# Patient Record
Sex: MALE | Race: OTHER | HISPANIC OR LATINO | URBAN - METROPOLITAN AREA
[De-identification: names, ages, dates, MRNs, and addresses within clinical notes are randomized per-mention and may not be internally consistent; named-entity substitution may affect disease eponyms.]

---

## 2017-04-02 ENCOUNTER — EMERGENCY (EMERGENCY)
Facility: HOSPITAL | Age: 32
LOS: 1 days | Discharge: HOME | End: 2017-04-02
Attending: EMERGENCY MEDICINE | Admitting: EMERGENCY MEDICINE
Payer: MEDICAID

## 2017-04-02 VITALS
HEART RATE: 65 BPM | HEIGHT: 66 IN | OXYGEN SATURATION: 97 % | RESPIRATION RATE: 18 BRPM | WEIGHT: 141.98 LBS | TEMPERATURE: 98 F | DIASTOLIC BLOOD PRESSURE: 75 MMHG | SYSTOLIC BLOOD PRESSURE: 110 MMHG

## 2017-04-02 DIAGNOSIS — S69.92XA UNSPECIFIED INJURY OF LEFT WRIST, HAND AND FINGER(S), INITIAL ENCOUNTER: ICD-10-CM
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
## 2017-04-02 DIAGNOSIS — Z88.0 ALLERGY STATUS TO PENICILLIN: ICD-10-CM
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
## 2017-04-02 DIAGNOSIS — Z79.1 LONG TERM (CURRENT) USE OF NON-STEROIDAL ANTI-INFLAMMATORIES (NSAID): ICD-10-CM

## 2017-04-02 DIAGNOSIS — Z79.891 LONG TERM (CURRENT) USE OF OPIATE ANALGESIC: ICD-10-CM

## 2017-04-02 DIAGNOSIS — Z79.899 OTHER LONG TERM (CURRENT) DRUG THERAPY: ICD-10-CM
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
## 2017-04-02 DIAGNOSIS — Z79.2 LONG TERM (CURRENT) USE OF ANTIBIOTICS: ICD-10-CM

## 2017-04-02 PROCEDURE — 73140 X-RAY EXAM OF FINGER(S): CPT | Mod: 26,LT

## 2017-04-02 PROCEDURE — 99283 EMERGENCY DEPT VISIT LOW MDM: CPT | Mod: 25

## 2017-04-02 PROCEDURE — 73140 X-RAY EXAM OF FINGER(S): CPT

## 2017-04-02 NOTE — ED PROVIDER NOTE - MUSCULOSKELETAL, MLM
L hand: + min swelling over 3rd PIP, + tend over PIP, FROM of PIP and DIP joints, no breaks in skin, no tend over metacarpals, no snuff box tend, + light touch, radial pulse 2+, cap refill < 2 sec
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
## 2017-04-02 NOTE — ED ADULT TRIAGE NOTE - CHIEF COMPLAINT QUOTE
" I noticed my finger has deformity." Pt C/O pain with movement to left 3rd finger x 1wk. Pt is able to move the finger.

## 2017-10-27 ENCOUNTER — EMERGENCY (EMERGENCY)
Facility: HOSPITAL | Age: 32
LOS: 1 days | Discharge: HOME | End: 2017-10-27
Admitting: EMERGENCY MEDICINE
Payer: MEDICAID

## 2017-10-27 VITALS
RESPIRATION RATE: 18 BRPM | HEART RATE: 67 BPM | HEIGHT: 66 IN | WEIGHT: 141.98 LBS | SYSTOLIC BLOOD PRESSURE: 100 MMHG | TEMPERATURE: 98 F | DIASTOLIC BLOOD PRESSURE: 71 MMHG | OXYGEN SATURATION: 98 %

## 2017-10-27 DIAGNOSIS — Z79.2 LONG TERM (CURRENT) USE OF ANTIBIOTICS: ICD-10-CM

## 2017-10-27 DIAGNOSIS — Z79.891 LONG TERM (CURRENT) USE OF OPIATE ANALGESIC: ICD-10-CM

## 2017-10-27 DIAGNOSIS — Z79.1 LONG TERM (CURRENT) USE OF NON-STEROIDAL ANTI-INFLAMMATORIES (NSAID): ICD-10-CM

## 2017-10-27 DIAGNOSIS — S20.211A CONTUSION OF RIGHT FRONT WALL OF THORAX, INITIAL ENCOUNTER: ICD-10-CM
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
## 2017-10-27 DIAGNOSIS — S70.01XA CONTUSION OF RIGHT HIP, INITIAL ENCOUNTER: ICD-10-CM

## 2017-10-27 DIAGNOSIS — B20 HUMAN IMMUNODEFICIENCY VIRUS [HIV] DISEASE: ICD-10-CM

## 2017-10-27 DIAGNOSIS — M25.551 PAIN IN RIGHT HIP: ICD-10-CM

## 2017-10-27 DIAGNOSIS — Z88.0 ALLERGY STATUS TO PENICILLIN: ICD-10-CM
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
## 2017-10-27 PROCEDURE — 93005 ELECTROCARDIOGRAM TRACING: CPT

## 2017-10-27 PROCEDURE — 99284 EMERGENCY DEPT VISIT MOD MDM: CPT | Mod: 25

## 2017-10-27 PROCEDURE — 71045 X-RAY EXAM CHEST 1 VIEW: CPT

## 2017-10-27 PROCEDURE — 93010 ELECTROCARDIOGRAM REPORT: CPT

## 2017-10-27 PROCEDURE — 71101 X-RAY EXAM UNILAT RIBS/CHEST: CPT | Mod: 26,RT

## 2017-10-27 PROCEDURE — 73502 X-RAY EXAM HIP UNI 2-3 VIEWS: CPT | Mod: 26,RT

## 2017-10-27 PROCEDURE — 73502 X-RAY EXAM HIP UNI 2-3 VIEWS: CPT

## 2017-10-27 PROCEDURE — 71100 X-RAY EXAM RIBS UNI 2 VIEWS: CPT

## 2017-10-27 RX ORDER — OXYCODONE AND ACETAMINOPHEN 5; 325 MG/1; MG/1
1 TABLET ORAL ONCE
Qty: 0 | Refills: 0 | Status: DISCONTINUED | OUTPATIENT
Start: 2017-10-27 | End: 2017-10-27

## 2017-10-27 RX ORDER — IBUPROFEN 200 MG
1 TABLET ORAL
Qty: 30 | Refills: 0 | OUTPATIENT
Start: 2017-10-27 | End: 2017-11-06

## 2017-10-27 RX ORDER — ACETAMINOPHEN WITH CODEINE 300MG-30MG
1 TABLET ORAL
Qty: 9 | Refills: 0 | OUTPATIENT
Start: 2017-10-27 | End: 2017-10-30

## 2017-10-27 RX ADMIN — OXYCODONE AND ACETAMINOPHEN 1 TABLET(S): 5; 325 TABLET ORAL at 19:50

## 2017-10-27 NOTE — ED PROVIDER NOTE - DIAGNOSTIC INTERPRETATION
CHEST XRAY INTERPRETATION: lungs clear, heart shadow normal, bony structures intact . no pneumothorax  ribs: no fx  x-ray hip/pelvis: no fx or dislocation

## 2017-10-27 NOTE — ED PROVIDER NOTE - RESPIRATORY, MLM
Breath sounds clear and equal bilaterally. + tenderness to right lower ribs. no bruising or redness. no deformity. equal expansion b/l
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
## 2017-10-27 NOTE — ED PROVIDER NOTE - MUSCULOSKELETAL, MLM
Spine appears normal, range of motion is not limited, + tenderness to lateral right hip.  no deformity. FROM. distal NVI. remainder of RLE non tender.

## 2017-11-07 ENCOUNTER — EMERGENCY (EMERGENCY)
Facility: HOSPITAL | Age: 32
LOS: 1 days | Discharge: HOME | End: 2017-11-07
Admitting: EMERGENCY MEDICINE
Payer: MEDICAID

## 2017-11-07 VITALS
HEART RATE: 76 BPM | HEIGHT: 66 IN | RESPIRATION RATE: 18 BRPM | OXYGEN SATURATION: 98 % | TEMPERATURE: 99 F | SYSTOLIC BLOOD PRESSURE: 111 MMHG | DIASTOLIC BLOOD PRESSURE: 74 MMHG | WEIGHT: 145.95 LBS

## 2017-11-07 DIAGNOSIS — Z79.891 LONG TERM (CURRENT) USE OF OPIATE ANALGESIC: ICD-10-CM

## 2017-11-07 DIAGNOSIS — H57.11 OCULAR PAIN, RIGHT EYE: ICD-10-CM

## 2017-11-07 DIAGNOSIS — Z79.2 LONG TERM (CURRENT) USE OF ANTIBIOTICS: ICD-10-CM

## 2017-11-07 DIAGNOSIS — Z88.0 ALLERGY STATUS TO PENICILLIN: ICD-10-CM

## 2017-11-07 DIAGNOSIS — H00.032 ABSCESS OF RIGHT LOWER EYELID: ICD-10-CM

## 2017-11-07 DIAGNOSIS — Z79.899 OTHER LONG TERM (CURRENT) DRUG THERAPY: ICD-10-CM

## 2017-11-07 DIAGNOSIS — Z79.1 LONG TERM (CURRENT) USE OF NON-STEROIDAL ANTI-INFLAMMATORIES (NSAID): ICD-10-CM

## 2017-11-07 PROCEDURE — 99283 EMERGENCY DEPT VISIT LOW MDM: CPT | Mod: 25

## 2017-11-07 PROCEDURE — 67700 BLEPHAROTOMY DRG ABSC EYELID: CPT | Mod: E4

## 2017-11-07 PROCEDURE — 67700 BLEPHAROTOMY DRG ABSC EYELID: CPT

## 2017-11-07 RX ORDER — ERYTHROMYCIN BASE 5 MG/GRAM
1 OINTMENT (GRAM) OPHTHALMIC (EYE)
Qty: 1 | Refills: 0 | OUTPATIENT
Start: 2017-11-07 | End: 2017-11-17

## 2017-11-07 NOTE — ED PROVIDER NOTE - OBJECTIVE STATEMENT
pt to ed co pain and swelling to lower lid right eye for few days getting worse no vision change no eye red 8/10 no radiation no alleviating factors onset slow  sharp pain no fever no dizzy no headache no chills no NVD no chest pain no SOB no shakes no aches no other  injury no other complaints

## 2017-11-07 NOTE — ED PROVIDER NOTE - EYES, MLM
Clear bilaterally, pupils equal, round and reactive to light. lower lid large sty with surrounding edema

## 2017-11-07 NOTE — ED ADULT NURSE NOTE - OBJECTIVE STATEMENT
c/o of swelling to upper right x 1 week with tenderness. has little white discharge from right side of eye through out the day.  pt denies any visual changes, denies left eye problem

## 2017-11-07 NOTE — ED PROVIDER NOTE - MEDICAL DECISION MAKING DETAILS
pt with abscess on right eye lid not improving small I&D done pus removed pt to FU eye I have discussed the discharge plan with the patient. The patient agrees with the plan, as discussed.  The patient understands Emergency Department diagnosis is a preliminary diagnosis often based on limited information and that the patient must adhere to the follow-up plan as discussed.  The patient understands that if the symptoms worsen or if prescribed medications do not have the desired/planned effect that the patient may return to the Emergency Department at any time for further evaluation and treatment.

## 2017-11-07 NOTE — ED ADULT TRIAGE NOTE - CHIEF COMPLAINT QUOTE
Pt c/o R eye stye and itching x one week, states he tried warm soaks. Pt states "It would be great if you guys could bust it".

## 2017-11-13 ENCOUNTER — EMERGENCY (EMERGENCY)
Facility: HOSPITAL | Age: 32
LOS: 1 days | Discharge: HOME | End: 2017-11-13
Attending: EMERGENCY MEDICINE | Admitting: EMERGENCY MEDICINE
Payer: MEDICAID

## 2017-11-13 VITALS
HEART RATE: 83 BPM | HEIGHT: 66 IN | OXYGEN SATURATION: 98 % | SYSTOLIC BLOOD PRESSURE: 119 MMHG | WEIGHT: 145.95 LBS | RESPIRATION RATE: 16 BRPM | DIASTOLIC BLOOD PRESSURE: 74 MMHG | TEMPERATURE: 98 F

## 2017-11-13 VITALS
OXYGEN SATURATION: 99 % | DIASTOLIC BLOOD PRESSURE: 70 MMHG | RESPIRATION RATE: 16 BRPM | SYSTOLIC BLOOD PRESSURE: 105 MMHG | TEMPERATURE: 98 F | HEART RATE: 76 BPM

## 2017-11-13 DIAGNOSIS — Z79.899 OTHER LONG TERM (CURRENT) DRUG THERAPY: ICD-10-CM

## 2017-11-13 DIAGNOSIS — R06.02 SHORTNESS OF BREATH: ICD-10-CM

## 2017-11-13 DIAGNOSIS — Z88.0 ALLERGY STATUS TO PENICILLIN: ICD-10-CM

## 2017-11-13 DIAGNOSIS — Z21 ASYMPTOMATIC HUMAN IMMUNODEFICIENCY VIRUS [HIV] INFECTION STATUS: ICD-10-CM

## 2017-11-13 DIAGNOSIS — R05 COUGH: ICD-10-CM

## 2017-11-13 DIAGNOSIS — Z79.891 LONG TERM (CURRENT) USE OF OPIATE ANALGESIC: ICD-10-CM

## 2017-11-13 DIAGNOSIS — Z79.2 LONG TERM (CURRENT) USE OF ANTIBIOTICS: ICD-10-CM

## 2017-11-13 PROCEDURE — 93010 ELECTROCARDIOGRAM REPORT: CPT

## 2017-11-13 PROCEDURE — 99053 MED SERV 10PM-8AM 24 HR FAC: CPT

## 2017-11-13 PROCEDURE — 71020: CPT | Mod: 26

## 2017-11-13 PROCEDURE — 99284 EMERGENCY DEPT VISIT MOD MDM: CPT | Mod: 25

## 2017-11-13 PROCEDURE — 94640 AIRWAY INHALATION TREATMENT: CPT

## 2017-11-13 PROCEDURE — 71046 X-RAY EXAM CHEST 2 VIEWS: CPT

## 2017-11-13 RX ORDER — ALBUTEROL 90 UG/1
2.5 AEROSOL, METERED ORAL ONCE
Qty: 0 | Refills: 0 | Status: COMPLETED | OUTPATIENT
Start: 2017-11-13 | End: 2017-11-13

## 2017-11-13 RX ORDER — ALBUTEROL 90 UG/1
2 AEROSOL, METERED ORAL
Qty: 1 | Refills: 0 | OUTPATIENT
Start: 2017-11-13 | End: 2017-12-13

## 2017-11-13 RX ADMIN — ALBUTEROL 2.5 MILLIGRAM(S): 90 AEROSOL, METERED ORAL at 09:14

## 2017-11-13 RX ADMIN — ALBUTEROL 2.5 MILLIGRAM(S): 90 AEROSOL, METERED ORAL at 08:47

## 2017-11-13 NOTE — ED ADULT TRIAGE NOTE - ARRIVAL INFO ADDITIONAL COMMENTS
Pt c/o sob x 4 days. Respirations unlabored in triage. Pt states he was told there was black mold in his apt and is concerned because he has intermittent sob and cough.

## 2017-11-13 NOTE — ED PROVIDER NOTE - OBJECTIVE STATEMENT
32 yo male with hx of HIV, on meds, unknown viral load but CD4 count 32 yo male with hx of HIV, on meds, unknown viral load but CD4 count in 300s presents with SOB X 4 days. Pt states he was told that there was "black mould" found in his building and is concerned that this is affecting his breathing. Some dry cough. No fevers/ chills/ leg pain or swelling. No CP. NO other complaints.

## 2017-11-13 NOTE — ED PROVIDER NOTE - MEDICAL DECISION MAKING DETAILS
30 yo male with hx of HIV, controlled on meds, presents with SOB X 4 days and concern for possible mould exposure at his place of residence. GIven nebs with improvement of sxs. CXR with NAD. Rx given and pt to f/up outpt.

## 2017-11-13 NOTE — ED ADULT NURSE NOTE - CHPI ED SYMPTOMS NEG
no diaphoresis/no fever/no edema/no headache/no chest pain/no cough/no chills/no wheezing/no hemoptysis/no body aches

## 2017-11-13 NOTE — ED ADULT NURSE NOTE - OBJECTIVE STATEMENT
hx of HIV, c/o " at work and outside I feel fine, but at home I'm waking up with short of breath for one week, my building has black mold , not sure if mold giving me short of breath" pt speaking in full sentences, no SOB noted, denies cough, no fever, no chills, no chest pain hx of HIV, c/o " at work and outside I feel fine, but at home I'm waking up with short of breath for one week, my building has black mold , not sure if mold giving me short of breath" pt speaking in full sentences, no SOB noted, denies cough, no fever, no chills, no chest pain. pt observed playing  with his phone, no s/s of distress noted. EKG in progress by DENY Metzger

## 2017-11-13 NOTE — ED ADULT NURSE NOTE - DISCHARGE TEACHING
followup with primary doctor, take medication as prescribed. Return to ED if symptoms continue or worsen

## 2018-03-06 ENCOUNTER — EMERGENCY (EMERGENCY)
Facility: HOSPITAL | Age: 33
LOS: 1 days | Discharge: HOME | End: 2018-03-06
Admitting: EMERGENCY MEDICINE
Payer: MEDICAID

## 2018-03-06 VITALS
DIASTOLIC BLOOD PRESSURE: 80 MMHG | RESPIRATION RATE: 16 BRPM | WEIGHT: 156.97 LBS | OXYGEN SATURATION: 99 % | HEART RATE: 72 BPM | TEMPERATURE: 98 F | SYSTOLIC BLOOD PRESSURE: 122 MMHG

## 2018-03-06 DIAGNOSIS — Z79.899 OTHER LONG TERM (CURRENT) DRUG THERAPY: ICD-10-CM

## 2018-03-06 DIAGNOSIS — Z79.2 LONG TERM (CURRENT) USE OF ANTIBIOTICS: ICD-10-CM

## 2018-03-06 DIAGNOSIS — R05 COUGH: ICD-10-CM

## 2018-03-06 DIAGNOSIS — J06.9 ACUTE UPPER RESPIRATORY INFECTION, UNSPECIFIED: ICD-10-CM

## 2018-03-06 DIAGNOSIS — Z88.0 ALLERGY STATUS TO PENICILLIN: ICD-10-CM

## 2018-03-06 DIAGNOSIS — Z79.891 LONG TERM (CURRENT) USE OF OPIATE ANALGESIC: ICD-10-CM

## 2018-03-06 DIAGNOSIS — Z79.1 LONG TERM (CURRENT) USE OF NON-STEROIDAL ANTI-INFLAMMATORIES (NSAID): ICD-10-CM

## 2018-03-06 LAB
ALBUMIN SERPL ELPH-MCNC: 4.2 G/DL — SIGNIFICANT CHANGE UP (ref 3.3–5)
ALP SERPL-CCNC: 78 U/L — SIGNIFICANT CHANGE UP (ref 40–120)
ALT FLD-CCNC: 28 U/L — SIGNIFICANT CHANGE UP (ref 10–45)
ANION GAP SERPL CALC-SCNC: 11 MMOL/L — SIGNIFICANT CHANGE UP (ref 5–17)
AST SERPL-CCNC: 44 U/L — HIGH (ref 10–40)
BASOPHILS NFR BLD AUTO: 0.4 % — SIGNIFICANT CHANGE UP (ref 0–2)
BILIRUB SERPL-MCNC: <0.2 MG/DL — SIGNIFICANT CHANGE UP (ref 0.2–1.2)
BUN SERPL-MCNC: 16 MG/DL — SIGNIFICANT CHANGE UP (ref 7–23)
CALCIUM SERPL-MCNC: 9.2 MG/DL — SIGNIFICANT CHANGE UP (ref 8.4–10.5)
CHLORIDE SERPL-SCNC: 104 MMOL/L — SIGNIFICANT CHANGE UP (ref 96–108)
CO2 SERPL-SCNC: 25 MMOL/L — SIGNIFICANT CHANGE UP (ref 22–31)
CREAT SERPL-MCNC: 0.9 MG/DL — SIGNIFICANT CHANGE UP (ref 0.5–1.3)
EOSINOPHIL NFR BLD AUTO: 1.1 % — SIGNIFICANT CHANGE UP (ref 0–6)
GLUCOSE SERPL-MCNC: 98 MG/DL — SIGNIFICANT CHANGE UP (ref 70–99)
HCT VFR BLD CALC: 39.6 % — SIGNIFICANT CHANGE UP (ref 39–50)
HGB BLD-MCNC: 13.2 G/DL — SIGNIFICANT CHANGE UP (ref 13–17)
LYMPHOCYTES # BLD AUTO: 38.2 % — SIGNIFICANT CHANGE UP (ref 13–44)
MCHC RBC-ENTMCNC: 30 PG — SIGNIFICANT CHANGE UP (ref 27–34)
MCHC RBC-ENTMCNC: 33.3 G/DL — SIGNIFICANT CHANGE UP (ref 32–36)
MCV RBC AUTO: 90 FL — SIGNIFICANT CHANGE UP (ref 80–100)
MONOCYTES NFR BLD AUTO: 10.9 % — SIGNIFICANT CHANGE UP (ref 2–14)
NEUTROPHILS NFR BLD AUTO: 49.4 % — SIGNIFICANT CHANGE UP (ref 43–77)
PLATELET # BLD AUTO: 271 K/UL — SIGNIFICANT CHANGE UP (ref 150–400)
POTASSIUM SERPL-MCNC: 4.6 MMOL/L — SIGNIFICANT CHANGE UP (ref 3.5–5.3)
POTASSIUM SERPL-SCNC: 4.6 MMOL/L — SIGNIFICANT CHANGE UP (ref 3.5–5.3)
PROT SERPL-MCNC: 8.1 G/DL — SIGNIFICANT CHANGE UP (ref 6–8.3)
RAPID RVP RESULT: SIGNIFICANT CHANGE UP
RBC # BLD: 4.4 M/UL — SIGNIFICANT CHANGE UP (ref 4.2–5.8)
RBC # FLD: 14.9 % — SIGNIFICANT CHANGE UP (ref 10.3–16.9)
SODIUM SERPL-SCNC: 140 MMOL/L — SIGNIFICANT CHANGE UP (ref 135–145)
WBC # BLD: 5.5 K/UL — SIGNIFICANT CHANGE UP (ref 3.8–10.5)
WBC # FLD AUTO: 5.5 K/UL — SIGNIFICANT CHANGE UP (ref 3.8–10.5)

## 2018-03-06 PROCEDURE — 87486 CHLMYD PNEUM DNA AMP PROBE: CPT

## 2018-03-06 PROCEDURE — 36415 COLL VENOUS BLD VENIPUNCTURE: CPT

## 2018-03-06 PROCEDURE — 71046 X-RAY EXAM CHEST 2 VIEWS: CPT

## 2018-03-06 PROCEDURE — 99284 EMERGENCY DEPT VISIT MOD MDM: CPT | Mod: 25

## 2018-03-06 PROCEDURE — 71046 X-RAY EXAM CHEST 2 VIEWS: CPT | Mod: 26

## 2018-03-06 PROCEDURE — 87581 M.PNEUMON DNA AMP PROBE: CPT

## 2018-03-06 PROCEDURE — 80053 COMPREHEN METABOLIC PANEL: CPT

## 2018-03-06 PROCEDURE — 93010 ELECTROCARDIOGRAM REPORT: CPT

## 2018-03-06 PROCEDURE — 96375 TX/PRO/DX INJ NEW DRUG ADDON: CPT

## 2018-03-06 PROCEDURE — 87798 DETECT AGENT NOS DNA AMP: CPT

## 2018-03-06 PROCEDURE — 87633 RESP VIRUS 12-25 TARGETS: CPT

## 2018-03-06 PROCEDURE — 96374 THER/PROPH/DIAG INJ IV PUSH: CPT

## 2018-03-06 PROCEDURE — 85025 COMPLETE CBC W/AUTO DIFF WBC: CPT

## 2018-03-06 PROCEDURE — 93005 ELECTROCARDIOGRAM TRACING: CPT

## 2018-03-06 RX ORDER — ONDANSETRON 8 MG/1
4 TABLET, FILM COATED ORAL ONCE
Qty: 0 | Refills: 0 | Status: COMPLETED | OUTPATIENT
Start: 2018-03-06 | End: 2018-03-06

## 2018-03-06 RX ORDER — AZITHROMYCIN 500 MG/1
1 TABLET, FILM COATED ORAL
Qty: 6 | Refills: 0 | OUTPATIENT
Start: 2018-03-06 | End: 2018-03-10

## 2018-03-06 RX ORDER — KETOROLAC TROMETHAMINE 30 MG/ML
30 SYRINGE (ML) INJECTION ONCE
Qty: 0 | Refills: 0 | Status: DISCONTINUED | OUTPATIENT
Start: 2018-03-06 | End: 2018-03-06

## 2018-03-06 RX ORDER — ONDANSETRON 8 MG/1
1 TABLET, FILM COATED ORAL
Qty: 9 | Refills: 0 | OUTPATIENT
Start: 2018-03-06 | End: 2018-03-08

## 2018-03-06 RX ORDER — METOCLOPRAMIDE HCL 10 MG
10 TABLET ORAL ONCE
Qty: 0 | Refills: 0 | Status: COMPLETED | OUTPATIENT
Start: 2018-03-06 | End: 2018-03-06

## 2018-03-06 RX ORDER — SODIUM CHLORIDE 9 MG/ML
1000 INJECTION INTRAMUSCULAR; INTRAVENOUS; SUBCUTANEOUS ONCE
Qty: 0 | Refills: 0 | Status: COMPLETED | OUTPATIENT
Start: 2018-03-06 | End: 2018-03-06

## 2018-03-06 RX ORDER — FAMOTIDINE 10 MG/ML
20 INJECTION INTRAVENOUS ONCE
Qty: 0 | Refills: 0 | Status: COMPLETED | OUTPATIENT
Start: 2018-03-06 | End: 2018-03-06

## 2018-03-06 RX ADMIN — ONDANSETRON 4 MILLIGRAM(S): 8 TABLET, FILM COATED ORAL at 19:51

## 2018-03-06 RX ADMIN — Medication 104 MILLIGRAM(S): at 18:34

## 2018-03-06 RX ADMIN — SODIUM CHLORIDE 1000 MILLILITER(S): 9 INJECTION INTRAMUSCULAR; INTRAVENOUS; SUBCUTANEOUS at 18:33

## 2018-03-06 RX ADMIN — Medication 30 MILLIGRAM(S): at 18:34

## 2018-03-06 RX ADMIN — FAMOTIDINE 20 MILLIGRAM(S): 10 INJECTION INTRAVENOUS at 19:52

## 2018-03-06 RX ADMIN — Medication 30 MILLIGRAM(S): at 18:51

## 2018-03-06 NOTE — ED PROVIDER NOTE - ENMT, MLM
Airway patent, Nasal congestion. Mouth with dry mucosa. Throat has no vesicles, no oropharyngeal exudates and uvula is midline. + diffuse cervical lymphadenopathy b/l

## 2018-03-06 NOTE — ED ADULT NURSE NOTE - OBJECTIVE STATEMENT
Pt presents to ED with c/o unproductive cough since Thursday, with sweating/chills and generalized body aches. Denies nausea/vomiting. States he left AMA from a different hospital who told him his EKG and chest x-ray were abnormal. Pt breathing unlabored, afebrile, VSS.

## 2018-03-06 NOTE — ED PROVIDER NOTE - OBJECTIVE STATEMENT
The pt is a 31 y/o M, HIV+ (VL >1000, CD4 300s), who presents to ED c/o cough x 4 d. Pt states cough is dry, non productive, also nasal congestion, body aches, chills, n/v/d. States that was seen at another hosp yest and signed out AMA - stating "they said my xray was very abnormal". Denies sob, dyspnea, hemoptysis, night sweats, weight loss, abd pain, dizziness, syncope, rash

## 2018-03-06 NOTE — ED PROVIDER NOTE - MEDICAL DECISION MAKING DETAILS
well appearing, non toxic pt w/cough x 4d, nasal congestion and n/v/d - benign abd, seen at another hosp and told that had abnormal cxr - however cxr clear today (discussed w/radiology), HIV+ but normal labs, symptomatically improved w/meds and ivf, will dc w/prn anti emetics, cough meds and will cover w/z pack since immunocompromised, to f/u w/his pmd (out of [facility]), pt understands and agrees w/plan

## 2018-07-31 ENCOUNTER — INPATIENT (INPATIENT)
Facility: HOSPITAL | Age: 33
LOS: 12 days | Discharge: HOME | DRG: 885 | End: 2018-08-13
Attending: STUDENT IN AN ORGANIZED HEALTH CARE EDUCATION/TRAINING PROGRAM | Admitting: STUDENT IN AN ORGANIZED HEALTH CARE EDUCATION/TRAINING PROGRAM
Payer: MEDICAID

## 2018-07-31 VITALS
SYSTOLIC BLOOD PRESSURE: 111 MMHG | WEIGHT: 128.97 LBS | OXYGEN SATURATION: 97 % | DIASTOLIC BLOOD PRESSURE: 73 MMHG | TEMPERATURE: 98 F | HEART RATE: 79 BPM | RESPIRATION RATE: 18 BRPM

## 2018-07-31 DIAGNOSIS — F33.3 MAJOR DEPRESSIVE DISORDER, RECURRENT, SEVERE WITH PSYCHOTIC SYMPTOMS: ICD-10-CM

## 2018-07-31 LAB
ALBUMIN SERPL ELPH-MCNC: 3.8 G/DL — SIGNIFICANT CHANGE UP (ref 3.3–5)
ALP SERPL-CCNC: 67 U/L — SIGNIFICANT CHANGE UP (ref 40–120)
ALT FLD-CCNC: 14 U/L — SIGNIFICANT CHANGE UP (ref 10–45)
ANION GAP SERPL CALC-SCNC: 12 MMOL/L — SIGNIFICANT CHANGE UP (ref 5–17)
APAP SERPL-MCNC: <15 UG/ML — SIGNIFICANT CHANGE UP (ref 10–30)
APPEARANCE UR: CLEAR — SIGNIFICANT CHANGE UP
AST SERPL-CCNC: 23 U/L — SIGNIFICANT CHANGE UP (ref 10–40)
BASOPHILS NFR BLD AUTO: 0.9 % — SIGNIFICANT CHANGE UP (ref 0–2)
BILIRUB SERPL-MCNC: 0.3 MG/DL — SIGNIFICANT CHANGE UP (ref 0.2–1.2)
BILIRUB UR-MCNC: NEGATIVE — SIGNIFICANT CHANGE UP
BUN SERPL-MCNC: 14 MG/DL — SIGNIFICANT CHANGE UP (ref 7–23)
CALCIUM SERPL-MCNC: 9.3 MG/DL — SIGNIFICANT CHANGE UP (ref 8.4–10.5)
CHLORIDE SERPL-SCNC: 102 MMOL/L — SIGNIFICANT CHANGE UP (ref 96–108)
CO2 SERPL-SCNC: 26 MMOL/L — SIGNIFICANT CHANGE UP (ref 22–31)
COLOR SPEC: YELLOW — SIGNIFICANT CHANGE UP
CREAT SERPL-MCNC: 0.98 MG/DL — SIGNIFICANT CHANGE UP (ref 0.5–1.3)
DIFF PNL FLD: NEGATIVE — SIGNIFICANT CHANGE UP
EOSINOPHIL NFR BLD AUTO: 2.1 % — SIGNIFICANT CHANGE UP (ref 0–6)
ETHANOL SERPL-MCNC: <10 MG/DL — SIGNIFICANT CHANGE UP (ref 0–10)
GLUCOSE SERPL-MCNC: 81 MG/DL — SIGNIFICANT CHANGE UP (ref 70–99)
GLUCOSE UR QL: NEGATIVE — SIGNIFICANT CHANGE UP
HCT VFR BLD CALC: 38.8 % — LOW (ref 39–50)
HGB BLD-MCNC: 12.6 G/DL — LOW (ref 13–17)
KETONES UR-MCNC: NEGATIVE — SIGNIFICANT CHANGE UP
LEUKOCYTE ESTERASE UR-ACNC: NEGATIVE — SIGNIFICANT CHANGE UP
LYMPHOCYTES # BLD AUTO: 45.5 % — HIGH (ref 13–44)
MCHC RBC-ENTMCNC: 28.7 PG — SIGNIFICANT CHANGE UP (ref 27–34)
MCHC RBC-ENTMCNC: 32.5 G/DL — SIGNIFICANT CHANGE UP (ref 32–36)
MCV RBC AUTO: 88.4 FL — SIGNIFICANT CHANGE UP (ref 80–100)
MONOCYTES NFR BLD AUTO: 13.8 % — SIGNIFICANT CHANGE UP (ref 2–14)
NEUTROPHILS NFR BLD AUTO: 37.7 % — LOW (ref 43–77)
NITRITE UR-MCNC: NEGATIVE — SIGNIFICANT CHANGE UP
PCP SPEC-MCNC: SIGNIFICANT CHANGE UP
PH UR: 5.5 — SIGNIFICANT CHANGE UP (ref 5–8)
PLATELET # BLD AUTO: 294 K/UL — SIGNIFICANT CHANGE UP (ref 150–400)
POTASSIUM SERPL-MCNC: 4.2 MMOL/L — SIGNIFICANT CHANGE UP (ref 3.5–5.3)
POTASSIUM SERPL-SCNC: 4.2 MMOL/L — SIGNIFICANT CHANGE UP (ref 3.5–5.3)
PROT SERPL-MCNC: 8 G/DL — SIGNIFICANT CHANGE UP (ref 6–8.3)
PROT UR-MCNC: NEGATIVE MG/DL — SIGNIFICANT CHANGE UP
RBC # BLD: 4.39 M/UL — SIGNIFICANT CHANGE UP (ref 4.2–5.8)
RBC # FLD: 13.2 % — SIGNIFICANT CHANGE UP (ref 10.3–16.9)
SALICYLATES SERPL-MCNC: <0.3 MG/DL — LOW (ref 2.8–20)
SODIUM SERPL-SCNC: 140 MMOL/L — SIGNIFICANT CHANGE UP (ref 135–145)
SP GR SPEC: 1.02 — SIGNIFICANT CHANGE UP (ref 1–1.03)
UROBILINOGEN FLD QL: 0.2 E.U./DL — SIGNIFICANT CHANGE UP
WBC # BLD: 3.3 K/UL — LOW (ref 3.8–10.5)
WBC # FLD AUTO: 3.3 K/UL — LOW (ref 3.8–10.5)

## 2018-07-31 PROCEDURE — 99285 EMERGENCY DEPT VISIT HI MDM: CPT

## 2018-07-31 RX ORDER — NICOTINE POLACRILEX 2 MG
2 GUM BUCCAL EVERY 4 HOURS
Qty: 0 | Refills: 0 | Status: DISCONTINUED | OUTPATIENT
Start: 2018-07-31 | End: 2018-08-01

## 2018-07-31 RX ORDER — HALOPERIDOL DECANOATE 100 MG/ML
5 INJECTION INTRAMUSCULAR EVERY 6 HOURS
Qty: 0 | Refills: 0 | Status: DISCONTINUED | OUTPATIENT
Start: 2018-07-31 | End: 2018-08-13

## 2018-07-31 RX ORDER — NICOTINE POLACRILEX 2 MG
1 GUM BUCCAL DAILY
Qty: 0 | Refills: 0 | Status: DISCONTINUED | OUTPATIENT
Start: 2018-07-31 | End: 2018-08-13

## 2018-07-31 RX ORDER — ACETAMINOPHEN 500 MG
650 TABLET ORAL EVERY 6 HOURS
Qty: 0 | Refills: 0 | Status: DISCONTINUED | OUTPATIENT
Start: 2018-07-31 | End: 2018-08-13

## 2018-07-31 RX ORDER — DIPHENHYDRAMINE HCL 50 MG
50 CAPSULE ORAL EVERY 6 HOURS
Qty: 0 | Refills: 0 | Status: DISCONTINUED | OUTPATIENT
Start: 2018-07-31 | End: 2018-08-13

## 2018-07-31 RX ORDER — BACITRACIN ZINC 500 UNIT/G
1 OINTMENT IN PACKET (EA) TOPICAL
Qty: 0 | Refills: 0 | Status: DISCONTINUED | OUTPATIENT
Start: 2018-07-31 | End: 2018-08-13

## 2018-07-31 RX ORDER — BACITRACIN ZINC 500 UNIT/G
1 OINTMENT IN PACKET (EA) TOPICAL ONCE
Qty: 0 | Refills: 0 | Status: COMPLETED | OUTPATIENT
Start: 2018-07-31 | End: 2018-07-31

## 2018-07-31 RX ORDER — RISPERIDONE 4 MG/1
1 TABLET ORAL ONCE
Qty: 0 | Refills: 0 | Status: COMPLETED | OUTPATIENT
Start: 2018-07-31 | End: 2018-07-31

## 2018-07-31 RX ORDER — TETANUS TOXOID, REDUCED DIPHTHERIA TOXOID AND ACELLULAR PERTUSSIS VACCINE, ADSORBED 5; 2.5; 8; 8; 2.5 [IU]/.5ML; [IU]/.5ML; UG/.5ML; UG/.5ML; UG/.5ML
0.5 SUSPENSION INTRAMUSCULAR ONCE
Qty: 0 | Refills: 0 | Status: COMPLETED | OUTPATIENT
Start: 2018-07-31 | End: 2018-07-31

## 2018-07-31 RX ADMIN — Medication 50 MILLIGRAM(S): at 22:36

## 2018-07-31 RX ADMIN — Medication 1 APPLICATION(S): at 20:19

## 2018-07-31 RX ADMIN — TETANUS TOXOID, REDUCED DIPHTHERIA TOXOID AND ACELLULAR PERTUSSIS VACCINE, ADSORBED 0.5 MILLILITER(S): 5; 2.5; 8; 8; 2.5 SUSPENSION INTRAMUSCULAR at 20:20

## 2018-07-31 RX ADMIN — RISPERIDONE 1 MILLIGRAM(S): 4 TABLET ORAL at 22:37

## 2018-07-31 NOTE — ED PROVIDER NOTE - CARE PLAN
Principal Discharge DX:	Suicide attempt  Secondary Diagnosis:	HIV (human immunodeficiency virus infection)

## 2018-07-31 NOTE — ED BEHAVIORAL HEALTH ASSESSMENT NOTE - SUMMARY
This is a 32 year old man, domiciled alone, with a past psychiatric history of major depressive disorder with psychotic features, multiple hospitalizations, multiple suicide attempts including via hanging at age 17, not currently in outpatient care, who presents after attempting suicide tonight in the context of worsening depression and psychosis. The patient is at imminent risk of harm to himself and requires inpatient hospitalization for safety and stabilization.

## 2018-07-31 NOTE — ED ADULT NURSE REASSESSMENT NOTE - NS ED NURSE REASSESS COMMENT FT1
report was called as noted, teletracking entered
spoke with Dr. Harding, states doesn't need coags
report was received from off-going RN, NAD at present, sitter at bedside, medicated as noted, lenny. well, assessment on-going, see constant observation record

## 2018-07-31 NOTE — ED BEHAVIORAL HEALTH ASSESSMENT NOTE - OTHER PAST PSYCHIATRIC HISTORY (INCLUDE DETAILS REGARDING ONSET, COURSE OF ILLNESS, INPATIENT/OUTPATIENT TREATMENT)
Multiple hospitalizations, multiple suicide attempts, on many medications, patient does not recall names

## 2018-07-31 NOTE — ED PROVIDER NOTE - PHYSICAL EXAMINATION
VITAL SIGNS: I have reviewed nursing notes and confirm.  CONSTITUTIONAL: Well-developed; well-nourished; in no acute distress.  SKIN: Agree with RN documentation regarding decubitus evaluation. Remainder of skin exam is warm and dry, no acute rash.  HEAD: Normocephalic; atraumatic.  EYES: PERRL, EOM intact; conjunctiva and sclera clear.  ENT: No nasal discharge; airway clear.  NECK: Supple; non tender, + linear abrasions over anterior neck w/out active bleeding or foreign body, no bruit or hematoma/swelling  CARD: S1, S2 normal; no murmurs, gallops, or rubs. Regular rate and rhythm.  RESP: No wheezes, rales or rhonchi.  ABD: Normal bowel sounds; soft; non-distended; non-tender; no hepatosplenomegaly.  EXT: Normal ROM. No clubbing, cyanosis or edema.  LYMPH: No acute cervical adenopathy.  NEURO: Alert, oriented. Grossly unremarkable.  PSYCH: Cooperative, dysphoric/flat affect, appears to be responding to internal stimuli at times.
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
## 2018-07-31 NOTE — ED ADULT NURSE NOTE - OTHER
not taking meds for 2 months - family dropped him off for treatment--- a lot of family responsibil;ity

## 2018-07-31 NOTE — ED BEHAVIORAL HEALTH ASSESSMENT NOTE - SUICIDE RISK FACTORS
Highly impulsive behavior/None Known/Hopelessness/Perceived burden on family and others/Unable to engage in safety planning/Mood episode/Anhedonia

## 2018-07-31 NOTE — ED ADULT NURSE NOTE - OBJECTIVE STATEMENT
"cutting"  attempt at dinner with family --auditory hallucinations -- "telling me to stop" but does not know what -- denies physical symptoms - states has "psychosis" and stopped taking meds 2 months ago because "ran out" ; knows he needs his meds- wants to be evaluated and admitted for treatment ;

## 2018-07-31 NOTE — ED BEHAVIORAL HEALTH ASSESSMENT NOTE - RISK ASSESSMENT
Patient at acute and imminent risk of harm to himself due to suicide attempt tonight, command auditory hallucinations, active suicidal ideation, past attempts, and acute psychosis.

## 2018-07-31 NOTE — ED BEHAVIORAL HEALTH ASSESSMENT NOTE - PSYCHIATRIC ISSUES AND PLAN (INCLUDE STANDING AND PRN MEDICATION)
will order PRN medications for agitation- patient received Risperdal for auditory hallucinations while in ED

## 2018-07-31 NOTE — ED ADULT NURSE NOTE - NSSISCREENINGSIGNS_ED_A_ED
past suicide attempts/ family/perceived burden to family/talking about suicide/non-complaint with treatments

## 2018-07-31 NOTE — ED ADULT NURSE NOTE - HPI (INCLUDE ILLNESS QUALITY, SEVERITY, DURATION, TIMING, CONTEXT, MODIFYING FACTORS, ASSOCIATED SIGNS AND SYMPTOMS)
psychosis? hallucination, SI and attempt at cutting neck; this episode started today - client is open with conversation and cooperative ; maintained on constant observation since arrival

## 2018-07-31 NOTE — ED ADULT NURSE NOTE - NSIMPLEMENTINTERV_GEN_ALL_ED
Implemented All Universal Safety Interventions:  [place] to call system. Call bell, personal items and telephone within reach. Instruct patient to call for assistance. Room bathroom lighting operational. Non-slip footwear when patient is off stretcher. Physically safe environment: no spills, clutter or unnecessary equipment. Stretcher in lowest position, wheels locked, appropriate side rails in place.

## 2018-07-31 NOTE — ED PROVIDER NOTE - OBJECTIVE STATEMENT
33 y/o M w/psych hx (schizophrenia? MDD?) w/several suicide attempts in the past and hospitalizations, last hospitalization 1yr ago, not actively on psych meds, p/w suicide attempt cutting his neck with a kitchen knife while at dinner with his family. Doesn't remember his actions. States he heard voices in his head urging him to kill himself. No VH or HI. Denies substance abuse. No medical complaints at this time or active bleeding from small abrasion on neck.

## 2018-07-31 NOTE — ED BEHAVIORAL HEALTH ASSESSMENT NOTE - DETAILS
spoke with SKYE Stevens on [facility] Collaborated with Dr. Finley actively wants to die by cutting father had psychosis irritation and erythema/superficial lacerations R neck Saying "STOP!"

## 2018-08-01 DIAGNOSIS — B20 HUMAN IMMUNODEFICIENCY VIRUS [HIV] DISEASE: ICD-10-CM

## 2018-08-01 DIAGNOSIS — F19.90 OTHER PSYCHOACTIVE SUBSTANCE USE, UNSPECIFIED, UNCOMPLICATED: ICD-10-CM

## 2018-08-01 DIAGNOSIS — Z72.0 TOBACCO USE: ICD-10-CM

## 2018-08-01 LAB
CHOLEST SERPL-MCNC: 165 MG/DL — SIGNIFICANT CHANGE UP (ref 10–199)
CULTURE RESULTS: NO GROWTH — SIGNIFICANT CHANGE UP
HBA1C BLD-MCNC: 5.6 % — SIGNIFICANT CHANGE UP (ref 4–5.6)
HDLC SERPL-MCNC: 43 MG/DL — SIGNIFICANT CHANGE UP (ref 40–125)
LIPID PNL WITH DIRECT LDL SERPL: 95 MG/DL — SIGNIFICANT CHANGE UP
SPECIMEN SOURCE: SIGNIFICANT CHANGE UP
T PALLIDUM AB TITR SER: NEGATIVE — SIGNIFICANT CHANGE UP
TOTAL CHOLESTEROL/HDL RATIO MEASUREMENT: 3.8 RATIO — SIGNIFICANT CHANGE UP (ref 3.4–9.6)
TRIGL SERPL-MCNC: 134 MG/DL — SIGNIFICANT CHANGE UP (ref 10–149)

## 2018-08-01 PROCEDURE — 99223 1ST HOSP IP/OBS HIGH 75: CPT

## 2018-08-01 RX ORDER — NICOTINE POLACRILEX 2 MG
2 GUM BUCCAL
Qty: 0 | Refills: 0 | Status: DISCONTINUED | OUTPATIENT
Start: 2018-08-01 | End: 2018-08-13

## 2018-08-01 RX ORDER — HALOPERIDOL DECANOATE 100 MG/ML
5 INJECTION INTRAMUSCULAR
Qty: 0 | Refills: 0 | Status: DISCONTINUED | OUTPATIENT
Start: 2018-08-01 | End: 2018-08-02

## 2018-08-01 RX ORDER — SERTRALINE 25 MG/1
50 TABLET, FILM COATED ORAL DAILY
Qty: 0 | Refills: 0 | Status: DISCONTINUED | OUTPATIENT
Start: 2018-08-01 | End: 2018-08-03

## 2018-08-01 RX ADMIN — HALOPERIDOL DECANOATE 5 MILLIGRAM(S): 100 INJECTION INTRAMUSCULAR at 22:06

## 2018-08-01 RX ADMIN — HALOPERIDOL DECANOATE 5 MILLIGRAM(S): 100 INJECTION INTRAMUSCULAR at 11:10

## 2018-08-01 RX ADMIN — Medication 50 MILLIGRAM(S): at 22:05

## 2018-08-01 RX ADMIN — Medication 1 PATCH: at 11:05

## 2018-08-01 RX ADMIN — Medication 1 PATCH: at 11:08

## 2018-08-01 NOTE — BEHAVIORAL HEALTH ASSESSMENT NOTE - NSBHADMITIPSTRENGTH_PSY_A_CORE
Intelligent/Creative/Attempting to realize his/her potential/Has access to housing/residential stability
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
## 2018-08-01 NOTE — BEHAVIORAL HEALTH ASSESSMENT NOTE - NSBHADMITCOUNSEL_PSY_A_CORE
diagnostic results/impressions and/or recommended studies/importance of adherence to chosen treatment/risks and benefits of treatment options/client/family/caregiver education/instructions for management, treatment and follow up/risk factor reduction

## 2018-08-01 NOTE — BEHAVIORAL HEALTH ASSESSMENT NOTE - NSBHSUICRISKFACTOR_PSY_A_CORE
Perceived burden on family and others/Global insomnia/Substance abuse/dependence/History of abuse/trauma/Family history of suicide/Chronic pain or acute medical issue/Hopelessness/Mood episode/Command hallucinations to hurt self/Anhedonia

## 2018-08-01 NOTE — BEHAVIORAL HEALTH ASSESSMENT NOTE - NSBHCHARTREVIEWLAB_PSY_A_CORE FT
12.6                 140  | 26   | 14           3.3   >-----------< 294     ------------------------< 81                    38.8                 4.2  | 102  | 0.98                                         Ca 9.3   Mg x     Ph x

## 2018-08-01 NOTE — BEHAVIORAL HEALTH ASSESSMENT NOTE - NSBHMEDSOTHERFT_PSY_A_CORE
Unable to recall Unable to recall, per pharmacy  Tivicay 50 mg  Olanzapine 5 mg  Descovy 200/25  Sertraline 100 mg

## 2018-08-01 NOTE — BEHAVIORAL HEALTH ASSESSMENT NOTE - DESCRIPTION (FIRST USE, LAST USE, QUANTITY, FREQUENCY, DURATION)
2 packs per day hx of use, none recently states that cigarettes were recently laced with cocaine hx of IV use, none recently denies any hx of rehab/detox

## 2018-08-01 NOTE — BEHAVIORAL HEALTH ASSESSMENT NOTE - NSBHCHARTREVIEWVS_PSY_A_CORE FT
Vital Signs Last 24 Hrs  T(C): 36.6 (01 Aug 2018 10:04), Max: 36.7 (31 Jul 2018 19:15)  T(F): 97.9 (01 Aug 2018 10:04), Max: 98.1 (31 Jul 2018 19:15)  HR: 57 (01 Aug 2018 10:04) (57 - 79)  BP: 99/67 (01 Aug 2018 10:04) (99/67 - 112/66)  BP(mean): --  RR: 17 (31 Jul 2018 21:00) (17 - 18)  SpO2: 98% (31 Jul 2018 21:00) (97% - 98%)
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
## 2018-08-02 LAB
4/8 RATIO: 0.49 RATIO — LOW (ref 0.9–3.6)
ABS CD8: 654 /UL — SIGNIFICANT CHANGE UP (ref 136–757)
CD16+CD56+ CELLS NFR BLD: 26 % — HIGH (ref 4–25)
CD16+CD56+ CELLS NFR SPEC: 411 /UL — SIGNIFICANT CHANGE UP (ref 64–494)
CD19 BLASTS SPEC-ACNC: 125 /UL — SIGNIFICANT CHANGE UP (ref 68–528)
CD19 BLASTS SPEC-ACNC: 8 % — SIGNIFICANT CHANGE UP (ref 5–22)
CD3 BLASTS SPEC-ACNC: 1022 /UL — SIGNIFICANT CHANGE UP (ref 799–2171)
CD3 BLASTS SPEC-ACNC: 66 % — SIGNIFICANT CHANGE UP (ref 59–85)
CD4 %: 22 % — LOW (ref 36–65)
CD8 %: 44 % — HIGH (ref 11–36)
FOLATE SERPL-MCNC: 8 NG/ML — SIGNIFICANT CHANGE UP
HIV-1 VIRAL LOAD RESULT: ABNORMAL
HIV1 RNA # SERPL NAA+PROBE: SIGNIFICANT CHANGE UP
HIV1 RNA SER-IMP: SIGNIFICANT CHANGE UP
HIV1 RNA SERPL NAA+PROBE-ACNC: ABNORMAL
HIV1 RNA SERPL NAA+PROBE-LOG#: 4.18 — SIGNIFICANT CHANGE UP
T-CELL CD4 SUBSET PNL BLD: 321 /UL — LOW (ref 489–1457)
T3 SERPL-MCNC: 83 NG/DL — SIGNIFICANT CHANGE UP (ref 80–200)
T4 AB SER-ACNC: 4.97 UG/DL — SIGNIFICANT CHANGE UP (ref 3.17–11.72)
TSH SERPL-MCNC: 1.78 UIU/ML — SIGNIFICANT CHANGE UP (ref 0.35–4.94)
VIT B12 SERPL-MCNC: 582 PG/ML — SIGNIFICANT CHANGE UP (ref 232–1245)
VIT D25+D1,25 OH+D1,25 PNL SERPL-MCNC: 39.5 PG/ML — SIGNIFICANT CHANGE UP (ref 19.9–79.3)

## 2018-08-02 PROCEDURE — 99232 SBSQ HOSP IP/OBS MODERATE 35: CPT

## 2018-08-02 RX ORDER — BENZTROPINE MESYLATE 1 MG
1 TABLET ORAL
Qty: 0 | Refills: 0 | Status: DISCONTINUED | OUTPATIENT
Start: 2018-08-02 | End: 2018-08-06

## 2018-08-02 RX ORDER — HALOPERIDOL DECANOATE 100 MG/ML
10 INJECTION INTRAMUSCULAR AT BEDTIME
Qty: 0 | Refills: 0 | Status: DISCONTINUED | OUTPATIENT
Start: 2018-08-02 | End: 2018-08-13

## 2018-08-02 RX ORDER — HALOPERIDOL DECANOATE 100 MG/ML
5 INJECTION INTRAMUSCULAR DAILY
Qty: 0 | Refills: 0 | Status: DISCONTINUED | OUTPATIENT
Start: 2018-08-02 | End: 2018-08-13

## 2018-08-02 RX ADMIN — HALOPERIDOL DECANOATE 10 MILLIGRAM(S): 100 INJECTION INTRAMUSCULAR at 22:13

## 2018-08-02 RX ADMIN — HALOPERIDOL DECANOATE 5 MILLIGRAM(S): 100 INJECTION INTRAMUSCULAR at 10:59

## 2018-08-02 RX ADMIN — Medication 50 MILLIGRAM(S): at 22:13

## 2018-08-02 RX ADMIN — SERTRALINE 50 MILLIGRAM(S): 25 TABLET, FILM COATED ORAL at 10:59

## 2018-08-03 PROCEDURE — 70450 CT HEAD/BRAIN W/O DYE: CPT | Mod: 26

## 2018-08-03 PROCEDURE — 99232 SBSQ HOSP IP/OBS MODERATE 35: CPT

## 2018-08-03 RX ORDER — DOLUTEGRAVIR SODIUM 25 MG/1
50 TABLET, FILM COATED ORAL DAILY
Qty: 0 | Refills: 0 | Status: DISCONTINUED | OUTPATIENT
Start: 2018-08-03 | End: 2018-08-13

## 2018-08-03 RX ORDER — EMTRICITABINE AND TENOFOVIR DISOPROXIL FUMARATE 200; 300 MG/1; MG/1
1 TABLET, FILM COATED ORAL DAILY
Qty: 0 | Refills: 0 | Status: DISCONTINUED | OUTPATIENT
Start: 2018-08-03 | End: 2018-08-13

## 2018-08-03 RX ORDER — SERTRALINE 25 MG/1
75 TABLET, FILM COATED ORAL DAILY
Qty: 0 | Refills: 0 | Status: DISCONTINUED | OUTPATIENT
Start: 2018-08-03 | End: 2018-08-06

## 2018-08-03 RX ADMIN — SERTRALINE 50 MILLIGRAM(S): 25 TABLET, FILM COATED ORAL at 09:50

## 2018-08-03 RX ADMIN — HALOPERIDOL DECANOATE 10 MILLIGRAM(S): 100 INJECTION INTRAMUSCULAR at 21:36

## 2018-08-03 RX ADMIN — Medication 50 MILLIGRAM(S): at 21:36

## 2018-08-03 RX ADMIN — HALOPERIDOL DECANOATE 5 MILLIGRAM(S): 100 INJECTION INTRAMUSCULAR at 09:50

## 2018-08-04 RX ADMIN — SERTRALINE 75 MILLIGRAM(S): 25 TABLET, FILM COATED ORAL at 10:33

## 2018-08-04 RX ADMIN — HALOPERIDOL DECANOATE 10 MILLIGRAM(S): 100 INJECTION INTRAMUSCULAR at 22:11

## 2018-08-04 RX ADMIN — HALOPERIDOL DECANOATE 5 MILLIGRAM(S): 100 INJECTION INTRAMUSCULAR at 10:33

## 2018-08-04 RX ADMIN — DOLUTEGRAVIR SODIUM 50 MILLIGRAM(S): 25 TABLET, FILM COATED ORAL at 10:33

## 2018-08-04 RX ADMIN — Medication 50 MILLIGRAM(S): at 22:11

## 2018-08-04 RX ADMIN — EMTRICITABINE AND TENOFOVIR DISOPROXIL FUMARATE 1 TABLET(S): 200; 300 TABLET, FILM COATED ORAL at 10:33

## 2018-08-05 RX ADMIN — DOLUTEGRAVIR SODIUM 50 MILLIGRAM(S): 25 TABLET, FILM COATED ORAL at 11:56

## 2018-08-05 RX ADMIN — HALOPERIDOL DECANOATE 10 MILLIGRAM(S): 100 INJECTION INTRAMUSCULAR at 22:22

## 2018-08-05 RX ADMIN — SERTRALINE 75 MILLIGRAM(S): 25 TABLET, FILM COATED ORAL at 11:56

## 2018-08-05 RX ADMIN — HALOPERIDOL DECANOATE 5 MILLIGRAM(S): 100 INJECTION INTRAMUSCULAR at 11:56

## 2018-08-05 RX ADMIN — EMTRICITABINE AND TENOFOVIR DISOPROXIL FUMARATE 1 TABLET(S): 200; 300 TABLET, FILM COATED ORAL at 11:56

## 2018-08-05 RX ADMIN — Medication 50 MILLIGRAM(S): at 22:22

## 2018-08-06 PROCEDURE — 99232 SBSQ HOSP IP/OBS MODERATE 35: CPT

## 2018-08-06 RX ORDER — TRAZODONE HCL 50 MG
50 TABLET ORAL AT BEDTIME
Qty: 0 | Refills: 0 | Status: DISCONTINUED | OUTPATIENT
Start: 2018-08-06 | End: 2018-08-13

## 2018-08-06 RX ORDER — BENZTROPINE MESYLATE 1 MG
1 TABLET ORAL
Qty: 0 | Refills: 0 | Status: DISCONTINUED | OUTPATIENT
Start: 2018-08-06 | End: 2018-08-13

## 2018-08-06 RX ORDER — TRAZODONE HCL 50 MG
50 TABLET ORAL AT BEDTIME
Qty: 0 | Refills: 0 | Status: DISCONTINUED | OUTPATIENT
Start: 2018-08-06 | End: 2018-08-07

## 2018-08-06 RX ORDER — SERTRALINE 25 MG/1
100 TABLET, FILM COATED ORAL DAILY
Qty: 0 | Refills: 0 | Status: DISCONTINUED | OUTPATIENT
Start: 2018-08-06 | End: 2018-08-13

## 2018-08-06 RX ADMIN — HALOPERIDOL DECANOATE 5 MILLIGRAM(S): 100 INJECTION INTRAMUSCULAR at 12:28

## 2018-08-06 RX ADMIN — Medication 50 MILLIGRAM(S): at 22:43

## 2018-08-06 RX ADMIN — HALOPERIDOL DECANOATE 5 MILLIGRAM(S): 100 INJECTION INTRAMUSCULAR at 22:42

## 2018-08-06 RX ADMIN — Medication 1 MILLIGRAM(S): at 22:42

## 2018-08-06 RX ADMIN — EMTRICITABINE AND TENOFOVIR DISOPROXIL FUMARATE 1 TABLET(S): 200; 300 TABLET, FILM COATED ORAL at 12:28

## 2018-08-06 RX ADMIN — Medication 50 MILLIGRAM(S): at 22:42

## 2018-08-06 RX ADMIN — DOLUTEGRAVIR SODIUM 50 MILLIGRAM(S): 25 TABLET, FILM COATED ORAL at 12:28

## 2018-08-06 RX ADMIN — SERTRALINE 75 MILLIGRAM(S): 25 TABLET, FILM COATED ORAL at 12:28

## 2018-08-07 PROCEDURE — 99232 SBSQ HOSP IP/OBS MODERATE 35: CPT

## 2018-08-07 RX ORDER — TRAZODONE HCL 50 MG
100 TABLET ORAL AT BEDTIME
Qty: 0 | Refills: 0 | Status: DISCONTINUED | OUTPATIENT
Start: 2018-08-07 | End: 2018-08-13

## 2018-08-07 RX ADMIN — SERTRALINE 100 MILLIGRAM(S): 25 TABLET, FILM COATED ORAL at 10:13

## 2018-08-07 RX ADMIN — EMTRICITABINE AND TENOFOVIR DISOPROXIL FUMARATE 1 TABLET(S): 200; 300 TABLET, FILM COATED ORAL at 10:13

## 2018-08-07 RX ADMIN — HALOPERIDOL DECANOATE 5 MILLIGRAM(S): 100 INJECTION INTRAMUSCULAR at 10:13

## 2018-08-07 RX ADMIN — HALOPERIDOL DECANOATE 10 MILLIGRAM(S): 100 INJECTION INTRAMUSCULAR at 21:40

## 2018-08-07 RX ADMIN — Medication 1 MILLIGRAM(S): at 21:38

## 2018-08-07 RX ADMIN — Medication 2 MILLIGRAM(S): at 19:14

## 2018-08-07 RX ADMIN — Medication 100 MILLIGRAM(S): at 21:39

## 2018-08-07 RX ADMIN — Medication 1 MILLIGRAM(S): at 10:13

## 2018-08-07 RX ADMIN — DOLUTEGRAVIR SODIUM 50 MILLIGRAM(S): 25 TABLET, FILM COATED ORAL at 10:13

## 2018-08-08 PROCEDURE — 99232 SBSQ HOSP IP/OBS MODERATE 35: CPT

## 2018-08-08 RX ORDER — HALOPERIDOL DECANOATE 100 MG/ML
100 INJECTION INTRAMUSCULAR
Qty: 0 | Refills: 0 | Status: DISCONTINUED | OUTPATIENT
Start: 2018-08-08 | End: 2018-08-09

## 2018-08-08 RX ADMIN — Medication 50 MILLIGRAM(S): at 21:21

## 2018-08-08 RX ADMIN — HALOPERIDOL DECANOATE 10 MILLIGRAM(S): 100 INJECTION INTRAMUSCULAR at 21:22

## 2018-08-08 RX ADMIN — Medication 1 MILLIGRAM(S): at 21:22

## 2018-08-08 RX ADMIN — HALOPERIDOL DECANOATE 5 MILLIGRAM(S): 100 INJECTION INTRAMUSCULAR at 09:44

## 2018-08-08 RX ADMIN — EMTRICITABINE AND TENOFOVIR DISOPROXIL FUMARATE 1 TABLET(S): 200; 300 TABLET, FILM COATED ORAL at 09:44

## 2018-08-08 RX ADMIN — Medication 50 MILLIGRAM(S): at 11:53

## 2018-08-08 RX ADMIN — Medication 2 MILLIGRAM(S): at 19:24

## 2018-08-08 RX ADMIN — SERTRALINE 100 MILLIGRAM(S): 25 TABLET, FILM COATED ORAL at 09:44

## 2018-08-08 RX ADMIN — HALOPERIDOL DECANOATE 100 MILLIGRAM(S): 100 INJECTION INTRAMUSCULAR at 19:26

## 2018-08-08 RX ADMIN — Medication 1 MILLIGRAM(S): at 09:44

## 2018-08-08 RX ADMIN — Medication 100 MILLIGRAM(S): at 21:22

## 2018-08-08 RX ADMIN — DOLUTEGRAVIR SODIUM 50 MILLIGRAM(S): 25 TABLET, FILM COATED ORAL at 09:44

## 2018-08-08 RX ADMIN — Medication 2 MILLIGRAM(S): at 09:48

## 2018-08-08 NOTE — CHART NOTE - NSCHARTNOTEFT_GEN_A_CORE
Patient seen and evaluated.  Chart reviewed.  Case discussed with interdisciplinary team, including Yumi Whatley NP.  Patient is calm, cooperative, and pleasant.  He states that he is feeling better and his thoughts are more clear than when he first came in.  He acknowledges AH PTA, and states that they have since dissipated.  He reports improved mood.  He denies SI/HI/AH/VH at this time.  He is future-oriented and looking forward to moving to [place] in the near future.  Sleeps well at night, +appetite.  He is compliant with meds, denies side effects, and is agreeable to [facility] prior to discharge.  Does not appear to be responding to internal stimuli, well related.  Denies substance abuse, though utox + for cocaine.  Psychoeducation provided re: risks associated with susbstance use.  He presents with a euthymic affect.  DDX- likely mdd with psychotic features, though need to r/o primary psychotic d/o.  Agree with continuing NP's plan.  Patient is stabilizing, acute risk factors are being mitigated and patient is at low acute risk of harm to self/others at this time.  If remains stable and accepts [facility], can likely discharge pt with outpt follow up by the end of the week.

## 2018-08-09 PROCEDURE — 99232 SBSQ HOSP IP/OBS MODERATE 35: CPT

## 2018-08-09 RX ORDER — HALOPERIDOL DECANOATE 100 MG/ML
50 INJECTION INTRAMUSCULAR
Qty: 0 | Refills: 0 | Status: DISCONTINUED | OUTPATIENT
Start: 2018-08-10 | End: 2018-08-13

## 2018-08-09 RX ADMIN — Medication 1 MILLIGRAM(S): at 10:20

## 2018-08-09 RX ADMIN — Medication 50 MILLIGRAM(S): at 21:39

## 2018-08-09 RX ADMIN — Medication 1 MILLIGRAM(S): at 21:37

## 2018-08-09 RX ADMIN — DOLUTEGRAVIR SODIUM 50 MILLIGRAM(S): 25 TABLET, FILM COATED ORAL at 10:20

## 2018-08-09 RX ADMIN — SERTRALINE 100 MILLIGRAM(S): 25 TABLET, FILM COATED ORAL at 10:20

## 2018-08-09 RX ADMIN — HALOPERIDOL DECANOATE 10 MILLIGRAM(S): 100 INJECTION INTRAMUSCULAR at 21:38

## 2018-08-09 RX ADMIN — Medication 100 MILLIGRAM(S): at 21:37

## 2018-08-09 RX ADMIN — HALOPERIDOL DECANOATE 5 MILLIGRAM(S): 100 INJECTION INTRAMUSCULAR at 10:20

## 2018-08-09 RX ADMIN — EMTRICITABINE AND TENOFOVIR DISOPROXIL FUMARATE 1 TABLET(S): 200; 300 TABLET, FILM COATED ORAL at 10:20

## 2018-08-10 PROCEDURE — 99232 SBSQ HOSP IP/OBS MODERATE 35: CPT

## 2018-08-10 RX ADMIN — Medication 100 MILLIGRAM(S): at 21:33

## 2018-08-10 RX ADMIN — HALOPERIDOL DECANOATE 5 MILLIGRAM(S): 100 INJECTION INTRAMUSCULAR at 12:52

## 2018-08-10 RX ADMIN — Medication 50 MILLIGRAM(S): at 21:33

## 2018-08-10 RX ADMIN — SERTRALINE 100 MILLIGRAM(S): 25 TABLET, FILM COATED ORAL at 12:52

## 2018-08-10 RX ADMIN — Medication 1 MILLIGRAM(S): at 21:33

## 2018-08-10 RX ADMIN — HALOPERIDOL DECANOATE 50 MILLIGRAM(S): 100 INJECTION INTRAMUSCULAR at 12:54

## 2018-08-10 RX ADMIN — Medication 1 MILLIGRAM(S): at 12:51

## 2018-08-10 RX ADMIN — EMTRICITABINE AND TENOFOVIR DISOPROXIL FUMARATE 1 TABLET(S): 200; 300 TABLET, FILM COATED ORAL at 12:53

## 2018-08-10 RX ADMIN — DOLUTEGRAVIR SODIUM 50 MILLIGRAM(S): 25 TABLET, FILM COATED ORAL at 12:53

## 2018-08-10 RX ADMIN — HALOPERIDOL DECANOATE 10 MILLIGRAM(S): 100 INJECTION INTRAMUSCULAR at 21:33

## 2018-08-10 RX ADMIN — Medication 50 MILLIGRAM(S): at 15:28

## 2018-08-11 RX ADMIN — Medication 50 MILLIGRAM(S): at 22:03

## 2018-08-11 RX ADMIN — HALOPERIDOL DECANOATE 10 MILLIGRAM(S): 100 INJECTION INTRAMUSCULAR at 22:02

## 2018-08-11 RX ADMIN — Medication 100 MILLIGRAM(S): at 22:02

## 2018-08-11 RX ADMIN — Medication 1 MILLIGRAM(S): at 11:10

## 2018-08-11 RX ADMIN — HALOPERIDOL DECANOATE 5 MILLIGRAM(S): 100 INJECTION INTRAMUSCULAR at 11:10

## 2018-08-11 RX ADMIN — Medication 1 MILLIGRAM(S): at 22:02

## 2018-08-11 RX ADMIN — EMTRICITABINE AND TENOFOVIR DISOPROXIL FUMARATE 1 TABLET(S): 200; 300 TABLET, FILM COATED ORAL at 11:10

## 2018-08-11 RX ADMIN — DOLUTEGRAVIR SODIUM 50 MILLIGRAM(S): 25 TABLET, FILM COATED ORAL at 11:10

## 2018-08-11 RX ADMIN — Medication 50 MILLIGRAM(S): at 12:54

## 2018-08-11 RX ADMIN — SERTRALINE 100 MILLIGRAM(S): 25 TABLET, FILM COATED ORAL at 11:10

## 2018-08-12 RX ADMIN — Medication 1 MILLIGRAM(S): at 21:28

## 2018-08-12 RX ADMIN — HALOPERIDOL DECANOATE 10 MILLIGRAM(S): 100 INJECTION INTRAMUSCULAR at 21:28

## 2018-08-12 RX ADMIN — Medication 1 MILLIGRAM(S): at 10:13

## 2018-08-12 RX ADMIN — DOLUTEGRAVIR SODIUM 50 MILLIGRAM(S): 25 TABLET, FILM COATED ORAL at 10:13

## 2018-08-12 RX ADMIN — Medication 50 MILLIGRAM(S): at 21:28

## 2018-08-12 RX ADMIN — HALOPERIDOL DECANOATE 5 MILLIGRAM(S): 100 INJECTION INTRAMUSCULAR at 10:13

## 2018-08-12 RX ADMIN — SERTRALINE 100 MILLIGRAM(S): 25 TABLET, FILM COATED ORAL at 10:13

## 2018-08-12 RX ADMIN — Medication 50 MILLIGRAM(S): at 14:27

## 2018-08-12 RX ADMIN — EMTRICITABINE AND TENOFOVIR DISOPROXIL FUMARATE 1 TABLET(S): 200; 300 TABLET, FILM COATED ORAL at 10:14

## 2018-08-12 RX ADMIN — Medication 100 MILLIGRAM(S): at 21:28

## 2018-08-13 VITALS
TEMPERATURE: 98 F | DIASTOLIC BLOOD PRESSURE: 72 MMHG | HEART RATE: 71 BPM | SYSTOLIC BLOOD PRESSURE: 107 MMHG | RESPIRATION RATE: 18 BRPM

## 2018-08-13 PROCEDURE — 87086 URINE CULTURE/COLONY COUNT: CPT

## 2018-08-13 PROCEDURE — 84436 ASSAY OF TOTAL THYROXINE: CPT

## 2018-08-13 PROCEDURE — 70450 CT HEAD/BRAIN W/O DYE: CPT

## 2018-08-13 PROCEDURE — 84480 ASSAY TRIIODOTHYRONINE (T3): CPT

## 2018-08-13 PROCEDURE — 86780 TREPONEMA PALLIDUM: CPT

## 2018-08-13 PROCEDURE — 82746 ASSAY OF FOLIC ACID SERUM: CPT

## 2018-08-13 PROCEDURE — 82652 VIT D 1 25-DIHYDROXY: CPT

## 2018-08-13 PROCEDURE — 85025 COMPLETE CBC W/AUTO DIFF WBC: CPT

## 2018-08-13 PROCEDURE — 36415 COLL VENOUS BLD VENIPUNCTURE: CPT

## 2018-08-13 PROCEDURE — 87536 HIV-1 QUANT&REVRSE TRNSCRPJ: CPT

## 2018-08-13 PROCEDURE — 80307 DRUG TEST PRSMV CHEM ANLYZR: CPT

## 2018-08-13 PROCEDURE — 99238 HOSP IP/OBS DSCHRG MGMT 30/<: CPT

## 2018-08-13 PROCEDURE — 90471 IMMUNIZATION ADMIN: CPT

## 2018-08-13 PROCEDURE — 90715 TDAP VACCINE 7 YRS/> IM: CPT

## 2018-08-13 PROCEDURE — 84443 ASSAY THYROID STIM HORMONE: CPT

## 2018-08-13 PROCEDURE — 80061 LIPID PANEL: CPT

## 2018-08-13 PROCEDURE — 80053 COMPREHEN METABOLIC PANEL: CPT

## 2018-08-13 PROCEDURE — 99285 EMERGENCY DEPT VISIT HI MDM: CPT | Mod: 25

## 2018-08-13 PROCEDURE — 81003 URINALYSIS AUTO W/O SCOPE: CPT

## 2018-08-13 PROCEDURE — 82607 VITAMIN B-12: CPT

## 2018-08-13 PROCEDURE — 83036 HEMOGLOBIN GLYCOSYLATED A1C: CPT

## 2018-08-13 RX ORDER — EMTRICITABINE AND TENOFOVIR DISOPROXIL FUMARATE 200; 300 MG/1; MG/1
0 TABLET, FILM COATED ORAL
Qty: 0 | Refills: 0 | COMMUNITY

## 2018-08-13 RX ORDER — TRAZODONE HCL 50 MG
1 TABLET ORAL
Qty: 21 | Refills: 0
Start: 2018-08-13 | End: 2018-09-02

## 2018-08-13 RX ORDER — TRAZODONE HCL 50 MG
1 TABLET ORAL
Qty: 0 | Refills: 0 | COMMUNITY
Start: 2018-08-13

## 2018-08-13 RX ORDER — EMTRICITABINE AND TENOFOVIR DISOPROXIL FUMARATE 200; 300 MG/1; MG/1
1 TABLET, FILM COATED ORAL
Qty: 30 | Refills: 0
Start: 2018-08-13 | End: 2018-09-11

## 2018-08-13 RX ORDER — HALOPERIDOL DECANOATE 100 MG/ML
1 INJECTION INTRAMUSCULAR
Qty: 21 | Refills: 0
Start: 2018-08-13 | End: 2018-09-02

## 2018-08-13 RX ORDER — SERTRALINE 25 MG/1
1 TABLET, FILM COATED ORAL
Qty: 0 | Refills: 0 | COMMUNITY
Start: 2018-08-13

## 2018-08-13 RX ORDER — NICOTINE POLACRILEX 2 MG
0 GUM BUCCAL
Qty: 0 | Refills: 0 | COMMUNITY
Start: 2018-08-13

## 2018-08-13 RX ORDER — BENZTROPINE MESYLATE 1 MG
1 TABLET ORAL
Qty: 42 | Refills: 0
Start: 2018-08-13 | End: 2018-09-02

## 2018-08-13 RX ORDER — BENZTROPINE MESYLATE 1 MG
1 TABLET ORAL
Qty: 0 | Refills: 0 | COMMUNITY
Start: 2018-08-13

## 2018-08-13 RX ORDER — SERTRALINE 25 MG/1
1 TABLET, FILM COATED ORAL
Qty: 21 | Refills: 0
Start: 2018-08-13 | End: 2018-09-02

## 2018-08-13 RX ORDER — NICOTINE POLACRILEX 2 MG
1 GUM BUCCAL
Qty: 360 | Refills: 0
Start: 2018-08-13 | End: 2018-09-11

## 2018-08-13 RX ORDER — EMTRICITABINE AND TENOFOVIR DISOPROXIL FUMARATE 200; 300 MG/1; MG/1
1 TABLET, FILM COATED ORAL
Qty: 0 | Refills: 0 | COMMUNITY
Start: 2018-08-13

## 2018-08-13 RX ORDER — DOLUTEGRAVIR SODIUM 25 MG/1
1 TABLET, FILM COATED ORAL
Qty: 30 | Refills: 0
Start: 2018-08-13 | End: 2018-09-11

## 2018-08-13 RX ORDER — NICOTINE POLACRILEX 2 MG
1 GUM BUCCAL
Qty: 30 | Refills: 0
Start: 2018-08-13 | End: 2018-09-11

## 2018-08-13 RX ADMIN — HALOPERIDOL DECANOATE 5 MILLIGRAM(S): 100 INJECTION INTRAMUSCULAR at 10:34

## 2018-08-13 RX ADMIN — DOLUTEGRAVIR SODIUM 50 MILLIGRAM(S): 25 TABLET, FILM COATED ORAL at 10:34

## 2018-08-13 RX ADMIN — SERTRALINE 100 MILLIGRAM(S): 25 TABLET, FILM COATED ORAL at 10:34

## 2018-08-13 RX ADMIN — EMTRICITABINE AND TENOFOVIR DISOPROXIL FUMARATE 1 TABLET(S): 200; 300 TABLET, FILM COATED ORAL at 10:34

## 2018-08-13 RX ADMIN — Medication 1 MILLIGRAM(S): at 10:34

## 2018-08-13 NOTE — PROGRESS NOTE BEHAVIORAL HEALTH - PROBLEM SELECTOR PLAN 2
t cell/rpr ordered  HIV specialist contacted re recs. Tivicay 50mg daily/Descovy daily
t cell/rpr ordered  HIV specialist contacted re recs.

## 2018-08-13 NOTE — PROGRESS NOTE BEHAVIORAL HEALTH - NSBHCHARTREVIEWLAB_PSY_A_CORE FT
12.6                 140  | 26   | 14           3.3   >-----------< 294     ------------------------< 81                    38.8                 4.2  | 102  | 0.98                                         Ca 9.3   Mg x     Ph x  08-01 LmjlmqtzqoT1O 5.6  08-01 Chol 165 LDL 95 HDL 43 Trig 134
12.6                 140  | 26   | 14           3.3   >-----------< 294     ------------------------< 81                    38.8                 4.2  | 102  | 0.98                                         Ca 9.3   Mg x     Ph x  08-01 SfxsessmplM4N 5.6  08-01 Chol 165 LDL 95 HDL 43 Trig 134
12.6                 140  | 26   | 14           3.3   >-----------< 294     ------------------------< 81                    38.8                 4.2  | 102  | 0.98                                         Ca 9.3   Mg x     Ph x  08-01 VbmpduayshQ4X 5.6  08-01 Chol 165 LDL 95 HDL 43 Trig 134
12.6                 140  | 26   | 14           3.3   >-----------< 294     ------------------------< 81                    38.8                 4.2  | 102  | 0.98                                         Ca 9.3   Mg x     Ph x  08-01 VmbppeeetvD0N 5.6  08-01 Chol 165 LDL 95 HDL 43 Trig 134
12.6                 140  | 26   | 14           3.3   >-----------< 294     ------------------------< 81                    38.8                 4.2  | 102  | 0.98                                         Ca 9.3   Mg x     Ph x  08-01 XktiqswfwyT0C 5.6  08-01 Chol 165 LDL 95 HDL 43 Trig 134
12.6                 140  | 26   | 14           3.3   >-----------< 294     ------------------------< 81                    38.8                 4.2  | 102  | 0.98                                         Ca 9.3   Mg x     Ph x  08-01 ZxpxvposhlV3A 5.6  08-01 Chol 165 LDL 95 HDL 43 Trig 134
12.6                 140  | 26   | 14           3.3   >-----------< 294     ------------------------< 81                    38.8                 4.2  | 102  | 0.98                                         Ca 9.3   Mg x     Ph x  08-01 SfwbnqmnelU6M 5.6  08-01 Chol 165 LDL 95 HDL 43 Trig 134
12.6                 140  | 26   | 14           3.3   >-----------< 294     ------------------------< 81                    38.8                 4.2  | 102  | 0.98                                         Ca 9.3   Mg x     Ph x  08-01 VxkorcqvlcT7K 5.6  08-01 Chol 165 LDL 95 HDL 43 Trig 134

## 2018-08-13 NOTE — PROGRESS NOTE BEHAVIORAL HEALTH - [facility] REVIEW OF ED CHART AVAILABLE LABS REVIEWED
PDMP reviewed; no aberrant behavior identified, prescription authorized for postoperative pain.     
Surgery Off-Premise Visit Arrived  
Yes

## 2018-08-13 NOTE — PROGRESS NOTE BEHAVIORAL HEALTH - NSBHLEGALSTATUS_PSY_A_CORE
9.13 (Voluntary)

## 2018-08-13 NOTE — PROGRESS NOTE BEHAVIORAL HEALTH - NSBHADMITDANGERSELF_PSY_A_CORE
unable to care for self
discharge planning
unable to care for self
discharge planning
discharge planning
unable to care for self

## 2018-08-13 NOTE — PROGRESS NOTE BEHAVIORAL HEALTH - NSBHATTESTSEENBY_PSY_A_CORE
NP without Attending Psychiatrist

## 2018-08-13 NOTE — PROGRESS NOTE BEHAVIORAL HEALTH - SUMMARY
This is a 32 year old man, domiciled alone, with a past psychiatric history of major depressive disorder with psychotic features, multiple hospitalizations, multiple suicide attempts including via hanging at age 17, not currently in outpatient care, who presents after attempting suicide tonight in the context of worsening depression and psychosis. The patient is at imminent risk of harm to himself and requires inpatient hospitalization for safety and stabilization.    Haldol dec 100mg to be given today and the balance of 50mg given on Friday prior to discharge.
This is a 32 year old man, domiciled alone, with a past psychiatric history of major depressive disorder with psychotic features, multiple hospitalizations, multiple suicide attempts including via hanging at age 17, not currently in outpatient care, who presents after attempting suicide tonight in the context of worsening depression and psychosis. The patient is at imminent risk of harm to himself and requires inpatient hospitalization for safety and stabilization.    Haldol dec 50mg to be given tomorrow prior to discharge. Continue all other meds. Plan to discharge tomorrow
This is a 32 year old man, domiciled alone, with a past psychiatric history of major depressive disorder with psychotic features, multiple hospitalizations, multiple suicide attempts including via hanging at age 17, not currently in outpatient care, who presents after attempting suicide tonight in the context of worsening depression and psychosis. The patient is at imminent risk of harm to himself and requires inpatient hospitalization for safety and stabilization.    Trazodone 50mg added to regimen.
This is a 32 year old man, domiciled alone, with a past psychiatric history of major depressive disorder with psychotic features, multiple hospitalizations, multiple suicide attempts including via hanging at age 17, not currently in outpatient care, who presents after attempting suicide tonight in the context of worsening depression and psychosis. The patient is at imminent risk of harm to himself and requires inpatient hospitalization for safety and stabilization.    Haldol dec 50mg to be given tomorrow prior to discharge. Continue all other meds. Plan to discharge tomorrow
This is a 32 year old man, domiciled alone, with a past psychiatric history of major depressive disorder with psychotic features, multiple hospitalizations, multiple suicide attempts including via hanging at age 17, not currently in outpatient care, who presents after attempting suicide tonight in the context of worsening depression and psychosis. The patient is at imminent risk of harm to himself and requires inpatient hospitalization for safety and stabilization.    Patient reports improvement in mood and sxs, anticipating discharge today and his upcoming appointments.
This is a 32 year old man, domiciled alone, with a past psychiatric history of major depressive disorder with psychotic features, multiple hospitalizations, multiple suicide attempts including via hanging at age 17, not currently in outpatient care, who presents after attempting suicide tonight in the context of worsening depression and psychosis. The patient is at imminent risk of harm to himself and requires inpatient hospitalization for safety and stabilization.    Trazodone 50mg added to regimen PRN and standing. Zoloft increased to 100mg daily and cogentin 1mg is now standing.
This is a 32 year old man, domiciled alone, with a past psychiatric history of major depressive disorder with psychotic features, multiple hospitalizations, multiple suicide attempts including via hanging at age 17, not currently in outpatient care, who presents after attempting suicide tonight in the context of worsening depression and psychosis. The patient is at imminent risk of harm to himself and requires inpatient hospitalization for safety and stabilization.    Trazodone increased to 100mg QHS. Will continue regimen.
This is a 32 year old man, domiciled alone, with a past psychiatric history of major depressive disorder with psychotic features, multiple hospitalizations, multiple suicide attempts including via hanging at age 17, not currently in outpatient care, who presents after attempting suicide tonight in the context of worsening depression and psychosis. The patient is at imminent risk of harm to himself and requires inpatient hospitalization for safety and stabilization.

## 2018-08-13 NOTE — PROGRESS NOTE BEHAVIORAL HEALTH - [facility] MSE GENERAL APPEARANCE
Other/Well developed
Other/Well developed
Well developed/Other

## 2018-08-13 NOTE — PROGRESS NOTE BEHAVIORAL HEALTH - NSBHCHARTREVIEWVS_PSY_A_CORE FT
Vital Signs Last 24 Hrs  T(C): 36.4 (07 Aug 2018 10:00), Max: 36.4 (07 Aug 2018 10:00)  T(F): 97.5 (07 Aug 2018 10:00), Max: 97.5 (07 Aug 2018 10:00)  HR: 64 (07 Aug 2018 10:00) (64 - 64)  BP: 110/72 (07 Aug 2018 10:00) (110/72 - 110/72)  BP(mean): --  RR: 18 (07 Aug 2018 10:00) (18 - 18)  SpO2: --
Vital Signs Last 24 Hrs  T(C): 36.4 (09 Aug 2018 09:05), Max: 36.5 (08 Aug 2018 16:21)  T(F): 97.5 (09 Aug 2018 09:05), Max: 97.7 (08 Aug 2018 16:21)  HR: 72 (09 Aug 2018 09:05) (72 - 73)  BP: 103/64 (09 Aug 2018 09:05) (103/64 - 113/71)  BP(mean): --  RR: 20 (09 Aug 2018 09:05) (18 - 20)  SpO2: --
Vital Signs Last 24 Hrs  T(C): 36.4 (09 Aug 2018 09:05), Max: 36.5 (08 Aug 2018 16:21)  T(F): 97.5 (09 Aug 2018 09:05), Max: 97.7 (08 Aug 2018 16:21)  HR: 72 (09 Aug 2018 09:05) (72 - 73)  BP: 103/64 (09 Aug 2018 09:05) (103/64 - 113/71)  BP(mean): --  RR: 20 (09 Aug 2018 09:05) (18 - 20)  SpO2: --
Vital Signs Last 24 Hrs  T(C): 36.6 (13 Aug 2018 10:00), Max: 37 (12 Aug 2018 17:00)  T(F): 97.9 (13 Aug 2018 10:00), Max: 98.6 (12 Aug 2018 17:00)  HR: 71 (13 Aug 2018 10:00) (69 - 71)  BP: 107/72 (13 Aug 2018 10:00) (101/67 - 107/72)  BP(mean): --  RR: 18 (13 Aug 2018 10:00) (17 - 18)  SpO2: --
Vital Signs Last 24 Hrs  T(C): 36.7 (02 Aug 2018 10:00), Max: 36.7 (02 Aug 2018 10:00)  T(F): 98.1 (02 Aug 2018 10:00), Max: 98.1 (02 Aug 2018 10:00)  HR: 67 (02 Aug 2018 10:00) (67 - 78)  BP: 100/67 (02 Aug 2018 10:00) (100/67 - 100/70)  BP(mean): --  RR: 20 (02 Aug 2018 10:00) (17 - 20)  SpO2: --
Vital Signs Last 24 Hrs  T(C): 36.7 (06 Aug 2018 09:18), Max: 37 (05 Aug 2018 17:00)  T(F): 98.1 (06 Aug 2018 09:18), Max: 98.6 (05 Aug 2018 17:00)  HR: 62 (06 Aug 2018 09:18) (62 - 67)  BP: 110/72 (06 Aug 2018 09:18) (103/67 - 110/72)  BP(mean): --  RR: 20 (06 Aug 2018 09:18) (17 - 20)  SpO2: --
Vital Signs Last 24 Hrs  T(C): 36.5 (08 Aug 2018 16:21), Max: 37.1 (07 Aug 2018 17:00)  T(F): 97.7 (08 Aug 2018 16:21), Max: 98.7 (07 Aug 2018 17:00)  HR: 73 (08 Aug 2018 16:21) (62 - 73)  BP: 113/71 (08 Aug 2018 16:21) (102/66 - 113/71)  BP(mean): --  RR: 18 (08 Aug 2018 16:21) (17 - 20)  SpO2: --
Vital Signs Last 24 Hrs  T(C): 36.4 (03 Aug 2018 09:35), Max: 36.9 (02 Aug 2018 16:16)  T(F): 97.5 (03 Aug 2018 09:35), Max: 98.5 (02 Aug 2018 16:16)  HR: 74 (03 Aug 2018 09:35) (74 - 76)  BP: 108/71 (03 Aug 2018 09:35) (104/68 - 108/71)  BP(mean): --  RR: 20 (03 Aug 2018 09:35) (16 - 20)  SpO2: --

## 2018-08-13 NOTE — PROGRESS NOTE BEHAVIORAL HEALTH - PROBLEM SELECTOR PROBLEM 2
HIV (human immunodeficiency virus infection)

## 2018-08-13 NOTE — PROGRESS NOTE BEHAVIORAL HEALTH - PROBLEM SELECTOR PLAN 4
Substance use groups

## 2018-08-13 NOTE — PROGRESS NOTE BEHAVIORAL HEALTH - NSBHADMITCOORDWITH_PSY_A_CORE
nursing, creative arts/medical staff/social work
nursing, creative arts/social work/medical staff
medical staff/social work/nursing, [employer] arts
nursing, creative arts/medical staff/social work
social work/medical staff/nursing, [employer] arts
medical staff/social work/nursing, [employer] arts

## 2018-08-13 NOTE — PROGRESS NOTE BEHAVIORAL HEALTH - RISK ASSESSMENT
Static: mental illness, prior hospitalizations, hx of SA, hx of substance use  Modifiable: psychotic sxs, mood sxs, med/treatment accessibility, interpersonal issues  Protective: future oriented, employed, domiciled
Patient at acute and imminent risk of harm to himself due to suicide attempt tonight, command auditory hallucinations, active suicidal ideation, past attempts, and acute psychosis.
Static: mental illness, prior hospitalizations, hx of SA, hx of substance use  Modifiable: psychotic sxs, mood sxs, med/treatment accessibility, interpersonal issues  Protective: future oriented, employed, domiciled
Static: mental illness, prior hospitalizations, hx of SA, hx of substance use  Modifiable: psychotic sxs, mood sxs, med/treatment accessibility, interpersonal issues  Protective: future oriented, employed, domiciled
Patient at acute and imminent risk of harm to himself due to suicide attempt tonight, command auditory hallucinations, active suicidal ideation, past attempts, and acute psychosis.

## 2018-08-13 NOTE — PROGRESS NOTE BEHAVIORAL HEALTH - OTHER
multiple visible tattoos on neck, arms, hands

## 2018-08-13 NOTE — PROGRESS NOTE BEHAVIORAL HEALTH - NSBHADMITIPOBSFT_PSY_A_CORE
Patient attempted to cut throat prior to admission, and has no recollection of events
q15 mins
Patient attempted to cut throat prior to admission, and has no recollection of events
q15 mins
q15 mins
Patient attempted to cut throat prior to admission, and has no recollection of events

## 2018-08-13 NOTE — PROGRESS NOTE BEHAVIORAL HEALTH - PROBLEM SELECTOR PROBLEM 1
Major depressive disorder, recurrent episode with mood-congruent psychotic features

## 2018-08-13 NOTE — PROGRESS NOTE BEHAVIORAL HEALTH - PROBLEM SELECTOR PLAN 1
Zoloft 100mg daily  Haldol 5mg/10mg  Cogentin 1mg  Trazodone  100mg QHS  Haldol dec 100mg 8/8/18
Zoloft 100mg daily  Haldol 5mg/10mg  Cogentin 1mg  Trazodone  100mg QHS  Haldol dec 100mg 8/8/18  Haldol dec 50mg 8/10/18
Zoloft 50mg daily  Haldol 5mg/10mg  Cogentin 1mg BID PRN EPS  Trazodone 50mg WH[facility]
Zoloft 100mg daily  Haldol 5mg/10mg  Cogentin 1mg  Trazodone  100mg QHS  Haldol dec 100mg 8/8/18  Haldol dec 50mg 8/10/18
Zoloft 100mg daily  Haldol 5mg/10mg  Cogentin 1mg  Trazodone  100mg QHS  Haldol dec 100mg 8/8/18  Haldol dec 50mg 8/10/18
Zoloft 100mg daily  Haldol 5mg/10mg  Cogentin 1mg  Trazodone 50mg S
Zoloft 100mg daily  Haldol 5mg/10mg  Cogentin 1mg  Trazodone increased to 100mg QHS
Zoloft 50mg daily  Haldol increased to 5mg/10mg  Cogentin 1mg BID PRN EPS

## 2018-08-13 NOTE — PROGRESS NOTE BEHAVIORAL HEALTH - [facility] MED ROS CONSTITUTIONAL SYMPTOMS
Unable to assess

## 2018-08-13 NOTE — PROGRESS NOTE BEHAVIORAL HEALTH - PERCEPTIONS
Auditory hallucinations
Auditory hallucinations
No abnormalities

## 2018-08-13 NOTE — PROGRESS NOTE BEHAVIORAL HEALTH - PROBLEM SELECTOR PLAN 3
Nicotine patch  Nicorette gum prn

## 2018-08-13 NOTE — PROGRESS NOTE BEHAVIORAL HEALTH - PROBLEM SELECTOR PROBLEM 4
Substance use disorder

## 2018-08-13 NOTE — PROGRESS NOTE BEHAVIORAL HEALTH - NSBHADMITCOUNSEL_PSY_A_CORE
risk factor reduction/client/family/caregiver education/instructions for management, treatment and follow up/diagnostic results/impressions and/or recommended studies/risks and benefits of treatment options/importance of adherence to chosen treatment
risks and benefits of treatment options/importance of adherence to chosen treatment/client/family/caregiver education/diagnostic results/impressions and/or recommended studies/instructions for management, treatment and follow up/risk factor reduction
client/family/caregiver education/importance of adherence to chosen treatment/instructions for management, treatment and follow up/risk factor reduction/diagnostic results/impressions and/or recommended studies/risks and benefits of treatment options
diagnostic results/impressions and/or recommended studies/importance of adherence to chosen treatment/risks and benefits of treatment options/client/family/caregiver education/instructions for management, treatment and follow up/risk factor reduction
diagnostic results/impressions and/or recommended studies/risks and benefits of treatment options/instructions for management, treatment and follow up/risk factor reduction/client/family/caregiver education/importance of adherence to chosen treatment
diagnostic results/impressions and/or recommended studies/risks and benefits of treatment options/instructions for management, treatment and follow up/risk factor reduction/client/family/caregiver education/importance of adherence to chosen treatment
risks and benefits of treatment options/instructions for management, treatment and follow up/client/family/caregiver education/diagnostic results/impressions and/or recommended studies/importance of adherence to chosen treatment/risk factor reduction
risks and benefits of treatment options/importance of adherence to chosen treatment/client/family/caregiver education/instructions for management, treatment and follow up/risk factor reduction/diagnostic results/impressions and/or recommended studies

## 2018-08-13 NOTE — PROGRESS NOTE BEHAVIORAL HEALTH - NSBHADMITIPOBS_PSY_A_CORE
Constant observation
Routine observation
Constant observation
Routine observation
Routine observation
Constant observation

## 2018-08-13 NOTE — PROGRESS NOTE BEHAVIORAL HEALTH - NSBHFUPINTERVALHXFT_PSY_A_CORE
Patient reports continued improvement in mood, initially ambivalent about leaving tomorrow, stating that unless he were able to get discharge early in the day (I have a lot of things to take care of)', he would be ok with discharge on Monday. He later approached writer and requested to be discharged tomorrow. He has no complaints, medical or psychiatric, has adjusted well to the unit. no side effects of med regimen. Attending select groups and interacting appropriately. Anticipating haldol dec 50mg prior to d/c tomorrow.
Patient reports having a fair weekend. He states that though his mood is lifting and he is smiling more, he feels irritable that he is on constant observant and is wearing a gown instead of his clothes. He is tolerating med regimen well, though he notes fine hand tremors, probably due to haldol. We discussed adding cogentin to regimen and he was agreeable. No other side effects reported. He states that he last had a/h prior to the weekend and hasn't had v/h or tactile h in several days. He now states that he only had tactile h once during this hospitalization, though upon admission he reported experiencing the sensation that someone was touching his back on a daily basis. This is an inconsistency. He also states that one of his most recent stressors is his entire family most likely moving to [place] to be with other members of the family by the end of the year. He shares custody of 12y/o daughter with his mother and has been stressed about the decision. He thinks that this may have led to his dissociative episode. He denies having any such episode during his admission. Denies si, no hi, no a/v h or paranoia. We discussed beginning to taper his CO during the day as he will be more visible on the unit attending groups and such. He is agreeable. Appetite is good, though sleep is poor and he is continuing to have intermittent and initial insomnia. Has not gotten in contact with family yet.   Per staff report he is guarded, minimally interactive with staff.
Patient seen at the bedside, is cooperative and interactive upon approach, NAD. He reports some improvement in his symptoms, a/h has decreased, though he heard a voice telling him 'stop' earlier in the morning. No v/h since yesterday. Continuing to experience daily tactile hallucinations, no olfactory expressed. He is tolerating med regimen well without issue, no reported side effects or adverse reactions. Mood is described as 'low', but he is hopeful that he will begin to feel better soon. He has not contacted family or friends yet since his admission. He states that his appetite has slightly improved and he has been eating at each meal. Sleep is fair. No acute concerns. He denies any physical concerns, discomfort or pain. He remains on CO.
Patient seen in his room resting at intervals throughout the day. He reports overall improvement in mood (compared to admission) lessening of his depression and states that his a/h has resolved, has not heard it in several days. No reported si/hi or paranoia. Is future oriented. Is tolerating meds well without issue, no side effects or adverse reactions. Sleep was improved last night with addition of Trazodone though he requested benadryl as he felt the trazodone wasn't strong enough. Appetite has improved and he reports eating 100% at each meal.
Patient seen in the miileu, reported that he felt it would be a safer discharge next week as he is concerned about his aftercare. No si/hi, no a/v h or paranoia reported. Mood and sxs continue to improve. No medical concerns. visible on the unit attending groups and interacting with peers
Patient visible on the unit this morning. He reports sleep was improved last night with the increase in Trazodone to 100mg. He did not require need for prn Benadryl. He reports continued improvement in mood feeling that the level of his depression has significantly improved during his admission. He denies a/v h or paranoia and feels that they have completely resolved with haldol. Is overall tolerating meds well without issue. We discussed [facility] to which he was agreeable with. r/b/a discussed and he verbalized understanding. He is discharge focused, requesting to be discharged on Friday with appropriate aftercare. He has reached out to his mother during his admission and states that she was supportive which he found to be shocking as she usually doesn't like when he is in the hospital for psychiatric reasons.   Appetite remains good. No acute medical concerns. CO to be discontinued after tonight.
Patient visible on the unit, he reports having a fair weekend, not experiencing any hallucinations and feels mood is 'ok'. He reports feeling future oriented and feels ready and more prepared for discharge.  No thoughts of death, suicide or self-harm. No acute medical concerns. Tolerating meds well without issue, looking forward to upcoming appointment.
Patient seen at the bedside, is cooperative and interactive upon approach, NAD. He does report some improvement in mood and sxs today, last had a/h this morning shortly after awakening, last had v/h and tactile h last night, olfactory hallucination was last experienced the first day of admission. He states that he feels his mood is slowly improving and he is not feeling quite as depressed as the day before. He is still experiencing both initial and intermittent insomnia, though states that it has improved during this admission. He is requesting to receive meds to help with his insomnia. No medical concerns reported. Appetite is fair. Will remain on CO for safety concerns over the weekend, unless specified by DOC.

## 2018-08-16 DIAGNOSIS — R44.0 AUDITORY HALLUCINATIONS: ICD-10-CM

## 2018-08-16 DIAGNOSIS — F19.19 OTHER PSYCHOACTIVE SUBSTANCE ABUSE WITH UNSPECIFIED PSYCHOACTIVE SUBSTANCE-INDUCED DISORDER: ICD-10-CM
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
## 2018-08-16 DIAGNOSIS — G47.00 INSOMNIA, UNSPECIFIED: ICD-10-CM

## 2018-08-16 DIAGNOSIS — T14.91XA SUICIDE ATTEMPT, INITIAL ENCOUNTER: ICD-10-CM

## 2018-08-16 DIAGNOSIS — F17.210 NICOTINE DEPENDENCE, CIGARETTES, UNCOMPLICATED: ICD-10-CM

## 2018-08-16 DIAGNOSIS — F33.3 MAJOR DEPRESSIVE DISORDER, RECURRENT, SEVERE WITH PSYCHOTIC SYMPTOMS: ICD-10-CM
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
## 2018-08-16 DIAGNOSIS — R44.1 VISUAL HALLUCINATIONS: ICD-10-CM

## 2018-08-16 DIAGNOSIS — Z21 ASYMPTOMATIC HUMAN IMMUNODEFICIENCY VIRUS [HIV] INFECTION STATUS: ICD-10-CM

## 2019-05-29 ENCOUNTER — EMERGENCY (EMERGENCY)
Facility: HOSPITAL | Age: 34
LOS: 1 days | Discharge: ANOTHER HOSPITAL | End: 2019-05-29
Attending: EMERGENCY MEDICINE | Admitting: EMERGENCY MEDICINE
Payer: COMMERCIAL

## 2019-05-29 ENCOUNTER — INPATIENT (INPATIENT)
Facility: HOSPITAL | Age: 34
LOS: 14 days | Discharge: HOME | End: 2019-06-13
Attending: PSYCHIATRY & NEUROLOGY | Admitting: PSYCHIATRY & NEUROLOGY
Payer: COMMERCIAL

## 2019-05-29 VITALS
SYSTOLIC BLOOD PRESSURE: 136 MMHG | HEART RATE: 89 BPM | OXYGEN SATURATION: 100 % | TEMPERATURE: 98 F | RESPIRATION RATE: 18 BRPM | DIASTOLIC BLOOD PRESSURE: 87 MMHG

## 2019-05-29 VITALS
DIASTOLIC BLOOD PRESSURE: 62 MMHG | SYSTOLIC BLOOD PRESSURE: 122 MMHG | HEART RATE: 60 BPM | OXYGEN SATURATION: 97 % | RESPIRATION RATE: 18 BRPM | TEMPERATURE: 98 F

## 2019-05-29 VITALS — WEIGHT: 141.1 LBS | RESPIRATION RATE: 18 BRPM | HEIGHT: 66 IN | TEMPERATURE: 98 F | OXYGEN SATURATION: 99 %

## 2019-05-29 DIAGNOSIS — F14.10 COCAINE ABUSE, UNCOMPLICATED: ICD-10-CM

## 2019-05-29 DIAGNOSIS — F33.2 MAJOR DEPRESSIVE DISORDER, RECURRENT SEVERE WITHOUT PSYCHOTIC FEATURES: ICD-10-CM

## 2019-05-29 DIAGNOSIS — F33.3 MAJOR DEPRESSIVE DISORDER, RECURRENT, SEVERE WITH PSYCHOTIC SYMPTOMS: ICD-10-CM

## 2019-05-29 PROCEDURE — 99053 MED SERV 10PM-8AM 24 HR FAC: CPT

## 2019-05-29 PROCEDURE — 90792 PSYCH DIAG EVAL W/MED SRVCS: CPT | Mod: GT

## 2019-05-29 PROCEDURE — 99284 EMERGENCY DEPT VISIT MOD MDM: CPT | Mod: 25

## 2019-05-29 PROCEDURE — 93010 ELECTROCARDIOGRAM REPORT: CPT

## 2019-05-29 RX ORDER — DIPHENHYDRAMINE HCL 50 MG
50 CAPSULE ORAL EVERY 6 HOURS
Refills: 0 | Status: DISCONTINUED | OUTPATIENT
Start: 2019-05-29 | End: 2019-06-13

## 2019-05-29 RX ORDER — TRAZODONE HCL 50 MG
50 TABLET ORAL AT BEDTIME
Refills: 0 | Status: DISCONTINUED | OUTPATIENT
Start: 2019-05-29 | End: 2019-05-31

## 2019-05-29 RX ORDER — HALOPERIDOL DECANOATE 100 MG/ML
5 INJECTION INTRAMUSCULAR EVERY 6 HOURS
Refills: 0 | Status: DISCONTINUED | OUTPATIENT
Start: 2019-05-29 | End: 2019-06-13

## 2019-05-29 RX ORDER — HALOPERIDOL DECANOATE 100 MG/ML
5 INJECTION INTRAMUSCULAR ONCE
Refills: 0 | Status: DISCONTINUED | OUTPATIENT
Start: 2019-05-29 | End: 2019-06-13

## 2019-05-29 RX ORDER — DIPHENHYDRAMINE HCL 50 MG
50 CAPSULE ORAL ONCE
Refills: 0 | Status: DISCONTINUED | OUTPATIENT
Start: 2019-05-29 | End: 2019-06-13

## 2019-05-29 RX ADMIN — Medication 50 MILLIGRAM(S): at 20:56
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
## 2019-05-29 NOTE — ED ADULT NURSE NOTE - OBJECTIVE STATEMENT
34 y/o male presents to ED c/o SI. AOx3, states was with friend when he "blacked out and began cutting himself with a broken bottle". On arrival, patient presents with scratch marks on anterior aspect of neck and L forearm without bleeding. Patient also notes "hears voices that are telling me to hurt myself". Admits to cocaine use last week. Cooperative at triage, denies HI, CP, SoB, fevers, chills. 34 y/o male presents to ED c/o SI. AOx3, states was with friend when he "blacked out and began cutting himself with a broken bottle". On arrival, patient presents with scratch marks on anterior aspect of neck and L forearm without bleeding. Patient also notes "hears voices that are telling me to hurt myself". Admits to cocaine 12 hours ago. Cooperative at triage, denies HI, CP, SoB, fevers, chills.

## 2019-05-29 NOTE — ED BEHAVIORAL HEALTH ASSESSMENT NOTE - PAST PSYCHOTROPIC MEDICATION
according to psyckes- abilify 15mg, remeron 15mg, zyprexa 5mg, seroquel 50mg, effexor XR 75mg, zoloft 100mg, lexapro 10mg, klonopin 1mg and xanax 1mg. cogentin had been used for EPS from haldol

## 2019-05-29 NOTE — ED BEHAVIORAL HEALTH ASSESSMENT NOTE - RISK ASSESSMENT
Acute Suicide Risk  (x  ) High   (  ) Moderate   (  ) Low   (  ) Unable to determine   Rationale ___________current SI with plan and intent, active depressive and psychotic symptoms, substance abuse, isolated, unemployed      Elevated Chronic Risk   x(  ) Yes ___________  Details ___________h/o SA, inpt admissions, multiple losses, trauma, non-compliance with tx, male gender, strong family h/o suicide and substance use, + substance abuse hx  (  ) No   ___________     Safety Plan   Details: ___________  [  ] Safety plan discussed with patient  [  ] Education provided regarding environmental safety / lethal means restriction  [  ] Provision of National Suicide Prevention Lifeline 2-457-081-TALK (9304)

## 2019-05-29 NOTE — ED BEHAVIORAL HEALTH ASSESSMENT NOTE - PSYCHIATRIC ISSUES AND PLAN (INCLUDE STANDING AND PRN MEDICATION)
should do Li level. intermittently compliant with medications. reports last being on lithium, risperdal and trazodone. low risk for violence but can give PRNS: haldol 5mg, ativan 2mg, diphenhydramine 50mg, PO/IM, Q6H for Agitation

## 2019-05-29 NOTE — ED ADULT NURSE REASSESSMENT NOTE - NS ED NURSE REASSESS COMMENT FT1
Pt remains calm and compliant, awaiting bed allocation for admission, remains on 1-1
Pt remains calm and compliant, has walked out independantly to use bathroom with pct in attendance
Patient resting in stretcher, nad on 1:1 constant observation. Currently no complaints at this moment, patient is cooperative and easily arousable. Pending psych consult, plan of care explained, will continue to monitor.

## 2019-05-29 NOTE — ED PROVIDER NOTE - OBJECTIVE STATEMENT
Hx of HIV and depression last admitted to [facility] psychiatric floor ~ 1 year ago.  States that he passed out and then began cutting himself.  States that he has been feeling suicidal.  Endorses cocaine use about 12 hours ago.

## 2019-05-29 NOTE — ED PROVIDER NOTE - CLINICAL SUMMARY MEDICAL DECISION MAKING FREE TEXT BOX
hx of depression with last system psych admission ~ 1 year ago presents with SI and "cutting" episode.  Superficial abrasions to the neck and L forearm.  EKG, labs, urine reviewed.  Medically cleared for psych evaluation.  D/w Telepsychiatry at 7 am.
[id number] Comprehensive
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
## 2019-05-29 NOTE — ED BEHAVIORAL HEALTH ASSESSMENT NOTE - DESCRIPTION (FIRST USE, LAST USE, QUANTITY, FREQUENCY, DURATION)
2 packs per day--> now 3 cig/day drinks 3-4 beers a week. long hx of substance abuse. relapsed yesterday. sober for 1mo last use heroin IV 3yrs ago

## 2019-05-29 NOTE — ED BEHAVIORAL HEALTH ASSESSMENT NOTE - DESCRIPTION
Pre-Hospital Course: Patient was a walk-in.    ED Course: Pt was in ED ~1.5 hours prior to telepsychiatry consult request at 4:52. Patient presents to ED for SI. SKYE Freed reports that patient was cooperative with the triage process and interviews, provided routine bloodwork and urine willingly and allowed gowning and security wanding without incident. Patient’s belongings were taken by security, nothing of note in belongings, no contraband. RN reports that patient appears anxious and depressed. RN reports that patient’s affect appears flat, with a blank stare on his face and seems to be “looking through you.” Patient states he was with friend when he blacked out and began cutting himself with a broken bottle, per RN note, scratch marks observed on neck and forearm, no bleeding. RN reports patient endorses SI, with plan to cut himself. Patient also endorses command AH, hearing voices telling him to kill himself. Patient denies HI, VH. Patient also admitted to cocaine use 12 hours ago. Utox is positive for cocaine, BAL was negative. Patient reports hx of inpatient psych admission at [facility] ~1 year ago, and medical hx of HIV. RN reports patient is A&Ox4, speech is soft but coherent, thought process is linear and logical, eye contact is poor. Patient’s hygiene is poor, he is slightly malodorous but not disheveled. RN reports patient has been calm in the ED, no agitation, aggression or behavioral issues. Patient did not require medication, security intervention or restraints. Patient is observed resting in bed, fell asleep at ~5:00 and slept for several hours. Patient requested something to drink and was provided with drink, did not ask for any other food/medications. RN reports patient is unaccompanied by social or family supports. Patient was placed on 1:1 observation and is interacting appropriately with the 1:1 sitter. RN reports patient is in private room ready for telepsychiatry evaluation.     Collateral Sources:   (1) No collaterals in chart. HIV for 10yrs, reports taking Descovy and Tivicay through [facility] clinic Dr. Garza. as per psyckes last picked up descovy 200mg-25mg/day and tivicay 50mg/day on 8/15/2018. 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

## 2019-05-29 NOTE — ED BEHAVIORAL HEALTH ASSESSMENT NOTE - SUICIDE RISK FACTORS
Hopelessness/History of abuse/trauma/Anhedonia/Highly impulsive behavior/Substance abuse/dependence/Perceived burden on family and others/Unable to engage in safety planning/Family history of suicide/Command hallucinations to hurt self/Access to means (pills, firearms, etc.)/Mood episode
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
## 2019-05-29 NOTE — ED ADULT NURSE NOTE - NS ED NURSE DISCH DISPOSITION
[facility] is calling  Would like to know if we received the plan of care that was faxed on 04/13/2017. Please call Anibal back. The caller stated it is okay for clinical staff to leave a detailed message on the patient's voice mail with their results or other medical information.     Phone number to leave message on  [phone]    Transferred

## 2019-05-29 NOTE — ED BEHAVIORAL HEALTH ASSESSMENT NOTE - DIFFERENTIAL
MDD with psychotic features vs. Schizoaffective Disorder vs. complex PTSD  cocaine could also be contributing to mood and psychosis although pt reports these symptoms even in sober periods  cocaine abuse disorder

## 2019-05-29 NOTE — ED ADULT TRIAGE NOTE - CHIEF COMPLAINT QUOTE
patient c/o suicidal ideation with a plan. patient states he " blacked out" 1 hour ago and started cutting himself. patient is cooperative during triage. denies drug use in last 12 hours. trentonlewisn also states he is hearing voices. patient denies homicidal ideation.

## 2019-05-29 NOTE — ED BEHAVIORAL HEALTH ASSESSMENT NOTE - NS ED [facility] ED COURSE UTILIZATION OF 1 TO 1 IN ED YN
ONCOLOGY / HEMATOLOGY FOLLOW  UP NOTE    Ms. Jackeline Rosario was seen at [facility], she is a 54 year old female with:     ONCOLOGY PROBLEMS:  Patient Active Problem List    Diagnosis Date Noted   • Malignant neoplasm of base of tongue (CMS/HCC) 05/01/2015     Priority: High     Ms. Rosario noticed a left neck lump around July or August of 2014. Over time, she developed pain and some difficulty swallowing, particularly with solids.   Left supraclav node bx 5/9/2015 - Squamous Cell Ca  PET Scan - Extensive demarco involvement. With orpharynx uptake.  ENT EXAM _ Base of Tongue Ca.  Rx - CMT - Cisplatin + RT beginning 6/8/2015     • Dehydration, moderate 08/28/2015     Priority: Medium   • Hypomagnesemia 07/07/2015     Priority: Medium   • Hypokalemia 07/07/2015     Priority: Medium   • Drug-induced hypothyroidism 06/14/2018     Priority: Low   • Anemia due to antineoplastic chemotherapy 04/18/2016     Priority: Low   • Chemotherapy-induced thrombocytopenia 01/25/2016     Priority: Low   • Chemotherapy induced diarrhea 01/25/2016     Priority: Low   • Encounter for antineoplastic chemotherapy 01/25/2016     Priority: Low   • Nausea & vomiting 09/01/2015     Priority: Low   • Failure to thrive in adult 07/28/2015     Priority: Low   • Other complications due to other vascular device, implant, and graft 06/29/2015     Priority: Low   • Chemotherapy induced nausea and vomiting 06/15/2015     Priority: Low   • Elevated serum creatinine 06/15/2015     Priority: Low        Chief Complaint   Patient presents with   • Cancer     BOT   • Chemotherapy     Cycl 10 Nivolumab         INTERVAL HISTORY:  Jackeline Rosario noticed a left neck lump around July or August of 2014. Over time, she developed pain and some difficulty swallowing, particularly with solids.  Left supraclavicula node bx done on 5/9/2015 confirmed Squamous Cell Ca. PET Scan showed Extensive demarco involvement. With orpharynx uptake. ENT EXAM revealed  Base of Tongue Ca.  She was treated with CMT consisting of Cisplatin + RT from 6/8/2015 to 8/21/2015.  PET Scan done on 12/9/2015 confirmed metastatic disease in AP window LN as well as C 4 vertebral body.  She was treated with Carbo/Taxol/Cetuximab from 1/18/2016 to 5/31/2016 for 6 cycles.  She then continued with single agent Cetuximab.  CT on 3/22/2018 showed progressive mediastinal CEFERINO.  Dr Kellogg performed EBUS and biopsy on 4/27/2018 which confirmed metastatic squamous cell cancer.  She began palliative treatment with Nivolumab on 5/17/2018.     She has tolerated the treatment well.  She did not develop any acute nausea or vomiting.  She did not have any increase in tingling or numbness.  Her fatigue was manageable.  She did not experience any significant bleeding.  No mouth sores were noted.   She had a cough a few days ago which has subsided.  She has chronic left shoulder pain which is better.  She has chronic numbness in her feet which doesn't interfere with her ability to walk.     She also denies any fever, chills or night sweats.  Her weight has remained essentially stable.    She has not required any transfusion in the recent past, nor has she had any infections or hospitalizations.  She is on synthroid 75 mcg daily.  Her dose was decreased from 100 mcg 4 weeks ago    Her most recent CT Neck/Chest was on 8/10/2018 which showed good response to immunotherapy.    Please see review of system below and the positive findings are highlighted.     PAST MEDICAL HISTORY:    Past Medical History:   Diagnosis Date   • Depression    • Drug-induced hypothyroidism 6/14/2018   • Gastroesophageal reflux disease    • Malignant neoplasm (CMS/HCC)     base of tongue   • Severe protein-calorie malnutrition (CMS/HCC)    • Urinary tract infection         ALLERGIES: no known allergies.     Current Outpatient Prescriptions   Medication Sig   • levothyroxine (SYNTHROID, LEVOTHROID) 75 MCG tablet Take 1 tablet by mouth daily.    • lidocaine-prilocaine (EMLA) cream Apply over port site 1 hour before appointment. Cover with colton.   • HYDROcodone-acetaminophen (NORCO)  MG per tablet Take 1 tablet by mouth every 4 hours as needed for Pain.   • LORazepam (ATIVAN) 1 MG tablet Take 1 tablet by mouth every 6 hours as needed for Anxiety.   • acetaminophen (TYLENOL) 500 MG tablet Take 500 mg by mouth every 6 hours as needed for Pain.   • Calcium Carbonate Antacid (LINDSAY-SELTZER ANTACID PO) Take by mouth daily as needed.   • phenylephrine (KEM-SYNEPHRINE) 1 % nasal spray Spray 1 drop in each nostril every 4 hours as needed for Congestion.   • ondansetron (ZOFRAN ODT) 8 MG disintegrating tablet Place 1 tablet onto the tongue every 8 hours as needed for Nausea.   • prochlorperazine (COMPAZINE) 10 MG tablet Take 1 tablet by mouth every 6 hours as needed for Nausea or Vomiting.   • FAM Unable to Find Medication Take 2 each by mouth daily. Energy , metabolism gummies   • sulfacetamide (BLEPH-10) 10 % ophthalmic solution Place 1 drop into both eyes every 3 hours.   • Multiple Vitamins-Minerals (MULTIVITAMIN GUMMIES ADULT PO) Take by mouth daily.     No current facility-administered medications for this visit.      Facility-Administered Medications Ordered in Other Visits   Medication   • heparin flush 100 UNIT/ML lock flush 500 Units       FAMILY & SOCIAL HISTORY:  Reviewed with patient.  Unchanged.    REVIEW OF SYSTEMS:   Jocelin Mercado MA  9/10/2018 11:43 AM  Sign at close encounter  REVIEW OF SYSTEMS:  GENERAL:  Patient denies fevers chills night sweats excessive fatigue anorexia weight loss weakness hot flashes  EYES:  Patient denies blurred vision double vision pain burning & itching.   NEUROLOGIC:  Patient denies headache dizziness numbness tingling loss of balance insomnia.  GASTROINTESTINAL:  Patient denies nausea, vomiting, diarrhea, abdominal pain, constipation, blood in stool, indigestion/heartburn.  CARDIOVASCULAR:  Patient denies  chest pain, palpitations.  SKIN:  Patient denies skin rashes, persistent itching, but complains of:  bruising.  MUSCULOSKELETAL:  Patient denies joint pain, bone pain, leg and ankle swelling.  ENT/MOUTH:  Patient denies dysphagia, dry mouth, sore throat, sores on tongue, mouth sores, sinus drainage, hearing loss  :  Patient denies urgency, painful urination, urinary frequency, blood in urine, nocturia  RESPIRATORY:  Patient denies wheezing, frequent cough, shortness of breath.  PSYCH: Patient denies anxiety, depression    Concerns:  Medications reviewed and updated.  Denies known Latex allergy or symptoms of Latex sensitivity.         PHYSICAL EXAMINATION:    Jackeline Rosario is a 54 year old female who is alert, oriented times 3, in no apparent distress.  VITALS:   Oncology Encounter Vitals [09/10/18 1143]   ONC OP Encounter Vitals Group      BP 98/59      Pulse 61      Resp 16      Temp 98.2 °F (36.8 °C)      Temp src Oral      SpO2 98 %      Weight 95 lb 1.6 oz (43.1 kg)      Height       Pain Score  0      Pain Location       Pain Education?       BSA (Calculated - m2) - [employer]       BMI (Calculated)        ECO - No physically strenuous activity, but ambulatory and able to carry out light or sedentary work.   General: The patient is alert, well-developed, well-nourished, no distress.  Skin: Warm, normal color, normal texture, normal turgor and without rash.  Head: Normocephalic, atraumatic.  Neck: Supple with no significant adenopathy.  Eyes: Normal conjunctivae and sclerae. Pupils equal, round, reactive to light and accommodation. Extraocular movements intact.  ENT: Mucous membranes are dry and pale. Normal nose,mouth and throat.  Cardiovascular: Regular rate and rhythm, no murmurs, gallops or rubs.  Respiratory: Normal respiratory effort. Clear to auscultation. No wheezes, rales, or rhonchi.  Abdomen: Soft and non tender.  No hepato-splenomegaly or masses.  Extremities: No clubbing, no cyanosis,  trace pitting edema. Normal muscle tone and development bilaterally.  Lymph: No cervical, supraclavicular, axillary, inguinal lymphadenopathy.  Neurologic: Motor strength normal. Coordination normal. No tremor noted.  Psychiatric: Cooperative. Appropriate mood and affect. Normal judgment.       LABS:    Lab Results   Component Value Date/Time    WBC 6.2 09/10/2018 11:32 AM    RBC 3.60 (L) 09/10/2018 11:32 AM    HGB 11.4 (L) 09/10/2018 11:32 AM    HCT 34.5 (L) 09/10/2018 11:32 AM     (L) 09/10/2018 11:32 AM    MCV 95.8 09/10/2018 11:32 AM    MCH 31.7 09/10/2018 11:32 AM    MCHC 33.0 09/10/2018 11:32 AM    RDWCV 14.1 09/10/2018 11:32 AM    SEG 77 09/10/2018 11:32 AM    TLYMPH 15 09/10/2018 11:32 AM    PMON 6 09/10/2018 11:32 AM    PEOS 2 09/10/2018 11:32 AM    PBASO 0 09/10/2018 11:32 AM    ANEUT 4.8 09/10/2018 11:32 AM    ALYMS 0.9 (L) 09/10/2018 11:32 AM    JESUS 0.4 09/10/2018 11:32 AM    AEOS 0.1 09/10/2018 11:32 AM    ABASO 0.0 09/10/2018 11:32 AM     Lab Results   Component Value Date/Time    GLUCOSE 107 (H) 08/27/2018 12:47 PM    SODIUM 140 08/27/2018 12:47 PM    POTASSIUM 3.3 (L) 08/27/2018 12:47 PM    CHLORIDE 102 08/27/2018 12:47 PM    BUN 19 08/27/2018 12:47 PM    CREATININE 0.84 08/27/2018 12:47 PM    CALCIUM 8.8 08/27/2018 12:47 PM    ALBUMIN 2.9 (L) 08/27/2018 12:47 PM    AST 52 (H) 08/27/2018 12:47 PM    ALKPT 289 (H) 08/27/2018 12:47 PM    GPT 70 08/27/2018 12:47 PM    ANIONGAP 10 08/27/2018 12:47 PM    BCRAT 23 08/27/2018 12:47 PM    GLOB 3.7 08/27/2018 12:47 PM    AGR 0.8 (L) 08/27/2018 12:47 PM       RADIOLOGICAL DATA: NONE      ASSESSMENT/PLAN:  Ms. Jackeline Rosario is a 52 year old year old female with the following hematology/oncology problems:     ASSESSMENT: (C01) Malignant neoplasm of base of tongue (CMS/HCC)  (primary encounter diagnosis)  Comment: Confirmed metastatc disease to mediastinum..  Responding to treatment.  Plan: CBC & AUTO DIFFERENTIAL, COMPREHENSIVE         METABOLIC PANEL         Continue present management.       (D64.81,  T45.1X5A) Anemia due to antineoplastic chemotherapy  Comment: Mild.  Based on today's counts, the hemoglobin is not low enough to compromise end organ perfusion and therefore,  PRBC (Packed Red Blood Cell)  transfusion is not needed.   Plan: Continue to observe and monitor.  No intervention is needed at this time.     (Z51.12) Encounter for antineoplastic immunotherapy  Comment:No grade 3/4 toxicity.  No evidence of thrombosis or hemorrhage.  Thus far, the patient has had no immune reactions to immunotherapy. Specifically, there is no evidence of thyroid, adrenal, gastrointestinal, or pulmonary reactions.  Counts reviewed and adequate for treatment.   Plan: Proceed with Cycle 9 of Nivolumab.  Follow up in 2 weeks.     (E03.2) Drug-induced hypothyroidism  Comment: Over correction.  On Synthroid 75 mcg daily.  Plan: TSH and T4 today..        The patient is instructed to call earlier if any questions or concerns.    Thank you for allowing me to participate in her care and please feel free to call if any questions or concerns.    Jagjit Patterson MD  [facility].    Yes

## 2019-05-29 NOTE — ED BEHAVIORAL HEALTH ASSESSMENT NOTE - OTHER
[facility], [facility], [facility] , [facility] Medical, [facility], [facility]patient Psychiatry, [facility], [facility], [facility], [facility] ED, [facility]patient Psychiatry, [facility], [facility], [facility], [facility], [facility] lost [facility] of his daughter, not compliant with medications, unemployed and isolating telepych, external billing nurse states malodorous deferred to medical ER
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
## 2019-05-29 NOTE — ED PROVIDER NOTE - CARE PLAN
Principal Discharge DX:	Depression, unspecified depression type  Secondary Diagnosis:	Suicidal behavior with attempted self-injury

## 2019-05-29 NOTE — ED ADULT NURSE NOTE - NSIMPLEMENTINTERV_GEN_ALL_ED
Implemented All Universal Safety Interventions:  [place] to call system. Call bell, personal items and telephone within reach. Instruct patient to call for assistance. Room bathroom lighting operational. Non-slip footwear when patient is off stretcher. Physically safe environment: no spills, clutter or unnecessary equipment. Stretcher in lowest position, wheels locked, appropriate side rails in place.

## 2019-05-30 PROCEDURE — 99232 SBSQ HOSP IP/OBS MODERATE 35: CPT

## 2019-05-30 RX ORDER — OLANZAPINE 15 MG/1
5 TABLET, FILM COATED ORAL AT BEDTIME
Refills: 0 | Status: DISCONTINUED | OUTPATIENT
Start: 2019-05-30 | End: 2019-06-03

## 2019-05-30 RX ORDER — FLUOXETINE HCL 10 MG
20 CAPSULE ORAL DAILY
Refills: 0 | Status: DISCONTINUED | OUTPATIENT
Start: 2019-05-30 | End: 2019-06-03

## 2019-05-30 RX ADMIN — OLANZAPINE 5 MILLIGRAM(S): 15 TABLET, FILM COATED ORAL at 21:10

## 2019-05-30 RX ADMIN — Medication 20 MILLIGRAM(S): at 14:58

## 2019-05-30 RX ADMIN — Medication 50 MILLIGRAM(S): at 21:10

## 2019-05-31 LAB
CHOLEST SERPL-MCNC: 160 MG/DL — SIGNIFICANT CHANGE UP (ref 120–199)
HBA1C BLD-MCNC: 5.2 % — SIGNIFICANT CHANGE UP (ref 4–5.6)
HDLC SERPL-MCNC: 45 MG/DL — SIGNIFICANT CHANGE UP (ref 35–55)
LIPID PNL WITH DIRECT LDL SERPL: 105 MG/DL — SIGNIFICANT CHANGE UP
TRIGL SERPL-MCNC: 131 MG/DL — SIGNIFICANT CHANGE UP (ref 10–149)
TSH SERPL-MCNC: 3.94 UIU/ML — SIGNIFICANT CHANGE UP (ref 0.27–4.2)

## 2019-05-31 PROCEDURE — 99232 SBSQ HOSP IP/OBS MODERATE 35: CPT

## 2019-05-31 RX ORDER — TRAZODONE HCL 50 MG
100 TABLET ORAL AT BEDTIME
Refills: 0 | Status: DISCONTINUED | OUTPATIENT
Start: 2019-05-31 | End: 2019-06-03

## 2019-05-31 RX ORDER — DOLUTEGRAVIR SODIUM 25 MG/1
50 TABLET, FILM COATED ORAL DAILY
Refills: 0 | Status: DISCONTINUED | OUTPATIENT
Start: 2019-05-31 | End: 2019-06-13

## 2019-05-31 RX ORDER — EMTRICITABINE AND TENOFOVIR DISOPROXIL FUMARATE 200; 300 MG/1; MG/1
1 TABLET, FILM COATED ORAL DAILY
Refills: 0 | Status: DISCONTINUED | OUTPATIENT
Start: 2019-05-31 | End: 2019-06-13

## 2019-05-31 RX ADMIN — Medication 20 MILLIGRAM(S): at 08:33

## 2019-05-31 RX ADMIN — DOLUTEGRAVIR SODIUM 50 MILLIGRAM(S): 25 TABLET, FILM COATED ORAL at 15:24

## 2019-05-31 RX ADMIN — Medication 100 MILLIGRAM(S): at 20:53

## 2019-05-31 RX ADMIN — EMTRICITABINE AND TENOFOVIR DISOPROXIL FUMARATE 1 TABLET(S): 200; 300 TABLET, FILM COATED ORAL at 15:36

## 2019-05-31 RX ADMIN — OLANZAPINE 5 MILLIGRAM(S): 15 TABLET, FILM COATED ORAL at 20:53
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
## 2019-06-01 DIAGNOSIS — R45.851 SUICIDAL IDEATIONS: ICD-10-CM

## 2019-06-01 DIAGNOSIS — T14.91XA SUICIDE ATTEMPT, INITIAL ENCOUNTER: ICD-10-CM

## 2019-06-01 DIAGNOSIS — F32.9 MAJOR DEPRESSIVE DISORDER, SINGLE EPISODE, UNSPECIFIED: ICD-10-CM

## 2019-06-01 DIAGNOSIS — S10.91XA ABRASION OF UNSPECIFIED PART OF NECK, INITIAL ENCOUNTER: ICD-10-CM
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
## 2019-06-01 DIAGNOSIS — S50.812A ABRASION OF LEFT FOREARM, INITIAL ENCOUNTER: ICD-10-CM

## 2019-06-01 PROCEDURE — 93010 ELECTROCARDIOGRAM REPORT: CPT

## 2019-06-01 PROCEDURE — 99232 SBSQ HOSP IP/OBS MODERATE 35: CPT

## 2019-06-01 RX ADMIN — OLANZAPINE 5 MILLIGRAM(S): 15 TABLET, FILM COATED ORAL at 21:23

## 2019-06-01 RX ADMIN — EMTRICITABINE AND TENOFOVIR DISOPROXIL FUMARATE 1 TABLET(S): 200; 300 TABLET, FILM COATED ORAL at 09:18

## 2019-06-01 RX ADMIN — Medication 100 MILLIGRAM(S): at 21:23

## 2019-06-01 RX ADMIN — Medication 20 MILLIGRAM(S): at 09:18

## 2019-06-01 RX ADMIN — DOLUTEGRAVIR SODIUM 50 MILLIGRAM(S): 25 TABLET, FILM COATED ORAL at 09:18

## 2019-06-01 NOTE — CHART NOTE - NSCHARTNOTEFT_GEN_A_CORE
Screening Medical Evaluation  Patient Admitted from: [facility] ED    [facility] admitting diagnosis: Severe episode of recurrent major depressive disorder, without psychotic features    PAST MEDICAL & SURGICAL HISTORY:  HIV (human immunodeficiency virus infection; contracted in 2009 via IV heroin use)- on ART  Corrective surgery for Deviated Septum- 2015    ALLERGIES  - penicillins (RASH)    INTOLERANCES  - none    SOCIAL HISTORY:   Admits to Drug use- Cocaine (last used on 5/28/19)  Admits to EtOH use- last used 5/29/19	  Current Smoker- ½ ppd x 15years  , 2 children: 3 y.o and 14y.o (no longer has custody)  Unemployed    FAMILY HISTORY:  No pertinent family history reported by patient.    MEDICATIONS  (STANDING):  dolutegravir 50 milliGRAM(s) Oral daily  emtricitabine 200 mG/tenofovir alafenamide 25 mG (DESCOVY) Tablet 1 Tablet(s) Oral daily  FLUoxetine 20 milliGRAM(s) Oral daily  OLANZapine 5 milliGRAM(s) Oral at bedtime  traZODone 100 milliGRAM(s) Oral at bedtime    MEDICATIONS  (PRN):  diphenhydrAMINE 50 milliGRAM(s) Oral every 6 hours PRN agitation  diphenhydrAMINE   Injectable 50 milliGRAM(s) IntraMuscular once PRN agitation  haloperidol     Tablet 5 milliGRAM(s) Oral every 6 hours PRN agitation  haloperidol    Injectable 5 milliGRAM(s) IntraMuscular once PRN agitation  LORazepam     Tablet 2 milliGRAM(s) Oral every 2 hours PRN CIWA score increase by 2 points and current CIWA score GREATER THAN 9      Vital Signs Last 24 Hrs  T(C): 36.5 (31 May 2019 08:37), Max: 36.5 (31 May 2019 08:37)  T(F): 97.7 (31 May 2019 08:37), Max: 97.7 (31 May 2019 08:37)  HR: 59 (01 Jun 2019 01:15) (59 - 78)  BP: 111/58 (01 Jun 2019 01:15) (111/58 - 111/58)  RR: --  SpO2: --    PHYSICAL EXAM:  GENERAL: NAD, well-developed, well-nourished, sitting in day room watching TV  HEAD:  Atraumatic, Normocephalic  EYES: EOMI, PERRLA  NECK: Supple  CHEST/LUNG: Clear to auscultation bilaterally; No wheeze, crackles, rhonchi  HEART: Regular rate and rhythm; +s1 and +s2, No murmurs, rubs, or gallops  ABDOMEN: Soft, Nontender, Nondistended; normoactive Bowel sounds present  EXTREMITIES:  2+ Peripheral Pulses: Radial b/l and Dorsalis Pedis pulse b/l, No clubbing, or edema  PSYCH: AAOx3, cooperative, pleasant  NEUROLOGY: non-focal  SKIN: Lacerations across throat and LUE, various tattoos throughout b/l UE and LE. b/l feet without deformity, erythema, or swelling.       LABS:  Labs and EKG personally reviewed: NSR of 82bpm and inverted T-waves in Lead V1, with no evidence of ischemia, infarction or arrhythmias. No prior EKG to compare to.     RADIOLOGY & ADDITIONAL TESTS:    Assessment and Plan:  Pt is a 33y.o  male with PMHx significant for HIV (contracted 2009 via IVDU, currently on ART) who presents to [facility] from [facility] ED for Severe episode of recurrent major depressive disorder, without psychotic features. In regards to his diagnosis of HIV, pt does follow Dr. Luis at [facility] reporting that his last CD4 and Viral load were taken 2 weeks ago. Pt reports having intermittent 8/10 substernal CP with no radiation that has been ongoing since 2014, worst with ambulation with no associated numbness/tingling/radiation to neck/jaw, UE, or back. EKG from OSH personally reviewed with NSR of 82bpm and inverted T-waves in Lead V1, with no evidence of ischemia, infarction or arrhythmias. No prior EKG to compare to. He also reports bilateral foot pain that extends to below knee that he describes “like I’m walking on broken pieces of glass” and was told to follow-up with podiatry. He currently denies any medical concern including fever, chills, visual changes (double vision, scotomas, vision loss), HA, SOB, CP, Abdominal pain, n/v, constipation, diarrhea, urinary symptoms (dysuria, hematuria, incontinence, frequency/urgency, urethral discharge) or changes in bowel movements. Physical Exam unremarkable. OSH labs and inpatient labs personally reviewed. CBC, CMP, TSH, and Lipid Panel WNL. UTox Screen positive for Cocaine.      #) HIV: HIV-1 RNA Quantitative, Viral Load labs ordered, continue current medication regimen (Dolutegravir & Descovy).   #) Intermittent CP: EKG ordered. Monitor for CP. Pt educated to look out for worsening CP.  #) Bilateral Foot Pain: possibyl Plantar Fascitis?? Recommended patient stretch feet through various means. Podiatry consult placed.   #) Smoking: Nicotine patch/gum offered, patient refused. Pt aware it is readily available if needed  #) Severe episode of recurrent major depressive disorder, without psychotic features: management as per primary psychiatry team

## 2019-06-02 PROCEDURE — 99232 SBSQ HOSP IP/OBS MODERATE 35: CPT

## 2019-06-02 RX ORDER — ACETAMINOPHEN 500 MG
650 TABLET ORAL ONCE
Refills: 0 | Status: COMPLETED | OUTPATIENT
Start: 2019-06-02 | End: 2019-06-02

## 2019-06-02 RX ORDER — HYDROXYZINE HCL 10 MG
50 TABLET ORAL EVERY 6 HOURS
Refills: 0 | Status: DISCONTINUED | OUTPATIENT
Start: 2019-06-02 | End: 2019-06-13

## 2019-06-02 RX ADMIN — OLANZAPINE 5 MILLIGRAM(S): 15 TABLET, FILM COATED ORAL at 21:09

## 2019-06-02 RX ADMIN — Medication 20 MILLIGRAM(S): at 09:22

## 2019-06-02 RX ADMIN — Medication 650 MILLIGRAM(S): at 21:09

## 2019-06-02 RX ADMIN — Medication 650 MILLIGRAM(S): at 22:10

## 2019-06-02 RX ADMIN — Medication 100 MILLIGRAM(S): at 21:09

## 2019-06-02 RX ADMIN — EMTRICITABINE AND TENOFOVIR DISOPROXIL FUMARATE 1 TABLET(S): 200; 300 TABLET, FILM COATED ORAL at 09:22

## 2019-06-02 RX ADMIN — DOLUTEGRAVIR SODIUM 50 MILLIGRAM(S): 25 TABLET, FILM COATED ORAL at 09:22

## 2019-06-03 PROCEDURE — 99232 SBSQ HOSP IP/OBS MODERATE 35: CPT

## 2019-06-03 RX ORDER — TRAZODONE HCL 50 MG
150 TABLET ORAL AT BEDTIME
Refills: 0 | Status: DISCONTINUED | OUTPATIENT
Start: 2019-06-03 | End: 2019-06-13

## 2019-06-03 RX ORDER — ACETAMINOPHEN 500 MG
650 TABLET ORAL ONCE
Refills: 0 | Status: COMPLETED | OUTPATIENT
Start: 2019-06-03 | End: 2019-06-03

## 2019-06-03 RX ORDER — FLUOXETINE HCL 10 MG
40 CAPSULE ORAL DAILY
Refills: 0 | Status: DISCONTINUED | OUTPATIENT
Start: 2019-06-03 | End: 2019-06-05

## 2019-06-03 RX ORDER — OLANZAPINE 15 MG/1
10 TABLET, FILM COATED ORAL AT BEDTIME
Refills: 0 | Status: DISCONTINUED | OUTPATIENT
Start: 2019-06-03 | End: 2019-06-06

## 2019-06-03 RX ADMIN — OLANZAPINE 10 MILLIGRAM(S): 15 TABLET, FILM COATED ORAL at 21:31

## 2019-06-03 RX ADMIN — Medication 150 MILLIGRAM(S): at 21:31

## 2019-06-03 RX ADMIN — Medication 650 MILLIGRAM(S): at 22:00

## 2019-06-03 RX ADMIN — DOLUTEGRAVIR SODIUM 50 MILLIGRAM(S): 25 TABLET, FILM COATED ORAL at 09:27

## 2019-06-03 RX ADMIN — EMTRICITABINE AND TENOFOVIR DISOPROXIL FUMARATE 1 TABLET(S): 200; 300 TABLET, FILM COATED ORAL at 09:27

## 2019-06-03 RX ADMIN — Medication 650 MILLIGRAM(S): at 22:10

## 2019-06-03 RX ADMIN — Medication 20 MILLIGRAM(S): at 09:27

## 2019-06-04 PROCEDURE — 99232 SBSQ HOSP IP/OBS MODERATE 35: CPT

## 2019-06-04 RX ORDER — SODIUM CHLORIDE 0.65 %
1 AEROSOL, SPRAY (ML) NASAL EVERY 6 HOURS
Refills: 0 | Status: DISCONTINUED | OUTPATIENT
Start: 2019-06-04 | End: 2019-06-13

## 2019-06-04 RX ADMIN — Medication 40 MILLIGRAM(S): at 08:13

## 2019-06-04 RX ADMIN — Medication 150 MILLIGRAM(S): at 21:43

## 2019-06-04 RX ADMIN — DOLUTEGRAVIR SODIUM 50 MILLIGRAM(S): 25 TABLET, FILM COATED ORAL at 08:13

## 2019-06-04 RX ADMIN — OLANZAPINE 10 MILLIGRAM(S): 15 TABLET, FILM COATED ORAL at 21:43

## 2019-06-04 RX ADMIN — EMTRICITABINE AND TENOFOVIR DISOPROXIL FUMARATE 1 TABLET(S): 200; 300 TABLET, FILM COATED ORAL at 08:13

## 2019-06-05 PROCEDURE — 99232 SBSQ HOSP IP/OBS MODERATE 35: CPT

## 2019-06-05 RX ORDER — ONDANSETRON 8 MG/1
4 TABLET, FILM COATED ORAL ONCE
Refills: 0 | Status: COMPLETED | OUTPATIENT
Start: 2019-06-05 | End: 2019-06-05

## 2019-06-05 RX ORDER — FLUOXETINE HCL 10 MG
60 CAPSULE ORAL DAILY
Refills: 0 | Status: DISCONTINUED | OUTPATIENT
Start: 2019-06-05 | End: 2019-06-07

## 2019-06-05 RX ORDER — PRAZOSIN HCL 2 MG
1 CAPSULE ORAL AT BEDTIME
Refills: 0 | Status: DISCONTINUED | OUTPATIENT
Start: 2019-06-05 | End: 2019-06-06

## 2019-06-05 RX ADMIN — Medication 1 MILLIGRAM(S): at 21:13

## 2019-06-05 RX ADMIN — EMTRICITABINE AND TENOFOVIR DISOPROXIL FUMARATE 1 TABLET(S): 200; 300 TABLET, FILM COATED ORAL at 09:06

## 2019-06-05 RX ADMIN — Medication 100 MILLIGRAM(S): at 21:35

## 2019-06-05 RX ADMIN — Medication 40 MILLIGRAM(S): at 09:06

## 2019-06-05 RX ADMIN — OLANZAPINE 10 MILLIGRAM(S): 15 TABLET, FILM COATED ORAL at 21:13

## 2019-06-05 RX ADMIN — Medication 150 MILLIGRAM(S): at 21:13

## 2019-06-05 RX ADMIN — ONDANSETRON 4 MILLIGRAM(S): 8 TABLET, FILM COATED ORAL at 21:35

## 2019-06-05 RX ADMIN — DOLUTEGRAVIR SODIUM 50 MILLIGRAM(S): 25 TABLET, FILM COATED ORAL at 09:06

## 2019-06-06 PROCEDURE — 99231 SBSQ HOSP IP/OBS SF/LOW 25: CPT

## 2019-06-06 RX ORDER — PRAZOSIN HCL 2 MG
2 CAPSULE ORAL AT BEDTIME
Refills: 0 | Status: DISCONTINUED | OUTPATIENT
Start: 2019-06-06 | End: 2019-06-10

## 2019-06-06 RX ORDER — OLANZAPINE 15 MG/1
15 TABLET, FILM COATED ORAL AT BEDTIME
Refills: 0 | Status: DISCONTINUED | OUTPATIENT
Start: 2019-06-06 | End: 2019-06-10

## 2019-06-06 RX ADMIN — Medication 150 MILLIGRAM(S): at 21:42

## 2019-06-06 RX ADMIN — OLANZAPINE 15 MILLIGRAM(S): 15 TABLET, FILM COATED ORAL at 21:42

## 2019-06-06 RX ADMIN — EMTRICITABINE AND TENOFOVIR DISOPROXIL FUMARATE 1 TABLET(S): 200; 300 TABLET, FILM COATED ORAL at 08:46

## 2019-06-06 RX ADMIN — Medication 60 MILLIGRAM(S): at 08:46

## 2019-06-06 RX ADMIN — Medication 2 MILLIGRAM(S): at 21:42

## 2019-06-06 RX ADMIN — DOLUTEGRAVIR SODIUM 50 MILLIGRAM(S): 25 TABLET, FILM COATED ORAL at 08:46

## 2019-06-07 PROCEDURE — 99232 SBSQ HOSP IP/OBS MODERATE 35: CPT

## 2019-06-07 RX ORDER — ACETAMINOPHEN 500 MG
650 TABLET ORAL EVERY 6 HOURS
Refills: 0 | Status: DISCONTINUED | OUTPATIENT
Start: 2019-06-07 | End: 2019-06-13

## 2019-06-07 RX ORDER — FLUOXETINE HCL 10 MG
80 CAPSULE ORAL DAILY
Refills: 0 | Status: DISCONTINUED | OUTPATIENT
Start: 2019-06-08 | End: 2019-06-13

## 2019-06-07 RX ORDER — ONDANSETRON 8 MG/1
4 TABLET, FILM COATED ORAL EVERY 6 HOURS
Refills: 0 | Status: DISCONTINUED | OUTPATIENT
Start: 2019-06-07 | End: 2019-06-13

## 2019-06-07 RX ADMIN — Medication 150 MILLIGRAM(S): at 21:33

## 2019-06-07 RX ADMIN — Medication 60 MILLIGRAM(S): at 09:09

## 2019-06-07 RX ADMIN — DOLUTEGRAVIR SODIUM 50 MILLIGRAM(S): 25 TABLET, FILM COATED ORAL at 09:09

## 2019-06-07 RX ADMIN — OLANZAPINE 15 MILLIGRAM(S): 15 TABLET, FILM COATED ORAL at 21:33

## 2019-06-07 RX ADMIN — Medication 2 MILLIGRAM(S): at 21:33

## 2019-06-07 RX ADMIN — EMTRICITABINE AND TENOFOVIR DISOPROXIL FUMARATE 1 TABLET(S): 200; 300 TABLET, FILM COATED ORAL at 09:09

## 2019-06-08 RX ORDER — LANOLIN ALCOHOL/MO/W.PET/CERES
3 CREAM (GRAM) TOPICAL ONCE
Refills: 0 | Status: COMPLETED | OUTPATIENT
Start: 2019-06-08 | End: 2019-06-08

## 2019-06-08 RX ADMIN — Medication 80 MILLIGRAM(S): at 08:27

## 2019-06-08 RX ADMIN — EMTRICITABINE AND TENOFOVIR DISOPROXIL FUMARATE 1 TABLET(S): 200; 300 TABLET, FILM COATED ORAL at 08:27

## 2019-06-08 RX ADMIN — Medication 150 MILLIGRAM(S): at 20:25

## 2019-06-08 RX ADMIN — DOLUTEGRAVIR SODIUM 50 MILLIGRAM(S): 25 TABLET, FILM COATED ORAL at 08:27

## 2019-06-08 RX ADMIN — Medication 2 MILLIGRAM(S): at 20:25

## 2019-06-08 RX ADMIN — OLANZAPINE 15 MILLIGRAM(S): 15 TABLET, FILM COATED ORAL at 20:25

## 2019-06-09 RX ADMIN — OLANZAPINE 15 MILLIGRAM(S): 15 TABLET, FILM COATED ORAL at 21:13

## 2019-06-09 RX ADMIN — DOLUTEGRAVIR SODIUM 50 MILLIGRAM(S): 25 TABLET, FILM COATED ORAL at 08:58

## 2019-06-09 RX ADMIN — Medication 2 MILLIGRAM(S): at 21:13

## 2019-06-09 RX ADMIN — Medication 150 MILLIGRAM(S): at 21:13

## 2019-06-09 RX ADMIN — Medication 80 MILLIGRAM(S): at 08:58

## 2019-06-09 RX ADMIN — EMTRICITABINE AND TENOFOVIR DISOPROXIL FUMARATE 1 TABLET(S): 200; 300 TABLET, FILM COATED ORAL at 08:58

## 2019-06-10 PROCEDURE — 99232 SBSQ HOSP IP/OBS MODERATE 35: CPT

## 2019-06-10 RX ORDER — OLANZAPINE 15 MG/1
20 TABLET, FILM COATED ORAL AT BEDTIME
Refills: 0 | Status: DISCONTINUED | OUTPATIENT
Start: 2019-06-10 | End: 2019-06-13

## 2019-06-10 RX ORDER — PRAZOSIN HCL 2 MG
3 CAPSULE ORAL AT BEDTIME
Refills: 0 | Status: DISCONTINUED | OUTPATIENT
Start: 2019-06-10 | End: 2019-06-13

## 2019-06-10 RX ADMIN — Medication 80 MILLIGRAM(S): at 09:31

## 2019-06-10 RX ADMIN — Medication 3 MILLIGRAM(S): at 21:23

## 2019-06-10 RX ADMIN — Medication 150 MILLIGRAM(S): at 21:22

## 2019-06-10 RX ADMIN — OLANZAPINE 20 MILLIGRAM(S): 15 TABLET, FILM COATED ORAL at 21:23

## 2019-06-10 RX ADMIN — EMTRICITABINE AND TENOFOVIR DISOPROXIL FUMARATE 1 TABLET(S): 200; 300 TABLET, FILM COATED ORAL at 09:31

## 2019-06-10 RX ADMIN — DOLUTEGRAVIR SODIUM 50 MILLIGRAM(S): 25 TABLET, FILM COATED ORAL at 09:31

## 2019-06-11 PROCEDURE — 90832 PSYTX W PT 30 MINUTES: CPT

## 2019-06-11 PROCEDURE — 99232 SBSQ HOSP IP/OBS MODERATE 35: CPT

## 2019-06-11 RX ADMIN — EMTRICITABINE AND TENOFOVIR DISOPROXIL FUMARATE 1 TABLET(S): 200; 300 TABLET, FILM COATED ORAL at 08:36

## 2019-06-11 RX ADMIN — Medication 150 MILLIGRAM(S): at 20:24

## 2019-06-11 RX ADMIN — DOLUTEGRAVIR SODIUM 50 MILLIGRAM(S): 25 TABLET, FILM COATED ORAL at 08:36

## 2019-06-11 RX ADMIN — OLANZAPINE 20 MILLIGRAM(S): 15 TABLET, FILM COATED ORAL at 20:24

## 2019-06-11 RX ADMIN — Medication 3 MILLIGRAM(S): at 20:24

## 2019-06-11 RX ADMIN — Medication 80 MILLIGRAM(S): at 08:36

## 2019-06-12 VITALS — TEMPERATURE: 98 F | SYSTOLIC BLOOD PRESSURE: 94 MMHG | HEART RATE: 74 BPM | DIASTOLIC BLOOD PRESSURE: 60 MMHG

## 2019-06-12 PROCEDURE — 99232 SBSQ HOSP IP/OBS MODERATE 35: CPT

## 2019-06-12 RX ORDER — EMTRICITABINE AND TENOFOVIR DISOPROXIL FUMARATE 200; 300 MG/1; MG/1
1 TABLET, FILM COATED ORAL
Qty: 30 | Refills: 0
Start: 2019-06-12

## 2019-06-12 RX ORDER — PRAZOSIN HCL 2 MG
3 CAPSULE ORAL
Qty: 90 | Refills: 0
Start: 2019-06-12

## 2019-06-12 RX ORDER — DOLUTEGRAVIR SODIUM 25 MG/1
1 TABLET, FILM COATED ORAL
Qty: 30 | Refills: 0
Start: 2019-06-12

## 2019-06-12 RX ORDER — OLANZAPINE 15 MG/1
1 TABLET, FILM COATED ORAL
Qty: 30 | Refills: 0
Start: 2019-06-12

## 2019-06-12 RX ORDER — FLUOXETINE HCL 10 MG
2 CAPSULE ORAL
Qty: 60 | Refills: 0
Start: 2019-06-12

## 2019-06-12 RX ORDER — TRAZODONE HCL 50 MG
1 TABLET ORAL
Qty: 30 | Refills: 0
Start: 2019-06-12

## 2019-06-12 RX ADMIN — DOLUTEGRAVIR SODIUM 50 MILLIGRAM(S): 25 TABLET, FILM COATED ORAL at 08:27

## 2019-06-12 RX ADMIN — OLANZAPINE 20 MILLIGRAM(S): 15 TABLET, FILM COATED ORAL at 21:38

## 2019-06-12 RX ADMIN — Medication 3 MILLIGRAM(S): at 21:38

## 2019-06-12 RX ADMIN — Medication 80 MILLIGRAM(S): at 08:27

## 2019-06-12 RX ADMIN — EMTRICITABINE AND TENOFOVIR DISOPROXIL FUMARATE 1 TABLET(S): 200; 300 TABLET, FILM COATED ORAL at 08:27

## 2019-06-12 RX ADMIN — Medication 150 MILLIGRAM(S): at 21:38

## 2019-06-13 PROCEDURE — 99238 HOSP IP/OBS DSCHRG MGMT 30/<: CPT

## 2019-06-13 RX ADMIN — EMTRICITABINE AND TENOFOVIR DISOPROXIL FUMARATE 1 TABLET(S): 200; 300 TABLET, FILM COATED ORAL at 10:36

## 2019-06-13 RX ADMIN — DOLUTEGRAVIR SODIUM 50 MILLIGRAM(S): 25 TABLET, FILM COATED ORAL at 10:36

## 2019-06-13 RX ADMIN — Medication 80 MILLIGRAM(S): at 10:36

## 2019-08-19 ENCOUNTER — EMERGENCY (EMERGENCY)
Facility: HOSPITAL | Age: 34
LOS: 1 days | Discharge: HOME | End: 2019-08-19
Attending: EMERGENCY MEDICINE | Admitting: EMERGENCY MEDICINE
Payer: MEDICAID

## 2019-08-19 VITALS
SYSTOLIC BLOOD PRESSURE: 108 MMHG | RESPIRATION RATE: 16 BRPM | TEMPERATURE: 101 F | OXYGEN SATURATION: 99 % | HEART RATE: 78 BPM | DIASTOLIC BLOOD PRESSURE: 63 MMHG

## 2019-08-19 VITALS
DIASTOLIC BLOOD PRESSURE: 56 MMHG | HEART RATE: 85 BPM | SYSTOLIC BLOOD PRESSURE: 101 MMHG | RESPIRATION RATE: 16 BRPM | HEIGHT: 66 IN | WEIGHT: 154.98 LBS | TEMPERATURE: 100 F | OXYGEN SATURATION: 98 %

## 2019-08-19 DIAGNOSIS — T14.91XA SUICIDE ATTEMPT, INITIAL ENCOUNTER: ICD-10-CM
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
## 2019-08-19 DIAGNOSIS — R18.8 OTHER ASCITES: ICD-10-CM
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
## 2019-08-19 DIAGNOSIS — R50.9 FEVER, UNSPECIFIED: ICD-10-CM
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
## 2019-08-19 LAB
ALBUMIN SERPL ELPH-MCNC: 3.3 G/DL — SIGNIFICANT CHANGE UP (ref 3.3–5)
ALP SERPL-CCNC: 67 U/L — SIGNIFICANT CHANGE UP (ref 40–120)
ALT FLD-CCNC: 32 U/L — SIGNIFICANT CHANGE UP (ref 10–45)
ANION GAP SERPL CALC-SCNC: 11 MMOL/L — SIGNIFICANT CHANGE UP (ref 5–17)
APAP SERPL-MCNC: <5 UG/ML — LOW (ref 10–30)
APPEARANCE UR: CLEAR — SIGNIFICANT CHANGE UP
APTT BLD: 33.9 SEC — SIGNIFICANT CHANGE UP (ref 27.5–36.3)
AST SERPL-CCNC: 51 U/L — HIGH (ref 10–40)
BASOPHILS # BLD AUTO: 0.01 K/UL — SIGNIFICANT CHANGE UP (ref 0–0.2)
BASOPHILS NFR BLD AUTO: 0.2 % — SIGNIFICANT CHANGE UP (ref 0–2)
BILIRUB SERPL-MCNC: 0.2 MG/DL — SIGNIFICANT CHANGE UP (ref 0.2–1.2)
BILIRUB UR-MCNC: NEGATIVE — SIGNIFICANT CHANGE UP
BUN SERPL-MCNC: 6 MG/DL — LOW (ref 7–23)
CALCIUM SERPL-MCNC: 8.2 MG/DL — LOW (ref 8.4–10.5)
CHLORIDE SERPL-SCNC: 99 MMOL/L — SIGNIFICANT CHANGE UP (ref 96–108)
CO2 SERPL-SCNC: 27 MMOL/L — SIGNIFICANT CHANGE UP (ref 22–31)
COLOR SPEC: YELLOW — SIGNIFICANT CHANGE UP
CREAT SERPL-MCNC: 0.93 MG/DL — SIGNIFICANT CHANGE UP (ref 0.5–1.3)
DIFF PNL FLD: ABNORMAL
EOSINOPHIL # BLD AUTO: 0.02 K/UL — SIGNIFICANT CHANGE UP (ref 0–0.5)
EOSINOPHIL NFR BLD AUTO: 0.3 % — SIGNIFICANT CHANGE UP (ref 0–6)
ETHANOL SERPL-MCNC: <10 MG/DL — SIGNIFICANT CHANGE UP (ref 0–10)
GLUCOSE SERPL-MCNC: 78 MG/DL — SIGNIFICANT CHANGE UP (ref 70–99)
GLUCOSE UR QL: NEGATIVE — SIGNIFICANT CHANGE UP
HCT VFR BLD CALC: 37 % — LOW (ref 39–50)
HGB BLD-MCNC: 11.7 G/DL — LOW (ref 13–17)
IMM GRANULOCYTES NFR BLD AUTO: 0.2 % — SIGNIFICANT CHANGE UP (ref 0–1.5)
INR BLD: 1.27 — HIGH (ref 0.88–1.16)
KETONES UR-MCNC: NEGATIVE — SIGNIFICANT CHANGE UP
LACTATE SERPL-SCNC: 1.1 MMOL/L — SIGNIFICANT CHANGE UP (ref 0.5–2)
LEUKOCYTE ESTERASE UR-ACNC: ABNORMAL
LITHIUM SERPL-MCNC: <.05 MMOL/L — LOW (ref 0.6–1.2)
LYMPHOCYTES # BLD AUTO: 1.32 K/UL — SIGNIFICANT CHANGE UP (ref 1–3.3)
LYMPHOCYTES # BLD AUTO: 23 % — SIGNIFICANT CHANGE UP (ref 13–44)
MCHC RBC-ENTMCNC: 29 PG — SIGNIFICANT CHANGE UP (ref 27–34)
MCHC RBC-ENTMCNC: 31.6 GM/DL — LOW (ref 32–36)
MCV RBC AUTO: 91.6 FL — SIGNIFICANT CHANGE UP (ref 80–100)
MONOCYTES # BLD AUTO: 0.55 K/UL — SIGNIFICANT CHANGE UP (ref 0–0.9)
MONOCYTES NFR BLD AUTO: 9.6 % — SIGNIFICANT CHANGE UP (ref 2–14)
NEUTROPHILS # BLD AUTO: 3.84 K/UL — SIGNIFICANT CHANGE UP (ref 1.8–7.4)
NEUTROPHILS NFR BLD AUTO: 66.7 % — SIGNIFICANT CHANGE UP (ref 43–77)
NITRITE UR-MCNC: NEGATIVE — SIGNIFICANT CHANGE UP
NRBC # BLD: 0 /100 WBCS — SIGNIFICANT CHANGE UP (ref 0–0)
PH UR: 6 — SIGNIFICANT CHANGE UP (ref 5–8)
PLATELET # BLD AUTO: 365 K/UL — SIGNIFICANT CHANGE UP (ref 150–400)
POTASSIUM SERPL-MCNC: 4.1 MMOL/L — SIGNIFICANT CHANGE UP (ref 3.5–5.3)
POTASSIUM SERPL-SCNC: 4.1 MMOL/L — SIGNIFICANT CHANGE UP (ref 3.5–5.3)
PROT SERPL-MCNC: 8 G/DL — SIGNIFICANT CHANGE UP (ref 6–8.3)
PROT UR-MCNC: NEGATIVE MG/DL — SIGNIFICANT CHANGE UP
PROTHROM AB SERPL-ACNC: 14.4 SEC — HIGH (ref 10–12.9)
RBC # BLD: 4.04 M/UL — LOW (ref 4.2–5.8)
RBC # FLD: 13.7 % — SIGNIFICANT CHANGE UP (ref 10.3–14.5)
SALICYLATES SERPL-MCNC: <0.3 MG/DL — LOW (ref 2.8–20)
SODIUM SERPL-SCNC: 137 MMOL/L — SIGNIFICANT CHANGE UP (ref 135–145)
SP GR SPEC: 1.01 — SIGNIFICANT CHANGE UP (ref 1–1.03)
TSH SERPL-MCNC: 2.72 UIU/ML — SIGNIFICANT CHANGE UP (ref 0.35–4.94)
UROBILINOGEN FLD QL: 0.2 E.U./DL — SIGNIFICANT CHANGE UP
WBC # BLD: 5.75 K/UL — SIGNIFICANT CHANGE UP (ref 3.8–10.5)
WBC # FLD AUTO: 5.75 K/UL — SIGNIFICANT CHANGE UP (ref 3.8–10.5)

## 2019-08-19 PROCEDURE — 93010 ELECTROCARDIOGRAM REPORT: CPT

## 2019-08-19 PROCEDURE — 70450 CT HEAD/BRAIN W/O DYE: CPT

## 2019-08-19 PROCEDURE — 85730 THROMBOPLASTIN TIME PARTIAL: CPT

## 2019-08-19 PROCEDURE — 85025 COMPLETE CBC W/AUTO DIFF WBC: CPT

## 2019-08-19 PROCEDURE — 87536 HIV-1 QUANT&REVRSE TRNSCRPJ: CPT

## 2019-08-19 PROCEDURE — 74177 CT ABD & PELVIS W/CONTRAST: CPT | Mod: 26

## 2019-08-19 PROCEDURE — 85610 PROTHROMBIN TIME: CPT

## 2019-08-19 PROCEDURE — 71260 CT THORAX DX C+: CPT

## 2019-08-19 PROCEDURE — 36415 COLL VENOUS BLD VENIPUNCTURE: CPT

## 2019-08-19 PROCEDURE — 80307 DRUG TEST PRSMV CHEM ANLYZR: CPT

## 2019-08-19 PROCEDURE — 87086 URINE CULTURE/COLONY COUNT: CPT

## 2019-08-19 PROCEDURE — 83605 ASSAY OF LACTIC ACID: CPT

## 2019-08-19 PROCEDURE — 80053 COMPREHEN METABOLIC PANEL: CPT

## 2019-08-19 PROCEDURE — 71260 CT THORAX DX C+: CPT | Mod: 26

## 2019-08-19 PROCEDURE — 96374 THER/PROPH/DIAG INJ IV PUSH: CPT

## 2019-08-19 PROCEDURE — 70450 CT HEAD/BRAIN W/O DYE: CPT | Mod: 26

## 2019-08-19 PROCEDURE — 84443 ASSAY THYROID STIM HORMONE: CPT

## 2019-08-19 PROCEDURE — 99285 EMERGENCY DEPT VISIT HI MDM: CPT

## 2019-08-19 PROCEDURE — 80178 ASSAY OF LITHIUM: CPT

## 2019-08-19 PROCEDURE — 81001 URINALYSIS AUTO W/SCOPE: CPT

## 2019-08-19 PROCEDURE — 99285 EMERGENCY DEPT VISIT HI MDM: CPT | Mod: 25

## 2019-08-19 PROCEDURE — 96375 TX/PRO/DX INJ NEW DRUG ADDON: CPT | Mod: XU

## 2019-08-19 PROCEDURE — 93005 ELECTROCARDIOGRAM TRACING: CPT

## 2019-08-19 PROCEDURE — 74177 CT ABD & PELVIS W/CONTRAST: CPT

## 2019-08-19 PROCEDURE — 87040 BLOOD CULTURE FOR BACTERIA: CPT

## 2019-08-19 RX ORDER — VANCOMYCIN HCL 1 G
1750 VIAL (EA) INTRAVENOUS ONCE
Refills: 0 | Status: COMPLETED | OUTPATIENT
Start: 2019-08-19 | End: 2019-08-19

## 2019-08-19 RX ORDER — ACETAMINOPHEN 500 MG
650 TABLET ORAL ONCE
Refills: 0 | Status: COMPLETED | OUTPATIENT
Start: 2019-08-19 | End: 2019-08-19

## 2019-08-19 RX ORDER — VANCOMYCIN HCL 1 G
1750 VIAL (EA) INTRAVENOUS ONCE
Refills: 0 | Status: DISCONTINUED | OUTPATIENT
Start: 2019-08-19 | End: 2019-08-19

## 2019-08-19 RX ORDER — SODIUM CHLORIDE 9 MG/ML
1000 INJECTION INTRAMUSCULAR; INTRAVENOUS; SUBCUTANEOUS ONCE
Refills: 0 | Status: COMPLETED | OUTPATIENT
Start: 2019-08-19 | End: 2019-08-19

## 2019-08-19 RX ORDER — AZTREONAM 2 G
2000 VIAL (EA) INJECTION ONCE
Refills: 0 | Status: COMPLETED | OUTPATIENT
Start: 2019-08-19 | End: 2019-08-19

## 2019-08-19 RX ADMIN — Medication 100 MILLIGRAM(S): at 18:41

## 2019-08-19 RX ADMIN — SODIUM CHLORIDE 1000 MILLILITER(S): 9 INJECTION INTRAMUSCULAR; INTRAVENOUS; SUBCUTANEOUS at 18:41

## 2019-08-19 RX ADMIN — Medication 250 MILLIGRAM(S): at 19:31

## 2019-08-19 RX ADMIN — Medication 650 MILLIGRAM(S): at 18:41
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
## 2019-08-19 NOTE — ED ADULT TRIAGE NOTE - NS_BH TRG QUESTION8_ED_ALL_ED
Anxiety (includes Panic, OCD)/Depression (without Suicidality or Psychosis)/Lor (includes Bipolar Disorder)

## 2019-08-19 NOTE — ED PROVIDER NOTE - PROGRESS NOTE DETAILS
Klepfish: very slight h/h drop, other labs grossly wnl. UA weakly positive. CT w/ concern for small pulm contusions vs. possible infection. Also w/ mild free fluid in abd. Pt still w/ L lower chest/upper abd pain, abd soft but still mild ttp, no rebound/guarding. d/w michele dia (trauma) and dr. ayoub (ED). will transfer ED to ED. updated pt. Rubén: Opal pt is on 1:1, michele unable to arrange transport. d/w [facility] who will arrange transport. transfer paperwork completed. cd of CTS.

## 2019-08-19 NOTE — ED PROVIDER NOTE - PHYSICAL EXAMINATION
tech [facility] chaperone 102.3 rectal temp, no brbpr or black stool.  No clonus, rigidity, tremors, fasciculations. PERRL, EOMI, no nystagmus. Strength 5/5. Steady unassisted gait. Normal bowel sounds, skin temp/color. minimal scattered LE scabs, no surrounding erythema. multiple old scars, tattoos.   abd: soft, minimal b/l upper abd ttp  chest: no obvious deformities. +L lateral lower rib ttp, no overlying skin changes.  No spinal ttp, neck FROM. No other bony ttp, FROM all extremities. Normal equal distal pulses. no LE edema.

## 2019-08-19 NOTE — ED ADULT NURSE NOTE - NS_BH TRG QUESTION7_ED_ALL_ED
Lor (includes Bipolar Disorder)/Anxiety (includes Panic, OCD)/Depression (without Suicidality or Psychosis)
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
## 2019-08-19 NOTE — ED PROVIDER NOTE - CARE PLAN
Principal Discharge DX:	Fever  Secondary Diagnosis:	Suicide attempt Principal Discharge DX:	Fever  Secondary Diagnosis:	Suicide attempt  Secondary Diagnosis:	Free fluid in pelvis
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
## 2019-08-19 NOTE — ED ADULT TRIAGE NOTE - NS_BH TRG QUESTION7_ED_ALL_ED
Depression (without Suicidality or Psychosis)/Lor (includes Bipolar Disorder)/Anxiety (includes Panic, OCD)
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
## 2019-08-20 LAB
CULTURE RESULTS: NO GROWTH — SIGNIFICANT CHANGE UP
SPECIMEN SOURCE: SIGNIFICANT CHANGE UP

## 2019-08-21 LAB
HIV-1 VIRAL LOAD RESULT: ABNORMAL
HIV1 RNA # SERPL NAA+PROBE: SIGNIFICANT CHANGE UP
HIV1 RNA SER-IMP: SIGNIFICANT CHANGE UP
HIV1 RNA SERPL NAA+PROBE-ACNC: ABNORMAL
HIV1 RNA SERPL NAA+PROBE-LOG#: 4.16 — SIGNIFICANT CHANGE UP

## 2019-08-24 LAB
CULTURE RESULTS: SIGNIFICANT CHANGE UP
CULTURE RESULTS: SIGNIFICANT CHANGE UP
SPECIMEN SOURCE: SIGNIFICANT CHANGE UP
SPECIMEN SOURCE: SIGNIFICANT CHANGE UP

## 2019-09-15 ENCOUNTER — EMERGENCY (EMERGENCY)
Facility: HOSPITAL | Age: 34
LOS: 1 days | Discharge: HOME | End: 2019-09-15
Admitting: EMERGENCY MEDICINE
Payer: MEDICAID

## 2019-09-15 VITALS
DIASTOLIC BLOOD PRESSURE: 73 MMHG | SYSTOLIC BLOOD PRESSURE: 121 MMHG | HEART RATE: 90 BPM | OXYGEN SATURATION: 96 % | RESPIRATION RATE: 18 BRPM | TEMPERATURE: 98 F
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
## 2019-09-15 DIAGNOSIS — S00.31XA ABRASION OF NOSE, INITIAL ENCOUNTER: ICD-10-CM

## 2019-09-15 DIAGNOSIS — R55 SYNCOPE AND COLLAPSE: ICD-10-CM
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
## 2019-09-15 PROCEDURE — 93010 ELECTROCARDIOGRAM REPORT: CPT

## 2019-09-15 PROCEDURE — 99053 MED SERV 10PM-8AM 24 HR FAC: CPT

## 2019-09-15 PROCEDURE — 71045 X-RAY EXAM CHEST 1 VIEW: CPT | Mod: 26

## 2019-09-15 PROCEDURE — 99284 EMERGENCY DEPT VISIT MOD MDM: CPT | Mod: 25

## 2019-09-15 RX ORDER — SODIUM CHLORIDE 9 MG/ML
1000 INJECTION INTRAMUSCULAR; INTRAVENOUS; SUBCUTANEOUS ONCE
Refills: 0 | Status: COMPLETED | OUTPATIENT
Start: 2019-09-15 | End: 2019-09-15

## 2019-09-15 RX ORDER — ACETAMINOPHEN 500 MG
650 TABLET ORAL ONCE
Refills: 0 | Status: COMPLETED | OUTPATIENT
Start: 2019-09-15 | End: 2019-09-15

## 2019-09-15 NOTE — ED ADULT TRIAGE NOTE - CHIEF COMPLAINT QUOTE
Patient to ED s/p syncopal episode earlier tonight.  States he had LOC for 2 minutes.  Presents with swelling and skin tear to nose and neck pain.

## 2019-09-16 LAB
ALBUMIN SERPL ELPH-MCNC: 2.9 G/DL — LOW (ref 3.4–5)
ALP SERPL-CCNC: 105 U/L — SIGNIFICANT CHANGE UP (ref 40–120)
ALT FLD-CCNC: 20 U/L — SIGNIFICANT CHANGE UP (ref 12–42)
ANION GAP SERPL CALC-SCNC: 7 MMOL/L — LOW (ref 9–16)
AST SERPL-CCNC: 24 U/L — SIGNIFICANT CHANGE UP (ref 15–37)
BASOPHILS NFR BLD AUTO: 0.4 % — SIGNIFICANT CHANGE UP (ref 0–2)
BILIRUB SERPL-MCNC: 0.2 MG/DL — SIGNIFICANT CHANGE UP (ref 0.2–1.2)
BUN SERPL-MCNC: 14 MG/DL — SIGNIFICANT CHANGE UP (ref 7–23)
CALCIUM SERPL-MCNC: 8.1 MG/DL — LOW (ref 8.5–10.5)
CHLORIDE SERPL-SCNC: 110 MMOL/L — HIGH (ref 96–108)
CO2 SERPL-SCNC: 27 MMOL/L — SIGNIFICANT CHANGE UP (ref 22–31)
CREAT SERPL-MCNC: 1.1 MG/DL — SIGNIFICANT CHANGE UP (ref 0.5–1.3)
EOSINOPHIL NFR BLD AUTO: 3.4 % — SIGNIFICANT CHANGE UP (ref 0–6)
ETHANOL SERPL-MCNC: <3 MG/DL — SIGNIFICANT CHANGE UP
GLUCOSE SERPL-MCNC: 113 MG/DL — HIGH (ref 70–99)
HCT VFR BLD CALC: 37.3 % — LOW (ref 39–50)
HGB BLD-MCNC: 12.3 G/DL — LOW (ref 13–17)
IMM GRANULOCYTES NFR BLD AUTO: 0.2 % — SIGNIFICANT CHANGE UP (ref 0–1.5)
LITHIUM SERPL-MCNC: <0.2 MMOL/L — LOW (ref 0.6–1.2)
LYMPHOCYTES # BLD AUTO: 35.9 % — SIGNIFICANT CHANGE UP (ref 13–44)
MAGNESIUM SERPL-MCNC: 1.6 MG/DL — SIGNIFICANT CHANGE UP (ref 1.6–2.6)
MCHC RBC-ENTMCNC: 29.6 PG — SIGNIFICANT CHANGE UP (ref 27–34)
MCHC RBC-ENTMCNC: 33 G/DL — SIGNIFICANT CHANGE UP (ref 32–36)
MCV RBC AUTO: 89.9 FL — SIGNIFICANT CHANGE UP (ref 80–100)
MONOCYTES NFR BLD AUTO: 9.2 % — SIGNIFICANT CHANGE UP (ref 2–14)
NEUTROPHILS NFR BLD AUTO: 50.9 % — SIGNIFICANT CHANGE UP (ref 43–77)
PLATELET # BLD AUTO: 249 K/UL — SIGNIFICANT CHANGE UP (ref 150–400)
POTASSIUM SERPL-MCNC: 4 MMOL/L — SIGNIFICANT CHANGE UP (ref 3.5–5.3)
POTASSIUM SERPL-SCNC: 4 MMOL/L — SIGNIFICANT CHANGE UP (ref 3.5–5.3)
PROT SERPL-MCNC: 7.5 G/DL — SIGNIFICANT CHANGE UP (ref 6.4–8.2)
RBC # BLD: 4.15 M/UL — LOW (ref 4.2–5.8)
RBC # FLD: 15.3 % — HIGH (ref 10.3–14.5)
SODIUM SERPL-SCNC: 144 MMOL/L — SIGNIFICANT CHANGE UP (ref 132–145)
WBC # BLD: 4.8 K/UL — SIGNIFICANT CHANGE UP (ref 3.8–10.5)
WBC # FLD AUTO: 4.8 K/UL — SIGNIFICANT CHANGE UP (ref 3.8–10.5)

## 2019-09-16 PROCEDURE — 70486 CT MAXILLOFACIAL W/O DYE: CPT | Mod: 26

## 2019-09-16 PROCEDURE — 72125 CT NECK SPINE W/O DYE: CPT | Mod: 26

## 2019-09-16 PROCEDURE — 71045 X-RAY EXAM CHEST 1 VIEW: CPT | Mod: 26

## 2019-09-16 PROCEDURE — 70450 CT HEAD/BRAIN W/O DYE: CPT | Mod: 26

## 2019-09-16 RX ORDER — SODIUM CHLORIDE 9 MG/ML
1000 INJECTION INTRAMUSCULAR; INTRAVENOUS; SUBCUTANEOUS ONCE
Refills: 0 | Status: COMPLETED | OUTPATIENT
Start: 2019-09-16 | End: 2019-09-16

## 2019-09-16 RX ORDER — BACITRACIN ZINC 500 UNIT/G
1 OINTMENT IN PACKET (EA) TOPICAL ONCE
Refills: 0 | Status: COMPLETED | OUTPATIENT
Start: 2019-09-16 | End: 2019-09-16

## 2019-09-16 RX ADMIN — SODIUM CHLORIDE 1000 MILLILITER(S): 9 INJECTION INTRAMUSCULAR; INTRAVENOUS; SUBCUTANEOUS at 03:41

## 2019-09-16 RX ADMIN — Medication 1 APPLICATION(S): at 04:57

## 2019-09-16 RX ADMIN — SODIUM CHLORIDE 1000 MILLILITER(S): 9 INJECTION INTRAMUSCULAR; INTRAVENOUS; SUBCUTANEOUS at 00:06

## 2019-09-16 RX ADMIN — Medication 650 MILLIGRAM(S): at 03:42

## 2019-09-16 RX ADMIN — SODIUM CHLORIDE 1000 MILLILITER(S): 9 INJECTION INTRAMUSCULAR; INTRAVENOUS; SUBCUTANEOUS at 03:42

## 2019-09-16 RX ADMIN — SODIUM CHLORIDE 1000 MILLILITER(S): 9 INJECTION INTRAMUSCULAR; INTRAVENOUS; SUBCUTANEOUS at 04:57

## 2019-09-16 RX ADMIN — Medication 650 MILLIGRAM(S): at 00:06

## 2019-09-16 NOTE — ED PROVIDER NOTE - OBJECTIVE STATEMENT
32 y/o Male with PMHx of HIV and bipolar disorder BIBA s/p syncope episode while at friend's house this evening. Pt has recently been homeless for the past x5 days and has been trying to stay awake due to living conditions. Pt admits to not having enough sleep and a decrease in PO intake. Denies alcohol and drug use. Denies CP, SOB, palpitations, drug use within the last x48 hours, HA , and N/V. Per friend, pt passed out for x1 minute.

## 2019-09-16 NOTE — ED PROVIDER NOTE - MUSCULOSKELETAL, MLM
Tenderness to underlying nasal bone. Spine appears normal, range of motion is not limited, no muscle or joint tenderness. EOMI without pain. No Tenderness to underlying nasal bone. Spine appears normal with no midline spine tenderness, range of motion is not limited, no muscle or joint tenderness. EOMI without pain.

## 2019-09-16 NOTE — ED PROVIDER NOTE - PATIENT PORTAL LINK FT
You can access the FollowMyHealth Patient Portal offered by [facility] by registering at the following website: [email or web address]. By joining [facility]’s FollowMyHealth portal, you will also be able to view your health information using other applications (apps) compatible with our system.

## 2019-09-16 NOTE — ED PROVIDER NOTE - CLINICAL SUMMARY MEDICAL DECISION MAKING FREE TEXT BOX
pt with syncope; no prodromal symptoms.  concern for dehydration, vasovagal syncope.  no witnessed seizure.  no chest symptoms.  will give IVF and obtain ct head, maxface, and c spine, and basic labs and reassess.  pt newly homeless and presentation in the setting of lack of sleep.,  pt well appearing tolerating pO.  will give local wound care to abrasions.

## 2019-09-16 NOTE — ED PROVIDER NOTE - ENMT, MLM
Airway patent, Nasal mucosa clear. Mouth with normal mucosa. Throat has no vesicles, no oropharyngeal exudates and uvula is midline. No hemotympanum. No septal hematoma.

## 2019-09-16 NOTE — ED PROVIDER NOTE - DIAGNOSTIC INTERPRETATION
Interpreted by ED physician  chest x-ray, 1 view  Lungs clear, heart shadow normal, bony structures normal, no free air under diaphragm, no PTX

## 2019-09-16 NOTE — ED ADULT NURSE NOTE - OBJECTIVE STATEMENT
pt has been homeless for 4 days smoking crack cocaine, yesterday at friends house, had syncopal episode, has not been eating or drinking fluids, sustained abrasions to face

## 2019-12-10 NOTE — ED ADULT NURSE NOTE - CAS TRG GEN SKIN CONDITION
Warm/Dry Tissue Cultured Epidermal Autograft Text: The defect edges were debeveled with a #15 scalpel blade.  Given the location of the defect, shape of the defect and the proximity to free margins a tissue cultured epidermal autograft was deemed most appropriate.  The graft was then trimmed to fit the size of the defect.  The graft was then placed in the primary defect and oriented appropriately.

## 2020-04-17 ENCOUNTER — EMERGENCY (EMERGENCY)
Facility: HOSPITAL | Age: 35
LOS: 1 days | Discharge: ANOTHER HOSPITAL | End: 2020-04-17
Attending: EMERGENCY MEDICINE | Admitting: EMERGENCY MEDICINE
Payer: MEDICAID

## 2020-04-17 VITALS
RESPIRATION RATE: 18 BRPM | SYSTOLIC BLOOD PRESSURE: 133 MMHG | HEIGHT: 68 IN | HEART RATE: 84 BPM | TEMPERATURE: 98 F | WEIGHT: 160.06 LBS | OXYGEN SATURATION: 99 % | DIASTOLIC BLOOD PRESSURE: 80 MMHG

## 2020-04-17 LAB
ALBUMIN SERPL ELPH-MCNC: 3.5 G/DL — SIGNIFICANT CHANGE UP (ref 3.4–5)
ALP SERPL-CCNC: 93 U/L — SIGNIFICANT CHANGE UP (ref 40–120)
ALT FLD-CCNC: 24 U/L — SIGNIFICANT CHANGE UP (ref 12–42)
AMPHET UR-MCNC: NEGATIVE — SIGNIFICANT CHANGE UP
ANION GAP SERPL CALC-SCNC: 12 MMOL/L — SIGNIFICANT CHANGE UP (ref 9–16)
ANISOCYTOSIS BLD QL: SLIGHT — SIGNIFICANT CHANGE UP
AST SERPL-CCNC: 24 U/L — SIGNIFICANT CHANGE UP (ref 15–37)
BARBITURATES UR SCN-MCNC: NEGATIVE — SIGNIFICANT CHANGE UP
BASOPHILS # BLD AUTO: 0.03 K/UL — SIGNIFICANT CHANGE UP (ref 0–0.2)
BASOPHILS NFR BLD AUTO: 1 % — SIGNIFICANT CHANGE UP (ref 0–2)
BENZODIAZ UR-MCNC: NEGATIVE — SIGNIFICANT CHANGE UP
BILIRUB SERPL-MCNC: 0.4 MG/DL — SIGNIFICANT CHANGE UP (ref 0.2–1.2)
BUN SERPL-MCNC: 12 MG/DL — SIGNIFICANT CHANGE UP (ref 7–23)
CALCIUM SERPL-MCNC: 8.6 MG/DL — SIGNIFICANT CHANGE UP (ref 8.5–10.5)
CHLORIDE SERPL-SCNC: 106 MMOL/L — SIGNIFICANT CHANGE UP (ref 96–108)
CO2 SERPL-SCNC: 22 MMOL/L — SIGNIFICANT CHANGE UP (ref 22–31)
COCAINE METAB.OTHER UR-MCNC: POSITIVE
CREAT SERPL-MCNC: 1.26 MG/DL — SIGNIFICANT CHANGE UP (ref 0.5–1.3)
EOSINOPHIL # BLD AUTO: 0.08 K/UL — SIGNIFICANT CHANGE UP (ref 0–0.5)
EOSINOPHIL NFR BLD AUTO: 3 % — SIGNIFICANT CHANGE UP (ref 0–6)
ETHANOL SERPL-MCNC: <3 MG/DL — SIGNIFICANT CHANGE UP
GLUCOSE SERPL-MCNC: 150 MG/DL — HIGH (ref 70–99)
HCT VFR BLD CALC: 44.2 % — SIGNIFICANT CHANGE UP (ref 39–50)
HGB BLD-MCNC: 14.5 G/DL — SIGNIFICANT CHANGE UP (ref 13–17)
LYMPHOCYTES # BLD AUTO: 0.7 K/UL — LOW (ref 1–3.3)
LYMPHOCYTES # BLD AUTO: 25 % — SIGNIFICANT CHANGE UP (ref 13–44)
MANUAL SMEAR VERIFICATION: SIGNIFICANT CHANGE UP
MCHC RBC-ENTMCNC: 28.8 PG — SIGNIFICANT CHANGE UP (ref 27–34)
MCHC RBC-ENTMCNC: 32.8 GM/DL — SIGNIFICANT CHANGE UP (ref 32–36)
MCV RBC AUTO: 87.7 FL — SIGNIFICANT CHANGE UP (ref 80–100)
METHADONE UR-MCNC: NEGATIVE — SIGNIFICANT CHANGE UP
MICROCYTES BLD QL: SLIGHT — SIGNIFICANT CHANGE UP
MONOCYTES # BLD AUTO: 0.22 K/UL — SIGNIFICANT CHANGE UP (ref 0–0.9)
MONOCYTES NFR BLD AUTO: 8 % — SIGNIFICANT CHANGE UP (ref 2–14)
NEUTROPHILS # BLD AUTO: 1.04 K/UL — LOW (ref 1.8–7.4)
NEUTROPHILS NFR BLD AUTO: 37 % — LOW (ref 43–77)
NRBC # BLD: 0 /100 — SIGNIFICANT CHANGE UP (ref 0–0)
NRBC # BLD: SIGNIFICANT CHANGE UP /100 WBCS (ref 0–0)
OPIATES UR-MCNC: NEGATIVE — SIGNIFICANT CHANGE UP
PCP SPEC-MCNC: SIGNIFICANT CHANGE UP
PCP UR-MCNC: NEGATIVE — SIGNIFICANT CHANGE UP
PLAT MORPH BLD: NORMAL — SIGNIFICANT CHANGE UP
PLATELET # BLD AUTO: 253 K/UL — SIGNIFICANT CHANGE UP (ref 150–400)
PLATELET COUNT - ESTIMATE: NORMAL — SIGNIFICANT CHANGE UP
POTASSIUM SERPL-MCNC: 3.9 MMOL/L — SIGNIFICANT CHANGE UP (ref 3.5–5.3)
POTASSIUM SERPL-SCNC: 3.9 MMOL/L — SIGNIFICANT CHANGE UP (ref 3.5–5.3)
PROT SERPL-MCNC: 8.1 G/DL — SIGNIFICANT CHANGE UP (ref 6.4–8.2)
RBC # BLD: 5.04 M/UL — SIGNIFICANT CHANGE UP (ref 4.2–5.8)
RBC # FLD: 14.4 % — SIGNIFICANT CHANGE UP (ref 10.3–14.5)
RBC BLD AUTO: ABNORMAL
SARS-COV-2 RNA SPEC QL NAA+PROBE: SIGNIFICANT CHANGE UP
SODIUM SERPL-SCNC: 140 MMOL/L — SIGNIFICANT CHANGE UP (ref 132–145)
THC UR QL: NEGATIVE — SIGNIFICANT CHANGE UP
VARIANT LYMPHS # BLD: 26 % — HIGH (ref 0–6)
WBC # BLD: 2.8 K/UL — LOW (ref 3.8–10.5)
WBC # FLD AUTO: 2.8 K/UL — LOW (ref 3.8–10.5)

## 2020-04-17 PROCEDURE — 90792 PSYCH DIAG EVAL W/MED SRVCS: CPT | Mod: 95

## 2020-04-17 PROCEDURE — 99220: CPT
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
## 2020-04-17 NOTE — ED BEHAVIORAL HEALTH ASSESSMENT NOTE - DIFFERENTIAL
MDD with psychotic features vs. Schizoaffective Disorder vs. complex PTSD  cocaine could also be contributing to mood and psychosis although pt reports these symptoms even in sober periods  cocaine abuse disorder MDD with psychotic features vs. Schizoaffective Disorder   Cocaine use disorder

## 2020-04-17 NOTE — ED ADULT NURSE NOTE - OBJECTIVE STATEMENT
33 y/o M with hx anxiety, depression, bipolar, schizoaffective disorder, HIV, unsure of his daily meds, reports he is compliant with his HIV meds but not as much with his psych meds. Pt presents to ED c/o increasing suicidal thoughts and visual hallucinations for the past 2 weeks. pt reports he has racing thoughts, and he lives alone and his mother checks on him and reports she wanted him to come to the hospital. pt reports multiple past psych admissions and suicide attempts. reports he has a plan to jump in front of the train, feels paranoid and sees two men following him although he has been self isolating in his apartment. denies alcohol/drug use, cough, fevers, any recent self harm or HI.

## 2020-04-17 NOTE — ED PROVIDER NOTE - CLINICAL SUMMARY MEDICAL DECISION MAKING FREE TEXT BOX
Suicide ideation with previous hx of psych disorders. Plan to check labs with a psychiatric telepsych consult, and will then re-evaluate.
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
## 2020-04-17 NOTE — ED CDU PROVIDER INITIAL DAY NOTE - OBJECTIVE STATEMENT
Pt is a 35 y/o male with a PMHx of HIV, bipolar disorder, schizophrenia, and prior psych admission with SI presents to the ED c/o suicide ideation for x2 weeks. Pt reports he lives with his mother and that he is on haldol and HIV medication. He states that he is intermittently complaint with psych medication. Pt reports today he planned to get a razor blade and slit his throat. He reports cocaine use 1 week ago and is a daily tobacco user. Denies EtOH consumption. Denies fever, chills, and weakness.

## 2020-04-17 NOTE — ED BEHAVIORAL HEALTH ASSESSMENT NOTE - DESCRIPTION (FIRST USE, LAST USE, QUANTITY, FREQUENCY, DURATION)
2 packs per day--> now 3 cig/day drinks 3-4 beers a week. long hx of substance abuse. relapsed yesterday. sober for 1mo last use heroin IV 3yrs ago up to 1 ppd several beers intermittently throughout the week long hx of cocaine use; UTOX +cocaine heroin IV 3yrs ago

## 2020-04-17 NOTE — ED BEHAVIORAL HEALTH ASSESSMENT NOTE - RISK ASSESSMENT
High Acute Suicide Risk high acute risk due to above. chronic elevated risk due to chronic mental illness and chronic/ongoing substance use

## 2020-04-17 NOTE — ED BEHAVIORAL HEALTH ASSESSMENT NOTE - SUICIDE RISK FACTORS
Anhedonia/Current mood episode/Unable to engage in safety planning/Family history of suicide/Access to lethal methods (pills, firearm, etc.: Ask specifically about presence or absence of a firearm in the home or ease of accessing/Hopelessness or despair/Command hallucinations/Alcohol/Substance abuse disorders/History of abuse/trauma History of abuse/trauma/Anhedonia/Alcohol/Substance abuse disorders/Access to lethal methods (pills, firearm, etc.: Ask specifically about presence or absence of a firearm in the home or ease of accessing/Family history of suicide/Current mood episode/Agitation/Severe Anxiety/Panic/Hopelessness or despair/Command hallucinations/Unable to engage in safety planning/Insomnia
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
## 2020-04-17 NOTE — ED BEHAVIORAL HEALTH ASSESSMENT NOTE - OTHER
girlfriend and three young children; mother lives in same building [employer], [facility], [facility] , [facility], [facility], [facility], [facility], [facility], [facility], [facility], [facility], [facility], [facility], [facility], [facility] lost [facility] of his daughter, not compliant with medications, unemployed and isolating deferred to medical ER appears internally preoccupied and withdrawn/distracted pending external billing

## 2020-04-17 NOTE — ED PROVIDER NOTE - OBJECTIVE STATEMENT
Pt is a 35 y/o male with a PMHx of HIV, bipolar disorder, schizophrenia, and prior psych admission with SI presents to the ED c/o suicide ideation for x2 weeks. Pt reports he lives with his mother and that he is on haldol and HIV medication. He states that he is intermittently complaint with psych medication. Pt reports today he planned to get a razor blade and slit his throat. He reports cocaine use 1 week ago and is a daily tobacco user. Denies EtOH consumption. Denies fever, chills, and weakness. Pt is a 33 y/o male with a PMHx of HIV, bipolar disorder, schizophrenia, and prior psych admission with SI presents to the ED c/o suicide ideation for x2 weeks. Pt reports he lives with his mother and that he is on haldol and HIV medication. He states that he is intermittently complaint with psych medication. Pt reports today he planned to get a razor blade and slit his throat. He reports cocaine use 1 week ago and is a daily tobacco user. Denies EtOH consumption. Denies fever, chills, and weakness.    Prev behavioral health note with hx attempted hanging at 8 yo, attempted jumping off a bridge at 18 yo. hospitalized 1 year ago at [facility] after attempted suicide by cutting throat.

## 2020-04-17 NOTE — ED BEHAVIORAL HEALTH ASSESSMENT NOTE - DESCRIPTION
see BTCM note HIV for 10yrs, reports taking Descovy and Tivicay through [facility] clinic Dr. Garza. as per psyckes last picked up descovy 200mg-25mg/day and tivicay 50mg/day on 8/15/2018. 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

## 2020-04-17 NOTE — ED BEHAVIORAL HEALTH ASSESSMENT NOTE - ACTIVATING EVENTS/STRESSORS
Other/Current or pending social isolation/Non-compliant or not receiving treatment Current or pending social isolation/Non-compliant or not receiving treatment

## 2020-04-17 NOTE — ED BEHAVIORAL HEALTH ASSESSMENT NOTE - CURRENT MEDICATION
intermittently compliant and does not know doses. as per psyckes pt picked up risperdal 2mg BID and trazodone 50mg/day on 4/12/19 (pt reports being prescribed by an ER); on 1/23/2019 he picked up haldol 1mg BID, Lithium 300mg and 600mg, propanolol 20mg.  pt reports currently taking risperdal, li and trazodone every few days on/off. intermittently compliant and does not know doses -- per psyckes, pt was on duloxetine 30 mg BID and risperidone 2 mg BID, trazodone PRN sleep.

## 2020-04-17 NOTE — ED ADULT TRIAGE NOTE - CHIEF COMPLAINT QUOTE
Patient to ED with complaint of suicidal ideation.  Patient states "I want to jump in front of a bus or train".  Numerous attempts in past.  Not aggressive in triage.  Denies drug or alcohol use

## 2020-04-17 NOTE — ED BEHAVIORAL HEALTH NOTE - BEHAVIORAL HEALTH NOTE
PRE HOSPITAL COURSE  SOURCE: RN and triage documentation.   DETAILS: Patient presented self to ED; chief complaint of SI.     ED COURSE  SOURCE: RN and triage documentation/nurse note.  ARRIVAL: Patient was calm and cooperative upon arrival. Patient allowed for gowning/security wanding without incident.   BELONGINGS: No notable belongings; cell phone and clothing secured.   BEHAVIOR: Patient has been cooperative and complaint, however presents as anxious Patient is redirected easily with RN. Patient provided blood/urine for routine labs. Patient has been resting in hospital bed. Patient endorsed SI and plan to throw himself in front of train, however denies HI. Patient endorses AH of voices to kill himself. Patients speech is of normal volume/pressured and patient is seemingly alert/oriented at this time. Patient has eaten and drank while he's been in ED.   TREATMENT: No medication intervention has been required while in ED.   VISITORS: Patient is unaccompanied by social supports at this time.

## 2020-04-17 NOTE — ED BEHAVIORAL HEALTH ASSESSMENT NOTE - MEDICAL ISSUES AND PLAN (INCLUDE STANDING AND PRN MEDICATION)
HIV - reports taking Descovy and Tivicay through [facility] - per psyckes as of 2/2020 Descovy 200-25 mg and Tivicay 50 mg

## 2020-04-18 ENCOUNTER — INPATIENT (INPATIENT)
Facility: HOSPITAL | Age: 35
LOS: 8 days | Discharge: HOME | End: 2020-04-27
Attending: PSYCHIATRY & NEUROLOGY | Admitting: PSYCHIATRY & NEUROLOGY
Payer: COMMERCIAL

## 2020-04-18 VITALS
HEART RATE: 70 BPM | RESPIRATION RATE: 18 BRPM | DIASTOLIC BLOOD PRESSURE: 74 MMHG | SYSTOLIC BLOOD PRESSURE: 121 MMHG | OXYGEN SATURATION: 96 % | TEMPERATURE: 98 F

## 2020-04-18 VITALS — HEIGHT: 66 IN | WEIGHT: 117.95 LBS | RESPIRATION RATE: 16 BRPM | TEMPERATURE: 98 F

## 2020-04-18 DIAGNOSIS — F39 UNSPECIFIED MOOD [AFFECTIVE] DISORDER: ICD-10-CM

## 2020-04-18 PROCEDURE — 99217: CPT

## 2020-04-18 RX ORDER — HALOPERIDOL DECANOATE 100 MG/ML
5 INJECTION INTRAMUSCULAR EVERY 6 HOURS
Refills: 0 | Status: DISCONTINUED | OUTPATIENT
Start: 2020-04-18 | End: 2020-04-27

## 2020-04-18 RX ORDER — DULOXETINE HYDROCHLORIDE 30 MG/1
30 CAPSULE, DELAYED RELEASE ORAL DAILY
Refills: 0 | Status: DISCONTINUED | OUTPATIENT
Start: 2020-04-18 | End: 2020-04-20

## 2020-04-18 RX ORDER — TRAZODONE HCL 50 MG
100 TABLET ORAL AT BEDTIME
Refills: 0 | Status: DISCONTINUED | OUTPATIENT
Start: 2020-04-18 | End: 2020-04-24

## 2020-04-18 RX ORDER — DIPHENHYDRAMINE HCL 50 MG
50 CAPSULE ORAL EVERY 4 HOURS
Refills: 0 | Status: DISCONTINUED | OUTPATIENT
Start: 2020-04-18 | End: 2020-04-27

## 2020-04-18 RX ORDER — DOLUTEGRAVIR SODIUM 25 MG/1
50 TABLET, FILM COATED ORAL DAILY
Refills: 0 | Status: DISCONTINUED | OUTPATIENT
Start: 2020-04-18 | End: 2020-04-27

## 2020-04-18 RX ORDER — EMTRICITABINE AND TENOFOVIR DISOPROXIL FUMARATE 200; 300 MG/1; MG/1
1 TABLET, FILM COATED ORAL DAILY
Refills: 0 | Status: DISCONTINUED | OUTPATIENT
Start: 2020-04-18 | End: 2020-04-27

## 2020-04-18 RX ORDER — HALOPERIDOL DECANOATE 100 MG/ML
5 INJECTION INTRAMUSCULAR ONCE
Refills: 0 | Status: DISCONTINUED | OUTPATIENT
Start: 2020-04-18 | End: 2020-04-27

## 2020-04-18 RX ORDER — RISPERIDONE 4 MG/1
2 TABLET ORAL
Refills: 0 | Status: DISCONTINUED | OUTPATIENT
Start: 2020-04-18 | End: 2020-04-27

## 2020-04-18 RX ADMIN — RISPERIDONE 2 MILLIGRAM(S): 4 TABLET ORAL at 20:44

## 2020-04-18 RX ADMIN — RISPERIDONE 2 MILLIGRAM(S): 4 TABLET ORAL at 08:47

## 2020-04-18 RX ADMIN — DOLUTEGRAVIR SODIUM 50 MILLIGRAM(S): 25 TABLET, FILM COATED ORAL at 08:47

## 2020-04-18 RX ADMIN — Medication 100 MILLIGRAM(S): at 20:44

## 2020-04-18 RX ADMIN — DULOXETINE HYDROCHLORIDE 30 MILLIGRAM(S): 30 CAPSULE, DELAYED RELEASE ORAL at 08:47

## 2020-04-18 RX ADMIN — EMTRICITABINE AND TENOFOVIR DISOPROXIL FUMARATE 1 TABLET(S): 200; 300 TABLET, FILM COATED ORAL at 08:47

## 2020-04-18 NOTE — ED ADULT NURSE REASSESSMENT NOTE - NS ED NURSE REASSESS COMMENT FT1
Pt care handed over to myself, checked in on patient remains on 1-1 with pct Jaron at present, introduced self to patient, nil complaints asked if he wanted more food - given to him, pt smiling calm and compliant aware he is awaiting bed allocation, informed pt once I know of bed I will inform him
Pt has bed on floor Low 4 at [facility], report given to luz cheatham with thanks transport booked by rn tomy
transport here for pt report given
Pt laying on bed, states that he is comfortable at this time. Pt awaiting bed placement. Pt continues to be on constant observation. Will continue to monitor. Safety is maintained.

## 2020-04-18 NOTE — CHART NOTE - NSCHARTNOTEFT_GEN_A_CORE
Screening Medical Evaluation  Patient Admitted from: [facility] admitting diagnosis: Mood disorder    PAST MEDICAL & SURGICAL HISTORY:  Schizophrenia, unspecified type  Bipolar disorder  HIV (human immunodeficiency virus infection)  No significant past surgical history        Allergies    penicillins (Unknown)    Intolerances        Social History:     FAMILY HISTORY:  No pertinent family history in first degree relatives      MEDICATIONS  (STANDING):  dolutegravir 50 milliGRAM(s) Oral daily  DULoxetine 30 milliGRAM(s) Oral daily  emtricitabine 200 mG/tenofovir alafenamide 25 mG (DESCOVY) Tablet 1 Tablet(s) Oral daily  risperiDONE   Tablet 2 milliGRAM(s) Oral two times a day  traZODone 100 milliGRAM(s) Oral at bedtime    MEDICATIONS  (PRN):  diphenhydrAMINE 50 milliGRAM(s) Oral every 4 hours PRN agitation  diphenhydrAMINE   Injectable 50 milliGRAM(s) IntraMuscular every 4 hours PRN Rash and/or Itching  haloperidol     Tablet 5 milliGRAM(s) Oral every 6 hours PRN agitation  haloperidol    Injectable 5 milliGRAM(s) IntraMuscular once PRN agitation  LORazepam     Tablet 2 milliGRAM(s) Oral every 6 hours PRN agitation  LORazepam   Injectable 2 milliGRAM(s) IntraMuscular once PRN agitation      Vital Signs Last 24 Hrs  T(C): 36.2 (18 Apr 2020 19:07), Max: 36.9 (18 Apr 2020 08:17)  T(F): 97.1 (18 Apr 2020 19:07), Max: 98.4 (18 Apr 2020 08:17)  HR: 70 (18 Apr 2020 04:39) (70 - 74)  BP: 121/74 (18 Apr 2020 04:39) (116/73 - 121/74)  BP(mean): --  RR: 16 (18 Apr 2020 05:53) (16 - 18)  SpO2: 96% (18 Apr 2020 04:39) (96% - 96%)  CAPILLARY BLOOD GLUCOSE            PHYSICAL EXAM:  GENERAL: NAD, well-developed  HEAD:  Atraumatic, Normocephalic  EYES: EOMI, PERRLA, conjunctiva and sclera clear  NECK: Supple, No JVD  CHEST/LUNG: Clear to auscultation bilaterally; No wheeze  HEART: Regular rate and rhythm; No murmurs, rubs, or gallops  ABDOMEN: Soft, Nontender, Nondistended; Bowel sounds present  EXTREMITIES:  2+ Peripheral Pulses, No clubbing, cyanosis, or edema  PSYCH: AAOx3  NEUROLOGY: non-focal  SKIN: In tact    LABS:                        14.5   2.80  )-----------( 253      ( 17 Apr 2020 18:14 )             44.2     04-17    140  |  106  |  12  ----------------------------<  150<H>  3.9   |  22  |  1.26    Ca    8.6      17 Apr 2020 18:14    TPro  8.1  /  Alb  3.5  /  TBili  0.4  /  DBili  x   /  AST  24  /  ALT  24  /  AlkPhos  93  04-17              RADIOLOGY & ADDITIONAL TESTS:    Assessment and Plan: 33 yo M with PMH of HIV is admitted to [facility] with a primary psychiatric diagnosis of Mood disorder. The pt currently denies having any medical complaints such as chest pain, sob, abdominal pain, n/v/d/c, or any problems with urination or bowel movements. The rest of his screening physical is unremarkable.    1.Mood disorder-Plan: continue with meds as per primary psychiatric team

## 2020-04-18 NOTE — ED CDU PROVIDER DISPOSITION NOTE - CLINICAL COURSE
pt placed on obs for pending covid and psych evaluation  d/w psych team, voluntary admission to [facility]  pending bed pt placed on obs for pending covid and psych evaluation, medically cleared by previous ED provider  d/w psych team, voluntary admission to [facility]  pending bed pt placed on obs for pending covid and psych evaluation, medically cleared by previous ED provider  d/w psych team, voluntary admission to [facility]  pending bed    pt had been 1:1 in ed, has been calm/cooperative, has not required any sedation, denies any active hallucination.

## 2020-04-19 PROCEDURE — 99232 SBSQ HOSP IP/OBS MODERATE 35: CPT

## 2020-04-19 RX ADMIN — RISPERIDONE 2 MILLIGRAM(S): 4 TABLET ORAL at 20:40

## 2020-04-19 RX ADMIN — DULOXETINE HYDROCHLORIDE 30 MILLIGRAM(S): 30 CAPSULE, DELAYED RELEASE ORAL at 08:11

## 2020-04-19 RX ADMIN — EMTRICITABINE AND TENOFOVIR DISOPROXIL FUMARATE 1 TABLET(S): 200; 300 TABLET, FILM COATED ORAL at 08:11

## 2020-04-19 RX ADMIN — Medication 100 MILLIGRAM(S): at 20:40

## 2020-04-19 RX ADMIN — RISPERIDONE 2 MILLIGRAM(S): 4 TABLET ORAL at 08:11

## 2020-04-19 RX ADMIN — DOLUTEGRAVIR SODIUM 50 MILLIGRAM(S): 25 TABLET, FILM COATED ORAL at 08:11

## 2020-04-20 LAB
BASOPHILS # BLD AUTO: 0.03 K/UL — SIGNIFICANT CHANGE UP (ref 0–0.2)
BASOPHILS NFR BLD AUTO: 1 % — SIGNIFICANT CHANGE UP (ref 0–2)
BASOPHILS NFR SPEC: 0 % — SIGNIFICANT CHANGE UP (ref 0–2)
BLASTS # FLD: 0 % — SIGNIFICANT CHANGE UP (ref 0–0)
CHOLEST SERPL-MCNC: 186 MG/DL — SIGNIFICANT CHANGE UP (ref 120–199)
EOSINOPHIL # BLD AUTO: 0.1 K/UL — SIGNIFICANT CHANGE UP (ref 0–0.5)
EOSINOPHIL NFR BLD AUTO: 3.2 % — SIGNIFICANT CHANGE UP (ref 0–6)
EOSINOPHIL NFR FLD: 2 % — SIGNIFICANT CHANGE UP (ref 0–6)
GIANT PLATELETS BLD QL SMEAR: PRESENT — SIGNIFICANT CHANGE UP
HBA1C BLD-MCNC: 5.3 % — SIGNIFICANT CHANGE UP (ref 4–5.6)
HCT VFR BLD CALC: 41.5 % — SIGNIFICANT CHANGE UP (ref 39–50)
HDLC SERPL-MCNC: 59 MG/DL — HIGH (ref 35–55)
HGB BLD-MCNC: 13.2 G/DL — SIGNIFICANT CHANGE UP (ref 13–17)
IMM GRANULOCYTES NFR BLD AUTO: 0.3 % — SIGNIFICANT CHANGE UP (ref 0–1.5)
LIPID PNL WITH DIRECT LDL SERPL: 108 MG/DL — SIGNIFICANT CHANGE UP
LYMPHOCYTES # BLD AUTO: 1.49 K/UL — SIGNIFICANT CHANGE UP (ref 1–3.3)
LYMPHOCYTES # BLD AUTO: 47.8 % — HIGH (ref 13–44)
LYMPHOCYTES NFR SPEC AUTO: 24.7 % — SIGNIFICANT CHANGE UP (ref 13–44)
MACROCYTES BLD QL: SLIGHT — SIGNIFICANT CHANGE UP
MCHC RBC-ENTMCNC: 28.3 PG — SIGNIFICANT CHANGE UP (ref 27–34)
MCHC RBC-ENTMCNC: 31.8 % — LOW (ref 32–36)
MCV RBC AUTO: 88.9 FL — SIGNIFICANT CHANGE UP (ref 80–100)
METAMYELOCYTES # FLD: 0 % — SIGNIFICANT CHANGE UP (ref 0–1)
MICROCYTES BLD QL: SIGNIFICANT CHANGE UP
MONOCYTES # BLD AUTO: 0.39 K/UL — SIGNIFICANT CHANGE UP (ref 0–0.9)
MONOCYTES NFR BLD AUTO: 12.5 % — SIGNIFICANT CHANGE UP (ref 2–14)
MONOCYTES NFR BLD: 10.9 % — HIGH (ref 2–9)
MYELOCYTES NFR BLD: 0 % — SIGNIFICANT CHANGE UP (ref 0–0)
NEUTROPHIL AB SER-ACNC: 42.6 % — LOW (ref 43–77)
NEUTROPHILS # BLD AUTO: 1.1 K/UL — LOW (ref 1.8–7.4)
NEUTROPHILS NFR BLD AUTO: 35.2 % — LOW (ref 43–77)
NEUTS BAND # BLD: 0 % — SIGNIFICANT CHANGE UP (ref 0–6)
NRBC # FLD: 0 K/UL — SIGNIFICANT CHANGE UP (ref 0–0)
OTHER - HEMATOLOGY %: 0 — SIGNIFICANT CHANGE UP
PLATELET # BLD AUTO: 227 K/UL — SIGNIFICANT CHANGE UP (ref 150–400)
PLATELET COUNT - ESTIMATE: NORMAL — SIGNIFICANT CHANGE UP
PMV BLD: 10.1 FL — SIGNIFICANT CHANGE UP (ref 7–13)
PROMYELOCYTES # FLD: 0 % — SIGNIFICANT CHANGE UP (ref 0–0)
RBC # BLD: 4.67 M/UL — SIGNIFICANT CHANGE UP (ref 4.2–5.8)
RBC # FLD: 14.5 % — SIGNIFICANT CHANGE UP (ref 10.3–14.5)
SMUDGE CELLS # BLD: PRESENT — SIGNIFICANT CHANGE UP
TRIGL SERPL-MCNC: 222 MG/DL — HIGH (ref 10–149)
VARIANT LYMPHS # BLD: 19.8 % — SIGNIFICANT CHANGE UP
WBC # BLD: 3.12 K/UL — LOW (ref 3.8–10.5)
WBC # FLD AUTO: 3.12 K/UL — LOW (ref 3.8–10.5)

## 2020-04-20 PROCEDURE — 99232 SBSQ HOSP IP/OBS MODERATE 35: CPT

## 2020-04-20 RX ORDER — DULOXETINE HYDROCHLORIDE 30 MG/1
30 CAPSULE, DELAYED RELEASE ORAL
Refills: 0 | Status: DISCONTINUED | OUTPATIENT
Start: 2020-04-20 | End: 2020-04-23

## 2020-04-20 RX ADMIN — DULOXETINE HYDROCHLORIDE 30 MILLIGRAM(S): 30 CAPSULE, DELAYED RELEASE ORAL at 20:39

## 2020-04-20 RX ADMIN — Medication 50 MILLIGRAM(S): at 23:35

## 2020-04-20 RX ADMIN — HALOPERIDOL DECANOATE 5 MILLIGRAM(S): 100 INJECTION INTRAMUSCULAR at 23:35

## 2020-04-20 RX ADMIN — EMTRICITABINE AND TENOFOVIR DISOPROXIL FUMARATE 1 TABLET(S): 200; 300 TABLET, FILM COATED ORAL at 09:02

## 2020-04-20 RX ADMIN — Medication 100 MILLIGRAM(S): at 20:42

## 2020-04-20 RX ADMIN — DULOXETINE HYDROCHLORIDE 30 MILLIGRAM(S): 30 CAPSULE, DELAYED RELEASE ORAL at 09:02

## 2020-04-20 RX ADMIN — RISPERIDONE 2 MILLIGRAM(S): 4 TABLET ORAL at 09:02

## 2020-04-20 RX ADMIN — RISPERIDONE 2 MILLIGRAM(S): 4 TABLET ORAL at 20:42

## 2020-04-20 RX ADMIN — DOLUTEGRAVIR SODIUM 50 MILLIGRAM(S): 25 TABLET, FILM COATED ORAL at 09:02

## 2020-04-21 DIAGNOSIS — Z79.899 OTHER LONG TERM (CURRENT) DRUG THERAPY: ICD-10-CM

## 2020-04-21 DIAGNOSIS — R45.851 SUICIDAL IDEATIONS: ICD-10-CM

## 2020-04-21 DIAGNOSIS — Z88.0 ALLERGY STATUS TO PENICILLIN: ICD-10-CM

## 2020-04-21 PROCEDURE — 99232 SBSQ HOSP IP/OBS MODERATE 35: CPT

## 2020-04-21 RX ORDER — HYDROXYZINE HCL 10 MG
50 TABLET ORAL EVERY 6 HOURS
Refills: 0 | Status: DISCONTINUED | OUTPATIENT
Start: 2020-04-21 | End: 2020-04-27

## 2020-04-21 RX ADMIN — Medication 2 MILLIGRAM(S): at 21:11

## 2020-04-21 RX ADMIN — Medication 100 MILLIGRAM(S): at 20:11

## 2020-04-21 RX ADMIN — RISPERIDONE 2 MILLIGRAM(S): 4 TABLET ORAL at 20:11

## 2020-04-21 RX ADMIN — Medication 50 MILLIGRAM(S): at 20:11

## 2020-04-21 RX ADMIN — EMTRICITABINE AND TENOFOVIR DISOPROXIL FUMARATE 1 TABLET(S): 200; 300 TABLET, FILM COATED ORAL at 08:39

## 2020-04-21 RX ADMIN — RISPERIDONE 2 MILLIGRAM(S): 4 TABLET ORAL at 08:39

## 2020-04-21 RX ADMIN — DULOXETINE HYDROCHLORIDE 30 MILLIGRAM(S): 30 CAPSULE, DELAYED RELEASE ORAL at 08:39

## 2020-04-21 RX ADMIN — DULOXETINE HYDROCHLORIDE 30 MILLIGRAM(S): 30 CAPSULE, DELAYED RELEASE ORAL at 20:11

## 2020-04-21 RX ADMIN — Medication 50 MILLIGRAM(S): at 21:11

## 2020-04-21 RX ADMIN — DOLUTEGRAVIR SODIUM 50 MILLIGRAM(S): 25 TABLET, FILM COATED ORAL at 08:39

## 2020-04-22 LAB — MANUAL DIF COMMENT BLD-IMP: SIGNIFICANT CHANGE UP

## 2020-04-22 RX ADMIN — EMTRICITABINE AND TENOFOVIR DISOPROXIL FUMARATE 1 TABLET(S): 200; 300 TABLET, FILM COATED ORAL at 08:30

## 2020-04-22 RX ADMIN — DULOXETINE HYDROCHLORIDE 30 MILLIGRAM(S): 30 CAPSULE, DELAYED RELEASE ORAL at 08:30

## 2020-04-22 RX ADMIN — Medication 1 MILLIGRAM(S): at 21:33

## 2020-04-22 RX ADMIN — DOLUTEGRAVIR SODIUM 50 MILLIGRAM(S): 25 TABLET, FILM COATED ORAL at 08:30

## 2020-04-22 RX ADMIN — Medication 50 MILLIGRAM(S): at 20:30

## 2020-04-22 RX ADMIN — RISPERIDONE 2 MILLIGRAM(S): 4 TABLET ORAL at 08:30

## 2020-04-22 RX ADMIN — RISPERIDONE 2 MILLIGRAM(S): 4 TABLET ORAL at 20:30

## 2020-04-22 RX ADMIN — HALOPERIDOL DECANOATE 5 MILLIGRAM(S): 100 INJECTION INTRAMUSCULAR at 21:33

## 2020-04-22 RX ADMIN — Medication 50 MILLIGRAM(S): at 21:33

## 2020-04-22 RX ADMIN — DULOXETINE HYDROCHLORIDE 30 MILLIGRAM(S): 30 CAPSULE, DELAYED RELEASE ORAL at 20:30

## 2020-04-22 RX ADMIN — Medication 100 MILLIGRAM(S): at 20:30

## 2020-04-23 PROCEDURE — 99232 SBSQ HOSP IP/OBS MODERATE 35: CPT

## 2020-04-23 RX ORDER — DULOXETINE HYDROCHLORIDE 30 MG/1
60 CAPSULE, DELAYED RELEASE ORAL DAILY
Refills: 0 | Status: DISCONTINUED | OUTPATIENT
Start: 2020-04-23 | End: 2020-04-27

## 2020-04-23 RX ORDER — DULOXETINE HYDROCHLORIDE 30 MG/1
30 CAPSULE, DELAYED RELEASE ORAL ONCE
Refills: 0 | Status: COMPLETED | OUTPATIENT
Start: 2020-04-23 | End: 2020-04-23

## 2020-04-23 RX ORDER — LANOLIN ALCOHOL/MO/W.PET/CERES
5 CREAM (GRAM) TOPICAL AT BEDTIME
Refills: 0 | Status: DISCONTINUED | OUTPATIENT
Start: 2020-04-23 | End: 2020-04-27

## 2020-04-23 RX ADMIN — DOLUTEGRAVIR SODIUM 50 MILLIGRAM(S): 25 TABLET, FILM COATED ORAL at 08:41

## 2020-04-23 RX ADMIN — Medication 5 MILLIGRAM(S): at 20:02

## 2020-04-23 RX ADMIN — RISPERIDONE 2 MILLIGRAM(S): 4 TABLET ORAL at 20:02

## 2020-04-23 RX ADMIN — Medication 50 MILLIGRAM(S): at 20:14

## 2020-04-23 RX ADMIN — Medication 1 MILLIGRAM(S): at 20:14

## 2020-04-23 RX ADMIN — EMTRICITABINE AND TENOFOVIR DISOPROXIL FUMARATE 1 TABLET(S): 200; 300 TABLET, FILM COATED ORAL at 08:41

## 2020-04-23 RX ADMIN — DULOXETINE HYDROCHLORIDE 30 MILLIGRAM(S): 30 CAPSULE, DELAYED RELEASE ORAL at 08:41

## 2020-04-23 RX ADMIN — RISPERIDONE 2 MILLIGRAM(S): 4 TABLET ORAL at 08:41

## 2020-04-23 RX ADMIN — HALOPERIDOL DECANOATE 5 MILLIGRAM(S): 100 INJECTION INTRAMUSCULAR at 20:14

## 2020-04-23 RX ADMIN — Medication 100 MILLIGRAM(S): at 20:02

## 2020-04-23 RX ADMIN — DULOXETINE HYDROCHLORIDE 30 MILLIGRAM(S): 30 CAPSULE, DELAYED RELEASE ORAL at 12:01

## 2020-04-24 RX ORDER — TRAZODONE HCL 50 MG
150 TABLET ORAL AT BEDTIME
Refills: 0 | Status: DISCONTINUED | OUTPATIENT
Start: 2020-04-24 | End: 2020-04-27

## 2020-04-24 RX ADMIN — Medication 150 MILLIGRAM(S): at 20:39

## 2020-04-24 RX ADMIN — RISPERIDONE 2 MILLIGRAM(S): 4 TABLET ORAL at 08:47

## 2020-04-24 RX ADMIN — Medication 5 MILLIGRAM(S): at 20:39

## 2020-04-24 RX ADMIN — DULOXETINE HYDROCHLORIDE 60 MILLIGRAM(S): 30 CAPSULE, DELAYED RELEASE ORAL at 08:47

## 2020-04-24 RX ADMIN — DOLUTEGRAVIR SODIUM 50 MILLIGRAM(S): 25 TABLET, FILM COATED ORAL at 08:47

## 2020-04-24 RX ADMIN — EMTRICITABINE AND TENOFOVIR DISOPROXIL FUMARATE 1 TABLET(S): 200; 300 TABLET, FILM COATED ORAL at 08:47

## 2020-04-24 RX ADMIN — RISPERIDONE 2 MILLIGRAM(S): 4 TABLET ORAL at 20:39

## 2020-04-25 RX ADMIN — RISPERIDONE 2 MILLIGRAM(S): 4 TABLET ORAL at 08:37

## 2020-04-25 RX ADMIN — DOLUTEGRAVIR SODIUM 50 MILLIGRAM(S): 25 TABLET, FILM COATED ORAL at 08:37

## 2020-04-25 RX ADMIN — Medication 5 MILLIGRAM(S): at 20:09

## 2020-04-25 RX ADMIN — EMTRICITABINE AND TENOFOVIR DISOPROXIL FUMARATE 1 TABLET(S): 200; 300 TABLET, FILM COATED ORAL at 08:37

## 2020-04-25 RX ADMIN — RISPERIDONE 2 MILLIGRAM(S): 4 TABLET ORAL at 20:09

## 2020-04-25 RX ADMIN — DULOXETINE HYDROCHLORIDE 60 MILLIGRAM(S): 30 CAPSULE, DELAYED RELEASE ORAL at 08:37

## 2020-04-25 RX ADMIN — Medication 150 MILLIGRAM(S): at 20:09

## 2020-04-26 RX ADMIN — RISPERIDONE 2 MILLIGRAM(S): 4 TABLET ORAL at 21:02

## 2020-04-26 RX ADMIN — Medication 5 MILLIGRAM(S): at 21:02

## 2020-04-26 RX ADMIN — DOLUTEGRAVIR SODIUM 50 MILLIGRAM(S): 25 TABLET, FILM COATED ORAL at 08:57

## 2020-04-26 RX ADMIN — RISPERIDONE 2 MILLIGRAM(S): 4 TABLET ORAL at 08:57

## 2020-04-26 RX ADMIN — DULOXETINE HYDROCHLORIDE 60 MILLIGRAM(S): 30 CAPSULE, DELAYED RELEASE ORAL at 08:57

## 2020-04-26 RX ADMIN — EMTRICITABINE AND TENOFOVIR DISOPROXIL FUMARATE 1 TABLET(S): 200; 300 TABLET, FILM COATED ORAL at 08:57

## 2020-04-26 RX ADMIN — Medication 150 MILLIGRAM(S): at 21:02

## 2020-04-27 VITALS — TEMPERATURE: 98 F

## 2020-04-27 PROCEDURE — 99238 HOSP IP/OBS DSCHRG MGMT 30/<: CPT

## 2020-04-27 RX ORDER — RISPERIDONE 4 MG/1
1 TABLET ORAL
Qty: 60 | Refills: 0
Start: 2020-04-27 | End: 2020-05-26

## 2020-04-27 RX ORDER — TRAZODONE HCL 50 MG
1 TABLET ORAL
Qty: 30 | Refills: 0
Start: 2020-04-27 | End: 2022-06-25

## 2020-04-27 RX ORDER — DULOXETINE HYDROCHLORIDE 30 MG/1
1 CAPSULE, DELAYED RELEASE ORAL
Qty: 30 | Refills: 0
Start: 2020-04-27 | End: 2022-06-25

## 2020-04-27 RX ORDER — TRAZODONE HCL 50 MG
1 TABLET ORAL
Qty: 30 | Refills: 0
Start: 2020-04-27 | End: 2020-05-26

## 2020-04-27 RX ORDER — DOLUTEGRAVIR SODIUM 25 MG/1
1 TABLET, FILM COATED ORAL
Qty: 30 | Refills: 0
Start: 2020-04-27 | End: 2020-05-26

## 2020-04-27 RX ORDER — LANOLIN ALCOHOL/MO/W.PET/CERES
1 CREAM (GRAM) TOPICAL
Qty: 30 | Refills: 0
Start: 2020-04-27 | End: 2022-06-25

## 2020-04-27 RX ORDER — RISPERIDONE 4 MG/1
1 TABLET ORAL
Qty: 60 | Refills: 0
Start: 2020-04-27 | End: 2022-06-25

## 2020-04-27 RX ORDER — LANOLIN ALCOHOL/MO/W.PET/CERES
1 CREAM (GRAM) TOPICAL
Qty: 30 | Refills: 0
Start: 2020-04-27 | End: 2020-05-26

## 2020-04-27 RX ORDER — DULOXETINE HYDROCHLORIDE 30 MG/1
1 CAPSULE, DELAYED RELEASE ORAL
Qty: 30 | Refills: 0
Start: 2020-04-27 | End: 2020-05-26

## 2020-04-27 RX ORDER — EMTRICITABINE AND TENOFOVIR DISOPROXIL FUMARATE 200; 300 MG/1; MG/1
1 TABLET, FILM COATED ORAL
Qty: 30 | Refills: 0
Start: 2020-04-27 | End: 2020-05-26

## 2020-04-27 RX ADMIN — DULOXETINE HYDROCHLORIDE 60 MILLIGRAM(S): 30 CAPSULE, DELAYED RELEASE ORAL at 08:08

## 2020-04-27 RX ADMIN — RISPERIDONE 2 MILLIGRAM(S): 4 TABLET ORAL at 08:08

## 2020-04-27 RX ADMIN — DOLUTEGRAVIR SODIUM 50 MILLIGRAM(S): 25 TABLET, FILM COATED ORAL at 08:08

## 2020-04-27 RX ADMIN — EMTRICITABINE AND TENOFOVIR DISOPROXIL FUMARATE 1 TABLET(S): 200; 300 TABLET, FILM COATED ORAL at 08:08

## 2020-05-29 ENCOUNTER — EMERGENCY (EMERGENCY)
Facility: HOSPITAL | Age: 35
LOS: 1 days | Discharge: ANOTHER HOSPITAL | End: 2020-05-29
Attending: EMERGENCY MEDICINE | Admitting: EMERGENCY MEDICINE
Payer: COMMERCIAL

## 2020-05-29 VITALS
HEART RATE: 99 BPM | TEMPERATURE: 99 F | OXYGEN SATURATION: 98 % | SYSTOLIC BLOOD PRESSURE: 122 MMHG | RESPIRATION RATE: 16 BRPM | WEIGHT: 130.07 LBS | HEIGHT: 67 IN | DIASTOLIC BLOOD PRESSURE: 76 MMHG

## 2020-05-29 DIAGNOSIS — B20 HUMAN IMMUNODEFICIENCY VIRUS [HIV] DISEASE: ICD-10-CM

## 2020-05-29 DIAGNOSIS — R45.851 SUICIDAL IDEATIONS: ICD-10-CM

## 2020-05-29 DIAGNOSIS — Z88.0 ALLERGY STATUS TO PENICILLIN: ICD-10-CM

## 2020-05-29 DIAGNOSIS — Z79.899 OTHER LONG TERM (CURRENT) DRUG THERAPY: ICD-10-CM

## 2020-05-29 DIAGNOSIS — F17.200 NICOTINE DEPENDENCE, UNSPECIFIED, UNCOMPLICATED: ICD-10-CM

## 2020-05-29 DIAGNOSIS — Z20.828 CONTACT WITH AND (SUSPECTED) EXPOSURE TO OTHER VIRAL COMMUNICABLE DISEASES: ICD-10-CM

## 2020-05-29 LAB
ALBUMIN SERPL ELPH-MCNC: 3.7 G/DL — SIGNIFICANT CHANGE UP (ref 3.3–5)
ALP SERPL-CCNC: 92 U/L — SIGNIFICANT CHANGE UP (ref 40–120)
ALT FLD-CCNC: 15 U/L — SIGNIFICANT CHANGE UP (ref 10–45)
AMPHET UR-MCNC: NEGATIVE — SIGNIFICANT CHANGE UP
ANION GAP SERPL CALC-SCNC: 12 MMOL/L — SIGNIFICANT CHANGE UP (ref 5–17)
AST SERPL-CCNC: 28 U/L — SIGNIFICANT CHANGE UP (ref 10–40)
BARBITURATES UR SCN-MCNC: NEGATIVE — SIGNIFICANT CHANGE UP
BASOPHILS # BLD AUTO: 0.02 K/UL — SIGNIFICANT CHANGE UP (ref 0–0.2)
BASOPHILS NFR BLD AUTO: 0.7 % — SIGNIFICANT CHANGE UP (ref 0–2)
BENZODIAZ UR-MCNC: NEGATIVE — SIGNIFICANT CHANGE UP
BILIRUB SERPL-MCNC: 0.4 MG/DL — SIGNIFICANT CHANGE UP (ref 0.2–1.2)
BUN SERPL-MCNC: 11 MG/DL — SIGNIFICANT CHANGE UP (ref 7–23)
CALCIUM SERPL-MCNC: 8.7 MG/DL — SIGNIFICANT CHANGE UP (ref 8.4–10.5)
CHLORIDE SERPL-SCNC: 103 MMOL/L — SIGNIFICANT CHANGE UP (ref 96–108)
CO2 SERPL-SCNC: 25 MMOL/L — SIGNIFICANT CHANGE UP (ref 22–31)
COCAINE METAB.OTHER UR-MCNC: POSITIVE
CREAT SERPL-MCNC: 0.89 MG/DL — SIGNIFICANT CHANGE UP (ref 0.5–1.3)
EOSINOPHIL # BLD AUTO: 0.05 K/UL — SIGNIFICANT CHANGE UP (ref 0–0.5)
EOSINOPHIL NFR BLD AUTO: 1.6 % — SIGNIFICANT CHANGE UP (ref 0–6)
ETHANOL SERPL-MCNC: <10 MG/DL — SIGNIFICANT CHANGE UP (ref 0–10)
GLUCOSE SERPL-MCNC: 147 MG/DL — HIGH (ref 70–99)
HCT VFR BLD CALC: 42.8 % — SIGNIFICANT CHANGE UP (ref 39–50)
HGB BLD-MCNC: 14.5 G/DL — SIGNIFICANT CHANGE UP (ref 13–17)
IMM GRANULOCYTES NFR BLD AUTO: 0.3 % — SIGNIFICANT CHANGE UP (ref 0–1.5)
LYMPHOCYTES # BLD AUTO: 1.47 K/UL — SIGNIFICANT CHANGE UP (ref 1–3.3)
LYMPHOCYTES # BLD AUTO: 48.4 % — HIGH (ref 13–44)
MCHC RBC-ENTMCNC: 28.9 PG — SIGNIFICANT CHANGE UP (ref 27–34)
MCHC RBC-ENTMCNC: 33.9 GM/DL — SIGNIFICANT CHANGE UP (ref 32–36)
MCV RBC AUTO: 85.4 FL — SIGNIFICANT CHANGE UP (ref 80–100)
METHADONE UR-MCNC: NEGATIVE — SIGNIFICANT CHANGE UP
MONOCYTES # BLD AUTO: 0.37 K/UL — SIGNIFICANT CHANGE UP (ref 0–0.9)
MONOCYTES NFR BLD AUTO: 12.2 % — SIGNIFICANT CHANGE UP (ref 2–14)
NEUTROPHILS # BLD AUTO: 1.12 K/UL — LOW (ref 1.8–7.4)
NEUTROPHILS NFR BLD AUTO: 36.8 % — LOW (ref 43–77)
NRBC # BLD: 0 /100 WBCS — SIGNIFICANT CHANGE UP (ref 0–0)
OPIATES UR-MCNC: NEGATIVE — SIGNIFICANT CHANGE UP
PCP SPEC-MCNC: SIGNIFICANT CHANGE UP
PCP UR-MCNC: NEGATIVE — SIGNIFICANT CHANGE UP
PLATELET # BLD AUTO: 283 K/UL — SIGNIFICANT CHANGE UP (ref 150–400)
POTASSIUM SERPL-MCNC: 3.7 MMOL/L — SIGNIFICANT CHANGE UP (ref 3.5–5.3)
POTASSIUM SERPL-SCNC: 3.7 MMOL/L — SIGNIFICANT CHANGE UP (ref 3.5–5.3)
PROT SERPL-MCNC: 7.8 G/DL — SIGNIFICANT CHANGE UP (ref 6–8.3)
RBC # BLD: 5.01 M/UL — SIGNIFICANT CHANGE UP (ref 4.2–5.8)
RBC # FLD: 13.5 % — SIGNIFICANT CHANGE UP (ref 10.3–14.5)
SODIUM SERPL-SCNC: 140 MMOL/L — SIGNIFICANT CHANGE UP (ref 135–145)
THC UR QL: NEGATIVE — SIGNIFICANT CHANGE UP
WBC # BLD: 3.04 K/UL — LOW (ref 3.8–10.5)
WBC # FLD AUTO: 3.04 K/UL — LOW (ref 3.8–10.5)

## 2020-05-29 PROCEDURE — 90792 PSYCH DIAG EVAL W/MED SRVCS: CPT | Mod: 95

## 2020-05-29 PROCEDURE — 99285 EMERGENCY DEPT VISIT HI MDM: CPT
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
## 2020-05-29 NOTE — ED ADULT NURSE REASSESSMENT NOTE - NS ED NURSE REASSESS COMMENT FT1
Pt Took report from SKYE Chung. Pt under constant observation 1:1, pending urine sample to be sent and telepsych consult. All safety and comfort measures maintained. Pt calm and lying in bed. NAD. A&Ox4.
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
## 2020-05-29 NOTE — ED BEHAVIORAL HEALTH ASSESSMENT NOTE - DESCRIPTION
see BTCM note    VITAL SIGNS (Last 24 hrs):  T(C): 37 (05-29-20 @ 21:36), Max: 37.1 (05-29-20 @ 18:11)  HR: 88 (05-29-20 @ 21:36) (88 - 99)  BP: 121/72 (05-29-20 @ 21:36) (121/72 - 122/76)  RR: 18 (05-29-20 @ 21:36) (16 - 18)  SpO2: 98% (05-29-20 @ 21:36) (98% - 98%)  Wt(kg): --  Daily Height in cm: 170.18 (29 May 2020 18:11)    Daily     I&O's Summary HIV for 10yrs, reports taking Descovy and Tivicay Graduated college in 2005, worked in visual design in past; , now unclear if  vs. GF who is his partner, 3 young children

## 2020-05-29 NOTE — ED PROVIDER NOTE - PROGRESS NOTE DETAILS
called telepsych, pending eval by dr bejarano telepsych attending recommends voluntary admission, in fact she saw the patient one year ago and he was admitted at that time, there are no beds at [place] so will await bed, EKG and Covid19 swab ordered.

## 2020-05-29 NOTE — ED BEHAVIORAL HEALTH ASSESSMENT NOTE - DIFFERENTIAL
MDD with psychotic features vs. Schizoaffective Disorder vs. PTSD have been past diagnosis  Cocaine use disorder vs. cocaine induced mood disorder

## 2020-05-29 NOTE — ED BEHAVIORAL HEALTH ASSESSMENT NOTE - SUICIDE RISK FACTORS
Insomnia/Hopelessness or despair/Command hallucinations/Alcohol/Substance abuse disorders/Current mood episode/Unable to engage in safety planning/History of abuse/trauma/Family history of suicide/Agitation/Severe Anxiety/Panic/Access to lethal methods (pills, firearm, etc.: Ask specifically about presence or absence of a firearm in the home or ease of accessing/Anhedonia
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
## 2020-05-29 NOTE — ED PROVIDER NOTE - CLINICAL SUMMARY MEDICAL DECISION MAKING FREE TEXT BOX
labs ok, called telepsych, pending eval by dr bejarano labs ok, called telepsych, pending eval by dr bejarano    pt resting comfortably in ER calm and withour complaints.   admit to dr reyes at [facility]

## 2020-05-29 NOTE — ED BEHAVIORAL HEALTH ASSESSMENT NOTE - VIOLENCE PROTECTIVE FACTORS:
Residential stability/Engagement in treatment/Insight into violence risk and need for management/treatment/Relationship stability

## 2020-05-29 NOTE — ED BEHAVIORAL HEALTH ASSESSMENT NOTE - CURRENT MEDICATION
as per [facility] d/c summary: duloxetine 30 mg BID and risperidone 2 mg BID, trazodone 150mg PRN sleep, descovy 200/25mg daily, tivicay 500mg daily.

## 2020-05-29 NOTE — ED BEHAVIORAL HEALTH ASSESSMENT NOTE - DESCRIPTION (FIRST USE, LAST USE, QUANTITY, FREQUENCY, DURATION)
up to 1 ppd several beers intermittently throughout the week according to last note; however pt denies any alcohol use this month long hx of cocaine use; UTOX +cocaine heroin IV 3yrs ago
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
## 2020-05-29 NOTE — ED BEHAVIORAL HEALTH ASSESSMENT NOTE - RISK ASSESSMENT
risk- male, active cocaine use, active SI with intent and plan, h/o serious SA, recent psych admission and chronic mental illness, h/o trauma, recent violent episode, insomnia and medication over-use   protective- has children and is , not homicidal, not manic, compliant with medications High Acute Suicide Risk
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
## 2020-05-29 NOTE — ED BEHAVIORAL HEALTH NOTE - BEHAVIORAL HEALTH NOTE
============  ED COURSE   ============  SOURCE:  Chart review and primary RN Angie  ARRIVAL: walked in     BELONGINGS: clothing   BEHAVIOR: Complied with all of triage protocol-provided blood and urine willingly, pt. is positive for cocaine, pt. allowed security to wand/collect belongings without incident, hygiene is fair, pt. endorses SI with plan to jump out of window, no HI, and delusions, speech is normal, affect is euthymic, thought process is logical and linear, pt. is not aggressive and is A&OX3.   TREATMENT:  None required   VISITORS: no one    ========================  FOR EACH COLLATERAL  ========================  NAME:  NUMBER:   RELATIONSHIP:   RELIABILITY:   COMMENTS:     ========================  PATIENT DEMOGRAPHICS:  ========================  HPI  BASELINE FUNCTIONING:   DATE HPI STARTED:   DECOMPENSATION:   SUICIDALITY  VIOLENCE:    SUBSTANCE:        ========================  PAST PSYCHIATRIC HISTORY  ========================  DATE PAST PSYCHIATRIC HISTORY STARTED:  MAIN PSYCHIATRIC DIAGNOSIS:  PSYCHIATRIC HOSPITALIZATIONS:    PRIOR ILLNESS:?  SUICIDALITY:    VIOLENCE:    SUBSTANCE USE:      ==============  OTHER HISTORY  ==============  CURRENT MEDICATION:    MEDICAL HISTORY:    ALLERGIES  LEGAL ISSUES:  FIREARM ACCESS:  SOCIAL HISTORY:  FAMILY HISTORY:  DEVELOPMENTAL HISTORY: ============  ED COURSE   ============  SOURCE: Chart review and primary RN Angie  ARRIVAL: walked in     BELONGINGS: clothing   BEHAVIOR: Complied with all of triage protocol-provided blood and urine willingly, pt. is positive for cocaine, pt. allowed security to wand/collect belongings without incident, hygiene is fair, pt. endorses SI with plan to jump out of window, no HI, and delusions, speech is normal, affect is depressed and pt. is tearful, thought process is logical and linear, pt. is not aggressive and is A&OX3.   TREATMENT:  None required   VISITORS: no one

## 2020-05-29 NOTE — ED ADULT NURSE REASSESSMENT NOTE - NS ED NURSE REASSESS COMMENT FT1
Pt currently sitting up in stretcher, speaking with Telepsych. Security and PCT watching pt. NAD. Pt is calm. A&Ox3.

## 2020-05-30 VITALS
OXYGEN SATURATION: 99 % | SYSTOLIC BLOOD PRESSURE: 117 MMHG | DIASTOLIC BLOOD PRESSURE: 69 MMHG | HEART RATE: 83 BPM | TEMPERATURE: 98 F

## 2020-05-30 DIAGNOSIS — F14.10 COCAINE ABUSE, UNCOMPLICATED: ICD-10-CM

## 2020-05-30 DIAGNOSIS — F33.3 MAJOR DEPRESSIVE DISORDER, RECURRENT, SEVERE WITH PSYCHOTIC SYMPTOMS: ICD-10-CM

## 2020-05-30 LAB — SARS-COV-2 RNA SPEC QL NAA+PROBE: SIGNIFICANT CHANGE UP

## 2020-05-30 NOTE — ED ADULT NURSE REASSESSMENT NOTE - NS ED NURSE REASSESS COMMENT FT1
Pt sitting up in bed, calm and requesting something to drink. Pt maintained on constant observation. NAD. Safety and comfort measures maintained. Pending placement in inpatient psychiatric facility.
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
## 2020-05-30 NOTE — ED ADULT NURSE REASSESSMENT NOTE - NS ED NURSE REASSESS COMMENT FT1
Pt sleeping in stretcher. On constant observation. NAD. Safety and comfort measures maintained. Pending Psych admission in AM

## 2020-07-10 ENCOUNTER — EMERGENCY (EMERGENCY)
Facility: HOSPITAL | Age: 35
LOS: 1 days | Discharge: HOME | End: 2020-07-10
Attending: EMERGENCY MEDICINE | Admitting: EMERGENCY MEDICINE
Payer: MEDICAID

## 2020-07-10 VITALS
OXYGEN SATURATION: 98 % | HEART RATE: 71 BPM | HEIGHT: 66 IN | TEMPERATURE: 98 F | SYSTOLIC BLOOD PRESSURE: 111 MMHG | RESPIRATION RATE: 18 BRPM | DIASTOLIC BLOOD PRESSURE: 75 MMHG | WEIGHT: 149.91 LBS

## 2020-07-10 DIAGNOSIS — Z20.828 CONTACT WITH AND (SUSPECTED) EXPOSURE TO OTHER VIRAL COMMUNICABLE DISEASES: ICD-10-CM

## 2020-07-10 DIAGNOSIS — R45.851 SUICIDAL IDEATIONS: ICD-10-CM

## 2020-07-10 LAB
APPEARANCE UR: CLEAR — SIGNIFICANT CHANGE UP
BASOPHILS # BLD AUTO: 0.05 K/UL — SIGNIFICANT CHANGE UP (ref 0–0.2)
BASOPHILS NFR BLD AUTO: 1.1 % — SIGNIFICANT CHANGE UP (ref 0–2)
BILIRUB UR-MCNC: NEGATIVE — SIGNIFICANT CHANGE UP
COLOR SPEC: YELLOW — SIGNIFICANT CHANGE UP
DIFF PNL FLD: NEGATIVE — SIGNIFICANT CHANGE UP
EOSINOPHIL # BLD AUTO: 0.27 K/UL — SIGNIFICANT CHANGE UP (ref 0–0.5)
EOSINOPHIL NFR BLD AUTO: 5.7 % — SIGNIFICANT CHANGE UP (ref 0–6)
ETHANOL SERPL-MCNC: <10 MG/DL — SIGNIFICANT CHANGE UP (ref 0–10)
GLUCOSE UR QL: NEGATIVE — SIGNIFICANT CHANGE UP
HCT VFR BLD CALC: 39.8 % — SIGNIFICANT CHANGE UP (ref 39–50)
HGB BLD-MCNC: 12.7 G/DL — LOW (ref 13–17)
IMM GRANULOCYTES NFR BLD AUTO: 0.2 % — SIGNIFICANT CHANGE UP (ref 0–1.5)
KETONES UR-MCNC: NEGATIVE — SIGNIFICANT CHANGE UP
LEUKOCYTE ESTERASE UR-ACNC: NEGATIVE — SIGNIFICANT CHANGE UP
LYMPHOCYTES # BLD AUTO: 1.75 K/UL — SIGNIFICANT CHANGE UP (ref 1–3.3)
LYMPHOCYTES # BLD AUTO: 37 % — SIGNIFICANT CHANGE UP (ref 13–44)
MCHC RBC-ENTMCNC: 29.9 PG — SIGNIFICANT CHANGE UP (ref 27–34)
MCHC RBC-ENTMCNC: 31.9 GM/DL — LOW (ref 32–36)
MCV RBC AUTO: 93.6 FL — SIGNIFICANT CHANGE UP (ref 80–100)
MONOCYTES # BLD AUTO: 0.32 K/UL — SIGNIFICANT CHANGE UP (ref 0–0.9)
MONOCYTES NFR BLD AUTO: 6.8 % — SIGNIFICANT CHANGE UP (ref 2–14)
NEUTROPHILS # BLD AUTO: 2.33 K/UL — SIGNIFICANT CHANGE UP (ref 1.8–7.4)
NEUTROPHILS NFR BLD AUTO: 49.2 % — SIGNIFICANT CHANGE UP (ref 43–77)
NITRITE UR-MCNC: NEGATIVE — SIGNIFICANT CHANGE UP
NRBC # BLD: 0 /100 WBCS — SIGNIFICANT CHANGE UP (ref 0–0)
PCP SPEC-MCNC: SIGNIFICANT CHANGE UP
PH UR: 6 — SIGNIFICANT CHANGE UP (ref 5–8)
PLATELET # BLD AUTO: 203 K/UL — SIGNIFICANT CHANGE UP (ref 150–400)
PROT UR-MCNC: NEGATIVE MG/DL — SIGNIFICANT CHANGE UP
RBC # BLD: 4.25 M/UL — SIGNIFICANT CHANGE UP (ref 4.2–5.8)
RBC # FLD: 15.4 % — HIGH (ref 10.3–14.5)
SP GR SPEC: 1.02 — SIGNIFICANT CHANGE UP (ref 1–1.03)
UROBILINOGEN FLD QL: 0.2 E.U./DL — SIGNIFICANT CHANGE UP
WBC # BLD: 4.73 K/UL — SIGNIFICANT CHANGE UP (ref 3.8–10.5)
WBC # FLD AUTO: 4.73 K/UL — SIGNIFICANT CHANGE UP (ref 3.8–10.5)

## 2020-07-10 PROCEDURE — 99053 MED SERV 10PM-8AM 24 HR FAC: CPT

## 2020-07-10 PROCEDURE — 93010 ELECTROCARDIOGRAM REPORT: CPT

## 2020-07-10 PROCEDURE — 99285 EMERGENCY DEPT VISIT HI MDM: CPT

## 2020-07-10 PROCEDURE — 99231 SBSQ HOSP IP/OBS SF/LOW 25: CPT

## 2020-07-10 NOTE — ED ADULT NURSE NOTE - HPI (INCLUDE ILLNESS QUALITY, SEVERITY, DURATION, TIMING, CONTEXT, MODIFYING FACTORS, ASSOCIATED SIGNS AND SYMPTOMS)
Patient w/ Hx of Shizophrenia, Bipolar, Anxiety , HIV, verbalizes SI X 1 week, states hearing voices telling him to harm self, seeing shadows telling him to follow.  Does not have a specific plan.   SI precautions ongoing.
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
## 2020-07-10 NOTE — ED ADULT NURSE NOTE - OBJECTIVE STATEMENT
Patient w/ Hx of Shizophrenia, Bipolar, Anxiety , HIV, verbalizes SI X 1 week, states hearing voices telling him to harm self, seeing shadows telling him to follow.  SI precautions ongoing.

## 2020-07-10 NOTE — ED PROVIDER NOTE - CLINICAL SUMMARY MEDICAL DECISION MAKING FREE TEXT BOX
35 y/o M pt presents to ED with SI. Will check labs, once pt is medically stable, will obtain psych consult.

## 2020-07-10 NOTE — ED ADULT NURSE NOTE - NSIMPLEMENTINTERV_GEN_ALL_ED
Implemented All Universal Safety Interventions:  [place] to call system. Call bell, personal items and telephone within reach. Instruct patient to call for assistance. Room bathroom lighting operational. Non-slip footwear when patient is off stretcher. Physically safe environment: no spills, clutter or unnecessary equipment. Stretcher in lowest position, wheels locked, appropriate side rails in place.

## 2020-07-10 NOTE — ED PROVIDER NOTE - PROGRESS NOTE DETAILS
pt evaluated by telepsych - to be voluntary admission.  pending bed placement and availability As per psych, bed available at [facility]. Legals signed and faxed. Pending accepting physician. Discussed with VINCENZO George - pending transfer confirmation.

## 2020-07-11 VITALS
RESPIRATION RATE: 18 BRPM | OXYGEN SATURATION: 98 % | SYSTOLIC BLOOD PRESSURE: 131 MMHG | TEMPERATURE: 98 F | DIASTOLIC BLOOD PRESSURE: 69 MMHG | HEART RATE: 66 BPM

## 2020-07-11 DIAGNOSIS — F32.2 MAJOR DEPRESSIVE DISORDER, SINGLE EPISODE, SEVERE WITHOUT PSYCHOTIC FEATURES: ICD-10-CM

## 2020-07-11 LAB
ALBUMIN SERPL ELPH-MCNC: 3.5 G/DL — SIGNIFICANT CHANGE UP (ref 3.3–5)
ALP SERPL-CCNC: 103 U/L — SIGNIFICANT CHANGE UP (ref 40–120)
ALT FLD-CCNC: 55 U/L — HIGH (ref 10–45)
ANION GAP SERPL CALC-SCNC: 10 MMOL/L — SIGNIFICANT CHANGE UP (ref 5–17)
APAP SERPL-MCNC: <5 UG/ML — LOW (ref 10–30)
AST SERPL-CCNC: 76 U/L — HIGH (ref 10–40)
BILIRUB SERPL-MCNC: <0.2 MG/DL — SIGNIFICANT CHANGE UP (ref 0.2–1.2)
BUN SERPL-MCNC: 9 MG/DL — SIGNIFICANT CHANGE UP (ref 7–23)
CALCIUM SERPL-MCNC: 8.2 MG/DL — LOW (ref 8.4–10.5)
CHLORIDE SERPL-SCNC: 101 MMOL/L — SIGNIFICANT CHANGE UP (ref 96–108)
CO2 SERPL-SCNC: 26 MMOL/L — SIGNIFICANT CHANGE UP (ref 22–31)
CREAT SERPL-MCNC: 0.79 MG/DL — SIGNIFICANT CHANGE UP (ref 0.5–1.3)
ETHANOL SERPL-MCNC: <10 MG/DL — SIGNIFICANT CHANGE UP (ref 0–10)
GLUCOSE SERPL-MCNC: 105 MG/DL — HIGH (ref 70–99)
POTASSIUM SERPL-MCNC: 4.2 MMOL/L — SIGNIFICANT CHANGE UP (ref 3.5–5.3)
POTASSIUM SERPL-SCNC: 4.2 MMOL/L — SIGNIFICANT CHANGE UP (ref 3.5–5.3)
PROT SERPL-MCNC: 7.4 G/DL — SIGNIFICANT CHANGE UP (ref 6–8.3)
SALICYLATES SERPL-MCNC: <0.3 MG/DL — LOW (ref 2.8–20)
SARS-COV-2 RNA SPEC QL NAA+PROBE: SIGNIFICANT CHANGE UP
SODIUM SERPL-SCNC: 137 MMOL/L — SIGNIFICANT CHANGE UP (ref 135–145)

## 2020-07-11 PROCEDURE — 80307 DRUG TEST PRSMV CHEM ANLYZR: CPT

## 2020-07-11 PROCEDURE — 80053 COMPREHEN METABOLIC PANEL: CPT

## 2020-07-11 PROCEDURE — 81003 URINALYSIS AUTO W/O SCOPE: CPT

## 2020-07-11 PROCEDURE — 90792 PSYCH DIAG EVAL W/MED SRVCS: CPT | Mod: 95

## 2020-07-11 PROCEDURE — 85025 COMPLETE CBC W/AUTO DIFF WBC: CPT

## 2020-07-11 PROCEDURE — 99285 EMERGENCY DEPT VISIT HI MDM: CPT | Mod: 25

## 2020-07-11 PROCEDURE — 87635 SARS-COV-2 COVID-19 AMP PRB: CPT

## 2020-07-11 PROCEDURE — 36415 COLL VENOUS BLD VENIPUNCTURE: CPT

## 2020-07-11 PROCEDURE — 93005 ELECTROCARDIOGRAM TRACING: CPT
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
## 2020-07-11 NOTE — ED BEHAVIORAL HEALTH NOTE - BEHAVIORAL HEALTH NOTE
I reviewed the chart and re-evaluated this 34 year-old man with MDD with psychotic features and cocaine use disorder in the ED at [facility] awaiting an inpatient psychiatric bed. The patient continues to endorse SI. Denies a plan or intent to end his life in the hospital. Endorses persistently depressed mood and anhedonia. Continues to be interested in inpatient treatment for SI and depression. Patient is agreeable to be transferred to [facility] for voluntary inpatient psychiatric treatment where there is a bed available.   Plan  Disposition  Admit to [facility]  Safety  No 1:1 indicated – patient denying intent or plan to harm self or others  Psychiatric  Patient willing to restart Risperdal 1 mg qhs, which he states he's been on before and helps him  Defer selection of antidepressant to inpatient team  Medical  Continue Tivicay 50 mg daily and Descovy 200/25 mg daily for HIV  COVID negative  Substance  Patient endorsing recent abuse of cocaine, monitor vitals, w/d can be treated symptomatically  Denying recent use of EtOH or benzos/barbiturates   Smokes 1 ppd, nicotine patch, gum prn   Legal  9.13 Tele Part B I reviewed the chart and re-evaluated this 34 year-old man with MDD with psychotic features and cocaine use disorder in the ED at [facility] awaiting an inpatient psychiatric bed. The patient continues to endorse SI. Denies a plan or intent to end his life in the hospital. Endorses persistently depressed mood and anhedonia. Continues to be interested in inpatient treatment for SI and depression. Patient is agreeable to be transferred to [facility] for voluntary inpatient psychiatric treatment where there is a bed available.   Plan  Disposition  Admit to [facility]  Safety  No 1:1 indicated – patient denying intent or plan to harm self or others  Psychiatric  Patient willing to restart Risperdal 1 mg qhs, which he states he's been on before and helps him  Defer selection of antidepressant to inpatient team  Medical  Continue Tivicay 50 mg daily and Descovy 200/25 mg daily for HIV  COVID negative  Substance  Patient endorsing recent abuse of cocaine, monitor vitals, w/d can be treated symptomatically  Denying recent use of EtOH or benzos/barbiturates   Smokes 1 ppd, nicotine patch, gum prn   Legal  9.13 Tele Part B    Update 7:09 pm - Descovy is nonformulary at [facility]. I will put in emtricitabine/tenofovir 200/300 mg instead. Of note, neither Tivicay nor dolutegravir is formulary at [facility]. Inpatient team will need to submit a nonformulary request.
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
## 2020-07-11 NOTE — PROGRESS NOTE BEHAVIORAL HEALTH - NSBHFUPINTERVALCCFT_PSY_A_CORE
patient's Covid test came back negative and patient agreed to be admitted voluntarily to an inpatient unit for psychiatric stabilization of SI.  Patient reported he would prefer to wait until on a unit prior to starting psychotropic medications

## 2020-07-11 NOTE — ED BEHAVIORAL HEALTH ASSESSMENT NOTE - VIOLENCE PROTECTIVE FACTORS:
Engagement in treatment/Insight into violence risk and need for management/treatment/Residential stability/Relationship stability

## 2020-07-11 NOTE — PROGRESS NOTE BEHAVIORAL HEALTH - SUMMARY
34 year old male with history of SUDs, HIV and MDD presents to ED c/o depression and SI requesting admission to inpatient milieu for stabilization in the context of recent discharge in June 2020

## 2020-07-11 NOTE — ED ADULT NURSE REASSESSMENT NOTE - NS ED NURSE REASSESS COMMENT FT1
Covering for Duane CHEUNG. Pt is sleeping in chair in grant. Denies complaints at this time. Pt is well appearing, calm. One to one and safety measures in place. All needs attended.
Patient a/oX 3, SI precautions ongoing, no symptom complaint, vital signs stable.  Transferred to [facility] by EMS in stable condition.
Patient a/oX 3, no new symptom complaint, SI precautions 1:1 sitter ongoing, vital signs stable.  Tele Psych done.  Transfer to Psychiatric facility pending.
Patient care and endorsement received from previous RN.  Patient a/ox 3, SI precautions 1:1 sitter ongoing.  Vital signs stable.  Food and fluids PO tolerated well.  Transfer to Psych facility pending.
Telepsych called, states ready for pt., remains 1:1, moved to private consult room on cart 2, Telepsych notified
as per Dr. Mireles, pt. now planned voluntary admit, Covid swab obtained and sent, awaiting further
pt on 1:1 with PCA.  pt aa+ox 4.   pt pleasant, conversant.  pt offered lunch, pt declined.
pt. nad, calm/cooperative, awaiting further recs. per Telepsych, pt. aware, with understanding verbalized, assessment on-going
pt. sitting up eating sandwich and drinking juice, nad, remains 1:1, assessment on-going
pt endorsed to me from  night shift RN Drew, alert, awake and oriented x4. pt calm and on a constant observation. Awaiting admission and transferred to a [facility] hospital. will continue to monitor.

## 2020-07-11 NOTE — ED BEHAVIORAL HEALTH ASSESSMENT NOTE - DESCRIPTION
see BTCM note    VITAL SIGNS (Last 24 hrs):  T(C): 37 (05-29-20 @ 21:36), Max: 37.1 (05-29-20 @ 18:11)  HR: 88 (05-29-20 @ 21:36) (88 - 99)  BP: 121/72 (05-29-20 @ 21:36) (121/72 - 122/76)  RR: 18 (05-29-20 @ 21:36) (16 - 18)  SpO2: 98% (05-29-20 @ 21:36) (98% - 98%)  Wt(kg): --  Daily Height in cm: 170.18 (29 May 2020 18:11)    Daily     I&O's Summary HIV for 10yrs, reports taking Descovy and Tivicay Graduated college in 2005, worked in visual design in past; , now unclear if  vs. GF who is his partner, 3 young children see BTCM note  Vital Signs Last 24 Hrs  T(C): 36.8 (11 Jul 2020 01:21), Max: 36.8 (10 Jul 2020 22:20)  T(F): 98.2 (11 Jul 2020 01:21), Max: 98.3 (10 Jul 2020 22:20)  HR: 58 (11 Jul 2020 01:21) (58 - 71)  BP: 112/71 (11 Jul 2020 01:21) (111/75 - 112/71)  BP(mean): --  RR: 18 (11 Jul 2020 01:21) (18 - 18)  SpO2: 98% (11 Jul 2020 01:21) (98% - 98%) see BTCM note    Vital Signs Last 24 Hrs  T(C): 36.8 (11 Jul 2020 01:21), Max: 36.8 (10 Jul 2020 22:20)  T(F): 98.2 (11 Jul 2020 01:21), Max: 98.3 (10 Jul 2020 22:20)  HR: 58 (11 Jul 2020 01:21) (58 - 71)  BP: 112/71 (11 Jul 2020 01:21) (111/75 - 112/71)  BP(mean): --  RR: 18 (11 Jul 2020 01:21) (18 - 18)  SpO2: 98% (11 Jul 2020 01:21) (98% - 98%)

## 2020-07-11 NOTE — ED BEHAVIORAL HEALTH ASSESSMENT NOTE - PAST PSYCHOTROPIC MEDICATION
according to psyckes- abilify 15mg, remeron 15mg, zyprexa 5mg, seroquel 50mg, effexor XR 75mg, zoloft 100mg, lexapro 10mg, klonopin 1mg and xanax 1mg. cogentin had been used for EPS from haldol according to psyckes- abilify 15mg, remeron 15mg, zyprexa 5mg, seroquel 50mg, effexor XR 75mg, zoloft 100mg, lexapro 10mg, klonopin 1mg and xanax 1mg. cogentin had been used for EPS from haldol  cymbalta, risperdal, trazodone

## 2020-07-11 NOTE — ED BEHAVIORAL HEALTH ASSESSMENT NOTE - AXIS IV
Occupational problems/Problem related to social environment/Problems with primary support Problems with primary support/Problem related to social environment

## 2020-07-11 NOTE — ED BEHAVIORAL HEALTH ASSESSMENT NOTE - OTHER
[facility], [facility], [facility] , [facility], [facility], [facility], [facility], [facility], [facility] ED, [facility], [facility], [facility], [facility], [facility], [facility] appears internally preoccupied and withdrawn/distracted telepych, external billing

## 2020-07-11 NOTE — ED BEHAVIORAL HEALTH ASSESSMENT NOTE - SUICIDE RISK FACTORS
Family history of suicide/Unable to engage in safety planning/Current mood episode/Anhedonia/Hopelessness or despair/Command hallucinations/Alcohol/Substance abuse disorders/Agitation/Severe Anxiety/Panic/History of abuse/trauma

## 2020-08-08 ENCOUNTER — EMERGENCY (EMERGENCY)
Facility: HOSPITAL | Age: 35
LOS: 1 days | Discharge: ANOTHER HOSPITAL | End: 2020-08-08
Attending: EMERGENCY MEDICINE | Admitting: EMERGENCY MEDICINE
Payer: MEDICAID

## 2020-08-08 VITALS
HEIGHT: 66 IN | DIASTOLIC BLOOD PRESSURE: 89 MMHG | SYSTOLIC BLOOD PRESSURE: 130 MMHG | TEMPERATURE: 98 F | HEART RATE: 73 BPM | OXYGEN SATURATION: 96 % | RESPIRATION RATE: 18 BRPM | WEIGHT: 149.91 LBS

## 2020-08-08 VITALS
RESPIRATION RATE: 18 BRPM | DIASTOLIC BLOOD PRESSURE: 81 MMHG | OXYGEN SATURATION: 99 % | SYSTOLIC BLOOD PRESSURE: 131 MMHG | TEMPERATURE: 98 F | HEART RATE: 69 BPM

## 2020-08-08 DIAGNOSIS — F32.9 MAJOR DEPRESSIVE DISORDER, SINGLE EPISODE, UNSPECIFIED: ICD-10-CM

## 2020-08-08 DIAGNOSIS — Z20.828 CONTACT WITH AND (SUSPECTED) EXPOSURE TO OTHER VIRAL COMMUNICABLE DISEASES: ICD-10-CM

## 2020-08-08 DIAGNOSIS — R45.851 SUICIDAL IDEATIONS: ICD-10-CM

## 2020-08-08 DIAGNOSIS — F14.10 COCAINE ABUSE, UNCOMPLICATED: ICD-10-CM

## 2020-08-08 LAB
ALBUMIN SERPL ELPH-MCNC: 3.2 G/DL — LOW (ref 3.4–5)
ALP SERPL-CCNC: 112 U/L — SIGNIFICANT CHANGE UP (ref 40–120)
ALT FLD-CCNC: 44 U/L — HIGH (ref 12–42)
ANION GAP SERPL CALC-SCNC: 7 MMOL/L — LOW (ref 9–16)
APPEARANCE UR: CLEAR — SIGNIFICANT CHANGE UP
AST SERPL-CCNC: 42 U/L — HIGH (ref 15–37)
BASOPHILS # BLD AUTO: 0.03 K/UL — SIGNIFICANT CHANGE UP (ref 0–0.2)
BASOPHILS NFR BLD AUTO: 0.7 % — SIGNIFICANT CHANGE UP (ref 0–2)
BILIRUB SERPL-MCNC: 0.4 MG/DL — SIGNIFICANT CHANGE UP (ref 0.2–1.2)
BILIRUB UR-MCNC: ABNORMAL
BUN SERPL-MCNC: 12 MG/DL — SIGNIFICANT CHANGE UP (ref 7–23)
CALCIUM SERPL-MCNC: 8.4 MG/DL — LOW (ref 8.5–10.5)
CHLORIDE SERPL-SCNC: 104 MMOL/L — SIGNIFICANT CHANGE UP (ref 96–108)
CO2 SERPL-SCNC: 29 MMOL/L — SIGNIFICANT CHANGE UP (ref 22–31)
COLOR SPEC: YELLOW — SIGNIFICANT CHANGE UP
CREAT SERPL-MCNC: 0.91 MG/DL — SIGNIFICANT CHANGE UP (ref 0.5–1.3)
DIFF PNL FLD: NEGATIVE — SIGNIFICANT CHANGE UP
EOSINOPHIL # BLD AUTO: 0.17 K/UL — SIGNIFICANT CHANGE UP (ref 0–0.5)
EOSINOPHIL NFR BLD AUTO: 3.8 % — SIGNIFICANT CHANGE UP (ref 0–6)
ETHANOL SERPL-MCNC: <3 MG/DL — SIGNIFICANT CHANGE UP
GLUCOSE SERPL-MCNC: 89 MG/DL — SIGNIFICANT CHANGE UP (ref 70–99)
GLUCOSE UR QL: NEGATIVE — SIGNIFICANT CHANGE UP
HCT VFR BLD CALC: 39.1 % — SIGNIFICANT CHANGE UP (ref 39–50)
HGB BLD-MCNC: 13.3 G/DL — SIGNIFICANT CHANGE UP (ref 13–17)
IMM GRANULOCYTES NFR BLD AUTO: 0.2 % — SIGNIFICANT CHANGE UP (ref 0–1.5)
KETONES UR-MCNC: NEGATIVE — SIGNIFICANT CHANGE UP
LEUKOCYTE ESTERASE UR-ACNC: NEGATIVE — SIGNIFICANT CHANGE UP
LYMPHOCYTES # BLD AUTO: 1.46 K/UL — SIGNIFICANT CHANGE UP (ref 1–3.3)
LYMPHOCYTES # BLD AUTO: 32.3 % — SIGNIFICANT CHANGE UP (ref 13–44)
MCHC RBC-ENTMCNC: 30.8 PG — SIGNIFICANT CHANGE UP (ref 27–34)
MCHC RBC-ENTMCNC: 34 GM/DL — SIGNIFICANT CHANGE UP (ref 32–36)
MCV RBC AUTO: 90.5 FL — SIGNIFICANT CHANGE UP (ref 80–100)
MONOCYTES # BLD AUTO: 0.37 K/UL — SIGNIFICANT CHANGE UP (ref 0–0.9)
MONOCYTES NFR BLD AUTO: 8.2 % — SIGNIFICANT CHANGE UP (ref 2–14)
NEUTROPHILS # BLD AUTO: 2.48 K/UL — SIGNIFICANT CHANGE UP (ref 1.8–7.4)
NEUTROPHILS NFR BLD AUTO: 54.8 % — SIGNIFICANT CHANGE UP (ref 43–77)
NITRITE UR-MCNC: NEGATIVE — SIGNIFICANT CHANGE UP
NRBC # BLD: 0 /100 WBCS — SIGNIFICANT CHANGE UP (ref 0–0)
PCP SPEC-MCNC: SIGNIFICANT CHANGE UP
PH UR: 5.5 — SIGNIFICANT CHANGE UP (ref 5–8)
PLATELET # BLD AUTO: 227 K/UL — SIGNIFICANT CHANGE UP (ref 150–400)
POTASSIUM SERPL-MCNC: 4.1 MMOL/L — SIGNIFICANT CHANGE UP (ref 3.5–5.3)
POTASSIUM SERPL-SCNC: 4.1 MMOL/L — SIGNIFICANT CHANGE UP (ref 3.5–5.3)
PROT SERPL-MCNC: 8 G/DL — SIGNIFICANT CHANGE UP (ref 6.4–8.2)
PROT UR-MCNC: ABNORMAL MG/DL
RBC # BLD: 4.32 M/UL — SIGNIFICANT CHANGE UP (ref 4.2–5.8)
RBC # FLD: 14.5 % — SIGNIFICANT CHANGE UP (ref 10.3–14.5)
SARS-COV-2 RNA SPEC QL NAA+PROBE: SIGNIFICANT CHANGE UP
SODIUM SERPL-SCNC: 140 MMOL/L — SIGNIFICANT CHANGE UP (ref 132–145)
SP GR SPEC: >=1.03 — SIGNIFICANT CHANGE UP (ref 1–1.03)
UROBILINOGEN FLD QL: 4 E.U./DL
WBC # BLD: 4.52 K/UL — SIGNIFICANT CHANGE UP (ref 3.8–10.5)
WBC # FLD AUTO: 4.52 K/UL — SIGNIFICANT CHANGE UP (ref 3.8–10.5)

## 2020-08-08 PROCEDURE — 99284 EMERGENCY DEPT VISIT MOD MDM: CPT

## 2020-08-08 PROCEDURE — 93010 ELECTROCARDIOGRAM REPORT: CPT

## 2020-08-08 PROCEDURE — 90792 PSYCH DIAG EVAL W/MED SRVCS: CPT | Mod: 95

## 2020-08-08 PROCEDURE — 93971 EXTREMITY STUDY: CPT | Mod: 26,LT

## 2020-08-08 RX ORDER — HALOPERIDOL DECANOATE 100 MG/ML
5 INJECTION INTRAMUSCULAR EVERY 6 HOURS
Refills: 0 | Status: DISCONTINUED | OUTPATIENT
Start: 2020-08-09 | End: 2020-08-14

## 2020-08-08 NOTE — ED ADULT NURSE REASSESSMENT NOTE - NS ED NURSE REASSESS COMMENT FT1
Attempted to give report to [facility], they wont take report yet as they havent received "legals" from [employer] dick has faxed legals to [employer] so awaiting confirmation

## 2020-08-08 NOTE — ED BEHAVIORAL HEALTH ASSESSMENT NOTE - SUBSTANCE ISSUES AND PLAN (INCLUDE STANDING AND PRN MEDICATION)
monitor VS for cocaine use; symptomatic management Symptomatic treatment of cocaine withdrawal, he is denying recent use of ETOH or benzos

## 2020-08-08 NOTE — ED PROVIDER NOTE - ATTENDING CONTRIBUTION TO CARE
I have seen the pt, reviewed all pertinent clinical data, and I agree with the documentation/care/plan executed by GIORGIO Cornejo.

## 2020-08-08 NOTE — ED BEHAVIORAL HEALTH ASSESSMENT NOTE - DIFFERENTIAL
MDD with psychotic features vs. Schizoaffective Disorder vs. PTSD have been past diagnosis  Cocaine use disorder vs. cocaine induced mood disorder MDD vs. Schizoaffective Disorder vs. PTSD have been past diagnosis  Cocaine use disorder vs. cocaine induced mood disorder

## 2020-08-08 NOTE — ED ADULT NURSE REASSESSMENT NOTE - NS ED NURSE REASSESS COMMENT FT1
pt care handed over to myself, checked in on patient, introduced self given drinks in paper cups, awaiting bed placement pt aware,pt calm and compliant, remains on 1-1 with pct kirk

## 2020-08-08 NOTE — ED BEHAVIORAL HEALTH ASSESSMENT NOTE - AXIS IV
Problems with primary support/Problem related to social environment Problem related to social environment/Housing problems/Problems with primary support/Occupational problems/Economic problems/Problems with access to healthcare services

## 2020-08-08 NOTE — ED ADULT NURSE REASSESSMENT NOTE - NS ED NURSE REASSESS COMMENT FT1
Spoke with ailin they faxed through 'legals" to [facility] will wait and then given report

## 2020-08-08 NOTE — ED PROVIDER NOTE - OBJECTIVE STATEMENT
33 yo M w/ PMHx of depression, schizophrenia, bipolar disorder, previous suicide attempts, HIV presents to the ED via walk-in w/ worsening depression w/ new SI. Pt notes he recently became homeless unexpectedly. Pt is currently living in streets with no idea of what to do next. Pt is extremely stressed, worried, depressed and is considering suicide; no plan at this time. Pt lost psychiatric meds 1 week ago when he became homeless (cannot remember the name of medications); pt was given the prescription upon discharge from psychiatric hospital, but cannot remember the name of the hospital. Pt has no access to psychiatric resources or follow up at this time. Pt admits to using cocaine 3 days ago and has not used any drugs since that time. denies alcohol or illicit drug use currently, fever, chills, headache, dizziness, chest pain, palpitations, hemoptysis, N/V/D, abdominal pain, LE numbness/weakness, HI, auditory or visual hallucinations. Pt also notes LLE is slightly more swollen uncomfortable over past couple days and is unsure if this is due prolonged time on his feet due to homelessness.
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
## 2020-08-08 NOTE — ED BEHAVIORAL HEALTH NOTE - BEHAVIORAL HEALTH NOTE
===================  PRE-HOSPITAL COURSE  ===================  SOURCE:  Courtney CHEUNG     DETAILS:  Pt was a walk in.     ============  ED COURSE   ============  SOURCE:  Courtney CHEUNG    ARRIVAL:  Pt was a walk in for worsening sx of depression and SI without a plan.     BELONGINGS:  Pt’s belongings were secured by security.     BEHAVIOR: Pt brought self to the ED for worsening sx of depression and SI without a plan. Pt is fairly well-groomed, flat affect, good eye-contact, no psychomotor agitation, speech volume and rate normal as well as logical and linear thought process.  RN did not ask pt regarding hx of SA. Pt agreed to change into hospital scrubs, has been compliant with triage processes and agreed for bloodwork to be drawn and for an EKG to get done.  Pt had something to eat and drink. Pt was seen resting comfortably with 1:1 at bedside while waiting to get seen by telepsych. Pt was not witnessed being aggressive or irritable. Pt did not endorse AVH and no delusions were elicited.     TREATMENT:  Pt did not require any medication or security intervention.     VISITORS:  Pt does not have any visitors at bedside.     ========================  FOR EACH COLLATERAL  ========================    None available at this time.
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
## 2020-08-08 NOTE — ED BEHAVIORAL HEALTH ASSESSMENT NOTE - PRIMARY DX
Severe episode of recurrent major depressive disorder, with psychotic features MDD (major depressive disorder)

## 2020-08-08 NOTE — ED PROVIDER NOTE - MUSCULOSKELETAL, MLM
LLE: slight swollen w/ mild tenderness to the anterior shin no pitting edema, soft compartments, no deformity, no fluctuance, no crepitus, distally NVI

## 2020-08-08 NOTE — ED ADULT TRIAGE NOTE - CHIEF COMPLAINT QUOTE
Pt presents to ED c/o suicidal thoughts x2 weeks s/p breaking up with girlfriend and being kicked out of apt and now being homeless for the first time. Pt reports thoughts of jumping in front of traffic. Pt denies any recent suicide attempts, no auditory/visual hallucinations, no homicidal ideation. Pt reports x10 suicide attempts in the last x2 years with admissions. Pt denies any ETOH abuse, admits to cocaine x3days ago. Pt is calm and cooperative in triage, tearful with limited eye contact. 1x1 observation initiated in triage.

## 2020-08-08 NOTE — ED BEHAVIORAL HEALTH ASSESSMENT NOTE - MEDICAL ISSUES AND PLAN (INCLUDE STANDING AND PRN MEDICATION)
deferred to ed team HIV - I would recommend checking the patient's CD4 count and viral load and consider consulting ID, patient reports he's been off Tivicay and Descovy for 7 days

## 2020-08-08 NOTE — ED BEHAVIORAL HEALTH ASSESSMENT NOTE - DESCRIPTION
HIV for 10yrs, reports taking Descovy and Tivicay Graduated college in 2005, worked in visual design in past; , 3 young children See SW note    ICU Vital Signs Last 24 Hrs  T(C): 36.8 (08 Aug 2020 11:44), Max: 36.8 (08 Aug 2020 11:44)  T(F): 98.3 (08 Aug 2020 11:44), Max: 98.3 (08 Aug 2020 11:44)  HR: 67 (08 Aug 2020 13:17) (67 - 73)  BP: 127/73 (08 Aug 2020 13:17) (127/73 - 130/89)  BP(mean): --  ABP: --  ABP(mean): --  RR: 18 (08 Aug 2020 13:17) (18 - 18)  SpO2: 97% (08 Aug 2020 13:17) (96% - 97%)

## 2020-08-08 NOTE — ED PROVIDER NOTE - CLINICAL SUMMARY MEDICAL DECISION MAKING FREE TEXT BOX
35 yo M w/ PMHx of schizophrenia, bipolar disorder, depression w/ previous suicide attempts presents with 1 week of worsening depression and SI in the setting of recent homelessness. Will medically clear; LE ultrasound ordered. psychiatric evaluation pending.

## 2020-08-08 NOTE — ED ADULT NURSE NOTE - OBJECTIVE STATEMENT
Pt pw si.  Pt rpts feeling depressed for the last week.  Pt states,  'I'm very depressed, I'm homeless, I feel suicidal ideation. It's been a lot"  Pt does not report active plan.

## 2020-08-08 NOTE — ED PROVIDER NOTE - PROGRESS NOTE DETAILS
Patient was evaluated by Dr. Shrestha (Psychiatry) in the ED via telepsych and the patient will be a voluntary admission to [facility].

## 2020-08-08 NOTE — ED BEHAVIORAL HEALTH ASSESSMENT NOTE - OTHER
[facility], [facility] Psychiatry, [facility] , [facility] Outpatient Medical, [facility], [facility] Inpatient Psychiatry, [facility] Outpatient Psychiatry, [facility] Inpatient Psychiatry, [facility], [facility] ED, [facility] Inpatient Psychiatry, [facility], [facility], [facility], [facility], [facility] to medical ER location depends on results of covid test
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
## 2020-08-08 NOTE — ED BEHAVIORAL HEALTH ASSESSMENT NOTE - PAST PSYCHOTROPIC MEDICATION
according to psyckes- abilify 15mg, remeron 15mg, zyprexa 5mg, seroquel 50mg, effexor XR 75mg, zoloft 100mg, lexapro 10mg, klonopin 1mg and xanax 1mg. cogentin had been used for EPS from haldol  cymbalta, risperdal, trazodone
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
## 2020-08-08 NOTE — ED BEHAVIORAL HEALTH ASSESSMENT NOTE - PSYCHIATRIC ISSUES AND PLAN (INCLUDE STANDING AND PRN MEDICATION)
PRNS: haldol 5mg, ativan 2mg, diphenhydramine 50mg, PO/IM, Q6H for Agitation Defer selection of antidepressant to inpatient team

## 2020-08-08 NOTE — ED BEHAVIORAL HEALTH ASSESSMENT NOTE - SUICIDE RISK FACTORS
Hopelessness or despair/Current mood episode/Alcohol/Substance abuse disorders/Family history of suicide/Anhedonia/History of abuse/trauma/Agitation/Severe Anxiety/Panic/Unable to engage in safety planning

## 2020-08-09 ENCOUNTER — INPATIENT (INPATIENT)
Facility: HOSPITAL | Age: 35
LOS: 4 days | Discharge: HOME | End: 2020-08-14
Attending: PSYCHIATRY & NEUROLOGY | Admitting: PSYCHIATRY & NEUROLOGY
Payer: COMMERCIAL

## 2020-08-09 VITALS
HEART RATE: 74 BPM | TEMPERATURE: 97 F | RESPIRATION RATE: 18 BRPM | WEIGHT: 154.98 LBS | DIASTOLIC BLOOD PRESSURE: 73 MMHG | SYSTOLIC BLOOD PRESSURE: 131 MMHG | HEIGHT: 66 IN | OXYGEN SATURATION: 98 %

## 2020-08-09 DIAGNOSIS — R45.851 SUICIDAL IDEATIONS: ICD-10-CM

## 2020-08-09 DIAGNOSIS — F32.2 MAJOR DEPRESSIVE DISORDER, SINGLE EPISODE, SEVERE WITHOUT PSYCHOTIC FEATURES: ICD-10-CM

## 2020-08-09 DIAGNOSIS — F32.9 MAJOR DEPRESSIVE DISORDER, SINGLE EPISODE, UNSPECIFIED: ICD-10-CM

## 2020-08-09 LAB
SARS-COV-2 IGG SERPL QL IA: NEGATIVE — SIGNIFICANT CHANGE UP
SARS-COV-2 IGM SERPL IA-ACNC: 0.19 INDEX — SIGNIFICANT CHANGE UP

## 2020-08-09 RX ORDER — IBUPROFEN 200 MG
400 TABLET ORAL THREE TIMES A DAY
Refills: 0 | Status: DISCONTINUED | OUTPATIENT
Start: 2020-08-09 | End: 2020-08-14

## 2020-08-09 RX ADMIN — Medication 25 MILLIGRAM(S): at 18:45

## 2020-08-09 RX ADMIN — Medication 25 MILLIGRAM(S): at 13:22

## 2020-08-09 RX ADMIN — Medication 25 MILLIGRAM(S): at 06:10

## 2020-08-09 RX ADMIN — Medication 25 MILLIGRAM(S): at 01:58

## 2020-08-09 RX ADMIN — Medication 25 MILLIGRAM(S): at 09:29

## 2020-08-09 NOTE — H&P ADULT - NSICDXPASTMEDICALHX_GEN_ALL_CORE_FT
PAST MEDICAL HISTORY:  Bipolar disorder     HIV (human immunodeficiency virus infection)     Schizophrenia, unspecified type

## 2020-08-09 NOTE — H&P ADULT - HISTORY OF PRESENT ILLNESS
33 yo M w/ PMHx of depression, schizophrenia, bipolar disorder, previous suicide attempts, HIV presents to the ED via walk-in w/ worsening depression w/ new SI. Pt notes he recently became homeless unexpectedly. Pt is currently living in streets with no idea of what to do next. Pt is extremely stressed, worried, depressed and is considering suicide; no plan at this time. Pt lost psychiatric meds 1 week ago when he became homeless (cannot remember the name of medications); pt was given the prescription upon discharge from psychiatric hospital, but cannot remember the name of the hospital. Pt has no access to psychiatric resources or follow up at this time. Pt admits to using cocaine 3 days ago and has not used any drugs since that time. denies alcohol or illicit drug use currently, fever, chills, headache, dizziness, chest pain, palpitations, hemoptysis, N/V/D, abdominal pain, LE numbness/weakness, HI, auditory or visual hallucinations. Pt also notes LLE is slightly more swollen uncomfortable over past couple days and is unsure if this is due prolonged time on his feet due to homelessness. [facility] patient states while in the floor that he drinks  5 pints of vodka daily for 2 months . 35 yo M w/ PMHx of depression, schizophrenia, bipolar disorder, previous suicide attempts, HIV presents to the ED via walk-in w/ worsening depression w/ new SI. Pt notes he recently became homeless unexpectedly. Pt is currently living in streets with no idea of what to do next. Pt is extremely stressed, worried, depressed and is considering suicide; no plan at this time. Pt lost psychiatric meds 1 week ago when he became homeless (cannot remember the name of medications); pt was given the prescription upon discharge from psychiatric hospital, but cannot remember the name of the hospital. Pt has no access to psychiatric resources or follow up at this time. Pt admits to using cocaine 3 days ago and has not used any drugs since that time. denies illicit drug use currently, fever, chills, headache, dizziness, chest pain, palpitations, hemoptysis, N/V/D, abdominal pain, LE numbness/weakness, HI, auditory or visual hallucinations. Pt also notes LLE is slightly more swollen uncomfortable over past couple days and is unsure if this is due prolonged time on his feet due to homelessness. [facility] patient states while in the floor that he drinks  5 pints of vodka daily for 2 months .

## 2020-08-09 NOTE — H&P ADULT - NSHPLABSRESULTS_GEN_ALL_CORE
13.3   4.52  )-----------( 227      ( 08 Aug 2020 12:01 )             39.1       08-08    140  |  104  |  12  ----------------------------<  89  4.1   |  29  |  0.91    Ca    8.4<L>      08 Aug 2020 12:01    TPro  8.0  /  Alb  3.2<L>  /  TBili  0.4  /  DBili  x   /  AST  42<H>  /  ALT  44<H>  /  AlkPhos  112  08-08              Urinalysis Basic - ( 08 Aug 2020 12:01 )    Color: x / Appearance: x / SG: x / pH: x  Gluc: x / Ketone: x  / Bili: x / Urobili: x   Blood: x / Protein: x / Nitrite: x   Leuk Esterase: x / RBC: < 5 /HPF / WBC x   Sq Epi: x / Non Sq Epi: 0-5 /HPF / Bacteria: x            Lactate Trend            CAPILLARY BLOOD GLUCOSE
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
## 2020-08-09 NOTE — H&P ADULT - NSHPPHYSICALEXAM_GEN_ALL_CORE
Vital Signs Last 24 Hrs  T(C): 36 (08-09-20 @ 02:15)  T(F): 96.8 (08-09-20 @ 02:15), Max: 98.3 (08-08-20 @ 11:44)  HR: 74 (08-09-20 @ 02:15) (67 - 82)  BP: 131/73 (08-09-20 @ 02:15)  BP(mean): --  RR: 18 (08-09-20 @ 02:15) (16 - 18)  SpO2: 98% (08-09-20 @ 02:15) (96% - 100%)  Wt(kg): --

## 2020-08-10 DIAGNOSIS — F17.200 NICOTINE DEPENDENCE, UNSPECIFIED, UNCOMPLICATED: ICD-10-CM

## 2020-08-10 DIAGNOSIS — F10.20 ALCOHOL DEPENDENCE, UNCOMPLICATED: ICD-10-CM

## 2020-08-10 DIAGNOSIS — F14.20 COCAINE DEPENDENCE, UNCOMPLICATED: ICD-10-CM

## 2020-08-10 DIAGNOSIS — F33.2 MAJOR DEPRESSIVE DISORDER, RECURRENT SEVERE WITHOUT PSYCHOTIC FEATURES: ICD-10-CM

## 2020-08-10 PROCEDURE — 99232 SBSQ HOSP IP/OBS MODERATE 35: CPT

## 2020-08-10 RX ORDER — ESCITALOPRAM OXALATE 10 MG/1
10 TABLET, FILM COATED ORAL DAILY
Refills: 0 | Status: DISCONTINUED | OUTPATIENT
Start: 2020-08-11 | End: 2020-08-14

## 2020-08-10 RX ORDER — HYDROXYZINE HCL 10 MG
50 TABLET ORAL EVERY 6 HOURS
Refills: 0 | Status: DISCONTINUED | OUTPATIENT
Start: 2020-08-10 | End: 2020-08-14

## 2020-08-10 RX ORDER — NICOTINE POLACRILEX 2 MG
2 GUM BUCCAL
Refills: 0 | Status: DISCONTINUED | OUTPATIENT
Start: 2020-08-10 | End: 2020-08-14

## 2020-08-10 RX ORDER — TRAZODONE HCL 50 MG
50 TABLET ORAL AT BEDTIME
Refills: 0 | Status: DISCONTINUED | OUTPATIENT
Start: 2020-08-10 | End: 2020-08-14

## 2020-08-10 RX ADMIN — Medication 20 MILLIGRAM(S): at 01:34

## 2020-08-10 RX ADMIN — Medication 20 MILLIGRAM(S): at 11:29

## 2020-08-10 RX ADMIN — Medication 20 MILLIGRAM(S): at 22:03

## 2020-08-10 RX ADMIN — Medication 20 MILLIGRAM(S): at 18:37

## 2020-08-10 RX ADMIN — Medication 20 MILLIGRAM(S): at 14:18

## 2020-08-10 RX ADMIN — Medication 20 MILLIGRAM(S): at 06:36

## 2020-08-10 NOTE — PROGRESS NOTE BEHAVIORAL HEALTH - NSBHADDHXSUBSTFT_PSY_A_CORE
Alcohol use disorder, started drinking at 14 years of age.  Attempted to go to inpatient rehab but wasn't accepted, was referred to outpatient rehab and has attempted multiple times but was never successful.  Denies withdrawal seizures but admits to withdrawal symptoms of sweating and restlessness.  Has smoked cigarettes, 2ppd, since 14 years old.  Uses cocaine to improve his low mood and admits to "crashing" soon after

## 2020-08-10 NOTE — PROGRESS NOTE BEHAVIORAL HEALTH - SUMMARY
34 year-old  man with depression with psychotic features, newly undomiciled, father to 3 children he has reportedly not seen in years, unemployed, with PPHx MDD with psychotic features, cocaine use disorder, multiple prior hospitalizations, recently discharged from [facility], frequent ER visits, history of four known suicide attempts.  h/o childhood trauma; hx of arrests/legal issues including DUI in 2006; active substance use of cocaine with UTOX+cocaine on admission; hx of mult detox/rehab admissions and substance tx, PMH of HIV+ contracted from IV heroin use.  Reported to [facility] ED by himself with plans to commit suicide by jumping into traffic.      Today on evaluation, patient endorses continued suicidal thoughts of not wanting to live, but currently does not have a plan and is seeking help for his depression and alcohol use disorder.  Patient's current mood episode and suicidal thoughts appear to be related to his girlfriend kicking him out of the house recently, thus making the patient homeless.  His continued alcohol and cocaine use appear to be worsening his mood and suicidality due to disinhibiting effects of alcohol and the mood crashes after cocaine use.  Predisposing factors of family history of depression and suicide may also be contributing, as well as hx of trauma.  Patient has multiple IPP admissions in the past for multiple suicide attempts, poor compliance with medications, and demonstrates lack of ability to engage in his treatment for psychiatric illness.  Will consult infectious disease to decide how and when to restart HIV medications.  Will continue librium taper for alcohol withdrawal symptoms.      #Alcohol use disorder  - Continue Librium taper for alcohol withdrawal; started on 8/9/20   - Librium 25mg Q4hrs PRN for symptoms of alcohol withdrawal not fully treated on Librium taper   - Ativan 2mg PRN for agitation       #MDD with psychotic features  - Milieu, group and supportive therapy  - Haldol 5mg PRN for agitation 34 year-old  man with depression with psychotic features, newly undomiciled, father to 3 children he has reportedly not seen in years, unemployed, with PPHx MDD with psychotic features, cocaine use disorder, multiple prior hospitalizations, recently discharged from [facility], frequent ER visits, history of four known suicide attempts.  h/o childhood trauma; hx of arrests/legal issues including DUI in 2006; active substance use of cocaine with UTOX+cocaine on admission; hx of mult detox/rehab admissions and substance tx, PMH of HIV+ contracted from IV heroin use.  Reported to [facility] ED by himself with plans to commit suicide by jumping into traffic.      Today on evaluation, patient endorses continued suicidal thoughts of not wanting to live, but currently does not have a plan and is seeking help for his depression and alcohol use disorder.  Patient's current mood episode and suicidal thoughts appear to be related to his girlfriend kicking him out of the house recently, thus making the patient homeless.  His continued alcohol and cocaine use appear to be worsening his mood and suicidality due to disinhibiting effects of alcohol and the mood crashes after cocaine use.  Predisposing factors of family history of depression and suicide may also be contributing, as well as hx of trauma.  Patient has multiple IPP admissions in the past for multiple suicide attempts, poor compliance with medications, and demonstrates lack of ability to engage in his treatment for psychiatric illness.  Will consult infectious disease to decide how and when to restart HIV medications.  Will continue librium taper for alcohol withdrawal symptoms.      #MDD with psychotic features  - Start lexapro 10 mg qd   - Trazodone 50 mg q6h prn insomnia  - Vistaril 50 mg q6h prn anxiety/insomnia  - Encourage group milieu  - Supportive psychotherapy provided     #Alcohol use disorder  - Continue Librium taper for alcohol withdrawal; started on 8/9/20   - Monitor for withdrawal symptoms  - Librium 25mg Q4hrs PRN for symptoms of alcohol withdrawal not fully treated on Librium taper   - MVI  - Encourage pt to go to inpatient rehab once psychiatrically stable    #Cocaine use d/o  - Monitor for symptoms  - No medications indicated at this time  - Encourage pt to go to inpatient rehab once psychiatrically stable    #Nicotine use d/o  - Nicotine gum 2 mg q2h prn nicotine craving    #HIV  - Pt's pharmacy has not yet confirmed his outpatient medications  - Unclear how long pt has not been on medication  - ID consult appreciated     ***PRNs: for agitation not amenable to verbal redirection, can give Haldol 6mg PO q6h and [id number] PO q6h, with escalation to IM formulation if patient refuses or a danger to himself or others.

## 2020-08-10 NOTE — PROGRESS NOTE BEHAVIORAL HEALTH - RISK ASSESSMENT
Risk factors include: multiple IPP admissions, multiple suicide attempts, male sex, alcohol use disorder, cocaine use, poor insight, poor judgement, positive HIV status    Protective Factors include: willingness to seek treatment, desire to transfer seek inpatient rehab after discharge from IPP Risk factors include: multiple IPP admissions, multiple suicide attempts, male sex, alcohol use disorder, cocaine use, positive HIV status    Protective Factors include: willingness to seek treatment, desire to transfer seek inpatient rehab after discharge from IPP

## 2020-08-10 NOTE — PROGRESS NOTE BEHAVIORAL HEALTH - NSBHADDHXPSYCHFT_PSY_A_CORE
MDD with psychotic features.  Command auditory hallucinations, last time was 1 month ago.  Voices tell him to jump off a bridge and other ways to kill himself.  Patient states he was diagnosed with bipolar disorder by a psychiatrist and states he was taking Lithium previously but cannot name any other medications.  States he can't confirm if any medications were helpful in the past because he would only taking them for a short while after being d/c'd from IPP and then stopped soon after without followup.

## 2020-08-10 NOTE — PROGRESS NOTE BEHAVIORAL HEALTH - PRIMARY DX
Current severe episode of major depressive disorder without psychotic features without prior episode Severe episode of recurrent major depressive disorder, without psychotic features

## 2020-08-10 NOTE — PROGRESS NOTE BEHAVIORAL HEALTH - NSBHADDHXMEDFT_PSY_A_CORE
Patient is HIV positive and is a patient at [facility].  Complains of left ankle pain which was evaluate PA here at [facility]; PA said this was likely an ankle sprain and gave motrin but no other imaging or interventions were planned.

## 2020-08-10 NOTE — PROGRESS NOTE BEHAVIORAL HEALTH - DETAILS
fatigue, uncomfortable light sensitivity left ankle pain that feels like the "skin is being pulled tight" headache

## 2020-08-10 NOTE — PROGRESS NOTE BEHAVIORAL HEALTH - NSBHFUPADDHPIFT_PSY_A_CORE
Patient is a 34 year old male recently undomiciled with a history of alcohol use disorder, MDD with psychotic features, a reported history of bipolar disorder, and HIV.  Patient receives his medications from [facility], where his HIV medications are managed and he receives treatment.  Contacted [facility] to obtain records of medications, doses, and date last picked up - request for this information was made and pharmacy will call back.  Patient states he hasn't been taking any of his medications in the past 7 days.  Patient states he was kicked out of his home by his girlfriend recently due to his alcohol issues. Patient is a 34 year old male recently undomiciled with a history of alcohol use disorder, MDD with psychotic features, and HIV.  Patient receives his medications from [facility], where his HIV medications are managed and he receives treatment.  Contacted [facility] to obtain records of medications, doses, and date last picked up - request for this information was made and pharmacy will call back.  Patient states he hasn't been taking any of his medications in the past 7 days.  Patient states he was kicked out of his home by his girlfriend recently due to his alcohol issues.

## 2020-08-10 NOTE — PROGRESS NOTE BEHAVIORAL HEALTH - NSBHFUPINTERVALHXFT_PSY_A_CORE
Patient evaluated at bedside, chart reviewed.  No overnight events.      Upon approach today, patient is seen laying down in bed wearing hospital gown.  Appears fatigued, sweating, and uncomfortable, repeatedly pulling his gown away from his chest to cool him down.  Patient is cooperative with interview but at times becomes mildly irritable with questions, which he apologized for, stating that he is in withdrawal from alcohol.  Patient states he has a poor memory but is able to explain that he reported to the ED by himself because he was suicidal.  He has attempted suicide 4 times, the first time was at 9 years of age when he tried to hang himself.  He states that voices tell him jump off a bridge, that he's better off dead, and other commands to kill himself. Patient evaluated at bedside, chart reviewed.  No overnight events.      Upon approach today, patient is seen laying down in bed wearing hospital gown.  Appears fatigued, sweating, and uncomfortable, repeatedly pulling his gown away from his chest to cool him down.  Patient is cooperative with interview but at times becomes mildly irritable with questions, which he apologized for, stating that he is in withdrawal from alcohol.  Patient states he has a poor memory but is able to explain that he reported to the ED by himself because he was suicidal.  He has attempted suicide 4 times, the first time was at 9 years of age when he tried to hang himself. Pt reports that he became homeless recently since his ex girlfriend "kick him " out of the apartment. He reports that this is the first time that he has been homeless. Reports depressed mood and admits to drinking 'too much." States last drink was day of coming to the hospital. At this time, reports that he is experiencing some withdrawal symptoms, complains of hot sweats, chills, fatigue, photophobia and nausea. Denied previous withdrawal seizures or DTs. Also admitted to using cocaine "once in a while." At this time, reports that he has not been compliant with psychiatric medications since leaving the previous hospital. Would be interested in going to inpatient rehab once psychiatrically stable. Denied A/V hallucinations at this time. Denied any active suicidal/homicidal ideation, intent or plan.

## 2020-08-11 PROCEDURE — 99232 SBSQ HOSP IP/OBS MODERATE 35: CPT

## 2020-08-11 RX ORDER — EMTRICITABINE AND TENOFOVIR DISOPROXIL FUMARATE 200; 300 MG/1; MG/1
1 TABLET, FILM COATED ORAL DAILY
Refills: 0 | Status: DISCONTINUED | OUTPATIENT
Start: 2020-08-11 | End: 2020-08-14

## 2020-08-11 RX ORDER — DOLUTEGRAVIR SODIUM 25 MG/1
50 TABLET, FILM COATED ORAL DAILY
Refills: 0 | Status: DISCONTINUED | OUTPATIENT
Start: 2020-08-11 | End: 2020-08-14

## 2020-08-11 RX ORDER — THIAMINE MONONITRATE (VIT B1) 100 MG
100 TABLET ORAL DAILY
Refills: 0 | Status: DISCONTINUED | OUTPATIENT
Start: 2020-08-11 | End: 2020-08-14

## 2020-08-11 RX ORDER — FOLIC ACID 0.8 MG
1 TABLET ORAL DAILY
Refills: 0 | Status: DISCONTINUED | OUTPATIENT
Start: 2020-08-11 | End: 2020-08-14

## 2020-08-11 RX ADMIN — EMTRICITABINE AND TENOFOVIR DISOPROXIL FUMARATE 1 TABLET(S): 200; 300 TABLET, FILM COATED ORAL at 15:32

## 2020-08-11 RX ADMIN — Medication 15 MILLIGRAM(S): at 18:36

## 2020-08-11 RX ADMIN — Medication 1 TABLET(S): at 09:00

## 2020-08-11 RX ADMIN — Medication 15 MILLIGRAM(S): at 21:01

## 2020-08-11 RX ADMIN — Medication 400 MILLIGRAM(S): at 18:31

## 2020-08-11 RX ADMIN — ESCITALOPRAM OXALATE 10 MILLIGRAM(S): 10 TABLET, FILM COATED ORAL at 09:00

## 2020-08-11 RX ADMIN — Medication 15 MILLIGRAM(S): at 05:43

## 2020-08-11 RX ADMIN — Medication 15 MILLIGRAM(S): at 15:53

## 2020-08-11 RX ADMIN — Medication 15 MILLIGRAM(S): at 09:00

## 2020-08-11 RX ADMIN — DOLUTEGRAVIR SODIUM 50 MILLIGRAM(S): 25 TABLET, FILM COATED ORAL at 15:53

## 2020-08-11 NOTE — CONSULT NOTE ADULT - SUBJECTIVE AND OBJECTIVE BOX
ADRIANNE BRADLEY  34y, Male  Allergy: penicillins (Unknown)      CHIEF COMPLAINT: 34 YEARS OLD MALE C/O SUICIDAL THOUGHTS . (09 Aug 2020 02:18)      HPI:  33 yo M w/ PMHx of depression, schizophrenia, bipolar disorder, previous suicide attempts, HIV presents to the ED via walk-in w/ worsening depression w/ new SI. Pt notes he recently became homeless unexpectedly. Pt is currently living in streets with no idea of what to do next. Pt is extremely stressed, worried, depressed and is considering suicide; no plan at this time. Pt lost psychiatric meds 1 week ago when he became homeless (cannot remember the name of medications); pt was given the prescription upon discharge from psychiatric hospital, but cannot remember the name of the hospital. Pt has no access to psychiatric resources or follow up at this time. Pt admits to using cocaine 3 days ago and has not used any drugs since that time. denies illicit drug use currently, fever, chills, headache, dizziness, chest pain, palpitations, hemoptysis, N/V/D, abdominal pain, LE numbness/weakness, HI, auditory or visual hallucinations. Pt also notes LLE is slightly more swollen uncomfortable over past couple days and is unsure if this is due prolonged time on his feet due to homelessness. The patient transfer to ipp unit in [facility] from [facility] , patient states while in the floor that he drinks  5 pints of vodka daily for 2 months . (09 Aug 2020 02:18)    FAMILY HISTORY:    PAST MEDICAL & SURGICAL HISTORY:  Schizophrenia, unspecified type  Bipolar disorder  HIV (human immunodeficiency virus infection)  No significant past surgical history      SOCIAL HISTORY  Social History:        ROS  General: Denies fevers, chills, nightsweats, weight loss  HEENT: Denies headache, rhinorrhea, sore throat, eye pain  CV: Denies CP, palpitations  PULM: Denies SOB, cough  GI: Denies abdominal pain, diarrhea  : Denies dysuria, hematuria  MSK: Denies arthralgias  SKIN: Denies rash   NEURO: Denies paresthesias, weakness  PSYCH: Denies depression    VITALS:  T(F): 97.5, Max: 98.3 (08-11-20 @ 05:46)  HR: 87  BP: 113/58  RR: 18Vital Signs Last 24 Hrs  T(C): 36.4 (11 Aug 2020 08:00), Max: 36.8 (11 Aug 2020 05:46)  T(F): 97.5 (11 Aug 2020 08:00), Max: 98.3 (11 Aug 2020 05:46)  HR: 87 (11 Aug 2020 08:00) (62 - 87)  BP: 113/58 (11 Aug 2020 08:00) (113/58 - 124/73)  BP(mean): --  RR: 18 (11 Aug 2020 05:46) (16 - 18)  SpO2: --    PHYSICAL EXAM:  Gen: NAD, resting in bed  HEENT: Normocephalic, atraumatic  Neck: supple, no lymphadenopathy  CV: Regular rate & regular rhythm  Lungs: decreased BS at bases, no fremitus  Abdomen: Soft, BS present  Ext: Warm, well perfused  Neuro: non focal, awake  Skin: no rash, no erythema    TESTS & MEASUREMENTS:                        INFECTIOUS DISEASES TESTING      RADIOLOGY & ADDITIONAL TESTS:  I have personally reviewed the last Chest xray  CXR      CT      CARDIOLOGY TESTING      MEDICATIONS  chlordiazePOXIDE   chlordiazePOXIDE 15  escitalopram 10  multivitamin 1      ANTIBIOTICS:      All available historical data has been reviewed

## 2020-08-11 NOTE — PROGRESS NOTE BEHAVIORAL HEALTH - NSBHFUPINTERVALHXFT_PSY_A_CORE
Patient evaluated in team meeting today, chart reviewed, no overnight events.     Upon approach today, patient is seen in bed, is responsive to verbal communication and gets out of bed readily for team meeting.  His affect appeared normal at the time of approach, but upon arriving to team meeting he became more irritable at times stating that he doesn't want to be "bothered by people during meals."  He refused lab draw this morning because he was eating breakfast.  He denies being irritable at the time of refusing blood draw, but simply states he didn't want to be bothered while eating and he will cooperate with labs tomorrow.  Patient appears more alert today and has better memory/fund of knowledge.  Did not require PRN's last night and denies any significant withdrawal symptoms not controlled under his Librium taper.  Admits to mild headache and nausea, but states this his greatly improved from the day prior and the sweating he had yesterday has also improved.  Patient states he feels he has more energy and will get up and participate in groups today.  He confirms that his goal after discharge is still to attend inpatient rehab and thereafter find a place to stay since he is homeless.  Patient mentions that his friend recently killed himself 3 weeks ago and he found him in the bathroom after completing suicide.  Pt. states this has increased his stress because the friend was his only emotional support.  Denies having any other friends or family members to talk to.  He states he has 3 children but doesn't have a good relationship with them.  he talks to his oldest daughter once a month and would like to be a better father once he gets himself "back on his feet".  States he has no contacts we could use for collateral information.  He is not interested in getting back together with his girlfriend who kicked him out and he would "rather not talk about it."  Patient denies suicidal thoughts today, states the last time was yesterday afternoon when he thought about jumping off a bridge or shooting himself.  Patient has no access to firearms outside the hospital.  He identifies triggers for his suicidal ideation as "seeing people happy", after drinking, and after using cocaine.  He identifies his only coping mechanisms as continuing to drink or going to the hospital. Patient evaluated in team meeting today, chart reviewed, no overnight events.     Upon approach today, patient is seen in bed, is responsive to verbal communication and gets out of bed readily for team meeting.  His affect appeared normal at the time of approach, but upon arriving to team meeting he became more irritable at times stating that he doesn't want to be "bothered by people during meals."  He refused lab draw this morning because he was eating breakfast.  He denies being irritable at the time of refusing blood draw, but simply states he didn't want to be bothered while eating and he will cooperate with labs tomorrow.  Patient appears more alert today and has better memory/fund of knowledge.  Did not require PRN's last night and denies any significant withdrawal symptoms not controlled under his Librium taper.  Admits to mild headache and nausea, but states this his greatly improved from the day prior and the sweating he had yesterday has also improved.  Patient states he feels he has more energy and will get up and participate in groups today. Pt remains motivated to go to rehab once psychiatrically stable. Patient mentions that his friend recently killed himself 3 weeks ago and he found him in the bathroom after completing suicide.  Pt. states this has increased his stress because the friend was his only emotional support.  Denies having any other friends or family members to talk to.  He states he has 3 children but doesn't have a good relationship with them.  he talks to his oldest daughter once a month and would like to be a better father once he gets himself "back on his feet".  States he has no contacts we could use for collateral information.  He is not interested in getting back together with his girlfriend who kicked him out and he would "rather not talk about it." Patient denies suicidal thoughts today, states the last time was yesterday afternoon. Patient has no access to firearms outside the hospital.  He identifies triggers for his suicidal ideation as "seeing people happy", after drinking, and after using cocaine.  He identifies his only coping mechanisms as continuing to drink or going to the hospital. Denied A/V hallucinations.

## 2020-08-11 NOTE — CONSULT NOTE ADULT - ASSESSMENT
ASSESSMENT  34y M admitted with MAJOR DEPRESSIVE DISORDER    HPI:  33 yo M w/ PMHx of depression, schizophrenia, bipolar disorder, previous suicide attempts, HIV presents to the ED via walk-in w/ worsening depression w/ new SI. Pt notes he recently became homeless unexpectedly. Pt is currently living in streets with no idea of what to do next. Pt is extremely stressed, worried, depressed and is considering suicide; no plan at this time. Pt lost psychiatric meds 1 week ago when he became homeless (cannot remember the name of medications); pt was given the prescription upon discharge from psychiatric hospital, but cannot remember the name of the hospital. Pt has no access to psychiatric resources or follow up at this time. Pt admits to using cocaine 3 days ago and has not used any drugs since that time. denies illicit drug use currently, fever, chills, headache, dizziness, chest pain, palpitations, hemoptysis, N/V/D, abdominal pain, LE numbness/weakness, HI, auditory or visual hallucinations. Pt also notes LLE is slightly more swollen uncomfortable over past couple days and is unsure if this is due prolonged time on his feet due to homelessness. The patient transfer to ipp unit in [facility] from [facility] , patient states while in the floor that he drinks  5 pints of vodka daily for 2 months .    PROBLEMS  1. HIV (non-compliant with HIV meds)    2. Suicidal    3. Alcohol abuse ASSESSMENT  34y M admitted with MAJOR DEPRESSIVE DISORDER    HPI:  35 yo M w/ PMHx of depression, schizophrenia, bipolar disorder, previous suicide attempts, HIV presents to the ED via walk-in w/ worsening depression w/ new SI. Pt notes he recently became homeless unexpectedly. Pt is currently living in streets with no idea of what to do next. Pt is extremely stressed, worried, depressed and is considering suicide; no plan at this time. Pt lost psychiatric meds 1 week ago when he became homeless (cannot remember the name of medications); pt was given the prescription upon discharge from psychiatric hospital, but cannot remember the name of the hospital. Pt has no access to psychiatric resources or follow up at this time. Pt admits to using cocaine 3 days ago and has not used any drugs since that time. denies illicit drug use currently, fever, chills, headache, dizziness, chest pain, palpitations, hemoptysis, N/V/D, abdominal pain, LE numbness/weakness, HI, auditory or visual hallucinations. Pt also notes LLE is slightly more swollen uncomfortable over past couple days and is unsure if this is due prolonged time on his feet due to homelessness. The patient transfer to ipp unit in [facility] from [facility] , patient states while in the floor that he drinks  5 pints of vodka daily for 2 months .    PROBLEMS  1. HIV (non-compliant with HIV meds)    After 9am: can call the [facility] at [phone] who may have list of HIV meds    2. Suicidal    3. Alcohol abuse

## 2020-08-11 NOTE — PROGRESS NOTE BEHAVIORAL HEALTH - DETAILS
fatigue, improving from yesterday; sweating - improved from yesterday light sensitivity - improved from yesterday left ankle pain that feels like the "skin is being pulled tight" headache - improved from yesterday

## 2020-08-11 NOTE — PROGRESS NOTE BEHAVIORAL HEALTH - RISK ASSESSMENT
Risk factors include: multiple IPP admissions, multiple suicide attempts, male sex, alcohol use disorder, cocaine use, positive HIV status    Protective Factors include: willingness to seek treatment, desire to transfer seek inpatient rehab after discharge from IPP.

## 2020-08-11 NOTE — PROGRESS NOTE BEHAVIORAL HEALTH - SUMMARY
34 year-old  man with depression with psychotic features, newly undomiciled, father to 3 children he has reportedly not seen in years, unemployed, with PPHx MDD with psychotic features, cocaine use disorder, multiple prior hospitalizations, recently discharged from [facility], frequent ER visits, history of four known suicide attempts.  h/o childhood trauma; hx of arrests/legal issues including DUI in 2006; active substance use of cocaine with UTOX+cocaine on admission; hx of mult detox/rehab admissions and substance tx, PMH of HIV+ contracted from IV heroin use.  Reported to [facility] ED by himself with plans to commit suicide by jumping into traffic.      Today on evaluation, patient denies suicidal thoughts; last time was yesterday afternoon but he had no plan.  Denies auditory hallucinations.  Patient's current mood episode and suicidal thoughts appear to be related to his girlfriend kicking him out of the house recently, thus making the patient homeless, and also related to his only friend who committed suicide 3 weeks ago; this friend was his only source of support.  His continued alcohol and cocaine use appear to be worsening his mood and suicidality due to disinhibiting effects of alcohol and the mood crashes after cocaine use.  Cocaine and alcohol are his only coping mechanism he can identify to distract him from depressed/suicidal thoughts.  He has fair insight but poor judgement, though he does demonstrate ability to seek help.  Predisposing factors of family history of depression and suicide may also be contributing, as well as hx of trauma.  Patient has multiple IPP admissions in the past for multiple suicide attempts, poor compliance with medications, and demonstrates lack of ability to engage in his treatment for psychiatric illness.  Consulted Infectious Disease to assess HIV medications and need to restart here in the hospital.  No plan was recommended by infectious disease time, but [facility] pharmacy was contacted today and medications/last refill was confirmed.  Will continue librium taper for alcohol withdrawal symptoms.      #MDD with psychotic features  - C/w lexapro 10 mg qd   - Trazodone 50 mg q6h prn insomnia  - Vistaril 50 mg q6h prn anxiety/insomnia  - Encourage group milieu  - Supportive psychotherapy provided     #Alcohol use disorder  - Continue Librium taper for alcohol withdrawal; started on 8/9/20   - Monitor for withdrawal symptoms  - Librium 25mg Q4hrs PRN for symptoms of alcohol withdrawal not fully treated on Librium taper   - MVI  - Encourage pt to go to inpatient rehab once psychiatrically stable    #Cocaine use d/o  - Monitor for symptoms  - No medications indicated at this time  - Encourage pt to go to inpatient rehab once psychiatrically stable    #Nicotine use d/o  - Nicotine gum 2 mg q2h prn nicotine craving    #HIV  - Pt's pharmacy and medications confirmed.  HIV medications last picked up July 24 and would be current until August 24, however his home meds are at ex girlfriends house to which he has no access  - Will restart HIV meds here in IPP  - Unclear how long pt has not been on medication  - ID consult appreciated     ***PRNs: for agitation not amenable to verbal redirection, can give Haldol 6mg PO q6h and Fsunsj0dd PO q6h, with escalation to IM formulation if patient refuses or a danger to himself or others. 34 year-old  man with depression with psychotic features, newly undomiciled, father to 3 children he has reportedly not seen in years, unemployed, with PPHx MDD with psychotic features, cocaine use disorder, multiple prior hospitalizations, recently discharged from [facility], frequent ER visits, history of four known suicide attempts.  h/o childhood trauma; hx of arrests/legal issues including DUI in 2006; active substance use of cocaine with UTOX+cocaine on admission; hx of mult detox/rehab admissions and substance tx, PMH of HIV+ contracted from IV heroin use.  Reported to [facility] ED by himself with plans to commit suicide by jumping into traffic.      Today on evaluation, patient denies suicidal thoughts; last time was yesterday afternoon but he had no plan.  Denies auditory hallucinations.  Patient's current mood episode and suicidal thoughts appear to be related to his girlfriend kicking him out of the house recently, thus making the patient homeless, and also related to his only friend who committed suicide 3 weeks ago; this friend was his only source of support.  His continued alcohol and cocaine use appear to be worsening his mood and suicidality due to disinhibiting effects of alcohol and the mood crashes after cocaine use.  Cocaine and alcohol are his only coping mechanism he can identify to distract him from depressed/suicidal thoughts.  He has fair insight but poor judgement, though he does demonstrate ability to seek help.  Predisposing factors of family history of depression and suicide may also be contributing, as well as hx of trauma.  Patient has multiple IPP admissions in the past for multiple suicide attempts, poor compliance with medications, and demonstrates lack of ability to engage in his treatment for psychiatric illness.  Consulted Infectious Disease to assess HIV medications and need to restart here in the hospital.  No plan was recommended by infectious disease time, but [facility] pharmacy was contacted today and medications/last refill was confirmed.  Will continue librium taper for alcohol withdrawal symptoms.      #MDD with psychotic features  - C/w lexapro 10 mg qd   - Trazodone 50 mg q6h prn insomnia  - Vistaril 50 mg q6h prn anxiety/insomnia  - Encourage group milieu  - Supportive psychotherapy provided     #Alcohol use disorder  - Continue Librium taper for alcohol withdrawal; started on 8/9/20   - Monitor for withdrawal symptoms  - Librium 25mg Q4hrs PRN for symptoms of alcohol withdrawal not fully treated on Librium taper   - MVI/folate/thiamine  - Encourage pt to go to inpatient rehab once psychiatrically stable    #Cocaine use d/o  - Monitor for symptoms  - No medications indicated at this time  - Encourage pt to go to inpatient rehab once psychiatrically stable    #Nicotine use d/o  - Nicotine gum 2 mg q2h prn nicotine craving    #HIV  - Pt's pharmacy and medications confirmed with [facility] pharmacy ([phone]).  HIV medications last picked up July 24; however his home meds are at ex girlfriends house to which he has no access  - Will restart outpatient HIV meds here in IPP: Descovy 1 tab qd, dolutegravir 50 mg qd  - ID consult appreciated     ***PRNs: for agitation not amenable to verbal redirection, can give Haldol 6mg PO q6h and Mphipb2fw PO q6h, with escalation to IM formulation if patient refuses or a danger to himself or others.

## 2020-08-11 NOTE — PROGRESS NOTE BEHAVIORAL HEALTH - NSBHCHARTREVIEWINVESTIGATE_PSY_A_CORE FT
< from: 12 Lead ECG (07.10.20 @ 22:47) >      Ventricular Rate 59 BPM    Atrial Rate 59 BPM    P-R Interval 130 ms    QRS Duration 92 ms    Q-T Interval 422 ms    QTC Calculation(Bezet) 417 ms    P Axis 38 degrees    R Axis 62 degrees    T Axis 57 degrees    Diagnosis Line Sinus bradycardia    Confirmed by Shalonda Milan ([id number]) on 7/14/2020 3:38:54 PM    < end of copied text >

## 2020-08-12 LAB
A1C WITH ESTIMATED AVERAGE GLUCOSE RESULT: 5.5 % — SIGNIFICANT CHANGE UP (ref 4–5.6)
ALBUMIN SERPL ELPH-MCNC: 3.7 G/DL — SIGNIFICANT CHANGE UP (ref 3.5–5.2)
ALP SERPL-CCNC: 80 U/L — SIGNIFICANT CHANGE UP (ref 30–115)
ALT FLD-CCNC: 98 U/L — HIGH (ref 0–41)
ANION GAP SERPL CALC-SCNC: 7 MMOL/L — SIGNIFICANT CHANGE UP (ref 7–14)
AST SERPL-CCNC: 110 U/L — HIGH (ref 0–41)
BILIRUB SERPL-MCNC: <0.2 MG/DL — SIGNIFICANT CHANGE UP (ref 0.2–1.2)
BUN SERPL-MCNC: 17 MG/DL — SIGNIFICANT CHANGE UP (ref 10–20)
CALCIUM SERPL-MCNC: 9 MG/DL — SIGNIFICANT CHANGE UP (ref 8.5–10.1)
CHLORIDE SERPL-SCNC: 103 MMOL/L — SIGNIFICANT CHANGE UP (ref 98–110)
CHOLEST SERPL-MCNC: 189 MG/DL — SIGNIFICANT CHANGE UP (ref 100–200)
CO2 SERPL-SCNC: 28 MMOL/L — SIGNIFICANT CHANGE UP (ref 17–32)
CREAT SERPL-MCNC: 1.1 MG/DL — SIGNIFICANT CHANGE UP (ref 0.7–1.5)
ESTIMATED AVERAGE GLUCOSE: 111 MG/DL — SIGNIFICANT CHANGE UP (ref 68–114)
GLUCOSE SERPL-MCNC: 95 MG/DL — SIGNIFICANT CHANGE UP (ref 70–99)
HCT VFR BLD CALC: 40.8 % — LOW (ref 42–52)
HDLC SERPL-MCNC: 62 MG/DL — SIGNIFICANT CHANGE UP
HGB BLD-MCNC: 13.4 G/DL — LOW (ref 14–18)
LIPID PNL WITH DIRECT LDL SERPL: 110 MG/DL — SIGNIFICANT CHANGE UP (ref 4–129)
MCHC RBC-ENTMCNC: 30.5 PG — SIGNIFICANT CHANGE UP (ref 27–31)
MCHC RBC-ENTMCNC: 32.8 G/DL — SIGNIFICANT CHANGE UP (ref 32–37)
MCV RBC AUTO: 92.7 FL — SIGNIFICANT CHANGE UP (ref 80–94)
NRBC # BLD: 0 /100 WBCS — SIGNIFICANT CHANGE UP (ref 0–0)
PLATELET # BLD AUTO: 264 K/UL — SIGNIFICANT CHANGE UP (ref 130–400)
POTASSIUM SERPL-MCNC: 4.4 MMOL/L — SIGNIFICANT CHANGE UP (ref 3.5–5)
POTASSIUM SERPL-SCNC: 4.4 MMOL/L — SIGNIFICANT CHANGE UP (ref 3.5–5)
PROT SERPL-MCNC: 7.2 G/DL — SIGNIFICANT CHANGE UP (ref 6–8)
RBC # BLD: 4.4 M/UL — LOW (ref 4.7–6.1)
RBC # FLD: 14.2 % — SIGNIFICANT CHANGE UP (ref 11.5–14.5)
SODIUM SERPL-SCNC: 138 MMOL/L — SIGNIFICANT CHANGE UP (ref 135–146)
T3 SERPL-MCNC: 86 NG/DL — SIGNIFICANT CHANGE UP (ref 80–200)
T4 AB SER-ACNC: 3.6 UG/DL — LOW (ref 4.6–12)
TOTAL CHOLESTEROL/HDL RATIO MEASUREMENT: 3 RATIO — LOW (ref 4–5.5)
TRIGL SERPL-MCNC: 113 MG/DL — SIGNIFICANT CHANGE UP (ref 10–149)
TSH SERPL-MCNC: 2.35 UIU/ML — SIGNIFICANT CHANGE UP (ref 0.27–4.2)
WBC # BLD: 4.09 K/UL — LOW (ref 4.8–10.8)
WBC # FLD AUTO: 4.09 K/UL — LOW (ref 4.8–10.8)

## 2020-08-12 PROCEDURE — 99231 SBSQ HOSP IP/OBS SF/LOW 25: CPT

## 2020-08-12 RX ADMIN — Medication 10 MILLIGRAM(S): at 05:54

## 2020-08-12 RX ADMIN — ESCITALOPRAM OXALATE 10 MILLIGRAM(S): 10 TABLET, FILM COATED ORAL at 08:07

## 2020-08-12 RX ADMIN — Medication 10 MILLIGRAM(S): at 18:00

## 2020-08-12 RX ADMIN — Medication 1 MILLIGRAM(S): at 08:07

## 2020-08-12 RX ADMIN — Medication 100 MILLIGRAM(S): at 08:07

## 2020-08-12 RX ADMIN — Medication 10 MILLIGRAM(S): at 09:29

## 2020-08-12 RX ADMIN — DOLUTEGRAVIR SODIUM 50 MILLIGRAM(S): 25 TABLET, FILM COATED ORAL at 08:07

## 2020-08-12 RX ADMIN — Medication 1 TABLET(S): at 08:07

## 2020-08-12 RX ADMIN — EMTRICITABINE AND TENOFOVIR DISOPROXIL FUMARATE 1 TABLET(S): 200; 300 TABLET, FILM COATED ORAL at 09:29

## 2020-08-12 RX ADMIN — Medication 10 MILLIGRAM(S): at 14:10

## 2020-08-12 NOTE — PROGRESS NOTE BEHAVIORAL HEALTH - SUMMARY
34 year-old  man with depression with psychotic features, newly undomiciled, father to 3 children he has reportedly not seen in years, unemployed, with PPHx MDD with psychotic features, cocaine use disorder, multiple prior hospitalizations, recently discharged from [facility], frequent ER visits, history of four known suicide attempts.  h/o childhood trauma; hx of arrests/legal issues including DUI in 2006; active substance use of cocaine with UTOX+cocaine on admission; hx of mult detox/rehab admissions and substance tx, PMH of HIV+ contracted from IV heroin use.  Reported to [facility] system ED by himself with plans to commit suicide by jumping into traffic.      Today on evaluation, patient denies suicidal thoughts; last time was yesterday afternoon but he had no plan.  Denies auditory hallucinations.  Patient's current mood episode and suicidal thoughts appear to be related to his girlfriend kicking him out of the house recently, thus making the patient homeless, and also related to his only friend who committed suicide 3 weeks ago; this friend was his only source of support.  His continued alcohol and cocaine use appear to be worsening his mood and suicidality due to disinhibiting effects of alcohol and the mood crashes after cocaine use.  Cocaine and alcohol are his only coping mechanism he can identify to distract him from depressed/suicidal thoughts.  He has fair insight but poor judgement, though he does demonstrate ability to seek help.  Predisposing factors of family history of depression and suicide may also be contributing, as well as hx of trauma.  Patient has multiple IPP admissions in the past for multiple suicide attempts, poor compliance with medications, and demonstrates lack of ability to engage in his treatment for psychiatric illness.  Consulted Infectious Disease to assess HIV medications and need to restart here in the hospital.  No plan was recommended by infectious disease time, but [facility] pharmacy was contacted today and medications/last refill was confirmed.  Will continue librium taper for alcohol withdrawal symptoms.      #MDD with psychotic features  - C/w lexapro 10 mg qd   - Trazodone 50 mg qh prn insomnia  - Vistaril 50 mg q6h prn anxiety/insomnia  - Encourage group milieu  - Supportive psychotherapy provided     #Alcohol use disorder  - Continue Librium taper for alcohol withdrawal; started on 8/9/20; last dose on 8/12/20  - Monitor for withdrawal symptoms  - MVI/folate/thiamine  - Encourage pt to go to inpatient rehab once psychiatrically stable    #Cocaine use d/o  - Monitor for symptoms  - No medications indicated at this time  - Encourage pt to go to inpatient rehab once psychiatrically stable    #Nicotine use d/o  - Nicotine gum 2 mg q2h prn nicotine craving    #HIV  - Pt's pharmacy and medications confirmed with [facility] pharmacy ([phone]).  HIV medications last picked up July 24; however his home meds are at ex girlfriends house to which he has no access  - Will restart outpatient HIV meds here in IPP: Descovy 1 tab qd, dolutegravir 50 mg qd  - ID consult appreciated     ***PRNs: for agitation not amenable to verbal redirection, can give Haldol 6mg PO q6h and Pmlvyt9vr PO q6h, with escalation to IM formulation if patient refuses or a danger to himself or others.

## 2020-08-12 NOTE — PROGRESS NOTE BEHAVIORAL HEALTH - NSBHFUPINTERVALHXFT_PSY_A_CORE
Pt was seen, evaluated, chart reviewed.  As per nursing staff, no events overnight. On evaluation, pt reports that he is doing "better." Offered no new complaints. Is compliant with medication, denies negative side effects. Reports that his withdrawals are improving daily. Reports that headaches and nausea have resolved. he is not feeling tremulous. Endorsed good sleep and appetite. Denied A/V hallucinations. Denied paranoia. Denied suicidal/homicidal ideation, intent or plan. Pt reports that he was able to go to 2 groups yesterday and  on some coping skills. States that the groups made him feel better and happier. Pt was encouraged to attend groups. He remains motivated to go into inpatient rehab once psychiatrically stable.

## 2020-08-13 PROCEDURE — 99231 SBSQ HOSP IP/OBS SF/LOW 25: CPT

## 2020-08-13 RX ORDER — DOLUTEGRAVIR SODIUM 25 MG/1
1 TABLET, FILM COATED ORAL
Qty: 30 | Refills: 0
Start: 2020-08-13 | End: 2020-09-11

## 2020-08-13 RX ORDER — THIAMINE MONONITRATE (VIT B1) 100 MG
1 TABLET ORAL
Qty: 30 | Refills: 0
Start: 2020-08-13 | End: 2020-09-11

## 2020-08-13 RX ORDER — ESCITALOPRAM OXALATE 10 MG/1
1 TABLET, FILM COATED ORAL
Qty: 30 | Refills: 0
Start: 2020-08-13 | End: 2020-09-11

## 2020-08-13 RX ORDER — FOLIC ACID 0.8 MG
1 TABLET ORAL
Qty: 30 | Refills: 0
Start: 2020-08-13 | End: 2020-09-11

## 2020-08-13 RX ORDER — EMTRICITABINE AND TENOFOVIR DISOPROXIL FUMARATE 200; 300 MG/1; MG/1
1 TABLET, FILM COATED ORAL
Qty: 30 | Refills: 0
Start: 2020-08-13 | End: 2020-09-11

## 2020-08-13 RX ADMIN — Medication 1 TABLET(S): at 08:03

## 2020-08-13 RX ADMIN — DOLUTEGRAVIR SODIUM 50 MILLIGRAM(S): 25 TABLET, FILM COATED ORAL at 08:03

## 2020-08-13 RX ADMIN — Medication 100 MILLIGRAM(S): at 08:03

## 2020-08-13 RX ADMIN — EMTRICITABINE AND TENOFOVIR DISOPROXIL FUMARATE 1 TABLET(S): 200; 300 TABLET, FILM COATED ORAL at 08:03

## 2020-08-13 RX ADMIN — ESCITALOPRAM OXALATE 10 MILLIGRAM(S): 10 TABLET, FILM COATED ORAL at 08:03

## 2020-08-13 RX ADMIN — Medication 1 MILLIGRAM(S): at 08:03

## 2020-08-13 NOTE — DISCHARGE NOTE BEHAVIORAL HEALTH - NSBHDCMEDSFT_PSY_A_CORE
Patient was started on librium taper for signs of alcohol withdrawal that improved over the course of his stay.  Patient never required further PRN librium for withdrawal symptoms uncontrolled on standard taper, nor did he require PRN haldol for agitation.  Was generally cooperative.

## 2020-08-13 NOTE — PROGRESS NOTE BEHAVIORAL HEALTH - NSBHLEGALSTATUS_PSY_A_CORE
9.13 (Voluntary)
S/P ankle arthrodesis    S/P appendectomy    S/P hemorrhoidectomy    S/P hysterectomy    S/P tonsillectomy
9.13 (Voluntary)

## 2020-08-13 NOTE — DISCHARGE NOTE BEHAVIORAL HEALTH - MEDICATION SUMMARY - MEDICATIONS TO STOP TAKING
I will STOP taking the medications listed below when I get home from the hospital:    DULoxetine 60 mg oral delayed release capsule  -- 1 cap(s) by mouth once a day    risperiDONE 2 mg oral tablet  -- 1 tab(s) by mouth 2 times a day    melatonin 3 mg oral tablet  -- 1 tab(s) by mouth once a day (at bedtime)    traZODone 150 mg oral tablet  -- 1 tab(s) by mouth once a day (at bedtime)

## 2020-08-13 NOTE — PROGRESS NOTE BEHAVIORAL HEALTH - SECONDARY DX2
Cocaine use disorder, moderate, dependence

## 2020-08-13 NOTE — PROGRESS NOTE BEHAVIORAL HEALTH - NSBHFUPINTERVALCCFT_PSY_A_CORE
"I'm doing better."
"ok"  pt  seen , discussed with staff and chart reviewed.  pt is lying in bed , poor eye contact. minimally engaged  during interview.  denies s/h ideations intent or plan. mood depress ,   no acute event overnight. is librium protocol.
"I'm better"
"I wanted to kill myself."
"I'm fine"

## 2020-08-13 NOTE — DISCHARGE NOTE BEHAVIORAL HEALTH - NSBHDCRESPONSEFT_PSY_A_CORE
Patient was cooperative with treatment and never refused meds, though he did refuse lab draw on one occasion which was completed the next day.  Withdrawal symptoms responded well to librium taper and his depressed/irritable mood improved significantly.  Patient irritable and somnolent on admission but was more visible on the unit toward the end of his stay and affect was brighter and patient more engaged in conversation/treatment planning

## 2020-08-13 NOTE — PROGRESS NOTE BEHAVIORAL HEALTH - SECONDARY DX1
Alcohol use disorder, moderate, dependence

## 2020-08-13 NOTE — PROGRESS NOTE BEHAVIORAL HEALTH - NSBHCHARTREVIEWLAB_PSY_A_CORE FT
13.4   4.09  )-----------( 264      ( 12 Aug 2020 07:24 )             40.8     08-12    138  |  103  |  17  ----------------------------<  95  4.4   |  28  |  1.1    Ca    9.0      12 Aug 2020 07:24    TPro  7.2  /  Alb  3.7  /  TBili  <0.2  /  DBili  x   /  AST  110<H>  /  ALT  98<H>  /  AlkPhos  80  08-12
Cocaine Metabolite, Urine: Positive (08.08.20 @ 11:56)

## 2020-08-13 NOTE — PROGRESS NOTE BEHAVIORAL HEALTH - NSBHATTESTSEENBY_PSY_A_CORE
attending Psychiatrist without NP/Trainee
attending Psychiatrist without NP/Trainee
Attending Psychiatrist supervising NP/Trainee, meeting pt...

## 2020-08-13 NOTE — PROGRESS NOTE BEHAVIORAL HEALTH - NSBHFUPINTERVALHXFT_PSY_A_CORE
Patient seen and evaluated at bedside, chart reviewed, no overnight events, no PRN's given.      Upon approach today, patient is seen in the day room watching TV. Per nursing report, he has been engaged in groups and is visible on the unit.  Today he is wearing street clothes but states that the nurse was unable to find his own personal shoes or clothes.  He states his mood is good today and denies alcohol withdrawal symptoms.  He is eager to be discharged and attend inpatient rehab.  He is able to make jokes with treatment team and shows no signs of irritability or agitation.  Last suicidal thoughts were 2 days ago and denies any AVH during IPP stay. Patient seen and evaluated at bedside, chart reviewed, no overnight events, no PRN's given.      Upon approach today, patient is seen in the day room watching TV. Per nursing report, he has been engaged in groups and is visible on the unit.  Today he is wearing street clothes but states that the nurse was unable to find his own personal shoes or clothes.  He states his mood is good today and denies alcohol withdrawal symptoms.  He is eager to be discharged and attend inpatient rehab.  He is able to make jokes with treatment team and shows no signs of irritability or agitation.  Last suicidal thoughts were 2 days ago and denies any AVH during IPP stay. Denied suicidal/homicidal ideation, intent or plan.

## 2020-08-13 NOTE — DISCHARGE NOTE BEHAVIORAL HEALTH - NSBHDCSUICFCTROTHERFT_PSY_A_CORE
lack of stable housing post-discharge from inpatient rehab.  lack of social supports.  inability to cope with depressed feelings and suicidal thoughts without the use of alcohol or cocaine.

## 2020-08-13 NOTE — DISCHARGE NOTE BEHAVIORAL HEALTH - MEDICATION SUMMARY - MEDICATIONS TO TAKE
I will START or STAY ON the medications listed below when I get home from the hospital:    escitalopram 10 mg oral tablet  -- 1 tab(s) by mouth once a day x 30 days     Continue to take as directed unless told otherwise by provider.  -- Indication: For Severe episode of recurrent major depressive disorder, without psychotic features    dolutegravir 50 mg oral tablet  -- 1 tab(s) by mouth once a day x 30 days     Continue to take as directed unless told otherwise by provider.  -- Indication: For HIV    emtricitabine-tenofovir alafenamide 200 mg-25 mg oral tablet  -- 1 tab(s) by mouth once a day x 30 days     Continue to take as directed unless told otherwise by provider.  -- Indication: For HIV    folic acid 1 mg oral tablet  -- 1 tab(s) by mouth once a day x 30 days     Continue to take as directed unless told otherwise by provider.  -- Indication: For Nutritional Supplement    thiamine 100 mg oral tablet  -- 1 tab(s) by mouth once a day x 30 days     Continue to take as directed unless told otherwise by provider.  -- Indication: For Nutritional Supplement

## 2020-08-13 NOTE — DISCHARGE NOTE BEHAVIORAL HEALTH - FAMILY HISTORY OF PSYCHIATRIC ILLNESS
recently undomiciled - kicked out of apartment by girlfriend.  Father to 3 children whom he has not seen in years.  Has no emotional or social support.

## 2020-08-13 NOTE — DISCHARGE NOTE BEHAVIORAL HEALTH - NSBHDCADMRISKREASONS_PSY_A_CORE
High utilizer of Inpateint/ED services/Poor engagement in outpt treatment/Non-adherence with medications/Poor residential stability/Insurance/financial Issues/Co-occur mental health and subst use/Co-occur mental health and medical/Lack of social supports

## 2020-08-13 NOTE — DISCHARGE NOTE BEHAVIORAL HEALTH - NSTOBACCOREFERRAL_GEN_A_NCS
Yes Patient registered and referred to the [place] [facility] program. Phone appointment scheduled for 8/17/2020 at 0900./Yes

## 2020-08-13 NOTE — DISCHARGE NOTE BEHAVIORAL HEALTH - NSBHDCDXVALIDYESFT_PSY_A_CORE
Patient showed signs of depressive episode in the context of alcohol use and cocaine withdrawal; admitted to not wanting to live but with no active plan to commit suicide.  Patient was very irritable, guarded in the information he was willing to convey, demonstrated no ability to engage in safety planning or to take a role in his treatment or supportive therapy.

## 2020-08-13 NOTE — DISCHARGE NOTE BEHAVIORAL HEALTH - NSBHDCMEDICALFT_PSY_A_CORE
Patient started on his home medications for HIV: Descovy and dolutegravir.  No issues with medications; was compliant and denied side effects

## 2020-08-13 NOTE — PROGRESS NOTE BEHAVIORAL HEALTH - NSBHADMITDANGERSELF_PSY_A_CORE
unable to care for self/SI
suicidal behavior/unable to care for self
suicidal behavior

## 2020-08-13 NOTE — DISCHARGE NOTE BEHAVIORAL HEALTH - NSBHDCSUICFCTRMIT_PSY_A_CORE
patient will attend inpatient rehab with the goal of finding better coping mechanisms to deal with depression and suicidal thoughts.  Patient was taught coping strategies while in group therapy.  Encouraged to attend AA after inpatient rehab.

## 2020-08-13 NOTE — DISCHARGE NOTE BEHAVIORAL HEALTH - NSBHDCADMRISKMITFT_PSY_A_CORE
Patient will attend inpatient rehab, has engaged in safety planning for mitigating suicidality and to learn better coping mechanisms rather than alcohol/substance use.  Pt given contact info for suicide hotline, taught coping strategies, and has engaged in group therapy.

## 2020-08-13 NOTE — DISCHARGE NOTE BEHAVIORAL HEALTH - NSBHDCSUICSAFETYFT_PSY_A_CORE
Patient given numbers for suicide hotline, [facility], local ED, and advised to call 911 in the event that patient feels unsafe.  Encouraged to reach out to friends or family to re-establish supportive relationships that were once lost.

## 2020-08-13 NOTE — PROGRESS NOTE BEHAVIORAL HEALTH - NSBHCHARTREVIEWVS_PSY_A_CORE FT
Vital Signs Last 24 Hrs  T(C): 36 (09 Aug 2020 07:57), Max: 36.8 (08 Aug 2020 21:09)  T(F): 96.8 (09 Aug 2020 07:57), Max: 98.2 (08 Aug 2020 21:09)  HR: 80 (09 Aug 2020 07:57) (69 - 82)  BP: 140/92 (09 Aug 2020 07:57) (131/73 - 142/81)  BP(mean): --  RR: 18 (09 Aug 2020 07:57) (16 - 18)  SpO2: 98% (09 Aug 2020 02:15) (98% - 100%)
Vital Signs Last 24 Hrs  T(C): 36 (13 Aug 2020 10:27), Max: 36 (13 Aug 2020 10:27)  T(F): 96.8 (13 Aug 2020 10:27), Max: 96.8 (13 Aug 2020 10:27)  HR: 68 (13 Aug 2020 10:27) (65 - 68)  BP: 110/70 (13 Aug 2020 10:27) (107/64 - 110/70)  BP(mean): --  RR: 18 (13 Aug 2020 10:27) (18 - 18)  SpO2: --
ICU Vital Signs Last 24 Hrs  T(C): 35.7 (12 Aug 2020 06:03), Max: 36.7 (11 Aug 2020 15:25)  T(F): 96.2 (12 Aug 2020 06:03), Max: 98 (11 Aug 2020 15:25)  HR: 60 (12 Aug 2020 06:03) (60 - 80)  BP: 118/68 (12 Aug 2020 06:03) (118/68 - 133/78)  BP(mean): --  ABP: --  ABP(mean): --  RR: 16 (12 Aug 2020 06:03) (16 - 16)  SpO2: --
Vital Signs Last 24 Hrs  T(C): 35.7 (10 Aug 2020 07:34), Max: 37.6 (10 Aug 2020 01:35)  T(F): 96.2 (10 Aug 2020 07:34), Max: 99.7 (10 Aug 2020 01:35)  HR: 55 (10 Aug 2020 07:34) (55 - 67)  BP: 127/82 (10 Aug 2020 07:34) (116/65 - 127/82)  BP(mean): --  RR: 18 (10 Aug 2020 07:34) (16 - 18)  SpO2: --
Vital Signs Last 24 Hrs  T(C): 36.4 (11 Aug 2020 08:00), Max: 36.8 (11 Aug 2020 05:46)  T(F): 97.5 (11 Aug 2020 08:00), Max: 98.3 (11 Aug 2020 05:46)  HR: 87 (11 Aug 2020 08:00) (62 - 87)  BP: 113/58 (11 Aug 2020 08:00) (113/58 - 124/73)  BP(mean): --  RR: 18 (11 Aug 2020 05:46) (16 - 18)  SpO2: --

## 2020-08-13 NOTE — DISCHARGE NOTE BEHAVIORAL HEALTH - NSBHDCSUBSTHXFT_PSY_A_CORE
Cocaine use disorder, alcohol use disorder.  Reportedly drinks 4 pints of alcohol per day and uses cocaine to improve his mood but reports severe crashes in his mood after using cocaine.  Was kicked out of girlfriend's apartment due to his drinking issues.

## 2020-08-13 NOTE — DISCHARGE NOTE BEHAVIORAL HEALTH - NSBHDCSWCOMMENTSFT_PSY_A_CORE
[phone] 12pm. [phone] on 08/14/2020 at 10:45am

## 2020-08-13 NOTE — PROGRESS NOTE BEHAVIORAL HEALTH - SUMMARY
4 year-old  man with depression with psychotic features, newly undomiciled, father to 3 children he has reportedly not seen in years, unemployed, with PPHx MDD with psychotic features, cocaine use disorder, multiple prior hospitalizations, recently discharged from [facility], frequent ER visits, history of four known suicide attempts.  h/o childhood trauma; hx of arrests/legal issues including DUI in 2006; active substance use of cocaine with UTOX+cocaine on admission; hx of mult detox/rehab admissions and substance tx, PMH of HIV+ contracted from IV heroin use.  Reported to [facility] ED by himself with plans to commit suicide by jumping into traffic.      Today on evaluation, patient denies suicidal thoughts; last time was 2 days prior but he had no plan.  Denies auditory hallucinations at any time during IPP stay.  Patient's current mood episode and suicidal thoughts appear to be related to his girlfriend kicking him out of the house recently, thus making the patient homeless, and also related to his only friend who committed suicide 3 weeks ago; this friend was his only source of support.  His continued alcohol and cocaine use appear to be worsening his mood and suicidality due to disinhibiting effects of alcohol and the mood crashes after cocaine use.  Cocaine and alcohol are his only coping mechanism he can identify to distract him from depressed/suicidal thoughts.  He has fair insight and judgement which is improved since admission, and he does demonstrate ability to seek help.  Predisposing factors of family history of depression and suicide may also be contributing, as well as hx of trauma.  Patient has multiple IPP admissions in the past for multiple suicide attempts, poor compliance with medications, and demonstrates lack of ability to engage in his treatment for psychiatric illness.  Has demonstrated motivations to seek inpatient rehab for alcohol and cocaine use disorders.  HIV meds have been given during IPP stay with no issues; infectious disease was consulted who reviewed labs and previous studies and had no further recommendations.  Librium taper was completed yesterday 8/13/20.  Patient has no signs of alcohol withdrawal today. Mood has improved significantly during his stay, and has shown improved ability to engage in safety planning and psychiatric treatment.      #MDD with psychotic features  - C/w lexapro 10 mg qd  - Trazodone 50 mg qh prn insomnia  - Vistaril 50 mg q6h prn anxiety/insomnia  - Encourage group milieu  - Supportive psychotherapy provided     #Alcohol use disorder  - Librium taper completed 8/12/20 - no signs of withdrawal symptoms today   - MVI/folate/thiamine given   - Patient will attend inpatient rehab following discharge    #Cocaine use d/o  - Monitor for symptoms  - No medications indicated at this time  - Encourage pt to go to inpatient rehab once psychiatrically stable    #Nicotine use d/o  - Nicotine gum 2 mg q2h prn nicotine craving    #HIV  - Pt's pharmacy and medications confirmed with [facility] pharmacy/[facility] ([phone]).  HIV medications last picked up July 24  - Outpatient HIV meds restarted here: Descovy 1 tab qd, dolutegravir 50 mg qd  - ID consult appreciated     ***PRNs: for agitation not amenable to verbal redirection, can give Haldol 6mg PO q6h and Azpcsw4te PO q6h, with escalation to IM formulation if patient refuses or a danger to himself or others.

## 2020-08-13 NOTE — PROGRESS NOTE BEHAVIORAL HEALTH - CASE SUMMARY
Pt was seen, evaluated and discussed with the resident, Dr. Jules. Chart reviewed. On evaluation, pt reports that he is feeling better, appears to have a brighter affect. Denied any more withdrawal symptoms. Has been visible on the unit, attending groups. Remains motivated to go to rehab. Agree with assessment and plan above. Continue with medications as above.
Pt was seen, evaluated and discussed with the resident, Dr. Jules. Chart reviewed. On evaluation, pt reports that he is ok and reports feeling more energized today. reports that his withdrawal symptoms are improving. Pt remains motivated to go to inpatient rehab once psychiatrically stable. Agree with assessment and plan above. Continue with medications as above.
Pt was seen, evaluated and discussed with the resident, Dr. Jules. Chart reviewed. Pt is 34 year-old  man with depression with psychotic features, newly undomiciled, father to 3 children he has reportedly not seen in years, unemployed, with PPHx MDD with psychotic features, cocaine use disorder, multiple prior hospitalizations, recently discharged from [facility], frequent ER visits, history of four known suicide attempts.  h/o childhood trauma; hx of arrests/legal issues including DUI in 2006; active substance use of cocaine with UTOX+cocaine on admission; hx of mult detox/rehab admissions and substance tx, PMH of HIV+ contracted from IV heroin use.  Reported to [facility] ED by himself with plans to commit suicide by jumping into traffic. On evaluation today, pt complains of depressed mood due to recent homelessness. Admitted to alcohol/cocaine use. Currently having some mild withdrawal symptoms, despite receiving Librium taper. Agreeable to start medications. Denied A/V hallucinations. Denied active suicidal/homicidal ideation, intent or plan. Agree with assessment and plan above. Continue with medications as above.

## 2020-08-14 VITALS
TEMPERATURE: 98 F | DIASTOLIC BLOOD PRESSURE: 51 MMHG | HEART RATE: 64 BPM | SYSTOLIC BLOOD PRESSURE: 100 MMHG | RESPIRATION RATE: 18 BRPM

## 2020-08-14 RX ADMIN — ESCITALOPRAM OXALATE 10 MILLIGRAM(S): 10 TABLET, FILM COATED ORAL at 08:06

## 2020-08-14 RX ADMIN — DOLUTEGRAVIR SODIUM 50 MILLIGRAM(S): 25 TABLET, FILM COATED ORAL at 08:06

## 2020-08-14 RX ADMIN — EMTRICITABINE AND TENOFOVIR DISOPROXIL FUMARATE 1 TABLET(S): 200; 300 TABLET, FILM COATED ORAL at 08:06

## 2020-08-14 RX ADMIN — Medication 100 MILLIGRAM(S): at 08:06

## 2020-08-14 RX ADMIN — Medication 1 TABLET(S): at 08:06

## 2020-08-14 RX ADMIN — Medication 1 MILLIGRAM(S): at 08:06
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
## 2020-08-18 DIAGNOSIS — Z79.818 LONG TERM (CURRENT) USE OF OTHER AGENTS AFFECTING ESTROGEN RECEPTORS AND ESTROGEN LEVELS: ICD-10-CM

## 2020-08-18 DIAGNOSIS — F10.20 ALCOHOL DEPENDENCE, UNCOMPLICATED: ICD-10-CM
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
## 2020-08-18 DIAGNOSIS — R45.851 SUICIDAL IDEATIONS: ICD-10-CM

## 2020-08-18 DIAGNOSIS — Z91.5 PERSONAL HISTORY OF SELF-HARM: ICD-10-CM

## 2020-08-18 DIAGNOSIS — Z71.6 TOBACCO ABUSE COUNSELING: ICD-10-CM

## 2020-08-18 DIAGNOSIS — F32.2 MAJOR DEPRESSIVE DISORDER, SINGLE EPISODE, SEVERE WITHOUT PSYCHOTIC FEATURES: ICD-10-CM

## 2020-08-18 DIAGNOSIS — Z79.899 OTHER LONG TERM (CURRENT) DRUG THERAPY: ICD-10-CM

## 2020-08-18 DIAGNOSIS — Z91.19 PATIENT'S NONCOMPLIANCE WITH OTHER MEDICAL TREATMENT AND REGIMEN: ICD-10-CM

## 2020-08-18 DIAGNOSIS — F14.20 COCAINE DEPENDENCE, UNCOMPLICATED: ICD-10-CM

## 2020-08-18 DIAGNOSIS — S93.402A SPRAIN OF UNSPECIFIED LIGAMENT OF LEFT ANKLE, INITIAL ENCOUNTER: ICD-10-CM

## 2020-08-18 DIAGNOSIS — F20.9 SCHIZOPHRENIA, UNSPECIFIED: ICD-10-CM

## 2020-08-18 DIAGNOSIS — Z59.0 HOMELESSNESS: ICD-10-CM
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
## 2020-08-18 DIAGNOSIS — Z21 ASYMPTOMATIC HUMAN IMMUNODEFICIENCY VIRUS [HIV] INFECTION STATUS: ICD-10-CM

## 2020-08-18 DIAGNOSIS — Z72.0 TOBACCO USE: ICD-10-CM

## 2020-08-18 DIAGNOSIS — Z88.0 ALLERGY STATUS TO PENICILLIN: ICD-10-CM

## 2020-08-18 SDOH — ECONOMIC STABILITY - HOUSING INSECURITY: HOMELESSNESS: Z59.0

## 2020-09-28 ENCOUNTER — EMERGENCY (EMERGENCY)
Facility: HOSPITAL | Age: 35
LOS: 1 days | Discharge: HOME | End: 2020-09-28
Attending: EMERGENCY MEDICINE | Admitting: EMERGENCY MEDICINE
Payer: MEDICAID

## 2020-09-28 VITALS
OXYGEN SATURATION: 99 % | DIASTOLIC BLOOD PRESSURE: 69 MMHG | RESPIRATION RATE: 18 BRPM | HEART RATE: 68 BPM | SYSTOLIC BLOOD PRESSURE: 104 MMHG | TEMPERATURE: 98 F

## 2020-09-28 VITALS
RESPIRATION RATE: 18 BRPM | DIASTOLIC BLOOD PRESSURE: 76 MMHG | OXYGEN SATURATION: 98 % | TEMPERATURE: 98 F | WEIGHT: 149.91 LBS | SYSTOLIC BLOOD PRESSURE: 120 MMHG | HEART RATE: 78 BPM | HEIGHT: 66 IN

## 2020-09-28 DIAGNOSIS — Z20.828 CONTACT WITH AND (SUSPECTED) EXPOSURE TO OTHER VIRAL COMMUNICABLE DISEASES: ICD-10-CM

## 2020-09-28 DIAGNOSIS — F10.20 ALCOHOL DEPENDENCE, UNCOMPLICATED: ICD-10-CM

## 2020-09-28 DIAGNOSIS — F33.3 MAJOR DEPRESSIVE DISORDER, RECURRENT, SEVERE WITH PSYCHOTIC SYMPTOMS: ICD-10-CM

## 2020-09-28 DIAGNOSIS — B20 HUMAN IMMUNODEFICIENCY VIRUS [HIV] DISEASE: ICD-10-CM

## 2020-09-28 DIAGNOSIS — F20.9 SCHIZOPHRENIA, UNSPECIFIED: ICD-10-CM

## 2020-09-28 DIAGNOSIS — F14.20 COCAINE DEPENDENCE, UNCOMPLICATED: ICD-10-CM

## 2020-09-28 DIAGNOSIS — R45.851 SUICIDAL IDEATIONS: ICD-10-CM

## 2020-09-28 LAB
ALBUMIN SERPL ELPH-MCNC: 3.6 G/DL — SIGNIFICANT CHANGE UP (ref 3.3–5)
ALP SERPL-CCNC: 66 U/L — SIGNIFICANT CHANGE UP (ref 40–120)
ALT FLD-CCNC: 12 U/L — SIGNIFICANT CHANGE UP (ref 10–45)
ANION GAP SERPL CALC-SCNC: 11 MMOL/L — SIGNIFICANT CHANGE UP (ref 5–17)
APAP SERPL-MCNC: <5 UG/ML — LOW (ref 10–30)
APPEARANCE UR: CLEAR — SIGNIFICANT CHANGE UP
AST SERPL-CCNC: 21 U/L — SIGNIFICANT CHANGE UP (ref 10–40)
BASOPHILS # BLD AUTO: 0.01 K/UL — SIGNIFICANT CHANGE UP (ref 0–0.2)
BASOPHILS NFR BLD AUTO: 0.4 % — SIGNIFICANT CHANGE UP (ref 0–2)
BILIRUB SERPL-MCNC: 0.3 MG/DL — SIGNIFICANT CHANGE UP (ref 0.2–1.2)
BILIRUB UR-MCNC: NEGATIVE — SIGNIFICANT CHANGE UP
BUN SERPL-MCNC: 9 MG/DL — SIGNIFICANT CHANGE UP (ref 7–23)
CALCIUM SERPL-MCNC: 8.8 MG/DL — SIGNIFICANT CHANGE UP (ref 8.4–10.5)
CHLORIDE SERPL-SCNC: 104 MMOL/L — SIGNIFICANT CHANGE UP (ref 96–108)
CO2 SERPL-SCNC: 23 MMOL/L — SIGNIFICANT CHANGE UP (ref 22–31)
COLOR SPEC: YELLOW — SIGNIFICANT CHANGE UP
CREAT SERPL-MCNC: 0.8 MG/DL — SIGNIFICANT CHANGE UP (ref 0.5–1.3)
DIFF PNL FLD: NEGATIVE — SIGNIFICANT CHANGE UP
EOSINOPHIL # BLD AUTO: 0.04 K/UL — SIGNIFICANT CHANGE UP (ref 0–0.5)
EOSINOPHIL NFR BLD AUTO: 1.5 % — SIGNIFICANT CHANGE UP (ref 0–6)
ETHANOL SERPL-MCNC: <10 MG/DL — SIGNIFICANT CHANGE UP (ref 0–10)
GLUCOSE SERPL-MCNC: 162 MG/DL — HIGH (ref 70–99)
GLUCOSE UR QL: NEGATIVE — SIGNIFICANT CHANGE UP
HCT VFR BLD CALC: 41 % — SIGNIFICANT CHANGE UP (ref 39–50)
HGB BLD-MCNC: 13.2 G/DL — SIGNIFICANT CHANGE UP (ref 13–17)
IMM GRANULOCYTES NFR BLD AUTO: 0 % — SIGNIFICANT CHANGE UP (ref 0–1.5)
KETONES UR-MCNC: NEGATIVE — SIGNIFICANT CHANGE UP
LEUKOCYTE ESTERASE UR-ACNC: NEGATIVE — SIGNIFICANT CHANGE UP
LYMPHOCYTES # BLD AUTO: 1.2 K/UL — SIGNIFICANT CHANGE UP (ref 1–3.3)
LYMPHOCYTES # BLD AUTO: 45.6 % — HIGH (ref 13–44)
MCHC RBC-ENTMCNC: 30.4 PG — SIGNIFICANT CHANGE UP (ref 27–34)
MCHC RBC-ENTMCNC: 32.2 GM/DL — SIGNIFICANT CHANGE UP (ref 32–36)
MCV RBC AUTO: 94.5 FL — SIGNIFICANT CHANGE UP (ref 80–100)
MONOCYTES # BLD AUTO: 0.29 K/UL — SIGNIFICANT CHANGE UP (ref 0–0.9)
MONOCYTES NFR BLD AUTO: 11 % — SIGNIFICANT CHANGE UP (ref 2–14)
NEUTROPHILS # BLD AUTO: 1.09 K/UL — LOW (ref 1.8–7.4)
NEUTROPHILS NFR BLD AUTO: 41.5 % — LOW (ref 43–77)
NITRITE UR-MCNC: NEGATIVE — SIGNIFICANT CHANGE UP
NRBC # BLD: 0 /100 WBCS — SIGNIFICANT CHANGE UP (ref 0–0)
PCP SPEC-MCNC: SIGNIFICANT CHANGE UP
PH UR: 6 — SIGNIFICANT CHANGE UP (ref 5–8)
PLATELET # BLD AUTO: 226 K/UL — SIGNIFICANT CHANGE UP (ref 150–400)
POTASSIUM SERPL-MCNC: 4.1 MMOL/L — SIGNIFICANT CHANGE UP (ref 3.5–5.3)
POTASSIUM SERPL-SCNC: 4.1 MMOL/L — SIGNIFICANT CHANGE UP (ref 3.5–5.3)
PROT SERPL-MCNC: 7.4 G/DL — SIGNIFICANT CHANGE UP (ref 6–8.3)
PROT UR-MCNC: NEGATIVE MG/DL — SIGNIFICANT CHANGE UP
RBC # BLD: 4.34 M/UL — SIGNIFICANT CHANGE UP (ref 4.2–5.8)
RBC # FLD: 13.8 % — SIGNIFICANT CHANGE UP (ref 10.3–14.5)
SALICYLATES SERPL-MCNC: <0.3 MG/DL — LOW (ref 2.8–20)
SARS-COV-2 RNA SPEC QL NAA+PROBE: SIGNIFICANT CHANGE UP
SODIUM SERPL-SCNC: 138 MMOL/L — SIGNIFICANT CHANGE UP (ref 135–145)
SP GR SPEC: 1.02 — SIGNIFICANT CHANGE UP (ref 1–1.03)
UROBILINOGEN FLD QL: 0.2 E.U./DL — SIGNIFICANT CHANGE UP
WBC # BLD: 2.63 K/UL — LOW (ref 3.8–10.5)
WBC # FLD AUTO: 2.63 K/UL — LOW (ref 3.8–10.5)

## 2020-09-28 PROCEDURE — 80053 COMPREHEN METABOLIC PANEL: CPT

## 2020-09-28 PROCEDURE — 99285 EMERGENCY DEPT VISIT HI MDM: CPT

## 2020-09-28 PROCEDURE — 87635 SARS-COV-2 COVID-19 AMP PRB: CPT

## 2020-09-28 PROCEDURE — 86769 SARS-COV-2 COVID-19 ANTIBODY: CPT

## 2020-09-28 PROCEDURE — 81003 URINALYSIS AUTO W/O SCOPE: CPT

## 2020-09-28 PROCEDURE — 93010 ELECTROCARDIOGRAM REPORT: CPT

## 2020-09-28 PROCEDURE — 36415 COLL VENOUS BLD VENIPUNCTURE: CPT

## 2020-09-28 PROCEDURE — 85025 COMPLETE CBC W/AUTO DIFF WBC: CPT

## 2020-09-28 PROCEDURE — 80307 DRUG TEST PRSMV CHEM ANLYZR: CPT

## 2020-09-28 PROCEDURE — 93005 ELECTROCARDIOGRAM TRACING: CPT

## 2020-09-28 NOTE — ED BEHAVIORAL HEALTH ASSESSMENT NOTE - PSYCHIATRIC ISSUES AND PLAN (INCLUDE STANDING AND PRN MEDICATION)
clarify most recent psychotropic regimen. Consider resuming Lexapro 10mg (most recent documented medication)
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
## 2020-09-28 NOTE — ED BEHAVIORAL HEALTH ASSESSMENT NOTE - DESCRIPTION
Per ED team, pt has been calm, cooperative, no agitation or attempts at self-harm, no PRN medications required.   Pt reported SI with command hallucinations, seeking help in hospital, appeared upset, did not appear to be responding to internal stimuli on their evaluation.  Pt reported cocaine use last two weeks ago. Utox +cocaine. Serum EtOH <10 HIV x10 years, contracted by IV heroin use Reports to be , lives with wife in [place] (recently moved from the [place]). Currently unemployed after lost job at [employer]. Graduated college with degree in design per prior chart.

## 2020-09-28 NOTE — ED ADULT NURSE NOTE - NSIMPLEMENTINTERV_GEN_ALL_ED
Implemented All Universal Safety Interventions:  [place] to call system. Call bell, personal items and telephone within reach. Instruct patient to call for assistance. Room bathroom lighting operational. Non-slip footwear when patient is off stretcher. Physically safe environment: no spills, clutter or unnecessary equipment. Stretcher in lowest position, wheels locked, appropriate side rails in place.

## 2020-09-28 NOTE — ED BEHAVIORAL HEALTH ASSESSMENT NOTE - DESCRIPTION (FIRST USE, LAST USE, QUANTITY, FREQUENCY, DURATION)
reports history of heavy alcohol use, 12 beers and 5 nips/day, last two weeks ago reports weekly-monthly use of cocaine (intranasal), last one week ago documented history of IV heroin use, denies recent use

## 2020-09-28 NOTE — ED BEHAVIORAL HEALTH ASSESSMENT NOTE - SUICIDE RISK FACTORS
Cluster B Personality disorders or traits current/past/Current mood episode/Hopelessness or despair/Impulsivity/Command hallucinations/History of abuse/trauma/Family history of suicide/Alcohol/Substance abuse disorders

## 2020-09-28 NOTE — ED BEHAVIORAL HEALTH ASSESSMENT NOTE - OTHER
job loss multiple tattoos on arms, hands moving all extremities; motor exam deferred due to COVID precautions stable posture tearful at times Does not appear to be responding to internal stimuli impaired accepting facility not yet identified

## 2020-09-28 NOTE — ED ADULT NURSE NOTE - OBJECTIVE STATEMENT
Patient is a 33yo M, arrived to ED via walk-in, AAOx3, in NAD, VSS, complaining of SI with AH and VH.  Patient states he has been "seeing shadows and a man" for over one week.  Patient denies any HI, active plan, CP. SOB, dizziness, N/V/D, fevers, chills or any other complaints at this time.  Patient placed on constant observation for patient and staff safety.

## 2020-09-28 NOTE — ED PROVIDER NOTE - PMH
Anxiety    Bipolar disorder    Depression    HIV (human immunodeficiency virus infection)    Schizophrenia, unspecified type

## 2020-09-28 NOTE — ED PROVIDER NOTE - PHYSICAL EXAMINATION
VITAL SIGNS: I have reviewed nursing notes and confirm.  CONSTITUTIONAL: Well-developed; well-nourished; in no acute distress.   SKIN:  warm and dry, no acute rash.   HEAD:  normocephalic, atraumatic.  EYES: EOM intact; conjunctiva and sclera clear.  ENT: No nasal discharge; airway clear.   NECK: Supple; non tender.  CARD: S1, S2 normal; no murmurs, gallops, or rubs. Regular rate and rhythm.   RESP:  Clear to auscultation b/l, no wheezes, rales or rhonchi.  ABD: Normal bowel sounds; soft; non-distended; non-tender; no guarding/ rebound.  EXT: Normal ROM. No clubbing, cyanosis or edema. 2+ pulses to b/l ue/le.  NEURO: Alert, oriented, grossly unremarkable  PSYCH: Cooperative, mood and affect appropriate. Pt calm, approachable and responsive to questions.
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
## 2020-09-28 NOTE — ED PROVIDER NOTE - OBJECTIVE STATEMENT
33 y/o M PMHx HIV (t cells 198. VL is significantly high), depression, anxiety, bipolar and schizophrenia presents to the ED c/o SI. Pt reports seeing shadows and hearing voices telling him to jump off a building. Pt was admitted in August at [facility] for SI. Pt states he has attempted suicide in the past where he jumped off a 2-3 story building and has walked into oncoming traffic. Pt admits to feeling overwhelmed where he recently started a job 2 W ago as an [employer] . Pt also reports missing his daughter who is in [place] who he has not seen in 6 M. Pt admits to using cocaine 2 W ago. Denies any recent alcohol use, URI sx, fevers, chills, CP, abdominal pain, SOB, GI sx, urinary sx. Pt gets treatment for HIV at [facility] where he sees his PCP and psychiatrist there. Pt has not seen his psychiatrist in a few months because he was trying to get himself together. Pt recently got a new apartment. Pt compliant w/ meds but states he is not responding to the meds since he has been having many hallucinations.

## 2020-09-28 NOTE — ED BEHAVIORAL HEALTH ASSESSMENT NOTE - ADDITIONAL DETAILS / COMMENTS
based on recent SI and command hallucinations, fair insight into psychiatric illness with regard to need to seek treatment, limited insight into role of substance abuse

## 2020-09-28 NOTE — ED PROVIDER NOTE - CARE PLAN
Principal Discharge DX:	Schizophrenia, unspecified type  Secondary Diagnosis:	HIV (human immunodeficiency virus infection)

## 2020-09-28 NOTE — ED BEHAVIORAL HEALTH ASSESSMENT NOTE - TREATMENT
previously treated for withdrawal per EMR (Librium taper while at [facility]). Referred to rehab but did not attend
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
## 2020-09-28 NOTE — ED BEHAVIORAL HEALTH ASSESSMENT NOTE - SUMMARY
RECOMMENDATIONS  -1:1 observation for safety  -requires psychiatric admission for safety and stabilization pending identification of accepting facility. Voluntary admission paperwork completed  -will attempt collateral from pt's wife and recent outpatient providers  -defer standing psychotropic medication pending clarification of most recent regimen  -recommend Haldol 5mg and Ativan 2mg q4H PRN for acute agitation  d/w ED team 33yo   man with a documented psychiatric history of MDD with psychosis, cocaine and alcohol use disorders, past opiate abuse, childhood trauma, prior question of schizophrenia/bipolar disorder, many past psychiatric admissions and suicide attempts, last known admission to [facility] 8/9-8/14/2020 following presentation to [facility] with c/o command hallucinations and SI, medical history of HIV, who presents with recurrent depression, SI, and command hallucinations to end his life in the setting of psychosocial stressors (job loss, ?conflict with wife), nonadherence with outpatient dual diagnosis treatment with recent alcohol and cocaine use, and unclear adherence with outpatient psychotropic medication.     Pt presents as acutely dysphoric and endorsing distressing command hallucinations and active SI with plan to end his life. He endorses a history of erratic and violent behavior due to psychosis and ?intoxication. While some concern for substance induced symptoms given utox +cocaine and documented history of heavy cocaine and alcohol use, with chronic poor adherence with outpatient care, pt's current acuity of symptoms pose a significant risk for harm to self. Pt would benefit from sustained sobriety, substance use treatment, and stabilization on medication for depression. He requires psychiatric admission for safety and stabilization at this time. He is agreeable to voluntary psychiatric admission.    RECOMMENDATIONS  -1:1 observation for safety  -requires psychiatric admission for safety and stabilization pending identification of accepting facility. Voluntary 9.13 admission paperwork completed with pt  -will attempt collateral from pt's wife and recent outpatient providers - pt unable to provide contact info at this time  -defer standing psychotropic medication pending clarification of most recent regimen. Consider resuming prior Lexapro 10mg for depression once confirmed (most recent documented medication)  -CIWA monitoring for alcohol withdrawal given uncertain recent use  -recommend Haldol 5mg and Ativan 2mg q4H PRN for acute agitation. Please monitor for QTc prolongation  d/w ED team

## 2020-09-28 NOTE — ED BEHAVIORAL HEALTH ASSESSMENT NOTE - VIOLENCE RISK FACTORS:
Noncompliance with treatment/Community stressors that increase the risk of destabilization/Impulsivity/Substance abuse

## 2020-09-28 NOTE — ED BEHAVIORAL HEALTH ASSESSMENT NOTE - DIFFERENTIAL
Unspecified depression, MDD with psychosis by history, r/o substance-induced depression and psychosis, r/o primary psychotic disorder  Alcohol Use Disorder  Cocaine Use Disorder Unspecified depression, MDD with psychosis by history, r/o substance-induced depression and psychosis, r/o primary psychotic disorder  Alcohol Use Disorder  Cocaine Use Disorder  Opiate Use disorder in reported sustained remission  cluster B personality traits

## 2020-09-28 NOTE — ED PROVIDER NOTE - ATTENDING CONTRIBUTION TO CARE
35yo M hx of schizophrenia, bipolar depression, anxiety, here w/ both auditory hallucinations and visual "shadows" for 2 weeks. states taking meds but not improving. wants to hurt himself by jumping off a building. no suicidal attempt at this time. denies drug use in past 2 weeks or alcohol. also w/ hx of HIV VL high and CD4 190s, not complaint w/ meds or follow up. VSS, calm, not actively responding to internal stimuli, +SI. Plan for medical clearance and psych eval

## 2020-09-28 NOTE — ED BEHAVIORAL HEALTH ASSESSMENT NOTE - CURRENT MEDICATION
Pt unable to recall medications.  Per EMR, most recent psychotropic medication at [facility] was Lexapro 10mg, with prior  Cymbalta, Trazodone, Risperidal discontinued
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
## 2020-09-28 NOTE — ED BEHAVIORAL HEALTH ASSESSMENT NOTE - COMMENTS ON VIOLENCE RISK/PROTECTIVE FACTORS:
reports relationship and housing stability but unclear if accurate, with noted recent homelessness, poor adherence with treatment

## 2020-09-28 NOTE — ED PROVIDER NOTE - CLINICAL SUMMARY MEDICAL DECISION MAKING FREE TEXT BOX
Patient with SI with hallucinations seen by psych and will be transfer to a psych accommodating facility. Patient is stable with stable labs and ekg. He is cooperative and calm. Awaiting for placement from [facility] and .

## 2020-09-28 NOTE — ED BEHAVIORAL HEALTH ASSESSMENT NOTE - RISK ASSESSMENT
High Acute Suicide Risk current active SI, command hallucinations, recurrent substance abuse all place pt at acutely elevated suicide risk  at chronic elevated risk given multiple past suicide attempts, poor engagemetn in outpatient treatment, childhood trauma, family history of suicide

## 2020-09-29 LAB
SARS-COV-2 IGG SERPL QL IA: NEGATIVE — SIGNIFICANT CHANGE UP
SARS-COV-2 IGM SERPL IA-ACNC: <0.1 INDEX — SIGNIFICANT CHANGE UP

## 2020-11-07 NOTE — PROGRESS NOTE BEHAVIORAL HEALTH - NSBHADDHXPSYSOCFT_PSY_A_CORE
Discharge Instructions    Discharged to other by medical transportation with escort. Discharged via wheelchair, hospital escort: Yes.  Special equipment needed: Not Applicable    Be sure to schedule a follow-up appointment with your primary care doctor or any specialists as instructed.     Discharge Plan:   Diet Plan: Discussed  Activity Level: Discussed  Confirmed Follow up Appointment: Patient to Call and Schedule Appointment  Confirmed Symptoms Management: Discussed  Medication Reconciliation Updated: Yes(By physician)  Influenza Vaccine Indication: Patient Refuses    I understand that a diet low in cholesterol, fat, and sodium is recommended for good health. Unless I have been given specific instructions below for another diet, I accept this instruction as my diet prescription.   Other diet: REG    Special Instructions: None    · Is patient discharged on Warfarin / Coumadin?   No     Depression / Suicide Risk    As you are discharged from this [facility] facility, it is important to learn how to keep safe from harming yourself.    Recognize the warning signs:  · Abrupt changes in personality, positive or negative- including increase in energy   · Giving away possessions  · Change in eating patterns- significant weight changes-  positive or negative  · Change in sleeping patterns- unable to sleep or sleeping all the time   · Unwillingness or inability to communicate  · Depression  · Unusual sadness, discouragement and loneliness  · Talk of wanting to die  · Neglect of personal appearance   · Rebelliousness- reckless behavior  · Withdrawal from people/activities they love  · Confusion- inability to concentrate     If you or a loved one observes any of these behaviors or has concerns about self-harm, here's what you can do:  · Talk about it- your feelings and reasons for harming yourself  · Remove any means that you might use to hurt yourself (examples: pills, rope, extension cords, firearm)  · Get professional  help from the community (Mental Health, Substance Abuse, psychological counseling)  · Do not be alone:Call your Safe Contact- someone whom you trust who will be there for you.  · Call your local CRISIS HOTLINE 184-1188 or 682-052-2120  · Call your local Children's Mobile Crisis Response Team [place] (817) 427-5786 or www.Hook Mobile  · Call the toll free National Suicide Prevention Hotlines   · National Suicide Prevention Lifeline 628-428-YZFC (3577)  · [employer] Line Network 800-SUICIDE (220-0891)      Understanding Your Risk for Falls  Each year, millions of people suffer serious injuries from falls. It is important to understand your risk for falling. Talk with your health care provider about your risk and what you can do to lower it. There are actions you can take at home to lower your risk.  If you do have a serious fall, it is important to tell your health care provider. Falling once raises your risk for falling again.  How can falls affect me?  Serious injuries from falls are common. These include:  · Broken bones. Most hip fractures are caused by falls.  · Traumatic brain injury (TBI). Falls are the most common cause of TBI.  Fear of falling can also cause you to avoid activities and stay at home. This can make your muscles weaker and actually raise your risk for a fall.  What can increase my risk?  Serious injuries from a fall most often happen to people older than age 65. Children and young adults ages 15-29 are also at higher risk. The more risk factors you have for falling, the higher your risk. Risk factors include:  · Weakness in the lower body.  · Lack (deficiency) of vitamin D.  · Weak bones (osteoporosis).  · Being generally weak or confused due to long-term (chronic) illness.  · Dizziness or balance problems.  · Poor vision.  · Having depression.  · Medicine that causes dizziness or drowsiness. These can include medicines for your blood pressure, heart, anxiety, insomnia, or edema,  as well as pain medicines and muscle relaxants.  · Drinking alcohol.  · Foot pain or improper footwear.  · Working at a dangerous job.  · Having had a fall in the past.  · Tripping hazards at home, such as floor clutter or loose rugs, or poor lighting.  · Having pets or clutter in your home.  What actions can I take to lower my risk of falling?         · Maintain physical fitness:  ? Do strength and balance exercises. Consider taking a regular class to build strength and balance. Yoga and alejandra chi are good options.  ? Have your eyes checked every year and your vision prescription updated as needed.  · Remove all clutter from walkways and stairways, including extension cords.  · Use a cordless phone.  · Do not use throw rugs. Make sure all carpeting is taped or tacked down securely.  · Use good lighting in all rooms. Keep a flashlight near your bed.  · Make sure there is a clear path from your bed to the bathroom. Use night-lights.  · Install grab bars for your tub, shower, and toilet. Use a bath mat in your tub or shower.  · Attach secure railings on both sides of your stairs.  · Repair uneven or broken steps.  · Use a cane or walker as directed by your health care provider.  · Wear nonskid shoes. Do not wear high heels. Do not walk around the house in socks or slippers.  · Avoid walking on icy or slippery surfaces. Walk on the grass instead of on icy or slick sidewalks. Where you can, use ice melt to get rid of ice on walkways.  Questions to ask your health care provider  · Can you help me evaluate my risk for a fall?  · Do any of my medicines make me more likely to fall?  · Should I take a vitamin D supplement?  · What exercises can I do to improve my strength and balance?  · Should I make an appointment to have my vision checked?  · Do I need a bone density test to check for osteoporosis?  · Would it help to use a cane or a walker?  Where to find more information  · Centers for Disease Control and Prevention,  STEADI: cdc.gov  · Community-Based Fall Prevention Programs: cdc.gov  · National [place] on Aging: sl7mptz.milagro.nih.gov  Contact a health care provider if:  · You fall at home.  · You are afraid of falling at home.  · You feel weak, drowsy, or dizzy at home.  Summary  · People 65 and older are at high risk for falling. However, older people are not the only ones injured in falls. Children and young adults have a higher-than-normal risk, too.  · Talk with your health care provider about your risks for falling and how to lower those risks.  · Taking certain precautions at home can lower your risk for falling.  · If you fall, always tell your health care provider.  This information is not intended to replace advice given to you by your health care provider. Make sure you discuss any questions you have with your health care provider.  Document Released: 08/01/2018 Document Revised: 03/19/2019 Document Reviewed: 08/01/2018  Elsevier Patient Education © 2020 Elsevier Inc.         Patient was kicked out his home by his girlfriend and is now recently homeless.  He states he was kicked out because of he drinks too much alcohol.  He denies being violent or being involved in domestic abuse.  Patient completed some college but dropped out because alcohol use inhibited him from completing studies.

## 2021-01-02 ENCOUNTER — EMERGENCY (EMERGENCY)
Facility: HOSPITAL | Age: 36
LOS: 1 days | Discharge: HOME | End: 2021-01-02
Attending: EMERGENCY MEDICINE | Admitting: EMERGENCY MEDICINE
Payer: COMMERCIAL

## 2021-01-02 VITALS
HEIGHT: 66 IN | SYSTOLIC BLOOD PRESSURE: 115 MMHG | HEART RATE: 81 BPM | DIASTOLIC BLOOD PRESSURE: 80 MMHG | TEMPERATURE: 98 F | WEIGHT: 139.99 LBS | RESPIRATION RATE: 18 BRPM | OXYGEN SATURATION: 97 %

## 2021-01-02 DIAGNOSIS — M79.18 MYALGIA, OTHER SITE: ICD-10-CM

## 2021-01-02 DIAGNOSIS — R05 COUGH: ICD-10-CM

## 2021-01-02 DIAGNOSIS — Z20.822 CONTACT WITH AND (SUSPECTED) EXPOSURE TO COVID-19: ICD-10-CM

## 2021-01-02 DIAGNOSIS — Z72.0 TOBACCO USE: ICD-10-CM

## 2021-01-02 LAB
FLU A RESULT: SIGNIFICANT CHANGE UP
FLU A RESULT: SIGNIFICANT CHANGE UP
FLUAV AG NPH QL: SIGNIFICANT CHANGE UP
FLUBV AG NPH QL: SIGNIFICANT CHANGE UP
RSV RESULT: SIGNIFICANT CHANGE UP
RSV RNA RESP QL NAA+PROBE: SIGNIFICANT CHANGE UP
SARS-COV-2 RNA SPEC QL NAA+PROBE: SIGNIFICANT CHANGE UP

## 2021-01-02 PROCEDURE — 71045 X-RAY EXAM CHEST 1 VIEW: CPT | Mod: 26

## 2021-01-02 PROCEDURE — 99284 EMERGENCY DEPT VISIT MOD MDM: CPT | Mod: 25

## 2021-01-02 RX ORDER — IBUPROFEN 200 MG
600 TABLET ORAL ONCE
Refills: 0 | Status: COMPLETED | OUTPATIENT
Start: 2021-01-02 | End: 2021-01-02

## 2021-01-02 RX ADMIN — Medication 100 MILLIGRAM(S): at 04:35

## 2021-01-02 RX ADMIN — Medication 600 MILLIGRAM(S): at 04:35

## 2021-01-02 NOTE — ED PROVIDER NOTE - NSFOLLOWUPINSTRUCTIONS_ED_ALL_ED_FT
Follow up with your primary care doctor  Take tylenol for aches as needed  Return immediately for any new or worsening symptoms or any new concerns

## 2021-01-02 NOTE — ED PROVIDER NOTE - PROGRESS NOTE DETAILS
d/w rad, no acute findings on xray.  pt afebrile, no hypoxia, no tachypnea, lungs clear, clinically not c/w pcp

## 2021-01-02 NOTE — ED PROVIDER NOTE - PHYSICAL EXAMINATION
Physical Exam  GEN: Awake, alert, non-toxic appearing, NCAT  EYES: full EOMI,  ENT: External inspection normal, normal voice,   HEAD: atraumatic  NECK: FROM neck, supple,   CV: rrr  RESP: cta bl, no tachypnea, no hypoxia, no resp distress,  MSK: barnett x 4, FROM

## 2021-01-02 NOTE — ED PROVIDER NOTE - PATIENT PORTAL LINK FT
You can access the FollowMyHealth Patient Portal offered by [facility] by registering at the following website: [email or web address]. By joining [facility]’s FollowMyHealth portal, you will also be able to view your health information using other applications (apps) compatible with our system.

## 2021-01-02 NOTE — ED PROVIDER NOTE - CLINICAL SUMMARY MEDICAL DECISION MAKING FREE TEXT BOX
nontoxic appearing pt w/ 1 wk of nonproductive cough w/ myalgia, chills, afebrile, no hypoxia, no tachypnea, will check covid and influenza, and xray chest, analgesia and antitussive,

## 2021-01-02 NOTE — ED PROVIDER NOTE - OBJECTIVE STATEMENT
35 yom pw nonproductive cough x 1 wk, w/ generalized myalgia.  pt denies fc but reports "my body feels hot and then it feels cold".  denies sick contact or travel.  hx of HIV, on HARRT, reports CD4 count 90s, and VL 10k.  no URI sx.

## 2021-01-02 NOTE — ED ADULT NURSE NOTE - CHIEF COMPLAINT QUOTE
Pt walked in c/o nonproductive cough and body aches x1 week. Denies recent travels or sick contacts. States "my body feels hot and then it feels cold". Denies sob, cp, fevers, chills.

## 2021-01-10 ENCOUNTER — EMERGENCY (EMERGENCY)
Facility: HOSPITAL | Age: 36
LOS: 1 days | Discharge: ANOTHER HOSPITAL | End: 2021-01-10
Attending: EMERGENCY MEDICINE | Admitting: EMERGENCY MEDICINE
Payer: COMMERCIAL

## 2021-01-10 ENCOUNTER — INPATIENT (INPATIENT)
Facility: HOSPITAL | Age: 36
LOS: 8 days | Discharge: HOME | End: 2021-01-19
Attending: PSYCHIATRY & NEUROLOGY | Admitting: PSYCHIATRY & NEUROLOGY
Payer: COMMERCIAL

## 2021-01-10 VITALS
HEIGHT: 66 IN | DIASTOLIC BLOOD PRESSURE: 75 MMHG | TEMPERATURE: 98 F | HEART RATE: 89 BPM | OXYGEN SATURATION: 98 % | WEIGHT: 139.99 LBS | RESPIRATION RATE: 15 BRPM | SYSTOLIC BLOOD PRESSURE: 125 MMHG

## 2021-01-10 VITALS
DIASTOLIC BLOOD PRESSURE: 75 MMHG | HEIGHT: 67 IN | HEART RATE: 73 BPM | RESPIRATION RATE: 16 BRPM | TEMPERATURE: 96 F | WEIGHT: 139.99 LBS | SYSTOLIC BLOOD PRESSURE: 125 MMHG

## 2021-01-10 VITALS
HEART RATE: 63 BPM | RESPIRATION RATE: 16 BRPM | DIASTOLIC BLOOD PRESSURE: 70 MMHG | OXYGEN SATURATION: 99 % | SYSTOLIC BLOOD PRESSURE: 118 MMHG | TEMPERATURE: 98 F

## 2021-01-10 DIAGNOSIS — R45.851 SUICIDAL IDEATIONS: ICD-10-CM
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
## 2021-01-10 DIAGNOSIS — F19.959 OTHER PSYCHOACTIVE SUBSTANCE USE, UNSPECIFIED WITH PSYCHOACTIVE SUBSTANCE-INDUCED PSYCHOTIC DISORDER, UNSPECIFIED: ICD-10-CM
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
## 2021-01-10 DIAGNOSIS — B20 HUMAN IMMUNODEFICIENCY VIRUS [HIV] DISEASE: ICD-10-CM

## 2021-01-10 DIAGNOSIS — S71.002A UNSPECIFIED OPEN WOUND, LEFT HIP, INITIAL ENCOUNTER: ICD-10-CM
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
## 2021-01-10 DIAGNOSIS — Z20.822 CONTACT WITH AND (SUSPECTED) EXPOSURE TO COVID-19: ICD-10-CM

## 2021-01-10 LAB
ALBUMIN SERPL ELPH-MCNC: 3.4 G/DL — SIGNIFICANT CHANGE UP (ref 3.4–5)
ALP SERPL-CCNC: 85 U/L — SIGNIFICANT CHANGE UP (ref 40–120)
ALT FLD-CCNC: 25 U/L — SIGNIFICANT CHANGE UP (ref 12–42)
AMPHET UR-MCNC: NEGATIVE — SIGNIFICANT CHANGE UP
ANION GAP SERPL CALC-SCNC: 11 MMOL/L — SIGNIFICANT CHANGE UP (ref 9–16)
ANISOCYTOSIS BLD QL: SLIGHT — SIGNIFICANT CHANGE UP
APPEARANCE UR: CLEAR — SIGNIFICANT CHANGE UP
AST SERPL-CCNC: 50 U/L — HIGH (ref 15–37)
BACTERIA # UR AUTO: PRESENT /HPF
BARBITURATES UR SCN-MCNC: NEGATIVE — SIGNIFICANT CHANGE UP
BASOPHILS # BLD AUTO: 0 K/UL — SIGNIFICANT CHANGE UP (ref 0–0.2)
BASOPHILS NFR BLD AUTO: 0 % — SIGNIFICANT CHANGE UP (ref 0–2)
BENZODIAZ UR-MCNC: NEGATIVE — SIGNIFICANT CHANGE UP
BILIRUB SERPL-MCNC: 0.2 MG/DL — SIGNIFICANT CHANGE UP (ref 0.2–1.2)
BILIRUB UR-MCNC: ABNORMAL
BUN SERPL-MCNC: 13 MG/DL — SIGNIFICANT CHANGE UP (ref 7–23)
CALCIUM SERPL-MCNC: 8.5 MG/DL — SIGNIFICANT CHANGE UP (ref 8.5–10.5)
CHLORIDE SERPL-SCNC: 106 MMOL/L — SIGNIFICANT CHANGE UP (ref 96–108)
CO2 SERPL-SCNC: 26 MMOL/L — SIGNIFICANT CHANGE UP (ref 22–31)
COCAINE METAB.OTHER UR-MCNC: POSITIVE
COLOR SPEC: YELLOW — SIGNIFICANT CHANGE UP
COMMENT - URINE: SIGNIFICANT CHANGE UP
CREAT SERPL-MCNC: 0.97 MG/DL — SIGNIFICANT CHANGE UP (ref 0.5–1.3)
DIFF PNL FLD: NEGATIVE — SIGNIFICANT CHANGE UP
EOSINOPHIL # BLD AUTO: 0.07 K/UL — SIGNIFICANT CHANGE UP (ref 0–0.5)
EOSINOPHIL NFR BLD AUTO: 2 % — SIGNIFICANT CHANGE UP (ref 0–6)
EPI CELLS # UR: SIGNIFICANT CHANGE UP /HPF (ref 0–5)
ETHANOL SERPL-MCNC: 23 MG/DL — HIGH
GLUCOSE SERPL-MCNC: 117 MG/DL — HIGH (ref 70–99)
GLUCOSE UR QL: NEGATIVE — SIGNIFICANT CHANGE UP
HCT VFR BLD CALC: 38 % — LOW (ref 39–50)
HGB BLD-MCNC: 12.3 G/DL — LOW (ref 13–17)
HYPOCHROMIA BLD QL: SLIGHT — SIGNIFICANT CHANGE UP
KETONES UR-MCNC: NEGATIVE — SIGNIFICANT CHANGE UP
LEUKOCYTE ESTERASE UR-ACNC: NEGATIVE — SIGNIFICANT CHANGE UP
LG PLATELETS BLD QL AUTO: SLIGHT — SIGNIFICANT CHANGE UP
LYMPHOCYTES # BLD AUTO: 0.63 K/UL — LOW (ref 1–3.3)
LYMPHOCYTES # BLD AUTO: 17 % — SIGNIFICANT CHANGE UP (ref 13–44)
MACROCYTES BLD QL: SLIGHT — SIGNIFICANT CHANGE UP
MANUAL SMEAR VERIFICATION: SIGNIFICANT CHANGE UP
MCHC RBC-ENTMCNC: 28.3 PG — SIGNIFICANT CHANGE UP (ref 27–34)
MCHC RBC-ENTMCNC: 32.4 GM/DL — SIGNIFICANT CHANGE UP (ref 32–36)
MCV RBC AUTO: 87.6 FL — SIGNIFICANT CHANGE UP (ref 80–100)
METHADONE UR-MCNC: NEGATIVE — SIGNIFICANT CHANGE UP
MONOCYTES # BLD AUTO: 0.41 K/UL — SIGNIFICANT CHANGE UP (ref 0–0.9)
MONOCYTES NFR BLD AUTO: 11 % — SIGNIFICANT CHANGE UP (ref 2–14)
NEUTROPHILS # BLD AUTO: 2.37 K/UL — SIGNIFICANT CHANGE UP (ref 1.8–7.4)
NEUTROPHILS NFR BLD AUTO: 64 % — SIGNIFICANT CHANGE UP (ref 43–77)
NITRITE UR-MCNC: NEGATIVE — SIGNIFICANT CHANGE UP
NRBC # BLD: 0 /100 — SIGNIFICANT CHANGE UP (ref 0–0)
NRBC # BLD: SIGNIFICANT CHANGE UP /100 WBCS (ref 0–0)
OPIATES UR-MCNC: NEGATIVE — SIGNIFICANT CHANGE UP
PCP SPEC-MCNC: SIGNIFICANT CHANGE UP
PCP UR-MCNC: NEGATIVE — SIGNIFICANT CHANGE UP
PH UR: 6 — SIGNIFICANT CHANGE UP (ref 5–8)
PLAT MORPH BLD: ABNORMAL
PLATELET # BLD AUTO: 300 K/UL — SIGNIFICANT CHANGE UP (ref 150–400)
POTASSIUM SERPL-MCNC: 3.6 MMOL/L — SIGNIFICANT CHANGE UP (ref 3.5–5.3)
POTASSIUM SERPL-SCNC: 3.6 MMOL/L — SIGNIFICANT CHANGE UP (ref 3.5–5.3)
PROT SERPL-MCNC: 8.5 G/DL — HIGH (ref 6.4–8.2)
PROT UR-MCNC: ABNORMAL MG/DL
RBC # BLD: 4.34 M/UL — SIGNIFICANT CHANGE UP (ref 4.2–5.8)
RBC # FLD: 13.1 % — SIGNIFICANT CHANGE UP (ref 10.3–14.5)
RBC BLD AUTO: ABNORMAL
RBC CASTS # UR COMP ASSIST: < 5 /HPF — SIGNIFICANT CHANGE UP
SARS-COV-2 RNA SPEC QL NAA+PROBE: SIGNIFICANT CHANGE UP
SCHISTOCYTES BLD QL AUTO: SLIGHT — SIGNIFICANT CHANGE UP
SODIUM SERPL-SCNC: 143 MMOL/L — SIGNIFICANT CHANGE UP (ref 132–145)
SP GR SPEC: >=1.03 — SIGNIFICANT CHANGE UP (ref 1–1.03)
TARGETS BLD QL SMEAR: SLIGHT — SIGNIFICANT CHANGE UP
THC UR QL: NEGATIVE — SIGNIFICANT CHANGE UP
TSH SERPL-MCNC: 1.65 UIU/ML — SIGNIFICANT CHANGE UP (ref 0.36–3.74)
UROBILINOGEN FLD QL: 0.2 E.U./DL — SIGNIFICANT CHANGE UP
VARIANT LYMPHS # BLD: 6 % — SIGNIFICANT CHANGE UP (ref 0–6)
WBC # BLD: 3.7 K/UL — LOW (ref 3.8–10.5)
WBC # FLD AUTO: 3.7 K/UL — LOW (ref 3.8–10.5)
WBC UR QL: < 5 /HPF — SIGNIFICANT CHANGE UP

## 2021-01-10 PROCEDURE — 90792 PSYCH DIAG EVAL W/MED SRVCS: CPT | Mod: 95

## 2021-01-10 PROCEDURE — 99053 MED SERV 10PM-8AM 24 HR FAC: CPT

## 2021-01-10 PROCEDURE — 99285 EMERGENCY DEPT VISIT HI MDM: CPT

## 2021-01-10 RX ORDER — EMTRICITABINE AND TENOFOVIR DISOPROXIL FUMARATE 200; 300 MG/1; MG/1
1 TABLET, FILM COATED ORAL DAILY
Refills: 0 | Status: DISCONTINUED | OUTPATIENT
Start: 2021-01-10 | End: 2021-01-19

## 2021-01-10 RX ORDER — KETOROLAC TROMETHAMINE 30 MG/ML
60 SYRINGE (ML) INJECTION ONCE
Refills: 0 | Status: DISCONTINUED | OUTPATIENT
Start: 2021-01-10 | End: 2021-01-10

## 2021-01-10 RX ORDER — RISPERIDONE 4 MG/1
2 TABLET ORAL AT BEDTIME
Refills: 0 | Status: DISCONTINUED | OUTPATIENT
Start: 2021-01-10 | End: 2021-01-11

## 2021-01-10 RX ORDER — RISPERIDONE 4 MG/1
1 TABLET ORAL DAILY
Refills: 0 | Status: DISCONTINUED | OUTPATIENT
Start: 2021-01-10 | End: 2021-01-11

## 2021-01-10 RX ORDER — KETOROLAC TROMETHAMINE 30 MG/ML
30 SYRINGE (ML) INJECTION ONCE
Refills: 0 | Status: DISCONTINUED | OUTPATIENT
Start: 2021-01-10 | End: 2021-01-10

## 2021-01-10 RX ORDER — DOLUTEGRAVIR SODIUM 25 MG/1
50 TABLET, FILM COATED ORAL DAILY
Refills: 0 | Status: DISCONTINUED | OUTPATIENT
Start: 2021-01-10 | End: 2021-01-19

## 2021-01-10 RX ORDER — ESCITALOPRAM OXALATE 10 MG/1
15 TABLET, FILM COATED ORAL DAILY
Refills: 0 | Status: DISCONTINUED | OUTPATIENT
Start: 2021-01-10 | End: 2021-01-19

## 2021-01-10 RX ORDER — BENZTROPINE MESYLATE 1 MG
1 TABLET ORAL
Refills: 0 | Status: DISCONTINUED | OUTPATIENT
Start: 2021-01-10 | End: 2021-01-11

## 2021-01-10 RX ADMIN — RISPERIDONE 2 MILLIGRAM(S): 4 TABLET ORAL at 20:07

## 2021-01-10 RX ADMIN — Medication 100 MILLIGRAM(S): at 06:09

## 2021-01-10 RX ADMIN — Medication 1 MILLIGRAM(S): at 20:07

## 2021-01-10 RX ADMIN — Medication 30 MILLIGRAM(S): at 05:30

## 2021-01-10 NOTE — ED PROVIDER NOTE - OBJECTIVE STATEMENT
PMHX HIV vl 10K, CD4 90 on antivirals, bipolar, depression anxiety presents with suicidal ideation. states that he wants to take something sharp and cut himself. states that he has not left his room in one week due to symptoms. admits to psych admission in the past. admits to attempts in the past with cutting, jumping out of buildings, running into traffic. admits to hearing voices telling him to hurt himself. admits to drinking three beers and doing cocaine today.  patient is also complaining of L hip wound. states that it started one week ago, states that he felt an irritation over the area, pain and symptoms worsened.

## 2021-01-10 NOTE — ED BEHAVIORAL HEALTH ASSESSMENT NOTE - DETAILS
has open wound , dressed and now pt receiving antibiotics. n/a as above father with depression and per chart, completed suicide history of aggression per chart, current level of aggressiveness unknown discussed with attending unable to assess

## 2021-01-10 NOTE — ED ADULT NURSE NOTE - HPI (INCLUDE ILLNESS QUALITY, SEVERITY, DURATION, TIMING, CONTEXT, MODIFYING FACTORS, ASSOCIATED SIGNS AND SYMPTOMS)
REports of suicidal thoughts, h/o depression, anxiety and bipolar. H/o psych admission in the past. Pt states he's been in his room for the past few days and have frequent thoughts of hurting self.

## 2021-01-10 NOTE — H&P ADULT - ASSESSMENT
36 yo male with PMHx HIV presents for increasing depression.              Plan:  ·	admit to [facility]  ·	tx and eval as per ph  ·	cont HIV meds    All above d/w Dr wynn

## 2021-01-10 NOTE — ED BEHAVIORAL HEALTH ASSESSMENT NOTE - OTHER PAST PSYCHIATRIC HISTORY (INCLUDE DETAILS REGARDING ONSET, COURSE OF ILLNESS, INPATIENT/OUTPATIENT TREATMENT)
self reported depression and possible schizophrenia  nonadherent to psychiatric medication chronically  last hospitalizations were 5/2020 [facility], 8/2020 [facility], 10/2020 [facility]  many documented past suicide attempts and aborted attempts since childhood: attempted hanging at age 9, jumped in front of car at age 17, attempted to jump off [employer] in 2016, ?sliced his own neck?

## 2021-01-10 NOTE — ED BEHAVIORAL HEALTH NOTE - BEHAVIORAL HEALTH NOTE
===================  PRE-HOSPITAL COURSE  ===================  SOURCE: ED triage   DETAILS: Patient walked into the ED, with complaints of depression and suicidal ideations and stating that he wanted to take something sharp and cut himself. He states he hasn’t left his room in a week due to the symptoms    ============  ED COURSE   ============  SOURCE: ED triage and ED Rn   ARRIVAL: Walk in  BELONGINGS: All belongs were searched and secured  BEHAVIOR: Per RN, patient has been calm, quiet and sleeping. Per Rn patient appears flat and does not appear too paranoid. Per ED Rn patient did not appear disheveled, looks clean but was wearing a few layers of clothing. He expressed suicidal ideations but no plan except that if he had something sharp.  TREATMENT: Received Toradol injection for wound on L hip, received Vibramycin 100mg oral  VISITORS: None    ========================  FOR EACH COLLATERAL-   ========================  NAME: Van  NUMBER: [phone]  RELATIONSHIP:  Unclear  COMMENTS: BTCM left message, waiting a callback    COVID Exposure Screen- collateral (i.e. third-party, chart review, belongings, etc; include EMS and ED staff)     1.        *Has the patient had a COVID-19 test in the last 21 days?  ( x ) Yes   (  ) No   (  ) Unknown- Reason: ______  IF YES PROCEED TO QUESTION #2. IF NO OR UNKNOWN THEN PLEASE SKIP TO QUESTION #3.  2.        Date of test: January 02,2021  3.        Do you know the result? ( x ) Negative   (  ) Positive   (  ) No result available  4.        *In the past 14 days, has the patient been around anyone with a positive COVID-19 test?*  (  ) Yes   (x  ) No   (  ) Unknown- Reason (e.g. collateral uncertain, refusing to answer, etc.):    IF YES PROCEED TO QUESTION #5. IF NO or UNKNOWN, PLEASE SKIP TO QUESTION #10  5.        Was the patient within 6 feet of them for at least 15 minutes? (  ) Yes   (  ) No   (  ) Unknown- Reason: ______   6.        Did the patient provide care for them? (  ) Yes   (  ) No   (  ) Unknown- Reason: ______   7.        Did the patient have direct physical contact with them (touched, hugged, or kissed them)? (  ) Yes   (  ) No    (  ) Unknown- Reason: ______   8.        Did the patient share eating or drinking utensils with them? (  ) Yes   (  ) No    (  ) Unknown- Reason: ______   9.        Have they sneezed, coughed, or somehow got respiratory droplets on the patient? (  ) Yes   (  ) No    (  ) Unknown- Reason: ______   10.     *Have you been out of [place] within the past 14 days?*  (  ) Yes   (x  ) No   (  ) Unknown- Reason (e.g. patient uncertain, sedated, refusing to answer, etc.): _______  IF YES PLEASE ANSWER THE FOLLOWING QUESTIONS:  11.     Which state/country have you been to? ______  12.     Were you there over 24 hours? (  ) Yes   (  ) No    (  ) Unknown- Reason: ______  13.     Date of return to [facility]: ______

## 2021-01-10 NOTE — ED BEHAVIORAL HEALTH ASSESSMENT NOTE - DESCRIPTION
HIV, takes tivicay 50mg and discovey 200mg-25mg as of 10/2020 as per  note lives alone, says he is now  from wife and daughters

## 2021-01-10 NOTE — H&P ADULT - NSHPLABSRESULTS_GEN_ALL_CORE
12.3   3.70  )-----------( 300      ( 10 Sid 2021 05:21 )             38.0       01-10    143  |  106  |  13  ----------------------------<  117<H>  3.6   |  26  |  0.97    Ca    8.5      10 Sid 2021 05:21    TPro  8.5<H>  /  Alb  3.4  /  TBili  0.2  /  DBili  x   /  AST  50<H>  /  ALT  25  /  AlkPhos  85  01-10                  Urinalysis Basic - ( 10 Sid 2021 05:22 )    Color: Yellow / Appearance: Clear / SG: >=1.030 / pH: x  Gluc: x / Ketone: NEGATIVE  / Bili: Small / Urobili: 0.2 E.U./dL   Blood: x / Protein: Trace mg/dL / Nitrite: NEGATIVE   Leuk Esterase: NEGATIVE / RBC: < 5 /HPF / WBC < 5 /HPF   Sq Epi: x / Non Sq Epi: 0-5 /HPF / Bacteria: Present /HPF          COVID-19 PCR: NotDetec (10 Sid 2021 05:36)  COVID-19 PCR: NotDetec (02 Jan 2021 04:04)  COVID-19 PCR: NotDetec (28 Sep 2020 12:29)  COVID-19 PCR: NotDetec (08 Aug 2020 15:52)

## 2021-01-10 NOTE — ED BEHAVIORAL HEALTH ASSESSMENT NOTE - PAST PSYCHOTROPIC MEDICATION
Discharge summary from [facility] 10/2020 indicates:   lexapro 15mg daily  risperidone 1mg QAM and 2mg QPM.   Cogentin 1mg BID

## 2021-01-10 NOTE — ED BEHAVIORAL HEALTH ASSESSMENT NOTE - HPI (INCLUDE ILLNESS QUALITY, SEVERITY, DURATION, TIMING, CONTEXT, MODIFYING FACTORS, ASSOCIATED SIGNS AND SYMPTOMS)
Milka from [facility] and has a question regarding letter sent on 2/27/2017 [phone].   35  man, currently , living alone in [place], unemployed, two daughters not in his custody, who has PMHx of HIV, and PPHx of psychotic depression vs schizophrenia, poly substance use disorder (etoh and cocaine active, heroin in the past), many prior hospitalizations (most recent 10/2020 at [facility]) and suicide attempts, who self presented with depression, AH and SI.     Patient presented to the hospital at 4:45am reporting depression, saying to medical ED that he was depressed over the holidays, had barely left his room, was not taking psychiatric medications, and wanted help. Initial workup showed ETOH level 23 and UTOX +cocaine. He also has open wound on hip which was dressed and pt started on antibiotics.     On my interview, patient was very drowsy, unable to maintain wakefulness for psychiatric interview. He said that he lives in [facility], but was unable to say what he was doing in [facility]. Denied alcohol use even when told that his alcohol level was 23. Said last cocaine use was 4 days ago. Says that he came here because "usually you guys help me before something bad happens". He continued to fall asleep and could not answer more detailed questions about recent symptoms, medications or treatment.

## 2021-01-10 NOTE — ED PROVIDER NOTE - PROGRESS NOTE DETAILS
as per Dr Miranda telepsychiatrist patient currently too drowsy to be evaluated. signed out to me pending psych disposition. telepsych called, voluntary admission to [facility]. medically cleared. as per primary care overnight team for wound to left abdomen -- doxycycline 100mg twice a day and wet to dry dressings daily while inpatient. signed out to me pending psych disposition. telepsych called, voluntary admission to [facility] under Dr. Fuentes. medically cleared. as per primary care overnight team for wound to left abdomen -- doxycycline 100mg twice a day and wet to dry dressings daily while inpatient.

## 2021-01-10 NOTE — ED BEHAVIORAL HEALTH NOTE - BEHAVIORAL HEALTH NOTE
Received signout from overnight telepsychiatry team. In brief, patient is a 34yo  man,  from wife and children 92 (daughters not in his custody), living alone in [place], unemployed, PMH HIV and PPH of psychotic depression vs. schizophrenia, polysubstance use disorder (etoh and cocaine active, heroin in the past), many prior hospitalizations (most recently at [facility] 10/2020), and hx of suicide attempts who self presented for depression, AH, SI. Utox positive for cocaine and etoh 23 suggesting recent substance use.     Patient was seen for reassessment at 9:11am when more awake to participate in interview (has had time to metabolize substances). Presented with constricted affect and poor eye contact. Still continues to endorse AH telling him to harm himself. Also continues to have SI w/plan to cut himself. Not able to safety plan at this time. Reports sleeping 2-3 hours a night for the past couple of weeks. Patient states that he has been doing poorly for a while, recent stressors include lack of family/social support during holidays, not having medications, using substances more again. Interested in voluntary admissions and restarting medications. Agreed to risperidone 1mg x1 in ER.    Plan:  -offer risperidone 1mg x1 in the ER  -COVID neg  -plan for voluntary admission 9.13 given current presentation and past risk factors once medically cleared  -CIWA to monitor for alcohol withdrawal, can provide Ativan PRN as needed

## 2021-01-10 NOTE — H&P ADULT - NSHPPHYSICALEXAM_GEN_ALL_CORE
Vital Signs Last 24 Hrs  T(C): 36.6 (10 Sid 2021 15:54), Max: 36.9 (10 Sid 2021 06:50)  T(F): 97.9 (10 Sid 2021 15:54), Max: 98.4 (10 Sid 2021 06:50)  HR: 68 (10 Sid 2021 15:54) (63 - 89)  BP: 114/62 (10 Sid 2021 15:54) (114/62 - 125/79)  BP(mean): --  RR: 16 (10 Sid 2021 15:54) (15 - 18)  SpO2: 99% (10 Sid 2021 11:45) (97% - 99%)    T(C): 36.6 (01-10-21 @ 15:54), Max: 36.9 (01-10-21 @ 06:50)  HR: 68 (01-10-21 @ 15:54) (63 - 89)  BP: 114/62 (01-10-21 @ 15:54) (114/62 - 125/79)  RR: 16 (01-10-21 @ 15:54) (15 - 18)  SpO2: 99% (01-10-21 @ 11:45) (97% - 99%)    PHYSICAL EXAM:  GENERAL: NAD, well-developed, well nourished, looks stated age  HEAD:  Atraumatic, Normocephalic  EYES: EOMI, PERRLA, conjunctiva and sclera clear  NECK: Supple, No JVD, no bruits, no masses, no thyroid enlargement  ENMT: No tonsillar erythema, exudated or enlargement, moist mucous membranes  CHEST/LUNG: Clear to auscultation bilaterally; No rales, rhonchi or wheezing, no dullness to percussion  HEART: S1,S2 Regular rate and rhythm; No murmurs, rubs, or gallops  ABDOMEN: Soft, nontender, nondistended, no rebound tenderness; No palpable masses, no hernias, no organomegaly; Bowel sounds present and normoactive  EXTREMITIES:  2+ peripheral pulses bilaterally and symmetrically, no clubbing, cyanosis, or edema  LYMPH: No lymphadenopathy  PSYCH: AAOx3, normal mood and affect  NEUROLOGY: non-focal, muscle strength 5/5 all extremities, DTRs 2+ symmetrically  SKIN: No rashes or lesions

## 2021-01-10 NOTE — H&P ADULT - HISTORY OF PRESENT ILLNESS
34 yo with PMHx HIV presents for increasing depression. Pt denies any S/H ideation.    Pt admits to a cough with congestion x 1 week. Had OP Negative COVID.

## 2021-01-10 NOTE — ED PROVIDER NOTE - ATTENDING CONTRIBUTION TO CARE
Patient presenting with SI in the setting of drug abuse. VSS. + Sleepy, SI, depressed, has L outer hip skin lesion consistent with bust abscess with nice granulation tissue. Cx sent, wet to dry dressing placed. labs sent. Will need psych eval.

## 2021-01-10 NOTE — ED ADULT TRIAGE NOTE - CHIEF COMPLAINT QUOTE
Pt states "I've been in my apartment for a few days now and I'm starting to feel suicidal." Pt reports history of depression with past suicidal attempts and psych admission.

## 2021-01-10 NOTE — ED PROVIDER NOTE - CLINICAL SUMMARY MEDICAL DECISION MAKING FREE TEXT BOX
Patient PMHx bipolar d/o, anxiety, depression presents with suicidal ideation with plan to hurt himself with hx of attempts in the past. also complaining of open wound over L hip that is draining blood and pus. will check labs, place on 1:1

## 2021-01-10 NOTE — H&P ADULT - NSICDXPASTMEDICALHX_GEN_ALL_CORE_FT
PAST MEDICAL HISTORY:  Anxiety     Bipolar disorder     Depression     HIV (human immunodeficiency virus infection)     Schizophrenia, unspecified type

## 2021-01-10 NOTE — ED BEHAVIORAL HEALTH ASSESSMENT NOTE - SUMMARY
35  man, currently , living alone in [place], unemployed, two daughters not in his custody, who has PMHx of HIV, and PPHx of psychotic depression vs schizophrenia, poly substance use disorder (etoh and cocaine active, heroin in the past), many prior hospitalizations (most recent 10/2020 at [facility]) and suicide attempts, who self presented with depression, AH and SI.   Workup revealed UTOX with cocaine and ETOH level 23 suggesting recent substance use. Additionally - his arrival to the hospital as 445am and sedated mental status (unmedicated) suggests substance-induced presentation.   At this time, pt is not maintaining sufficient wakefulness to participate in meaningful interview and risk assessment. He does have a history of admission and may benefit from re-admission, however it is possible that with additional observation and time for him to metabolize substances, he may experience some remission from his acute symptoms (cocaine-induced AH for example)

## 2021-01-11 DIAGNOSIS — F33.9 MAJOR DEPRESSIVE DISORDER, RECURRENT, UNSPECIFIED: ICD-10-CM

## 2021-01-11 DIAGNOSIS — F14.229 COCAINE DEPENDENCE WITH INTOXICATION, UNSPECIFIED: ICD-10-CM

## 2021-01-11 DIAGNOSIS — F33.3 MAJOR DEPRESSIVE DISORDER, RECURRENT, SEVERE WITH PSYCHOTIC SYMPTOMS: ICD-10-CM

## 2021-01-11 DIAGNOSIS — F10.20 ALCOHOL DEPENDENCE, UNCOMPLICATED: ICD-10-CM

## 2021-01-11 LAB
ANION GAP SERPL CALC-SCNC: 7 MMOL/L — SIGNIFICANT CHANGE UP (ref 7–14)
BUN SERPL-MCNC: 12 MG/DL — SIGNIFICANT CHANGE UP (ref 10–20)
CALCIUM SERPL-MCNC: 8.4 MG/DL — LOW (ref 8.5–10.1)
CHLORIDE SERPL-SCNC: 104 MMOL/L — SIGNIFICANT CHANGE UP (ref 98–110)
CO2 SERPL-SCNC: 27 MMOL/L — SIGNIFICANT CHANGE UP (ref 17–32)
CREAT SERPL-MCNC: 1.1 MG/DL — SIGNIFICANT CHANGE UP (ref 0.7–1.5)
GLUCOSE SERPL-MCNC: 99 MG/DL — SIGNIFICANT CHANGE UP (ref 70–99)
HCT VFR BLD CALC: 38 % — LOW (ref 42–52)
HGB BLD-MCNC: 12.1 G/DL — LOW (ref 14–18)
MCHC RBC-ENTMCNC: 28.5 PG — SIGNIFICANT CHANGE UP (ref 27–31)
MCHC RBC-ENTMCNC: 31.8 G/DL — LOW (ref 32–37)
MCV RBC AUTO: 89.4 FL — SIGNIFICANT CHANGE UP (ref 80–94)
NRBC # BLD: 0 /100 WBCS — SIGNIFICANT CHANGE UP (ref 0–0)
PLATELET # BLD AUTO: 287 K/UL — SIGNIFICANT CHANGE UP (ref 130–400)
POTASSIUM SERPL-MCNC: 4.3 MMOL/L — SIGNIFICANT CHANGE UP (ref 3.5–5)
POTASSIUM SERPL-SCNC: 4.3 MMOL/L — SIGNIFICANT CHANGE UP (ref 3.5–5)
RBC # BLD: 4.25 M/UL — LOW (ref 4.7–6.1)
RBC # FLD: 13.7 % — SIGNIFICANT CHANGE UP (ref 11.5–14.5)
SODIUM SERPL-SCNC: 138 MMOL/L — SIGNIFICANT CHANGE UP (ref 135–146)
WBC # BLD: 4.25 K/UL — LOW (ref 4.8–10.8)
WBC # FLD AUTO: 4.25 K/UL — LOW (ref 4.8–10.8)

## 2021-01-11 PROCEDURE — 99232 SBSQ HOSP IP/OBS MODERATE 35: CPT

## 2021-01-11 PROCEDURE — 99222 1ST HOSP IP/OBS MODERATE 55: CPT

## 2021-01-11 RX ORDER — BACITRACIN AND POLYMYXIN B SULFATE 500; 10000 [USP'U]/G; [USP'U]/G
1 OINTMENT TOPICAL
Refills: 0 | Status: DISCONTINUED | OUTPATIENT
Start: 2021-01-11 | End: 2021-01-11

## 2021-01-11 RX ORDER — HYDROXYZINE HCL 10 MG
50 TABLET ORAL EVERY 6 HOURS
Refills: 0 | Status: DISCONTINUED | OUTPATIENT
Start: 2021-01-11 | End: 2021-01-19

## 2021-01-11 RX ORDER — RISPERIDONE 4 MG/1
1 TABLET ORAL
Refills: 0 | Status: DISCONTINUED | OUTPATIENT
Start: 2021-01-11 | End: 2021-01-19

## 2021-01-11 RX ORDER — RISPERIDONE 4 MG/1
1 TABLET ORAL
Refills: 0 | Status: DISCONTINUED | OUTPATIENT
Start: 2021-01-11 | End: 2021-01-11

## 2021-01-11 RX ORDER — ACETAMINOPHEN 500 MG
650 TABLET ORAL EVERY 6 HOURS
Refills: 0 | Status: DISCONTINUED | OUTPATIENT
Start: 2021-01-11 | End: 2021-01-19

## 2021-01-11 RX ORDER — HALOPERIDOL DECANOATE 100 MG/ML
5 INJECTION INTRAMUSCULAR EVERY 6 HOURS
Refills: 0 | Status: DISCONTINUED | OUTPATIENT
Start: 2021-01-11 | End: 2021-01-19

## 2021-01-11 RX ORDER — BACITRACIN ZINC NEOMYCIN SULFATE POLYMYXIN B SULFATE 400; 3.5; 5 [IU]/G; MG/G; [IU]/G
1 OINTMENT TOPICAL
Refills: 0 | Status: DISCONTINUED | OUTPATIENT
Start: 2021-01-11 | End: 2021-01-19

## 2021-01-11 RX ADMIN — ESCITALOPRAM OXALATE 15 MILLIGRAM(S): 10 TABLET, FILM COATED ORAL at 08:13

## 2021-01-11 RX ADMIN — Medication 100 MILLIGRAM(S): at 12:20

## 2021-01-11 RX ADMIN — Medication 2 MILLIGRAM(S): at 10:21

## 2021-01-11 RX ADMIN — Medication 100 MILLIGRAM(S): at 20:21

## 2021-01-11 RX ADMIN — RISPERIDONE 1 MILLIGRAM(S): 4 TABLET ORAL at 08:13

## 2021-01-11 RX ADMIN — Medication 2 MILLIGRAM(S): at 20:21

## 2021-01-11 RX ADMIN — RISPERIDONE 1 MILLIGRAM(S): 4 TABLET ORAL at 20:22

## 2021-01-11 RX ADMIN — BACITRACIN ZINC NEOMYCIN SULFATE POLYMYXIN B SULFATE 1 APPLICATION(S): 400; 3.5; 5 OINTMENT TOPICAL at 20:22

## 2021-01-11 RX ADMIN — Medication 1 MILLIGRAM(S): at 08:13

## 2021-01-11 RX ADMIN — EMTRICITABINE AND TENOFOVIR DISOPROXIL FUMARATE 1 TABLET(S): 200; 300 TABLET, FILM COATED ORAL at 08:15

## 2021-01-11 RX ADMIN — DOLUTEGRAVIR SODIUM 50 MILLIGRAM(S): 25 TABLET, FILM COATED ORAL at 08:13

## 2021-01-11 NOTE — PROGRESS NOTE BEHAVIORAL HEALTH - PRIMARY DX
MDD (major depressive disorder), recurrent episode Severe episode of recurrent major depressive disorder, with psychotic features

## 2021-01-11 NOTE — PROGRESS NOTE BEHAVIORAL HEALTH - NSBHFUPSTRENGTHS_PSY_A_CORE
Motivated and ready for change/Has access to housing/residential stability/Awareness of substance use issues

## 2021-01-11 NOTE — PROGRESS NOTE BEHAVIORAL HEALTH - NSBHFUPADDHPIFT_PSY_A_CORE
35  man, currently , living alone in [place], unemployed, two daughters not in his custody, who has PMHx of HIV, and PPHx of psychotic depression vs schizophrenia, poly substance use disorder (etoh and cocaine active, heroin in the past), many prior hospitalizations (most recent 10/2020 at [facility]) and suicide attempts, who self presented with depression, auditory hallucinations and suicidal ideation.  Patient presented to the hospital at 4:45am reporting depression, saying to medical ED that he was depressed over the holidays, had barely left his room, was not taking psychiatric medications, and wanted help. Initial workup showed ETOH level 23 and UTOX +cocaine. He also has open wound on hip which was dressed and pt was started on antibiotics.

## 2021-01-11 NOTE — PROGRESS NOTE BEHAVIORAL HEALTH - NSBHADDHXPSYSOCFT_PSY_A_CORE
Living in apartment in [place]. Patient completed some college but dropped out because alcohol use inhibited him from completing studies. , poor relationship with wife, better relationship with kids.

## 2021-01-11 NOTE — ADVANCED PRACTICE NURSE CONSULT - ASSESSMENT
Patient is a 34 yo male  with PMHx HIV presents for increasing depression. Pt denies any S/H ideation.  Pt admits to a cough with congestion x 1 week. Had OP Negative COVID.    PAST MEDICAL & SURGICAL HISTORY:  Anxiety  Depression  Schizophrenia, unspecified type  Bipolar disorder  HIV (human immunodeficiency virus infection)  No significant past surgical history      Assessment:  Patient received in bed, a/o x3 responds appropriately to all commands    L hip  full thickness wound about 3x3x1 in size. Wound base pink with about 10 % of yellow tissue noted  scant amount of drainage noted, no  foul smell or pus noted   Patient has no temperature  or Increase white count at this time     Wound #1  Location:  L hip  Size: Length: 3 Width: 3 Depth: 1    Tissue Description (Place "x" if applicable/present):   [ ] Necrotic   [ ] Slough   [ ] Infected   [ ] Granulation (firm, beefy red tissue)   [ ] Hypergranulation (soft, gelatinous)  [x ] Poor-Quality Granulation (pale, grey/brown/red granulation tissue)   [ ] Epithelium (pink/mauve at wound edges)  [ ] Macerated  [ ] Other: _______  Wound Exudate : scant    Wound Edge):   [ ] Epithelisation [ ] Maceration [ ] Dehydration [ ] Undermining (use clock position) [ ] Rolled Edges [ ] Other: _____  Periwound Condition (area that extends 4cm from the edge of the wound):   [ ] Maceration [ ] Excoriation [x ] Dry skin [ ] Hyperkeratosis [ ] Callus [ ] Eczema [ ] Other: _______    Pressure due to: [ ] Immobility [ ] Medical Device   [ ] Stage I  [ ]  Stage II  [ ]  Stage III  [ ]  Stage VI  [ ]  Suspected Deep Tissue Injury (DTI)  [ ]  Unstageable    Other Etiology:  [ ] Aterial  [ ] Venous   [ ] Surgical Incision  [ ] Other: ________

## 2021-01-11 NOTE — CONSULT NOTE ADULT - ASSESSMENT
# Suicidal ideation   Card per primary team    # Non healing left hip wound in HIV patient  ID consulted- follow recommendations re HIV and abx medication  Follow up with wound cx   Wound care consult  Monitor temp  pain control    Call medicine as needed    [phone]

## 2021-01-11 NOTE — ADVANCED PRACTICE NURSE CONSULT - RECOMMEDATIONS
Plan: Clean wound with wound cleanser and apply topical antimicrobial as ordered   Pressure ulcer preventive  measures  skin care   Asses wound and inform primary provider of any changes   Case discussed with primary Rn  Wound/ ostomy specialist  to f/u as needed     Offloading: [ ] Frequent position changes [ ] Devices/Equipment  Cleansing: [ ] Saline [ ] Soap/Water [ ] Other: wound cleanser   Topicals: [ ] Barrier Cream [x ] Antimicrobial [ ] Enzymatic Wound Debridement  Dressings: [ ] Dry, sterile [ ] Foam [ ] Absorbant Pads [ ] Collagenase

## 2021-01-11 NOTE — PROGRESS NOTE BEHAVIORAL HEALTH - SUMMARY
35  man, currently , living alone in [place], unemployed, two daughters not in his custody, who has PMHx of HIV, and PPHx of psychotic depression vs schizophrenia, poly substance use disorder (etoh and cocaine active, heroin in the past), many prior hospitalizations (most recent 10/2020 at [facility]) and suicide attempts, who self presented with depression, auditory hallucinations and suicidal ideation. Workup revealed UTOX with cocaine and ETOH level 23 suggesting recent substance use. Presented with constricted affect and poor eye contact. Still continues to endorse AH telling him to harm himself. Also continues to have SI w/plan to cut himself. Not able to safety plan at this time. Reports sleeping 2-3 hours a night for the past couple of weeks. Patient states that he has been doing poorly for a while, recent stressors include lack of family/social support during holidays, not having medications, using substances more again. Interested in voluntary admissions and restarting medications.    On evaluation today, patient appearing lethargic, withdrawn. Patient continuing to endorse intermittent command auditory hallucinations, poor mood. Will continue and titrate medications to target symptoms and monitor for tolerability. Possible plan for rehab on discharge.      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  -continue lexapro 15mg daily  -risperidone 1mg bid    #Alcohol use disorder with withdrawal  -will start ativan taper with additional ativan 1mg po q6h prn for breakthrough symptoms of alcohol withdrawal    PRNs  -For severe agitation not responding to behavioral intervention, may give haldol 5 mg po q6h prn,  hydroxyzine 50 mg po q6h prn, with escalation to IM if patient refusing PO and remains an imminent danger to self or others. If IM antipsychotic is administered, please perform follow-up ECG for QTc monitoring. 35  man, currently , living alone in [place], unemployed, two daughters not in his custody, who has PMHx of HIV, and PPHx of psychotic depression vs schizophrenia, poly substance use disorder (etoh and cocaine active, heroin in the past), many prior hospitalizations (most recent 10/2020 at [facility]) and suicide attempts, who self presented with depression, auditory hallucinations and suicidal ideation. Workup revealed UTOX with cocaine and ETOH level 23 suggesting recent substance use. Presented with constricted affect and poor eye contact. Still continues to endorse AH telling him to harm himself. Also continues to have SI w/plan to cut himself. Not able to safety plan at this time. Reports sleeping 2-3 hours a night for the past couple of weeks. Patient states that he has been doing poorly for a while, recent stressors include lack of family/social support during holidays, not having medications, using substances more again. Interested in voluntary admissions and restarting medications.    On evaluation today, patient appearing lethargic, withdrawn. Patient continuing to endorse intermittent command auditory hallucinations, poor mood. Will continue and titrate medications to target symptoms and monitor for tolerability. Possible plan for rehab on discharge.      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  -continue lexapro 15mg daily  -risperidone 1mg bid    #Alcohol use disorder with withdrawal  -will start ativan taper with additional ativan 1mg po q6h prn for breakthrough symptoms of alcohol withdrawal  - Continue to monitor for alcohol withdrawal symptoms    #Cocaine use d/o  - Will continue to monitor  - No treatment indicated at this time    PRNs  -For severe agitation not responding to behavioral intervention, may give haldol 5 mg po q6h prn,  hydroxyzine 50 mg po q6h prn, with escalation to IM if patient refusing PO and remains an imminent danger to self or others. If IM antipsychotic is administered, please perform follow-up ECG for QTc monitoring.

## 2021-01-11 NOTE — PROGRESS NOTE BEHAVIORAL HEALTH - NSBHADDHXSUBSTFT_PSY_A_CORE
Alcohol use disorder, started drinking at 14 years of age. Attempted to go to inpatient rehab but wasn't accepted, was referred to outpatient rehab and has attempted multiple times but was never successful. Denies any lifetime withdrawal seizures, but admits to withdrawal symptoms of sweating and restlessness. Has smoked cigarettes, 2ppd, since 14 years old. Uses cocaine to improve his low mood and admits to "crashing" soon after

## 2021-01-12 LAB
-  AMPICILLIN/SULBACTAM: SIGNIFICANT CHANGE UP
-  CEFAZOLIN: SIGNIFICANT CHANGE UP
-  CLINDAMYCIN: SIGNIFICANT CHANGE UP
-  DAPTOMYCIN: SIGNIFICANT CHANGE UP
-  ERYTHROMYCIN: SIGNIFICANT CHANGE UP
-  GENTAMICIN: SIGNIFICANT CHANGE UP
-  LINEZOLID: SIGNIFICANT CHANGE UP
-  OXACILLIN: SIGNIFICANT CHANGE UP
-  PENICILLIN: SIGNIFICANT CHANGE UP
-  RIFAMPIN: SIGNIFICANT CHANGE UP
-  TETRACYCLINE: SIGNIFICANT CHANGE UP
-  TRIMETHOPRIM/SULFAMETHOXAZOLE: SIGNIFICANT CHANGE UP
-  VANCOMYCIN: SIGNIFICANT CHANGE UP
A1C WITH ESTIMATED AVERAGE GLUCOSE RESULT: 5.6 % — SIGNIFICANT CHANGE UP (ref 4–5.6)
ALBUMIN SERPL ELPH-MCNC: 3.6 G/DL — SIGNIFICANT CHANGE UP (ref 3.5–5.2)
ALP SERPL-CCNC: 69 U/L — SIGNIFICANT CHANGE UP (ref 30–115)
ALT FLD-CCNC: 12 U/L — SIGNIFICANT CHANGE UP (ref 0–41)
ANION GAP SERPL CALC-SCNC: 11 MMOL/L — SIGNIFICANT CHANGE UP (ref 7–14)
AST SERPL-CCNC: 20 U/L — SIGNIFICANT CHANGE UP (ref 0–41)
BASOPHILS # BLD AUTO: 0.02 K/UL — SIGNIFICANT CHANGE UP (ref 0–0.2)
BASOPHILS NFR BLD AUTO: 0.4 % — SIGNIFICANT CHANGE UP (ref 0–1)
BILIRUB SERPL-MCNC: 0.2 MG/DL — SIGNIFICANT CHANGE UP (ref 0.2–1.2)
BUN SERPL-MCNC: 10 MG/DL — SIGNIFICANT CHANGE UP (ref 10–20)
CALCIUM SERPL-MCNC: 8.6 MG/DL — SIGNIFICANT CHANGE UP (ref 8.5–10.1)
CHLORIDE SERPL-SCNC: 102 MMOL/L — SIGNIFICANT CHANGE UP (ref 98–110)
CHOLEST SERPL-MCNC: 147 MG/DL — SIGNIFICANT CHANGE UP
CO2 SERPL-SCNC: 25 MMOL/L — SIGNIFICANT CHANGE UP (ref 17–32)
CREAT SERPL-MCNC: 0.8 MG/DL — SIGNIFICANT CHANGE UP (ref 0.7–1.5)
CULTURE RESULTS: SIGNIFICANT CHANGE UP
EOSINOPHIL # BLD AUTO: 0.1 K/UL — SIGNIFICANT CHANGE UP (ref 0–0.7)
EOSINOPHIL NFR BLD AUTO: 2.2 % — SIGNIFICANT CHANGE UP (ref 0–8)
ESTIMATED AVERAGE GLUCOSE: 114 MG/DL — SIGNIFICANT CHANGE UP (ref 68–114)
GLUCOSE SERPL-MCNC: 96 MG/DL — SIGNIFICANT CHANGE UP (ref 70–99)
HCT VFR BLD CALC: 42.3 % — SIGNIFICANT CHANGE UP (ref 42–52)
HDLC SERPL-MCNC: 42 MG/DL — SIGNIFICANT CHANGE UP
HGB BLD-MCNC: 13.4 G/DL — LOW (ref 14–18)
IMM GRANULOCYTES NFR BLD AUTO: 0.2 % — SIGNIFICANT CHANGE UP (ref 0.1–0.3)
LIPID PNL WITH DIRECT LDL SERPL: 89 MG/DL — SIGNIFICANT CHANGE UP
LYMPHOCYTES # BLD AUTO: 1.45 K/UL — SIGNIFICANT CHANGE UP (ref 1.2–3.4)
LYMPHOCYTES # BLD AUTO: 31.5 % — SIGNIFICANT CHANGE UP (ref 20.5–51.1)
MCHC RBC-ENTMCNC: 28.8 PG — SIGNIFICANT CHANGE UP (ref 27–31)
MCHC RBC-ENTMCNC: 31.7 G/DL — LOW (ref 32–37)
MCV RBC AUTO: 91 FL — SIGNIFICANT CHANGE UP (ref 80–94)
METHOD TYPE: SIGNIFICANT CHANGE UP
MONOCYTES # BLD AUTO: 0.41 K/UL — SIGNIFICANT CHANGE UP (ref 0.1–0.6)
MONOCYTES NFR BLD AUTO: 8.9 % — SIGNIFICANT CHANGE UP (ref 1.7–9.3)
NEUTROPHILS # BLD AUTO: 2.61 K/UL — SIGNIFICANT CHANGE UP (ref 1.4–6.5)
NEUTROPHILS NFR BLD AUTO: 56.8 % — SIGNIFICANT CHANGE UP (ref 42.2–75.2)
NON HDL CHOLESTEROL: 105 MG/DL — SIGNIFICANT CHANGE UP
NRBC # BLD: 0 /100 WBCS — SIGNIFICANT CHANGE UP (ref 0–0)
ORGANISM # SPEC MICROSCOPIC CNT: SIGNIFICANT CHANGE UP
ORGANISM # SPEC MICROSCOPIC CNT: SIGNIFICANT CHANGE UP
PLATELET # BLD AUTO: 303 K/UL — SIGNIFICANT CHANGE UP (ref 130–400)
POTASSIUM SERPL-MCNC: 4.2 MMOL/L — SIGNIFICANT CHANGE UP (ref 3.5–5)
POTASSIUM SERPL-SCNC: 4.2 MMOL/L — SIGNIFICANT CHANGE UP (ref 3.5–5)
PROT SERPL-MCNC: 7.3 G/DL — SIGNIFICANT CHANGE UP (ref 6–8)
RBC # BLD: 4.65 M/UL — LOW (ref 4.7–6.1)
RBC # FLD: 13.5 % — SIGNIFICANT CHANGE UP (ref 11.5–14.5)
SARS-COV-2 IGG SERPL QL IA: NEGATIVE — SIGNIFICANT CHANGE UP
SARS-COV-2 IGM SERPL IA-ACNC: <0.1 INDEX — SIGNIFICANT CHANGE UP
SODIUM SERPL-SCNC: 138 MMOL/L — SIGNIFICANT CHANGE UP (ref 135–146)
SPECIMEN SOURCE: SIGNIFICANT CHANGE UP
TRIGL SERPL-MCNC: 109 MG/DL — SIGNIFICANT CHANGE UP
WBC # BLD: 4.6 K/UL — LOW (ref 4.8–10.8)
WBC # FLD AUTO: 4.6 K/UL — LOW (ref 4.8–10.8)

## 2021-01-12 PROCEDURE — 99232 SBSQ HOSP IP/OBS MODERATE 35: CPT

## 2021-01-12 RX ADMIN — ESCITALOPRAM OXALATE 15 MILLIGRAM(S): 10 TABLET, FILM COATED ORAL at 08:43

## 2021-01-12 RX ADMIN — Medication 1 MILLIGRAM(S): at 20:25

## 2021-01-12 RX ADMIN — Medication 100 MILLIGRAM(S): at 08:42

## 2021-01-12 RX ADMIN — Medication 1.5 MILLIGRAM(S): at 08:49

## 2021-01-12 RX ADMIN — DOLUTEGRAVIR SODIUM 50 MILLIGRAM(S): 25 TABLET, FILM COATED ORAL at 08:41

## 2021-01-12 RX ADMIN — BACITRACIN ZINC NEOMYCIN SULFATE POLYMYXIN B SULFATE 1 APPLICATION(S): 400; 3.5; 5 OINTMENT TOPICAL at 08:51

## 2021-01-12 RX ADMIN — RISPERIDONE 1 MILLIGRAM(S): 4 TABLET ORAL at 08:41

## 2021-01-12 RX ADMIN — Medication 1 TABLET(S): at 20:23

## 2021-01-12 RX ADMIN — RISPERIDONE 1 MILLIGRAM(S): 4 TABLET ORAL at 20:23

## 2021-01-12 RX ADMIN — EMTRICITABINE AND TENOFOVIR DISOPROXIL FUMARATE 1 TABLET(S): 200; 300 TABLET, FILM COATED ORAL at 08:42

## 2021-01-12 RX ADMIN — BACITRACIN ZINC NEOMYCIN SULFATE POLYMYXIN B SULFATE 1 APPLICATION(S): 400; 3.5; 5 OINTMENT TOPICAL at 22:00

## 2021-01-12 NOTE — PROGRESS NOTE BEHAVIORAL HEALTH - SUMMARY
35  man, currently , living alone in [place], unemployed, two daughters not in his custody, who has PMHx of HIV, and PPHx of psychotic depression vs schizophrenia, poly substance use disorder (etoh and cocaine active, heroin in the past), many prior hospitalizations (most recent 10/2020 at [facility]) and suicide attempts, who self presented with depression, auditory hallucinations and suicidal ideation. Workup revealed UTOX with cocaine and ETOH level 23 suggesting recent substance use. Presented with constricted affect and poor eye contact. Still continues to endorse AH telling him to harm himself. Also continues to have SI w/plan to cut himself. Not able to safety plan at this time. Reports sleeping 2-3 hours a night for the past couple of weeks. Patient states that he has been doing poorly for a while, recent stressors include lack of family/social support during holidays, not having medications, using substances more again. Interested in voluntary admissions and restarting medications.  On initiation evaluation, patient appeared lethargic, withdrawn, continuing to endorse intermittent command auditory hallucinations, poor mood.   On evaluation today patient's mood mildly improved however remains lethargic. Auditory hallucinations continue to be intermittently present however none were reported thus far today, possibly demonstrating improvement as well. Will continue medications to target symptoms and continue to monitor for tolerability. Possible plan for rehab on discharge remains.      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  -continue lexapro 15mg po daily  -continue risperidone 1mg po bid    #Alcohol use disorder with withdrawal  -continue ativan taper with additional ativan 1mg po q6h prn for breakthrough symptoms of alcohol withdrawal  - Continue to monitor for alcohol withdrawal symptoms    #Cocaine use d/o  - Will continue to monitor  - No treatment indicated at this time    #Left Hip Ulcer, HIV+  - treatment per ID recommendations: d/c doxy, switch to bactrim DS, f/u labs, switch to descovy and Tivicay daily once labs sent    PRNs  -For severe agitation not responding to behavioral intervention, may give haldol 5 mg po q6h prn,  hydroxyzine 50 mg po q6h prn, with escalation to IM if patient refusing PO and remains an imminent danger to self or others. If IM antipsychotic is administered, please perform follow-up ECG for QTc monitoring.

## 2021-01-12 NOTE — CONSULT NOTE ADULT - ASSESSMENT
ASSESSMENT  34 yo with PMHx HIV presents for increasing depression and SI    IMPRESSION  #HIV  - Follows at [address], [place], [place]  - Follows with Dr. Katie Pratt.   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  Spectra [phone]   ASSESSMENT  34 yo with PMHx HIV presents for increasing depression and SI    IMPRESSION  #HIV  - Follows at [address], [place], [place]  - Follows with Dr. Katie Pratt.   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) based on Genosure archive 1/2017   - previously on Tivicay/Descovy    #Left Hip Ulcer  - No surrounding erythema, but noted drainage on dressing  - Wound Cx growing MRSA     #Depression/SI  #Polysubstance use    #Abx allergy: NKDA      RECOMMENDATIONS  - please obtain HIV VL and CD4 and HIV Genosure (these labs were ordered)  - check RPR  - stop doxycycline, switch to bactrim 1DS BID for 7 days  - local wound care  - After above work-up sent, can start Descovy daily and tivicay daily  - patient possibly being discharged to rehab    Please call or message on [employer] Teams if with any questions.  Spectra [phone]

## 2021-01-12 NOTE — CONSULT NOTE ADULT - SUBJECTIVE AND OBJECTIVE BOX
ADRIANNE BRADLEY  35y, Male  Allergy: penicillins (Unknown)      CHIEF COMPLAINT: depression (11 Jan 2021 11:11)      LOS  2d    HPI:  34 yo with PMHx HIV presents for increasing depression. Pt denies any S/H ideation.    Pt admits to a cough with congestion x 1 week. Had OP Negative COVID.  (10 Sid 2021 16:39)      INFECTIOUS DISEASE HISTORY:  Admitted to psych for evaluation of depression/SI.   Has hx of polysubstance use and alcohol use.   Regarding HIV, follows at [facility] [address].   Reports that he last saw provider 4 weeks ago, although last laboratory check is on 5/23/2019.  Follows with Dr. Katie Pratt.   CD4 5/23/2019 331/17%  HIV VL 5/23/2019 35,900 --> 8/20/2019 20074     Known hx of medication adherence issues. Reports that he has not consistently taken his ART for the past few months. His use is very sporadic.     Also has left circular based ulcer on left hip.   Patient cannot provide any details regarding hx.   Appears that wound cs was taken, growing MRSA    PAST MEDICAL & SURGICAL HISTORY:  Anxiety    Depression    Schizophrenia, unspecified type    Bipolar disorder    HIV (human immunodeficiency virus infection)    No significant past surgical history        FAMILY HISTORY  No pertinent family history in first degree relatives        SOCIAL HISTORY  Social History:  denies (10 Sid 2021 16:39)        ROS  General: Denies rigors, nightsweats  HEENT: Denies headache, rhinorrhea, sore throat, eye pain  CV: Denies CP, palpitations  PULM: Denies wheezing, hemoptysis  GI: Denies hematemesis, hematochezia, melena  : Denies discharge, hematuria  MSK: Denies arthralgias, myalgias  SKIN: Denies rash, lesions  NEURO: Denies paresthesias, weakness  PSYCH: Denies depression, anxiety    VITALS:  T(F): 98.5, Max: 99.2 (01-11-21 @ 14:47)  HR: 65  BP: 125/81  RR: 18Vital Signs Last 24 Hrs  T(C): 36.9 (12 Jan 2021 08:10), Max: 37.3 (11 Jan 2021 14:47)  T(F): 98.5 (12 Jan 2021 08:10), Max: 99.2 (11 Jan 2021 14:47)  HR: 65 (12 Jan 2021 08:10) (59 - 67)  BP: 125/81 (12 Jan 2021 08:10) (111/60 - 126/66)  BP(mean): --  RR: 18 (12 Jan 2021 08:10) (16 - 18)  SpO2: --    PHYSICAL EXAM:  Gen: NAD, resting in bed  HEENT: Normocephalic, atraumatic  Neck: supple, no lymphadenopathy  CV: Regular rate & regular rhythm  Lungs: decreased BS at bases, no fremitus  Abdomen: Soft, BS present  Ext: Warm, well perfused  Neuro: non focal, awake  Skin: no rash, no erythema  Lines: no phlebitis    TESTS & MEASUREMENTS:                        13.4   4.60  )-----------( 303      ( 12 Jan 2021 09:09 )             42.3     01-12    138  |  102  |  10  ----------------------------<  96  4.2   |  25  |  0.8    Ca    8.6      12 Jan 2021 09:09    TPro  7.3  /  Alb  3.6  /  TBili  0.2  /  DBili  x   /  AST  20  /  ALT  12  /  AlkPhos  69  01-12    eGFR if African American: 135 mL/min/1.73M2 (01-12-21 @ 09:09)  eGFR if Non African American: 117 mL/min/1.73M2 (01-12-21 @ 09:09)  eGFR if Non African American: 87 mL/min/1.73M2 (01-11-21 @ 19:00)  eGFR if : 101 mL/min/1.73M2 (01-11-21 @ 19:00)    LIVER FUNCTIONS - ( 12 Jan 2021 09:09 )  Alb: 3.6 g/dL / Pro: 7.3 g/dL / ALK PHOS: 69 U/L / ALT: 12 U/L / AST: 20 U/L / GGT: x               Culture - Other (collected 01-10-21 @ 10:12)  Source: .Other no source  Final Report (01-12-21 @ 10:11):    Numerous Methicillin resistant Staphylococcus aureus  Organism: Methicillin resistant Staphylococcus aureus (01-12-21 @ 10:11)  Organism: Methicillin resistant Staphylococcus aureus (01-12-21 @ 10:11)      -  Ampicillin/Sulbactam: R <=8/4      -  Cefazolin: R 8      -  Clindamycin: S 0.5      -  Daptomycin: S 1      -  Erythromycin: R >4      -  Gentamicin: S <=1 Should not be used as monotherapy      -  Linezolid: S 4      -  Oxacillin: R >2      -  Penicillin: R >8      -  RIF- Rifampin: S <=1 Should not be used as monotherapy      -  Tetra/Doxy: I 8      -  Trimethoprim/Sulfamethoxazole: S <=0.5/9.5      -  Vancomycin: S 2      Method Type: JOSEE            INFECTIOUS DISEASES TESTING  COVID-19 PCR: NotDetec (01-10-21 @ 05:36)  COVID-19 PCR: NotDetec (01-02-21 @ 04:04)  COVID-19 PCR: NotDetec (09-28-20 @ 12:29)  COVID-19 PCR: NotDetec (08-08-20 @ 15:52)  COVID-19 PCR: NotDetec (07-11-20 @ 03:03)  COVID-19 PCR: NotDetec (05-30-20 @ 00:26)  COVID-19 PCR: NotDetec (04-17-20 @ 20:53)      RADIOLOGY & ADDITIONAL TESTS:  I have personally reviewed the last Chest xray  CXR      CT      CARDIOLOGY TESTING      MEDICATIONS  dolutegravir 50 Oral daily  doxycycline hyclate Capsule 100 Oral every 12 hours  emtricitabine 200 mG/tenofovir alafenamide 25 mG (DESCOVY) Tablet 1 Oral daily  escitalopram 15 Oral daily  LORazepam     Tablet 1.5 Oral every 8 hours  LORazepam     Tablet 1 Oral every 8 hours  neomycin/bacitracin/polymyxin Topical Ointment 1 Topical two times a day  risperiDONE   Tablet 1 Oral two times a day      Weight  Weight (kg): 63.503 (01-10-21 @ 15:00)    ANTIBIOTICS:  dolutegravir 50 milliGRAM(s) Oral daily  doxycycline hyclate Capsule 100 milliGRAM(s) Oral every 12 hours  emtricitabine 200 mG/tenofovir alafenamide 25 mG (DESCOVY) Tablet 1 Tablet(s) Oral daily      ALLERGIES:  penicillins (Unknown)        
  ABENAADRIANNE MANLEY  35y, Male  Allergy: penicillins (Unknown)      OVERNIGHT EVENTS:    36 yo male with extensive psychiatry history, HIV admitted to [facility] for management of psychiatry conditions and suicidal ideation.   Complaining of left hip non healing wound that is getting bigger. No fever or chills.     REVIEW OF SYSTEMS:    CONSTITUTIONAL: No weakness, fevers or chills  EYES/ENT: No visual changes;  No vertigo or throat pain   NECK: No pain or stiffness  RESPIRATORY: No cough, wheezing, hemoptysis; No shortness of breath  CARDIOVASCULAR: No chest pain or palpitations  GASTROINTESTINAL: No abdominal or epigastric pain. No nausea, vomiting, or hematemesis; No diarrhea or constipation. No melena or hematochezia.  GENITOURINARY: No dysuria, frequency or hematuria  NEUROLOGICAL: No numbness or weakness  SKIN: POSITIVE left hip wound    PAST MEDICAL & SURGICAL HISTORY:  Anxiety    Depression    Schizophrenia, unspecified type    Bipolar disorder    HIV (human immunodeficiency virus infection)    No significant past surgical history        VITALS:  T(F): 98.2 (01-11-21 @ 06:06), Max: 98.2 (01-11-21 @ 06:06)  HR: 62 (01-11-21 @ 06:06)  BP: 106/53 (01-11-21 @ 06:06) (106/53 - 125/75)  RR: 20 (01-11-21 @ 06:06)  SpO2: --    General: WN/WD NAD  Neurology: A&Ox3, nonfocal, SALDANA x 4  Eyes: PERRLA/ EOMI, Gross vision intact  ENT/Neck: Neck supple, trachea midline, No JVD, Gross hearing intact  Respiratory: CTA B/L, No wheezing, rales, rhonchi  CV: RRR, S1S2, no murmurs, rubs or gallops  Abdominal: Soft, NT, ND +BS,   Extremities: No edema, + peripheral pulses  Skin: left hip 4 x  3 cm open wound,mild edema no fluctuance, warm, no bleeding      TESTS & MEASUREMENTS:  Height (cm): 170.2 (01-10-21 @ 15:00), 167.6 (01-10-21 @ 04:54)  Weight (kg): 63.503 (01-10-21 @ 15:00), 63.5 (01-10-21 @ 04:54)  BMI (kg/m2): 21.9 (01-10-21 @ 15:00), 22.6 (01-10-21 @ 04:54)                          12.3   3.70  )-----------( 300      ( 10 Sid 2021 05:21 )             38.0       01-10    143  |  106  |  13  ----------------------------<  117<H>  3.6   |  26  |  0.97    Ca    8.5      10 Sid 2021 05:21    TPro  8.5<H>  /  Alb  3.4  /  TBili  0.2  /  DBili  x   /  AST  50<H>  /  ALT  25  /  AlkPhos  85  01-10    LIVER FUNCTIONS - ( 10 Sid 2021 05:21 )  Alb: 3.4 g/dL / Pro: 8.5 g/dL / ALK PHOS: 85 U/L / ALT: 25 U/L / AST: 50 U/L / GGT: x                 Culture - Other (collected 01-10-21 @ 10:12)  Source: .Other no source  Preliminary Report (01-11-21 @ 10:59):    Numerous Staphylococcus aureus Susceptibility to follow.      Urinalysis Basic - ( 10 Sid 2021 05:22 )    Color: Yellow / Appearance: Clear / SG: >=1.030 / pH: x  Gluc: x / Ketone: NEGATIVE  / Bili: Small / Urobili: 0.2 E.U./dL   Blood: x / Protein: Trace mg/dL / Nitrite: NEGATIVE   Leuk Esterase: NEGATIVE / RBC: < 5 /HPF / WBC < 5 /HPF   Sq Epi: x / Non Sq Epi: 0-5 /HPF / Bacteria: Present /HPF        RADIOLOGY & ADDITIONAL TESTS:    MEDICATIONS:  MEDICATIONS  (STANDING):  benztropine 1 milliGRAM(s) Oral two times a day  dolutegravir 50 milliGRAM(s) Oral daily  doxycycline hyclate Capsule 100 milliGRAM(s) Oral once  emtricitabine 200 mG/tenofovir alafenamide 25 mG (DESCOVY) Tablet 1 Tablet(s) Oral daily  escitalopram 15 milliGRAM(s) Oral daily  risperiDONE  Disintegrating Tablet 2 milliGRAM(s) Oral at bedtime  risperiDONE  Disintegrating Tablet 1 milliGRAM(s) Oral daily    MEDICATIONS  (PRN):  acetaminophen   Tablet .. 650 milliGRAM(s) Oral every 6 hours PRN Temp greater or equal to 38C (100.4F), Mild Pain (1 - 3)      HEALTH ISSUES - PROBLEM Dx:          Case discussed in details with:     PRESSURE ULCERS                                                 POA        YES/NO  URETHRAL CATHETER                                           POA        YES/NO  CENTRAL VENOUS CATHETER/PICC LINE          POA        YES/NO  SEPSIS                                                                       POA        YES/NO

## 2021-01-13 LAB
4/8 RATIO: 0.47 RATIO — LOW (ref 1.2–3.4)
ABS CD8: 649 /UL — HIGH (ref 206–494)
CD16+CD56+ CELLS NFR BLD: 19 % — SIGNIFICANT CHANGE UP (ref 4–20)
CD16+CD56+ CELLS NFR SPEC: 246 /UL — SIGNIFICANT CHANGE UP (ref 89–385)
CD19 BLASTS SPEC-ACNC: 3 % — LOW (ref 10–28)
CD19 BLASTS SPEC-ACNC: 46 /UL — LOW (ref 72–502)
CD3 BLASTS SPEC-ACNC: 77 % — SIGNIFICANT CHANGE UP (ref 59–81)
CD3 BLASTS SPEC-ACNC: 992 /UL — SIGNIFICANT CHANGE UP (ref 800–2012)
CD4 %: 24 % — LOW (ref 42–58)
CD8 %: 50 % — HIGH (ref 14–30)
HCT VFR BLD CALC: 43.1 % — SIGNIFICANT CHANGE UP (ref 42–52)
HGB BLD-MCNC: 13.7 G/DL — LOW (ref 14–18)
MCHC RBC-ENTMCNC: 28.7 PG — SIGNIFICANT CHANGE UP (ref 27–31)
MCHC RBC-ENTMCNC: 31.8 G/DL — LOW (ref 32–37)
MCV RBC AUTO: 90.4 FL — SIGNIFICANT CHANGE UP (ref 80–94)
NRBC # BLD: 0 /100 WBCS — SIGNIFICANT CHANGE UP (ref 0–0)
PLATELET # BLD AUTO: 327 K/UL — SIGNIFICANT CHANGE UP (ref 130–400)
RBC # BLD: 4.77 M/UL — SIGNIFICANT CHANGE UP (ref 4.7–6.1)
RBC # FLD: 13.5 % — SIGNIFICANT CHANGE UP (ref 11.5–14.5)
T-CELL CD4 SUBSET PNL BLD: 307 /UL — LOW (ref 495–1312)
WBC # BLD: 4.76 K/UL — LOW (ref 4.8–10.8)
WBC # FLD AUTO: 4.76 K/UL — LOW (ref 4.8–10.8)

## 2021-01-13 PROCEDURE — 99232 SBSQ HOSP IP/OBS MODERATE 35: CPT

## 2021-01-13 RX ADMIN — Medication 1 MILLIGRAM(S): at 06:08

## 2021-01-13 RX ADMIN — Medication 1 TABLET(S): at 20:27

## 2021-01-13 RX ADMIN — RISPERIDONE 1 MILLIGRAM(S): 4 TABLET ORAL at 08:30

## 2021-01-13 RX ADMIN — BACITRACIN ZINC NEOMYCIN SULFATE POLYMYXIN B SULFATE 1 APPLICATION(S): 400; 3.5; 5 OINTMENT TOPICAL at 08:40

## 2021-01-13 RX ADMIN — EMTRICITABINE AND TENOFOVIR DISOPROXIL FUMARATE 1 TABLET(S): 200; 300 TABLET, FILM COATED ORAL at 08:29

## 2021-01-13 RX ADMIN — ESCITALOPRAM OXALATE 15 MILLIGRAM(S): 10 TABLET, FILM COATED ORAL at 08:32

## 2021-01-13 RX ADMIN — DOLUTEGRAVIR SODIUM 50 MILLIGRAM(S): 25 TABLET, FILM COATED ORAL at 08:31

## 2021-01-13 RX ADMIN — RISPERIDONE 1 MILLIGRAM(S): 4 TABLET ORAL at 20:27

## 2021-01-13 RX ADMIN — Medication 1 TABLET(S): at 08:30

## 2021-01-13 RX ADMIN — Medication 1 MILLIGRAM(S): at 13:39

## 2021-01-13 RX ADMIN — BACITRACIN ZINC NEOMYCIN SULFATE POLYMYXIN B SULFATE 1 APPLICATION(S): 400; 3.5; 5 OINTMENT TOPICAL at 20:30

## 2021-01-13 NOTE — PROGRESS NOTE ADULT - SUBJECTIVE AND OBJECTIVE BOX
ADRIANNE BRADLEY  35y, Male  Allergy: penicillins (Unknown)      LOS  3d    CHIEF COMPLAINT: depression (12 Jan 2021 10:13)      INTERVAL EVENTS/HPI  - No acute events overnight  - T(F): , Max: 97.1 (01-12-21 @ 16:06)  - Denies any worsening symptoms  - Tolerating medication  - WBC Count: 4.60 (01-12-21 @ 09:09)  WBC Count: 4.25 (01-11-21 @ 19:00)     - Creatinine, Serum: 0.8 (01-12-21 @ 09:09)  Creatinine, Serum: 1.1 (01-11-21 @ 19:00)       ROS  General: Denies rigors, nightsweats  HEENT: Denies headache, rhinorrhea, sore throat, eye pain  CV: Denies CP, palpitations  PULM: Denies wheezing, hemoptysis  GI: Denies hematemesis, hematochezia, melena  : Denies discharge, hematuria  MSK: Denies arthralgias, myalgias  SKIN: Denies rash, lesions  NEURO: Denies paresthesias, weakness  PSYCH: Denies depression, anxiety    VITALS:  T(F): 97.1, Max: 97.1 (01-12-21 @ 16:06)  HR: 74  BP: 115/59  RR: 16Vital Signs Last 24 Hrs  T(C): 36.2 (12 Jan 2021 16:06), Max: 36.2 (12 Jan 2021 16:06)  T(F): 97.1 (12 Jan 2021 16:06), Max: 97.1 (12 Jan 2021 16:06)  HR: 74 (12 Jan 2021 16:06) (74 - 74)  BP: 115/59 (12 Jan 2021 16:06) (115/59 - 115/59)  BP(mean): --  RR: 16 (12 Jan 2021 16:06) (16 - 16)  SpO2: --    PHYSICAL EXAM:  Gen: NAD, resting in bed  HEENT: Normocephalic, atraumatic  Neck: supple, no lymphadenopathy  CV: Regular rate & regular rhythm  Lungs: decreased BS at bases, no fremitus  Abdomen: Soft, BS present  Ext: Warm, well perfused  Neuro: non focal, awake  Skin: no rash, no erythema  Lines: no phlebitis    FH: Non-contributory  Social Hx: Non-contributory    TESTS & MEASUREMENTS:                        13.4   4.60  )-----------( 303      ( 12 Jan 2021 09:09 )             42.3     01-12    138  |  102  |  10  ----------------------------<  96  4.2   |  25  |  0.8    Ca    8.6      12 Jan 2021 09:09    TPro  7.3  /  Alb  3.6  /  TBili  0.2  /  DBili  x   /  AST  20  /  ALT  12  /  AlkPhos  69  01-12      LIVER FUNCTIONS - ( 12 Jan 2021 09:09 )  Alb: 3.6 g/dL / Pro: 7.3 g/dL / ALK PHOS: 69 U/L / ALT: 12 U/L / AST: 20 U/L / GGT: x               Culture - Other (collected 01-10-21 @ 10:12)  Source: .Other no source  Final Report (01-12-21 @ 10:11):    Numerous Methicillin resistant Staphylococcus aureus  Organism: Methicillin resistant Staphylococcus aureus (01-12-21 @ 10:11)  Organism: Methicillin resistant Staphylococcus aureus (01-12-21 @ 10:11)      -  Ampicillin/Sulbactam: R <=8/4      -  Cefazolin: R 8      -  Clindamycin: S 0.5      -  Daptomycin: S 1      -  Erythromycin: R >4      -  Gentamicin: S <=1 Should not be used as monotherapy      -  Linezolid: S 4      -  Oxacillin: R >2      -  Penicillin: R >8      -  RIF- Rifampin: S <=1 Should not be used as monotherapy      -  Tetra/Doxy: I 8      -  Trimethoprim/Sulfamethoxazole: S <=0.5/9.5      -  Vancomycin: S 2      Method Type: JOSEE            INFECTIOUS DISEASES TESTING  COVID-19 PCR: NotDetec (01-10-21 @ 05:36)  COVID-19 PCR: NotDetec (01-02-21 @ 04:04)  Flu A Result: NotDetec (01-02-21 @ 04:04)  Flu B Result: NotDetec (01-02-21 @ 04:04)  RSV Result: NotDetec (01-02-21 @ 04:04)  COVID-19 PCR: NotDetec (09-28-20 @ 12:29)  COVID-19 PCR: NotDetec (08-08-20 @ 15:52)  COVID-19 PCR: NotDetec (07-11-20 @ 03:03)  COVID-19 PCR: NotDetec (05-30-20 @ 00:26)  COVID-19 PCR: NotDetec (04-17-20 @ 20:53)      INFLAMMATORY MARKERS      RADIOLOGY & ADDITIONAL TESTS:  I have personally reviewed the last available Chest xray  CXR      CT      CARDIOLOGY TESTING  12 Lead ECG:   Ventricular Rate 70 BPM    Atrial Rate 70 BPM    P-R Interval 116 ms    QRS Duration 96 ms    Q-T Interval 380 ms    QTC Calculation(Bazett) 410 ms    P Axis 30 degrees    R Axis 77 degrees    T Axis 72 degrees    Diagnosis Line Normal sinus rhythm  Normal ECG    Confirmed by FLACO SALAZAR MD ([id number]) on 1/12/2021 12:28:14 PM (01-11-21 @ 17:07)      MEDICATIONS  dolutegravir 50 Oral daily  emtricitabine 200 mG/tenofovir alafenamide 25 mG (DESCOVY) Tablet 1 Oral daily  escitalopram 15 Oral daily  LORazepam     Tablet 1 Oral every 8 hours  neomycin/bacitracin/polymyxin Topical Ointment 1 Topical two times a day  risperiDONE   Tablet 1 Oral two times a day  trimethoprim  160 mG/sulfamethoxazole 800 mG 1 Oral two times a day      WEIGHT  Weight (kg): 63.503 (01-10-21 @ 15:00)      ANTIBIOTICS:  dolutegravir 50 milliGRAM(s) Oral daily  emtricitabine 200 mG/tenofovir alafenamide 25 mG (DESCOVY) Tablet 1 Tablet(s) Oral daily  trimethoprim  160 mG/sulfamethoxazole 800 mG 1 Tablet(s) Oral two times a day      All available historical records have been reviewed

## 2021-01-14 LAB — T PALLIDUM AB TITR SER: NEGATIVE — SIGNIFICANT CHANGE UP

## 2021-01-14 PROCEDURE — 99231 SBSQ HOSP IP/OBS SF/LOW 25: CPT

## 2021-01-14 RX ADMIN — Medication 0.5 MILLIGRAM(S): at 20:38

## 2021-01-14 RX ADMIN — RISPERIDONE 1 MILLIGRAM(S): 4 TABLET ORAL at 08:15

## 2021-01-14 RX ADMIN — Medication 1 TABLET(S): at 20:36

## 2021-01-14 RX ADMIN — Medication 1 TABLET(S): at 08:12

## 2021-01-14 RX ADMIN — BACITRACIN ZINC NEOMYCIN SULFATE POLYMYXIN B SULFATE 1 APPLICATION(S): 400; 3.5; 5 OINTMENT TOPICAL at 09:53

## 2021-01-14 RX ADMIN — RISPERIDONE 1 MILLIGRAM(S): 4 TABLET ORAL at 20:36

## 2021-01-14 RX ADMIN — ESCITALOPRAM OXALATE 10 MILLIGRAM(S): 10 TABLET, FILM COATED ORAL at 10:32

## 2021-01-14 RX ADMIN — EMTRICITABINE AND TENOFOVIR DISOPROXIL FUMARATE 1 TABLET(S): 200; 300 TABLET, FILM COATED ORAL at 11:37

## 2021-01-14 RX ADMIN — DOLUTEGRAVIR SODIUM 50 MILLIGRAM(S): 25 TABLET, FILM COATED ORAL at 08:12

## 2021-01-14 RX ADMIN — Medication 0.5 MILLIGRAM(S): at 08:15

## 2021-01-14 NOTE — DISCHARGE NOTE BEHAVIORAL HEALTH - NSBHDCSWCOMMENTSFT_PSY_A_CORE
Discharge Summary to [facility] [phone] on at Discharge Summary to [facility] [phone] on 01/19/2021 at 10:30am.

## 2021-01-14 NOTE — DISCHARGE NOTE BEHAVIORAL HEALTH - NSBHDCMEDICALFT_PSY_A_CORE
Patient is HIV positive and is a patient at [facility], was continued on descovy/Tivicay daily  Patient presented with nonhealing left hip wound with wound cultures positive for MRSA. Patient was initially given doxycycline, then was placed on 7-day course of bactrim DS bid.

## 2021-01-14 NOTE — DISCHARGE NOTE BEHAVIORAL HEALTH - MEDICATION SUMMARY - MEDICATIONS TO CHANGE
I will SWITCH the dose or number of times a day I take the medications listed below when I get home from the hospital:    escitalopram 10 mg oral tablet  -- 1 tab(s) by mouth once a day x 30 days     Continue to take as directed unless told otherwise by provider.

## 2021-01-14 NOTE — ED POST DISCHARGE NOTE - RESULT SUMMARY
wound cx (+) MRSA, pt on doxycycline, (intermediate sensitivity), recc to call for wellness check and change abx if sx are not resolving. Attempted to call the numbers in the chart, not in service. Will send telegram

## 2021-01-14 NOTE — DISCHARGE NOTE BEHAVIORAL HEALTH - NSBHDCMEDSFT_PSY_A_CORE
Patient was started on lexapro 15mg daily for mood with good response and tolerance  Patient was started on risperidone 1mg po bid for auditory hallucinations, which resolved.   Patient was placed on ativan taper for alcohol withdrawal symptoms with noted symptom resolution Patient was started on lexapro 15mg daily for mood with good response and tolerance  Patient was started on risperidone 1mg po bid for auditory hallucinations, which resolved.   Patient was placed on ativan taper for alcohol withdrawal symptoms with noted symptom resolution  Patient was compliant with the medications and denied any side-effects of the medications.

## 2021-01-14 NOTE — PROGRESS NOTE BEHAVIORAL HEALTH - SUMMARY
35  man, currently , living alone in [place], unemployed, two daughters not in his custody, who has PMHx of HIV, and PPHx of psychotic depression vs schizophrenia, poly substance use disorder (etoh and cocaine active, heroin in the past), many prior hospitalizations (most recent 10/2020 at [facility]) and suicide attempts, who self presented with depression, auditory hallucinations and suicidal ideation. Workup revealed UTOX with cocaine and ETOH level 23 suggesting recent substance use. Presented with constricted affect and poor eye contact. Still continues to endorse AH telling him to harm himself. Also continues to have SI w/plan to cut himself. Not able to safety plan at this time. Reports sleeping 2-3 hours a night for the past couple of weeks. Patient states that he has been doing poorly for a while, recent stressors include lack of family/social support during holidays, not having medications, using substances more again. Interested in voluntary admissions and restarting medications.  On initiation evaluation, patient appeared lethargic, withdrawn, continuing to endorse intermittent command auditory hallucinations, poor mood.  Today, patient's mood appears to demonstrate continued improvement with current medication regimen, auditory hallucinations appear to be improved as well. Patient continuing ativan taper for alcohol withdrawal symptoms.      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  -continue lexapro 15mg po daily  -continue risperidone 1mg po bid    #Alcohol use disorder with withdrawal  -continue ativan taper with additional ativan 1mg po q6h prn for breakthrough symptoms of alcohol withdrawal  - Continue to monitor for alcohol withdrawal symptoms    #Cocaine use d/o  - Will continue to monitor  - No treatment indicated at this time    #Left Hip Ulcer, HIV+  - treatment per ID recommendations: d/c'd doxy, switched to bactrim DS x7 days, f/u labs  - continue descovy and Tivicay daily    PRNs  -For severe agitation not responding to behavioral intervention, may give haldol 5 mg po q6h prn,  hydroxyzine 50 mg po q6h prn, with escalation to IM if patient refusing PO and remains an imminent danger to self or others. If IM antipsychotic is administered, please perform follow-up ECG for QTc monitoring.

## 2021-01-14 NOTE — DISCHARGE NOTE BEHAVIORAL HEALTH - NSBHDCSUBSTHXFT_PSY_A_CORE
Alcohol use disorder, started drinking at 14 years of age. Attempted to go to inpatient rehab but wasn't accepted, was referred to outpatient rehab and has attempted multiple times but was never successful. Denies any lifetime withdrawal seizures, but admits to withdrawal symptoms of sweating and restlessness. Has smoked cigarettes, 2ppd, since 14 years old.   Uses cocaine to improve his low mood and admits to "crashing" soon after, including recent use

## 2021-01-14 NOTE — DISCHARGE NOTE BEHAVIORAL HEALTH - NSTOBACCOREFERRAL_GEN_A_NCS
Yes Patient registered and referred to the [place] [facility] program. Phone appointment scheduled for 1/21/20 at 0900./Yes

## 2021-01-14 NOTE — DISCHARGE NOTE BEHAVIORAL HEALTH - NSBHDCADMRISKREASONS_PSY_A_CORE
Poor engagement in outpt treatment/Non-adherence with medications/Co-occur mental health and subst use

## 2021-01-14 NOTE — DISCHARGE NOTE BEHAVIORAL HEALTH - NSBHDCTESTSFT_PSY_A_CORE
Full T Cell Subset (01.12.21 @ 12:07)   CD19 %: 3 %   CD3 %: 77 %   CD4 %: 24 %   CD8 %: 50 %   4/8 Ratio: 0.47 Ratio   ABS [id number]: 246 /uL   ABS CD19: 46 /uL   ABS CD3: 992 /uL   ABS CD4: 307 /uL   ABS CD8: 649 /uL   [id number] %: 19 % Due to possible exposure to a patient who tested positive for COVID while on the unit, please self-quarantine for 14 days after discharge

## 2021-01-14 NOTE — DISCHARGE NOTE BEHAVIORAL HEALTH - MEDICATION SUMMARY - MEDICATIONS TO TAKE
I will START or STAY ON the medications listed below when I get home from the hospital:    escitalopram 5 mg oral tablet  -- 3 tab(s) by mouth once a day until changed by a physician  -- Indication: For Severe episode of recurrent major depressive disorder, with psychotic features    risperiDONE 1 mg oral tablet  -- 1 tab(s) by mouth 2 times a day until changed by a physician  -- Indication: For Severe episode of recurrent major depressive disorder, with psychotic features    dolutegravir 50 mg oral tablet  -- 1 tab(s) by mouth once a day until changed by a physician  -- Indication: For HIV (human immunodeficiency virus infection)    emtricitabine-tenofovir alafenamide 200 mg-25 mg oral tablet  -- 1 tab(s) by mouth once a day until changed by a physician  -- Indication: For HIV (human immunodeficiency virus infection)    bacitracin/neomycin/polymyxin B 400 units-3.5 mg-5000 units/g topical ointment  -- 1 application on skin to wound 2 times a day until changed by a physician  -- Indication: For Ulcer of left lower extremity    lidocaine 2% mucous membrane solution  -- 5 milliliter(s), swish and spit, 4 times a day, As Needed for tooth pain until changed by a physician  -- Indication: For tooth pain    folic acid 1 mg oral tablet  -- 1 tab(s) by mouth once a day until changed by a physician  -- Indication: For Alcohol dependence    thiamine 100 mg oral tablet  -- 1 tab(s) by mouth once a day until changed by a physician  -- Indication: For Alcohol dependence

## 2021-01-14 NOTE — DISCHARGE NOTE BEHAVIORAL HEALTH - SECONDARY DIAGNOSIS.
Alcohol dependence Ulcer of left lower extremity Cocaine dependence with intoxication HIV (human immunodeficiency virus infection)

## 2021-01-14 NOTE — DISCHARGE NOTE BEHAVIORAL HEALTH - NSBHDCHANDOFFNOFT_PSY_A_CORE
Discharge summary to be faxed to provider within 24 hrs of discharge.    Discharge summary to be faxed to provider within 24 hrs of discharge.  Discharge summary to be faxed to provider within 24 hrs of discharge.

## 2021-01-14 NOTE — DISCHARGE NOTE BEHAVIORAL HEALTH - NSBHDCRESPONSEFT_PSY_A_CORE
HST has been scheduled.    ADARSH Kitchen       Patient has made significant progress over the course of hospitalization. With continuous psychotherapy from the treatment team and medication, the patient reports feeling better, sleeping and eating well. Thought process and insight improved. Patient was calm and cooperative and was in good behavioral control. Patient denied any suicidal or homicidal ideations. Patient denied any auditory or visual hallucinations. Patient was evaluated by treatment team, is stable for discharge, and shows no imminent danger to self, others, or property at this time. The patient understands and agrees with treatment plan and follow-up. Psychoeducation was provided regarding diagnosis, medications, treatment, and follow up. Risks, benefits, and alternatives were discussed, with all questions and concerns addressed and answered. Reviewed crisis intervention with patient, who verbalized understanding. Patient has made significant progress over the course of hospitalization. With continuous psychotherapy from the treatment team and medication, the patient reports feeling better, sleeping and eating well. Thought process and insight improved. Patient was calm and cooperative and was in good behavioral control. Patient denied any suicidal or homicidal ideations. Patient denied any auditory or visual hallucinations. Pt was visible on the unit. He was strongly encouraged by the treatment team to go to inpatient rehab however pt was not ready to do so. Patient was evaluated by treatment team, is stable for discharge, and shows no imminent danger to self, others, or property at this time. The patient understands and agrees with treatment plan and follow-up. Psychoeducation was provided regarding diagnosis, medications, treatment, and follow up. Risks, benefits, and alternatives were discussed, with all questions and concerns addressed and answered. Reviewed crisis intervention with patient, who verbalized understanding.

## 2021-01-14 NOTE — MEDICAL STUDENT PROGRESS NOTE(EDUCATION) - SUBJECTIVE AND OBJECTIVE BOX
Interval CC: "I'm feeling positive"    Interval Data: Pt was seen, evaluated, chart reviewed. As per nursing staff, there were no overnight events. On evaluation, pt reports he is feeling positive, saying he woke up in a good mood after getting a good sleep. Patient says he is not experiencing any withdrawal sxs, and is finishing up his ativan taper. Patient has been compliant with his medications, and denies any negative side effects. Patient has been receiving bactrim for his hip ulcer and endorses reduced pain. He has no AVH, SI/HI. Patient said he will "call for help" the next time he has suicidal thoughts. Patient was again encouraged to consider rehab after being stabilized on this unit.       Vitals:  Vital Signs Last 24 Hrs  T(C): 35.9 (14 Jan 2021 08:10), Max: 37 (13 Jan 2021 15:50)  T(F): 96.6 (14 Jan 2021 08:10), Max: 98.6 (13 Jan 2021 15:50)  HR: 50 (14 Jan 2021 08:10) (50 - 75)  BP: 134/63 (14 Jan 2021 08:10) (113/60 - 134/63)  RR: 16 (14 Jan 2021 08:10) (16 - 18)      Labs:                        13.7   4.76  )-----------( 327      ( 13 Jan 2021 09:59 )             43.1       MEDICATIONS  (STANDING):  dolutegravir 50 milliGRAM(s) Oral daily  emtricitabine 200 mG/tenofovir alafenamide 25 mG (DESCOVY) Tablet 1 Tablet(s) Oral daily  escitalopram 15 milliGRAM(s) Oral daily  LORazepam     Tablet 0.5 milliGRAM(s) Oral two times a day  neomycin/bacitracin/polymyxin Topical Ointment 1 Application(s) Topical two times a day  risperiDONE   Tablet 1 milliGRAM(s) Oral two times a day  trimethoprim  160 mG/sulfamethoxazole 800 mG 1 Tablet(s) Oral two times a day    MEDICATIONS  (PRN):  acetaminophen   Tablet .. 650 milliGRAM(s) Oral every 6 hours PRN Temp greater or equal to 38C (100.4F), Mild Pain (1 - 3)  haloperidol     Tablet 5 milliGRAM(s) Oral every 6 hours PRN agitation  hydrOXYzine hydrochloride 50 milliGRAM(s) Oral every 6 hours PRN Anxiety  LORazepam     Tablet 1 milliGRAM(s) Oral every 6 hours PRN breakthrough alcohol withdrawal      MENTAL STATUS EXAM:   · Level of Consciousness	Alert  · General Appearance	Well developed  · Body Habitus	Well nourished  · Hygiene	Good  · Grooming	Fair  · Behavior	Cooperative  · Eye Contact	Fair  · Relatedness	Fair  · Impulse Control	Normal  · Muscle Tone / Strength	Normal muscle tone/strength  · Abnormal Movements	No abnormal movements  · Gait / Station	Other  · Other	deferred  · Speech Volume	Soft  · Speech Rate	Normal  · Speech Spontaneity	Increased latency  · Speech Articulation	Normal  · Reported mood	positive  · Affect Quality	euthymic  · Affect Range	Constricted  · Affect Congruence	Congruent  · Thought Process	Linear  · Thought Associations	Normal  · Thought Content	Preoccupations  · Perceptions	No abnormalities  · Oriented to Time	Yes  · Oriented to Place	Yes  · Oriented to Situation	Yes  · Oriented to Person	Yes  · Attention / Concentration	Normal  · Estimated Intelligence	Average  · Recent Memory	Normal  · Remote Memory	Normal  · Fund of Knowledge	Normal  · Language	No abnormalities noted  · Judgment (regarding everyday events)	Fair  · Insight (regarding psychiatric illness)	Fair    SUICIDALITY:   · Suicidality (Interval)	none known    HOMICIDALITY/AGGRESSION:   · Homicidality/Aggression	none known    DIAGNOSIS DSM-V:   Psychiatric Diagnosis (Corresponds to DSM-IV Axis I, II):  Primary Dx Severe episode of recurrent major depressive disorder, with psychotic features F33.3. Secondary Dx 1 Cocaine dependence with intoxication F14.229. Secondary Dx 2 Alcohol dependence F10.20.    Medical Diagnosis (Corresponds to DSM-IV Axis III):  · Axis III	HIV          Assessment:   35  man, currently , living alone in [place], unemployed, two daughters not in his custody, who has PMHx of HIV, and PPHx of psychotic depression vs schizophrenia, poly substance use disorder (etoh and cocaine active, heroin in the past), many prior hospitalizations (most recent 10/2020 at [facility]) and suicide attempts, who self presented with depression, auditory hallucinations and suicidal ideation. Workup revealed UTOX with cocaine and ETOH level 23 suggesting recent substance use. Presented with constricted affect and poor eye contact. Still continues to endorse AH telling him to harm himself. Also continues to have SI w/plan to cut himself. Not able to safety plan at this time. Reports sleeping 2-3 hours a night for the past couple of weeks. Patient states that he has been doing poorly for a while, recent stressors include lack of family/social support during holidays, not having medications, using substances more again. Interested in voluntary admissions and restarting medications.  On initiation evaluation, patient appeared lethargic, withdrawn, continuing to endorse intermittent command auditory hallucinations, poor mood.   On evaluation today patient's mood is improved, however he remains lethargic. Patient is not experiencing AH and has no current suicidal ideation. Patient mood appears to be improving. Will continue medications to target symptoms and continue to monitor for tolerability. Possible plan for rehab on discharge remains.      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  -continue lexapro 15mg po daily  -continue risperidone 1mg po bid    #Alcohol use disorder with withdrawal  -continue ativan taper with additional ativan 1mg po q6h prn for breakthrough symptoms of alcohol withdrawal  - Continue to monitor for alcohol withdrawal symptoms    #Cocaine use d/o  - Will continue to monitor  - No treatment indicated at this time    #Left Hip Ulcer, HIV+  -ID recs appreciated:  treatment per ID recommendations: bactrim for 7 days w/ end date of 1/18, f/u labs, switch to descovy and Tivicay daily once labs sent    PRNs  -For severe agitation not responding to behavioral intervention, may give haldol 5 mg po q6h prn,  hydroxyzine 50 mg po q6h prn, with escalation to IM if patient refusing PO and remains an imminent danger to self or others. If IM antipsychotic is administered, please perform follow-up ECG for QTc monitoring.         Risk Assessment:(consider static vs modifiable risk factors and protective factors; comment on level of risk for dangerous behavior):            Elevated risk due to command auditory hallucinations, depressed mood with suicidal ideation, poor compliance with treatment, not fully mitigated by his residential stability and access to care.                     Interval CC: "I'm feeling positive"    Interval Data: Pt was seen, evaluated, chart reviewed. As per nursing staff, there were no overnight events. On evaluation, pt reports he is feeling positive, saying he woke up in a good mood after getting a good sleep. Patient says he is not experiencing any withdrawal sxs, and is finishing up his ativan taper. Patient has been compliant with his medications, and denies any negative side effects. Patient has been receiving bactrim for his hip ulcer and endorses reduced pain. He has no AVH, SI/HI. Patient said he will "call for help" the next time he has suicidal thoughts. Patient was again encouraged to consider rehab after being stabilized on this unit.       Vitals:  Vital Signs Last 24 Hrs  T(C): 35.9 (14 Jan 2021 08:10), Max: 37 (13 Jan 2021 15:50)  T(F): 96.6 (14 Jan 2021 08:10), Max: 98.6 (13 Jan 2021 15:50)  HR: 50 (14 Jan 2021 08:10) (50 - 75)  BP: 134/63 (14 Jan 2021 08:10) (113/60 - 134/63)  RR: 16 (14 Jan 2021 08:10) (16 - 18)      Labs:                        13.7   4.76  )-----------( 327      ( 13 Jan 2021 09:59 )             43.1       MEDICATIONS  (STANDING):  dolutegravir 50 milliGRAM(s) Oral daily  emtricitabine 200 mG/tenofovir alafenamide 25 mG (DESCOVY) Tablet 1 Tablet(s) Oral daily  escitalopram 15 milliGRAM(s) Oral daily  LORazepam     Tablet 0.5 milliGRAM(s) Oral two times a day  neomycin/bacitracin/polymyxin Topical Ointment 1 Application(s) Topical two times a day  risperiDONE   Tablet 1 milliGRAM(s) Oral two times a day  trimethoprim  160 mG/sulfamethoxazole 800 mG 1 Tablet(s) Oral two times a day    MEDICATIONS  (PRN):  acetaminophen   Tablet .. 650 milliGRAM(s) Oral every 6 hours PRN Temp greater or equal to 38C (100.4F), Mild Pain (1 - 3)  haloperidol     Tablet 5 milliGRAM(s) Oral every 6 hours PRN agitation  hydrOXYzine hydrochloride 50 milliGRAM(s) Oral every 6 hours PRN Anxiety  LORazepam     Tablet 1 milliGRAM(s) Oral every 6 hours PRN breakthrough alcohol withdrawal      MENTAL STATUS EXAM:   · Level of Consciousness	Alert  · General Appearance	Well developed  · Body Habitus	Well nourished  · Hygiene	Good  · Grooming	Fair  · Behavior	Cooperative  · Eye Contact	Fair  · Relatedness	Fair  · Impulse Control	Normal  · Muscle Tone / Strength	Normal muscle tone/strength  · Abnormal Movements	No abnormal movements  · Gait / Station	Other  · Other	deferred  · Speech Volume	Soft  · Speech Rate	Normal  · Speech Spontaneity	Increased latency  · Speech Articulation	Normal  · Reported mood	positive  · Affect Quality	euthymic  · Affect Range	Constricted  · Affect Congruence	Congruent  · Thought Process	Linear  · Thought Associations	Normal  · Thought Content	Preoccupations  · Perceptions	No abnormalities  · Oriented to Time	Yes  · Oriented to Place	Yes  · Oriented to Situation	Yes  · Oriented to Person	Yes  · Attention / Concentration	Normal  · Estimated Intelligence	Average  · Recent Memory	Normal  · Remote Memory	Normal  · Fund of Knowledge	Normal  · Language	No abnormalities noted  · Judgment (regarding everyday events)	Fair  · Insight (regarding psychiatric illness)	Fair    SUICIDALITY:   · Suicidality (Interval)	none known    HOMICIDALITY/AGGRESSION:   · Homicidality/Aggression	none known    DIAGNOSIS DSM-V:   Psychiatric Diagnosis (Corresponds to DSM-IV Axis I, II):  Primary Dx Severe episode of recurrent major depressive disorder, with psychotic features F33.3. Secondary Dx 1 Cocaine dependence with intoxication F14.229. Secondary Dx 2 Alcohol dependence F10.20.    Medical Diagnosis (Corresponds to DSM-IV Axis III):  · Axis III	HIV          Assessment:   35  man, currently , living alone in [place], unemployed, two daughters not in his custody, who has PMHx of HIV, and PPHx of psychotic depression vs schizophrenia, poly substance use disorder (etoh and cocaine active, heroin in the past), many prior hospitalizations (most recent 10/2020 at [facility]) and suicide attempts, who self presented with depression, auditory hallucinations and suicidal ideation. Workup revealed UTOX with cocaine and ETOH level 23 suggesting recent substance use. Presented with constricted affect and poor eye contact. Still continues to endorse AH telling him to harm himself. Also continues to have SI w/plan to cut himself. Not able to safety plan at this time. Reports sleeping 2-3 hours a night for the past couple of weeks. Patient states that he has been doing poorly for a while, recent stressors include lack of family/social support during holidays, not having medications, using substances more again. Interested in voluntary admissions and restarting medications.  On initiation evaluation, patient appeared lethargic, withdrawn, continuing to endorse intermittent command auditory hallucinations, poor mood.   On evaluation today patient's mood is improved, however he remains lethargic. Patient is not experiencing AH and has no current suicidal ideation. Patient mood appears to be improving. Will continue medications to target symptoms and continue to monitor for tolerability. Possible plan for rehab on discharge remains. Patient needs to make safety plan.      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  -continue lexapro 15mg po daily  -continue risperidone 1mg po bid    #Alcohol use disorder with withdrawal  -continue ativan taper with additional ativan 1mg po q6h prn for breakthrough symptoms of alcohol withdrawal  - Continue to monitor for alcohol withdrawal symptoms    #Cocaine use d/o  - Will continue to monitor  - No treatment indicated at this time    #Left Hip Ulcer, HIV+  -ID recs appreciated:  treatment per ID recommendations: bactrim for 7 days w/ end date of 1/18, f/u labs, switch to descovy and Tivicay daily once labs sent    PRNs  -For severe agitation not responding to behavioral intervention, may give haldol 5 mg po q6h prn,  hydroxyzine 50 mg po q6h prn, with escalation to IM if patient refusing PO and remains an imminent danger to self or others. If IM antipsychotic is administered, please perform follow-up ECG for QTc monitoring.         Risk Assessment:(consider static vs modifiable risk factors and protective factors; comment on level of risk for dangerous behavior):            Elevated risk due to command auditory hallucinations, depressed mood with suicidal ideation, poor compliance with treatment, not fully mitigated by his residential stability and access to care.

## 2021-01-14 NOTE — DISCHARGE NOTE BEHAVIORAL HEALTH - CARE PROVIDER_API CALL
JOSÉ RIVERA  [id number]  [address] [place] [phone]  Phone: [phone]  Fax: [phone])-  Follow Up Time: Routine

## 2021-01-14 NOTE — DISCHARGE NOTE BEHAVIORAL HEALTH - NSBHDCCONDITIONFT_PSY_A_CORE
improved, patient no longer reporting suicidal ideation or auditory hallucinations with concurrent improvement in mood no psychiatric contraindications to discharge

## 2021-01-14 NOTE — DISCHARGE NOTE BEHAVIORAL HEALTH - HPI (INCLUDE ILLNESS QUALITY, SEVERITY, DURATION, TIMING, CONTEXT, MODIFYING FACTORS, ASSOCIATED SIGNS AND SYMPTOMS)
35  man, currently , living alone in [place], unemployed, two daughters not in his custody, who has PMHx of HIV, and PPHx of psychotic depression vs schizophrenia, poly substance use disorder (etoh and cocaine active, heroin in the past), many prior hospitalizations (most recent 10/2020 at [facility]) and suicide attempts, who self presented with depression, auditory hallucinations and suicidal ideation.  Patient presented to the hospital at 4:45am reporting depression, saying to medical ED that he was depressed over the holidays, had barely left his room, was not taking psychiatric medications, and wanted help. Initial workup showed ETOH level 23 and UTOX +cocaine. He also has open wound on hip which was dressed and pt was started on antibiotics.  Patient reports that he came in to the hospital because he was depressed for past several weeks and had been having suicidal thoughts, namely jumping in front of a car, and came in to "get help". Patient denies any immediate suicidal thoughts, however states that he has been intermittently hearing voices telling him to hurt himself and kill himself. He denies hearing voices now but states that he last heard them "a few minutes ago", and that he wants to continue taking medications to make the voices leave. Reports voices have come when he is sober as well as when he has been intoxicated. Reports recent use of cocaine and alcohol (consistent with urine toxicology taken on admission). Patient states last alcohol and cocaine use was 4 days ago (though BAL was elevated on admission), stating he usually drinks 1 6-pack of beer per day. Reports current feelings of alcohol withdrawal, with sweating, no tremors. Patient states he is open to idea of possibly going to rehab following discharge.  Patient reports that while he was previously on medications and that they were helping with his mood, he never followed up outpatient and so did not have any of his medications, unsure of last time he took medications or what the medications were.

## 2021-01-14 NOTE — DISCHARGE NOTE BEHAVIORAL HEALTH - INSTRUCTIONS
Due to possible exposure to a patient who tested positive for COVID while on the unit, please self-quarantine for 14 days after discharge

## 2021-01-14 NOTE — DISCHARGE NOTE BEHAVIORAL HEALTH - FAMILY HISTORY OF PSYCHIATRIC ILLNESS
Living in apartment in [place]. Patient completed some college but dropped out because alcohol use inhibited him from completing studies. , poor relationship with wife, better relationship with kids.  Father had depression and committed suicide.

## 2021-01-14 NOTE — DISCHARGE NOTE BEHAVIORAL HEALTH - NSBHDCSUICSAFETYFT_PSY_A_CORE
discussed with patient discussed with patient  Safety plan is completed, in the chart. Copy given to the patient. CAMS assessment completed.

## 2021-01-15 LAB — SARS-COV-2 RNA SPEC QL NAA+PROBE: SIGNIFICANT CHANGE UP

## 2021-01-15 PROCEDURE — 99231 SBSQ HOSP IP/OBS SF/LOW 25: CPT

## 2021-01-15 RX ADMIN — RISPERIDONE 1 MILLIGRAM(S): 4 TABLET ORAL at 08:48

## 2021-01-15 RX ADMIN — DOLUTEGRAVIR SODIUM 50 MILLIGRAM(S): 25 TABLET, FILM COATED ORAL at 08:48

## 2021-01-15 RX ADMIN — Medication 1 TABLET(S): at 08:48

## 2021-01-15 RX ADMIN — BACITRACIN ZINC NEOMYCIN SULFATE POLYMYXIN B SULFATE 1 APPLICATION(S): 400; 3.5; 5 OINTMENT TOPICAL at 08:50

## 2021-01-15 RX ADMIN — ESCITALOPRAM OXALATE 10 MILLIGRAM(S): 10 TABLET, FILM COATED ORAL at 08:52

## 2021-01-15 RX ADMIN — Medication 0.5 MILLIGRAM(S): at 08:50

## 2021-01-15 RX ADMIN — RISPERIDONE 1 MILLIGRAM(S): 4 TABLET ORAL at 20:38

## 2021-01-15 RX ADMIN — Medication 1 TABLET(S): at 20:38

## 2021-01-15 RX ADMIN — EMTRICITABINE AND TENOFOVIR DISOPROXIL FUMARATE 1 TABLET(S): 200; 300 TABLET, FILM COATED ORAL at 11:15

## 2021-01-15 NOTE — PROGRESS NOTE BEHAVIORAL HEALTH - NSBHCHARTREVIEWLAB_PSY_A_CORE FT
COVID-19 PCR . (01.14.21 @ 12:30)   COVID-19 PCR: NotDetec
13.7   4.76  )-----------( 327      ( 13 Jan 2021 09:59 )             43.1
13.4   4.60  )-----------( 303      ( 12 Jan 2021 09:09 )             42.3     01-12    138  |  102  |  10  ----------------------------<  96  4.2   |  25  |  0.8    Ca    8.6      12 Jan 2021 09:09    TPro  7.3  /  Alb  3.6  /  TBili  0.2  /  DBili  x   /  AST  20  /  ALT  12  /  AlkPhos  69  01-12    LIVER FUNCTIONS - ( 12 Jan 2021 09:09 )  Alb: 3.6 g/dL / Pro: 7.3 g/dL / ALK PHOS: 69 U/L / ALT: 12 U/L / AST: 20 U/L / GGT: x
12.3   3.70  )-----------( 300      ( 10 Sid 2021 05:21 )             38.0     01-10    143  |  106  |  13  ----------------------------<  117<H>  3.6   |  26  |  0.97    Ca    8.5      10 Sid 2021 05:21    TPro  8.5<H>  /  Alb  3.4  /  TBili  0.2  /  DBili  x   /  AST  50<H>  /  ALT  25  /  AlkPhos  85  01-10    LIVER FUNCTIONS - ( 10 Sid 2021 05:21 )  Alb: 3.4 g/dL / Pro: 8.5 g/dL / ALK PHOS: 85 U/L / ALT: 25 U/L / AST: 50 U/L / GGT: x             Urinalysis Basic - ( 10 Sid 2021 05:22 )    Color: Yellow / Appearance: Clear / SG: >=1.030 / pH: x  Gluc: x / Ketone: NEGATIVE  / Bili: Small / Urobili: 0.2 E.U./dL   Blood: x / Protein: Trace mg/dL / Nitrite: NEGATIVE   Leuk Esterase: NEGATIVE / RBC: < 5 /HPF / WBC < 5 /HPF   Sq Epi: x / Non Sq Epi: 0-5 /HPF / Bacteria: Present /HPF

## 2021-01-15 NOTE — PROGRESS NOTE ADULT - SUBJECTIVE AND OBJECTIVE BOX
ADRIANNE BRADLEY  35y, Male  Allergy: penicillins (Unknown)      LOS  5d    CHIEF COMPLAINT: suicidal ideation (14 Jan 2021 15:17)      INTERVAL EVENTS/HPI  - No acute events overnight  - T(F): , Max: 98.5 (01-14-21 @ 15:47)  - Denies any worsening symptoms  - Tolerating medication  - WBC Count: 4.76 (01-13-21 @ 09:59)     -      ROS  General: Denies rigors, nightsweats  HEENT: Denies headache, rhinorrhea, sore throat, eye pain  CV: Denies CP, palpitations  PULM: Denies wheezing, hemoptysis  GI: Denies hematemesis, hematochezia, melena  : Denies discharge, hematuria  MSK: Denies arthralgias, myalgias  SKIN: Denies rash, lesions  NEURO: Denies paresthesias, weakness  PSYCH: Denies depression, anxiety    VITALS:  T(F): 96.7, Max: 98.5 (01-14-21 @ 15:47)  HR: 53  BP: 109/58  RR: 18Vital Signs Last 24 Hrs  T(C): 35.9 (15 Sid 2021 06:14), Max: 36.9 (14 Jan 2021 15:47)  T(F): 96.7 (15 Sid 2021 06:14), Max: 98.5 (14 Jan 2021 15:47)  HR: 53 (15 Sid 2021 06:14) (53 - 68)  BP: 109/58 (15 Sid 2021 06:14) (109/58 - 138/71)  BP(mean): --  RR: 18 (15 Sid 2021 06:14) (18 - 18)  SpO2: --    PHYSICAL EXAM:  Gen: NAD, resting in bed  HEENT: Normocephalic, atraumatic  Neck: supple, no lymphadenopathy  CV: Regular rate & regular rhythm  Lungs: decreased BS at bases, no fremitus  Abdomen: Soft, BS present  Ext: Warm, well perfused  Neuro: non focal, awake  Skin: no rash, no erythema  Lines: no phlebitis    FH: Non-contributory  Social Hx: Non-contributory    TESTS & MEASUREMENTS:                        13.7   4.76  )-----------( 327      ( 13 Jan 2021 09:59 )             43.1                   Culture - Other (collected 01-10-21 @ 10:12)  Source: .Other no source  Final Report (01-12-21 @ 10:11):    Numerous Methicillin resistant Staphylococcus aureus  Organism: Methicillin resistant Staphylococcus aureus (01-12-21 @ 10:11)  Organism: Methicillin resistant Staphylococcus aureus (01-12-21 @ 10:11)      -  Ampicillin/Sulbactam: R <=8/4      -  Cefazolin: R 8      -  Clindamycin: S 0.5      -  Daptomycin: S 1      -  Erythromycin: R >4      -  Gentamicin: S <=1 Should not be used as monotherapy      -  Linezolid: S 4      -  Oxacillin: R >2      -  Penicillin: R >8      -  RIF- Rifampin: S <=1 Should not be used as monotherapy      -  Tetra/Doxy: I 8      -  Trimethoprim/Sulfamethoxazole: S <=0.5/9.5      -  Vancomycin: S 2      Method Type: [facility]            INFECTIOUS DISEASES TESTING  COVID-19 PCR: NotDetec (01-14-21 @ 12:30)  COVID-19 PCR: NotDetec (01-10-21 @ 05:36)  COVID-19 PCR: NotDetec (01-02-21 @ 04:04)  Flu A Result: NotDetec (01-02-21 @ 04:04)  Flu B Result: NotDetec (01-02-21 @ 04:04)  RSV Result: NotDetec (01-02-21 @ 04:04)  COVID-19 PCR: NotDetec (09-28-20 @ 12:29)  COVID-19 PCR: NotDetec (08-08-20 @ 15:52)  COVID-19 PCR: NotDetec (07-11-20 @ 03:03)  COVID-19 PCR: NotDetec (05-30-20 @ 00:26)  COVID-19 PCR: NotDetec (04-17-20 @ 20:53)      INFLAMMATORY MARKERS      RADIOLOGY & ADDITIONAL TESTS:  I have personally reviewed the last available Chest xray  CXR      CT      CARDIOLOGY TESTING  12 Lead ECG:   Ventricular Rate 67 BPM    Atrial Rate 67 BPM    P-R Interval 122 ms    QRS Duration 94 ms    Q-T Interval 406 ms    QTC Calculation(Bazett) 429 ms    P Axis 46 degrees    R Axis 76 degrees    T Axis 72 degrees    Diagnosis Line Normal sinus rhythm  Normal ECG    Confirmed by LEFKOVIC MD, FLACO ([id number]) on 1/13/2021 12:26:13 PM (01-12-21 @ 07:57)  12 Lead ECG:   Ventricular Rate 70 BPM    Atrial Rate 70 BPM    P-R Interval 116 ms    QRS Duration 96 ms    Q-T Interval 380 ms    QTC Calculation(Bazett) 410 ms    P Axis 30 degrees    R Axis 77 degrees    T Axis 72 degrees    Diagnosis Line Normal sinus rhythm  Normal ECG    Confirmed by FLACO SALAZAR MD ([id number]) on 1/12/2021 12:28:14 PM (01-11-21 @ 17:07)      MEDICATIONS  dolutegravir 50 Oral daily  emtricitabine 200 mG/tenofovir alafenamide 25 mG (DESCOVY) Tablet 1 Oral daily  escitalopram 15 Oral daily  neomycin/bacitracin/polymyxin Topical Ointment 1 Topical two times a day  risperiDONE   Tablet 1 Oral two times a day  trimethoprim  160 mG/sulfamethoxazole 800 mG 1 Oral two times a day      WEIGHT  Weight (kg): 63.503 (01-10-21 @ 15:00)      ANTIBIOTICS:  dolutegravir 50 milliGRAM(s) Oral daily  emtricitabine 200 mG/tenofovir alafenamide 25 mG (DESCOVY) Tablet 1 Tablet(s) Oral daily  trimethoprim  160 mG/sulfamethoxazole 800 mG 1 Tablet(s) Oral two times a day      All available historical records have been reviewed

## 2021-01-15 NOTE — PROGRESS NOTE BEHAVIORAL HEALTH - RISK ASSESSMENT
Elevated risk due to command auditory hallucinations, depressed mood with suicidal ideation, poor compliance with treatment, not fully mitigated by his residential stability and access to care.

## 2021-01-15 NOTE — PROGRESS NOTE ADULT - ASSESSMENT
ASSESSMENT  36 yo with PMHx HIV presents for increasing depression and SI    IMPRESSION  #HIV  - Follows at [address], [place], [place]  - Follows with Dr. Katie Pratt.   - CD4 5/23/2019 331/17%  - HIV VL 5/23/2019 35,900 --> 8/20/2019 23948   - Has K103 Mutations and P225 Mutations (resistance to efavirenz/nevirapine) based on Genosure archive 1/2017   - previously on Tivicay/Descovy    #Left Hip Ulcer  - No surrounding erythema, but noted drainage on dressing  - Wound Cx growing MRSA     #Depression/SI  #Polysubstance use    #Abx allergy: NKDA      RECOMMENDATIONS  - follow-up HIV VL/CD4 and genotyping test (collected)  - continue descovy/Tivicay daily  - continue bactrim - plan for 7 days (end date 1/18)  - local wound care  - patient possibly being discharged to rehab    Please call or message on [employer] Teams if with any questions.  Spectra [phone]  
ASSESSMENT  34 yo with PMHx HIV presents for increasing depression and SI    IMPRESSION  #HIV  - Follows at [address], [place], [place]  - Follows with Dr. Katie Pratt.   - CD4 5/23/2019 331/17%  - HIV VL 5/23/2019 35,900 --> 8/20/2019 [id number]   - Has K103 Mutations and P225 Mutations (resistance to efavirenz/nevirapine) based on Genosure archive 1/2017   - previously on Tivicay/Descovy  - Full T Cell Subset (01.12.21 @ 12:07)    CD4 %: 24 %    ABS CD4: 307 /uL      #Left Hip Ulcer  - No surrounding erythema, but noted drainage on dressing  - Wound Cx growing MRSA     #Depression/SI  #Polysubstance use    #Abx allergy: NKDA      RECOMMENDATIONS  - follow-up HIV VL/genotyping test (collected)  - continue descovy/Tivicay daily - can send for 30 day prescription when planned for discharge  - continue bactrim - plan for 7 days (end date 1/18)  - local wound care  - will contact primary HIV specialist for follow-up    Please call or message on Microsoft Teams if with any questions.  Spectra [phone]

## 2021-01-15 NOTE — PROGRESS NOTE BEHAVIORAL HEALTH - NSBHCHARTREVIEWINVESTIGATE_PSY_A_CORE FT
< from: 12 Lead ECG (01.12.21 @ 07:57) >    Ventricular Rate 67 BPM    Atrial Rate 67 BPM    P-R Interval 122 ms    QRS Duration 94 ms    Q-T Interval 406 ms    QTC Calculation(Bazett) 429 ms    P Axis 46 degrees    R Axis 76 degrees    T Axis 72 degrees    Diagnosis Line Normal sinus rhythm  Normal ECG    Confirmed by MARTIN KWOK, FLACO ([id number]) on 1/13/2021 12:26:13 PM    < end of copied text >
intact
< from: 12 Lead ECG (01.12.21 @ 07:57) >    Ventricular Rate 67 BPM    Atrial Rate 67 BPM    P-R Interval 122 ms    QRS Duration 94 ms    Q-T Interval 406 ms    QTC Calculation(Bazett) 429 ms    P Axis 46 degrees    R Axis 76 degrees    T Axis 72 degrees    Diagnosis Line Normal sinus rhythm  Normal ECG    Confirmed by MARTIN KWOK, FLACO ([id number]) on 1/13/2021 12:26:13 PM    < end of copied text >
< from: 12 Lead ECG (09.28.20 @ 12:26) >    Ventricular Rate 67 BPM    Atrial Rate 67 BPM    P-R Interval 124 ms    QRS Duration 104 ms    Q-T Interval 384 ms    QTC Calculation(Bazett) 405 ms    < end of copied text >

## 2021-01-15 NOTE — PROGRESS NOTE BEHAVIORAL HEALTH - NSBHCHARTREVIEWVS_PSY_A_CORE FT
Vital Signs Last 24 Hrs  T(C): 36.9 (12 Jan 2021 08:10), Max: 37.3 (11 Jan 2021 14:47)  T(F): 98.5 (12 Jan 2021 08:10), Max: 99.2 (11 Jan 2021 14:47)  HR: 65 (12 Jan 2021 08:10) (59 - 67)  BP: 125/81 (12 Jan 2021 08:10) (111/60 - 126/66)  BP(mean): --  RR: 18 (12 Jan 2021 08:10) (16 - 18)  SpO2: --
Vital Signs Last 24 Hrs  T(C): 37.3 (11 Jan 2021 14:47), Max: 37.3 (11 Jan 2021 14:47)  T(F): 99.2 (11 Jan 2021 14:47), Max: 99.2 (11 Jan 2021 14:47)  HR: 62 (11 Jan 2021 14:47) (62 - 73)  BP: 111/60 (11 Jan 2021 14:47) (106/53 - 125/75)  BP(mean): --  RR: 18 (11 Jan 2021 14:47) (16 - 20)  SpO2: --
ICU Vital Signs Last 24 Hrs  T(C): 36.2 (12 Jan 2021 16:06), Max: 36.2 (12 Jan 2021 16:06)  T(F): 97.1 (12 Jan 2021 16:06), Max: 97.1 (12 Jan 2021 16:06)  HR: 74 (12 Jan 2021 16:06) (74 - 74)  BP: 115/59 (12 Jan 2021 16:06) (115/59 - 115/59)  BP(mean): --  ABP: --  ABP(mean): --  RR: 16 (12 Jan 2021 16:06) (16 - 16)  SpO2: --
Vital Signs Last 24 Hrs  T(C): 35.9 (14 Jan 2021 08:10), Max: 37 (13 Jan 2021 15:50)  T(F): 96.6 (14 Jan 2021 08:10), Max: 98.6 (13 Jan 2021 15:50)  HR: 50 (14 Jan 2021 08:10) (50 - 75)  BP: 134/63 (14 Jan 2021 08:10) (113/60 - 134/63)  BP(mean): --  RR: 16 (14 Jan 2021 08:10) (16 - 18)  SpO2: --
Vital Signs Last 24 Hrs  T(C): 35.9 (15 Sid 2021 06:14), Max: 36.9 (14 Jan 2021 15:47)  T(F): 96.7 (15 Sid 2021 06:14), Max: 98.5 (14 Jan 2021 15:47)  HR: 53 (15 Sid 2021 06:14) (53 - 68)  BP: 109/58 (15 Sid 2021 06:14) (109/58 - 138/71)  BP(mean): --  RR: 18 (15 Sid 2021 06:14) (18 - 18)  SpO2: --

## 2021-01-15 NOTE — PROGRESS NOTE BEHAVIORAL HEALTH - NSBHFUPINTERVALCCFT_PSY_A_CORE
"I was depressed and started having suicidal thoughts"
"I'm better"
"I feel positive"
"All right"
"Good"

## 2021-01-15 NOTE — PROGRESS NOTE BEHAVIORAL HEALTH - SECONDARY DX1
Cocaine dependence with intoxication

## 2021-01-15 NOTE — PROGRESS NOTE BEHAVIORAL HEALTH - NSBHATTESTSEENBY_PSY_A_CORE
Attending Psychiatrist supervising NP/Trainee, meeting pt...
attending Psychiatrist without NP/Trainee
Attending Psychiatrist supervising NP/Trainee, meeting pt...

## 2021-01-15 NOTE — PROGRESS NOTE BEHAVIORAL HEALTH - SUMMARY
35  man, currently , living alone in [place], unemployed, two daughters not in his custody, who has PMHx of HIV, and PPHx of psychotic depression vs schizophrenia, poly substance use disorder (etoh and cocaine active, heroin in the past), many prior hospitalizations (most recent 10/2020 at [facility]) and suicide attempts, who self presented with depression, auditory hallucinations and suicidal ideation. Workup revealed UTOX with cocaine and ETOH level 23 suggesting recent substance use. Presented with constricted affect and poor eye contact. Still continues to endorse AH telling him to harm himself. Also continues to have SI w/plan to cut himself. Not able to safety plan at this time. Reports sleeping 2-3 hours a night for the past couple of weeks. Patient states that he has been doing poorly for a while, recent stressors include lack of family/social support during holidays, not having medications, using substances more again. Interested in voluntary admissions and restarting medications.  On initiation evaluation, patient appeared lethargic, withdrawn, continuing to endorse intermittent command auditory hallucinations, poor mood.  Today, patient's mood appears to demonstrate continued improvement in mood and psychotic symptoms with current medication regimen. Patient finishing ativan taper today for alcohol withdrawal, will monitor for symptom recurrence.      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  -continue lexapro 15mg po daily  -continue risperidone 1mg po bid    #Alcohol use disorder with withdrawal  -patient completed ativan taper with additional ativan 1mg po q6h prn for breakthrough symptoms of alcohol withdrawal  - Continue to monitor for alcohol withdrawal symptoms    #Cocaine use d/o  - Will continue to monitor  - No treatment indicated at this time    #Left Hip Ulcer, HIV+  - treatment per ID recommendations: d/c'd doxy, switched to bactrim DS x7 days, f/u labs  - continue descovy and Tivicay daily    PRNs  -For severe agitation not responding to behavioral intervention, may give haldol 5 mg po q6h prn,  hydroxyzine 50 mg po q6h prn, with escalation to IM if patient refusing PO and remains an imminent danger to self or others. If IM antipsychotic is administered, please perform follow-up ECG for QTc monitoring.

## 2021-01-15 NOTE — PROGRESS NOTE BEHAVIORAL HEALTH - NSBHFUPINTERVALHXFT_PSY_A_CORE
Per staff, no PRNs required overnight.  Patient seen and examined at bedside. Patient sleeping in bed, calm and cooperative interview, remains somewhat withdrawn. Patient reports better sleep last night. Reports mood is "a little bit better" today though does still feel depressed. Reports suicidal ideation is improving. Denies any auditory hallucinations at present, states that they last occurred yesterday evening, at the time were telling him "to hurt myself". He denies symptoms of alcohol withdrawal at this time, stating that the ativan is helping with his symptoms. Patient reports ongoing cough starting roughly 1 week ago, denies any fevers or chills at this time but endorses fatigue, stating he is likely to stay in bed. Denies any side effects from medications at this time.
Per staff, no agitation noted overnight, no PRNs given overnight, though remains largely isolative to room, quiet.  Patient seen and examined at bedside, calm and cooperative with interview. Patient reports good sleep last night, feels mood today is "good". Patient noted to be smiling, when asked what he was smiling about patient stated "I'm not sure". Patient denies any immediate suicidal ideation or homicidal ideation. Denies any auditory hallucinations at present, feels medications are helping. Patient states he continues to be tired throughout the day but that this is improving as well. He denies current alcohol withdrawal symptoms, received final dose of ativan taper today. Reports that he has thought about attending rehab, but maintains that he does not want to do so at this time, however willing to attend groups on the unit.
Per staff, no agitation reported overnight or PRNs required.  Patient seen and examined at bedside. On approach, patient was sleeping in bed, appearing somewhat lethargic, cooperative with interview however mildly irritable. Patient reports that he came in to the hospital because he was depressed for past several weeks and had been having suicidal thoughts, namely jumping in front of a car, and came in to "get help". Patient denies any immediate suicidal thoughts, however states that he has been intermittently hearing voices telling him to hurt himself and kill himself. He denies hearing voices now but states that he last heard them "a few minutes ago", and that he wants to continue taking medications to make the voices leave. Reports voices have come when he is sober as well as when he has been intoxicated. Reports recent use of cocaine and alcohol (consistent with urine toxicology taken on admission). Patient states last alcohol and cocaine use was 4 days ago (though BAL was elevated on admission), stating he usually drinks 1 6-pack of beer per day. Reports current feelings of alcohol withdrawal, with sweating, no tremors. Patient states he is open to idea of possibly going to rehab following discharge.  Patient reports that while he was previously on medications and that they were helping with his mood, he never followed up outpatient and so did not have any of his medications, unsure of last time he took medications or what the medications were.
Pt was seen, evaluated, chart reviewed.  As per nursing staff, no events overnight. On evaluation, pt reports he is feeling "better." However, pt appears in bed most of the time, wrapped in  a blanket. He denies any withdrawal symptoms, remains on an Ativan taper. Is compliant with medication, denies negative side effects. Endorsed good sleep and appetite. When addressing pt's substance use history, he reported the last time he left the hospital he was suppose to go to [facility] rehab however he never made it. When encouraged to consider going to inpatient rehab after being stabilized on this unit, pt stated he is "not ready." Reports that he uses alcohol and cocaine to "block out memories of life." Denied auditory/visual hallucinations. States the last time he heard voices was 2 days ago. Denied paranoia. Denied suicidal/homicidal ideation, intent or plan.
Per staff, no behavioral issues noted overnight, no PRNs given overnight.  Patient seen and examined at bedside. Patient initially sleeping in bed, however pleasant, calm and cooperative with interview, mood appearing brighter than previously. Patient reports that he woke up today feeling "positive", though was unable to specify what exactly he was feeling positive about. Patient denies any current suicidal ideations or auditory hallucinations, stating that he feels medications have been helping with this. Patient admits that his alcohol and cocaine use likely contributes to recurrence of his symptoms however he maintains that he is not interested in attending rehab after discharge at this time. Reports plan to move back to [place].  Patient denies feelings of alcohol withdrawal at this time. Reports cough has improved, no longer having pain from leg wound, denies any fevers or chills at this time.

## 2021-01-15 NOTE — PROGRESS NOTE BEHAVIORAL HEALTH - CASE SUMMARY
Pt was seen, evaluated and discussed with the resident, Dr. Wahl. Chart reviewed. On evaluation, pt reports he is "positive" today. Denied alcohol withdrawal symptoms. Continues to refuse inpatient rehab. Denied auditory/visual hallucinations. Denied paranoia. Denied suicidal/homicidal ideation, intent or plan. Agree with assessment and plan above. Continue with medications as above.
Pt was seen, evaluated and discussed with the resident, Dr. Wahl. Chart reviewed. On evaluation, pt reports he is "ok." Denied alcohol withdrawal symptoms. Reports he is no longer hearing voices, reports last auditory hallucinations were last night. Compliant with medications, denied side effects. Agree with assessment and plan above. Continue with medications as above.
Pt was seen, evaluated and discussed with the resident, Dr. Wahl. Chart reviewed. On evaluation, pt reports he is doing well. Denied withdrawal symptoms. Continues to not want to go to rehab. Denied auditory/visual hallucinations. Denied paranoia. Denied suicidal/homicidal ideation, intent or plan. Agree with assessment and plan above. Continue with medications as above.
Pt was seen, evaluated and discussed with the resident, Dr. Wahl. Chart reviewed. 35  man, currently , living alone in [place], unemployed, two daughters not in his custody, who has PMHx of HIV, and PPHx of psychotic depression vs schizophrenia, poly substance use disorder (etoh and cocaine active, heroin in the past), many prior hospitalizations (most recent 10/2020 at [facility]) and suicide attempts, who self presented with depression, auditory hallucinations and suicidal ideation. Workup revealed UTOX with cocaine and ETOH level 23 suggesting recent substance use.  On evaluation, pt reports that he did not follow up with a psychiatrist and did not continue to take medications since his last discharge from [facility] several months ago. Reports he has been using cocaine and drinking about a 6 pack of beers daily. Last drink was yesterday. Complains of mild withdrawal symptoms of sweating. Appears restless and uncomfortable but not tremulous. Reports he is also having depressed mood and intermittent auditory hallucinations. Agree with assessment and plan above. Continue with medications as above.

## 2021-01-16 RX ORDER — RISPERIDONE 4 MG/1
1 TABLET ORAL
Qty: 0 | Refills: 0 | DISCHARGE
Start: 2021-01-16

## 2021-01-16 RX ORDER — DOLUTEGRAVIR SODIUM 25 MG/1
1 TABLET, FILM COATED ORAL
Qty: 0 | Refills: 0 | DISCHARGE
Start: 2021-01-16

## 2021-01-16 RX ORDER — EMTRICITABINE AND TENOFOVIR DISOPROXIL FUMARATE 200; 300 MG/1; MG/1
1 TABLET, FILM COATED ORAL
Qty: 0 | Refills: 0 | DISCHARGE
Start: 2021-01-16

## 2021-01-16 RX ORDER — ESCITALOPRAM OXALATE 10 MG/1
3 TABLET, FILM COATED ORAL
Qty: 0 | Refills: 0 | DISCHARGE
Start: 2021-01-16

## 2021-01-16 RX ORDER — BACITRACIN ZINC NEOMYCIN SULFATE POLYMYXIN B SULFATE 400; 3.5; 5 [IU]/G; MG/G; [IU]/G
1 OINTMENT TOPICAL
Qty: 0 | Refills: 0 | DISCHARGE
Start: 2021-01-16

## 2021-01-16 RX ADMIN — DOLUTEGRAVIR SODIUM 50 MILLIGRAM(S): 25 TABLET, FILM COATED ORAL at 08:32

## 2021-01-16 RX ADMIN — BACITRACIN ZINC NEOMYCIN SULFATE POLYMYXIN B SULFATE 1 APPLICATION(S): 400; 3.5; 5 OINTMENT TOPICAL at 08:39

## 2021-01-16 RX ADMIN — BACITRACIN ZINC NEOMYCIN SULFATE POLYMYXIN B SULFATE 1 APPLICATION(S): 400; 3.5; 5 OINTMENT TOPICAL at 20:58

## 2021-01-16 RX ADMIN — RISPERIDONE 1 MILLIGRAM(S): 4 TABLET ORAL at 08:35

## 2021-01-16 RX ADMIN — Medication 1 TABLET(S): at 08:32

## 2021-01-16 RX ADMIN — RISPERIDONE 1 MILLIGRAM(S): 4 TABLET ORAL at 20:50

## 2021-01-16 RX ADMIN — EMTRICITABINE AND TENOFOVIR DISOPROXIL FUMARATE 1 TABLET(S): 200; 300 TABLET, FILM COATED ORAL at 10:21

## 2021-01-16 RX ADMIN — ESCITALOPRAM OXALATE 15 MILLIGRAM(S): 10 TABLET, FILM COATED ORAL at 08:33

## 2021-01-16 RX ADMIN — Medication 1 TABLET(S): at 20:50

## 2021-01-17 RX ORDER — LIDOCAINE 4 G/100G
5 CREAM TOPICAL
Refills: 0 | Status: DISCONTINUED | OUTPATIENT
Start: 2021-01-17 | End: 2021-01-19

## 2021-01-17 RX ORDER — IBUPROFEN 200 MG
600 TABLET ORAL THREE TIMES A DAY
Refills: 0 | Status: DISCONTINUED | OUTPATIENT
Start: 2021-01-17 | End: 2021-01-19

## 2021-01-17 RX ADMIN — Medication 600 MILLIGRAM(S): at 17:32

## 2021-01-17 RX ADMIN — BACITRACIN ZINC NEOMYCIN SULFATE POLYMYXIN B SULFATE 1 APPLICATION(S): 400; 3.5; 5 OINTMENT TOPICAL at 07:58

## 2021-01-17 RX ADMIN — DOLUTEGRAVIR SODIUM 50 MILLIGRAM(S): 25 TABLET, FILM COATED ORAL at 07:55

## 2021-01-17 RX ADMIN — Medication 1 TABLET(S): at 20:15

## 2021-01-17 RX ADMIN — EMTRICITABINE AND TENOFOVIR DISOPROXIL FUMARATE 1 TABLET(S): 200; 300 TABLET, FILM COATED ORAL at 07:57

## 2021-01-17 RX ADMIN — Medication 1 TABLET(S): at 07:56

## 2021-01-17 RX ADMIN — LIDOCAINE 5 MILLILITER(S): 4 CREAM TOPICAL at 21:08

## 2021-01-17 RX ADMIN — ESCITALOPRAM OXALATE 15 MILLIGRAM(S): 10 TABLET, FILM COATED ORAL at 07:56

## 2021-01-17 RX ADMIN — LIDOCAINE 5 MILLILITER(S): 4 CREAM TOPICAL at 18:46

## 2021-01-17 RX ADMIN — Medication 600 MILLIGRAM(S): at 20:18

## 2021-01-17 RX ADMIN — BACITRACIN ZINC NEOMYCIN SULFATE POLYMYXIN B SULFATE 1 APPLICATION(S): 400; 3.5; 5 OINTMENT TOPICAL at 21:06

## 2021-01-17 RX ADMIN — RISPERIDONE 1 MILLIGRAM(S): 4 TABLET ORAL at 07:56

## 2021-01-17 RX ADMIN — RISPERIDONE 1 MILLIGRAM(S): 4 TABLET ORAL at 20:15

## 2021-01-17 RX ADMIN — Medication 650 MILLIGRAM(S): at 16:33

## 2021-01-18 RX ADMIN — EMTRICITABINE AND TENOFOVIR DISOPROXIL FUMARATE 1 TABLET(S): 200; 300 TABLET, FILM COATED ORAL at 08:48

## 2021-01-18 RX ADMIN — DOLUTEGRAVIR SODIUM 50 MILLIGRAM(S): 25 TABLET, FILM COATED ORAL at 08:46

## 2021-01-18 RX ADMIN — RISPERIDONE 1 MILLIGRAM(S): 4 TABLET ORAL at 20:37

## 2021-01-18 RX ADMIN — RISPERIDONE 1 MILLIGRAM(S): 4 TABLET ORAL at 08:47

## 2021-01-18 RX ADMIN — BACITRACIN ZINC NEOMYCIN SULFATE POLYMYXIN B SULFATE 1 APPLICATION(S): 400; 3.5; 5 OINTMENT TOPICAL at 08:48

## 2021-01-18 RX ADMIN — Medication 1 TABLET(S): at 08:47

## 2021-01-18 RX ADMIN — ESCITALOPRAM OXALATE 15 MILLIGRAM(S): 10 TABLET, FILM COATED ORAL at 08:46

## 2021-01-18 RX ADMIN — Medication 1 TABLET(S): at 20:37

## 2021-01-19 VITALS
RESPIRATION RATE: 16 BRPM | TEMPERATURE: 98 F | HEART RATE: 85 BPM | SYSTOLIC BLOOD PRESSURE: 119 MMHG | DIASTOLIC BLOOD PRESSURE: 60 MMHG

## 2021-01-19 LAB — SARS-COV-2 RNA SPEC QL NAA+PROBE: SIGNIFICANT CHANGE UP

## 2021-01-19 PROCEDURE — 99238 HOSP IP/OBS DSCHRG MGMT 30/<: CPT

## 2021-01-19 RX ORDER — EMTRICITABINE AND TENOFOVIR DISOPROXIL FUMARATE 200; 300 MG/1; MG/1
1 TABLET, FILM COATED ORAL
Qty: 14 | Refills: 0
Start: 2021-01-19 | End: 2021-02-01

## 2021-01-19 RX ORDER — LIDOCAINE 4 G/100G
5 CREAM TOPICAL
Qty: 280 | Refills: 0
Start: 2021-01-19 | End: 2021-02-01

## 2021-01-19 RX ORDER — ESCITALOPRAM OXALATE 10 MG/1
3 TABLET, FILM COATED ORAL
Qty: 42 | Refills: 0
Start: 2021-01-19 | End: 2021-02-01

## 2021-01-19 RX ORDER — THIAMINE MONONITRATE (VIT B1) 100 MG
1 TABLET ORAL
Qty: 14 | Refills: 0
Start: 2021-01-19 | End: 2021-02-01

## 2021-01-19 RX ORDER — DOLUTEGRAVIR SODIUM 25 MG/1
1 TABLET, FILM COATED ORAL
Qty: 14 | Refills: 0
Start: 2021-01-19 | End: 2021-02-01

## 2021-01-19 RX ORDER — FOLIC ACID 0.8 MG
1 TABLET ORAL
Qty: 14 | Refills: 0
Start: 2021-01-19 | End: 2021-02-01

## 2021-01-19 RX ORDER — BACITRACIN ZINC NEOMYCIN SULFATE POLYMYXIN B SULFATE 400; 3.5; 5 [IU]/G; MG/G; [IU]/G
1 OINTMENT TOPICAL
Qty: 1 | Refills: 0
Start: 2021-01-19 | End: 2021-02-01

## 2021-01-19 RX ORDER — RISPERIDONE 4 MG/1
1 TABLET ORAL
Qty: 28 | Refills: 0
Start: 2021-01-19 | End: 2021-02-01

## 2021-01-19 RX ADMIN — DOLUTEGRAVIR SODIUM 50 MILLIGRAM(S): 25 TABLET, FILM COATED ORAL at 08:27

## 2021-01-19 RX ADMIN — Medication 1 TABLET(S): at 08:27

## 2021-01-19 RX ADMIN — ESCITALOPRAM OXALATE 15 MILLIGRAM(S): 10 TABLET, FILM COATED ORAL at 08:27

## 2021-01-19 RX ADMIN — RISPERIDONE 1 MILLIGRAM(S): 4 TABLET ORAL at 08:27

## 2021-01-19 RX ADMIN — BACITRACIN ZINC NEOMYCIN SULFATE POLYMYXIN B SULFATE 1 APPLICATION(S): 400; 3.5; 5 OINTMENT TOPICAL at 08:29

## 2021-01-19 RX ADMIN — EMTRICITABINE AND TENOFOVIR DISOPROXIL FUMARATE 1 TABLET(S): 200; 300 TABLET, FILM COATED ORAL at 08:29
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
## 2021-01-22 LAB
HIV-1 VIRAL LOAD RESULT: DETECTED
HIV1 RNA # SERPL NAA+PROBE: SIGNIFICANT CHANGE UP
HIV1 RNA SERPL NAA+PROBE-ACNC: DETECTED
HIV1 RNA SERPL NAA+PROBE-LOG#: 3.96 LG COP/ML — SIGNIFICANT CHANGE UP

## 2021-01-25 DIAGNOSIS — Z79.899 OTHER LONG TERM (CURRENT) DRUG THERAPY: ICD-10-CM

## 2021-01-25 DIAGNOSIS — R45.851 SUICIDAL IDEATIONS: ICD-10-CM

## 2021-01-25 DIAGNOSIS — K08.89 OTHER SPECIFIED DISORDERS OF TEETH AND SUPPORTING STRUCTURES: ICD-10-CM

## 2021-01-25 DIAGNOSIS — B95.62 METHICILLIN RESISTANT STAPHYLOCOCCUS AUREUS INFECTION AS THE CAUSE OF DISEASES CLASSIFIED ELSEWHERE: ICD-10-CM

## 2021-01-25 DIAGNOSIS — Z91.19 PATIENT'S NONCOMPLIANCE WITH OTHER MEDICAL TREATMENT AND REGIMEN: ICD-10-CM

## 2021-01-25 DIAGNOSIS — B20 HUMAN IMMUNODEFICIENCY VIRUS [HIV] DISEASE: ICD-10-CM

## 2021-01-25 DIAGNOSIS — Z59.0 HOMELESSNESS: ICD-10-CM

## 2021-01-25 DIAGNOSIS — F10.239 ALCOHOL DEPENDENCE WITH WITHDRAWAL, UNSPECIFIED: ICD-10-CM

## 2021-01-25 DIAGNOSIS — Z56.0 UNEMPLOYMENT, UNSPECIFIED: ICD-10-CM

## 2021-01-25 DIAGNOSIS — F41.9 ANXIETY DISORDER, UNSPECIFIED: ICD-10-CM

## 2021-01-25 DIAGNOSIS — R45.1 RESTLESSNESS AND AGITATION: ICD-10-CM

## 2021-01-25 DIAGNOSIS — Z91.5 PERSONAL HISTORY OF SELF-HARM: ICD-10-CM

## 2021-01-25 DIAGNOSIS — Z63.4 DISAPPEARANCE AND DEATH OF FAMILY MEMBER: ICD-10-CM

## 2021-01-25 DIAGNOSIS — F33.3 MAJOR DEPRESSIVE DISORDER, RECURRENT, SEVERE WITH PSYCHOTIC SYMPTOMS: ICD-10-CM

## 2021-01-25 DIAGNOSIS — L97.121 NON-PRESSURE CHRONIC ULCER OF LEFT THIGH LIMITED TO BREAKDOWN OF SKIN: ICD-10-CM

## 2021-01-25 DIAGNOSIS — Z88.0 ALLERGY STATUS TO PENICILLIN: ICD-10-CM

## 2021-01-25 DIAGNOSIS — F14.229 COCAINE DEPENDENCE WITH INTOXICATION, UNSPECIFIED: ICD-10-CM

## 2021-01-25 SDOH — SOCIAL STABILITY - SOCIAL INSECURITY: DISSAPEARANCE AND DEATH OF FAMILY MEMBER: Z63.4

## 2021-01-25 SDOH — ECONOMIC STABILITY - HOUSING INSECURITY: HOMELESSNESS: Z59.0

## 2021-01-25 SDOH — ECONOMIC STABILITY - INCOME SECURITY: UNEMPLOYMENT, UNSPECIFIED: Z56.0

## 2021-01-29 LAB
BICTEGRAVIR RESISTANCE: SIGNIFICANT CHANGE UP
DOLUTEGRAVIR ISLT GENOTYP: SIGNIFICANT CHANGE UP
ELVITEGRAVIR ISLT GENOTYP: SIGNIFICANT CHANGE UP
RALTEGRAVIR ISLT GENOTYP: SIGNIFICANT CHANGE UP
VIRAL LOAD DATE: SIGNIFICANT CHANGE UP

## 2021-03-05 ENCOUNTER — EMERGENCY (EMERGENCY)
Facility: HOSPITAL | Age: 36
LOS: 1 days | Discharge: ANOTHER HOSPITAL | End: 2021-03-05
Attending: EMERGENCY MEDICINE | Admitting: EMERGENCY MEDICINE
Payer: MEDICAID

## 2021-03-05 VITALS
WEIGHT: 154.98 LBS | HEIGHT: 67 IN | HEART RATE: 86 BPM | OXYGEN SATURATION: 98 % | DIASTOLIC BLOOD PRESSURE: 76 MMHG | SYSTOLIC BLOOD PRESSURE: 130 MMHG | RESPIRATION RATE: 18 BRPM | TEMPERATURE: 99 F

## 2021-03-05 DIAGNOSIS — F32.9 MAJOR DEPRESSIVE DISORDER, SINGLE EPISODE, UNSPECIFIED: ICD-10-CM

## 2021-03-05 DIAGNOSIS — Z20.822 CONTACT WITH AND (SUSPECTED) EXPOSURE TO COVID-19: ICD-10-CM

## 2021-03-05 DIAGNOSIS — R45.851 SUICIDAL IDEATIONS: ICD-10-CM

## 2021-03-05 DIAGNOSIS — F14.10 COCAINE ABUSE, UNCOMPLICATED: ICD-10-CM

## 2021-03-05 DIAGNOSIS — F20.9 SCHIZOPHRENIA, UNSPECIFIED: ICD-10-CM

## 2021-03-05 DIAGNOSIS — F17.200 NICOTINE DEPENDENCE, UNSPECIFIED, UNCOMPLICATED: ICD-10-CM

## 2021-03-05 DIAGNOSIS — Z88.0 ALLERGY STATUS TO PENICILLIN: ICD-10-CM

## 2021-03-05 LAB
ALBUMIN SERPL ELPH-MCNC: 4.3 G/DL — SIGNIFICANT CHANGE UP (ref 3.3–5)
ALP SERPL-CCNC: 129 U/L — HIGH (ref 40–120)
ALT FLD-CCNC: 19 U/L — SIGNIFICANT CHANGE UP (ref 10–45)
ANION GAP SERPL CALC-SCNC: 10 MMOL/L — SIGNIFICANT CHANGE UP (ref 5–17)
APAP SERPL-MCNC: <5 UG/ML — LOW (ref 10–30)
APPEARANCE UR: ABNORMAL
AST SERPL-CCNC: 34 U/L — SIGNIFICANT CHANGE UP (ref 10–40)
BASOPHILS # BLD AUTO: 0.04 K/UL — SIGNIFICANT CHANGE UP (ref 0–0.2)
BASOPHILS NFR BLD AUTO: 0.7 % — SIGNIFICANT CHANGE UP (ref 0–2)
BILIRUB SERPL-MCNC: 0.2 MG/DL — SIGNIFICANT CHANGE UP (ref 0.2–1.2)
BILIRUB UR-MCNC: ABNORMAL
BUN SERPL-MCNC: 15 MG/DL — SIGNIFICANT CHANGE UP (ref 7–23)
CALCIUM SERPL-MCNC: 9 MG/DL — SIGNIFICANT CHANGE UP (ref 8.4–10.5)
CHLORIDE SERPL-SCNC: 104 MMOL/L — SIGNIFICANT CHANGE UP (ref 96–108)
CO2 SERPL-SCNC: 28 MMOL/L — SIGNIFICANT CHANGE UP (ref 22–31)
COLOR SPEC: YELLOW — SIGNIFICANT CHANGE UP
CREAT SERPL-MCNC: 0.97 MG/DL — SIGNIFICANT CHANGE UP (ref 0.5–1.3)
DIFF PNL FLD: NEGATIVE — SIGNIFICANT CHANGE UP
EOSINOPHIL # BLD AUTO: 0.65 K/UL — HIGH (ref 0–0.5)
EOSINOPHIL NFR BLD AUTO: 11.3 % — HIGH (ref 0–6)
ETHANOL SERPL-MCNC: <10 MG/DL — SIGNIFICANT CHANGE UP (ref 0–10)
GLUCOSE SERPL-MCNC: 84 MG/DL — SIGNIFICANT CHANGE UP (ref 70–99)
GLUCOSE UR QL: NEGATIVE — SIGNIFICANT CHANGE UP
HCT VFR BLD CALC: 41.9 % — SIGNIFICANT CHANGE UP (ref 39–50)
HGB BLD-MCNC: 13.5 G/DL — SIGNIFICANT CHANGE UP (ref 13–17)
IMM GRANULOCYTES NFR BLD AUTO: 0.2 % — SIGNIFICANT CHANGE UP (ref 0–1.5)
KETONES UR-MCNC: NEGATIVE — SIGNIFICANT CHANGE UP
LEUKOCYTE ESTERASE UR-ACNC: NEGATIVE — SIGNIFICANT CHANGE UP
LYMPHOCYTES # BLD AUTO: 2.15 K/UL — SIGNIFICANT CHANGE UP (ref 1–3.3)
LYMPHOCYTES # BLD AUTO: 37.4 % — SIGNIFICANT CHANGE UP (ref 13–44)
MCHC RBC-ENTMCNC: 28.2 PG — SIGNIFICANT CHANGE UP (ref 27–34)
MCHC RBC-ENTMCNC: 32.2 GM/DL — SIGNIFICANT CHANGE UP (ref 32–36)
MCV RBC AUTO: 87.7 FL — SIGNIFICANT CHANGE UP (ref 80–100)
MONOCYTES # BLD AUTO: 0.67 K/UL — SIGNIFICANT CHANGE UP (ref 0–0.9)
MONOCYTES NFR BLD AUTO: 11.7 % — SIGNIFICANT CHANGE UP (ref 2–14)
NEUTROPHILS # BLD AUTO: 2.23 K/UL — SIGNIFICANT CHANGE UP (ref 1.8–7.4)
NEUTROPHILS NFR BLD AUTO: 38.7 % — LOW (ref 43–77)
NITRITE UR-MCNC: NEGATIVE — SIGNIFICANT CHANGE UP
NRBC # BLD: 0 /100 WBCS — SIGNIFICANT CHANGE UP (ref 0–0)
PCP SPEC-MCNC: SIGNIFICANT CHANGE UP
PH UR: 5.5 — SIGNIFICANT CHANGE UP (ref 5–8)
PLATELET # BLD AUTO: 298 K/UL — SIGNIFICANT CHANGE UP (ref 150–400)
POTASSIUM SERPL-MCNC: 3.9 MMOL/L — SIGNIFICANT CHANGE UP (ref 3.5–5.3)
POTASSIUM SERPL-SCNC: 3.9 MMOL/L — SIGNIFICANT CHANGE UP (ref 3.5–5.3)
PROT SERPL-MCNC: 8.8 G/DL — HIGH (ref 6–8.3)
PROT UR-MCNC: 30 MG/DL
RBC # BLD: 4.78 M/UL — SIGNIFICANT CHANGE UP (ref 4.2–5.8)
RBC # FLD: 14.5 % — SIGNIFICANT CHANGE UP (ref 10.3–14.5)
SARS-COV-2 RNA SPEC QL NAA+PROBE: SIGNIFICANT CHANGE UP
SODIUM SERPL-SCNC: 142 MMOL/L — SIGNIFICANT CHANGE UP (ref 135–145)
SP GR SPEC: >=1.03 — SIGNIFICANT CHANGE UP (ref 1–1.03)
UROBILINOGEN FLD QL: 0.2 E.U./DL — SIGNIFICANT CHANGE UP
WBC # BLD: 5.75 K/UL — SIGNIFICANT CHANGE UP (ref 3.8–10.5)
WBC # FLD AUTO: 5.75 K/UL — SIGNIFICANT CHANGE UP (ref 3.8–10.5)

## 2021-03-05 PROCEDURE — 99285 EMERGENCY DEPT VISIT HI MDM: CPT

## 2021-03-05 PROCEDURE — 93010 ELECTROCARDIOGRAM REPORT: CPT

## 2021-03-05 RX ORDER — RISPERIDONE 4 MG/1
1 TABLET ORAL ONCE
Refills: 0 | Status: COMPLETED | OUTPATIENT
Start: 2021-03-05 | End: 2021-03-05

## 2021-03-05 RX ADMIN — RISPERIDONE 1 MILLIGRAM(S): 4 TABLET ORAL at 20:28

## 2021-03-05 NOTE — BEHAVIORAL HEALTH ASSESSMENT NOTE - SUMMARY
34yo M hx schizophrenia, substance use, BIBS c/o CAH to end his life.  Recent cocaine and alcohol use.  Has been off medication.  Still actively suicidal, and had reported suicide attempt by jumping in front of traffic last night.      Utox pending    -Pt signed VOL for psychiatric admission  -Please begin risperidone 1mg PO BID for now  -EKG  -Keep on 1:1

## 2021-03-05 NOTE — ED ADULT TRIAGE NOTE - NS_BH TRG QUESTION7_ED_ALL_ED
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 He states he bumped his knee on a stool today which exacerbated his chronic pain. He denies any other trauma or injury. He has been able to ambulate and bear weight. He denies any lower extremity swelling or redness.    No Known Allergies    Discharge Medication List as of 11/25/2017  3:40 PM      CONTINUE these medications which have NOT CHANGED    Details   acetaminophen (TYLENOL) 500 MG tablet Take 1 tablet by mouth every 6 hours as needed for Pain.Normal, Disp-30 tablet, R-0      naproxen (NAPROSYN) 500 MG tablet Take 1 tablet by mouth 2 times daily (with meals).Normal, Disp-10 tablet, R-0      traMADol (ULTRAM) 50 MG tablet 1-2 tablets by mouth every 6 hours as needed for painNormal, Disp-30 tablet, R-0      diclofenac (VOLTAREN) 1 % gel 2 gm to right knee  Q 4 hr p.r.n. max  4 applications/dayDisp-2 Tube, R-11, EprescribeFor joints of the upper extremities: Recommended dose is 2 grams to the affected area. For joints of the lower extremities: Recommended dose is 4 grams to the affecte d area.      Acetaminophen 500 MG Cap Take 2 capsules by mouth every 8 hours.Eprescribe, Disp-180 capsule, R-5      diclofenac (VOLTAREN) 50 MG EC tablet Take 1 tablet by mouth every 8 hours.Eprescribe, Disp-90 tablet, R-1      pregabalin (LYRICA) 75 MG capsule Take 1 capsule by mouth every 8 hours.Normal, Disp-60 capsule, R-0      penicillin v potassium (VEETID) 500 MG tablet Take 1 tablet by mouth 4 times daily.Normal, Disp-40 tablet, R-0      albuterol 108 (90 Base) MCG/ACT inhaler Inhale 2 puffs into the lungs every 4 hours as needed for Shortness  of Breath or Wheezing.Eprescribe, Disp-1 Inhaler, R-0             Discharge Medication List as of 11/25/2017  3:40 PM          Past Medical History:   Diagnosis Date   • Attention deficit disorder        No past surgical history on file.    Family History   Problem Relation Age of Onset   • Cancer Mother      Brain   • Cancer Father      Liver-alcoholic   • Prostate Cancer Neg Hx    • Colon cancer Neg Hx        Social History   Substance Use Topics   • Smoking status: Current Every Day Smoker     Packs/day: 0.50   • Smokeless tobacco: Never Used   • Alcohol use No       Review of Systems  A complete review of systems is negative or non-contributory except as noted above.      Physical Exam     ED Triage Vitals [11/25/17 1354]   ED Triage Vitals Group      Temp 97.9 °F (36.6 °C)      Pulse 100      Resp 18      /79      SpO2 99 %      EtCO2 mmHg       Height 6' (1.829 m)      Weight 162 lb (73.5 kg)      Weight Scale Used        Physical Exam   Constitutional: He is oriented to person, place, and time. He appears well-developed and well-nourished. No distress.   HENT:   Head: Normocephalic and atraumatic.   Pulmonary/Chest: Effort normal. No respiratory distress.   Musculoskeletal: Normal range of motion. He exhibits tenderness. He exhibits no edema or deformity.        Right hip: Normal.        Right knee: He exhibits bony tenderness. He exhibits normal range of motion, no swelling, no effusion, no ecchymosis, no deformity, no laceration, no erythema, normal alignment, no LCL laxity, normal patellar mobility, normal meniscus and no MCL laxity. Tenderness found. No medial joint line, no lateral joint line, no MCL, no LCL and no patellar tendon tenderness noted.        Right ankle: Normal.        Legs:  Neurological: He is alert and oriented to person, place, and time.   Skin: Skin is warm and dry. No rash noted. He is not diaphoretic. No erythema. No pallor.   Psychiatric: He has a normal mood and affect. His  behavior is normal.   Nursing note and vitals reviewed.      ED Course     Procedures    Lab Results     No results found for this visit on 11/25/17.      Radiology Results     Imaging Results          XR Knee 4+ View Right (Final result)  Result time 11/25/17 15:16:29    Final result                 Impression:    IMPRESSION:  Stable negative right knee.               Narrative:    XR KNEE 4+ VW RIGHT    INDICATION:  pain    COMPARISON:  November 8, 2017    FINDINGS:    No fracture or malalignment. No joint effusion. No lytic or blastic lesion.  The periarticular soft tissues are negative.                                  ED Medication Orders     Start Ordered     Status Ordering Provider    11/25/17 1448 11/25/17 1447  HYDROcodone-acetaminophen (NORCO) 5-325 MG per tablet 1 tablet  ONCE      Last MAR action:  Given CITLALLI DUMONT   No evidence of fracture or bony misalignment. No concerning symptoms for septic arthritis or infectious process. No signs of DVT. Patient was treated here in the emergency department with a dose of Vicodin for pain control. He was advised that we cannot supplement his prescriptions for this ongoing and chronic problem. He expresses understanding of and agreement. Patient is discharged in improved condition with instructions to Ice and elevate. Take your medications as prescribed and follow-up with orthopedics and or your pain specialist for further evaluation and treatment as needed.    Clinical Impression     ED Diagnosis   1. Contusion of right knee, initial encounter         Disposition        Discharge 11/25/2017  3:37 PM  Trae Minor discharge to home/self care.                    Citlalli Dumont PA-C  11/26/17 0923     Depression (without Suicidality or Psychosis)

## 2021-03-05 NOTE — BEHAVIORAL HEALTH ASSESSMENT NOTE - NSBHSUBSTANCETYPE_PSY_A_CORE
observe off substances; monitor for withdrawal (he did not describe frequency/amount of use to warrant withdrawal tx at this time (and shows no sx of acute withdrawal)

## 2021-03-05 NOTE — ED PROVIDER NOTE - ATTENDING CONTRIBUTION TO CARE
prior schizophrenia, here with si and auditory hallucinations. appears disheveled, responding to internal stimuli. medical clearance labs obtained, psych consulted. plan admit. signed out to Dr. Mireles pending placement

## 2021-03-05 NOTE — BEHAVIORAL HEALTH ASSESSMENT NOTE - SUICIDE RISK FACTORS
Alcohol/Substance abuse disorders/Impulsivity/Mood Disorder current/past/Psychotic disorder current/past

## 2021-03-05 NOTE — ED PROVIDER NOTE - PROGRESS NOTE DETAILS
pt resting in bed this am. no further complaints. Rubén: medically cleared and signed out to me pending psych dispo. Will continue to observe in ED. [place]: sleeping comfortably, awaiting psych dispo. pt resting in bed. still awaiting [facility] bed for transfer. no further complaints at this time Ree: pt received from night team at s/o; pt is suicidal, seen by psych and awaiting bed availability for voluntary admission. Will continue on 1:1 hold. Ree: no bed availability within health system/ [facility]. Will continue on 1:1 hold pending bed assignment.

## 2021-03-05 NOTE — ED ADULT NURSE NOTE - OTHER COMPLAINTS
patient reports SI-- plan to jump in front of a car-- +AH, +VH, denies HI-- reports ETOH x2 days ago, cocaine x 4 days ago -- states "I woke up in the middle of the street and some people told me I jumped in front of a car"-- patient ambulatory with steady gait, endorsing generalized head pain, no obvious deformity or trauma noted
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
## 2021-03-05 NOTE — BEHAVIORAL HEALTH ASSESSMENT NOTE - RISK ASSESSMENT
High Acute Suicide Risk Psychosis, desperation, and recent high lethality behavior especially make his acute suicide risk high.  Inpatient admission indicated tiffany to address psychiatric sx that impair his ability to safely care for himself in the community.  No known violence hx, though impulsivity and impaired reality testing increase his risk for erratic or violent behavior.

## 2021-03-05 NOTE — BEHAVIORAL HEALTH ASSESSMENT NOTE - LANGUAGE
PAST MEDICAL HISTORY:  Anxiety     Benzodiazepine dependence     Bipolar disorder     Nicotine use disorder     Post traumatic stress disorder (PTSD) No abnormalities noted

## 2021-03-05 NOTE — ED PROVIDER NOTE - CLINICAL SUMMARY MEDICAL DECISION MAKING FREE TEXT BOX
worsening depression, hearing voices and SI. pt appears depressed on exam. vss. will check ecg, labs, and psych consult..

## 2021-03-05 NOTE — ED PROVIDER NOTE - OBJECTIVE STATEMENT
34 y/o male with hx of schizophrenia, bipolar depression c/o SI. Pt states he has been hearing voices for a while now and recently he has been discussing harming himself with the voices. no current plan. no HI or VH. pt admits to etoh last use a couple of days ago. Pt reports he has not been taking his psych meds and does not recall the name. no fever or chills. no cp, sob, abd pain, n/v. no further complaints.

## 2021-03-05 NOTE — ED ADULT NURSE NOTE - OBJECTIVE STATEMENT
35 year old M patient, A+OX3, ambulatory w steady gait presents to ED with c/o of Suicidal ideations.  Pt states he has been forgetful lately, he woke up in the middle of the street the other night.  He thinks he has been having auditory hallucinations, the voices are not telling him to harm himself but just talking to him.  Pt denies visual hallucinations.  States he does snort cocaine "every now and again", last use 4 days ago.  Denies alcohol use daily, states occasional drinker - 2 days ago.  DEnies other ellicit drugs.  Denies HI.  Pt appears anxious, noted to be crying.

## 2021-03-05 NOTE — BEHAVIORAL HEALTH ASSESSMENT NOTE - NSBHREFERDETAILS_PSY_A_CORE_FT
Pt with schizophrenia, came in on his own with CAH to end his life, seeking help.  tearful, has hx hospitalizations.

## 2021-03-05 NOTE — BEHAVIORAL HEALTH ASSESSMENT NOTE - OTHER
denies being homeless though appearance is dirty (eg hands) deferred per covid precautions help seeking psychosis tiffany deferred

## 2021-03-05 NOTE — BEHAVIORAL HEALTH ASSESSMENT NOTE - NSBHCHARTREVIEWVS_PSY_A_CORE FT
Vital Signs Last 24 Hrs  T(C): 36.9 (05 Mar 2021 18:55), Max: 37.4 (05 Mar 2021 16:12)  T(F): 98.4 (05 Mar 2021 18:55), Max: 99.4 (05 Mar 2021 16:12)  HR: 90 (05 Mar 2021 18:55) (86 - 90)  BP: 123/77 (05 Mar 2021 18:55) (123/77 - 130/76)  BP(mean): --  RR: 18 (05 Mar 2021 18:55) (18 - 18)  SpO2: 98% (05 Mar 2021 18:55) (98% - 98%)    03-05    142  |  104  |  15  ----------------------------<  84  3.9   |  28  |  0.97    Ca    9.0      05 Mar 2021 16:44    TPro  8.8<H>  /  Alb  4.3  /  TBili  0.2  /  DBili  x   /  AST  34  /  ALT  19  /  AlkPhos  129<H>  03-05 03-05    142  |  104  |  15  ----------------------------<  84  3.9   |  28  |  0.97    Ca    9.0      05 Mar 2021 16:44    TPro  8.8<H>  /  Alb  4.3  /  TBili  0.2  /  DBili  x   /  AST  34  /  ALT  19  /  AlkPhos  129<H>  03-05

## 2021-03-05 NOTE — BEHAVIORAL HEALTH ASSESSMENT NOTE - THOUGHT PROCESS
BRIEF OPERATIVE NOTE    Date of Procedure: 10/23/2017   Procedure: Cardiac catheterization    Preoperative Diagnosis: Coronary artery disease  Postoperative Diagnosis: Coronary artery disease     Surgeon/assistant: Nicholas Ramey MD    Anesthesia: Conscious sedation  Estimated Blood Loss: <50cc  Specimens: None    Findings:   LVEDP: 15   Left ventricular angiography:Not done   Coronary angiography: Angiographically normal   Intervention: None    Complications: None  Non-coronary Implants: None    Hydration, check BMP in AM.  Check V/Q scan. Linear

## 2021-03-05 NOTE — ED ADULT TRIAGE NOTE - OTHER COMPLAINTS
patient reports SI-- plan to jump in front of a car-- +AH, +VH, denies HI-- reports ETOH x2 days ago, cocaine x 4 days ago -- states "I woke up in the middle of the street and some people told me I jumped in front of a car"-- patient ambulatory with steady gait, endorsing generalized head pain patient reports SI-- plan to jump in front of a car-- +AH, +VH, denies HI-- reports ETOH x2 days ago, cocaine x 4 days ago -- states "I woke up in the middle of the street and some people told me I jumped in front of a car"-- patient ambulatory with steady gait, endorsing generalized head pain, no obvious deformity or trauma noted

## 2021-03-06 LAB
SARS-COV-2 IGG SERPL QL IA: POSITIVE
SARS-COV-2 IGM SERPL IA-ACNC: 2.28 INDEX — HIGH

## 2021-03-06 PROCEDURE — 99213 OFFICE O/P EST LOW 20 MIN: CPT

## 2021-03-06 RX ORDER — RISPERIDONE 4 MG/1
0.5 TABLET ORAL
Refills: 0 | Status: DISCONTINUED | OUTPATIENT
Start: 2021-03-06 | End: 2021-03-07

## 2021-03-06 RX ADMIN — RISPERIDONE 0.5 MILLIGRAM(S): 4 TABLET ORAL at 17:48

## 2021-03-06 RX ADMIN — RISPERIDONE 0.5 MILLIGRAM(S): 4 TABLET ORAL at 21:23

## 2021-03-06 NOTE — PROGRESS NOTE BEHAVIORAL HEALTH - NSBHFUPINTERVALCCFT_PSY_A_CORE
35 year old male with history of reported Schizophrenia presented to [facility] and awaiting voluntary admission to inpatient psychiatry

## 2021-03-07 LAB
CULTURE RESULTS: SIGNIFICANT CHANGE UP
SPECIMEN SOURCE: SIGNIFICANT CHANGE UP

## 2021-03-07 RX ORDER — RISPERIDONE 4 MG/1
1 TABLET ORAL
Refills: 0 | Status: DISCONTINUED | OUTPATIENT
Start: 2021-03-07 | End: 2021-03-09

## 2021-03-07 RX ADMIN — RISPERIDONE 0.5 MILLIGRAM(S): 4 TABLET ORAL at 09:57

## 2021-03-07 RX ADMIN — RISPERIDONE 1 MILLIGRAM(S): 4 TABLET ORAL at 18:53

## 2021-03-08 LAB — SARS-COV-2 RNA SPEC QL NAA+PROBE: DETECTED

## 2021-03-08 PROCEDURE — 99283 EMERGENCY DEPT VISIT LOW MDM: CPT

## 2021-03-08 RX ADMIN — Medication 1 MILLIGRAM(S): at 21:21

## 2021-03-08 RX ADMIN — RISPERIDONE 1 MILLIGRAM(S): 4 TABLET ORAL at 17:57

## 2021-03-08 RX ADMIN — RISPERIDONE 1 MILLIGRAM(S): 4 TABLET ORAL at 03:18

## 2021-03-08 NOTE — ED BEHAVIORAL HEALTH NOTE - BEHAVIORAL HEALTH NOTE
Psychiatry ED Follow Up    36yo M with a hx schizophrenia, substance use including cocaine, BIBS on 3/5 with c/o CAH to end his life. Utox +cocaine. Planned for voluntary psychiatry admission, awaiting bed availability. See this morning for follow up.    Pt found asleep but easily awakened, calm. Reports to be feeling "the same" as at time of presentation to ED, with mood "I don't know", ongoing c/o "chattering" auditory hallucinations without commands and thoughts of suicide that "come and go". Denies intent or plan in hospital but worries about safety outside of hospital. Reports last cocaine use on 3/3 and does not believe it to have an effect on his mood or psychotic symptoms. Denies other recent substance use. Remains motivated for inpatient psychiatric treatment.     MSE: somewhat disheveled young man, dressed in hospital attire, multiple tattoos. Good eye contact. Behavior calm, no PMA/R. Speech normal r/r/v.  Mood "I don't know", affect constricted. Thought process coherent, thought content vague and dysphoric themes with +SI, no plan, +AH without command. No VH. Normal associations.     Assessment: Persistent psychosis and mood symptoms, schizophrenia v. substance-induced. Ongoing active SI without specific plan, recent suicide attempt without remorse. Tolerating titration of risperidone without side effect. Remains appropriate for voluntary psychiatric admission for safety and stabilization.    Recommendations:  -planned for voluntary psychiatric admission pending location of accepting facility. 9.13 completed 3/5  -continue 1:1 observation for safety  -continue risperidone 1mg BID for psychosis Psychiatry ED Follow Up    34yo M with a hx schizophrenia, substance use including cocaine, BIBS on 3/5 with c/o CAH to end his life. Utox +cocaine. Planned for voluntary psychiatry admission, awaiting bed availability. Per chart review, tolerating medication, no agitation or substance withdrawal observed. Seen this morning for follow up.    Pt found asleep but easily awakened, calm. Reports to be feeling "the same" as at time of presentation to ED, with mood "I don't know", ongoing c/o "chattering" auditory hallucinations without commands and thoughts of suicide that "come and go". Denies intent or plan in hospital but worries about safety outside of hospital. Reports last cocaine use on 3/3 and does not believe it to have an effect on his mood or psychotic symptoms. Denies other recent substance use. Remains motivated for inpatient psychiatric treatment.     MSE: somewhat disheveled young man, dressed in hospital attire, multiple tattoos. Good eye contact. Behavior calm, no PMA/R. Speech normal r/r/v.  Mood "I don't know", affect constricted. Thought process coherent, thought content vague and dysphoric themes with +SI, no plan, no HI. +AH without command. No VH. Normal associations. Insight fair, judgment poor.    Assessment: Persistent psychosis and mood symptoms, schizophrenia v. substance-induced. Ongoing active SI without specific plan, recent suicide attempt without remorse. Tolerating titration of risperidone without side effect. Remains at acute risk of harm to self and appropriate for voluntary psychiatric admission for safety and stabilization, re-engagement in psychiatric treatment.    Recommendations:  -planned for voluntary psychiatric admission pending location of accepting facility. 9.13 completed 3/5  -continue 1:1 observation for safety  -continue risperidone 1mg BID for psychosis  -continue haloperidol and lorazepam PRN for acute agitation.  -will follow while in ED

## 2021-03-08 NOTE — ED ADULT NURSE REASSESSMENT NOTE - NS ED NURSE REASSESS COMMENT FT1
GIORGIO Noyola made aware patient requesting medication to "calm me down"---1:1 maintained. Will continue to monitor.
Offers no complaints at this time.  Observed eating and drinking and smiling with staff.  Denied auditory/visual hallucinations at this time.  Continues on 1:1 OBS.  Condition stable.
Patient asleep-- resps even and unlabored. 1:1 maintained. Patient arousable to verbal stimuli. VSS. Will continue to monitor. All safety maintained.
Patient continues to sleep, arousable to voice. 1:1 maintained. All safety maintained. Will continue to monitor.
Patient provided with sandwich, kitchen called for tray. 1:1 maintained. Will continue to monitor.
Patient received from Nina CHEUNG. Patient asleep, resps even and unlabored. Arousable to voice. Patient reports no complaints. VSS. 1:1 maintained. Will continue to monitor. All safety maintained.
Patient showered, 1:1 maintained.
Pt continues on 1:1 OBS and observed sleeping but awakens when aroused.  Observed in no distress.
Pt continues on 1:1 observation and offers no c/o hurting self or others at this time.  Denies auditory/visual hallucinations at this.  Pt observed resting comfortably in no distress pending transfer to behavioral health facility.
Pt offers no c/o hurting self or staff.  Denies auditory/visual hallucinations.  Continues with 1:1 OBS every 15 minutes.  Pt observed in no distress at this time.
Remains in hallway 7, awaiting further dispo.
Received patient in stretcher. AOX4, constant observation ongoing. VSS.  Patient denies chest pain, pain, discomfort, shortness of breath, difficulty breathing and any form of distress not noted. Patient oriented to ED area. Plan of care discussed and verbalized understanding. All needs attended. Hourly rounding in progress. Patient reports intent to harm self, CO ongoing. All needs attended. Safety precautions maintained.

## 2021-03-08 NOTE — ED BEHAVIORAL HEALTH NOTE - BEHAVIORAL HEALTH NOTE
Telepsychiatry Reassessment Note:    MD received handoff on patient.   MD reassessed pt via amwell cart. He appears depressed, disheveled. he complains of depression and intermittent suicidal ideation throughout the day w/o clear plan. Also has ongoing AH. He remains in agreement with voluntary admission.     A/P:    "34yo M with a hx schizophrenia, substance use including cocaine, BIBS on 3/5 with c/o CAH to end his life. Utox +cocaine. Planned for voluntary psychiatry admission, awaiting bed availability. Per chart review, tolerating medication, no agitation or substance withdrawal observed. "    patient presents depressed, qualifies for voluntary admission. He denies hx of etoh w/d, last drink 1 week ago. He reports allergy to PCN (rash).     - now COVID positive, Ab Positive. will require COVID positive bed.   - Hold for bed

## 2021-03-09 VITALS
TEMPERATURE: 98 F | HEART RATE: 92 BPM | SYSTOLIC BLOOD PRESSURE: 111 MMHG | RESPIRATION RATE: 16 BRPM | DIASTOLIC BLOOD PRESSURE: 65 MMHG | OXYGEN SATURATION: 98 %

## 2021-03-12 ENCOUNTER — EMERGENCY (EMERGENCY)
Facility: HOSPITAL | Age: 36
LOS: 1 days | Discharge: HOME | End: 2021-03-12
Attending: EMERGENCY MEDICINE | Admitting: EMERGENCY MEDICINE
Payer: MEDICAID

## 2021-03-12 VITALS
SYSTOLIC BLOOD PRESSURE: 122 MMHG | WEIGHT: 145.06 LBS | DIASTOLIC BLOOD PRESSURE: 75 MMHG | RESPIRATION RATE: 19 BRPM | OXYGEN SATURATION: 97 % | HEIGHT: 64 IN | HEART RATE: 97 BPM | TEMPERATURE: 99 F

## 2021-03-12 VITALS
SYSTOLIC BLOOD PRESSURE: 110 MMHG | RESPIRATION RATE: 18 BRPM | OXYGEN SATURATION: 98 % | DIASTOLIC BLOOD PRESSURE: 52 MMHG | TEMPERATURE: 98 F | HEART RATE: 78 BPM

## 2021-03-12 PROCEDURE — 96365 THER/PROPH/DIAG IV INF INIT: CPT

## 2021-03-12 PROCEDURE — 99284 EMERGENCY DEPT VISIT MOD MDM: CPT

## 2021-03-12 PROCEDURE — 99285 EMERGENCY DEPT VISIT HI MDM: CPT | Mod: 25

## 2021-03-12 RX ORDER — MUPIROCIN 20 MG/G
1 OINTMENT TOPICAL ONCE
Refills: 0 | Status: COMPLETED | OUTPATIENT
Start: 2021-03-12 | End: 2021-03-12

## 2021-03-12 RX ORDER — VANCOMYCIN HCL 1 G
1000 VIAL (EA) INTRAVENOUS ONCE
Refills: 0 | Status: COMPLETED | OUTPATIENT
Start: 2021-03-12 | End: 2021-03-12

## 2021-03-12 RX ORDER — MUPIROCIN 20 MG/G
1 OINTMENT TOPICAL
Qty: 1 | Refills: 0
Start: 2021-03-12 | End: 2021-03-18

## 2021-03-12 RX ADMIN — Medication 1000 MILLIGRAM(S): at 21:18

## 2021-03-12 RX ADMIN — Medication 1 TABLET(S): at 20:51

## 2021-03-12 RX ADMIN — Medication 250 MILLIGRAM(S): at 20:17

## 2021-03-12 RX ADMIN — MUPIROCIN 1 APPLICATION(S): 20 OINTMENT TOPICAL at 20:51

## 2021-03-12 NOTE — ED PROVIDER NOTE - CLINICAL SUMMARY MEDICAL DECISION MAKING FREE TEXT BOX
pt has skin infection recurrent on left side ro recurrent mrsa  hiv will treat with dual meds start with first dose iv vanco and dc on bactrim bactroban and continue doxy  pt was agreeable to this plan

## 2021-03-12 NOTE — ED PROVIDER NOTE - PROGRESS NOTE DETAILS
[facility] report given to Ernestine the acute care NP. she is aware of the treatment plan and once iv abx are done infusing they will take pt back to [facility] hos[facility] pt doing well no complications lenny iv invusion ems called

## 2021-03-12 NOTE — ED ADULT NURSE NOTE - CHIEF COMPLAINT QUOTE
COvid positive for 4 days, sent in from [facility] for abscess to left thigh.

## 2021-03-12 NOTE — ED PROVIDER NOTE - NSFOLLOWUPINSTRUCTIONS_ED_ALL_ED_FT
Continue doxy bid  start Bactrim ds 1 tab bid  start Bactroban ointment to wound 3 times per day  keep clean and dry. washing with warm soapy soaks 2-3 times per day  monitor for sepsis extension of wounds  consider consultation with ID specialist  return for worsening symptoms or any concerns.    MRSA (Methicillin-Resistant Staphylococcus Aureus)    WHAT YOU NEED TO KNOW:    MRSA (methicillin-resistant Staphylococcus aureus) is a strain of staph bacteria that can cause infection. Staph bacteria are normal on your skin and in your nose. They do not usually cause infection. The bacteria can cause an infection if they get inside your body through a break in your skin. Usually, antibiotics are used to kill bacteria. MRSA bacteria are resistant to the common antibiotics used to treat Staph infections. MRSA infections are most common as skin infections. You can also have MRSA bacteria in your blood, lungs, heart, and bone.    DISCHARGE INSTRUCTIONS:    Return to the emergency department if:   •You develop new symptoms such as a cough or fever during or after treatment for MRSA infection.       •Your symptoms get worse.      Call your doctor if:   •Your symptoms do not get better within 2 days of treatment.       •Your symptoms return after treatment.      •You have questions and concerns about your condition or care.      Medicines: You may need the following:   •Antibiotics may help treat a bacterial infection. You may need to take antibiotics for weeks to months. Take all of your antibiotics until they are finished.       •Take your medicine as directed. Contact your healthcare provider if you think your medicine is not helping or if you have side effects. Tell him or her if you are allergic to any medicine. Keep a list of the medicines, vitamins, and herbs you take. Include the amounts, and when and why you take them. Bring the list or the pill bottles to follow-up visits. Carry your medicine list with you in case of an emergency.      Prevent the spread of MRSA: Do the following if you have an active MRSA infection:   •Wash your hands often. Wash your hands several times each day. Wash after you use the bathroom, change a child's diaper, and before you prepare or eat food. Use soap and water every time. Rub your soapy hands together, lacing your fingers. Wash the front and back of your hands, and in between your fingers. Use the fingers of one hand to scrub under the fingernails of the other hand. Wash for at least 20 seconds. Rinse with warm, running water for several seconds. Then dry your hands with a clean towel or paper towel. Use hand  that contains alcohol if soap and water are not available. Do not touch your eyes, nose, or mouth without washing your hands first.  Handwashing           •Do not touch sores. Do not poke or squeeze sores. This can make the infection go deeper into your tissue.       •Cover infected sores with a bandage. Make sure the sore is completely covered during activities that may cause skin to skin contact with another person. Examples include sports such as wrestling or football. Put an extra bandage on a sore that is draining fluid. This helps keep infected drainage off surfaces that others can touch.       •Do not share personal items with others. This includes washcloths, towels, uniforms, clothes, and razors.       •Do not use public pools, hot tubs, or therapy pools until your infection has healed. Your infected sore can spread the infection to another person through the water.       •Tell all healthcare providers that you have a MRSA infection. If you are admitted to the hospital, you may be placed in a private room. Healthcare providers will wear gowns and gloves when they come into your room. They will also wash their hands often and clean your room well. Your visitors may also be asked to wear a gown and gloves. All of these practices help prevent the spread of MRSA to healthcare providers and other patients.       MRSA and your home: MRSA can stay on surfaces for weeks. It is important to keep others safe by doing the following:   •Clean surfaces often. Use a disinfecting wipe, a single-use sponge, or a cloth you can wash and reuse. Use disinfecting  if you do not have wipes. You can create a disinfecting  by mixing 1 part bleach with 10 parts water. In the kitchen, clean countertops, cooking surfaces, and the fronts and insides of the microwave and refrigerator. In the bathroom, clean the toilet, the area around the toilet, the sink, the area around the sink, and faucets. Clean surfaces in the person's room, such as a desk or dresser.      •Wash dishes and silverware in a  or in hot water. Do not share unwashed dishes or silverware with anyone.      •Wash used sheets, towels, and clothes with water and laundry detergent. Put dirty laundry in the washer immediately. Put it in a plastic bag if you are not able to wash it immediately. You do no need to wash this laundry separately from other laundry. Use the warmest water possible for the type of clothing. Wash your hands after you touch dirty laundry and before you handle clean laundry. Dry laundry completely in a warm or hot dryer.       Apply a warm compress to small abscesses only if directed: A warm compress will help the abscess drain. Wet a washcloth in warm, but not hot, water. Apply the compress for 10 minutes. Repeat this 4 times each day. Do not press on an abscess or try to open it with a needle. You may push the bacteria deeper or into your blood. If your abscess opens, keep it covered with a bandage at all times.     Follow up with your doctor within 2 days or as directed: You may be referred to an infectious disease specialist. You may need an exam or more tests to make sure your infection is healing. Write down your questions so you remember to ask them during your visits. Continue doxy bid  start Bactrim ds 1 tab bid  start Bactroban ointment to wound 3 times per day  keep clean and dry. washing with warm soapy soaks 2-3 times per day  monitor for sepsis extension of wounds  consider consultation with ID specialist or [facility]   return for worsening symptoms or any concerns.    MRSA (Methicillin-Resistant Staphylococcus Aureus)    WHAT YOU NEED TO KNOW:    MRSA (methicillin-resistant Staphylococcus aureus) is a strain of staph bacteria that can cause infection. Staph bacteria are normal on your skin and in your nose. They do not usually cause infection. The bacteria can cause an infection if they get inside your body through a break in your skin. Usually, antibiotics are used to kill bacteria. MRSA bacteria are resistant to the common antibiotics used to treat Staph infections. MRSA infections are most common as skin infections. You can also have MRSA bacteria in your blood, lungs, heart, and bone.    DISCHARGE INSTRUCTIONS:    Return to the emergency department if:   •You develop new symptoms such as a cough or fever during or after treatment for MRSA infection.       •Your symptoms get worse.      Call your doctor if:   •Your symptoms do not get better within 2 days of treatment.       •Your symptoms return after treatment.      •You have questions and concerns about your condition or care.      Medicines: You may need the following:   •Antibiotics may help treat a bacterial infection. You may need to take antibiotics for weeks to months. Take all of your antibiotics until they are finished.       •Take your medicine as directed. Contact your healthcare provider if you think your medicine is not helping or if you have side effects. Tell him or her if you are allergic to any medicine. Keep a list of the medicines, vitamins, and herbs you take. Include the amounts, and when and why you take them. Bring the list or the pill bottles to follow-up visits. Carry your medicine list with you in case of an emergency.      Prevent the spread of MRSA: Do the following if you have an active MRSA infection:   •Wash your hands often. Wash your hands several times each day. Wash after you use the bathroom, change a child's diaper, and before you prepare or eat food. Use soap and water every time. Rub your soapy hands together, lacing your fingers. Wash the front and back of your hands, and in between your fingers. Use the fingers of one hand to scrub under the fingernails of the other hand. Wash for at least 20 seconds. Rinse with warm, running water for several seconds. Then dry your hands with a clean towel or paper towel. Use hand  that contains alcohol if soap and water are not available. Do not touch your eyes, nose, or mouth without washing your hands first.  Handwashing           •Do not touch sores. Do not poke or squeeze sores. This can make the infection go deeper into your tissue.       •Cover infected sores with a bandage. Make sure the sore is completely covered during activities that may cause skin to skin contact with another person. Examples include sports such as wrestling or football. Put an extra bandage on a sore that is draining fluid. This helps keep infected drainage off surfaces that others can touch.       •Do not share personal items with others. This includes washcloths, towels, uniforms, clothes, and razors.       •Do not use public pools, hot tubs, or therapy pools until your infection has healed. Your infected sore can spread the infection to another person through the water.       •Tell all healthcare providers that you have a MRSA infection. If you are admitted to the hospital, you may be placed in a private room. Healthcare providers will wear gowns and gloves when they come into your room. They will also wash their hands often and clean your room well. Your visitors may also be asked to wear a gown and gloves. All of these practices help prevent the spread of MRSA to healthcare providers and other patients.       MRSA and your home: MRSA can stay on surfaces for weeks. It is important to keep others safe by doing the following:   •Clean surfaces often. Use a disinfecting wipe, a single-use sponge, or a cloth you can wash and reuse. Use disinfecting  if you do not have wipes. You can create a disinfecting  by mixing 1 part bleach with 10 parts water. In the kitchen, clean countertops, cooking surfaces, and the fronts and insides of the microwave and refrigerator. In the bathroom, clean the toilet, the area around the toilet, the sink, the area around the sink, and faucets. Clean surfaces in the person's room, such as a desk or dresser.      •Wash dishes and silverware in a  or in hot water. Do not share unwashed dishes or silverware with anyone.      •Wash used sheets, towels, and clothes with water and laundry detergent. Put dirty laundry in the washer immediately. Put it in a plastic bag if you are not able to wash it immediately. You do no need to wash this laundry separately from other laundry. Use the warmest water possible for the type of clothing. Wash your hands after you touch dirty laundry and before you handle clean laundry. Dry laundry completely in a warm or hot dryer.       Apply a warm compress to small abscesses only if directed: A warm compress will help the abscess drain. Wet a washcloth in warm, but not hot, water. Apply the compress for 10 minutes. Repeat this 4 times each day. Do not press on an abscess or try to open it with a needle. You may push the bacteria deeper or into your blood. If your abscess opens, keep it covered with a bandage at all times.     Follow up with your doctor within 2 days or as directed: You may be referred to an infectious disease specialist. You may need an exam or more tests to make sure your infection is healing. Write down your questions so you remember to ask them during your visits.

## 2021-03-12 NOTE — ED ADULT NURSE NOTE - OBJECTIVE STATEMENT
Patient came to ED d/t abscess to left upper thigh. Patient states he has been on oral antibiotic treatment without improvement . Denies drainage, fevers and chills.

## 2021-03-12 NOTE — ED ADULT NURSE NOTE - NSIMPLEMENTINTERV_GEN_ALL_ED
Implemented All Universal Safety Interventions:  [place] to call system. Call bell, personal items and telephone within reach. Instruct patient to call for assistance. Room bathroom lighting operational. Non-slip footwear when patient is off stretcher. Physically safe environment: no spills, clutter or unnecessary equipment. Stretcher in lowest position, wheels locked, appropriate side rails in place.

## 2021-03-12 NOTE — ED PROVIDER NOTE - OBJECTIVE STATEMENT
pt is a 34 yo male who is currently at [facility] for SI and Schizophrenia management. he has recurrent abscesses on left side of body and most recently one on his left thigh for past few weeks. he was started on doxy for 3 days and the treating physician at [facility] sent pt to hospital for an I and d of the abscess. pt has no fever no pain no other sx  pt is also covid positive  for 5 days, and hiv positive  allergy to pcn is rash

## 2021-03-12 NOTE — ED PROVIDER NOTE - CARE PLAN
Principal Discharge DX:	MRSA (methicillin resistant Staphylococcus aureus)  Secondary Diagnosis:	Skin infection   Principal Discharge DX:	MRSA (methicillin resistant Staphylococcus aureus)  Secondary Diagnosis:	Skin infection  Secondary Diagnosis:	COVID-19  Secondary Diagnosis:	HIV (human immunodeficiency virus infection)

## 2021-03-12 NOTE — ED ADULT TRIAGE NOTE - NS ED NURSE DIRECT TO ROOM YN
Patient calling back to schedule LMBB #1 (L) L3-4, L4-5. Rescheduled for 1.30.18 @ 10:05. Sonia in insurance notified of service date change.   
Patient left a message requesting to make an appointment, no other details given. Please advise.  
Returning call to patient to schedule appointment. Message left for patient to call office.  
Yes

## 2021-03-12 NOTE — ED PROVIDER NOTE - SKIN, MLM
Skin normal color for race, warm, dry and intact. pt has a skin infection with friable tissue on surface of skin and scab, the wound was being assessed and the scab fell off revealing a deep wound no abscess that is drainable. no drainable collection

## 2021-03-12 NOTE — ED ADULT NURSE NOTE - CHPI ED NUR SYMPTOMS NEG
no bleeding at site/no blood in mucus/no chills/no drainage/no fever/no pain/no purulent drainage/no rectal pain/no vomiting

## 2021-03-12 NOTE — ED PROVIDER NOTE - CARE PROVIDER_API CALL
Jarett Quezada)  Infectious Disease; Internal Medicine  [address] [place] [address]  Phone: [phone]  Fax: [phone]  Follow Up Time:

## 2021-03-12 NOTE — ED ADULT NURSE NOTE - ED STAT RN HANDOFF DETAILS
Report given to Nursing Supervisor Crystal at [facility]. All questions and concerns addressed at this time,

## 2021-03-12 NOTE — ED PROVIDER NOTE - PATIENT PORTAL LINK FT
You can access the FollowMyHealth Patient Portal offered by [facility] by registering at the following website: [email or web address]. By joining [facility]’s FollowMyHealth portal, you will also be able to view your health information using other applications (apps) compatible with our system.

## 2021-03-12 NOTE — ED ADULT TRIAGE NOTE - PATIENT ON (OXYGEN DELIVERY METHOD)
Verified Results  (1) COMPREHENSIVE METABOLIC PANEL [id number] 04:69HW Al Barefoot Order Number: [id number]     Test Name Result Flag Reference   GLUCOSE,RANDM 151 mg/dL H    If the patient is fasting, the ADA then defines impaired fasting glucose as > 100 mg/dL and diabetes as > or equal to 123 mg/dL  SODIUM 140 mmol/L  136-145   POTASSIUM 4 3 mmol/L  3 5-5 3   CHLORIDE 104 mmol/L  100-108   CARBON DIOXIDE 29 mmol/L  21-32   ANION GAP (CALC) 7 mmol/L  4-13   BLOOD UREA NITROGEN 16 mg/dL  5-25   CREATININE 0 70 mg/dL  0 60-1 30   Standardized to IDMS reference method   CALCIUM 8 5 mg/dL  8 3-10 1   BILI, TOTAL 1 10 mg/dL H 0 20-1 00   ALK PHOSPHATAS 104 U/L     ALT (SGPT) 162 U/L H 12-78   AST(SGOT) 58 U/L H 5-45   ALBUMIN 3 3 g/dL L 3 5-5 0   TOTAL PROTEIN 6 8 g/dL  6 4-8 2   eGFR Non-African American      >60 0 ml/min/1 73sq [facility] Disease Education Program recommendations are as follows:  GFR calculation is accurate only with a steady state creatinine  Chronic Kidney disease less than 60 ml/min/1 73 sq  meters  Kidney failure less than 15 ml/min/1 73 sq  meters       (1) CK (CPK) [id number] 03:29PM Al Barefoot Order Number: [id number]     Test Name Result Flag Reference   CK (CPK) 131 U/L     CK MB FRACTION 3 3 ng/mL  0 0-5 0   CK-MB INDEX 2 5 %  0 0-2 5
room air

## 2021-04-01 ENCOUNTER — EMERGENCY (EMERGENCY)
Facility: HOSPITAL | Age: 36
LOS: 1 days | Discharge: ANOTHER HOSPITAL | End: 2021-04-01
Attending: EMERGENCY MEDICINE | Admitting: EMERGENCY MEDICINE
Payer: MEDICAID

## 2021-04-01 VITALS
DIASTOLIC BLOOD PRESSURE: 78 MMHG | RESPIRATION RATE: 18 BRPM | HEART RATE: 85 BPM | TEMPERATURE: 98 F | SYSTOLIC BLOOD PRESSURE: 117 MMHG | HEIGHT: 64 IN | WEIGHT: 145.06 LBS | OXYGEN SATURATION: 97 %

## 2021-04-01 VITALS
RESPIRATION RATE: 15 BRPM | SYSTOLIC BLOOD PRESSURE: 134 MMHG | HEART RATE: 90 BPM | DIASTOLIC BLOOD PRESSURE: 78 MMHG | OXYGEN SATURATION: 97 % | TEMPERATURE: 98 F

## 2021-04-01 DIAGNOSIS — F25.9 SCHIZOAFFECTIVE DISORDER, UNSPECIFIED: ICD-10-CM

## 2021-04-01 DIAGNOSIS — R45.851 SUICIDAL IDEATIONS: ICD-10-CM

## 2021-04-01 DIAGNOSIS — B20 HUMAN IMMUNODEFICIENCY VIRUS [HIV] DISEASE: ICD-10-CM

## 2021-04-01 DIAGNOSIS — F31.9 BIPOLAR DISORDER, UNSPECIFIED: ICD-10-CM

## 2021-04-01 DIAGNOSIS — F14.10 COCAINE ABUSE, UNCOMPLICATED: ICD-10-CM

## 2021-04-01 DIAGNOSIS — U07.1 COVID-19: ICD-10-CM

## 2021-04-01 DIAGNOSIS — F41.9 ANXIETY DISORDER, UNSPECIFIED: ICD-10-CM

## 2021-04-01 DIAGNOSIS — Z88.0 ALLERGY STATUS TO PENICILLIN: ICD-10-CM

## 2021-04-01 LAB
ALBUMIN SERPL ELPH-MCNC: 3.7 G/DL — SIGNIFICANT CHANGE UP (ref 3.4–5)
ALP SERPL-CCNC: 111 U/L — SIGNIFICANT CHANGE UP (ref 40–120)
ALT FLD-CCNC: 42 U/L — SIGNIFICANT CHANGE UP (ref 12–42)
AMPHET UR-MCNC: NEGATIVE — SIGNIFICANT CHANGE UP
ANION GAP SERPL CALC-SCNC: 8 MMOL/L — LOW (ref 9–16)
APPEARANCE UR: CLEAR — SIGNIFICANT CHANGE UP
AST SERPL-CCNC: 61 U/L — HIGH (ref 15–37)
BACTERIA # UR AUTO: PRESENT /HPF
BARBITURATES UR SCN-MCNC: NEGATIVE — SIGNIFICANT CHANGE UP
BENZODIAZ UR-MCNC: NEGATIVE — SIGNIFICANT CHANGE UP
BILIRUB SERPL-MCNC: 0.4 MG/DL — SIGNIFICANT CHANGE UP (ref 0.2–1.2)
BILIRUB UR-MCNC: ABNORMAL
BUN SERPL-MCNC: 20 MG/DL — SIGNIFICANT CHANGE UP (ref 7–23)
CALCIUM SERPL-MCNC: 8.7 MG/DL — SIGNIFICANT CHANGE UP (ref 8.5–10.5)
CHLORIDE SERPL-SCNC: 104 MMOL/L — SIGNIFICANT CHANGE UP (ref 96–108)
CO2 SERPL-SCNC: 28 MMOL/L — SIGNIFICANT CHANGE UP (ref 22–31)
COCAINE METAB.OTHER UR-MCNC: POSITIVE
COLOR SPEC: YELLOW — SIGNIFICANT CHANGE UP
COMMENT - URINE: SIGNIFICANT CHANGE UP
CREAT SERPL-MCNC: 0.97 MG/DL — SIGNIFICANT CHANGE UP (ref 0.5–1.3)
DIFF PNL FLD: NEGATIVE — SIGNIFICANT CHANGE UP
ETHANOL SERPL-MCNC: <3 MG/DL — SIGNIFICANT CHANGE UP
GLUCOSE SERPL-MCNC: 123 MG/DL — HIGH (ref 70–99)
GLUCOSE UR QL: NEGATIVE — SIGNIFICANT CHANGE UP
HCT VFR BLD CALC: 39.8 % — SIGNIFICANT CHANGE UP (ref 39–50)
HGB BLD-MCNC: 12.9 G/DL — LOW (ref 13–17)
KETONES UR-MCNC: ABNORMAL MG/DL
LEUKOCYTE ESTERASE UR-ACNC: NEGATIVE — SIGNIFICANT CHANGE UP
MCHC RBC-ENTMCNC: 28.5 PG — SIGNIFICANT CHANGE UP (ref 27–34)
MCHC RBC-ENTMCNC: 32.4 GM/DL — SIGNIFICANT CHANGE UP (ref 32–36)
MCV RBC AUTO: 87.9 FL — SIGNIFICANT CHANGE UP (ref 80–100)
METHADONE UR-MCNC: NEGATIVE — SIGNIFICANT CHANGE UP
NITRITE UR-MCNC: NEGATIVE — SIGNIFICANT CHANGE UP
NRBC # BLD: 0 /100 WBCS — SIGNIFICANT CHANGE UP (ref 0–0)
OPIATES UR-MCNC: NEGATIVE — SIGNIFICANT CHANGE UP
PCP SPEC-MCNC: SIGNIFICANT CHANGE UP
PCP UR-MCNC: NEGATIVE — SIGNIFICANT CHANGE UP
PH UR: 6 — SIGNIFICANT CHANGE UP (ref 5–8)
PLATELET # BLD AUTO: 274 K/UL — SIGNIFICANT CHANGE UP (ref 150–400)
POTASSIUM SERPL-MCNC: 4 MMOL/L — SIGNIFICANT CHANGE UP (ref 3.5–5.3)
POTASSIUM SERPL-SCNC: 4 MMOL/L — SIGNIFICANT CHANGE UP (ref 3.5–5.3)
PROT SERPL-MCNC: 8.4 G/DL — HIGH (ref 6.4–8.2)
PROT UR-MCNC: ABNORMAL MG/DL
RBC # BLD: 4.53 M/UL — SIGNIFICANT CHANGE UP (ref 4.2–5.8)
RBC # FLD: 14.8 % — HIGH (ref 10.3–14.5)
RBC CASTS # UR COMP ASSIST: < 5 /HPF — SIGNIFICANT CHANGE UP
SARS-COV-2 RNA SPEC QL NAA+PROBE: DETECTED
SODIUM SERPL-SCNC: 140 MMOL/L — SIGNIFICANT CHANGE UP (ref 132–145)
SP GR SPEC: >=1.03 — SIGNIFICANT CHANGE UP (ref 1–1.03)
THC UR QL: NEGATIVE — SIGNIFICANT CHANGE UP
UROBILINOGEN FLD QL: 0.2 E.U./DL — SIGNIFICANT CHANGE UP
WBC # BLD: 5.24 K/UL — SIGNIFICANT CHANGE UP (ref 3.8–10.5)
WBC # FLD AUTO: 5.24 K/UL — SIGNIFICANT CHANGE UP (ref 3.8–10.5)
WBC UR QL: < 5 /HPF — SIGNIFICANT CHANGE UP

## 2021-04-01 PROCEDURE — 90792 PSYCH DIAG EVAL W/MED SRVCS: CPT | Mod: 95

## 2021-04-01 PROCEDURE — 93010 ELECTROCARDIOGRAM REPORT: CPT

## 2021-04-01 PROCEDURE — 99285 EMERGENCY DEPT VISIT HI MDM: CPT

## 2021-04-01 NOTE — ED CDU PROVIDER INITIAL DAY NOTE - ENMT, MLM
Pt arrives via EMS from [facility] independent living 
Airway patent. Nasal mucosa clear. Mouth with normal mucosa. Throat has no vesicles, no oropharyngeal exudates and uvula is midline.

## 2021-04-01 NOTE — ED PROVIDER NOTE - OBJECTIVE STATEMENT
34 y/o male with PMHx of anxiety, bipolar disorder, depression, HIV, and schizophrenia presents to the ED stating he is having suicidal thoughts and is hearing voices. Patient rode the train for 12 hours last night and now wants to jump in front of a train. Of note, Patient was admitted at [facility] 7 days ago. He occasionally does cocaine and drinks alcohol. Also recently positive for COVID.

## 2021-04-01 NOTE — ED BEHAVIORAL HEALTH ASSESSMENT NOTE - PSYCHIATRIC ISSUES AND PLAN (INCLUDE STANDING AND PRN MEDICATION)
resume seroquel 50mg bid, prn: seroquel 50mg po q6hr prn agitation, ativan 2mg po/IM q6hr prn agitation resume seroquel 50mg po bid -prn seroquel 50mg po q6hr prn agitation

## 2021-04-01 NOTE — ED PROVIDER NOTE - PMH
Anxiety    Bipolar disorder    Depression    HIV (human immunodeficiency virus infection)    HIV (human immunodeficiency virus infection)    Schizophrenia    Schizophrenia, unspecified type

## 2021-04-01 NOTE — ED ADULT TRIAGE NOTE - CHIEF COMPLAINT QUOTE
Pt states he is having suicidal thoughts, hearing voices  and was admitted at [facility] 7 days ago. He was also + for COVID and has a sore throat, ear pain and no smell or taste

## 2021-04-01 NOTE — ED CDU PROVIDER INITIAL DAY NOTE - INDICATION FOR OBSERVATION
[facility] Hospital Medicine  Discharge Summary      Patient Name: Crow Green  MRN: [id number]  Admission Date: 7/5/2020  Hospital Length of Stay: 2 days  Discharge Date and Time: 7/8/2020  1:22 PM  Attending Physician: Dr. Davina Donovan    Discharging Provider: Annabelle Choudhary NP  Primary Care Provider: Mateo Lambert DO      HPI:   Crow Green is a 64 year old male with DM II, CHF, atrial fibrillation and hypertension who presented to the ED with complaints of dizziness and falls at home. He states that symptoms began two days prior to presentation and typically occur when his moving from a sitting to a standing position. He also admits to nausea and poor intake since being discharged from the hospital on 7/3/20. He believes these symptoms are due to the doxycycline and Norco he is taking. The patient was hospitalized for an infected left great toe and underwent initial amputation on 6/29/20 with wound closure on 7/1/20. He is unsure if he is losing consciousness when he falls, but reports difficulty getting up. He denies fever, cough and shortness of breath. In the ED today, his creatinine was 3.6 which is elevated from 0.9 on 7/3/20. Other labs were significant for a WBC count of 16,580, albumin of 2.7, initial troponin of 0.099, initial lactic acid of 3.3 with a normal repeat and elevated specific gravity in his urine. Code status was discussed with the patient. He is a full code. His aunt, Awilda Moreno, is his surrogate medical decision maker.     * No surgery found *      Hospital Course:   Pt admitted to Observation Unit for Hypotension. Recent infected left great toe with initial amputation on 6/29/20 with wound closure on 7/1/20 of note.  JOSE MARIA noted.  Pt treated with IV hydration and IV antibiotics initiated.  Lactic acid normalized.  Leukocytosis noted.  Blood cultures with no growth to date.  H/H stable with troponin trending down.  Previous aerobic wound culture grew MRSA  and HAFNIA ALVEI with anaerobic culture growing BACTEROIDES OVATUS.  Pt verbalized symptom improvement with current plan of care continued.  On 7/7/2020, leukocytosis resolved on current therapy.  H/H stable and creatinine normalized.  Vital signs stable.  PT/OT evaluation ordered.  Blood cultures with no growth to date.  Wound cultures and sensitivities reviewed. On 7/8/2020, PT/OT evaluation completed.  Pt verbalized symptom improvement and vital signs stable.  Pt counseled on maintining compliance with prescribed medications, dietary restrictions, and outpatient follow up with understanding verbalized.  Pt seen and examined on the date of discharge and deemed suitable for discharge to home.  CM assisted with discharge planning and home health established prior to discharge.  Current medications resumed with MagOx and Augmentin prescribed.  Pt instructed to follow up with PCP and Podiatry upon discharge for further evaluation.      Consults:     Final Active Diagnoses:    Diagnosis Date Noted POA    PRINCIPAL PROBLEM:  Hypotension [I95.9] 07/05/2020 Yes    Dermatomyositis [M33.90] 07/05/2020 Yes    Elevated troponin [R79.89] 06/26/2020 Yes    Diabetic ulcer of toe of left foot associated with type 2 diabetes mellitus, with necrosis of muscle [E11.621, L97.523] 06/26/2020 Yes    JOSE MARIA (acute kidney injury) [N17.9] 08/07/2019 Yes    PAF (paroxysmal atrial fibrillation) [I48.0] 03/17/2017 Yes     Chronic    SANDRITA on CPAP [G47.33, Z99.89] 01/12/2017 Not Applicable    Uncontrolled type 2 diabetes mellitus with mild nonproliferative retinopathy without macular edema, with long-term current use of insulin [E11.3299, Z79.4, E11.65] 12/07/2016 Not Applicable     Chronic    Chronic combined systolic and diastolic HFrEF of 45% [I50.42] 12/05/2016 Yes     Chronic    Hypertension associated with diabetes [E11.59, I10] 02/27/2015 Yes     Chronic      Problems Resolved During this Admission:       Discharged Condition:  stable    Disposition: Home-Health Care [facility]    Follow Up:  Follow-up Information     [facility].    Specialty: Home Health Services  Why: Home Health  Contact information:  [address]  [place] [address]  [phone]             Mateo M Fausto, DO In 3 days.    Specialty: Family Medicine  Why: -hospital follow up   Contact information:  [address] [address] [place] [address]  [phone]             Barb Veras DPM.    Specialty: Podiatry  Why: -keep previously scheduled follow up appointment with Podiatry   Contact information:  [address] [place] [address]  [phone]                 Patient Instructions:      Diet Cardiac     Diet diabetic     Notify your health care provider if you experience any of the following:  temperature >100.4     Notify your health care provider if you experience any of the following:  persistent nausea and vomiting or diarrhea     Notify your health care provider if you experience any of the following:  redness, tenderness, or signs of infection (pain, swelling, redness, odor or green/yellow discharge around incision site)     Notify your health care provider if you experience any of the following:  difficulty breathing or increased cough     Notify your health care provider if you experience any of the following:  increased confusion or weakness     Notify your health care provider if you experience any of the following:  persistent dizziness, light-headedness, or visual disturbances     Change dressing (specify)   Order Comments: Dressing change: as needed and when soiled     SUBSEQUENT HOME HEALTH ORDERS   Order Comments: Subsequent Home Health Orders    Current Medications:  Current Facility-Administered Medications:  acetaminophen tablet 650 mg, 650 mg, Oral, Q6H PRN, JEWELS Jimenez  albuterol-ipratropium 2.5 mg-0.5 mg/3 mL nebulizer solution 3 mL, 3 mL, Nebulization, Q6H PRN, JEWELS Jimenez, 3  mL at 07/07/20 0543  amoxicillin-clavulanate 875-125mg per tablet 1 tablet, 1 tablet, Oral, Q12H, Jose Baumann MD, 1 tablet at 07/08/20 0835  apixaban tablet 5 mg, 5 mg, Oral, BID, Sarah TIERNEY'Candelariain, PA, 5 mg at 07/08/20 0835  arformoteroL nebulizer solution 15 mcg, 15 mcg, Nebulization, BID, Sarah Albain, PA, 15 mcg at 07/08/20 0729  aspirin EC tablet 81 mg, 81 mg, Oral, Daily, Sarah White, PA, 81 mg at 07/08/20 0835  atorvastatin tablet 40 mg, 40 mg, Oral, QHS, Sarah White, PA, 40 mg at 07/07/20 2112  baclofen tablet 10 mg, 10 mg, Oral, Daily, Sarah Albain, PA, 10 mg at 07/08/20 0835  budesonide nebulizer solution 0.5 mg, 0.5 mg, Nebulization, Q12H, Sarah White, PA, 0.5 mg at 07/08/20 0720  butalbital-acetaminophen-caffeine -40 mg per tablet 1 tablet, 1 tablet, Oral, Q6H PRN, Sarah White PA, 1 tablet at 07/07/20 0806  dextrose 50% injection 12.5 g, 12.5 g, Intravenous, PRN, Sarah White PA  dextrose 50% injection 25 g, 25 g, Intravenous, PRN, Sarah White PA  diltiaZEM tablet 60 mg, 60 mg, Oral, Q12H, Annabelle Choudhary NP, 60 mg at 07/08/20 0835  doxycycline tablet 100 mg, 100 mg, Oral, Q12H, Jose Baumann MD, 100 mg at 07/08/20 0835  gabapentin capsule 100 mg, 100 mg, Oral, TID, Sarah White PA, 100 mg at 07/08/20 0835  glucagon (human recombinant) injection 1 mg, 1 mg, Intramuscular, PRN, JEWELS Jimenez  glucose chewable tablet 16 g, 16 g, Oral, PRN, JEWELS Jimenez  glucose chewable tablet 24 g, 24 g, Oral, PRN, JEWELS Jimenez  HYDROcodone-acetaminophen 5-325 mg per tablet 1 tablet, 1 tablet, Oral, Q6H PRN, Annabelle Choudhary, NP, 1 tablet at 07/08/20 0836  insulin aspart U-100 pen 0-5 Units, 0-5 Units, Subcutaneous, QID (AC + HS) PRN, JEWELS Jimenez, 2 Units at 07/08/20 1139  latanoprost 0.005 % ophthalmic solution 1 drop, 1 drop, Both Eyes, QHS, JEWELS Jimenez, 1 drop at 07/07/20 2112  [START ON 7/9/2020] magnesium oxide tablet 400 mg, 400 mg, Oral, Daily,  Annabelle Choudhary NP  metoprolol succinate (TOPROL-XL) 24 hr tablet 50 mg, 50 mg, Oral, Daily, JEWELS Jimenez, 50 mg at 07/08/20 0835  ondansetron disintegrating tablet 8 mg, 8 mg, Oral, Q8H PRN, JEWELS Jimenez  pantoprazole EC tablet 40 mg, 40 mg, Oral, Daily, Sarah White, PA, 40 mg at 07/08/20 0835  predniSONE tablet 5 mg, 5 mg, Oral, BID, Sarah White, PA, 5 mg at 07/08/20 0835  sertraline tablet 100 mg, 100 mg, Oral, QHS, Sarah White, PA, 100 mg at 07/07/20 2112  tamsulosin 24 hr capsule 0.4 mg, 0.4 mg, Oral, Daily, Sarah White, PA, 0.4 mg at 07/08/20 0835  traMADoL tablet 50 mg, 50 mg, Oral, Q6H PRN, Annabelle Choudhary NP  traZODone tablet 200 mg, 200 mg, Oral, QHS, Sarah White, PA, 200 mg at 07/07/20 2110        Nursing:   Wound Care Orders:  yes:  Surgical Wound:  Location: foot    Consult ET nurse        Apply the following to wound:   Other: daily and as needed when soiled     Diet:   diabetic diet 2000 calorie and low sodium diet     Activities:   activity as tolerated    Labs:  N/A    Referrals/ Consults  Physical Therapy to evaluate and treat. Evaluate for home safety and equipment needs; Establish/upgrade home exercise program. Perform / instruct on therapeutic exercises, gait training, transfer training, and Range of Motion.  Occupational Therapy to evaluate and treat. Evaluate home environment for safety and equipment needs. Perform/Instruct on transfers, ADL training, ROM, and therapeutic exercises.  Aide to provide assistance with personal care, ADLs, and vital signs.    Home Health Aide:  Nursing Three times weekly, Physical Therapy Three times weekly, Occupational Therapy Three times weekly and Home Health Aide Three times weekly     Order Specific Question Answer Comments   What Home Health Agency is the patient currently using? [facility]      Activity as tolerated       Significant Diagnostic Studies: Labs: All labs within the past 24 hours have been  reviewed    Pending Diagnostic Studies:     None         Medications:  Reconciled Home Medications:      Medication List      START taking these medications    amoxicillin-clavulanate 875-125mg 875-125 mg per tablet  Commonly known as: AUGMENTIN  Take 1 tablet by mouth every 12 (twelve) hours. for 6 days     magnesium oxide 400 mg (241.3 mg magnesium) tablet  Commonly known as: MAG-OX  Take 1 tablet (400 mg total) by mouth once daily.        CHANGE how you take these medications    sertraline 100 MG tablet  Commonly known as: ZOLOFT  Take 1 tablet (100 mg total) by mouth every evening.  What changed: Another medication with the same name was removed. Continue taking this medication, and follow the directions you see here.        CONTINUE taking these medications    ALCOHOL PREP PADS TOP     allopurinoL 100 MG tablet  Commonly known as: ZYLOPRIM  Take 1 tablet (100 mg total) by mouth once daily.     aspirin 81 MG EC tablet  Commonly known as: ECOTRIN  Take 1 tablet (81 mg total) by mouth once daily.     atorvastatin 40 MG tablet  Commonly known as: LIPITOR  TAKE 1 TABLET EVERY EVENING     azaTHIOprine 50 mg Tab  Commonly known as: IMURAN  Take 1 tablet (50 mg total) by mouth once daily.     baclofen 10 MG tablet  Commonly known as: LIORESAL  Take 1 tablet (10 mg total) by mouth once daily.     blood sugar diagnostic Strp  Commonly known as: TRUE METRIX GLUCOSE TEST STRIP  Use with blood glucose meter kit to test blood sugar four times  daily     blood-glucose meter kit  Commonly known as: TRUE METRIX AIR GLUCOSE METER  TEST FOUR TIMES DAILY BEFORE MEALS  AND EVERY NIGHT     colchicine 0.6 mg tablet  Commonly known as: COLCRYS  Take 1 tablet (0.6 mg total) by mouth once daily.     diclofenac sodium 1 % Gel  Commonly known as: VOLTAREN  Apply 2 g topically 4 (four) times daily.     diltiaZEM 60 MG Cp12  Commonly known as: CARDIZEM SR  Take 1 capsule (60 mg total) by mouth 2 (two) times daily.     doxycycline 100 MG  tablet  Commonly known as: VIBRA-TABS  Take 1 tablet (100 mg total) by mouth every 12 (twelve) hours. for 5 days     ELIQUIS 5 mg Tab  Generic drug: apixaban  Take 1 tablet (5 mg total) by mouth 2 (two) times daily.     fluticasone-salmeterol 250-50 mcg/dose 250-50 mcg/dose diskus inhaler  Commonly known as: ADVAIR DISKUS  Inhale 1 puff into the lungs 2 (two) times daily. Controller     food supplemt, lactose-reduced Liqd  Commonly known as: ENSURE  Take 118 mLs by mouth 3 (three) times daily with meals.     furosemide 40 MG tablet  Commonly known as: LASIX  TAKE 2 TABLETS EVERY MORNING  AND TAKE 1 TABLET EVERY EVENING     gabapentin 800 MG tablet  Commonly known as: NEURONTIN  Take 1 tablet (800 mg total) by mouth 3 (three) times daily.     glimepiride 2 MG tablet  Commonly known as: AMARYL  TAKE 1 TABLET (2 MG TOTAL) BY MOUTH BEFORE BREAKFAST.     HYDROcodone-acetaminophen  mg per tablet  Commonly known as: NORCO  Take 1 tablet by mouth every 6 (six) hours as needed.     inhalation spacing device  Use as directed for inhalation.     insulin aspart U-100 100 unit/mL (3 mL) Inpn pen  Commonly known as: NovoLOG Flexpen U-100 Insulin  INJECT 10 UNITS SUBCUTANEOUSLY THREE TIMES DAILY WITH MEALS     insulin syringe-needle U-100 1 mL 31 gauge x 5/16 Syrg     ipratropium 0.02 % nebulizer solution  Commonly known as: ATROVENT  USE 1 VIAL VIA NEBULIZER FOUR TIMES DAILY     isosorbide mononitrate 60 MG 24 hr tablet  Commonly known as: IMDUR  Take 1 tablet (60 mg total) by mouth once daily.     lancets 32 gauge Misc  1 lancet by Misc.(Non-Drug; Combo Route) route 2 (two) times daily.     LANTUS SOLOSTAR U-100 INSULIN glargine 100 units/mL (3mL) SubQ pen  Generic drug: insulin  INJECT 35 UNITS SUBCUTANEOUSLY EVERY EVENING     latanoprost 0.005 % ophthalmic solution  INSTILL 1 DROP INTO BOTH EYES EVERY EVENING     lisinopriL 10 MG tablet  Take 1 tablet (10 mg total) by mouth once daily.     metFORMIN 500 MG XR 24hr  "tablet  Commonly known as: GLUCOPHAGE-XR  Take 2 tablets (1,000 mg total) by mouth 2 (two) times daily with meals.     metoprolol succinate 50 MG 24 hr tablet  Commonly known as: TOPROL-XL  Take 1 tablet (50 mg total) by mouth once daily.     nitroGLYCERIN 0.3 MG SL tablet  Commonly known as: NITROSTAT  PLACE 1 TABLET UNDER THE TONGUE EVERY 5 MINUTES AS NEEDED FOR CHEST PAIN, NO MORE THAN 3 TABLETS IN ONE DAY     OZEMPIC 1 mg/dose (2 mg/1.5 mL) Pnij  Generic drug: semaglutide     pantoprazole 40 MG tablet  Commonly known as: PROTONIX  Take 1 tablet (40 mg total) by mouth once daily.     pen needle, diabetic 31 gauge x 5/16" Ndle  Commonly known as: BD ULTRA-FINE SHORT PEN NEEDLE  Inject 1 each into the skin 3 (three) times daily.     predniSONE 5 MG tablet  Commonly known as: DELTASONE  Take 1 tablet (5 mg total) by mouth 2 (two) times daily.     silver sulfADIAZINE 1% 1 % cream  Commonly known as: SILVADENE  Apply topically 2 (two) times daily.     tamsulosin 0.4 mg Cap  Commonly known as: FLOMAX  Take 1 capsule (0.4 mg total) by mouth once daily.     tiotropium 18 mcg inhalation capsule  Commonly known as: SPIRIVA WITH HANDIHALER  Inhale 1 capsule (18 mcg total) into the lungs once daily. Controller     traZODone 100 MG tablet  Commonly known as: DESYREL  Take 2 tablets (200 mg total) by mouth every evening.     VIOS AEROSOL DELIVERY SYSTEM Shefali  Generic drug: nebulizer and compressor  USE AS DIRESTED        STOP taking these medications    ketorolac 10 mg tablet  Commonly known as: TORADOL     methylPREDNISolone 4 mg tablet  Commonly known as: MEDROL DOSEPACK            Indwelling Lines/Drains at time of discharge:   Lines/Drains/Airways     None                 Time spent on the discharge of patient: > 34 minutes  Patient was seen and examined on the date of discharge and determined to be suitable for discharge.         Annabelle Choudhary NP  Department of Hospital Medicine  [facility]  " Diagnostic Uncertainty/Therapeutic Intensity

## 2021-04-01 NOTE — ED CDU PROVIDER DISPOSITION NOTE - CLINICAL COURSE
Received sign out from Dr. Hutchinson.  Shortly after that the patient was accepted for transfer to [facility].  Pt consents to transfer.

## 2021-04-01 NOTE — ED PROVIDER NOTE - CARE PLAN
Principal Discharge DX:	Depression  Secondary Diagnosis:	Cocaine use  Secondary Diagnosis:	COVID-19

## 2021-04-01 NOTE — ED CDU PROVIDER INITIAL DAY NOTE - MEDICAL DECISION MAKING DETAILS
Patient presenting with suicidal thoughts and hearing voices today. Will get labs, EKG, and place on 1:1.  D/w telepsych, pt covid+, will place in CDU for diagnostic uncertainty at this time.

## 2021-04-01 NOTE — ED BEHAVIORAL HEALTH ASSESSMENT NOTE - PATIENT'S CHIEF COMPLAINT
"I went to the train station this morning and was planning to jump but stopped myself for my children."

## 2021-04-01 NOTE — ED BEHAVIORAL HEALTH ASSESSMENT NOTE - HPI (INCLUDE ILLNESS QUALITY, SEVERITY, DURATION, TIMING, CONTEXT, MODIFYING FACTORS, ASSOCIATED SIGNS AND SYMPTOMS)
36yo reportedly domiciled, unemployed on welfare benefits,   M w/ reported hx of schizoaffective d/o, hx of cocaine use w/ hx of over 5 SAs (past records indicate hx of starting at age 9 w/ attempting hanging, hx of jumping in front of car, aborted attempt [place]) with multiple past psych hospitalizations (most recent at [facility] - d/c'd on 3/26) with med hx of HIV. Patient presented with reported worsening depression and SI w/ plan to jump [place] front of [place] subway train.      Pt was seen and evaluated via telemonitor. Patient spoke of issues w/ depressive mood for the past month or so w/ issues of poor energy, sleeping too much, lack of motivation. Patient spoke of ongoing suicidal thoughts inc thoughts of jumping in front of a subway train. He reported that earlier in the day he went to a train station and contemplated jumping but stopped himself when he thought about his children. Patient spoke of most recent stressors relating to not being able to have access to his daughters. He reported that he at the time of discharge from [facility] he was still experiencing depressive mood and symptoms but his suicidal thoughts had improved at the time of discharge. Patient reported no current AH/VH. Pt denied recent manic symptoms inc FOI, dec need for sleep. Patient denied HI/intent or plan.       Collateral: from Dr Pandey - reported hx of schizoaffective d/o, bipolar subtype, reported that pt had underlying mood and psychotic symptoms that reappear whenever he stopped his psych meds, with associated worsening of mood symptoms when he would w/d from cocaine use. Pt reported to have requested to leave the covid pos unit and felt he did not meed clinical criteria for involuntary conversion.

## 2021-04-01 NOTE — ED ADULT NURSE NOTE - OBJECTIVE STATEMENT
Pt walked in with c/o suicidal ideations. States he has been riding the train for 12 hours and has been thinking about jumping in front of a train. + Covid. reports loss of smell and sore throat.  Placed on 1:1 observation upon arrival.

## 2021-04-01 NOTE — ED BEHAVIORAL HEALTH ASSESSMENT NOTE - DESCRIPTION
, unemployed, has minor kids not in his custody, reportedly on benefits, domiciled no beh issues      COVID exposure screen: Patient  -recent pos COVID on 3/8/21 and 4/1/21  -recent pos AB test on 3/6/21  -no travel out of [facility] in past 10 days  -no contact w/ individuals w/ covid in past 10 days HIV

## 2021-04-01 NOTE — ED ADULT NURSE REASSESSMENT NOTE - NS ED NURSE REASSESS COMMENT FT1
Pt received from SKYE Chung. Pt asleep in stretcher, no indicators of pain present. Breathing spontaneous and unlabored. 1:1 in place. Awaiting transport to [facility].

## 2021-04-01 NOTE — ED CDU PROVIDER INITIAL DAY NOTE - OBJECTIVE STATEMENT
36 y/o male with PMHx of anxiety, bipolar disorder, depression, HIV, and schizophrenia presents to the ED stating he is having suicidal thoughts and is hearing voices. Patient rode the train for 12 hours last night and now wants to jump in front of a train. Of note, Patient was admitted at [facility] 7 days ago. He occasionally does cocaine and drinks alcohol. Also recently positive for COVID.

## 2021-04-01 NOTE — ED BEHAVIORAL HEALTH ASSESSMENT NOTE - MEDICAL ISSUES AND PLAN (INCLUDE STANDING AND PRN MEDICATION)
as per ED - continue his tivicay 50mg qdaily, descovy 200/25mg qdaily tikicay 50mg qdaily, discovy 200/25mg qdaily

## 2021-04-01 NOTE — ED BEHAVIORAL HEALTH ASSESSMENT NOTE - SUMMARY
36yo M w/ hx of schizoaffective d/o, cocaine use, HIV, hx of SA, hx of interrupted and aborted SA who presents w/ ongoing depressive mood and SI. Patient reports recent aborted SA in the am -[place] and contemplated jumping but stopped due to thoughts about his children. Patient's recent inpatient psychiatrist reports pt has underlying mood and psychotic symptoms triggered by lack of adherence to meds and worsened by substance use. Patient has multiple risk factors of self harm stemming from gender, hx of mood d/o, hx of psychotic d/o, hx of substance use, hx of chronic med condition, limited social support, marital status, hx of SA/aborted SA inc most recent episode this am and recent inpatient d/c. Patient is amenable for return to inpatient psych hospitalization.

## 2021-04-01 NOTE — ED PROVIDER NOTE - CLINICAL SUMMARY MEDICAL DECISION MAKING FREE TEXT BOX
Patient presenting with suicidal thoughts and hearing voices today. Will get labs, EKG, and place on 1:1. Patient presenting with suicidal thoughts and hearing voices today. Will get labs, EKG, and place on 1:1.  D/w telepsych, pt covid+, will place in CDU for diagnostic uncertainty at this time.

## 2021-04-01 NOTE — ED ADULT NURSE NOTE - CHIEF COMPLAINT QUOTE
Pt states he is having suicidal thoughts, hearing voices  and was admitted at [facility] 7 days ago. He was also + for COVID and has a sore throat, ear pain and no smell or taste

## 2021-04-01 NOTE — ED BEHAVIORAL HEALTH ASSESSMENT NOTE - DETAILS
reported they were with their mothers dr man at [facility] MD made aware body aches, sore throat, loss of sense of smell, loss of taste handoff given dad w/ reported hx of SA, dep see HPI spoke to team MD gonzalez

## 2021-04-05 PROCEDURE — 85025 COMPLETE CBC W/AUTO DIFF WBC: CPT

## 2021-04-05 PROCEDURE — 99285 EMERGENCY DEPT VISIT HI MDM: CPT | Mod: 25

## 2021-04-05 PROCEDURE — U0005: CPT

## 2021-04-05 PROCEDURE — 36415 COLL VENOUS BLD VENIPUNCTURE: CPT

## 2021-04-05 PROCEDURE — 81001 URINALYSIS AUTO W/SCOPE: CPT

## 2021-04-05 PROCEDURE — 80053 COMPREHEN METABOLIC PANEL: CPT

## 2021-04-05 PROCEDURE — 87086 URINE CULTURE/COLONY COUNT: CPT

## 2021-04-05 PROCEDURE — 86769 SARS-COV-2 COVID-19 ANTIBODY: CPT

## 2021-04-05 PROCEDURE — 80307 DRUG TEST PRSMV CHEM ANLYZR: CPT

## 2021-04-05 PROCEDURE — U0003: CPT

## 2021-04-05 PROCEDURE — 93005 ELECTROCARDIOGRAM TRACING: CPT

## 2021-04-05 RX ADMIN — RISPERIDONE 1 MILLIGRAM(S): 4 TABLET ORAL at 11:54

## 2021-04-05 NOTE — ED BEHAVIORAL HEALTH ASSESSMENT NOTE - RELATIONSHIP
three young children Secondary Intention Text (Leave Blank If You Do Not Want): The defect will heal with secondary intention.

## 2021-04-13 ENCOUNTER — EMERGENCY (EMERGENCY)
Facility: HOSPITAL | Age: 36
LOS: 1 days | Discharge: HOME | End: 2021-04-13
Attending: EMERGENCY MEDICINE
Payer: MEDICAID

## 2021-04-13 VITALS
DIASTOLIC BLOOD PRESSURE: 74 MMHG | SYSTOLIC BLOOD PRESSURE: 118 MMHG | OXYGEN SATURATION: 97 % | RESPIRATION RATE: 18 BRPM | HEART RATE: 88 BPM | TEMPERATURE: 99 F

## 2021-04-13 VITALS
WEIGHT: 145.95 LBS | OXYGEN SATURATION: 96 % | HEART RATE: 100 BPM | HEIGHT: 64 IN | RESPIRATION RATE: 20 BRPM | DIASTOLIC BLOOD PRESSURE: 78 MMHG | TEMPERATURE: 100 F | SYSTOLIC BLOOD PRESSURE: 123 MMHG

## 2021-04-13 LAB
ALBUMIN SERPL ELPH-MCNC: 3.5 G/DL — SIGNIFICANT CHANGE UP (ref 3.3–5.2)
ALP SERPL-CCNC: 78 U/L — SIGNIFICANT CHANGE UP (ref 40–120)
ALT FLD-CCNC: 19 U/L — SIGNIFICANT CHANGE UP
ANION GAP SERPL CALC-SCNC: 11 MMOL/L — SIGNIFICANT CHANGE UP (ref 5–17)
AST SERPL-CCNC: 25 U/L — SIGNIFICANT CHANGE UP
BASOPHILS # BLD AUTO: 0.03 K/UL — SIGNIFICANT CHANGE UP (ref 0–0.2)
BASOPHILS NFR BLD AUTO: 0.6 % — SIGNIFICANT CHANGE UP (ref 0–2)
BILIRUB SERPL-MCNC: <0.2 MG/DL — LOW (ref 0.4–2)
BUN SERPL-MCNC: 15 MG/DL — SIGNIFICANT CHANGE UP (ref 8–20)
CALCIUM SERPL-MCNC: 8.5 MG/DL — LOW (ref 8.6–10.2)
CHLORIDE SERPL-SCNC: 96 MMOL/L — LOW (ref 98–107)
CO2 SERPL-SCNC: 27 MMOL/L — SIGNIFICANT CHANGE UP (ref 22–29)
CREAT SERPL-MCNC: 1.05 MG/DL — SIGNIFICANT CHANGE UP (ref 0.5–1.3)
EOSINOPHIL # BLD AUTO: 0.47 K/UL — SIGNIFICANT CHANGE UP (ref 0–0.5)
EOSINOPHIL NFR BLD AUTO: 9.5 % — HIGH (ref 0–6)
GLUCOSE SERPL-MCNC: 115 MG/DL — HIGH (ref 70–99)
HCT VFR BLD CALC: 40.1 % — SIGNIFICANT CHANGE UP (ref 39–50)
HGB BLD-MCNC: 13 G/DL — SIGNIFICANT CHANGE UP (ref 13–17)
IMM GRANULOCYTES NFR BLD AUTO: 0.4 % — SIGNIFICANT CHANGE UP (ref 0–1.5)
LYMPHOCYTES # BLD AUTO: 1.65 K/UL — SIGNIFICANT CHANGE UP (ref 1–3.3)
LYMPHOCYTES # BLD AUTO: 33.2 % — SIGNIFICANT CHANGE UP (ref 13–44)
MCHC RBC-ENTMCNC: 28.6 PG — SIGNIFICANT CHANGE UP (ref 27–34)
MCHC RBC-ENTMCNC: 32.4 GM/DL — SIGNIFICANT CHANGE UP (ref 32–36)
MCV RBC AUTO: 88.3 FL — SIGNIFICANT CHANGE UP (ref 80–100)
MONOCYTES # BLD AUTO: 0.62 K/UL — SIGNIFICANT CHANGE UP (ref 0–0.9)
MONOCYTES NFR BLD AUTO: 12.5 % — SIGNIFICANT CHANGE UP (ref 2–14)
NEUTROPHILS # BLD AUTO: 2.18 K/UL — SIGNIFICANT CHANGE UP (ref 1.8–7.4)
NEUTROPHILS NFR BLD AUTO: 43.8 % — SIGNIFICANT CHANGE UP (ref 43–77)
PLATELET # BLD AUTO: 297 K/UL — SIGNIFICANT CHANGE UP (ref 150–400)
POTASSIUM SERPL-MCNC: 4.2 MMOL/L — SIGNIFICANT CHANGE UP (ref 3.5–5.3)
POTASSIUM SERPL-SCNC: 4.2 MMOL/L — SIGNIFICANT CHANGE UP (ref 3.5–5.3)
PROT SERPL-MCNC: 7.7 G/DL — SIGNIFICANT CHANGE UP (ref 6.6–8.7)
RBC # BLD: 4.54 M/UL — SIGNIFICANT CHANGE UP (ref 4.2–5.8)
RBC # FLD: 14.6 % — HIGH (ref 10.3–14.5)
SODIUM SERPL-SCNC: 134 MMOL/L — LOW (ref 135–145)
TROPONIN T SERPL-MCNC: <0.01 NG/ML — SIGNIFICANT CHANGE UP (ref 0–0.06)
WBC # BLD: 4.97 K/UL — SIGNIFICANT CHANGE UP (ref 3.8–10.5)
WBC # FLD AUTO: 4.97 K/UL — SIGNIFICANT CHANGE UP (ref 3.8–10.5)

## 2021-04-13 PROCEDURE — 71045 X-RAY EXAM CHEST 1 VIEW: CPT

## 2021-04-13 PROCEDURE — 99285 EMERGENCY DEPT VISIT HI MDM: CPT

## 2021-04-13 PROCEDURE — 85025 COMPLETE CBC W/AUTO DIFF WBC: CPT

## 2021-04-13 PROCEDURE — 99284 EMERGENCY DEPT VISIT MOD MDM: CPT | Mod: 25

## 2021-04-13 PROCEDURE — 36415 COLL VENOUS BLD VENIPUNCTURE: CPT

## 2021-04-13 PROCEDURE — 71045 X-RAY EXAM CHEST 1 VIEW: CPT | Mod: 26

## 2021-04-13 PROCEDURE — 84484 ASSAY OF TROPONIN QUANT: CPT

## 2021-04-13 PROCEDURE — 80053 COMPREHEN METABOLIC PANEL: CPT

## 2021-04-13 PROCEDURE — 96374 THER/PROPH/DIAG INJ IV PUSH: CPT

## 2021-04-13 RX ORDER — KETOROLAC TROMETHAMINE 30 MG/ML
15 SYRINGE (ML) INJECTION ONCE
Refills: 0 | Status: DISCONTINUED | OUTPATIENT
Start: 2021-04-13 | End: 2021-04-13

## 2021-04-13 RX ADMIN — Medication 15 MILLIGRAM(S): at 17:58

## 2021-04-13 NOTE — CHART NOTE - NSCHARTNOTEFT_GEN_A_CORE
SW Note: SW made aware by ED provider pt is medically stable to return to [facility], ED provider already called and gave report to [facility] NP. SW placed call to [facility], spoke to Nell who reports pt is from unit [place] [address], attending MD on unit is Dr. Stratton. BONI arranged transport via  EMS ([facility]), call trx to RN for  transport questions. Pt made aware by ED provider he is returning to [facility] tonight, [facility] left on A-side of unit, no further SW services at this time

## 2021-04-13 NOTE — ED ADULT NURSE NOTE - OBJECTIVE STATEMENT
pt c/o "shingles" and pain associated where he has rash and pain while breathing where rash is located pt denies chest pain denies N/V/D

## 2021-04-13 NOTE — ED PROVIDER NOTE - PHYSICAL EXAMINATION
General: well appearing, NAD  Head: NC, AT  EENT: EOMI, no scleral icterus  Cardiac: RRR, no apparent murmurs, no lower extremity edema  Respiratory: CTABL, no respiratory distress   Abdomen: soft, ND, NT, nonperitonitic  MSK/Vascular: full ROM, soft compartments, warm extremities  Neuro: AAOx3, sensation to light touch intact  Skin: shingles like rash extending from left upper chest wall, under left axilla, to left scapula   Psych: calm, cooperative

## 2021-04-13 NOTE — ED PROVIDER NOTE - OBJECTIVE STATEMENT
35 year old male with history of HIV, COVID (3/8/21), schizophrenia who presents with fever. 35 year old male with history of HIV, COVID (3/8/21), schizophrenia who presents with fever. Two days ago the patient noticed a rash on his left upper chest and left back which was determined to be shingles by the NP at [facility]. He was started on valtrex but he continues to have pain that area of his chest with accompanying fever, body aches, and pleuritic chest pain. Also notes lightheadedness and nausea. No vomiting. No history of DM. Denies alcohol use. Smokes cigarettes.

## 2021-04-13 NOTE — ED PROVIDER NOTE - PROGRESS NOTE DETAILS
d/w SARAH Arevalo at [facility], who accepts patient back to [facility]

## 2021-04-13 NOTE — ED PROVIDER NOTE - NS ED ROS FT
Constitutional: +fever, no chills, +myalgias  Head: NC, AT   Eyes: no redness   ENMT: no nasal congestion/drainage, no sore throat   CV: +pleuritic chest pain, no edema  Resp: no cough, no dyspnea  GI: no abdominal pain, +nausea, no vomiting, no diarrhea  : no dysuria, no hematuria   Skin: +shingles rash   Neuro: no LOC, no headache, no sensory deficits, no weakness, +lightheadedness

## 2021-04-13 NOTE — ED PROVIDER NOTE - PMH
Anxiety    Bipolar disorder    COVID-19    Depression    HIV (human immunodeficiency virus infection)    HIV (human immunodeficiency virus infection)    Schizophrenia    Schizophrenia, unspecified type    
No

## 2021-04-13 NOTE — ED PROVIDER NOTE - ATTENDING CONTRIBUTION TO CARE
36 yo male with hx HIV, sent from [facility] for eval of fever today. paient c/o rash to chest wall and pain to site  denies sob  denies vomiting or diarrhea  patient had covid x 5 weeks ago, and neg pcr last week  nad  cta b/l, [id number]  abd soft, non tender  +vesicular rash to left chest wall, left mid back, with erythematous base    patient with likely zoster  patient on valtrex already

## 2021-04-20 NOTE — ED ADULT TRIAGE NOTE - BMI (KG/M2)
We are working hard to begin vaccinating more people against COVID-19.   Check [employer] or go to [employer] website to check your eligibility and to schedule vaccine appointment.   Please call [phone] to schedule your covid vaccine if you are unable to schedule it through [employer].           Did you know?      You can schedule a video visit for follow-up appointments as well as future appointments for certain conditions.  Please see the below link.     [email or web address]    If you have not already done so,  I encourage you to sign up for [employer] ([email or web address]).  This will allow you to review your results, securely communicate with a provider, and schedule virtual visits as well.  
24.2

## 2021-05-08 ENCOUNTER — INPATIENT (INPATIENT)
Facility: HOSPITAL | Age: 36
LOS: 12 days | Discharge: HOME | DRG: 885 | End: 2021-05-21
Attending: PSYCHIATRY & NEUROLOGY | Admitting: PSYCHIATRY & NEUROLOGY
Payer: MEDICAID

## 2021-05-08 VITALS
DIASTOLIC BLOOD PRESSURE: 79 MMHG | OXYGEN SATURATION: 100 % | TEMPERATURE: 97 F | RESPIRATION RATE: 18 BRPM | SYSTOLIC BLOOD PRESSURE: 137 MMHG | WEIGHT: 149.91 LBS | HEART RATE: 86 BPM | HEIGHT: 64 IN

## 2021-05-08 PROBLEM — U07.1 COVID-19: Chronic | Status: ACTIVE | Noted: 2021-04-13

## 2021-05-08 LAB
ALBUMIN SERPL ELPH-MCNC: 3.7 G/DL — SIGNIFICANT CHANGE UP (ref 3.3–5)
ALP SERPL-CCNC: 79 U/L — SIGNIFICANT CHANGE UP (ref 40–120)
ALT FLD-CCNC: 213 U/L — HIGH (ref 10–45)
AMPHET UR-MCNC: NEGATIVE — SIGNIFICANT CHANGE UP
ANION GAP SERPL CALC-SCNC: 9 MMOL/L — SIGNIFICANT CHANGE UP (ref 5–17)
APAP SERPL-MCNC: <5 UG/ML — LOW (ref 10–30)
APPEARANCE UR: CLEAR — SIGNIFICANT CHANGE UP
AST SERPL-CCNC: 277 U/L — HIGH (ref 10–40)
BARBITURATES UR SCN-MCNC: NEGATIVE — SIGNIFICANT CHANGE UP
BASOPHILS # BLD AUTO: 0.02 K/UL — SIGNIFICANT CHANGE UP (ref 0–0.2)
BASOPHILS NFR BLD AUTO: 0.6 % — SIGNIFICANT CHANGE UP (ref 0–2)
BENZODIAZ UR-MCNC: NEGATIVE — SIGNIFICANT CHANGE UP
BILIRUB DIRECT SERPL-MCNC: <0.2 MG/DL — SIGNIFICANT CHANGE UP (ref 0–0.2)
BILIRUB INDIRECT FLD-MCNC: SIGNIFICANT CHANGE UP MG/DL (ref 0.2–1)
BILIRUB SERPL-MCNC: 0.2 MG/DL — SIGNIFICANT CHANGE UP (ref 0.2–1.2)
BILIRUB UR-MCNC: NEGATIVE — SIGNIFICANT CHANGE UP
BUN SERPL-MCNC: 11 MG/DL — SIGNIFICANT CHANGE UP (ref 7–23)
CALCIUM SERPL-MCNC: 8.5 MG/DL — SIGNIFICANT CHANGE UP (ref 8.4–10.5)
CHLORIDE SERPL-SCNC: 103 MMOL/L — SIGNIFICANT CHANGE UP (ref 96–108)
CO2 SERPL-SCNC: 24 MMOL/L — SIGNIFICANT CHANGE UP (ref 22–31)
COCAINE METAB.OTHER UR-MCNC: NEGATIVE — SIGNIFICANT CHANGE UP
COLOR SPEC: YELLOW — SIGNIFICANT CHANGE UP
COVID-19 SPIKE DOMAIN AB INTERP: POSITIVE
COVID-19 SPIKE DOMAIN ANTIBODY RESULT: 37 U/ML — HIGH
CREAT SERPL-MCNC: 0.87 MG/DL — SIGNIFICANT CHANGE UP (ref 0.5–1.3)
DIFF PNL FLD: NEGATIVE — SIGNIFICANT CHANGE UP
EOSINOPHIL # BLD AUTO: 0.1 K/UL — SIGNIFICANT CHANGE UP (ref 0–0.5)
EOSINOPHIL NFR BLD AUTO: 3 % — SIGNIFICANT CHANGE UP (ref 0–6)
ETHANOL SERPL-MCNC: <10 MG/DL — SIGNIFICANT CHANGE UP (ref 0–10)
GLUCOSE SERPL-MCNC: 144 MG/DL — HIGH (ref 70–99)
GLUCOSE UR QL: NEGATIVE — SIGNIFICANT CHANGE UP
HCT VFR BLD CALC: 42.1 % — SIGNIFICANT CHANGE UP (ref 39–50)
HGB BLD-MCNC: 13.6 G/DL — SIGNIFICANT CHANGE UP (ref 13–17)
IMM GRANULOCYTES NFR BLD AUTO: 0.3 % — SIGNIFICANT CHANGE UP (ref 0–1.5)
KETONES UR-MCNC: NEGATIVE — SIGNIFICANT CHANGE UP
LEUKOCYTE ESTERASE UR-ACNC: NEGATIVE — SIGNIFICANT CHANGE UP
LYMPHOCYTES # BLD AUTO: 1.32 K/UL — SIGNIFICANT CHANGE UP (ref 1–3.3)
LYMPHOCYTES # BLD AUTO: 40.2 % — SIGNIFICANT CHANGE UP (ref 13–44)
MCHC RBC-ENTMCNC: 29.1 PG — SIGNIFICANT CHANGE UP (ref 27–34)
MCHC RBC-ENTMCNC: 32.3 GM/DL — SIGNIFICANT CHANGE UP (ref 32–36)
MCV RBC AUTO: 90.1 FL — SIGNIFICANT CHANGE UP (ref 80–100)
METHADONE UR-MCNC: NEGATIVE — SIGNIFICANT CHANGE UP
MONOCYTES # BLD AUTO: 0.29 K/UL — SIGNIFICANT CHANGE UP (ref 0–0.9)
MONOCYTES NFR BLD AUTO: 8.8 % — SIGNIFICANT CHANGE UP (ref 2–14)
NEUTROPHILS # BLD AUTO: 1.54 K/UL — LOW (ref 1.8–7.4)
NEUTROPHILS NFR BLD AUTO: 47.1 % — SIGNIFICANT CHANGE UP (ref 43–77)
NITRITE UR-MCNC: NEGATIVE — SIGNIFICANT CHANGE UP
NRBC # BLD: 0 /100 WBCS — SIGNIFICANT CHANGE UP (ref 0–0)
OPIATES UR-MCNC: NEGATIVE — SIGNIFICANT CHANGE UP
PCP SPEC-MCNC: SIGNIFICANT CHANGE UP
PCP UR-MCNC: NEGATIVE — SIGNIFICANT CHANGE UP
PH UR: 6 — SIGNIFICANT CHANGE UP (ref 5–8)
PLATELET # BLD AUTO: 202 K/UL — SIGNIFICANT CHANGE UP (ref 150–400)
POTASSIUM SERPL-MCNC: 3.9 MMOL/L — SIGNIFICANT CHANGE UP (ref 3.5–5.3)
POTASSIUM SERPL-SCNC: 3.9 MMOL/L — SIGNIFICANT CHANGE UP (ref 3.5–5.3)
PROT SERPL-MCNC: 7.9 G/DL — SIGNIFICANT CHANGE UP (ref 6–8.3)
PROT UR-MCNC: NEGATIVE MG/DL — SIGNIFICANT CHANGE UP
RBC # BLD: 4.67 M/UL — SIGNIFICANT CHANGE UP (ref 4.2–5.8)
RBC # FLD: 15.8 % — HIGH (ref 10.3–14.5)
SALICYLATES SERPL-MCNC: <0.3 MG/DL — LOW (ref 2.8–20)
SARS-COV-2 IGG+IGM SERPL QL IA: 37 U/ML — HIGH
SARS-COV-2 IGG+IGM SERPL QL IA: POSITIVE
SARS-COV-2 RNA SPEC QL NAA+PROBE: SIGNIFICANT CHANGE UP
SODIUM SERPL-SCNC: 136 MMOL/L — SIGNIFICANT CHANGE UP (ref 135–145)
SP GR SPEC: >=1.03 — SIGNIFICANT CHANGE UP (ref 1–1.03)
THC UR QL: NEGATIVE — SIGNIFICANT CHANGE UP
UROBILINOGEN FLD QL: 0.2 E.U./DL — SIGNIFICANT CHANGE UP
WBC # BLD: 3.28 K/UL — LOW (ref 3.8–10.5)
WBC # FLD AUTO: 3.28 K/UL — LOW (ref 3.8–10.5)

## 2021-05-08 PROCEDURE — 93010 ELECTROCARDIOGRAM REPORT: CPT

## 2021-05-08 PROCEDURE — 90792 PSYCH DIAG EVAL W/MED SRVCS: CPT

## 2021-05-08 PROCEDURE — 99223 1ST HOSP IP/OBS HIGH 75: CPT

## 2021-05-08 PROCEDURE — 99284 EMERGENCY DEPT VISIT MOD MDM: CPT

## 2021-05-08 RX ORDER — FOLIC ACID 0.8 MG
1 TABLET ORAL DAILY
Refills: 0 | Status: DISCONTINUED | OUTPATIENT
Start: 2021-05-08 | End: 2021-05-21

## 2021-05-08 RX ORDER — ACETAMINOPHEN 500 MG
650 TABLET ORAL EVERY 6 HOURS
Refills: 0 | Status: DISCONTINUED | OUTPATIENT
Start: 2021-05-08 | End: 2021-05-21

## 2021-05-08 RX ORDER — MAGNESIUM HYDROXIDE 400 MG/1
30 TABLET, CHEWABLE ORAL DAILY
Refills: 0 | Status: DISCONTINUED | OUTPATIENT
Start: 2021-05-08 | End: 2021-05-21

## 2021-05-08 RX ORDER — THIAMINE MONONITRATE (VIT B1) 100 MG
100 TABLET ORAL DAILY
Refills: 0 | Status: COMPLETED | OUTPATIENT
Start: 2021-05-08 | End: 2021-05-11

## 2021-05-08 RX ORDER — HALOPERIDOL DECANOATE 100 MG/ML
5 INJECTION INTRAMUSCULAR EVERY 6 HOURS
Refills: 0 | Status: DISCONTINUED | OUTPATIENT
Start: 2021-05-08 | End: 2021-05-21

## 2021-05-08 RX ORDER — QUETIAPINE FUMARATE 200 MG/1
200 TABLET, FILM COATED ORAL AT BEDTIME
Refills: 0 | Status: DISCONTINUED | OUTPATIENT
Start: 2021-05-08 | End: 2021-05-11

## 2021-05-08 RX ADMIN — QUETIAPINE FUMARATE 200 MILLIGRAM(S): 200 TABLET, FILM COATED ORAL at 22:01

## 2021-05-08 NOTE — ED PROVIDER NOTE - SKIN, MLM
Skin normal color for race, warm, dry and intact. No evidence of rash. Lower extremities, with no edema, well perfused, warm.
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
## 2021-05-08 NOTE — CHART NOTE - NSCHARTNOTEFT_GEN_A_CORE
Medicine was asked to evaluate the need for isolation precautions for psych patient with recent shingles infection. Per patient, he was treated two months ago for shingles with acyclovir with a 10 day course. On exam, patient has a mild erythematous non painful raised rash on his right chest but there are no active vesicular lesions or even any dry/crusted lesions. Patient does not need either contact or airborne precautions in this case.     Darren Dubose PGY2   Internal Medicine

## 2021-05-08 NOTE — ED BEHAVIORAL HEALTH ASSESSMENT NOTE - RISK ASSESSMENT
Multiple past and current risk factors. Current Risk: HIGH. Current Risk d/t mood episode will be mitigated with medication i.e. Seroquel. High Acute Suicide Risk

## 2021-05-08 NOTE — ED PROVIDER NOTE - CPE EDP RESP NORM
Post-Care Instructions: I reviewed with the patient in detail post-care instructions. Patient is to keep the biopsy site dry overnight, and then apply bacitracin twice daily until healed. Patient may apply hydrogen peroxide soaks to remove any crusting. normal...
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
## 2021-05-08 NOTE — ED ADULT NURSE NOTE - PMH
Anxiety    Bipolar disorder    COVID-19    Depression    HIV (human immunodeficiency virus infection)    Schizophrenia, unspecified type
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
## 2021-05-08 NOTE — CONSULT NOTE ADULT - ATTENDING COMMENTS
Agree with assessment and plan above. Lesions on chest appear to have re-epithelized. No need to isolate for this reason.    d/w Dr. Dubose. 35 M w/previous suicide attempts, schizophrenia, Bipolar disorder, alcohol use disorder, and previously treated shingles with voluntary admission to inpatient psychiatric unit. Medicine consulted for reocmmendations on necessity of isolation given shingles hx.    #Shingles - Agree with assessment and plan above. Lesions on chest appear to have re-epithelized. No need to isolate for this reason.  #Schizophrenia - care as per primary team. c/w seroquel 200mg qHS  #Bipolar disorder - care as per primary team.   # Alcohol use disorder - care as per primary team. c/w folic acid, MV, thiamine.     d/w Dr. Dubose.

## 2021-05-08 NOTE — ED PROVIDER NOTE - ENMT, MLM
Airway patent, Nasal mucosa clear. Mouth with normal, moist mucosa. Throat has no vesicles, no oropharyngeal exudates and uvula is midline. No nasal congestion

## 2021-05-08 NOTE — ED BEHAVIORAL HEALTH ASSESSMENT NOTE - DIFFERENTIAL
MDD with Psychotic Features vs Bipolar vs Schizophrenia vs Schizoaffective Disorder vs Alcohol - Induced Mood D/o
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
## 2021-05-08 NOTE — ED PROVIDER NOTE - PSYCHIATRIC, MLM
Alert and oriented to person, place, time/situation. Positive SI; no HI though admits to fear of harming others.
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
## 2021-05-08 NOTE — ED ADULT NURSE NOTE - CAS TRG GEN SKIN CONDITION
Pt to PED for bilateral testicle swelling since this morning. Denies fevers/pain. No redness our swelling seen during triage.    Warm/Dry

## 2021-05-08 NOTE — ED ADULT NURSE NOTE - CAS TRG GENERAL AIRWAY, MLM
Patent
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
## 2021-05-08 NOTE — ED BEHAVIORAL HEALTH ASSESSMENT NOTE - DETAILS
CHIEF COMPLAINT:  Chief Complaint   Patient presents with   • Derm Problem     she has white blothces on her back and neck has had it for 2 weeks       HPI:  Ms. Isabella Wahl is a pleasant 12 year old  female, who is here today for an acute visit presenting with white patches on back and neck.     Patient presents with her mother today.    Patient has had an issue with white patches on her back and neck over the past 2 weeks.  She denies any pruritus.  Patient has had no new contacts with laundry detergent, soaps, lotions, plants, animals or chemicals.  They have tried no OTC therapies.      OTHER SIGNIFICANT PROBLEMS:  Patient Active Problem List   Diagnosis   • Acquired hypothyroidism   • Cough   • Pyelonephritis   • Dysfunctional voiding of urine   • Chronic constipation   • Vaccination not carried out because of patient refusal        History reviewed. No pertinent family history.       Summary of your Discharge Medications          Accurate as of June 5, 2020 12:33 PM. Always use your most recent med list.            Take these Medications      Details   levothyroxine 50 MCG tablet   TAKE ONE TABLET BY MOUTH DAILY              REVIEW OF SYSTEMS:  A three point review of systems was performed.  All pertinent positives and negatives were noted in the History of Present Illness.    VITALS:  Visit Vitals  /64   Pulse 84   Ht 5' 2.75\" (1.594 m)   Wt (!) 67.1 kg   SpO2 98%   BMI 26.43 kg/m²         PHYSICAL EXAM:  Constitutional:  Well developed, well nourished, no acute distress  Respiratory:  Respiratory effort normal, bilateral air entry equal, no rales, no wheezing   Cardiovascular:  Normal rate, normal rhythm, no murmurs, rubs, or gallops. Normal S1 and S2.  No pedal edema   Integument:  Well hydrated. Hyperpigmented macules noted to back and neck.  No redness or swelling noted   Neurologic:  Alert & oriented x 3, Cranial Nerves 2-12 normal, normal motor function, normal sensory function, no  focal deficits noted   Psychiatric:  Normal affect, speech and behavior appropriate, insight normal, makes appropriate eye contact.      LABS:  No visits with results within 1 Week(s) from this visit.   Latest known visit with results is:   Hospital Outpatient Visit on 01/31/2020   Component Date Value   • TSH 01/31/2020 2.987        No results found.      ASSESSMENT AND PLAN:    Encounter Diagnoses   Name Primary?   • Tinea versicolor Yes      Ketoconazole 2% cream apply to affected areas b.i.d. x 14 days.  Avoid oily skin products.  Wear loose clothing and allow skin to breathe.  Use sunscreen.  Avoid tanning beds.   Mother informed that patient could utilize medicated dandruff shampoo over whole body while in shower.  Educational handout given and discussed  Patient and guardian agrees with above plan and verbalizes understanding.      Orders Placed This Encounter   • ketoconazole (NIZORAL) 2 % cream          Return if symptoms worsen or fail to improve.        SHAWANDA Mccain  [facility]  [address]  [place] [id number]               with mothers became toxic on Lithium recent attempt not available father depression d/w ED staff writer is Attending on call today [facility] not required for clinical decision making at this time

## 2021-05-08 NOTE — ED PROVIDER NOTE - PMH
Anxiety    Bipolar disorder    COVID-19    Depression    HIV (human immunodeficiency virus infection)    HIV (human immunodeficiency virus infection)    Schizophrenia    Schizophrenia, unspecified type

## 2021-05-09 LAB
ALBUMIN SERPL ELPH-MCNC: 3.3 G/DL — SIGNIFICANT CHANGE UP (ref 3.3–5)
ALP SERPL-CCNC: 72 U/L — SIGNIFICANT CHANGE UP (ref 40–120)
ALT FLD-CCNC: 159 U/L — HIGH (ref 10–45)
ANION GAP SERPL CALC-SCNC: 9 MMOL/L — SIGNIFICANT CHANGE UP (ref 5–17)
AST SERPL-CCNC: 140 U/L — HIGH (ref 10–40)
BILIRUB SERPL-MCNC: 0.2 MG/DL — SIGNIFICANT CHANGE UP (ref 0.2–1.2)
BUN SERPL-MCNC: 10 MG/DL — SIGNIFICANT CHANGE UP (ref 7–23)
CALCIUM SERPL-MCNC: 8.4 MG/DL — SIGNIFICANT CHANGE UP (ref 8.4–10.5)
CHLORIDE SERPL-SCNC: 106 MMOL/L — SIGNIFICANT CHANGE UP (ref 96–108)
CHOLEST SERPL-MCNC: 132 MG/DL — SIGNIFICANT CHANGE UP
CO2 SERPL-SCNC: 22 MMOL/L — SIGNIFICANT CHANGE UP (ref 22–31)
CREAT SERPL-MCNC: 0.86 MG/DL — SIGNIFICANT CHANGE UP (ref 0.5–1.3)
GLUCOSE SERPL-MCNC: 147 MG/DL — HIGH (ref 70–99)
HCT VFR BLD CALC: 42.1 % — SIGNIFICANT CHANGE UP (ref 39–50)
HDLC SERPL-MCNC: 45 MG/DL — SIGNIFICANT CHANGE UP
HGB BLD-MCNC: 13.7 G/DL — SIGNIFICANT CHANGE UP (ref 13–17)
LIPID PNL WITH DIRECT LDL SERPL: 65 MG/DL — SIGNIFICANT CHANGE UP
MCHC RBC-ENTMCNC: 29.3 PG — SIGNIFICANT CHANGE UP (ref 27–34)
MCHC RBC-ENTMCNC: 32.5 GM/DL — SIGNIFICANT CHANGE UP (ref 32–36)
MCV RBC AUTO: 90.1 FL — SIGNIFICANT CHANGE UP (ref 80–100)
NON HDL CHOLESTEROL: 87 MG/DL — SIGNIFICANT CHANGE UP
NRBC # BLD: 0 /100 WBCS — SIGNIFICANT CHANGE UP (ref 0–0)
PLATELET # BLD AUTO: 213 K/UL — SIGNIFICANT CHANGE UP (ref 150–400)
POTASSIUM SERPL-MCNC: 3.7 MMOL/L — SIGNIFICANT CHANGE UP (ref 3.5–5.3)
POTASSIUM SERPL-SCNC: 3.7 MMOL/L — SIGNIFICANT CHANGE UP (ref 3.5–5.3)
PROT SERPL-MCNC: 7.2 G/DL — SIGNIFICANT CHANGE UP (ref 6–8.3)
RBC # BLD: 4.67 M/UL — SIGNIFICANT CHANGE UP (ref 4.2–5.8)
RBC # FLD: 15.7 % — HIGH (ref 10.3–14.5)
SODIUM SERPL-SCNC: 137 MMOL/L — SIGNIFICANT CHANGE UP (ref 135–145)
TRIGL SERPL-MCNC: 109 MG/DL — SIGNIFICANT CHANGE UP
TSH SERPL-MCNC: 1.53 UIU/ML — SIGNIFICANT CHANGE UP (ref 0.27–4.2)
WBC # BLD: 3.53 K/UL — LOW (ref 3.8–10.5)
WBC # FLD AUTO: 3.53 K/UL — LOW (ref 3.8–10.5)

## 2021-05-09 PROCEDURE — 99223 1ST HOSP IP/OBS HIGH 75: CPT

## 2021-05-09 RX ADMIN — Medication 100 MILLIGRAM(S): at 09:54

## 2021-05-09 RX ADMIN — QUETIAPINE FUMARATE 200 MILLIGRAM(S): 200 TABLET, FILM COATED ORAL at 22:06

## 2021-05-09 RX ADMIN — Medication 1 TABLET(S): at 09:54

## 2021-05-09 RX ADMIN — Medication 1 MILLIGRAM(S): at 09:54
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
## 2021-05-09 NOTE — BEHAVIORAL HEALTH ASSESSMENT NOTE - DETAILS
not required for clinical decision making at this time not available became toxic on Lithium recent attempt [facility] father depression
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
## 2021-05-09 NOTE — BEHAVIORAL HEALTH ASSESSMENT NOTE - SUICIDE RISK FACTORS
Hopelessness or despair/Current mood episode/Alcohol/Substance abuse disorders/Impulsivity/Mood Disorder current/past

## 2021-05-10 DIAGNOSIS — F31.5 BIPOLAR DISORDER, CURRENT EPISODE DEPRESSED, SEVERE, WITH PSYCHOTIC FEATURES: ICD-10-CM

## 2021-05-10 LAB
A1C WITH ESTIMATED AVERAGE GLUCOSE RESULT: 5.4 % — SIGNIFICANT CHANGE UP (ref 4–5.6)
ALBUMIN SERPL ELPH-MCNC: 3.5 G/DL — SIGNIFICANT CHANGE UP (ref 3.3–5)
ALP SERPL-CCNC: 73 U/L — SIGNIFICANT CHANGE UP (ref 40–120)
ALT FLD-CCNC: 123 U/L — HIGH (ref 10–45)
ANION GAP SERPL CALC-SCNC: 7 MMOL/L — SIGNIFICANT CHANGE UP (ref 5–17)
AST SERPL-CCNC: 76 U/L — HIGH (ref 10–40)
BILIRUB SERPL-MCNC: 0.3 MG/DL — SIGNIFICANT CHANGE UP (ref 0.2–1.2)
BUN SERPL-MCNC: 12 MG/DL — SIGNIFICANT CHANGE UP (ref 7–23)
CALCIUM SERPL-MCNC: 8.8 MG/DL — SIGNIFICANT CHANGE UP (ref 8.4–10.5)
CHLORIDE SERPL-SCNC: 102 MMOL/L — SIGNIFICANT CHANGE UP (ref 96–108)
CO2 SERPL-SCNC: 28 MMOL/L — SIGNIFICANT CHANGE UP (ref 22–31)
CREAT SERPL-MCNC: 0.9 MG/DL — SIGNIFICANT CHANGE UP (ref 0.5–1.3)
ESTIMATED AVERAGE GLUCOSE: 108 MG/DL — SIGNIFICANT CHANGE UP (ref 68–114)
GLUCOSE SERPL-MCNC: 94 MG/DL — SIGNIFICANT CHANGE UP (ref 70–99)
POTASSIUM SERPL-MCNC: 4.3 MMOL/L — SIGNIFICANT CHANGE UP (ref 3.5–5.3)
POTASSIUM SERPL-SCNC: 4.3 MMOL/L — SIGNIFICANT CHANGE UP (ref 3.5–5.3)
PROT SERPL-MCNC: 7.3 G/DL — SIGNIFICANT CHANGE UP (ref 6–8.3)
SODIUM SERPL-SCNC: 137 MMOL/L — SIGNIFICANT CHANGE UP (ref 135–145)

## 2021-05-10 PROCEDURE — 99232 SBSQ HOSP IP/OBS MODERATE 35: CPT

## 2021-05-10 PROCEDURE — 99233 SBSQ HOSP IP/OBS HIGH 50: CPT | Mod: GC

## 2021-05-10 RX ADMIN — Medication 100 MILLIGRAM(S): at 12:13

## 2021-05-10 RX ADMIN — Medication 1 MILLIGRAM(S): at 12:13

## 2021-05-10 RX ADMIN — Medication 1 TABLET(S): at 12:13

## 2021-05-10 RX ADMIN — Medication 650 MILLIGRAM(S): at 18:37

## 2021-05-10 RX ADMIN — QUETIAPINE FUMARATE 200 MILLIGRAM(S): 200 TABLET, FILM COATED ORAL at 22:01

## 2021-05-10 RX ADMIN — Medication 650 MILLIGRAM(S): at 19:55
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
## 2021-05-10 NOTE — PROGRESS NOTE ADULT - SUBJECTIVE AND OBJECTIVE BOX
O/N Events: DRE   Subjective/ROS: Denies HA, CP, SOB, n/v, changes in bowel/urinary habits.  12pt ROS otherwise negative.    VITALS  Vital Signs Last 24 Hrs  T(C): 36.6 (10 May 2021 08:00), Max: 36.9 (10 May 2021 06:26)  T(F): 97.8 (10 May 2021 08:00), Max: 98.4 (10 May 2021 06:26)  HR: 79 (10 May 2021 08:00) (60 - 80)  BP: 112/76 (10 May 2021 08:00) (112/76 - 143/82)  BP(mean): --  RR: 19 (10 May 2021 08:00) (18 - 20)  SpO2: 97% (10 May 2021 08:00) (97% - 99%)    CAPILLARY BLOOD GLUCOSE    PHYSICAL EXAM  General: A&Ox3; NAD  Head: NC/AT; MMM; PERRL; EOMI;  Neck: Supple; no JVD  Respiratory: CTA B/L; no wheezes/crackles   Cardiovascular: Regular rhythm/rate; S1/S2   Gastrointestinal: Soft; NTND; normoactive BS  Extremities: WWP; no edema/cyanosis  Derm: mildly erythematous raised rash on the left chest , no active vesicular lesions, non-tender to palpation. Tattoos seen throughout.   Neurological:  CNII-XII grossly intact; no obvious focal deficits    MEDICATIONS  (STANDING):  folic acid 1 milliGRAM(s) Oral daily  multivitamin 1 Tablet(s) Oral daily  QUEtiapine 200 milliGRAM(s) Oral at bedtime  thiamine 100 milliGRAM(s) Oral daily    MEDICATIONS  (PRN):  acetaminophen   Tablet .. 650 milliGRAM(s) Oral every 6 hours PRN Moderate Pain (4 - 6)  aluminum hydroxide/magnesium hydroxide/simethicone Suspension 30 milliLiter(s) Oral every 4 hours PRN Dyspepsia  haloperidol     Tablet 5 milliGRAM(s) Oral every 6 hours PRN agitation  LORazepam     Tablet 2 milliGRAM(s) Oral every 6 hours PRN Symptom-triggered: each CIWA -Ar score 8 or GREATER  magnesium hydroxide Suspension 30 milliLiter(s) Oral daily PRN Constipation      penicillin (Unknown)  penicillins (Unknown)      LABS                        13.7   3.53  )-----------( 213      ( 09 May 2021 08:47 )             42.1     05-10    137  |  102  |  12  ----------------------------<  94  4.3   |  28  |  0.90    Ca    8.8      10 May 2021 07:31    TPro  7.3  /  Alb  3.5  /  TBili  0.3  /  DBili  x   /  AST  76<H>  /  ALT  123<H>  /  AlkPhos  73  05-10      Urinalysis Basic - ( 08 May 2021 13:48 )    Color: Yellow / Appearance: Clear / SG: >=1.030 / pH: x  Gluc: x / Ketone: NEGATIVE  / Bili: Negative / Urobili: 0.2 E.U./dL   Blood: x / Protein: NEGATIVE mg/dL / Nitrite: NEGATIVE   Leuk Esterase: NEGATIVE / RBC: x / WBC x   Sq Epi: x / Non Sq Epi: x / Bacteria: x

## 2021-05-10 NOTE — PROGRESS NOTE ADULT - ATTENDING COMMENTS
Doing well  Acute Varicella-Zoster rash has resolved, lesions are all crusted over, no need for airborne/contact isolation at this time  Please resume ARV's medications for this patient with HIV (Tivicay and Descovy), patient follows at [facility]  Consider getting HIV team to see patient while in 8UR  Transaminitis of unclear etiology, trending down, per patient Hepatitis C negative, consider repeating hepatitis remote panel during this visit if LFT's remain elevated (pattern of transaminitis doesn't seem to be secondary to alcohol use), avoid hepatotoxic agents  Rest as above  Signing off at this time and please reconsult as needed Doing well  Acute Varicella-Zoster rash has resolved, lesions are all crusted over, no need for airborne/contact isolation at this time  Please resume ARV's medications for this patient with HIV (Tivicay and Descovy), patient follows at [facility]  Consider getting HIV team to see patient while in 8UR  Transaminitis of unclear etiology, trending down, per patient Hepatitis C negative, consider repeating hepatitis remote panel during this visit if LFT's remain elevated (pattern of transaminitis doesn't seem to be secondary to alcohol use), avoid hepatotoxic agents  C/w treatment for Bipolar disorder/Schizophrenia per psych  Rest as above  Signing off at this time and please reconsult as needed

## 2021-05-10 NOTE — PROGRESS NOTE BEHAVIORAL HEALTH - GAIT / STATION
Occupational Therapy Treatment    ASSESSMENT:   Patient seen on 2w nursing unit.  Today's treatment focused on ADL performance, orientation to place, functional transfers and safety.  The patient is demonstrating fair progress as evidenced by improved manipulation of objects, following 1 step commands consistently.   At this time the patient continues to demonstrate impairments in activity tolerance, balance, coordination, limited knowledge of compensatory strategies, postural control and safety awareness which are limiting the performance of toilet transfers, sit to/from stand transfers, upper body dressing, lower body dressing, upper body bathing, lower body bathing and item retrieval.   Further skilled occupational therapy services are reasonable and necessary to address the above impairments and performance deficits to maximize the patient's independence.    OT Identified Barriers to Discharge: confusion; fall risk       PLAN AND RECOMMENDATIONS:   Recommendations for Discharge:  Recommendations for Discharge: OT WI: Sub-acute nursing home, Less intensive rehab      Plan:   Continue skilled OT, including the following Treatment Interventions: ADL retraining (01/30/20 1430)      OT Frequency: Once a day;7 days/week (01/30/20 1430)    Treatment Plan for Next Session: Functional transfers with emphasis on safety, orientation to task and room,                  EQUIPMENT:   PT/OT ADL Equipment for Discharge: will continue to assess (01/30/20 1430)        DIAGNOSIS:   1. Seizure (CMS/[facility])           PRECAUTIONS:   Precautions  Seizure Precautions: Yes  Other Precautions: Fall risk       EDUCATION:   On this date, the patient was educated on self-care activities.  The response to education was: Needs reinforcement.     SUBJECTIVE:   Subjective: Patinet states that the closet in room is the sink (01/30/20 1430)       OBJECTIVE:     Self Cares/ADLs:    Self Cares/ADL's  Grooming Assistance:  Set-up;Supervision;Touching/Steadying Assistance (01/30/20 1430)  Grooming/Oral Hygiene Deficit: Steadying;Supervision/Safety;Wash/dry hands;Brushing hair (01/30/20 1430)  Lower Body Clothing Assistance: Supervision;Minimal Assist (Min) (01/30/20 1430)  Footwear Assistance: Minimal Assist (Min) (01/30/20 1430)  Lower Body Dressing Deficit: Supervision/Safety (01/30/20 1430)  Self Cares/ADL's Comments #1: Able to don/doff socks seated edge of bed, requires cues though for safety and to keep seated on edge of bed, after askied to go to bathroom to comb hair at sink side, patient told to go to the sink, he goes to closet in room.  Therapist asked him \"what is this\"  He states the closet is the sink.  I asked what rooms he can normally find a sink, he states in the living room. attempts to stand multiple times during this task.  In bathroom, requires max cues for turning water off when done at sink, cues to stop pulling paper towels when drying hands, when leaving bathroom, states he needs to go to the bathroom, max cues to remove brief before sitting on toilet, then stands up after a few seconds stating he is done. (01/30/20 1430)      Household Mobility:    Household Mobility  Sit to Stand: Minimal Assist (Min) (01/30/20 1430)  Stand to Sit: Minimal Assist (Min) (01/30/20 1430)  Stand Pivot Transfers: Minimal Assist (Min) (01/30/20 1430)  Transfer Equipment: two wheled walker, gait belt (01/30/20 1430)  Sitting - Static: Supervision (01/30/20 1430)  Sitting - Dynamic: Minimal Assist (Min) (01/30/20 1430)  Standing - Static: Minimal Assist (Min) (01/30/20 1430)  Standing - Dynamic: Minimal Assist (Min) (01/30/20 1430)  Household Mobility Comments #1: Patient impulsive thorughout session, attempts standing any time a person in the room moves.  Is unsteady with attempts to transfer.  Even when discussing with patient that he is going to sit while sitter is going to leave the room for a moment, patient attempts standing  as soon as sitter stands.   (01/30/20 1430)    Home Management:    not addressed during today's OT session     GOALS:   Short Term Goals to Be Reviewed On: 02/05/20 (01/30/20 1430)  Short Term Goals Are The Same as Discharge Goals: Yes (01/30/20 1430)  Goal Agreement: Patient agrees with goals and treatment plan (01/30/20 1430)  Feeding Discharge Goal 1: Independent eating using adaptive equipment as needed for effective hand  (01/29/20 1113)  Grooming Discharge Goal 1: Modified independent grooming completed in bathroom setting (01/29/20 1113)  Bathing Discharge Goal 1: Modified independent full body sponge bathing in batrhroom setting (01/29/20 1113)  Dressing Discharge Goal 1: Modiied independent full body dressing (01/29/20 1113)  Toileting Discharge Goal 1: Modified independent toileting in bathroom setting (01/29/20 1113)  Home Setting Transfer Discharge Goal 1: Modified independent toilet and chair transfers (01/29/20 1113)  Home Management Discharge Goal 1: Modified independent item traansport and retrieval skills during completion of self cares and kitchen tasks (01/29/20 1113)  Other Discharge Goal 1: Patient to improve attention during self cares up to 10 minutes with one verbal cues or less for redirection (01/29/20 1113)       BILLING INFORMATION:   Total Treatment Time: OT Time Spent: 35 minutes      Other

## 2021-05-10 NOTE — PROGRESS NOTE BEHAVIORAL HEALTH - RISK ASSESSMENT
Static: mood disorder and substance abuse ; compliance concerns  Modifiable: mood and subatnce use  Protective: appears motivated for treatment; Static: mood disorder and substance abuse ; compliance concerns  Modifiable: mood and subatance use  Protective: appears motivated for treatment;
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
## 2021-05-11 DIAGNOSIS — F10.20 ALCOHOL DEPENDENCE, UNCOMPLICATED: ICD-10-CM

## 2021-05-11 DIAGNOSIS — F14.10 COCAINE ABUSE, UNCOMPLICATED: ICD-10-CM

## 2021-05-11 PROCEDURE — 99232 SBSQ HOSP IP/OBS MODERATE 35: CPT

## 2021-05-11 RX ORDER — QUETIAPINE FUMARATE 200 MG/1
300 TABLET, FILM COATED ORAL AT BEDTIME
Refills: 0 | Status: DISCONTINUED | OUTPATIENT
Start: 2021-05-11 | End: 2021-05-21

## 2021-05-11 RX ADMIN — Medication 100 MILLIGRAM(S): at 13:51

## 2021-05-11 RX ADMIN — QUETIAPINE FUMARATE 300 MILLIGRAM(S): 200 TABLET, FILM COATED ORAL at 21:48

## 2021-05-11 RX ADMIN — Medication 1 MILLIGRAM(S): at 13:50

## 2021-05-11 RX ADMIN — Medication 1 TABLET(S): at 13:50

## 2021-05-11 RX ADMIN — Medication 650 MILLIGRAM(S): at 21:13

## 2021-05-11 RX ADMIN — Medication 650 MILLIGRAM(S): at 19:35

## 2021-05-11 NOTE — PROGRESS NOTE BEHAVIORAL HEALTH - SUMMARY
The patient continues to be depressed in mood with psychomotor retardation but without suicidal ideation or active perceptual disturbance, which may be responding to Seroquel with improved sleep. Pt's episodes of aggression may be associated with substance use vs. dissociative state secondary to past trauma, but unlikely to be seizure in etiology as there are no accompanying somatic symptoms or post-ictal state. These episodes also do not seem to be related to any auditory hallucination that is command in nature, although collateral report noted pt's SI/AH during the time of the incident. At this time, the mood component appears to be more prominent than psychotic symptoms, and given the history of past trauma related to current affective state, pt may very well have an underlying MDD with psychotic features  vs substance induced mood/psychosis vs historical diagnosis of schizophrenia/bipolar. Pt will benefit from continued psychotherapy including explorative, expressive, and motivational interviewing, as well as continued titration of medication while on inpatient setting.

## 2021-05-11 NOTE — PROGRESS NOTE BEHAVIORAL HEALTH - OTHER
extensive tattooing deferred deferred per COVID precautions laughs appropriately minimally spontaneous

## 2021-05-11 NOTE — PROGRESS NOTE BEHAVIORAL HEALTH - RISK ASSESSMENT
Static: mood disorder, psychotic disorder, and substance abuse;  Modifiable: mood and substance use, medication nonadherence   Protective: appears motivated for treatment;

## 2021-05-12 DIAGNOSIS — B20 HUMAN IMMUNODEFICIENCY VIRUS [HIV] DISEASE: ICD-10-CM

## 2021-05-12 LAB
HCV AB S/CO SERPL IA: 0.04 S/CO — SIGNIFICANT CHANGE UP
HCV AB SERPL-IMP: SIGNIFICANT CHANGE UP
SARS-COV-2 RNA SPEC QL NAA+PROBE: SIGNIFICANT CHANGE UP

## 2021-05-12 PROCEDURE — 99221 1ST HOSP IP/OBS SF/LOW 40: CPT

## 2021-05-12 PROCEDURE — 99232 SBSQ HOSP IP/OBS MODERATE 35: CPT

## 2021-05-12 RX ORDER — EMTRICITABINE AND TENOFOVIR DISOPROXIL FUMARATE 200; 300 MG/1; MG/1
1 TABLET, FILM COATED ORAL DAILY
Refills: 0 | Status: DISCONTINUED | OUTPATIENT
Start: 2021-05-12 | End: 2021-05-19

## 2021-05-12 RX ORDER — DOLUTEGRAVIR SODIUM 25 MG/1
50 TABLET, FILM COATED ORAL DAILY
Refills: 0 | Status: DISCONTINUED | OUTPATIENT
Start: 2021-05-12 | End: 2021-05-19

## 2021-05-12 RX ADMIN — Medication 1 TABLET(S): at 12:21

## 2021-05-12 RX ADMIN — QUETIAPINE FUMARATE 300 MILLIGRAM(S): 200 TABLET, FILM COATED ORAL at 22:25

## 2021-05-12 RX ADMIN — DOLUTEGRAVIR SODIUM 50 MILLIGRAM(S): 25 TABLET, FILM COATED ORAL at 17:37

## 2021-05-12 RX ADMIN — Medication 650 MILLIGRAM(S): at 13:00

## 2021-05-12 RX ADMIN — Medication 650 MILLIGRAM(S): at 12:21

## 2021-05-12 RX ADMIN — EMTRICITABINE AND TENOFOVIR DISOPROXIL FUMARATE 1 TABLET(S): 200; 300 TABLET, FILM COATED ORAL at 17:37

## 2021-05-12 RX ADMIN — Medication 1 MILLIGRAM(S): at 12:21

## 2021-05-12 NOTE — CONSULT NOTE ADULT - ASSESSMENT
Mr. Hernandez is a 35M w/ HIV (on descovy and tivicay), severe depression w/ 12 suicide attempts in past 2 yrs, bipolar disorder, schizophrenia, alcohol use disorder, substance use disorder, presenting for voluntary psychiatric admission in setting of suicide attempt. Consulted for assistance with management of HIV    #HIV  Diagnosed in 2012, unknown how it was contracted, suspected 2/2 IVDA. Reported t cell lyla 197. No known hx of aids-defining illnesses.  -No reported high risk sexual activity  -Pt reports compliance with medications, missing ~3-4 doses per month.  Recommendations:    -f/u CD4 and viral load    -f/u hep c in light of IVDA and transaminitis on admission    -f/u syphilis screen    -restart home Descovy 200-25 and Tivicay 50mg qd    Case discussed with attending, Dr. Mathew. Will continue to follow.      Mr. Hernandez is a 35M w/ HIV (on descovy and tivicay), severe depression w/ 12 suicide attempts in past 2 yrs, bipolar disorder, schizophrenia, alcohol use disorder, substance use disorder, presenting for voluntary psychiatric admission in setting of suicide attempt. Consulted for assistance with management of HIV    #HIV  Diagnosed in 2012, unknown how it was contracted, suspected 2/2 IVDA. Reported t cell lyla 197. No known hx of aids-defining illnesses.  -No reported high risk sexual activity  -Pt reports compliance with medications, missing ~3-4 doses per month.  Recommendations:    -f/u CD4 and viral load    -f/u hep c in light of IVDA and transaminitis on admission    -f/u syphilis screen    -restart home Descovy 200-25 and Tivicay 50mg qd    Case discussed with attending, Dr. Mathew. Will continue to follow.     Attending Attestation:  I saw and examined the patient at bedside.  I reviewed labs and imaging and discuss the case with HS.  I gave medication and HIV treatment education.     On Descovy/Tivicay, misses multiple doses per month.  Reports a previous hospitalization w/ possible PCP PNA.  Will follow up labs.  Restart ARVs.  Will continue to follow and plan for [facility] upon discharge.

## 2021-05-12 NOTE — PROGRESS NOTE BEHAVIORAL HEALTH - SUMMARY
The patient continues to be depressed in mood with psychomotor retardation but without suicidal ideation or active perceptual disturbance, which may be responding to Seroquel with improved sleep. Pt's episodes of aggression may be associated with substance use vs. dissociative state secondary to past trauma, but unlikely to be seizure in etiology as there are no accompanying somatic symptoms or post-ictal state. These episodes also do not seem to be related to any auditory hallucination that is command in nature, although collateral report noted pt's SI/AH during the time of the incident. At this time, the mood component appears to be more prominent than psychotic symptoms. Differentials include Bipolar  Disorder (predominently depressive with hypomanic states but currently with psychotic features  ) vs substance induced mood/psychosis vs schizophrenia vs schizoaffective disorder. Pt will benefit from continued psychotherapy including explorative, expressive, and motivational interviewing, as well as continued titration of medication while on inpatient setting. The patient continues to be depressed in mood with psychomotor retardation but without suicidal ideation or active perceptual disturbance, which may be responding to Seroquel with improved sleep. Pt's episodes of aggression may be associated with substance use vs. dissociative state secondary to past trauma, but unlikely to be seizure in etiology as there are no accompanying somatic symptoms or post-ictal state. These episodes also do not seem to be related to any auditory hallucination that is command in nature, although collateral report noted pt's SI/AH during the time of the incident. At this time, the mood component appears to be more prominent than psychotic symptoms. Differentials include Bipolar  Disorder (predominantly depressive with hypomanic states but currently with psychotic features  ) vs substance induced mood/psychosis vs schizophrenia vs schizoaffective disorder. Pt will benefit from continued psychotherapy including explorative, expressive, and motivational interviewing, as well as continued titration of medication while on inpatient setting.
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
## 2021-05-12 NOTE — PROVIDER CONTACT NOTE (OTHER) - SITUATION
RN received phone call from Mary in the lab that blood draw for QuantiFeron was quantity not sufficient.

## 2021-05-13 LAB
4/8 RATIO: 0.44 RATIO — LOW (ref 0.9–3.6)
ABS CD8: 894 /UL — HIGH (ref 142–740)
CD3 BLASTS SPEC-ACNC: 1337 /UL — SIGNIFICANT CHANGE UP (ref 672–1870)
CD3 BLASTS SPEC-ACNC: 75 % — SIGNIFICANT CHANGE UP (ref 59–83)
CD4 %: 22 % — LOW (ref 30–62)
CD8 %: 50 % — HIGH (ref 12–36)
HIV-1 VIRAL LOAD RESULT: ABNORMAL
HIV1 RNA # SERPL NAA+PROBE: SIGNIFICANT CHANGE UP
HIV1 RNA SER-IMP: SIGNIFICANT CHANGE UP
HIV1 RNA SERPL NAA+PROBE-ACNC: ABNORMAL
HIV1 RNA SERPL NAA+PROBE-LOG#: 4.93 — SIGNIFICANT CHANGE UP
T PALLIDUM AB TITR SER: NEGATIVE — SIGNIFICANT CHANGE UP
T-CELL CD4 SUBSET PNL BLD: 393 /UL — LOW (ref 489–1457)

## 2021-05-13 PROCEDURE — 99232 SBSQ HOSP IP/OBS MODERATE 35: CPT

## 2021-05-13 RX ADMIN — DOLUTEGRAVIR SODIUM 50 MILLIGRAM(S): 25 TABLET, FILM COATED ORAL at 11:31

## 2021-05-13 RX ADMIN — EMTRICITABINE AND TENOFOVIR DISOPROXIL FUMARATE 1 TABLET(S): 200; 300 TABLET, FILM COATED ORAL at 11:32

## 2021-05-13 RX ADMIN — Medication 1 MILLIGRAM(S): at 12:56

## 2021-05-13 RX ADMIN — Medication 1 TABLET(S): at 12:56

## 2021-05-13 RX ADMIN — QUETIAPINE FUMARATE 300 MILLIGRAM(S): 200 TABLET, FILM COATED ORAL at 21:55
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
## 2021-05-14 LAB
GAMMA INTERFERON BACKGROUND BLD IA-ACNC: 0.05 IU/ML — SIGNIFICANT CHANGE UP
M TB IFN-G BLD-IMP: NEGATIVE — SIGNIFICANT CHANGE UP
M TB IFN-G CD4+ BCKGRND COR BLD-ACNC: 0.02 IU/ML — SIGNIFICANT CHANGE UP
M TB IFN-G CD4+CD8+ BCKGRND COR BLD-ACNC: 0.01 IU/ML — SIGNIFICANT CHANGE UP
QUANT TB PLUS MITOGEN MINUS NIL: 7.55 IU/ML — SIGNIFICANT CHANGE UP

## 2021-05-14 PROCEDURE — 99232 SBSQ HOSP IP/OBS MODERATE 35: CPT | Mod: GC

## 2021-05-14 PROCEDURE — 99232 SBSQ HOSP IP/OBS MODERATE 35: CPT

## 2021-05-14 RX ADMIN — QUETIAPINE FUMARATE 300 MILLIGRAM(S): 200 TABLET, FILM COATED ORAL at 23:08

## 2021-05-14 RX ADMIN — DOLUTEGRAVIR SODIUM 50 MILLIGRAM(S): 25 TABLET, FILM COATED ORAL at 12:30

## 2021-05-14 RX ADMIN — Medication 1 MILLIGRAM(S): at 10:12

## 2021-05-14 RX ADMIN — Medication 1 TABLET(S): at 12:29

## 2021-05-14 RX ADMIN — EMTRICITABINE AND TENOFOVIR DISOPROXIL FUMARATE 1 TABLET(S): 200; 300 TABLET, FILM COATED ORAL at 12:30
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
## 2021-05-14 NOTE — CHART NOTE - NSCHARTNOTEFT_GEN_A_CORE
O/N Events: DRE    Subjective/ROS: Denies HA, CP, SOB, n/v, changes in bowel/urinary habits.  12pt ROS otherwise negative.    VITALS  Vital Signs Last 24 Hrs  T(C): 37 (13 May 2021 17:34), Max: 37 (13 May 2021 17:34)  T(F): 98.6 (13 May 2021 17:34), Max: 98.6 (13 May 2021 17:34)  HR: 89 (13 May 2021 17:34) (89 - 89)  BP: 114/72 (13 May 2021 17:34) (114/72 - 114/72)  BP(mean): --  RR: 18 (13 May 2021 17:34) (18 - 18)  SpO2: 98% (13 May 2021 17:34) (98% - 98%)    CAPILLARY BLOOD GLUCOSE          PHYSICAL EXAM  General: A&Ox3; NAD  Head: NC/AT; MMM; PERRL; EOMI;  Neck: Supple; no JVD  Respiratory: CTA B/L; no wheezes/crackles   Cardiovascular: Regular rhythm/rate; S1/S2   Gastrointestinal: Soft; NTND; normoactive BS  Extremities: WWP; no edema/cyanosis  Neurological:  CNII-XII grossly intact; no obvious focal deficits    MEDICATIONS  (STANDING):  dolutegravir 50 milliGRAM(s) Oral daily  emtricitabine 200 mG/tenofovir alafenamide 25 mG (DESCOVY) Tablet 1 Tablet(s) Oral daily  folic acid 1 milliGRAM(s) Oral daily  multivitamin 1 Tablet(s) Oral daily  QUEtiapine 300 milliGRAM(s) Oral at bedtime    MEDICATIONS  (PRN):  acetaminophen   Tablet .. 650 milliGRAM(s) Oral every 6 hours PRN Moderate Pain (4 - 6)  aluminum hydroxide/magnesium hydroxide/simethicone Suspension 30 milliLiter(s) Oral every 4 hours PRN Dyspepsia  haloperidol     Tablet 5 milliGRAM(s) Oral every 6 hours PRN agitation  LORazepam     Tablet 2 milliGRAM(s) Oral every 6 hours PRN Symptom-triggered: each CIWA -Ar score 8 or GREATER  magnesium hydroxide Suspension 30 milliLiter(s) Oral daily PRN Constipation      penicillin (Unknown)  penicillins (Unknown)      IMAGING/EKG/ETC: Reviewed    Assessment/Recs:  35M w/ HIV (on descovy and tivicay), severe depression w/ 12 suicide attempts in past 2 yrs, bipolar disorder, schizophrenia, alcohol use disorder, substance use disorder, presenting for voluntary psychiatric admission in setting of suicide attempt. Consulted for assistance with management of HIV    #HIV  Diagnosed in 2012, unknown how it was contracted, suspected 2/2 IVDA. Reported t cell lyla 197. No known hx of aids-defining illnesses.  Restarted descovy/tivicay.  Patient to follow up w/ me at [facility] - [address] [place] [address], [address] upon discharge.  Please schedule an appointment: [phone] when patient is ready for discharge.    HIV team to sign off.

## 2021-05-15 RX ADMIN — DOLUTEGRAVIR SODIUM 50 MILLIGRAM(S): 25 TABLET, FILM COATED ORAL at 14:45

## 2021-05-15 RX ADMIN — EMTRICITABINE AND TENOFOVIR DISOPROXIL FUMARATE 1 TABLET(S): 200; 300 TABLET, FILM COATED ORAL at 14:45

## 2021-05-15 RX ADMIN — Medication 1 MILLIGRAM(S): at 14:45

## 2021-05-15 RX ADMIN — QUETIAPINE FUMARATE 300 MILLIGRAM(S): 200 TABLET, FILM COATED ORAL at 21:48

## 2021-05-15 RX ADMIN — Medication 1 TABLET(S): at 14:45

## 2021-05-16 RX ADMIN — Medication 1 MILLIGRAM(S): at 10:55

## 2021-05-16 RX ADMIN — DOLUTEGRAVIR SODIUM 50 MILLIGRAM(S): 25 TABLET, FILM COATED ORAL at 10:54

## 2021-05-16 RX ADMIN — QUETIAPINE FUMARATE 300 MILLIGRAM(S): 200 TABLET, FILM COATED ORAL at 21:38

## 2021-05-16 RX ADMIN — EMTRICITABINE AND TENOFOVIR DISOPROXIL FUMARATE 1 TABLET(S): 200; 300 TABLET, FILM COATED ORAL at 10:54

## 2021-05-16 RX ADMIN — Medication 1 TABLET(S): at 10:55

## 2021-05-17 LAB
ALBUMIN SERPL ELPH-MCNC: 3.7 G/DL — SIGNIFICANT CHANGE UP (ref 3.3–5)
ALP SERPL-CCNC: 74 U/L — SIGNIFICANT CHANGE UP (ref 40–120)
ALT FLD-CCNC: 33 U/L — SIGNIFICANT CHANGE UP (ref 10–45)
AST SERPL-CCNC: 50 U/L — HIGH (ref 10–40)
BILIRUB DIRECT SERPL-MCNC: 0.2 MG/DL — SIGNIFICANT CHANGE UP (ref 0–0.2)
BILIRUB INDIRECT FLD-MCNC: 0 MG/DL — LOW (ref 0.2–1)
BILIRUB SERPL-MCNC: 0.2 MG/DL — SIGNIFICANT CHANGE UP (ref 0.2–1.2)
PROT SERPL-MCNC: 7.1 G/DL — SIGNIFICANT CHANGE UP (ref 6–8.3)

## 2021-05-17 PROCEDURE — 99232 SBSQ HOSP IP/OBS MODERATE 35: CPT | Mod: GC

## 2021-05-17 RX ADMIN — Medication 1 TABLET(S): at 11:29

## 2021-05-17 RX ADMIN — Medication 1 MILLIGRAM(S): at 11:29

## 2021-05-17 RX ADMIN — EMTRICITABINE AND TENOFOVIR DISOPROXIL FUMARATE 1 TABLET(S): 200; 300 TABLET, FILM COATED ORAL at 11:29

## 2021-05-17 RX ADMIN — QUETIAPINE FUMARATE 300 MILLIGRAM(S): 200 TABLET, FILM COATED ORAL at 21:13

## 2021-05-17 RX ADMIN — DOLUTEGRAVIR SODIUM 50 MILLIGRAM(S): 25 TABLET, FILM COATED ORAL at 11:29

## 2021-05-18 PROCEDURE — 99232 SBSQ HOSP IP/OBS MODERATE 35: CPT

## 2021-05-18 RX ADMIN — Medication 1 MILLIGRAM(S): at 11:02

## 2021-05-18 RX ADMIN — QUETIAPINE FUMARATE 300 MILLIGRAM(S): 200 TABLET, FILM COATED ORAL at 22:00

## 2021-05-18 RX ADMIN — DOLUTEGRAVIR SODIUM 50 MILLIGRAM(S): 25 TABLET, FILM COATED ORAL at 11:01

## 2021-05-18 RX ADMIN — Medication 1 TABLET(S): at 11:02

## 2021-05-18 RX ADMIN — EMTRICITABINE AND TENOFOVIR DISOPROXIL FUMARATE 1 TABLET(S): 200; 300 TABLET, FILM COATED ORAL at 11:02

## 2021-05-18 NOTE — PROGRESS NOTE BEHAVIORAL HEALTH - RISK ASSESSMENT
Risk elements: history of schizophrenia; history of schizoaffective illness; appears depressed; history of bipolar disorder; signed in as a voluntary; has a job; has been treated for mood with lithium; recent inpatient admission(s); came on own; walk-in; -mother; -father; -daughter; may have negative thinking;   At time of admission suicide risk was HIGH.    Static: chronic psychiatric disorder; mood issues; mood episodes; mood instability; chronic psychiatric issues; possibly isolated;     Modifiable: treatment of psychotic sxs; treat underlying mood issues; help stabilize mood; assess/address treatment compliance and efficacy issues; improve interpersonal engagement via groups and therapy; assist engaging possible support network; address possible anger issues;     Protective: seeks help; has job or job skills; actively  seeking help; may have involved family; review for degree of family engagement; has capacity to report internal thoughts to care providers;     Risk currently MODERATE with medication, milieu,, and ability to address substance abuse.
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
## 2021-05-19 PROCEDURE — 99232 SBSQ HOSP IP/OBS MODERATE 35: CPT

## 2021-05-19 RX ORDER — DOLUTEGRAVIR SODIUM 25 MG/1
50 TABLET, FILM COATED ORAL DAILY
Refills: 0 | Status: DISCONTINUED | OUTPATIENT
Start: 2021-05-19 | End: 2021-05-21

## 2021-05-19 RX ORDER — EMTRICITABINE AND TENOFOVIR DISOPROXIL FUMARATE 200; 300 MG/1; MG/1
1 TABLET, FILM COATED ORAL DAILY
Refills: 0 | Status: DISCONTINUED | OUTPATIENT
Start: 2021-05-19 | End: 2021-05-21

## 2021-05-19 RX ADMIN — EMTRICITABINE AND TENOFOVIR DISOPROXIL FUMARATE 1 TABLET(S): 200; 300 TABLET, FILM COATED ORAL at 10:13

## 2021-05-19 RX ADMIN — QUETIAPINE FUMARATE 300 MILLIGRAM(S): 200 TABLET, FILM COATED ORAL at 21:43

## 2021-05-19 RX ADMIN — Medication 1 TABLET(S): at 10:15

## 2021-05-19 RX ADMIN — DOLUTEGRAVIR SODIUM 50 MILLIGRAM(S): 25 TABLET, FILM COATED ORAL at 10:13

## 2021-05-19 RX ADMIN — Medication 1 MILLIGRAM(S): at 10:12
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
## 2021-05-19 NOTE — ED BEHAVIORAL HEALTH ASSESSMENT NOTE - SUICIDE PROTECTIVE FACTORS
Subjective:   Destiny New is a 54 y.o. female here today for shoulder pain, requesting sleep study    Tendinitis of right rotator cuff  She continues to have some mild pain in the right shoulder.  We did do a subacromial bursa injection for her back in April and she reports this helped.  She is taking ibuprofen about once a day for the pain.  She never started physical therapy for her shoulder due to lack of time but now feels that she could and she is interested in this.  She is not currently doing any home exercises.    Snoring  She reports heavy snoring at night.  States that she feels very sleepy in the mornings and can fall asleep easily if trying to watch TV or read a book.  She has occasional morning headaches.  She denies orthopnea or PND.  She is requesting a sleep study.  She has never been diagnosed with BALBIR in the past.       Current medicines (including changes today)  Current Outpatient Medications   Medication Sig Dispense Refill   • ALPRAZolam (XANAX) 0.5 MG Tab      • amLODIPine (NORVASC) 5 MG Tab TAKE 1 TAB BY MOUTH EVERY DAY. 90 tablet 3   • carBAMazepine SR (TEGRETOL XR) 100 MG TABLET SR 12 HR      • venlafaxine XR (EFFEXOR XR) 37.5 MG CAPSULE SR 24 HR Take 1 Cap by mouth every day. 30 Cap 3   • diclofenac DR (VOLTAREN) 75 MG Tablet Delayed Response Take 1 tablet by mouth 2 times a day. 30 tablet 0   • Nutritional Supplements (ESTROVEN PM PO) Take  by mouth. (Patient not taking: Reported on 5/19/2021)       No current facility-administered medications for this visit.     She  has a past medical history of Anxiety, Arthritis, Bipolar disorder (HCC), and Cancer (HCC).    ROS   Denies chest pain, shortness of breath  As above in HPI     Objective:     Vitals:    05/19/21 0841   BP: 122/82   Pulse: (!) 54   Resp: 16   Temp: 36.2 °C (97.1 °F)   SpO2: 94%     Body mass index is 29.41 kg/m².   Physical Exam:  Constitutional: Alert, no distress.  Skin: Warm, dry, good turgor, no rashes in visible  areas.  Eye: Equal, round and reactive, conjunctiva clear, lids normal.  Psych: Alert and oriented x3, normal affect and mood.      Assessment and Plan:   The following treatment plan was discussed    1. Snoring  Concern for sleep apnea.  Will refer for sleep study.  - REFERRAL TO PULMONARY AND SLEEP MEDICINE    2. Subacromial bursitis of right shoulder joint  Pain is mild at this time and she did respond to the subacromial bursa injection however concerned that she will reaggravate symptoms.  We discussed that the most frequent we can do shoulder injections every 3 months.  We discussed the importance of physical therapy to strengthen her shoulder and prevent further injury.  Referral has been placed.  - REFERRAL TO PHYSICAL THERAPY    3. Tear of right supraspinatus tendon  - REFERRAL TO PHYSICAL THERAPY    4. Screening mammogram, encounter for  - MA-SCREENING MAMMO BILAT W/CAD; Future    5. Screen for colon cancer  - REFERRAL TO GI FOR COLONOSCOPY    6. Tendinitis of right rotator cuff  - REFERRAL TO PHYSICAL THERAPY    7. Excessive daytime sleepiness  - REFERRAL TO PULMONARY AND SLEEP MEDICINE        Followup: Return if symptoms worsen or fail to improve.          None known

## 2021-05-20 PROCEDURE — 99232 SBSQ HOSP IP/OBS MODERATE 35: CPT

## 2021-05-20 RX ORDER — SERTRALINE 25 MG/1
25 TABLET, FILM COATED ORAL DAILY
Refills: 0 | Status: DISCONTINUED | OUTPATIENT
Start: 2021-05-20 | End: 2021-05-20

## 2021-05-20 RX ORDER — FOLIC ACID 0.8 MG
1 TABLET ORAL
Qty: 0 | Refills: 0 | DISCHARGE
Start: 2021-05-20

## 2021-05-20 RX ORDER — QUETIAPINE FUMARATE 200 MG/1
1 TABLET, FILM COATED ORAL
Qty: 0 | Refills: 0 | DISCHARGE
Start: 2021-05-20

## 2021-05-20 RX ORDER — EMTRICITABINE AND TENOFOVIR DISOPROXIL FUMARATE 200; 300 MG/1; MG/1
1 TABLET, FILM COATED ORAL
Qty: 0 | Refills: 0 | DISCHARGE
Start: 2021-05-20

## 2021-05-20 RX ORDER — DOLUTEGRAVIR SODIUM 25 MG/1
1 TABLET, FILM COATED ORAL
Qty: 0 | Refills: 0 | DISCHARGE
Start: 2021-05-20

## 2021-05-20 RX ADMIN — EMTRICITABINE AND TENOFOVIR DISOPROXIL FUMARATE 1 TABLET(S): 200; 300 TABLET, FILM COATED ORAL at 10:36

## 2021-05-20 RX ADMIN — DOLUTEGRAVIR SODIUM 50 MILLIGRAM(S): 25 TABLET, FILM COATED ORAL at 10:36

## 2021-05-20 RX ADMIN — SERTRALINE 25 MILLIGRAM(S): 25 TABLET, FILM COATED ORAL at 10:36

## 2021-05-20 RX ADMIN — Medication 1 TABLET(S): at 10:36

## 2021-05-20 RX ADMIN — QUETIAPINE FUMARATE 300 MILLIGRAM(S): 200 TABLET, FILM COATED ORAL at 21:01

## 2021-05-20 RX ADMIN — Medication 1 MILLIGRAM(S): at 10:36
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
## 2021-05-20 NOTE — PROGRESS NOTE BEHAVIORAL HEALTH - NSBHADMITMEDEDUDETAILS_A_CORE FT
Seroquel, risks and benefits
Naltrexone, acamprosate, disulfiram - risks and benefits
Naltrexone, acamprosate, disulfiram - risks and benefits
Seroquel, risks and benefits
Naltrexone, acamprosate, disulfiram - risks and benefits
Seroquel, risks and benefits
Naltrexone, acamprosate, disulfiram - risks and benefits

## 2021-05-21 VITALS
HEART RATE: 78 BPM | DIASTOLIC BLOOD PRESSURE: 73 MMHG | RESPIRATION RATE: 20 BRPM | TEMPERATURE: 98 F | OXYGEN SATURATION: 98 % | SYSTOLIC BLOOD PRESSURE: 112 MMHG

## 2021-05-21 LAB
HIV-1 VIRAL LOAD RESULT: ABNORMAL
HIV1 RNA # SERPL NAA+PROBE: 90 — SIGNIFICANT CHANGE UP
HIV1 RNA SER-IMP: SIGNIFICANT CHANGE UP
HIV1 RNA SERPL NAA+PROBE-ACNC: ABNORMAL
HIV1 RNA SERPL NAA+PROBE-LOG#: 1.95 — SIGNIFICANT CHANGE UP

## 2021-05-21 PROCEDURE — 85027 COMPLETE CBC AUTOMATED: CPT

## 2021-05-21 PROCEDURE — 80307 DRUG TEST PRSMV CHEM ANLYZR: CPT

## 2021-05-21 PROCEDURE — 85025 COMPLETE CBC W/AUTO DIFF WBC: CPT

## 2021-05-21 PROCEDURE — U0003: CPT

## 2021-05-21 PROCEDURE — 84443 ASSAY THYROID STIM HORMONE: CPT

## 2021-05-21 PROCEDURE — 86780 TREPONEMA PALLIDUM: CPT

## 2021-05-21 PROCEDURE — 86803 HEPATITIS C AB TEST: CPT

## 2021-05-21 PROCEDURE — 80048 BASIC METABOLIC PNL TOTAL CA: CPT

## 2021-05-21 PROCEDURE — 87536 HIV-1 QUANT&REVRSE TRNSCRPJ: CPT

## 2021-05-21 PROCEDURE — 82962 GLUCOSE BLOOD TEST: CPT

## 2021-05-21 PROCEDURE — 93005 ELECTROCARDIOGRAM TRACING: CPT

## 2021-05-21 PROCEDURE — 99285 EMERGENCY DEPT VISIT HI MDM: CPT | Mod: 25

## 2021-05-21 PROCEDURE — 87635 SARS-COV-2 COVID-19 AMP PRB: CPT

## 2021-05-21 PROCEDURE — 80053 COMPREHEN METABOLIC PANEL: CPT

## 2021-05-21 PROCEDURE — 80061 LIPID PANEL: CPT

## 2021-05-21 PROCEDURE — 81003 URINALYSIS AUTO W/O SCOPE: CPT

## 2021-05-21 PROCEDURE — 83036 HEMOGLOBIN GLYCOSYLATED A1C: CPT

## 2021-05-21 PROCEDURE — 99232 SBSQ HOSP IP/OBS MODERATE 35: CPT

## 2021-05-21 PROCEDURE — U0005: CPT

## 2021-05-21 PROCEDURE — 80076 HEPATIC FUNCTION PANEL: CPT

## 2021-05-21 PROCEDURE — 86359 T CELLS TOTAL COUNT: CPT

## 2021-05-21 PROCEDURE — 86360 T CELL ABSOLUTE COUNT/RATIO: CPT

## 2021-05-21 PROCEDURE — 36415 COLL VENOUS BLD VENIPUNCTURE: CPT

## 2021-05-21 PROCEDURE — 86480 TB TEST CELL IMMUN MEASURE: CPT

## 2021-05-21 PROCEDURE — 86769 SARS-COV-2 COVID-19 ANTIBODY: CPT

## 2021-05-21 RX ORDER — NICOTINE POLACRILEX 2 MG
1 GUM BUCCAL
Qty: 360 | Refills: 0
Start: 2021-05-21 | End: 2021-06-19

## 2021-05-21 RX ORDER — DOLUTEGRAVIR SODIUM 25 MG/1
1 TABLET, FILM COATED ORAL
Qty: 30 | Refills: 0
Start: 2021-05-21 | End: 2021-06-19

## 2021-05-21 RX ORDER — THIAMINE MONONITRATE (VIT B1) 100 MG
1 TABLET ORAL
Qty: 30 | Refills: 0
Start: 2021-05-21 | End: 2021-06-19

## 2021-05-21 RX ORDER — FOLIC ACID 0.8 MG
1 TABLET ORAL
Qty: 30 | Refills: 0
Start: 2021-05-21 | End: 2021-06-19

## 2021-05-21 RX ORDER — QUETIAPINE FUMARATE 200 MG/1
1 TABLET, FILM COATED ORAL
Qty: 30 | Refills: 0
Start: 2021-05-21 | End: 2021-06-19

## 2021-05-21 RX ORDER — EMTRICITABINE AND TENOFOVIR DISOPROXIL FUMARATE 200; 300 MG/1; MG/1
1 TABLET, FILM COATED ORAL
Qty: 30 | Refills: 0
Start: 2021-05-21 | End: 2021-06-19

## 2021-05-21 RX ORDER — QUETIAPINE FUMARATE 200 MG/1
1 TABLET, FILM COATED ORAL
Qty: 30 | Refills: 0
Start: 2021-05-21 | End: 2022-06-25

## 2021-05-21 RX ADMIN — Medication 1 TABLET(S): at 10:38

## 2021-05-21 RX ADMIN — Medication 1 MILLIGRAM(S): at 10:38

## 2021-05-21 RX ADMIN — DOLUTEGRAVIR SODIUM 50 MILLIGRAM(S): 25 TABLET, FILM COATED ORAL at 10:38

## 2021-05-21 RX ADMIN — EMTRICITABINE AND TENOFOVIR DISOPROXIL FUMARATE 1 TABLET(S): 200; 300 TABLET, FILM COATED ORAL at 10:38

## 2021-05-21 NOTE — PROGRESS NOTE BEHAVIORAL HEALTH - NSBHCHARTREVIEWVS_PSY_A_CORE FT
Vital Signs Last 24 Hrs  T(C): 37.1 (19 May 2021 16:05), Max: 37.1 (19 May 2021 16:05)  T(F): 98.8 (19 May 2021 16:05), Max: 98.8 (19 May 2021 16:05)  HR: 85 (19 May 2021 16:05) (85 - 85)  BP: 112/73 (19 May 2021 16:05) (112/73 - 112/73)  BP(mean): --  RR: 18 (19 May 2021 16:05) (18 - 18)  SpO2: 98% (19 May 2021 16:05) (98% - 98%)
Vital Signs Last 24 Hrs  T(C): 36.7 (18 May 2021 16:53), Max: 36.7 (18 May 2021 16:53)  T(F): 98.1 (18 May 2021 16:53), Max: 98.1 (18 May 2021 16:53)  HR: 85 (18 May 2021 16:53) (85 - 85)  BP: 115/69 (18 May 2021 16:53) (115/69 - 115/69)  BP(mean): --  RR: 18 (18 May 2021 16:53) (18 - 18)  SpO2: 98% (18 May 2021 16:53) (98% - 98%)
Vital Signs Last 24 Hrs  T(C): 36.7 (20 May 2021 16:00), Max: 36.7 (20 May 2021 10:00)  T(F): 98 (20 May 2021 16:00), Max: 98 (20 May 2021 10:00)  HR: 77 (20 May 2021 16:00) (69 - 77)  BP: 108/70 (20 May 2021 16:00) (108/70 - 113/70)  BP(mean): --  RR: 18 (20 May 2021 16:00) (18 - 20)  SpO2: 98% (20 May 2021 16:00) (97% - 98%)
Vital Signs Last 24 Hrs  T(C): 37 (13 May 2021 17:34), Max: 37 (13 May 2021 17:34)  T(F): 98.6 (13 May 2021 17:34), Max: 98.6 (13 May 2021 17:34)  HR: 89 (13 May 2021 17:34) (79 - 89)  BP: 114/72 (13 May 2021 17:34) (114/72 - 114/74)  BP(mean): --  RR: 18 (13 May 2021 17:34) (18 - 18)  SpO2: 98% (13 May 2021 17:34) (98% - 98%)
Vital Signs Last 24 Hrs  T(C): 36.9 (10 May 2021 15:40), Max: 36.9 (10 May 2021 06:26)  T(F): 98.4 (10 May 2021 15:40), Max: 98.4 (10 May 2021 06:26)  HR: 83 (10 May 2021 15:40) (60 - 83)  BP: 114/76 (10 May 2021 15:40) (112/76 - 124/76)  BP(mean): --  RR: 18 (10 May 2021 15:40) (18 - 20)  SpO2: 98% (10 May 2021 15:40) (97% - 98%)
Vital Signs Last 24 Hrs  T(C): 37 (12 May 2021 16:15), Max: 37 (12 May 2021 16:15)  T(F): 98.6 (12 May 2021 16:15), Max: 98.6 (12 May 2021 16:15)  HR: 75 (12 May 2021 16:15) (75 - 75)  BP: 111/72 (12 May 2021 16:15) (111/72 - 111/72)  BP(mean): --  RR: 80 (12 May 2021 16:15) (80 - 80)  SpO2: 98% (12 May 2021 16:15) (98% - 98%)
Vital Signs Last 24 Hrs  T(C): 37.3 (17 May 2021 17:07), Max: 37.3 (17 May 2021 17:07)  T(F): 99.1 (17 May 2021 17:07), Max: 99.1 (17 May 2021 17:07)  HR: 76 (17 May 2021 17:07) (75 - 76)  BP: 100/66 (17 May 2021 17:07) (98/63 - 100/66)  BP(mean): --  RR: 19 (17 May 2021 17:07) (18 - 19)  SpO2: 98% (17 May 2021 17:07) (98% - 99%)
Vital Signs Last 24 Hrs  T(C): 37.3 (17 May 2021 17:07), Max: 37.3 (17 May 2021 17:07)  T(F): 99.1 (17 May 2021 17:07), Max: 99.1 (17 May 2021 17:07)  HR: 76 (17 May 2021 17:07) (75 - 76)  BP: 100/66 (17 May 2021 17:07) (98/63 - 100/66)  BP(mean): --  RR: 19 (17 May 2021 17:07) (18 - 19)  SpO2: 98% (17 May 2021 17:07) (98% - 99%)
Vital Signs Last 24 Hrs  T(C): 37 (12 May 2021 16:15), Max: 37.7 (11 May 2021 20:22)  T(F): 98.6 (12 May 2021 16:15), Max: 99.8 (11 May 2021 20:22)  HR: 75 (12 May 2021 16:15) (63 - 85)  BP: 111/72 (12 May 2021 16:15) (99/68 - 135/91)  BP(mean): --  RR: 80 (12 May 2021 16:15) (17 - 80)  SpO2: 98% (12 May 2021 16:15) (97% - 98%)
Vital Signs Last 24 Hrs  T(C): 37.4 (11 May 2021 16:03), Max: 37.4 (11 May 2021 16:03)  T(F): 99.3 (11 May 2021 16:03), Max: 99.3 (11 May 2021 16:03)  HR: 83 (11 May 2021 16:03) (64 - 83)  BP: 101/67 (11 May 2021 16:03) (101/67 - 115/73)  BP(mean): --  RR: 16 (11 May 2021 16:03) (16 - 18)  SpO2: 99% (11 May 2021 16:03) (96% - 99%)
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
## 2021-05-21 NOTE — PROGRESS NOTE BEHAVIORAL HEALTH - NSBHPTASSESSDT_PSY_A_CORE
12-May-2021 08:18
14-May-2021 07:08
17-May-2021 08:23
13-May-2021 07:14
21-May-2021 07:12
19-May-2021 08:10
20-May-2021 07:12
18-May-2021 07:12
11-May-2021 08:10
10-May-2021 13:28

## 2021-05-21 NOTE — PROGRESS NOTE BEHAVIORAL HEALTH - PROBLEM SELECTOR PROBLEM 1
Bipolar disorder, current episode depressed, severe, with psychotic features

## 2021-05-21 NOTE — PROGRESS NOTE BEHAVIORAL HEALTH - ESTIMATED DISCHARGE DATE
21-May-2021

## 2021-05-21 NOTE — PROGRESS NOTE BEHAVIORAL HEALTH - PROBLEM SELECTOR PLAN 3
motivational interviewing   pt is currently not a candidate for naltrexone, as he has elevated liver enzymes (trending down from admission)
motivational interviewing   Discuss medication therapy for treatment of alcohol use disorder
motivational interviewing - action stage, planning for IP rehab referral   Currently not interested in medication treatment for alcohol use disorder   Elevated liver enzymes - trending down, repeat LFTs prior to discharge.
motivational interviewing - action stage, planning for IP rehab referral   pt is currently not a candidate for naltrexone, as he has elevated liver enzymes (trending down from admission)
motivational interviewing - action stage, planning for IP rehab referral   Elevated liver enzymes - trended down  Currently not interested in medication treatment for alcohol use disorder
motivational interviewing - action stage, planning for IP rehab referral   Currently not interested in medication treatment for alcohol use disorder   Elevated liver enzymes - trending down, repeat LFTs prior to discharge.
motivational interviewing - action stage, planning for IP rehab referral   Elevated liver enzymes - trended down  Currently not interested in medication treatment for alcohol use disorder
motivational interviewing - action stage, planning for IP rehab referral   Elevated liver enzymes - trended down  Currently not interested in medication treatment for alcohol use disorder
motivational interviewing   pt is currently not a candidate for naltrexone, as he has elevated liver enzymes (trending down from admission)

## 2021-05-21 NOTE — PROGRESS NOTE BEHAVIORAL HEALTH - PROBLEM SELECTOR PLAN 1
mood stabilization.
Continue Seroquel to 300mg PO qHS for mood symptoms, monitor side effects
clarify past manic symptoms and rule in/out MDD with psychotic features   increase Seroquel to 300mg PO qHS for mood symptoms
Continue Seroquel to 300mg PO qHS for mood symptoms, monitor side effects

## 2021-05-21 NOTE — PROGRESS NOTE BEHAVIORAL HEALTH - PROBLEM SELECTOR PROBLEM 3
R/O Alcohol dependence with unspecified alcohol-induced disorder

## 2021-05-21 NOTE — PROGRESS NOTE BEHAVIORAL HEALTH - BODY HABITUS
Average build

## 2021-05-21 NOTE — PROGRESS NOTE BEHAVIORAL HEALTH - SECONDARY DX1
Cocaine use disorder
Cocaine abuse
Cocaine use disorder

## 2021-05-21 NOTE — PROGRESS NOTE BEHAVIORAL HEALTH - NS ED BHA MSE GENERAL APPEARANCE
No deformities present/Other
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
## 2021-05-21 NOTE — PROGRESS NOTE BEHAVIORAL HEALTH - PROBLEM SELECTOR PLAN 4
infectious disease consult
- continue Descovy and Tivicay as per Medicine/ID consult
- continue Descovy and Tivicay as per Medicine/ID consult
infectious disease consult
- resume Descovy and Tivicay as per Medicine/ID consult
- continue Descovy and Tivicay as per Medicine/ID consult

## 2021-05-21 NOTE — PROGRESS NOTE BEHAVIORAL HEALTH - NSBHATTESTSEENBY_PSY_A_CORE
12/17/2018        To Whom it may Concern:    Damián THAYER Riccardo had a PPD (tuberculin skin test) placed on the left forearm on 12/15/2018  The results were read on 12/17/2018 by JESUSITA Holly    RESULTS:   0 mm induration noted    Signed,      JESUSITA Holly    [address]  [facility] [address]  [phone]  
attending Psychiatrist without NP/Trainee
NP without Attending Psychiatrist
Attending Psychiatrist supervising NP/Trainee, meeting pt...
attending Psychiatrist without NP/Trainee

## 2021-05-21 NOTE — PROGRESS NOTE BEHAVIORAL HEALTH - PROBLEM SELECTOR PLAN 2
motivational interviewing - action stage, planning for IP rehab referral
motivational interviewing
motivational interviewing - action stage, planning for IP rehab referral
motivational interviewing
motivational interviewing
motivational interviewing - action stage, planning for IP rehab referral
motivational interviewing - action stage, planning for IP rehab referral

## 2021-05-21 NOTE — PROGRESS NOTE BEHAVIORAL HEALTH - PRIMARY DX
Schizophrenia, unspecified type
Bipolar disorder, current episode depressed, severe, with psychotic features

## 2021-05-21 NOTE — PROGRESS NOTE BEHAVIORAL HEALTH - NSBHADMITIPOBSFT_PSY_A_CORE
able to make needs known

## 2021-05-21 NOTE — PROGRESS NOTE BEHAVIORAL HEALTH - PROBLEM SELECTOR PROBLEM 2
Cocaine abuse

## 2021-05-27 DIAGNOSIS — F14.10 COCAINE ABUSE, UNCOMPLICATED: ICD-10-CM

## 2021-05-27 DIAGNOSIS — Z21 ASYMPTOMATIC HUMAN IMMUNODEFICIENCY VIRUS [HIV] INFECTION STATUS: ICD-10-CM

## 2021-05-27 DIAGNOSIS — F33.8 OTHER RECURRENT DEPRESSIVE DISORDERS: ICD-10-CM

## 2021-05-27 DIAGNOSIS — Z88.0 ALLERGY STATUS TO PENICILLIN: ICD-10-CM

## 2021-05-27 DIAGNOSIS — R45.851 SUICIDAL IDEATIONS: ICD-10-CM

## 2021-05-27 DIAGNOSIS — F29 UNSPECIFIED PSYCHOSIS NOT DUE TO A SUBSTANCE OR KNOWN PHYSIOLOGICAL CONDITION: ICD-10-CM

## 2021-05-27 DIAGNOSIS — F10.20 ALCOHOL DEPENDENCE, UNCOMPLICATED: ICD-10-CM

## 2021-06-18 NOTE — ED PROVIDER NOTE - PRINCIPAL DIAGNOSIS
pt recently back from [facility] yesterday morning c.o SOB, chest tightness. States she saw an MD in [facility] and was told had elevated T waves, was supposed to be admitted to hospital over there but daughter had pt returned to [facility]. Pt denies N/V, fever, cough. PMH HLD. Pt fully vaccinated and had negative covid test after traveling.
Abscess

## 2021-06-25 NOTE — ED BEHAVIORAL HEALTH ASSESSMENT NOTE - GROOMING
Airway  Performed by: Noa Reilly MD  Authorized by: Noa Reilly MD     Final Airway Type:  Supraglottic airway  SGA Size*:  4.5  Attempts*:  1   Patient Identified, Procedure confirmed, Emergency equipment available and Safety protocols followed  Location:  OR  Urgency:  Elective  Difficult Airway: No    Indications for Airway Management:  Anesthesia  Sedation Level:  Deep  Mask Difficulty Assessment:  1 - vent by mask  Anesthesiologist:  Noa Reilly MD   EZM and LMA           Fair

## 2021-07-03 ENCOUNTER — EMERGENCY (EMERGENCY)
Facility: HOSPITAL | Age: 36
LOS: 1 days | Discharge: ANOTHER HOSPITAL | End: 2021-07-03
Attending: EMERGENCY MEDICINE | Admitting: EMERGENCY MEDICINE
Payer: MEDICAID

## 2021-07-03 VITALS
SYSTOLIC BLOOD PRESSURE: 137 MMHG | TEMPERATURE: 98 F | OXYGEN SATURATION: 98 % | DIASTOLIC BLOOD PRESSURE: 92 MMHG | RESPIRATION RATE: 18 BRPM | WEIGHT: 145.06 LBS | HEIGHT: 66 IN | HEART RATE: 72 BPM

## 2021-07-03 DIAGNOSIS — F32.9 MAJOR DEPRESSIVE DISORDER, SINGLE EPISODE, UNSPECIFIED: ICD-10-CM

## 2021-07-03 DIAGNOSIS — F31.9 BIPOLAR DISORDER, UNSPECIFIED: ICD-10-CM

## 2021-07-03 DIAGNOSIS — B20 HUMAN IMMUNODEFICIENCY VIRUS [HIV] DISEASE: ICD-10-CM

## 2021-07-03 DIAGNOSIS — R45.851 SUICIDAL IDEATIONS: ICD-10-CM

## 2021-07-03 DIAGNOSIS — F20.9 SCHIZOPHRENIA, UNSPECIFIED: ICD-10-CM

## 2021-07-03 DIAGNOSIS — R44.1 VISUAL HALLUCINATIONS: ICD-10-CM

## 2021-07-03 DIAGNOSIS — Z86.16 PERSONAL HISTORY OF COVID-19: ICD-10-CM

## 2021-07-03 DIAGNOSIS — Z20.822 CONTACT WITH AND (SUSPECTED) EXPOSURE TO COVID-19: ICD-10-CM

## 2021-07-03 DIAGNOSIS — Z88.0 ALLERGY STATUS TO PENICILLIN: ICD-10-CM

## 2021-07-03 LAB
ALBUMIN SERPL ELPH-MCNC: 3.3 G/DL — LOW (ref 3.4–5)
ALP SERPL-CCNC: 97 U/L — SIGNIFICANT CHANGE UP (ref 40–120)
ALT FLD-CCNC: 46 U/L — HIGH (ref 12–42)
AMPHET UR-MCNC: NEGATIVE — SIGNIFICANT CHANGE UP
ANION GAP SERPL CALC-SCNC: 7 MMOL/L — LOW (ref 9–16)
APPEARANCE UR: CLEAR — SIGNIFICANT CHANGE UP
AST SERPL-CCNC: 43 U/L — HIGH (ref 15–37)
BARBITURATES UR SCN-MCNC: NEGATIVE — SIGNIFICANT CHANGE UP
BASOPHILS # BLD AUTO: 0.02 K/UL — SIGNIFICANT CHANGE UP (ref 0–0.2)
BASOPHILS NFR BLD AUTO: 0.5 % — SIGNIFICANT CHANGE UP (ref 0–2)
BENZODIAZ UR-MCNC: NEGATIVE — SIGNIFICANT CHANGE UP
BILIRUB SERPL-MCNC: 0.2 MG/DL — SIGNIFICANT CHANGE UP (ref 0.2–1.2)
BILIRUB UR-MCNC: ABNORMAL
BUN SERPL-MCNC: 6 MG/DL — LOW (ref 7–23)
CALCIUM SERPL-MCNC: 8.6 MG/DL — SIGNIFICANT CHANGE UP (ref 8.5–10.5)
CHLORIDE SERPL-SCNC: 108 MMOL/L — SIGNIFICANT CHANGE UP (ref 96–108)
CO2 SERPL-SCNC: 27 MMOL/L — SIGNIFICANT CHANGE UP (ref 22–31)
COCAINE METAB.OTHER UR-MCNC: POSITIVE
COLOR SPEC: YELLOW — SIGNIFICANT CHANGE UP
COMMENT - URINE: SIGNIFICANT CHANGE UP
CREAT SERPL-MCNC: 1.03 MG/DL — SIGNIFICANT CHANGE UP (ref 0.5–1.3)
DIFF PNL FLD: NEGATIVE — SIGNIFICANT CHANGE UP
EOSINOPHIL # BLD AUTO: 0.26 K/UL — SIGNIFICANT CHANGE UP (ref 0–0.5)
EOSINOPHIL NFR BLD AUTO: 6 % — SIGNIFICANT CHANGE UP (ref 0–6)
EPI CELLS # UR: SIGNIFICANT CHANGE UP /HPF (ref 0–5)
ETHANOL SERPL-MCNC: <3 MG/DL — SIGNIFICANT CHANGE UP
GLUCOSE SERPL-MCNC: 106 MG/DL — HIGH (ref 70–99)
GLUCOSE UR QL: NEGATIVE — SIGNIFICANT CHANGE UP
HCT VFR BLD CALC: 45 % — SIGNIFICANT CHANGE UP (ref 39–50)
HGB BLD-MCNC: 14.4 G/DL — SIGNIFICANT CHANGE UP (ref 13–17)
IMM GRANULOCYTES NFR BLD AUTO: 0 % — SIGNIFICANT CHANGE UP (ref 0–1.5)
KETONES UR-MCNC: NEGATIVE — SIGNIFICANT CHANGE UP
LEUKOCYTE ESTERASE UR-ACNC: NEGATIVE — SIGNIFICANT CHANGE UP
LYMPHOCYTES # BLD AUTO: 1.92 K/UL — SIGNIFICANT CHANGE UP (ref 1–3.3)
LYMPHOCYTES # BLD AUTO: 44.1 % — HIGH (ref 13–44)
MCHC RBC-ENTMCNC: 30 PG — SIGNIFICANT CHANGE UP (ref 27–34)
MCHC RBC-ENTMCNC: 32 GM/DL — SIGNIFICANT CHANGE UP (ref 32–36)
MCV RBC AUTO: 93.8 FL — SIGNIFICANT CHANGE UP (ref 80–100)
METHADONE UR-MCNC: NEGATIVE — SIGNIFICANT CHANGE UP
MONOCYTES # BLD AUTO: 0.29 K/UL — SIGNIFICANT CHANGE UP (ref 0–0.9)
MONOCYTES NFR BLD AUTO: 6.7 % — SIGNIFICANT CHANGE UP (ref 2–14)
NEUTROPHILS # BLD AUTO: 1.86 K/UL — SIGNIFICANT CHANGE UP (ref 1.8–7.4)
NEUTROPHILS NFR BLD AUTO: 42.7 % — LOW (ref 43–77)
NITRITE UR-MCNC: NEGATIVE — SIGNIFICANT CHANGE UP
NRBC # BLD: 0 /100 WBCS — SIGNIFICANT CHANGE UP (ref 0–0)
OPIATES UR-MCNC: NEGATIVE — SIGNIFICANT CHANGE UP
PCP SPEC-MCNC: SIGNIFICANT CHANGE UP
PCP UR-MCNC: NEGATIVE — SIGNIFICANT CHANGE UP
PH UR: 6.5 — SIGNIFICANT CHANGE UP (ref 5–8)
PLATELET # BLD AUTO: 268 K/UL — SIGNIFICANT CHANGE UP (ref 150–400)
POTASSIUM SERPL-MCNC: 3.5 MMOL/L — SIGNIFICANT CHANGE UP (ref 3.5–5.3)
POTASSIUM SERPL-SCNC: 3.5 MMOL/L — SIGNIFICANT CHANGE UP (ref 3.5–5.3)
PROT SERPL-MCNC: 8 G/DL — SIGNIFICANT CHANGE UP (ref 6.4–8.2)
PROT UR-MCNC: 30 MG/DL
RBC # BLD: 4.8 M/UL — SIGNIFICANT CHANGE UP (ref 4.2–5.8)
RBC # FLD: 14.4 % — SIGNIFICANT CHANGE UP (ref 10.3–14.5)
RBC CASTS # UR COMP ASSIST: < 5 /HPF — SIGNIFICANT CHANGE UP
SARS-COV-2 RNA SPEC QL NAA+PROBE: SIGNIFICANT CHANGE UP
SODIUM SERPL-SCNC: 142 MMOL/L — SIGNIFICANT CHANGE UP (ref 132–145)
SP GR SPEC: 1.02 — SIGNIFICANT CHANGE UP (ref 1–1.03)
THC UR QL: NEGATIVE — SIGNIFICANT CHANGE UP
UROBILINOGEN FLD QL: 4 E.U./DL
WBC # BLD: 4.35 K/UL — SIGNIFICANT CHANGE UP (ref 3.8–10.5)
WBC # FLD AUTO: 4.35 K/UL — SIGNIFICANT CHANGE UP (ref 3.8–10.5)
WBC UR QL: < 5 /HPF — SIGNIFICANT CHANGE UP

## 2021-07-03 PROCEDURE — 90792 PSYCH DIAG EVAL W/MED SRVCS: CPT | Mod: 95

## 2021-07-03 PROCEDURE — 99284 EMERGENCY DEPT VISIT MOD MDM: CPT

## 2021-07-03 PROCEDURE — 93010 ELECTROCARDIOGRAM REPORT: CPT

## 2021-07-03 NOTE — ED PROVIDER NOTE - PROGRESS NOTE DETAILS
Pt medically clear for psych admission. Tele Psych attending called back and rec voluntary admission. Legals sent to tele psych. They are working on admission location. admission to go to [facility]. admission to go to [facility] under Dr Cisneros.

## 2021-07-03 NOTE — ED BEHAVIORAL HEALTH ASSESSMENT NOTE - SUMMARY
36yo reportedly domiciled, unemployed,   M w/ hx of affective and psychotic symptoms, w/ hx of SA and SIB, w/ multiple psych hospitalizations, w/ hx of opioid use in remission, alcohol use, cocaine use who presented w/ worsening depression w/ SI with purported recent suicidal/self injurious gesture of superficially lacerating the right side of his neck. This is in context of med non adherence and possibly relating to substance use. Pt has multiple risk factors inc hx of SA and SIB, hx of substance use, hx of chronic med illness, marital status, unemployment. He might benefit from inpatient psych services to help resume psych meds to address his mood symptoms. Patient is amenable to this.

## 2021-07-03 NOTE — ED ADULT NURSE REASSESSMENT NOTE - NS ED NURSE REASSESS COMMENT FT1
Pt received from [facility]. Pt on 1:1, belongings already secured by security. Pt reports having suicidal ideation after being noncompliant with medications x1 month. Pt denies plan, homicidal ideation, substance use. Pt reports alcohol use 3 days ago, denies daily drinking. Awake and alert, breathing spontaneous and nonlabored. Denies medical complaints at this time.

## 2021-07-03 NOTE — ED PROVIDER NOTE - OBJECTIVE STATEMENT
PMhx depression, schizophrenia, bipolar d/o, HIV noncompliant with all medications presents with suicidal ideation. denies plan but states that he cut himself over the R side of the neck last week. states that he had been feeling so depressed that he has not been able to go home and had been sleeping in the street.admits to visual hallucinations. denies homicidal ideation. admits to psych admission for SI in the past, most recently in 5/2021. has not followed up without patient psych since previous admission. states that he has an appointment with ID clinic on 7/10/21. admits to taking cocaine 4 days ago. denies any other recreational drug use.

## 2021-07-03 NOTE — ED BEHAVIORAL HEALTH ASSESSMENT NOTE - DIFFERENTIAL
unspecified depressive d/o, cocaine use, alcohol use, r/o substance induced mood d/o, r/o MDD w/ psychotic fx (per hx)

## 2021-07-03 NOTE — ED BEHAVIORAL HEALTH ASSESSMENT NOTE - PSYCHIATRIC ISSUES AND PLAN (INCLUDE STANDING AND PRN MEDICATION)
resume seroquel 50mg po HS, cymbalta 20mg po qdaily; prn: haldol 5mg po q6hr prn agitation, ativan 2mg q4h prn agitaiton

## 2021-07-03 NOTE — ED BEHAVIORAL HEALTH NOTE - BEHAVIORAL HEALTH NOTE
===================  PRE-HOSPITAL COURSE  ===================  SOURCE:  RN Janeth and secondhand ED documentation.  DETAILS:  Per chart, patient presented to the ED due to recent SI, worsening depression, and medication non-compliance.    ============  ED COURSE   ============  SOURCE:  RN Janeth and secondhand ED documentation.  ARRIVAL:  Per chart and RN, patient arrived as a walk-in. Per chart and RN, patient was tearful upon arrival, and compliant with triage process.   BELONGINGS:  Per RN, patient arrived with clothing and a bag, which was provided to hospital security. Patient is currently dressed in a hospital with, and is on 1:1 status.   BEHAVIOR: RN described patient to be calm and cooperative, presenting with linear thought process, is AAOx3, presenting with sad, depressed mood with appropriate affect, remains in good behavioral and impulse control, is not violent/aggressive. RN stated that the patient is reporting SI without any plan or intent, and is currently denying HI/A/VH. RN stated that there are no visible marks, bruises, or lacerations on the body. RN stated that the patient appears to be well-groomed, maintains proper hygiene, and reports good ADLs, ambulates well and independently without assistance.   TREATMENT:  Per chart and RN, patient did not) PRN medications.   VISITORS:  Per RN, patient does not currently have any visitors accompanying him in the ED.     ========================  FOR EACH COLLATERAL  ========================  NAME: Listed as “Young”  NUMBER: [phone]  RELATIONSHIP: Listed as Emergency Contact  RELIABILITY: N/A  COMMENTS: BT attempted to contact collateral however they were unavailable. [facility] was able to leave a v/m requesting a call back.     COVID Exposure Screen- collateral (i.e. third-party, chart review, belongings, etc; include EMS and ED staff)  1.	*Has the patient had a COVID-19 test in the last 90 days?  (  ) Yes   (  ) No   ( x ) Unknown- Reason: Unable to assess  IF YES PROCEED TO QUESTION #2. IF NO OR UNKNOWN, PLEASE SKIP TO QUESTION #3.  2.	Date of test(s) and result(s): ________  3.	*Has the patient tested positive for COVID-19 antibodies? (  ) Yes   (  ) No   ( x ) Unknown- Reason: Unable to assess  IF YES PROCEED TO QUESTION #4. IF NO or UNKNOWN, PLEASE SKIP TO QUESTION #5.  4.	Date of positive antibody test: ________  5.	*Has the patient received 2 doses of the COVID-19 vaccine? (  ) Yes   (  ) No   (  ) Unknown- Reason: Unable to assess  IF YES PROCEED TO QUESTION #6. IF NO or UNKNOWN, PLEASE SKIP TO QUESTION #7.  6.	 Date of second dose: ________  7.	*In the past 10 days, has the patient been around anyone with a positive COVID-19 test?* (  ) Yes   (  ) No   ( x ) Unknown- Reason: Unable to assess  IF YES PROCEED TO QUESTION #8. IF NO or UNKNOWN, PLEASE SKIP TO QUESTION #13.  8.	Was the patient within 6 feet of them for at least 15 minutes? (  ) Yes   (  ) No   (  ) Unknown- Reason: _____  9.	Did the patient provide care for them? (  ) Yes   (  ) No   (  ) Unknown- Reason: ______  10.	Did the patient have direct physical contact with them (touched, hugged, or kissed them)? (  ) Yes   (  ) No    (  ) Unknown- Reason: __  11.	Did the patient share eating or drinking utensils with them? (  ) Yes   (  ) No    (  ) Unknown- Reason: ____  12.	Did they sneeze, cough, or somehow get respiratory droplets on the patient? (  ) Yes   (  ) No    (  ) Unknown- Reason: ______  13.	*Has the patient been out of [place] within the past 10 days?* (  ) Yes   (  ) No   ( x ) Unknown- Reason: Unable to assess  IF YES PLEASE ANSWER THE FOLLOWING QUESTIONS:  14.	Which state/country did they go to? ______  15.	Were they there over 24 hours? (  ) Yes   (  ) No    (  ) Unknown- Reason: ______  16.	Date of return to [facility]: ______

## 2021-07-03 NOTE — ED PROVIDER NOTE - CLINICAL SUMMARY MEDICAL DECISION MAKING FREE TEXT BOX
PMHX depression, bipolar d/o, schizophrenia presents with suicidal ideation, states that he cut himself over the neck last week in attempt to harm himself. no HI, + visual hallucinations. will place on 1:1, check labs, consult psych

## 2021-07-03 NOTE — ED PROVIDER NOTE - ATTENDING CONTRIBUTION TO CARE
Pt presents for depression symptoms w SI, unclear pain. hx of HIV (on descovy and tivicay), severe depression w/ 12 suicide attempts in past 2 yrs, bipolar disorder, schizophrenia, alcohol use disorder, substance use disorder. Psych clearance work up ordered, constant observation for psych consult.

## 2021-07-03 NOTE — ED BEHAVIORAL HEALTH ASSESSMENT NOTE - DESCRIPTION
no beh issues; tearful, dysphoric appearing      COVID exposure screen: pt  -no travel out of [facility] in past 10 days  -no contact w/ individuals w/ covid in past 10 days  -no vaccine  -covid ab pos on 5/8/21 , unemployed, minor children not in his custody HIV - had been on tivikay, descovy

## 2021-07-03 NOTE — ED ADULT NURSE NOTE - OBJECTIVE STATEMENT
36 yo M c/o suicidal ideation. Pt reports noncompliance with psych meds x 1 month. Denies plan, homicidal ideation, auditory/visual hallucinations. Denies trauma, medical complaints. Pt alert and oriented, breathing spontaneous and nonlabored.
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
## 2021-07-03 NOTE — ED BEHAVIORAL HEALTH ASSESSMENT NOTE - MEDICAL ISSUES AND PLAN (INCLUDE STANDING AND PRN MEDICATION)
discussed w/ ED med attending; will hold restarting tivikay, descovy given hx of non adherence; will need med/ID consult

## 2021-07-03 NOTE — ED BEHAVIORAL HEALTH ASSESSMENT NOTE - HPI (INCLUDE ILLNESS QUALITY, SEVERITY, DURATION, TIMING, CONTEXT, MODIFYING FACTORS, ASSOCIATED SIGNS AND SYMPTOMS)
36yo reportedly domiciled, unemployed,   M w/ hx of affective and psychotic symptoms/disorders, w/ of past SAs (inc hanging, jumping in front of traffic, cutting his neck) with hx of SIB, w/ past psych hosp (last known in May 2021 at [facility]) w/ hx of cocaine, alcohol, opioid use (opioid use reportedly in remission), med hx of HIV who presented w/ worsening depression with SI.      Pt was seen and evaluated via telemonitor. Pt spoke of how for the past 2-3 weeks his depressive mood has been worsening w/ insomnia, anhedonia, appetite loss, socially isolating, poor ADLs and staying on streets instead of being home. Patient reported ongoing SI - reported that about 1-2 week ago superficially lacerated the right side of his neck. Patient denied recent anxiety symptoms. Patient denied current manic symptoms inc FOI, dec need for sleep, inc goal directed activities. Patient denied AH/VH/HI/intent or plan.      Collateral: reviewed d/c summary from [facility] inpatient psych unit

## 2021-07-03 NOTE — ED ADULT TRIAGE NOTE - CHIEF COMPLAINT QUOTE
c/o depression and suicidal thoughts x 3weeks. reports not taking his meds for the past month. denies suicidal plan. admits to drinking 3 days ago. denies polysubstance use. +tearful in triage. suicidal precautions/protocol initiated and followed. c/o depression and suicidal thoughts x 3weeks. reports not taking his meds for the past month. denies suicidal plan. admits to drinking 3 days ago. denies polysubstance use. +cooperative but tearful in triage. suicidal precautions/protocol initiated and followed.

## 2021-07-03 NOTE — ED BEHAVIORAL HEALTH ASSESSMENT NOTE - DETAILS
father w/ depression to nurse on unit 4/9/16 yo - not in his custody; with their perspective mothers see HPI MD made aware

## 2021-07-03 NOTE — ED ADULT NURSE NOTE - CHIEF COMPLAINT QUOTE
c/o depression and suicidal thoughts x 3weeks. reports not taking his meds for the past month. denies suicidal plan. admits to drinking 3 days ago. denies polysubstance use. +cooperative but tearful in triage. suicidal precautions/protocol initiated and followed.

## 2021-07-04 ENCOUNTER — INPATIENT (INPATIENT)
Facility: HOSPITAL | Age: 36
LOS: 9 days | Discharge: NURSING, REHAB OR LONG-TERM CARE | End: 2021-07-14
Attending: PSYCHIATRY & NEUROLOGY | Admitting: PSYCHIATRY & NEUROLOGY
Payer: COMMERCIAL

## 2021-07-04 VITALS
HEART RATE: 65 BPM | DIASTOLIC BLOOD PRESSURE: 71 MMHG | SYSTOLIC BLOOD PRESSURE: 116 MMHG | RESPIRATION RATE: 16 BRPM | WEIGHT: 139.33 LBS | HEIGHT: 66 IN | TEMPERATURE: 97 F

## 2021-07-04 VITALS
RESPIRATION RATE: 18 BRPM | HEART RATE: 64 BPM | DIASTOLIC BLOOD PRESSURE: 75 MMHG | OXYGEN SATURATION: 98 % | TEMPERATURE: 98 F | SYSTOLIC BLOOD PRESSURE: 111 MMHG

## 2021-07-04 DIAGNOSIS — F11.21 OPIOID DEPENDENCE, IN REMISSION: ICD-10-CM

## 2021-07-04 DIAGNOSIS — F31.9 BIPOLAR DISORDER, UNSPECIFIED: ICD-10-CM

## 2021-07-04 DIAGNOSIS — F41.9 ANXIETY DISORDER, UNSPECIFIED: ICD-10-CM

## 2021-07-04 DIAGNOSIS — F32.9 MAJOR DEPRESSIVE DISORDER, SINGLE EPISODE, UNSPECIFIED: ICD-10-CM

## 2021-07-04 DIAGNOSIS — B20 HUMAN IMMUNODEFICIENCY VIRUS [HIV] DISEASE: ICD-10-CM

## 2021-07-04 DIAGNOSIS — F17.200 NICOTINE DEPENDENCE, UNSPECIFIED, UNCOMPLICATED: ICD-10-CM

## 2021-07-04 DIAGNOSIS — Z21 ASYMPTOMATIC HUMAN IMMUNODEFICIENCY VIRUS [HIV] INFECTION STATUS: ICD-10-CM

## 2021-07-04 DIAGNOSIS — R45.851 SUICIDAL IDEATIONS: ICD-10-CM

## 2021-07-04 PROCEDURE — 99221 1ST HOSP IP/OBS SF/LOW 40: CPT

## 2021-07-04 PROCEDURE — 90792 PSYCH DIAG EVAL W/MED SRVCS: CPT

## 2021-07-04 RX ORDER — FOLIC ACID 0.8 MG
1 TABLET ORAL DAILY
Refills: 0 | Status: DISCONTINUED | OUTPATIENT
Start: 2021-07-04 | End: 2021-07-14

## 2021-07-04 RX ORDER — ESCITALOPRAM OXALATE 10 MG/1
5 TABLET, FILM COATED ORAL DAILY
Refills: 0 | Status: DISCONTINUED | OUTPATIENT
Start: 2021-07-04 | End: 2021-07-07

## 2021-07-04 RX ORDER — NICOTINE POLACRILEX 2 MG
2 GUM BUCCAL
Refills: 0 | Status: DISCONTINUED | OUTPATIENT
Start: 2021-07-04 | End: 2021-07-14

## 2021-07-04 RX ORDER — THIAMINE MONONITRATE (VIT B1) 100 MG
100 TABLET ORAL DAILY
Refills: 0 | Status: DISCONTINUED | OUTPATIENT
Start: 2021-07-04 | End: 2021-07-14

## 2021-07-04 RX ORDER — HALOPERIDOL DECANOATE 100 MG/ML
5 INJECTION INTRAMUSCULAR EVERY 6 HOURS
Refills: 0 | Status: DISCONTINUED | OUTPATIENT
Start: 2021-07-04 | End: 2021-07-14

## 2021-07-04 RX ORDER — QUETIAPINE FUMARATE 200 MG/1
50 TABLET, FILM COATED ORAL AT BEDTIME
Refills: 0 | Status: DISCONTINUED | OUTPATIENT
Start: 2021-07-04 | End: 2021-07-06

## 2021-07-04 RX ORDER — EMTRICITABINE AND TENOFOVIR DISOPROXIL FUMARATE 200; 300 MG/1; MG/1
1 TABLET, FILM COATED ORAL DAILY
Refills: 0 | Status: DISCONTINUED | OUTPATIENT
Start: 2021-07-04 | End: 2021-07-14

## 2021-07-04 RX ORDER — DOLUTEGRAVIR SODIUM 25 MG/1
50 TABLET, FILM COATED ORAL DAILY
Refills: 0 | Status: DISCONTINUED | OUTPATIENT
Start: 2021-07-04 | End: 2021-07-14

## 2021-07-04 RX ADMIN — QUETIAPINE FUMARATE 50 MILLIGRAM(S): 200 TABLET, FILM COATED ORAL at 20:22

## 2021-07-04 RX ADMIN — DOLUTEGRAVIR SODIUM 50 MILLIGRAM(S): 25 TABLET, FILM COATED ORAL at 13:11

## 2021-07-04 RX ADMIN — Medication 1 MILLIGRAM(S): at 11:21

## 2021-07-04 RX ADMIN — EMTRICITABINE AND TENOFOVIR DISOPROXIL FUMARATE 1 TABLET(S): 200; 300 TABLET, FILM COATED ORAL at 13:12

## 2021-07-04 RX ADMIN — Medication 100 MILLIGRAM(S): at 11:21

## 2021-07-04 NOTE — H&P ADULT - HISTORY OF PRESENT ILLNESS
36y/o male Pt states of how for the past 2-3 weeks his depressive mood has been worsening w/ insomnia, anhedonia, appetite loss, socially isolating, poor ADLs and staying on streets instead of being home. Patient reported ongoing SI - reported that about 1-2 week ago superficially lacerated the right side of his neck.

## 2021-07-04 NOTE — PROGRESS NOTE BEHAVIORAL HEALTH - NSBHFUPINTERVALHXFT_PSY_A_CORE
Pt lies in bed, cooperative with interview. He endorses active suicidal ideation and intent, and comes to hospital to avoid attempting behaviors. Pt denies hearing voices. He feels safe in the hospital. Pt denies having hx of alcohol withdrawal or TD.    Pt agrees to start lexapro 5mg po daily, in addition to seroquel 50mg po bedtime.

## 2021-07-04 NOTE — CONSULT NOTE ADULT - ASSESSMENT
34y/o male Pt states of how for the past 2-3 weeks his depressive mood has been worsening w/ insomnia, anhedonia, appetite loss, socially isolating, poor ADLs and staying on streets instead of being home.     Problem/Plan - 1:  ·  Problem: Suicidal intent.  Plan: plan as per psychiatrist  Meds as per PMD  tried to cut himself at the throat  2 weeks ago  consider 1 to 1 prn  PRN agitation haldol.      Problem/Plan - 2:  ·  Problem: Depression.  Plan: as per psychiatrist  pt on Quetiapine  OP.      Problem/Plan - 3:  ·  Problem: Anxiety.  Plan: prn's Atarax.      Problem/Plan - 4:  ·  Problem: Bipolar disorder.  Plan: as per psychiatrist.      Problem/Plan - 5:  ·  Problem: HIV (human immunodeficiency virus infection).  Plan:   Continue Meds, follow at [facility]     Problem/Plan - 6:  Problem: Nicotine dependence. Plan: nicotine gum prn.

## 2021-07-04 NOTE — H&P ADULT - NSHPPHYSICALEXAM_GEN_ALL_CORE
GENERAL:  34y/o Male NAD, resting comfortably.  HEAD:  Atraumatic, Normocephalic  EYES: EOMI, PERRLA, conjunctiva and sclera clear  NECK: Supple, No JVD, no cervical lymphadenopathy, non-tender  CHEST/LUNG: Clear to auscultation bilaterally; No wheeze, rhonchi, or rales  HEART: Regular rate and rhythm; S1&S2  ABDOMEN: Soft, Nontender, Nondistended x 4 quadrants; Bowel sounds present  EXTREMITIES:   Peripheral Pulses Present, No clubbing, no cyanosis, or no edema, no calf tenderness  PSYCH: AAOx3, cooperative, appropriate  NEUROLOGY: WNL  SKIN: WNL

## 2021-07-04 NOTE — H&P ADULT - NSHPLABSRESULTS_GEN_ALL_CORE
14.4   4.35  )-----------( 268      ( 2021 19:50 )             45.0       07-03    142  |  108  |  6<L>  ----------------------------<  106<H>  3.5   |  27  |  1.03    Ca    8.6      2021 19:50    TPro  8.0  /  Alb  3.3<L>  /  TBili  0.2  /  DBili  x   /  AST  43<H>  /  ALT  46<H>  /  AlkPhos  97  07-03              Urinalysis Basic - ( 2021 20:03 )    Color: Yellow / Appearance: Clear / S.025 / pH: x  Gluc: x / Ketone: NEGATIVE  / Bili: Small / Urobili: 4.0 E.U./dL   Blood: x / Protein: 30 mg/dL / Nitrite: NEGATIVE   Leuk Esterase: NEGATIVE / RBC: < 5 /HPF / WBC < 5 /HPF   Sq Epi: x / Non Sq Epi: 0-5 /HPF / Bacteria: x            Lactate Trend            CAPILLARY BLOOD GLUCOSE

## 2021-07-04 NOTE — PROGRESS NOTE BEHAVIORAL HEALTH - NSBHFUPIPCHARTREVFT_PSY_A_CORE
Pt is a 35 yr , domiciled, employed, non-caregiver  male with hx of MDD with psychosis, trauma history, multiple suicidal attempts, multiple IPP, the last one was at [facility] May 8-21, 2021. Pt has polysubstance abuse/dependence (ETOH, cocaine/crack, heroine/methodone program in the past), last use was three days ago. Pt was non-compliant with treatment after discharge.    Pt came to [facility] ED for worsening depression and suicidal ideation, and behavior (cutting his neck superficially 1-2 weeks ago), living on the street x 2-3 weeks (he has living place).

## 2021-07-04 NOTE — H&P ADULT - PROBLEM SELECTOR PLAN 1
plan as per psychiatrist  Meds as per PMD  tried to cut hinself at the throat  2 weeks ago  consider 1 to 1 prn  PRN agitation haldol

## 2021-07-04 NOTE — PROGRESS NOTE BEHAVIORAL HEALTH - NSBHFUPSTRENGTHS_PSY_A_CORE
seeking for help/Has supportive interpersonal relationships with family, friends or peers/Has access to housing/residential stability/Steady employment

## 2021-07-04 NOTE — H&P ADULT - NSICDXPASTMEDICALHX_GEN_ALL_CORE_FT
PAST MEDICAL HISTORY:  Anxiety     Bipolar disorder     COVID-19     Depression     HIV (human immunodeficiency virus infection)     Schizophrenia, unspecified type

## 2021-07-05 PROCEDURE — 99232 SBSQ HOSP IP/OBS MODERATE 35: CPT

## 2021-07-05 RX ADMIN — ESCITALOPRAM OXALATE 5 MILLIGRAM(S): 10 TABLET, FILM COATED ORAL at 08:15

## 2021-07-05 RX ADMIN — Medication 100 MILLIGRAM(S): at 08:14

## 2021-07-05 RX ADMIN — Medication 1 MILLIGRAM(S): at 08:14

## 2021-07-05 RX ADMIN — DOLUTEGRAVIR SODIUM 50 MILLIGRAM(S): 25 TABLET, FILM COATED ORAL at 08:14

## 2021-07-05 RX ADMIN — EMTRICITABINE AND TENOFOVIR DISOPROXIL FUMARATE 1 TABLET(S): 200; 300 TABLET, FILM COATED ORAL at 08:15

## 2021-07-05 RX ADMIN — QUETIAPINE FUMARATE 50 MILLIGRAM(S): 200 TABLET, FILM COATED ORAL at 20:15

## 2021-07-06 PROCEDURE — 99231 SBSQ HOSP IP/OBS SF/LOW 25: CPT

## 2021-07-06 PROCEDURE — 93010 ELECTROCARDIOGRAM REPORT: CPT | Mod: NC

## 2021-07-06 RX ORDER — TRAZODONE HCL 50 MG
100 TABLET ORAL AT BEDTIME
Refills: 0 | Status: DISCONTINUED | OUTPATIENT
Start: 2021-07-06 | End: 2021-07-14

## 2021-07-06 RX ADMIN — Medication 1 MILLIGRAM(S): at 08:14

## 2021-07-06 RX ADMIN — DOLUTEGRAVIR SODIUM 50 MILLIGRAM(S): 25 TABLET, FILM COATED ORAL at 08:14

## 2021-07-06 RX ADMIN — EMTRICITABINE AND TENOFOVIR DISOPROXIL FUMARATE 1 TABLET(S): 200; 300 TABLET, FILM COATED ORAL at 08:14

## 2021-07-06 RX ADMIN — ESCITALOPRAM OXALATE 5 MILLIGRAM(S): 10 TABLET, FILM COATED ORAL at 08:14

## 2021-07-06 RX ADMIN — Medication 100 MILLIGRAM(S): at 08:14

## 2021-07-06 NOTE — PROGRESS NOTE BEHAVIORAL HEALTH - NSBHFUPINTERVALHXFT_PSY_A_CORE
Pt is a 34 y/o male , domiciled, employed, non-caregiver  male with hx of MDD with psychosis, trauma history, multiple suicidal attempts, multiple IPP, the last one was at [facility] May 8-21, 2021. Pt has polysubstance abuse/dependence (ETOH, cocaine/crack, heroin/methodone program in the past), last use was five days ago. Pt was started on Lexapro 5 mg PO daily and Seroquel 50 mg PO daily when admitted.    Pt evaluated during teams. He states that he is "depressed" and does not remember the events that lead to his admission. He states that he wants to "get my life back together" and is open to taking medication. Per chart, pt has been taking his Seroquel and Lexapro. Pt is a 34 y/o male , domiciled, employed, non-caregiver  male with hx of MDD with psychosis, trauma history, multiple suicidal attempts, multiple IPP, the last one was at [facility] May 8-21, 2021. Pt has polysubstance abuse/dependence (ETOH, cocaine/crack, heroin/methodone program in the past), last use was five days ago. Pt was started on Lexapro 5 mg PO daily and Seroquel 50 mg PO daily when admitted. Patient in good behavioral control and medication compliant while on unit.    Pt evaluated during teams. He states that he is depressed and does not remember the events that led to his admission. He states that he wants to "get my life back together" and is open to taking medication. Patient states that he has been isolative in the community, has been falling asleep in different places in the community. Denies recent triggers, trauma, etc. Since discharge, patient reports that he has not taken his medications; did not  medications following discharge due to not wanting to take medications. Patient states his goals on the unit are to "get better" and take his medications. Denies recent substance use expect for alcohol. Pt is a 34 y/o male , domiciled, employed, non-caregiver  male with pmh HIV  [May 2021 CD4  393, VL 90] hx of MDD with psychosis, trauma history, multiple suicidal attempts, multiple IPP, the last one was at [facility] May 8-21, 2021. Pt has polysubstance abuse/dependence (ETOH, cocaine/crack, heroin/methodone program in the past), last use was five days ago. Pt was started on Lexapro 5 mg PO daily and Seroquel 50 mg PO daily when admitted. Patient in good behavioral control and medication compliant while on unit.    Pt evaluated during teams. He states that he is depressed and does not remember the events that led to his admission. He states that he wants to "get my life back together" and is open to taking medication. Patient states that he has been isolative in the community, has been falling asleep in different places in the community. Denies recent triggers, trauma, etc. Since discharge, patient reports that he has not taken his medications; did not  medications following discharge due to not wanting to take medications. Patient states his goals on the unit are to "get better" and take his medications. Denies recent substance use expect for alcohol.

## 2021-07-06 NOTE — PROGRESS NOTE BEHAVIORAL HEALTH - MODIFICATIONS
Updated risk assessment seen/discussed with resident. Summary as noted.  Will continue psychoed and encourage compliance with treatment plan
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
## 2021-07-06 NOTE — PROGRESS NOTE BEHAVIORAL HEALTH - SUMMARY
Pt is a 36 y/o male , domiciled, employed, non-caregiver  male with hx of MDD with psychosis, trauma history, multiple suicidal attempts, multiple IPP, the last one was at [facility] May 8-21, 2021. Pt has polysubstance abuse/dependence (ETOH, cocaine/crack, heroin/methodone program in the past), last use was five days ago. Pt was started on Lexapro 5 mg PO daily and Seroquel 50 mg PO daily on 7/4.    Plan    Major Depressive d/o vs bipolar   - continue seroquel 50 mg QHS  - continue lexapro 5 mg daily    Alcohol use disorder  - patient reports frequent use of alcohol, did not quantify  - on CIWA protocol; ____ prns?  - CATCH team consult    Cocaine use  - UTox positive for cocaine   - CATCH team consult Pt is a 36 y/o male , domiciled, employed, non-caregiver  male with hx of MDD with psychosis, trauma history, multiple suicidal attempts, multiple IPP, the last one was at [facility] May 8-21, 2021. Pt has polysubstance abuse/dependence (ETOH, cocaine/crack, heroin/methodone program in the past), last use was five days ago. Pt was started on Lexapro 5 mg PO daily and Seroquel 50 mg PO daily on 7/4.    Plan    Major Depressive d/o vs bipolar   - continue seroquel 50 mg QHS  - continue lexapro 5 mg daily  - f/u ekg qtc    Alcohol use disorder  - patient reports frequent use of alcohol, did not quantify; drug screen <3  - CATCH team consult  - c/w folic acid 1 mg daily, thiamine 100 mg daily    Cocaine use  - UTox positive for cocaine   - CATCH team consult    Nicotine use disorder  - nicotine gum 2 mg PRN q3h    HIV  -  May 2021: CD4  393, VL 90  - c/w dolutegravir 50 mg, descovy 200/25    Severe agitation/aggression  - Ativan 2 mg PO Q6H PRN or haldol 5 mg PO Q6H PRN for severe agitation not amenable to verbal redirection with escalation to IM if patient refuses PO or becomes a danger to self, others or property.    diet: regular  dvt: none; patient mobile Pt is a 36 y/o male , domiciled, employed, non-caregiver  male with hx of MDD with psychosis, trauma history, multiple suicidal attempts, multiple IPP, the last one was at [facility] May 8-21, 2021. Pt has polysubstance abuse/dependence (ETOH, cocaine/crack, heroin/methodone program in the past), last use was five days ago. Pt was started on Lexapro 5 mg PO daily and Seroquel 50 mg PO daily on 7/4.    Plan    Major Depressive d/o vs bipolar   - continue lexapro 5 mg daily  - continue trazodone 100 mg PRN QHS for insomnia  - f/u ekg qtc    Alcohol use disorder  - patient reports frequent use of alcohol, did not quantify; BAL <3  - CATCH team consult  - c/w folic acid 1 mg daily, thiamine 100 mg daily    Cocaine use  - UTox positive for cocaine   - CATCH team consult    Nicotine use disorder  - nicotine gum 2 mg PRN q3h    HIV  -  May 2021: CD4  393, VL 90  - c/w dolutegravir 50 mg, descovy 200/25    Severe agitation/aggression  - Ativan 2 mg PO Q6H PRN or haldol 5 mg PO Q6H PRN for severe agitation not amenable to verbal redirection with escalation to IM if patient refuses PO or becomes a danger to self, others or property.    diet: regular  dvt: none; patient mobile Pt is a 36 y/o male , domiciled, employed, non-caregiver  male with hx of MDD with psychosis, trauma history, multiple suicidal attempts, multiple IPP, the last one was at [facility] May 8-21, 2021. Pt has polysubstance abuse/dependence (ETOH, cocaine/crack, heroin/methodone program in the past), last use was five days ago. Pt was started on Lexapro 5 mg PO daily and Seroquel 50 mg PO daily on 7/4.    Plan    Major Depressive d/o vs bipolar   - continue lexapro 5 mg daily  - continue trazodone 100 mg PRN QHS for insomnia  - f/u ekg qtc    Alcohol use disorder  - patient reports frequent use of alcohol, did not quantify; BAL <3  - c/w CIWA protocol - lorazepam 2 mg PRN for sxs of withdrawal  - CATCH team consult  - c/w folic acid 1 mg daily, thiamine 100 mg daily    Cocaine use  - UTox positive for cocaine   - CATCH team consult    Nicotine use disorder  - nicotine gum 2 mg PRN q3h    HIV  -  May 2021: CD4  393, VL 90  - c/w dolutegravir 50 mg, descovy 200/25    Severe agitation/aggression  - Ativan 2 mg PO Q6H PRN or haldol 5 mg PO Q6H PRN for severe agitation not amenable to verbal redirection with escalation to IM if patient refuses PO or becomes a danger to self, others or property.    diet: regular  dvt: none; patient mobile

## 2021-07-06 NOTE — PROGRESS NOTE BEHAVIORAL HEALTH - RISK ASSESSMENT
Pt is a risk to himself. On admission, pt states he superficially lacerated his neck 1-2 weeks ago. On interview, pt affirmed he was still having suicidal ideation. Risk factors - prior suicide attempts, passive suicidal ideation, medication non-compliance, substance use    Protective factors - identifies relationship with mother as a reason for living

## 2021-07-07 DIAGNOSIS — F10.20 ALCOHOL DEPENDENCE, UNCOMPLICATED: ICD-10-CM

## 2021-07-07 LAB
A1C WITH ESTIMATED AVERAGE GLUCOSE RESULT: 5.3 % — SIGNIFICANT CHANGE UP (ref 4–5.6)
CHOLEST SERPL-MCNC: 182 MG/DL — SIGNIFICANT CHANGE UP
ESTIMATED AVERAGE GLUCOSE: 105 MG/DL — SIGNIFICANT CHANGE UP (ref 68–114)
HDLC SERPL-MCNC: 56 MG/DL — SIGNIFICANT CHANGE UP
LIPID PNL WITH DIRECT LDL SERPL: 112 MG/DL — HIGH
NON HDL CHOLESTEROL: 126 MG/DL — SIGNIFICANT CHANGE UP
TRIGL SERPL-MCNC: 108 MG/DL — SIGNIFICANT CHANGE UP

## 2021-07-07 PROCEDURE — 99231 SBSQ HOSP IP/OBS SF/LOW 25: CPT

## 2021-07-07 PROCEDURE — 99221 1ST HOSP IP/OBS SF/LOW 40: CPT

## 2021-07-07 RX ORDER — ESCITALOPRAM OXALATE 10 MG/1
10 TABLET, FILM COATED ORAL DAILY
Refills: 0 | Status: DISCONTINUED | OUTPATIENT
Start: 2021-07-08 | End: 2021-07-14

## 2021-07-07 RX ADMIN — ESCITALOPRAM OXALATE 5 MILLIGRAM(S): 10 TABLET, FILM COATED ORAL at 08:17

## 2021-07-07 RX ADMIN — Medication 1 MILLIGRAM(S): at 08:16

## 2021-07-07 RX ADMIN — EMTRICITABINE AND TENOFOVIR DISOPROXIL FUMARATE 1 TABLET(S): 200; 300 TABLET, FILM COATED ORAL at 08:17

## 2021-07-07 RX ADMIN — DOLUTEGRAVIR SODIUM 50 MILLIGRAM(S): 25 TABLET, FILM COATED ORAL at 08:17

## 2021-07-07 RX ADMIN — Medication 100 MILLIGRAM(S): at 08:17

## 2021-07-07 NOTE — PROGRESS NOTE BEHAVIORAL HEALTH - NSBHFUPINTERVALHXFT_PSY_A_CORE
Pt states that his mood is "not good" and "confused" but denies any specific reason or cause for this. He states that he slept okay last night, but experienced a nightmare of waking up next to a dead body. He denies any recent trauma and says that the nightmare started while here on the unit. Pt also says that he occasionally smells dried blood. He states that his appetite has been good. He says that he is trying to get out of his room more and walking around the unit. He states he occasionally feels shakiness and a tactile sensation of crawling on his arms. He denies feeling withdrawals, nausea, diarrhea, headache, and sensitivity to light. He denies SI, HI, AH, and VH. Pt states that his mood is "not good" and "confused" but denies any specific reason or cause for this. He states that he slept okay last night, but experienced a nightmare of waking up next to a dead body. He denies any recent trauma and says that the nightmare started while here on the unit. Pt also says that he occasionally smells dried blood. He states that his appetite has been good. He says that he is trying to get out of his room more and walking around the unit. He states he occasionally feels shakiness and a tactile sensation of crawling on his arms. He denies feeling withdrawals, nausea, diarrhea, headache, and sensitivity to light. He denies SI, HI, AH, and VH. Overnight, no behavioral events and no PRNs administered. Medication compliant.

## 2021-07-07 NOTE — CONSULT NOTE ADULT - SUBJECTIVE AND OBJECTIVE BOX
34y/o male Pt states of how for the past 2-3 weeks his depressive mood has been worsening w/ insomnia, anhedonia, appetite loss, socially isolating, poor ADLs and staying on streets instead of being home.    Today:  Seen at bedside, no new complaints.        REVIEW OF SYSTEMS:  CONSTITUTIONAL: No fever, weight loss, or fatigue  EYES: No eye pain, visual disturbances, or discharge  ENMT:  No difficulty hearing, tinnitus, vertigo; No sinus or throat pain  NECK: No pain or stiffness  RESPIRATORY: No cough, wheezing, chills or hemoptysis; No shortness of breath  CARDIOVASCULAR: No chest pain, palpitations, dizziness, or leg swelling  GASTROINTESTINAL: No abdominal or epigastric pain. No nausea, vomiting, or hematemesis; No diarrhea or constipation. No melena or hematochezia.  GENITOURINARY: No dysuria, frequency, hematuria, or incontinence  NEUROLOGICAL: No headaches, memory loss, loss of strength, numbness, or tremors  SKIN: No itching, burning, rashes, or lesions   LYMPH NODES: No enlarged glands  ENDOCRINE: No heat or cold intolerance; No hair loss  MUSCULOSKELETAL: No joint pain or swelling; No muscle, back, or extremity pain  HEME/LYMPH: No easy bruising, or bleeding gums  ALLERGY AND IMMUNOLOGIC: No hives or eczema      MEDICATIONS  (STANDING):  folic acid 1 milliGRAM(s) Oral daily  QUEtiapine 50 milliGRAM(s) Oral at bedtime  thiamine 100 milliGRAM(s) Oral daily    MEDICATIONS  (PRN):  aluminum hydroxide/magnesium hydroxide/simethicone Suspension 30 milliLiter(s) Oral every 4 hours PRN Dyspepsia  haloperidol     Tablet 5 milliGRAM(s) Oral every 6 hours PRN agitation  LORazepam     Tablet 2 milliGRAM(s) Oral every 4 hours PRN s/sx of alcohol withdrawal  nicotine  Polacrilex Gum 2 milliGRAM(s) Oral every 3 hours PRN nicotine addiction      Allergies  penicillin (Unknown)  penicillins (Unknown)          Vital Signs Last 24 Hrs  T(C): 36.2 (2021 03:48), Max: 36.9 (2021 22:16)  T(F): 97.2 (2021 03:48), Max: 98.5 (2021 22:16)  HR: 65 (2021 03:48) (64 - 72)  BP: 116/71 (2021 03:48) (111/75 - 137/92)  RR: 16 (2021 03:48) (16 - 18)  SpO2: 98% (2021 01:18) (97% - 98%)    PHYSICAL EXAM:  GENERAL: NAD, well-groomed, well-developed  HEAD:  Atraumatic, Normocephalic  EYES: EOMI, PERRLA, conjunctiva and sclera clear  ENMT: No tonsillar erythema, exudates, or enlargement; Moist mucous membranes, Good dentition, No lesions  NECK: Supple, No JVD, Normal thyroid  NERVOUS SYSTEM:  Alert & Oriented X3, Good concentration  CHEST/LUNG: Clear to percussion bilaterally; No rales, rhonchi, wheezing, or rubs  HEART: Regular rate and rhythm; No murmurs, rubs, or gallops  ABDOMEN: Soft, Nontender, Nondistended; Bowel sounds present  EXTREMITIES:  2+ Peripheral Pulses, No clubbing, cyanosis, or edema  SKIN: No rashes or lesions      LABS:                        14.4   4.35  )-----------( 268      ( 2021 19:50 )             45.0     07-03    142  |  108  |  6<L>  ----------------------------<  106<H>  3.5   |  27  |  1.03    Ca    8.6      2021 19:50    TPro  8.0  /  Alb  3.3<L>  /  TBili  0.2  /  DBili  x   /  AST  43<H>  /  ALT  46<H>  /  AlkPhos  97  07-03      Urinalysis Basic - ( 2021 20:03 )    Color: Yellow / Appearance: Clear / S.025 / pH: x  Gluc: x / Ketone: NEGATIVE  / Bili: Small / Urobili: 4.0 E.U./dL   Blood: x / Protein: 30 mg/dL / Nitrite: NEGATIVE   Leuk Esterase: NEGATIVE / RBC: < 5 /HPF / WBC < 5 /HPF   Sq Epi: x / Non Sq Epi: 0-5 /HPF / Bacteria: x        
    Pt interviewed, examined and EMR chart reviewed.  Pt admits to drinking 3pts of vodka/day x  6  months. Last Drink 2 days prior to admission  Pt denies Hx of withdrawal   variable periods of sobriety in the past.  Has been in detox before ___X__yes,   _____No    SOCIAL HISTORY:    REVIEW OF SYSTEMS:    Constitutional: No fever, weight loss or fatigue  ENT:  No difficulty hearing, tinnitus, vertigo; No sinus or throat pain  Neck: No pain or stiffness  Respiratory: No cough, wheezing, chills or hemoptysis  Cardiovascular: No chest pain, palpitations, shortness of breath, dizziness or leg swelling  Gastrointestinal: No abdominal or epigastric pain. No nausea, vomiting or hematemesis; No diarrhea or constipation. No melena or hematochezia.  Neurological: No headaches, memory loss, loss of strength, numbness or tremors  Musculoskeletal: No joint pain or swelling; No muscle, back or extremity pain  Psychiatric: No depression, anxiety, mood swings or difficulty sleeping    MEDICATIONS  (STANDING):  dolutegravir 50 milliGRAM(s) Oral daily  emtricitabine 200 mG/tenofovir alafenamide 25 mG (DESCOVY) Tablet 1 Tablet(s) Oral daily  escitalopram 5 milliGRAM(s) Oral daily  folic acid 1 milliGRAM(s) Oral daily  thiamine 100 milliGRAM(s) Oral daily    MEDICATIONS  (PRN):  aluminum hydroxide/magnesium hydroxide/simethicone Suspension 30 milliLiter(s) Oral every 4 hours PRN Dyspepsia  haloperidol     Tablet 5 milliGRAM(s) Oral every 6 hours PRN agitation  LORazepam     Tablet 2 milliGRAM(s) Oral every 6 hours PRN severe anxiety  LORazepam     Tablet 2 milliGRAM(s) Oral every 4 hours PRN alcohol withdrawal  nicotine  Polacrilex Gum 2 milliGRAM(s) Oral every 3 hours PRN nicotine addiction  traZODone 100 milliGRAM(s) Oral at bedtime PRN insomnia      Vital Signs Last 24 Hrs  T(C): 36.2 (07 Jul 2021 09:07), Max: 36.2 (07 Jul 2021 09:07)  T(F): 97.1 (07 Jul 2021 09:07), Max: 97.1 (07 Jul 2021 09:07)  HR: 60 (07 Jul 2021 09:07) (55 - 66)  BP: 106/56 (07 Jul 2021 09:07) (104/68 - 124/69)  BP(mean): --  RR: 18 (07 Jul 2021 09:07) (16 - 18)  SpO2: --    PHYSICAL EXAM:    Constitutional: NAD, well-groomed, well-developed  HEENT: PERRLA, EOMI, Normal Hearing, MMM  Neck: No LAD, No JVD  Back: Normal spine flexure, No CVA tenderness  Respiratory: CTAB/L  Cardiovascular: S1 and S2, RRR, no M/G/R  Gastrointestinal: BS+, soft, NT/ND  Extremities: No peripheral edema  Vascular: 2+ peripheral pulses  Neurological: A/O x 3, no focal deficits    LABS:              Drug Screen Urine:  Alcohol Level        RADIOLOGY & ADDITIONAL STUDIES:

## 2021-07-07 NOTE — CONSULT NOTE ADULT - PROBLEM SELECTOR RECOMMENDATION 9
After evaluation at this time would use PRN medication secondary to no history of withdrawals and now 4 days post last drink and no complaints. Pt has stopped drinking in past on his own with out any issues.   Pt will be monitored and supportive care provided  CATCH team involved for aftercare and pt will follow up with aftercare

## 2021-07-07 NOTE — PROGRESS NOTE BEHAVIORAL HEALTH - RISK ASSESSMENT
Risk factors - prior suicide attempts, passive suicidal ideation, medication non-compliance, substance use    Protective factors - identifies relationship with mother as a reason for living

## 2021-07-07 NOTE — PROGRESS NOTE BEHAVIORAL HEALTH - SUMMARY
Pt is a 36 y/o male , domiciled, employed, non-caregiver  male with hx of MDD with psychosis, trauma history, multiple suicidal attempts, multiple IPP, the last one was at [facility] May 8-21, 2021. Pt has polysubstance abuse/dependence (ETOH, cocaine/crack, heroin/methodone program in the past), last use was six days ago. Pt was started on Lexapro 5 mg PO daily and Seroquel 50 mg PO daily on 7/4. Seroquel was d/c on 7/6. Pt started on Trazodone 100 mg PO PRN for insomnia on 7/6.    Plan    Major Depressive d/o vs bipolar   - continue lexapro 5 mg daily  - continue trazodone 100 mg PRN QHS for insomnia  - EKG from 7/6: QT interval 416 ms    Alcohol use disorder  - patient reports frequent use of alcohol, did not quantify; BAL <3 on 7/3  - c/w CIWA protocol - lorazepam 2 mg PRN for sxs of withdrawal  - c/w folic acid 1 mg daily, thiamine 100 mg daily  - CATCH team consult    Cocaine use  - UTox positive for cocaine on 7/3  - CATCH team consult    Nicotine use disorder  - nicotine gum 2 mg PRN q3h    HIV  -  May 2021: CD4 393, VL 90  - c/w dolutegravir 50 mg, descovy 200/25    Severe agitation/aggression  - Ativan 2 mg PO Q6H PRN or haldol 5 mg PO Q6H PRN for severe agitation not amenable to verbal redirection with escalation to IM if patient refuses PO or becomes a danger to self, others or property.    diet: regular  dvt: none; patient mobile Pt is a 34 y/o male , domiciled, employed, non-caregiver  male with hx of MDD with psychosis, trauma history, multiple suicidal attempts, multiple IPP, the last one was at [facility] May 8-21, 2021. Pt has polysubstance abuse/dependence (ETOH, cocaine/crack, heroin/methodone program in the past), last use was six days ago. Pt was started on Lexapro 5 mg PO daily and Seroquel 50 mg PO daily on 7/4. Seroquel was d/c on 7/6. Pt started on Trazodone 100 mg PO PRN for insomnia on 7/6.    Plan    Major Depressive d/o vs bipolar   - Increase Lexapro 5 mg daily to 10 mg daily  - continue trazodone 100 mg PRN QHS for insomnia  - EKG from 7/6: QT interval 416 ms  - Order EKG for 7/8 after Lexapro 10 mg dose; f/u on EKG    Alcohol use disorder  - patient reports frequent use of alcohol, did not quantify; BAL <3 on 7/3  - c/w CIWA protocol - lorazepam 2 mg PRN for sxs of withdrawal  - c/w folic acid 1 mg daily, thiamine 100 mg daily  - CATCH team consult completed: recommendation of using PRN medication    Cocaine use  - UTox positive for cocaine on 7/3  - CATCH team consult: recommendation of using PRN medication    Nicotine use disorder  - nicotine gum 2 mg PRN q3h    HIV  -  May 2021: CD4 393, VL 90  - c/w dolutegravir 50 mg, descovy 200/25    Severe agitation/aggression  - Ativan 2 mg PO Q6H PRN or haldol 5 mg PO Q6H PRN for severe agitation not amenable to verbal redirection with escalation to IM if patient refuses PO or becomes a danger to self, others or property.    diet: regular  dvt: none; patient mobile

## 2021-07-08 LAB
COVID-19 SPIKE DOMAIN AB INTERP: POSITIVE
COVID-19 SPIKE DOMAIN ANTIBODY RESULT: 35.4 U/ML — HIGH
SARS-COV-2 IGG+IGM SERPL QL IA: 35.4 U/ML — HIGH
SARS-COV-2 IGG+IGM SERPL QL IA: POSITIVE

## 2021-07-08 PROCEDURE — 99231 SBSQ HOSP IP/OBS SF/LOW 25: CPT

## 2021-07-08 RX ORDER — HYDROXYZINE HCL 10 MG
50 TABLET ORAL EVERY 6 HOURS
Refills: 0 | Status: DISCONTINUED | OUTPATIENT
Start: 2021-07-08 | End: 2021-07-14

## 2021-07-08 RX ADMIN — Medication 1 MILLIGRAM(S): at 08:54

## 2021-07-08 RX ADMIN — EMTRICITABINE AND TENOFOVIR DISOPROXIL FUMARATE 1 TABLET(S): 200; 300 TABLET, FILM COATED ORAL at 08:53

## 2021-07-08 RX ADMIN — DOLUTEGRAVIR SODIUM 50 MILLIGRAM(S): 25 TABLET, FILM COATED ORAL at 08:54

## 2021-07-08 RX ADMIN — ESCITALOPRAM OXALATE 10 MILLIGRAM(S): 10 TABLET, FILM COATED ORAL at 08:54

## 2021-07-08 RX ADMIN — Medication 100 MILLIGRAM(S): at 08:54

## 2021-07-08 NOTE — PROGRESS NOTE BEHAVIORAL HEALTH - MODIFICATIONS
seen/discussed with resident.  Pt remains pleasant on approach and remains motivated for inpatient rehab placement.  Continue tx plan

## 2021-07-08 NOTE — PROGRESS NOTE BEHAVIORAL HEALTH - NSBHFUPINTERVALHXFT_PSY_A_CORE
Pt says that his mood is "less confused" today. He states that he was talking with another pt on the floor who encouraged him to reveal how he really felt. Pt states that the nightmare he described last night was actually a real-life event. He says that 1 week ago he woke up in the [place] next to a body that was wrapped. He states that he felt frightened and left the area to avoid getting in trouble. Pt denies any substance use before the event, and says that he used cocaine after this event. Pt states that he thinks about this event often. He states that last night he would occasionally wake up to thoughts about the event but that overall he woke up feeling rested. Pt states he occasionally feels shakiness and a tactile sensation of crawling on his arms. He denies feeling nausea, diarrhea, headache, and light sensitivity. He denies SI, HI, and AVH. Pt says that his mood is "less confused" today. He states that he was talking with another pt on the floor who encouraged him to reveal how he really felt. Pt states that the nightmare he described last night was actually a real-life event. He says that 1 week ago he woke up [place] the [place] next to a body that was wrapped in plastic. He states that he felt frightened and left the area to avoid getting in trouble. Pt denies any substance use before the event, and says that he used cocaine after this event. Pt states that he thinks about this event often. He states that last night he also had nightmares about waking up next to a dead body and that the body told him "there is no afterlife." Pt states he occasionally feels shakiness and a tactile sensation of crawling on his arms. He denies feeling nausea, diarrhea, headache, and light sensitivity. He denies SI, HI, and AVH. Pt says that his mood is "less confused" today. He states that he was talking with another pt on the floor who encouraged him to reveal how he really felt. Pt states that the nightmare he described last night was actually a real-life event. He says that 1 week ago he woke up [place] next to a body that was wrapped in plastic. He states that he felt frightened and left the area to avoid getting in trouble. Pt denies any substance use before the event, and says that he used cocaine after this event. Pt states that he thinks about this event often. He states that last night he also had nightmares about waking up next to a dead body and that the body told him "there is no afterlife." Pt states he occasionally feels shakiness and a tactile sensation of crawling on his arms. He denies feeling nausea, diarrhea, headache, and light sensitivity. He denies SI, HI, and AVH. Medication compliants. No PRNs overnight. No behavioral events overnight. Pt says that his mood is "less confused" today. He states that he was talking with another pt on the floor who encouraged him to reveal how he really felt. Pt states that the nightmare he described last night was actually a real-life event. He says that 1 week ago he woke up [place] the [place] next to a body that was wrapped in plastic. He states that he felt frightened and left the area to avoid getting in trouble. Pt denies any substance use before the event, and says that he used cocaine after this event. Pt states that he thinks about this event often. He states that last night he also had nightmares about waking up next to a dead body and that the body told him "there is no afterlife." Pt states he occasionally feels shakiness and a tactile sensation of crawling on his arms. He denies feeling nausea, diarrhea, headache, and light sensitivity. He denies SI, HI, and AVH. Medication compliant. No PRNs overnight. No behavioral events overnight.

## 2021-07-08 NOTE — CHART NOTE - NSCHARTNOTEFT_GEN_A_CORE
Social Work Note:    Treatment team met with patient earlier this morning on unit rounds.  At time of assessment patient was able to engage with treatment team members.  Precipitants to admission were discussed.  Patient spoke about being in a park in [facility] and “woke up next to a dead body” that was wrapped in plastic. He did not report this and explained that he did not want to get in trouble.  Patient then scanned the room and informed treatment team that he did not want to get in trouble for informing team of this event. Substance use of cocaine endorsed prior to admission.  He also heard voices that were telling him that there is “no after life.”    Patient was encouraged to be visible on the unit, sit in the day room and engage with peers.  ADLs have improved from admission.  Patient was seen by CATCH team this morning.  Referral to an inpatient chemical dependency unit discussed. Patient was agreeable to referral.      Mental Status Exam:    Mood – Neutral   Sleep – Fair   Appetite – Fair   ADLs – Good   Thought Process – Linear    Observation – [id number]    No barriers to discharge identified at this time.     At this time patient is not psychiatrically stable for discharge.

## 2021-07-08 NOTE — PROGRESS NOTE ADULT - PROBLEM SELECTOR PLAN 1
Pt is doing well on protocol. There is no change in treatment.  Pt will be monitored and supportive care provided  CATCH team involved for aftercare and pt will follow up with aftercare

## 2021-07-08 NOTE — PROGRESS NOTE ADULT - SUBJECTIVE AND OBJECTIVE BOX
Pt interviewed, examined and EMR chart reviewed.    Follow up of Alcohol Depenency. Pt is on librium protocol. Pt is doing well. Pt with no complaints today.    SOCIAL HISTORY:    REVIEW OF SYSTEMS:    Constitutional: No fever, weight loss or fatigue  ENT:  No difficulty hearing, tinnitus, vertigo; No sinus or throat pain  Neck: No pain or stiffness  Respiratory: No cough, wheezing, chills or hemoptysis  Cardiovascular: No chest pain, palpitations, shortness of breath, dizziness or leg swelling  Gastrointestinal: No abdominal or epigastric pain. No nausea, vomiting or hematemesis; No diarrhea or constipation. No melena or hematochezia.  Neurological: No headaches, memory loss, loss of strength, numbness or tremors  Musculoskeletal: No joint pain or swelling; No muscle, back or extremity pain      MEDICATIONS  (STANDING):  dolutegravir 50 milliGRAM(s) Oral daily  emtricitabine 200 mG/tenofovir alafenamide 25 mG (DESCOVY) Tablet 1 Tablet(s) Oral daily  escitalopram 10 milliGRAM(s) Oral daily  folic acid 1 milliGRAM(s) Oral daily  thiamine 100 milliGRAM(s) Oral daily    MEDICATIONS  (PRN):  aluminum hydroxide/magnesium hydroxide/simethicone Suspension 30 milliLiter(s) Oral every 4 hours PRN Dyspepsia  haloperidol     Tablet 5 milliGRAM(s) Oral every 6 hours PRN agitation  LORazepam     Tablet 2 milliGRAM(s) Oral every 6 hours PRN severe anxiety  LORazepam     Tablet 2 milliGRAM(s) Oral every 4 hours PRN alcohol withdrawal  nicotine  Polacrilex Gum 2 milliGRAM(s) Oral every 3 hours PRN nicotine addiction  traZODone 100 milliGRAM(s) Oral at bedtime PRN insomnia      Vital Signs Last 24 Hrs  T(C): 36.8 (08 Jul 2021 08:00), Max: 36.8 (07 Jul 2021 15:37)  T(F): 98.3 (08 Jul 2021 08:00), Max: 98.3 (08 Jul 2021 08:00)  HR: 60 (08 Jul 2021 08:00) (55 - 69)  BP: 103/64 (08 Jul 2021 08:00) (96/56 - 108/57)  BP(mean): --  RR: 16 (08 Jul 2021 08:00) (16 - 18)  SpO2: --    PHYSICAL EXAM:    Constitutional: NAD, well-groomed, well-developed  HEENT: PERRLA, EOMI, Normal Hearing, MMM  Neck: No LAD, No JVD  Back: Normal spine flexure, No CVA tenderness  Respiratory: CTAB/L  Cardiovascular: S1 and S2, RRR, no M/G/R  Gastrointestinal: BS+, soft, NT/ND  Extremities: No peripheral edema  Vascular: 2+ peripheral pulses  Neurological: A/O x 3, no focal deficits    LABS:              Drug Screen Urine:  Alcohol Level        RADIOLOGY & ADDITIONAL STUDIES:

## 2021-07-08 NOTE — PROGRESS NOTE BEHAVIORAL HEALTH - SUMMARY
Pt is a 36 y/o male , domiciled, employed, non-caregiver  male with hx of MDD with psychosis, trauma history, multiple suicidal attempts, multiple IPP, the last one was at [facility] May 8-21, 2021. Pt has polysubstance abuse/dependence (ETOH, cocaine/crack, heroin/methodone program in the past), last use was seven days ago. Pt started on Lexapro 5 mg PO daily and Seroquel 50 mg PO daily on 7/4. Seroquel was d/c on 7/6. Pt started on Trazodone 100 mg PO PRN for insomnia on 7/6. Lexapro increased to 10 mg PO daily on 7/7.     Plan    Major Depressive d/o vs bipolar   - Continue Lexapro 10 mg daily  - Continue trazodone 100 mg PRN QHS for insomnia  - EKG from 7/6: QT interval 416 ms  - f/u on EKG    Alcohol use disorder  - Patient reports frequent use of alcohol, did not quantify; BAL <3 on 7/3  - c/w CIWA protocol - lorazepam 2 mg PRN for sxs of withdrawal  - c/w folic acid 1 mg daily, thiamine 100 mg daily  - CATCH team consult: CATCH team involved for aftercare and pt will follow up with aftercare    Cocaine use  - UTox positive for cocaine on 7/3  - CATCH team consult: recommendation of using PRN medication    Nicotine use disorder  - nicotine gum 2 mg PRN q3h    HIV  -  May 2021: CD4 393, VL 90  - c/w dolutegravir 50 mg, descovy 200/25    Severe agitation/aggression  - Ativan 2 mg PO Q6H PRN or haldol 5 mg PO Q6H PRN for severe agitation not amenable to verbal redirection with escalation to IM if patient refuses PO or becomes a danger to self, others or property.    diet: regular  dvt: none; patient mobile Pt is a 34 y/o male , domiciled, employed, non-caregiver  male with hx of MDD with psychosis, trauma history, multiple suicidal attempts, multiple IPP, the last one was at [facility] May 8-21, 2021. Pt has polysubstance abuse/dependence (ETOH, cocaine/crack, heroin/methodone program in the past), last use was seven days ago. Pt started on Lexapro 5 mg PO daily and Seroquel 50 mg PO daily on 7/4. Seroquel was d/c on 7/6. Pt started on Trazodone 100 mg PO PRN for insomnia on 7/6. Lexapro increased to 10 mg PO daily on 7/7.     Plan    Major Depressive d/o vs bipolar   - Continue Lexapro 10 mg daily  - Continue Trazodone 100 mg PRN QHS for insomnia  - EKG from 7/6: QT interval 416 ms  - f/u on EKG    Alcohol use disorder  - Patient reports frequent use of alcohol, did not quantify; BAL <3 on 7/3  - c/w WESLYWA protocol - lorazepam 2 mg PRN for sxs of withdrawal  - c/w folic acid 1 mg daily, thiamine 100 mg daily  - CATCH team consult: CATCH team involved for aftercare and pt will follow up with aftercare  - BALDO-Ar score of 0 - remove CIWA protocol    Cocaine use  - UTox positive for cocaine on 7/3  - CATCH team consult: CATCH team involved for aftercare and pt will follow up with aftercare    Nicotine use disorder  - nicotine gum 2 mg PRN q3h    HIV  - May 2021: CD4 393, VL 90  - c/w dolutegravir 50 mg, descovy 200/25    Severe agitation/aggression  - Ativan 2 mg PO Q6H PRN or haldol 5 mg PO Q6H PRN for severe agitation not amenable to verbal redirection with escalation to IM if patient refuses PO or becomes a danger to self, others or property.    diet: regular  dvt: none; patient mobile Pt is a 36 y/o male , domiciled, employed, non-caregiver  male with hx of MDD with psychosis, trauma history, multiple suicidal attempts, multiple IPP, the last one was at [facility] May 8-21, 2021. Pt has polysubstance abuse/dependence (ETOH, cocaine/crack, heroin/methodone program in the past), last use was seven days ago. Pt started on Lexapro 5 mg PO daily and Seroquel 50 mg PO daily on 7/4. Seroquel was d/c on 7/6. Pt started on Trazodone 100 mg PO PRN for insomnia on 7/6. Lexapro increased to 10 mg PO daily on 7/7. Atarax 50mg PO PRN for anxiety added on 7/8.    Plan    Major Depressive d/o vs bipolar   - Continue Lexapro 10 mg daily  - Continue Trazodone 100 mg PRN QHS for insomnia  - EKG from 7/6: QT interval 416 ms  - f/u on EKG    Alcohol use disorder  - Patient reports frequent use of alcohol, did not quantify; BAL <3 on 7/3  - c/w CIWA protocol - lorazepam 2 mg PRN for sxs of withdrawal  - c/w folic acid 1 mg daily, thiamine 100 mg daily  - CATCH team consult: CATCH team involved for aftercare and pt will follow up with aftercare  - BALDO-Ar score of 0 - remove CIWA protocol    Cocaine use  - UTox positive for cocaine on 7/3  - CATCH team consult: CATCH team involved for aftercare and pt will follow up with aftercare    Nicotine use disorder  - nicotine gum 2 mg PRN q3h    HIV  - May 2021: CD4 393, VL 90  - c/w dolutegravir 50 mg, descovy 200/25    Severe agitation/aggression  - Ativan 2 mg PO Q6H PRN or haldol 5 mg PO Q6H PRN for severe agitation not amenable to verbal redirection with escalation to IM if patient refuses PO or becomes a danger to self, others or property.    diet: regular  dvt: none; patient mobile

## 2021-07-09 PROCEDURE — 99231 SBSQ HOSP IP/OBS SF/LOW 25: CPT

## 2021-07-09 RX ADMIN — EMTRICITABINE AND TENOFOVIR DISOPROXIL FUMARATE 1 TABLET(S): 200; 300 TABLET, FILM COATED ORAL at 08:03

## 2021-07-09 RX ADMIN — DOLUTEGRAVIR SODIUM 50 MILLIGRAM(S): 25 TABLET, FILM COATED ORAL at 08:03

## 2021-07-09 RX ADMIN — Medication 1 MILLIGRAM(S): at 08:03

## 2021-07-09 RX ADMIN — Medication 100 MILLIGRAM(S): at 08:03

## 2021-07-09 RX ADMIN — ESCITALOPRAM OXALATE 10 MILLIGRAM(S): 10 TABLET, FILM COATED ORAL at 08:03

## 2021-07-09 NOTE — PROGRESS NOTE BEHAVIORAL HEALTH - SUMMARY
Pt is a 36 y/o male , domiciled, employed, non-caregiver  male with hx of MDD with psychosis, trauma history, multiple suicidal attempts, multiple IPP, the last one was at [facility] May 8-21, 2021. Pt has polysubstance abuse/dependence (ETOH, cocaine/crack, heroin/methodone program in the past), last use was eight days ago. Pt started on Lexapro 5 mg PO daily and Seroquel 50 mg PO daily on 7/4. Seroquel was d/c on 7/6. Pt started on Trazodone 100 mg PO PRN for insomnia on 7/6. Lexapro increased to 10 mg PO daily on 7/7. Atarax 50mg PO PRN for anxiety added on 7/8.    Plan    Major Depressive d/o vs bipolar   - Continue Lexapro 10 mg daily  - Continue Trazodone 100 mg PRN QHS for insomnia  - EKG from 7/6: QT interval 416 ms  - f/u on EKG    Alcohol use disorder  - Patient reports frequent use of alcohol, did not quantify; BAL <3 on 7/3  - c/w CIWA protocol - lorazepam 2 mg PRN for sxs of withdrawal  - c/w folic acid 1 mg daily, thiamine 100 mg daily  - CATCH team consult: CATCH team involved for aftercare and pt will follow up with aftercare  - BALDO-Ar score of 0 - remove CIWA protocol    Cocaine use  - UTox positive for cocaine on 7/3  - CATCH team consult: CATCH team involved for aftercare and pt will follow up with aftercare    Nicotine use disorder  - nicotine gum 2 mg PRN q3h    HIV  - May 2021: CD4 393, VL 90  - c/w dolutegravir 50 mg, descovy 200/25    Severe agitation/aggression  - Ativan 2 mg PO Q6H PRN or haldol 5 mg PO Q6H PRN for severe agitation not amenable to verbal redirection with escalation to IM if patient refuses PO or becomes a danger to self, others or property.    diet: regular  dvt: none; patient mobile

## 2021-07-09 NOTE — PROGRESS NOTE BEHAVIORAL HEALTH - NSBHFUPINTERVALHXFT_PSY_A_CORE
Pt says that he feel better today because he has been walking around the unit and talking to the other pts and treatment team. States he slept well overnight with no nightmares and that his appetite has been good. Denies SI, HI, and AVH. States he feelsd     Medication compliant. No behavioral events overnight. No PRNs administered overnight. Pt says that he feel better today because he has been walking around the unit and talking to the other pts and treatment team. States he slept well overnight with no nightmares and that his appetite has been good. Denies SI, HI, and AVH. States he met with the CATCH team again today and is ready to pursue rehab upon discharge. Says that he still occasionally feels the shakiness and tactile sensation of crawling on his arms but this is unchanged from admission.     Medication compliant. No behavioral events overnight. No PRNs administered overnight.

## 2021-07-10 RX ORDER — IBUPROFEN 200 MG
200 TABLET ORAL EVERY 8 HOURS
Refills: 0 | Status: DISCONTINUED | OUTPATIENT
Start: 2021-07-10 | End: 2021-07-14

## 2021-07-10 RX ADMIN — Medication 100 MILLIGRAM(S): at 08:07

## 2021-07-10 RX ADMIN — ESCITALOPRAM OXALATE 10 MILLIGRAM(S): 10 TABLET, FILM COATED ORAL at 08:08

## 2021-07-10 RX ADMIN — DOLUTEGRAVIR SODIUM 50 MILLIGRAM(S): 25 TABLET, FILM COATED ORAL at 08:08

## 2021-07-10 RX ADMIN — Medication 1 MILLIGRAM(S): at 08:07

## 2021-07-10 RX ADMIN — EMTRICITABINE AND TENOFOVIR DISOPROXIL FUMARATE 1 TABLET(S): 200; 300 TABLET, FILM COATED ORAL at 08:08

## 2021-07-10 NOTE — CHART NOTE - NSCHARTNOTEFT_GEN_A_CORE
.  Patient requesting Ibuprofen for nasal pain due to his history of nasal fracture pre-admission.   Will order Ibuprofen 200 mg PO every 8 hours as needed for pain.

## 2021-07-11 RX ADMIN — DOLUTEGRAVIR SODIUM 50 MILLIGRAM(S): 25 TABLET, FILM COATED ORAL at 08:12

## 2021-07-11 RX ADMIN — EMTRICITABINE AND TENOFOVIR DISOPROXIL FUMARATE 1 TABLET(S): 200; 300 TABLET, FILM COATED ORAL at 08:13

## 2021-07-11 RX ADMIN — Medication 100 MILLIGRAM(S): at 08:12

## 2021-07-11 RX ADMIN — ESCITALOPRAM OXALATE 10 MILLIGRAM(S): 10 TABLET, FILM COATED ORAL at 08:12

## 2021-07-11 RX ADMIN — Medication 1 MILLIGRAM(S): at 08:12

## 2021-07-12 DIAGNOSIS — F33.2 MAJOR DEPRESSIVE DISORDER, RECURRENT SEVERE WITHOUT PSYCHOTIC FEATURES: ICD-10-CM

## 2021-07-12 DIAGNOSIS — F17.200 NICOTINE DEPENDENCE, UNSPECIFIED, UNCOMPLICATED: ICD-10-CM

## 2021-07-12 DIAGNOSIS — F10.20 ALCOHOL DEPENDENCE, UNCOMPLICATED: ICD-10-CM

## 2021-07-12 DIAGNOSIS — F14.10 COCAINE ABUSE, UNCOMPLICATED: ICD-10-CM

## 2021-07-12 PROCEDURE — 99231 SBSQ HOSP IP/OBS SF/LOW 25: CPT

## 2021-07-12 RX ADMIN — DOLUTEGRAVIR SODIUM 50 MILLIGRAM(S): 25 TABLET, FILM COATED ORAL at 08:07

## 2021-07-12 RX ADMIN — Medication 1 MILLIGRAM(S): at 08:06

## 2021-07-12 RX ADMIN — ESCITALOPRAM OXALATE 10 MILLIGRAM(S): 10 TABLET, FILM COATED ORAL at 08:07

## 2021-07-12 RX ADMIN — Medication 100 MILLIGRAM(S): at 08:06

## 2021-07-12 RX ADMIN — EMTRICITABINE AND TENOFOVIR DISOPROXIL FUMARATE 1 TABLET(S): 200; 300 TABLET, FILM COATED ORAL at 09:07

## 2021-07-12 NOTE — PROGRESS NOTE BEHAVIORAL HEALTH - NSBHFUPINTERVALHXFT_PSY_A_CORE
Pt states that he had a good weekend and tried to leave his room and socialize with other pts. He states that overall he has been sleeping well and his appetite has been good. States that he has a nightmare the past two nights where "I am looking for drugs on the floor and someone tells me not to look there because there are bugs and insects." Denies medication side-effects. Denies SI, HI, and AVH. Denies feeling shakiness and crawling on his arms. Affirms he still wants to go to rehab after discharge.     Medication compliant. No behavioral events overnight. No PRNs administered overnight.

## 2021-07-12 NOTE — CHART NOTE - NSCHARTNOTEFT_GEN_A_CORE
Social Work Note:    Treatment team met with patient earlier this morning on unit rounds.  At time of assessment patient was engaged in interview with treatment team members.  He informed he has been having dreams of searching for drugs and looking on the ground and “seeing maggots and flies.” Appetite endorsed as good.  Patient has been visible on the unit and sitting in the day room.  Continued group attendance and participation encouraged for discharge plan of referral to an inpatient chemical dependency unit.     ADLs have improved from admission.  Patient was seen by CATCH team.  Referrals were sent last week to [facility], [facility] and [facility].  Responses pending at this time.  Both [facility] and [facility] have expressed interest in accepting patient.        Mental Status Exam:    Mood – Depressed   Sleep – Fair   Appetite – Good   ADLs – Good   Thought Process – Linear    Observation – [id number]    No barriers to discharge identified at this time.     At this time patient is not psychiatrically stable for discharge.

## 2021-07-12 NOTE — PROGRESS NOTE BEHAVIORAL HEALTH - SUMMARY
Pt is a 34 y/o male , domiciled, employed, non-caregiver  male with hx of MDD with psychosis, trauma history, multiple suicidal attempts, multiple IPP, the last one was at [facility] May 8-21, 2021. Pt has polysubstance abuse/dependence (ETOH, cocaine/crack, heroin/methodone program in the past), last use was eight days ago. Pt started on Lexapro 5 mg PO daily and Seroquel 50 mg PO daily on 7/4. Seroquel was d/c on 7/6. Pt started on Trazodone 100 mg PO PRN for insomnia on 7/6. Lexapro increased to 10 mg PO daily on 7/7. Atarax 50mg PO PRN for anxiety added on 7/8.    Plan    Major Depressive d/o vs bipolar   - Continue Lexapro 10 mg daily  - Continue Trazodone 100 mg PRN QHS for insomnia  - EKG from 7/6: QT interval 416 ms  - f/u on EKG    Alcohol use disorder  - Patient reports frequent use of alcohol, did not quantify; BAL <3 on 7/3  - c/w CIWA protocol - lorazepam 2 mg PRN for sxs of withdrawal  - c/w folic acid 1 mg daily, thiamine 100 mg daily  - CATCH team consult: CATCH team involved for aftercare and pt will follow up with aftercare  - BALDO-Ar score of 0 - remove CIWA protocol    Cocaine use  - UTox positive for cocaine on 7/3  - CATCH team consult: CATCH team involved for aftercare and pt will follow up with aftercare    Nicotine use disorder  - nicotine gum 2 mg PRN q3h    HIV  - May 2021: CD4 393, VL 90  - c/w dolutegravir 50 mg, descovy 200/25    Severe agitation/aggression  - Ativan 2 mg PO Q6H PRN or haldol 5 mg PO Q6H PRN for severe agitation not amenable to verbal redirection with escalation to IM if patient refuses PO or becomes a danger to self, others or property.    diet: regular  dvt: none; patient mobile Pt is a 34 y/o male , domiciled, employed, non-caregiver  male with hx of MDD with psychosis, trauma history, multiple suicidal attempts, multiple IPP, the last one was at [facility] May 8-21, 2021. Pt has polysubstance abuse/dependence (ETOH, cocaine/crack, heroin/methodone program in the past), last use was eleven days ago. Pt started on Lexapro 5 mg PO daily and Seroquel 50 mg PO daily on 7/4. Seroquel was d/c on 7/6. Pt started on Trazodone 100 mg PO PRN for insomnia on 7/6. Lexapro increased to 10 mg PO daily on 7/7. Atarax 50mg PO PRN for anxiety added on 7/8.    Plan    Major Depressive d/o vs bipolar   - Continue Lexapro 10 mg daily  - Continue Trazodone 100 mg PRN QHS for insomnia  - EKG from 7/6: QT interval 416 ms  - f/u on EKG    Alcohol use disorder  - Patient reports frequent use of alcohol, did not quantify; BAL <3 on 7/3  - c/w CIWA protocol - lorazepam 2 mg PRN for sxs of withdrawal  - c/w folic acid 1 mg daily, thiamine 100 mg daily  - CATCH team consult: CATCH team involved for aftercare and pt will follow up with aftercare  - BALDO-Ar score of 0 - remove CIWA protocol    Cocaine use  - UTox positive for cocaine on 7/3  - CATCH team consult: CATCH team involved for aftercare and pt will follow up with aftercare    Nicotine use disorder  - nicotine gum 2 mg PRN q3h    HIV  - May 2021: CD4 393, VL 90  - c/w dolutegravir 50 mg, descovy 200/25    Severe agitation/aggression  - Ativan 2 mg PO Q6H PRN or haldol 5 mg PO Q6H PRN for severe agitation not amenable to verbal redirection with escalation to IM if patient refuses PO or becomes a danger to self, others or property.    diet: regular  dvt: none; patient mobile Pt is a 36 y/o male , domiciled, employed, non-caregiver  male with hx of MDD with psychosis, trauma history, multiple suicidal attempts, multiple IPP, the last one was at [facility] May 8-21, 2021. Pt has polysubstance abuse/dependence (ETOH, cocaine/crack, heroin/methodone program in the past), last use was eleven days ago. Pt started on Lexapro 5 mg PO daily and Seroquel 50 mg PO daily on 7/4. Seroquel was d/c on 7/6. Pt started on Trazodone 100 mg PO PRN for insomnia on 7/6. Lexapro increased to 10 mg PO daily on 7/7. Atarax 50mg PO PRN for anxiety added on 7/8.    Plan    Major Depressive d/o; r/o SIMD  - Continue Lexapro 10 mg daily  - Continue Trazodone 100 mg PRN QHS for insomnia  - EKG from 7/6: QT interval 416 ms  - f/u on EKG    Alcohol use disorder  - Patient reports frequent use of alcohol, did not quantify; BAL <3 on 7/3  - c/w CIWA protocol - lorazepam 2 mg PRN for sxs of withdrawal  - c/w folic acid 1 mg daily, thiamine 100 mg daily  - CATCH team consult: CATCH team involved for aftercare and pt will follow up with aftercare  - BALDO-Ar score of 0 - remove CIWA protocol    Cocaine use  - UTox positive for cocaine on 7/3  - CATCH team consult: CATCH team involved for aftercare and pt will follow up with aftercare; pt interested in inpatient rehab  - No pharmacological treatment indicated    Nicotine use disorder  - nicotine gum 2 mg PRN q3h    HIV  - May 2021: CD4 393, VL 90  - c/w dolutegravir 50 mg, descovy 200/25    Severe agitation/aggression  - Ativan 2 mg PO Q6H PRN or haldol 5 mg PO Q6H PRN for severe agitation not amenable to verbal redirection with escalation to IM if patient refuses PO or becomes a danger to self, others or property.    diet: regular  dvt: none; patient mobile

## 2021-07-12 NOTE — PROGRESS NOTE BEHAVIORAL HEALTH - NSBHCHARTREVIEWLAB_PSY_A_CORE FT
Lipid Profile in AM (07.07.21 @ 06:41)   Cholesterol, Serum: 182 mg/dL   Triglycerides, Serum: 108 mg/dL   HDL Cholesterol, Serum: 56 mg/dL   Non HDL Cholesterol: 126

## 2021-07-12 NOTE — PROGRESS NOTE BEHAVIORAL HEALTH - NSBHCHARTREVIEWINVESTIGATE_PSY_A_CORE FT
< from: 12 Lead ECG (07.06.21 @ 14:00) >    Ventricular Rate 60 BPM    Atrial Rate 60 BPM    P-R Interval 136 ms    QRS Duration 108 ms    Q-T Interval 416 ms    QTC Calculation(Bazett) 416 ms    P Axis 55 degrees    R Axis 73 degrees    T Axis 69 degrees    Diagnosis Line Normal sinus rhythm  Normal ECG    Confirmed by MARTIN KWOK, FLACO ([id number]) on 7/6/2021 5:40:10 PM    < end of copied text >

## 2021-07-13 PROCEDURE — 99231 SBSQ HOSP IP/OBS SF/LOW 25: CPT

## 2021-07-13 RX ORDER — NICOTINE POLACRILEX 2 MG
1 GUM BUCCAL
Qty: 360 | Refills: 0
Start: 2021-07-13 | End: 2021-08-11

## 2021-07-13 RX ORDER — ESCITALOPRAM OXALATE 10 MG/1
1 TABLET, FILM COATED ORAL
Qty: 30 | Refills: 0
Start: 2021-07-13 | End: 2021-08-11

## 2021-07-13 RX ORDER — DOLUTEGRAVIR SODIUM 25 MG/1
1 TABLET, FILM COATED ORAL
Qty: 30 | Refills: 0
Start: 2021-07-13 | End: 2021-08-11

## 2021-07-13 RX ORDER — EMTRICITABINE AND TENOFOVIR DISOPROXIL FUMARATE 200; 300 MG/1; MG/1
1 TABLET, FILM COATED ORAL
Qty: 30 | Refills: 0
Start: 2021-07-13 | End: 2021-08-11

## 2021-07-13 RX ADMIN — DOLUTEGRAVIR SODIUM 50 MILLIGRAM(S): 25 TABLET, FILM COATED ORAL at 08:09

## 2021-07-13 RX ADMIN — EMTRICITABINE AND TENOFOVIR DISOPROXIL FUMARATE 1 TABLET(S): 200; 300 TABLET, FILM COATED ORAL at 12:30

## 2021-07-13 RX ADMIN — Medication 1 MILLIGRAM(S): at 08:09

## 2021-07-13 RX ADMIN — ESCITALOPRAM OXALATE 10 MILLIGRAM(S): 10 TABLET, FILM COATED ORAL at 08:10

## 2021-07-13 RX ADMIN — Medication 100 MILLIGRAM(S): at 08:09

## 2021-07-13 NOTE — DISCHARGE NOTE BEHAVIORAL HEALTH - NSTOBACCOREFERRAL_GEN_A_NCS
Yes Patient registered and referred to the [place] [facility]. Phone appointment scheduled for 7/16/21 at 0900./Yes

## 2021-07-13 NOTE — DISCHARGE NOTE BEHAVIORAL HEALTH - MEDICATION SUMMARY - MEDICATIONS TO TAKE
I will START or STAY ON the medications listed below when I get home from the hospital:    escitalopram 10 mg oral tablet  -- 1 tab(s) by mouth once a day x 30 days   -- Indication: For MDD    dolutegravir 50 mg oral tablet  -- 1 tab(s) by mouth once a day x 30 days   -- Indication: For HIV (human immunodeficiency virus infection)    emtricitabine-tenofovir alafenamide 200 mg-25 mg oral tablet  -- 1 tab(s) by mouth once a day x 30 days   -- Indication: For HIV (human immunodeficiency virus infection)    Nicorette 2 mg oral transmucosal gum  -- 1 gum chewed every 2 hours x 30 days, As Needed   -- Do not take this drug if you are pregnant.  It is very important that you take or use this exactly as directed.  Do not skip doses or discontinue unless directed by your doctor.    -- Indication: For Nicotine dependence

## 2021-07-13 NOTE — DISCHARGE NOTE BEHAVIORAL HEALTH - NSBHDCSUICPROTECTFT_PSY_A_CORE
Has residential stability, desire to achieve sobriety and go to rehab, medication compliance on the unit, future oriented

## 2021-07-13 NOTE — PROGRESS NOTE BEHAVIORAL HEALTH - NSBHFUPINTERVALCCFT_PSY_A_CORE
"I am depressed"
"I am doing good"
"I feel much better"
I am ok. I feel safe here. pt stated.
"I am good"
"Not good and confused"
"Less confused"

## 2021-07-13 NOTE — DISCHARGE NOTE BEHAVIORAL HEALTH - NSBHDCMEDICALFT_PSY_A_CORE
HIV  - May 2021: CD4 393, VL 90  - continued on outpatient medications: dolutegravir 50 mg, descovy 200/25

## 2021-07-13 NOTE — PROGRESS NOTE BEHAVIORAL HEALTH - NSBHPTASSESSDT_PSY_A_CORE
04-Jul-2021 13:35
08-Jul-2021 09:30
09-Jul-2021 09:21
05-Jul-2021 09:43
06-Jul-2021 09:40
07-Jul-2021 09:00
12-Jul-2021 10:00
13-Jul-2021 12:37

## 2021-07-13 NOTE — PROGRESS NOTE BEHAVIORAL HEALTH - NSBHATTESTSEENBY_PSY_A_CORE
attending Psychiatrist without NP/Trainee
Attending Psychiatrist supervising NP/Trainee, meeting pt...
attending Psychiatrist without NP/Trainee
Attending Psychiatrist supervising NP/Trainee, meeting pt...

## 2021-07-13 NOTE — DISCHARGE NOTE BEHAVIORAL HEALTH - NSBHDCMEDSFT_PSY_A_CORE
Pt was monitored for alcohol withdrawal symptoms, no benzodiazepine taper ordered as it had been 3 days since pt last used alcohol. Pt had Ativan as PRN for withdrawal symptoms. Pt was started on Lexapro, which was titrated to 10 mg qdaily for depressed mood. Pt had trazodone and hydroxyzine as a PRN, however did not require any during the hospital stay. Pt tolerated medications well, denied side effects.

## 2021-07-13 NOTE — PROGRESS NOTE BEHAVIORAL HEALTH - NSBHADMITIPREASON_PSY_A_CORE
Danger to self; mental illness expected to respond to inpatient care
suicidal; ideation/Danger to self; mental illness expected to respond to inpatient care
Danger to self; mental illness expected to respond to inpatient care

## 2021-07-13 NOTE — PROGRESS NOTE BEHAVIORAL HEALTH - NSBHFUPINTERVALHXFT_PSY_A_CORE
Medication compliant. No behavioral events overnight. No PRNs administered overnight. Pt says his mood is a lot better today as compared Offered and patient declined Medication compliant. No behavioral events overnight. No PRNs administered overnight. Pt says his mood is a lot better today as compared to when he first came in because he has been getting out of his room more and socializing. Says he slept well last night, that he has a great appetite, and ate a lot of food for breakfast. Pt states he has been taking his medication as prescribed and denies side-effects. Says he occasionally feels like ants are crawling on his arms, but says this sensation has been the same since he got onto the unit. States he wants to pursue rehab after discharge. Denies SI, HI, and AVH.

## 2021-07-13 NOTE — PROGRESS NOTE BEHAVIORAL HEALTH - NSBHCHARTREVIEWVS_PSY_A_CORE FT
Vital Signs Last 24 Hrs  T(C): 36.2 (07 Jul 2021 09:07), Max: 36.2 (07 Jul 2021 09:07)  T(F): 97.1 (07 Jul 2021 09:07), Max: 97.1 (07 Jul 2021 09:07)  HR: 60 (07 Jul 2021 09:07) (55 - 66)  BP: 106/56 (07 Jul 2021 09:07) (104/68 - 124/69)  BP(mean): --  RR: 18 (07 Jul 2021 09:07) (16 - 18)  SpO2: --
Vital Signs Last 24 Hrs  T(C): 35.9 (13 Jul 2021 08:06), Max: 36.5 (13 Jul 2021 06:04)  T(F): 96.7 (13 Jul 2021 08:06), Max: 97.7 (13 Jul 2021 06:04)  HR: 71 (13 Jul 2021 08:06) (55 - 72)  BP: 105/68 (13 Jul 2021 08:06) (95/55 - 107/63)  BP(mean): --  RR: 18 (13 Jul 2021 08:06) (18 - 18)  SpO2: --
Vital Signs Last 24 Hrs  T(C): 36 (09 Jul 2021 08:40), Max: 36.3 (08 Jul 2021 19:11)  T(F): 96.8 (09 Jul 2021 08:40), Max: 97.4 (08 Jul 2021 19:11)  HR: 51 (09 Jul 2021 08:40) (51 - 67)  BP: 114/62 (09 Jul 2021 08:40) (106/64 - 114/62)  BP(mean): --  RR: 18 (09 Jul 2021 08:40) (16 - 18)  SpO2: --
Vital Signs Last 24 Hrs  T(C): 36.8 (08 Jul 2021 08:00), Max: 36.8 (07 Jul 2021 15:37)  T(F): 98.3 (08 Jul 2021 08:00), Max: 98.3 (08 Jul 2021 08:00)  HR: 60 (08 Jul 2021 08:00) (55 - 69)  BP: 103/64 (08 Jul 2021 08:00) (96/56 - 108/57)  BP(mean): --  RR: 16 (08 Jul 2021 08:00) (16 - 18)  SpO2: --
Vital Signs Last 24 Hrs  T(C): 36.1 (12 Jul 2021 10:17), Max: 36.7 (12 Jul 2021 06:08)  T(F): 96.9 (12 Jul 2021 10:17), Max: 98 (12 Jul 2021 06:08)  HR: 74 (12 Jul 2021 10:17) (57 - 74)  BP: 110/59 (12 Jul 2021 10:17) (99/57 - 110/59)  BP(mean): --  RR: 18 (12 Jul 2021 10:17) (18 - 18)  SpO2: --
Vital Signs Last 24 Hrs  T(C): 35.6 (06 Jul 2021 07:54), Max: 36.8 (06 Jul 2021 05:57)  T(F): 96.1 (06 Jul 2021 07:54), Max: 98.2 (06 Jul 2021 05:57)  HR: 51 (06 Jul 2021 07:54) (51 - 82)  BP: 103/66 (06 Jul 2021 07:54) (102/63 - 123/85)  BP(mean): --  RR: 16 (06 Jul 2021 07:54) (16 - 17)  SpO2: --

## 2021-07-13 NOTE — DISCHARGE NOTE BEHAVIORAL HEALTH - NSBHDCRESPONSEFT_PSY_A_CORE
Pt has made significant progress over the course of hospitalization. With continuous psychotherapy from the treatment team and the medications, patient reports feeling better, sleeping and eating well. Thought process and insight improved. Pt was calm and cooperative and was in good behavioral control. Patient denied any suicidal or homicidal ideations. Patient denied any auditory or visual hallucinations. Pt was evaluated by treatment team, pt is stable for discharge and patient shows no imminent danger to self, others or property at this time. Pt understands and agrees with treatment plan and following up with outpatient. Psychoeducation provided regarding diagnosis, medications, treatment and follow up. Risks, benefits, alternatives discussed, all questions and concerns addressed and answered. Reviewed crisis intervention with verbalized understanding.

## 2021-07-13 NOTE — PROGRESS NOTE BEHAVIORAL HEALTH - AXIS III
HIV [CD4 393, VL 90]

## 2021-07-13 NOTE — PROGRESS NOTE BEHAVIORAL HEALTH - SUMMARY
Pt is a 34 y/o male , domiciled, employed, non-caregiver  male with hx of MDD with psychosis, trauma history, multiple suicidal attempts, multiple IPP, the last one was at [facility] May 8-21, 2021. Pt has polysubstance abuse/dependence (ETOH, cocaine/crack, heroin/methodone program in the past), last use was twelve days ago. Pt started on Lexapro 5 mg PO daily and Seroquel 50 mg PO daily on 7/4. Seroquel was d/c on 7/6. Pt started on Trazodone 100 mg PO PRN for insomnia on 7/6. Lexapro increased to 10 mg PO daily on 7/7. Atarax 50mg PO PRN for anxiety added on 7/8.    Plan    Major Depressive d/o; r/o SIMD  - Continue Lexapro 10 mg daily  - EKG from 7/6: QT interval 416 ms    Alcohol use disorder  - Patient reports frequent use of alcohol, did not quantify; BAL <3 on 7/3  - c/w folic acid 1 mg daily, thiamine 100 mg daily  - CATCH team consult: CATCH team involved for aftercare and pt will follow up with aftercare  - [facility]-Ar score of 0 - removed CIWA protocol    Cocaine use  - UTox positive for cocaine on 7/3  - CATCH team consult: CATCH team involved for aftercare and pt will follow up with aftercare; pt interested in inpatient rehab  - No pharmacological treatment indicated    Nicotine use disorder  - nicotine gum 2 mg PRN q3h    HIV  - May 2021: CD4 393, VL 90  - c/w dolutegravir 50 mg, descovy 200/25    Severe agitation/aggression  - Ativan 2 mg PO Q6H PRN or haldol 5 mg PO Q6H PRN for severe agitation not amenable to verbal redirection with escalation to IM if patient refuses PO or becomes a danger to self, others or property.    diet: regular  dvt: none; patient mobile

## 2021-07-13 NOTE — DISCHARGE NOTE BEHAVIORAL HEALTH - HPI (INCLUDE ILLNESS QUALITY, SEVERITY, DURATION, TIMING, CONTEXT, MODIFYING FACTORS, ASSOCIATED SIGNS AND SYMPTOMS)
34yo reportedly domiciled, unemployed,   M w/ hx of affective and psychotic symptoms/disorders, w/ of past SAs (inc hanging, jumping in front of traffic, cutting his neck) with hx of SIB, w/ past psych hosp (last known in May 2021 at [facility]) w/ hx of cocaine, alcohol, opioid use (opioid use reportedly in remission), med hx of HIV who presented w/ worsening depression with SI.      Pt was seen and evaluated via telemonitor. Pt spoke of how for the past 2-3 weeks his depressive mood has been worsening w/ insomnia, anhedonia, appetite loss, socially isolating, poor ADLs and staying on streets instead of being home. Patient reported ongoing SI - reported that about 1-2 week ago superficially lacerated the right side of his neck. Patient denied recent anxiety symptoms. Patient denied current manic symptoms inc FOI, dec need for sleep, inc goal directed activities. Patient denied AH/VH/HI/intent or plan.      Collateral: reviewed d/c summary from [facility] inpatient psych unit

## 2021-07-13 NOTE — PROGRESS NOTE BEHAVIORAL HEALTH - NSBHADMITDANGERSELF_PSY_A_CORE
suicidal behavior/unable to care for self
unable to care for self
suicidal behavior/unable to care for self

## 2021-07-13 NOTE — DISCHARGE NOTE BEHAVIORAL HEALTH - NSBHDCSUBSTHXFT_PSY_A_CORE
Alcohol; Cocaine/Crack  6 beer/day - last time 3 days ago - no hx of w/d inc sz, DT  last use 3 days ago

## 2021-07-13 NOTE — PROGRESS NOTE BEHAVIORAL HEALTH - CASE SUMMARY
Pt was seen, evaluated and discussed with the resident, Dr. Lauren. Chart reviewed. On evaluation, pt reports he is doing well. Visible on the unit, attending groups. Remains motivated to go to inpatient rehab. Compliant with medications, denied side effects. Agree with assessment and plan above. Continue with medications as above.
as noted
Pt is a 36 y/o male , domiciled, employed, non-caregiver  male with hx of MDD with psychosis, trauma history, multiple suicidal attempts, multiple IPP, the last one was at [facility] May 8-21, 2021. Pt has polysubstance abuse/dependence (ETOH, cocaine/crack, heroin/methodone program in the past), last use was five days ago. Pt was started on Lexapro 5 mg PO daily and Seroquel 50 mg PO daily on 7/4.
Pt was seen, evaluated and discussed with the resident, Dr. Lauren. Chart reviewed. On evaluation, pt reports he is doing well. Endorsed some nightmares regarding "bugs crawling on him." Compliant with medications, denied side effects. Visible on the unit, remains interested in inpatient rehab. Agree with assessment and plan above. Continue with medications as above.

## 2021-07-13 NOTE — DISCHARGE NOTE BEHAVIORAL HEALTH - FAMILY HISTORY OF PSYCHIATRIC ILLNESS
father w/ depression    , unemployed, minor children not in his custody  past notes show hx of arrests

## 2021-07-14 VITALS
DIASTOLIC BLOOD PRESSURE: 65 MMHG | SYSTOLIC BLOOD PRESSURE: 116 MMHG | HEART RATE: 77 BPM | RESPIRATION RATE: 18 BRPM | TEMPERATURE: 98 F

## 2021-07-14 PROCEDURE — 99238 HOSP IP/OBS DSCHRG MGMT 30/<: CPT

## 2021-07-14 RX ADMIN — DOLUTEGRAVIR SODIUM 50 MILLIGRAM(S): 25 TABLET, FILM COATED ORAL at 08:41

## 2021-07-14 RX ADMIN — EMTRICITABINE AND TENOFOVIR DISOPROXIL FUMARATE 1 TABLET(S): 200; 300 TABLET, FILM COATED ORAL at 09:29

## 2021-07-14 RX ADMIN — Medication 100 MILLIGRAM(S): at 08:41

## 2021-07-14 RX ADMIN — Medication 1 MILLIGRAM(S): at 08:41

## 2021-07-14 RX ADMIN — ESCITALOPRAM OXALATE 10 MILLIGRAM(S): 10 TABLET, FILM COATED ORAL at 09:29

## 2021-07-14 NOTE — CHART NOTE - NSCHARTNOTEFT_GEN_A_CORE
Social Work Discharge Note:    Patient is for discharge today.   He is alert and oriented x3.  Mood is improved.  Anxiety has decreased.  Insight and judgment have improved.  Suicidal / homicidal ideation denied.      Patient will be discharged to [facility] inpatient rehab unit. Yesterday patient completed a phone screen and was approved for admission to facility.  Patient is agreeable to discharge plan. Transport to be arranged by [facility] to pick patient up from the hospital.      [facility] [phone] provided as an additional resource.     Patient is aware and agreeable to discharge today.

## 2021-07-14 NOTE — CHART NOTE - NSCHARTNOTEFT_GEN_A_CORE
Pt was seen, evaluated, chart reviewed.  As per nursing staff, no events overnight. On evaluation, pt reports he is doing well and ready for discharge to rehab. Is compliant with medication, denies negative side effects. Endorsed good sleep and appetite. Denied auditory/visual hallucinations. Denied paranoia. Denied suicidal/homicidal ideation, intent or plan.     Pt is for discharge today. Agreeable with outpatient follow up.

## 2021-07-20 NOTE — ED BEHAVIORAL HEALTH NOTE - BEHAVIORAL HEALTH NOTE
35 year old male with Schizophrenia awaiting voluntary admission.  this writer titrated risperdal from 0.5 to 1mg BID beginning this evening. Methotrexate Counseling:  Patient counseled regarding adverse effects of methotrexate including but not limited to nausea, vomiting, abnormalities in liver function tests. Patients may develop mouth sores, rash, diarrhea, and abnormalities in blood counts. The patient understands that monitoring is required including LFT's and blood counts.  There is a rare possibility of scarring of the liver and lung problems that can occur when taking methotrexate. Persistent nausea, loss of appetite, pale stools, dark urine, cough, and shortness of breath should be reported immediately. Patient advised to discontinue methotrexate treatment at least three months before attempting to become pregnant.  I discussed the need for folate supplements while taking methotrexate.  These supplements can decrease side effects during methotrexate treatment. The patient verbalized understanding of the proper use and possible adverse effects of methotrexate.  All of the patient's questions and concerns were addressed.

## 2021-07-21 DIAGNOSIS — R45.1 RESTLESSNESS AND AGITATION: ICD-10-CM

## 2021-07-21 DIAGNOSIS — G47.00 INSOMNIA, UNSPECIFIED: ICD-10-CM

## 2021-07-21 DIAGNOSIS — R45.851 SUICIDAL IDEATIONS: ICD-10-CM

## 2021-07-21 DIAGNOSIS — Z86.16 PERSONAL HISTORY OF COVID-19: ICD-10-CM

## 2021-07-21 DIAGNOSIS — F17.200 NICOTINE DEPENDENCE, UNSPECIFIED, UNCOMPLICATED: ICD-10-CM

## 2021-07-21 DIAGNOSIS — R63.8 OTHER SYMPTOMS AND SIGNS CONCERNING FOOD AND FLUID INTAKE: ICD-10-CM

## 2021-07-21 DIAGNOSIS — F10.20 ALCOHOL DEPENDENCE, UNCOMPLICATED: ICD-10-CM

## 2021-07-21 DIAGNOSIS — B20 HUMAN IMMUNODEFICIENCY VIRUS [HIV] DISEASE: ICD-10-CM

## 2021-07-21 DIAGNOSIS — F33.2 MAJOR DEPRESSIVE DISORDER, RECURRENT SEVERE WITHOUT PSYCHOTIC FEATURES: ICD-10-CM

## 2021-07-21 DIAGNOSIS — Z88.0 ALLERGY STATUS TO PENICILLIN: ICD-10-CM

## 2021-07-21 DIAGNOSIS — Z91.19 PATIENT'S NONCOMPLIANCE WITH OTHER MEDICAL TREATMENT AND REGIMEN: ICD-10-CM

## 2021-07-21 DIAGNOSIS — F11.20 OPIOID DEPENDENCE, UNCOMPLICATED: ICD-10-CM

## 2021-07-21 DIAGNOSIS — Z91.5 PERSONAL HISTORY OF SELF-HARM: ICD-10-CM

## 2021-07-21 DIAGNOSIS — F14.10 COCAINE ABUSE, UNCOMPLICATED: ICD-10-CM
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
## 2021-10-12 ENCOUNTER — INPATIENT (INPATIENT)
Facility: HOSPITAL | Age: 36
LOS: 7 days | Discharge: HOME | DRG: 897 | End: 2021-10-20
Attending: PSYCHIATRY & NEUROLOGY | Admitting: PSYCHIATRY & NEUROLOGY
Payer: MEDICAID

## 2021-10-12 VITALS
HEART RATE: 61 BPM | HEIGHT: 66 IN | DIASTOLIC BLOOD PRESSURE: 76 MMHG | WEIGHT: 145.06 LBS | OXYGEN SATURATION: 98 % | RESPIRATION RATE: 16 BRPM | SYSTOLIC BLOOD PRESSURE: 117 MMHG | TEMPERATURE: 99 F

## 2021-10-12 DIAGNOSIS — Z91.51 PERSONAL HISTORY OF SUICIDAL BEHAVIOR: ICD-10-CM

## 2021-10-12 DIAGNOSIS — G47.00 INSOMNIA, UNSPECIFIED: ICD-10-CM

## 2021-10-12 DIAGNOSIS — Z98.890 OTHER SPECIFIED POSTPROCEDURAL STATES: ICD-10-CM

## 2021-10-12 DIAGNOSIS — Z28.21 IMMUNIZATION NOT CARRIED OUT BECAUSE OF PATIENT REFUSAL: ICD-10-CM

## 2021-10-12 DIAGNOSIS — F12.20 CANNABIS DEPENDENCE, UNCOMPLICATED: ICD-10-CM

## 2021-10-12 DIAGNOSIS — Z88.0 ALLERGY STATUS TO PENICILLIN: ICD-10-CM

## 2021-10-12 DIAGNOSIS — F19.94 OTHER PSYCHOACTIVE SUBSTANCE USE, UNSPECIFIED WITH PSYCHOACTIVE SUBSTANCE-INDUCED MOOD DISORDER: ICD-10-CM

## 2021-10-12 DIAGNOSIS — F32.9 MAJOR DEPRESSIVE DISORDER, SINGLE EPISODE, UNSPECIFIED: ICD-10-CM

## 2021-10-12 DIAGNOSIS — F14.90 COCAINE USE, UNSPECIFIED, UNCOMPLICATED: ICD-10-CM

## 2021-10-12 DIAGNOSIS — F15.90 OTHER STIMULANT USE, UNSPECIFIED, UNCOMPLICATED: ICD-10-CM

## 2021-10-12 DIAGNOSIS — Z86.16 PERSONAL HISTORY OF COVID-19: ICD-10-CM

## 2021-10-12 DIAGNOSIS — F11.11 OPIOID ABUSE, IN REMISSION: ICD-10-CM

## 2021-10-12 DIAGNOSIS — B20 HUMAN IMMUNODEFICIENCY VIRUS [HIV] DISEASE: ICD-10-CM

## 2021-10-12 DIAGNOSIS — Z81.8 FAMILY HISTORY OF OTHER MENTAL AND BEHAVIORAL DISORDERS: ICD-10-CM

## 2021-10-12 DIAGNOSIS — F14.10 COCAINE ABUSE, UNCOMPLICATED: ICD-10-CM

## 2021-10-12 DIAGNOSIS — F32.A DEPRESSION, UNSPECIFIED: ICD-10-CM

## 2021-10-12 DIAGNOSIS — F41.9 ANXIETY DISORDER, UNSPECIFIED: ICD-10-CM

## 2021-10-12 DIAGNOSIS — F20.9 SCHIZOPHRENIA, UNSPECIFIED: ICD-10-CM

## 2021-10-12 DIAGNOSIS — F10.20 ALCOHOL DEPENDENCE, UNCOMPLICATED: ICD-10-CM

## 2021-10-12 DIAGNOSIS — R44.1 VISUAL HALLUCINATIONS: ICD-10-CM

## 2021-10-12 DIAGNOSIS — F17.210 NICOTINE DEPENDENCE, CIGARETTES, UNCOMPLICATED: ICD-10-CM

## 2021-10-12 DIAGNOSIS — Z91.14 PATIENT'S OTHER NONCOMPLIANCE WITH MEDICATION REGIMEN: ICD-10-CM

## 2021-10-12 LAB
ALBUMIN SERPL ELPH-MCNC: 3.1 G/DL — LOW (ref 3.4–5)
ALP SERPL-CCNC: 96 U/L — SIGNIFICANT CHANGE UP (ref 40–120)
ALT FLD-CCNC: 44 U/L — HIGH (ref 12–42)
AMPHET UR-MCNC: POSITIVE
ANION GAP SERPL CALC-SCNC: 9 MMOL/L — SIGNIFICANT CHANGE UP (ref 9–16)
AST SERPL-CCNC: 41 U/L — HIGH (ref 15–37)
BARBITURATES UR SCN-MCNC: NEGATIVE — SIGNIFICANT CHANGE UP
BENZODIAZ UR-MCNC: NEGATIVE — SIGNIFICANT CHANGE UP
BILIRUB SERPL-MCNC: 0.2 MG/DL — SIGNIFICANT CHANGE UP (ref 0.2–1.2)
BUN SERPL-MCNC: 9 MG/DL — SIGNIFICANT CHANGE UP (ref 7–23)
CALCIUM SERPL-MCNC: 8.7 MG/DL — SIGNIFICANT CHANGE UP (ref 8.5–10.5)
CHLORIDE SERPL-SCNC: 109 MMOL/L — HIGH (ref 96–108)
CO2 SERPL-SCNC: 27 MMOL/L — SIGNIFICANT CHANGE UP (ref 22–31)
COCAINE METAB.OTHER UR-MCNC: POSITIVE
CREAT SERPL-MCNC: 1.18 MG/DL — SIGNIFICANT CHANGE UP (ref 0.5–1.3)
ETHANOL SERPL-MCNC: <3 MG/DL — SIGNIFICANT CHANGE UP
GLUCOSE SERPL-MCNC: 118 MG/DL — HIGH (ref 70–99)
HCT VFR BLD CALC: 41.7 % — SIGNIFICANT CHANGE UP (ref 39–50)
HGB BLD-MCNC: 13.6 G/DL — SIGNIFICANT CHANGE UP (ref 13–17)
MCHC RBC-ENTMCNC: 30.9 PG — SIGNIFICANT CHANGE UP (ref 27–34)
MCHC RBC-ENTMCNC: 32.6 GM/DL — SIGNIFICANT CHANGE UP (ref 32–36)
MCV RBC AUTO: 94.8 FL — SIGNIFICANT CHANGE UP (ref 80–100)
METHADONE UR-MCNC: NEGATIVE — SIGNIFICANT CHANGE UP
NRBC # BLD: 0 /100 WBCS — SIGNIFICANT CHANGE UP (ref 0–0)
OPIATES UR-MCNC: NEGATIVE — SIGNIFICANT CHANGE UP
PCP SPEC-MCNC: SIGNIFICANT CHANGE UP
PCP UR-MCNC: NEGATIVE — SIGNIFICANT CHANGE UP
PLATELET # BLD AUTO: 249 K/UL — SIGNIFICANT CHANGE UP (ref 150–400)
POTASSIUM SERPL-MCNC: 3.9 MMOL/L — SIGNIFICANT CHANGE UP (ref 3.5–5.3)
POTASSIUM SERPL-SCNC: 3.9 MMOL/L — SIGNIFICANT CHANGE UP (ref 3.5–5.3)
PROT SERPL-MCNC: 7.7 G/DL — SIGNIFICANT CHANGE UP (ref 6.4–8.2)
RBC # BLD: 4.4 M/UL — SIGNIFICANT CHANGE UP (ref 4.2–5.8)
RBC # FLD: 13.6 % — SIGNIFICANT CHANGE UP (ref 10.3–14.5)
SARS-COV-2 RNA SPEC QL NAA+PROBE: SIGNIFICANT CHANGE UP
SODIUM SERPL-SCNC: 145 MMOL/L — SIGNIFICANT CHANGE UP (ref 132–145)
THC UR QL: POSITIVE
WBC # BLD: 3.51 K/UL — LOW (ref 3.8–10.5)
WBC # FLD AUTO: 3.51 K/UL — LOW (ref 3.8–10.5)

## 2021-10-12 PROCEDURE — 99218: CPT

## 2021-10-12 PROCEDURE — 90792 PSYCH DIAG EVAL W/MED SRVCS: CPT | Mod: 95

## 2021-10-12 PROCEDURE — 93010 ELECTROCARDIOGRAM REPORT: CPT

## 2021-10-12 PROCEDURE — 99053 MED SERV 10PM-8AM 24 HR FAC: CPT

## 2021-10-12 RX ORDER — QUETIAPINE FUMARATE 200 MG/1
50 TABLET, FILM COATED ORAL ONCE
Refills: 0 | Status: COMPLETED | OUTPATIENT
Start: 2021-10-12 | End: 2021-10-12

## 2021-10-12 RX ORDER — TRAZODONE HCL 50 MG
50 TABLET ORAL ONCE
Refills: 0 | Status: COMPLETED | OUTPATIENT
Start: 2021-10-12 | End: 2021-10-12

## 2021-10-12 RX ADMIN — QUETIAPINE FUMARATE 50 MILLIGRAM(S): 200 TABLET, FILM COATED ORAL at 21:58

## 2021-10-12 RX ADMIN — Medication 50 MILLIGRAM(S): at 21:59

## 2021-10-12 NOTE — ED BEHAVIORAL HEALTH ASSESSMENT NOTE - OTHER PAST PSYCHIATRIC HISTORY (INCLUDE DETAILS REGARDING ONSET, COURSE OF ILLNESS, INPATIENT/OUTPATIENT TREATMENT)
as per HPI; last hospitalization was at [facility] in July 2021 followed by 1 month treatment at [facility] rehab; says after rehab he never followed up with outpatient treatment and has been not adherent with treatment;

## 2021-10-12 NOTE — ED ADULT NURSE NOTE - NSIMPLEMENTINTERV_GEN_ALL_ED
Implemented All Universal Safety Interventions:  [place] to call system. Call bell, personal items and telephone within reach. Instruct patient to call for assistance. Room bathroom lighting operational. Non-slip footwear when patient is off stretcher. Physically safe environment: no spills, clutter or unnecessary equipment. Stretcher in lowest position, wheels locked, appropriate side rails in place.

## 2021-10-12 NOTE — ED BEHAVIORAL HEALTH ASSESSMENT NOTE - DETAILS
in mother's custody living in another [place] discussed recommendations with Dr. Thomas father w/ depression and addiction history n/a as per HPI

## 2021-10-12 NOTE — ED PROVIDER NOTE - CLINICAL SUMMARY MEDICAL DECISION MAKING FREE TEXT BOX
34 y/o male with PMHx of anxiety, depression, schizophrenia, and HIV presents with suicidal ideation and paranoid delusions and is noncompliant with medication/ Patient cannot recall dates of previous hospital visits. Patient endorses recent cocaine use. Patient hemodynamically stable, a&o x3. Will review previous visits, order medical screening exam and consult psychiatry. 34 y/o male with PMHx of anxiety, depression, schizophrenia, and HIV presents with suicidal ideation and paranoid delusions and is noncompliant with medication. Patient cannot recall dates of previous hospital visits. Patient endorses recent cocaine use. Patient hemodynamically stable, calm and cooperative. Placed on 1:1, medical screening examination for telepsych consult

## 2021-10-12 NOTE — ED BEHAVIORAL HEALTH NOTE - BEHAVIORAL HEALTH NOTE
====================  PRE-HOSPITAL COURSE  ====================  Source: [facility]/ED documentation  Details: Pt. presented to ED c/o SI and hallucinations            ====================  ED COURSE  ====================  Source: [facility] Har[facility] /ED documentation  Arrival: walk-in, unaccompanied   Belongings: no belongings of note  Behavior: Pt. arrived to ED alert and oriented, appearing in good hygiene, well groomed.  Pt. has been calm and cooperative in ED, complied with all triage processes w/o issue. Pt. maintains good eye contact, speech is clear and coherent, thoughts logical and linear in nature.  Pt. endorses SI, states that he recently had interrupted attempt near bridge.  He also endorses AH/VH; denies HI.  Pt. admits that he has not been compliant with his medications x 2 weeks and used cocaine x3 days ago.  In ED pt. mood/affect reportedly appropriate.  Pt. interactions with staff are pleasant.  Pt. has been resting comfortably while waiting evaluation with no further complaint.    Treatment: no medication given, no security intervention required    Visitors:   no visitors present          ====================  COLLATERAL  ====================  No collateral available.         COVID Exposure Screen- collateral (i.e. third-party, chart review, belongings, etc; include EMS and ED staff)  1.            *Has the patient had a COVID-19 test in the last 90 days?  (  ) Yes   (  ) No   ( X  ) Unknown- Reason: No collateral for screener questions_____  IF YES PROCEED TO QUESTION #2. IF NO OR UNKNOWN, PLEASE SKIP TO QUESTION #3.  2.            Date of test(s) and result(s):    3.            *Has the patient tested positive for COVID-19 antibodies? (  ) Yes   (   ) No   (  X) Unknown- Reason: _____  IF YES PROCEED TO QUESTION #4. IF NO or UNKNOWN, PLEASE SKIP TO QUESTION #5.  4.            Date of positive antibody test:   5.            *Has the patient received 2 doses of the COVID-19 vaccine? (   ) Yes   (   ) No   ( X ) Unknown- Reason: ____  IF YES PROCEED TO QUESTION #6. IF NO or UNKNOWN, PLEASE SKIP TO QUESTION #7.  6.             Date of second dose:   7.            *In the past 10 days, has the patient been around anyone with a positive COVID-19 test?* (  ) Yes   (   ) No   (  X ) Unknown- Reason: __  IF YES PROCEED TO QUESTION #8. IF NO or UNKNOWN, PLEASE SKIP TO QUESTION #13.  8.            Was the patient within 6 feet of them for at least 15 minutes? (  ) Yes   (  ) No   (  ) Unknown- Reason: _____  9.            Did the patient provide care for them? (  ) Yes   (  ) No   (  ) Unknown- Reason: ______  10.          Did the patient have direct physical contact with them (touched, hugged, or kissed them)? (  ) Yes   (  ) No    (  ) Unknown- Reason: __  11.          Did the patient share eating or drinking utensils with them? (  ) Yes   (  ) No    (  ) Unknown- Reason: ____  12.          Did they sneeze, cough, or somehow get respiratory droplets on the patient? (  ) Yes   (  ) No    (  ) Unknown- Reason: ______  13.          *Has the patient been out of [place] within the past 10 days?* (  ) Yes   (  ) No   ( X  ) Unknown- Reason: _____  IF YES PLEASE ANSWER THE FOLLOWING QUESTIONS:  14.          Which state/country did they go to?  15.          Were they there over 24 hours? (  ) Yes   (  ) No    (  ) Unknown- Reason: ______  16.          Date of return to [facility]:

## 2021-10-12 NOTE — ED CDU PROVIDER INITIAL DAY NOTE - DETAILS
Medically cleared for telepsych consult.  Reviewed case with Dr. Christie.  Observation, seroquel/trazadone at night for sleep.  Will look for inpatient bed.  Consented for voluntary admission.
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
## 2021-10-12 NOTE — ED BEHAVIORAL HEALTH ASSESSMENT NOTE - SUMMARY
Amandeep is a 35 year-old M, single, domiciled, unemployed, with past psychiatric history significant for affective and psychotic symptoms/disorders, substance use disorders (cocaine, alcohol, cannabis primarily; opioid use in remission x 6 months), multiple of past SAs (inc hanging, jumping in front of traffic, cutting his neck), more remote history of NSSIB (cutting self), with history of multiple past psychiatric hospitalizations (May 2021 at [facility] and in July 2021 at [facility]), history of at least 2 prior detox/rehab stays for substance abuse treatment, PMH significant for HIV being treated on Biktarvy, BIB self with worsening depressive and psychotic symptoms, s/p interrupted suicide attempt by jumping off a bridge today. Patient's presentation is most consistent with diagnoses of Unspecified Depressive Disorder, Cocaine Use Disorder, Alcohol Use Disorder, and Cannabis Use Disorder. Substance-Induced Mood Disorder is also on the differential.

## 2021-10-12 NOTE — ED BEHAVIORAL HEALTH ASSESSMENT NOTE - HPI (INCLUDE ILLNESS QUALITY, SEVERITY, DURATION, TIMING, CONTEXT, MODIFYING FACTORS, ASSOCIATED SIGNS AND SYMPTOMS)
Amandeep is a 35 year-old M, single, domiciled, unemployed, with past psychiatric history significant for affective and psychotic symptoms/disorders, substance use disorders (cocaine, alcohol, cannabis primarily; opioid use in remission x 6 months), multiple of past SAs (inc hanging, jumping in front of traffic, cutting his neck), more remote history of NSSIB (cutting self), with history of multiple past psychiatric hospitalizations (May 2021 at [facility] and in July 2021 at [facility]), history of at least 2 prior detox/rehab stays for substance abuse treatment, PMH significant for HIV being treated on Biktarvy, BIB self with worsening depressive and psychotic symptoms, s/p interrupted suicide attempt by jumping off a bridge today. Patient reports he was on a bridge and bystanders called the police to stop him from jumping earlier today. He says he got scared and ran home, ended up running home. Patient reports he has not been in any outpatient treatment since discharge from [facility] in August 2021. Says he has been experiencing AH of voices making negative commentary about himself, which he finds disturbing. Also reports feeling paranoid that people are following him, especially late at night. Reports feeling shadows on these occasions when he is feeling paranoid. At this time he continues to endorse suicidal ideation with intent but no specific plan. Denies any homicidal/aggressive ideation. Endorses significant dysphoria and anhedonia. Endorses poor sleep, low energy, poor appetite, and poor concentration. Denies any symptoms concerning for adriel/hypomania. Denies any other perceptual disturbances besides AH mentioned above. No delusions elicited on exam. Denies significant anxiety symptoms. Denies panic attacks. Denies symptoms consistent with OCD. Denies trauma history or any symptoms consistent with PTSD. Denies nicotine use. Endorses marijuana and cocaine use (about $50 per day). Reports drinking liquor daily; denies drinking to excess, history of withdrawal seizures or DTs. Denies any other illicit substance use.    COVID Exposure Screen- Patient  Have you had a COVID-19 test in the last 90 days? No.  Have you tested positive for COVID-19 antibodies? Yes, poisivt 5/8/21  Have you received 2 doses of the COVID-19 vaccine? No.  In the past 10 days, have you been around anyone with a positive COVID-19 test? No.  Have you been out of [place] within the past 10 days? No.    Patient is unable to offer up a collateral contact at this time.

## 2021-10-12 NOTE — ED BEHAVIORAL HEALTH ASSESSMENT NOTE - NS ED BHA REVIEW OF ED CHART AVAILABLE INVESTIGATIONS REVIEWED
Bowel Obstruction   WHAT YOU NEED TO KNOW:   A bowel obstruction occurs when your large or small intestine is completely or partly blocked  The blockage prevents food and waste from passing through normally  DISCHARGE INSTRUCTIONS:   Follow up with your healthcare provider as directed:  Write down your questions so you remember to ask them during your visits  Contact your healthcare provider if:   · You have nausea and are vomiting  · Your abdomen is enlarged  · You cannot pass a bowel movement or gas  · You lose weight without trying  · You have blood in your bowel movement  · You have questions or concerns about your condition or care  Return to the emergency department if:   · You have severe abdominal pain that does not get better  · Your heart is beating faster than normal for you  · You have a fever  © Copyright [address] Information is for End User's use only and may not be sold, redistributed or otherwise used for commercial purposes  All illustrations and images included in CareNotes® are the copyrighted property of A D A M , Inc  or [facility] Erin Yap   The above information is an  only  It is not intended as medical advice for individual conditions or treatments  Talk to your doctor, nurse or pharmacist before following any medical regimen to see if it is safe and effective for you  Yes
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
## 2021-10-12 NOTE — ED ADULT NURSE REASSESSMENT NOTE - NS ED NURSE REASSESS COMMENT FT1
Received patient from RN Villa. Pt resting in stretcher, in no acute distress at this time, respirations even bilaterally. Safety measures maintained, remains on 1:1 constant observation, pending transfer to [facility] for voluntary psych admission. Pt is calm and cooperative at this time.

## 2021-10-12 NOTE — ED ADULT NURSE NOTE - CHIEF COMPLAINT
Suicidal ideation and auditory hallucinations since last night. Pt cooperative and calm in ED on presentation.

## 2021-10-12 NOTE — ED BEHAVIORAL HEALTH ASSESSMENT NOTE - PSYCHIATRIC ISSUES AND PLAN (INCLUDE STANDING AND PRN MEDICATION)
-Recommending offering quetiapine 50 mg qHS to target psychosis; -resume escitalopram 10 mg daily for depression; -reusme trazodone 50 mg qHS PRN insomnia; -Recommending Seroquel 50 mg q6 PRN agitation; also recommending for severe agitation: Haldol 5 mg q6 PO/IM PRN severe agitation, Ativan q6 PO/IM PRN agitation, Benadryl 50 mg PO/IM q6 PRN agitation

## 2021-10-12 NOTE — ED BEHAVIORAL HEALTH NOTE - BEHAVIORAL HEALTH NOTE
Telepsychiatry Reassessment Note:    MD received handoff on patient.     MD assessed patient via [employer]. Patient complains of depression, suicidal ideation, PI, remains in agreement with voluntary admission at [facility].     a/p: 35 year-old M, single, domiciled, unemployed, with past psychiatric history significant for affective and psychotic symptoms/disorders, substance use disorders (cocaine, alcohol, cannabis primarily; opioid use in remission x 6 months), multiple of past SAs (inc hanging, jumping in front of traffic, cutting his neck), more remote history of NSSIB (cutting self), with history of multiple past psychiatric hospitalizations (May 2021 at [facility] and in July 2021 at [facility]), history of at least 2 prior detox/rehab stays for substance abuse treatment, PMH significant for HIV being treated on Biktarvy, BIB self with worsening depressive and psychotic symptoms, s/p interrupted suicide attempt by jumping off a bridge today.     - transfer to [facility] for voluntary hospitalization for depression

## 2021-10-12 NOTE — ED ADULT NURSE NOTE - PAIN RATING/NUMBER SCALE (0-10): REST
Preventive Health Recommendations  Male Ages 21 - 25     Yearly exam:             See your health care provider every year in order to  o   Review health changes.   o   Discuss preventive care.    o   Review your medicines if your doctor has prescribed any.    You should be tested each year for STDs (sexually transmitted diseases).     Talk to your provider about cholesterol testing.      If you are at risk for diabetes, you should have a diabetes test (fasting glucose).    Shots: Get a flu shot each year. Get a tetanus shot every 10 years.     Nutrition:    Eat at least 5 servings of fruits and vegetables daily.     Eat whole-grain bread, whole-wheat pasta and brown rice instead of white grains and rice.     Get adequate calcium and Vitamin D.     Lifestyle    Exercise for at least 150 minutes a week (30 minutes a day, 5 days a week). This will help you control your weight and prevent disease.     Limit alcohol to one drink per day.     No smoking.     Wear sunscreen to prevent skin cancer.     See your dentist every six months for an exam and cleaning.      0

## 2021-10-12 NOTE — ED BEHAVIORAL HEALTH ASSESSMENT NOTE - REASON
patient is not safe to leave the ED at this time; he would benefit from psychiatric hospitalization for safety, stabilization,

## 2021-10-12 NOTE — ED BEHAVIORAL HEALTH ASSESSMENT NOTE - DESCRIPTION
HIV on Biktarvy reports being single; unemployed; domiciled alone in apartment; has daughter not in his custody living in another state Patient arrived in ED and has been calm and cooperative in no acute distress. Telepsychiatry consulted.

## 2021-10-13 LAB
A1C WITH ESTIMATED AVERAGE GLUCOSE RESULT: 5 % — SIGNIFICANT CHANGE UP (ref 4–5.6)
CHOLEST SERPL-MCNC: 133 MG/DL — SIGNIFICANT CHANGE UP
ESTIMATED AVERAGE GLUCOSE: 97 MG/DL — SIGNIFICANT CHANGE UP (ref 68–114)
HDLC SERPL-MCNC: 41 MG/DL — SIGNIFICANT CHANGE UP
LIPID PNL WITH DIRECT LDL SERPL: 78 MG/DL — SIGNIFICANT CHANGE UP
NON HDL CHOLESTEROL: 92 MG/DL — SIGNIFICANT CHANGE UP
TRIGL SERPL-MCNC: 72 MG/DL — SIGNIFICANT CHANGE UP

## 2021-10-13 PROCEDURE — 99223 1ST HOSP IP/OBS HIGH 75: CPT

## 2021-10-13 RX ORDER — TRAZODONE HCL 50 MG
50 TABLET ORAL AT BEDTIME
Refills: 0 | Status: DISCONTINUED | OUTPATIENT
Start: 2021-10-12 | End: 2021-10-20

## 2021-10-13 RX ORDER — NICOTINE POLACRILEX 2 MG
2 GUM BUCCAL
Refills: 0 | Status: DISCONTINUED | OUTPATIENT
Start: 2021-10-13 | End: 2021-10-20

## 2021-10-13 RX ORDER — BICTEGRAVIR SODIUM, EMTRICITABINE, AND TENOFOVIR ALAFENAMIDE FUMARATE 30; 120; 15 MG/1; MG/1; MG/1
1 TABLET ORAL DAILY
Refills: 0 | Status: DISCONTINUED | OUTPATIENT
Start: 2021-10-12 | End: 2021-10-20

## 2021-10-13 RX ORDER — INFLUENZA VIRUS VACCINE 15; 15; 15; 15 UG/.5ML; UG/.5ML; UG/.5ML; UG/.5ML
0.5 SUSPENSION INTRAMUSCULAR ONCE
Refills: 0 | Status: DISCONTINUED | OUTPATIENT
Start: 2021-10-13 | End: 2021-10-20

## 2021-10-13 RX ORDER — ESCITALOPRAM OXALATE 10 MG/1
10 TABLET, FILM COATED ORAL DAILY
Refills: 0 | Status: DISCONTINUED | OUTPATIENT
Start: 2021-10-12 | End: 2021-10-20

## 2021-10-13 RX ORDER — QUETIAPINE FUMARATE 200 MG/1
50 TABLET, FILM COATED ORAL AT BEDTIME
Refills: 0 | Status: DISCONTINUED | OUTPATIENT
Start: 2021-10-12 | End: 2021-10-14

## 2021-10-13 RX ORDER — HALOPERIDOL DECANOATE 100 MG/ML
5 INJECTION INTRAMUSCULAR EVERY 6 HOURS
Refills: 0 | Status: DISCONTINUED | OUTPATIENT
Start: 2021-10-13 | End: 2021-10-20

## 2021-10-13 RX ADMIN — QUETIAPINE FUMARATE 50 MILLIGRAM(S): 200 TABLET, FILM COATED ORAL at 22:11

## 2021-10-13 NOTE — BH INPATIENT PSYCHIATRY ASSESSMENT NOTE - NSBHASSESSSUMMFT_PSY_ALL_CORE
Patient is a 36 y/o, single, domiciled, unemployed  male with medical history significant for HIV, currently treated with Biktarvy. Psychiatric history significant for Polysubstance use disorders (cocaine, alcohol, cannabis, tobacco opioid use in remission x 6 months), multiple of past SAs (inc hanging, jumping in front of traffic, cutting his neck), more remote history of NSSIB (cutting self), with history of multiple past psychiatric hospitalizations (May 2021 at [facility] and in July 2021 at [facility]), history of at least 2 prior detox/rehab stays for substance abuse treatment. Patient self-presents to ED with c/o worsening depressive and psychotic symptoms, s/p interrupted suicide attempt by jumping off a bridge today. He stated that he was on a bridge and bystanders called the police to stop him from jumping earlier today. He says he got scared and ran home, ended up running home. Admitted to [facility] voluntary.    Patient reports symptoms of depression along with psychotic sxs including avh and paranoid ideation even he does not use substances. He requests to be restarted on psychiatric medication and would like to pursue rehab though he also acknowledges not want wanting to completing stopping the use of drugs.     -Lexapro 10mg qd for depressive sxs  -Seroquel 50mg qhs for mood and psychotic sxs oziel continue to titrate as appropriate  -Biktarvy daily for HIV, viral load pending  -Trazodone 50mg qhs prn insomnia

## 2021-10-13 NOTE — BH SOCIAL WORK INITIAL PSYCHOSOCIAL EVALUATION - OTHER PAST PSYCHIATRIC HISTORY (INCLUDE DETAILS REGARDING ONSET, COURSE OF ILLNESS, INPATIENT/OUTPATIENT TREATMENT)
F33.2 Major Depressive Disorder, Recurrent, Severe  F14.10 Cocaine Use Disorder, Uncomplicated  Schizophrenia vs Substance-Induced Psychosis  Multiple prior hospitalizations (most recent in July 2021 at [facility])

## 2021-10-13 NOTE — BH SOCIAL WORK INITIAL PSYCHOSOCIAL EVALUATION - NSPTSTATEDGOAL_PSY_ALL_CORE
Patient wants to get on a stable medication regimen, reduce symptoms of depression; patient is discharge focused.

## 2021-10-13 NOTE — BH INPATIENT PSYCHIATRY ASSESSMENT NOTE - HPI (INCLUDE ILLNESS QUALITY, SEVERITY, DURATION, TIMING, CONTEXT, MODIFYING FACTORS, ASSOCIATED SIGNS AND SYMPTOMS)
Patient is a 36 y/o, single, domiciled, unemployed  male with medical history significant for HIV, currently treated with Biktarvy. Psychiatric history significant for Polysubstance use disorders (cocaine, alcohol, cannabis, tobacco opioid use in remission x 6 months), multiple of past SAs (inc hanging, jumping in front of traffic, cutting his neck), more remote history of NSSIB (cutting self), with history of multiple past psychiatric hospitalizations (May 2021 at [facility] and in July 2021 at [facility]), history of at least 2 prior detox/rehab stays for substance abuse treatment. Patient self-presents to ED with c/o worsening depressive and psychotic symptoms, s/p interrupted suicide attempt by jumping off a bridge today. He stated that he was on a bridge and bystanders called the police to stop him from jumping earlier today. He says he got scared and ran home, ended up running home. Admitted to [facility] voluntary.    Patient reports that most recently he has been experiencing v/h seeing shadows and paranoia, feeling that people are watching him. He has been regularly using cocaine, alcohol and tobacco and used shortly before coming into the hospital. He reports using $50 of both cannabis and marijuana and drinking roughly a pint of liquor each day. Denies any other current drug use. Utox +THC, Cocaine and amphetamine. BAL 0. He states that most recently he was hospitalized at [facility] and transferred to [facility] rehab x1.5 months, but relapsed shortly after he finished the program. He is non-compliant with medication regimen, both psychotropics and antiretroviral, reporting that he rarely takes them.     Currently denies si/hi, endorses v/h and paranoid ideation. Would like to be restarted on medication regimen and follow up with rehab, though he also states that he is not ready to completely stop using drug/etoh. No reported a/h upon admissioni though he did endorse ah of voices making negative commentary about him when he arrived in the ED. He reports limited support from family and it appears that he is estranged from them at this time and lives alone.

## 2021-10-13 NOTE — BH INPATIENT PSYCHIATRY ASSESSMENT NOTE - NSBHMSEAFFRANGE_PSY_A_CORE
Follow up with your Gyn if you have no improvement in three days.  Return to the ED if you develop a fever or increasing pain.  Do not drink alcohol while using Flagyl.  No intercourse for one week.   
Full

## 2021-10-13 NOTE — BH INPATIENT PSYCHIATRY ASSESSMENT NOTE - OTHER PAST PSYCHIATRIC HISTORY (INCLUDE DETAILS REGARDING ONSET, COURSE OF ILLNESS, INPATIENT/OUTPATIENT TREATMENT)
as per HPI; last hospitalization was at [facility] in July 2021 followed by 1 month treatment at [facility] rehab; says after rehab he never followed up with outpatient treatment and has been not adherent with treatment;

## 2021-10-13 NOTE — BH SOCIAL WORK INITIAL PSYCHOSOCIAL EVALUATION - NSBHABUSEAPS_PSY_ALL_CORE
None reported; however, according to patient chart, patient has a daughter who is in the custody of her mother./Unknown

## 2021-10-13 NOTE — BH SOCIAL WORK INITIAL PSYCHOSOCIAL EVALUATION - NSBHBARRIERS_PSY_ALL_CORE
Lack of support/Lack of insight/Low motivation/Non-compliant with treatment/Poor judgement/Social withdrawal

## 2021-10-13 NOTE — BH INPATIENT PSYCHIATRY ASSESSMENT NOTE - NSBHMETABOLIC_PSY_ALL_CORE_FT
BMI: BMI (kg/m2): 23.4 (10-12-21 @ 15:25)  HbA1c: A1C with Estimated Average Glucose Result: 5.0 % (10-13-21 @ 11:17)    Glucose: POCT Blood Glucose.: 96 mg/dL (05-08-21 @ 13:01)    BP: 104/73 (10-13-21 @ 09:00) (102/67 - 119/79)  Lipid Panel: Date/Time: 10-13-21 @ 11:18  Cholesterol, Serum: 133  Direct LDL: --  HDL Cholesterol, Serum: 41  Total Cholesterol/HDL Ration Measurement: --  Triglycerides, Serum: 72
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
## 2021-10-13 NOTE — BH PATIENT PROFILE - HOME MEDICATIONS
escitalopram 10 mg oral tablet , 1 tab(s) orally once a day x 30 days   emtricitabine-tenofovir alafenamide 200 mg-25 mg oral tablet , 1 tab(s) orally once a day x 30 days   dolutegravir 50 mg oral tablet , 1 tab(s) orally once a day x 30 days   Nicorette 2 mg oral transmucosal gum , 1 gum chewed every 2 hours x 30 days, As Needed

## 2021-10-13 NOTE — BH INPATIENT PSYCHIATRY ASSESSMENT NOTE - DESCRIPTION
reports being single; unemployed; domiciled alone in apartment; has daughter not in his custody living in another state

## 2021-10-13 NOTE — BH SOCIAL WORK INITIAL PSYCHOSOCIAL EVALUATION - NSBHCOMMCURRENT_PSY_ALL_CORE
Patient was offered outpatient services post-discharge from his prior hospitalizations but did not show up to any./None

## 2021-10-13 NOTE — BH INPATIENT PSYCHIATRY ASSESSMENT NOTE - NSTREATMENTCERT_PSY_ALL_CORE
Date Time Faraz Providers Departments Visit Type                     C [x] 3/18/21 2:00  S Festus Matta DO [[id number]] [facility] [[phone]] [facility] [[id number]]                                   C - Canceled      
.
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
## 2021-10-13 NOTE — BH INPATIENT PSYCHIATRY ASSESSMENT NOTE - NSICDXBHSECONDARYDX_PSY_ALL_CORE
Depressive disorder   F32.9  Cocaine use disorder   F14.10  Alcohol use disorder, moderate, dependence   F10.20  Cannabis use disorder, moderate, dependence   F12.20

## 2021-10-13 NOTE — BH INPATIENT PSYCHIATRY ASSESSMENT NOTE - CURRENT MEDICATION
MEDICATIONS  (STANDING):  bictegravir 50 mG/emtricitabine 200 mG/tenofovir alafenamide 25 mG (BIKTARVY) 1 Tablet(s) Oral daily  escitalopram 10 milliGRAM(s) Oral daily  influenza   Vaccine 0.5 milliLiter(s) IntraMuscular once  QUEtiapine 50 milliGRAM(s) Oral at bedtime    MEDICATIONS  (PRN):  haloperidol     Tablet 5 milliGRAM(s) Oral every 6 hours PRN agitation  LORazepam     Tablet 1 milliGRAM(s) Oral every 6 hours PRN agitation  traZODone 50 milliGRAM(s) Oral at bedtime PRN insomnia

## 2021-10-13 NOTE — BH INPATIENT PSYCHIATRY ASSESSMENT NOTE - NSBHCHARTREVIEWVS_PSY_A_CORE FT
Vital Signs Last 24 Hrs  T(C): 36.6 (10-13-21 @ 09:00), Max: 37.2 (10-12-21 @ 19:57)  T(F): 97.9 (10-13-21 @ 09:00), Max: 98.9 (10-12-21 @ 19:57)  HR: 58 (10-13-21 @ 09:00) (58 - 74)  BP: 104/73 (10-13-21 @ 09:00) (102/67 - 119/79)  BP(mean): --  RR: 16 (10-13-21 @ 09:00) (16 - 18)  SpO2: 98% (10-13-21 @ 09:00) (98% - 98%)

## 2021-10-13 NOTE — BH SOCIAL WORK INITIAL PSYCHOSOCIAL EVALUATION - NSHIGHRISKBEHFT_PSY_ALL_CORE
Multiple prior suicide attempts (4 times including: jumping off a bridge, aborted; hanging; jumping in front of oncoming traffic; cutting his neck)  Polysubstance use resulting in tuan HIV via IV use  Medication non-adherence

## 2021-10-14 LAB
A1C WITH ESTIMATED AVERAGE GLUCOSE RESULT: 4.7 % — SIGNIFICANT CHANGE UP (ref 4–5.6)
CHOLEST SERPL-MCNC: 138 MG/DL — SIGNIFICANT CHANGE UP
ESTIMATED AVERAGE GLUCOSE: 88 MG/DL — SIGNIFICANT CHANGE UP (ref 68–114)
HDLC SERPL-MCNC: 36 MG/DL — LOW
LIPID PNL WITH DIRECT LDL SERPL: 77 MG/DL — SIGNIFICANT CHANGE UP
NON HDL CHOLESTEROL: 102 MG/DL — SIGNIFICANT CHANGE UP
TRIGL SERPL-MCNC: 123 MG/DL — SIGNIFICANT CHANGE UP

## 2021-10-14 PROCEDURE — 99233 SBSQ HOSP IP/OBS HIGH 50: CPT

## 2021-10-14 RX ORDER — QUETIAPINE FUMARATE 200 MG/1
100 TABLET, FILM COATED ORAL AT BEDTIME
Refills: 0 | Status: DISCONTINUED | OUTPATIENT
Start: 2021-10-14 | End: 2021-10-15

## 2021-10-15 PROCEDURE — 99233 SBSQ HOSP IP/OBS HIGH 50: CPT

## 2021-10-15 RX ORDER — QUETIAPINE FUMARATE 200 MG/1
200 TABLET, FILM COATED ORAL AT BEDTIME
Refills: 0 | Status: DISCONTINUED | OUTPATIENT
Start: 2021-10-15 | End: 2021-10-18

## 2021-10-15 RX ADMIN — BICTEGRAVIR SODIUM, EMTRICITABINE, AND TENOFOVIR ALAFENAMIDE FUMARATE 1 TABLET(S): 30; 120; 15 TABLET ORAL at 09:39

## 2021-10-15 RX ADMIN — QUETIAPINE FUMARATE 200 MILLIGRAM(S): 200 TABLET, FILM COATED ORAL at 22:05

## 2021-10-15 RX ADMIN — ESCITALOPRAM OXALATE 10 MILLIGRAM(S): 10 TABLET, FILM COATED ORAL at 09:38

## 2021-10-15 RX ADMIN — Medication 50 MILLIGRAM(S): at 22:05

## 2021-10-15 NOTE — BH INPATIENT PSYCHIATRY PROGRESS NOTE - NSBHCONSDANGERSELF_PSY_A_CORE
suicidal behavior/suicidal ideation with plan and means
suicidal behavior/suicidal ideation with plan and means

## 2021-10-15 NOTE — CHART NOTE - NSCHARTNOTEFT_GEN_A_CORE
PHYSICAL EXAM: Agree    VITALS: T(C): 36.8 (10-15-21 @ 08:53), Max: 37.2 (10-14-21 @ 20:34)  HR: 74 (10-15-21 @ 08:53) (56 - 74)  BP: 98/64 (10-15-21 @ 08:53) (98/64 - 144/69)  RR: 18 (10-15-21 @ 08:53) (18 - 18)  SpO2: 98% (10-15-21 @ 08:53) (97% - 99%)      GENERAL: NAD, comfortable, ambulating. Cooperative  HEAD:  Atraumatic, Normocephalic  EYES: EOMI, PERRLA, conjunctiva and sclera clear  ENT: Moist mucous membranes  NECK: Supple, No JVD  CHEST/LUNG: Clear to auscultation bilaterally; No rales, rhonchi, wheezing, or rubs. Unlabored respirations. Clear airway entry.  HEART: Regular rate and rhythm; No murmurs, rubs, or gallops  ABDOMEN: BSx4; Soft, nontender, nondistended  EXTREMITIES:  No clubbing, cyanosis, or edema. Right handed limited range of motion, unable to flex/extend wrist secondary to pain.    NERVOUS SYSTEM:  A&Ox3, no focal deficits, 5/5 strength on all extremities.   SKIN: No rashes or lesions. PHYSICAL EXAM: Agree    VITALS: T(C): 36.8 (10-15-21 @ 08:53), Max: 37.2 (10-14-21 @ 20:34)  HR: 74 (10-15-21 @ 08:53) (56 - 74)  BP: 98/64 (10-15-21 @ 08:53) (98/64 - 144/69)  RR: 18 (10-15-21 @ 08:53) (18 - 18)  SpO2: 98% (10-15-21 @ 08:53) (97% - 99%)      GENERAL: NAD, comfortable, ambulating. Cooperative  HEAD:  Atraumatic, Normocephalic  EYES: EOMI, PERRLA, conjunctiva and sclera clear  ENT: Moist mucous membranes  NECK: Supple, No JVD  CHEST/LUNG: Clear to auscultation bilaterally; No rales, rhonchi, wheezing, or rubs. Unlabored respirations. Clear airway entry.  HEART: Regular rate and rhythm; No murmurs, rubs, or gallops  ABDOMEN: BSx4; Soft, nontender, nondistended  EXTREMITIES:  No clubbing, cyanosis. Right wrist swelling with limited range of motion, unable to flex/extend, secondary to pain.    NERVOUS SYSTEM:  A&Ox3, no focal deficits, 5/5 strength on all extremities.   SKIN: No rashes or lesions. PHYSICAL EXAM: Agree    VITALS: T(C): 36.8 (10-15-21 @ 08:53), Max: 37.2 (10-14-21 @ 20:34)  HR: 74 (10-15-21 @ 08:53) (56 - 74)  BP: 98/64 (10-15-21 @ 08:53) (98/64 - 144/69)  RR: 18 (10-15-21 @ 08:53) (18 - 18)  SpO2: 98% (10-15-21 @ 08:53) (97% - 99%)      GENERAL: NAD, comfortable, ambulating. Diaphoretic. Cooperative.  HEAD:  Atraumatic, Normocephalic  EYES: EOMI, PERRLA, conjunctiva and sclera clear  ENT: Moist mucous membranes  NECK: Supple, No JVD  CHEST/LUNG: Clear to auscultation bilaterally; No rales, rhonchi, wheezing, or rubs. Unlabored respirations. Clear airway entry.  HEART: Regular rate and rhythm; No murmurs, rubs, or gallops  ABDOMEN: BSx4; Soft, nontender, nondistended  EXTREMITIES:  No clubbing, cyanosis. Right wrist swelling with limited range of motion, unable to flex/extend, secondary to pain.    NERVOUS SYSTEM:  A&Ox3, no focal deficits, 5/5 strength on all extremities.   SKIN: No rashes or lesions.    CIWA Score: 5 (Diaphoretic, visual hallucinations)

## 2021-10-16 RX ADMIN — QUETIAPINE FUMARATE 200 MILLIGRAM(S): 200 TABLET, FILM COATED ORAL at 21:35

## 2021-10-16 RX ADMIN — Medication 50 MILLIGRAM(S): at 21:35

## 2021-10-17 RX ADMIN — QUETIAPINE FUMARATE 200 MILLIGRAM(S): 200 TABLET, FILM COATED ORAL at 22:22

## 2021-10-17 RX ADMIN — Medication 50 MILLIGRAM(S): at 22:23

## 2021-10-17 RX ADMIN — BICTEGRAVIR SODIUM, EMTRICITABINE, AND TENOFOVIR ALAFENAMIDE FUMARATE 1 TABLET(S): 30; 120; 15 TABLET ORAL at 12:54

## 2021-10-17 RX ADMIN — ESCITALOPRAM OXALATE 10 MILLIGRAM(S): 10 TABLET, FILM COATED ORAL at 12:54

## 2021-10-18 PROCEDURE — 99232 SBSQ HOSP IP/OBS MODERATE 35: CPT

## 2021-10-18 RX ORDER — QUETIAPINE FUMARATE 200 MG/1
300 TABLET, FILM COATED ORAL AT BEDTIME
Refills: 0 | Status: DISCONTINUED | OUTPATIENT
Start: 2021-10-18 | End: 2021-10-20

## 2021-10-18 RX ORDER — ACETAMINOPHEN 500 MG
650 TABLET ORAL EVERY 6 HOURS
Refills: 0 | Status: DISCONTINUED | OUTPATIENT
Start: 2021-10-18 | End: 2021-10-20

## 2021-10-18 RX ADMIN — BICTEGRAVIR SODIUM, EMTRICITABINE, AND TENOFOVIR ALAFENAMIDE FUMARATE 1 TABLET(S): 30; 120; 15 TABLET ORAL at 10:09

## 2021-10-18 RX ADMIN — ESCITALOPRAM OXALATE 10 MILLIGRAM(S): 10 TABLET, FILM COATED ORAL at 10:09

## 2021-10-18 RX ADMIN — Medication 50 MILLIGRAM(S): at 23:01

## 2021-10-18 RX ADMIN — QUETIAPINE FUMARATE 300 MILLIGRAM(S): 200 TABLET, FILM COATED ORAL at 22:59

## 2021-10-18 NOTE — BH PSYCHOLOGY - CLINICIAN PSYCHOTHERAPY NOTE - NSBHPSYCHOLADDL_PSY_A_CORE
Pt is a 35 year-old M, single, domiciled, unemployed, with past psychiatric history significant for affective and psychotic symptoms/disorders, substance use disorders (cocaine, alcohol, cannabis primarily; opioid use in remission x 6 months), multiple of past SAs (inc hanging, jumping in front of traffic, cutting his neck), more remote history of NSSIB (cutting self), with history of multiple past psychiatric hospitalizations (May 2021 at [facility] and in July 2021 at [facility] [place]), history of at least 2 prior detox/rehab stays for substance abuse treatment, PMH significant for HIV being treated on Biktarvy, BIB self with worsening depressive and psychotic sxs.

## 2021-10-18 NOTE — BH PSYCHOLOGY - CLINICIAN PSYCHOTHERAPY NOTE - NSBHPSYCHOLNARRATIVE_PSY_A_CORE FT
APPEARANCE:  [X] adequately groomed []disheveled  [] malodorous [] Other:     BEHAVIOR: [X] cooperative [] uncooperative [X] good EC [] poor EC [X] well related [] oddly related [] guarded []PMA [] PMR []abnormal movements [] Other:     SPEED: [X] normal rate/rhythm/volume [] loud [] quiet [] slow  [] rapid [] pressured [] Other: _________   MOOD: [] euthymic [X] dysphoric []anxious [] irritable [] Other: ___________   AFFECT: [] full [] expansive [X] constricted [] blunted [] flat [] stable [] labile [] Other: _________________   THOUGHT PROCESS: [x] organized [] disorganized [] goal-directed [] concrete [] logical  [] illogical   [] circumstantial [] tangential [] impoverished [] effusive [] repetitive [] Other: ___________   THOUGHT CONTENT: [X] negative for delusions/suicidal ideation /homicidal ideation  [] positive for delusions/suicidal ideation/homicidal ideation Describe: _____________________________________   PERCEPTION: [x] negative for auditory/ visual hallucinations  [] positive for auditory/ visual hallucinations Describe: ____________________________________________________________   INSIGHT/JUDGMENT: [] good [x]fair [] poor        IMPULSE CONTROL: [x] good []fair [] poor         COGNITION: [x] alert and oriented to person,time,place    Lacks orientation to person/ time/ place. Describe: _______________________    Risk assessment as applicable :  [x] suicide [] self-harm [] elopement [] aggression [] Other:   [x] ideation	 [] intent	 [] plan   [x] prior incidences (if checked, elaborate) history of prior attempts  [] family history of suicide	[] family history of aggression  Static factors: hx of prior attempts, hx of substance use  Modifiable factors: current mood state  Protective factors: Identifies reasons for living, has a daughter, domiciled, engaged in safety planning.   Met with pt for 30 min 1:1 session to complete suicide specific safety plan. Pt reported that he is still hearing voices and continues to endorse depressed mood, but denied having any suicidal ideation, reporting that he feels differently than when he came in. Pt and this writer engaged in safety planning. Pt was motivated, but also required encouragement to participate, and as he spoke more, his motivation was observed to have increased. Pt reported that staying out of substances will be important for him, as it triggers mood swings and suicidality. Pt reported that he had been to a few rehabs and did not find them helpful. Reported that he would be willing to try NA again, as he participated but did not put much effort to it in the past. Pt reported that his daughter who is 17, is an important person in his life that he would like to invest improving their relationship. Pt also reported that he usually starts using drugs when he gets discharged, and stops going to his psychiatrist and stops taking his meds, and that is something he will do differently this time around. COpies of the safety plan were given to the patient.

## 2021-10-18 NOTE — BH SAFETY PLAN - WARNING SIGN 1
Patient completed [facility] Suicide Specific safety plan. A copy is placed in the paper chart and a copy is given to the patient.

## 2021-10-19 PROCEDURE — 99231 SBSQ HOSP IP/OBS SF/LOW 25: CPT

## 2021-10-19 RX ADMIN — QUETIAPINE FUMARATE 300 MILLIGRAM(S): 200 TABLET, FILM COATED ORAL at 22:19

## 2021-10-19 RX ADMIN — Medication 50 MILLIGRAM(S): at 22:19

## 2021-10-19 RX ADMIN — BICTEGRAVIR SODIUM, EMTRICITABINE, AND TENOFOVIR ALAFENAMIDE FUMARATE 1 TABLET(S): 30; 120; 15 TABLET ORAL at 10:59

## 2021-10-19 RX ADMIN — ESCITALOPRAM OXALATE 10 MILLIGRAM(S): 10 TABLET, FILM COATED ORAL at 10:59

## 2021-10-19 NOTE — BH INPATIENT PSYCHIATRY PROGRESS NOTE - NSCGISEVERILLNESS_PSY_ALL_CORE
3 = Mildly ill – clearly established symptoms with minimal, if any, distress or difficulty in social and occupational function
3 = Mildly ill – clearly established symptoms with minimal, if any, distress or difficulty in social and occupational function

## 2021-10-19 NOTE — BH INPATIENT PSYCHIATRY PROGRESS NOTE - NSBHCHARTREVIEWVS_PSY_A_CORE FT
Vital Signs Last 24 Hrs  T(C): 37.1 (10-18-21 @ 16:44), Max: 37.1 (10-18-21 @ 16:44)  T(F): 98.7 (10-18-21 @ 16:44), Max: 98.7 (10-18-21 @ 16:44)  HR: 82 (10-18-21 @ 16:44) (64 - 82)  BP: 107/69 (10-18-21 @ 16:44) (107/67 - 107/69)  BP(mean): --  RR: 20 (10-18-21 @ 09:00) (20 - 20)  SpO2: 97% (10-18-21 @ 16:44) (97% - 98%)    
Vital Signs Last 24 Hrs  T(C): 36.8 (10-15-21 @ 08:53), Max: 37.2 (10-14-21 @ 20:34)  T(F): 98.2 (10-15-21 @ 08:53), Max: 98.9 (10-14-21 @ 20:34)  HR: 74 (10-15-21 @ 08:53) (56 - 74)  BP: 98/64 (10-15-21 @ 08:53) (98/64 - 144/69)  BP(mean): --  RR: 18 (10-15-21 @ 08:53) (18 - 18)  SpO2: 98% (10-15-21 @ 08:53) (97% - 99%)    
Vital Signs Last 24 Hrs  T(C): 37.3 (10-16-21 @ 17:00), Max: 37.3 (10-16-21 @ 17:00)  T(F): 99.1 (10-16-21 @ 17:00), Max: 99.1 (10-16-21 @ 17:00)  HR: 74 (10-16-21 @ 17:00) (74 - 78)  BP: 109/68 (10-16-21 @ 17:00) (109/68 - 120/74)  BP(mean): --  RR: 18 (10-16-21 @ 17:00) (18 - 18)  SpO2: 98% (10-16-21 @ 17:00) (97% - 98%)    
Vital Signs Last 24 Hrs  T(C): 36.8 (10-19-21 @ 16:08), Max: 37.1 (10-18-21 @ 16:44)  T(F): 98.2 (10-19-21 @ 16:08), Max: 98.7 (10-18-21 @ 16:44)  HR: 79 (10-19-21 @ 16:08) (77 - 82)  BP: 120/72 (10-19-21 @ 16:08) (97/68 - 120/72)  BP(mean): --  RR: 18 (10-19-21 @ 16:08) (18 - 18)  SpO2: 97% (10-19-21 @ 16:08) (97% - 98%)    
Vital Signs Last 24 Hrs  T(C): 36.6 (10-14-21 @ 11:13), Max: 37 (10-13-21 @ 20:47)  T(F): 97.8 (10-14-21 @ 11:13), Max: 98.6 (10-13-21 @ 20:47)  HR: 76 (10-14-21 @ 11:13) (61 - 76)  BP: 108/69 (10-14-21 @ 11:13) (108/69 - 128/81)  BP(mean): --  RR: 16 (10-14-21 @ 11:13) (16 - 18)  SpO2: 96% (10-14-21 @ 11:13) (96% - 99%)

## 2021-10-19 NOTE — BH INPATIENT PSYCHIATRY PROGRESS NOTE - NSBHATTESTSEENBY_PSY_A_CORE
NP without Attending Psychiatrist
Trainee with telephonic supervision from Attending Psychiatrist
NP without Attending Psychiatrist

## 2021-10-19 NOTE — BH INPATIENT PSYCHIATRY PROGRESS NOTE - NSICDXBHPRIMARYDX_PSY_ALL_CORE
Substance use disorder   F19.90  
Psychosis   F29  
Substance use disorder   F19.90

## 2021-10-19 NOTE — BH INPATIENT PSYCHIATRY PROGRESS NOTE - NSTXPSYCHOGOAL_PSY_ALL_CORE
Will be able to report experiencing hallucinations to staff

## 2021-10-19 NOTE — BH INPATIENT PSYCHIATRY PROGRESS NOTE - NSICDXBHTERTIARYDX_PSY_ALL_CORE
R/O Malingering   Z76.5  

## 2021-10-19 NOTE — BH INPATIENT PSYCHIATRY PROGRESS NOTE - NSBHASSESSSUMMFT_PSY_ALL_CORE
Mr. Hernandez is a 35-year-old male with a past psychiatric history of psychosis, cocaine use disorder, multiple SAs, admitted with VH and paranoia. He stated cogently that he has had VH prior to substance use and that he has never had a period of one month in which he was on antipsychotics and free of substance use. This suggests a primary psychotic disorder for a which a legitimate trial of antipsychotic should be started, which is happening on the unit currently. There is concomitant cocaine use disorder and cravings are interwoven with the patient's paranoia and anxiety. Due to patient's recent endorsement of AH and sleep disturbance, quetiapine increased to positive effect on sleep per patient. Patient would benefit from combined addiction and mental illness treatment on discharge. 
Patient is a 34 y/o, single, domiciled, unemployed  male with medical history significant for HIV, currently treated with Biktarvy. Psychiatric history significant for Polysubstance use disorders (cocaine, alcohol, cannabis, tobacco opioid use in remission x 6 months), multiple of past SAs (inc hanging, jumping in front of traffic, cutting his neck), more remote history of NSSIB (cutting self), with history of multiple past psychiatric hospitalizations (May 2021 at [facility] and in July 2021 at [facility]), history of at least 2 prior detox/rehab stays for substance abuse treatment. Patient self-presents to ED with c/o worsening depressive and psychotic symptoms, s/p interrupted suicide attempt by jumping off a bridge today. He stated that he was on a bridge and bystanders called the police to stop him from jumping earlier today. He says he got scared and ran home, ended up running home. Admitted to [facility] voluntary.    Tolerating meds well without issue. He continues to endorse vh seeing shadows and paranoid thoughs that people are following him. Will plan to continue titrating seroquel.    -Lexapro 10mg qd for depressive sxs  -Seroquel 200mg qhs for psychotic sxs will continue to titrate as appropriate  -Biktarvy daily for HIV, viral load pending  -Trazodone 50mg qhs prn insomnia  
35M, hx psychosis, cocaine use disorder, multiple SAs, admitted with VH and paranoia. He stated cogently that he has had VH prior to substance use and that he ha never had a period of one month in which he was on antipsychotics and free of substance use. This suggests a primary psychotic disorder for a which a legitimate trial of antipsychotic should be started, which is happening on the unit currently. There is concomitant cocaine use disorder and cravings are interwoven with the patient's paranoia and anxiety. Patient would benefit from combined addiction and mental illness treatment on discharge. No side effects so will continue current regimen.
Mr. Hernandez is a 35-year-old male with a past psychitric history of psychosis, cocaine use disorder, multiple SAs, admitted with VH and paranoia. He stated cogently that he has had VH prior to substance use and that he has never had a period of one month in which he was on antipsychotics and free of substance use. This suggests a primary psychotic disorder for a which a legitimate trial of antipsychotic should be started, which is happening on the unit currently. There is concomitant cocaine use disorder and cravings are interwoven with the patient's paranoia and anxiety. Due to patient's recent endorsement of AH and sleep disturbance, quetiapine increased. Patient would benefit from combined addiction and mental illness treatment on discharge. 
Patient is a 34 y/o, single, domiciled, unemployed  male with medical history significant for HIV, currently treated with Biktarvy. Psychiatric history significant for Polysubstance use disorders (cocaine, alcohol, cannabis, tobacco opioid use in remission x 6 months), multiple of past SAs (inc hanging, jumping in front of traffic, cutting his neck), more remote history of NSSIB (cutting self), with history of multiple past psychiatric hospitalizations (May 2021 at [facility] and in July 2021 at [facility]), history of at least 2 prior detox/rehab stays for substance abuse treatment. Patient self-presents to ED with c/o worsening depressive and psychotic symptoms, s/p interrupted suicide attempt by jumping off a bridge today. He stated that he was on a bridge and bystanders called the police to stop him from jumping earlier today. He says he got scared and ran home, ended up running home. Admitted to [facility] voluntary.    Tolerating meds well without issue. He continues to endorse vh seeing shadows and paranoid thoughs that people are following him. Will plan to continue titrating seroquel.    -Lexapro 10mg qd for depressive sxs  -Seroquel 100mg qhs for psychotic sxs will continue to titrate as appropriate  -Biktarvy daily for HIV, viral load pending  -Trazodone 50mg qhs prn insomnia

## 2021-10-19 NOTE — BH INPATIENT PSYCHIATRY PROGRESS NOTE - NSBHMETABOLIC_PSY_ALL_CORE_FT
BMI: BMI (kg/m2): 23.4 (10-12-21 @ 15:25)  HbA1c: A1C with Estimated Average Glucose Result: 4.7 % (10-14-21 @ 07:52)    Glucose: POCT Blood Glucose.: 96 mg/dL (05-08-21 @ 13:01)    BP: 120/72 (10-19-21 @ 16:08) (97/68 - 134/77)  Lipid Panel: Date/Time: 10-14-21 @ 07:52  Cholesterol, Serum: 138  Direct LDL: --  HDL Cholesterol, Serum: 36  Total Cholesterol/HDL Ration Measurement: --  Triglycerides, Serum: 123  
BMI: BMI (kg/m2): 23.4 (10-12-21 @ 15:25)  HbA1c: A1C with Estimated Average Glucose Result: 4.7 % (10-14-21 @ 07:52)    Glucose: POCT Blood Glucose.: 96 mg/dL (05-08-21 @ 13:01)    BP: 98/64 (10-15-21 @ 08:53) (98/64 - 144/69)  Lipid Panel: Date/Time: 10-14-21 @ 07:52  Cholesterol, Serum: 138  Direct LDL: --  HDL Cholesterol, Serum: 36  Total Cholesterol/HDL Ration Measurement: --  Triglycerides, Serum: 123  
BMI: BMI (kg/m2): 23.4 (10-12-21 @ 15:25)  HbA1c: A1C with Estimated Average Glucose Result: 4.7 % (10-14-21 @ 07:52)    Glucose: POCT Blood Glucose.: 96 mg/dL (05-08-21 @ 13:01)    BP: 107/69 (10-18-21 @ 16:44) (107/67 - 134/77)  Lipid Panel: Date/Time: 10-14-21 @ 07:52  Cholesterol, Serum: 138  Direct LDL: --  HDL Cholesterol, Serum: 36  Total Cholesterol/HDL Ration Measurement: --  Triglycerides, Serum: 123  
BMI: BMI (kg/m2): 23.4 (10-12-21 @ 15:25)  HbA1c: A1C with Estimated Average Glucose Result: 4.7 % (10-14-21 @ 07:52)    Glucose: POCT Blood Glucose.: 96 mg/dL (05-08-21 @ 13:01)    BP: 109/68 (10-16-21 @ 17:00) (98/64 - 144/69)  Lipid Panel: Date/Time: 10-14-21 @ 07:52  Cholesterol, Serum: 138  Direct LDL: --  HDL Cholesterol, Serum: 36  Total Cholesterol/HDL Ration Measurement: --  Triglycerides, Serum: 123  
BMI: BMI (kg/m2): 23.4 (10-12-21 @ 15:25)  HbA1c: A1C with Estimated Average Glucose Result: 4.7 % (10-14-21 @ 07:52)    Glucose: POCT Blood Glucose.: 96 mg/dL (05-08-21 @ 13:01)    BP: 108/69 (10-14-21 @ 11:13) (102/67 - 128/81)  Lipid Panel: Date/Time: 10-14-21 @ 07:52  Cholesterol, Serum: 138  Direct LDL: --  HDL Cholesterol, Serum: 36  Total Cholesterol/HDL Ration Measurement: --  Triglycerides, Serum: 123

## 2021-10-19 NOTE — BH INPATIENT PSYCHIATRY PROGRESS NOTE - NSBHINPTBILLING_PSY_ALL_CORE
[id number] - Inpatient High Complexity
10689 - Inpatient Moderate Complexity
40830 - Inpatient Moderate Complexity
[id number] - Inpatient Low Complexity
[id number] - Inpatient High Complexity

## 2021-10-19 NOTE — BH INPATIENT PSYCHIATRY PROGRESS NOTE - PRN MEDS
MEDICATIONS  (PRN):  acetaminophen   Tablet .. 650 milliGRAM(s) Oral every 6 hours PRN Moderate Pain (4 - 6)  aluminum hydroxide/magnesium hydroxide/simethicone Suspension 30 milliLiter(s) Oral every 6 hours PRN Dyspepsia  haloperidol     Tablet 5 milliGRAM(s) Oral every 6 hours PRN agitation  LORazepam     Tablet 1 milliGRAM(s) Oral every 6 hours PRN agitation  nicotine  Polacrilex Gum 2 milliGRAM(s) Oral every 2 hours PRN tobacco cessation  traZODone 50 milliGRAM(s) Oral at bedtime PRN insomnia  
MEDICATIONS  (PRN):  haloperidol     Tablet 5 milliGRAM(s) Oral every 6 hours PRN agitation  LORazepam     Tablet 1 milliGRAM(s) Oral every 6 hours PRN agitation  nicotine  Polacrilex Gum 2 milliGRAM(s) Oral every 2 hours PRN tobacco cessation  traZODone 50 milliGRAM(s) Oral at bedtime PRN insomnia  
MEDICATIONS  (PRN):  haloperidol     Tablet 5 milliGRAM(s) Oral every 6 hours PRN agitation  LORazepam     Tablet 1 milliGRAM(s) Oral every 6 hours PRN agitation  nicotine  Polacrilex Gum 2 milliGRAM(s) Oral every 2 hours PRN tobacco cessation  traZODone 50 milliGRAM(s) Oral at bedtime PRN insomnia  
MEDICATIONS  (PRN):  acetaminophen   Tablet .. 650 milliGRAM(s) Oral every 6 hours PRN Moderate Pain (4 - 6)  aluminum hydroxide/magnesium hydroxide/simethicone Suspension 30 milliLiter(s) Oral every 6 hours PRN Dyspepsia  haloperidol     Tablet 5 milliGRAM(s) Oral every 6 hours PRN agitation  LORazepam     Tablet 1 milliGRAM(s) Oral every 6 hours PRN agitation  nicotine  Polacrilex Gum 2 milliGRAM(s) Oral every 2 hours PRN tobacco cessation  traZODone 50 milliGRAM(s) Oral at bedtime PRN insomnia  
MEDICATIONS  (PRN):  haloperidol     Tablet 5 milliGRAM(s) Oral every 6 hours PRN agitation  LORazepam     Tablet 1 milliGRAM(s) Oral every 6 hours PRN agitation  nicotine  Polacrilex Gum 2 milliGRAM(s) Oral every 2 hours PRN tobacco cessation  traZODone 50 milliGRAM(s) Oral at bedtime PRN insomnia

## 2021-10-19 NOTE — BH INPATIENT PSYCHIATRY PROGRESS NOTE - GENERAL APPEARANCE
No deformities present/Other
No deformities present/Other
No deformities present

## 2021-10-19 NOTE — BH INPATIENT PSYCHIATRY PROGRESS NOTE - CURRENT MEDICATION
MEDICATIONS  (STANDING):  bictegravir 50 mG/emtricitabine 200 mG/tenofovir alafenamide 25 mG (BIKTARVY) 1 Tablet(s) Oral daily  escitalopram 10 milliGRAM(s) Oral daily  influenza   Vaccine 0.5 milliLiter(s) IntraMuscular once  QUEtiapine 200 milliGRAM(s) Oral at bedtime    MEDICATIONS  (PRN):  acetaminophen   Tablet .. 650 milliGRAM(s) Oral every 6 hours PRN Moderate Pain (4 - 6)  aluminum hydroxide/magnesium hydroxide/simethicone Suspension 30 milliLiter(s) Oral every 6 hours PRN Dyspepsia  haloperidol     Tablet 5 milliGRAM(s) Oral every 6 hours PRN agitation  LORazepam     Tablet 1 milliGRAM(s) Oral every 6 hours PRN agitation  nicotine  Polacrilex Gum 2 milliGRAM(s) Oral every 2 hours PRN tobacco cessation  traZODone 50 milliGRAM(s) Oral at bedtime PRN insomnia  
MEDICATIONS  (STANDING):  bictegravir 50 mG/emtricitabine 200 mG/tenofovir alafenamide 25 mG (BIKTARVY) 1 Tablet(s) Oral daily  escitalopram 10 milliGRAM(s) Oral daily  influenza   Vaccine 0.5 milliLiter(s) IntraMuscular once  QUEtiapine 200 milliGRAM(s) Oral at bedtime    MEDICATIONS  (PRN):  haloperidol     Tablet 5 milliGRAM(s) Oral every 6 hours PRN agitation  LORazepam     Tablet 1 milliGRAM(s) Oral every 6 hours PRN agitation  nicotine  Polacrilex Gum 2 milliGRAM(s) Oral every 2 hours PRN tobacco cessation  traZODone 50 milliGRAM(s) Oral at bedtime PRN insomnia  
MEDICATIONS  (STANDING):  bictegravir 50 mG/emtricitabine 200 mG/tenofovir alafenamide 25 mG (BIKTARVY) 1 Tablet(s) Oral daily  escitalopram 10 milliGRAM(s) Oral daily  influenza   Vaccine 0.5 milliLiter(s) IntraMuscular once  QUEtiapine 200 milliGRAM(s) Oral at bedtime    MEDICATIONS  (PRN):  haloperidol     Tablet 5 milliGRAM(s) Oral every 6 hours PRN agitation  LORazepam     Tablet 1 milliGRAM(s) Oral every 6 hours PRN agitation  nicotine  Polacrilex Gum 2 milliGRAM(s) Oral every 2 hours PRN tobacco cessation  traZODone 50 milliGRAM(s) Oral at bedtime PRN insomnia  
MEDICATIONS  (STANDING):  bictegravir 50 mG/emtricitabine 200 mG/tenofovir alafenamide 25 mG (BIKTARVY) 1 Tablet(s) Oral daily  escitalopram 10 milliGRAM(s) Oral daily  influenza   Vaccine 0.5 milliLiter(s) IntraMuscular once  QUEtiapine 100 milliGRAM(s) Oral at bedtime    MEDICATIONS  (PRN):  haloperidol     Tablet 5 milliGRAM(s) Oral every 6 hours PRN agitation  LORazepam     Tablet 1 milliGRAM(s) Oral every 6 hours PRN agitation  nicotine  Polacrilex Gum 2 milliGRAM(s) Oral every 2 hours PRN tobacco cessation  traZODone 50 milliGRAM(s) Oral at bedtime PRN insomnia  
MEDICATIONS  (STANDING):  bictegravir 50 mG/emtricitabine 200 mG/tenofovir alafenamide 25 mG (BIKTARVY) 1 Tablet(s) Oral daily  escitalopram 10 milliGRAM(s) Oral daily  influenza   Vaccine 0.5 milliLiter(s) IntraMuscular once  QUEtiapine 300 milliGRAM(s) Oral at bedtime    MEDICATIONS  (PRN):  acetaminophen   Tablet .. 650 milliGRAM(s) Oral every 6 hours PRN Moderate Pain (4 - 6)  aluminum hydroxide/magnesium hydroxide/simethicone Suspension 30 milliLiter(s) Oral every 6 hours PRN Dyspepsia  haloperidol     Tablet 5 milliGRAM(s) Oral every 6 hours PRN agitation  LORazepam     Tablet 1 milliGRAM(s) Oral every 6 hours PRN agitation  nicotine  Polacrilex Gum 2 milliGRAM(s) Oral every 2 hours PRN tobacco cessation  traZODone 50 milliGRAM(s) Oral at bedtime PRN insomnia

## 2021-10-19 NOTE — BH INPATIENT PSYCHIATRY PROGRESS NOTE - NSDCCRITERIA_PSY_ALL_CORE
Patient reporting improvement in his psychotic symptoms

## 2021-10-19 NOTE — BH TREATMENT PLAN - NSTXDEPRESINTERPR_PSY_ALL_CORE
Pt. will be invited and encouraged to attend all offered groups
Pt. will be invited and encouraged to attend all offered groups

## 2021-10-19 NOTE — CHART NOTE - NSCHARTNOTEFT_GEN_A_CORE
PHYSICAL EXAM: Agree    VITALS: T(C): 36.7 (10-19-21 @ 09:17), Max: 37.1 (10-18-21 @ 16:44)  HR: 77 (10-19-21 @ 09:17) (77 - 82)  BP: 97/68 (10-19-21 @ 09:17) (97/68 - 107/69)  RR: 18 (10-19-21 @ 09:17) (18 - 18)  SpO2: 98% (10-19-21 @ 09:17) (97% - 98%)      GENERAL: NAD, comfortable, ambulating  HEAD:  Atraumatic, Normocephalic  EYES: EOMI, PERRLA, conjunctiva and sclera clear  ENT: Moist mucous membranes  NECK: Supple, No JVD  CHEST/LUNG: Clear to auscultation bilaterally; No rales, rhonchi, wheezing, or rubs. Unlabored respirations  HEART: Regular rate and rhythm; No murmurs, rubs, or gallops  ABDOMEN: BSx4; Soft, nontender, nondistended  EXTREMITIES:  No clubbing, cyanosis, or peripheral edema  MSK: Unable to assess R wrist strength/ROM due to injury. Otherwise UE and LE strength 5/5 and full ROM b/L.  NERVOUS SYSTEM:  A&Ox3, no focal deficits   SKIN: No rashes, lesions, or scars. Tattoos present on upper and lower extremities. PHYSICAL EXAM: Agree    VITALS: T(C): 36.7 (10-19-21 @ 09:17), Max: 37.1 (10-18-21 @ 16:44)  HR: 77 (10-19-21 @ 09:17) (77 - 82)  BP: 97/68 (10-19-21 @ 09:17) (97/68 - 107/69)  RR: 18 (10-19-21 @ 09:17) (18 - 18)  SpO2: 98% (10-19-21 @ 09:17) (97% - 98%)      GENERAL: NAD, comfortable, ambulating  HEAD:  Atraumatic, Normocephalic  EYES: EOMI, PERRLA, conjunctiva and sclera clear  ENT: Moist mucous membranes  NECK: Supple, no JVD, no lymphadenopathy  CHEST/LUNG: Clear to auscultation bilaterally; No rales, rhonchi, wheezing, or rubs. Unlabored respirations  HEART: Regular rate and rhythm; No murmurs, rubs, or gallops  ABDOMEN: BSx4; Soft, nontender, nondistended  EXTREMITIES:  No clubbing, cyanosis, or peripheral edema. Extremities warm and well-perfused.   MSK: Unable to assess R wrist strength/ROM due to injury. Otherwise UE and LE strength 5/5 and full ROM b/L.  NERVOUS SYSTEM:  A&Ox3, no focal deficits   SKIN: No rashes, lesions, or scars. Tattoos present on upper and lower extremities.

## 2021-10-19 NOTE — BH TREATMENT PLAN - NSPTSTATEDGOAL_PSY_ALL_CORE
Patient wants to get on a stable medication regimen, reduce symptoms of depression; patient is discharge focused.
Patient wants to get on a stable medication regimen, reduce symptoms of depression; patient is discharge focused.

## 2021-10-19 NOTE — BH INPATIENT PSYCHIATRY PROGRESS NOTE - NSCGIIMPROVESX_PSY_ALL_CORE
5 = Minimally worse - slightly worse but may not be clinically meaningful; may represent very little change in basic clinical status or functional capacity
5 = Minimally worse - slightly worse but may not be clinically meaningful; may represent very little change in basic clinical status or functional capacity

## 2021-10-19 NOTE — BH TREATMENT PLAN - NSTXDEPRESINTERSW_PSY_ALL_CORE
Liaison with family and collateral providers as needed for discharge planning purposes.
Liaison with family and collateral providers as needed for discharge planning purposes.

## 2021-10-19 NOTE — BH INPATIENT PSYCHIATRY PROGRESS NOTE - NSBHMSEPERCEPT_PSY_A_CORE
Auditory hallucinations/Visual hallucinations
Visual hallucinations
Auditory hallucinations/Visual hallucinations

## 2021-10-20 VITALS
TEMPERATURE: 98 F | OXYGEN SATURATION: 99 % | HEART RATE: 80 BPM | DIASTOLIC BLOOD PRESSURE: 74 MMHG | SYSTOLIC BLOOD PRESSURE: 113 MMHG

## 2021-10-20 PROCEDURE — 80061 LIPID PANEL: CPT

## 2021-10-20 PROCEDURE — 85027 COMPLETE CBC AUTOMATED: CPT

## 2021-10-20 PROCEDURE — 80053 COMPREHEN METABOLIC PANEL: CPT

## 2021-10-20 PROCEDURE — 36415 COLL VENOUS BLD VENIPUNCTURE: CPT

## 2021-10-20 PROCEDURE — 87635 SARS-COV-2 COVID-19 AMP PRB: CPT

## 2021-10-20 PROCEDURE — G0378: CPT

## 2021-10-20 PROCEDURE — 83036 HEMOGLOBIN GLYCOSYLATED A1C: CPT

## 2021-10-20 PROCEDURE — 99285 EMERGENCY DEPT VISIT HI MDM: CPT

## 2021-10-20 PROCEDURE — 80307 DRUG TEST PRSMV CHEM ANLYZR: CPT

## 2021-10-20 PROCEDURE — 93005 ELECTROCARDIOGRAM TRACING: CPT

## 2021-10-20 RX ORDER — ESCITALOPRAM OXALATE 10 MG/1
1 TABLET, FILM COATED ORAL
Qty: 14 | Refills: 0
Start: 2021-10-20 | End: 2021-11-02

## 2021-10-20 RX ORDER — ESCITALOPRAM OXALATE 10 MG/1
1 TABLET, FILM COATED ORAL
Qty: 0 | Refills: 0 | DISCHARGE
Start: 2021-10-20

## 2021-10-20 RX ORDER — TRAZODONE HCL 50 MG
1 TABLET ORAL
Qty: 0 | Refills: 0 | DISCHARGE
Start: 2021-10-20

## 2021-10-20 RX ORDER — TRAZODONE HCL 50 MG
1 TABLET ORAL
Qty: 14 | Refills: 0
Start: 2021-10-20 | End: 2021-11-02

## 2021-10-20 RX ORDER — BICTEGRAVIR SODIUM, EMTRICITABINE, AND TENOFOVIR ALAFENAMIDE FUMARATE 30; 120; 15 MG/1; MG/1; MG/1
1 TABLET ORAL
Qty: 0 | Refills: 0 | DISCHARGE
Start: 2021-10-20

## 2021-10-20 RX ORDER — QUETIAPINE FUMARATE 200 MG/1
1 TABLET, FILM COATED ORAL
Qty: 14 | Refills: 0
Start: 2021-10-20 | End: 2021-11-02

## 2021-10-20 RX ORDER — NICOTINE POLACRILEX 2 MG
0 GUM BUCCAL
Qty: 0 | Refills: 0 | DISCHARGE
Start: 2021-10-20

## 2021-10-20 RX ORDER — BICTEGRAVIR SODIUM, EMTRICITABINE, AND TENOFOVIR ALAFENAMIDE FUMARATE 30; 120; 15 MG/1; MG/1; MG/1
1 TABLET ORAL
Qty: 30 | Refills: 0
Start: 2021-10-20 | End: 2021-11-18

## 2021-10-20 RX ORDER — QUETIAPINE FUMARATE 200 MG/1
1 TABLET, FILM COATED ORAL
Qty: 0 | Refills: 0 | DISCHARGE
Start: 2021-10-20

## 2021-10-20 RX ADMIN — BICTEGRAVIR SODIUM, EMTRICITABINE, AND TENOFOVIR ALAFENAMIDE FUMARATE 1 TABLET(S): 30; 120; 15 TABLET ORAL at 11:28

## 2021-10-20 RX ADMIN — ESCITALOPRAM OXALATE 10 MILLIGRAM(S): 10 TABLET, FILM COATED ORAL at 11:28

## 2021-10-20 NOTE — BH INPATIENT PSYCHIATRY DISCHARGE NOTE - NSDCMRMEDTOKEN_GEN_ALL_CORE_FT
bictegravir/emtricitabine/tenofovir 50 mg-200 mg-25 mg oral tablet: 1 tab(s) orally once a day  escitalopram 10 mg oral tablet: 1 tab(s) orally once a day  Nicorette 2 mg oral transmucosal gum: 1 gum chewed every 2 hours x 30 days, As Needed   nicotine:   QUEtiapine 300 mg oral tablet: 1 tab(s) orally once a day (at bedtime)  traZODone 50 mg oral tablet: 1 tab(s) orally once a day (at bedtime), As needed, insomnia

## 2021-10-20 NOTE — BH INPATIENT PSYCHIATRY DISCHARGE NOTE - HPI (INCLUDE ILLNESS QUALITY, SEVERITY, DURATION, TIMING, CONTEXT, MODIFYING FACTORS, ASSOCIATED SIGNS AND SYMPTOMS)
Patient is a 36 y/o, single, domiciled, unemployed  male with medical history significant for HIV, currently treated with Biktarvy. Psychiatric history significant for Polysubstance use disorders (cocaine, alcohol, cannabis, tobacco opioid use in remission x 6 months), multiple of past SAs (inc hanging, jumping in front of traffic, cutting his neck), more remote history of NSSIB (cutting self), with history of multiple past psychiatric hospitalizations (May 2021 at [facility] and in July 2021 at [facility]), history of at least 2 prior detox/rehab stays for substance abuse treatment. Patient self-presents to ED with c/o worsening depressive and psychotic symptoms, s/p interrupted suicide attempt by jumping off a bridge today. He stated that he was on a bridge and bystanders called the police to stop him from jumping earlier today. He says he got scared and ran home, ended up running home. Admitted to [facility] voluntary.    Patient reports that most recently he has been experiencing v/h seeing shadows and paranoia, feeling that people are watching him. He has been regularly using cocaine, alcohol and tobacco and used shortly before coming into the hospital. He reports using $50 of both cannabis and marijuana and drinking roughly a pint of liquor each day. Denies any other current drug use. Utox +THC, Cocaine and amphetamine. BAL 0. He states that most recently he was hospitalized at [facility] and transferred to [facility] rehab x1.5 months, but relapsed shortly after he finished the program. He is non-compliant with medication regimen, both psychotropics and antiretroviral, reporting that he rarely takes them.     Currently denies si/hi, endorses v/h and paranoid ideation. Would like to be restarted on medication regimen and follow up with rehab, though he also states that he is not ready to completely stop using drug/etoh. No reported a/h upon admissioni though he did endorse ah of voices making negative commentary about him when he arrived in the ED. He reports limited support from family and it appears that he is estranged from them at this time and lives alone.

## 2021-10-20 NOTE — BH DISCHARGE NOTE NURSING/SOCIAL WORK/PSYCH REHAB - NSCDUDCCRISIS_PSY_A_CORE
Critical access hospital Well  1 (494) [facility] ([phone])  Text "WELL" to 29850  Website: www.Saharey/.[facility]iz[facility] 1 (316) 621-HOPE (3687) Website: www.safehorizon.org/.National Suicide Prevention Lifeline 3 (922) 786-0354/.  [facility]  1 (414) LIFENET (780-2835) [facility] Well  1 (954) [facility]-WELL ([phone])  Text "WELL" to 92817  Website: www.Rainforest/.Safe Horizons 1 (753) 691-HHRY (3167) Website: www.safehorizon.org/.National Suicide Prevention Lifeline 3 (668) 910-2721/.  [facility]  1 (855) LIFENET (478-0152)/.  [facility]’s Behavioral Health Crisis [facility]  [address] [place] [address] [phone]   Hours:  Monday through Friday from 9 AM to 3 PM/.  [facility] Dept of  Affairs - Veterans Crisis Line  0 [phone], Option 1 [facility] Well  1 (291) [facility] ([phone])  Text "WELL" to 27075  Website: www.BizAnytime/.Safe Horizons 1 (835) 621-HOPE (9734) Website: www.safehorizon.org/.National Suicide Prevention Lifeline 1 (508) 967-8316/.  [facility]  1 (302) LIFENET (754-2568) [facility] Well  1 (957) [facility]WELL ([phone])  Text "WELL" to 75206  Website: www.VIPTALON/.Safe Horizons 1 (725) 201-VFUH (3333) Website: www.safehorizon.org/.National Suicide Prevention Lifeline 8 (063) 112-2101/.  [facility]  1 (549) LIFENET (740-4464)/.  [facility]’s Behavioral Health Crisis Center  [address] [place] [address] [phone]   Hours:  Monday through Friday from 9 AM to 3 PM/.  [employer].[place] Dept of  Affairs - Veterans Crisis Line  [phone], Option 1

## 2021-10-20 NOTE — BH INPATIENT PSYCHIATRY DISCHARGE NOTE - NSBHMETABOLIC_PSY_ALL_CORE_FT
BMI: BMI (kg/m2): 23.4 (10-12-21 @ 15:25)  HbA1c: A1C with Estimated Average Glucose Result: 4.7 % (10-14-21 @ 07:52)    Glucose: POCT Blood Glucose.: 96 mg/dL (05-08-21 @ 13:01)    BP: 113/74 (10-20-21 @ 10:17) (97/68 - 134/77)  Lipid Panel: Date/Time: 10-14-21 @ 07:52  Cholesterol, Serum: 138  Direct LDL: --  HDL Cholesterol, Serum: 36  Total Cholesterol/HDL Ration Measurement: --  Triglycerides, Serum: 123

## 2021-10-20 NOTE — BH DISCHARGE NOTE NURSING/SOCIAL WORK/PSYCH REHAB - NSDCPRGOAL_PSY_ALL_CORE
Pt. attended select groups during his admission and participated appropriately.  Pt. has demonstrated moderate range of affect and has been pleasant on approach.  Pt. has been well-related but has mostly kept to himself and has not been very social with peers.  Pt. has discussed his experiences with depression, substance use and sobriety in groups.  Pt. completed a safety plan.

## 2021-10-20 NOTE — BH DISCHARGE NOTE NURSING/SOCIAL WORK/PSYCH REHAB - PATIENT PORTAL LINK FT
You can access the FollowMyHealth Patient Portal offered by [facility] by registering at the following website: [email or web address]. By joining [employer]’s FollowMyHealth portal, you will also be able to view your health information using other applications (apps) compatible with our system.

## 2021-10-20 NOTE — BH INPATIENT PSYCHIATRY DISCHARGE NOTE - OTHER PAST PSYCHIATRIC HISTORY (INCLUDE DETAILS REGARDING ONSET, COURSE OF ILLNESS, INPATIENT/OUTPATIENT TREATMENT)
as per HPI; last hospitalization was at [facility] in July 2021 followed by 1 month treatment at [facility] rehab; says after rehab he never followed up with outpatient treatment and has been not adherent with treatment;

## 2021-10-20 NOTE — BH DISCHARGE NOTE NURSING/SOCIAL WORK/PSYCH REHAB - NSDCVIVACCINE_GEN_ALL_CORE_FT
Tdap; 31-Jul-2018 20:20; Servando Drew (RN); [employer]; [id number]; IntraMuscular; Deltoid Right.; 0.5 milliLiter(s); VIS (VIS Published: 09-May-2013, VIS Presented: 31-Jul-2018);

## 2021-10-20 NOTE — BH INPATIENT PSYCHIATRY DISCHARGE NOTE - NSDCCPCAREPLAN_GEN_ALL_CORE_FT
PRINCIPAL DISCHARGE DIAGNOSIS  Diagnosis: Substance induced mood disorder  Assessment and Plan of Treatment: Continue current medication regimen and follow up with aftercare appointments

## 2021-10-20 NOTE — BH INPATIENT PSYCHIATRY DISCHARGE NOTE - HOSPITAL COURSE
Patient admitted to unit voluntarily. Lexapro 10mg daily initiated for depressive sxs, seroquel 50mg qhs initiated for mood and psychotic sxs and biktarvy daily for HIV. Upon admission he continued to endorse visual hallucinations, seeing shadows in addition to paranoid ideation worried that people were following him and wanted to hurt him. He did endorse feeling safe on the unit and was agreeable to medication changes to treat his symptoms. Seroquel was ultimately titrated to final dose of 300mg qhs for his symptoms. Medication regimen tolerated well without issue. He continued to endorse some intermitted v/h, ah hearing a 'knocking sound' and paranoid ideation but was not distressed about them and verbalized that they had decreased in frequency. He acknowledged during admission that he was not completely ready to stop using substances but was interested in harm reduction and he reported feeling safe for discharge.   At time of discharge, pt is calm, cooperative and appropriate. States that he is looking forward to discharge and following up with aftercare. Pt is chronically at elevated risk for self harm, but is not acute at this time and is appropriate for discharge.

## 2021-10-22 NOTE — ED ADULT TRIAGE NOTE - PRO INTERPRETER NEED 2
Abdomen , soft, nontender, nondistended , no guarding or rigidity , no masses palpable , normal bowel sounds , Liver and Spleen , no hepatomegaly present , no hepatosplenomegaly , liver nontender , spleen not palpable
English

## 2021-11-01 NOTE — PROGRESS NOTE BEHAVIORAL HEALTH - NSBHCONSORIP_PSY_A_CORE
ambulatory
Inpatient Admission...

## 2021-11-19 NOTE — DISCHARGE NOTE BEHAVIORAL HEALTH - MEDICATION SUMMARY - MEDICATIONS TO STOP TAKING
I will STOP taking the medications listed below when I get home from the hospital:    QUEtiapine 300 mg oral tablet  -- 1 tab(s) by mouth once a day (at bedtime)    Multiple Vitamins oral tablet  -- 1 tab(s) by mouth once a day    folic acid 1 mg oral tablet  -- 1 tab(s) by mouth once a day    thiamine 100 mg oral tablet  -- 1 tab(s) by mouth once a day x 30 days  
No

## 2021-11-26 ENCOUNTER — INPATIENT (INPATIENT)
Facility: HOSPITAL | Age: 36
LOS: 19 days | Discharge: PSYCHIATRIC OR SUBSTANCE USE FACILITY | DRG: 896 | End: 2021-12-16
Attending: PSYCHIATRY & NEUROLOGY | Admitting: STUDENT IN AN ORGANIZED HEALTH CARE EDUCATION/TRAINING PROGRAM
Payer: MEDICAID

## 2021-11-26 VITALS
DIASTOLIC BLOOD PRESSURE: 73 MMHG | HEIGHT: 66 IN | RESPIRATION RATE: 16 BRPM | OXYGEN SATURATION: 96 % | TEMPERATURE: 98 F | HEART RATE: 85 BPM | WEIGHT: 175.05 LBS | SYSTOLIC BLOOD PRESSURE: 124 MMHG

## 2021-11-26 DIAGNOSIS — F31.9 BIPOLAR DISORDER, UNSPECIFIED: ICD-10-CM

## 2021-11-26 DIAGNOSIS — F10.20 ALCOHOL DEPENDENCE, UNCOMPLICATED: ICD-10-CM

## 2021-11-26 DIAGNOSIS — F11.20 OPIOID DEPENDENCE, UNCOMPLICATED: ICD-10-CM

## 2021-11-26 DIAGNOSIS — Z87.01 PERSONAL HISTORY OF PNEUMONIA (RECURRENT): ICD-10-CM

## 2021-11-26 DIAGNOSIS — R45.851 SUICIDAL IDEATIONS: ICD-10-CM

## 2021-11-26 DIAGNOSIS — F25.9 SCHIZOAFFECTIVE DISORDER, UNSPECIFIED: ICD-10-CM

## 2021-11-26 DIAGNOSIS — J02.8 ACUTE PHARYNGITIS DUE TO OTHER SPECIFIED ORGANISMS: ICD-10-CM

## 2021-11-26 DIAGNOSIS — Z63.4 DISAPPEARANCE AND DEATH OF FAMILY MEMBER: ICD-10-CM

## 2021-11-26 DIAGNOSIS — S62.101A FRACTURE OF UNSPECIFIED CARPAL BONE, RIGHT WRIST, INITIAL ENCOUNTER FOR CLOSED FRACTURE: ICD-10-CM

## 2021-11-26 DIAGNOSIS — Z91.14 PATIENT'S OTHER NONCOMPLIANCE WITH MEDICATION REGIMEN: ICD-10-CM

## 2021-11-26 DIAGNOSIS — A40.0 SEPSIS DUE TO STREPTOCOCCUS, GROUP A: ICD-10-CM

## 2021-11-26 DIAGNOSIS — F43.29 ADJUSTMENT DISORDER WITH OTHER SYMPTOMS: ICD-10-CM

## 2021-11-26 DIAGNOSIS — K08.89 OTHER SPECIFIED DISORDERS OF TEETH AND SUPPORTING STRUCTURES: ICD-10-CM

## 2021-11-26 DIAGNOSIS — F60.9 PERSONALITY DISORDER, UNSPECIFIED: ICD-10-CM
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
## 2021-11-26 DIAGNOSIS — K02.9 DENTAL CARIES, UNSPECIFIED: ICD-10-CM

## 2021-11-26 DIAGNOSIS — L53.9 ERYTHEMATOUS CONDITION, UNSPECIFIED: ICD-10-CM

## 2021-11-26 DIAGNOSIS — F19.188 OTHER PSYCHOACTIVE SUBSTANCE ABUSE WITH OTHER PSYCHOACTIVE SUBSTANCE-INDUCED DISORDER: ICD-10-CM
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
## 2021-11-26 DIAGNOSIS — B20 HUMAN IMMUNODEFICIENCY VIRUS [HIV] DISEASE: ICD-10-CM

## 2021-11-26 DIAGNOSIS — Z79.899 OTHER LONG TERM (CURRENT) DRUG THERAPY: ICD-10-CM

## 2021-11-26 DIAGNOSIS — Z88.0 ALLERGY STATUS TO PENICILLIN: ICD-10-CM

## 2021-11-26 DIAGNOSIS — Z23 ENCOUNTER FOR IMMUNIZATION: ICD-10-CM

## 2021-11-26 LAB
ALBUMIN SERPL ELPH-MCNC: 3.6 G/DL — SIGNIFICANT CHANGE UP (ref 3.4–5)
ALP SERPL-CCNC: 114 U/L — SIGNIFICANT CHANGE UP (ref 40–120)
ALT FLD-CCNC: 21 U/L — SIGNIFICANT CHANGE UP (ref 12–42)
AMPHET UR-MCNC: NEGATIVE — SIGNIFICANT CHANGE UP
ANION GAP SERPL CALC-SCNC: 9 MMOL/L — SIGNIFICANT CHANGE UP (ref 9–16)
APPEARANCE UR: CLEAR — SIGNIFICANT CHANGE UP
AST SERPL-CCNC: 53 U/L — HIGH (ref 15–37)
BARBITURATES UR SCN-MCNC: NEGATIVE — SIGNIFICANT CHANGE UP
BASOPHILS # BLD AUTO: 0.04 K/UL — SIGNIFICANT CHANGE UP (ref 0–0.2)
BASOPHILS NFR BLD AUTO: 1 % — SIGNIFICANT CHANGE UP (ref 0–2)
BENZODIAZ UR-MCNC: NEGATIVE — SIGNIFICANT CHANGE UP
BILIRUB SERPL-MCNC: 0.4 MG/DL — SIGNIFICANT CHANGE UP (ref 0.2–1.2)
BILIRUB UR-MCNC: NEGATIVE — SIGNIFICANT CHANGE UP
BUN SERPL-MCNC: 16 MG/DL — SIGNIFICANT CHANGE UP (ref 7–23)
CALCIUM SERPL-MCNC: 8.9 MG/DL — SIGNIFICANT CHANGE UP (ref 8.5–10.5)
CHLORIDE SERPL-SCNC: 103 MMOL/L — SIGNIFICANT CHANGE UP (ref 96–108)
CO2 SERPL-SCNC: 26 MMOL/L — SIGNIFICANT CHANGE UP (ref 22–31)
COCAINE METAB.OTHER UR-MCNC: POSITIVE
COLOR SPEC: YELLOW — SIGNIFICANT CHANGE UP
CREAT SERPL-MCNC: 0.9 MG/DL — SIGNIFICANT CHANGE UP (ref 0.5–1.3)
DIFF PNL FLD: NEGATIVE — SIGNIFICANT CHANGE UP
EOSINOPHIL # BLD AUTO: 0.13 K/UL — SIGNIFICANT CHANGE UP (ref 0–0.5)
EOSINOPHIL NFR BLD AUTO: 3 % — SIGNIFICANT CHANGE UP (ref 0–6)
ETHANOL SERPL-MCNC: 37 MG/DL — HIGH
GIANT PLATELETS BLD QL SMEAR: PRESENT — SIGNIFICANT CHANGE UP
GLUCOSE SERPL-MCNC: 80 MG/DL — SIGNIFICANT CHANGE UP (ref 70–99)
GLUCOSE UR QL: NEGATIVE — SIGNIFICANT CHANGE UP
HCT VFR BLD CALC: 41.2 % — SIGNIFICANT CHANGE UP (ref 39–50)
HGB BLD-MCNC: 13.5 G/DL — SIGNIFICANT CHANGE UP (ref 13–17)
KETONES UR-MCNC: ABNORMAL MG/DL
LEUKOCYTE ESTERASE UR-ACNC: NEGATIVE — SIGNIFICANT CHANGE UP
LG PLATELETS BLD QL AUTO: SLIGHT — SIGNIFICANT CHANGE UP
LYMPHOCYTES # BLD AUTO: 1.17 K/UL — SIGNIFICANT CHANGE UP (ref 1–3.3)
LYMPHOCYTES # BLD AUTO: 27 % — SIGNIFICANT CHANGE UP (ref 13–44)
MANUAL SMEAR VERIFICATION: SIGNIFICANT CHANGE UP
MCHC RBC-ENTMCNC: 30 PG — SIGNIFICANT CHANGE UP (ref 27–34)
MCHC RBC-ENTMCNC: 32.8 GM/DL — SIGNIFICANT CHANGE UP (ref 32–36)
MCV RBC AUTO: 91.6 FL — SIGNIFICANT CHANGE UP (ref 80–100)
METHADONE UR-MCNC: NEGATIVE — SIGNIFICANT CHANGE UP
MONOCYTES # BLD AUTO: 0.39 K/UL — SIGNIFICANT CHANGE UP (ref 0–0.9)
MONOCYTES NFR BLD AUTO: 9 % — SIGNIFICANT CHANGE UP (ref 2–14)
NEUTROPHILS # BLD AUTO: 2.47 K/UL — SIGNIFICANT CHANGE UP (ref 1.8–7.4)
NEUTROPHILS NFR BLD AUTO: 57 % — SIGNIFICANT CHANGE UP (ref 43–77)
NITRITE UR-MCNC: NEGATIVE — SIGNIFICANT CHANGE UP
NRBC # BLD: 0 /100 — SIGNIFICANT CHANGE UP (ref 0–0)
NRBC # BLD: SIGNIFICANT CHANGE UP /100 WBCS (ref 0–0)
OPIATES UR-MCNC: NEGATIVE — SIGNIFICANT CHANGE UP
PCP SPEC-MCNC: SIGNIFICANT CHANGE UP
PCP UR-MCNC: NEGATIVE — SIGNIFICANT CHANGE UP
PH UR: 5.5 — SIGNIFICANT CHANGE UP (ref 5–8)
PLAT MORPH BLD: ABNORMAL
PLATELET # BLD AUTO: 290 K/UL — SIGNIFICANT CHANGE UP (ref 150–400)
POTASSIUM SERPL-MCNC: 3.5 MMOL/L — SIGNIFICANT CHANGE UP (ref 3.5–5.3)
POTASSIUM SERPL-SCNC: 3.5 MMOL/L — SIGNIFICANT CHANGE UP (ref 3.5–5.3)
PROT SERPL-MCNC: 8.6 G/DL — HIGH (ref 6.4–8.2)
PROT UR-MCNC: NEGATIVE MG/DL — SIGNIFICANT CHANGE UP
RBC # BLD: 4.5 M/UL — SIGNIFICANT CHANGE UP (ref 4.2–5.8)
RBC # FLD: 12.5 % — SIGNIFICANT CHANGE UP (ref 10.3–14.5)
RBC BLD AUTO: NORMAL — SIGNIFICANT CHANGE UP
SARS-COV-2 RNA SPEC QL NAA+PROBE: SIGNIFICANT CHANGE UP
SODIUM SERPL-SCNC: 138 MMOL/L — SIGNIFICANT CHANGE UP (ref 132–145)
SP GR SPEC: 1.02 — SIGNIFICANT CHANGE UP (ref 1–1.03)
THC UR QL: NEGATIVE — SIGNIFICANT CHANGE UP
UROBILINOGEN FLD QL: 0.2 E.U./DL — SIGNIFICANT CHANGE UP
VARIANT LYMPHS # BLD: 3 % — SIGNIFICANT CHANGE UP (ref 0–6)
WBC # BLD: 4.33 K/UL — SIGNIFICANT CHANGE UP (ref 3.8–10.5)
WBC # FLD AUTO: 4.33 K/UL — SIGNIFICANT CHANGE UP (ref 3.8–10.5)

## 2021-11-26 PROCEDURE — 29125 APPL SHORT ARM SPLINT STATIC: CPT

## 2021-11-26 PROCEDURE — 73200 CT UPPER EXTREMITY W/O DYE: CPT | Mod: 26,RT

## 2021-11-26 PROCEDURE — 93010 ELECTROCARDIOGRAM REPORT: CPT | Mod: 59

## 2021-11-26 PROCEDURE — 99218: CPT | Mod: 25

## 2021-11-26 PROCEDURE — 73110 X-RAY EXAM OF WRIST: CPT | Mod: 26,RT

## 2021-11-26 PROCEDURE — 99284 EMERGENCY DEPT VISIT MOD MDM: CPT | Mod: 95

## 2021-11-26 RX ORDER — IBUPROFEN 200 MG
600 TABLET ORAL EVERY 6 HOURS
Refills: 0 | Status: DISCONTINUED | OUTPATIENT
Start: 2021-11-26 | End: 2021-12-16

## 2021-11-26 RX ORDER — NICOTINE POLACRILEX 2 MG
2 GUM BUCCAL
Refills: 0 | Status: DISCONTINUED | OUTPATIENT
Start: 2021-11-26 | End: 2021-12-16

## 2021-11-26 RX ORDER — TRAZODONE HCL 50 MG
50 TABLET ORAL AT BEDTIME
Refills: 0 | Status: DISCONTINUED | OUTPATIENT
Start: 2021-11-26 | End: 2021-12-16

## 2021-11-26 RX ORDER — ESCITALOPRAM OXALATE 10 MG/1
10 TABLET, FILM COATED ORAL DAILY
Refills: 0 | Status: DISCONTINUED | OUTPATIENT
Start: 2021-11-26 | End: 2021-12-03

## 2021-11-26 RX ORDER — BICTEGRAVIR SODIUM, EMTRICITABINE, AND TENOFOVIR ALAFENAMIDE FUMARATE 30; 120; 15 MG/1; MG/1; MG/1
1 TABLET ORAL DAILY
Refills: 0 | Status: DISCONTINUED | OUTPATIENT
Start: 2021-11-26 | End: 2021-12-16

## 2021-11-26 RX ORDER — QUETIAPINE FUMARATE 200 MG/1
150 TABLET, FILM COATED ORAL AT BEDTIME
Refills: 0 | Status: DISCONTINUED | OUTPATIENT
Start: 2021-11-26 | End: 2021-11-28

## 2021-11-26 RX ORDER — INFLUENZA VIRUS VACCINE 15; 15; 15; 15 UG/.5ML; UG/.5ML; UG/.5ML; UG/.5ML
0.5 SUSPENSION INTRAMUSCULAR ONCE
Refills: 0 | Status: DISCONTINUED | OUTPATIENT
Start: 2021-11-26 | End: 2021-12-16

## 2021-11-26 RX ORDER — QUETIAPINE FUMARATE 200 MG/1
100 TABLET, FILM COATED ORAL ONCE
Refills: 0 | Status: COMPLETED | OUTPATIENT
Start: 2021-11-26 | End: 2021-11-26

## 2021-11-26 RX ORDER — OLANZAPINE 15 MG/1
10 TABLET, FILM COATED ORAL EVERY 8 HOURS
Refills: 0 | Status: DISCONTINUED | OUTPATIENT
Start: 2021-11-26 | End: 2021-12-16

## 2021-11-26 RX ORDER — MAGNESIUM HYDROXIDE 400 MG/1
30 TABLET, CHEWABLE ORAL DAILY
Refills: 0 | Status: DISCONTINUED | OUTPATIENT
Start: 2021-11-26 | End: 2021-12-16

## 2021-11-26 RX ADMIN — QUETIAPINE FUMARATE 100 MILLIGRAM(S): 200 TABLET, FILM COATED ORAL at 04:41

## 2021-11-26 RX ADMIN — Medication 2 MILLIGRAM(S): at 04:41

## 2021-11-26 RX ADMIN — QUETIAPINE FUMARATE 150 MILLIGRAM(S): 200 TABLET, FILM COATED ORAL at 22:28

## 2021-11-26 SDOH — SOCIAL STABILITY - SOCIAL INSECURITY: DISSAPEARANCE AND DEATH OF FAMILY MEMBER: Z63.4
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
## 2021-11-26 NOTE — ED PROVIDER NOTE - CLINICAL SUMMARY MEDICAL DECISION MAKING FREE TEXT BOX
Patient presenting with depressed mode and VH and passive SI in the setting of recent loss of friends, frustration about drug use and likely longstanding AXIS one psychotic d/o. Will check labs, wrist x-rays dn obtain psych eval.

## 2021-11-26 NOTE — ED PROVIDER NOTE - PHYSICAL EXAMINATION
VITAL SIGNS: I have reviewed nursing notes and confirm.  CONSTITUTIONAL: Well-developed; well-nourished; in no acute distress.  SKIN: Skin is warm and dry, no acute rash.  HEAD: Normocephalic; atraumatic.  EYES: Pupils round bilaterally, 3mm; conjunctiva and sclera clear.  ENT: No nasal discharge; airway clear, MMM.  NECK: Supple; non tender.  CARD: S1, S2 normal; no murmurs, gallops, or rubs. Regular rate and rhythm.  RESP: No wheezes, rales or rhonchi.  : No CVAT tenderness bilaterally   ABD: Soft; non-distended; non-tender; no rebound or guarding.  EXT: Painful general ROM R wrist, no deformity, + tenderness to the radial side of the wrist.  NEURO: Alert, oriented. Grossly unremarkable. SALDANA, normal tone, no gross motor or sensory changes. Fluent speech.   PSYCH: Cooperative, appropriate. Mood and affect depressed mood, passive SI

## 2021-11-26 NOTE — ED BEHAVIORAL HEALTH NOTE - BEHAVIORAL HEALTH NOTE
===================    PRE-HOSPITAL COURSE    ===================    SOURCE: Chart review/RN    DETAILS: Patient self-presented to the ED for suicidal ideation in the context of recent loss and substance abuse.     ============    ED COURSE    ============    SOURCE: SKYE Coffman     ARRIVAL: Self-presented.     BELONGINGS: Clothing with security.     BEHAVIOR: Patient self-presented to the emergency room endorsing suicidal ideation. He was compliant with triage process. Patient is in hospital gown with 1:1 present. Appears to be poorly groomed and unkept. He does not appear to be responding to internal stimuli. Patient is A&OX3. He denies HI. Patient is awaiting CT for his wrist. He remains in behavioral control.     TREATMENT: None    VISITORS: None    ========================    FOR EACH COLLATERAL    ========================    No collaterals available at this time.

## 2021-11-26 NOTE — ED PROVIDER NOTE - PROGRESS NOTE DETAILS
Seen by psych, likely will be admitted assuming he feels the same way after some sleep (hasn't slept in a week). Will place on obs.

## 2021-11-26 NOTE — ED ADULT NURSE NOTE - NS ED NURSE REPORT GIVEN TO FT
NONE, SUPERVISOR EDUARDO MORRISON MADE AWARE OF MULTIPLE ATTEMPTS TO REACH UNIT. REPORT GIVEN TO EMS

## 2021-11-26 NOTE — ED ADULT TRIAGE NOTE - PRO INTERPRETER NEED 2
Patient is here for a Nurse Check.      Regimen: R-CHOP  Cycle/Day: C5/ D13    Dr Thomas is supervising clinician today.    ECOG:   ECOG   ECOG Performance Status 0       Nursing Assessment:  A comprehensive nursing assessment addressing the side-effects of chemotherapy was performed and the patient reports the following:  Nausea: NO  Vomiting: NO  Fever: NO  Chills: NO  Other signs of infection: NO but has pea-sized red bump to right calf. Tender with touch. No warmth or drainage. Denies fevers. Evaluated by Sabi Spencer NP; she recommended to leave alone and call with worsening redness, pain drainage or fevers. Patient voicing understanding  Bleeding: NO  Mucositis: NO  Diarrhea: NO  Constipation: NO  Anorexia: NO  Dysuria: NO  Urinary Bleeding: NO  Cough: NO  Shortness of Breath: NO  Fatigue/Weakness: YES but still exercising and working.  Numbness/Tingling: YES to fingers and toes Usually has for 1 week after chemo and goes away but still there after 2 weeks. Will address with MD at follow-up next week.  Other Neuropathies: NO  Edema: NO  Rash: NO  Hand/Foot Syndrome: NO  Anxiety/Depression/Insomnia: NO  Pain: NO     WBC (K/mcL)   Date Value   10/14/2020 7.6     RBC (mil/mcL)   Date Value   10/14/2020 3.53 (L)     HCT (%)   Date Value   10/14/2020 34.3 (L)     HGB (g/dL)   Date Value   10/14/2020 11.5 (L)     PLT (K/mcL)   Date Value   10/14/2020 120 (L)     Device: PICC  Central Line Site: Right  and Upper arm  Central Line Site Appearance: WDL  Good blood return obtained.  Blood obtained and sent to lab.  Site Care: Aseptic site care per protocol, Occlusive Transparent Dressing, Catheter securement device applied, Injection caps changed/applied and Biopatch  Comments: Due 10/21  Central line flushed with: 20 ml 0.9 normal saline  Central line removed: no      Transfusion: Not needed     Oral Chemotherapy: No    Education: No new instructions needed Reviewed lab results with patient.    Next appointment  scheduled:  Future Appointments   Date Time Provider Department Center   10/21/2020  3:00 PM [facility] [address]   10/21/2020  3:15 PM Everardo Earl DO [address]     Patient instructed to call the office with any questions or concerns.    Patient Discharged: patient discharged to home per self, ambulatory   English

## 2021-11-26 NOTE — ED PROVIDER NOTE - NSICDXPASTMEDICALHX_GEN_ALL_CORE_FT
PAST MEDICAL HISTORY:  Alcohol abuse     Anxiety     Bipolar disorder     Cocaine abuse     COVID-19     Depression     Heroin abuse     HIV (human immunodeficiency virus infection)     Schizophrenia, unspecified type

## 2021-11-26 NOTE — ED ADULT NURSE REASSESSMENT NOTE - GENERAL PATIENT STATE
comfortable appearance/cooperative/resting/sleeping
comfortable appearance/cooperative/resting/sleeping

## 2021-11-26 NOTE — BH PATIENT PROFILE - NSSUBRIEFINTERVENTION_PSY_A_CORE
Drinking or using substances at the unhealthy identified increases the risk of alcohol or substance abuse related health problems./Alcohol or substance use can interact with existing medical conditions and/or medication i.e. (Hypertension, depression, anxiety, insomnia, injury, congestive heart failure, diabetes, breast cancer risk)/If the patient has condition or takes medication with contraindications to drinking or substance use, it is recommended to abstain from drinking or substance use, or to drink or use substances below the recommended limits.
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
## 2021-11-26 NOTE — ED ADULT NURSE REASSESSMENT NOTE - NS ED NURSE REASSESS COMMENT FT1
Pt. received AAOx3 semi fowlers in stretcher breathing at ease on room air in no apparent distress. Pt. is calm and cooperative. Psych MD Miranda at bedside, as per MD pt. will be on 1 to 1 for elopement, 1 to 1 at bedside. As per MD pt. will stay for re-eval in AM. Monitoring ongoing.
pt re-evaluated by psych, cart in room. asper tele-psych, will be admitted.
received pt from night shift RN, pt under constant observation with PCT Outlaw, in NAD and will continue to monitor.
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
## 2021-11-26 NOTE — ED PROCEDURE NOTE - PROCEDURE ADDITIONAL DETAILS
Because patient is going to a psych unit and has SI were are not allowed to use joe, ace wraps, orthoglass, or prefabricated wrist splints with metal fixtures.  Used plaster, web roll, and silk tape to fabricate a volar splint.
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
## 2021-11-26 NOTE — ED CDU PROVIDER INITIAL DAY NOTE - PROGRESS NOTE DETAILS
Sleeping, no issues Sleeping, no issues. CT wrist ordered ? lunate fx vs. double density sign/artefact. Accept sign out from Dr. Arthur.  Pt resting comfortably.  Pt on 1:1 for SI and awaiting re-evaluation by tele-psych.  Pt also with wrist pain and awaiting CT to r/o lunate fracture. CT scan reviewed with Ortho on call, Dr. Hudson.  He is ok with a minimal splint that does not require ACE bandages or other materials that may be dangerous for a patient with SI.  I will review what is ok to use for R wrist immobilization and apply.  Dr. Hudson confirms that this will not require admission or surgery and will just need f/u with an ortho hand specialist once discharged from psych.

## 2021-11-26 NOTE — BH PATIENT PROFILE - HOME MEDICATIONS
QUEtiapine 300 mg oral tablet , 1 tab(s) orally once a day (at bedtime)  escitalopram 10 mg oral tablet , 1 tab(s) orally once a day  bictegravir/emtricitabine/tenofovir 50 mg-200 mg-25 mg oral tablet , 1 tab(s) orally once a day  traZODone 50 mg oral tablet , 1 tab(s) orally once a day (at bedtime), As needed, insomnia  nicotine  QUEtiapine 300 mg oral tablet , 1 tab(s) orally once a day (at bedtime)  Nicorette 2 mg oral transmucosal gum , 1 gum chewed every 2 hours x 30 days, As Needed    prazosin 1 mg oral capsule , 1 cap(s) orally once a day (at bedtime)  gabapentin 100 mg oral capsule , 1 cap(s) orally 3 times a day  loperamide 2 mg oral capsule , 1 cap(s) orally 2 times a day, As needed, diarrhea  ZyPREXA 10 mg oral tablet , 1 tab(s) orally every 8 hours, As needed, agitation  benzocaine 20% topical spray , 1 spray(s) topically every 4 hours, As needed, numbing for gums  Nicorette 2 mg oral transmucosal gum , 1 gum oral transmucosal every 2 hours, As Needed  bictegravir/emtricitabine/tenofovir 50 mg-200 mg-25 mg oral tablet , 1 tab(s) orally once a day  QUEtiapine 300 mg oral tablet , 1 tab(s) orally once a day (at bedtime)

## 2021-11-26 NOTE — ED CDU PROVIDER DISPOSITION NOTE - CLINICAL COURSE
Patient accepted to [facility] psych.  As per team there for his wrist immobilization we are not allowed to use ortho glass, ace wraps, joe, or prefabricated slints with metal rods.  Fabricated a splint with plaster and web roll.  Will likely need it re-done given weaker nature of plaster.  Per ortho pt can f/u with ortho hand specialist after discharge.

## 2021-11-26 NOTE — ED ADULT NURSE REASSESSMENT NOTE - STATUS
8 URIS admission transport/awaiting transfer, no change
bed TBD/awaiting bed, no change
psych re-eval/awaiting consult

## 2021-11-27 DIAGNOSIS — F11.20 OPIOID DEPENDENCE, UNCOMPLICATED: ICD-10-CM

## 2021-11-27 DIAGNOSIS — F10.20 ALCOHOL DEPENDENCE, UNCOMPLICATED: ICD-10-CM

## 2021-11-27 LAB
A1C WITH ESTIMATED AVERAGE GLUCOSE RESULT: 5.4 % — SIGNIFICANT CHANGE UP (ref 4–5.6)
CHOLEST SERPL-MCNC: 158 MG/DL — SIGNIFICANT CHANGE UP
COVID-19 NUCLEOCAPSID GAM AB INTERP: POSITIVE
COVID-19 NUCLEOCAPSID TOTAL GAM ANTIBODY RESULT: 22 INDEX — HIGH
COVID-19 SPIKE DOMAIN AB INTERP: POSITIVE
COVID-19 SPIKE DOMAIN ANTIBODY RESULT: >250 U/ML — HIGH
ESTIMATED AVERAGE GLUCOSE: 108 MG/DL — SIGNIFICANT CHANGE UP (ref 68–114)
HDLC SERPL-MCNC: 55 MG/DL — SIGNIFICANT CHANGE UP
LIPID PNL WITH DIRECT LDL SERPL: 82 MG/DL — SIGNIFICANT CHANGE UP
NON HDL CHOLESTEROL: 103 MG/DL — SIGNIFICANT CHANGE UP
SARS-COV-2 IGG+IGM SERPL QL IA: 22 INDEX — HIGH
SARS-COV-2 IGG+IGM SERPL QL IA: >250 U/ML — HIGH
SARS-COV-2 IGG+IGM SERPL QL IA: POSITIVE
SARS-COV-2 IGG+IGM SERPL QL IA: POSITIVE
TRIGL SERPL-MCNC: 105 MG/DL — SIGNIFICANT CHANGE UP

## 2021-11-27 PROCEDURE — 99223 1ST HOSP IP/OBS HIGH 75: CPT

## 2021-11-27 RX ADMIN — ESCITALOPRAM OXALATE 10 MILLIGRAM(S): 10 TABLET, FILM COATED ORAL at 10:42

## 2021-11-27 RX ADMIN — QUETIAPINE FUMARATE 150 MILLIGRAM(S): 200 TABLET, FILM COATED ORAL at 22:28

## 2021-11-27 RX ADMIN — BICTEGRAVIR SODIUM, EMTRICITABINE, AND TENOFOVIR ALAFENAMIDE FUMARATE 1 TABLET(S): 30; 120; 15 TABLET ORAL at 10:43

## 2021-11-27 RX ADMIN — Medication 50 MILLIGRAM(S): at 22:29

## 2021-11-27 NOTE — BH INPATIENT PSYCHIATRY ASSESSMENT NOTE - NSBHMETABOLIC_PSY_ALL_CORE_FT
BMI: BMI (kg/m2): 22.3 (11-26-21 @ 15:39)  HbA1c: A1C with Estimated Average Glucose Result: 5.4 % (11-27-21 @ 07:45)    Glucose: POCT Blood Glucose.: 96 mg/dL (05-08-21 @ 13:01)    BP: 104/60 (11-27-21 @ 06:09) (102/58 - 124/76)  Lipid Panel: Date/Time: 11-27-21 @ 07:45  Cholesterol, Serum: 158  Direct LDL: --  HDL Cholesterol, Serum: 55  Total Cholesterol/HDL Ration Measurement: --  Triglycerides, Serum: 105

## 2021-11-27 NOTE — BH INPATIENT PSYCHIATRY ASSESSMENT NOTE - NSBHCHARTREVIEWVS_PSY_A_CORE FT
Vital Signs Last 24 Hrs  T(C): 36.8 (11-27-21 @ 06:09), Max: 37 (11-26-21 @ 15:39)  T(F): 98.2 (11-27-21 @ 06:09), Max: 98.6 (11-26-21 @ 15:39)  HR: 72 (11-27-21 @ 06:09) (18 - 84)  BP: 104/60 (11-27-21 @ 06:09) (104/60 - 118/78)  BP(mean): --  RR: 18 (11-27-21 @ 06:09) (17 - 18)  SpO2: 97% (11-27-21 @ 06:09) (97% - 98%)

## 2021-11-27 NOTE — BH INPATIENT PSYCHIATRY ASSESSMENT NOTE - NSBHASSESSSUMMFT_PSY_ALL_CORE
34 YO M with hx of substance induced mood d/o, etoh/opioid use d/o comes with SI in context of medication nonadherence and death of two friends by opioid overdose.     1)Start lexapro 10 mg for depression and seroquel 150 mg qhs for mood dysregulation. Was stabilized on seroquel 300 mg when he was idscharged from 8U in October. Pt refused naltrexone when I offered it.     2)Motivational interviewing and encourage inpatient rehab.
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
## 2021-11-27 NOTE — BH INPATIENT PSYCHIATRY ASSESSMENT NOTE - CURRENT MEDICATION
MEDICATIONS  (STANDING):  bictegravir 50 mG/emtricitabine 200 mG/tenofovir alafenamide 25 mG (BIKTARVY) 1 Tablet(s) Oral daily  escitalopram 10 milliGRAM(s) Oral daily  influenza   Vaccine 0.5 milliLiter(s) IntraMuscular once  QUEtiapine 150 milliGRAM(s) Oral at bedtime    MEDICATIONS  (PRN):  aluminum hydroxide/magnesium hydroxide/simethicone Suspension 30 milliLiter(s) Oral every 4 hours PRN Dyspepsia  ibuprofen  Tablet. 600 milliGRAM(s) Oral every 6 hours PRN Moderate Pain (4 - 6)  LORazepam     Tablet 2 milliGRAM(s) Oral every 2 hours PRN CIWA-Ar score increase by 2 points and a total score of 7 or less  magnesium hydroxide Suspension 30 milliLiter(s) Oral daily PRN Constipation  nicotine  Polacrilex Gum 2 milliGRAM(s) Oral every 2 hours PRN breakthrough cravings  OLANZapine 10 milliGRAM(s) Oral every 8 hours PRN agitation  traZODone 50 milliGRAM(s) Oral at bedtime PRN insomnia

## 2021-11-28 PROCEDURE — 99233 SBSQ HOSP IP/OBS HIGH 50: CPT

## 2021-11-28 RX ORDER — QUETIAPINE FUMARATE 200 MG/1
200 TABLET, FILM COATED ORAL AT BEDTIME
Refills: 0 | Status: DISCONTINUED | OUTPATIENT
Start: 2021-11-28 | End: 2021-12-16

## 2021-11-28 RX ADMIN — BICTEGRAVIR SODIUM, EMTRICITABINE, AND TENOFOVIR ALAFENAMIDE FUMARATE 1 TABLET(S): 30; 120; 15 TABLET ORAL at 10:39

## 2021-11-28 RX ADMIN — ESCITALOPRAM OXALATE 10 MILLIGRAM(S): 10 TABLET, FILM COATED ORAL at 10:39

## 2021-11-28 RX ADMIN — QUETIAPINE FUMARATE 200 MILLIGRAM(S): 200 TABLET, FILM COATED ORAL at 21:38

## 2021-11-28 NOTE — BH INPATIENT PSYCHIATRY PROGRESS NOTE - NSBHFUPINTERVALHXFT_PSY_A_CORE
Pt not feeling acutely suicidal but still depressed. He looks very dysphoric. Isolating as he usually does. Although he looked very depressed I did find him watching a Karate Kid film. Pt said he would being attending groups soon but did not feel up to being with others yet. He is interested in inpatient rehab and completed a stint of inpt rehab at [facility] 6 months ago. He does not want naltrexone, as he has both the pill and the shot in the past. They did not help and made him feel "funny." Agreed to increase in seroquel to 200 mg qhs

## 2021-11-29 PROCEDURE — 99233 SBSQ HOSP IP/OBS HIGH 50: CPT

## 2021-11-29 RX ORDER — LOPERAMIDE HCL 2 MG
2 TABLET ORAL
Refills: 0 | Status: DISCONTINUED | OUTPATIENT
Start: 2021-11-29 | End: 2021-12-16

## 2021-11-29 RX ADMIN — ESCITALOPRAM OXALATE 10 MILLIGRAM(S): 10 TABLET, FILM COATED ORAL at 10:23

## 2021-11-29 RX ADMIN — QUETIAPINE FUMARATE 200 MILLIGRAM(S): 200 TABLET, FILM COATED ORAL at 22:21

## 2021-11-29 RX ADMIN — BICTEGRAVIR SODIUM, EMTRICITABINE, AND TENOFOVIR ALAFENAMIDE FUMARATE 1 TABLET(S): 30; 120; 15 TABLET ORAL at 10:23

## 2021-11-29 NOTE — BH SOCIAL WORK INITIAL PSYCHOSOCIAL EVALUATION - OTHER PAST PSYCHIATRIC HISTORY (INCLUDE DETAILS REGARDING ONSET, COURSE OF ILLNESS, INPATIENT/OUTPATIENT TREATMENT)
F25.9 Schizophrenia  F10.20 Alcohol Use Disorder, Severe  F11.20 Opioid Use Disorder, Severe  F14.10 Cocaine Use Disorder  Multiple prior psychiatric hospitalizations (most recent at [facility] in 10/2021)

## 2021-11-29 NOTE — BH SOCIAL WORK INITIAL PSYCHOSOCIAL EVALUATION - NSPTSTATEDGOAL_PSY_ALL_CORE
Patient unable to state goal for admission, but was recommended to reflect on ways the unit staff can assist him.
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
## 2021-11-29 NOTE — CHART NOTE - NSCHARTNOTEFT_GEN_A_CORE
[facility]  PHYSICAL EXAM: Agree/Declined    VITALS: T(C): 36.9 (11-29-21 @ 06:00), Max: 36.9 (11-28-21 @ 22:05)  HR: 68 (11-29-21 @ 06:00) (68 - 83)  BP: 106/72 (11-29-21 @ 06:00) (105/74 - 126/79)  RR: 16 (11-29-21 @ 06:00) (16 - 18)  SpO2: 97% (11-29-21 @ 06:00) (96% - 98%)      GENERAL: NAD, comfortable, ambulating  HEAD:  Atraumatic, Normocephalic  EYES: EOMI, PERRLA, conjunctiva and sclera clear  ENT: Moist mucous membranes  NECK: Supple, No JVD  CHEST/LUNG: Clear to auscultation bilaterally; No rales, rhonchi, wheezing, or rubs. Unlabored respirations  HEART: Regular rate and rhythm; No murmurs, rubs, or gallops  ABDOMEN: BSx4; Soft, nontender, nondistended  EXTREMITIES:  No clubbing, cyanosis, or edema  NERVOUS SYSTEM:  A&Ox3, no focal deficits   SKIN: No rashes or lesions
PHYSICAL EXAM:      Constitutional: wnl    Eyes: wnl    ENMT: wnl    Neck: wnl    Breasts:    Back:    Respiratory: normal breath sounds    Cardiovascular: S1S2, no murmurs appreciated    Gastrointestinal: soft to palpation, +bowel sounds     Genitourinary: not done    Rectal:    Extremities: 5/5 strength in all limbs, tenderness at R wrist at site of fracture    Vascular:    Neurological: CN intact    Skin: wnl    Lymph Nodes:    Musculoskeletal:    Psychiatric: dysphoric & sleepy

## 2021-11-29 NOTE — ED PROVIDER NOTE - RESPIRATORY, MLM
Psychiatric Progress Note      DATA:  Subjective/Behavioral Description:  Patient verbalizes: Patient worries about her 92-year-old man who is in the nursing home she is demented and is falling.    She smokes marijuana after long she has done that for long time she has stopped drinking alcohol    Mental Status Evaluation:   She seems to be very comfortable her other things are going she has hobbies making jewelry    MEDICATIONS:  Current Outpatient Medications   Medication Sig Dispense Refill   • fluoxetine (PROzac) 40 MG capsule Take 2 capsules by mouth daily. 180 capsule 0   • traZODone (DESYREL) 100 MG tablet Take 1 tablet by mouth nightly. 90 tablet 0   • busPIRone (BUSPAR) 15 MG tablet Take 2 tablets by mouth 2 times daily. 360 tablet 0   • gabapentin (NEURONTIN) 300 MG capsule Take 2 capsules by mouth 2 times daily. 360 capsule 0   • nalTREXone (REVIA) 50 MG tablet Take 1 tablet by mouth daily. 90 tablet 0   • atorvastatin (LIPITOR) 20 MG tablet      • meloxicam (MOBIC) 15 MG tablet      • pantoprazole (PROTONIX) 40 MG tablet daily as needed.        No current facility-administered medications for this visit.       LABS:    No results found for any previous visit.       PLAN:   A. Behavioral: Continue  plan .  Outpatient        B. Medication: Continue medications no change       Medication change rationale:      Not applicable    Addendum: She has happy and has no fun with her  continues to do that     Breath sounds clear and equal bilaterally.

## 2021-11-29 NOTE — BH INPATIENT PSYCHIATRY PROGRESS NOTE - NSBHASSESSSUMMFT_PSY_ALL_CORE
34 YO M with hx of substance induced mood d/o, etoh/opioid use d/o comes with SI in context of medication nonadherence and death of two friends by opioid overdose.     Pt currently on seroquel 200mg qhs, lexapro 10mg daily and biktarvy qd
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
## 2021-11-29 NOTE — BH SOCIAL WORK INITIAL PSYCHOSOCIAL EVALUATION - NSBHSACONSEQUENCE_PSY_A_CORE FT
Increase in depressive symptoms, loss of employment, damage to physical health, poor judgment when intoxicated, suicide attempt

## 2021-11-29 NOTE — BH SOCIAL WORK INITIAL PSYCHOSOCIAL EVALUATION - NSBHBARRIERS_PSY_ALL_CORE
Lack of support/Lack of insight/Low motivation/Medical co-morbidities/Non-compliant with treatment/Poor judgement/Social withdrawal

## 2021-11-29 NOTE — BH INPATIENT PSYCHIATRY PROGRESS NOTE - CURRENT MEDICATION
MEDICATIONS  (STANDING):  bictegravir 50 mG/emtricitabine 200 mG/tenofovir alafenamide 25 mG (BIKTARVY) 1 Tablet(s) Oral daily  escitalopram 10 milliGRAM(s) Oral daily  influenza   Vaccine 0.5 milliLiter(s) IntraMuscular once  QUEtiapine 200 milliGRAM(s) Oral at bedtime    MEDICATIONS  (PRN):  aluminum hydroxide/magnesium hydroxide/simethicone Suspension 30 milliLiter(s) Oral every 4 hours PRN Dyspepsia  ibuprofen  Tablet. 600 milliGRAM(s) Oral every 6 hours PRN Moderate Pain (4 - 6)  LORazepam     Tablet 2 milliGRAM(s) Oral every 2 hours PRN CIWA-Ar score increase by 2 points and a total score of 7 or less  magnesium hydroxide Suspension 30 milliLiter(s) Oral daily PRN Constipation  nicotine  Polacrilex Gum 2 milliGRAM(s) Oral every 2 hours PRN breakthrough cravings  OLANZapine 10 milliGRAM(s) Oral every 8 hours PRN agitation  traZODone 50 milliGRAM(s) Oral at bedtime PRN insomnia   70.3

## 2021-11-30 LAB
A1C WITH ESTIMATED AVERAGE GLUCOSE RESULT: 5.3 % — SIGNIFICANT CHANGE UP (ref 4–5.6)
CHOLEST SERPL-MCNC: 185 MG/DL — SIGNIFICANT CHANGE UP
ESTIMATED AVERAGE GLUCOSE: 105 MG/DL — SIGNIFICANT CHANGE UP (ref 68–114)
HDLC SERPL-MCNC: 56 MG/DL — SIGNIFICANT CHANGE UP
LIPID PNL WITH DIRECT LDL SERPL: 97 MG/DL — SIGNIFICANT CHANGE UP
NON HDL CHOLESTEROL: 129 MG/DL — SIGNIFICANT CHANGE UP
TRIGL SERPL-MCNC: 159 MG/DL — HIGH

## 2021-11-30 PROCEDURE — 99233 SBSQ HOSP IP/OBS HIGH 50: CPT

## 2021-11-30 RX ADMIN — QUETIAPINE FUMARATE 200 MILLIGRAM(S): 200 TABLET, FILM COATED ORAL at 21:50

## 2021-11-30 RX ADMIN — Medication 2 MILLIGRAM(S): at 21:53

## 2021-11-30 RX ADMIN — ESCITALOPRAM OXALATE 10 MILLIGRAM(S): 10 TABLET, FILM COATED ORAL at 11:27

## 2021-11-30 RX ADMIN — BICTEGRAVIR SODIUM, EMTRICITABINE, AND TENOFOVIR ALAFENAMIDE FUMARATE 1 TABLET(S): 30; 120; 15 TABLET ORAL at 11:28

## 2021-11-30 NOTE — BH INPATIENT PSYCHIATRY PROGRESS NOTE - CURRENT MEDICATION
MEDICATIONS  (STANDING):  bictegravir 50 mG/emtricitabine 200 mG/tenofovir alafenamide 25 mG (BIKTARVY) 1 Tablet(s) Oral daily  escitalopram 10 milliGRAM(s) Oral daily  influenza   Vaccine 0.5 milliLiter(s) IntraMuscular once  QUEtiapine 200 milliGRAM(s) Oral at bedtime    MEDICATIONS  (PRN):  aluminum hydroxide/magnesium hydroxide/simethicone Suspension 30 milliLiter(s) Oral every 4 hours PRN Dyspepsia  ibuprofen  Tablet. 600 milliGRAM(s) Oral every 6 hours PRN Moderate Pain (4 - 6)  loperamide 2 milliGRAM(s) Oral two times a day PRN diarrhea  LORazepam     Tablet 2 milliGRAM(s) Oral every 2 hours PRN CIWA-Ar score increase by 2 points and a total score of 7 or less  magnesium hydroxide Suspension 30 milliLiter(s) Oral daily PRN Constipation  nicotine  Polacrilex Gum 2 milliGRAM(s) Oral every 2 hours PRN breakthrough cravings  OLANZapine 10 milliGRAM(s) Oral every 8 hours PRN agitation  traZODone 50 milliGRAM(s) Oral at bedtime PRN insomnia
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
## 2021-12-01 PROCEDURE — 99233 SBSQ HOSP IP/OBS HIGH 50: CPT

## 2021-12-01 PROCEDURE — 99221 1ST HOSP IP/OBS SF/LOW 40: CPT | Mod: 57

## 2021-12-01 RX ORDER — CALCIUM CARBONATE 500(1250)
2 TABLET ORAL
Refills: 0 | Status: DISCONTINUED | OUTPATIENT
Start: 2021-12-01 | End: 2021-12-16

## 2021-12-01 RX ADMIN — ESCITALOPRAM OXALATE 10 MILLIGRAM(S): 10 TABLET, FILM COATED ORAL at 10:24

## 2021-12-01 RX ADMIN — BICTEGRAVIR SODIUM, EMTRICITABINE, AND TENOFOVIR ALAFENAMIDE FUMARATE 1 TABLET(S): 30; 120; 15 TABLET ORAL at 10:24

## 2021-12-01 RX ADMIN — QUETIAPINE FUMARATE 200 MILLIGRAM(S): 200 TABLET, FILM COATED ORAL at 21:19

## 2021-12-01 RX ADMIN — Medication 600 MILLIGRAM(S): at 21:21

## 2021-12-01 RX ADMIN — Medication 2 MILLIGRAM(S): at 21:22
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
## 2021-12-01 NOTE — BH INPATIENT PSYCHIATRY PROGRESS NOTE - NSBHFUPINTERVALHXFT_PSY_A_CORE
Patient seen in his room, cooperative and engaging on approach. He reports still withdrawing and feeling dope sick though he reports last using heroin over a week ago. Is having upset stomach and indigestion, but is able to keep meals down. He brought up suboxone and methadone and we discussed them, but he is not ready to entertain either at this time. He states that he has been trying to engage more with other people but he feels that they are looking at him funny and he is curious to know how he presents to other people. Acknowledges still feeling sad and feeling remorse and guilt regarding the od of his 2 friends recently. Does get tearful when talking about it. He states that waking up in the mornings is still difficulty as he feels panic as soon as he awakes. Is continuing to tolerate med regimen well. Ortho was contacted for recs regarding possible splint for his R wrist. Recs were appreciated and pt is wearing a soft cloth splint on his wrist. No reported si/hi/avh or paranoid ideation.

## 2021-12-02 PROCEDURE — 99232 SBSQ HOSP IP/OBS MODERATE 35: CPT

## 2021-12-02 RX ADMIN — Medication 600 MILLIGRAM(S): at 22:06

## 2021-12-02 RX ADMIN — BICTEGRAVIR SODIUM, EMTRICITABINE, AND TENOFOVIR ALAFENAMIDE FUMARATE 1 TABLET(S): 30; 120; 15 TABLET ORAL at 10:15

## 2021-12-02 RX ADMIN — Medication 600 MILLIGRAM(S): at 06:47

## 2021-12-02 RX ADMIN — ESCITALOPRAM OXALATE 10 MILLIGRAM(S): 10 TABLET, FILM COATED ORAL at 10:14

## 2021-12-02 RX ADMIN — QUETIAPINE FUMARATE 200 MILLIGRAM(S): 200 TABLET, FILM COATED ORAL at 21:06

## 2021-12-02 RX ADMIN — Medication 600 MILLIGRAM(S): at 21:06

## 2021-12-02 RX ADMIN — Medication 2 MILLIGRAM(S): at 21:05

## 2021-12-02 NOTE — BH INPATIENT PSYCHIATRY PROGRESS NOTE - CURRENT MEDICATION
MEDICATIONS  (STANDING):  bictegravir 50 mG/emtricitabine 200 mG/tenofovir alafenamide 25 mG (BIKTARVY) 1 Tablet(s) Oral daily  escitalopram 10 milliGRAM(s) Oral daily  influenza   Vaccine 0.5 milliLiter(s) IntraMuscular once  QUEtiapine 200 milliGRAM(s) Oral at bedtime    MEDICATIONS  (PRN):  aluminum hydroxide/magnesium hydroxide/simethicone Suspension 30 milliLiter(s) Oral every 4 hours PRN Dyspepsia  calcium carbonate    500 mG (Tums) Chewable 2 Tablet(s) Chew four times a day PRN indigestion  ibuprofen  Tablet. 600 milliGRAM(s) Oral every 6 hours PRN Moderate Pain (4 - 6)  loperamide 2 milliGRAM(s) Oral two times a day PRN diarrhea  LORazepam     Tablet 2 milliGRAM(s) Oral every 2 hours PRN CIWA-Ar score increase by 2 points and a total score of 7 or less  magnesium hydroxide Suspension 30 milliLiter(s) Oral daily PRN Constipation  nicotine  Polacrilex Gum 2 milliGRAM(s) Oral every 2 hours PRN breakthrough cravings  OLANZapine 10 milliGRAM(s) Oral every 8 hours PRN agitation  traZODone 50 milliGRAM(s) Oral at bedtime PRN insomnia

## 2021-12-02 NOTE — BH INPATIENT PSYCHIATRY PROGRESS NOTE - NSBHFUPINTERVALHXFT_PSY_A_CORE
Patient seen in his room, sleeping upon approach but soon becomes alert and cooperative. He reports having a session with psychologist earlier in the morning and hated it as he had to go over everything that had recently happened to him. He states that he has started to feel a little better, has started to go out more into the milieu and watch tv. He hasn't yet had too much interactions with other patients and is very happy that his roommate was discharged today as he caused him to have an increase in anxiety each morning when he would flip the lights on in the room. Pt reports that his upset stomach has improved and while he still endorses general malaise and withdrawal sxs, he is beginning to feel better overall. No reported si/hi/avh or paranoid ideation.
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
## 2021-12-03 PROCEDURE — 99233 SBSQ HOSP IP/OBS HIGH 50: CPT

## 2021-12-03 RX ORDER — ESCITALOPRAM OXALATE 10 MG/1
20 TABLET, FILM COATED ORAL DAILY
Refills: 0 | Status: DISCONTINUED | OUTPATIENT
Start: 2021-12-03 | End: 2021-12-16

## 2021-12-03 RX ADMIN — ESCITALOPRAM OXALATE 10 MILLIGRAM(S): 10 TABLET, FILM COATED ORAL at 10:52

## 2021-12-03 RX ADMIN — Medication 2 MILLIGRAM(S): at 10:52

## 2021-12-03 RX ADMIN — Medication 600 MILLIGRAM(S): at 10:52

## 2021-12-03 RX ADMIN — Medication 600 MILLIGRAM(S): at 22:40

## 2021-12-03 RX ADMIN — BICTEGRAVIR SODIUM, EMTRICITABINE, AND TENOFOVIR ALAFENAMIDE FUMARATE 1 TABLET(S): 30; 120; 15 TABLET ORAL at 10:52

## 2021-12-03 RX ADMIN — Medication 600 MILLIGRAM(S): at 21:39

## 2021-12-03 RX ADMIN — Medication 2 MILLIGRAM(S): at 21:37

## 2021-12-03 RX ADMIN — QUETIAPINE FUMARATE 200 MILLIGRAM(S): 200 TABLET, FILM COATED ORAL at 21:37

## 2021-12-03 RX ADMIN — Medication 600 MILLIGRAM(S): at 14:54

## 2021-12-03 NOTE — BH INPATIENT PSYCHIATRY PROGRESS NOTE - NSBHFUPINTERVALHXFT_PSY_A_CORE
Patient seen in his room. Cooperative and calm. He reports being in a bad mood today as he has been continuing to ruminate about the od of his best friend each morning and feeling panicky when he first wakes up. He states that this hospitalization has been different from his prior ones as he has always known that he would continue to use drugs when he left, but this time he is not sure if he wants to. His 18y/o daughter is due to deliver her first child in [place] next month and he wants to go to be the. He does not want to follow in the footsteps of his own father who  before meeting his grandchild. He acknowledges that the only thing keeping him in [place] is drugs and if he were to move to [place] or [place] he would not use because his family is not aware that he uses drugs. He has not attended any groups yet, and thought about going to [facility] last night but showed up too late. Is thinking about going to a group today. He reports ongoing withdrawal sxs. No acute medical concerns. Is wearing brace on R hand.
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
## 2021-12-04 DIAGNOSIS — L53.9 ERYTHEMATOUS CONDITION, UNSPECIFIED: ICD-10-CM

## 2021-12-04 DIAGNOSIS — B20 HUMAN IMMUNODEFICIENCY VIRUS [HIV] DISEASE: ICD-10-CM

## 2021-12-04 LAB
ANION GAP SERPL CALC-SCNC: 16 MMOL/L — SIGNIFICANT CHANGE UP (ref 5–17)
BUN SERPL-MCNC: 24 MG/DL — HIGH (ref 7–23)
CALCIUM SERPL-MCNC: 9.1 MG/DL — SIGNIFICANT CHANGE UP (ref 8.4–10.5)
CHLORIDE SERPL-SCNC: 101 MMOL/L — SIGNIFICANT CHANGE UP (ref 96–108)
CO2 SERPL-SCNC: 25 MMOL/L — SIGNIFICANT CHANGE UP (ref 22–31)
CREAT SERPL-MCNC: 1.03 MG/DL — SIGNIFICANT CHANGE UP (ref 0.5–1.3)
GLUCOSE SERPL-MCNC: 90 MG/DL — SIGNIFICANT CHANGE UP (ref 70–99)
HCT VFR BLD CALC: 42.4 % — SIGNIFICANT CHANGE UP (ref 39–50)
HGB BLD-MCNC: 13.6 G/DL — SIGNIFICANT CHANGE UP (ref 13–17)
MCHC RBC-ENTMCNC: 30.8 PG — SIGNIFICANT CHANGE UP (ref 27–34)
MCHC RBC-ENTMCNC: 32.1 GM/DL — SIGNIFICANT CHANGE UP (ref 32–36)
MCV RBC AUTO: 95.9 FL — SIGNIFICANT CHANGE UP (ref 80–100)
NRBC # BLD: 0 /100 WBCS — SIGNIFICANT CHANGE UP (ref 0–0)
PLATELET # BLD AUTO: 373 K/UL — SIGNIFICANT CHANGE UP (ref 150–400)
POTASSIUM SERPL-MCNC: 4.4 MMOL/L — SIGNIFICANT CHANGE UP (ref 3.5–5.3)
POTASSIUM SERPL-SCNC: 4.4 MMOL/L — SIGNIFICANT CHANGE UP (ref 3.5–5.3)
RBC # BLD: 4.42 M/UL — SIGNIFICANT CHANGE UP (ref 4.2–5.8)
RBC # FLD: 12.8 % — SIGNIFICANT CHANGE UP (ref 10.3–14.5)
SODIUM SERPL-SCNC: 142 MMOL/L — SIGNIFICANT CHANGE UP (ref 135–145)
WBC # BLD: 6.93 K/UL — SIGNIFICANT CHANGE UP (ref 3.8–10.5)
WBC # FLD AUTO: 6.93 K/UL — SIGNIFICANT CHANGE UP (ref 3.8–10.5)

## 2021-12-04 PROCEDURE — 99221 1ST HOSP IP/OBS SF/LOW 40: CPT

## 2021-12-04 RX ORDER — DIPHENHYDRAMINE HYDROCHLORIDE AND LIDOCAINE HYDROCHLORIDE AND ALUMINUM HYDROXIDE AND MAGNESIUM HYDRO
15 KIT
Refills: 0 | Status: DISCONTINUED | OUTPATIENT
Start: 2021-12-04 | End: 2021-12-16

## 2021-12-04 RX ORDER — BENZOCAINE AND MENTHOL 5; 1 G/100ML; G/100ML
1 LIQUID ORAL EVERY 4 HOURS
Refills: 0 | Status: DISCONTINUED | OUTPATIENT
Start: 2021-12-04 | End: 2021-12-12

## 2021-12-04 RX ORDER — ACETAMINOPHEN 500 MG
650 TABLET ORAL EVERY 6 HOURS
Refills: 0 | Status: DISCONTINUED | OUTPATIENT
Start: 2021-12-04 | End: 2021-12-13

## 2021-12-04 RX ORDER — BENZOCAINE AND MENTHOL 5; 1 G/100ML; G/100ML
1 LIQUID ORAL EVERY 4 HOURS
Refills: 0 | Status: DISCONTINUED | OUTPATIENT
Start: 2021-12-04 | End: 2021-12-04

## 2021-12-04 RX ADMIN — QUETIAPINE FUMARATE 200 MILLIGRAM(S): 200 TABLET, FILM COATED ORAL at 22:06

## 2021-12-04 RX ADMIN — Medication 600 MILLIGRAM(S): at 16:42

## 2021-12-04 RX ADMIN — Medication 650 MILLIGRAM(S): at 22:06

## 2021-12-04 RX ADMIN — ESCITALOPRAM OXALATE 20 MILLIGRAM(S): 10 TABLET, FILM COATED ORAL at 10:40

## 2021-12-04 RX ADMIN — Medication 2 MILLIGRAM(S): at 10:39

## 2021-12-04 RX ADMIN — Medication 650 MILLIGRAM(S): at 23:00

## 2021-12-04 RX ADMIN — BICTEGRAVIR SODIUM, EMTRICITABINE, AND TENOFOVIR ALAFENAMIDE FUMARATE 1 TABLET(S): 30; 120; 15 TABLET ORAL at 10:41

## 2021-12-04 RX ADMIN — Medication 600 MILLIGRAM(S): at 10:43

## 2021-12-04 NOTE — CONSULT NOTE ADULT - ASSESSMENT
Mr. Hernandez is a 36 y/o M with PMHx ETOH abuse, cocaine and heroin abuse, bipolar disorder, anxiety, schizophrenia HIV (on Biktarvy, unknown viral load and CD4 count), depression who was BIB self for suicidal ideations. Medicine consulted for mouth pain and erythema.

## 2021-12-04 NOTE — CONSULT NOTE ADULT - PROBLEM SELECTOR RECOMMENDATION 2
Unknown CD4/viral load. Reportedly compliant with Biktarvy. Unknown OI history though had prolonged hospital stay (1.5 months) for "pneumonia". No fevers/chills, no leukocytosis, shortness of breath, cough present.     Recommendations:  - F/u Tcell subset/viral load (in lab)   - Continue Biktarvy   - Will try to obtain more detailed sexual history when patient is more comfortable Unknown CD4/viral load. Reportedly compliant with Biktarvy. Unknown OI history though had prolonged hospital stay (1.5 months) for "pneumonia". No fevers/chills, no leukocytosis, shortness of breath, cough present. No leukopenia, patient appears to be well controlled at this time     Recommendations:  - F/u Tcell subset/viral load (in lab)   - Continue Biktarvy

## 2021-12-04 NOTE — CONSULT NOTE ADULT - SUBJECTIVE AND OBJECTIVE BOX
INTERNAL MEDICINE SERVICE INITIAL CONSULT NOTE    HPI: Mr. Hernandez is a 34 y/o M with PMHx ETOH abuse, cocaine and heroin abuse, bipolar disorder, anxiety, schizophrenia HIV (on Biktarvy, unknown viral load and CD4 count), depression who was BIB self for suicidal ideations. Patient requested inpatient psychiatric hospitalization for increasingly angry outbursts and suicidal ideations after using heroin, cocaine and alcohol with some of his friends a week prior to admission - two of his friends overdosed on heroin and reportedly passed away, and the patient stated he was the only one left. On admission, the patient was having suicidal ideations and visual hallucinations - requested inpatient hospitalization as he feels safe here. Since being hospitalized, patient has reportedly become more engaged and introspective - currently on seroquel 200 qHS, lexapro increased to 20mg daily, biktarvy. Orthopedics consulted for R acute comminuted fracture of lunate, for which patient is in a removable sling.     Medicine consulted for mouth pain and erythema.     Patient states that he feels pain at the back of his throat that has made it hard for him to swallow for the past 1-2 days. He did not have these symptoms prior to admission and this is the first time he is experiencing something like this. He states he has difficulty breathing when he is sleeping sometimes but does not feel like he is choking. Denies any increased salivary production. Associates the pain with numbness and tingling along the right side of his face extending from his mouth to his ear and eye. Also admits to R sided ear pain, denies tinnitus, denies recent swimming. He denies coughing up any blood. He denies any trauma to the mouth area during his stay so far, denies sharing any drinks or food/sick contacts while hospitalized inpatient. Also denies any fevers, chills, nausea, vomiting, headache, chest pain, shortness of breath, diarrhea, etc.    HIV history: Patient is 100% compliant with Biktarvy. Does not recall his last CD4/viral load and doesn't know how his numbers usually are. Follows occasionally with a provider outpatient. Does not know if he has ever had an opportunistic infection but does state that he was hospitalized in a [facility] for 1.5 months for a "walking pneumonia". Does not know if he ever underwent a bronchoscopy. Sexual history limited as patient did not feel comfortable discussing details with three other people (examiner, chaperone and roommate) present in the room.     REVIEW OF SYSTEMS:   Otherwise negative except as specified in HPI    MEDICATIONS:  MEDICATIONS  (STANDING):  bictegravir 50 mG/emtricitabine 200 mG/tenofovir alafenamide 25 mG (BIKTARVY) 1 Tablet(s) Oral daily  escitalopram 20 milliGRAM(s) Oral daily  influenza   Vaccine 0.5 milliLiter(s) IntraMuscular once  QUEtiapine 200 milliGRAM(s) Oral at bedtime    MEDICATIONS  (PRN):  acetaminophen     Tablet .. 650 milliGRAM(s) Oral every 6 hours PRN Mild Pain (1 - 3), Moderate Pain (4 - 6)  aluminum hydroxide/magnesium hydroxide/simethicone Suspension 30 milliLiter(s) Oral every 4 hours PRN Dyspepsia  calcium carbonate    500 mG (Tums) Chewable 2 Tablet(s) Chew four times a day PRN indigestion  ibuprofen  Tablet. 600 milliGRAM(s) Oral every 6 hours PRN Moderate Pain (4 - 6)  loperamide 2 milliGRAM(s) Oral two times a day PRN diarrhea  LORazepam     Tablet 2 milliGRAM(s) Oral every 2 hours PRN CIWA-Ar score increase by 2 points and a total score of 7 or less  magnesium hydroxide Suspension 30 milliLiter(s) Oral daily PRN Constipation  nicotine  Polacrilex Gum 2 milliGRAM(s) Oral every 2 hours PRN breakthrough cravings  OLANZapine 10 milliGRAM(s) Oral every 8 hours PRN agitation  traZODone 50 milliGRAM(s) Oral at bedtime PRN insomnia      ALLERGIES:  Allergies    penicillin (Unknown)  penicillins (Unknown)    Intolerances        VITAL SIGNS:  Vital Signs Last 24 Hrs  T(C): 36.8 (04 Dec 2021 17:41), Max: 37.1 (04 Dec 2021 09:00)  T(F): 98.2 (04 Dec 2021 17:41), Max: 98.8 (04 Dec 2021 09:00)  HR: 81 (04 Dec 2021 19:48) (80 - 86)  BP: 114/73 (04 Dec 2021 19:48) (112/73 - 119/76)  BP(mean): --  RR: 18 (04 Dec 2021 19:48) (18 - 18)  SpO2: 98% (04 Dec 2021 19:48) (98% - 98%)      PHYSICAL EXAM:  Constitutional: slightly unkempt but otherwise WDWN M resting in bed in NAD  HEENT: PERRLA, EOMI, anicteric sclera, +erythema to the hard palate and bilateral tonsils. Tonsils are non-enlarged, no vesicles or white exudates present. MMM  Neck: supple; no JVD or thyromegaly. +Enlarged and tender R submandibular lymph node  Lymph: +tender and enlarged R submandibular LN as above, no axillary or pelvic LAD.   Respiratory: CTA B/L; no W/R/R, no retractions  Cardiac: +S1/S2; RRR; no M/R/G; PMI non-displaced  Gastrointestinal: abdomen soft, NT/ND; no rebound or guarding; +BSx4  Extremities: WWP, no clubbing or cyanosis; no peripheral edema  Musculoskeletal: NROM x4; no joint swelling, tenderness or erythema  Vascular: 2+ radial, femoral, DP/PT pulses B/L  Dermatologic: skin warm, dry and intact; no rashes, wounds, or scars  Neurologic: AAOx3; CNII-XII grossly intact; no focal deficits  Psychiatric: affect and characteristics of appearance, verbalizations, behaviors are appropriate    LABS:                        13.6   6.93  )-----------( 373      ( 04 Dec 2021 20:14 )             42.4     12-04    142  |  101  |  x   ----------------------------<  90  4.4   |  25  |  1.03    Ca    9.1      04 Dec 2021 20:14              CAPILLARY BLOOD GLUCOSE              RADIOLOGY & ADDITIONAL TESTS: Reviewed. INTERNAL MEDICINE SERVICE INITIAL CONSULT NOTE    HPI: Mr. Hernandez is a 34 y/o M with PMHx ETOH abuse, cocaine and heroin abuse, bipolar disorder, anxiety, schizophrenia HIV (on Biktarvy, unknown viral load and CD4 count), depression who was BIB self for suicidal ideations. Patient requested inpatient psychiatric hospitalization for increasingly angry outbursts and suicidal ideations after using heroin, cocaine and alcohol with some of his friends a week prior to admission - two of his friends overdosed on heroin and reportedly passed away, and the patient stated he was the only one left. On admission, the patient was having suicidal ideations and visual hallucinations - requested inpatient hospitalization as he feels safe here. Since being hospitalized, patient has reportedly become more engaged and introspective - currently on seroquel 200 qHS, lexapro increased to 20mg daily, biktarvy. Orthopedics consulted for R acute comminuted fracture of lunate, for which patient is in a removable sling.     Medicine consulted for mouth pain and erythema.     Patient states that he feels pain at the back of his throat that has made it hard for him to swallow for the past 1-2 days. He did not have these symptoms prior to admission and this is the first time he is experiencing something like this. He states he has difficulty breathing when he is sleeping sometimes but does not feel like he is choking. Denies any increased salivary production. Associates the pain with numbness and tingling along the right side of his face extending from his mouth to his ear and eye. Also admits to R sided ear pain, denies tinnitus, denies recent swimming. He denies coughing up any blood. He denies any trauma to the mouth area during his stay so far, denies sharing any drinks or food/sick contacts while hospitalized inpatient. Also denies any fevers, chills, nausea, vomiting, headache, chest pain, shortness of breath, diarrhea, etc.    HIV history: Patient is 100% compliant with Biktarvy. Does not recall his last CD4/viral load and doesn't know how his numbers usually are. Follows occasionally with a provider outpatient. Does not know if he has ever had an opportunistic infection but does state that he was hospitalized in a [facility] for 1.5 months for a "walking pneumonia". Does not know if he ever underwent a bronchoscopy. Sexual history limited as patient did not feel comfortable discussing details with three other people (examiner, chaperone and roommate) present in the room.     REVIEW OF SYSTEMS:   Otherwise negative except as specified in HPI    MEDICATIONS:  MEDICATIONS  (STANDING):  bictegravir 50 mG/emtricitabine 200 mG/tenofovir alafenamide 25 mG (BIKTARVY) 1 Tablet(s) Oral daily  escitalopram 20 milliGRAM(s) Oral daily  influenza   Vaccine 0.5 milliLiter(s) IntraMuscular once  QUEtiapine 200 milliGRAM(s) Oral at bedtime    MEDICATIONS  (PRN):  acetaminophen     Tablet .. 650 milliGRAM(s) Oral every 6 hours PRN Mild Pain (1 - 3), Moderate Pain (4 - 6)  aluminum hydroxide/magnesium hydroxide/simethicone Suspension 30 milliLiter(s) Oral every 4 hours PRN Dyspepsia  calcium carbonate    500 mG (Tums) Chewable 2 Tablet(s) Chew four times a day PRN indigestion  ibuprofen  Tablet. 600 milliGRAM(s) Oral every 6 hours PRN Moderate Pain (4 - 6)  loperamide 2 milliGRAM(s) Oral two times a day PRN diarrhea  LORazepam     Tablet 2 milliGRAM(s) Oral every 2 hours PRN CIWA-Ar score increase by 2 points and a total score of 7 or less  magnesium hydroxide Suspension 30 milliLiter(s) Oral daily PRN Constipation  nicotine  Polacrilex Gum 2 milliGRAM(s) Oral every 2 hours PRN breakthrough cravings  OLANZapine 10 milliGRAM(s) Oral every 8 hours PRN agitation  traZODone 50 milliGRAM(s) Oral at bedtime PRN insomnia      ALLERGIES:  Allergies    penicillin (Unknown)  penicillins (Unknown)    Intolerances        VITAL SIGNS:  Vital Signs Last 24 Hrs  T(C): 36.8 (04 Dec 2021 17:41), Max: 37.1 (04 Dec 2021 09:00)  T(F): 98.2 (04 Dec 2021 17:41), Max: 98.8 (04 Dec 2021 09:00)  HR: 81 (04 Dec 2021 19:48) (80 - 86)  BP: 114/73 (04 Dec 2021 19:48) (112/73 - 119/76)  BP(mean): --  RR: 18 (04 Dec 2021 19:48) (18 - 18)  SpO2: 98% (04 Dec 2021 19:48) (98% - 98%)      PHYSICAL EXAM:  Constitutional: slightly unkempt but otherwise WDWN M resting in bed in NAD  HEENT: PERRLA, EOMI, anicteric sclera, +erythema to the hard palate and bilateral tonsils. Tonsils are non-enlarged, no vesicles or white exudates present. MMM. R ear without pain upon pulling of tragus, no visible external ear canal erythema.   Neck: supple; no JVD or thyromegaly. +Enlarged and tender R submandibular lymph node  Lymph: +tender and enlarged R submandibular LN as above, no axillary or pelvic LAD.   Respiratory: CTA B/L; no W/R/R, no retractions  Cardiac: +S1/S2; RRR; no M/R/G; PMI non-displaced  Gastrointestinal: abdomen soft, NT/ND; no rebound or guarding; +BSx4  Extremities: WWP, no clubbing or cyanosis; no peripheral edema  Musculoskeletal: NROM x4; no joint swelling, tenderness or erythema  Vascular: 2+ radial, femoral, DP/PT pulses B/L  Dermatologic: skin warm, dry and intact; no rashes, wounds, or scars  Neurologic: AAOx3; CNII-XII grossly intact; no focal deficits  Psychiatric: affect and characteristics of appearance, verbalizations, behaviors are appropriate    LABS:                        13.6   6.93  )-----------( 373      ( 04 Dec 2021 20:14 )             42.4     12-04    142  |  101  |  x   ----------------------------<  90  4.4   |  25  |  1.03    Ca    9.1      04 Dec 2021 20:14              CAPILLARY BLOOD GLUCOSE              RADIOLOGY & ADDITIONAL TESTS: Reviewed. INTERNAL MEDICINE SERVICE INITIAL CONSULT NOTE    HPI: Mr. Hernandez is a heterosexual 36 y/o M with PMHx ETOH abuse, cocaine and heroin abuse, bipolar disorder, anxiety, schizophrenia, HIV (on Biktarvy, unknown viral load and CD4 count), depression who was BIB self for suicidal ideations. Patient requested inpatient psychiatric hospitalization for increasingly angry outbursts and suicidal ideations after using heroin, cocaine and alcohol with some of his friends a week prior to admission - two of his friends overdosed on heroin and reportedly passed away, and the patient stated he was the only one left. On admission, the patient was having suicidal ideations and visual hallucinations - requested inpatient hospitalization as he feels safe here. Since being hospitalized, patient has reportedly become more engaged and introspective - currently on seroquel 200 qHS, lexapro increased to 20mg daily, biktarvy. Orthopedics consulted for R acute comminuted fracture of lunate, for which patient is in a removable sling.     Medicine consulted for mouth pain and erythema.     Patient states that he feels pain at the back of his throat that has made it hard for him to swallow for the past 1-2 days. He did not have these symptoms prior to admission and this is the first time he is experiencing something like this. He states he has difficulty breathing when he is sleeping sometimes but does not feel like he is choking. Denies any increased salivary production. Associates the pain with numbness and tingling along the right side of his face extending from his mouth to his ear and eye. Also admits to R sided ear pain, denies tinnitus, denies recent swimming. He denies coughing up any blood. He denies any trauma to the mouth area during his stay so far, denies sharing any drinks or food/sick contacts while hospitalized inpatient. Also denies any fevers, chills, nausea, vomiting, headache, chest pain, shortness of breath, diarrhea, etc.    HIV history: Patient is 100% compliant with Biktarvy. Does not recall his last CD4/viral load and doesn't know how his numbers usually are. Follows occasionally with a provider outpatient. Does not know if he has ever had an opportunistic infection but does state that he was hospitalized in a [facility] for 1.5 months for a "walking pneumonia". Does not know if he ever underwent a bronchoscopy. Sexual history - patient has sex with women only, denies any recent sexual encounters (including oral sex) in the last 3 months.     REVIEW OF SYSTEMS:   Otherwise negative except as specified in HPI    MEDICATIONS:  MEDICATIONS  (STANDING):  bictegravir 50 mG/emtricitabine 200 mG/tenofovir alafenamide 25 mG (BIKTARVY) 1 Tablet(s) Oral daily  escitalopram 20 milliGRAM(s) Oral daily  influenza   Vaccine 0.5 milliLiter(s) IntraMuscular once  QUEtiapine 200 milliGRAM(s) Oral at bedtime    MEDICATIONS  (PRN):  acetaminophen     Tablet .. 650 milliGRAM(s) Oral every 6 hours PRN Mild Pain (1 - 3), Moderate Pain (4 - 6)  aluminum hydroxide/magnesium hydroxide/simethicone Suspension 30 milliLiter(s) Oral every 4 hours PRN Dyspepsia  calcium carbonate    500 mG (Tums) Chewable 2 Tablet(s) Chew four times a day PRN indigestion  ibuprofen  Tablet. 600 milliGRAM(s) Oral every 6 hours PRN Moderate Pain (4 - 6)  loperamide 2 milliGRAM(s) Oral two times a day PRN diarrhea  LORazepam     Tablet 2 milliGRAM(s) Oral every 2 hours PRN CIWA-Ar score increase by 2 points and a total score of 7 or less  magnesium hydroxide Suspension 30 milliLiter(s) Oral daily PRN Constipation  nicotine  Polacrilex Gum 2 milliGRAM(s) Oral every 2 hours PRN breakthrough cravings  OLANZapine 10 milliGRAM(s) Oral every 8 hours PRN agitation  traZODone 50 milliGRAM(s) Oral at bedtime PRN insomnia      ALLERGIES:  Allergies    penicillin (Unknown)  penicillins (Unknown)    Intolerances        VITAL SIGNS:  Vital Signs Last 24 Hrs  T(C): 36.8 (04 Dec 2021 17:41), Max: 37.1 (04 Dec 2021 09:00)  T(F): 98.2 (04 Dec 2021 17:41), Max: 98.8 (04 Dec 2021 09:00)  HR: 81 (04 Dec 2021 19:48) (80 - 86)  BP: 114/73 (04 Dec 2021 19:48) (112/73 - 119/76)  BP(mean): --  RR: 18 (04 Dec 2021 19:48) (18 - 18)  SpO2: 98% (04 Dec 2021 19:48) (98% - 98%)      PHYSICAL EXAM:  Constitutional: slightly unkempt but otherwise WDWN M resting in bed in NAD  HEENT: PERRLA, EOMI, anicteric sclera, +erythema to the hard palate and bilateral tonsils. Tonsils are non-enlarged, no vesicles or white exudates present. MMM. R ear without pain upon pulling of tragus, no visible external ear canal erythema. No oral thrush present.   Neck: supple; no JVD or thyromegaly. +Enlarged and tender R submandibular lymph node  Lymph: +tender and enlarged R submandibular LN as above, no axillary or pelvic LAD.   Respiratory: CTA B/L; no W/R/R, no retractions  Cardiac: +S1/S2; RRR; no M/R/G; PMI non-displaced  Gastrointestinal: abdomen soft, NT/ND; no rebound or guarding; +BSx4  Extremities: WWP, no clubbing or cyanosis; no peripheral edema  Musculoskeletal: NROM x4; no joint swelling, tenderness or erythema  Vascular: 2+ radial, femoral, DP/PT pulses B/L  Dermatologic: skin warm, dry and intact; no rashes, wounds, or scars  Neurologic: AAOx3; CNII-XII grossly intact; no focal deficits  Psychiatric: affect and characteristics of appearance, verbalizations, behaviors are appropriate    LABS:                        13.6   6.93  )-----------( 373      ( 04 Dec 2021 20:14 )             42.4     12-04    142  |  101  |  x   ----------------------------<  90  4.4   |  25  |  1.03    Ca    9.1      04 Dec 2021 20:14              CAPILLARY BLOOD GLUCOSE              RADIOLOGY & ADDITIONAL TESTS: Reviewed.

## 2021-12-04 NOTE — CONSULT NOTE ADULT - PROBLEM SELECTOR RECOMMENDATION 9
Patient with scattered erythema on hard palate without associated tonsillar swelling Patient with scattered erythema on hard palate and bilateral tonsils without associated tonsillar swelling, white exudates or vesicles. Associated with odynophagia, enlarged and tender R submandibular lymph node, numbness and tingling to R ear/eye and R ear pain. No clinical s/s of otitis externa, no change in vision, no tinnitus, etc. Some difficulty breathing when lying flat, however VSS (normal oxygenation and no fever).     DDx includes group A strep pharyngitis, infectious mononucleosis, oral herpes, oral gonorrhea/chlamydia, viral tonsillitis/pharyngitis.     Recommendations:   - Rapid Group A Strep - 2 swabs   - Mononucleosis screen   - Oral GC/Chlamydia swab   - RVP and COVID swab   - HSV PCR testing   - Cepacol lozenges for throat pain  - Symptomatic management - encourage good oral hydration (or start IVF if unable to tolerate oral), pain management, etc. Patient with scattered erythema on hard palate and bilateral tonsils without associated tonsillar swelling, white exudates or vesicles. Associated with odynophagia, enlarged and tender R submandibular lymph node, numbness and tingling to R ear/eye and R ear pain. No clinical s/s of otitis externa, no change in vision, no tinnitus, etc. Some difficulty breathing when lying flat, however VSS (normal oxygenation and no fever).     DDx includes group A strep pharyngitis, infectious mononucleosis, oral herpes, oral gonorrhea/chlamydia, viral tonsillitis/pharyngitis.     Recommendations:   - Rapid Group A Strep - 2 swabs   - Mononucleosis screen   - Oral GC/Chlamydia swab   - RVP and COVID swab   - HSV PCR testing   - Cepacol lozenges for throat pain  - Continue ibuprofen PRN for pain/inflammation  - Symptomatic management - encourage good oral hydration (or start IVF if unable to tolerate oral), pain management, etc.  - Will need to clarify reaction to pencillin in case patient needs treatment for strep pharyngitis Patient with scattered erythema on hard palate and bilateral tonsils without associated tonsillar swelling, white exudates or vesicles. Associated with odynophagia, enlarged and tender R submandibular lymph node, numbness and tingling to R ear/eye and R ear pain. No clinical s/s of otitis externa, no change in vision, no tinnitus, etc. Some difficulty breathing when lying flat, however VSS (normal oxygenation and no fever).     DDx includes group A strep pharyngitis, infectious mononucleosis, oral herpes, oral gonorrhea/chlamydia, viral tonsillitis/pharyngitis.     Recommendations:   - Collected and sent to lab - Rapid Group A Strep - 2 swabs, Oral GC/Chlamydia swab, RVP and COVID swab   - Mononucleosis screen (gold top) blood test ordered for 12/5 AM  - Symptom control: cepacol lozenges standing, magic mouthwash PRN   - Continue tylenol and ibuprofen PRN for pain   - Encourage good oral hydration (or start IVF if unable to tolerate oral), pain management, etc.  - Will need to clarify reaction to pencillin in case patient needs treatment for strep pharyngitis Patient with scattered erythema on hard palate and bilateral tonsils without associated tonsillar swelling, white exudates or vesicles. Associated with odynophagia, enlarged and tender R submandibular lymph node, numbness and tingling to R ear/eye and R ear pain. No clinical s/s of otitis externa, no change in vision, no tinnitus, etc. Some difficulty breathing when lying flat, however VSS (normal oxygenation and no fever).     DDx includes group A strep pharyngitis, infectious mononucleosis, oral herpes, oral gonorrhea/chlamydia, viral tonsillitis/pharyngitis.     Recommendations:   - Collected and sent to lab - Rapid Group A Strep - 2 swabs, Oral GC/Chlamydia swab, RVP and COVID swab, ENT culture   - Mononucleosis screen (gold top) blood test ordered for 12/5 AM  - Symptom control: cepacol lozenges standing, magic mouthwash PRN   - Continue tylenol and ibuprofen PRN for pain   - Encourage good oral hydration (or start IVF if unable to tolerate oral), pain management, etc.  - Will need to clarify reaction to pencillin in case patient needs treatment for strep pharyngitis

## 2021-12-05 LAB
HETEROPH AB TITR SER AGGL: NEGATIVE — SIGNIFICANT CHANGE UP
RAPID RVP RESULT: SIGNIFICANT CHANGE UP
S PYO AG SPEC QL IA: NEGATIVE — SIGNIFICANT CHANGE UP
SARS-COV-2 RNA SPEC QL NAA+PROBE: SIGNIFICANT CHANGE UP

## 2021-12-05 PROCEDURE — 99233 SBSQ HOSP IP/OBS HIGH 50: CPT

## 2021-12-05 RX ADMIN — BICTEGRAVIR SODIUM, EMTRICITABINE, AND TENOFOVIR ALAFENAMIDE FUMARATE 1 TABLET(S): 30; 120; 15 TABLET ORAL at 14:44

## 2021-12-05 RX ADMIN — BENZOCAINE AND MENTHOL 1 LOZENGE: 5; 1 LIQUID ORAL at 14:44

## 2021-12-05 RX ADMIN — Medication 2 MILLIGRAM(S): at 13:15

## 2021-12-05 RX ADMIN — BENZOCAINE AND MENTHOL 1 LOZENGE: 5; 1 LIQUID ORAL at 00:23

## 2021-12-05 RX ADMIN — QUETIAPINE FUMARATE 200 MILLIGRAM(S): 200 TABLET, FILM COATED ORAL at 21:12

## 2021-12-05 RX ADMIN — BENZOCAINE AND MENTHOL 1 LOZENGE: 5; 1 LIQUID ORAL at 06:32

## 2021-12-05 RX ADMIN — Medication 600 MILLIGRAM(S): at 05:13

## 2021-12-05 RX ADMIN — Medication 600 MILLIGRAM(S): at 21:11

## 2021-12-05 RX ADMIN — Medication 50 MILLIGRAM(S): at 21:15

## 2021-12-05 RX ADMIN — ESCITALOPRAM OXALATE 20 MILLIGRAM(S): 10 TABLET, FILM COATED ORAL at 14:44

## 2021-12-05 RX ADMIN — Medication 600 MILLIGRAM(S): at 22:11

## 2021-12-05 RX ADMIN — Medication 600 MILLIGRAM(S): at 06:00

## 2021-12-05 RX ADMIN — DIPHENHYDRAMINE HYDROCHLORIDE AND LIDOCAINE HYDROCHLORIDE AND ALUMINUM HYDROXIDE AND MAGNESIUM HYDRO 15 MILLILITER(S): KIT at 05:35

## 2021-12-05 RX ADMIN — BENZOCAINE AND MENTHOL 1 LOZENGE: 5; 1 LIQUID ORAL at 17:30

## 2021-12-05 NOTE — PROGRESS NOTE ADULT - SUBJECTIVE AND OBJECTIVE BOX
Subjective/Interval events:  No acute overnight events. Ongoing sore throat, feeling a bit "worn down" and fatigued. No fevers/chills. No cough. No shortness of breath.    MEDICATIONS  (STANDING):  benzocaine 15 mG/menthol 3.6 mG Lozenge 1 Lozenge Oral every 4 hours  bictegravir 50 mG/emtricitabine 200 mG/tenofovir alafenamide 25 mG (BIKTARVY) 1 Tablet(s) Oral daily  escitalopram 20 milliGRAM(s) Oral daily  influenza   Vaccine 0.5 milliLiter(s) IntraMuscular once  QUEtiapine 200 milliGRAM(s) Oral at bedtime    MEDICATIONS  (PRN):  acetaminophen     Tablet .. 650 milliGRAM(s) Oral every 6 hours PRN Mild Pain (1 - 3), Moderate Pain (4 - 6)  aluminum hydroxide/magnesium hydroxide/simethicone Suspension 30 milliLiter(s) Oral every 4 hours PRN Dyspepsia  calcium carbonate    500 mG (Tums) Chewable 2 Tablet(s) Chew four times a day PRN indigestion  FIRST- Mouthwash  BLM 15 milliLiter(s) Swish and Spit four times a day PRN Mouth Pain  ibuprofen  Tablet. 600 milliGRAM(s) Oral every 6 hours PRN Moderate Pain (4 - 6)  loperamide 2 milliGRAM(s) Oral two times a day PRN diarrhea  LORazepam     Tablet 2 milliGRAM(s) Oral every 2 hours PRN CIWA-Ar score increase by 2 points and a total score of 7 or less  magnesium hydroxide Suspension 30 milliLiter(s) Oral daily PRN Constipation  nicotine  Polacrilex Gum 2 milliGRAM(s) Oral every 2 hours PRN breakthrough cravings  OLANZapine 10 milliGRAM(s) Oral every 8 hours PRN agitation  traZODone 50 milliGRAM(s) Oral at bedtime PRN insomnia      Vital Signs Last 24 Hrs  T(C): 36.9 (05 Dec 2021 10:40), Max: 36.9 (05 Dec 2021 10:40)  T(F): 98.4 (05 Dec 2021 10:40), Max: 98.4 (05 Dec 2021 10:40)  HR: 73 (05 Dec 2021 10:40) (65 - 86)  BP: 98/65 (05 Dec 2021 10:40) (98/65 - 114/73)  BP(mean): --  RR: 18 (05 Dec 2021 10:40) (18 - 18)  SpO2: 97% (05 Dec 2021 10:40) (97% - 98%)    PHYSICAL EXAM:  GENERAL: pleasant, appropriate, no acute distress. Participating appropriately in interview  HEENT - NC/AT, EOMI, PERLLA, oropharynx with erythema, no tonsillar exudate, no purulence, no thrush  NECK: Soft, supple, No JVD  CHEST/LUNG: Clear to auscultation bilaterally; No rales, rhonchi, wheezing. Normal work of breathing, not tachypneic  HEART: Regular rate and rhythm; No murmurs, rubs, or gallops, normal s1/s2. Warm and well-perfused  ABDOMEN: Soft, Nontender, Nondistended. Normoactive bowel sounds.  EXTREMITIES:  2+ Peripheral Pulses, No clubbing, cyanosis, or edema  NEURO:  awake, alert, oriented to person, place, time, and situation. Cranial nerves intact, no motor or sensory deficits. Moves all extremities.  SKIN: No rashes or lesions    LABS:  12-04    142  |  101  |  24<H>  ----------------------------<  90  4.4   |  25  |  1.03    Ca    9.1      04 Dec 2021 20:14                         13.6   6.93  )-----------( 373      ( 04 Dec 2021 20:14 )             42.4       Micro: RVP negative, Mono screen negative, RVP negative, Rapid Group A strep negative    RADIOLOGY AND ADDITIONAL TESTS:  reviewed, none new

## 2021-12-05 NOTE — PROGRESS NOTE ADULT - PROBLEM SELECTOR PLAN 1
Erythema and sore throat on hard palate starting 12/4, Centor criteria 2 (lack of cough, lymphadenopathy), rapid strep negative, mono screen negative, RVP negative. Suspect most likely a viral pharyngitis  -symptomatic management for now - cepacol lozenge and prn magic mouthwash, tylenol prn pain/fever  -f/u GC oral swab  -can hold off on abx and just manage supportively unless above workup comes back positive

## 2021-12-05 NOTE — PROGRESS NOTE ADULT - PROBLEM SELECTOR PLAN 2
Reports adherance to Biktarvy. Likely well controlled, though doesn't know his counts/VL  -f/u CD4/viral load  -continue home Biktarvy

## 2021-12-06 LAB
4/8 RATIO: 0.49 RATIO — LOW (ref 0.9–3.6)
ABS CD8: 933 /UL — HIGH (ref 142–740)
C TRACH RRNA SPEC QL NAA+PROBE: SIGNIFICANT CHANGE UP
C TRACH+GC RRNA SPEC QL PROBE: SIGNIFICANT CHANGE UP
CD16+CD56+ CELLS NFR BLD: 7 % — SIGNIFICANT CHANGE UP (ref 5–23)
CD16+CD56+ CELLS NFR SPEC: 112 /UL — SIGNIFICANT CHANGE UP (ref 71–410)
CD19 BLASTS SPEC-ACNC: 5 % — LOW (ref 6–24)
CD19 BLASTS SPEC-ACNC: 85 /UL — SIGNIFICANT CHANGE UP (ref 84–469)
CD3 BLASTS SPEC-ACNC: 1415 /UL — SIGNIFICANT CHANGE UP (ref 672–1870)
CD3 BLASTS SPEC-ACNC: 82 % — SIGNIFICANT CHANGE UP (ref 59–83)
CD4 %: 25 % — LOW (ref 30–62)
CD8 %: 52 % — HIGH (ref 12–36)
CHLAMYDIA/N. GONORRHEA, ORAL/THROAT, TMA - SOURCE ORAL: SIGNIFICANT CHANGE UP
N GONORRHOEA RRNA SPEC QL NAA+PROBE: SIGNIFICANT CHANGE UP
T-CELL CD4 SUBSET PNL BLD: 456 /UL — LOW (ref 489–1457)

## 2021-12-06 PROCEDURE — 99233 SBSQ HOSP IP/OBS HIGH 50: CPT | Mod: GC

## 2021-12-06 PROCEDURE — 99233 SBSQ HOSP IP/OBS HIGH 50: CPT

## 2021-12-06 RX ADMIN — Medication 600 MILLIGRAM(S): at 05:45

## 2021-12-06 RX ADMIN — BENZOCAINE AND MENTHOL 1 LOZENGE: 5; 1 LIQUID ORAL at 05:47

## 2021-12-06 RX ADMIN — Medication 650 MILLIGRAM(S): at 10:34

## 2021-12-06 RX ADMIN — QUETIAPINE FUMARATE 200 MILLIGRAM(S): 200 TABLET, FILM COATED ORAL at 21:19

## 2021-12-06 RX ADMIN — BENZOCAINE AND MENTHOL 1 LOZENGE: 5; 1 LIQUID ORAL at 21:19

## 2021-12-06 RX ADMIN — DIPHENHYDRAMINE HYDROCHLORIDE AND LIDOCAINE HYDROCHLORIDE AND ALUMINUM HYDROXIDE AND MAGNESIUM HYDRO 15 MILLILITER(S): KIT at 05:46

## 2021-12-06 RX ADMIN — BENZOCAINE AND MENTHOL 1 LOZENGE: 5; 1 LIQUID ORAL at 13:10

## 2021-12-06 RX ADMIN — Medication 50 MILLIGRAM(S): at 21:19

## 2021-12-06 RX ADMIN — Medication 650 MILLIGRAM(S): at 16:34

## 2021-12-06 RX ADMIN — Medication 600 MILLIGRAM(S): at 12:37

## 2021-12-06 RX ADMIN — ESCITALOPRAM OXALATE 20 MILLIGRAM(S): 10 TABLET, FILM COATED ORAL at 10:00

## 2021-12-06 RX ADMIN — BICTEGRAVIR SODIUM, EMTRICITABINE, AND TENOFOVIR ALAFENAMIDE FUMARATE 1 TABLET(S): 30; 120; 15 TABLET ORAL at 13:10

## 2021-12-06 RX ADMIN — BENZOCAINE AND MENTHOL 1 LOZENGE: 5; 1 LIQUID ORAL at 15:37

## 2021-12-06 RX ADMIN — Medication 600 MILLIGRAM(S): at 17:50

## 2021-12-06 RX ADMIN — BENZOCAINE AND MENTHOL 1 LOZENGE: 5; 1 LIQUID ORAL at 17:50

## 2021-12-06 NOTE — PROGRESS NOTE ADULT - ATTENDING COMMENTS
Pt. seen and examined by me earlier today; I have read Dr. Ceballos's note, I agree w/ her impression and recommendations as above; cont. supportive care, f/u cultures, no indication for abx; will follow w/ you I have read Dr. Ceballos's note, I agree w/ her impression and recommendations as above; cont. supportive care

## 2021-12-06 NOTE — BH INPATIENT PSYCHIATRY PROGRESS NOTE - CURRENT MEDICATION
MEDICATIONS  (STANDING):  benzocaine 15 mG/menthol 3.6 mG Lozenge 1 Lozenge Oral every 4 hours  bictegravir 50 mG/emtricitabine 200 mG/tenofovir alafenamide 25 mG (BIKTARVY) 1 Tablet(s) Oral daily  escitalopram 20 milliGRAM(s) Oral daily  influenza   Vaccine 0.5 milliLiter(s) IntraMuscular once  QUEtiapine 200 milliGRAM(s) Oral at bedtime    MEDICATIONS  (PRN):  acetaminophen     Tablet .. 650 milliGRAM(s) Oral every 6 hours PRN Mild Pain (1 - 3), Moderate Pain (4 - 6)  aluminum hydroxide/magnesium hydroxide/simethicone Suspension 30 milliLiter(s) Oral every 4 hours PRN Dyspepsia  calcium carbonate    500 mG (Tums) Chewable 2 Tablet(s) Chew four times a day PRN indigestion  FIRST- Mouthwash  BLM 15 milliLiter(s) Swish and Spit four times a day PRN Mouth Pain  ibuprofen  Tablet. 600 milliGRAM(s) Oral every 6 hours PRN Moderate Pain (4 - 6)  loperamide 2 milliGRAM(s) Oral two times a day PRN diarrhea  LORazepam     Tablet 2 milliGRAM(s) Oral every 2 hours PRN CIWA-Ar score increase by 2 points and a total score of 7 or less  magnesium hydroxide Suspension 30 milliLiter(s) Oral daily PRN Constipation  nicotine  Polacrilex Gum 2 milliGRAM(s) Oral every 2 hours PRN breakthrough cravings  OLANZapine 10 milliGRAM(s) Oral every 8 hours PRN agitation  traZODone 50 milliGRAM(s) Oral at bedtime PRN insomnia

## 2021-12-06 NOTE — BH INPATIENT PSYCHIATRY PROGRESS NOTE - NSBHASSESSSUMMFT_PSY_ALL_CORE
34 YO M with hx of substance induced mood d/o, etoh/opioid use d/o comes with SI in context of medication nonadherence and death of two friends by opioid overdose.     Pt becoming more engaged in interactions , introspective.     -seroquel 200mg qhs   -lexapro 20mg daily  -biktarvy qd  -Ortho recs appreciated, pt currently wearing a splint

## 2021-12-06 NOTE — BH TREATMENT PLAN - NSPTSTATEDGOAL_PSY_ALL_CORE
Patient unable to state goal for admission, but was recommended to reflect on ways the unit staff can assist him.
To feel better
Patient unable to state goal for admission, but was recommended to reflect on ways the unit staff can assist him.

## 2021-12-06 NOTE — BH CHART NOTE - NSEVENTNOTEFT_PSY_ALL_CORE
Pt c/o pain  Patient c/o throat pain, describing it as "very bad,"  Patient c/o throat pain, describing it as "very bad," unchanged since earlier. Accepting Cepacol, magic mouthwash, ibuprofen Q6H PRN pain 4-6, tylenol Q6H PRN pain 1-3 throughout the day. No trouble swallowing, only pain with swallowing.  Physical exam unremarkable, no oropharyngeal erythema or exudates, no hypersalivation.   Medicine consult service contacted, no new recommendations provided over the phone. Will continue with current regimen and monitor for resolution.

## 2021-12-06 NOTE — ED BEHAVIORAL HEALTH ASSESSMENT NOTE - IMPULSE CONTROL
Nothing in vagina for 6 weeks  May resume activity as tolerated, no driving for 2 weeks and no lifting >15 pounds for 6 weeks  Incision dressing may come off 1 week after delivery  If bleeding from vagina is >1 pad in an hour, please go to ER  If anything seems abnormal to you--including your incision, breasts, vaginal bleeding, mood, or otherwise--please give our clinic a call at any time.    Congratulations again on the birth of your baby!     The first six weeks following your delivery are known as the postpartum period.  Here are some general instructions to help both you and your baby have a healthy and happy postpartum recovery.      Rest & Sleep:  · Focus on yourself and baby during this time, and get plenty of rest for the first few weeks.    · Sleep when your baby sleeps and allow support people help you rest (care for other children, prepare meals, shopping, cleaning, etc).  · Do not lift anything heavier than your baby.  · Take short walks if possible throughout the day.    Nutrition:  · Eat nutritious and well balanced meals to provide your body the energy it needs.  · Drink at least 8 glasses of water every day (try drinking a glass of water every time you feed the baby).  · Do not diet at this time.  · Breastfeeding mothers require extra fluid, calories, protein and calcium.   · Avoid tobacco, alcohol and non-essential medications when breastfeeding.     Postpartum Bleeding (Lochia):  · Expect bleeding for up to 6 weeks.  · Expect normal period odor from your bleeding.  · Change your sanitary pad often, and wash your hands before changing.  · DO NOT have intercourse, use tampons, or place anything in your vagina for 6-8 weeks to allow your body time to heal.    Call your doctor/midwife for foul smelling vaginal discharge, large clots, or heavy bleeding (saturating a pad in an hour).      Care:   · Keep incision clean and dry.  When washing the incision, use mild, fragrance free soap and pat to  dry.  · If Steri-strips were used, do not remove them - they will start to fall off on their own.  If they have not fallen off after 10 days, you may remove them.  · Hold a pillow against the incision when you laugh or cough and when you get up from a lying or sitting position.  · You may shower, but no soaking in a bathtub/pool/hot tub for 8 weeks.   · Don't drive until your healthcare provider says it's okay.  · No heavy lifting. Lift nothing heavier than 10 pounds for 6-8 weeks.   · Plan your activities so that you don't have to go up or down stairs more than needed.   · Anticipate vaginal spotting following surgery. This may not begin until 2 weeks following surgery and may wax and wane for a duration of 6-8 weeks following surgery.   · Call doctor/midwife right away for fever, redness, swelling, increased pain or drainage from incision.  · If you develop chest pain, shortness of breath, lower extremity edema or pain, go to the local emergency room.     Breast Care for Formula Feeding Mothers:  · Wear a snug and supportive sports bra.  · Apply cold compresses (ice pack or bag of frozen peas) for 20-30 minutes every 3-4 hours.  · Do not express milk.  · Avoid nipple or breast stimulation even in the shower (Avoid running water directly on breasts).  · Mastitis is a breast infection that may include the following symptoms: fever; red, very sore area of breast; flu-like symptoms.  Call your doctor/midwife right away if these symptoms occur.          Postpartum Family Planning:   You received a Depo Provera injection during this hospitalization.    · Contact your provider for excessive bleeding.  · Continue family planning discussions with your provider at your follow up visit.      Postpartum Adjustment:   Becoming a mother involves many changes to your mind and body. Although you may have expected to be excited and happy, instead you may be unsure of yourself in your new role, and you may find that you are easily  upset, impatient, irritable or tearful. This is normal and comes and goes quickly.  \"Baby blues\" may occur anywhere from a few days after delivery to several weeks postpartum and will pass in a short time.     Postpartum Depression:  Postpartum depression goes beyond \"baby blues\" and is persistent, intense, and severe.  The most common symptom is a feeling of deep sadness.  You may also feel as if you just can't cope with life.  Other symptoms include:  · thoughts of harming yourself or the baby  · lack of interest in the baby, family, or friends  · feeling guilty or worthless  · feeling hopeless  · uncontrollable crying  · feelings of being a bad mother  · trouble sleeping, oversleeping or feeling tired all the time  · changes in appetite  · strong feelings of irritability, anger, or nervousness  · having trouble thinking clearly or making decisions  · having headaches, aches and pains, or stomach problems that won't go away  · thinking about death or suicide    The exact cause of postpartum depression is unknown. Changes in brain chemistry or structure are believed to play a big role in depression. It may be due to changes in your hormones during and after childbirth. You may also be tired from caring for your baby and adjusting to being a mother. All these factors may make you feel depressed. In some cases, your genes may also play a role.    Postpartum depression can be treated in many ways.  Talking to your healthcare provider is the first step toward feeling better.    Call your doctor or midwife immediately if you:  · Cry for no clear reason  · Have trouble sleeping, eating, and making choices  · Question whether you can handle caring for your baby  · Have intense feelings of sadness, anxiety, or despair that prevent you from being able to do your daily tasks    Here are some additional resources offering support for Postpartum Depression:    · Postpartum Support International: (369) 395-4778    · National  [place] [facility] Mental [employer]: (862) 746-6698  · National Suicide Prevention (824) 372-8572  · Postpartum Progress https://postpartumprogress.com/     Postpartum Preeclampsia: A serious condition that occurs when a woman has high blood pressure and excess protein in her urine soon after childbirth. It usually occurs within a few days of birth, but can develop up to 6 weeks after having the baby. Preeclampsia can result in seizures and other serious complications and requires prompt treatment.     Call your doctor or midwife immediately if you experience any of the following symptoms that could be signs of Preeclampsia:  · Increased swelling in your face, hands or legs  · severe headache that doesn't go away  · abdominal pain  · shortness of breath / difficulty breathing  · nausea and vomiting   · confusion  · vision changes:blurred vision or flashing spots   · gain more than 3 pounds in three days    Summary of when to call your doctor or midwife:  · Foul smelling vaginal discharge  · Passing large blood clots or heavy bleeding (saturating a pad in an hour)  · Fever above 100.4F  · Redness, swelling, increased pain or drainage from incision (if you had a )  · Redness & pain in any area of breasts  · Flu-like symptoms (such as fever, chills, body aches, etc.)  · Fainting, dizziness or lightheadedness  · Postpartum depression: Crying for no clear reason, having trouble sleeping, eating, and making choices; questioning whether you can handle caring for your baby; having intense feelings of sadness, anxiety, or despair that prevent you from being able to do your daily tasks  · Preeclampsia symptoms: increased swelling in face, hands or legs; headache,abdominal pain, shortness of breath, nausea and vomiting, confusion, vision changes, gaining more than 3 pounds in 3 days.    · New or worsening symptoms or pain, not relieved by medicine or rest    For Your Information:  Your blood type is O Rh Positive      Immunizations:  Most Recent Immunizations   Administered Date(s) Administered   • Hep A/Hep B 11/15/2018   • Hep B, adolescent or pediatric 10/23/1997   • MMR 08/01/2018   • Tdap 11/18/2019     Additional Questions:  If you have additional questions about these discharge instructions, please call your primary OB.    Our Hospital and Medical team have enjoyed caring for you during this special time, and we wish you a safe and healthy recovery.          Normal

## 2021-12-06 NOTE — PROGRESS NOTE ADULT - SUBJECTIVE AND OBJECTIVE BOX
REASON FOR CONSULT: throat pain    DATE OF INITIAL CONSULT: 12/4/21    INTERVAL/OVERNIGHT EVENTS: As per overnight staff, there were no acute events overnight    SUBJECTIVE HPI: Patient seen in room. Pt sitting at table eating lunch. Pt reports throat pain has resolved, but he noticed that for the past day or so he has had tingling of his lower R lip extending up and along his jawline. No fevers or chills, no pain. He notes that he has poor dentition, and he has not been to a dentist in years. He denies any foul smell from his mouth.      MEDICATIONS  (STANDING):  benzocaine 15 mG/menthol 3.6 mG Lozenge 1 Lozenge Oral every 4 hours  bictegravir 50 mG/emtricitabine 200 mG/tenofovir alafenamide 25 mG (BIKTARVY) 1 Tablet(s) Oral daily  escitalopram 20 milliGRAM(s) Oral daily  influenza   Vaccine 0.5 milliLiter(s) IntraMuscular once  QUEtiapine 200 milliGRAM(s) Oral at bedtime    MEDICATIONS  (PRN):  acetaminophen     Tablet .. 650 milliGRAM(s) Oral every 6 hours PRN Mild Pain (1 - 3), Moderate Pain (4 - 6)  aluminum hydroxide/magnesium hydroxide/simethicone Suspension 30 milliLiter(s) Oral every 4 hours PRN Dyspepsia  calcium carbonate    500 mG (Tums) Chewable 2 Tablet(s) Chew four times a day PRN indigestion  FIRST- Mouthwash  BLM 15 milliLiter(s) Swish and Spit four times a day PRN Mouth Pain  ibuprofen  Tablet. 600 milliGRAM(s) Oral every 6 hours PRN Moderate Pain (4 - 6)  loperamide 2 milliGRAM(s) Oral two times a day PRN diarrhea  LORazepam     Tablet 2 milliGRAM(s) Oral every 2 hours PRN CIWA-Ar score increase by 2 points and a total score of 7 or less  magnesium hydroxide Suspension 30 milliLiter(s) Oral daily PRN Constipation  nicotine  Polacrilex Gum 2 milliGRAM(s) Oral every 2 hours PRN breakthrough cravings  OLANZapine 10 milliGRAM(s) Oral every 8 hours PRN agitation  traZODone 50 milliGRAM(s) Oral at bedtime PRN insomnia        VITAL SIGNS:  Vital Signs Last 24 Hrs  T(C): 36.7 (06 Dec 2021 09:00), Max: 36.9 (05 Dec 2021 20:31)  T(F): 98 (06 Dec 2021 09:00), Max: 98.5 (05 Dec 2021 20:31)  HR: 62 (06 Dec 2021 09:00) (62 - 76)  BP: 124/84 (06 Dec 2021 09:00) (106/68 - 132/85)  BP(mean): --  RR: 18 (06 Dec 2021 09:00) (17 - 18)  SpO2: 98% (06 Dec 2021 09:00) (97% - 98%)      PHYSICAL EXAM:  General: Comfortable, nontoxic appearing, pleasant and cooperative with exam, in no acute distress  Neurological: AAOx3; Cranial Nerves: V, VII intact and symmetrical bilaterally  HEENT: clear conjunctiva, no nasal or oropharyngeal discharge or exudates, MMM, poor dentition with extensive dental carries and missing teeth, no foul odor in oropharynx, no abscesses or drainage, no pain or discomfort on palpation of sinuses  Neck: no cervical or post-auricular lymphadenopathy  Cardiovascular: +S1/S2, no murmurs/rubs/gallops, RRR  Respiratory: CTA B/L, no diminished breath sounds, no increased work of breathing or accessory muscle use  Gastrointestinal: soft, NT/ND; active BSx4 quadrants  Extremities: WWP        LABS:                        13.6   6.93  )-----------( 373      ( 04 Dec 2021 20:14 )             42.4     12-04    142  |  101  |  24<H>  ----------------------------<  90  4.4   |  25  |  1.03    Ca    9.1      04 Dec 2021 20:14      RADIOLOGY & ADDITIONAL STUDIES: Reviewed.

## 2021-12-06 NOTE — BH INPATIENT PSYCHIATRY PROGRESS NOTE - NSBHFUPINTERVALHXFT_PSY_A_CORE
Patient seen in his room, calm, cooperative and interactive on approach. He reported having some dental pain and was seen by medicine over the weekend. He reports that this is not a new issue, but his consistent drug use usually kept the pain at bay. He reports intent to follow up with dental after this hospitalization. No other new medical concerns. He reports that he has found it comforting to have a roommate as he felt very alone and 'vulnerable' by himself. He has not yet reached out to family or anyone since admission, states that he doesn't know anyone's number and has no way of contacting them. He has been thinking more of using drugs and recognizes that he uses drugs mostly for social reasons, not knowing how interact with others sober. Continues to feel a lot of guilt regarding death of friend and sadness thinking of the pain that the best friend's family is going through. Is hoping to go back to a rehab that he went to last year after a hospitalization at [facility] where he completed the program and was sober for 7 months after. He found this program to be the most beneficial. No reported si/hi/avh or paranoid ideation.

## 2021-12-07 LAB
CULTURE RESULTS: SIGNIFICANT CHANGE UP
HIV-1 VIRAL LOAD RESULT: ABNORMAL
HIV1 RNA # SERPL NAA+PROBE: 61 — SIGNIFICANT CHANGE UP
HIV1 RNA SER-IMP: SIGNIFICANT CHANGE UP
HIV1 RNA SERPL NAA+PROBE-ACNC: ABNORMAL
HIV1 RNA SERPL NAA+PROBE-LOG#: 1.78 — SIGNIFICANT CHANGE UP
SPECIMEN SOURCE: SIGNIFICANT CHANGE UP

## 2021-12-07 PROCEDURE — 99233 SBSQ HOSP IP/OBS HIGH 50: CPT

## 2021-12-07 RX ORDER — DIPHENHYDRAMINE HYDROCHLORIDE AND LIDOCAINE HYDROCHLORIDE AND ALUMINUM HYDROXIDE AND MAGNESIUM HYDRO
30 KIT
Refills: 0 | Status: DISCONTINUED | OUTPATIENT
Start: 2021-12-07 | End: 2021-12-16

## 2021-12-07 RX ADMIN — Medication 600 MILLIGRAM(S): at 18:18

## 2021-12-07 RX ADMIN — ESCITALOPRAM OXALATE 20 MILLIGRAM(S): 10 TABLET, FILM COATED ORAL at 10:28

## 2021-12-07 RX ADMIN — Medication 600 MILLIGRAM(S): at 04:12

## 2021-12-07 RX ADMIN — QUETIAPINE FUMARATE 200 MILLIGRAM(S): 200 TABLET, FILM COATED ORAL at 22:13

## 2021-12-07 RX ADMIN — BENZOCAINE AND MENTHOL 1 LOZENGE: 5; 1 LIQUID ORAL at 22:14

## 2021-12-07 RX ADMIN — Medication 600 MILLIGRAM(S): at 19:18

## 2021-12-07 RX ADMIN — Medication 650 MILLIGRAM(S): at 23:12

## 2021-12-07 RX ADMIN — Medication 600 MILLIGRAM(S): at 03:12

## 2021-12-07 RX ADMIN — BENZOCAINE AND MENTHOL 1 LOZENGE: 5; 1 LIQUID ORAL at 10:29

## 2021-12-07 RX ADMIN — Medication 650 MILLIGRAM(S): at 16:12

## 2021-12-07 RX ADMIN — BICTEGRAVIR SODIUM, EMTRICITABINE, AND TENOFOVIR ALAFENAMIDE FUMARATE 1 TABLET(S): 30; 120; 15 TABLET ORAL at 10:29

## 2021-12-07 RX ADMIN — BENZOCAINE AND MENTHOL 1 LOZENGE: 5; 1 LIQUID ORAL at 17:15

## 2021-12-07 RX ADMIN — Medication 600 MILLIGRAM(S): at 10:28

## 2021-12-07 RX ADMIN — Medication 650 MILLIGRAM(S): at 15:13

## 2021-12-07 RX ADMIN — Medication 50 MILLIGRAM(S): at 22:13

## 2021-12-07 RX ADMIN — DIPHENHYDRAMINE HYDROCHLORIDE AND LIDOCAINE HYDROCHLORIDE AND ALUMINUM HYDROXIDE AND MAGNESIUM HYDRO 15 MILLILITER(S): KIT at 18:59

## 2021-12-07 RX ADMIN — Medication 650 MILLIGRAM(S): at 22:12

## 2021-12-07 RX ADMIN — Medication 600 MILLIGRAM(S): at 11:28

## 2021-12-07 NOTE — BH INPATIENT PSYCHIATRY PROGRESS NOTE - NSBHASSESSSUMMFT_PSY_ALL_CORE
36 YO M with hx of substance induced mood d/o, etoh/opioid use d/o comes with SI in context of medication nonadherence and death of two friends by opioid overdose.     Pt becoming more engaged in interactions , introspective. Interested in inpt rehab    -seroquel 200mg qhs   -lexapro 20mg daily  -biktarvy qd  -Ortho recs appreciated, pt currently wearing a splint

## 2021-12-07 NOTE — BH SAFETY PLAN - STEP 1 WARNING SIGNS
RN NOTES:

STOPPED PT'S IVF AND MORPHINE PCA. FAMILY AT BED SIDE, RN CHARGE FROM 3W CAME TO HELP AND 
ASSESS THE SITUATION. CONTACTED RN SUPERVISOR TOO. .

## 2021-12-07 NOTE — BH PSYCHOLOGY - CLINICIAN PSYCHOTHERAPY NOTE - NSBHPSYCHOLADHERE_PSY_A_CORE FT
Pt expresses not being ready to challenge his thoughts (e.g., "I am responsible for my friend's death"); he also is extremely ambivalent about the possibility of going to rehab because he would lose friends he uses drugs with, as well as getting psychotherapy because he would lose his "quick fix" with drugs.

## 2021-12-07 NOTE — BH INPATIENT PSYCHIATRY PROGRESS NOTE - NSBHFUPINTERVALHXFT_PSY_A_CORE
Patient visible on the unit, seen seated in milieu, tearful on approach. Per pt he had just had a session with psychologist and was feeling emotional about what was shared. He remains hopeful that he will be able to be referred to the inpt rehab that he completed last year as he found that to most beneficial. No reported si/hi/avh or paranoid ideation. He requests that diet be changed to soft due to tooth pain.

## 2021-12-07 NOTE — BH SAFETY PLAN - WARNING SIGN 1
Safety plan was created with patient's collaboration. Pt was given the safety plan and a copy can be found in his file.

## 2021-12-08 PROCEDURE — 99233 SBSQ HOSP IP/OBS HIGH 50: CPT

## 2021-12-08 PROCEDURE — 99233 SBSQ HOSP IP/OBS HIGH 50: CPT | Mod: GC

## 2021-12-08 RX ADMIN — Medication 600 MILLIGRAM(S): at 03:13

## 2021-12-08 RX ADMIN — BENZOCAINE AND MENTHOL 1 LOZENGE: 5; 1 LIQUID ORAL at 10:59

## 2021-12-08 RX ADMIN — BENZOCAINE AND MENTHOL 1 LOZENGE: 5; 1 LIQUID ORAL at 23:53

## 2021-12-08 RX ADMIN — Medication 50 MILLIGRAM(S): at 21:40

## 2021-12-08 RX ADMIN — DIPHENHYDRAMINE HYDROCHLORIDE AND LIDOCAINE HYDROCHLORIDE AND ALUMINUM HYDROXIDE AND MAGNESIUM HYDRO 30 MILLILITER(S): KIT at 10:59

## 2021-12-08 RX ADMIN — DIPHENHYDRAMINE HYDROCHLORIDE AND LIDOCAINE HYDROCHLORIDE AND ALUMINUM HYDROXIDE AND MAGNESIUM HYDRO 15 MILLILITER(S): KIT at 18:14

## 2021-12-08 RX ADMIN — Medication 600 MILLIGRAM(S): at 02:13

## 2021-12-08 RX ADMIN — BENZOCAINE AND MENTHOL 1 LOZENGE: 5; 1 LIQUID ORAL at 14:09

## 2021-12-08 RX ADMIN — DIPHENHYDRAMINE HYDROCHLORIDE AND LIDOCAINE HYDROCHLORIDE AND ALUMINUM HYDROXIDE AND MAGNESIUM HYDRO 15 MILLILITER(S): KIT at 06:46

## 2021-12-08 RX ADMIN — ESCITALOPRAM OXALATE 20 MILLIGRAM(S): 10 TABLET, FILM COATED ORAL at 10:55

## 2021-12-08 RX ADMIN — BICTEGRAVIR SODIUM, EMTRICITABINE, AND TENOFOVIR ALAFENAMIDE FUMARATE 1 TABLET(S): 30; 120; 15 TABLET ORAL at 10:55

## 2021-12-08 RX ADMIN — QUETIAPINE FUMARATE 200 MILLIGRAM(S): 200 TABLET, FILM COATED ORAL at 21:40

## 2021-12-08 RX ADMIN — Medication 600 MILLIGRAM(S): at 10:58

## 2021-12-08 RX ADMIN — Medication 650 MILLIGRAM(S): at 15:10

## 2021-12-08 RX ADMIN — Medication 600 MILLIGRAM(S): at 21:41

## 2021-12-08 RX ADMIN — BENZOCAINE AND MENTHOL 1 LOZENGE: 5; 1 LIQUID ORAL at 06:26

## 2021-12-08 NOTE — BH INPATIENT PSYCHIATRY PROGRESS NOTE - CURRENT MEDICATION
MEDICATIONS  (STANDING):  benzocaine 15 mG/menthol 3.6 mG Lozenge 1 Lozenge Oral every 4 hours  bictegravir 50 mG/emtricitabine 200 mG/tenofovir alafenamide 25 mG (BIKTARVY) 1 Tablet(s) Oral daily  escitalopram 20 milliGRAM(s) Oral daily  influenza   Vaccine 0.5 milliLiter(s) IntraMuscular once  QUEtiapine 200 milliGRAM(s) Oral at bedtime    MEDICATIONS  (PRN):  acetaminophen     Tablet .. 650 milliGRAM(s) Oral every 6 hours PRN Mild Pain (1 - 3), Moderate Pain (4 - 6)  aluminum hydroxide/magnesium hydroxide/simethicone Suspension 30 milliLiter(s) Oral every 4 hours PRN Dyspepsia  calcium carbonate    500 mG (Tums) Chewable 2 Tablet(s) Chew four times a day PRN indigestion  FIRST- Mouthwash  BLM 30 milliLiter(s) Swish and Spit two times a day PRN mouth sores  FIRST- Mouthwash  BLM 15 milliLiter(s) Swish and Spit four times a day PRN Mouth Pain  ibuprofen  Tablet. 600 milliGRAM(s) Oral every 6 hours PRN Moderate Pain (4 - 6)  loperamide 2 milliGRAM(s) Oral two times a day PRN diarrhea  LORazepam     Tablet 2 milliGRAM(s) Oral every 2 hours PRN CIWA-Ar score increase by 2 points and a total score of 7 or less  magnesium hydroxide Suspension 30 milliLiter(s) Oral daily PRN Constipation  nicotine  Polacrilex Gum 2 milliGRAM(s) Oral every 2 hours PRN breakthrough cravings  OLANZapine 10 milliGRAM(s) Oral every 8 hours PRN agitation  traZODone 50 milliGRAM(s) Oral at bedtime PRN insomnia

## 2021-12-08 NOTE — BH INPATIENT PSYCHIATRY PROGRESS NOTE - NSBHASSESSSUMMFT_PSY_ALL_CORE
36 YO M with hx of substance induced mood d/o, etoh/opioid use d/o comes with SI in context of medication nonadherence and death of two friends by opioid overdose.     Pt becoming more engaged in interactions , introspective. Interested in inpt rehab    -seroquel 200mg qhs   -lexapro 20mg daily  -biktarvy qd  -Ortho recs appreciated, pt currently wearing a splint  -medicine recs appreciated

## 2021-12-08 NOTE — BH INPATIENT PSYCHIATRY PROGRESS NOTE - NSBHFUPINTERVALHXFT_PSY_A_CORE
Patient visible on the unit, is observed to be mostly in tv area today and he states that he has been trying to be more present on the unit and attempting to interact more with others. He has been having conversations with a peer that he reports has a similar upbringing like him with severe polysubstance use and they have been supporting/distracting each other. He reports having nightmares last night reexperiencing waking up to find his 2 friends dead next to him d/t od. He has been requesting prns for mouth/dental pain which has been somewhat helpful. He states that he is still having withdrawal sxs and had one episode of emesis earlier this morning. Appetite is still fair. No reported si/hi/avh or paranoid ideation.

## 2021-12-08 NOTE — PROGRESS NOTE ADULT - SUBJECTIVE AND OBJECTIVE BOX
REASON FOR CONSULT: throat pain    DATE OF INITIAL CONSULT: 12/4    INTERVAL/OVERNIGHT EVENTS: on 12/6 pt reported that pain had improved however yesterday pt reported that pain had recurred.    SUBJECTIVE HPI: Patient seen and examined at bedside. Patient says that he still has some numbness and tingling in his R lower lip and its causing him to bite his lip accidentally.       MEDICATIONS  (STANDING):  benzocaine 15 mG/menthol 3.6 mG Lozenge 1 Lozenge Oral every 4 hours  bictegravir 50 mG/emtricitabine 200 mG/tenofovir alafenamide 25 mG (BIKTARVY) 1 Tablet(s) Oral daily  escitalopram 20 milliGRAM(s) Oral daily  influenza   Vaccine 0.5 milliLiter(s) IntraMuscular once  QUEtiapine 200 milliGRAM(s) Oral at bedtime    MEDICATIONS  (PRN):  acetaminophen     Tablet .. 650 milliGRAM(s) Oral every 6 hours PRN Mild Pain (1 - 3), Moderate Pain (4 - 6)  aluminum hydroxide/magnesium hydroxide/simethicone Suspension 30 milliLiter(s) Oral every 4 hours PRN Dyspepsia  calcium carbonate    500 mG (Tums) Chewable 2 Tablet(s) Chew four times a day PRN indigestion  FIRST- Mouthwash  BLM 30 milliLiter(s) Swish and Spit two times a day PRN mouth sores  FIRST- Mouthwash  BLM 15 milliLiter(s) Swish and Spit four times a day PRN Mouth Pain  ibuprofen  Tablet. 600 milliGRAM(s) Oral every 6 hours PRN Moderate Pain (4 - 6)  loperamide 2 milliGRAM(s) Oral two times a day PRN diarrhea  LORazepam     Tablet 2 milliGRAM(s) Oral every 2 hours PRN CIWA-Ar score increase by 2 points and a total score of 7 or less  magnesium hydroxide Suspension 30 milliLiter(s) Oral daily PRN Constipation  nicotine  Polacrilex Gum 2 milliGRAM(s) Oral every 2 hours PRN breakthrough cravings  OLANZapine 10 milliGRAM(s) Oral every 8 hours PRN agitation  traZODone 50 milliGRAM(s) Oral at bedtime PRN insomnia        VITAL SIGNS:  Vital Signs Last 24 Hrs  T(C): 36.7 (07 Dec 2021 21:07), Max: 36.7 (07 Dec 2021 21:07)  T(F): 98 (07 Dec 2021 21:07), Max: 98 (07 Dec 2021 21:07)  HR: 79 (08 Dec 2021 09:00) (77 - 79)  BP: 110/69 (08 Dec 2021 09:00) (110/69 - 118/78)  BP(mean): --  RR: 20 (08 Dec 2021 09:00) (18 - 20)  SpO2: 98% (08 Dec 2021 09:00) (98% - 98%)      PHYSICAL EXAM:  General: Comfortable, nontoxic appearing, pleasant and cooperative with exam, in no acute distress  Neurological: AAOx3; Cranial Nerves: V, VII intact and symmetrical bilaterally  HEENT: clear conjunctiva, no nasal or oropharyngeal discharge or exudates, MMM, poor dentition with extensive dental carries and missing teeth, no foul odor in oropharynx, no abscesses or drainage, no pain or discomfort on palpation of sinuses; 2 ulcers on lower frontal gingiva nontender to palpation  Neck: no cervical or post-auricular lymphadenopathy  Cardiovascular: +S1/S2, no murmurs/rubs/gallops, RRR  Respiratory: CTA B/L, no diminished breath sounds, no increased work of breathing or accessory muscle use  Gastrointestinal: soft, NT/ND; active BSx4 quadrants  Extremities: WWP      LABS:  Microbiology:  Culture - Ear, Nose and Throat (ENT) (collected 12-06-21 @ 00:35)  Source: Sinus Sinus  Final Report (12-07-21 @ 16:57):  Normal Respiratory Neelima present    Culture - Group A Streptococcus (collected 12-05-21 @ 05:34)  Source: Cult/Viral Throat  Final Report (12-07-21 @ 13:29):  Beta Hemolytic Streptococci, Group C      RADIOLOGY & ADDITIONAL STUDIES: Reviewed.

## 2021-12-09 PROCEDURE — 99233 SBSQ HOSP IP/OBS HIGH 50: CPT | Mod: GC

## 2021-12-09 RX ORDER — BENZOCAINE 10 %
1 GEL (GRAM) MUCOUS MEMBRANE EVERY 4 HOURS
Refills: 0 | Status: DISCONTINUED | OUTPATIENT
Start: 2021-12-09 | End: 2021-12-16

## 2021-12-09 RX ORDER — PRAZOSIN HCL 2 MG
1 CAPSULE ORAL AT BEDTIME
Refills: 0 | Status: DISCONTINUED | OUTPATIENT
Start: 2021-12-09 | End: 2021-12-16

## 2021-12-09 RX ADMIN — Medication 600 MILLIGRAM(S): at 17:01

## 2021-12-09 RX ADMIN — ESCITALOPRAM OXALATE 20 MILLIGRAM(S): 10 TABLET, FILM COATED ORAL at 10:40

## 2021-12-09 RX ADMIN — BENZOCAINE AND MENTHOL 1 LOZENGE: 5; 1 LIQUID ORAL at 17:36

## 2021-12-09 RX ADMIN — BENZOCAINE AND MENTHOL 1 LOZENGE: 5; 1 LIQUID ORAL at 10:43

## 2021-12-09 RX ADMIN — Medication 650 MILLIGRAM(S): at 23:00

## 2021-12-09 RX ADMIN — Medication 600 MILLIGRAM(S): at 12:29

## 2021-12-09 RX ADMIN — Medication 600 MILLIGRAM(S): at 23:00

## 2021-12-09 RX ADMIN — Medication 600 MILLIGRAM(S): at 06:50

## 2021-12-09 RX ADMIN — Medication 1 MILLIGRAM(S): at 22:01

## 2021-12-09 RX ADMIN — QUETIAPINE FUMARATE 200 MILLIGRAM(S): 200 TABLET, FILM COATED ORAL at 22:01

## 2021-12-09 RX ADMIN — Medication 600 MILLIGRAM(S): at 10:40

## 2021-12-09 RX ADMIN — DIPHENHYDRAMINE HYDROCHLORIDE AND LIDOCAINE HYDROCHLORIDE AND ALUMINUM HYDROXIDE AND MAGNESIUM HYDRO 30 MILLILITER(S): KIT at 22:02

## 2021-12-09 RX ADMIN — Medication 600 MILLIGRAM(S): at 04:54

## 2021-12-09 RX ADMIN — BICTEGRAVIR SODIUM, EMTRICITABINE, AND TENOFOVIR ALAFENAMIDE FUMARATE 1 TABLET(S): 30; 120; 15 TABLET ORAL at 10:40

## 2021-12-09 RX ADMIN — Medication 650 MILLIGRAM(S): at 22:01

## 2021-12-09 RX ADMIN — BENZOCAINE AND MENTHOL 1 LOZENGE: 5; 1 LIQUID ORAL at 22:01

## 2021-12-09 RX ADMIN — Medication 600 MILLIGRAM(S): at 16:05

## 2021-12-09 RX ADMIN — Medication 600 MILLIGRAM(S): at 22:01

## 2021-12-09 RX ADMIN — BENZOCAINE AND MENTHOL 1 LOZENGE: 5; 1 LIQUID ORAL at 14:46

## 2021-12-09 NOTE — PROGRESS NOTE ADULT - SUBJECTIVE AND OBJECTIVE BOX
*** INCOMPLETE ***    REASON FOR CONSULT: throat pain    DATE OF INITIAL CONSULT: 12/4    INTERVAL/OVERNIGHT EVENTS: As per overnight staff, there were no acute events overnight    SUBJECTIVE HPI: Patient seen and examined at bedside. Pt states that pain is constant, progressively worsening throughout the day. he says that he has a stinging burning and throbbing pain in his R lower jaw and teeth and extends up along his jaw. Says the pain feels similar to when he was hospitalized at [facility] and that 2 teeth needed to be pulled.      MEDICATIONS  (STANDING):  benzocaine 15 mG/menthol 3.6 mG Lozenge 1 Lozenge Oral every 4 hours  bictegravir 50 mG/emtricitabine 200 mG/tenofovir alafenamide 25 mG (BIKTARVY) 1 Tablet(s) Oral daily  escitalopram 20 milliGRAM(s) Oral daily  influenza   Vaccine 0.5 milliLiter(s) IntraMuscular once  QUEtiapine 200 milliGRAM(s) Oral at bedtime    MEDICATIONS  (PRN):  acetaminophen     Tablet .. 650 milliGRAM(s) Oral every 6 hours PRN Mild Pain (1 - 3), Moderate Pain (4 - 6)  aluminum hydroxide/magnesium hydroxide/simethicone Suspension 30 milliLiter(s) Oral every 4 hours PRN Dyspepsia  calcium carbonate    500 mG (Tums) Chewable 2 Tablet(s) Chew four times a day PRN indigestion  FIRST- Mouthwash  BLM 15 milliLiter(s) Swish and Spit four times a day PRN Mouth Pain  FIRST- Mouthwash  BLM 30 milliLiter(s) Swish and Spit two times a day PRN mouth sores  ibuprofen  Tablet. 600 milliGRAM(s) Oral every 6 hours PRN Moderate Pain (4 - 6)  loperamide 2 milliGRAM(s) Oral two times a day PRN diarrhea  LORazepam     Tablet 2 milliGRAM(s) Oral every 2 hours PRN CIWA-Ar score increase by 2 points and a total score of 7 or less  magnesium hydroxide Suspension 30 milliLiter(s) Oral daily PRN Constipation  nicotine  Polacrilex Gum 2 milliGRAM(s) Oral every 2 hours PRN breakthrough cravings  OLANZapine 10 milliGRAM(s) Oral every 8 hours PRN agitation  traZODone 50 milliGRAM(s) Oral at bedtime PRN insomnia      VITAL SIGNS:  Vital Signs Last 24 Hrs  T(C): 36.4 (09 Dec 2021 09:15), Max: 37.3 (08 Dec 2021 17:00)  T(F): 97.6 (09 Dec 2021 09:15), Max: 99.1 (08 Dec 2021 17:00)  HR: 69 (09 Dec 2021 09:15) (69 - 86)  BP: 109/74 (09 Dec 2021 09:15) (109/74 - 122/77)  BP(mean): --  RR: 16 (08 Dec 2021 17:00) (16 - 16)  SpO2: 99% (09 Dec 2021 09:15) (98% - 99%)      PHYSICAL EXAM:  General: Comfortable, nontoxic appearing, pleasant and cooperative with exam, in no acute distress  Neurological: AAOx3; Cranial Nerves: V, VII intact and symmetrical bilaterally  HEENT: clear conjunctiva, no nasal or oropharyngeal discharge or exudates, MMM, poor dentition with extensive dental carries and missing teeth, no foul odor in oropharynx, no abscesses or drainage, no pain or discomfort on palpation of sinuses; 2 ulcers on lower frontal gingiva nontender to palpation. no lesions noted on tongue or under tongue  Neck: no cervical or post-auricular lymphadenopathy  Cardiovascular: +S1/S2, no murmurs/rubs/gallops, RRR  Respiratory: CTA B/L, no diminished breath sounds, no increased work of breathing or accessory muscle use  Gastrointestinal: soft, NT/ND; active BSx4 quadrants    LABS: not obtained      Microbiology:  Culture - Ear, Nose and Throat (ENT) (collected 12-06-21 @ 00:35)  Source: Sinus Sinus  Final Report (12-07-21 @ 16:57):  Normal Respiratory Neelima present    Culture - Group A Streptococcus (collected 12-05-21 @ 05:34)  Source: Cult/Viral Throat  Final Report (12-07-21 @ 13:29):  Beta Hemolytic Streptococci, Group C      RADIOLOGY & ADDITIONAL STUDIES: Reviewed.   REASON FOR CONSULT: throat pain    DATE OF INITIAL CONSULT: 12/4    INTERVAL/OVERNIGHT EVENTS: As per overnight staff, there were no acute events overnight    SUBJECTIVE HPI: Patient seen and examined at bedside. Pt states that pain is constant, progressively worsening throughout the day. he says that he has a stinging burning and throbbing pain in his R lower jaw and teeth and extends up along his jaw. Says the pain feels similar to when he was hospitalized at [facility] recently and that 2 teeth needed to be pulled.      MEDICATIONS  (STANDING):  benzocaine 15 mG/menthol 3.6 mG Lozenge 1 Lozenge Oral every 4 hours  bictegravir 50 mG/emtricitabine 200 mG/tenofovir alafenamide 25 mG (BIKTARVY) 1 Tablet(s) Oral daily  escitalopram 20 milliGRAM(s) Oral daily  influenza   Vaccine 0.5 milliLiter(s) IntraMuscular once  QUEtiapine 200 milliGRAM(s) Oral at bedtime    MEDICATIONS  (PRN):  acetaminophen     Tablet .. 650 milliGRAM(s) Oral every 6 hours PRN Mild Pain (1 - 3), Moderate Pain (4 - 6)  aluminum hydroxide/magnesium hydroxide/simethicone Suspension 30 milliLiter(s) Oral every 4 hours PRN Dyspepsia  calcium carbonate    500 mG (Tums) Chewable 2 Tablet(s) Chew four times a day PRN indigestion  FIRST- Mouthwash  BLM 15 milliLiter(s) Swish and Spit four times a day PRN Mouth Pain  FIRST- Mouthwash  BLM 30 milliLiter(s) Swish and Spit two times a day PRN mouth sores  ibuprofen  Tablet. 600 milliGRAM(s) Oral every 6 hours PRN Moderate Pain (4 - 6)  loperamide 2 milliGRAM(s) Oral two times a day PRN diarrhea  LORazepam     Tablet 2 milliGRAM(s) Oral every 2 hours PRN CIWA-Ar score increase by 2 points and a total score of 7 or less  magnesium hydroxide Suspension 30 milliLiter(s) Oral daily PRN Constipation  nicotine  Polacrilex Gum 2 milliGRAM(s) Oral every 2 hours PRN breakthrough cravings  OLANZapine 10 milliGRAM(s) Oral every 8 hours PRN agitation  traZODone 50 milliGRAM(s) Oral at bedtime PRN insomnia      VITAL SIGNS:  Vital Signs Last 24 Hrs  T(C): 36.4 (09 Dec 2021 09:15), Max: 37.3 (08 Dec 2021 17:00)  T(F): 97.6 (09 Dec 2021 09:15), Max: 99.1 (08 Dec 2021 17:00)  HR: 69 (09 Dec 2021 09:15) (69 - 86)  BP: 109/74 (09 Dec 2021 09:15) (109/74 - 122/77)  BP(mean): --  RR: 16 (08 Dec 2021 17:00) (16 - 16)  SpO2: 99% (09 Dec 2021 09:15) (98% - 99%)      PHYSICAL EXAM:  General: Comfortable, nontoxic appearing, pleasant and cooperative with exam, in no acute distress  Neurological: AAOx3; Cranial Nerves: V, VII intact and symmetrical bilaterally  HEENT: clear conjunctiva, no nasal or oropharyngeal discharge or exudates, MMM, poor dentition with extensive dental carries and missing teeth, no foul odor in oropharynx, no abscesses or drainage, no pain or discomfort on palpation of sinuses; 2 ulcers on lower frontal gingiva nontender to palpation. no lesions noted on tongue or under tongue  Neck: no cervical or post-auricular lymphadenopathy  Cardiovascular: +S1/S2, no murmurs/rubs/gallops, RRR  Respiratory: CTA B/L, no diminished breath sounds, no increased work of breathing or accessory muscle use  Gastrointestinal: soft, NT/ND; active BSx4 quadrants    LABS: not obtained      Microbiology:  Culture - Ear, Nose and Throat (ENT) (collected 12-06-21 @ 00:35)  Source: Sinus Sinus  Final Report (12-07-21 @ 16:57):  Normal Respiratory Neelima present    Culture - Group A Streptococcus (collected 12-05-21 @ 05:34)  Source: Cult/Viral Throat  Final Report (12-07-21 @ 13:29):  Beta Hemolytic Streptococci, Group C      RADIOLOGY & ADDITIONAL STUDIES: Reviewed.

## 2021-12-09 NOTE — PROGRESS NOTE ADULT - ATTENDING COMMENTS
Pt. seen and examined by me earlier today; I have read Dr. Ceballos's note, I agree w/ her impression and recommendations as above; cont. supportive care, monitor off abx; Pt. will need outpatient dental f/u

## 2021-12-09 NOTE — BH INPATIENT PSYCHIATRY PROGRESS NOTE - CURRENT MEDICATION
MEDICATIONS  (STANDING):  benzocaine 15 mG/menthol 3.6 mG Lozenge 1 Lozenge Oral every 4 hours  bictegravir 50 mG/emtricitabine 200 mG/tenofovir alafenamide 25 mG (BIKTARVY) 1 Tablet(s) Oral daily  escitalopram 20 milliGRAM(s) Oral daily  influenza   Vaccine 0.5 milliLiter(s) IntraMuscular once  prazosin. 1 milliGRAM(s) Oral at bedtime  QUEtiapine 200 milliGRAM(s) Oral at bedtime    MEDICATIONS  (PRN):  acetaminophen     Tablet .. 650 milliGRAM(s) Oral every 6 hours PRN Mild Pain (1 - 3), Moderate Pain (4 - 6)  aluminum hydroxide/magnesium hydroxide/simethicone Suspension 30 milliLiter(s) Oral every 4 hours PRN Dyspepsia  calcium carbonate    500 mG (Tums) Chewable 2 Tablet(s) Chew four times a day PRN indigestion  FIRST- Mouthwash  BLM 15 milliLiter(s) Swish and Spit four times a day PRN Mouth Pain  FIRST- Mouthwash  BLM 30 milliLiter(s) Swish and Spit two times a day PRN mouth sores  ibuprofen  Tablet. 600 milliGRAM(s) Oral every 6 hours PRN Moderate Pain (4 - 6)  loperamide 2 milliGRAM(s) Oral two times a day PRN diarrhea  LORazepam     Tablet 2 milliGRAM(s) Oral every 2 hours PRN CIWA-Ar score increase by 2 points and a total score of 7 or less  magnesium hydroxide Suspension 30 milliLiter(s) Oral daily PRN Constipation  nicotine  Polacrilex Gum 2 milliGRAM(s) Oral every 2 hours PRN breakthrough cravings  OLANZapine 10 milliGRAM(s) Oral every 8 hours PRN agitation  traZODone 50 milliGRAM(s) Oral at bedtime PRN insomnia

## 2021-12-09 NOTE — BH INPATIENT PSYCHIATRY PROGRESS NOTE - NSBHFUPINTERVALHXFT_PSY_A_CORE
Patient visible on the unit, observed attending a group today. He stated that he had a nightmare last night and rolled and fell off the bed while sleeping. Rolling onto his left side, he denies hitting his head, denied any other injury or concerns. He did not inform staff and simply went back to bed. He endorses hypervigilance on the unit, states that when he is seated in milieu and doesn't have a clear line of sight he uses the mirror to see who is coming around the corner. Loud noises startle him and he keeps ruminating about his friends deaths. He shared that he is scared for discharge as he might use and he wants to stay clean. He endorses continued tooth/dental pain but states that he has been consistently requesting comfort meds (lozenges, magic mouthwash, tylenol) which has been somewhat helpful, but pain is distracting. We discussed prazosin to address nightmares related to PTSD and he is agreeable. No reported si/hi

## 2021-12-10 PROCEDURE — 99233 SBSQ HOSP IP/OBS HIGH 50: CPT

## 2021-12-10 PROCEDURE — 99233 SBSQ HOSP IP/OBS HIGH 50: CPT | Mod: GC

## 2021-12-10 RX ORDER — ACETAMINOPHEN 500 MG
325 TABLET ORAL EVERY 4 HOURS
Refills: 0 | Status: DISCONTINUED | OUTPATIENT
Start: 2021-12-10 | End: 2021-12-13

## 2021-12-10 RX ADMIN — Medication 1 MILLIGRAM(S): at 21:43

## 2021-12-10 RX ADMIN — Medication 650 MILLIGRAM(S): at 05:13

## 2021-12-10 RX ADMIN — Medication 650 MILLIGRAM(S): at 17:28

## 2021-12-10 RX ADMIN — Medication 325 MILLIGRAM(S): at 17:28

## 2021-12-10 RX ADMIN — Medication 650 MILLIGRAM(S): at 04:28

## 2021-12-10 RX ADMIN — Medication 325 MILLIGRAM(S): at 21:42

## 2021-12-10 RX ADMIN — Medication 600 MILLIGRAM(S): at 19:56

## 2021-12-10 RX ADMIN — BENZOCAINE AND MENTHOL 1 LOZENGE: 5; 1 LIQUID ORAL at 14:16

## 2021-12-10 RX ADMIN — BENZOCAINE AND MENTHOL 1 LOZENGE: 5; 1 LIQUID ORAL at 21:43

## 2021-12-10 RX ADMIN — BENZOCAINE AND MENTHOL 1 LOZENGE: 5; 1 LIQUID ORAL at 06:10

## 2021-12-10 RX ADMIN — BENZOCAINE AND MENTHOL 1 LOZENGE: 5; 1 LIQUID ORAL at 17:38

## 2021-12-10 RX ADMIN — Medication 325 MILLIGRAM(S): at 16:41

## 2021-12-10 RX ADMIN — BICTEGRAVIR SODIUM, EMTRICITABINE, AND TENOFOVIR ALAFENAMIDE FUMARATE 1 TABLET(S): 30; 120; 15 TABLET ORAL at 11:06

## 2021-12-10 RX ADMIN — Medication 650 MILLIGRAM(S): at 14:02

## 2021-12-10 RX ADMIN — BENZOCAINE AND MENTHOL 1 LOZENGE: 5; 1 LIQUID ORAL at 11:06

## 2021-12-10 RX ADMIN — ESCITALOPRAM OXALATE 20 MILLIGRAM(S): 10 TABLET, FILM COATED ORAL at 11:06

## 2021-12-10 RX ADMIN — Medication 1 SPRAY(S): at 11:09

## 2021-12-10 RX ADMIN — Medication 650 MILLIGRAM(S): at 11:07

## 2021-12-10 RX ADMIN — Medication 600 MILLIGRAM(S): at 16:39

## 2021-12-10 RX ADMIN — QUETIAPINE FUMARATE 200 MILLIGRAM(S): 200 TABLET, FILM COATED ORAL at 21:43

## 2021-12-10 RX ADMIN — Medication 600 MILLIGRAM(S): at 14:15

## 2021-12-10 RX ADMIN — Medication 650 MILLIGRAM(S): at 16:41

## 2021-12-10 NOTE — BH INPATIENT PSYCHIATRY PROGRESS NOTE - NSBHASSESSSUMMFT_PSY_ALL_CORE
36 YO M with hx of substance induced mood d/o, etoh/opioid use d/o comes with SI in context of medication nonadherence and death of two friends by opioid overdose.     -seroquel 200mg qhs   -lexapro 20mg daily  -biktarvy qd  -Ortho recs appreciated, pt currently wearing a splint- has since signed off  -medicine recs appreciated (throat pain)  -oral maxillary facial surgery consult requested

## 2021-12-10 NOTE — PROGRESS NOTE ADULT - SUBJECTIVE AND OBJECTIVE BOX
REASON FOR CONSULT: throat pain    DATE OF INITIAL CONSULT: 12/4    INTERVAL/OVERNIGHT EVENTS: started benzocaine 20% yesterday.    SUBJECTIVE HPI: Patient seen and examined in common area. pt says that pain is worsening and benzocaine spray works for ~45 minutes.    MEDICATIONS  (STANDING):  benzocaine 15 mG/menthol 3.6 mG Lozenge 1 Lozenge Oral every 4 hours  bictegravir 50 mG/emtricitabine 200 mG/tenofovir alafenamide 25 mG (BIKTARVY) 1 Tablet(s) Oral daily  escitalopram 20 milliGRAM(s) Oral daily  influenza   Vaccine 0.5 milliLiter(s) IntraMuscular once  prazosin. 1 milliGRAM(s) Oral at bedtime  QUEtiapine 200 milliGRAM(s) Oral at bedtime    MEDICATIONS  (PRN):  acetaminophen     Tablet .. 650 milliGRAM(s) Oral every 6 hours PRN Mild Pain (1 - 3), Moderate Pain (4 - 6)  aluminum hydroxide/magnesium hydroxide/simethicone Suspension 30 milliLiter(s) Oral every 4 hours PRN Dyspepsia  benzocaine 20% Spray 1 Spray(s) Topical every 4 hours PRN numbing for gums  calcium carbonate    500 mG (Tums) Chewable 2 Tablet(s) Chew four times a day PRN indigestion  FIRST- Mouthwash  BLM 30 milliLiter(s) Swish and Spit two times a day PRN mouth sores  FIRST- Mouthwash  BLM 15 milliLiter(s) Swish and Spit four times a day PRN Mouth Pain  ibuprofen  Tablet. 600 milliGRAM(s) Oral every 6 hours PRN Moderate Pain (4 - 6)  loperamide 2 milliGRAM(s) Oral two times a day PRN diarrhea  magnesium hydroxide Suspension 30 milliLiter(s) Oral daily PRN Constipation  nicotine  Polacrilex Gum 2 milliGRAM(s) Oral every 2 hours PRN breakthrough cravings  OLANZapine 10 milliGRAM(s) Oral every 8 hours PRN agitation  traZODone 50 milliGRAM(s) Oral at bedtime PRN insomnia      VITAL SIGNS:  Vital Signs Last 24 Hrs  T(C): 37 (09 Dec 2021 17:00), Max: 37 (09 Dec 2021 17:00)  T(F): 98.6 (09 Dec 2021 17:00), Max: 98.6 (09 Dec 2021 17:00)  HR: 72 (09 Dec 2021 17:00) (72 - 72)  BP: 118/79 (09 Dec 2021 17:00) (118/79 - 118/79)  BP(mean): --  RR: 18 (09 Dec 2021 17:00) (18 - 18)  SpO2: 98% (09 Dec 2021 17:00) (98% - 98%)      PHYSICAL EXAM:  General: nontoxic appearing  HEENT: clear conjunctiva, no nasal or oropharyngeal discharge or exudates, MMM, poor dentition with extensive dental carries and missing teeth, no foul odor in oropharynx, no abscesses or drainage, no pain or discomfort on palpation of sinuses; 2 ulcers on lower frontal gingiva nontender to palpation. no lesions noted on tongue or under tongue  Neck: no cervical or post-auricular lymphadenopathy  Cardiovascular: +S1/S2, no murmurs/rubs/gallops, RRR      Microbiology:  Culture - Group A Streptococcus (collected 12-05-21 @ 05:34)  Source: Cult/Viral Throat  Final Report (12-07-21 @ 13:29):  Beta Hemolytic Streptococci, Group C      RADIOLOGY & ADDITIONAL STUDIES: Reviewed.

## 2021-12-10 NOTE — BH INPATIENT PSYCHIATRY PROGRESS NOTE - CURRENT MEDICATION
MEDICATIONS  (STANDING):  benzocaine 15 mG/menthol 3.6 mG Lozenge 1 Lozenge Oral every 4 hours  bictegravir 50 mG/emtricitabine 200 mG/tenofovir alafenamide 25 mG (BIKTARVY) 1 Tablet(s) Oral daily  escitalopram 20 milliGRAM(s) Oral daily  influenza   Vaccine 0.5 milliLiter(s) IntraMuscular once  prazosin. 1 milliGRAM(s) Oral at bedtime  QUEtiapine 200 milliGRAM(s) Oral at bedtime    MEDICATIONS  (PRN):  acetaminophen     Tablet .. 650 milliGRAM(s) Oral every 6 hours PRN Mild Pain (1 - 3), Moderate Pain (4 - 6)  acetaminophen     Tablet .. 325 milliGRAM(s) Oral every 4 hours PRN Severe Pain (7 - 10)  aluminum hydroxide/magnesium hydroxide/simethicone Suspension 30 milliLiter(s) Oral every 4 hours PRN Dyspepsia  benzocaine 20% Spray 1 Spray(s) Topical every 4 hours PRN numbing for gums  calcium carbonate    500 mG (Tums) Chewable 2 Tablet(s) Chew four times a day PRN indigestion  FIRST- Mouthwash  BLM 15 milliLiter(s) Swish and Spit four times a day PRN Mouth Pain  FIRST- Mouthwash  BLM 30 milliLiter(s) Swish and Spit two times a day PRN mouth sores  ibuprofen  Tablet. 600 milliGRAM(s) Oral every 6 hours PRN Moderate Pain (4 - 6)  loperamide 2 milliGRAM(s) Oral two times a day PRN diarrhea  magnesium hydroxide Suspension 30 milliLiter(s) Oral daily PRN Constipation  nicotine  Polacrilex Gum 2 milliGRAM(s) Oral every 2 hours PRN breakthrough cravings  OLANZapine 10 milliGRAM(s) Oral every 8 hours PRN agitation  traZODone 50 milliGRAM(s) Oral at bedtime PRN insomnia

## 2021-12-10 NOTE — PROGRESS NOTE ADULT - ATTENDING COMMENTS
Pt. seen and examined by me earlier today; I have read Dr. Ceballos's note, I agree w/ her impression and recommendations as above; suggest OMFS consult, Pt. needs a dental eval

## 2021-12-10 NOTE — BH INPATIENT PSYCHIATRY PROGRESS NOTE - NSBHFUPINTERVALHXFT_PSY_A_CORE
no Patient visible on the unit, irritable upon approach. Reporting feeling angry that his dental pain is not being adequately addressed. He stated that if he requested discharge and left the hospital he could easily buy what he needed. He feels that medications are helping, but only for short periods of time. He later reported having a 'tingling throbbing' sensation along his R jawline and to his R shoulder. Pt had no psychiatric complaints, wanting to discuss medical concerns today.  Oral maxillary facial surgeon to follow pt. Will plan to order CT maxillofacial without contrast for tentative plan for extraction on Thursday if deemed necessary.

## 2021-12-11 RX ADMIN — BENZOCAINE AND MENTHOL 1 LOZENGE: 5; 1 LIQUID ORAL at 07:50

## 2021-12-11 RX ADMIN — Medication 650 MILLIGRAM(S): at 23:32

## 2021-12-11 RX ADMIN — BENZOCAINE AND MENTHOL 1 LOZENGE: 5; 1 LIQUID ORAL at 18:55

## 2021-12-11 RX ADMIN — Medication 600 MILLIGRAM(S): at 14:32

## 2021-12-11 RX ADMIN — Medication 1 MILLIGRAM(S): at 21:58

## 2021-12-11 RX ADMIN — BENZOCAINE AND MENTHOL 1 LOZENGE: 5; 1 LIQUID ORAL at 14:12

## 2021-12-11 RX ADMIN — Medication 325 MILLIGRAM(S): at 12:17

## 2021-12-11 RX ADMIN — Medication 50 MILLIGRAM(S): at 21:59

## 2021-12-11 RX ADMIN — Medication 325 MILLIGRAM(S): at 18:55

## 2021-12-11 RX ADMIN — DIPHENHYDRAMINE HYDROCHLORIDE AND LIDOCAINE HYDROCHLORIDE AND ALUMINUM HYDROXIDE AND MAGNESIUM HYDRO 30 MILLILITER(S): KIT at 14:32

## 2021-12-11 RX ADMIN — Medication 650 MILLIGRAM(S): at 02:59

## 2021-12-11 RX ADMIN — BENZOCAINE AND MENTHOL 1 LOZENGE: 5; 1 LIQUID ORAL at 02:59

## 2021-12-11 RX ADMIN — Medication 600 MILLIGRAM(S): at 10:00

## 2021-12-11 RX ADMIN — ESCITALOPRAM OXALATE 20 MILLIGRAM(S): 10 TABLET, FILM COATED ORAL at 12:17

## 2021-12-11 RX ADMIN — BENZOCAINE AND MENTHOL 1 LOZENGE: 5; 1 LIQUID ORAL at 21:59

## 2021-12-11 RX ADMIN — Medication 650 MILLIGRAM(S): at 22:00

## 2021-12-11 RX ADMIN — QUETIAPINE FUMARATE 200 MILLIGRAM(S): 200 TABLET, FILM COATED ORAL at 21:59

## 2021-12-11 RX ADMIN — BICTEGRAVIR SODIUM, EMTRICITABINE, AND TENOFOVIR ALAFENAMIDE FUMARATE 1 TABLET(S): 30; 120; 15 TABLET ORAL at 12:17

## 2021-12-11 RX ADMIN — Medication 600 MILLIGRAM(S): at 06:50

## 2021-12-12 RX ORDER — OXYCODONE HYDROCHLORIDE 5 MG/1
5 TABLET ORAL ONCE
Refills: 0 | Status: DISCONTINUED | OUTPATIENT
Start: 2021-12-12 | End: 2021-12-12

## 2021-12-12 RX ORDER — BENZOCAINE AND MENTHOL 5; 1 G/100ML; G/100ML
1 LIQUID ORAL EVERY 4 HOURS
Refills: 0 | Status: DISCONTINUED | OUTPATIENT
Start: 2021-12-12 | End: 2021-12-16

## 2021-12-12 RX ORDER — BENZOCAINE AND MENTHOL 5; 1 G/100ML; G/100ML
1 LIQUID ORAL EVERY 4 HOURS
Refills: 0 | Status: DISCONTINUED | OUTPATIENT
Start: 2021-12-12 | End: 2021-12-12

## 2021-12-12 RX ADMIN — Medication 600 MILLIGRAM(S): at 21:43

## 2021-12-12 RX ADMIN — BENZOCAINE AND MENTHOL 1 LOZENGE: 5; 1 LIQUID ORAL at 13:26

## 2021-12-12 RX ADMIN — BICTEGRAVIR SODIUM, EMTRICITABINE, AND TENOFOVIR ALAFENAMIDE FUMARATE 1 TABLET(S): 30; 120; 15 TABLET ORAL at 09:38

## 2021-12-12 RX ADMIN — Medication 1 MILLIGRAM(S): at 21:41

## 2021-12-12 RX ADMIN — BENZOCAINE AND MENTHOL 1 LOZENGE: 5; 1 LIQUID ORAL at 01:11

## 2021-12-12 RX ADMIN — OXYCODONE HYDROCHLORIDE 5 MILLIGRAM(S): 5 TABLET ORAL at 23:45

## 2021-12-12 RX ADMIN — Medication 600 MILLIGRAM(S): at 15:40

## 2021-12-12 RX ADMIN — Medication 650 MILLIGRAM(S): at 13:26

## 2021-12-12 RX ADMIN — ESCITALOPRAM OXALATE 20 MILLIGRAM(S): 10 TABLET, FILM COATED ORAL at 09:38

## 2021-12-12 RX ADMIN — Medication 600 MILLIGRAM(S): at 00:50

## 2021-12-12 RX ADMIN — Medication 650 MILLIGRAM(S): at 14:26

## 2021-12-12 RX ADMIN — Medication 600 MILLIGRAM(S): at 22:43

## 2021-12-12 RX ADMIN — QUETIAPINE FUMARATE 200 MILLIGRAM(S): 200 TABLET, FILM COATED ORAL at 21:42

## 2021-12-12 RX ADMIN — Medication 600 MILLIGRAM(S): at 09:37

## 2021-12-12 RX ADMIN — Medication 1 SPRAY(S): at 22:26

## 2021-12-12 RX ADMIN — OLANZAPINE 10 MILLIGRAM(S): 15 TABLET, FILM COATED ORAL at 22:30

## 2021-12-12 RX ADMIN — Medication 325 MILLIGRAM(S): at 22:42

## 2021-12-12 RX ADMIN — Medication 600 MILLIGRAM(S): at 10:37

## 2021-12-12 RX ADMIN — Medication 600 MILLIGRAM(S): at 16:40

## 2021-12-12 RX ADMIN — Medication 325 MILLIGRAM(S): at 21:42

## 2021-12-12 RX ADMIN — OXYCODONE HYDROCHLORIDE 5 MILLIGRAM(S): 5 TABLET ORAL at 22:45

## 2021-12-12 RX ADMIN — Medication 50 MILLIGRAM(S): at 22:31

## 2021-12-12 RX ADMIN — BENZOCAINE AND MENTHOL 1 LOZENGE: 5; 1 LIQUID ORAL at 07:57

## 2021-12-13 PROCEDURE — 70486 CT MAXILLOFACIAL W/O DYE: CPT | Mod: 26

## 2021-12-13 PROCEDURE — 99233 SBSQ HOSP IP/OBS HIGH 50: CPT

## 2021-12-13 PROCEDURE — 99233 SBSQ HOSP IP/OBS HIGH 50: CPT | Mod: GC

## 2021-12-13 RX ORDER — ACETAMINOPHEN 500 MG
1000 TABLET ORAL EVERY 6 HOURS
Refills: 0 | Status: DISCONTINUED | OUTPATIENT
Start: 2021-12-13 | End: 2021-12-16

## 2021-12-13 RX ADMIN — ESCITALOPRAM OXALATE 20 MILLIGRAM(S): 10 TABLET, FILM COATED ORAL at 11:35

## 2021-12-13 RX ADMIN — Medication 600 MILLIGRAM(S): at 18:06

## 2021-12-13 RX ADMIN — Medication 650 MILLIGRAM(S): at 05:21

## 2021-12-13 RX ADMIN — Medication 1 SPRAY(S): at 22:19

## 2021-12-13 RX ADMIN — Medication 600 MILLIGRAM(S): at 05:20

## 2021-12-13 RX ADMIN — Medication 650 MILLIGRAM(S): at 13:30

## 2021-12-13 RX ADMIN — Medication 650 MILLIGRAM(S): at 11:37

## 2021-12-13 RX ADMIN — Medication 1000 MILLIGRAM(S): at 18:07

## 2021-12-13 RX ADMIN — BICTEGRAVIR SODIUM, EMTRICITABINE, AND TENOFOVIR ALAFENAMIDE FUMARATE 1 TABLET(S): 30; 120; 15 TABLET ORAL at 11:35

## 2021-12-13 RX ADMIN — Medication 600 MILLIGRAM(S): at 11:38

## 2021-12-13 NOTE — BH TREATMENT PLAN - NSTXSUICIDGOAL_PSY_ALL_CORE
Be able to report that they independently used a coping skill instead of hurting oneself
Be able to state 3 reasons for living

## 2021-12-13 NOTE — BH TREATMENT PLAN - NSTXDEPRESINTERRN_PSY_ALL_CORE
Encourage patient to participate in groups
Encourage patient to come out of bed and walk in room and hallway.
Encourage patient to adhere with prescribed medications and treatment, participation in groups and unit activities, verbalization of feelings and concerns.
Encourage patient to adhere with prescribed medications and treatment, participation in groups and unit activities, verbalization of feelings and concerns.

## 2021-12-13 NOTE — BH INPATIENT PSYCHIATRY PROGRESS NOTE - CURRENT MEDICATION
MEDICATIONS  (STANDING):  bictegravir 50 mG/emtricitabine 200 mG/tenofovir alafenamide 25 mG (BIKTARVY) 1 Tablet(s) Oral daily  escitalopram 20 milliGRAM(s) Oral daily  influenza   Vaccine 0.5 milliLiter(s) IntraMuscular once  prazosin. 1 milliGRAM(s) Oral at bedtime  QUEtiapine 200 milliGRAM(s) Oral at bedtime    MEDICATIONS  (PRN):  acetaminophen     Tablet .. 1000 milliGRAM(s) Oral every 6 hours PRN Moderate Pain (4 - 6)  aluminum hydroxide/magnesium hydroxide/simethicone Suspension 30 milliLiter(s) Oral every 4 hours PRN Dyspepsia  benzocaine 15 mG/menthol 3.6 mG Lozenge 1 Lozenge Oral every 4 hours PRN Sore Throat  benzocaine 20% Spray 1 Spray(s) Topical every 4 hours PRN numbing for gums  calcium carbonate    500 mG (Tums) Chewable 2 Tablet(s) Chew four times a day PRN indigestion  FIRST- Mouthwash  BLM 30 milliLiter(s) Swish and Spit two times a day PRN mouth sores  FIRST- Mouthwash  BLM 15 milliLiter(s) Swish and Spit four times a day PRN Mouth Pain  ibuprofen  Tablet. 600 milliGRAM(s) Oral every 6 hours PRN Moderate Pain (4 - 6)  loperamide 2 milliGRAM(s) Oral two times a day PRN diarrhea  magnesium hydroxide Suspension 30 milliLiter(s) Oral daily PRN Constipation  nicotine  Polacrilex Gum 2 milliGRAM(s) Oral every 2 hours PRN breakthrough cravings  OLANZapine 10 milliGRAM(s) Oral every 8 hours PRN agitation  traZODone 50 milliGRAM(s) Oral at bedtime PRN insomnia

## 2021-12-13 NOTE — BH TREATMENT PLAN - NSDCCRITERIA_PSY_ALL_CORE
Less depressed & suicidal 

## 2021-12-13 NOTE — BH TREATMENT PLAN - NSTXSUBMISINTERMD_PSY_ALL_CORE
motivational interviewing, naltrexone offered, encourage inpt rehab

## 2021-12-13 NOTE — BH TREATMENT PLAN - NSTXDEPRESINTERMD_PSY_ALL_CORE
psychopharm management x 15 min daily, lexapro, seroquel

## 2021-12-13 NOTE — BH TREATMENT PLAN - NSTXSUICIDINTERMD_PSY_ALL_CORE
psychopharm management x 15 min daily, lexapro seroquel, supportive therapy 

## 2021-12-13 NOTE — PROGRESS NOTE ADULT - PROBLEM SELECTOR PROBLEM 1
Erythema of oropharynx

## 2021-12-13 NOTE — BH TREATMENT PLAN - NSTXSUBMISINTERRN_PSY_ALL_CORE
Encourage patient to eat and drink while withdrawing from multi substance abuse.
Encourage patient to participate in groups
Encourage patient to attend 12 Step Group
Encourage patient to adhere with prescribed medications and treatment, participation in groups and unit activities, verbalization of feelings and concerns.

## 2021-12-13 NOTE — BH TREATMENT PLAN - NSTXSUBMISINTERPR_PSY_ALL_CORE
Pt. will be prompted to attend when a speaker is on the unit
Pt. will be prompted to attend when a speaker is on the unit
Pt will continue to be encouraged to attend 12-step groups when a speaker is on the unit

## 2021-12-13 NOTE — BH TREATMENT PLAN - NSTXSUICIDINTERRN_PSY_ALL_CORE
Assess suicidal thoughts, and institute a no-harm self-contract and suicidal precautions, as indicated.
Encourage patient to participate in groups
Encourage patient to adhere with prescribed medications and treatment, participation in groups and unit activities, verbalization of feelings and concerns.

## 2021-12-13 NOTE — BH TREATMENT PLAN - NSBHPRIMARYDX_PSY_ALL_CORE
Substance induced mood disorder    

## 2021-12-13 NOTE — PROGRESS NOTE ADULT - ATTENDING COMMENTS
Patient discussed with resident team and plan of care reviewed. I have personally reviewed all pertinent labs and imaging and performed an independent history and physical. Resident note personally reviewed, and I agree with above resident note with the following additions:    35 YOM with PMHx ETOH abuse, cocaine and heroin abuse, bipolar disorder, anxiety, schizophrenia, HIV (on Biktarvy, CD4 456, VL 61 this admit), depression who was admitted for SI. Medicine consulted for mouth pain and erythema.    Sore throat is improving - likely viral pharyngitis. However reports ongoing dental pain with exam notable for poor dentition, caries. No obvious abscess. OMFS has been consulted and patient is pending maxillofacial CT.

## 2021-12-13 NOTE — BH TREATMENT PLAN - NSTXDEPRESGOAL_PSY_ALL_CORE
Attend and participate in at least 2 groups daily despite low mood/energy
Other...
Attend and participate in at least 2 groups daily despite low mood/energy
Will identify thoughts and self-talk that contribute to depression

## 2021-12-13 NOTE — BH TREATMENT PLAN - NSTXPLANTHERAPYSESSIONSFT_PSY_ALL_CORE
12-04-21  Type of therapy: Leisure development,Games  Type of session: Group  Level of patient participation: Participates,Quiet  Duration of participation: 15 minutes  Therapy conducted by: Psych rehab  Therapy Summary: Pt is pleasant on approach, and often appears more interested in groups when invited but usually does not attend. Pt showed more interest in the games group, participated when there, but left after 15 mins. Pt is pleasant and appropriate with others, but engages little socially with others.  
  12-07-21  Type of therapy: Creative arts therapy  Type of session: Group  Level of patient participation: Attentive,Participated with encouragement  Duration of participation: 60 minutes  Therapy conducted by: Psych rehab  Therapy Summary: Pt reported coming to the group with the encouragement of his treatment provider. In response to directive to create 'hope ornaments' Pt wrote anti-Jose messages on his paper ornaments. Pt's messages were humorous, and several peers responded well to them. They gave Pt a chance to share his dislike of the holidays and endorses that holidays are a difficult time for him. Pt showed awareness of inappropriateness of his ornaments for the [employer] tree, and tolerated hearing from peers that they did not think they belonged on the tree. At end of group, Pt shared that he had come to group reluctantly but admitted having enjoyed being there.

## 2021-12-13 NOTE — BH TREATMENT PLAN - NSTXDEPRESINTERPR_PSY_ALL_CORE
Pt will continue to be invited and encouraged to attend all offered groups
Pt. will be invited and encouraged to attend all offered groups
Pt. will be invited and encouraged to attend all offered groups

## 2021-12-13 NOTE — PROGRESS NOTE ADULT - SUBJECTIVE AND OBJECTIVE BOX
REASON FOR CONSULT: throat pain    DATE OF INITIAL CONSULT: 12/4    INTERVAL/OVERNIGHT EVENTS: As per overnight staff, there were no acute events overnight    SUBJECTIVE HPI: Patient seen and examined at bedside. Patient reports pain continues to worsen.      MEDICATIONS  (STANDING):  bictegravir 50 mG/emtricitabine 200 mG/tenofovir alafenamide 25 mG (BIKTARVY) 1 Tablet(s) Oral daily  escitalopram 20 milliGRAM(s) Oral daily  influenza   Vaccine 0.5 milliLiter(s) IntraMuscular once  prazosin. 1 milliGRAM(s) Oral at bedtime  QUEtiapine 200 milliGRAM(s) Oral at bedtime    MEDICATIONS  (PRN):  acetaminophen     Tablet .. 325 milliGRAM(s) Oral every 4 hours PRN Severe Pain (7 - 10)  acetaminophen     Tablet .. 650 milliGRAM(s) Oral every 6 hours PRN Mild Pain (1 - 3), Moderate Pain (4 - 6)  aluminum hydroxide/magnesium hydroxide/simethicone Suspension 30 milliLiter(s) Oral every 4 hours PRN Dyspepsia  benzocaine 15 mG/menthol 3.6 mG Lozenge 1 Lozenge Oral every 4 hours PRN Sore Throat  benzocaine 20% Spray 1 Spray(s) Topical every 4 hours PRN numbing for gums  calcium carbonate    500 mG (Tums) Chewable 2 Tablet(s) Chew four times a day PRN indigestion  FIRST- Mouthwash  BLM 30 milliLiter(s) Swish and Spit two times a day PRN mouth sores  FIRST- Mouthwash  BLM 15 milliLiter(s) Swish and Spit four times a day PRN Mouth Pain  ibuprofen  Tablet. 600 milliGRAM(s) Oral every 6 hours PRN Moderate Pain (4 - 6)  loperamide 2 milliGRAM(s) Oral two times a day PRN diarrhea  magnesium hydroxide Suspension 30 milliLiter(s) Oral daily PRN Constipation  nicotine  Polacrilex Gum 2 milliGRAM(s) Oral every 2 hours PRN breakthrough cravings  OLANZapine 10 milliGRAM(s) Oral every 8 hours PRN agitation  traZODone 50 milliGRAM(s) Oral at bedtime PRN insomnia        VITAL SIGNS:  Vital Signs Last 24 Hrs  T(C): 36.4 (13 Dec 2021 09:02), Max: 36.8 (12 Dec 2021 17:00)  T(F): 97.5 (13 Dec 2021 09:02), Max: 98.2 (12 Dec 2021 17:00)  HR: 82 (13 Dec 2021 09:02) (63 - 82)  BP: 112/73 (13 Dec 2021 09:02) (112/73 - 115/76)  BP(mean): --  RR: 18 (13 Dec 2021 09:02) (16 - 18)  SpO2: 97% (13 Dec 2021 09:02) (97% - 99%)      PHYSICAL EXAM:  General: nontoxic appearing  HEENT: clear conjunctiva, no nasal or oropharyngeal discharge or exudates, MMM, poor dentition with extensive dental carries and missing teeth, no foul odor in oropharynx, no abscesses or drainage, no pain or discomfort on palpation of sinuses; 2 ulcers on lower frontal gingiva nontender to palpation. no lesions noted on tongue or under tongue  Neck: no cervical or post-auricular lymphadenopathy  Cardiovascular: +S1/S2, no murmurs/rubs/gallops, RRR  LABS: none collected      Microbiology:  Culture - Ear, Nose and Throat (ENT) (collected 12-06-21 @ 00:35)  Source: Sinus Sinus  Final Report (12-07-21 @ 16:57):    Normal Respiratory Neelima present    Culture - Group A Streptococcus (collected 12-05-21 @ 05:34)  Source: Cult/Viral Throat  Final Report (12-07-21 @ 13:29):    Beta Hemolytic Streptococci, Group C        RADIOLOGY & ADDITIONAL STUDIES: Reviewed.

## 2021-12-13 NOTE — BH INPATIENT PSYCHIATRY PROGRESS NOTE - NSBHFUPINTERVALHXFT_PSY_A_CORE
Patient visible on the unit, seen engaging appropriately with peers. Reports that pain control is inadequate. Will plan to consult with pain for additional recommendations. States that he was very irritable over the weekend and felt like punching the glass on the unit, but was able to control himself. No reported si/hi/avh or paranoid ideation. Denies having any nightmares since prazosin was added to regimen.

## 2021-12-13 NOTE — BH TREATMENT PLAN - NSTXDEPRESINTERSW_PSY_ALL_CORE
Liaison with family and collateral providers as needed for discharge planning purposes.

## 2021-12-14 LAB — SARS-COV-2 RNA SPEC QL NAA+PROBE: SIGNIFICANT CHANGE UP

## 2021-12-14 PROCEDURE — 99233 SBSQ HOSP IP/OBS HIGH 50: CPT

## 2021-12-14 PROCEDURE — 99232 SBSQ HOSP IP/OBS MODERATE 35: CPT | Mod: GC

## 2021-12-14 RX ORDER — GABAPENTIN 400 MG/1
100 CAPSULE ORAL THREE TIMES A DAY
Refills: 0 | Status: DISCONTINUED | OUTPATIENT
Start: 2021-12-14 | End: 2021-12-16

## 2021-12-14 RX ADMIN — QUETIAPINE FUMARATE 200 MILLIGRAM(S): 200 TABLET, FILM COATED ORAL at 21:24

## 2021-12-14 RX ADMIN — BENZOCAINE AND MENTHOL 1 LOZENGE: 5; 1 LIQUID ORAL at 21:25

## 2021-12-14 RX ADMIN — GABAPENTIN 100 MILLIGRAM(S): 400 CAPSULE ORAL at 21:24

## 2021-12-14 RX ADMIN — Medication 600 MILLIGRAM(S): at 16:38

## 2021-12-14 RX ADMIN — Medication 1 SPRAY(S): at 21:25

## 2021-12-14 RX ADMIN — Medication 1 SPRAY(S): at 14:37

## 2021-12-14 RX ADMIN — Medication 600 MILLIGRAM(S): at 07:13

## 2021-12-14 RX ADMIN — Medication 1000 MILLIGRAM(S): at 12:10

## 2021-12-14 RX ADMIN — Medication 50 MILLIGRAM(S): at 22:45

## 2021-12-14 RX ADMIN — Medication 1000 MILLIGRAM(S): at 13:30

## 2021-12-14 RX ADMIN — DIPHENHYDRAMINE HYDROCHLORIDE AND LIDOCAINE HYDROCHLORIDE AND ALUMINUM HYDROXIDE AND MAGNESIUM HYDRO 15 MILLILITER(S): KIT at 03:26

## 2021-12-14 RX ADMIN — Medication 1000 MILLIGRAM(S): at 22:44

## 2021-12-14 RX ADMIN — BENZOCAINE AND MENTHOL 1 LOZENGE: 5; 1 LIQUID ORAL at 12:37

## 2021-12-14 RX ADMIN — Medication 1 MILLIGRAM(S): at 21:24

## 2021-12-14 RX ADMIN — DIPHENHYDRAMINE HYDROCHLORIDE AND LIDOCAINE HYDROCHLORIDE AND ALUMINUM HYDROXIDE AND MAGNESIUM HYDRO 15 MILLILITER(S): KIT at 22:45

## 2021-12-14 RX ADMIN — BENZOCAINE AND MENTHOL 1 LOZENGE: 5; 1 LIQUID ORAL at 07:13

## 2021-12-14 RX ADMIN — Medication 1000 MILLIGRAM(S): at 04:10

## 2021-12-14 RX ADMIN — Medication 600 MILLIGRAM(S): at 08:30

## 2021-12-14 RX ADMIN — Medication 1000 MILLIGRAM(S): at 03:26

## 2021-12-14 NOTE — BH PSYCHOLOGY - CLINICIAN PSYCHOTHERAPY NOTE - NSTXSUBMISGOAL_PSY_ALL_CORE
Other...
Be able to verbalize an understanding of the association between substance abuse and mental health
Be able to verbalize an understanding of the association between substance abuse and mental health

## 2021-12-14 NOTE — BH INPATIENT PSYCHIATRY PROGRESS NOTE - NSBHASSESSSUMMFT_PSY_ALL_CORE
34 YO M with hx of substance induced mood d/o, etoh/opioid use d/o comes with SI in context of medication nonadherence and death of two friends by opioid overdose.     -seroquel 200mg qhs   -lexapro 20mg daily  -biktarvy qd  -Ortho recs appreciated, pt currently wearing a splint- has since signed off  -medicine recs appreciated (throat pain)  -oral maxillary facial surgery consult requested 34 YO M with hx of substance induced mood d/o, etoh/opioid use d/o comes with SI in context of medication nonadherence and death of two friends by opioid overdose.     -seroquel 200mg qhs   -lexapro 20mg daily  -biktarvy qd  -Ortho recs appreciated, pt currently wearing a splint- has since signed off  -medicine recs appreciated (throat pain)  -oral maxillary facial surgery consult requested  -CT max face results pending  -Pain consult recs appreciated

## 2021-12-14 NOTE — BH PSYCHOLOGY - CLINICIAN PSYCHOTHERAPY NOTE - NSBHPSYCHOLNARRATIVE_PSY_A_CORE FT
Pt is a 36yo cisgender male, single, domiciled, unemployed, with PPH of affective and psychotic symptoms, substance use disorders (prominently alcohol, cocaine, but recent heroin use), past SA including hanging, history of NSSIB in the past, multiple prior admissions (recently May 2021 [facility], July 2021 [facility], Oct 2021 [facility]), history of detox/rehab in the past, with PMH of HIV on Biktarvy. Pt is hospitalized voluntarily because of increased SI  in the context of the death of two friends by OD (pt supplied the heroin) a week and a half ago.   Pt seen 1:1 to assess for safety and provide him with a space to process his experience. Pt found in bed, stated that he did not want to talk because he was tired of repeating his story but that he would comply anyway. Pt explained that he came to the hospital after a week of feeling extremely guilty and angry after finding his two friends dead following the use of heroin that he provided them with. Pt explained how he found them (they had spent the night in the same apartment), panicked, left the apartment only to realize that nobody would find his friends; so he returned, called the ambulance. EMS recommended that he gets himself checked at the hospital, but he did not do it for a week because he feared to be accused of his friends' deaths and has been anxious about that since. Pt also reported that the images of finding his friends dead in the morning keeps replaying in his mind. Additionally, pt reported that on the day following the death, pt tried to use more substances and people around him asked him what was happening; when he told them about his friends' death, they did not believe him, adding to his distress. Pt had anger outbursts at several time points in the following week. He also reported difficulty sleeping.  Pt reports that one of the friends, Simon, was his "best friend." Simon was a 52yo  and pt described him as an father figure that would support him and "tell him when he made bad choices." It was Simon's second time using heroin and pt feels extremely guilty for providing the substance. Pt was tearful when evoking Simon. Pt also described being in denial the days following the deaths, waiting for Simon outside his building.  Pt endorses SI ("I deserve to die"), although he can have a difficult time reporting the specifics and benefits for multiple choices and suggestions. Pt was compliant with the CAMS, endorsed SI and wish to die, and might be slightly underreporting in some instances.  MSE: APPEARANCE:  [X] adequately groomed []disheveled  [] malodorous [] Other:   BEHAVIOR: [X] cooperative [] uncooperative [X] good EC [] poor EC [X] well related [] oddly related [] guarded []PMA [] PMR []abnormal movements [] Other:   SPEED: [] normal rate/rhythm/volume [] loud [] quiet [X] slow  [] rapid [] pressured [] Other: _________   MOOD: [] euthymic [X] dysphoric []anxious [] irritable [] Other: ___________   AFFECT: [] full [] expansive [X] constricted [] blunted [] flat [] stable [] labile [] Other: _________________   THOUGHT PROCESS: [x] organized [] disorganized [] goal-directed [] concrete [] logical  [] illogical [] circumstantial [] tangential [] impoverished [] effusive [] repetitive [] Other: ___________   THOUGHT CONTENT: [X] negative for delusions/suicidal ideation /homicidal ideation  [] positive for delusions/suicidal ideation/homicidal ideation. Describe:  PERCEPTION: [x] negative for auditory/ visual hallucinations  [] positive for auditory/ visual hallucinations. Describe:   INSIGHT/JUDGMENT: [] good [x]fair [] poor        IMPULSE CONTROL: [x] good []fair [] poor         COGNITION: [x] alert and oriented to person,time,place Lacks orientation to person/ time/ place. Describe:   Risk assessment as applicable :  [x] suicide [] self-harm [] elopement [] aggression [] Other:   [x] ideation	 [] intent	 [] plan   [x] prior incidences (if checked, elaborate) history of prior attempts  [] family history of suicide	[] family history of aggression  Static factors: hx of prior attempts, hx of substance use  Modifiable factors: current mood state  Protective factors: Identifies reasons for living, has a daughter, domiciled, engaged in safety planning. 
Pt seen 1:1 to assess for safety and provide him with a space to process his experience. Pt reported that his SI did not improve; with encouragements he endorses a plan of using "all the drugs" and intent is not clear. Pt continues to endorse significant hopelessness, guilt, and self-hatred, which are triggers to his SI. He explains that he wonders "why did I not die and my best friend did," and also that he feels that him going through his pain is like a punishment for his actions.  Pt also said that he sees the images of finding his friends dead, repeatedly, about 4 times a day; he experienced this during this session and appeared absent at that time for a few minutes, he also reports having it whenever he talks about what happens with other providers, and he had to walk out of a group because of the images.  Pt talked about his avoidance coping strategy for the pain and in general through substance use, and expressed clear ambivalence about trying other strategies.  MSE: APPEARANCE:  [x] adequately groomed []disheveled  [] malodorous [] Other:  BEHAVIOR: [x] cooperative [] uncooperative [] good EC [x] poor EC [x] well related [] oddly related [] guarded []PMA [] PMR []abnormal movements [] Other:  SPEED: [] normal rate/rhythm/volume [] loud [] quiet [x] slow  [] rapid [] pressured [] Other:  MOOD: [] euthymic [x] dysphoric []anxious [] irritable [] Other:  AFFECT: [] full [] expansive [x] constricted [] blunted [x] flat [] stable [] labile [] Other:  THOUGHT PROCESS: [x] organized [] disorganized [x] goal-directed [] concrete [x] logical  [] illogical [] circumstantial [] tangential [] impoverished [] effusive [] repetitive [] Other:  THOUGHT CONTENT: [] negative for delusions/suicidal ideation /homicidal ideation  [x] positive for delusions/suicidal ideation/homicidal ideation. Describe: SI  PERCEPTION: [] negative for auditory/ visual hallucinations  [] positive for auditory/ visual hallucinations. Describe:  INSIGHT/JUDGMENT: [] good [x]fair [] poor    IMPULSE CONTROL: [x] good []fair [] poor  COGNITION: [x] alert and oriented to person,time,place Lacks orientation to person/ time/ place. Describe:  Risk assessment as applicable :  [x] suicide [] self-harm [] elopement [] aggression [] Other:  [x] ideation	 [] intent	 [x] plan  [] prior incidences (if checked, elaborate) history of prior attempts  [] family history of suicide	[] family history of aggression  Static factors: hx of prior attempts, hx of substance use  Modifiable factors: current mood state, substance use, lack of coping skills  Protective factors: Identifies reasons for living, has a daughter, domiciled, engaged in safety planning.
Pt seen 1:1 to assess for safety and provide him with a space to process his experience. Pt appears much brighter, complains of 10/10 tooth pain. He thinks that the physical pain and socializing on the unit helped get his mind off of the mourning. He also reports that he does not experience intrusive thoughts about his friends' death, although he believes that this is entirely due to his mind being busy with his physical pain. Pt denies SI, and his CAMS ratings are improved compared to previous week's ratings.  Additionally, pt expressed the fact that he is increasingly aware of the fact that drug use is a problem in his life. Pt engaged in listing pros and cons of engaging in drug use versus not engaging in drug use.  MSE: APPEARANCE:  [x] adequately groomed []disheveled  [] malodorous [] Other:  BEHAVIOR: [x] cooperative [] uncooperative [] good EC [x] poor EC [x] well related [] oddly related [] guarded []PMA [] PMR []abnormal movements [] Other:  SPEED: [] normal rate/rhythm/volume [] loud [] quiet [x] slow  [] rapid [] pressured [] Other:  MOOD: [x] euthymic [] dysphoric []anxious [] irritable [] Other:  AFFECT: [] full [] expansive [x] constricted [] blunted [] flat [] stable [] labile [] Other:  THOUGHT PROCESS: [x] organized [] disorganized [x] goal-directed [] concrete [x] logical  [] illogical [] circumstantial [] tangential [] impoverished [] effusive [] repetitive [] Other:  THOUGHT CONTENT: [x] negative for delusions/suicidal ideation /homicidal ideation  [] positive for delusions/suicidal ideation/homicidal ideation. Describe:   PERCEPTION: [] negative for auditory/ visual hallucinations  [] positive for auditory/ visual hallucinations. Describe:  INSIGHT/JUDGMENT: [] good [x]fair [] poor    IMPULSE CONTROL: [x] good []fair [] poor  COGNITION: [x] alert and oriented to person,time,place Lacks orientation to person/ time/ place. Describe:

## 2021-12-14 NOTE — BH PSYCHOLOGY - CLINICIAN PSYCHOTHERAPY NOTE - NSBHPSYCHOLADDL_PSY_A_CORE
Pt endorses extremely low mood and guilt in context of the death of two friends following OD with substance that pt provided. Pt reports intrusive thoughts (repeated images of finding the friends dead), which should be monitored to assess for the development of PTSD. Pt will benefit from continued assessment and therapy to monitor his SI and provide him with a space to process his complicated mourning.  R/O adjustment disorder  R/O MDE (assess if with psychotic features; per chart the MDD would be part of a schizoaffective disorder)
Acute suicide risk is high because pt endorses strong SI with plan to OD on drugs and unclear level of intent. Pt's mood is extremely low, driving his SI.  Pt will benefit from continued processing of his mourning, monitoring of his intrusive thoughts, and learn coping skills.
Risk assessment: Acute risk is low to moderate, as pt denies SI and seems much brighter. However, pt might be avoiding the emotions that led him to increased SI; therefore, he might experience increased SI in the future and his chronic risk is low to moderate.  Static factors: hx of prior attempts, hx of substance use  Modifiable factors: mood state, substance use, lack of coping skills  Protective factors: Identifies reasons for living, has a daughter, domiciled, engaged in safety planning.

## 2021-12-14 NOTE — PROGRESS NOTE ADULT - SUBJECTIVE AND OBJECTIVE BOX
INTERVAL HPI/OVERNIGHT EVENTS:    VITAL SIGNS:  T(F): 97.6 (12-14-21 @ 09:13)  HR: 73 (12-14-21 @ 09:13)  BP: 114/74 (12-14-21 @ 09:13)  RR: 18 (12-13-21 @ 17:11)  SpO2: 98% (12-14-21 @ 09:13)  Wt(kg): --    PHYSICAL EXAM:        VASCULAR: 2+ radial, DP/PT bilaterally   MEDICATIONS  (STANDING):  bictegravir 50 mG/emtricitabine 200 mG/tenofovir alafenamide 25 mG (BIKTARVY) 1 Tablet(s) Oral daily  escitalopram 20 milliGRAM(s) Oral daily  influenza   Vaccine 0.5 milliLiter(s) IntraMuscular once  prazosin. 1 milliGRAM(s) Oral at bedtime  QUEtiapine 200 milliGRAM(s) Oral at bedtime    MEDICATIONS  (PRN):  acetaminophen     Tablet .. 1000 milliGRAM(s) Oral every 6 hours PRN Moderate Pain (4 - 6)  aluminum hydroxide/magnesium hydroxide/simethicone Suspension 30 milliLiter(s) Oral every 4 hours PRN Dyspepsia  benzocaine 15 mG/menthol 3.6 mG Lozenge 1 Lozenge Oral every 4 hours PRN Sore Throat  benzocaine 20% Spray 1 Spray(s) Topical every 4 hours PRN numbing for gums  calcium carbonate    500 mG (Tums) Chewable 2 Tablet(s) Chew four times a day PRN indigestion  FIRST- Mouthwash  BLM 30 milliLiter(s) Swish and Spit two times a day PRN mouth sores  FIRST- Mouthwash  BLM 15 milliLiter(s) Swish and Spit four times a day PRN Mouth Pain  ibuprofen  Tablet. 600 milliGRAM(s) Oral every 6 hours PRN Moderate Pain (4 - 6)  loperamide 2 milliGRAM(s) Oral two times a day PRN diarrhea  magnesium hydroxide Suspension 30 milliLiter(s) Oral daily PRN Constipation  nicotine  Polacrilex Gum 2 milliGRAM(s) Oral every 2 hours PRN breakthrough cravings  OLANZapine 10 milliGRAM(s) Oral every 8 hours PRN agitation  traZODone 50 milliGRAM(s) Oral at bedtime PRN insomnia      Allergies    penicillin (Unknown)  penicillins (Unknown)    Intolerances        LABS:  none    RADIOLOGY & ADDITIONAL TESTS: Reviewed     INTERVAL HPI/OVERNIGHT EVENTS: Reports mouth pain is relatively the same and not improved compared to yesterday      VITAL SIGNS:  T(F): 97.6 (12-14-21 @ 09:13)  HR: 73 (12-14-21 @ 09:13)  BP: 114/74 (12-14-21 @ 09:13)  RR: 18 (12-13-21 @ 17:11)  SpO2: 98% (12-14-21 @ 09:13)  Wt(kg): --    PHYSICAL EXAM:  GENERAL: In NAD. Lying in bed comfortably.  HEENT: NC/AT. +Apthous ulcers in lower gum  CV: RRR. +S1/S2.   LUNGS: Clear to auscultation b/l. No wheezing  ABDOMEN: Soft, nontender, nondistended  EXT: WWP. No edema in b/l extremities          VASCULAR: 2+ radial, DP/PT bilaterally   MEDICATIONS  (STANDING):  bictegravir 50 mG/emtricitabine 200 mG/tenofovir alafenamide 25 mG (BIKTARVY) 1 Tablet(s) Oral daily  escitalopram 20 milliGRAM(s) Oral daily  influenza   Vaccine 0.5 milliLiter(s) IntraMuscular once  prazosin. 1 milliGRAM(s) Oral at bedtime  QUEtiapine 200 milliGRAM(s) Oral at bedtime    MEDICATIONS  (PRN):  acetaminophen     Tablet .. 1000 milliGRAM(s) Oral every 6 hours PRN Moderate Pain (4 - 6)  aluminum hydroxide/magnesium hydroxide/simethicone Suspension 30 milliLiter(s) Oral every 4 hours PRN Dyspepsia  benzocaine 15 mG/menthol 3.6 mG Lozenge 1 Lozenge Oral every 4 hours PRN Sore Throat  benzocaine 20% Spray 1 Spray(s) Topical every 4 hours PRN numbing for gums  calcium carbonate    500 mG (Tums) Chewable 2 Tablet(s) Chew four times a day PRN indigestion  FIRST- Mouthwash  BLM 30 milliLiter(s) Swish and Spit two times a day PRN mouth sores  FIRST- Mouthwash  BLM 15 milliLiter(s) Swish and Spit four times a day PRN Mouth Pain  ibuprofen  Tablet. 600 milliGRAM(s) Oral every 6 hours PRN Moderate Pain (4 - 6)  loperamide 2 milliGRAM(s) Oral two times a day PRN diarrhea  magnesium hydroxide Suspension 30 milliLiter(s) Oral daily PRN Constipation  nicotine  Polacrilex Gum 2 milliGRAM(s) Oral every 2 hours PRN breakthrough cravings  OLANZapine 10 milliGRAM(s) Oral every 8 hours PRN agitation  traZODone 50 milliGRAM(s) Oral at bedtime PRN insomnia      Allergies    penicillin (Unknown)  penicillins (Unknown)    Intolerances        LABS:  none    RADIOLOGY & ADDITIONAL TESTS: Reviewed

## 2021-12-14 NOTE — BH INPATIENT PSYCHIATRY PROGRESS NOTE - NSBHFUPINTERVALHXFT_PSY_A_CORE
Patient visible on the unit, pacing the halls, is seen in the milieu. He reports continued pain  Patient visible on the unit, pacing the halls at times, observing laughing and talking to select peers, he is seen in the milieu. He reports continued pain, minimally lessened with current pain medication regimen. Sleep last night was poor due to jaw pain. Food intake has not been affected and pt has been eating heartily at each meal time. He endorses rumination regarding his friend who  and states that he really wants to remain sober. Denies si/hi/avh or paranoid ideation.   Pain consult was contacted regarding recs for pain regimen.

## 2021-12-14 NOTE — CONSULT NOTE ADULT - ATTENDING COMMENTS
35y old Male with a history of polysubstance abuse (ETOH, heroin, cocaine), anxiety, depression, schizophrenia, bipolar disorder, HIV admitted for suicidal ideations with reports of mouth pain. Pt evaluated for pain control. Chart reviewed, medications reviewed. Pain control options discussed. All Q's were addressed satisfactorily. We will recommend comprehensive medical regimen and titrate to pain control and side effects. We will follow up as needed. Dr. Milan
35 year old male patient is admitted to psychiatry unit for depression and suicidal ideation is consulted to medicine service for throat pain and erythema of 1 day duration. Patient has history of ETOH abuse, cocaine and heroin abuse, bipolar disorder, anxiety, schizophrenia HIV (on Biktarvy, unknown viral load and CD4 count), depression. Patient notes some tingling sensation of the mouth radiating to ear.    1. Sore throat   differential includes viral, bacterial, Sexually transmitted infection  more likely viral - as patient is afebrile and no exudates but given symptoms and LAP  RVP  Ear nose throat culture  infectious mono screening   GC chlamydia   avoid antimicrobial therapy currently until mono screening is resulted     2. HIV Disease  likely virally suppressed given history  confirm cd4 count and viral load

## 2021-12-14 NOTE — BH PSYCHOLOGY - CLINICIAN PSYCHOTHERAPY NOTE - NSTXDEPRESGOALOTHER_PSY_ALL_CORE
Pt will participate in groups and milieu tx structure of unit
Pt. will participate in groups and milieu treatment structure of the unit

## 2021-12-14 NOTE — CONSULT NOTE ADULT - SUBJECTIVE AND OBJECTIVE BOX
Pain Management Consult Note - [facility] [phone]    Chief Complaint: mouth pain     HPI: Patient seen and examined today. This is a 34 y/o male PMH of polysubstance abuse (ETOH, heroin, cocaine), anxiety, depression, schizophrenia, bipolar disorder, HIV admitted for suicidal ideations with reports of mouth pain. Patient reports he thinks mouth pain is related to his "teeth being messed up" from doing drugs for so long. Patient reports mouth pain feels burning and throbbing, and is all on the right side of the face. Patient remains on Tylenol, Ibuprofen, and mouth wash's that he reports help mildly, but still reports significant pain. Patient denies drug allergies and denies any side effects from current pain regimen.    Pain is __X_ sharp ____dull ___burning ___achy ___ Intensity: ____ mild __X_mod _X__severe     Location ____surgical site ____cervical _____lumbar ____abd ____upper ext____lower ext    Worse with ____activity ___X_movement _____physical therapy___ Rest    Improved with _X___medication ____rest ____physical therapy    ROS: Const:  _N__febrile   Eyes:___ENT:___CV: _N__chest pain  Resp: __N__sob  GI:__N_nausea N___vomiting _N__abd pain ___npo ___clears _Y_full diet __bm  :___ Musk: __Y_pain ___spasm  Skin:___ Neuro:  _N__sedation__N_confusion__N_ numbness _N__weakness __N_paresth  Psych:__anxiety  Endo:___ Heme:___Allergy:___NKDA_____, _N__all others reviewed and negative    PAST MEDICAL & SURGICAL HISTORY:  HIV (human immunodeficiency virus infection)  Bipolar disorder  Schizophrenia, unspecified type  Depression  Anxiety  COVID-19  Cocaine abuse  Alcohol abuse  Heroin abuse  No significant past surgical history    SH: __Y_Tobacco   _Y__Alcohol                          FH:FAMILY HISTORY:  No pertinent family history in first degree relatives    acetaminophen     Tablet .. 1000 milliGRAM(s) Oral every 6 hours PRN  aluminum hydroxide/magnesium hydroxide/simethicone Suspension 30 milliLiter(s) Oral every 4 hours PRN  benzocaine 15 mG/menthol 3.6 mG Lozenge 1 Lozenge Oral every 4 hours PRN  benzocaine 20% Spray 1 Spray(s) Topical every 4 hours PRN  bictegravir 50 mG/emtricitabine 200 mG/tenofovir alafenamide 25 mG (BIKTARVY) 1 Tablet(s) Oral daily  calcium carbonate    500 mG (Tums) Chewable 2 Tablet(s) Chew four times a day PRN  escitalopram 20 milliGRAM(s) Oral daily  FIRST- Mouthwash  BLM 30 milliLiter(s) Swish and Spit two times a day PRN  FIRST- Mouthwash  BLM 15 milliLiter(s) Swish and Spit four times a day PRN  ibuprofen  Tablet. 600 milliGRAM(s) Oral every 6 hours PRN  influenza   Vaccine 0.5 milliLiter(s) IntraMuscular once  loperamide 2 milliGRAM(s) Oral two times a day PRN  magnesium hydroxide Suspension 30 milliLiter(s) Oral daily PRN  nicotine  Polacrilex Gum 2 milliGRAM(s) Oral every 2 hours PRN  OLANZapine 10 milliGRAM(s) Oral every 8 hours PRN  prazosin. 1 milliGRAM(s) Oral at bedtime  QUEtiapine 200 milliGRAM(s) Oral at bedtime  traZODone 50 milliGRAM(s) Oral at bedtime PRN      T(C): 36.4 (12-14-21 @ 09:13), Max: 36.6 (12-13-21 @ 17:11)  HR: 73 (12-14-21 @ 09:13) (61 - 73)  BP: 114/74 (12-14-21 @ 09:13) (114/74 - 141/86)  RR: 18 (12-13-21 @ 17:11) (18 - 18)  SpO2: 98% (12-14-21 @ 09:13) (98% - 98%)  Wt(kg): --    T(C): 36.4 (12-14-21 @ 09:13), Max: 36.6 (12-13-21 @ 17:11)  HR: 73 (12-14-21 @ 09:13) (61 - 73)  BP: 114/74 (12-14-21 @ 09:13) (114/74 - 141/86)  RR: 18 (12-13-21 @ 17:11) (18 - 18)  SpO2: 98% (12-14-21 @ 09:13) (98% - 98%)  Wt(kg): --    T(C): 36.4 (12-14-21 @ 09:13), Max: 36.6 (12-13-21 @ 17:11)  HR: 73 (12-14-21 @ 09:13) (61 - 73)  BP: 114/74 (12-14-21 @ 09:13) (114/74 - 141/86)  RR: 18 (12-13-21 @ 17:11) (18 - 18)  SpO2: 98% (12-14-21 @ 09:13) (98% - 98%)  Wt(kg): --    PHYSICAL EXAM:  Gen Appearance: __X_no acute distress __X_appropriate        Neuro: ___SILT feet____ EOM Intact Psych: AAOX_3_, _X__mood/affect appropriate        Eyes: __X_conjunctiva WNL  _____ Pupils equal and round        ENT: __X_ears and nose atraumatic__X_ Hearing grossly intact        Neck: ___trachea midline, no visible masses ___thyroid without palpable mass    Resp: __X_Nml WOB____No tactile fremitus ___clear to auscultation    Cardio: _X__extremities free from edema ____pedal pulses palpable    GI/Abdomen: ___soft _____ Nontender_____X_Nondistended_____HSM    Lymphatic: ___no palpable nodes in neck  ___no palpable nodes calves and feet    Skin/Wound: ___Incision, ___Dressing c/d/i,   ____surrounding tissues soft,  ___drain/chest tube present____    Muscular: EHL ___/5  Gastrocnemius___/5    _X__absent clubbing/cyanosis      ASSESSMENT: This is a 35y old Male with a history of polysubstance abuse (ETOH, heroin, cocaine), anxiety, depression, schizophrenia, bipolar disorder, HIV admitted for suicidal ideations with reports of mouth pain.    Recommended Treatment PLAN:  1. Consider starting Gabapentin 100 mg PO TID  2. Consider continuing Tylenol 1000 mg PO q6h PRN moderate pain  3. Consider continuing Ibuprofen 600 mg PO q6h PRN moderate pain  Plan discussed with Dr. Milan

## 2021-12-14 NOTE — PROGRESS NOTE ADULT - ATTENDING COMMENTS
Patient discussed with resident team and plan of care reviewed. I have personally reviewed all pertinent labs and imaging and performed an independent history and physical. Resident note personally reviewed, and I agree with above resident note with the following additions:    35 YOM with PMHx ETOH abuse, cocaine and heroin abuse, bipolar disorder, anxiety, schizophrenia, HIV (on Biktarvy, CD4 456, VL 61 this admit), depression who was admitted for SI. Medicine consulted for mouth pain and erythema. Sore throat is improving - likely viral pharyngitis, resolving; however appears to be a component of dental pain as well.    Maxillofacial CT is done, awaiting read. OMFS to evaluate the patient later this week. Follow up read of CT scan.

## 2021-12-14 NOTE — BH INPATIENT PSYCHIATRY PROGRESS NOTE - CURRENT MEDICATION
MEDICATIONS  (STANDING):  bictegravir 50 mG/emtricitabine 200 mG/tenofovir alafenamide 25 mG (BIKTARVY) 1 Tablet(s) Oral daily  escitalopram 20 milliGRAM(s) Oral daily  influenza   Vaccine 0.5 milliLiter(s) IntraMuscular once  prazosin. 1 milliGRAM(s) Oral at bedtime  QUEtiapine 200 milliGRAM(s) Oral at bedtime    MEDICATIONS  (PRN):  acetaminophen     Tablet .. 1000 milliGRAM(s) Oral every 6 hours PRN Moderate Pain (4 - 6)  aluminum hydroxide/magnesium hydroxide/simethicone Suspension 30 milliLiter(s) Oral every 4 hours PRN Dyspepsia  benzocaine 15 mG/menthol 3.6 mG Lozenge 1 Lozenge Oral every 4 hours PRN Sore Throat  benzocaine 20% Spray 1 Spray(s) Topical every 4 hours PRN numbing for gums  calcium carbonate    500 mG (Tums) Chewable 2 Tablet(s) Chew four times a day PRN indigestion  FIRST- Mouthwash  BLM 30 milliLiter(s) Swish and Spit two times a day PRN mouth sores  FIRST- Mouthwash  BLM 15 milliLiter(s) Swish and Spit four times a day PRN Mouth Pain  ibuprofen  Tablet. 600 milliGRAM(s) Oral every 6 hours PRN Moderate Pain (4 - 6)  loperamide 2 milliGRAM(s) Oral two times a day PRN diarrhea  magnesium hydroxide Suspension 30 milliLiter(s) Oral daily PRN Constipation  nicotine  Polacrilex Gum 2 milliGRAM(s) Oral every 2 hours PRN breakthrough cravings  OLANZapine 10 milliGRAM(s) Oral every 8 hours PRN agitation  traZODone 50 milliGRAM(s) Oral at bedtime PRN insomnia   MEDICATIONS  (STANDING):  bictegravir 50 mG/emtricitabine 200 mG/tenofovir alafenamide 25 mG (BIKTARVY) 1 Tablet(s) Oral daily  escitalopram 20 milliGRAM(s) Oral daily  gabapentin 100 milliGRAM(s) Oral three times a day  influenza   Vaccine 0.5 milliLiter(s) IntraMuscular once  prazosin. 1 milliGRAM(s) Oral at bedtime  QUEtiapine 200 milliGRAM(s) Oral at bedtime    MEDICATIONS  (PRN):  acetaminophen     Tablet .. 1000 milliGRAM(s) Oral every 6 hours PRN Moderate Pain (4 - 6)  aluminum hydroxide/magnesium hydroxide/simethicone Suspension 30 milliLiter(s) Oral every 4 hours PRN Dyspepsia  benzocaine 15 mG/menthol 3.6 mG Lozenge 1 Lozenge Oral every 4 hours PRN Sore Throat  benzocaine 20% Spray 1 Spray(s) Topical every 4 hours PRN numbing for gums  calcium carbonate    500 mG (Tums) Chewable 2 Tablet(s) Chew four times a day PRN indigestion  FIRST- Mouthwash  BLM 30 milliLiter(s) Swish and Spit two times a day PRN mouth sores  FIRST- Mouthwash  BLM 15 milliLiter(s) Swish and Spit four times a day PRN Mouth Pain  ibuprofen  Tablet. 600 milliGRAM(s) Oral every 6 hours PRN Moderate Pain (4 - 6)  loperamide 2 milliGRAM(s) Oral two times a day PRN diarrhea  magnesium hydroxide Suspension 30 milliLiter(s) Oral daily PRN Constipation  nicotine  Polacrilex Gum 2 milliGRAM(s) Oral every 2 hours PRN breakthrough cravings  OLANZapine 10 milliGRAM(s) Oral every 8 hours PRN agitation  traZODone 50 milliGRAM(s) Oral at bedtime PRN insomnia

## 2021-12-15 PROCEDURE — 99232 SBSQ HOSP IP/OBS MODERATE 35: CPT | Mod: GC

## 2021-12-15 PROCEDURE — 99233 SBSQ HOSP IP/OBS HIGH 50: CPT

## 2021-12-15 RX ADMIN — QUETIAPINE FUMARATE 200 MILLIGRAM(S): 200 TABLET, FILM COATED ORAL at 22:42

## 2021-12-15 RX ADMIN — Medication 1 MILLIGRAM(S): at 22:42

## 2021-12-15 RX ADMIN — DIPHENHYDRAMINE HYDROCHLORIDE AND LIDOCAINE HYDROCHLORIDE AND ALUMINUM HYDROXIDE AND MAGNESIUM HYDRO 30 MILLILITER(S): KIT at 22:43

## 2021-12-15 RX ADMIN — Medication 600 MILLIGRAM(S): at 16:09

## 2021-12-15 RX ADMIN — GABAPENTIN 100 MILLIGRAM(S): 400 CAPSULE ORAL at 10:08

## 2021-12-15 RX ADMIN — BICTEGRAVIR SODIUM, EMTRICITABINE, AND TENOFOVIR ALAFENAMIDE FUMARATE 1 TABLET(S): 30; 120; 15 TABLET ORAL at 10:08

## 2021-12-15 RX ADMIN — BENZOCAINE AND MENTHOL 1 LOZENGE: 5; 1 LIQUID ORAL at 15:34

## 2021-12-15 RX ADMIN — Medication 1000 MILLIGRAM(S): at 19:06

## 2021-12-15 RX ADMIN — DIPHENHYDRAMINE HYDROCHLORIDE AND LIDOCAINE HYDROCHLORIDE AND ALUMINUM HYDROXIDE AND MAGNESIUM HYDRO 15 MILLILITER(S): KIT at 17:53

## 2021-12-15 RX ADMIN — Medication 1000 MILLIGRAM(S): at 12:33

## 2021-12-15 RX ADMIN — Medication 600 MILLIGRAM(S): at 11:11

## 2021-12-15 RX ADMIN — Medication 50 MILLIGRAM(S): at 22:42

## 2021-12-15 RX ADMIN — ESCITALOPRAM OXALATE 20 MILLIGRAM(S): 10 TABLET, FILM COATED ORAL at 10:08

## 2021-12-15 RX ADMIN — Medication 600 MILLIGRAM(S): at 10:08

## 2021-12-15 RX ADMIN — DIPHENHYDRAMINE HYDROCHLORIDE AND LIDOCAINE HYDROCHLORIDE AND ALUMINUM HYDROXIDE AND MAGNESIUM HYDRO 30 MILLILITER(S): KIT at 15:34

## 2021-12-15 RX ADMIN — Medication 1000 MILLIGRAM(S): at 13:13

## 2021-12-15 RX ADMIN — Medication 600 MILLIGRAM(S): at 23:10

## 2021-12-15 RX ADMIN — DIPHENHYDRAMINE HYDROCHLORIDE AND LIDOCAINE HYDROCHLORIDE AND ALUMINUM HYDROXIDE AND MAGNESIUM HYDRO 15 MILLILITER(S): KIT at 10:08

## 2021-12-15 RX ADMIN — GABAPENTIN 100 MILLIGRAM(S): 400 CAPSULE ORAL at 14:57

## 2021-12-15 RX ADMIN — Medication 600 MILLIGRAM(S): at 22:41

## 2021-12-15 RX ADMIN — GABAPENTIN 100 MILLIGRAM(S): 400 CAPSULE ORAL at 22:41

## 2021-12-15 RX ADMIN — BENZOCAINE AND MENTHOL 1 LOZENGE: 5; 1 LIQUID ORAL at 19:06

## 2021-12-15 NOTE — BH INPATIENT PSYCHIATRY PROGRESS NOTE - CURRENT MEDICATION
MEDICATIONS  (STANDING):  bictegravir 50 mG/emtricitabine 200 mG/tenofovir alafenamide 25 mG (BIKTARVY) 1 Tablet(s) Oral daily  escitalopram 20 milliGRAM(s) Oral daily  gabapentin 100 milliGRAM(s) Oral three times a day  influenza   Vaccine 0.5 milliLiter(s) IntraMuscular once  prazosin. 1 milliGRAM(s) Oral at bedtime  QUEtiapine 200 milliGRAM(s) Oral at bedtime    MEDICATIONS  (PRN):  acetaminophen     Tablet .. 1000 milliGRAM(s) Oral every 6 hours PRN Moderate Pain (4 - 6)  aluminum hydroxide/magnesium hydroxide/simethicone Suspension 30 milliLiter(s) Oral every 4 hours PRN Dyspepsia  benzocaine 15 mG/menthol 3.6 mG Lozenge 1 Lozenge Oral every 4 hours PRN Sore Throat  benzocaine 20% Spray 1 Spray(s) Topical every 4 hours PRN numbing for gums  calcium carbonate    500 mG (Tums) Chewable 2 Tablet(s) Chew four times a day PRN indigestion  FIRST- Mouthwash  BLM 30 milliLiter(s) Swish and Spit two times a day PRN mouth sores  FIRST- Mouthwash  BLM 15 milliLiter(s) Swish and Spit four times a day PRN Mouth Pain  ibuprofen  Tablet. 600 milliGRAM(s) Oral every 6 hours PRN Moderate Pain (4 - 6)  loperamide 2 milliGRAM(s) Oral two times a day PRN diarrhea  magnesium hydroxide Suspension 30 milliLiter(s) Oral daily PRN Constipation  nicotine  Polacrilex Gum 2 milliGRAM(s) Oral every 2 hours PRN breakthrough cravings  OLANZapine 10 milliGRAM(s) Oral every 8 hours PRN agitation  traZODone 50 milliGRAM(s) Oral at bedtime PRN insomnia

## 2021-12-15 NOTE — BH INPATIENT PSYCHIATRY PROGRESS NOTE - NSBHFUPINTERVALHXFT_PSY_A_CORE
Patient visible on the unit in the milieu. Calm and cooperative on approach. He is apologetic for his outburst last night and is grateful for being able to remain in the hospital as he was certain that he would have used drugs and would have been arrested by now. He reports feeling very angry and finding it difficult to be nice. "I feel disgusted when I try to be nice, its easier to be angry' At times ruminates about his best friend. He remains focused on rehab, feeling that this is the best option for him as he wants to stay clean/sober.   He states that today he feels minimal dental pain due to the schedule that he is on with his prn pains. Looking forward to seeing OMFS and plan for extractions. He reports some R wrist pain due to hitting the glass last night in a fit of anger, does have full mobility and range of motion. Denies si/hi/avh paranoid ideation.

## 2021-12-15 NOTE — BH INPATIENT PSYCHIATRY PROGRESS NOTE - NSTXSUICIDGOAL_PSY_ALL_CORE
Be able to state 3 reasons for living

## 2021-12-15 NOTE — BH INPATIENT PSYCHIATRY PROGRESS NOTE - NSTXSUBMISINTERMD_PSY_ALL_CORE
motivational interviewing, naltrexone offered, encourage inpt rehab

## 2021-12-15 NOTE — BH INPATIENT PSYCHIATRY PROGRESS NOTE - NSTXSUBMISDATEEST_PSY_ALL_CORE
06-Dec-2021
29-Nov-2021
29-Nov-2021
06-Dec-2021
13-Dec-2021
29-Nov-2021
02-Dec-2021
06-Dec-2021
29-Nov-2021
06-Dec-2021
13-Dec-2021
06-Dec-2021
13-Dec-2021
29-Nov-2021

## 2021-12-15 NOTE — PROGRESS NOTE ADULT - ATTENDING COMMENTS
Patient discussed with resident team and plan of care reviewed. I have personally reviewed all pertinent labs and imaging and performed an independent history and physical. Resident note personally reviewed, and I agree with above resident note with the following additions:    35 YOM with PMHx ETOH abuse, cocaine and heroin abuse, bipolar disorder, anxiety, schizophrenia, HIV (on Biktarvy, CD4 456, VL 61 this admit), depression who was admitted for SI. Medicine consulted for mouth pain and erythema. Sore throat is improving - likely viral pharyngitis, resolving; however appears to be a component of dental pain as well.    Maxillofacial CT is done, notable for extensive caries. His pain is likely from this. OMFS to evaluate the patient today.     Thank you for inviting us to participate in the care of this patient. Medicine consult to sign off - please feel free to call us back with any questions or concerns.

## 2021-12-15 NOTE — BH INPATIENT PSYCHIATRY PROGRESS NOTE - NSTXDEPRESGOAL_PSY_ALL_CORE
Other...
Attend and participate in at least 2 groups daily despite low mood/energy
Other...
Other...
Attend and participate in at least 2 groups daily despite low mood/energy
Other...
Attend and participate in at least 2 groups daily despite low mood/energy
Will identify thoughts and self-talk that contribute to depression
Other...
Other...

## 2021-12-15 NOTE — BH INPATIENT PSYCHIATRY PROGRESS NOTE - NSBHMSEBEHAV_PSY_A_CORE
Cooperative
Other
Cooperative

## 2021-12-15 NOTE — BH INPATIENT PSYCHIATRY PROGRESS NOTE - NSBHMSETHTCONTENT_PSY_A_CORE
Ruminations/Hopelessness
Ruminations/Hopelessness/Guilt
Ruminations/Hopelessness/Guilt/Suicidality
Ruminations/Hopelessness
Ruminations/Hopelessness/Guilt
Ruminations/Hopelessness
Ruminations/Hopelessness/Guilt
Ruminations/Hopelessness/Guilt
Ruminations/Hopelessness
Ruminations/Hopelessness/Guilt
Ruminations/Hopelessness/Guilt

## 2021-12-15 NOTE — BH INPATIENT PSYCHIATRY PROGRESS NOTE - NSTXSUICIDINTERMD_PSY_ALL_CORE
psychopharm management x 15 min daily, lexapro seroquel, supportive therapy 

## 2021-12-15 NOTE — BH INPATIENT PSYCHIATRY PROGRESS NOTE - NSBHATTESTSEENBY_PSY_A_CORE
NP without Attending Psychiatrist
attending Psychiatrist without NP/Trainee
NP without Attending Psychiatrist

## 2021-12-15 NOTE — BH INPATIENT PSYCHIATRY PROGRESS NOTE - PRN MEDS
MEDICATIONS  (PRN):  acetaminophen     Tablet .. 650 milliGRAM(s) Oral every 6 hours PRN Mild Pain (1 - 3), Moderate Pain (4 - 6)  aluminum hydroxide/magnesium hydroxide/simethicone Suspension 30 milliLiter(s) Oral every 4 hours PRN Dyspepsia  calcium carbonate    500 mG (Tums) Chewable 2 Tablet(s) Chew four times a day PRN indigestion  FIRST- Mouthwash  BLM 30 milliLiter(s) Swish and Spit two times a day PRN mouth sores  FIRST- Mouthwash  BLM 15 milliLiter(s) Swish and Spit four times a day PRN Mouth Pain  ibuprofen  Tablet. 600 milliGRAM(s) Oral every 6 hours PRN Moderate Pain (4 - 6)  loperamide 2 milliGRAM(s) Oral two times a day PRN diarrhea  LORazepam     Tablet 2 milliGRAM(s) Oral every 2 hours PRN CIWA-Ar score increase by 2 points and a total score of 7 or less  magnesium hydroxide Suspension 30 milliLiter(s) Oral daily PRN Constipation  nicotine  Polacrilex Gum 2 milliGRAM(s) Oral every 2 hours PRN breakthrough cravings  OLANZapine 10 milliGRAM(s) Oral every 8 hours PRN agitation  traZODone 50 milliGRAM(s) Oral at bedtime PRN insomnia  
MEDICATIONS  (PRN):  aluminum hydroxide/magnesium hydroxide/simethicone Suspension 30 milliLiter(s) Oral every 4 hours PRN Dyspepsia  ibuprofen  Tablet. 600 milliGRAM(s) Oral every 6 hours PRN Moderate Pain (4 - 6)  loperamide 2 milliGRAM(s) Oral two times a day PRN diarrhea  LORazepam     Tablet 2 milliGRAM(s) Oral every 2 hours PRN CIWA-Ar score increase by 2 points and a total score of 7 or less  magnesium hydroxide Suspension 30 milliLiter(s) Oral daily PRN Constipation  nicotine  Polacrilex Gum 2 milliGRAM(s) Oral every 2 hours PRN breakthrough cravings  OLANZapine 10 milliGRAM(s) Oral every 8 hours PRN agitation  traZODone 50 milliGRAM(s) Oral at bedtime PRN insomnia  
MEDICATIONS  (PRN):  aluminum hydroxide/magnesium hydroxide/simethicone Suspension 30 milliLiter(s) Oral every 4 hours PRN Dyspepsia  ibuprofen  Tablet. 600 milliGRAM(s) Oral every 6 hours PRN Moderate Pain (4 - 6)  loperamide 2 milliGRAM(s) Oral two times a day PRN diarrhea  LORazepam     Tablet 2 milliGRAM(s) Oral every 2 hours PRN CIWA-Ar score increase by 2 points and a total score of 7 or less  magnesium hydroxide Suspension 30 milliLiter(s) Oral daily PRN Constipation  nicotine  Polacrilex Gum 2 milliGRAM(s) Oral every 2 hours PRN breakthrough cravings  OLANZapine 10 milliGRAM(s) Oral every 8 hours PRN agitation  traZODone 50 milliGRAM(s) Oral at bedtime PRN insomnia  
MEDICATIONS  (PRN):  acetaminophen     Tablet .. 1000 milliGRAM(s) Oral every 6 hours PRN Moderate Pain (4 - 6)  aluminum hydroxide/magnesium hydroxide/simethicone Suspension 30 milliLiter(s) Oral every 4 hours PRN Dyspepsia  benzocaine 15 mG/menthol 3.6 mG Lozenge 1 Lozenge Oral every 4 hours PRN Sore Throat  benzocaine 20% Spray 1 Spray(s) Topical every 4 hours PRN numbing for gums  calcium carbonate    500 mG (Tums) Chewable 2 Tablet(s) Chew four times a day PRN indigestion  FIRST- Mouthwash  BLM 30 milliLiter(s) Swish and Spit two times a day PRN mouth sores  FIRST- Mouthwash  BLM 15 milliLiter(s) Swish and Spit four times a day PRN Mouth Pain  ibuprofen  Tablet. 600 milliGRAM(s) Oral every 6 hours PRN Moderate Pain (4 - 6)  loperamide 2 milliGRAM(s) Oral two times a day PRN diarrhea  magnesium hydroxide Suspension 30 milliLiter(s) Oral daily PRN Constipation  nicotine  Polacrilex Gum 2 milliGRAM(s) Oral every 2 hours PRN breakthrough cravings  OLANZapine 10 milliGRAM(s) Oral every 8 hours PRN agitation  traZODone 50 milliGRAM(s) Oral at bedtime PRN insomnia  
MEDICATIONS  (PRN):  acetaminophen     Tablet .. 650 milliGRAM(s) Oral every 6 hours PRN Mild Pain (1 - 3), Moderate Pain (4 - 6)  aluminum hydroxide/magnesium hydroxide/simethicone Suspension 30 milliLiter(s) Oral every 4 hours PRN Dyspepsia  calcium carbonate    500 mG (Tums) Chewable 2 Tablet(s) Chew four times a day PRN indigestion  FIRST- Mouthwash  BLM 15 milliLiter(s) Swish and Spit four times a day PRN Mouth Pain  ibuprofen  Tablet. 600 milliGRAM(s) Oral every 6 hours PRN Moderate Pain (4 - 6)  loperamide 2 milliGRAM(s) Oral two times a day PRN diarrhea  LORazepam     Tablet 2 milliGRAM(s) Oral every 2 hours PRN CIWA-Ar score increase by 2 points and a total score of 7 or less  magnesium hydroxide Suspension 30 milliLiter(s) Oral daily PRN Constipation  nicotine  Polacrilex Gum 2 milliGRAM(s) Oral every 2 hours PRN breakthrough cravings  OLANZapine 10 milliGRAM(s) Oral every 8 hours PRN agitation  traZODone 50 milliGRAM(s) Oral at bedtime PRN insomnia  
MEDICATIONS  (PRN):  acetaminophen     Tablet .. 650 milliGRAM(s) Oral every 6 hours PRN Mild Pain (1 - 3), Moderate Pain (4 - 6)  aluminum hydroxide/magnesium hydroxide/simethicone Suspension 30 milliLiter(s) Oral every 4 hours PRN Dyspepsia  calcium carbonate    500 mG (Tums) Chewable 2 Tablet(s) Chew four times a day PRN indigestion  FIRST- Mouthwash  BLM 15 milliLiter(s) Swish and Spit four times a day PRN Mouth Pain  ibuprofen  Tablet. 600 milliGRAM(s) Oral every 6 hours PRN Moderate Pain (4 - 6)  loperamide 2 milliGRAM(s) Oral two times a day PRN diarrhea  LORazepam     Tablet 2 milliGRAM(s) Oral every 2 hours PRN CIWA-Ar score increase by 2 points and a total score of 7 or less  magnesium hydroxide Suspension 30 milliLiter(s) Oral daily PRN Constipation  nicotine  Polacrilex Gum 2 milliGRAM(s) Oral every 2 hours PRN breakthrough cravings  OLANZapine 10 milliGRAM(s) Oral every 8 hours PRN agitation  traZODone 50 milliGRAM(s) Oral at bedtime PRN insomnia  
MEDICATIONS  (PRN):  aluminum hydroxide/magnesium hydroxide/simethicone Suspension 30 milliLiter(s) Oral every 4 hours PRN Dyspepsia  ibuprofen  Tablet. 600 milliGRAM(s) Oral every 6 hours PRN Moderate Pain (4 - 6)  LORazepam     Tablet 2 milliGRAM(s) Oral every 2 hours PRN CIWA-Ar score increase by 2 points and a total score of 7 or less  magnesium hydroxide Suspension 30 milliLiter(s) Oral daily PRN Constipation  nicotine  Polacrilex Gum 2 milliGRAM(s) Oral every 2 hours PRN breakthrough cravings  OLANZapine 10 milliGRAM(s) Oral every 8 hours PRN agitation  traZODone 50 milliGRAM(s) Oral at bedtime PRN insomnia  
MEDICATIONS  (PRN):  acetaminophen     Tablet .. 650 milliGRAM(s) Oral every 6 hours PRN Mild Pain (1 - 3), Moderate Pain (4 - 6)  acetaminophen     Tablet .. 325 milliGRAM(s) Oral every 4 hours PRN Severe Pain (7 - 10)  aluminum hydroxide/magnesium hydroxide/simethicone Suspension 30 milliLiter(s) Oral every 4 hours PRN Dyspepsia  benzocaine 20% Spray 1 Spray(s) Topical every 4 hours PRN numbing for gums  calcium carbonate    500 mG (Tums) Chewable 2 Tablet(s) Chew four times a day PRN indigestion  FIRST- Mouthwash  BLM 15 milliLiter(s) Swish and Spit four times a day PRN Mouth Pain  FIRST- Mouthwash  BLM 30 milliLiter(s) Swish and Spit two times a day PRN mouth sores  ibuprofen  Tablet. 600 milliGRAM(s) Oral every 6 hours PRN Moderate Pain (4 - 6)  loperamide 2 milliGRAM(s) Oral two times a day PRN diarrhea  magnesium hydroxide Suspension 30 milliLiter(s) Oral daily PRN Constipation  nicotine  Polacrilex Gum 2 milliGRAM(s) Oral every 2 hours PRN breakthrough cravings  OLANZapine 10 milliGRAM(s) Oral every 8 hours PRN agitation  traZODone 50 milliGRAM(s) Oral at bedtime PRN insomnia  
MEDICATIONS  (PRN):  acetaminophen     Tablet .. 1000 milliGRAM(s) Oral every 6 hours PRN Moderate Pain (4 - 6)  aluminum hydroxide/magnesium hydroxide/simethicone Suspension 30 milliLiter(s) Oral every 4 hours PRN Dyspepsia  benzocaine 15 mG/menthol 3.6 mG Lozenge 1 Lozenge Oral every 4 hours PRN Sore Throat  benzocaine 20% Spray 1 Spray(s) Topical every 4 hours PRN numbing for gums  calcium carbonate    500 mG (Tums) Chewable 2 Tablet(s) Chew four times a day PRN indigestion  FIRST- Mouthwash  BLM 30 milliLiter(s) Swish and Spit two times a day PRN mouth sores  FIRST- Mouthwash  BLM 15 milliLiter(s) Swish and Spit four times a day PRN Mouth Pain  ibuprofen  Tablet. 600 milliGRAM(s) Oral every 6 hours PRN Moderate Pain (4 - 6)  loperamide 2 milliGRAM(s) Oral two times a day PRN diarrhea  magnesium hydroxide Suspension 30 milliLiter(s) Oral daily PRN Constipation  nicotine  Polacrilex Gum 2 milliGRAM(s) Oral every 2 hours PRN breakthrough cravings  OLANZapine 10 milliGRAM(s) Oral every 8 hours PRN agitation  traZODone 50 milliGRAM(s) Oral at bedtime PRN insomnia  
MEDICATIONS  (PRN):  acetaminophen     Tablet .. 1000 milliGRAM(s) Oral every 6 hours PRN Moderate Pain (4 - 6)  aluminum hydroxide/magnesium hydroxide/simethicone Suspension 30 milliLiter(s) Oral every 4 hours PRN Dyspepsia  benzocaine 15 mG/menthol 3.6 mG Lozenge 1 Lozenge Oral every 4 hours PRN Sore Throat  benzocaine 20% Spray 1 Spray(s) Topical every 4 hours PRN numbing for gums  calcium carbonate    500 mG (Tums) Chewable 2 Tablet(s) Chew four times a day PRN indigestion  FIRST- Mouthwash  BLM 30 milliLiter(s) Swish and Spit two times a day PRN mouth sores  FIRST- Mouthwash  BLM 15 milliLiter(s) Swish and Spit four times a day PRN Mouth Pain  ibuprofen  Tablet. 600 milliGRAM(s) Oral every 6 hours PRN Moderate Pain (4 - 6)  loperamide 2 milliGRAM(s) Oral two times a day PRN diarrhea  magnesium hydroxide Suspension 30 milliLiter(s) Oral daily PRN Constipation  nicotine  Polacrilex Gum 2 milliGRAM(s) Oral every 2 hours PRN breakthrough cravings  OLANZapine 10 milliGRAM(s) Oral every 8 hours PRN agitation  traZODone 50 milliGRAM(s) Oral at bedtime PRN insomnia  
MEDICATIONS  (PRN):  aluminum hydroxide/magnesium hydroxide/simethicone Suspension 30 milliLiter(s) Oral every 4 hours PRN Dyspepsia  calcium carbonate    500 mG (Tums) Chewable 2 Tablet(s) Chew four times a day PRN indigestion  ibuprofen  Tablet. 600 milliGRAM(s) Oral every 6 hours PRN Moderate Pain (4 - 6)  loperamide 2 milliGRAM(s) Oral two times a day PRN diarrhea  LORazepam     Tablet 2 milliGRAM(s) Oral every 2 hours PRN CIWA-Ar score increase by 2 points and a total score of 7 or less  magnesium hydroxide Suspension 30 milliLiter(s) Oral daily PRN Constipation  nicotine  Polacrilex Gum 2 milliGRAM(s) Oral every 2 hours PRN breakthrough cravings  OLANZapine 10 milliGRAM(s) Oral every 8 hours PRN agitation  traZODone 50 milliGRAM(s) Oral at bedtime PRN insomnia  
MEDICATIONS  (PRN):  aluminum hydroxide/magnesium hydroxide/simethicone Suspension 30 milliLiter(s) Oral every 4 hours PRN Dyspepsia  calcium carbonate    500 mG (Tums) Chewable 2 Tablet(s) Chew four times a day PRN indigestion  ibuprofen  Tablet. 600 milliGRAM(s) Oral every 6 hours PRN Moderate Pain (4 - 6)  loperamide 2 milliGRAM(s) Oral two times a day PRN diarrhea  LORazepam     Tablet 2 milliGRAM(s) Oral every 2 hours PRN CIWA-Ar score increase by 2 points and a total score of 7 or less  magnesium hydroxide Suspension 30 milliLiter(s) Oral daily PRN Constipation  nicotine  Polacrilex Gum 2 milliGRAM(s) Oral every 2 hours PRN breakthrough cravings  OLANZapine 10 milliGRAM(s) Oral every 8 hours PRN agitation  traZODone 50 milliGRAM(s) Oral at bedtime PRN insomnia  
MEDICATIONS  (PRN):  acetaminophen     Tablet .. 650 milliGRAM(s) Oral every 6 hours PRN Mild Pain (1 - 3), Moderate Pain (4 - 6)  aluminum hydroxide/magnesium hydroxide/simethicone Suspension 30 milliLiter(s) Oral every 4 hours PRN Dyspepsia  calcium carbonate    500 mG (Tums) Chewable 2 Tablet(s) Chew four times a day PRN indigestion  FIRST- Mouthwash  BLM 15 milliLiter(s) Swish and Spit four times a day PRN Mouth Pain  FIRST- Mouthwash  BLM 30 milliLiter(s) Swish and Spit two times a day PRN mouth sores  ibuprofen  Tablet. 600 milliGRAM(s) Oral every 6 hours PRN Moderate Pain (4 - 6)  loperamide 2 milliGRAM(s) Oral two times a day PRN diarrhea  LORazepam     Tablet 2 milliGRAM(s) Oral every 2 hours PRN CIWA-Ar score increase by 2 points and a total score of 7 or less  magnesium hydroxide Suspension 30 milliLiter(s) Oral daily PRN Constipation  nicotine  Polacrilex Gum 2 milliGRAM(s) Oral every 2 hours PRN breakthrough cravings  OLANZapine 10 milliGRAM(s) Oral every 8 hours PRN agitation  traZODone 50 milliGRAM(s) Oral at bedtime PRN insomnia  
MEDICATIONS  (PRN):  aluminum hydroxide/magnesium hydroxide/simethicone Suspension 30 milliLiter(s) Oral every 4 hours PRN Dyspepsia  ibuprofen  Tablet. 600 milliGRAM(s) Oral every 6 hours PRN Moderate Pain (4 - 6)  LORazepam     Tablet 2 milliGRAM(s) Oral every 2 hours PRN CIWA-Ar score increase by 2 points and a total score of 7 or less  magnesium hydroxide Suspension 30 milliLiter(s) Oral daily PRN Constipation  nicotine  Polacrilex Gum 2 milliGRAM(s) Oral every 2 hours PRN breakthrough cravings  OLANZapine 10 milliGRAM(s) Oral every 8 hours PRN agitation  traZODone 50 milliGRAM(s) Oral at bedtime PRN insomnia

## 2021-12-15 NOTE — BH INPATIENT PSYCHIATRY PROGRESS NOTE - NSBHMETABOLIC_PSY_ALL_CORE_FT
BMI: BMI (kg/m2): 22.3 (11-26-21 @ 15:39)  HbA1c: A1C with Estimated Average Glucose Result: 5.3 % (11-30-21 @ 12:43)    Glucose: POCT Blood Glucose.: 96 mg/dL (05-08-21 @ 13:01)    BP: 124/84 (12-06-21 @ 09:00) (98/65 - 132/85)  Lipid Panel: Date/Time: 11-30-21 @ 12:43  Cholesterol, Serum: 185  Direct LDL: --  HDL Cholesterol, Serum: 56  Total Cholesterol/HDL Ration Measurement: --  Triglycerides, Serum: 159  
BMI: BMI (kg/m2): 22.3 (11-26-21 @ 15:39)  HbA1c: A1C with Estimated Average Glucose Result: 5.3 % (11-30-21 @ 12:43)    Glucose: POCT Blood Glucose.: 96 mg/dL (05-08-21 @ 13:01)    BP: 141/86 (12-13-21 @ 17:11) (112/73 - 141/86)  Lipid Panel: Date/Time: 11-30-21 @ 12:43  Cholesterol, Serum: 185  Direct LDL: --  HDL Cholesterol, Serum: 56  Total Cholesterol/HDL Ration Measurement: --  Triglycerides, Serum: 159  
BMI: BMI (kg/m2): 22.3 (11-26-21 @ 15:39)  HbA1c: A1C with Estimated Average Glucose Result: 5.4 % (11-27-21 @ 07:45)    Glucose: POCT Blood Glucose.: 96 mg/dL (05-08-21 @ 13:01)    BP: 106/96 (11-29-21 @ 09:00) (104/60 - 128/82)  Lipid Panel: Date/Time: 11-27-21 @ 07:45  Cholesterol, Serum: 158  Direct LDL: --  HDL Cholesterol, Serum: 55  Total Cholesterol/HDL Ration Measurement: --  Triglycerides, Serum: 105  
BMI: BMI (kg/m2): 22.3 (11-26-21 @ 15:39)  HbA1c: A1C with Estimated Average Glucose Result: 5.3 % (11-30-21 @ 12:43)    Glucose: POCT Blood Glucose.: 96 mg/dL (05-08-21 @ 13:01)    BP: 113/62 (11-30-21 @ 09:12) (103/66 - 128/82)  Lipid Panel: Date/Time: 11-30-21 @ 12:43  Cholesterol, Serum: 185  Direct LDL: --  HDL Cholesterol, Serum: 56  Total Cholesterol/HDL Ration Measurement: --  Triglycerides, Serum: 159  
BMI: BMI (kg/m2): 22.3 (11-26-21 @ 15:39)  HbA1c: A1C with Estimated Average Glucose Result: 5.3 % (11-30-21 @ 12:43)    Glucose: POCT Blood Glucose.: 96 mg/dL (05-08-21 @ 13:01)    BP: 118/73 (12-10-21 @ 16:29) (109/74 - 122/77)  Lipid Panel: Date/Time: 11-30-21 @ 12:43  Cholesterol, Serum: 185  Direct LDL: --  HDL Cholesterol, Serum: 56  Total Cholesterol/HDL Ration Measurement: --  Triglycerides, Serum: 159  
BMI: BMI (kg/m2): 22.3 (11-26-21 @ 15:39)  HbA1c: A1C with Estimated Average Glucose Result: 5.4 % (11-27-21 @ 07:45)    Glucose: POCT Blood Glucose.: 96 mg/dL (05-08-21 @ 13:01)    BP: 110/76 (11-28-21 @ 09:00) (102/58 - 128/82)  Lipid Panel: Date/Time: 11-27-21 @ 07:45  Cholesterol, Serum: 158  Direct LDL: --  HDL Cholesterol, Serum: 55  Total Cholesterol/HDL Ration Measurement: --  Triglycerides, Serum: 105  
BMI: BMI (kg/m2): 22.3 (11-26-21 @ 15:39)  HbA1c: A1C with Estimated Average Glucose Result: 5.3 % (11-30-21 @ 12:43)    Glucose: POCT Blood Glucose.: 96 mg/dL (05-08-21 @ 13:01)    BP: 109/74 (12-09-21 @ 09:15) (109/74 - 122/77)  Lipid Panel: Date/Time: 11-30-21 @ 12:43  Cholesterol, Serum: 185  Direct LDL: --  HDL Cholesterol, Serum: 56  Total Cholesterol/HDL Ration Measurement: --  Triglycerides, Serum: 159  
BMI: BMI (kg/m2): 22.3 (11-26-21 @ 15:39)  HbA1c: A1C with Estimated Average Glucose Result: 5.3 % (11-30-21 @ 12:43)    Glucose: POCT Blood Glucose.: 96 mg/dL (05-08-21 @ 13:01)    BP: 109/39 (12-02-21 @ 08:56) (103/66 - 122/78)  Lipid Panel: Date/Time: 11-30-21 @ 12:43  Cholesterol, Serum: 185  Direct LDL: --  HDL Cholesterol, Serum: 56  Total Cholesterol/HDL Ration Measurement: --  Triglycerides, Serum: 159  
BMI: BMI (kg/m2): 22.3 (11-26-21 @ 15:39)  HbA1c: A1C with Estimated Average Glucose Result: 5.3 % (11-30-21 @ 12:43)    Glucose: POCT Blood Glucose.: 96 mg/dL (05-08-21 @ 13:01)    BP: 114/74 (12-14-21 @ 09:13) (112/73 - 141/86)  Lipid Panel: Date/Time: 11-30-21 @ 12:43  Cholesterol, Serum: 185  Direct LDL: --  HDL Cholesterol, Serum: 56  Total Cholesterol/HDL Ration Measurement: --  Triglycerides, Serum: 159  
BMI: BMI (kg/m2): 22.3 (11-26-21 @ 15:39)  HbA1c: A1C with Estimated Average Glucose Result: 5.3 % (11-30-21 @ 12:43)    Glucose: POCT Blood Glucose.: 96 mg/dL (05-08-21 @ 13:01)    BP: 110/69 (12-08-21 @ 09:00) (106/68 - 132/85)  Lipid Panel: Date/Time: 11-30-21 @ 12:43  Cholesterol, Serum: 185  Direct LDL: --  HDL Cholesterol, Serum: 56  Total Cholesterol/HDL Ration Measurement: --  Triglycerides, Serum: 159  
BMI: BMI (kg/m2): 22.3 (11-26-21 @ 15:39)  HbA1c: A1C with Estimated Average Glucose Result: 5.3 % (11-30-21 @ 12:43)    Glucose: POCT Blood Glucose.: 96 mg/dL (05-08-21 @ 13:01)    BP: 122/78 (12-01-21 @ 08:45) (103/66 - 126/79)  Lipid Panel: Date/Time: 11-30-21 @ 12:43  Cholesterol, Serum: 185  Direct LDL: --  HDL Cholesterol, Serum: 56  Total Cholesterol/HDL Ration Measurement: --  Triglycerides, Serum: 159  
BMI: BMI (kg/m2): 22.3 (11-26-21 @ 15:39)  HbA1c: A1C with Estimated Average Glucose Result: 5.3 % (11-30-21 @ 12:43)    Glucose: POCT Blood Glucose.: 96 mg/dL (05-08-21 @ 13:01)    BP: 118/73 (12-07-21 @ 10:22) (98/65 - 132/85)  Lipid Panel: Date/Time: 11-30-21 @ 12:43  Cholesterol, Serum: 185  Direct LDL: --  HDL Cholesterol, Serum: 56  Total Cholesterol/HDL Ration Measurement: --  Triglycerides, Serum: 159  
BMI: BMI (kg/m2): 22.3 (11-26-21 @ 15:39)  HbA1c: A1C with Estimated Average Glucose Result: 5.3 % (11-30-21 @ 12:43)    Glucose: POCT Blood Glucose.: 96 mg/dL (05-08-21 @ 13:01)    BP: 104/71 (12-15-21 @ 09:00) (104/71 - 141/86)  Lipid Panel: Date/Time: 11-30-21 @ 12:43  Cholesterol, Serum: 185  Direct LDL: --  HDL Cholesterol, Serum: 56  Total Cholesterol/HDL Ration Measurement: --  Triglycerides, Serum: 159  
BMI: BMI (kg/m2): 22.3 (11-26-21 @ 15:39)  HbA1c: A1C with Estimated Average Glucose Result: 5.3 % (11-30-21 @ 12:43)    Glucose: POCT Blood Glucose.: 96 mg/dL (05-08-21 @ 13:01)    BP: 113/73 (12-03-21 @ 09:00) (103/66 - 130/78)  Lipid Panel: Date/Time: 11-30-21 @ 12:43  Cholesterol, Serum: 185  Direct LDL: --  HDL Cholesterol, Serum: 56  Total Cholesterol/HDL Ration Measurement: --  Triglycerides, Serum: 159

## 2021-12-15 NOTE — PROGRESS NOTE ADULT - ASSESSMENT
Mr. Hernandez is a 34 y/o M with PMHx ETOH abuse, cocaine and heroin abuse, bipolar disorder, anxiety, schizophrenia HIV (on Biktarvy, unknown viral load and CD4 count), depression who was BIB self for suicidal ideations. Medicine consulted for mouth pain and erythema.     #mouth pain  On initial eval for sore throat, centor criteria 2 (lack of cough, lymphadenopathy). no systemic signs of infection, no fevers, no leukocytosis.  -Workup thus far negative: rapid strep negative, mono screen negative, RVP negative. oral gonorrhea/chlamydia negative. Mononucleosis screen negative  -oropharynx not malodorous, poor dentition with extensive dental carries and missing teeth. no gross abscesses or drainage.  -not immunocompromised, no evidence of thrush  -oral culture +group c strep which is normal part of oral sayra. no exudate or systemic symptoms concerning for strep pharyngitis therefore no indication to target with abx at this time.  -most likely viral pharyngitis. pt likely has dental issues causing worsening mouth pain.  Recommendations:    -primary team to call OMFS consult (oral maxillofacial surgery)    -c/w benzocaine 20% spray for oral pain    -c/w supportive care: encourage hydration, cepacol lozenge, PRN magic mouthwash, Tylenol as needed for pain/fever    -pain control per primary team    -pt should have dental evaluation after discharge    #HIV  -reportedly compliant with Biktarvy, however reports that he does not have good follow up with HIV physician. Previously with [facility].  -pt cannot recall most recent CD4/viral load. Unknown OI history though had prolonged hospital stay (1.5 months) for "pneumonia". pt reports lyla CD4 ~95.  -CD4 456, viral load undetectable  Recommendations:    -c/w Biktarvy    -pt requesting to be provided with his viral load and cd4 after discharge    *Additional recommendations:    -for nicotine replacement therapy, consider changing gum to patch in light of patient's poor dentition and mouth pain    Patient seen with internal medicine attending (Dr. Rizvi). Plan discussed with attending and primary team.  Will continue to follow.
Mr. Hernandez is a 36 y/o M with PMHx ETOH abuse, cocaine and heroin abuse, bipolar disorder, anxiety, schizophrenia HIV (on Biktarvy, unknown viral load and CD4 count), depression who was BIB self for suicidal ideations. Medicine consulted for mouth pain and erythema - likely viral pharyngitis.    
Mr. Hernandez is a 36 y/o M with PMHx ETOH abuse, cocaine and heroin abuse, bipolar disorder, anxiety, schizophrenia HIV (on Biktarvy, unknown viral load and CD4 count), depression who was BIB self for suicidal ideations. Medicine consulted for mouth pain and erythema.     #Erythema of oropharynx.   On intial eval, centor criteria 2 (lack of cough, lymphadenopathy). no systemic signs of infection, no fevers, no leukocytosis.  -Workup thus far negative: rapid strep negative, mono screen negative, RVP negative. oral gonorrhea/chlamydia negative. Mononucleosis screen negative  -oropharynx not malodorous, poor dentition with extrensive dental carries and missing teeth. no gross abscesses or drainage.  -oral culture +group c strep which is normal part of oral sayra. no exudate or systemic symptoms concerning for strep pharyngitis therefore no indication to target with abx at this time.  -most likely viral pharyngitis  Recommendations:    -c/w supportive care: encourage hydration, cepacol lozenge, PRN magic mouthwash, Tylenol as needed for pain/fever    -pt should have dental evaluation after discharge    #HIV  -reportedly compliant with Biktarvy, however reports that he does not have good follow up with HIV physician. Previously with [facility].  -pt cannot recall most recent CD4/viral load. Unknown OI history though had prolonged hospital stay (1.5 months) for "pneumonia". pt reports lyla CD4 ~95.  -CD4 456, viral load detectable at 61  Recommendations:    -c/w Biktarvy    -pt requesting to be provided with his viral load and cd4 after discharge    Patient seen with internal medicine attending (Dr. Rizvi). Plan discussed with attending and primary team.  Will continue to follow.
Mr. Hernandez is a 36 y/o M with PMHx ETOH abuse, cocaine and heroin abuse, bipolar disorder, anxiety, schizophrenia HIV (on Biktarvy, unknown viral load and CD4 count), depression who was BIB self for suicidal ideations. Medicine consulted for mouth pain and erythema. Pain most likely due to poor dentition, pending evaluation by OMFS with possible extractions.    #Oral pain  Likely viral pharyngitis. Additionally, patient likely has dental issues causing worsening mouth pain. OMFS was consulted by primary team and will see patient today    Recommendations:    -f/u on OMFS consult (oral maxillofacial surgery)    -c/w gabapentin as per pain management recs    -c/w supportive care: encourage hydration, cepacol lozenge, PRN magic mouthwash, Tylenol as needed for pain/fever    -pt should have dental evaluation after discharge    #HIV  -CD4 456, viral load undetectable  -reportedly compliant with Biktarvy, however reports that he does not have good follow up with HIV physician. Previously with [facility].    Recommendations:    -c/w Biktarvy    -pt requesting to be provided with his viral load and cd4 after discharge      Plan discussed with attending Dr. Soto  Will sign off. Please reconsult with any questions.
Mr. Hernandez is a 34 y/o M with PMHx ETOH abuse, cocaine and heroin abuse, bipolar disorder, anxiety, schizophrenia HIV (on Biktarvy, unknown viral load and CD4 count), depression who was BIB self for suicidal ideations. Medicine consulted for mouth pain and erythema. Pain most likely due to poor dentition, pending evaluation by OMFS with possible extractions.    #Oral pain  Likely viral pharyngitis. Additionally, patient likely has dental issues causing worsening mouth pain. OMFS to be consulted by primary team    Recommendations:    -f/u on OMFS consult (oral maxillofacial surgery)    -c/w benzocaine 20% spray for oral pain    -c/w supportive care: encourage hydration, cepacol lozenge, PRN magic mouthwash, Tylenol as needed for pain/fever    -pain control per primary team    -pt should have dental evaluation after discharge    #HIV  -CD4 456, viral load undetectable  -reportedly compliant with Biktarvy, however reports that he does not have good follow up with HIV physician. Previously with [facility].    Recommendations:    -c/w Biktarvy    -pt requesting to be provided with his viral load and cd4 after discharge      Plan discussed with attending Dr. Soto  Will continue to follow.  
Mr. Hernandez is a 36 y/o M with PMHx ETOH abuse, cocaine and heroin abuse, bipolar disorder, anxiety, schizophrenia HIV (on Biktarvy, unknown viral load and CD4 count), depression who was BIB self for suicidal ideations. Medicine consulted for mouth pain and erythema.     #Erythema of oropharynx.   On intial eval, centor criteria 2 (lack of cough, lymphadenopathy). no systemic signs of infection, no fevers, no leukocytosis.  -Workup thus far negative: rapid strep negative, mono screen negative, RVP negative. oral gonorrhea/chlamydia negative. Mononucleosis screen negative  -oropharynx not malodorous, poor dentition with extrensive dental carries and missing teeth. no gross abscesses or drainage.  -most likely viral pharyngitis  Recommendations:    -c/w supportive care: encourage hydration, cepacol lozenge, PRN magic mouthwash, Tylenol as needed for pain/fever    -Collected and sent to lab - Rapid Group A Strep - 2 swabs, ENT culture     -pt should have dental evaluation after discharge    #HIV  -reportedly compliant with Biktarvy, however reports that he does not have good follow up with HIV physician. Previously with [email or web address]  -pt cannot recall most recent CD4/viral load. Unknown OI history though had prolonged hospital stay (1.5 months) for "pneumonia". pt reports lyla CD4 ~95.  -CD4 456  Recommendations:    -c/w Biktarvy    -f/u viral load    -pt requesting to be provided with his viral load and cd4 after discharge    Patient seen with internal medicine attending (Dr. Rizvi). Plan discussed with attending and primary team.  Will continue to follow.
Mr. Hernandez is a 36 y/o M with PMHx ETOH abuse, cocaine and heroin abuse, bipolar disorder, anxiety, schizophrenia HIV (on Biktarvy, unknown viral load and CD4 count), depression who was BIB self for suicidal ideations. Medicine consulted for mouth pain and erythema.     #mouth pain  On initial eval for sore throat, centor criteria 2 (lack of cough, lymphadenopathy). no systemic signs of infection, no fevers, no leukocytosis.  -Workup thus far negative: rapid strep negative, mono screen negative, RVP negative. oral gonorrhea/chlamydia negative. Mononucleosis screen negative  -oropharynx not malodorous, poor dentition with extensive dental carries and missing teeth. no gross abscesses or drainage.  -not immunocompromised, no evidence of thrush  -oral culture +group c strep which is normal part of oral sayra. no exudate or systemic symptoms concerning for strep pharyngitis therefore no indication to target with abx at this time.  -most likely viral pharyngitis  -TODAY: pain is constant, progressively worsening throughout the day. he says that he has a stinging burning and throbbing pain in his R lower jaw and teeth and extends up along his jaw. Says the pain feels similar to when he was hospitalized at [facility] recently and that 2 teeth needed to be pulled.  Recommendations:    -primary team to call OMFS consult (oral maxillofacial surgery)    -can try benzocaine 20% spray for oral pain    -c/w supportive care: encourage hydration, cepacol lozenge, PRN magic mouthwash, Tylenol as needed for pain/fever    -pt should have dental evaluation after discharge    #HIV  -reportedly compliant with Biktarvy, however reports that he does not have good follow up with HIV physician. Previously with [facility].  -pt cannot recall most recent CD4/viral load. Unknown OI history though had prolonged hospital stay (1.5 months) for "pneumonia". pt reports lyla CD4 ~95.  -CD4 456, viral load undetectable  Recommendations:    -c/w Biktarvy    -pt requesting to be provided with his viral load and cd4 after discharge    Patient seen with internal medicine attending (Dr. Rizvi). Plan discussed with attending and primary team.  Will continue to follow.
Mr. Hernandez is a 34 y/o M with PMHx ETOH abuse, cocaine and heroin abuse, bipolar disorder, anxiety, schizophrenia HIV (on Biktarvy, unknown viral load and CD4 count), depression who was BIB self for suicidal ideations. Medicine consulted for mouth pain and erythema. Pain most likely due to poor dentition, pending evaluation by OMFS with possible extractions.    #mouth pain  On initial eval for sore throat, centor criteria 2 (lack of cough, lymphadenopathy). no systemic signs of infection, no fevers, no leukocytosis.  -Workup thus far negative: rapid strep negative, mono screen negative, RVP negative. oral gonorrhea/chlamydia negative. Mononucleosis screen negative  -oropharynx not malodorous, poor dentition with extensive dental carries and missing teeth. no gross abscesses or drainage.  -not immunocompromised, no evidence of thrush  -oral culture +group c strep which is normal part of oral sayra. no exudate or systemic symptoms concerning for strep pharyngitis therefore no indication to target with abx at this time.  -most likely viral pharyngitis. pt likely has dental issues causing worsening mouth pain.  Recommendations:    -primary team to call OMFS consult (oral maxillofacial surgery)    -c/w benzocaine 20% spray for oral pain    -c/w supportive care: encourage hydration, cepacol lozenge, PRN magic mouthwash, Tylenol as needed for pain/fever    -pain control per primary team    -pt should have dental evaluation after discharge    #HIV  -reportedly compliant with Biktarvy, however reports that he does not have good follow up with HIV physician. Previously with [facility].  -pt cannot recall most recent CD4/viral load. Unknown OI history though had prolonged hospital stay (1.5 months) for "pneumonia". pt reports lyla CD4 ~95.  -CD4 456, viral load undetectable  Recommendations:    -c/w Biktarvy    -pt requesting to be provided with his viral load and cd4 after discharge    *Additional recommendations:    -for nicotine replacement therapy, consider changing gum to patch in light of patient's poor dentition and mouth pain    Patient seen with internal medicine attending (Dr. Soto).  Will continue to follow.

## 2021-12-15 NOTE — BH INPATIENT PSYCHIATRY PROGRESS NOTE - NSICDXBHPRIMARYDX_PSY_ALL_CORE
Substance induced mood disorder   F19.12  
Substance induced mood disorder   F19.49  
Substance induced mood disorder   F19.78  
Substance induced mood disorder   F19.83  
Substance induced mood disorder   F19.18  
Substance induced mood disorder   F19.51  
Substance induced mood disorder   F19.45  
Substance induced mood disorder   F19.04  
Substance induced mood disorder   F19.95  
Substance induced mood disorder   F19.27  
Substance induced mood disorder   F19.39  
Substance induced mood disorder   F19.11  
Substance induced mood disorder   F19.38  
Substance induced mood disorder   F19.02

## 2021-12-15 NOTE — BH INPATIENT PSYCHIATRY PROGRESS NOTE - NSBHCONSULTIPREASON_PSY_A_CORE
danger to self; mental illness expected to respond to inpatient care
danger to self; mental illness expected to respond to inpatient care
other reason
other reason
danger to self; mental illness expected to respond to inpatient care
other reason
danger to self; mental illness expected to respond to inpatient care
other reason
danger to self; mental illness expected to respond to inpatient care

## 2021-12-15 NOTE — BH INPATIENT PSYCHIATRY PROGRESS NOTE - NSTXSUBMISGOALOTHER_PSY_ALL_CORE
Pt will attend 12-step groups when a speaker is on the unit

## 2021-12-15 NOTE — BH INPATIENT PSYCHIATRY PROGRESS NOTE - NSTXDEPRESDATEEST_PSY_ALL_CORE
01-Dec-2021
26-Nov-2021
13-Dec-2021
01-Dec-2021
06-Dec-2021
29-Nov-2021
13-Dec-2021
06-Dec-2021
06-Dec-2021
01-Dec-2021
06-Dec-2021
13-Dec-2021
29-Nov-2021
06-Dec-2021

## 2021-12-15 NOTE — BH INPATIENT PSYCHIATRY PROGRESS NOTE - NSBHMSEBODY_PSY_A_CORE
Average build

## 2021-12-15 NOTE — BH INPATIENT PSYCHIATRY PROGRESS NOTE - NSBHROSSYSTEMS_PSY_ALL_CORE
Psychiatric

## 2021-12-15 NOTE — BH INPATIENT PSYCHIATRY PROGRESS NOTE - NSBHMSEAFFQUAL_PSY_A_CORE
Irritable
Irritable
Depressed/Other
Irritable
Depressed/Other
Depressed
Irritable
Depressed/Other

## 2021-12-15 NOTE — BH INPATIENT PSYCHIATRY PROGRESS NOTE - NSTXDEPRESDATETRGT_PSY_ALL_CORE
08-Dec-2021
13-Dec-2021
06-Dec-2021
20-Dec-2021
02-Dec-2021
20-Dec-2021
13-Dec-2021
08-Dec-2021
08-Dec-2021
13-Dec-2021
13-Dec-2021
20-Dec-2021
06-Dec-2021
13-Dec-2021

## 2021-12-15 NOTE — BH INPATIENT PSYCHIATRY PROGRESS NOTE - NSDCCRITERIA_PSY_ALL_CORE
Less depressed & suicidal 

## 2021-12-15 NOTE — BH INPATIENT PSYCHIATRY PROGRESS NOTE - NSTXDEPRESGOALOTHER_PSY_ALL_CORE
Pt. will participate in groups and milieu treatment structure of the unit
Pt. will participate in groups and milieu treatment structure of the unit
Pt will participate in groups and milieu tx structure of unit
Pt. will participate in groups and milieu treatment structure of the unit

## 2021-12-15 NOTE — BH INPATIENT PSYCHIATRY PROGRESS NOTE - NSTXSUBMISGOAL_PSY_ALL_CORE
Be able to verbalize an understanding of the association between substance abuse and mental health
Other...
Be able to verbalize an understanding of the association between substance abuse and mental health
Other...
Be able to verbalize an understanding of the association between substance abuse and mental health
Other...
Be able to verbalize an understanding of the association between substance abuse and mental health

## 2021-12-15 NOTE — BH INPATIENT PSYCHIATRY PROGRESS NOTE - NSTXSUICIDDATETRGT_PSY_ALL_CORE
08-Dec-2021
04-Dec-2021
04-Dec-2021
08-Dec-2021
04-Dec-2021
08-Dec-2021

## 2021-12-15 NOTE — BH INPATIENT PSYCHIATRY PROGRESS NOTE - NSTXPROBSUBMIS_PSY_ALL_CORE
SUBSTANCE MISUSE

## 2021-12-15 NOTE — BH INPATIENT PSYCHIATRY PROGRESS NOTE - NSBHMSEINTELL_PSY_A_CORE
Above average

## 2021-12-15 NOTE — BH INPATIENT PSYCHIATRY PROGRESS NOTE - NSBHINPTBILLING_PSY_ALL_CORE
[id number] - Inpatient High Complexity
78099 - Inpatient High Complexity
13414 - Inpatient High Complexity
88566 - Inpatient Moderate Complexity
48791 - Inpatient High Complexity
52133 - Inpatient High Complexity
48332 - Inpatient High Complexity
54337 - Inpatient High Complexity
63585 - Inpatient High Complexity
86258 - Inpatient High Complexity
49435 - Inpatient High Complexity
73720 - Inpatient High Complexity
54413 - Inpatient High Complexity
62820 - Inpatient High Complexity

## 2021-12-15 NOTE — BH INPATIENT PSYCHIATRY PROGRESS NOTE - NSCGISEVERILLNESS_PSY_ALL_CORE
5 = Markedly ill - intrusive symptoms that distinctly impair social/occupational function or cause intrusive levels of distress

## 2021-12-15 NOTE — BH INPATIENT PSYCHIATRY PROGRESS NOTE - NSBHCHARTREVIEWVS_PSY_A_CORE FT
Vital Signs Last 24 Hrs  T(C): 36.4 (12-03-21 @ 09:00), Max: 37.3 (12-02-21 @ 17:01)  T(F): 97.6 (12-03-21 @ 09:00), Max: 99.1 (12-02-21 @ 17:01)  HR: 75 (12-03-21 @ 09:00) (61 - 76)  BP: 113/73 (12-03-21 @ 09:00) (103/66 - 130/78)  BP(mean): --  RR: 18 (12-03-21 @ 09:00) (16 - 18)  SpO2: 97% (12-03-21 @ 09:00) (97% - 98%)    
Vital Signs Last 24 Hrs  T(C): 36.7 (12-07-21 @ 21:07), Max: 36.7 (12-07-21 @ 21:07)  T(F): 98 (12-07-21 @ 21:07), Max: 98 (12-07-21 @ 21:07)  HR: 79 (12-08-21 @ 09:00) (77 - 79)  BP: 110/69 (12-08-21 @ 09:00) (110/69 - 118/78)  BP(mean): --  RR: 20 (12-08-21 @ 09:00) (18 - 20)  SpO2: 98% (12-08-21 @ 09:00) (98% - 98%)    
Vital Signs Last 24 Hrs  T(C): 36.6 (12-13-21 @ 17:11), Max: 36.6 (12-13-21 @ 17:11)  T(F): 97.8 (12-13-21 @ 17:11), Max: 97.8 (12-13-21 @ 17:11)  HR: 61 (12-13-21 @ 17:11) (61 - 82)  BP: 141/86 (12-13-21 @ 17:11) (112/73 - 141/86)  BP(mean): --  RR: 18 (12-13-21 @ 17:11) (18 - 18)  SpO2: 98% (12-13-21 @ 17:11) (97% - 98%)    
Vital Signs Last 24 Hrs  T(C): 36.4 (12-02-21 @ 08:56), Max: 37.2 (12-01-21 @ 20:51)  T(F): 97.6 (12-02-21 @ 08:56), Max: 98.9 (12-01-21 @ 20:51)  HR: 68 (12-02-21 @ 08:56) (59 - 78)  BP: 109/39 (12-02-21 @ 08:56) (109/39 - 120/72)  BP(mean): --  RR: 16 (12-02-21 @ 08:56) (16 - 17)  SpO2: 99% (12-02-21 @ 08:56) (96% - 99%)    
Vital Signs Last 24 Hrs  T(C): 36.4 (12-14-21 @ 09:13), Max: 36.6 (12-13-21 @ 17:11)  T(F): 97.6 (12-14-21 @ 09:13), Max: 97.8 (12-13-21 @ 17:11)  HR: 73 (12-14-21 @ 09:13) (61 - 73)  BP: 114/74 (12-14-21 @ 09:13) (114/74 - 141/86)  BP(mean): --  RR: 18 (12-13-21 @ 17:11) (18 - 18)  SpO2: 98% (12-14-21 @ 09:13) (98% - 98%)    
Vital Signs Last 24 Hrs  T(C): 36.7 (11-28-21 @ 09:00), Max: 37.1 (11-27-21 @ 20:36)  T(F): 98.1 (11-28-21 @ 09:00), Max: 98.8 (11-27-21 @ 20:36)  HR: 83 (11-28-21 @ 09:00) (61 - 83)  BP: 110/76 (11-28-21 @ 09:00) (107/70 - 128/82)  BP(mean): --  RR: 18 (11-28-21 @ 09:00) (16 - 18)  SpO2: 97% (11-28-21 @ 09:00) (97% - 97%)    
Vital Signs Last 24 Hrs  T(C): 36.9 (12-10-21 @ 16:29), Max: 36.9 (12-10-21 @ 16:29)  T(F): 98.4 (12-10-21 @ 16:29), Max: 98.4 (12-10-21 @ 16:29)  HR: 77 (12-10-21 @ 16:29) (77 - 77)  BP: 118/73 (12-10-21 @ 16:29) (118/73 - 118/73)  BP(mean): --  RR: 18 (12-10-21 @ 16:29) (18 - 18)  SpO2: 97% (12-10-21 @ 16:29) (97% - 97%)    
Vital Signs Last 24 Hrs  T(C): 36.9 (11-29-21 @ 06:00), Max: 36.9 (11-28-21 @ 22:05)  T(F): 98.5 (11-29-21 @ 06:00), Max: 98.5 (11-29-21 @ 06:00)  HR: 79 (11-29-21 @ 09:00) (68 - 79)  BP: 106/96 (11-29-21 @ 09:00) (105/74 - 126/79)  BP(mean): --  RR: 18 (11-29-21 @ 09:00) (16 - 18)  SpO2: 98% (11-29-21 @ 09:00) (96% - 98%)    
Vital Signs Last 24 Hrs  T(C): 36.7 (12-06-21 @ 09:00), Max: 36.9 (12-05-21 @ 20:31)  T(F): 98 (12-06-21 @ 09:00), Max: 98.5 (12-05-21 @ 20:31)  HR: 62 (12-06-21 @ 09:00) (62 - 76)  BP: 124/84 (12-06-21 @ 09:00) (106/68 - 132/85)  BP(mean): --  RR: 18 (12-06-21 @ 09:00) (17 - 18)  SpO2: 98% (12-06-21 @ 09:00) (97% - 98%)    
Vital Signs Last 24 Hrs  T(C): 36.7 (12-07-21 @ 10:22), Max: 36.9 (12-06-21 @ 17:50)  T(F): 98.1 (12-07-21 @ 10:22), Max: 98.5 (12-06-21 @ 17:50)  HR: 74 (12-07-21 @ 10:22) (72 - 74)  BP: 118/73 (12-07-21 @ 10:22) (115/69 - 118/73)  BP(mean): --  RR: 18 (12-06-21 @ 17:50) (18 - 18)  SpO2: 96% (12-07-21 @ 10:22) (96% - 98%)    
Vital Signs Last 24 Hrs  T(C): 36.5 (12-15-21 @ 09:00), Max: 36.7 (12-14-21 @ 17:00)  T(F): 97.7 (12-15-21 @ 09:00), Max: 98.1 (12-14-21 @ 17:00)  HR: 75 (12-15-21 @ 09:00) (75 - 75)  BP: 104/71 (12-15-21 @ 09:00) (104/71 - 129/89)  BP(mean): --  RR: 15 (12-15-21 @ 09:00) (15 - 16)  SpO2: 97% (12-15-21 @ 09:00) (97% - 98%)    
Vital Signs Last 24 Hrs  T(C): 36.6 (12-01-21 @ 08:45), Max: 36.6 (12-01-21 @ 06:00)  T(F): 97.9 (12-01-21 @ 08:45), Max: 97.9 (12-01-21 @ 06:00)  HR: 62 (12-01-21 @ 08:45) (57 - 62)  BP: 122/78 (12-01-21 @ 08:45) (110/68 - 122/78)  BP(mean): --  RR: 16 (12-01-21 @ 08:45) (16 - 17)  SpO2: 97% (12-01-21 @ 08:45) (96% - 97%)    
Vital Signs Last 24 Hrs  T(C): 36.4 (12-09-21 @ 09:15), Max: 37.3 (12-08-21 @ 17:00)  T(F): 97.6 (12-09-21 @ 09:15), Max: 99.1 (12-08-21 @ 17:00)  HR: 69 (12-09-21 @ 09:15) (69 - 86)  BP: 109/74 (12-09-21 @ 09:15) (109/74 - 122/77)  BP(mean): --  RR: 16 (12-08-21 @ 17:00) (16 - 16)  SpO2: 99% (12-09-21 @ 09:15) (98% - 99%)    
Vital Signs Last 24 Hrs  T(C): 36.8 (11-30-21 @ 09:12), Max: 36.8 (11-29-21 @ 16:57)  T(F): 98.3 (11-30-21 @ 09:12), Max: 98.3 (11-30-21 @ 09:12)  HR: 72 (11-30-21 @ 09:12) (59 - 79)  BP: 113/62 (11-30-21 @ 09:12) (103/66 - 113/74)  BP(mean): --  RR: 17 (11-30-21 @ 06:00) (17 - 18)  SpO2: 98% (11-30-21 @ 09:12) (97% - 100%)

## 2021-12-15 NOTE — BH INPATIENT PSYCHIATRY PROGRESS NOTE - NSTXDEPRESPROGRES_PSY_ALL_CORE
Improving
No Change
Improving
No Change

## 2021-12-15 NOTE — BH INPATIENT PSYCHIATRY PROGRESS NOTE - NSBHADMITIPREASONDETAILS_PSY_A_CORE FT
Stabilization of psychiatric sxs

## 2021-12-15 NOTE — BH INPATIENT PSYCHIATRY PROGRESS NOTE - NSBHFUPINTERVALCCFT_PSY_A_CORE
I keep replaying what happened
I'm scared 
I'm angry
I'm scared 
Ongoing treatment of depression and substance use
I feel like people are looking at me weird
I'm not ready
I'm scared 
I feel just a little better
I'm not ready
I'm scared 
I'm just tired of this, its annoying
I'm not ready
Ongoing treatment of depression and substance use

## 2021-12-15 NOTE — PROGRESS NOTE ADULT - SUBJECTIVE AND OBJECTIVE BOX
INTERVAL HPI/OVERNIGHT EVENTS: Denies any new complaints. Still complaining of mouth pain. He just started gabapentin.    VITAL SIGNS:  T(F): 97.7 (12-15-21 @ 09:00)  HR: 75 (12-15-21 @ 09:00)  BP: 104/71 (12-15-21 @ 09:00)  RR: 15 (12-15-21 @ 09:00)  SpO2: 97% (12-15-21 @ 09:00)  Wt(kg): --    PHYSICAL EXAM:  GENERAL: In NAD. Walking around the [address] floors  HEENT: No erythema in the oral pharynx    MEDICATIONS  (STANDING):  bictegravir 50 mG/emtricitabine 200 mG/tenofovir alafenamide 25 mG (BIKTARVY) 1 Tablet(s) Oral daily  escitalopram 20 milliGRAM(s) Oral daily  gabapentin 100 milliGRAM(s) Oral three times a day  influenza   Vaccine 0.5 milliLiter(s) IntraMuscular once  prazosin. 1 milliGRAM(s) Oral at bedtime  QUEtiapine 200 milliGRAM(s) Oral at bedtime    MEDICATIONS  (PRN):  acetaminophen     Tablet .. 1000 milliGRAM(s) Oral every 6 hours PRN Moderate Pain (4 - 6)  aluminum hydroxide/magnesium hydroxide/simethicone Suspension 30 milliLiter(s) Oral every 4 hours PRN Dyspepsia  benzocaine 15 mG/menthol 3.6 mG Lozenge 1 Lozenge Oral every 4 hours PRN Sore Throat  benzocaine 20% Spray 1 Spray(s) Topical every 4 hours PRN numbing for gums  calcium carbonate    500 mG (Tums) Chewable 2 Tablet(s) Chew four times a day PRN indigestion  FIRST- Mouthwash  BLM 30 milliLiter(s) Swish and Spit two times a day PRN mouth sores  FIRST- Mouthwash  BLM 15 milliLiter(s) Swish and Spit four times a day PRN Mouth Pain  ibuprofen  Tablet. 600 milliGRAM(s) Oral every 6 hours PRN Moderate Pain (4 - 6)  loperamide 2 milliGRAM(s) Oral two times a day PRN diarrhea  magnesium hydroxide Suspension 30 milliLiter(s) Oral daily PRN Constipation  nicotine  Polacrilex Gum 2 milliGRAM(s) Oral every 2 hours PRN breakthrough cravings  OLANZapine 10 milliGRAM(s) Oral every 8 hours PRN agitation  traZODone 50 milliGRAM(s) Oral at bedtime PRN insomnia      Allergies    penicillin (Unknown)  penicillins (Unknown)    Intolerances        LABS:        RADIOLOGY & ADDITIONAL TESTS: Reviewed

## 2021-12-15 NOTE — BH INPATIENT PSYCHIATRY PROGRESS NOTE - NSBHMETABOLICLABS_PSY_ALL_CORE
Labs within last 12 months

## 2021-12-15 NOTE — BH INPATIENT PSYCHIATRY PROGRESS NOTE - NSTXSUICIDDATEEST_PSY_ALL_CORE
01-Dec-2021
27-Nov-2021
01-Dec-2021
01-Dec-2021
27-Nov-2021
01-Dec-2021
27-Nov-2021
01-Dec-2021

## 2021-12-15 NOTE — BH INPATIENT PSYCHIATRY PROGRESS NOTE - GENERAL APPEARANCE
No deformities present
No deformities present/Other

## 2021-12-15 NOTE — BH INPATIENT PSYCHIATRY PROGRESS NOTE - NSBHMSEMOOD_PSY_A_CORE
Depressed
Irritable
Depressed
Irritable
Depressed
Depressed
Irritable
Depressed
Irritable
Depressed

## 2021-12-15 NOTE — BH INPATIENT PSYCHIATRY PROGRESS NOTE - NSBHASSESSSUMMFT_PSY_ALL_CORE
34 YO M with hx of substance induced mood d/o, etoh/opioid use d/o comes with SI in context of medication nonadherence and death of two friends by opioid overdose.     -seroquel 200mg qhs   -lexapro 20mg daily  -biktarvy qd  -prazosin 1mg bid  -Ortho recs appreciated, pt currently wearing a splint- has since signed off  -medicine recs appreciated (throat pain)  -oral maxillary facial surgery consult requested  -CT max face results pending  -Pain consult recs appreciated  -Pt has numerous prn pain meds at this time

## 2021-12-15 NOTE — BH INPATIENT PSYCHIATRY PROGRESS NOTE - NSTXSUBMISDATETRGT_PSY_ALL_CORE
06-Dec-2021
13-Dec-2021
13-Dec-2021
06-Dec-2021
13-Dec-2021
13-Dec-2021
06-Dec-2021
20-Dec-2021
20-Dec-2021
06-Dec-2021
02-Dec-2021
13-Dec-2021
06-Dec-2021
20-Dec-2021

## 2021-12-15 NOTE — BH INPATIENT PSYCHIATRY PROGRESS NOTE - NSBHMSEAFFRANGE_PSY_A_CORE
Constricted

## 2021-12-15 NOTE — BH INPATIENT PSYCHIATRY PROGRESS NOTE - NSTXDEPRESINTERMD_PSY_ALL_CORE
psychopharm management x 15 min daily, lexapro, seroquel

## 2021-12-15 NOTE — BH INPATIENT PSYCHIATRY PROGRESS NOTE - NSICDXBHSECONDARYDX_PSY_ALL_CORE
Adjustment disorder with disturbance of emotion   F43.29  Alcohol use disorder, severe, dependence   F10.20  Severe opioid use disorder   F11.20  Erythema of oropharynx   L53.9  HIV disease   B20  
Adjustment disorder with disturbance of emotion   F43.29  Alcohol use disorder, severe, dependence   F10.20  Severe opioid use disorder   F11.20  
Alcohol use disorder, severe, dependence   F10.20  Severe opioid use disorder   F11.20  
Adjustment disorder with disturbance of emotion   F43.29  Alcohol use disorder, severe, dependence   F10.20  Severe opioid use disorder   F11.20  Erythema of oropharynx   L53.9  HIV disease   B20  
Adjustment disorder with disturbance of emotion   F43.29  Alcohol use disorder, severe, dependence   F10.20  Severe opioid use disorder   F11.20  Erythema of oropharynx   L53.9  HIV disease   B20  
Adjustment disorder with disturbance of emotion   F43.29  Alcohol use disorder, severe, dependence   F10.20  Severe opioid use disorder   F11.20  
Adjustment disorder with disturbance of emotion   F43.29  Alcohol use disorder, severe, dependence   F10.20  Severe opioid use disorder   F11.20  Erythema of oropharynx   L53.9  HIV disease   B20  
Adjustment disorder with disturbance of emotion   F43.29  Alcohol use disorder, severe, dependence   F10.20  Severe opioid use disorder   F11.20  
Adjustment disorder with disturbance of emotion   F43.29  Alcohol use disorder, severe, dependence   F10.20  Severe opioid use disorder   F11.20  
Adjustment disorder with disturbance of emotion   F43.29  Alcohol use disorder, severe, dependence   F10.20  Severe opioid use disorder   F11.20  Erythema of oropharynx   L53.9  HIV disease   B20  
Adjustment disorder with disturbance of emotion   F43.29  Alcohol use disorder, severe, dependence   F10.20  Severe opioid use disorder   F11.20  
Adjustment disorder with disturbance of emotion   F43.29  Alcohol use disorder, severe, dependence   F10.20  Severe opioid use disorder   F11.20  Erythema of oropharynx   L53.9  HIV disease   B20

## 2021-12-15 NOTE — PROGRESS NOTE ADULT - PROVIDER SPECIALTY LIST ADULT
Internal Medicine
Hospitalist

## 2021-12-16 ENCOUNTER — INPATIENT (INPATIENT)
Facility: HOSPITAL | Age: 36
LOS: 5 days | Discharge: HOME | DRG: 897 | End: 2021-12-22
Attending: PSYCHIATRY & NEUROLOGY | Admitting: PSYCHIATRY & NEUROLOGY
Payer: MEDICAID

## 2021-12-16 VITALS
SYSTOLIC BLOOD PRESSURE: 123 MMHG | TEMPERATURE: 99 F | RESPIRATION RATE: 18 BRPM | DIASTOLIC BLOOD PRESSURE: 84 MMHG | WEIGHT: 153 LBS | OXYGEN SATURATION: 99 % | HEART RATE: 79 BPM

## 2021-12-16 VITALS
HEART RATE: 87 BPM | SYSTOLIC BLOOD PRESSURE: 134 MMHG | DIASTOLIC BLOOD PRESSURE: 92 MMHG | RESPIRATION RATE: 19 BRPM | TEMPERATURE: 99 F | OXYGEN SATURATION: 96 %

## 2021-12-16 DIAGNOSIS — R45.851 SUICIDAL IDEATIONS: ICD-10-CM

## 2021-12-16 DIAGNOSIS — Z91.14 PATIENT'S OTHER NONCOMPLIANCE WITH MEDICATION REGIMEN: ICD-10-CM

## 2021-12-16 DIAGNOSIS — K05.6 PERIODONTAL DISEASE, UNSPECIFIED: ICD-10-CM

## 2021-12-16 DIAGNOSIS — F60.9 PERSONALITY DISORDER, UNSPECIFIED: ICD-10-CM

## 2021-12-16 DIAGNOSIS — F32.9 MAJOR DEPRESSIVE DISORDER, SINGLE EPISODE, UNSPECIFIED: ICD-10-CM

## 2021-12-16 DIAGNOSIS — F41.9 ANXIETY DISORDER, UNSPECIFIED: ICD-10-CM

## 2021-12-16 DIAGNOSIS — F14.10 COCAINE ABUSE, UNCOMPLICATED: ICD-10-CM

## 2021-12-16 DIAGNOSIS — F31.9 BIPOLAR DISORDER, UNSPECIFIED: ICD-10-CM

## 2021-12-16 DIAGNOSIS — F11.20 OPIOID DEPENDENCE, UNCOMPLICATED: ICD-10-CM

## 2021-12-16 DIAGNOSIS — Z91.51 PERSONAL HISTORY OF SUICIDAL BEHAVIOR: ICD-10-CM

## 2021-12-16 DIAGNOSIS — B20 HUMAN IMMUNODEFICIENCY VIRUS [HIV] DISEASE: ICD-10-CM

## 2021-12-16 DIAGNOSIS — F19.24 OTHER PSYCHOACTIVE SUBSTANCE DEPENDENCE WITH PSYCHOACTIVE SUBSTANCE-INDUCED MOOD DISORDER: ICD-10-CM

## 2021-12-16 DIAGNOSIS — F10.20 ALCOHOL DEPENDENCE, UNCOMPLICATED: ICD-10-CM

## 2021-12-16 DIAGNOSIS — F20.9 SCHIZOPHRENIA, UNSPECIFIED: ICD-10-CM

## 2021-12-16 PROBLEM — F11.10 OPIOID ABUSE, UNCOMPLICATED: Chronic | Status: ACTIVE | Noted: 2021-11-26

## 2021-12-16 PROBLEM — F10.10 ALCOHOL ABUSE, UNCOMPLICATED: Chronic | Status: ACTIVE | Noted: 2021-11-26

## 2021-12-16 LAB
ANION GAP SERPL CALC-SCNC: 9 MMOL/L — SIGNIFICANT CHANGE UP (ref 5–17)
APTT BLD: 33.1 SEC — SIGNIFICANT CHANGE UP (ref 27.5–35.5)
BLD GP AB SCN SERPL QL: NEGATIVE — SIGNIFICANT CHANGE UP
BUN SERPL-MCNC: 16 MG/DL — SIGNIFICANT CHANGE UP (ref 7–23)
CALCIUM SERPL-MCNC: 9.1 MG/DL — SIGNIFICANT CHANGE UP (ref 8.4–10.5)
CHLORIDE SERPL-SCNC: 103 MMOL/L — SIGNIFICANT CHANGE UP (ref 96–108)
CO2 SERPL-SCNC: 30 MMOL/L — SIGNIFICANT CHANGE UP (ref 22–31)
CREAT SERPL-MCNC: 0.92 MG/DL — SIGNIFICANT CHANGE UP (ref 0.5–1.3)
GLUCOSE SERPL-MCNC: 96 MG/DL — SIGNIFICANT CHANGE UP (ref 70–99)
HCT VFR BLD CALC: 43.1 % — SIGNIFICANT CHANGE UP (ref 39–50)
HGB BLD-MCNC: 13.3 G/DL — SIGNIFICANT CHANGE UP (ref 13–17)
INR BLD: 1 — SIGNIFICANT CHANGE UP (ref 0.88–1.16)
MCHC RBC-ENTMCNC: 28.9 PG — SIGNIFICANT CHANGE UP (ref 27–34)
MCHC RBC-ENTMCNC: 30.9 GM/DL — LOW (ref 32–36)
MCV RBC AUTO: 93.5 FL — SIGNIFICANT CHANGE UP (ref 80–100)
NRBC # BLD: 0 /100 WBCS — SIGNIFICANT CHANGE UP (ref 0–0)
PLATELET # BLD AUTO: 341 K/UL — SIGNIFICANT CHANGE UP (ref 150–400)
POTASSIUM SERPL-MCNC: 4.1 MMOL/L — SIGNIFICANT CHANGE UP (ref 3.5–5.3)
POTASSIUM SERPL-SCNC: 4.1 MMOL/L — SIGNIFICANT CHANGE UP (ref 3.5–5.3)
PROTHROM AB SERPL-ACNC: 12 SEC — SIGNIFICANT CHANGE UP (ref 10.6–13.6)
RBC # BLD: 4.61 M/UL — SIGNIFICANT CHANGE UP (ref 4.2–5.8)
RBC # FLD: 13.2 % — SIGNIFICANT CHANGE UP (ref 10.3–14.5)
RH IG SCN BLD-IMP: POSITIVE — SIGNIFICANT CHANGE UP
SODIUM SERPL-SCNC: 142 MMOL/L — SIGNIFICANT CHANGE UP (ref 135–145)
WBC # BLD: 5.96 K/UL — SIGNIFICANT CHANGE UP (ref 3.8–10.5)
WBC # FLD AUTO: 5.96 K/UL — SIGNIFICANT CHANGE UP (ref 3.8–10.5)

## 2021-12-16 PROCEDURE — 99238 HOSP IP/OBS DSCHRG MGMT 30/<: CPT

## 2021-12-16 RX ORDER — LOPERAMIDE HCL 2 MG
2 TABLET ORAL
Refills: 0 | Status: DISCONTINUED | OUTPATIENT
Start: 2021-12-16 | End: 2021-12-22

## 2021-12-16 RX ORDER — NICOTINE POLACRILEX 2 MG
2 GUM BUCCAL
Refills: 0 | Status: DISCONTINUED | OUTPATIENT
Start: 2021-12-16 | End: 2021-12-22

## 2021-12-16 RX ORDER — NICOTINE POLACRILEX 2 MG
1 GUM BUCCAL
Qty: 360 | Refills: 0
Start: 2021-12-16 | End: 2022-01-14

## 2021-12-16 RX ORDER — CHLORHEXIDINE GLUCONATE 213 G/1000ML
15 SOLUTION TOPICAL
Refills: 0 | Status: DISCONTINUED | OUTPATIENT
Start: 2021-12-16 | End: 2021-12-22

## 2021-12-16 RX ORDER — PRAZOSIN HCL 2 MG
1 CAPSULE ORAL
Qty: 0 | Refills: 0 | DISCHARGE
Start: 2021-12-16

## 2021-12-16 RX ORDER — QUETIAPINE FUMARATE 200 MG/1
200 TABLET, FILM COATED ORAL AT BEDTIME
Refills: 0 | Status: DISCONTINUED | OUTPATIENT
Start: 2021-12-16 | End: 2021-12-22

## 2021-12-16 RX ORDER — BICTEGRAVIR SODIUM, EMTRICITABINE, AND TENOFOVIR ALAFENAMIDE FUMARATE 30; 120; 15 MG/1; MG/1; MG/1
1 TABLET ORAL DAILY
Refills: 0 | Status: DISCONTINUED | OUTPATIENT
Start: 2021-12-16 | End: 2021-12-22

## 2021-12-16 RX ORDER — ACETAMINOPHEN 500 MG
1000 TABLET ORAL ONCE
Refills: 0 | Status: COMPLETED | OUTPATIENT
Start: 2021-12-16 | End: 2021-12-16

## 2021-12-16 RX ORDER — PRAZOSIN HCL 2 MG
1 CAPSULE ORAL AT BEDTIME
Refills: 0 | Status: DISCONTINUED | OUTPATIENT
Start: 2021-12-16 | End: 2021-12-22

## 2021-12-16 RX ORDER — CEFAZOLIN SODIUM 1 G
1000 VIAL (EA) INJECTION EVERY 8 HOURS
Refills: 0 | Status: DISCONTINUED | OUTPATIENT
Start: 2021-12-16 | End: 2021-12-17

## 2021-12-16 RX ORDER — GABAPENTIN 400 MG/1
100 CAPSULE ORAL THREE TIMES A DAY
Refills: 0 | Status: DISCONTINUED | OUTPATIENT
Start: 2021-12-16 | End: 2021-12-22

## 2021-12-16 RX ORDER — TRAZODONE HCL 50 MG
1 TABLET ORAL
Qty: 14 | Refills: 0
Start: 2021-12-16 | End: 2021-12-29

## 2021-12-16 RX ORDER — ESCITALOPRAM OXALATE 10 MG/1
1 TABLET, FILM COATED ORAL
Qty: 14 | Refills: 0
Start: 2021-12-16 | End: 2021-12-29

## 2021-12-16 RX ORDER — ESCITALOPRAM OXALATE 10 MG/1
20 TABLET, FILM COATED ORAL DAILY
Refills: 0 | Status: DISCONTINUED | OUTPATIENT
Start: 2021-12-16 | End: 2021-12-22

## 2021-12-16 RX ORDER — KETOROLAC TROMETHAMINE 30 MG/ML
30 SYRINGE (ML) INJECTION ONCE
Refills: 0 | Status: DISCONTINUED | OUTPATIENT
Start: 2021-12-16 | End: 2021-12-16

## 2021-12-16 RX ORDER — TRAZODONE HCL 50 MG
50 TABLET ORAL AT BEDTIME
Refills: 0 | Status: DISCONTINUED | OUTPATIENT
Start: 2021-12-16 | End: 2021-12-22

## 2021-12-16 RX ORDER — LOPERAMIDE HCL 2 MG
1 TABLET ORAL
Qty: 0 | Refills: 0 | DISCHARGE
Start: 2021-12-16

## 2021-12-16 RX ORDER — OLANZAPINE 15 MG/1
10 TABLET, FILM COATED ORAL EVERY 8 HOURS
Refills: 0 | Status: DISCONTINUED | OUTPATIENT
Start: 2021-12-16 | End: 2021-12-22

## 2021-12-16 RX ORDER — ACETAMINOPHEN 500 MG
1000 TABLET ORAL EVERY 6 HOURS
Refills: 0 | Status: DISCONTINUED | OUTPATIENT
Start: 2021-12-16 | End: 2021-12-22

## 2021-12-16 RX ORDER — SODIUM CHLORIDE 9 MG/ML
500 INJECTION, SOLUTION INTRAVENOUS
Refills: 0 | Status: DISCONTINUED | OUTPATIENT
Start: 2021-12-16 | End: 2021-12-22

## 2021-12-16 RX ORDER — BENZOCAINE 10 %
1 GEL (GRAM) MUCOUS MEMBRANE
Qty: 0 | Refills: 0 | DISCHARGE
Start: 2021-12-16

## 2021-12-16 RX ORDER — OLANZAPINE 15 MG/1
1 TABLET, FILM COATED ORAL
Qty: 0 | Refills: 0 | DISCHARGE
Start: 2021-12-16

## 2021-12-16 RX ORDER — GABAPENTIN 400 MG/1
1 CAPSULE ORAL
Qty: 0 | Refills: 0 | DISCHARGE
Start: 2021-12-16

## 2021-12-16 RX ADMIN — GABAPENTIN 100 MILLIGRAM(S): 400 CAPSULE ORAL at 22:28

## 2021-12-16 RX ADMIN — DIPHENHYDRAMINE HYDROCHLORIDE AND LIDOCAINE HYDROCHLORIDE AND ALUMINUM HYDROXIDE AND MAGNESIUM HYDRO 30 MILLILITER(S): KIT at 11:26

## 2021-12-16 RX ADMIN — Medication 300 MILLIGRAM(S): at 22:38

## 2021-12-16 RX ADMIN — SODIUM CHLORIDE 75 MILLILITER(S): 9 INJECTION, SOLUTION INTRAVENOUS at 18:27

## 2021-12-16 RX ADMIN — Medication 400 MILLIGRAM(S): at 18:30

## 2021-12-16 RX ADMIN — Medication 50 MILLIGRAM(S): at 22:28

## 2021-12-16 RX ADMIN — Medication 1 MILLIGRAM(S): at 22:28

## 2021-12-16 RX ADMIN — Medication 1000 MILLIGRAM(S): at 18:48

## 2021-12-16 RX ADMIN — Medication 30 MILLIGRAM(S): at 18:35

## 2021-12-16 RX ADMIN — QUETIAPINE FUMARATE 200 MILLIGRAM(S): 200 TABLET, FILM COATED ORAL at 22:28

## 2021-12-16 RX ADMIN — Medication 30 MILLIGRAM(S): at 18:48

## 2021-12-16 NOTE — BH DISCHARGE NOTE NURSING/SOCIAL WORK/PSYCH REHAB - NSBHDCNOREFERRAL_PSY_ALL_CORE
Patient is being transported to different unit for dental care. Formal discharge initiated and patient is scheduled to return post-dental operation.

## 2021-12-16 NOTE — BH DISCHARGE NOTE NURSING/SOCIAL WORK/PSYCH REHAB - NSCDUDCCRISIS_PSY_A_CORE
[facility]  1 (515) [facility]LL ([phone])  Text "WELL" to 28484  Website: www.Telerivet/.[employer] [facility] 1 (309) 741-TSYC (1337) Website: www.safehorizon.org/.National Suicide Prevention Lifeline 9 (967) 047-8910/.  [facility]  1 (171) LIFENET (324-7690)/.  [facility]  [address] [place] [address] [phone]   Hours:  Monday through Friday from 9 AM to 3 PM [facility]  1 (877) [facility]-WELL ([phone])  Text "WELL" to 13414  Website: www.iHandle/.[employer] [facility]iz[facility] 1 (475) 821-FVDS (3190) Website: www.safehorizon.org/.National Suicide Prevention Lifeline 2 (123) 938-4051/.  [facility]  1 (474) LIFENET (945-3582)/.  [facility]  [address] [place] [address] [phone]   Hours:  Monday through Friday from 9 AM to 3 PM/.  [employer]  [employer] Crisis Line  8 [phone], Option 1

## 2021-12-16 NOTE — BH DISCHARGE NOTE NURSING/SOCIAL WORK/PSYCH REHAB - NSDCVIVACCINE_GEN_ALL_CORE_FT
Tdap; 31-Jul-2018 20:20; Servando Drew (RN); [employer]; [id number]; IntraMuscular; Deltoid Right.; 0.5 milliLiter(s); VIS (VIS Published: 09-May-2013, VIS Presented: 31-Jul-2018);

## 2021-12-16 NOTE — BH DISCHARGE NOTE NURSING/SOCIAL WORK/PSYCH REHAB - NSDCNEXTLEVEL_PSY_ALL_CORE
Patient medication list and clinical documentation available to [facility] staff in other unit./No

## 2021-12-16 NOTE — BH INPATIENT PSYCHIATRY DISCHARGE NOTE - NSBHMETABOLIC_PSY_ALL_CORE_FT
BMI: BMI (kg/m2): 22.3 (11-26-21 @ 15:39)  HbA1c: A1C with Estimated Average Glucose Result: 5.3 % (11-30-21 @ 12:43)    Glucose: POCT Blood Glucose.: 96 mg/dL (05-08-21 @ 13:01)    BP: 139/84 (12-15-21 @ 16:37) (104/71 - 141/86)  Lipid Panel: Date/Time: 11-30-21 @ 12:43  Cholesterol, Serum: 185  Direct LDL: --  HDL Cholesterol, Serum: 56  Total Cholesterol/HDL Ration Measurement: --  Triglycerides, Serum: 159

## 2021-12-16 NOTE — PRE-OP CHECKLIST - SITE MARKED BY SURGEON
yes
rec'd pt aaox3 in room 11, tearful and c/o 10/10 back, leg and head pain since this afternoon, reports taking 1mg of Dilaudid PO at home at approximately 1pm w/ moderate relief but states pain has returned and is unbearable.  Pt. denies CP/SOB, n/v/d.  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  Vital signs stable. 22G IV saline lock placed in the R forearm, labs drawn and sent as ordered.  Pt. awaiting results and dispo.

## 2021-12-16 NOTE — BH DISCHARGE NOTE NURSING/SOCIAL WORK/PSYCH REHAB - NSDCPEEMAIL_GEN_ALL_CORE
[facility] email [email or web address]

## 2021-12-16 NOTE — BH INPATIENT PSYCHIATRY DISCHARGE NOTE - VIOLENCE RISK FACTORS:
Substance abuse/Impulsivity/History of being victimized/traumatized/Community stressors that increase the risk of destabilization

## 2021-12-16 NOTE — PROGRESS NOTE ADULT - SUBJECTIVE AND OBJECTIVE BOX
ENT POST-OP NOTE    HPI: Mr. Hernandez is a 36 y/o M with PMHx ETOH abuse, cocaine and heroin abuse, bipolar disorder, anxiety, schizophrenia HIV (on Biktarvy, unknown viral load and CD4 count), depression who was BIB self for suicidal ideations. Medicine consulted for mouth pain and erythema. Pain most likely due to poor dentition. Maxillofacial CT is done, notable for extensive caries.     Now s/p removal of teeth, preservation of R maxillary incisors.    INTERVAL:    12/16: No acute postop events. Extubated with no issues, pain controlled.      PAST MEDICAL & SURGICAL HISTORY:  HIV (human immunodeficiency virus infection)    Bipolar disorder    Schizophrenia, unspecified type    Depression    Anxiety    COVID-19    Cocaine abuse    Alcohol abuse    Heroin abuse    No significant past surgical history      penicillin (Unknown)  penicillins (Unknown)    ICU Vital Signs Last 24 Hrs  T(C): 37 (16 Dec 2021 12:30), Max: 37 (16 Dec 2021 12:30)  T(F): 98.6 (16 Dec 2021 12:30), Max: 98.6 (16 Dec 2021 12:30)  HR: 79 (16 Dec 2021 12:30) (79 - 79)  BP: 123/84 (16 Dec 2021 12:30) (123/84 - 123/84)  BP(mean): --  ABP: --  ABP(mean): --  RR: 18 (16 Dec 2021 12:30) (18 - 18)  SpO2: 99% (16 Dec 2021 12:30) (99% - 99%)      PHYSICAL EXAM:    CONSTITUTIONAL: Well nourished, well developed, NON-DYSMORPHIC, and in no acute distress.    HEAD: normocephalic, atraumatic.  ORAL: No oral bleeding.  RESPIRATORY: Respirations unlabored, no increased work of breathing with use of accessory muscles and retractions. No stridor.  CARDIAC: Warm extremities, no cyanosis.         A/P: Mr. Hernandez is a 36 y/o M with PMHx ETOH abuse, cocaine and heroin abuse, bipolar disorder, anxiety, schizophrenia HIV (on Biktarvy, unknown viral load and CD4 count), depression who was BIB self for suicidal ideations. Medicine consulted for mouth pain and erythema. Pain most likely due to poor dentition. Maxillofacial CT is done, notable for extensive caries - worst in R central and lateral mandibular incisors now s/p extraction of indicated teeth on 12/16.    - Ancef while inpatient, augmentin on discharge  - Peridex rinses, peridex on discharge  - Advance diet as tolerated (start with clears)  - Return to psychiatric floor  - Pain management/home meds per psychiatry  - Appreciate psych care  - [facility] if any questions or issues  - D/w attending Dr. Mendoza ENT POST-OP NOTE    HPI: Mr. Hernandez is a 36 y/o M with PMHx ETOH abuse, cocaine and heroin abuse, bipolar disorder, anxiety, schizophrenia HIV (on Biktarvy, unknown viral load and CD4 count), depression who was BIB self for suicidal ideations. Medicine consulted for mouth pain and erythema. Pain most likely due to poor dentition. Maxillofacial CT is done, notable for extensive caries.     Now s/p removal of teeth, preservation of R maxillary incisors.    INTERVAL:    12/16: No acute postop events. Extubated with no issues, pain controlled.      PAST MEDICAL & SURGICAL HISTORY:  HIV (human immunodeficiency virus infection)    Bipolar disorder    Schizophrenia, unspecified type    Depression    Anxiety    COVID-19    Cocaine abuse    Alcohol abuse    Heroin abuse    No significant past surgical history      penicillin (Unknown)  penicillins (Unknown)    ICU Vital Signs Last 24 Hrs  T(C): 37 (16 Dec 2021 12:30), Max: 37 (16 Dec 2021 12:30)  T(F): 98.6 (16 Dec 2021 12:30), Max: 98.6 (16 Dec 2021 12:30)  HR: 79 (16 Dec 2021 12:30) (79 - 79)  BP: 123/84 (16 Dec 2021 12:30) (123/84 - 123/84)  BP(mean): --  ABP: --  ABP(mean): --  RR: 18 (16 Dec 2021 12:30) (18 - 18)  SpO2: 99% (16 Dec 2021 12:30) (99% - 99%)      PHYSICAL EXAM:    CONSTITUTIONAL: Well nourished, well developed, NON-DYSMORPHIC, and in no acute distress.    HEAD: normocephalic, atraumatic.  ORAL: No oral bleeding.  RESPIRATORY: Respirations unlabored, no increased work of breathing with use of accessory muscles and retractions. No stridor.  CARDIAC: Warm extremities, no cyanosis.         A/P: Mr. Hernandez is a 36 y/o M with PMHx ETOH abuse, cocaine and heroin abuse, bipolar disorder, anxiety, schizophrenia HIV (on Biktarvy, unknown viral load and CD4 count), depression who was BIB self for suicidal ideations. Medicine consulted for mouth pain and erythema. Pain most likely due to poor dentition. Maxillofacial CT is done, notable for extensive caries - worst in R central and lateral mandibular incisors now s/p extraction of indicated teeth on 12/16.    - Ancef while inpatient (1g q8 hours), augmentin on discharge (875 mg for 7 days after discharge)  - Peridex rinses (15 mL/ twice a day), peridex on discharge (for 14 days)  - Advance diet as tolerated (start with clears, can advance to soft and regular as tolerated))  - Return to psychiatric floor  - Pain management/home meds per psychiatry; no restrictions on ENT end, avoid toradol  - Appreciate psych care  - [facility] if any questions or issues  - D/w attending Dr. Mendoza

## 2021-12-16 NOTE — BH INPATIENT PSYCHIATRY DISCHARGE NOTE - NSBHDCMEDICALFT_PSY_A_CORE
Patient followed by medicine consult, pain consult and oral maxillary facial surgery for ongoing dental problems during his admission

## 2021-12-16 NOTE — BH INPATIENT PSYCHIATRY DISCHARGE NOTE - HOSPITAL COURSE
Patient admitted to [facility] voluntary for medication stabilization and treatment. Was restarted on current medication regimen with addition of Lexapro to treat depressive symptoms. Pt compliant with all medications. Visible on the unit, appropriately interacting with others. Medicine consult team contacted to treat dental and throat pain. Pain consult also contacted for additional recs as pt endorsed that pain was not relieved. Oral maxillary facial surgery consult place for recs regarding pt's dental problems. Pt to be discharged from [facility] for oral surgery. Aftercare for pt once he returns to [facility] is inpatient rehab. He has been denying si/hi/avh or paranoid ideation for over 2 weeks. Depressive symptoms remains at this time.

## 2021-12-16 NOTE — BH DISCHARGE NOTE NURSING/SOCIAL WORK/PSYCH REHAB - PATIENT PORTAL LINK FT
You can access the FollowMyHealth Patient Portal offered by [facility] by registering at the following website: [email or web address]. By joining [employer]’s FollowMyHealth portal, you will also be able to view your health information using other applications (apps) compatible with our system.

## 2021-12-16 NOTE — BH INPATIENT PSYCHIATRY DISCHARGE NOTE - OTHER PAST PSYCHIATRIC HISTORY (INCLUDE DETAILS REGARDING ONSET, COURSE OF ILLNESS, INPATIENT/OUTPATIENT TREATMENT)
F25.9 Schizophrenia  F10.20 Alcohol Use Disorder, Severe  F11.20 Opioid Use Disorder, Severe  F14.10 Cocaine Use Disorder  Multiple prior psychiatric hospitalizations (most recent at [facility] in 10/2021)
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
## 2021-12-16 NOTE — BH DISCHARGE NOTE NURSING/SOCIAL WORK/PSYCH REHAB - NSDCPRGOAL_PSY_ALL_CORE
Pt. has attended groups more regularly recently.  At the beginning of the pt's admission, he was mostly isolative and declined groups.  Pt. has been better-related and more pleasant recently.  Pt. has been social with peers.  Pt. has been provocative at times and on some occasions, has demonstrated some bizarre behavior, laughing inappropriately.  Pt. has discussed his challenges with sobriety in some groups.  Pt. has been future-focused and has endorsed wanting to go to rehab.

## 2021-12-16 NOTE — BH INPATIENT PSYCHIATRY DISCHARGE NOTE - NSDCCPCAREPLAN_GEN_ALL_CORE_FT
PRINCIPAL DISCHARGE DIAGNOSIS  Diagnosis: Substance use disorder  Assessment and Plan of Treatment: Continue current medication regimen      SECONDARY DISCHARGE DIAGNOSES  Diagnosis: Polysubstance abuse  Assessment and Plan of Treatment: Continue current medication regimen    Diagnosis: Fracture of lunate  Assessment and Plan of Treatment: Continue current medication regimen    Diagnosis: Substance induced mood disorder  Assessment and Plan of Treatment: Continue current medication regimen    Diagnosis: Personality disorder  Assessment and Plan of Treatment: Continue current medication regimen    Diagnosis: Schizoaffective disorder  Assessment and Plan of Treatment: Continue current medication regimen

## 2021-12-16 NOTE — CONSULT NOTE ADULT - SUBJECTIVE AND OBJECTIVE BOX
ENT CONSULT NOTE / PRE-OP NOTE    HPI: Mr. Hernandez is a 36 y/o M with PMHx ETOH abuse, cocaine and heroin abuse, bipolar disorder, anxiety, schizophrenia HIV (on Biktarvy, unknown viral load and CD4 count), depression who was BIB self for suicidal ideations. Medicine consulted for mouth pain and erythema. Pain most likely due to poor dentition. Maxillofacial CT is done, notable for extensive caries.     PAST MEDICAL & SURGICAL HISTORY:  HIV (human immunodeficiency virus infection)    Bipolar disorder    Schizophrenia, unspecified type    Depression    Anxiety    COVID-19    Cocaine abuse    Alcohol abuse    Heroin abuse    No significant past surgical history      penicillin (Unknown)  penicillins (Unknown)    MEDICATIONS  (STANDING):  bictegravir 50 mG/emtricitabine 200 mG/tenofovir alafenamide 25 mG (BIKTARVY) 1 Tablet(s) Oral daily  escitalopram 20 milliGRAM(s) Oral daily  gabapentin 100 milliGRAM(s) Oral three times a day  influenza   Vaccine 0.5 milliLiter(s) IntraMuscular once  prazosin. 1 milliGRAM(s) Oral at bedtime  QUEtiapine 200 milliGRAM(s) Oral at bedtime    MEDICATIONS  (PRN):  acetaminophen     Tablet .. 1000 milliGRAM(s) Oral every 6 hours PRN Moderate Pain (4 - 6)  aluminum hydroxide/magnesium hydroxide/simethicone Suspension 30 milliLiter(s) Oral every 4 hours PRN Dyspepsia  benzocaine 15 mG/menthol 3.6 mG Lozenge 1 Lozenge Oral every 4 hours PRN Sore Throat  benzocaine 20% Spray 1 Spray(s) Topical every 4 hours PRN numbing for gums  calcium carbonate    500 mG (Tums) Chewable 2 Tablet(s) Chew four times a day PRN indigestion  FIRST- Mouthwash  BLM 15 milliLiter(s) Swish and Spit four times a day PRN Mouth Pain  FIRST- Mouthwash  BLM 30 milliLiter(s) Swish and Spit two times a day PRN mouth sores  ibuprofen  Tablet. 600 milliGRAM(s) Oral every 6 hours PRN Moderate Pain (4 - 6)  loperamide 2 milliGRAM(s) Oral two times a day PRN diarrhea  magnesium hydroxide Suspension 30 milliLiter(s) Oral daily PRN Constipation  nicotine  Polacrilex Gum 2 milliGRAM(s) Oral every 2 hours PRN breakthrough cravings  OLANZapine 10 milliGRAM(s) Oral every 8 hours PRN agitation  traZODone 50 milliGRAM(s) Oral at bedtime PRN insomnia      Objective    ICU Vital Signs Last 24 Hrs  T(C): 37 (16 Dec 2021 12:30), Max: 37 (16 Dec 2021 12:30)  T(F): 98.6 (16 Dec 2021 12:30), Max: 98.6 (16 Dec 2021 12:30)  HR: 79 (16 Dec 2021 12:30) (73 - 79)  BP: 123/84 (16 Dec 2021 12:30) (123/84 - 139/84)  BP(mean): --  ABP: --  ABP(mean): --  RR: 18 (16 Dec 2021 12:30) (17 - 18)  SpO2: 99% (16 Dec 2021 12:30) (98% - 99%)      PHYSICAL EXAM:    CONSTITUTIONAL: Well nourished, well developed, NON-DYSMORPHIC, and in no acute distress.    HEAD: normocephalic, atraumatic.  RESPIRATORY: Respirations unlabored, no increased work of breathing with use of accessory muscles and retractions. No stridor.  CARDIAC: Warm extremities, no cyanosis.                           13.3   5.96  )-----------( 341      ( 16 Dec 2021 08:07 )             43.1     12-16    142  |  103  |  16  ----------------------------<  96  4.1   |  30  |  0.92    Ca    9.1      16 Dec 2021 08:07        I&O's Summary      A/P: Mr. Hernandez is a 36 y/o M with PMHx ETOH abuse, cocaine and heroin abuse, bipolar disorder, anxiety, schizophrenia HIV (on Biktarvy, unknown viral load and CD4 count), depression who was BIB self for suicidal ideations. Medicine consulted for mouth pain and erythema. Pain most likely due to poor dentition. Maxillofacial CT is done, notable for extensive caries.     - OMFS to perform teeth extractions 12/16  - Consented/added on  - NPO for procedure  - Preop labs obtained ENT CONSULT NOTE / PRE-OP NOTE    HPI: Mr. Hernandez is a 36 y/o M with PMHx ETOH abuse, cocaine and heroin abuse, bipolar disorder, anxiety, schizophrenia HIV (on Biktarvy, unknown viral load and CD4 count), depression who was BIB self for suicidal ideations. Medicine consulted for mouth pain and erythema. Pain most likely due to poor dentition. Maxillofacial CT is done, notable for extensive caries.     PAST MEDICAL & SURGICAL HISTORY:  HIV (human immunodeficiency virus infection)    Bipolar disorder    Schizophrenia, unspecified type    Depression    Anxiety    COVID-19    Cocaine abuse    Alcohol abuse    Heroin abuse    No significant past surgical history      penicillin (Unknown)  penicillins (Unknown)    MEDICATIONS  (STANDING):  bictegravir 50 mG/emtricitabine 200 mG/tenofovir alafenamide 25 mG (BIKTARVY) 1 Tablet(s) Oral daily  escitalopram 20 milliGRAM(s) Oral daily  gabapentin 100 milliGRAM(s) Oral three times a day  influenza   Vaccine 0.5 milliLiter(s) IntraMuscular once  prazosin. 1 milliGRAM(s) Oral at bedtime  QUEtiapine 200 milliGRAM(s) Oral at bedtime    MEDICATIONS  (PRN):  acetaminophen     Tablet .. 1000 milliGRAM(s) Oral every 6 hours PRN Moderate Pain (4 - 6)  aluminum hydroxide/magnesium hydroxide/simethicone Suspension 30 milliLiter(s) Oral every 4 hours PRN Dyspepsia  benzocaine 15 mG/menthol 3.6 mG Lozenge 1 Lozenge Oral every 4 hours PRN Sore Throat  benzocaine 20% Spray 1 Spray(s) Topical every 4 hours PRN numbing for gums  calcium carbonate    500 mG (Tums) Chewable 2 Tablet(s) Chew four times a day PRN indigestion  FIRST- Mouthwash  BLM 15 milliLiter(s) Swish and Spit four times a day PRN Mouth Pain  FIRST- Mouthwash  BLM 30 milliLiter(s) Swish and Spit two times a day PRN mouth sores  ibuprofen  Tablet. 600 milliGRAM(s) Oral every 6 hours PRN Moderate Pain (4 - 6)  loperamide 2 milliGRAM(s) Oral two times a day PRN diarrhea  magnesium hydroxide Suspension 30 milliLiter(s) Oral daily PRN Constipation  nicotine  Polacrilex Gum 2 milliGRAM(s) Oral every 2 hours PRN breakthrough cravings  OLANZapine 10 milliGRAM(s) Oral every 8 hours PRN agitation  traZODone 50 milliGRAM(s) Oral at bedtime PRN insomnia      Objective    ICU Vital Signs Last 24 Hrs  T(C): 37 (16 Dec 2021 12:30), Max: 37 (16 Dec 2021 12:30)  T(F): 98.6 (16 Dec 2021 12:30), Max: 98.6 (16 Dec 2021 12:30)  HR: 79 (16 Dec 2021 12:30) (73 - 79)  BP: 123/84 (16 Dec 2021 12:30) (123/84 - 139/84)  BP(mean): --  ABP: --  ABP(mean): --  RR: 18 (16 Dec 2021 12:30) (17 - 18)  SpO2: 99% (16 Dec 2021 12:30) (98% - 99%)      PHYSICAL EXAM:    CONSTITUTIONAL: Well nourished, well developed, NON-DYSMORPHIC, and in no acute distress.    HEAD: normocephalic, atraumatic.  RESPIRATORY: Respirations unlabored, no increased work of breathing with use of accessory muscles and retractions. No stridor.  CARDIAC: Warm extremities, no cyanosis.                           13.3   5.96  )-----------( 341      ( 16 Dec 2021 08:07 )             43.1     12-16    142  |  103  |  16  ----------------------------<  96  4.1   |  30  |  0.92    Ca    9.1      16 Dec 2021 08:07        I&O's Summary      A/P: Mr. Hernandez is a 36 y/o M with PMHx ETOH abuse, cocaine and heroin abuse, bipolar disorder, anxiety, schizophrenia HIV (on Biktarvy, unknown viral load and CD4 count), depression who was BIB self for suicidal ideations. Medicine consulted for mouth pain and erythema. Pain most likely due to poor dentition. Maxillofacial CT is done, notable for extensive caries - worst in R central and lateral mandibular incisors.    - OMFS to perform teeth extractions 12/16  - Consented/added on  - NPO for procedure  - Preop labs obtained  - Pt explained risks and benefits of procedure  - Spoke to psychiatry NP managing patient - to be transferred back to psychiatric floor after procedure

## 2021-12-16 NOTE — BH DISCHARGE NOTE NURSING/SOCIAL WORK/PSYCH REHAB - NSDCPEWEB_GEN_ALL_CORE
[facility] for Tobacco Control website --- [email or web address] website --- [email or web address]

## 2021-12-16 NOTE — BH INPATIENT PSYCHIATRY DISCHARGE NOTE - NSDCMRMEDTOKEN_GEN_ALL_CORE_FT
benzocaine 20% topical spray: 1 spray(s) topically every 4 hours, As needed, numbing for gums  bictegravir/emtricitabine/tenofovir 50 mg-200 mg-25 mg oral tablet: 1 tab(s) orally once a day  gabapentin 100 mg oral capsule: 1 cap(s) orally 3 times a day  loperamide 2 mg oral capsule: 1 cap(s) orally 2 times a day, As needed, diarrhea  Nicorette 2 mg oral transmucosal gum: 1 gum oral transmucosal every 2 hours, As Needed  prazosin 1 mg oral capsule: 1 cap(s) orally once a day (at bedtime)  QUEtiapine 300 mg oral tablet: 1 tab(s) orally once a day (at bedtime)  ZyPREXA 10 mg oral tablet: 1 tab(s) orally every 8 hours, As needed, agitation

## 2021-12-17 RX ADMIN — GABAPENTIN 100 MILLIGRAM(S): 400 CAPSULE ORAL at 14:32

## 2021-12-17 RX ADMIN — ESCITALOPRAM OXALATE 20 MILLIGRAM(S): 10 TABLET, FILM COATED ORAL at 11:59

## 2021-12-17 RX ADMIN — Medication 300 MILLIGRAM(S): at 06:24

## 2021-12-17 RX ADMIN — CHLORHEXIDINE GLUCONATE 15 MILLILITER(S): 213 SOLUTION TOPICAL at 18:49

## 2021-12-17 RX ADMIN — Medication 300 MILLIGRAM(S): at 18:48

## 2021-12-17 RX ADMIN — QUETIAPINE FUMARATE 200 MILLIGRAM(S): 200 TABLET, FILM COATED ORAL at 21:34

## 2021-12-17 RX ADMIN — GABAPENTIN 100 MILLIGRAM(S): 400 CAPSULE ORAL at 21:33

## 2021-12-17 RX ADMIN — Medication 50 MILLIGRAM(S): at 21:34

## 2021-12-17 RX ADMIN — Medication 300 MILLIGRAM(S): at 14:33

## 2021-12-17 RX ADMIN — GABAPENTIN 100 MILLIGRAM(S): 400 CAPSULE ORAL at 12:00

## 2021-12-17 RX ADMIN — BICTEGRAVIR SODIUM, EMTRICITABINE, AND TENOFOVIR ALAFENAMIDE FUMARATE 1 TABLET(S): 30; 120; 15 TABLET ORAL at 12:00

## 2021-12-17 RX ADMIN — Medication 1 MILLIGRAM(S): at 21:33

## 2021-12-17 NOTE — CHART NOTE - NSCHARTNOTEFT_GEN_A_CORE
[facility]  PHYSICAL EXAM: Agree/Declined    VITALS: T(C): 37 (12-16-21 @ 17:33), Max: 37 (12-16-21 @ 12:30)  HR: 80 (12-16-21 @ 18:18) (78 - 87)  BP: 130/78 (12-16-21 @ 18:03) (123/84 - 140/74)  RR: 25 (12-16-21 @ 18:18) (12 - 25)  SpO2: 100% (12-16-21 @ 18:18) (95% - 100%)      GENERAL: NAD, comfortable, ambulating  HEAD:  Atraumatic, Normocephalic  EYES: EOMI, PERRLA, conjunctiva and sclera clear  ENT: Moist mucous membranes  NECK: Supple, No JVD  CHEST/LUNG: Clear to auscultation bilaterally; No rales, rhonchi, wheezing, or rubs. Unlabored respirations  HEART: Regular rate and rhythm; No murmurs, rubs, or gallops  ABDOMEN: BSx4; Soft, nontender, nondistended  EXTREMITIES:  No clubbing, cyanosis, or edema  NERVOUS SYSTEM:  A&Ox3, no focal deficits   SKIN: No rashes or lesions

## 2021-12-17 NOTE — PROGRESS NOTE ADULT - SUBJECTIVE AND OBJECTIVE BOX
ENT POST-OP NOTE    HPI: Mr. Hernandez is a 36 y/o M with PMHx ETOH abuse, cocaine and heroin abuse, bipolar disorder, anxiety, schizophrenia HIV (on Biktarvy, unknown viral load and CD4 count), depression who was BIB self for suicidal ideations. Medicine consulted for mouth pain and erythema. Pain most likely due to poor dentition. Maxillofacial CT is done, notable for extensive caries.     Now s/p removal of teeth, preservation of R maxillary incisors.    INTERVAL:    12/16: No acute postop events. Extubated with no issues, pain controlled.  12/17: NAEON. pt doing well      PAST MEDICAL & SURGICAL HISTORY:  HIV (human immunodeficiency virus infection)    Bipolar disorder    Schizophrenia, unspecified type    Depression    Anxiety    COVID-19    Cocaine abuse    Alcohol abuse    Heroin abuse    No significant past surgical history      penicillin (Unknown)  penicillins (Unknown)    ICU Vital Signs Last 24 Hrs  ICU Vital Signs Last 24 Hrs  T(C): 37 (16 Dec 2021 17:33), Max: 37 (16 Dec 2021 12:30)  T(F): 98.6 (16 Dec 2021 17:33), Max: 98.6 (16 Dec 2021 12:30)  HR: 80 (16 Dec 2021 18:18) (78 - 87)  BP: 130/78 (16 Dec 2021 18:03) (123/84 - 140/74)  BP(mean): 99 (16 Dec 2021 18:03) (96 - 107)  ABP: --  ABP(mean): --  RR: 25 (16 Dec 2021 18:18) (12 - 25)  SpO2: 100% (16 Dec 2021 18:18) (95% - 100%)        PHYSICAL EXAM:    CONSTITUTIONAL: Well nourished, well developed, NON-DYSMORPHIC, and in no acute distress.    HEAD: normocephalic, atraumatic.  ORAL: No oral bleeding.  RESPIRATORY: Respirations unlabored, no increased work of breathing with use of accessory muscles and retractions. No stridor.  CARDIAC: Warm extremities, no cyanosis.         A/P: Mr. Hernandez is a 36 y/o M with PMHx ETOH abuse, cocaine and heroin abuse, bipolar disorder, anxiety, schizophrenia HIV (on Biktarvy, unknown viral load and CD4 count), depression who was BIB self for suicidal ideations. Medicine consulted for mouth pain and erythema. Pain most likely due to poor dentition. Maxillofacial CT is done, notable for extensive caries - worst in R central and lateral mandibular incisors now s/p extraction of indicated teeth on 12/16.    - Ancef while inpatient (1g q8 hours), augmentin on discharge (875 mg for 7 days after discharge)  - Peridex rinses (15 mL/ twice a day), peridex on discharge (for 14 days)  - Advance diet as tolerated (start with clears, can advance to soft and regular as tolerated))  - Pain management/home meds per psychiatry; no restrictions on ENT end, avoid toradol  - rest of care per psych  - Page ENT if any questions or issues  - D/w attending Dr. Mendoza

## 2021-12-18 LAB
A1C WITH ESTIMATED AVERAGE GLUCOSE RESULT: 5.4 % — SIGNIFICANT CHANGE UP (ref 4–5.6)
CHOLEST SERPL-MCNC: 181 MG/DL — SIGNIFICANT CHANGE UP
ESTIMATED AVERAGE GLUCOSE: 108 MG/DL — SIGNIFICANT CHANGE UP (ref 68–114)
HDLC SERPL-MCNC: 61 MG/DL — SIGNIFICANT CHANGE UP
LIPID PNL WITH DIRECT LDL SERPL: 105 MG/DL — HIGH
NON HDL CHOLESTEROL: 120 MG/DL — SIGNIFICANT CHANGE UP
TRIGL SERPL-MCNC: 75 MG/DL — SIGNIFICANT CHANGE UP

## 2021-12-18 PROCEDURE — 99221 1ST HOSP IP/OBS SF/LOW 40: CPT

## 2021-12-18 RX ADMIN — Medication 1000 MILLIGRAM(S): at 13:14

## 2021-12-18 RX ADMIN — GABAPENTIN 100 MILLIGRAM(S): 400 CAPSULE ORAL at 10:59

## 2021-12-18 RX ADMIN — Medication 300 MILLIGRAM(S): at 23:47

## 2021-12-18 RX ADMIN — GABAPENTIN 100 MILLIGRAM(S): 400 CAPSULE ORAL at 21:20

## 2021-12-18 RX ADMIN — CHLORHEXIDINE GLUCONATE 15 MILLILITER(S): 213 SOLUTION TOPICAL at 06:19

## 2021-12-18 RX ADMIN — Medication 50 MILLIGRAM(S): at 21:19

## 2021-12-18 RX ADMIN — Medication 1000 MILLIGRAM(S): at 22:00

## 2021-12-18 RX ADMIN — Medication 300 MILLIGRAM(S): at 12:13

## 2021-12-18 RX ADMIN — Medication 300 MILLIGRAM(S): at 00:06

## 2021-12-18 RX ADMIN — Medication 1000 MILLIGRAM(S): at 12:14

## 2021-12-18 RX ADMIN — Medication 300 MILLIGRAM(S): at 06:19

## 2021-12-18 RX ADMIN — Medication 1 MILLIGRAM(S): at 21:20

## 2021-12-18 RX ADMIN — GABAPENTIN 100 MILLIGRAM(S): 400 CAPSULE ORAL at 15:05

## 2021-12-18 RX ADMIN — BICTEGRAVIR SODIUM, EMTRICITABINE, AND TENOFOVIR ALAFENAMIDE FUMARATE 1 TABLET(S): 30; 120; 15 TABLET ORAL at 10:59

## 2021-12-18 RX ADMIN — QUETIAPINE FUMARATE 200 MILLIGRAM(S): 200 TABLET, FILM COATED ORAL at 21:20

## 2021-12-18 RX ADMIN — Medication 300 MILLIGRAM(S): at 17:08

## 2021-12-18 RX ADMIN — Medication 1000 MILLIGRAM(S): at 21:20

## 2021-12-18 RX ADMIN — ESCITALOPRAM OXALATE 20 MILLIGRAM(S): 10 TABLET, FILM COATED ORAL at 10:59

## 2021-12-18 RX ADMIN — CHLORHEXIDINE GLUCONATE 15 MILLILITER(S): 213 SOLUTION TOPICAL at 17:08
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
## 2021-12-18 NOTE — PROGRESS NOTE ADULT - SUBJECTIVE AND OBJECTIVE BOX
ENT POST-OP NOTE    HPI: Mr. Hernandez is a 34 y/o M with PMHx ETOH abuse, cocaine and heroin abuse, bipolar disorder, anxiety, schizophrenia HIV (on Biktarvy, unknown viral load and CD4 count), depression who was BIB self for suicidal ideations. Medicine consulted for mouth pain and erythema. Pain most likely due to poor dentition. Maxillofacial CT is done, notable for extensive caries.     Now s/p removal of teeth, preservation of R maxillary incisors.    INTERVAL:    12/16: No acute postop events. Extubated with no issues, pain controlled.  12/17: NAEON. pt doing well  12/18: naoen    Vital Signs Last 24 Hrs  T(C): 36.6 (18 Dec 2021 06:00), Max: 36.6 (17 Dec 2021 10:28)  T(F): 97.9 (18 Dec 2021 06:00), Max: 97.9 (17 Dec 2021 10:28)  HR: 73 (18 Dec 2021 06:00) (73 - 85)  BP: 106/66 (18 Dec 2021 06:00) (106/66 - 146/71)  BP(mean): --  RR: 16 (18 Dec 2021 06:00) (16 - 18)  SpO2: 96% (18 Dec 2021 06:00) (96% - 97%)      PHYSICAL EXAM:    CONSTITUTIONAL: Well nourished, well developed, NON-DYSMORPHIC, and in no acute distress.    HEAD: normocephalic, atraumatic.  ORAL: No oral bleeding. healing well. chromic sutures intact. no bleeding.   RESPIRATORY: Respirations unlabored, no increased work of breathing with use of accessory muscles and retractions. No stridor.  CARDIAC: Warm extremities, no cyanosis.         A/P: Mr. Hernandez is a 34 y/o M with PMHx ETOH abuse, cocaine and heroin abuse, bipolar disorder, anxiety, schizophrenia HIV (on Biktarvy, unknown viral load and CD4 count), depression who was BIB self for suicidal ideations. Medicine consulted for mouth pain and erythema. Pain most likely due to poor dentition. Maxillofacial CT is done, notable for extensive caries - worst in R central and lateral mandibular incisors now s/p extraction of indicated teeth on 12/16.  - healing well   - Ancef while inpatient (1g q8 hours), augmentin on discharge (875 mg for 7 days after discharge). complete 7d of abx regardless till 12/24  - Peridex rinses (15 mL/ twice a day), peridex on discharge (for 14 days)  - Advance diet as tolerated (start with clears, can advance to soft and regular as tolerated))  - Pain management/home meds per psychiatry; no restrictions on ENT end, avoid toradol  - rest of care per psych  - Page ENT if any questions or issues  - D/w attending Dr. Mendoza   12/16: No acute postop events. Extubated with no issues, pain controlled.  12/17: NAEON. pt doing well  12/18: naoen    Vital Signs Last 24 Hrs  T(C): 36.6 (18 Dec 2021 06:00), Max: 36.6 (17 Dec 2021 10:28)  T(F): 97.9 (18 Dec 2021 06:00), Max: 97.9 (17 Dec 2021 10:28)  HR: 73 (18 Dec 2021 06:00) (73 - 85)  BP: 106/66 (18 Dec 2021 06:00) (106/66 - 146/71)  BP(mean): --  RR: 16 (18 Dec 2021 06:00) (16 - 18)  SpO2: 96% (18 Dec 2021 06:00) (96% - 97%)      PHYSICAL EXAM:    CONSTITUTIONAL: Well nourished, well developed, NON-DYSMORPHIC, and in no acute distress.    HEAD: normocephalic, atraumatic.  ORAL: No oral bleeding. healing well. chromic sutures intact. no bleeding.   RESPIRATORY: Respirations unlabored, no increased work of breathing with use of accessory muscles and retractions. No stridor.  CARDIAC: Warm extremities, no cyanosis.         A/P: Mr. Hernandez is a 34 y/o M with PMHx ETOH abuse, cocaine and heroin abuse, bipolar disorder, anxiety, schizophrenia HIV (on Biktarvy, unknown viral load and CD4 count), depression who was BIB self for suicidal ideations. Medicine consulted for mouth pain and erythema. Pain most likely due to poor dentition. Maxillofacial CT is done, notable for extensive caries - worst in R central and lateral mandibular incisors now s/p extraction of indicated teeth on 12/16.  - healing well   - complete 7d of abx regardless till 12/24. augmentin may be an option.  - Peridex rinses (15 mL/ twice a day), peridex on discharge (for 14 days)  - Advance diet as tolerated (start with clears, can advance to soft and regular as tolerated))  - Pain management/home meds per psychiatry; no restrictions on ENT end, avoid toradol  - rest of care per psych  - Page ENT if any questions or issues  - D/w attending Dr. Mendoza

## 2021-12-19 PROCEDURE — 99232 SBSQ HOSP IP/OBS MODERATE 35: CPT

## 2021-12-19 RX ADMIN — GABAPENTIN 100 MILLIGRAM(S): 400 CAPSULE ORAL at 16:57

## 2021-12-19 RX ADMIN — Medication 1 MILLIGRAM(S): at 22:51

## 2021-12-19 RX ADMIN — Medication 300 MILLIGRAM(S): at 06:14

## 2021-12-19 RX ADMIN — Medication 50 MILLIGRAM(S): at 22:51

## 2021-12-19 RX ADMIN — CHLORHEXIDINE GLUCONATE 15 MILLILITER(S): 213 SOLUTION TOPICAL at 06:14

## 2021-12-19 RX ADMIN — QUETIAPINE FUMARATE 200 MILLIGRAM(S): 200 TABLET, FILM COATED ORAL at 22:51

## 2021-12-19 RX ADMIN — BICTEGRAVIR SODIUM, EMTRICITABINE, AND TENOFOVIR ALAFENAMIDE FUMARATE 1 TABLET(S): 30; 120; 15 TABLET ORAL at 11:23

## 2021-12-19 RX ADMIN — Medication 300 MILLIGRAM(S): at 23:02

## 2021-12-19 RX ADMIN — ESCITALOPRAM OXALATE 20 MILLIGRAM(S): 10 TABLET, FILM COATED ORAL at 10:37

## 2021-12-19 RX ADMIN — GABAPENTIN 100 MILLIGRAM(S): 400 CAPSULE ORAL at 10:37

## 2021-12-19 RX ADMIN — GABAPENTIN 100 MILLIGRAM(S): 400 CAPSULE ORAL at 22:51

## 2021-12-19 RX ADMIN — Medication 1000 MILLIGRAM(S): at 22:50

## 2021-12-19 RX ADMIN — Medication 300 MILLIGRAM(S): at 12:57

## 2021-12-19 NOTE — PROGRESS NOTE ADULT - SUBJECTIVE AND OBJECTIVE BOX
12/16: No acute postop events. Extubated with no issues, pain controlled.  12/17: NAEON. pt doing well  12/18: naoen  12/19: naeon    Vital Signs Last 24 Hrs  T(C): 36.7 (19 Dec 2021 06:00), Max: 36.8 (18 Dec 2021 19:20)  T(F): 98 (19 Dec 2021 06:00), Max: 98.3 (18 Dec 2021 19:20)  HR: 59 (19 Dec 2021 06:00) (59 - 78)  BP: 106/66 (19 Dec 2021 06:00) (106/66 - 127/86)  BP(mean): --  RR: 17 (19 Dec 2021 06:00) (17 - 18)  SpO2: 95% (19 Dec 2021 06:00) (95% - 97%)      PHYSICAL EXAM:    CONSTITUTIONAL: Well nourished, well developed, NON-DYSMORPHIC, and in no acute distress.    HEAD: normocephalic, atraumatic.  ORAL: No oral bleeding. healing well. chromic sutures intact. no bleeding.   RESPIRATORY: Respirations unlabored, no increased work of breathing with use of accessory muscles and retractions. No stridor.  CARDIAC: Warm extremities, no cyanosis.         A/P: Mr. Hernandez is a 34 y/o M with PMHx ETOH abuse, cocaine and heroin abuse, bipolar disorder, anxiety, schizophrenia HIV (on Biktarvy, unknown viral load and CD4 count), depression who was BIB self for suicidal ideations. Medicine consulted for mouth pain and erythema. Pain most likely due to poor dentition. Maxillofacial CT is done, notable for extensive caries - worst in R central and lateral mandibular incisors now s/p extraction of indicated teeth on 12/16.  - healing well   - complete 7d of abx regardless till 12/24. augmentin may be an option.  - Peridex rinses (15 mL/ twice a day), peridex on discharge (for 14 days)  - Advance diet as tolerated (start with clears, can advance to soft and regular as tolerated))  - Pain management/home meds per psychiatry; no restrictions on ENT end, avoid toradol  - rest of care per psych  - Page ENT if any questions or issues  - D/w attending Dr. Mendoza

## 2021-12-20 DIAGNOSIS — F11.20 OPIOID DEPENDENCE, UNCOMPLICATED: ICD-10-CM

## 2021-12-20 PROCEDURE — 99233 SBSQ HOSP IP/OBS HIGH 50: CPT

## 2021-12-20 RX ADMIN — CHLORHEXIDINE GLUCONATE 15 MILLILITER(S): 213 SOLUTION TOPICAL at 06:38

## 2021-12-20 RX ADMIN — Medication 300 MILLIGRAM(S): at 06:53

## 2021-12-20 RX ADMIN — Medication 1 MILLIGRAM(S): at 21:52

## 2021-12-20 RX ADMIN — Medication 300 MILLIGRAM(S): at 12:57

## 2021-12-20 RX ADMIN — GABAPENTIN 100 MILLIGRAM(S): 400 CAPSULE ORAL at 11:49

## 2021-12-20 RX ADMIN — GABAPENTIN 100 MILLIGRAM(S): 400 CAPSULE ORAL at 21:51

## 2021-12-20 RX ADMIN — Medication 50 MILLIGRAM(S): at 21:51

## 2021-12-20 RX ADMIN — Medication 1000 MILLIGRAM(S): at 21:52

## 2021-12-20 RX ADMIN — Medication 1000 MILLIGRAM(S): at 11:39

## 2021-12-20 RX ADMIN — CHLORHEXIDINE GLUCONATE 15 MILLILITER(S): 213 SOLUTION TOPICAL at 18:10

## 2021-12-20 RX ADMIN — BICTEGRAVIR SODIUM, EMTRICITABINE, AND TENOFOVIR ALAFENAMIDE FUMARATE 1 TABLET(S): 30; 120; 15 TABLET ORAL at 11:42

## 2021-12-20 RX ADMIN — Medication 1000 MILLIGRAM(S): at 22:30

## 2021-12-20 RX ADMIN — GABAPENTIN 100 MILLIGRAM(S): 400 CAPSULE ORAL at 18:10

## 2021-12-20 RX ADMIN — QUETIAPINE FUMARATE 200 MILLIGRAM(S): 200 TABLET, FILM COATED ORAL at 21:51

## 2021-12-20 RX ADMIN — Medication 300 MILLIGRAM(S): at 23:30

## 2021-12-20 RX ADMIN — Medication 1000 MILLIGRAM(S): at 12:30

## 2021-12-20 RX ADMIN — Medication 300 MILLIGRAM(S): at 18:11

## 2021-12-20 RX ADMIN — ESCITALOPRAM OXALATE 20 MILLIGRAM(S): 10 TABLET, FILM COATED ORAL at 11:43

## 2021-12-21 DIAGNOSIS — F60.9 PERSONALITY DISORDER, UNSPECIFIED: ICD-10-CM

## 2021-12-21 PROCEDURE — 99233 SBSQ HOSP IP/OBS HIGH 50: CPT

## 2021-12-21 RX ADMIN — Medication 300 MILLIGRAM(S): at 19:04

## 2021-12-21 RX ADMIN — GABAPENTIN 100 MILLIGRAM(S): 400 CAPSULE ORAL at 10:33

## 2021-12-21 RX ADMIN — QUETIAPINE FUMARATE 200 MILLIGRAM(S): 200 TABLET, FILM COATED ORAL at 21:50

## 2021-12-21 RX ADMIN — Medication 1000 MILLIGRAM(S): at 11:30

## 2021-12-21 RX ADMIN — GABAPENTIN 100 MILLIGRAM(S): 400 CAPSULE ORAL at 14:21

## 2021-12-21 RX ADMIN — Medication 300 MILLIGRAM(S): at 13:11

## 2021-12-21 RX ADMIN — ESCITALOPRAM OXALATE 20 MILLIGRAM(S): 10 TABLET, FILM COATED ORAL at 10:33

## 2021-12-21 RX ADMIN — Medication 1 MILLIGRAM(S): at 21:50

## 2021-12-21 RX ADMIN — Medication 300 MILLIGRAM(S): at 06:09

## 2021-12-21 RX ADMIN — Medication 1000 MILLIGRAM(S): at 10:32

## 2021-12-21 RX ADMIN — Medication 1000 MILLIGRAM(S): at 21:50

## 2021-12-21 RX ADMIN — CHLORHEXIDINE GLUCONATE 15 MILLILITER(S): 213 SOLUTION TOPICAL at 19:05

## 2021-12-21 RX ADMIN — CHLORHEXIDINE GLUCONATE 15 MILLILITER(S): 213 SOLUTION TOPICAL at 06:09

## 2021-12-21 RX ADMIN — GABAPENTIN 100 MILLIGRAM(S): 400 CAPSULE ORAL at 21:49

## 2021-12-21 RX ADMIN — BICTEGRAVIR SODIUM, EMTRICITABINE, AND TENOFOVIR ALAFENAMIDE FUMARATE 1 TABLET(S): 30; 120; 15 TABLET ORAL at 10:33

## 2021-12-21 NOTE — BH INPATIENT PSYCHIATRY PROGRESS NOTE - NSBHCHARTREVIEWVS_PSY_A_CORE FT
Vital Signs Last 24 Hrs  T(C): 36.6 (12-20-21 @ 08:10), Max: 37.1 (12-19-21 @ 17:12)  T(F): 97.8 (12-20-21 @ 08:10), Max: 98.8 (12-19-21 @ 17:12)  HR: 76 (12-20-21 @ 08:10) (68 - 82)  BP: 121/76 (12-20-21 @ 08:10) (106/71 - 134/88)  BP(mean): --  RR: 18 (12-20-21 @ 08:10) (18 - 19)  SpO2: 98% (12-20-21 @ 08:10) (95% - 98%)    
Vital Signs Last 24 Hrs  T(C): 36.4 (12-21-21 @ 09:12), Max: 37 (12-20-21 @ 17:05)  T(F): 97.6 (12-21-21 @ 09:12), Max: 98.6 (12-20-21 @ 17:05)  HR: 74 (12-21-21 @ 09:12) (63 - 80)  BP: 120/75 (12-21-21 @ 09:12) (103/70 - 129/89)  BP(mean): --  RR: 17 (12-21-21 @ 06:00) (17 - 18)  SpO2: 97% (12-21-21 @ 09:12) (97% - 98%)    
Vital Signs Last 24 Hrs  T(C): 36.5 (12-19-21 @ 21:00), Max: 37.1 (12-19-21 @ 17:12)  T(F): 97.7 (12-19-21 @ 21:00), Max: 98.8 (12-19-21 @ 17:12)  HR: 75 (12-19-21 @ 21:00) (59 - 82)  BP: 134/88 (12-19-21 @ 21:00) (106/66 - 134/88)  BP(mean): --  RR: 18 (12-19-21 @ 21:00) (17 - 19)  SpO2: 97% (12-19-21 @ 21:00) (95% - 98%)    
Vital Signs Last 24 Hrs  T(C): 36.4 (12-18-21 @ 08:43), Max: 36.6 (12-18-21 @ 06:00)  T(F): 97.6 (12-18-21 @ 08:43), Max: 97.9 (12-18-21 @ 06:00)  HR: 66 (12-18-21 @ 08:43) (66 - 85)  BP: 107/72 (12-18-21 @ 08:43) (106/66 - 146/71)  BP(mean): --  RR: 18 (12-18-21 @ 08:43) (16 - 18)  SpO2: 97% (12-18-21 @ 08:43) (96% - 97%)

## 2021-12-21 NOTE — BH INPATIENT PSYCHIATRY PROGRESS NOTE - NSCGIIMPROVESX_PSY_ALL_CORE
2 = Much improved - notably better with signficant reduction of symptoms; increase in the level of functioning but some symptoms remain

## 2021-12-21 NOTE — BH INPATIENT PSYCHIATRY PROGRESS NOTE - NSICDXBHSECONDARYDX_PSY_ALL_CORE
Polysubstance (including opioids) dependence, daily use   F11.20  
Alcohol use disorder, severe, dependence   F10.20  Severe opioid use disorder   F11.20  
Polysubstance (including opioids) dependence, daily use   F11.20  Personality disorder   F60.9  
Alcohol use disorder, severe, dependence   F10.20  Severe opioid use disorder   F11.20
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
## 2021-12-21 NOTE — BH INPATIENT PSYCHIATRY PROGRESS NOTE - NSBHINPTBILLING_PSY_ALL_CORE
[id number] - Inpatient High Complexity
[id number] - Inpatient High Complexity
[id number] - Inpatient High Complexity
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
## 2021-12-21 NOTE — BH INPATIENT PSYCHIATRY PROGRESS NOTE - NSBHFUPINTERVALHXFT_PSY_A_CORE
Patient visible on the unit, seen appropriately interacting with peers, pleasant on approach. He states that he is excited, nervous and anxious as he has been thinking much about his future and would like to be discharged tomorrow so that he can get his identification needed for his housing and go to program outpatient and look about getting his teeth taken care of. He states that he feels the most optimistic and motivated that he has ever felt and a discharge for tomorrow feels natural and appropriate for him. He denies concern for discharge prior to the holidays as he intends to go to [employer] and go to meetings which he states are held on holidays. Denies si/hi/avh or paranoid ideation. No acute medical concerns, no reports regarding pain. 
Patient seen in room today, states that he had 17 teeth extracted on Thursday and has 2 front teeth remaining, he states that pain was well tolerated until today and he thinks he may need narcotics. Otherwise has no complaints or acute medical concerns. Is future oriented, still hopeful for rehab. Denies si/hi/avh or paranoid ideation. Sleep and appetite are fair. Aftercare planning in effect. 
Case seen individually and discussed with nursing. Patient visible on the unit in the milieu, and is calm and cooperative on approach.   He reports good pain control and denies fevers, chills, or any other medical issues. No SE of meds reported. Appetite and sleep are good. Denies SI/HI/AVH. 
Case seen individually and discussed with nursing. Patient visible on the unit in the milieu, and is calm and cooperative on approach.   He reports good pain control and denies fevers, chills, or any other medical issues. No SE of meds reported. Appetite and sleep are good. Denies SI/HI/AVH.

## 2021-12-21 NOTE — BH SOCIAL WORK INITIAL PSYCHOSOCIAL EVALUATION - OTHER PAST PSYCHIATRIC HISTORY (INCLUDE DETAILS REGARDING ONSET, COURSE OF ILLNESS, INPATIENT/OUTPATIENT TREATMENT)
F25.9 Schizophrenia  F10.20 Alcohol Use Disorder, Severe  F11.20 Opioid Use Disorder, Severe  F14.10 Cocaine Use Disorder  Multiple prior psychiatric hospitalizations (most recent at [facility] in 10/2021)

## 2021-12-21 NOTE — BH INPATIENT PSYCHIATRY PROGRESS NOTE - NSTXDEPRESGOAL_PSY_ALL_CORE
Will identify thoughts and self-talk that contribute to depression
Attend and participate in at least 2 groups daily despite low mood/energy

## 2021-12-21 NOTE — BH INPATIENT PSYCHIATRY PROGRESS NOTE - PRN MEDS
MEDICATIONS  (PRN):  acetaminophen     Tablet .. 1000 milliGRAM(s) Oral every 6 hours PRN Temp greater or equal to 38C (100.4F), Moderate Pain (4 - 6)  loperamide 2 milliGRAM(s) Oral two times a day PRN diarrhea  nicotine  Polacrilex Gum 2 milliGRAM(s) Oral every 2 hours PRN nicotine cravings  OLANZapine 10 milliGRAM(s) Oral every 8 hours PRN agitation  traZODone 50 milliGRAM(s) Oral at bedtime PRN insomnia  

## 2021-12-21 NOTE — BH PSYCHOLOGY - CLINICIAN PSYCHOTHERAPY NOTE - NSBHPSYCHOLNARRATIVE_PSY_A_CORE FT
Pt found in bed and seen 1:1 in conference room to prepare him for discharge and offer him a space to process his experience. Pt expressed excitement and anxiety about discharge and motivation to continue his journey without the use of substance, possibly in a rehab center. Pt's CAMS ratings were low (ranged 1-2 out of 5) and he denied SI. Pt briefly reflected on his stay and said that he learned about readiness to stop using drugs - "sometimes you can go to rehab and not be ready to stop" but he says that he thinks about it and feels ready to stop and improve his life.  Pt reviewed the safety plan created with this clinician earlier during his stay and continues to abide by this safety plan.    MSE:   APPEARANCE:  [x] adequately groomed []disheveled  [] malodorous [] Other:  BEHAVIOR: [x] cooperative [] uncooperative [] good EC [x] poor EC [x] well related [] oddly related [] guarded []PMA [] PMR []abnormal movements [] Other:  SPEED: [] normal rate/rhythm/volume [] loud [] quiet [x] slow  [] rapid [] pressured [] Other:  MOOD: [x] euthymic [] dysphoric []anxious [] irritable [] Other:  AFFECT: [] full [] expansive [x] constricted [] blunted [] flat [] stable [] labile [] Other:  THOUGHT PROCESS: [x] organized [] disorganized [x] goal-directed [] concrete [x] logical  [] illogical [] circumstantial [] tangential [] impoverished [] effusive [] repetitive [] Other:  THOUGHT CONTENT: [x] negative for delusions/suicidal ideation /homicidal ideation  [] positive for delusions/suicidal ideation/homicidal ideation. Describe:   PERCEPTION: [] negative for auditory/ visual hallucinations  [] positive for auditory/ visual hallucinations. Describe:  INSIGHT/JUDGMENT: [] good [x]fair [] poor    IMPULSE CONTROL: [x] good []fair [] poor  COGNITION: [x] alert and oriented to person,time,place Lacks orientation to person/ time/ place. Describe:

## 2021-12-21 NOTE — BH INPATIENT PSYCHIATRY PROGRESS NOTE - NSICDXBHPRIMARYDX_PSY_ALL_CORE
Substance induced mood disorder   F19.68  
Substance induced mood disorder   F19.09  
Substance induced mood disorder   F19.23  
Substance induced mood disorder   F19.09

## 2021-12-21 NOTE — BH INPATIENT PSYCHIATRY PROGRESS NOTE - NSBHMETABOLIC_PSY_ALL_CORE_FT
BMI: BMI (kg/m2): 24.7 (12-16-21 @ 12:51)  HbA1c: A1C with Estimated Average Glucose Result: 5.4 % (12-18-21 @ 07:45)    Glucose: POCT Blood Glucose.: 96 mg/dL (05-08-21 @ 13:01)    BP: 120/75 (12-21-21 @ 09:12) (103/70 - 134/88)  Lipid Panel: Date/Time: 12-18-21 @ 07:45  Cholesterol, Serum: 181  Direct LDL: --  HDL Cholesterol, Serum: 61  Total Cholesterol/HDL Ration Measurement: --  Triglycerides, Serum: 75  
BMI: BMI (kg/m2): 24.7 (12-16-21 @ 12:51)  HbA1c: A1C with Estimated Average Glucose Result: 5.4 % (12-18-21 @ 07:45)    Glucose: POCT Blood Glucose.: 96 mg/dL (05-08-21 @ 13:01)    BP: 107/72 (12-18-21 @ 08:43) (106/66 - 146/71)  Lipid Panel: Date/Time: 12-18-21 @ 07:45  Cholesterol, Serum: 181  Direct LDL: --  HDL Cholesterol, Serum: 61  Total Cholesterol/HDL Ration Measurement: --  Triglycerides, Serum: 75  
BMI: BMI (kg/m2): 24.7 (12-16-21 @ 12:51)  HbA1c: A1C with Estimated Average Glucose Result: 5.4 % (12-18-21 @ 07:45)    Glucose: POCT Blood Glucose.: 96 mg/dL (05-08-21 @ 13:01)    BP: 134/88 (12-19-21 @ 21:00) (106/66 - 146/71)  Lipid Panel: Date/Time: 12-18-21 @ 07:45  Cholesterol, Serum: 181  Direct LDL: --  HDL Cholesterol, Serum: 61  Total Cholesterol/HDL Ration Measurement: --  Triglycerides, Serum: 75  
BMI: BMI (kg/m2): 24.7 (12-16-21 @ 12:51)  HbA1c: A1C with Estimated Average Glucose Result: 5.4 % (12-18-21 @ 07:45)    Glucose: POCT Blood Glucose.: 96 mg/dL (05-08-21 @ 13:01)    BP: 121/76 (12-20-21 @ 08:10) (106/66 - 146/71)  Lipid Panel: Date/Time: 12-18-21 @ 07:45  Cholesterol, Serum: 181  Direct LDL: --  HDL Cholesterol, Serum: 61  Total Cholesterol/HDL Ration Measurement: --  Triglycerides, Serum: 75

## 2021-12-21 NOTE — BH INPATIENT PSYCHIATRY PROGRESS NOTE - NSTXSUICIDGOAL_PSY_ALL_CORE
Be able to report that they independently used a coping skill instead of hurting oneself

## 2021-12-22 VITALS
TEMPERATURE: 99 F | WEIGHT: 154.98 LBS | HEIGHT: 63 IN | OXYGEN SATURATION: 95 % | DIASTOLIC BLOOD PRESSURE: 77 MMHG | HEART RATE: 83 BPM | SYSTOLIC BLOOD PRESSURE: 118 MMHG

## 2021-12-22 PROCEDURE — 99238 HOSP IP/OBS DSCHRG MGMT 30/<: CPT

## 2021-12-22 RX ORDER — ESCITALOPRAM OXALATE 10 MG/1
1 TABLET, FILM COATED ORAL
Qty: 14 | Refills: 0
Start: 2021-12-22 | End: 2022-01-04

## 2021-12-22 RX ORDER — ESCITALOPRAM OXALATE 10 MG/1
1 TABLET, FILM COATED ORAL
Qty: 0 | Refills: 0 | DISCHARGE
Start: 2021-12-22

## 2021-12-22 RX ORDER — PRAZOSIN HCL 2 MG
1 CAPSULE ORAL
Qty: 0 | Refills: 0 | DISCHARGE
Start: 2021-12-22

## 2021-12-22 RX ORDER — BICTEGRAVIR SODIUM, EMTRICITABINE, AND TENOFOVIR ALAFENAMIDE FUMARATE 30; 120; 15 MG/1; MG/1; MG/1
1 TABLET ORAL
Qty: 30 | Refills: 0
Start: 2021-12-22 | End: 2022-01-20

## 2021-12-22 RX ORDER — ACETAMINOPHEN 500 MG
2 TABLET ORAL
Qty: 0 | Refills: 0 | DISCHARGE
Start: 2021-12-22

## 2021-12-22 RX ORDER — CHLORHEXIDINE GLUCONATE 213 G/1000ML
15 SOLUTION TOPICAL
Qty: 420 | Refills: 0
Start: 2021-12-22 | End: 2022-01-04

## 2021-12-22 RX ORDER — GABAPENTIN 400 MG/1
1 CAPSULE ORAL
Qty: 42 | Refills: 0
Start: 2021-12-22 | End: 2022-01-04

## 2021-12-22 RX ORDER — ACETAMINOPHEN 500 MG
2 TABLET ORAL
Qty: 112 | Refills: 0
Start: 2021-12-22 | End: 2022-01-04

## 2021-12-22 RX ORDER — CHLORHEXIDINE GLUCONATE 213 G/1000ML
15 SOLUTION TOPICAL
Qty: 0 | Refills: 0 | DISCHARGE
Start: 2021-12-22

## 2021-12-22 RX ORDER — NICOTINE POLACRILEX 2 MG
1 GUM BUCCAL
Qty: 0 | Refills: 0 | DISCHARGE

## 2021-12-22 RX ORDER — BICTEGRAVIR SODIUM, EMTRICITABINE, AND TENOFOVIR ALAFENAMIDE FUMARATE 30; 120; 15 MG/1; MG/1; MG/1
1 TABLET ORAL
Qty: 0 | Refills: 0 | DISCHARGE
Start: 2021-12-22

## 2021-12-22 RX ADMIN — CHLORHEXIDINE GLUCONATE 15 MILLILITER(S): 213 SOLUTION TOPICAL at 06:59

## 2021-12-22 RX ADMIN — ESCITALOPRAM OXALATE 20 MILLIGRAM(S): 10 TABLET, FILM COATED ORAL at 11:10

## 2021-12-22 RX ADMIN — Medication 300 MILLIGRAM(S): at 06:58

## 2021-12-22 RX ADMIN — BICTEGRAVIR SODIUM, EMTRICITABINE, AND TENOFOVIR ALAFENAMIDE FUMARATE 1 TABLET(S): 30; 120; 15 TABLET ORAL at 11:12

## 2021-12-22 RX ADMIN — GABAPENTIN 100 MILLIGRAM(S): 400 CAPSULE ORAL at 11:11

## 2021-12-22 RX ADMIN — Medication 300 MILLIGRAM(S): at 00:08

## 2021-12-22 NOTE — BH INPATIENT PSYCHIATRY DISCHARGE NOTE - NSDCMRMEDTOKEN_GEN_ALL_CORE_FT
Acetaminophen Extra Strength Gelcaps 500 m tab(s) orally every 6 hours, As needed, Temp greater or equal to 38C (100.4F), Moderate Pain (4 - 6)  bictegravir/emtricitabine/tenofovir 50 mg-200 mg-25 mg oral tablet: 1 tab(s) orally once a day  escitalopram 20 mg oral tablet: 1 tab(s) orally once a day  gabapentin 100 mg oral capsule: 1 cap(s) orally 3 times a day  Peridex 0.12% mucous membrane liquid: 15 milliliter(s) mucous membrane 2 times a day  prazosin 1 mg oral capsule: 1 cap(s) orally once a day (at bedtime)  QUEtiapine 300 mg oral tablet: 1 tab(s) orally once a day (at bedtime)   Acetaminophen Extra Strength Gelcaps 500 m tab(s) orally every 6 hours, As needed, Temp greater or equal to 38C (100.4F), Moderate Pain (4 - 6)  bictegravir/emtricitabine/tenofovir 50 mg-200 mg-25 mg oral tablet: 1 tab(s) orally once a day  escitalopram 20 mg oral tablet: 1 tab(s) orally once a day  gabapentin 100 mg oral capsule: 1 cap(s) orally 3 times a day  Nicorette 2 mg oral transmucosal gum: 1 gum chewed every 2 hours x 30 days, As Needed   Peridex 0.12% mucous membrane liquid: 15 milliliter(s) mucous membrane 2 times a day  prazosin 1 mg oral capsule: 1 cap(s) orally once a day (at bedtime)  QUEtiapine 300 mg oral tablet: 1 tab(s) orally once a day (at bedtime)  traZODone 50 mg oral tablet: 1 tab(s) orally once a day (at bedtime), As needed, insomnia

## 2021-12-22 NOTE — BH DISCHARGE NOTE NURSING/SOCIAL WORK/PSYCH REHAB - NSDCPEEDUCATION_PSY_ALL_CORE
Healthy Living Otezla Pregnancy And Lactation Text: This medication is Pregnancy Category C and it isn't known if it is safe during pregnancy. It is unknown if it is excreted in breast milk.

## 2021-12-22 NOTE — BH DISCHARGE NOTE NURSING/SOCIAL WORK/PSYCH REHAB - NSCDUDCCRISIS_PSY_A_CORE
[facility] Well  1 [phone]) [facility]-WELL ([phone])  Text "WELL" to 47978  Website: www.[employer]/.[facility] 1 (313) 331-LTMA (5905) Website: www.safehorizon.org/.National Suicide Prevention Lifeline 2 (957) 077-4300/.  [facility]  1 (273) LIFENET (610-5789)/.  [facility]’s [facility]  [phone] [place] [address] [phone]   Hours:  Monday through Friday from 9 AM to 3 PM [facility] Well  1 ([phone] [facility]-WELL ([phone])  Text "WELL" to 48543  Website: www.[employer]/.[facility] 1 (942) 811-ZTVW (7588) Website: www.safehorizon.org/.National Suicide Prevention Lifeline 2 (081) 715-5773/.  [facility]  1 (235) LIFENET (012-3707)/.  [facility]’s Behavioral Health Crisis Center  [phone] [place] [address] [phone]   Hours:  Monday through Friday from 9 AM to 3 PM/.  [employer]  [employer] - [facility] Crisis Line  0 [phone]) 185-7495, Option 1

## 2021-12-22 NOTE — BH INPATIENT PSYCHIATRY DISCHARGE NOTE - NSBHDCMEDICALFT_PSY_A_CORE
Ortho was contacted early in admission for recs regarding splint for his R wrist.- Splint was provided  Medicine was consulted for throat and dental pain.  Pain was consulted- non narcotic pain options were recommended and implemented  Ortho maxillary facial surgeon was consulted- 17 teeth extracted

## 2021-12-22 NOTE — BH TREATMENT PLAN - NSTXSUBMISINTERMD_PSY_ALL_CORE
motivational interviewing, naltrexone offered, encourage inpt rehab
motivational interviewing, naltrexone offered, encourage inpt rehab
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
## 2021-12-22 NOTE — BH TREATMENT PLAN - NSTXDEPRESINTERMD_PSY_ALL_CORE
psychopharm management x 15 min daily, lexapro, seroquel
psychopharm management x 15 min daily, lexapro, seroquel

## 2021-12-22 NOTE — BH INPATIENT PSYCHIATRY DISCHARGE NOTE - NSBHMETABOLIC_PSY_ALL_CORE_FT
BMI: BMI (kg/m2): 24.7 (12-16-21 @ 12:51)  HbA1c: A1C with Estimated Average Glucose Result: 5.4 % (12-18-21 @ 07:45)    Glucose: POCT Blood Glucose.: 96 mg/dL (05-08-21 @ 13:01)    BP: 119/74 (12-22-21 @ 06:00) (103/70 - 134/88)  Lipid Panel: Date/Time: 12-18-21 @ 07:45  Cholesterol, Serum: 181  Direct LDL: --  HDL Cholesterol, Serum: 61  Total Cholesterol/HDL Ration Measurement: --  Triglycerides, Serum: 75

## 2021-12-22 NOTE — BH INPATIENT PSYCHIATRY DISCHARGE NOTE - NSDCCPCAREPLAN_GEN_ALL_CORE_FT
PRINCIPAL DISCHARGE DIAGNOSIS  Diagnosis: Substance induced mood disorder  Assessment and Plan of Treatment: Continue current medication regimen and follow up with aftercare appointment      SECONDARY DISCHARGE DIAGNOSES  Diagnosis: Polysubstance dependence including opioid type drug, continuous use  Assessment and Plan of Treatment: Continue current medication regimen and follow up with aftercare appointment    Diagnosis: Severe opioid use disorder  Assessment and Plan of Treatment: Continue current medication regimen and follow up with aftercare appointment    Diagnosis: Personality disorder  Assessment and Plan of Treatment: Continue current medication regimen and follow up with aftercare appointment    Diagnosis: Mood disorder  Assessment and Plan of Treatment: Continue current medication regimen and follow up with aftercare appointment

## 2021-12-22 NOTE — BH TREATMENT PLAN - NSTXSUICIDINTERMD_PSY_ALL_CORE
psychopharm management x 15 min daily, lexapro seroquel, supportive therapy 
psychopharm management x 15 min daily, lexapro seroquel, supportive therapy

## 2021-12-22 NOTE — BH DISCHARGE NOTE NURSING/SOCIAL WORK/PSYCH REHAB - NSDCVIVACCINE_GEN_ALL_CORE_FT
Tdap; 31-Jul-2018 20:20; Servnado Drew (RN); [employer]; [id number]; IntraMuscular; Deltoid Right.; 0.5 milliLiter(s); VIS (VIS Published: 09-May-2013, VIS Presented: 31-Jul-2018);

## 2021-12-22 NOTE — BH DISCHARGE NOTE NURSING/SOCIAL WORK/PSYCH REHAB - PATIENT PORTAL LINK FT
You can access the FollowMyHealth Patient Portal offered by [facility] by registering at the following website: [email or web address]. By joining [facility]’s FollowMyHealth portal, you will also be able to view your health information using other applications (apps) compatible with our system.

## 2021-12-22 NOTE — BH DISCHARGE NOTE NURSING/SOCIAL WORK/PSYCH REHAB - NSDCPRGOAL_PSY_ALL_CORE
Pt. has attended select groups during his admission.  Pt. has participated appropriately when in attendance.  Pt. has been mostly pleasant on approach.  Pt. has been social with peers and has been regularly visible on the unit.  Pt. has discussed his difficulties with substance abuse in groups and how he has not felt proud of some of the decisions that he has made.  Pt's mood has been brighter recently.  Pt. has been future focused and has endorsed being ready to leave the hospital.

## 2021-12-22 NOTE — BH TREATMENT PLAN - NSTXSUICIDGOAL_PSY_ALL_CORE
Be able to report that they independently used a coping skill instead of hurting oneself
Be able to report that they independently used a coping skill instead of hurting oneself

## 2021-12-22 NOTE — BH TREATMENT PLAN - NSTXPLANTHERAPYSESSIONSFT_PSY_ALL_CORE
12-16-21  Type of therapy: Safety planning  --  --  --  --  --    12-18-21  Type of therapy: Creative arts therapy  Type of session: Group  Level of patient participation: Participates  Duration of participation: 60 minutes  Therapy conducted by: Psych rehab  Therapy Summary: Pt stayed for full duration of this art therapy group, that focused on exploring thoughts and perceptions of this time of year (holidays, end of year, winter, etc.) Pt's mood seemed somewhat elevated, laughing often and making frequent jokes with peers, including using sarcasm. Pt continues to endorse hating this time of year, and shows this through his art making. Pt represented a red eyed Vernell with arms and legs that appear intentionally swastica-like. Pt's artwork was provocative, but Pt also was able to speak clearly about his dislike of the "fakeness" of the holiday season and desire to be able to speak his mind without having to hide himself to fit with others. Pt showed understanding that his painting was not appropriate in the milieu and could be offensive to others, and endorses intent to keep in his room.  
  12-16-21  Type of therapy: Safety planning  --  --  --  --  --    12-18-21  Type of therapy: Creative arts therapy  Type of session: Group  Level of patient participation: Participates  Duration of participation: 60 minutes  Therapy conducted by: Psych rehab  Therapy Summary: Pt stayed for full duration of this art therapy group, that focused on exploring thoughts and perceptions of this time of year (holidays, end of year, winter, etc.) Pt's mood seemed somewhat elevated, laughing often and making frequent jokes with peers, including using sarcasm. Pt continues to endorse hating this time of year, and shows this through his art making. Pt represented a red eyed Vernell with arms and legs that appear intentionally swastica-like. Pt's artwork was provocative, but Pt also was able to speak clearly about his dislike of the "fakeness" of the holiday season and desire to be able to speak his mind without having to hide himself to fit with others. Pt showed understanding that his painting was not appropriate in the milieu and could be offensive to others, and endorses intent to keep in his room.

## 2021-12-22 NOTE — BH INPATIENT PSYCHIATRY DISCHARGE NOTE - OTHER PAST PSYCHIATRIC HISTORY (INCLUDE DETAILS REGARDING ONSET, COURSE OF ILLNESS, INPATIENT/OUTPATIENT TREATMENT)
F25.9 Schizophrenia  F10.20 Alcohol Use Disorder, Severe  F11.20 Opioid Use Disorder, Severe  F14.10 Cocaine Use Disorder  Multiple prior psychiatric hospitalizations (most recent at [facility] in 10/2021)

## 2022-01-01 NOTE — ED ADULT NURSE NOTE - PAIN: PRESENCE, MLM
complains of pain/discomfort Normal shape and contour; nares, nostrils and choana patent; no nasal flaring; mucosa pink and moist.

## 2022-01-10 PROCEDURE — 87491 CHLMYD TRACH DNA AMP PROBE: CPT

## 2022-01-10 PROCEDURE — 87075 CULTR BACTERIA EXCEPT BLOOD: CPT

## 2022-01-10 PROCEDURE — 86769 SARS-COV-2 COVID-19 ANTIBODY: CPT

## 2022-01-10 PROCEDURE — 87591 N.GONORRHOEAE DNA AMP PROB: CPT

## 2022-01-10 PROCEDURE — 87536 HIV-1 QUANT&REVRSE TRNSCRPJ: CPT

## 2022-01-10 PROCEDURE — 86308 HETEROPHILE ANTIBODY SCREEN: CPT

## 2022-01-10 PROCEDURE — U0003: CPT

## 2022-01-10 PROCEDURE — 80048 BASIC METABOLIC PNL TOTAL CA: CPT

## 2022-01-10 PROCEDURE — G0378: CPT

## 2022-01-10 PROCEDURE — 85025 COMPLETE CBC W/AUTO DIFF WBC: CPT

## 2022-01-10 PROCEDURE — 83036 HEMOGLOBIN GLYCOSYLATED A1C: CPT

## 2022-01-10 PROCEDURE — 85610 PROTHROMBIN TIME: CPT

## 2022-01-10 PROCEDURE — 73110 X-RAY EXAM OF WRIST: CPT

## 2022-01-10 PROCEDURE — 86850 RBC ANTIBODY SCREEN: CPT

## 2022-01-10 PROCEDURE — 87205 SMEAR GRAM STAIN: CPT

## 2022-01-10 PROCEDURE — 80061 LIPID PANEL: CPT

## 2022-01-10 PROCEDURE — 86360 T CELL ABSOLUTE COUNT/RATIO: CPT

## 2022-01-10 PROCEDURE — 87880 STREP A ASSAY W/OPTIC: CPT

## 2022-01-10 PROCEDURE — 87070 CULTURE OTHR SPECIMN AEROBIC: CPT

## 2022-01-10 PROCEDURE — 86900 BLOOD TYPING SEROLOGIC ABO: CPT

## 2022-01-10 PROCEDURE — 85027 COMPLETE CBC AUTOMATED: CPT

## 2022-01-10 PROCEDURE — 0225U NFCT DS DNA&RNA 21 SARSCOV2: CPT

## 2022-01-10 PROCEDURE — 81003 URINALYSIS AUTO W/O SCOPE: CPT

## 2022-01-10 PROCEDURE — 87081 CULTURE SCREEN ONLY: CPT

## 2022-01-10 PROCEDURE — U0005: CPT

## 2022-01-10 PROCEDURE — 85730 THROMBOPLASTIN TIME PARTIAL: CPT

## 2022-01-10 PROCEDURE — 87635 SARS-COV-2 COVID-19 AMP PRB: CPT

## 2022-01-10 PROCEDURE — 93005 ELECTROCARDIOGRAM TRACING: CPT | Mod: XU

## 2022-01-10 PROCEDURE — 80053 COMPREHEN METABOLIC PANEL: CPT

## 2022-01-10 PROCEDURE — 86355 B CELLS TOTAL COUNT: CPT

## 2022-01-10 PROCEDURE — 86901 BLOOD TYPING SEROLOGIC RH(D): CPT

## 2022-01-10 PROCEDURE — 70486 CT MAXILLOFACIAL W/O DYE: CPT

## 2022-01-10 PROCEDURE — 86357 NK CELLS TOTAL COUNT: CPT

## 2022-01-10 PROCEDURE — 80307 DRUG TEST PRSMV CHEM ANLYZR: CPT

## 2022-01-10 PROCEDURE — 99285 EMERGENCY DEPT VISIT HI MDM: CPT

## 2022-01-10 PROCEDURE — 73200 CT UPPER EXTREMITY W/O DYE: CPT

## 2022-01-10 PROCEDURE — 86359 T CELLS TOTAL COUNT: CPT

## 2022-01-10 PROCEDURE — 36415 COLL VENOUS BLD VENIPUNCTURE: CPT

## 2022-01-10 PROCEDURE — 29125 APPL SHORT ARM SPLINT STATIC: CPT

## 2022-01-13 PROCEDURE — 36415 COLL VENOUS BLD VENIPUNCTURE: CPT

## 2022-01-13 PROCEDURE — 83036 HEMOGLOBIN GLYCOSYLATED A1C: CPT

## 2022-01-13 PROCEDURE — 80061 LIPID PANEL: CPT

## 2022-01-13 PROCEDURE — C9399: CPT

## 2022-02-03 NOTE — BEHAVIORAL HEALTH ASSESSMENT NOTE - NS ED BHA MSE PERCEPTIONS COMMAND YN
"Chief Complaint   Patient presents with     Epistaxis     Follow up epistaxis.  Cautery and nasopore.       Initial /62 (BP Location: Right arm, Patient Position: Sitting, Cuff Size: Adult Large)   Pulse 74   Temp 98  F (36.7  C) (Tympanic)   Ht 1.626 m (5' 4\")   Wt 106.6 kg (235 lb)   LMP  (LMP Unknown)   SpO2 98%   BMI 40.34 kg/m   Estimated body mass index is 40.34 kg/m  as calculated from the following:    Height as of this encounter: 1.626 m (5' 4\").    Weight as of this encounter: 106.6 kg (235 lb).  Medication Reconciliation: complete  Livia Jesus LPN    "
#120KSU scope.  
Yes

## 2022-02-09 NOTE — BEHAVIORAL HEALTH ASSESSMENT NOTE - EMPLOYMENT
Consent 3/Introductory Paragraph: I gave the patient a chance to ask questions they had about the procedure.  Following this I explained the Mohs procedure and consent was obtained. The risks, benefits and alternatives to therapy were discussed in detail. Specifically, the risks of infection, scarring, bleeding, prolonged wound healing, incomplete removal, allergy to anesthesia, nerve injury and recurrence were addressed. Prior to the procedure, the treatment site was clearly identified and confirmed by the patient. All components of Universal Protocol/PAUSE Rule completed. Employed

## 2022-03-05 ENCOUNTER — EMERGENCY (EMERGENCY)
Facility: HOSPITAL | Age: 37
LOS: 1 days | Discharge: ANOTHER HOSPITAL | End: 2022-03-05
Attending: EMERGENCY MEDICINE | Admitting: EMERGENCY MEDICINE
Payer: MEDICAID

## 2022-03-05 VITALS
DIASTOLIC BLOOD PRESSURE: 71 MMHG | HEART RATE: 94 BPM | SYSTOLIC BLOOD PRESSURE: 113 MMHG | OXYGEN SATURATION: 100 % | RESPIRATION RATE: 19 BRPM | WEIGHT: 134.92 LBS | TEMPERATURE: 99 F | HEIGHT: 63 IN

## 2022-03-05 DIAGNOSIS — F14.10 COCAINE ABUSE, UNCOMPLICATED: ICD-10-CM

## 2022-03-05 DIAGNOSIS — F19.94 OTHER PSYCHOACTIVE SUBSTANCE USE, UNSPECIFIED WITH PSYCHOACTIVE SUBSTANCE-INDUCED MOOD DISORDER: ICD-10-CM

## 2022-03-05 LAB
ALBUMIN SERPL ELPH-MCNC: 4 G/DL — SIGNIFICANT CHANGE UP (ref 3.3–5)
ALP SERPL-CCNC: 114 U/L — SIGNIFICANT CHANGE UP (ref 40–120)
ALT FLD-CCNC: 16 U/L — SIGNIFICANT CHANGE UP (ref 10–45)
AMPHET UR-MCNC: NEGATIVE — SIGNIFICANT CHANGE UP
ANION GAP SERPL CALC-SCNC: 12 MMOL/L — SIGNIFICANT CHANGE UP (ref 5–17)
APAP SERPL-MCNC: <5 UG/ML — LOW (ref 10–30)
APPEARANCE UR: CLEAR — SIGNIFICANT CHANGE UP
AST SERPL-CCNC: 31 U/L — SIGNIFICANT CHANGE UP (ref 10–40)
BACTERIA # UR AUTO: ABNORMAL /HPF
BARBITURATES UR SCN-MCNC: NEGATIVE — SIGNIFICANT CHANGE UP
BASOPHILS # BLD AUTO: 0.06 K/UL — SIGNIFICANT CHANGE UP (ref 0–0.2)
BASOPHILS NFR BLD AUTO: 1.1 % — SIGNIFICANT CHANGE UP (ref 0–2)
BENZODIAZ UR-MCNC: NEGATIVE — SIGNIFICANT CHANGE UP
BILIRUB SERPL-MCNC: 0.2 MG/DL — SIGNIFICANT CHANGE UP (ref 0.2–1.2)
BILIRUB UR-MCNC: NEGATIVE — SIGNIFICANT CHANGE UP
BUN SERPL-MCNC: 10 MG/DL — SIGNIFICANT CHANGE UP (ref 7–23)
CALCIUM SERPL-MCNC: 8.9 MG/DL — SIGNIFICANT CHANGE UP (ref 8.4–10.5)
CHLORIDE SERPL-SCNC: 104 MMOL/L — SIGNIFICANT CHANGE UP (ref 96–108)
CO2 SERPL-SCNC: 24 MMOL/L — SIGNIFICANT CHANGE UP (ref 22–31)
COCAINE METAB.OTHER UR-MCNC: POSITIVE
COLOR SPEC: YELLOW — SIGNIFICANT CHANGE UP
CREAT SERPL-MCNC: 0.87 MG/DL — SIGNIFICANT CHANGE UP (ref 0.5–1.3)
DIFF PNL FLD: ABNORMAL
EGFR: 115 ML/MIN/1.73M2 — SIGNIFICANT CHANGE UP
EOSINOPHIL # BLD AUTO: 0.14 K/UL — SIGNIFICANT CHANGE UP (ref 0–0.5)
EOSINOPHIL NFR BLD AUTO: 2.5 % — SIGNIFICANT CHANGE UP (ref 0–6)
EPI CELLS # UR: SIGNIFICANT CHANGE UP /HPF (ref 0–5)
ETHANOL SERPL-MCNC: <10 MG/DL — SIGNIFICANT CHANGE UP (ref 0–10)
GLUCOSE SERPL-MCNC: 105 MG/DL — HIGH (ref 70–99)
GLUCOSE UR QL: NEGATIVE — SIGNIFICANT CHANGE UP
HCT VFR BLD CALC: 38 % — LOW (ref 39–50)
HGB BLD-MCNC: 12.5 G/DL — LOW (ref 13–17)
IMM GRANULOCYTES NFR BLD AUTO: 0.2 % — SIGNIFICANT CHANGE UP (ref 0–1.5)
KETONES UR-MCNC: ABNORMAL MG/DL
LEUKOCYTE ESTERASE UR-ACNC: NEGATIVE — SIGNIFICANT CHANGE UP
LYMPHOCYTES # BLD AUTO: 2.66 K/UL — SIGNIFICANT CHANGE UP (ref 1–3.3)
LYMPHOCYTES # BLD AUTO: 47.6 % — HIGH (ref 13–44)
MCHC RBC-ENTMCNC: 30 PG — SIGNIFICANT CHANGE UP (ref 27–34)
MCHC RBC-ENTMCNC: 32.9 GM/DL — SIGNIFICANT CHANGE UP (ref 32–36)
MCV RBC AUTO: 91.1 FL — SIGNIFICANT CHANGE UP (ref 80–100)
METHADONE UR-MCNC: NEGATIVE — SIGNIFICANT CHANGE UP
MONOCYTES # BLD AUTO: 0.48 K/UL — SIGNIFICANT CHANGE UP (ref 0–0.9)
MONOCYTES NFR BLD AUTO: 8.6 % — SIGNIFICANT CHANGE UP (ref 2–14)
NEUTROPHILS # BLD AUTO: 2.24 K/UL — SIGNIFICANT CHANGE UP (ref 1.8–7.4)
NEUTROPHILS NFR BLD AUTO: 40 % — LOW (ref 43–77)
NITRITE UR-MCNC: NEGATIVE — SIGNIFICANT CHANGE UP
NRBC # BLD: 0 /100 WBCS — SIGNIFICANT CHANGE UP (ref 0–0)
OPIATES UR-MCNC: NEGATIVE — SIGNIFICANT CHANGE UP
PCP SPEC-MCNC: SIGNIFICANT CHANGE UP
PCP UR-MCNC: NEGATIVE — SIGNIFICANT CHANGE UP
PH UR: 7 — SIGNIFICANT CHANGE UP (ref 5–8)
PLATELET # BLD AUTO: 340 K/UL — SIGNIFICANT CHANGE UP (ref 150–400)
POTASSIUM SERPL-MCNC: 3.7 MMOL/L — SIGNIFICANT CHANGE UP (ref 3.5–5.3)
POTASSIUM SERPL-SCNC: 3.7 MMOL/L — SIGNIFICANT CHANGE UP (ref 3.5–5.3)
PROT SERPL-MCNC: 7.1 G/DL — SIGNIFICANT CHANGE UP (ref 6–8.3)
PROT UR-MCNC: NEGATIVE MG/DL — SIGNIFICANT CHANGE UP
RBC # BLD: 4.17 M/UL — LOW (ref 4.2–5.8)
RBC # FLD: 13.7 % — SIGNIFICANT CHANGE UP (ref 10.3–14.5)
RBC CASTS # UR COMP ASSIST: ABNORMAL /HPF
SALICYLATES SERPL-MCNC: <0.3 MG/DL — LOW (ref 2.8–20)
SARS-COV-2 RNA SPEC QL NAA+PROBE: NEGATIVE — SIGNIFICANT CHANGE UP
SODIUM SERPL-SCNC: 140 MMOL/L — SIGNIFICANT CHANGE UP (ref 135–145)
SP GR SPEC: 1.02 — SIGNIFICANT CHANGE UP (ref 1–1.03)
THC UR QL: NEGATIVE — SIGNIFICANT CHANGE UP
UROBILINOGEN FLD QL: 1 E.U./DL — SIGNIFICANT CHANGE UP
WBC # BLD: 5.59 K/UL — SIGNIFICANT CHANGE UP (ref 3.8–10.5)
WBC # FLD AUTO: 5.59 K/UL — SIGNIFICANT CHANGE UP (ref 3.8–10.5)
WBC UR QL: < 5 /HPF — SIGNIFICANT CHANGE UP

## 2022-03-05 PROCEDURE — 85025 COMPLETE CBC W/AUTO DIFF WBC: CPT

## 2022-03-05 PROCEDURE — 86359 T CELLS TOTAL COUNT: CPT

## 2022-03-05 PROCEDURE — 86355 B CELLS TOTAL COUNT: CPT

## 2022-03-05 PROCEDURE — 99285 EMERGENCY DEPT VISIT HI MDM: CPT | Mod: 25

## 2022-03-05 PROCEDURE — 86360 T CELL ABSOLUTE COUNT/RATIO: CPT

## 2022-03-05 PROCEDURE — 99285 EMERGENCY DEPT VISIT HI MDM: CPT

## 2022-03-05 PROCEDURE — 93005 ELECTROCARDIOGRAM TRACING: CPT

## 2022-03-05 PROCEDURE — 71045 X-RAY EXAM CHEST 1 VIEW: CPT

## 2022-03-05 PROCEDURE — 87536 HIV-1 QUANT&REVRSE TRNSCRPJ: CPT

## 2022-03-05 PROCEDURE — 86357 NK CELLS TOTAL COUNT: CPT

## 2022-03-05 PROCEDURE — 81001 URINALYSIS AUTO W/SCOPE: CPT

## 2022-03-05 PROCEDURE — 87635 SARS-COV-2 COVID-19 AMP PRB: CPT

## 2022-03-05 PROCEDURE — 36415 COLL VENOUS BLD VENIPUNCTURE: CPT

## 2022-03-05 PROCEDURE — 80053 COMPREHEN METABOLIC PANEL: CPT

## 2022-03-05 PROCEDURE — 71045 X-RAY EXAM CHEST 1 VIEW: CPT | Mod: 26

## 2022-03-05 PROCEDURE — 80307 DRUG TEST PRSMV CHEM ANLYZR: CPT

## 2022-03-05 RX ORDER — QUETIAPINE FUMARATE 200 MG/1
100 TABLET, FILM COATED ORAL ONCE
Refills: 0 | Status: COMPLETED | OUTPATIENT
Start: 2022-03-05 | End: 2022-03-05

## 2022-03-05 RX ADMIN — QUETIAPINE FUMARATE 100 MILLIGRAM(S): 200 TABLET, FILM COATED ORAL at 21:15

## 2022-03-05 NOTE — ED BEHAVIORAL HEALTH ASSESSMENT NOTE - DETAILS
pending bed search reports possible admission at [facility], but unsure when deferred unclear as per HPI self

## 2022-03-05 NOTE — ED PROVIDER NOTE - CLINICAL SUMMARY MEDICAL DECISION MAKING FREE TEXT BOX
37 yo male with h/o substance abuse, depression, multiple psychiatric admission for SI in the past, present to ER c/o auditory  hallucinations and SI. pt is cooperative. pending labs and psychiatric evaluation.

## 2022-03-05 NOTE — ED ADULT NURSE NOTE - OBJECTIVE STATEMENT
pt here for psychiatric eval  pt crying, explaining his girlfriend committed suicide recently by a train station  states [place] contemplating killing himself to be with her  denies any suicidal attempt, HI, hallucinations

## 2022-03-05 NOTE — ED PROVIDER NOTE - ATTENDING CONTRIBUTION TO CARE
36M hx substance abuse, depression, c/o SI.  pt states has been hearing voices. states voices telling him to kill himself. pt states noncompliant with meds or psych f/u.    gen- nad  heent- ncat, clear conj  cv -rrr  lungs -ctab  abd - soft, nt, nd  ext -wwp, no edema  neuro -aox3, steady gait, barnett  SI, AH, labs sent, on 1:1, will consult psych

## 2022-03-05 NOTE — ED BEHAVIORAL HEALTH ASSESSMENT NOTE - HPI (INCLUDE ILLNESS QUALITY, SEVERITY, DURATION, TIMING, CONTEXT, MODIFYING FACTORS, ASSOCIATED SIGNS AND SYMPTOMS)
35yo  man, single, previously domiciled currently living at [facility], unemployed, with PPH of affective and psychotic symptoms, substance use disorders (prominently alcohol, cocaine, heroin use), past SA including hanging, history of NSSIB in the past, multiple prior admissions (May 2021 [facility], July 2021 [facility], Oct 2021 [facility], Dec [facility]), history of detox/rehab in the past, with PMH of HIV on Biktarvy (haven't taken in over 2 months), who BIBS for SI in the setting of recent suicide of his girlfriend, heavy substance use and associated with it mood sx.     Patient known to this writer as he was most recently hospitalized on 8U and seen by this writer on numerous occasions. Notably, during that hospx, he underwent multiple dental extractions (17). On review of patient's whereabouts, he reports that following the discharge from 8, he neither picked up his medications nor followed up with outpatient providers as "I was not ready to quit drugs" and relapsed on primarily cocaine use. 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 Although he has housing through [employer], he states that soon after discharge he ended p living at the [facility] and has been staying there since adding "I just can't seem to leave the station". Notes that it is hard to recall what date or month it is but knows that the year is 2022 and shares that "everything has been blurry" in part due to substance use. Due to the above, Mr. Hernandez has find self increasingly more depressed, with impaired mood and sleep, anhedonia, difficulties with focus, and low appetite. This has culminated in him starting to have suicidal thoughts with a plan to jump from the bridge located by [facility], and spending much time sitting on the edge of the stated bridge and contemplating, thus he ultimately brought himself to [facility] hoping to receive psychiatric care here.  With regards to substance use, primarily reports heavy daily cocaine use, with last use yesterday. On motivational interviewing, remains precontemplative.  On review of symptoms, presents with cough and 1 day of chills and night sweats but no other symptoms. Notably has not been compliant with HAART for HIV.        Additionally, per discharge summary at  on 12/22:  "Patient admitted to [facility] voluntary for medication stabilization and treatment. Lexapro 10mg qd and seroquel 150mg qhs were started for depression and  mood dysregulation. Naltrexone was discussed as an option for treatment during his admission but he declined. Pt compliant with all medications. Lexapro and Seroquel were steadily increased to final doses of lexapro 20mg daily and 300mg qhs, tolerated well without issue. Patient regularly met with psychologist on the unit and was noted to become more forthcoming with team about his history, substance use etc. He continually expressed much guilt and remorse over the death of his best friend via OD who he introduced to drugs. He slowly became more visible on the unit, walking around the unit, watching team, eventually he began to appropriately interact with others. Attended select groups including 12 steps with encouragement. He expressed desire to go to outpatient rehab. Several services were consulted during pt's admission (ortho, medicine, pain, omfs). He endorsed dental pain and became focused on receiving narcotics for pain. He had one episode of agitation/aggression, punching the glass at the nursing station for pain meds. Pt was discharged from [facility] for oral surgery and returned directly after for continuity of care. Throughout his admission, pt denied suicidality, denied hi, denied avh or paranoid ideation. Pt began to request discharge to outpatient rehab, reporting intent to follow up with , go to a dentist to get dentures as well as attend programs for substance abuse support.  Safety plan completed prior to discharge"

## 2022-03-05 NOTE — ED PROVIDER NOTE - OBJECTIVE STATEMENT
35 yo male with h/o substance abuse, depression, present to Er c/o SI. Pt states his girlfriend killed herself by jumping from the train. No he lives [place] 1 [place] at the train [place] because he feels that he is "close to his girlfriend". Pt also reports he has been hearing voices in his head.  voices are telling him to kill himself. Pt is crying and states that he can not take care of himself. he is not complaint with medications, he didn't follow up with psychiatrist after his discharge from the hospital.

## 2022-03-05 NOTE — ED PROVIDER NOTE - PROGRESS NOTE DETAILS
clotilde: pt received at sign out from dr phillips/jennifer landon as pending psych eval and dispo pt signed out to jennifer galeas pending 1:1 and psych placement clotilde: pt received at sign out this am from overnight team as no overnight events, still pending psych placement

## 2022-03-05 NOTE — ED BEHAVIORAL HEALTH ASSESSMENT NOTE - DESCRIPTION
HIV (Biktarvy) as per HPI unremarkable  writer requested viral load and cd4 count  utox is pending  vss

## 2022-03-05 NOTE — ED BEHAVIORAL HEALTH ASSESSMENT NOTE - SUMMARY
37yo  man, single, previously domiciled currently living at [facility], unemployed, with PPH of affective and psychotic symptoms, substance use disorders (prominently alcohol, cocaine, heroin use), past SA including hanging, history of NSSIB in the past, multiple prior admissions (May 2021 [facility], July 2021 [facility], Oct 2021 [facility], Dec [facility]), history of detox/rehab in the past, with PMH of HIV on Biktarvy (haven't taken in over 2 months), who BIBS for SI + plan in the setting of recent suicide of his girlfriend, heavy substance use and associated with it mood sx.  He is presenting with low mood, low energy, anhedonia, hopelessness, guilt, insomnia and suicidal thoughts with detailed plan and vague intent. His mood symptoms also appear to have been impacting his ability to take care of self and take his antiretroviral medications. Much of the acute presentation and MSE is impacted by substance use. He is at acutely elevated risk for suicide and will require admission pending medical clearance.    Plan:  2PC filled out and placed in the chart  no beds available on 8U - will perform bed search  start seroquel 100 mg po qhs for mood stabilization  can offer seroquel 50 mg po q6h PRN agitation    utox pending  no signs of WD and no expression of recent etoh or opioid use 37yo  man, single, previously domiciled currently living at [facility], unemployed, with PPH of affective and psychotic symptoms, substance use disorders (prominently alcohol, cocaine, heroin use), past SA including hanging, history of NSSIB in the past, multiple prior admissions (May 2021 [facility], July 2021 [facility], Oct 2021 [facility], Dec [facility]), history of detox/rehab in the past, with PMH of HIV on Biktarvy (haven't taken in over 2 months), who BIBS for SI + plan in the setting of recent suicide of his girlfriend, heavy substance use and associated with it mood sx.  He is presenting with low mood, low energy, anhedonia, hopelessness, guilt, insomnia and suicidal thoughts with detailed plan and vague intent. His mood symptoms also appear to have been impacting his ability to take care of self and take his antiretroviral medications. Much of the acute presentation and MSE is impacted by substance use. He is at acutely elevated risk for suicide and will require admission pending medical clearance.    Plan:  2PC filled out and placed in the chart  no beds available on 8U - will perform bed search  start seroquel 100 mg po qhs for mood stabilization  can offer seroquel 50 mg po q6h PRN agitation    utox pending  no signs of WD and no expression of recent etoh or opioid use  Viral load and CD4 pending; CXR pending

## 2022-03-06 ENCOUNTER — INPATIENT (INPATIENT)
Facility: HOSPITAL | Age: 37
LOS: 10 days | Discharge: NURSING, REHAB OR LONG-TERM CARE | End: 2022-03-17
Attending: PSYCHIATRY & NEUROLOGY | Admitting: PSYCHIATRY & NEUROLOGY
Payer: COMMERCIAL

## 2022-03-06 VITALS
OXYGEN SATURATION: 96 % | SYSTOLIC BLOOD PRESSURE: 120 MMHG | HEART RATE: 82 BPM | DIASTOLIC BLOOD PRESSURE: 73 MMHG | RESPIRATION RATE: 17 BRPM | TEMPERATURE: 98 F

## 2022-03-06 VITALS — HEIGHT: 66 IN | TEMPERATURE: 96 F | WEIGHT: 138.23 LBS | RESPIRATION RATE: 18 BRPM

## 2022-03-06 DIAGNOSIS — F10.24 ALCOHOL DEPENDENCE WITH ALCOHOL-INDUCED MOOD DISORDER: ICD-10-CM

## 2022-03-06 DIAGNOSIS — Z91.51 PERSONAL HISTORY OF SUICIDAL BEHAVIOR: ICD-10-CM

## 2022-03-06 LAB
4/8 RATIO: 0.91 RATIO — SIGNIFICANT CHANGE UP (ref 0.9–3.6)
ABS CD8: 627 /UL — SIGNIFICANT CHANGE UP (ref 142–740)
CD16+CD56+ CELLS NFR BLD: 9 % — SIGNIFICANT CHANGE UP (ref 5–23)
CD16+CD56+ CELLS NFR SPEC: 135 /UL — SIGNIFICANT CHANGE UP (ref 71–410)
CD19 BLASTS SPEC-ACNC: 11 % — SIGNIFICANT CHANGE UP (ref 6–24)
CD19 BLASTS SPEC-ACNC: 169 /UL — SIGNIFICANT CHANGE UP (ref 84–469)
CD3 BLASTS SPEC-ACNC: 1226 /UL — SIGNIFICANT CHANGE UP (ref 672–1870)
CD3 BLASTS SPEC-ACNC: 79 % — SIGNIFICANT CHANGE UP (ref 59–83)
CD4 %: 37 % — SIGNIFICANT CHANGE UP (ref 30–62)
CD8 %: 41 % — HIGH (ref 12–36)
T-CELL CD4 SUBSET PNL BLD: 572 /UL — SIGNIFICANT CHANGE UP (ref 489–1457)

## 2022-03-06 RX ORDER — THIAMINE MONONITRATE (VIT B1) 100 MG
100 TABLET ORAL DAILY
Refills: 0 | Status: DISCONTINUED | OUTPATIENT
Start: 2022-03-06 | End: 2022-03-17

## 2022-03-06 RX ORDER — FOLIC ACID 0.8 MG
1 TABLET ORAL DAILY
Refills: 0 | Status: DISCONTINUED | OUTPATIENT
Start: 2022-03-06 | End: 2022-03-17

## 2022-03-06 RX ORDER — ESCITALOPRAM OXALATE 10 MG/1
20 TABLET, FILM COATED ORAL DAILY
Refills: 0 | Status: DISCONTINUED | OUTPATIENT
Start: 2022-03-06 | End: 2022-03-17

## 2022-03-06 RX ORDER — HALOPERIDOL DECANOATE 100 MG/ML
5 INJECTION INTRAMUSCULAR EVERY 6 HOURS
Refills: 0 | Status: DISCONTINUED | OUTPATIENT
Start: 2022-03-06 | End: 2022-03-17

## 2022-03-06 RX ORDER — HYDROXYZINE HCL 10 MG
50 TABLET ORAL EVERY 6 HOURS
Refills: 0 | Status: DISCONTINUED | OUTPATIENT
Start: 2022-03-06 | End: 2022-03-17

## 2022-03-06 RX ORDER — NICOTINE POLACRILEX 2 MG
2 GUM BUCCAL
Refills: 0 | Status: DISCONTINUED | OUTPATIENT
Start: 2022-03-06 | End: 2022-03-17

## 2022-03-06 RX ORDER — TRAZODONE HCL 50 MG
50 TABLET ORAL AT BEDTIME
Refills: 0 | Status: DISCONTINUED | OUTPATIENT
Start: 2022-03-06 | End: 2022-03-17

## 2022-03-06 RX ORDER — QUETIAPINE FUMARATE 200 MG/1
100 TABLET, FILM COATED ORAL AT BEDTIME
Refills: 0 | Status: DISCONTINUED | OUTPATIENT
Start: 2022-03-06 | End: 2022-03-17

## 2022-03-06 RX ORDER — BICTEGRAVIR SODIUM, EMTRICITABINE, AND TENOFOVIR ALAFENAMIDE FUMARATE 30; 120; 15 MG/1; MG/1; MG/1
1 TABLET ORAL DAILY
Refills: 0 | Status: DISCONTINUED | OUTPATIENT
Start: 2022-03-06 | End: 2022-03-17

## 2022-03-06 RX ADMIN — ESCITALOPRAM OXALATE 20 MILLIGRAM(S): 10 TABLET, FILM COATED ORAL at 17:28

## 2022-03-06 RX ADMIN — Medication 100 MILLIGRAM(S): at 17:32

## 2022-03-06 RX ADMIN — BICTEGRAVIR SODIUM, EMTRICITABINE, AND TENOFOVIR ALAFENAMIDE FUMARATE 1 TABLET(S): 30; 120; 15 TABLET ORAL at 17:29

## 2022-03-06 RX ADMIN — Medication 1 MILLIGRAM(S): at 17:27

## 2022-03-06 RX ADMIN — QUETIAPINE FUMARATE 100 MILLIGRAM(S): 200 TABLET, FILM COATED ORAL at 20:37

## 2022-03-06 RX ADMIN — Medication 50 MILLIGRAM(S): at 20:40

## 2022-03-06 RX ADMIN — Medication 1 TABLET(S): at 17:27

## 2022-03-06 NOTE — H&P ADULT - NSHPSOCIALHISTORY_GEN_ALL_CORE
Tobacco use: 2 PPD  EtOH use: drinks 2 pints per day, last 3 days ago.  No Hx of DT's or w/d seizure as reported by patient  Illicit drug use: +NAWAF

## 2022-03-06 NOTE — ED ADULT NURSE REASSESSMENT NOTE - NS ED NURSE REASSESS COMMENT FT1
Pt resting comfortably on stretcher, 1:1 in place, awaiting psych admit.
Pt resting comfortably on stretcher, no distress noted. 1:1 in place.
Pt resting comfortably, no distress noted, 1:1 in place.
Report given to RN Caren from [facility]. Report given to EMS for transfer. Pt aware of transfer to other facility with understanding verbalized.
Report received from [facility]. Pt sleeping comfortably in stretcher, arousable to voice. 1:1 remains in place, all safety measures maintained. Pending breakfast delivery and clean bed, aware with plan.
Pt resting comfortably on stretcher, VSS, no distress noted. 1:1 in place.

## 2022-03-06 NOTE — H&P ADULT - HISTORY OF PRESENT ILLNESS
36M w/PMHx of HIV (on HAART follows with ID at [facility]), Schizoaffective Disorder w/Hx of multiple suicide attempts presents to ED at [facility] 2/2 suicidal ideation.  Patient seen by psych and admitted to IPP for further management.  Patient seen and examined at bedside on arrival to unit, resting comfortably and without acute complaints.  No CP/SOB.  No abdominal or urinary complaints.

## 2022-03-06 NOTE — H&P ADULT - NSHPPHYSICALEXAM_GEN_ALL_CORE
Vital Signs (24 Hrs):  T(C): 35.8 (03-06-22 @ 16:07), Max: 36.9 (03-05-22 @ 23:54)  HR: 82 (03-06-22 @ 13:45) (65 - 83)  BP: 120/73 (03-06-22 @ 13:45) (109/72 - 124/82)  RR: 18 (03-06-22 @ 16:07) (16 - 18)  SpO2: 96% (03-06-22 @ 13:45) (96% - 98%)    PHYSICAL EXAM:  GENERAL: NAD, well-developed  SKIN: No rashes or lesions  HEAD:  Atraumatic, Normocephalic  EYES: EOMI, PERRLA, conjunctiva and sclera clear  NECK: Supple, No JVD  CHEST/LUNG: Clear to auscultation bilaterally; No wheeze  HEART: Regular rate and rhythm; No murmurs, rubs, or gallops  ABDOMEN: Soft, Nontender, Nondistended; Bowel sounds present  EXTREMITIES:  No clubbing, cyanosis, or edema  CNS: AAOx3

## 2022-03-06 NOTE — H&P ADULT - NSHPLABSRESULTS_GEN_ALL_CORE
12.5   5.59  )-----------( 340      ( 05 Mar 2022 04:38 )             38.0       03-05    140  |  104  |  10  ----------------------------<  105<H>  3.7   |  24  |  0.87    Ca    8.9      05 Mar 2022 04:38    TPro  7.1  /  Alb  4.0  /  TBili  0.2  /  DBili  x   /  AST  31  /  ALT  16  /  AlkPhos  114  03-05                  Urinalysis Basic - ( 05 Mar 2022 06:43 )    Color: Yellow / Appearance: Clear / S.020 / pH: x  Gluc: x / Ketone: Trace mg/dL  / Bili: Negative / Urobili: 1.0 E.U./dL   Blood: x / Protein: NEGATIVE mg/dL / Nitrite: NEGATIVE   Leuk Esterase: NEGATIVE / RBC: 5-10 /HPF / WBC < 5 /HPF   Sq Epi: x / Non Sq Epi: 0-5 /HPF / Bacteria: Many /HPF            Lactate Trend

## 2022-03-06 NOTE — ED BEHAVIORAL HEALTH NOTE - BEHAVIORAL HEALTH NOTE
Psychiatric ED follow up note    S: pt seen, case discussed with 1:1. No acute events overnight. Pt took hs seroquel in hs and no PRNs needed. Mood appears to be improving, slept well, and reported good appetite. NO expression of adriel or AVH, no WD sx or med SE. Continues to state that he does not feel safe getting discharged from the hospital and that "bad things may happen to me, I feel the same as yesterday (alluding to SI)".    Vital Signs Last 24 Hrs  T(C): 36.7 (06 Mar 2022 09:20), Max: 37.4 (05 Mar 2022 13:05)  T(F): 98 (06 Mar 2022 09:20), Max: 99.3 (05 Mar 2022 13:05)  HR: 83 (06 Mar 2022 09:20) (65 - 89)  BP: 124/82 (06 Mar 2022 09:20) (109/72 - 137/93)  BP(mean): --  RR: 16 (06 Mar 2022 09:20) (16 - 18)  SpO2: 98% (06 Mar 2022 09:20) (98% - 98%)    MSE:   appearance: dressed in hospital gown, in NAD, better groomed and freshly shaven  mood: improving  affect: congruent  thought process: linear  thought content: vague suicidal thoughts, no HI or delusions  Perceptual: denies AVH and does not appear internally preoccupied  concentration: unremarkable  I/J: improving      Assessment:  35yo  man, single, previously domiciled currently living at [facility], unemployed, with PPH of affective and psychotic symptoms, substance use disorders (prominently alcohol, cocaine, heroin use), past SA including hanging, history of NSSIB in the past, multiple prior admissions (May 2021 [facility], July 2021 [facility], Oct 2021 [facility], Dec [facility]), history of detox/rehab in the past, with PMH of HIV on Biktarvy (haven't taken in over 2 months), who BIBS for SI + plan in the setting of recent suicide of his girlfriend, heavy substance use and associated with it mood sx.  He is presenting with low mood, low energy, anhedonia, hopelessness, guilt, insomnia and suicidal thoughts with detailed plan and vague intent. His mood symptoms also appear to have been impacting his ability to take care of self and take his antiretroviral medications. Much of the acute presentation and MSE is impacted by substance use. Appears to be somewhat improved from yesterday, brighter and more upbeat, but continues to endorse SI, now with a vague plan, and does not contract for safety. Will continue to reassess.    Plan:  2PC filled out and placed in the chart  no beds available on 8U - will perform bed search  c/w seroquel 100 mg po qhs for mood stabilization  at home on Biktarvy per prior documentation  can offer seroquel 50 mg po q6h PRN agitation

## 2022-03-06 NOTE — H&P ADULT - ASSESSMENT
36M w/PMHx of HIV (on HAART follows with ID at [facility]), Schizoaffective Disorder w/Hx of multiple suicide attempts presents to ED at [facility] 2/2 suicidal ideation, admitted to IPP for further management.    #Sucidial ideation  - admit to IPP  - plan per psych  - medicine c/s PRN    #EtOH Dependence  - not currently exhibiting s/s of w/d  - Librium PRN  - Detox c/s  - MVI/Folic Acid/Thiamine    #HIV  - CD4 counts reviewed  - c/w HAART  - ID c/s PRN

## 2022-03-06 NOTE — CHART NOTE - NSCHARTNOTEFT_GEN_A_CORE
Saw pt briefly upon arrival to the unit. 35yo m with cocaine use d/o, Dx with substance induced mood d/o, on 2PC status, transferred from [facility] for depression, SI. Pt was seen in his room, presents friendly, calm, with constricted affect, reports feeling depressed but denies SI, denies AH.  Orders were entered, Pt was restarted on Seroquel 100mg HS and Lexapro 20mg daily.

## 2022-03-06 NOTE — PATIENT PROFILE BEHAVIORAL HEALTH - FALL HARM RISK - UNIVERSAL INTERVENTIONS
Bed in lowest position, wheels locked, appropriate side rails in place/Call bell, personal items and telephone in reach/Instruct patient to call for assistance before getting out of bed or chair/Non-slip footwear when patient is out of bed/[place] to call system/Physically safe environment - no spills, clutter or unnecessary equipment/Purposeful Proactive Rounding/Room/bathroom lighting operational, light cord in reach

## 2022-03-07 DIAGNOSIS — F17.200 NICOTINE DEPENDENCE, UNSPECIFIED, UNCOMPLICATED: ICD-10-CM

## 2022-03-07 DIAGNOSIS — F14.10 COCAINE ABUSE, UNCOMPLICATED: ICD-10-CM

## 2022-03-07 DIAGNOSIS — F32.9 MAJOR DEPRESSIVE DISORDER, SINGLE EPISODE, UNSPECIFIED: ICD-10-CM

## 2022-03-07 DIAGNOSIS — F10.10 ALCOHOL ABUSE, UNCOMPLICATED: ICD-10-CM

## 2022-03-07 DIAGNOSIS — F10.20 ALCOHOL DEPENDENCE, UNCOMPLICATED: ICD-10-CM

## 2022-03-07 DIAGNOSIS — F19.10 OTHER PSYCHOACTIVE SUBSTANCE ABUSE, UNCOMPLICATED: ICD-10-CM

## 2022-03-07 DIAGNOSIS — F33.1 MAJOR DEPRESSIVE DISORDER, RECURRENT, MODERATE: ICD-10-CM

## 2022-03-07 LAB
A1C WITH ESTIMATED AVERAGE GLUCOSE RESULT: 5.3 % — SIGNIFICANT CHANGE UP (ref 4–5.6)
CHOLEST SERPL-MCNC: 184 MG/DL — SIGNIFICANT CHANGE UP
ESTIMATED AVERAGE GLUCOSE: 105 MG/DL — SIGNIFICANT CHANGE UP (ref 68–114)
HDLC SERPL-MCNC: 73 MG/DL — SIGNIFICANT CHANGE UP
HIV-1 VIRAL LOAD RESULT: ABNORMAL
HIV1 RNA # SERPL NAA+PROBE: ABNORMAL COPIES/ML
HIV1 RNA SER-IMP: SIGNIFICANT CHANGE UP
HIV1 RNA SERPL NAA+PROBE-ACNC: ABNORMAL
HIV1 RNA SERPL NAA+PROBE-LOG#: ABNORMAL LG COP/ML
LIPID PNL WITH DIRECT LDL SERPL: 97 MG/DL — SIGNIFICANT CHANGE UP
NON HDL CHOLESTEROL: 111 MG/DL — SIGNIFICANT CHANGE UP
TRIGL SERPL-MCNC: 106 MG/DL — SIGNIFICANT CHANGE UP

## 2022-03-07 PROCEDURE — 99232 SBSQ HOSP IP/OBS MODERATE 35: CPT

## 2022-03-07 PROCEDURE — 99221 1ST HOSP IP/OBS SF/LOW 40: CPT

## 2022-03-07 RX ADMIN — Medication 100 MILLIGRAM(S): at 08:42

## 2022-03-07 RX ADMIN — ESCITALOPRAM OXALATE 20 MILLIGRAM(S): 10 TABLET, FILM COATED ORAL at 08:42

## 2022-03-07 RX ADMIN — Medication 1 MILLIGRAM(S): at 08:43

## 2022-03-07 RX ADMIN — Medication 1 TABLET(S): at 08:43

## 2022-03-07 RX ADMIN — QUETIAPINE FUMARATE 100 MILLIGRAM(S): 200 TABLET, FILM COATED ORAL at 20:25

## 2022-03-07 RX ADMIN — BICTEGRAVIR SODIUM, EMTRICITABINE, AND TENOFOVIR ALAFENAMIDE FUMARATE 1 TABLET(S): 30; 120; 15 TABLET ORAL at 08:43
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
## 2022-03-07 NOTE — PROGRESS NOTE BEHAVIORAL HEALTH - NSBHFUPINTERVALHXFT_PSY_A_CORE
Chart reviewed, patient seen and evaluated at bedside. Chart reviewed, patient seen and evaluated at bedside. No acute overnight events reported.     Encounter found patient to be sleeping comfortably in bed, no acute distress. Patient did not appear to have sad affect and was pleasant to speak with, however, answers were limited in scope. Patient offers to undersigned that his girlfriend passed away "a few weeks ago" which he reports caused him to become depressed and use cocaine to help him get away from the pain. Patient reported that he has been having difficulty staying asleep but is unable to elaborate further, reported feeling worthless and useless, reported poor energy, and admitted to passive SI but no active si, hi and denied auditory or visual hallucinations. Patient currently reported that he just needs time for his mind to clear.    Patient reports he drinks 2pts of alcohol per day, reports last drink 4 days ago, , CIWA= 0 at bedside, patient reported he did not feel withdrawal symptoms and reported he did not need a taper.     Spoke to [facility] [phone] who reports that the patient has not picked up or received medications since december 22,2022 at which time he only received Bektarvy.

## 2022-03-07 NOTE — ED ADULT NURSE NOTE - NS_SISCREENINGSR_GEN_ALL_ED
Positive [de-identified] : March 7, 2022 sinus rhythm [de-identified] : 2013 mild anterolateral ischemia [de-identified] : 2020 normal LV function mitral and tricuspid regurg\par \par October 2021 biatrial enlargement restrictive physiology moderate to severe mitral regurgitation normal LV systolic function pseudonormal diastolic function [de-identified] : 2014 moderate nonobstructive disease 2008 LAD stent

## 2022-03-07 NOTE — PROGRESS NOTE BEHAVIORAL HEALTH - PROBLEM SELECTOR PLAN 4
- Nicotine gum, 2mg q2 prn for nicotine cravings - ativan 2mg po q6 prn for signs and symptoms of alcohol withdrawal  - CATCH team

## 2022-03-07 NOTE — CONSULT NOTE ADULT - PROBLEM SELECTOR RECOMMENDATION 9
After evaluation at this time continue with current treatment. Ativan PRN. monitor for withdrawal and supportive care. Pt should be on thiamine and folic acid.  Pt will be monitored and supportive care provided  CATCH team involved for aftercare and pt will follow up with aftercare for possible rehab

## 2022-03-07 NOTE — PROGRESS NOTE BEHAVIORAL HEALTH - NSBHADDHXPSYCHFT_PSY_A_CORE
Multiple prior admissions,   prior NSSIB Multiple prior admissions, self reports 2 previous Suicide attempts, 1 via walking in front of car which he reports cause a wrist facture and 1 reported attempted overdose on heroin.   prior NSSIB

## 2022-03-07 NOTE — CONSULT NOTE ADULT - SUBJECTIVE AND OBJECTIVE BOX
Pt interviewed, examined and EMR chart reviewed.  Pt admits to drinking 2 pts per day x  3  months. Last Drink day of admission   Hx of withdrawal tremors no seizures  pt admits to using cocaine $100 per day. inhaled. pt states last use day prior to admission  variable periods of sobriety in the past.  Has been in detox before __X___yes,   _____No    Pt completed rehab 3 days ago    SOCIAL HISTORY:    REVIEW OF SYSTEMS:    Constitutional: No fever, weight loss or fatigue  ENT:  No difficulty hearing, tinnitus, vertigo; No sinus or throat pain  Neck: No pain or stiffness  Respiratory: No cough, wheezing, chills or hemoptysis  Cardiovascular: No chest pain, palpitations, shortness of breath, dizziness or leg swelling  Gastrointestinal: No abdominal or epigastric pain. No nausea, vomiting or hematemesis; No diarrhea or constipation. No melena or hematochezia.  Neurological: No headaches, memory loss, loss of strength, numbness or tremors  Musculoskeletal: No joint pain or swelling; No muscle, back or extremity pain  Psychiatric: No depression, anxiety, mood swings or difficulty sleeping    MEDICATIONS  (STANDING):  bictegravir 50 mG/emtricitabine 200 mG/tenofovir alafenamide 25 mG (BIKTARVY) 1 Tablet(s) Oral daily  escitalopram 20 milliGRAM(s) Oral daily  folic acid 1 milliGRAM(s) Oral daily  multivitamin 1 Tablet(s) Oral daily  QUEtiapine 100 milliGRAM(s) Oral at bedtime  thiamine 100 milliGRAM(s) Oral daily    MEDICATIONS  (PRN):  haloperidol     Tablet 5 milliGRAM(s) Oral every 6 hours PRN agitation  hydrOXYzine hydrochloride 50 milliGRAM(s) Oral every 6 hours PRN anxiety  LORazepam     Tablet 2 milliGRAM(s) Oral every 6 hours PRN objective signs of alcohol withdrawal  nicotine  Polacrilex Gum 2 milliGRAM(s) Oral every 2 hours PRN tobacco craving  traZODone 50 milliGRAM(s) Oral at bedtime PRN insomnia      Vital Signs Last 24 Hrs  T(C): 36.5 (07 Mar 2022 11:44), Max: 36.7 (07 Mar 2022 05:52)  T(F): 97.7 (07 Mar 2022 11:44), Max: 98.1 (07 Mar 2022 05:52)  HR: 75 (07 Mar 2022 11:44) (57 - 75)  BP: 144/69 (07 Mar 2022 11:44) (132/60 - 144/69)  BP(mean): --  RR: 18 (07 Mar 2022 11:44) (18 - 18)  SpO2: --    PHYSICAL EXAM:    Constitutional: NAD, well-groomed, well-developed  HEENT: PERRLA, EOMI, Normal Hearing, MMM  Neck: No LAD, No JVD  Back: Normal spine flexure, No CVA tenderness  Respiratory: CTAB/L  Cardiovascular: S1 and S2, RRR, no M/G/R  Gastrointestinal: BS+, soft, NT/ND  Extremities: No peripheral edema  Vascular: 2+ peripheral pulses  Neurological: A/O x 3, no focal deficits    LABS:              Drug Screen Urine:  Alcohol Level  Alcohol, Blood: <10 mg/dL (03-05-22 @ 04:38)        RADIOLOGY & ADDITIONAL STUDIES:       Pt interviewed, examined and EMR chart reviewed.  Pt admits to drinking 2 pts per day x  3  months. Last Drink day of admission   Hx of withdrawal tremors no seizures  pt admits to using cocaine $100 per day. inhaled. pt states last use day prior to admission  variable periods of sobriety in the past.  Has been in detox before __X___yes,   _____No    Pt completed rehab 3 days ago    SOCIAL HISTORY:    REVIEW OF SYSTEMS:    Constitutional: No fever, weight loss or fatigue  ENT:  No difficulty hearing, tinnitus, vertigo; No sinus or throat pain  Neck: No pain or stiffness  Respiratory: No cough, wheezing, chills or hemoptysis  Cardiovascular: No chest pain, palpitations, shortness of breath, dizziness or leg swelling  Gastrointestinal: No abdominal or epigastric pain. No nausea, vomiting or hematemesis; No diarrhea or constipation. No melena or hematochezia.  Neurological: No headaches, memory loss, loss of strength, numbness or tremors  Musculoskeletal: No joint pain or swelling; No muscle, back or extremity pain  Psychiatric: See HPI    MEDICATIONS  (STANDING):  bictegravir 50 mG/emtricitabine 200 mG/tenofovir alafenamide 25 mG (BIKTARVY) 1 Tablet(s) Oral daily  escitalopram 20 milliGRAM(s) Oral daily  folic acid 1 milliGRAM(s) Oral daily  multivitamin 1 Tablet(s) Oral daily  QUEtiapine 100 milliGRAM(s) Oral at bedtime  thiamine 100 milliGRAM(s) Oral daily    MEDICATIONS  (PRN):  haloperidol     Tablet 5 milliGRAM(s) Oral every 6 hours PRN agitation  hydrOXYzine hydrochloride 50 milliGRAM(s) Oral every 6 hours PRN anxiety  LORazepam     Tablet 2 milliGRAM(s) Oral every 6 hours PRN objective signs of alcohol withdrawal  nicotine  Polacrilex Gum 2 milliGRAM(s) Oral every 2 hours PRN tobacco craving  traZODone 50 milliGRAM(s) Oral at bedtime PRN insomnia      Vital Signs Last 24 Hrs  T(C): 36.5 (07 Mar 2022 11:44), Max: 36.7 (07 Mar 2022 05:52)  T(F): 97.7 (07 Mar 2022 11:44), Max: 98.1 (07 Mar 2022 05:52)  HR: 75 (07 Mar 2022 11:44) (57 - 75)  BP: 144/69 (07 Mar 2022 11:44) (132/60 - 144/69)  BP(mean): --  RR: 18 (07 Mar 2022 11:44) (18 - 18)  SpO2: --    PHYSICAL EXAM:    Constitutional: NAD, well-groomed, well-developed  HEENT: PERRLA, EOMI, Normal Hearing, MMM  Neck: No LAD, No JVD  Back: Normal spine flexure, No CVA tenderness  Respiratory: CTAB/L  Cardiovascular: S1 and S2, RRR, no M/G/R  Gastrointestinal: BS+, soft, NT/ND  Extremities: No peripheral edema  Vascular: 2+ peripheral pulses  Neurological: A/O x 3, no focal deficits    LABS:              Drug Screen Urine:  Alcohol Level  Alcohol, Blood: <10 mg/dL (03-05-22 @ 04:38)        RADIOLOGY & ADDITIONAL STUDIES:

## 2022-03-07 NOTE — PROGRESS NOTE BEHAVIORAL HEALTH - NSBHCHARTREVIEWLAB_PSY_A_CORE FT
Lipid Profile (03.07.22 @ 11:06)    Cholesterol, Serum: 184 mg/dL    HDL Cholesterol, Serum: 73 mg/dL    Non HDL Cholesterol: 111 mg/dL    LDL Cholesterol Calculated: 97 mg/dL
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
## 2022-03-07 NOTE — PROGRESS NOTE BEHAVIORAL HEALTH - NSBHFUPSTRENGTHS_PSY_A_CORE
Motivated and ready for change/In good physical health/Attempting to realize his/her potential/Able to exercise self-direction

## 2022-03-07 NOTE — PROGRESS NOTE BEHAVIORAL HEALTH - NSBHCHARTREVIEWINVESTIGATE_PSY_A_CORE FT
< from: 12 Lead ECG (07.06.21 @ 14:00) >      Ventricular Rate 60 BPM    Atrial Rate 60 BPM    P-R Interval 136 ms    QRS Duration 108 ms    Q-T Interval 416 ms    QTC Calculation(Bazett) 416 ms    < end of copied text >

## 2022-03-07 NOTE — PROGRESS NOTE BEHAVIORAL HEALTH - SUMMARY
37yo  man, single, previously domiciled currently living at [facility], unemployed, with PPH of affective and psychotic symptoms, substance use disorders (prominently alcohol, cocaine, heroin use), past SA including hanging, history of NSSIB in the past, multiple prior admissions (May 2021 [facility], July 2021 [facility], Oct 2021 [facility], Dec [facility]), history of detox/rehab in the past, with PMH of HIV on Biktarvy (haven't taken in over 2 months), who BIBS for SI in the setting of recent suicide of his girlfriend, heavy substance use and associated with it mood sx.     Encounter today found patient to be continue to report depressed mood due to the loss of his girlfriend and reporting no improvement since admission. Given the patient's continue mood episode, passive suicidal ideations, lack of outpatient follow up and poor ADL's, patient warrants continued ipp for safety and stabilization. 37yo  man, single, previously domiciled currently living at [facility], unemployed, with PPH of affective and psychotic symptoms, substance use disorders (prominently alcohol, cocaine, heroin use), past SA including hanging, history of NSSIB in the past, multiple prior admissions (May 2021 [facility], July 2021 [facility], Oct 2021 [facility], Dec [facility]), history of detox/rehab in the past, with PMH of HIV on Biktarvy (haven't taken in over 2 months), who BIBS for SI in the setting of recent suicide of his girlfriend, heavy substance use and associated with it mood sx.     Encounter today found patient to be continue to report depressed mood due to the loss of his girlfriend and reporting no improvement since admission. Given the patient's continue mood episode, passive suicidal ideations, lack of outpatient follow up and poor ADL's, patient warrants continued ipp for safety and stabilization.    Plan:  - infectious disease consult for HIV medication regiment   - remaining as documented below
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
## 2022-03-07 NOTE — PROGRESS NOTE BEHAVIORAL HEALTH - NSBHADDHXPSYSOCFT_PSY_A_CORE
Patient denied having family or friends as support network. Patient reporting he lives in an appartment in [place] but records indicate that he has been staying at [facility]

## 2022-03-07 NOTE — PROGRESS NOTE BEHAVIORAL HEALTH - PROBLEM SELECTOR PLAN 2
- ativan 2mg po q6 prn for signs and symptoms of alcohol withdrawal  - CATCH team - CATCH team  - No pharmacological treatment indicated at this time

## 2022-03-08 DIAGNOSIS — F32.A DEPRESSION, UNSPECIFIED: ICD-10-CM

## 2022-03-08 DIAGNOSIS — Z88.0 ALLERGY STATUS TO PENICILLIN: ICD-10-CM

## 2022-03-08 DIAGNOSIS — R45.851 SUICIDAL IDEATIONS: ICD-10-CM

## 2022-03-08 DIAGNOSIS — Z20.822 CONTACT WITH AND (SUSPECTED) EXPOSURE TO COVID-19: ICD-10-CM

## 2022-03-08 DIAGNOSIS — R44.0 AUDITORY HALLUCINATIONS: ICD-10-CM

## 2022-03-08 DIAGNOSIS — F19.94 OTHER PSYCHOACTIVE SUBSTANCE USE, UNSPECIFIED WITH PSYCHOACTIVE SUBSTANCE-INDUCED MOOD DISORDER: ICD-10-CM

## 2022-03-08 DIAGNOSIS — Z87.891 PERSONAL HISTORY OF NICOTINE DEPENDENCE: ICD-10-CM

## 2022-03-08 DIAGNOSIS — F41.9 ANXIETY DISORDER, UNSPECIFIED: ICD-10-CM

## 2022-03-08 DIAGNOSIS — F14.10 COCAINE ABUSE, UNCOMPLICATED: ICD-10-CM

## 2022-03-08 DIAGNOSIS — Z21 ASYMPTOMATIC HUMAN IMMUNODEFICIENCY VIRUS [HIV] INFECTION STATUS: ICD-10-CM

## 2022-03-08 LAB
COVID-19 SPIKE DOMAIN AB INTERP: POSITIVE
COVID-19 SPIKE DOMAIN ANTIBODY RESULT: 192 U/ML — HIGH
SARS-COV-2 IGG+IGM SERPL QL IA: 192 U/ML — HIGH
SARS-COV-2 IGG+IGM SERPL QL IA: POSITIVE

## 2022-03-08 PROCEDURE — 99231 SBSQ HOSP IP/OBS SF/LOW 25: CPT

## 2022-03-08 RX ADMIN — Medication 50 MILLIGRAM(S): at 20:13

## 2022-03-08 RX ADMIN — Medication 100 MILLIGRAM(S): at 08:44

## 2022-03-08 RX ADMIN — QUETIAPINE FUMARATE 100 MILLIGRAM(S): 200 TABLET, FILM COATED ORAL at 20:13

## 2022-03-08 RX ADMIN — ESCITALOPRAM OXALATE 20 MILLIGRAM(S): 10 TABLET, FILM COATED ORAL at 08:44

## 2022-03-08 RX ADMIN — Medication 1 TABLET(S): at 08:44

## 2022-03-08 RX ADMIN — Medication 1 MILLIGRAM(S): at 08:44

## 2022-03-08 NOTE — CONSULT NOTE ADULT - SUBJECTIVE AND OBJECTIVE BOX
ADRIANNE BRADLEY  36y, Male  Allergy: penicillin (Unknown)  penicillins (Unknown)      CHIEF COMPLAINT: Suicidal Ideation (07 Mar 2022 15:17)      LOS  2d    HPI:  36M w/PMHx of HIV (on HAART follows with ID at [facility]), Schizoaffective Disorder w/Hx of multiple suicide attempts presents to ED at [facility] 2/2 suicidal ideation.  Patient seen by psych and admitted to IPP for further management.  Patient seen and examined at bedside on arrival to unit, resting comfortably and without acute complaints.  No CP/SOB.  No abdominal or urinary complaints.   (06 Mar 2022 16:29)      INFECTIOUS DISEASE HISTORY:  History as above.   He follows with [facility], but has not been seen formally for about a year.   He is currently on Biktarvy.   He reports missings doses maybe 3-4x/month.   Otherwise VL is well controlled here.   He lives in the [place] -- he has been getting refills usually through hospitalizations related to his mental health.     PAST MEDICAL & SURGICAL HISTORY:  HIV (human immunodeficiency virus infection)    Bipolar disorder    Schizophrenia, unspecified type    Depression    Anxiety    COVID-19    Cocaine abuse    Alcohol abuse    Heroin abuse    No significant past surgical history        FAMILY HISTORY  No pertinent family history in first degree relatives    No pertinent family history in first degree relatives        SOCIAL HISTORY  Social History:  Tobacco use: 2 PPD  EtOH use: drinks 2 pints per day, last 3 days ago.  No Hx of DT's or w/d seizure as reported by patient  Illicit drug use: +NAWAF (06 Mar 2022 16:29)        ROS  General: Denies rigors, nightsweats  HEENT: Denies headache, rhinorrhea, sore throat, eye pain  CV: Denies CP, palpitations  PULM: Denies wheezing, hemoptysis  GI: Denies hematemesis, hematochezia, melena  : Denies discharge, hematuria  MSK: Denies arthralgias, myalgias  SKIN: Denies rash, lesions  NEURO: Denies paresthesias, weakness  PSYCH: Denies depression, anxiety    VITALS:  T(F): 97.1, Max: 97.1 (03-08-22 @ 15:47)  HR: 90  BP: 138/73  RR: 18Vital Signs Last 24 Hrs  T(C): 36.2 (08 Mar 2022 15:47), Max: 36.2 (08 Mar 2022 15:47)  T(F): 97.1 (08 Mar 2022 15:47), Max: 97.1 (08 Mar 2022 15:47)  HR: 90 (08 Mar 2022 15:47) (68 - 90)  BP: 138/73 (08 Mar 2022 15:47) (136/82 - 138/73)  BP(mean): --  RR: 18 (08 Mar 2022 15:47) (16 - 18)  SpO2: --    PHYSICAL EXAM:  Gen: NAD, resting in bed  HEENT: Normocephalic, atraumatic  Neck: supple, no lymphadenopathy  CV: Regular rate & regular rhythm  Lungs: decreased BS at bases, no fremitus  Abdomen: Soft, BS present  Ext: Warm, well perfused  Neuro: non focal, awake  Skin: no rash, no erythema  Lines: no phlebitis    TESTS & MEASUREMENTS:                  Culture - Ear, Nose and Throat (ENT) (collected 12-06-21 @ 00:35)  Source: Sinus Sinus  Final Report (12-07-21 @ 16:57):    Normal Respiratory Neelima present    Culture - Group A Streptococcus (collected 12-05-21 @ 05:34)  Source: Cult/Viral Throat  Final Report (12-07-21 @ 13:29):    Beta Hemolytic Streptococci, Group C            INFECTIOUS DISEASES TESTING  ABS CD4: 572 /uL (03-06-22 @ 08:41)  HIV-1 RNA Quantitative, Viral Load: DET. <30 copies/mL (03-06-22 @ 00:53)  HIV-1 Viral Load Result: DET. (03-06-22 @ 00:53)  COVID-19 PCR: Negative (03-05-22 @ 04:39)  COVID-19 PCR: NotDetec (12-14-21 @ 15:30)  ABS CD4: 456 /uL (12-05-21 @ 12:20)  HIV-1 RNA Quantitative, Viral Load: 61 (12-05-21 @ 12:04)  HIV-1 Viral Load Result: DET. (12-05-21 @ 12:04)  COVID-19 PCR: NotDetec (11-26-21 @ 03:17)  COVID-19 PCR: NotDetec (10-12-21 @ 17:50)  COVID-19 PCR: NotDetec (07-03-21 @ 20:56)  HIV-1 RNA Quantitative, Viral Load:   90 (05-21-21 @ 02:11)  HIV-1 Viral Load Result: DET. (05-21-21 @ 02:11)  ABS CD4: 393 /uL (05-13-21 @ 03:11)  HIV-1 RNA Quantitative, Viral Load:   85,798 (05-13-21 @ 00:42)  HIV-1 Viral Load Result: DET. (05-13-21 @ 00:42)  Hepatitis C Virus Interpretation: Nonreact (05-12-21 @ 19:14)  COVID-19 PCR: NotDetec (05-12-21 @ 19:00)  COVID-19 PCR: NotDetec (05-08-21 @ 13:42)  COVID-19 PCR: Detected (04-01-21 @ 14:44)  COVID-19 PCR: Detected (03-08-21 @ 18:22)      RADIOLOGY & ADDITIONAL TESTS:  I have personally reviewed the last Chest xray  CXR      CT      CARDIOLOGY TESTING  12 Lead ECG:   Ventricular Rate 84 BPM    Atrial Rate 84 BPM    P-R Interval 138 ms    QRS Duration 98 ms    Q-T Interval 396 ms    QTC Calculation(Bazett) 467 ms    P Axis 60 degrees    R Axis 84 degrees    T Axis 65 degrees    Diagnosis Line Normal sinus rhythm  EKG within normal limits    Confirmed by BRADLEY SLOAN NEIL ([id number]) on 3/8/2022 3:39:25 PM (03-05-22 @ 03:55)      MEDICATIONS  bictegravir 50 mG/emtricitabine 200 mG/tenofovir alafenamide 25 mG (BIKTARVY) 1 Oral daily  escitalopram 20 Oral daily  folic acid 1 Oral daily  multivitamin 1 Oral daily  QUEtiapine 100 Oral at bedtime  thiamine 100 Oral daily      Weight  Weight (kg): 62.7 (03-06-22 @ 16:07)    ANTIBIOTICS:  bictegravir 50 mG/emtricitabine 200 mG/tenofovir alafenamide 25 mG (BIKTARVY) 1 Tablet(s) Oral daily      ALLERGIES:  penicillin (Unknown)  penicillins (Unknown)

## 2022-03-08 NOTE — CONSULT NOTE ADULT - ASSESSMENT
ASSESSMENT  36M w/PMHx of HIV (on HAART follows with ID at [facility]), Schizoaffective Disorder w/Hx of multiple suicide attempts presents to ED at [facility] 2/2 suicidal ideation.    IMPRESSION  #HIV  - Follows at [address]  - Follows with Dr. Katie Pratt  - ABS CD4: 572 /uL (03.06.22 @ 08:41)  - HIV-1 RNA Quantitative, Viral Load: DET. <30 copies/mL (03.06.22 @ 00:53)  - Has K103 Mutations and P225 Mutations (resistance to efavirenz/nevirapine) based on [employer] archive 1/2017     #Depression/SI  #Polysubstance use    #Abx allergy: NKDA      RECOMMENDATIONS  - HIV controlled  - continue biktarvy daily   - will need to follow-up with primary HIV provider on discharge    Please call or message on [employer] Teams if with any questions.  Spectra [phone]

## 2022-03-08 NOTE — PROGRESS NOTE BEHAVIORAL HEALTH - SUMMARY
35yo  man, single, previously domiciled currently living at [facility], unemployed, with PPH of affective and psychotic symptoms, substance use disorders (prominently alcohol, cocaine, heroin use), past SA including hanging, history of NSSIB in the past, multiple prior admissions (May 2021 [facility], July 2021 [facility], Oct 2021 [facility], Dec [facility]), history of detox/rehab in the past, with PMH of HIV on Biktarvy (haven't taken in over 2 months), who BIBS for SI in the setting of recent suicide of his girlfriend, heavy substance use and associated with it mood sx.     Encounter today found patient to be continue to report depressed mood due to the loss of his girlfriend but reporting some improvement since admission. Given the patient's continue mood episode, passive suicidal ideations, lack of outpatient follow up and poor ADL's, patient warrants continued ipp for safety and stabilization.    Plan:  - infectious disease consult for HIV medication regiment   - remaining as documented below

## 2022-03-08 NOTE — PROGRESS NOTE BEHAVIORAL HEALTH - NSBHFUPINTERVALHXFT_PSY_A_CORE
Chart reviewed, No acute overnight events reported.     Encounter found patient to be laying comfortably in bed, no acute distress. Patient reporting that he is okay this morning, reporting that he is taking time to process his thoughts and is trying to get better. Patient reports that he still feels somewhat hopeless and that he would be better off dead but denied having active thoughts or plans. Patient encouraged to get out of his room and engage in milieu therapy to which patient reported understanding and agreement. Patient reports that after discharge he would be interested in rehab.     Patient expressed no other acute concerns at this time and denied having side effects from medication

## 2022-03-09 DIAGNOSIS — B20 HUMAN IMMUNODEFICIENCY VIRUS [HIV] DISEASE: ICD-10-CM

## 2022-03-09 PROCEDURE — 99231 SBSQ HOSP IP/OBS SF/LOW 25: CPT

## 2022-03-09 PROCEDURE — 73110 X-RAY EXAM OF WRIST: CPT | Mod: 26,RT

## 2022-03-09 RX ORDER — IBUPROFEN 200 MG
400 TABLET ORAL EVERY 8 HOURS
Refills: 0 | Status: DISCONTINUED | OUTPATIENT
Start: 2022-03-09 | End: 2022-03-17

## 2022-03-09 RX ADMIN — Medication 1 MILLIGRAM(S): at 08:22

## 2022-03-09 RX ADMIN — BICTEGRAVIR SODIUM, EMTRICITABINE, AND TENOFOVIR ALAFENAMIDE FUMARATE 1 TABLET(S): 30; 120; 15 TABLET ORAL at 08:21

## 2022-03-09 RX ADMIN — Medication 400 MILLIGRAM(S): at 18:44

## 2022-03-09 RX ADMIN — ESCITALOPRAM OXALATE 20 MILLIGRAM(S): 10 TABLET, FILM COATED ORAL at 08:22

## 2022-03-09 RX ADMIN — Medication 1 TABLET(S): at 08:22

## 2022-03-09 RX ADMIN — QUETIAPINE FUMARATE 100 MILLIGRAM(S): 200 TABLET, FILM COATED ORAL at 20:44

## 2022-03-09 RX ADMIN — Medication 100 MILLIGRAM(S): at 08:22

## 2022-03-09 NOTE — PROGRESS NOTE BEHAVIORAL HEALTH - SUMMARY
35yo  man, single, previously domiciled currently living at [facility], unemployed, with PPH of affective and psychotic symptoms, substance use disorders (prominently alcohol, cocaine, heroin use), past SA including hanging, history of NSSIB in the past, multiple prior admissions (May 2021 [facility], July 2021 [facility], Oct 2021 [facility], Dec [facility]), history of detox/rehab in the past, with PMH of HIV on Biktarvy (haven't taken in over 2 months), who BIBS for SI in the setting of recent suicide of his girlfriend, heavy substance use and associated with it mood sx.     Encounter today found patient report some improvement in mood but continuing to report some impairment in sleep. Patient starting to demonstrate some improvement in ADLs. Patient remains with some mood symptoms and has not shown ability to care for self, therefore, patient warrants continued ipp for safety and stabilization.

## 2022-03-09 NOTE — CHART NOTE - NSCHARTNOTEFT_GEN_A_CORE
Social Work Note:    Treatment team met with patient on unit rounds earlier this morning.  At time of assessment patient was more engaged than in previous days.  Sleep endorsed as poor as he had nightmares.  ADLs are improved from admission.  Patient has been adherent to prescribed medications.  He has been calm and cooperative with staff.      CATCH team met with patient to discuss a referral to an inpatient chemical dependency unit.  Referrals sent to [facility].  [facility] declined patient. [facility] not considering patient at this time as he appears to be in need of further stabilization at this level of care.     Patient was encouraged to be visible on the unit, sit in the day room and attend and actively participate in groups that are offered.  He was agreeable to same.  Patient was at [facility] rehab in the past and he is aware that group attendance is required for treatment in that setting.      Mental Status Exam:    Mood – Neutral   Sleep – Poor   Appetite – Fair   ADLs – Fair   Thought Process – Linear    Observation – [id number]    No barriers to discharge identified at this time.     At this time patient is not psychiatrically stable for discharge.

## 2022-03-09 NOTE — CHART NOTE - NSCHARTNOTEFT_GEN_A_CORE
Patient reports Right wrist Fx 11/2020: cast applied      - wrist Fx again 2/2022: Splint applied      - Imaging studies showed after 2nd Fx showed improperly healing old Fx: Surgical evaluation recommended: patient did not follow up.    Now c/o " Pins/Needles" sensation of tips of fingers                  can't close right hand all the way                  limited flexion/extension right hand    He reports worsening of above symptoms over the last few weeks    Plan: X-Ray right wrist          Motrin for pain          Orthopedic evaluation Patient reports Right wrist Fx 11/2020: cast applied      - wrist Fx again 2/2022: Splint applied      - Imaging studies showed after 2nd Fx showed improperly healing old Fx: Surgical evaluation recommended: patient did not follow up.    Now c/o " Pins/Needles" sensation of tips of fingers                  can't close right hand all the way                  limited flexion/extension right hand    He reports worsening of above symptoms over the last few weeks    PE: Right hand swollen compared to left hand       sensation intact       + Radial pulse       Limited ROM of hand, decreased ability to flex/extend       pain at wrist upon palpation    Plan: X-Ray right wrist          Motrin for pain          Orthopedic evaluation

## 2022-03-10 PROCEDURE — 99231 SBSQ HOSP IP/OBS SF/LOW 25: CPT

## 2022-03-10 RX ADMIN — QUETIAPINE FUMARATE 100 MILLIGRAM(S): 200 TABLET, FILM COATED ORAL at 20:13

## 2022-03-10 RX ADMIN — Medication 100 MILLIGRAM(S): at 08:28

## 2022-03-10 RX ADMIN — Medication 1 TABLET(S): at 08:28

## 2022-03-10 RX ADMIN — BICTEGRAVIR SODIUM, EMTRICITABINE, AND TENOFOVIR ALAFENAMIDE FUMARATE 1 TABLET(S): 30; 120; 15 TABLET ORAL at 08:29

## 2022-03-10 RX ADMIN — ESCITALOPRAM OXALATE 20 MILLIGRAM(S): 10 TABLET, FILM COATED ORAL at 08:28

## 2022-03-10 RX ADMIN — Medication 1 MILLIGRAM(S): at 08:28

## 2022-03-10 NOTE — MEDICAL STUDENT PROGRESS NOTE(EDUCATION) - NS MD HP STUD ASPLAN ASSES FT
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     On today's encounter, the patient was friendly and cooperative. Patient was well engaged in the interview. Sitting comfortably on the bed. Patient reports his mood to be "good" and smiled. Patient denies SI and depressed mood. He states he is interested in joining groups, and is engaging with others in the common room. Patient also reports he did not join groups yesterday. Patient had an improvement in mood as compared to yesterday and admits to be feeling better and sleeping better compared to yesterday. patient reports he will try to join groups today because he did not attend yesterday.

## 2022-03-10 NOTE — MEDICAL STUDENT PROGRESS NOTE(EDUCATION) - NS MD HP STUD ASPLAN PLAN FT
1. Continue Lexapro 200 mg PO QD for depression   2. Continue Quetiapine 100 mg PO for depression   3. Recommend groups for depression   4. Continue to see CATCH Team for cocaine and alcohol abuse  5. Continue Nicotine gum 2 mg PRN for nicotine dependence   6. Continue Ativan 2 mg PO q6hrs for alcohol withdrawal symptoms   7. Continue Biktarvy PO QD   8. Continue Motrin 400 mg PO q8hrs for wrist pain as recommended by medicine team

## 2022-03-10 NOTE — PROGRESS NOTE BEHAVIORAL HEALTH - SUMMARY
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     On today's encounter, the patient was friendly and cooperative with brighter affect, noted to be less isolative, reporting improvement in mood and denied SI. Patient remains motivated to go to rehab.  Patient has not shown ability to care for self. Given the above along with his extensive psychiatric history and lack of safe disposition, patient warrants continued ipp for safety and stabilization.
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
## 2022-03-10 NOTE — CONSULT NOTE ADULT - ASSESSMENT
Orthopaedic Surgery Consult Note        35yo Male RHD w/ complaint of right wrist pain. Pt had a right lunate fracture in Nov 2021 managed at [facility] in a cast. Then in February, he was at [facility], fell and had right wrist pain, reports there was no new fracture. He's currently admitted to inpatient psych at [facility] and has been having right wrist pain and difficulty w/ ROM. Denies that these are new symptoms, and they have no improved since his fall at [facility]. Denies paresthesias.    PMH/PSH  No pertinent family history in first degree relatives    No pertinent family history in first degree relatives    Handoff    No pertinent past medical history    HIV (human immunodeficiency virus infection)    Bipolar disorder    Schizophrenia, unspecified type    Depression    Anxiety    HIV (human immunodeficiency virus infection)    Schizophrenia    COVID-19    Cocaine abuse    Alcohol abuse    Heroin abuse    Bipolar disorder    Schizophrenia, unspecified type    Depression    Anxiety    MDD (major depressive disorder)    Moderate alcohol use disorder    Cocaine use disorder    Nicotine use disorder    Alcohol use disorder, moderate, dependence    Nicotine use disorder    Cocaine abuse    Nicotine dependence    Alcohol abuse                  Medications  bictegravir 50 mG/emtricitabine 200 mG/tenofovir alafenamide 25 mG (BIKTARVY) 1 Tablet(s) Oral daily  escitalopram 20 milliGRAM(s) Oral daily  folic acid 1 milliGRAM(s) Oral daily  haloperidol     Tablet 5 milliGRAM(s) Oral every 6 hours PRN  hydrOXYzine hydrochloride 50 milliGRAM(s) Oral every 6 hours PRN  ibuprofen  Tablet. 400 milliGRAM(s) Oral every 8 hours PRN  LORazepam     Tablet 2 milliGRAM(s) Oral every 6 hours PRN  multivitamin 1 Tablet(s) Oral daily  nicotine  Polacrilex Gum 2 milliGRAM(s) Oral every 2 hours PRN  QUEtiapine 100 milliGRAM(s) Oral at bedtime  thiamine 100 milliGRAM(s) Oral daily  traZODone 50 milliGRAM(s) Oral at bedtime PRN      Home Medications:  prazosin 1 mg oral capsule: 1 cap(s) orally once a day (at bedtime) (22 Dec 2021 09:25)        Allergies  penicillin (Unknown)  penicillins (Unknown)        T(C): 36.2 (03-10-22 @ 09:38), Max: 37.3 (03-09-22 @ 16:39)  HR: 89 (03-10-22 @ 09:38) (65 - 89)  BP: 113/61 (03-10-22 @ 09:38) (110/68 - 130/73)  RR: 18 (03-10-22 @ 09:38) (16 - 18)  SpO2: --    P/E:  AOx3, NAD  Non-labored breathing    Right wrist/hand  Skin intact  No swelling  No deformity  Able to make a fist  Limited wrist ROM  SILT R/M/U  AIN/PIN/U intact  WWP          Imaging:  Right wrist: no acute fractures    A/P:  35yo Male RHD w/ history of right lunate fracture. No acute fractures seen on new radiographs. Pt would benefit from OT/hand therapy to work on wrist/finger ROM    -OT/hand therapy for wrist/finger ROM  -Nsaids/tylenol  -Ice therapy

## 2022-03-10 NOTE — PROGRESS NOTE BEHAVIORAL HEALTH - NSBHFUPINTERVALHXFT_PSY_A_CORE
On today's encounter, the patient was friendly and cooperative. Patient was well engaged in the interview. Sitting comfortably on the bed. Patient reports his mood to be "good" and smiled. Patient denies SI, depressed mood, thoughts of being better off dead. He admits to be feeling better and sleeping better compared to yesterday. Patient reports interest in joining groups and will try to attend today, stating he was unable to joing groups yesterday patient is still interested in joining rehab after discharge.     Patient complained of bilateral hand sensations described as "pins and needles" with an emphasis on the right wrist, inability to close his right hand, limited flexion of the right wrist, and right wrist throbbing pain.  Patient was evaluated by GIORGIO Martins and had xrays performed that did not show any recent fractures. Patient seen by ortho this afternoon as well.

## 2022-03-11 PROCEDURE — 99231 SBSQ HOSP IP/OBS SF/LOW 25: CPT

## 2022-03-11 RX ADMIN — QUETIAPINE FUMARATE 100 MILLIGRAM(S): 200 TABLET, FILM COATED ORAL at 20:57

## 2022-03-11 RX ADMIN — ESCITALOPRAM OXALATE 20 MILLIGRAM(S): 10 TABLET, FILM COATED ORAL at 08:54

## 2022-03-11 RX ADMIN — Medication 100 MILLIGRAM(S): at 08:53

## 2022-03-11 RX ADMIN — Medication 1 TABLET(S): at 08:53

## 2022-03-11 RX ADMIN — Medication 50 MILLIGRAM(S): at 21:00

## 2022-03-11 RX ADMIN — BICTEGRAVIR SODIUM, EMTRICITABINE, AND TENOFOVIR ALAFENAMIDE FUMARATE 1 TABLET(S): 30; 120; 15 TABLET ORAL at 09:21

## 2022-03-11 RX ADMIN — Medication 1 MILLIGRAM(S): at 08:54

## 2022-03-11 NOTE — MEDICAL STUDENT PROGRESS NOTE(EDUCATION) - SUBJECTIVE AND OBJECTIVE BOX
CC: "i'm good"    On today's encounter, patient was friendly, pleasant, and cooperative. Patient was well engaged in the interview. Sitting comfortably on the bed. Patient reports mood to be "good, getting better and progressing". Patient denies SI, depressed mood, thoughts of being better off dead. Patient reports he enjoyed groups yesterday and made a children's book for his youngest daughter, 7 yo. Patient reports having good contact with his 2 older daughter. Patient would like to call them but does not have their numbers. Demonstrated interest in a [employer] phone to be able to contact his daughters. Patient is interested in a long term rehab [facility]. He does not want to stay in the [place] region because he wants to avoid temptation and access to drugs, does not want to fall into the same cycle. Patient reports sleeping well.    Patient complained of right knee pain. Pain is worse in the morning and gets better after a few minutes of walking around.     MSE: Patient appears stated age. Patient was cooperative, friendly, alert, and held good eye contact. Body build is normal. Patient is well groomed. No malodors were noticed from the patient. Patient had responsive and interested facial expression. Gait was observed to be normal. No tics, grimaces, or ritualistic behaviors were noted.  Patient was sitting on the bed when interviewed. Patient reported mood to be "good". Affect is normal. Patient was pleasant, friendly, and cooperative with staff. Speech is of ordinary rate, flow, volume, and clarity. Attention and concentration is sufficient. Thought process is linear. Patient denied SI, SA, plan, AH, and VH. Insight and judgment are fair.

## 2022-03-11 NOTE — CHART NOTE - NSCHARTNOTEFT_GEN_A_CORE
Social Work Note:    Treatment team met with patient on unit rounds earlier this morning.  At time of assessment patient was able to engage freely with treatment team.  Patient informed he is feeling better from his admission. ADLs are improved from admission.  Patient has been adherent to prescribed medications.  He has been calm and cooperative with staff.      CATCH team met with patient to discuss a referral to an inpatient chemical dependency unit.  [facility]  [facility]. [facility] not considering patient at this time as he appears to be in need of further stabilization at this level of care. Referral to be updated early next week.  Patient made aware of the above.  He remains motivated for rehab after his discharge from the hospital.      Patient discussed short and long term goals with treatment team.  His prior period of sobriety was also discussed.  Patient was praised for his progress and motivation for recovery.     Mental Status Exam:    Mood – Neutral   Sleep – Poor   Appetite – Fair   ADLs – Good   Thought Process – Linear    Observation – [id number]    No barriers to discharge identified at this time.     At this time patient is not psychiatrically stable for discharge. Social Work Note:    Treatment team met with patient on unit rounds earlier this morning.  At time of assessment patient was able to engage freely with treatment team.  Patient informed he is feeling better from his admission. ADLs are improved from admission.  Patient has been adherent to prescribed medications.  He has been calm and cooperative with staff.      CATCH team met with patient to discuss a referral to an inpatient chemical dependency unit.  [facility]  [facility]. [facility] not considering patient at this time as he appears to be in need of further stabilization at this level of care. Referral to be updated early next week.  Patient made aware of the above.  He remains motivated for rehab after his discharge from the hospital.      Per patient report his phone is "broken."  He was provided information for LO[employer] application for assistance with obtaining a new phone.     Patient discussed short and long term goals with treatment team.  His prior period of sobriety was also discussed.  Patient was praised for his progress and motivation for recovery.     Mental Status Exam:    Mood – Neutral   Sleep – Poor   Appetite – Fair   ADLs – Good   Thought Process – Linear    Observation – [id number]    No barriers to discharge identified at this time.     At this time patient is not psychiatrically stable for discharge.

## 2022-03-11 NOTE — PROGRESS NOTE BEHAVIORAL HEALTH - SUMMARY
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     On today's encounter, the patient was friendly and cooperative with brighter affect, noted to be less isolative, reporting improvement in mood and denied SI. Patient remains motivated to go to rehab.  Given his extensive psychiatric history and lack of safe disposition, patient warrants continued ipp for safety and stabilization.

## 2022-03-11 NOTE — MEDICAL STUDENT PROGRESS NOTE(EDUCATION) - NS MD HP STUD ASPLAN PLAN FT
1. Continue Lexapro 200 mg PO QD for depression   2. Continue Quetiapine 100 mg PO for depression   3. Recommend groups for depression   4. Continue to see CATCH Team for cocaine and alcohol abuse  5. Continue Nicotine gum 2 mg PRN for nicotine dependence   6. Continue Ativan 2 mg PO q6hrs for alcohol withdrawal symptoms   7. Continue Biktarvy PO QD   8. Continue Motrin 400 mg PO q8hrs for wrist pain as recommended by medicine team   7. Set up with a [employer] phone after discharge

## 2022-03-11 NOTE — OCCUPATIONAL THERAPY INITIAL EVALUATION ADULT - GENERAL OBSERVATIONS, REHAB EVAL
11:05-11:23; chart reviewed, ok to treat by Occupational Therapist as confirmed by SKYE Rich, Pt received standing in hallway in [facility]. Pt reported 8/10 right wrist pain, RN made aware. Pt in agreement with OT IE.

## 2022-03-11 NOTE — PROGRESS NOTE BEHAVIORAL HEALTH - NSBHFUPINTERVALHXFT_PSY_A_CORE
On today's encounter, patient was friendly, pleasant to speak with, well engaged in interview, joking with undersigned and opening up to staff. Patient reports mood to be "good, getting better" reporting that yesterday he attending some groups and made a drawing of "ghost cat". Patient expressed that he has a young daughter who he wanted to make a book about "ghost cat" for but reports he hasn't been able to. He reports that he keeps in contact with his children near daily until his phone broke down, reports that he doesn't live with them after a fall out with his then partner because of "drugs and alcohol". He reports he wishes to change for his family and stay sober, hoping to go to an [facility] rehab facility in order to get away from his triggers here in [place].    Patient complained of right knee pain. Pain is worse in the morning and gets better after a few minutes of walking around.

## 2022-03-11 NOTE — MEDICAL STUDENT PROGRESS NOTE(EDUCATION) - NS MD HP STUD ASPLAN ASSES FT
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    Patient was friendly, pleasant, and cooperative with brighter affect. Patient was well engaged in conversation and gave more information about his background. Reports improvement in mood and denied SI. Patient was well groomed and is showing an initiative in self care. Patient was laughing and making appropriate jokes with the staff. Patient is less isolated, and reported to join groups yesterday where he worked on a children's book for his daughter, 7 yo. He is goal directed and focused on improving himself by making mature decisions. Patient is interested in admission to a long term rehab center [facility] to avoid temptation and access to drugs. The team will work on admission to a long term rehab. Patient warrants continued IPP for safe discharge and stabilization. 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    Patient was friendly, pleasant, and cooperative with brighter affect. Patient was well engaged in conversation and gave more information about his background. Reports improvement in mood and denied SI. Patient was well groomed and is showing an initiative in self care. Patient was laughing and making appropriate jokes with the staff. Patient is less isolated, and reported to join groups yesterday where he worked on a children's book for his daughter, 5 yo. He is goal directed and focused on improving himself by making mature decisions. Patient is interested in admission to a long term rehab center [facility] to avoid temptation and access to drugs. The team will work on admission to a short term rehab in hopes to place in a long term after the short term rehab. Patient warrants continued IPP for safe discharge and stabilization.

## 2022-03-11 NOTE — OCCUPATIONAL THERAPY INITIAL EVALUATION ADULT - RANGE OF MOTION EXAMINATION
Left AROM shoulder, elbow, wrist, and hand WFL; Right AROM shoulder flexion WFL, elbow flexion 3/4 range, wrist extension approximately 5 degrees/deficits as listed below

## 2022-03-12 RX ADMIN — Medication 1 TABLET(S): at 08:59

## 2022-03-12 RX ADMIN — Medication 100 MILLIGRAM(S): at 08:59

## 2022-03-12 RX ADMIN — Medication 1 MILLIGRAM(S): at 08:59

## 2022-03-12 RX ADMIN — ESCITALOPRAM OXALATE 20 MILLIGRAM(S): 10 TABLET, FILM COATED ORAL at 08:59

## 2022-03-12 RX ADMIN — QUETIAPINE FUMARATE 100 MILLIGRAM(S): 200 TABLET, FILM COATED ORAL at 20:06

## 2022-03-12 RX ADMIN — BICTEGRAVIR SODIUM, EMTRICITABINE, AND TENOFOVIR ALAFENAMIDE FUMARATE 1 TABLET(S): 30; 120; 15 TABLET ORAL at 08:55

## 2022-03-12 RX ADMIN — Medication 50 MILLIGRAM(S): at 20:06

## 2022-03-13 PROCEDURE — 73620 X-RAY EXAM OF FOOT: CPT | Mod: 26,LT

## 2022-03-13 RX ADMIN — QUETIAPINE FUMARATE 100 MILLIGRAM(S): 200 TABLET, FILM COATED ORAL at 20:10

## 2022-03-13 RX ADMIN — Medication 1 MILLIGRAM(S): at 08:00

## 2022-03-13 RX ADMIN — Medication 50 MILLIGRAM(S): at 20:10

## 2022-03-13 RX ADMIN — Medication 100 MILLIGRAM(S): at 08:00

## 2022-03-13 RX ADMIN — ESCITALOPRAM OXALATE 20 MILLIGRAM(S): 10 TABLET, FILM COATED ORAL at 08:00

## 2022-03-13 RX ADMIN — Medication 1 TABLET(S): at 08:00

## 2022-03-13 RX ADMIN — BICTEGRAVIR SODIUM, EMTRICITABINE, AND TENOFOVIR ALAFENAMIDE FUMARATE 1 TABLET(S): 30; 120; 15 TABLET ORAL at 08:00

## 2022-03-13 NOTE — CHART NOTE - NSCHARTNOTEFT_GEN_A_CORE
[facility] [id number]      Notified by RN for patient  ADRIANNE BRADLEY  36y Male  with c/o FB stuck in left foot. Pt  believes it was glass and happened over 2-3 weeks ago, now   c/o of discomfort.    T(C): 36.9 (03-13-22 @ 16:22), Max: 36.9 (03-13-22 @ 16:22)  HR: 73 (03-13-22 @ 16:22) (64 - 80)  BP: 115/60 (03-13-22 @ 16:22) (110/72 - 118/79)  RR: 18 (03-13-22 @ 16:22) (18 - 18)  SpO2: --  Wt(kg): --    PHYSICAL EXAM:  LEFT FOOT- Poor hygiene , multiple calloused areas, region in question is medial aspect of left foot proximal to great toe.  No pus, no reddness no warmth. Elevated and sensitive to touch.    A/P  / FB to Left Foot  / Xray to r/o FB  / Podiatry consult    03-13-22 @ 17:18

## 2022-03-14 PROCEDURE — 99221 1ST HOSP IP/OBS SF/LOW 40: CPT | Mod: 25

## 2022-03-14 PROCEDURE — 99231 SBSQ HOSP IP/OBS SF/LOW 25: CPT

## 2022-03-14 PROCEDURE — 11056 PARNG/CUTG B9 HYPRKR LES 2-4: CPT

## 2022-03-14 RX ADMIN — QUETIAPINE FUMARATE 100 MILLIGRAM(S): 200 TABLET, FILM COATED ORAL at 20:24

## 2022-03-14 RX ADMIN — Medication 1 TABLET(S): at 08:18

## 2022-03-14 RX ADMIN — ESCITALOPRAM OXALATE 20 MILLIGRAM(S): 10 TABLET, FILM COATED ORAL at 08:17

## 2022-03-14 RX ADMIN — BICTEGRAVIR SODIUM, EMTRICITABINE, AND TENOFOVIR ALAFENAMIDE FUMARATE 1 TABLET(S): 30; 120; 15 TABLET ORAL at 08:18

## 2022-03-14 RX ADMIN — Medication 2 MILLIGRAM(S): at 12:56

## 2022-03-14 RX ADMIN — Medication 100 MILLIGRAM(S): at 08:17

## 2022-03-14 RX ADMIN — Medication 1 MILLIGRAM(S): at 08:17

## 2022-03-14 RX ADMIN — Medication 50 MILLIGRAM(S): at 20:25

## 2022-03-14 NOTE — CONSULT NOTE ADULT - SUBJECTIVE AND OBJECTIVE BOX
PODIATRY CONSULT   ADRIANNE BRADLEY is a 36y Male Patient is a 36y old  Male who presents with a chief complaint of Suicidal Ideation (10 Mar 2022 13:25)    HPI:  36M w/PMHx of HIV (on HAART follows with ID at [facility]), Schizoaffective Disorder w/Hx of multiple suicide attempts presents to ED at [place] [facility] 2/2 suicidal ideation.  Patient seen by psych and admitted to IPP for further management.  Patient seen and examined at bedside on arrival to unit, resting comfortably and without acute complaints.  No CP/SOB.  No abdominal or urinary complaints.   (06 Mar 2022 16:29)    Podiatry called for pain on the left foot. Pain with ambulation. pt thinks there is a piece of glass in his foot. pt stepped on glass 6 months ago. Pain since the injury.       No pertinent past medical history    HIV (human immunodeficiency virus infection)    Bipolar disorder    Schizophrenia, unspecified type    Depression    Anxiety    HIV (human immunodeficiency virus infection)    Schizophrenia    COVID-19    Cocaine abuse    Alcohol abuse    Heroin abuse        PMH: No pertinent past medical history    HIV (human immunodeficiency virus infection)    Bipolar disorder    Schizophrenia, unspecified type    Depression    Anxiety    HIV (human immunodeficiency virus infection)    Schizophrenia    COVID-19    Cocaine abuse    Alcohol abuse    Heroin abuse      PSH: No significant past surgical history    No significant past surgical history    No significant past surgical history    No significant past surgical history      Medication bictegravir 50 mG/emtricitabine 200 mG/tenofovir alafenamide 25 mG (BIKTARVY) 1 Tablet(s) Oral daily    Allergy: penicillin (Unknown)  penicillins (Unknown)        Labs:                    ROS:  [X]A ten point review of system was otherwise negative except as noted    Physical Exam - Lower Extremity Focused:   Derm:   Two Calluses medial 1st MTPJ left foot. tender and painful on palpation  Vascular:   DP and PT pulses 2/4 /BL). Cap re-fill time <then 3sec to the digits  Neuro:  - Protective sensation intact +/ Pain on the left hallux   MSK:   Manual Muscle strength 5/5 in all muscle compartments B/L    < from: Xray Foot AP + Lateral, Left (03.13.22 @ 17:48) >    ACC: [id number] EXAM:  XR FOOT 2 VIEWS LT                          PROCEDURE DATE:  03/13/2022          INTERPRETATION:  Clinical History / Reason for exam: Trauma. Concern for   foreign body.    Comparison: None.    Procedure: Three views of the left foot.    Findings:  There is no evidence of acute fracture or dislocation.  Joint   articulations are normal.  Bone mineralization is normal. No radiopaque   foreign bodies.    Impression:  No acute osseous abnormality or radiopaque foreign bodies.    --- End of Report ---            ELLIOT LANDAU MD; Attending Radiologist  This document has been electronically signed. Mar 14 2022  9:05AM    < end of copied text >      Assessment:   left foot pain due to callus  Plan:  Chart reviewed and Patient evaluated  Discussed diagnosis and treatment with patient  Xray reviewed - as above. No foreign object    No foreign body on physical exam  Dbx of callus L foot x2   No weight bearing restrictions  f/u as o/p   Please reconsult as needed.

## 2022-03-14 NOTE — PROGRESS NOTE BEHAVIORAL HEALTH - SUMMARY
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     On encounter, remains friendly and cooperative with brighter affect, noted to be engaging in milieu, not isolative, reporting improvement in mood and denied SI. Patient remains motivated to go to rehab.  Given his extensive psychiatric history and lack of safe disposition, patient warrants continued ipp for safety and stabilization.

## 2022-03-14 NOTE — CHART NOTE - NSCHARTNOTEFT_GEN_A_CORE
Social Work Note:    Treatment team met with patient on unit rounds earlier this morning.  At time of assessment patient was in his room.  Patient continues to feel better from his admission. ADLs are improved from admission.  Patient has been adherent to prescribed medications.  He was seen by occupational therapy this morning.        CATCH team met with patient to discuss a referral to an inpatient chemical dependency unit.  Referrals sent last week did not consider patient at that time as he appeared to be in need of further stabilization at this level of care. Referrals to be updated this week.  Patient made aware of the above.  He remains motivated for rehab after his discharge from the hospital.      Per patient report his phone is "broken."  He was provided information for ED01 application for assistance with obtaining a new phone on Friday.      Mental Status Exam:    Mood – Neutral   Sleep – Fair  Appetite – Fair   ADLs – Good   Thought Process – Linear    Observation – [id number]    No barriers to discharge identified at this time.     At this time patient is not psychiatrically stable for discharge.

## 2022-03-14 NOTE — PROGRESS NOTE BEHAVIORAL HEALTH - NSBHCHARTREVIEWIMAGING_PSY_A_CORE FT
< from: Xray Foot AP + Lateral, Left (03.13.22 @ 17:48) >    Findings:  There is no evidence of acute fracture or dislocation.  Joint   articulations are normal.  Bone mineralization is normal. No radiopaque   foreign bodies.    Impression:  No acute osseous abnormality or radiopaque foreign bodies.    --- End of Report ---            ELLIOT LANDAU MD; Attending Radiologist  This document has been electronically signed. Mar 14 2022  9:05AM    < end of copied text >

## 2022-03-14 NOTE — PROGRESS NOTE BEHAVIORAL HEALTH - NSBHFUPINTERVALHXFT_PSY_A_CORE
Chart reviewed, patient seen and evaluated at bedside. Patient receiving trazodone to sleep.    Encounter found patient sitting comfortably in bed, no acute distress. patient reports that he is well, reporting of minor pain in his foot and reporting that the medical PA already checked on it. Patient otherwise reporting that he remains well, reporting he is going to groups and reporting that he remains motivated to go to rehab. Patient reporting continued improved mood, denies si/hi, denies auditory or visual hallucinations and offers no new complaints.

## 2022-03-15 PROCEDURE — 99231 SBSQ HOSP IP/OBS SF/LOW 25: CPT

## 2022-03-15 RX ADMIN — QUETIAPINE FUMARATE 100 MILLIGRAM(S): 200 TABLET, FILM COATED ORAL at 20:08

## 2022-03-15 RX ADMIN — Medication 100 MILLIGRAM(S): at 08:15

## 2022-03-15 RX ADMIN — ESCITALOPRAM OXALATE 20 MILLIGRAM(S): 10 TABLET, FILM COATED ORAL at 08:15

## 2022-03-15 RX ADMIN — BICTEGRAVIR SODIUM, EMTRICITABINE, AND TENOFOVIR ALAFENAMIDE FUMARATE 1 TABLET(S): 30; 120; 15 TABLET ORAL at 08:16

## 2022-03-15 RX ADMIN — Medication 1 MILLIGRAM(S): at 08:15

## 2022-03-15 RX ADMIN — Medication 1 TABLET(S): at 08:15

## 2022-03-15 NOTE — PROGRESS NOTE BEHAVIORAL HEALTH - MODIFICATIONS
seen and discussed with resident
seen/discussed with resident
seen and discussed with resident
None

## 2022-03-15 NOTE — PROGRESS NOTE BEHAVIORAL HEALTH - SUMMARY
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     On encounter, remains friendly and cooperative with brighter affect, noted to be visible on unit and engaging in milieu, reporting improvement in mood and denied SI. Patient remains motivated to go to rehab.  Given his extensive psychiatric history and lack of safe disposition, patient warrants continued ipp for safety and stabilization.

## 2022-03-16 LAB — SARS-COV-2 RNA SPEC QL NAA+PROBE: SIGNIFICANT CHANGE UP

## 2022-03-16 PROCEDURE — 99231 SBSQ HOSP IP/OBS SF/LOW 25: CPT

## 2022-03-16 RX ORDER — ESCITALOPRAM OXALATE 10 MG/1
1 TABLET, FILM COATED ORAL
Qty: 0 | Refills: 0 | DISCHARGE
Start: 2022-03-16

## 2022-03-16 RX ORDER — QUETIAPINE FUMARATE 200 MG/1
1 TABLET, FILM COATED ORAL
Qty: 0 | Refills: 0 | DISCHARGE
Start: 2022-03-16

## 2022-03-16 RX ORDER — TRAZODONE HCL 50 MG
1 TABLET ORAL
Qty: 0 | Refills: 0 | DISCHARGE
Start: 2022-03-16

## 2022-03-16 RX ADMIN — Medication 1 MILLIGRAM(S): at 08:42

## 2022-03-16 RX ADMIN — ESCITALOPRAM OXALATE 20 MILLIGRAM(S): 10 TABLET, FILM COATED ORAL at 08:42

## 2022-03-16 RX ADMIN — Medication 100 MILLIGRAM(S): at 08:42

## 2022-03-16 RX ADMIN — Medication 2 MILLIGRAM(S): at 14:54

## 2022-03-16 RX ADMIN — BICTEGRAVIR SODIUM, EMTRICITABINE, AND TENOFOVIR ALAFENAMIDE FUMARATE 1 TABLET(S): 30; 120; 15 TABLET ORAL at 11:45

## 2022-03-16 RX ADMIN — Medication 1 TABLET(S): at 08:42

## 2022-03-16 RX ADMIN — Medication 2 MILLIGRAM(S): at 12:07

## 2022-03-16 RX ADMIN — Medication 2 MILLIGRAM(S): at 18:45

## 2022-03-16 RX ADMIN — QUETIAPINE FUMARATE 100 MILLIGRAM(S): 200 TABLET, FILM COATED ORAL at 20:41

## 2022-03-16 NOTE — PROGRESS NOTE BEHAVIORAL HEALTH - NSBHADMITIPOBSFT_PSY_A_CORE
unit protocol

## 2022-03-16 NOTE — PROGRESS NOTE BEHAVIORAL HEALTH - PROBLEM SELECTOR PLAN 5
- Seen by Infectious disease during this hospitalization: continue biktarvy daily

## 2022-03-16 NOTE — DISCHARGE NOTE BEHAVIORAL HEALTH - NSBHDCSWCOMMENTSFT_PSY_A_CORE
Discharge Summary to [facility] on [facility]/ Discharge Summary [facility] [phone] on 03/17/2022 at 10:20am.

## 2022-03-16 NOTE — DISCHARGE NOTE BEHAVIORAL HEALTH - MEDICATION SUMMARY - MEDICATIONS TO STOP TAKING
I will STOP taking the medications listed below when I get home from the hospital:    prazosin 1 mg oral capsule  -- 1 cap(s) by mouth once a day (at bedtime)    gabapentin 100 mg oral capsule  -- 1 cap(s) by mouth 3 times a day    Peridex 0.12% mucous membrane liquid  -- 15 milliliter(s) mucous membrane 2 times a day    Acetaminophen Extra Strength Gelcaps 500 mg  -- 2 tab(s) by mouth every 6 hours, As needed, Temp greater or equal to 38C (100.4F), Moderate Pain (4 - 6)

## 2022-03-16 NOTE — DISCHARGE NOTE BEHAVIORAL HEALTH - FAMILY HISTORY OF PSYCHIATRIC ILLNESS
Patient denied having family or friends as support network. Patient reporting he lives in an appartment in [place] but records indicate that he has been staying at [facility]

## 2022-03-16 NOTE — PROGRESS NOTE BEHAVIORAL HEALTH - NSBHCHARTREVIEWVS_PSY_A_CORE FT
Vital Signs Last 24 Hrs  T(C): 36 (08 Mar 2022 06:05), Max: 37.3 (07 Mar 2022 15:52)  T(F): 96.8 (08 Mar 2022 06:05), Max: 99.1 (07 Mar 2022 15:52)  HR: 68 (08 Mar 2022 06:05) (68 - 84)  BP: 136/82 (08 Mar 2022 06:05) (115/78 - 144/69)  BP(mean): --  RR: 16 (08 Mar 2022 06:05) (16 - 18)  SpO2: --
Vital Signs Last 24 Hrs  T(C): 36.1 (14 Mar 2022 06:31), Max: 36.9 (13 Mar 2022 16:22)  T(F): 96.9 (14 Mar 2022 06:31), Max: 98.5 (13 Mar 2022 16:22)  HR: 70 (14 Mar 2022 06:31) (70 - 80)  BP: 104/67 (14 Mar 2022 06:31) (104/67 - 118/79)  BP(mean): --  RR: 18 (14 Mar 2022 06:31) (18 - 18)  SpO2: --
Vital Signs Last 24 Hrs  T(C): 35.9 (11 Mar 2022 10:14), Max: 37.1 (10 Mar 2022 15:32)  T(F): 96.6 (11 Mar 2022 10:14), Max: 98.8 (10 Mar 2022 15:32)  HR: 82 (11 Mar 2022 10:14) (71 - 82)  BP: 120/68 (11 Mar 2022 10:14) (120/68 - 144/86)  BP(mean): --  RR: 16 (11 Mar 2022 10:14) (16 - 18)  SpO2: --
ICU Vital Signs Last 24 Hrs  T(C): 35.9 (16 Mar 2022 08:44), Max: 36.3 (15 Mar 2022 16:06)  T(F): 96.7 (16 Mar 2022 08:44), Max: 97.4 (15 Mar 2022 16:06)  HR: 93 (16 Mar 2022 08:44) (73 - 93)  BP: 118/71 (16 Mar 2022 08:44) (105/59 - 137/68)  BP(mean): --  ABP: --  ABP(mean): --  RR: 19 (16 Mar 2022 08:44) (16 - 19)  SpO2: --
Vital Signs Last 24 Hrs  T(C): 36.2 (10 Mar 2022 09:38), Max: 37.3 (09 Mar 2022 16:39)  T(F): 97.1 (10 Mar 2022 09:38), Max: 99.2 (09 Mar 2022 16:39)  HR: 89 (10 Mar 2022 09:38) (65 - 89)  BP: 113/61 (10 Mar 2022 09:38) (110/68 - 130/73)  BP(mean): --  RR: 18 (10 Mar 2022 09:38) (16 - 18)  SpO2: --
Vital Signs Last 24 Hrs  T(C): 36.6 (09 Mar 2022 09:25), Max: 36.6 (09 Mar 2022 09:25)  T(F): 97.8 (09 Mar 2022 09:25), Max: 97.8 (09 Mar 2022 09:25)  HR: 81 (09 Mar 2022 09:25) (68 - 90)  BP: 118/75 (09 Mar 2022 09:25) (118/75 - 138/73)  BP(mean): --  RR: 16 (09 Mar 2022 09:25) (16 - 18)  SpO2: --
Vital Signs Last 24 Hrs  T(C): 36.5 (07 Mar 2022 11:44), Max: 36.7 (07 Mar 2022 05:52)  T(F): 97.7 (07 Mar 2022 11:44), Max: 98.1 (07 Mar 2022 05:52)  HR: 75 (07 Mar 2022 11:44) (57 - 82)  BP: 144/69 (07 Mar 2022 11:44) (120/73 - 144/69)  BP(mean): --  RR: 18 (07 Mar 2022 11:44) (17 - 18)  SpO2: 96% (06 Mar 2022 13:45) (96% - 96%)
Vital Signs Last 24 Hrs  T(C): 36.4 (15 Mar 2022 08:58), Max: 36.9 (14 Mar 2022 15:42)  T(F): 97.6 (15 Mar 2022 08:58), Max: 98.4 (14 Mar 2022 15:42)  HR: 76 (15 Mar 2022 08:58) (75 - 87)  BP: 128/79 (15 Mar 2022 08:58) (104/58 - 128/79)  BP(mean): --  RR: 18 (15 Mar 2022 08:58) (18 - 18)  SpO2: --

## 2022-03-16 NOTE — DISCHARGE NOTE BEHAVIORAL HEALTH - NS MD DC FALL RISK RISK
For information on Fall & Injury Prevention, visit: [email or web address] OR  [email or web address] OR  https://www.cdc.gov/steadi/patient.html

## 2022-03-16 NOTE — PROGRESS NOTE BEHAVIORAL HEALTH - NSBHFUPINTERVALCCFT_PSY_A_CORE
"i've been really depressed and I couldn't take care of myself"
"hi"
"i'm good"
"i'm fine"
"Oh, hi!"
"i'm okay"
"i'm good"
'I'm good"

## 2022-03-16 NOTE — DISCHARGE NOTE BEHAVIORAL HEALTH - MEDICATION SUMMARY - MEDICATIONS TO TAKE
I will START or STAY ON the medications listed below when I get home from the hospital:    escitalopram 20 mg oral tablet  -- 1 tab(s) by mouth once a day. Continue to take until told otherwise by your provider.   -- Indication: For Mood    traZODone 50 mg oral tablet  -- 1 tab(s) by mouth once a day (at bedtime), As needed, insomnia. Continue to take until told otherwise by your provider.   -- Indication: For insomnia    QUEtiapine 100 mg oral tablet  -- 1 tab(s) by mouth once a day (at bedtime). Continue to take until told otherwise by your provider.   -- Indication: For Mood    bictegravir/emtricitabine/tenofovir 50 mg-200 mg-25 mg oral tablet  -- 1 tab(s) by mouth once a day. Continue to take until told otherwise by your provider.   -- Indication: For HIV (human immunodeficiency virus infection)    Nicorette 2 mg oral transmucosal gum  -- 1 gum chewed every 2 hours x 30 days, As Needed   -- Do not take this drug if you are pregnant.  It is very important that you take or use this exactly as directed.  Do not skip doses or discontinue unless directed by your doctor.    -- Indication: For Nicotine dependence

## 2022-03-16 NOTE — DISCHARGE NOTE BEHAVIORAL HEALTH - NSBHDCMEDSFT_PSY_A_CORE
Pt was restarted on Lexapro 20 mg qd, trazodone 50 mg qhs PRN insomnia and seroquel 100 mg qhs. Patient was compliant with the medications and denied any side-effects of the medications.

## 2022-03-16 NOTE — PROGRESS NOTE BEHAVIORAL HEALTH - NSBHPTASSESSDT_PSY_A_CORE
07-Mar-2022 12:15
16-Mar-2022 10:01
08-Mar-2022 10:30
11-Mar-2022 11:01
15-Mar-2022 09:20
10-Mar-2022 14:03
14-Mar-2022 09:34
09-Mar-2022 11:20

## 2022-03-16 NOTE — PROGRESS NOTE BEHAVIORAL HEALTH - RISK ASSESSMENT
Risk factors of history of previous SA are currently largely mitigated by his help seeking behavior, denial of si, improvement in mood and future orientation
Risk factors current mood symptoms and history of previous SA are only partially mitigated by his help seeking behavior.
Risk factors of history of previous SA are currently largely mitigated by his help seeking behavior, denial of si, improvement in mood and future orientation
Risk factors current mood symptoms and history of previous SA are only partially mitigated by his help seeking behavior
Risk factors current mood symptoms and history of previous SA are only partially mitigated by his help seeking behavior
Risk factors current mood symptoms and history of previous SA are only partially mitigated by his help seeking behavior.
Risk factors of history of previous SA are currently largely mitigated by his help seeking behavior, denial of si, improvement in mood and future orientation
Risk factors current mood symptoms and history of previous SA are only partially mitigated by his help seeking behavior

## 2022-03-16 NOTE — DISCHARGE NOTE BEHAVIORAL HEALTH - NSBHDCSUICFCTRMIT_PSY_A_CORE
Patient to be sent to rehab (motivated to go, reports previously did well for >4 months and looking forward to be substance free once more). 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

## 2022-03-16 NOTE — DISCHARGE NOTE BEHAVIORAL HEALTH - HPI (INCLUDE ILLNESS QUALITY, SEVERITY, DURATION, TIMING, CONTEXT, MODIFYING FACTORS, ASSOCIATED SIGNS AND SYMPTOMS)
35yo  man, single, previously domiciled currently living at [facility], unemployed, with PPH of affective and psychotic symptoms, substance use disorders (prominently alcohol, cocaine, heroin use), past SA including hanging, history of NSSIB in the past, multiple prior admissions (May 2021 [facility], July 2021 [facility], Oct 2021 [facility], Dec [facility]), history of detox/rehab in the past, with PMH of HIV on Biktarvy (haven't taken in over 2 months), who BIBS for SI in the setting of recent suicide of his girlfriend, heavy substance use and associated with it mood sx.     Patient known to this writer as he was most recently hospitalized on 8U and seen by this writer on numerous occasions. Notably, during that hospx, he underwent multiple dental extractions (17). On review of patient's whereabouts, he reports that following the discharge from 8, he neither picked up his medications nor followed up with outpatient providers as "I was not ready to quit drugs" and relapsed on primarily cocaine use. 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 Although he has housing through [employer], he states that soon after discharge he ended p living at the [facility] and has been staying there since adding "I just can't seem to leave the station". Notes that it is hard to recall what date or month it is but knows that the year is 2022 and shares that "everything has been blurry" in part due to substance use. Due to the above, Mr. Hernandez has find self increasingly more depressed, with impaired mood and sleep, anhedonia, difficulties with focus, and low appetite. This has culminated in him starting to have suicidal thoughts with a plan to jump from the bridge located by [facility], and spending much time sitting on the edge of the stated bridge and contemplating, thus he ultimately brought himself to [facility] hoping to receive psychiatric care here.  With regards to substance use, primarily reports heavy daily cocaine use, with last use yesterday. On motivational interviewing, remains precontemplative.  On review of symptoms, presents with cough and 1 day of chills and night sweats but no other symptoms. Notably has not been compliant with HAART for HIV.        Additionally, per discharge summary at  on 12/22:  "Patient admitted to [facility] voluntary for medication stabilization and treatment. Lexapro 10mg qd and seroquel 150mg qhs were started for depression and  mood dysregulation. Naltrexone was discussed as an option for treatment during his admission but he declined. Pt compliant with all medications. Lexapro and Seroquel were steadily increased to final doses of lexapro 20mg daily and 300mg qhs, tolerated well without issue. Patient regularly met with psychologist on the unit and was noted to become more forthcoming with team about his history, substance use etc. He continually expressed much guilt and remorse over the death of his best friend via OD who he introduced to drugs. He slowly became more visible on the unit, walking around the unit, watching team, eventually he began to appropriately interact with others. Attended select groups including [employer] steps with encouragement. He expressed desire to go to outpatient rehab. Several services were consulted during pt's admission (ortho, medicine, pain, omfs). He endorsed dental pain and became focused on receiving narcotics for pain. He had one episode of agitation/aggression, punching the glass at the nursing station for pain meds. Pt was discharged from [facility] for oral surgery and returned directly after for continuity of care. Throughout his admission, pt denied suicidality, denied hi, denied avh or paranoid ideation. Pt began to request discharge to outpatient rehab, reporting intent to follow up with , go to a dentist to get dentures as well as attend programs for substance abuse support.  Safety plan completed prior to discharge"

## 2022-03-16 NOTE — PROGRESS NOTE BEHAVIORAL HEALTH - NSBHFUPINTERVALHXFT_PSY_A_CORE
Pt was seen, evaluated, chart reviewed. As per nursing staff, no events overnight. On evaluation, pt reports he is doing "good." No new complaints. Remains future oriented and motivated to go to rehab. He has a phone interview with [facility] today. Visible on the unit, attending groups. Is compliant with medication, denies negative side effects. Endorsed good sleep and appetite. Denied auditory/visual hallucinations. Denied paranoia. Denied suicidal/homicidal ideation, intent or plan.

## 2022-03-16 NOTE — CHART NOTE - NSCHARTNOTEFT_GEN_A_CORE
Social Work Note:    Treatment team met with patient on unit rounds earlier this morning.  Patient engages appropriately with treatment team members.  Patient continues to feel better from his admission. ADLs are improved from admission.  Patient has been adherent to prescribed medications.       CATCH team meets with patient to provide updates on referrals made to rehab programs.  He has a phone screen scheduled for later today with psychiatrist Dr. Michele of [facility]. He remains motivated for rehab after his discharge from the hospital.      Mental Status Exam:    Mood – Neutral   Sleep – Fair  Appetite – Fair   ADLs – Good   Thought Process – Linear    Observation – [id number]    No barriers to discharge identified at this time.     At this time patient is not psychiatrically stable for discharge. Social Work Note:    Treatment team met with patient on unit rounds earlier this morning.  Patient engages appropriately with treatment team members.  Patient continues to feel better from his admission. ADLs are improved from admission.  Patient has been adherent to prescribed medications.       CATCH team meets with patient to provide updates on referrals made to rehab programs.  He has a phone screen scheduled for later today with psychiatrist Dr. Michele of [facility]. He remains motivated for rehab after his discharge from the hospital.      Mental Status Exam:    Mood – Neutral   Sleep – Fair  Appetite – Fair   ADLs – Good   Thought Process – Linear    Observation – [id number]    No barriers to discharge identified at this time.     At this time patient is not psychiatrically stable for discharge.

## 2022-03-16 NOTE — PROGRESS NOTE BEHAVIORAL HEALTH - NSBHATTESTSEENBY_PSY_A_CORE
attending Psychiatrist without NP/Trainee
Attending Psychiatrist supervising NP/Trainee, meeting pt...

## 2022-03-16 NOTE — PROGRESS NOTE BEHAVIORAL HEALTH - PROBLEM SELECTOR PLAN 1
- c/w Lexapro 20mg po daily  - c/w quetiapine 100mg po qHS  - group/ individual/ milieu therapy
- c/w Lexapro 20mg po daily  - group/ individual/ milieu therapy
- c/w Lexapro 20mg po daily  - c/w quetiapine 100mg po qHS  - group/ individual/ milieu therapy

## 2022-03-16 NOTE — DISCHARGE NOTE BEHAVIORAL HEALTH - PAST PSYCHIATRIC HISTORY
Multiple prior admissions, self reports 2 previous Suicide attempts, 1 via walking in front of car which he reports cause a wrist facture and 1 reported attempted overdose on heroin.   prior NSSIB

## 2022-03-16 NOTE — DISCHARGE NOTE BEHAVIORAL HEALTH - MEDICATION SUMMARY - MEDICATIONS TO CHANGE
I will SWITCH the dose or number of times a day I take the medications listed below when I get home from the hospital:    QUEtiapine 300 mg oral tablet  -- 1 tab(s) by mouth once a day (at bedtime)

## 2022-03-17 VITALS
RESPIRATION RATE: 18 BRPM | DIASTOLIC BLOOD PRESSURE: 78 MMHG | SYSTOLIC BLOOD PRESSURE: 120 MMHG | HEART RATE: 88 BPM | TEMPERATURE: 97 F

## 2022-03-17 PROCEDURE — 99238 HOSP IP/OBS DSCHRG MGMT 30/<: CPT

## 2022-03-17 RX ORDER — BICTEGRAVIR SODIUM, EMTRICITABINE, AND TENOFOVIR ALAFENAMIDE FUMARATE 30; 120; 15 MG/1; MG/1; MG/1
1 TABLET ORAL
Qty: 0 | Refills: 0 | DISCHARGE
Start: 2022-03-17

## 2022-03-17 RX ADMIN — Medication 2 MILLIGRAM(S): at 09:13

## 2022-03-17 RX ADMIN — ESCITALOPRAM OXALATE 20 MILLIGRAM(S): 10 TABLET, FILM COATED ORAL at 09:27

## 2022-03-17 RX ADMIN — Medication 100 MILLIGRAM(S): at 09:27

## 2022-03-17 RX ADMIN — Medication 1 MILLIGRAM(S): at 09:27

## 2022-03-17 RX ADMIN — BICTEGRAVIR SODIUM, EMTRICITABINE, AND TENOFOVIR ALAFENAMIDE FUMARATE 1 TABLET(S): 30; 120; 15 TABLET ORAL at 09:28

## 2022-03-17 RX ADMIN — Medication 1 TABLET(S): at 09:26

## 2022-03-17 NOTE — CHART NOTE - NSCHARTNOTESELECT_GEN_ALL_CORE
Event Note
Right wrist pain/Event Note

## 2022-03-17 NOTE — CHART NOTE - NSCHARTNOTEFT_GEN_A_CORE
Social Work Discharge Note:    Patient is for discharge today.  He is alert and oriented x3.  Mood is improved.  Anxiety has decreased.  Insight and judgment have improved.  Suicidal / homicidal ideation denied.      Patient will be discharged to [facility] inpatient chemical dependency unit.  Prior to discharge patient completed a phone screen with facility intake department and was approved for admission.    Patient agreeable to bed offer.     [facility] [phone] provided as an additional resource.     Patient is aware and agreeable to discharge today.

## 2022-03-17 NOTE — CHART NOTE - NSCHARTNOTEFT_GEN_A_CORE
Pt was seen, evaluated, chart reviewed. As per nursing staff, no events overnight. On evaluation, pt reports he is doing well and is ready for discharge to rehab. Is compliant with medication, denies negative side effects. Endorsed good sleep and appetite. Denied auditory/visual hallucinations. Denied paranoia. Denied suicidal/homicidal ideation, intent or plan.    Pt is for discharge today to rehab.

## 2022-03-19 DIAGNOSIS — Z86.16 PERSONAL HISTORY OF COVID-19: ICD-10-CM

## 2022-03-19 DIAGNOSIS — F33.1 MAJOR DEPRESSIVE DISORDER, RECURRENT, MODERATE: ICD-10-CM

## 2022-03-19 DIAGNOSIS — Z59.02 UNSHELTERED HOMELESSNESS: ICD-10-CM

## 2022-03-19 DIAGNOSIS — L84 CORNS AND CALLOSITIES: ICD-10-CM

## 2022-03-19 DIAGNOSIS — M25.531 PAIN IN RIGHT WRIST: ICD-10-CM

## 2022-03-19 DIAGNOSIS — Z21 ASYMPTOMATIC HUMAN IMMUNODEFICIENCY VIRUS [HIV] INFECTION STATUS: ICD-10-CM

## 2022-03-19 DIAGNOSIS — F14.14 COCAINE ABUSE WITH COCAINE-INDUCED MOOD DISORDER: ICD-10-CM

## 2022-03-19 DIAGNOSIS — Z91.14 PATIENT'S OTHER NONCOMPLIANCE WITH MEDICATION REGIMEN: ICD-10-CM

## 2022-03-19 DIAGNOSIS — F17.210 NICOTINE DEPENDENCE, CIGARETTES, UNCOMPLICATED: ICD-10-CM

## 2022-03-19 DIAGNOSIS — R45.851 SUICIDAL IDEATIONS: ICD-10-CM

## 2022-03-19 DIAGNOSIS — F10.14 ALCOHOL ABUSE WITH ALCOHOL-INDUCED MOOD DISORDER: ICD-10-CM

## 2022-03-19 DIAGNOSIS — F41.9 ANXIETY DISORDER, UNSPECIFIED: ICD-10-CM

## 2022-03-19 DIAGNOSIS — F25.9 SCHIZOAFFECTIVE DISORDER, UNSPECIFIED: ICD-10-CM

## 2022-03-19 SDOH — ECONOMIC STABILITY - HOUSING INSECURITY: UNSHELTERED HOMELESSNESS: Z59.02

## 2022-04-10 ENCOUNTER — EMERGENCY (EMERGENCY)
Facility: HOSPITAL | Age: 37
LOS: 1 days | Discharge: HOME | End: 2022-04-10
Attending: EMERGENCY MEDICINE | Admitting: EMERGENCY MEDICINE
Payer: MEDICAID

## 2022-04-10 VITALS
SYSTOLIC BLOOD PRESSURE: 118 MMHG | HEART RATE: 82 BPM | RESPIRATION RATE: 18 BRPM | WEIGHT: 145.06 LBS | TEMPERATURE: 98 F | HEIGHT: 66 IN | DIASTOLIC BLOOD PRESSURE: 71 MMHG | OXYGEN SATURATION: 98 %

## 2022-04-10 VITALS
DIASTOLIC BLOOD PRESSURE: 72 MMHG | SYSTOLIC BLOOD PRESSURE: 131 MMHG | RESPIRATION RATE: 17 BRPM | OXYGEN SATURATION: 96 % | HEART RATE: 80 BPM | TEMPERATURE: 97 F

## 2022-04-10 DIAGNOSIS — F32.A DEPRESSION, UNSPECIFIED: ICD-10-CM

## 2022-04-10 LAB
ANION GAP SERPL CALC-SCNC: 13 MMOL/L — SIGNIFICANT CHANGE UP (ref 5–17)
APAP SERPL-MCNC: <5 UG/ML — LOW (ref 10–30)
APPEARANCE UR: CLEAR — SIGNIFICANT CHANGE UP
BASOPHILS # BLD AUTO: 0.06 K/UL — SIGNIFICANT CHANGE UP (ref 0–0.2)
BASOPHILS NFR BLD AUTO: 1.8 % — SIGNIFICANT CHANGE UP (ref 0–2)
BILIRUB UR-MCNC: ABNORMAL
BUN SERPL-MCNC: 21 MG/DL — SIGNIFICANT CHANGE UP (ref 7–23)
CALCIUM SERPL-MCNC: 8.5 MG/DL — SIGNIFICANT CHANGE UP (ref 8.4–10.5)
CHLORIDE SERPL-SCNC: 106 MMOL/L — SIGNIFICANT CHANGE UP (ref 96–108)
CO2 SERPL-SCNC: 22 MMOL/L — SIGNIFICANT CHANGE UP (ref 22–31)
COLOR SPEC: YELLOW — SIGNIFICANT CHANGE UP
CREAT SERPL-MCNC: 0.98 MG/DL — SIGNIFICANT CHANGE UP (ref 0.5–1.3)
DIFF PNL FLD: ABNORMAL
EGFR: 103 ML/MIN/1.73M2 — SIGNIFICANT CHANGE UP
EOSINOPHIL # BLD AUTO: 0.09 K/UL — SIGNIFICANT CHANGE UP (ref 0–0.5)
EOSINOPHIL NFR BLD AUTO: 2.8 % — SIGNIFICANT CHANGE UP (ref 0–6)
ETHANOL SERPL-MCNC: <10 MG/DL — SIGNIFICANT CHANGE UP (ref 0–10)
GLUCOSE SERPL-MCNC: 109 MG/DL — HIGH (ref 70–99)
GLUCOSE UR QL: NEGATIVE — SIGNIFICANT CHANGE UP
HCT VFR BLD CALC: 43.9 % — SIGNIFICANT CHANGE UP (ref 39–50)
HGB BLD-MCNC: 14.3 G/DL — SIGNIFICANT CHANGE UP (ref 13–17)
KETONES UR-MCNC: 15 MG/DL
LEUKOCYTE ESTERASE UR-ACNC: NEGATIVE — SIGNIFICANT CHANGE UP
LYMPHOCYTES # BLD AUTO: 1.18 K/UL — SIGNIFICANT CHANGE UP (ref 1–3.3)
LYMPHOCYTES # BLD AUTO: 36.1 % — SIGNIFICANT CHANGE UP (ref 13–44)
MCHC RBC-ENTMCNC: 29.1 PG — SIGNIFICANT CHANGE UP (ref 27–34)
MCHC RBC-ENTMCNC: 32.6 GM/DL — SIGNIFICANT CHANGE UP (ref 32–36)
MCV RBC AUTO: 89.4 FL — SIGNIFICANT CHANGE UP (ref 80–100)
MONOCYTES # BLD AUTO: 0.42 K/UL — SIGNIFICANT CHANGE UP (ref 0–0.9)
MONOCYTES NFR BLD AUTO: 13 % — SIGNIFICANT CHANGE UP (ref 2–14)
NEUTROPHILS # BLD AUTO: 1.36 K/UL — LOW (ref 1.8–7.4)
NEUTROPHILS NFR BLD AUTO: 41.7 % — LOW (ref 43–77)
NITRITE UR-MCNC: NEGATIVE — SIGNIFICANT CHANGE UP
PCP SPEC-MCNC: SIGNIFICANT CHANGE UP
PH UR: 5.5 — SIGNIFICANT CHANGE UP (ref 5–8)
PLATELET # BLD AUTO: 243 K/UL — SIGNIFICANT CHANGE UP (ref 150–400)
POTASSIUM SERPL-MCNC: 4.2 MMOL/L — SIGNIFICANT CHANGE UP (ref 3.5–5.3)
POTASSIUM SERPL-SCNC: 4.2 MMOL/L — SIGNIFICANT CHANGE UP (ref 3.5–5.3)
PROT UR-MCNC: 30 MG/DL
RBC # BLD: 4.91 M/UL — SIGNIFICANT CHANGE UP (ref 4.2–5.8)
RBC # FLD: 13.4 % — SIGNIFICANT CHANGE UP (ref 10.3–14.5)
SALICYLATES SERPL-MCNC: <0.3 MG/DL — LOW (ref 2.8–20)
SARS-COV-2 RNA SPEC QL NAA+PROBE: NEGATIVE — SIGNIFICANT CHANGE UP
SODIUM SERPL-SCNC: 141 MMOL/L — SIGNIFICANT CHANGE UP (ref 135–145)
SP GR SPEC: >=1.03 — SIGNIFICANT CHANGE UP (ref 1–1.03)
UROBILINOGEN FLD QL: 0.2 E.U./DL — SIGNIFICANT CHANGE UP
WBC # BLD: 3.26 K/UL — LOW (ref 3.8–10.5)
WBC # FLD AUTO: 3.26 K/UL — LOW (ref 3.8–10.5)

## 2022-04-10 PROCEDURE — 93005 ELECTROCARDIOGRAM TRACING: CPT

## 2022-04-10 PROCEDURE — 99284 EMERGENCY DEPT VISIT MOD MDM: CPT

## 2022-04-10 PROCEDURE — 87635 SARS-COV-2 COVID-19 AMP PRB: CPT

## 2022-04-10 PROCEDURE — 85025 COMPLETE CBC W/AUTO DIFF WBC: CPT

## 2022-04-10 PROCEDURE — 36415 COLL VENOUS BLD VENIPUNCTURE: CPT

## 2022-04-10 PROCEDURE — 81001 URINALYSIS AUTO W/SCOPE: CPT

## 2022-04-10 PROCEDURE — 80307 DRUG TEST PRSMV CHEM ANLYZR: CPT

## 2022-04-10 PROCEDURE — 99285 EMERGENCY DEPT VISIT HI MDM: CPT

## 2022-04-10 PROCEDURE — 80048 BASIC METABOLIC PNL TOTAL CA: CPT

## 2022-04-10 PROCEDURE — 93010 ELECTROCARDIOGRAM REPORT: CPT

## 2022-04-10 NOTE — ED BEHAVIORAL HEALTH ASSESSMENT NOTE - SUMMARY
The patient is a 36y Male, PMH of HIV, PPH of affective and psychotic symptoms, substance use disorders (prominently alcohol, cocaine, heroin use), past SA including hanging per chart review, history of NSSIB in the past, multiple prior admissions (May 2021 [facility], July 2021 [facility], Oct 2021 [facility], Dec [facility]), history of detox/rehab in the past, here with anxiety & vague paranoia for past 5 days. Denies recent alcohol/ drug use. Not currently taking any medications.     Pt is well-known to our service; pt is currently reporting depressed mood but denies SI/HI/AVH; has affect that is incongruent in that it is full-range and bright at times. Pt was recently discharged within the past two weeks from an inpatient psychiatric hospitalization and subsequently a rehab hospitalization. Pt states that he gets his medications from ERs. Psychoeducation provided about the optimal settings to receive various kinds of care (outpatient care for routine renewals). Pt in agreement. Pt at chronically elevated risk of suicide given history of attempts, but is currently experiencing chronic depressed mood without reporting SI and is not at acutely elevated risk requiring inpatient psychiatric hospitalization. Pt reports that his plan after discharge is to go back home. Pt feels comfortable returning to the ER if he develops SI.

## 2022-04-10 NOTE — ED BEHAVIORAL HEALTH ASSESSMENT NOTE - OTHER PAST PSYCHIATRIC HISTORY (INCLUDE DETAILS REGARDING ONSET, COURSE OF ILLNESS, INPATIENT/OUTPATIENT TREATMENT)
frequent ER visits and inpatient psychiatric hospitalization; chronically noncompliant with outpatient care

## 2022-04-10 NOTE — ED PROVIDER NOTE - CLINICAL SUMMARY MEDICAL DECISION MAKING FREE TEXT BOX
here with anxiety/ paranoia/depression. denies si. frequent prior visits/ some admissions. medical screening workup obtained, psychiatry consulted.   cleared for dc by psychiatry. rec outpatient f/u. return precautions discussed

## 2022-04-10 NOTE — ED PROVIDER NOTE - NSFOLLOWUPINSTRUCTIONS_ED_ALL_ED_FT
There is a walk in clinic at [facility].  Located on the first floor of the Behavioral Health Pavilion, at [facility] and [address], the Walk-in Evaluation unit provides individuals with mental health assessments to determine what treatment best suits their needs. The unit is open weekdays from 8:30 a.m. to 5 p.m. Individuals requiring service after 5 p.m. or on weekends or holidays can seek assistance at the Psychiatric Emergency Room, located in the Hospital’s Main Building, in Room 1A19. For more information, call [phone] or [phone].    [facility]  o	[email or web address]  o	[address], 9th Floor  [place], [place] [address]   [phone] ([phone] for intakes)  o	Medicare, Medicaid, most private insurance (including United)      •	[facility]  o	[email or web address]  o	[address] [place] [address] [phone]  o	Medicare, Medicaid, Aetna, Affinity, Am[employer] Care, BCBS, [employer], Nico, GHI, Healthfirst, HealthPlus, MetroPlus, Wellcare      •	[facility]  o	[email or web address]  o	[address]  [place], [place] [id number] [phone]  o	Medicare, Medicaid, most private insurance.    Anxiety    WHAT YOU NEED TO KNOW:    Anxiety is a condition that causes you to feel extremely worried or nervous. The feelings are so strong that they can cause problems with your daily activities or sleep. Anxiety may be triggered by something you fear, or it may happen without a cause. Family or work stress, smoking, caffeine, and alcohol can increase your risk for anxiety. Certain medicines or health conditions can also increase your risk. Anxiety can become a long-term condition if it is not managed or treated.     DISCHARGE INSTRUCTIONS:    Call 1 if:     You have chest pain, tightness, or heaviness that may spread to your shoulders, arms, jaw, neck, or back.      You feel like hurting yourself or someone else.     Contact your healthcare provider if:     Your symptoms get worse or do not get better with treatment.      Your anxiety keeps you from doing your regular daily activities.      You have new symptoms since your last visit.      You have questions or concerns about your condition or care.    Medicines:     Medicines may be given to help you feel more calm and relaxed, and decrease your symptoms.      Take your medicine as directed. Contact your healthcare provider if you think your medicine is not helping or if you have side effects. Tell him of her if you are allergic to any medicine. Keep a list of the medicines, vitamins, and herbs you take. Include the amounts, and when and why you take them. Bring the list or the pill bottles to follow-up visits. Carry your medicine list with you in case of an emergency.    Follow up with your healthcare provider within 2 weeks or as directed: Write down your questions so you remember to ask them during your visits.    Manage anxiety:     Talk to someone about your anxiety. Your healthcare provider may suggest counseling. Cognitive behavioral therapy can help you understand and change how you react to events that trigger your symptoms. You might feel more comfortable talking with a friend or family member about your anxiety. Choose someone you know will be supportive and encouraging.      Find ways to relax. Activities such as exercise, meditation, or listening to music can help you relax. Spend time with friends, or do things you enjoy.      Practice deep breathing. Deep breathing can help you relax when you feel anxious. Focus on taking slow, deep breaths several times a day, or during an anxiety attack. Breathe in through your nose and out through your mouth.       Create a regular sleep routine. Regular sleep can help you feel calmer during the day. Go to sleep and wake up at the same times every day. Do not watch television or use the computer right before bed. Your room should be comfortable, dark, and quiet.       Eat a variety of healthy foods. Healthy foods include fruits, vegetables, low-fat dairy products, lean meats, fish, whole-grain breads, and cooked beans. Healthy foods can help you feel less anxious and have more energy.      Exercise regularly. Exercise can increase your energy level. Exercise may also lift your mood and help you sleep better. Your healthcare provider can help you create an exercise plan.      Do not smoke. Nicotine and other chemicals in cigarettes and cigars can increase anxiety. Ask your healthcare provider for information if you currently smoke and need help to quit. E-cigarettes or smokeless tobacco still contain nicotine. Talk to your healthcare provider before you use these products.       Do not have caffeine. Caffeine can make your symptoms worse. Do not have foods or drinks that are meant to increase your energy level.      Limit or do not drink alcohol. Ask your healthcare provider if alcohol is safe for you. You may not be able to drink alcohol if you take certain anxiety or depression medicines. Limit alcohol to 1 drink per day if you are a woman. Limit alcohol to 2 drinks per day if you are a man. A drink of alcohol is 12 ounces of beer, 5 ounces of wine, or 1½ ounces of liquor.       Do not use drugs. Drugs can make your anxiety worse. It can also make anxiety hard to manage. Talk to your healthcare provider if you use drugs and want help to quit.

## 2022-04-10 NOTE — ED BEHAVIORAL HEALTH ASSESSMENT NOTE - ADDITIONAL DETAILS ALL
Chronic history of suicidality; currently at chronic baseline and not acutely elevated risk given stable affect, currently denying SI, positively engaged in therapeutic care in ER with this writer surrounding outpatient care

## 2022-04-10 NOTE — ED BEHAVIORAL HEALTH ASSESSMENT NOTE - DETAILS
Recent inpatient psych hospitalization, followed by rehab placement, discharge less than 2 weeks ago Records available in EMR data is in hospital chart Reports last SI was three months ago Pt reports he feels comfortable returning to ER if he develops SI; reports coping strategies as going for a walk gave signout to ED

## 2022-04-10 NOTE — ED BEHAVIORAL HEALTH ASSESSMENT NOTE - OTHER
Denies SI/HI; screens negative for delusional content Full range of affect; bright at times; incongruent with stated depressed mood

## 2022-04-10 NOTE — ED ADULT TRIAGE NOTE - CHIEF COMPLAINT QUOTE
" I have not left my house for 5 days, I am paranoid and hearing voices that someone is watching me".

## 2022-04-10 NOTE — ED BEHAVIORAL HEALTH ASSESSMENT NOTE - DESCRIPTION
HIV unemployed, Patient behaviorally stable, polite, and pleasant in the ER. No PRN or emergent meds needed.

## 2022-04-10 NOTE — ED PROVIDER NOTE - PATIENT PORTAL LINK FT
You can access the FollowMyHealth Patient Portal offered by [facility] by registering at the following website: [email or web address]. By joining [employer]’s FollowMyHealth portal, you will also be able to view your health information using other applications (apps) compatible with our system.

## 2022-04-10 NOTE — ED BEHAVIORAL HEALTH ASSESSMENT NOTE - HPI (INCLUDE ILLNESS QUALITY, SEVERITY, DURATION, TIMING, CONTEXT, MODIFYING FACTORS, ASSOCIATED SIGNS AND SYMPTOMS)
The patient is a 36y Male, PMH of HIV, PPH of affective and psychotic symptoms, substance use disorders (prominently alcohol, cocaine, heroin use), past SA including hanging per chart review, history of NSSIB in the past, multiple prior admissions (May 2021 [facility], July 2021 [facility], Oct 2021 [facility], Dec [facility]), history of detox/rehab in the past, here with anxiety & vague paranoia for past 5 days. Denies recent alcohol/ drug use. Not currently taking any medications.     Patient reports he has been experiencing paranoia for the past five days. Denies recent stressors; cites chronic stressor of friend's death from one year ago; reports anhedonic mood and disrupted sleep; denies manic symptoms of grandiosity, euphoric mood, increased activity/planmaking (and euthymic, nonpressured, soft speech on MSE); denies any suicidal thoughts currently; reports he last experienced SI three months ago; denies any homicidal ideations; denies AVH and screens negative for paranoid, referential, or grandiose delusions; reports that if he is discharged he will return to his room.     Reports that his home medications are seroquel 50 mg po qhs & trazodone 100 mg po qhs. Reports that he doesn't have an outpatient psychiatrist, and that he goes to [facility] to get refills of his medications. Reports he last had an outpatient psychiatrist in 2019. Psychoeducation provided about how more stability can be achieved if people have stable outpatient plans; pt expressed agreement and is amenable to seeking outpatient psychiatric care.

## 2022-04-10 NOTE — ED ADULT NURSE NOTE - OBJECTIVE STATEMENT
Pt presented to ED with c/o of increasing anxiety, depression, paranoia for past several days. AOX4. VSS.  Patient denies chest pain, discomfort, shortness of breath, difficulty breathing and any form of distress not noted. Patient oriented to ED area. All needs attended. 1:1 initiated in triage, belongings secured. Rounding in progress. Fall risk precautions maintained.

## 2022-04-10 NOTE — ED PROVIDER NOTE - OBJECTIVE STATEMENT
PPH of affective and psychotic symptoms, substance use disorders (prominently alcohol, cocaine, heroin use), past SA including hanging, history of NSSIB in the past, multiple prior admissions (May 2021 [facility], July 2021 [facility], Oct 2021 [facility], Dec [facility]), history of detox/rehab in the past, with PMH of HIV, here with anxiety, depression, paranoia for past several days. Reports he hasn't left apartment in 5 days. Denies recent alcohol/ drug use. Not currently taking any medications. No fever, pain, sob.

## 2022-04-12 NOTE — ED ADULT TRIAGE NOTE - NS ED NURSE BANDS TYPE
Name band; I reviewed and verified the documentation and  and independently performed the documented  MDM.

## 2022-04-14 DIAGNOSIS — F22 DELUSIONAL DISORDERS: ICD-10-CM

## 2022-04-14 DIAGNOSIS — F32.A DEPRESSION, UNSPECIFIED: ICD-10-CM

## 2022-04-14 DIAGNOSIS — F41.9 ANXIETY DISORDER, UNSPECIFIED: ICD-10-CM

## 2022-04-14 DIAGNOSIS — F19.90 OTHER PSYCHOACTIVE SUBSTANCE USE, UNSPECIFIED, UNCOMPLICATED: ICD-10-CM

## 2022-04-14 DIAGNOSIS — Z86.16 PERSONAL HISTORY OF COVID-19: ICD-10-CM

## 2022-04-14 DIAGNOSIS — Z88.0 ALLERGY STATUS TO PENICILLIN: ICD-10-CM

## 2022-04-14 DIAGNOSIS — Z20.822 CONTACT WITH AND (SUSPECTED) EXPOSURE TO COVID-19: ICD-10-CM

## 2022-04-14 DIAGNOSIS — R44.0 AUDITORY HALLUCINATIONS: ICD-10-CM

## 2022-04-14 DIAGNOSIS — B20 HUMAN IMMUNODEFICIENCY VIRUS [HIV] DISEASE: ICD-10-CM

## 2022-04-14 DIAGNOSIS — F20.9 SCHIZOPHRENIA, UNSPECIFIED: ICD-10-CM

## 2022-04-20 NOTE — ED PROVIDER NOTE - NS_EDPROVIDERDISPOUSERTYPE_ED_A_ED
Concentration (Mg/Ml Or Millions Of Plaque Forming Units/Cc): 0.01 Attending Attestation (For Attendings USE Only)...

## 2022-04-23 ENCOUNTER — EMERGENCY (EMERGENCY)
Facility: HOSPITAL | Age: 37
LOS: 1 days | Discharge: HOME | End: 2022-04-23
Attending: EMERGENCY MEDICINE | Admitting: EMERGENCY MEDICINE
Payer: MEDICAID

## 2022-04-23 VITALS
RESPIRATION RATE: 18 BRPM | OXYGEN SATURATION: 97 % | HEART RATE: 74 BPM | DIASTOLIC BLOOD PRESSURE: 71 MMHG | HEIGHT: 66 IN | TEMPERATURE: 98 F | WEIGHT: 139.99 LBS | SYSTOLIC BLOOD PRESSURE: 112 MMHG

## 2022-04-23 DIAGNOSIS — Z88.0 ALLERGY STATUS TO PENICILLIN: ICD-10-CM

## 2022-04-23 DIAGNOSIS — F32.A DEPRESSION, UNSPECIFIED: ICD-10-CM

## 2022-04-23 DIAGNOSIS — Z72.89 OTHER PROBLEMS RELATED TO LIFESTYLE: ICD-10-CM

## 2022-04-23 DIAGNOSIS — F14.14 COCAINE ABUSE WITH COCAINE-INDUCED MOOD DISORDER: ICD-10-CM

## 2022-04-23 DIAGNOSIS — Z20.822 CONTACT WITH AND (SUSPECTED) EXPOSURE TO COVID-19: ICD-10-CM

## 2022-04-23 DIAGNOSIS — F11.99 OPIOID USE, UNSPECIFIED WITH UNSPECIFIED OPIOID-INDUCED DISORDER: ICD-10-CM

## 2022-04-23 DIAGNOSIS — Z21 ASYMPTOMATIC HUMAN IMMUNODEFICIENCY VIRUS [HIV] INFECTION STATUS: ICD-10-CM

## 2022-04-23 DIAGNOSIS — R44.0 AUDITORY HALLUCINATIONS: ICD-10-CM

## 2022-04-23 DIAGNOSIS — F14.99 COCAINE USE, UNSPECIFIED WITH UNSPECIFIED COCAINE-INDUCED DISORDER: ICD-10-CM

## 2022-04-23 LAB
AMPHET UR-MCNC: NEGATIVE — SIGNIFICANT CHANGE UP
ANION GAP SERPL CALC-SCNC: 9 MMOL/L — SIGNIFICANT CHANGE UP (ref 9–16)
APAP SERPL-MCNC: <2 UG/ML — LOW (ref 10–30)
APPEARANCE UR: CLEAR — SIGNIFICANT CHANGE UP
BARBITURATES UR SCN-MCNC: NEGATIVE — SIGNIFICANT CHANGE UP
BASOPHILS # BLD AUTO: 0.02 K/UL — SIGNIFICANT CHANGE UP (ref 0–0.2)
BASOPHILS NFR BLD AUTO: 0.5 % — SIGNIFICANT CHANGE UP (ref 0–2)
BENZODIAZ UR-MCNC: NEGATIVE — SIGNIFICANT CHANGE UP
BILIRUB UR-MCNC: ABNORMAL
BUN SERPL-MCNC: 15 MG/DL — SIGNIFICANT CHANGE UP (ref 7–23)
CALCIUM SERPL-MCNC: 8.3 MG/DL — LOW (ref 8.5–10.5)
CHLORIDE SERPL-SCNC: 109 MMOL/L — HIGH (ref 96–108)
CO2 SERPL-SCNC: 25 MMOL/L — SIGNIFICANT CHANGE UP (ref 22–31)
COCAINE METAB.OTHER UR-MCNC: POSITIVE
COLOR SPEC: YELLOW — SIGNIFICANT CHANGE UP
CREAT SERPL-MCNC: 1.08 MG/DL — SIGNIFICANT CHANGE UP (ref 0.5–1.3)
DIFF PNL FLD: NEGATIVE — SIGNIFICANT CHANGE UP
EGFR: 92 ML/MIN/1.73M2 — SIGNIFICANT CHANGE UP
EOSINOPHIL # BLD AUTO: 0.06 K/UL — SIGNIFICANT CHANGE UP (ref 0–0.5)
EOSINOPHIL NFR BLD AUTO: 1.6 % — SIGNIFICANT CHANGE UP (ref 0–6)
ETHANOL SERPL-MCNC: <3 MG/DL — SIGNIFICANT CHANGE UP
GLUCOSE SERPL-MCNC: 94 MG/DL — SIGNIFICANT CHANGE UP (ref 70–99)
GLUCOSE UR QL: NEGATIVE — SIGNIFICANT CHANGE UP
HCT VFR BLD CALC: 42.3 % — SIGNIFICANT CHANGE UP (ref 39–50)
HGB BLD-MCNC: 13.8 G/DL — SIGNIFICANT CHANGE UP (ref 13–17)
IMM GRANULOCYTES NFR BLD AUTO: 0.5 % — SIGNIFICANT CHANGE UP (ref 0–1.5)
KETONES UR-MCNC: NEGATIVE — SIGNIFICANT CHANGE UP
LEUKOCYTE ESTERASE UR-ACNC: NEGATIVE — SIGNIFICANT CHANGE UP
LYMPHOCYTES # BLD AUTO: 1.51 K/UL — SIGNIFICANT CHANGE UP (ref 1–3.3)
LYMPHOCYTES # BLD AUTO: 41.5 % — SIGNIFICANT CHANGE UP (ref 13–44)
MCHC RBC-ENTMCNC: 29.6 PG — SIGNIFICANT CHANGE UP (ref 27–34)
MCHC RBC-ENTMCNC: 32.6 GM/DL — SIGNIFICANT CHANGE UP (ref 32–36)
MCV RBC AUTO: 90.8 FL — SIGNIFICANT CHANGE UP (ref 80–100)
METHADONE UR-MCNC: NEGATIVE — SIGNIFICANT CHANGE UP
MONOCYTES # BLD AUTO: 0.5 K/UL — SIGNIFICANT CHANGE UP (ref 0–0.9)
MONOCYTES NFR BLD AUTO: 13.7 % — SIGNIFICANT CHANGE UP (ref 2–14)
NEUTROPHILS # BLD AUTO: 1.53 K/UL — LOW (ref 1.8–7.4)
NEUTROPHILS NFR BLD AUTO: 42.2 % — LOW (ref 43–77)
NITRITE UR-MCNC: NEGATIVE — SIGNIFICANT CHANGE UP
NRBC # BLD: 0 /100 WBCS — SIGNIFICANT CHANGE UP (ref 0–0)
OPIATES UR-MCNC: NEGATIVE — SIGNIFICANT CHANGE UP
PCP SPEC-MCNC: SIGNIFICANT CHANGE UP
PCP UR-MCNC: NEGATIVE — SIGNIFICANT CHANGE UP
PH UR: 5.5 — SIGNIFICANT CHANGE UP (ref 5–8)
PLATELET # BLD AUTO: 263 K/UL — SIGNIFICANT CHANGE UP (ref 150–400)
POTASSIUM SERPL-MCNC: 3.8 MMOL/L — SIGNIFICANT CHANGE UP (ref 3.5–5.3)
POTASSIUM SERPL-SCNC: 3.8 MMOL/L — SIGNIFICANT CHANGE UP (ref 3.5–5.3)
PROT UR-MCNC: NEGATIVE MG/DL — SIGNIFICANT CHANGE UP
RBC # BLD: 4.66 M/UL — SIGNIFICANT CHANGE UP (ref 4.2–5.8)
RBC # FLD: 13.5 % — SIGNIFICANT CHANGE UP (ref 10.3–14.5)
SALICYLATES SERPL-MCNC: 1.4 MG/DL — LOW (ref 2.8–20)
SARS-COV-2 RNA SPEC QL NAA+PROBE: SIGNIFICANT CHANGE UP
SODIUM SERPL-SCNC: 143 MMOL/L — SIGNIFICANT CHANGE UP (ref 132–145)
SP GR SPEC: >=1.03 — SIGNIFICANT CHANGE UP (ref 1–1.03)
THC UR QL: NEGATIVE — SIGNIFICANT CHANGE UP
UROBILINOGEN FLD QL: 0.2 E.U./DL — SIGNIFICANT CHANGE UP
WBC # BLD: 3.64 K/UL — LOW (ref 3.8–10.5)
WBC # FLD AUTO: 3.64 K/UL — LOW (ref 3.8–10.5)

## 2022-04-23 PROCEDURE — 90792 PSYCH DIAG EVAL W/MED SRVCS: CPT | Mod: 95

## 2022-04-23 PROCEDURE — 99285 EMERGENCY DEPT VISIT HI MDM: CPT

## 2022-04-23 PROCEDURE — 93010 ELECTROCARDIOGRAM REPORT: CPT

## 2022-04-23 RX ORDER — QUETIAPINE FUMARATE 200 MG/1
100 TABLET, FILM COATED ORAL ONCE
Refills: 0 | Status: COMPLETED | OUTPATIENT
Start: 2022-04-23 | End: 2022-04-23

## 2022-04-23 RX ADMIN — QUETIAPINE FUMARATE 100 MILLIGRAM(S): 200 TABLET, FILM COATED ORAL at 18:09

## 2022-04-23 NOTE — ED BEHAVIORAL HEALTH ASSESSMENT NOTE - SUMMARY
The patient is a 36y Male, PMH of HIV, PPH of affective and psychotic symptoms, substance use disorders (prominently alcohol, cocaine, heroin use), self report of past SA including hanging per chart review, history of NSSIB in the past, multiple prior admissions (May 2021 [facility], July 2021 [facility], Oct 2021 [facility], Dec [facility], Mar 22 [facility]), history of detox/rehab in the past, presents with subjective complaint of command auditory hallucinations telling him to jump of his window, stating he does not feel safe to be home.    On evaluation, patient does not appear to be internally preoccupied and was not observed to be responding to internal stimuli. His report of CAH for 1 day is inconsistent with presentation, and most likely related to recent crack/cocaine use as patient reports was 2 days ago, as well as chronic non-adherence to care despite multiple inpatient hospitalization and rehab recommendations.    Patient most likely does not need inpatient admission, but given his recent crack/cocaine use, subjective CAH, inability to keep himself safe, patient should hold in the ED and be re-assessed after metabolization of substances and if patient can safety plan after some time of observation.

## 2022-04-23 NOTE — ED ADULT NURSE NOTE - HPI (INCLUDE ILLNESS QUALITY, SEVERITY, DURATION, TIMING, CONTEXT, MODIFYING FACTORS, ASSOCIATED SIGNS AND SYMPTOMS)
Pt presents to ED complaining of auditory hallucinations, with increasing urges of SI. Pt denies homicidal ideation. Endorses frequent admission to [facility]. Hx of bipolar schizophrenia and depression. On seroquel and trazodone and one other medication unable to recall. 1:1 obs started from triage.

## 2022-04-23 NOTE — ED BEHAVIORAL HEALTH ASSESSMENT NOTE - NSACTIVEVENT_PSY_ALL_CORE
Triggering events leading to humiliation, shame, and/or despair (e.g., Loss of relationship, financial or health status) (real or anticipated)/Substance intoxication or withdrawal/Pending incarceration or homelessness/Inadequate social supports

## 2022-04-23 NOTE — ED PROVIDER NOTE - PSYCHIATRIC, MLM
Alert and oriented to person, place, time/situation. normal mood and affect. no apparent risk to self

## 2022-04-23 NOTE — ED BEHAVIORAL HEALTH ASSESSMENT NOTE - HPI (INCLUDE ILLNESS QUALITY, SEVERITY, DURATION, TIMING, CONTEXT, MODIFYING FACTORS, ASSOCIATED SIGNS AND SYMPTOMS)
The patient is a 36y Male, PMH of HIV, PPH of affective and psychotic symptoms, substance use disorders (prominently alcohol, cocaine, heroin use), self report of past SA including hanging per chart review, history of NSSIB in the past, multiple prior admissions (May 2021 [facility], July 2021 [facility], Oct 2021 [facility], Dec [facility], Mar 22 [facility]), history of detox/rehab in the past, presents with subjective complaint of command auditory hallucinations telling him to jump of his window, stating he does not feel safe to be home.    On evaluation, patient alert, does not appear to be internally preoccupied. Does not appear to be psychotic. States that last night suddenly while at home he began having command auditory hallucinations telling him to jump outside the window. patient is minimally forth-coming about history, unless challenged with respect to chart review and previous notes regarding admission and detox/rehab.    Patient initially denies any substance use. When provider states his urine toxicology is positive for cocaine, patient then states he used 2 days ago. When asked if he went to outpatient rehabilitation after discharge initially he says yes, then when asked for more details regarding treatment and how long, patient states he didn't go because he was "kicked out for cursing"    Patient then states that "you [place] just don't send me to [place]."    Patient is seeking supervision as he does not feel safe at this time.

## 2022-04-23 NOTE — ED BEHAVIORAL HEALTH ASSESSMENT NOTE - DETAILS
report CAH began last night, voice telling him to jump out the window states he is having command auditory hallucinations started last night. will discuss with attending self does not change disposition

## 2022-04-23 NOTE — ED PROVIDER NOTE - PROGRESS NOTE DETAILS
Cleared by psych for d/c. will give some additional Sequel and let him sleep the night. Seems substance induced. Will also give 1 week Rx for Seroquel 50ng qhs. No issues overnight.

## 2022-04-23 NOTE — ED PROVIDER NOTE - CONSTITUTIONAL, MLM
normal... Well appearing, awake, alert, oriented to person, place, time/situation and in no apparent distress. in room 23 on 1:1

## 2022-04-23 NOTE — ED PROVIDER NOTE - PATIENT PORTAL LINK FT
You can access the FollowMyHealth Patient Portal offered by [facility] by registering at the following website: [email or web address]. By joining [employer]’s FollowMyHealth portal, you will also be able to view your health information using other applications (apps) compatible with our system.

## 2022-04-23 NOTE — ED BEHAVIORAL HEALTH ASSESSMENT NOTE - DESCRIPTION
unemployed, Patient behaviorally stable, polite, and pleasant in the ER. No PRN or emergent meds needed. HIV

## 2022-04-23 NOTE — ED PROVIDER NOTE - OBJECTIVE STATEMENT
multiple previous hospital records reviewed.    37 yo  man, unemployed, with past psych history of depression with auditory hallucinations , substance use disorders (prominently alcohol, cocaine, heroin use), past SA including hanging, and jumping out of a window, multiple prior psych admissions (May 2021 [facility], July 2021 [facility], Oct 2021 [facility], Dec [facility]), - PMH of HIV on Biktarvy presented to the ER today with complaints of hearing voices telling him to jump out of the window. pt lives alone. denies recent drug use.  pt states that he was previously on seroquel, trazedone "and one other medications" but stopped taking all three medications 3+ weeks ago,

## 2022-04-23 NOTE — ED PROVIDER NOTE - NSFOLLOWUPINSTRUCTIONS_ED_ALL_ED_FT
Please get help for alcohol abuse if you drink heavily or use drugs on a regular basis.     [facility] is a good referral line for crisis and substance abuse help.  AA has drop in programs all over the [facility].    Return to the ER for Emergencies.     [facility]: [phone]   [facility] Substance Abuse Services: [phone], option #2   Methadone Maintenance & Ambulatory Opiate Detox: [phone]  Project Outreach: [phone]  [facility]: [phone]  [facility]: [phone]    [facility]: [phone], option #2   [facility]: [phone]    [facility]: [phone]    [facility] Central Intake: [phone]  [facility] Chemical Dependency/Ancillary Withdrawal: [phone]  [facility] Methadone Maintenance: [phone]    [facility]: [phone]  [facility] Addiction Treatment Services: [phone]    [facility] HopeLine: 8-945-[facility] Office of Alcoholism and Substance Abuse Services (OASAS): https://www.oasas.[place].gov/providerdirectory/  [facility] for Addiction Services and Psychotherapy Interventions Research (CASPIR)  www.[facility].org     Interested in discussing options to reduce your tobacco use?    [facility] for Tobacco Control:  [phone]  [facility] QUITLINE: 1-906-DB-QUITS (204-5728)    Interested in learning more about substance use?      http://rethinkingdrinking.niaaa.nih.gov   https://www.drugabuse.gov/patients-families     Learn more about opioid overdose prevention programs in [place]:  http://www.health.[place].gov/diseases/aids/general/opioid_overdose_prevention/ Please follow up with your regular primary care MD and psychiatrist.      Please get help for alcohol abuse if you drink heavily or use drugs on a regular basis.     [facility] is a good referral line for crisis and substance abuse help.  AA has drop in programs all over the [facility].    Return to the ER for Emergencies.     [facility]: [phone]   [facility] Substance Abuse Services: [phone], option #2   Methadone Maintenance & Ambulatory Opiate Detox: [phone]  [facility] Outreach: [phone]  [facility]: [phone]  [facility]: [phone]    [facility]: [phone], option #2   [facility]: [phone]    [facility]: [phone]    [facility] Central Intake: [phone]  [facility] Chemical Dependency/Ancillary Withdrawal: [phone]  [facility] Methadone Maintenance: [phone]    [facility]: [phone]  [facility] Addiction Treatment Services: [phone]    [facility] HopeLine: 0-102-4-[facility] Office of Alcoholism and Substance Abuse Services (OASAS): https://www.oasas.[place].gov/providerdirectory/  [facility] for Addiction Services and Psychotherapy Interventions Research (CASPIR)  www.[facility].org     Interested in discussing options to reduce your tobacco use?    [facility] for Tobacco Control:  [phone]  [facility] QUITLINE: 5-357-UG-QUITS (922-5829)    Interested in learning more about substance use?      http://rethinkingdrinking.niaaa.nih.gov   https://www.drugabuse.gov/patients-families     Learn more about opioid overdose prevention programs in [place]:  http://www.health.ny.gov/diseases/aids/general/opioid_overdose_prevention/

## 2022-04-23 NOTE — ED PROVIDER NOTE - CLINICAL SUMMARY MEDICAL DECISION MAKING FREE TEXT BOX
@ 545pm - I spoke with Dr Chin of telepsych  post his evaluation of the patient.  Plan to treat patient with Seroquel for rx of emotional symptoms related metabolism of crack cocaine.  psychiatry to re-eval in several hours. pt is no longer having suicidal ideations.  he is quiet calm and cooperative in room 23 on 1:1 @545pm.  I spoke with Dr Chin of telepsych  post his evaluation of the patient.  Plan to treat patient with Seroquel for rx of emotional symptoms related metabolism of crack cocaine.  psychiatry to re-eval in several hours. pt is no longer having suicidal ideations.  he is quiet calm and cooperative in room 23 on 1:1    @745pm. Patient is resting comfortably in bed.  fluent and appropriate speech . cooperative . pt is aware he will be re-evaluated by psychiatry in 1-2 hours.

## 2022-04-24 VITALS
SYSTOLIC BLOOD PRESSURE: 136 MMHG | OXYGEN SATURATION: 97 % | TEMPERATURE: 98 F | HEART RATE: 66 BPM | DIASTOLIC BLOOD PRESSURE: 73 MMHG | RESPIRATION RATE: 18 BRPM

## 2022-04-24 DIAGNOSIS — F14.10 COCAINE ABUSE, UNCOMPLICATED: ICD-10-CM

## 2022-04-24 DIAGNOSIS — F19.94 OTHER PSYCHOACTIVE SUBSTANCE USE, UNSPECIFIED WITH PSYCHOACTIVE SUBSTANCE-INDUCED MOOD DISORDER: ICD-10-CM

## 2022-04-24 RX ORDER — QUETIAPINE FUMARATE 200 MG/1
1 TABLET, FILM COATED ORAL
Qty: 7 | Refills: 0
Start: 2022-04-24 | End: 2022-04-30

## 2022-04-24 RX ORDER — QUETIAPINE FUMARATE 200 MG/1
100 TABLET, FILM COATED ORAL ONCE
Refills: 0 | Status: COMPLETED | OUTPATIENT
Start: 2022-04-24 | End: 2022-04-24

## 2022-04-24 RX ADMIN — QUETIAPINE FUMARATE 100 MILLIGRAM(S): 200 TABLET, FILM COATED ORAL at 00:30

## 2022-04-24 NOTE — PROGRESS NOTE BEHAVIORAL HEALTH - NSBHCONSULTFOLLOWDETAILS_PSY_A_CORE FT
I spent a total of 30 minutes reviewing past medical records, evaluating the patient, discussing treatment options with the patient, communicating with other healthcare professionals, coordinating care, and documenting in the EMR.

## 2022-04-24 NOTE — PROGRESS NOTE BEHAVIORAL HEALTH - OTHER
Full range of affect; bright at times; incongruent with stated depressed mood Full range of affect; irritable at times

## 2022-04-24 NOTE — ED ADULT NURSE REASSESSMENT NOTE - NS ED NURSE REASSESS COMMENT FT1
Pt received from previous rn, sleeping comfortably, in nad. even/unlabored respirations  awaiting to be d/kathleen in morning

## 2022-04-24 NOTE — PROGRESS NOTE BEHAVIORAL HEALTH - SUMMARY
The patient is a 36y Male, PMH of HIV, PPH of affective and psychotic symptoms, substance use disorders (prominently alcohol, cocaine, heroin use), self report of past SA including hanging per chart review, history of NSSIB in the past, multiple prior admissions (May 2021 [facility], July 2021 [facility], Oct 2021 [facility], Dec [facility], Mar 22 [facility]), history of detox/rehab in the past, presents with subjective complaint of command auditory hallucinations telling him to jump of his window, stating he does not feel safe to be home.    On evaluation, patient does not appear to be internally preoccupied and was not observed to be responding to internal stimuli. His report of CAH for 1 day is inconsistent with presentation, and most likely related to recent crack/cocaine use as patient reports was 2 days ago, as well as chronic non-adherence to care despite multiple inpatient hospitalization and rehab recommendations.    Patient most likely does not need inpatient admission, but given his recent crack/cocaine use, subjective CAH, inability to keep himself safe, patient should hold in the ED and be re-assessed after metabolization of substances and if patient can safety plan after some time of observation.    MDD; cocaine use disorder with substance induced mood disorder; r/o secondary gain

## 2022-05-14 ENCOUNTER — INPATIENT (INPATIENT)
Facility: HOSPITAL | Age: 37
LOS: 12 days | Discharge: HOME | DRG: 897 | End: 2022-05-27
Attending: PSYCHIATRY & NEUROLOGY | Admitting: PSYCHIATRY & NEUROLOGY
Payer: MEDICAID

## 2022-05-14 VITALS
RESPIRATION RATE: 18 BRPM | TEMPERATURE: 99 F | HEIGHT: 66 IN | DIASTOLIC BLOOD PRESSURE: 77 MMHG | WEIGHT: 145.06 LBS | SYSTOLIC BLOOD PRESSURE: 116 MMHG | HEART RATE: 91 BPM | OXYGEN SATURATION: 96 %

## 2022-05-14 DIAGNOSIS — Z21 ASYMPTOMATIC HUMAN IMMUNODEFICIENCY VIRUS [HIV] INFECTION STATUS: ICD-10-CM

## 2022-05-14 DIAGNOSIS — F19.94 OTHER PSYCHOACTIVE SUBSTANCE USE, UNSPECIFIED WITH PSYCHOACTIVE SUBSTANCE-INDUCED MOOD DISORDER: ICD-10-CM

## 2022-05-14 DIAGNOSIS — F11.20 OPIOID DEPENDENCE, UNCOMPLICATED: ICD-10-CM

## 2022-05-14 LAB
AMPHET UR-MCNC: POSITIVE
ANION GAP SERPL CALC-SCNC: 12 MMOL/L — SIGNIFICANT CHANGE UP (ref 5–17)
APAP SERPL-MCNC: <5 UG/ML — LOW (ref 10–30)
APPEARANCE UR: CLEAR — SIGNIFICANT CHANGE UP
BACTERIA # UR AUTO: PRESENT /HPF
BARBITURATES UR SCN-MCNC: NEGATIVE — SIGNIFICANT CHANGE UP
BASOPHILS # BLD AUTO: 0.02 K/UL — SIGNIFICANT CHANGE UP (ref 0–0.2)
BASOPHILS NFR BLD AUTO: 0.4 % — SIGNIFICANT CHANGE UP (ref 0–2)
BENZODIAZ UR-MCNC: NEGATIVE — SIGNIFICANT CHANGE UP
BILIRUB UR-MCNC: NEGATIVE — SIGNIFICANT CHANGE UP
BUN SERPL-MCNC: 13 MG/DL — SIGNIFICANT CHANGE UP (ref 7–23)
CALCIUM SERPL-MCNC: 8.6 MG/DL — SIGNIFICANT CHANGE UP (ref 8.4–10.5)
CHLORIDE SERPL-SCNC: 105 MMOL/L — SIGNIFICANT CHANGE UP (ref 96–108)
CO2 SERPL-SCNC: 24 MMOL/L — SIGNIFICANT CHANGE UP (ref 22–31)
COCAINE METAB.OTHER UR-MCNC: POSITIVE
COLOR SPEC: YELLOW — SIGNIFICANT CHANGE UP
CREAT SERPL-MCNC: 0.81 MG/DL — SIGNIFICANT CHANGE UP (ref 0.5–1.3)
DIFF PNL FLD: NEGATIVE — SIGNIFICANT CHANGE UP
EGFR: 118 ML/MIN/1.73M2 — SIGNIFICANT CHANGE UP
EOSINOPHIL # BLD AUTO: 0.08 K/UL — SIGNIFICANT CHANGE UP (ref 0–0.5)
EOSINOPHIL NFR BLD AUTO: 1.4 % — SIGNIFICANT CHANGE UP (ref 0–6)
EPI CELLS # UR: SIGNIFICANT CHANGE UP /HPF (ref 0–5)
ETHANOL SERPL-MCNC: <10 MG/DL — SIGNIFICANT CHANGE UP (ref 0–10)
GLUCOSE SERPL-MCNC: 117 MG/DL — HIGH (ref 70–99)
GLUCOSE UR QL: NEGATIVE — SIGNIFICANT CHANGE UP
HCT VFR BLD CALC: 44.5 % — SIGNIFICANT CHANGE UP (ref 39–50)
HGB BLD-MCNC: 14.4 G/DL — SIGNIFICANT CHANGE UP (ref 13–17)
IMM GRANULOCYTES NFR BLD AUTO: 0.4 % — SIGNIFICANT CHANGE UP (ref 0–1.5)
KETONES UR-MCNC: ABNORMAL MG/DL
LEUKOCYTE ESTERASE UR-ACNC: NEGATIVE — SIGNIFICANT CHANGE UP
LYMPHOCYTES # BLD AUTO: 1.51 K/UL — SIGNIFICANT CHANGE UP (ref 1–3.3)
LYMPHOCYTES # BLD AUTO: 27.2 % — SIGNIFICANT CHANGE UP (ref 13–44)
MCHC RBC-ENTMCNC: 28.9 PG — SIGNIFICANT CHANGE UP (ref 27–34)
MCHC RBC-ENTMCNC: 32.4 GM/DL — SIGNIFICANT CHANGE UP (ref 32–36)
MCV RBC AUTO: 89.2 FL — SIGNIFICANT CHANGE UP (ref 80–100)
METHADONE UR-MCNC: NEGATIVE — SIGNIFICANT CHANGE UP
MONOCYTES # BLD AUTO: 0.45 K/UL — SIGNIFICANT CHANGE UP (ref 0–0.9)
MONOCYTES NFR BLD AUTO: 8.1 % — SIGNIFICANT CHANGE UP (ref 2–14)
NEUTROPHILS # BLD AUTO: 3.48 K/UL — SIGNIFICANT CHANGE UP (ref 1.8–7.4)
NEUTROPHILS NFR BLD AUTO: 62.5 % — SIGNIFICANT CHANGE UP (ref 43–77)
NITRITE UR-MCNC: NEGATIVE — SIGNIFICANT CHANGE UP
NRBC # BLD: 0 /100 WBCS — SIGNIFICANT CHANGE UP (ref 0–0)
OPIATES UR-MCNC: NEGATIVE — SIGNIFICANT CHANGE UP
PCP SPEC-MCNC: SIGNIFICANT CHANGE UP
PCP UR-MCNC: NEGATIVE — SIGNIFICANT CHANGE UP
PH UR: 5.5 — SIGNIFICANT CHANGE UP (ref 5–8)
PLATELET # BLD AUTO: 316 K/UL — SIGNIFICANT CHANGE UP (ref 150–400)
POTASSIUM SERPL-MCNC: 4 MMOL/L — SIGNIFICANT CHANGE UP (ref 3.5–5.3)
POTASSIUM SERPL-SCNC: 4 MMOL/L — SIGNIFICANT CHANGE UP (ref 3.5–5.3)
PROT UR-MCNC: ABNORMAL MG/DL
RBC # BLD: 4.99 M/UL — SIGNIFICANT CHANGE UP (ref 4.2–5.8)
RBC # FLD: 13.2 % — SIGNIFICANT CHANGE UP (ref 10.3–14.5)
SALICYLATES SERPL-MCNC: <0.3 MG/DL — LOW (ref 2.8–20)
SARS-COV-2 RNA SPEC QL NAA+PROBE: SIGNIFICANT CHANGE UP
SODIUM SERPL-SCNC: 141 MMOL/L — SIGNIFICANT CHANGE UP (ref 135–145)
SP GR SPEC: >=1.03 — SIGNIFICANT CHANGE UP (ref 1–1.03)
THC UR QL: NEGATIVE — SIGNIFICANT CHANGE UP
UROBILINOGEN FLD QL: 0.2 E.U./DL — SIGNIFICANT CHANGE UP
WBC # BLD: 5.56 K/UL — SIGNIFICANT CHANGE UP (ref 3.8–10.5)
WBC # FLD AUTO: 5.56 K/UL — SIGNIFICANT CHANGE UP (ref 3.8–10.5)
WBC UR QL: < 5 /HPF — SIGNIFICANT CHANGE UP

## 2022-05-14 PROCEDURE — 99285 EMERGENCY DEPT VISIT HI MDM: CPT

## 2022-05-14 PROCEDURE — 70450 CT HEAD/BRAIN W/O DYE: CPT | Mod: 26,MG

## 2022-05-14 PROCEDURE — G1004: CPT

## 2022-05-14 PROCEDURE — 93010 ELECTROCARDIOGRAM REPORT: CPT

## 2022-05-14 RX ORDER — FLUOXETINE HCL 10 MG
20 CAPSULE ORAL DAILY
Refills: 0 | Status: DISCONTINUED | OUTPATIENT
Start: 2022-05-15 | End: 2022-05-27

## 2022-05-14 RX ORDER — RISPERIDONE 4 MG/1
3 TABLET ORAL AT BEDTIME
Refills: 0 | Status: DISCONTINUED | OUTPATIENT
Start: 2022-05-14 | End: 2022-05-27

## 2022-05-14 RX ORDER — BICTEGRAVIR SODIUM, EMTRICITABINE, AND TENOFOVIR ALAFENAMIDE FUMARATE 30; 120; 15 MG/1; MG/1; MG/1
1 TABLET ORAL DAILY
Refills: 0 | Status: DISCONTINUED | OUTPATIENT
Start: 2022-05-15 | End: 2022-05-22

## 2022-05-14 RX ORDER — DIPHENHYDRAMINE HCL 50 MG
50 CAPSULE ORAL EVERY 6 HOURS
Refills: 0 | Status: DISCONTINUED | OUTPATIENT
Start: 2022-05-14 | End: 2022-05-27

## 2022-05-14 RX ORDER — HALOPERIDOL DECANOATE 100 MG/ML
5 INJECTION INTRAMUSCULAR EVERY 6 HOURS
Refills: 0 | Status: DISCONTINUED | OUTPATIENT
Start: 2022-05-14 | End: 2022-05-27

## 2022-05-14 RX ORDER — CLONAZEPAM 1 MG
1 TABLET ORAL
Refills: 0 | Status: DISCONTINUED | OUTPATIENT
Start: 2022-05-14 | End: 2022-05-21
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
## 2022-05-14 NOTE — ED BEHAVIORAL HEALTH ASSESSMENT NOTE - NSCURPASTPSYDX_PSY_ALL_CORE
Mood disorder/Psychotic disorder/Alcohol/Substance Use disorders
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
## 2022-05-14 NOTE — ED PROVIDER NOTE - CARE PLAN
Principal Discharge DX:	Visual hallucination   1 Principal Discharge DX:	Visual hallucination  Secondary Diagnosis:	Substance induced mood disorder

## 2022-05-14 NOTE — ED BEHAVIORAL HEALTH ASSESSMENT NOTE - CURRENT PASSIVE IDEATION:
----- Message from Rhina Thomas MD sent at 3/28/2019 11:21 AM CDT -----  Notify patient, x-ray showed her lungs are clear. Rib x-ray did not show any rib fracture or dislocation. Yes
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
## 2022-05-14 NOTE — BH PATIENT PROFILE - ARE SIGNIFICANT INDICATORS COMPLETE.
Was given for aid until buspar to kick in. Should see me in the coming week. However, can we call patient and inquire on anxiety symptoms since starting buspar prior to this refill?    Thanks,    Ernesto Burkett, PAC   Yes

## 2022-05-14 NOTE — ED BEHAVIORAL HEALTH ASSESSMENT NOTE - NSBHSUBSTUSESTATEMENT_PSY_A_CORE
Paracentesis done by DK Gambino   6,400 mL serosanguineous fluid and labs sent  Patient tolerated well and VSS  Bandaid applied to puncture site on RLQ  Patient taken to CT post procedure  Report given to NICO Adkins 
.

## 2022-05-14 NOTE — ED BEHAVIORAL HEALTH ASSESSMENT NOTE - VIOLENCE RISK FACTORS:
Antisocial behavior/cognition (past or present)/Substance abuse/Affective dysregulation/Impulsivity/Noncompliance with treatment/Firearm/weapon access/None Known

## 2022-05-14 NOTE — BH PATIENT PROFILE - HOME MEDICATIONS
SEROquel XR 50 mg oral tablet, extended release , 1 tab(s) orally once a day (in the evening)   traZODone 50 mg oral tablet , 1 tab(s) orally once a day (at bedtime), As needed, insomnia. Continue to take until told otherwise by your provider.   escitalopram 20 mg oral tablet , 1 tab(s) orally once a day. Continue to take until told otherwise by your provider.   QUEtiapine 100 mg oral tablet , 1 tab(s) orally once a day (at bedtime). Continue to take until told otherwise by your provider.   bictegravir/emtricitabine/tenofovir 50 mg-200 mg-25 mg oral tablet , 1 tab(s) orally once a day. Continue to take until told otherwise by your provider.   Nicorette 2 mg oral transmucosal gum , 1 gum chewed every 2 hours x 30 days, As Needed

## 2022-05-14 NOTE — BH PATIENT PROFILE - FALL HARM RISK - UNIVERSAL INTERVENTIONS
Bed in lowest position, wheels locked, appropriate side rails in place/Call bell, personal items and telephone in reach/Instruct patient to call for assistance before getting out of bed or chair/Non-slip footwear when patient is out of bed/[place] to call system/Physically safe environment - no spills, clutter or unnecessary equipment/Purposeful Proactive Rounding/Room/bathroom lighting operational, light cord in reach

## 2022-05-14 NOTE — ED PROVIDER NOTE - CLINICAL SUMMARY MEDICAL DECISION MAKING FREE TEXT BOX
37 y/o m hx schizophrenia, HIV+, polysubstance abuse presents c/o having visual hallucinations today.  Sx seemed to start in the setting of crystal meth use, will get CT head as this is first documented visual hallucination visit.  Clearance labs sent, pt calm, cooperative in ED.  Psych consulted and will f/u recommendations after eval.
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
## 2022-05-15 DIAGNOSIS — F11.20 OPIOID DEPENDENCE, UNCOMPLICATED: ICD-10-CM

## 2022-05-15 RX ADMIN — BICTEGRAVIR SODIUM, EMTRICITABINE, AND TENOFOVIR ALAFENAMIDE FUMARATE 1 TABLET(S): 30; 120; 15 TABLET ORAL at 10:38

## 2022-05-15 RX ADMIN — Medication 1 MILLIGRAM(S): at 07:03

## 2022-05-15 RX ADMIN — RISPERIDONE 3 MILLIGRAM(S): 4 TABLET ORAL at 22:19

## 2022-05-15 RX ADMIN — Medication 20 MILLIGRAM(S): at 10:35

## 2022-05-15 RX ADMIN — Medication 1 MILLIGRAM(S): at 17:39

## 2022-05-15 NOTE — BH INPATIENT PSYCHIATRY ASSESSMENT NOTE - NSBHSAOPI_PSY_A_CORE FT
The patient currently denies opioid use but prior documentation indicates a history of Opioid Use Disorder.

## 2022-05-15 NOTE — BH INPATIENT PSYCHIATRY ASSESSMENT NOTE - RISK ASSESSMENT
?DEMETRIUS is chronically elevated?given multiple prior SAs, prior diagnoses of SIPD, SIMD, history of impulsivity and ongoing substance use which is acutely elevated given worsening SI with plan to jump from window in the setting of substance intoxication and depressed mood.  Protective factors that mitigate this risk include a capacity to advocate for self, concern for well-being, patient is domiciled, engaged in professional/educational pursuits, with euthymic affect and no current signs/sx of psychosis or adriel.

## 2022-05-15 NOTE — BH INPATIENT PSYCHIATRY ASSESSMENT NOTE - NSBHCHARTREVIEWINVESTIGATE_PSY_A_CORE FT
ECG (05/10/22):      Ventricular Rate 74 BPM    Atrial Rate 74 BPM    P-R Interval 132 ms    QRS Duration 98 ms    Q-T Interval 384 ms    QTC Calculation(Bazett) 426 ms    P Axis 70 degrees    R Axis 83 degrees    T Axis 88 degrees    Diagnosis Line Normal sinus rhythm    Confirmed by Bernardino Gonzalez ([id number]) on 4/13/202

## 2022-05-15 NOTE — BH INPATIENT PSYCHIATRY ASSESSMENT NOTE - NSBHSATHC_PSY_A_CORE FT
First used at the age of 16.  The patient states that he last used cannabis "a couple of years ago."  No history of K2 use.

## 2022-05-15 NOTE — BH INPATIENT PSYCHIATRY ASSESSMENT NOTE - NSCOMMENTSUICRISKFACT_PSY_ALL_CORE
?DEMETRIUS is chronically elevated?given multiple prior SAs, prior diagnoses of SIPD, SIMD, history of impulsivity and ongoing substance use which is acutely elevated given worsening SI with plan to jump from window in the setting of substance intoxication and depressed mood.

## 2022-05-15 NOTE — BH INPATIENT PSYCHIATRY ASSESSMENT NOTE - NSBHCHARTREVIEWVS_PSY_A_CORE FT
Vital Signs Last 24 Hrs  T(C): 36.4 (05-15-22 @ 10:46), Max: 37.1 (05-14-22 @ 23:00)  T(F): 97.6 (05-15-22 @ 10:46), Max: 98.7 (05-14-22 @ 23:00)  HR: 74 (05-15-22 @ 12:51) (70 - 77)  BP: 121/76 (05-15-22 @ 12:51) (106/75 - 126/74)  BP(mean): --  RR: 18 (05-15-22 @ 12:51) (17 - 18)  SpO2: 98% (05-15-22 @ 12:51) (98% - 99%)

## 2022-05-15 NOTE — BH INPATIENT PSYCHIATRY ASSESSMENT NOTE - NSBHMETABOLIC_PSY_ALL_CORE_FT
BMI: BMI (kg/m2): 23.6 (05-14-22 @ 23:00)  HbA1c: A1C with Estimated Average Glucose Result: 5.3 % (03-07-22 @ 11:06)    Glucose:   BP: 121/76 (05-15-22 @ 12:51) (106/75 - 126/74)  Lipid Panel: Date/Time: 03-07-22 @ 11:06  Cholesterol, Serum: 184  Direct LDL: --  HDL Cholesterol, Serum: 73  Total Cholesterol/HDL Ration Measurement: --  Triglycerides, Serum: 106

## 2022-05-15 NOTE — BH INPATIENT PSYCHIATRY ASSESSMENT NOTE - CURRENT MEDICATION
MEDICATIONS  (STANDING):  bictegravir 50 mG/emtricitabine 200 mG/tenofovir alafenamide 25 mG (BIKTARVY) 1 Tablet(s) Oral daily  clonazePAM  Tablet 1 milliGRAM(s) Oral two times a day  FLUoxetine 20 milliGRAM(s) Oral daily  risperiDONE   Tablet 3 milliGRAM(s) Oral at bedtime    MEDICATIONS  (PRN):  diphenhydrAMINE 50 milliGRAM(s) Oral every 6 hours PRN agitation  haloperidol     Tablet 5 milliGRAM(s) Oral every 6 hours PRN agitation  LORazepam     Tablet 2 milliGRAM(s) Oral every 6 hours PRN Agitation

## 2022-05-15 NOTE — BH INPATIENT PSYCHIATRY ASSESSMENT NOTE - DESCRIPTION
The patient was born and raised in [place], [place] by his mother.  He states that his father was intermittently in his life and he last spoke to him when he was 14 years old.  The patient has one older brother and three older sisters.  The patient states that he came to [facility] in 2012 because he read that [facility] provided excellent treatment for HIV.  The patient is currently domiciled with a roommate in [place], [place].  The patient states that he has lived in this apartment for two years but that his roommate just moved in two weeks ago.  The patient completed high school and completed one semester at the [employer].  The patient is currently unemployed and supports himself through government assistance.  He states that he last worked at [employer] in [place] four years ago.  The patient denies a history of  service.  He identifies as a cis-gender male and he is heterosexual.  He states that he was raised Roman Catholic but that he does not practice any Islam.

## 2022-05-15 NOTE — BH INPATIENT PSYCHIATRY ASSESSMENT NOTE - NSBHSACOC_PSY_A_CORE FT
The patient first used cocaine when he was 16 years old.  He reports usage of 1-2 times weekly for the past 2 years.

## 2022-05-15 NOTE — BH INPATIENT PSYCHIATRY ASSESSMENT NOTE - NSBHSAALC_PSY_A_CORE FT
First use at the age of 14.  The patient reports consuming 5 pints of vodka daily over the past 15 years.  The patient reports a history of blackouts (most recently one year ago).  The patient denies any history of withdrawal symptoms or DTs.
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
## 2022-05-15 NOTE — BH INPATIENT PSYCHIATRY ASSESSMENT NOTE - OTHER PAST PSYCHIATRIC HISTORY (INCLUDE DETAILS REGARDING ONSET, COURSE OF ILLNESS, INPATIENT/OUTPATIENT TREATMENT)
PSYCKES:  MEDICAID ID:  [id number]  [place] FOR SI:  Si x44 (first on 08/27/2017) most recently on 03/06/22 at [facility].  Treatment for Self-inflicted Harm/Injury (first on 07/31/18) and most recently on 08/19/19 at [facility]  QUALITY FLAGS: [facility] Performance Tracking Measure - as of 10/01/2021: Antidepressant Medication Discontinued - Recovery Phase  Low Antipsychotic Medication Adherence - Schizophrenia  No Follow Up after  Inpatient - 30 Days  No Follow Up after  Inpatient - 7 Days; Adherence - Antipsychotic (Schiz)  Discontinuation - Antidepressant <12 weeks (MDE)  No Metabolic Monitoring (LDL-C) on Antipsychotic; No Metabolic Monitoring (Gluc/HbA1c and LDL-C) on Antipsychotic (All)  No Outpatient Medical Visit > 1Yr; Eligible for Health Home Plus - No Health Home Plus Service Past 12 Months  Eligible for Health Home Plus - No Health Home Plus Service Past 3 Months  Eligible for Health Home Plus - Not Health Home Enrolled  HARP Enrolled - Not Health Home Enrolled  HARP-Enrolled - No Assessment for HCBS; High Mental Health Need 1+ ER or Inpatient past 12 months with suicide attempt, suicide ideation, or self-harm diagnosis1+ Inpt MH in past 12 monthsACT enrolled or discharged in past 5 years, High Utilization Inpt/ER, Substance Use Disorders - as of 10/01/2021: No Engagement in EPHRAIM Treatment  No Initiation of EPHRAIM Treatment; Treatment Engagement Adherence - Antipsychotic (Schiz)  Adherence - Mood Stabilizer (Bipolar)  Discontinuation - Antidepressant <12 weeks (MDE)  BEHAVIORAL HEALTH DIAGNOSES: Alcohol related disorders Major Depressive Disorder Cocaine related disorders Schizoaffective Disorder Bipolar I Other psychoactive substance related disorders Opioid related disorders Substance-Induced Depressive Disorder Schizophrenia Unspecified/Other Bipolar Adjustment Disorder Unspecified/Other Anxiety Disorder Unspecified/Other Depressive Disorder Cannabis related disorders Unspecified/Other Personality Disorder Unspecified/Other Psychotic Disorders Substance-Induced Psychotic Disorder Disruptive Mood Dysregulation Disorder (ICD10 only) Other stimulant related disorders PTSD Panic Disorder Sedative, hypnotic, or anxiolytic related disorders Tobacco related disorder  BEHAVIORAL HEALTH MEDICATIONS: Escitalopram Oxalate 10 mg PO daily (10/20/21), Gabapentin 100 mg PO TID (12/22/21), Nicotine Polacrilex 2 mg 11.79/day (12/22/21), Trazodone HCl 50 mg PO daily (12/22/21), Risperidone 3 mg PO daily (09/02/21), Quetiapine Fumarate 300 mg PO daily (03/25/21-05/21/21), Lithium Carbonate 150 mg PO TID (12/01/20), Naltrexone HCl 50 mg PO daily (12/01/20), Paliperidone ER 3 mg PO TID (12/01/20), Divalproex Sodium 500 mg PO BID (06/16/20), Divalproex Sodium 500 mg PO daily (06/16/20), Duloxetine HCl 60 mg PO daily (04/27/20-06/16/20), Aripiprazole (Abilify) 15 mg PO daily (08/28/19), Clonazepam 0.25 mg PO daily (08/28/19), Mirtazapine 15 mg PO daily (08/28/19), Venlafaxine HCL  mg PO daily (08/28/19), Fluoxetine HCl 40 mg PO BID (06/12/19), Olanzapine (Zyprexa) 20 mg PO daily (06/12/19), Clonidine HCl 0.1 mg PO BID (04/11/19-04/22/19), Haloperidol 1 mg PO BID (01/23/19-03/19/19), Sertraline HCl 100 mg PO daily (08/15/18)  NON-PSYCHIATRIC MEDICATIONS: Bictegravir-Emtricitab-Tenofov (Biktarvy) -25 mg/day (10/20/21, 12/22/21-02/14/22)   OUTPATIENT: [facility] (04/17/20-10/12/21) for MDD single episode unspecified, [facility] 03/28/19-07/03/21) for MDD single episode unspecified, [facility] (04/01/21) for Schizoaffective Disorder unspecified, [facility] (04/17/20-01/10/21) for SI, [facility] (02/08/20-02/21/20) for Schizoaffective Disorder Bipolar Type, [facility] (05/27/18-10/21/19) for Anxiety Disorder Unspecified, [facility] (05/22/19-07/11/19) for Alcohol Dependence Uncomplicated, [facility] (01/31/19-06/30/19) for Schizoaffective Disorder unspecified, [place] (05/27/19-05/28/19) for Schizoaffective Disorder unspecified, [facility] (04/01/19-04/11/19) Bipolar Disorder current episode depressed severe with psychotic features, [facility] (07/31/18) MDD recurrent severe with psychotic symptoms, [facility] (03/14/18-05/27/18) for MDD single episode unspecified, [facility] (12/20/17-03/08/18) for Bipolar Disorder current episode depressed moderate,   INPATIENT: ROA x11 most recently at [facility] (03/06/22-03/17/22) for MDD recurrent moderate, [facility] (12/16/21-12/22/21) for Other Psychoactive Substance Dependence with Psychoactive SIMD, [facility] (10/12/21-10/20/21) for Other Psychoactive Substance Dependence with Psychoactive SIMD, [facility] (07/14/21-08/23/21) for Alcohol Dependence Uncomplicated, [facility] (07/04/21-07/14/21) for MDD recurrent severe without psychotic features, MH x27 most recently at [facility] (01/07/22-01/19/22) MDD recurrent in full remission, [facility] (11/03/21-11/08/21) for Bipolar Disorder Unspecified, [facility] (08/27/21-09/03/21) for Schizoaffective Disorder Unspecified, [facility] (04/01/21-04/21/21) for Schizoaffective Disorder Depressive Type, [facility] (11/22/20-12/01/20) for Schizoaffective Disorder Bipolar Type  ED: MH x22 most recently at [facility] (05/08/21-05/21/21) for Other Recurrent Depressive Disorders, [facility] (04/01/21) for SI, [facility] (03/05/21, 03/08/21) for MDD single episode unspecified    ISTOP: Reference #: [id number]  No Records Found.
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
## 2022-05-15 NOTE — BH INPATIENT PSYCHIATRY ASSESSMENT NOTE - NSBHCHARTREVIEWLAB_PSY_A_CORE FT
CBC (05/14/22): wnl  CMP (05/14/22): Glucose Serum 117 (elevated)  Urinalysis (05/14/22): Trace Ketone, Trace Protein, Bacteria Present   Utox (05/14/22): Positive for cocaine and amphetamines  Salicylate Level Serum (05/14/22): < 0.3  Acetaminophen Level Serum (05/14/22): < 5  BAL (05/14/22): < 10
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
## 2022-05-16 DIAGNOSIS — F60.9 PERSONALITY DISORDER, UNSPECIFIED: ICD-10-CM

## 2022-05-16 PROCEDURE — 99233 SBSQ HOSP IP/OBS HIGH 50: CPT

## 2022-05-16 RX ADMIN — RISPERIDONE 3 MILLIGRAM(S): 4 TABLET ORAL at 22:05

## 2022-05-16 RX ADMIN — BICTEGRAVIR SODIUM, EMTRICITABINE, AND TENOFOVIR ALAFENAMIDE FUMARATE 1 TABLET(S): 30; 120; 15 TABLET ORAL at 11:21

## 2022-05-16 RX ADMIN — Medication 20 MILLIGRAM(S): at 11:21

## 2022-05-16 RX ADMIN — Medication 1 MILLIGRAM(S): at 19:06

## 2022-05-16 RX ADMIN — Medication 50 MILLIGRAM(S): at 22:05

## 2022-05-16 RX ADMIN — Medication 1 MILLIGRAM(S): at 06:04

## 2022-05-16 NOTE — BH INPATIENT PSYCHIATRY PROGRESS NOTE - NSBHCHARTREVIEWVS_PSY_A_CORE FT
Vital Signs Last 24 Hrs  T(C): 36.6 (05-16-22 @ 06:00), Max: 37.3 (05-15-22 @ 21:19)  T(F): 97.9 (05-16-22 @ 06:00), Max: 99.1 (05-15-22 @ 21:19)  HR: 64 (05-16-22 @ 06:00) (64 - 77)  BP: 106/70 (05-16-22 @ 06:00) (106/70 - 125/80)  BP(mean): --  RR: 16 (05-16-22 @ 06:00) (16 - 18)  SpO2: 98% (05-16-22 @ 06:00) (98% - 99%)
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
## 2022-05-16 NOTE — BH SOCIAL WORK INITIAL PSYCHOSOCIAL EVALUATION - NSBHSAOPI_PSY_A_CORE FT
First and last use, quantity, frequency and duration unknown at this time, though patient reports history of severe opioid dependence
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
## 2022-05-16 NOTE — BH SOCIAL WORK INITIAL PSYCHOSOCIAL EVALUATION - NSHIGHRISKBEHFT_PSY_ALL_CORE
Polysubstance use, multiple past suicide attempts, history of aggression and physical aggression when agitated

## 2022-05-16 NOTE — CHART NOTE - NSCHARTNOTEFT_GEN_A_CORE
[facility]  PHYSICAL EXAM: Agree/Declined    VITALS: T(C): 36.6 (05-16-22 @ 06:00), Max: 37.3 (05-15-22 @ 21:19)  HR: 64 (05-16-22 @ 06:00) (64 - 77)  BP: 106/70 (05-16-22 @ 06:00) (106/70 - 125/80)  RR: 16 (05-16-22 @ 06:00) (16 - 18)  SpO2: 98% (05-16-22 @ 06:00) (98% - 99%)      GENERAL: NAD, comfortable, ambulating  HEAD:  Atraumatic, Normocephalic  EYES: EOMI, PERRLA, conjunctiva and sclera clear  ENT: Moist mucous membranes  NECK: Supple, No JVD  CHEST/LUNG: Clear to auscultation bilaterally; No rales, rhonchi, wheezing, or rubs. Unlabored respirations  HEART: Regular rate and rhythm; No murmurs, rubs, or gallops  ABDOMEN: BSx4; Soft, nontender, nondistended  EXTREMITIES:  No clubbing, cyanosis, or edema  NERVOUS SYSTEM:  A&Ox3, no focal deficits   SKIN: No rashes or lesions

## 2022-05-16 NOTE — BH SOCIAL WORK INITIAL PSYCHOSOCIAL EVALUATION - NSBHEMPLOYEDYN_PSY_ALL_CORE
Patient was previously employed as  for construction company, but no longer interested in continuing employment/No

## 2022-05-16 NOTE — BH SOCIAL WORK INITIAL PSYCHOSOCIAL EVALUATION - OTHER PAST PSYCHIATRIC HISTORY (INCLUDE DETAILS REGARDING ONSET, COURSE OF ILLNESS, INPATIENT/OUTPATIENT TREATMENT)
F32.9 Major Depressive Disorder  F19.94 Substance-Induced Mood Disorder  F11.20 Opioid Use Disorder, Severe  Multiple Prior Psychiatric Admissions (most recently at [facility] from 01/07/22 - 01/19/22 for MDD)  Multiple Prior Substance Use Detox/Rehab Admissions (most recently at [facility] 03/06/22 - 03/17/22)  Multiple Prior Suicide Attempts (x5 most recently in 2021 when he jumped in front of a car; others include hanging and jumping out of third story window)

## 2022-05-16 NOTE — BH INPATIENT PSYCHIATRY PROGRESS NOTE - NSBHMETABOLIC_PSY_ALL_CORE_FT
BMI: BMI (kg/m2): 23.6 (05-14-22 @ 23:00)  HbA1c: A1C with Estimated Average Glucose Result: 5.3 % (03-07-22 @ 11:06)    Glucose:   BP: 106/70 (05-16-22 @ 06:00) (106/70 - 126/74)  Lipid Panel: Date/Time: 03-07-22 @ 11:06  Cholesterol, Serum: 184  Direct LDL: --  HDL Cholesterol, Serum: 73  Total Cholesterol/HDL Ration Measurement: --  Triglycerides, Serum: 106
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
## 2022-05-17 DIAGNOSIS — F32.9 MAJOR DEPRESSIVE DISORDER, SINGLE EPISODE, UNSPECIFIED: ICD-10-CM

## 2022-05-17 LAB
A1C WITH ESTIMATED AVERAGE GLUCOSE RESULT: 5.4 % — SIGNIFICANT CHANGE UP (ref 4–5.6)
CHOLEST SERPL-MCNC: 201 MG/DL — HIGH
ESTIMATED AVERAGE GLUCOSE: 108 MG/DL — SIGNIFICANT CHANGE UP (ref 68–114)
HDLC SERPL-MCNC: 63 MG/DL — SIGNIFICANT CHANGE UP
LIPID PNL WITH DIRECT LDL SERPL: 102 MG/DL — HIGH
NON HDL CHOLESTEROL: 138 MG/DL — HIGH
TRIGL SERPL-MCNC: 181 MG/DL — HIGH

## 2022-05-17 PROCEDURE — 99233 SBSQ HOSP IP/OBS HIGH 50: CPT

## 2022-05-17 RX ADMIN — Medication 1 MILLIGRAM(S): at 22:02

## 2022-05-17 RX ADMIN — RISPERIDONE 3 MILLIGRAM(S): 4 TABLET ORAL at 22:01

## 2022-05-17 RX ADMIN — Medication 1 MILLIGRAM(S): at 06:31

## 2022-05-17 RX ADMIN — Medication 20 MILLIGRAM(S): at 10:06

## 2022-05-17 RX ADMIN — BICTEGRAVIR SODIUM, EMTRICITABINE, AND TENOFOVIR ALAFENAMIDE FUMARATE 1 TABLET(S): 30; 120; 15 TABLET ORAL at 10:06

## 2022-05-17 RX ADMIN — Medication 50 MILLIGRAM(S): at 22:02

## 2022-05-17 NOTE — BH INPATIENT PSYCHIATRY PROGRESS NOTE - NSBHCHARTREVIEWVS_PSY_A_CORE FT
Vital Signs Last 24 Hrs  T(C): 37 (05-17-22 @ 16:47), Max: 37.1 (05-16-22 @ 19:38)  T(F): 98.6 (05-17-22 @ 16:47), Max: 98.8 (05-16-22 @ 19:38)  HR: 88 (05-17-22 @ 16:47) (61 - 91)  BP: 120/79 (05-17-22 @ 16:47) (110/73 - 134/81)  BP(mean): --  RR: 18 (05-17-22 @ 16:47) (18 - 18)  SpO2: 98% (05-17-22 @ 16:47) (96% - 98%)

## 2022-05-17 NOTE — BH INPATIENT PSYCHIATRY PROGRESS NOTE - NSBHMETABOLIC_PSY_ALL_CORE_FT
BMI: BMI (kg/m2): 23.6 (05-14-22 @ 23:00)  HbA1c: A1C with Estimated Average Glucose Result: 5.4 % (05-17-22 @ 11:11)    Glucose:   BP: 120/79 (05-17-22 @ 16:47) (106/70 - 134/81)  Lipid Panel: Date/Time: 05-17-22 @ 11:11  Cholesterol, Serum: 201  Direct LDL: --  HDL Cholesterol, Serum: 63  Total Cholesterol/HDL Ration Measurement: --  Triglycerides, Serum: 181

## 2022-05-17 NOTE — BH INPATIENT PSYCHIATRY PROGRESS NOTE - NSBHFUPINTERVALHXFT_PSY_A_CORE
Patient seen in his room today is sleeping initially but is cooperative on approach. Appears very depressed and he endorses feeling sad and tired today. No reported si/hi/avh or paranoid ideation, remains compliant with medication regimen. No acute medical concerns. Continues to adjust fairly well to unit. Fair eye contact, calm.
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
## 2022-05-18 PROCEDURE — 99232 SBSQ HOSP IP/OBS MODERATE 35: CPT

## 2022-05-18 RX ADMIN — RISPERIDONE 3 MILLIGRAM(S): 4 TABLET ORAL at 22:08

## 2022-05-18 RX ADMIN — Medication 20 MILLIGRAM(S): at 11:44

## 2022-05-18 RX ADMIN — Medication 1 MILLIGRAM(S): at 17:31

## 2022-05-18 RX ADMIN — BICTEGRAVIR SODIUM, EMTRICITABINE, AND TENOFOVIR ALAFENAMIDE FUMARATE 1 TABLET(S): 30; 120; 15 TABLET ORAL at 11:44

## 2022-05-18 RX ADMIN — Medication 50 MILLIGRAM(S): at 22:08

## 2022-05-18 RX ADMIN — Medication 1 MILLIGRAM(S): at 06:47
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
## 2022-05-18 NOTE — BH INPATIENT PSYCHIATRY PROGRESS NOTE - NSBHCHARTREVIEWVS_PSY_A_CORE FT
ANTIBODIES, M2, IGG In process     6/17/2020 1137 F-actin (smooth muscle) antibody w/ reflex to titer In process     6/17/2020 1137 MILDRED In process             Signed:  Minor Em  6/19/2020  3:54 PM Vital Signs Last 24 Hrs  T(C): 37.1 (05-18-22 @ 16:56), Max: 37.1 (05-18-22 @ 16:56)  T(F): 98.8 (05-18-22 @ 16:56), Max: 98.8 (05-18-22 @ 16:56)  HR: 67 (05-18-22 @ 16:56) (64 - 93)  BP: 132/72 (05-18-22 @ 16:56) (101/65 - 132/72)  BP(mean): --  RR: 18 (05-18-22 @ 16:56) (18 - 18)  SpO2: 98% (05-18-22 @ 16:56) (95% - 98%)

## 2022-05-18 NOTE — BH INPATIENT PSYCHIATRY PROGRESS NOTE - NSBHFUPINTERVALHXFT_PSY_A_CORE
Patient seen in his room today resting on approach, denies having any complaints, states that he is feeling a lot of emotions and working through them. 'Usually by 3 day I feel like myself'. No si/hi/avh or paranoid ideation, future oriented. States that he has maintained contact with his daughter. No acute medical concerns, no pain or discomfort reported.

## 2022-05-18 NOTE — BH INPATIENT PSYCHIATRY PROGRESS NOTE - NSBHMETABOLIC_PSY_ALL_CORE_FT
BMI: BMI (kg/m2): 23.6 (05-14-22 @ 23:00)  HbA1c: A1C with Estimated Average Glucose Result: 5.4 % (05-17-22 @ 11:11)    Glucose:   BP: 132/72 (05-18-22 @ 16:56) (101/65 - 134/81)  Lipid Panel: Date/Time: 05-17-22 @ 11:11  Cholesterol, Serum: 201  Direct LDL: --  HDL Cholesterol, Serum: 63  Total Cholesterol/HDL Ration Measurement: --  Triglycerides, Serum: 181

## 2022-05-19 PROCEDURE — 99232 SBSQ HOSP IP/OBS MODERATE 35: CPT

## 2022-05-19 RX ADMIN — Medication 1 MILLIGRAM(S): at 17:32

## 2022-05-19 RX ADMIN — Medication 20 MILLIGRAM(S): at 11:44

## 2022-05-19 RX ADMIN — Medication 50 MILLIGRAM(S): at 21:37

## 2022-05-19 RX ADMIN — BICTEGRAVIR SODIUM, EMTRICITABINE, AND TENOFOVIR ALAFENAMIDE FUMARATE 1 TABLET(S): 30; 120; 15 TABLET ORAL at 11:44

## 2022-05-19 RX ADMIN — RISPERIDONE 3 MILLIGRAM(S): 4 TABLET ORAL at 21:37

## 2022-05-19 RX ADMIN — Medication 1 MILLIGRAM(S): at 06:28

## 2022-05-19 NOTE — BH INPATIENT PSYCHIATRY PROGRESS NOTE - NSBHMETABOLIC_PSY_ALL_CORE_FT
BMI: BMI (kg/m2): 23.6 (05-14-22 @ 23:00)  HbA1c: A1C with Estimated Average Glucose Result: 5.4 % (05-17-22 @ 11:11)    Glucose:   BP: 116/73 (05-19-22 @ 09:00) (101/65 - 134/81)  Lipid Panel: Date/Time: 05-17-22 @ 11:11  Cholesterol, Serum: 201  Direct LDL: --  HDL Cholesterol, Serum: 63  Total Cholesterol/HDL Ration Measurement: --  Triglycerides, Serum: 181
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
## 2022-05-19 NOTE — BH INPATIENT PSYCHIATRY PROGRESS NOTE - NSBHFUPINTERVALHXFT_PSY_A_CORE
Patient visible on the unit, seen today in his room, continues to endorse low mood, states that he doesn't feel that he is at his baseline yet, but he has been trying to leave his room more frequently and be around other patients, though he is not ready to interact with them. He is concerned about his drug use and is focused on going to rehab. He has been compliant with medication regimen, denying any side effects. No acute medical concerns. No si/hi/avh or paranoid ideation.

## 2022-05-19 NOTE — BH INPATIENT PSYCHIATRY PROGRESS NOTE - NSBHCHARTREVIEWVS_PSY_A_CORE FT
Vital Signs Last 24 Hrs  T(C): 36.4 (05-19-22 @ 09:00), Max: 37.2 (05-18-22 @ 20:05)  T(F): 97.5 (05-19-22 @ 09:00), Max: 98.9 (05-18-22 @ 20:05)  HR: 94 (05-19-22 @ 09:00) (61 - 94)  BP: 116/73 (05-19-22 @ 09:00) (110/72 - 132/72)  BP(mean): --  RR: 18 (05-19-22 @ 09:00) (16 - 18)  SpO2: 97% (05-19-22 @ 09:00) (95% - 98%)

## 2022-05-20 RX ORDER — PANTOPRAZOLE SODIUM 20 MG/1
40 TABLET, DELAYED RELEASE ORAL
Refills: 0 | Status: DISCONTINUED | OUTPATIENT
Start: 2022-05-20 | End: 2022-05-20

## 2022-05-20 RX ORDER — ACETAMINOPHEN 500 MG
650 TABLET ORAL EVERY 6 HOURS
Refills: 0 | Status: DISCONTINUED | OUTPATIENT
Start: 2022-05-20 | End: 2022-05-27

## 2022-05-20 RX ADMIN — BICTEGRAVIR SODIUM, EMTRICITABINE, AND TENOFOVIR ALAFENAMIDE FUMARATE 1 TABLET(S): 30; 120; 15 TABLET ORAL at 12:30

## 2022-05-20 RX ADMIN — Medication 1 MILLIGRAM(S): at 06:59

## 2022-05-20 RX ADMIN — RISPERIDONE 3 MILLIGRAM(S): 4 TABLET ORAL at 21:12

## 2022-05-20 RX ADMIN — Medication 1 MILLIGRAM(S): at 17:37

## 2022-05-20 RX ADMIN — Medication 20 MILLIGRAM(S): at 12:30

## 2022-05-20 RX ADMIN — Medication 30 MILLILITER(S): at 13:31

## 2022-05-20 RX ADMIN — Medication 50 MILLIGRAM(S): at 21:12

## 2022-05-20 NOTE — BH TREATMENT PLAN - NSDCCRITERIA_PSY_ALL_CORE
Improvement in mood symptoms, decrease in intensity and frequency of SI/HI.  
Improvement in mood symptoms, decrease in intensity and frequency of SI/HI.

## 2022-05-20 NOTE — BH INPATIENT PSYCHIATRY PROGRESS NOTE - NSBHFUPINTERVALHXFT_PSY_A_CORE
Patient visible on the unit, seen today in his room, appears less sad and depressed today and he endorses feeling more like him and getting closer to his baseline. He is reflective and states that he has been thinking about his future and wanting to go to [place] and get out of [place] for good, however family in [place] do no know that he is a substance abuser. No si/hi/avh or paranoid ideation. States that yesterday he had right arm pain after he woke up and has been having it on and off again since then, Encouraged to take po prns for pain relief. He also requested prn for heartburn, got maalox and states that it was resolved.

## 2022-05-20 NOTE — BH INPATIENT PSYCHIATRY PROGRESS NOTE - CURRENT MEDICATION
MEDICATIONS  (STANDING):  bictegravir 50 mG/emtricitabine 200 mG/tenofovir alafenamide 25 mG (BIKTARVY) 1 Tablet(s) Oral daily  clonazePAM  Tablet 1 milliGRAM(s) Oral two times a day  FLUoxetine 20 milliGRAM(s) Oral daily  pantoprazole    Tablet 40 milliGRAM(s) Oral before breakfast  risperiDONE   Tablet 3 milliGRAM(s) Oral at bedtime    MEDICATIONS  (PRN):  acetaminophen     Tablet .. 650 milliGRAM(s) Oral every 6 hours PRN Moderate Pain (4 - 6)  aluminum hydroxide/magnesium hydroxide/simethicone Suspension 30 milliLiter(s) Oral every 6 hours PRN Dyspepsia  diphenhydrAMINE 50 milliGRAM(s) Oral every 6 hours PRN agitation  haloperidol     Tablet 5 milliGRAM(s) Oral every 6 hours PRN agitation  LORazepam     Tablet 2 milliGRAM(s) Oral every 6 hours PRN Agitation

## 2022-05-20 NOTE — BH TREATMENT PLAN - NSTXDEPRESINTERRN_PSY_ALL_CORE
Encourage patient to adhere with medications and treatment. Encourage patient to attend groups and verbalize his feelings and concerns. Help patient identify coping strategies that have worked in the past and support the use of these strategies.
Encourage patient to adhere with medications and treatment. Encourage patient to attend groups and verbalize his feelings and concerns. Help patient identify coping strategies that have worked in the past and support the use of these strategies.

## 2022-05-20 NOTE — BH INPATIENT PSYCHIATRY PROGRESS NOTE - NSBHCHARTREVIEWVS_PSY_A_CORE FT
Vital Signs Last 24 Hrs  T(C): 36.9 (05-20-22 @ 09:00), Max: 37.2 (05-19-22 @ 17:09)  T(F): 98.4 (05-20-22 @ 09:00), Max: 99 (05-19-22 @ 17:09)  HR: 71 (05-20-22 @ 09:00) (71 - 84)  BP: 116/78 (05-20-22 @ 09:00) (116/78 - 133/83)  BP(mean): --  RR: 18 (05-20-22 @ 09:00) (18 - 18)  SpO2: 98% (05-20-22 @ 09:00) (97% - 98%)

## 2022-05-20 NOTE — BH TREATMENT PLAN - NSTXSUICIDINTERRN_PSY_ALL_CORE
Assess and monitor risk for suicide, including but not limited to thoughts and ideation as patient may not disclose voluntarily; consider need for safety measures.
Assess and monitor risk for suicide, including but not limited to thoughts and ideation as patient may not disclose voluntarily; consider need for safety measures.
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
## 2022-05-20 NOTE — BH INPATIENT PSYCHIATRY PROGRESS NOTE - NSBHMETABOLIC_PSY_ALL_CORE_FT
BMI: BMI (kg/m2): 23.6 (05-14-22 @ 23:00)  HbA1c: A1C with Estimated Average Glucose Result: 5.4 % (05-17-22 @ 11:11)    Glucose:   BP: 116/78 (05-20-22 @ 09:00) (101/65 - 133/83)  Lipid Panel: Date/Time: 05-17-22 @ 11:11  Cholesterol, Serum: 201  Direct LDL: --  HDL Cholesterol, Serum: 63  Total Cholesterol/HDL Ration Measurement: --  Triglycerides, Serum: 181

## 2022-05-20 NOTE — BH TREATMENT PLAN - NSTXSUBMISINTERMD_PSY_ALL_CORE
Encourage pt to attend substance abuse groups and after hospitalization rehab
Encourage pt to attend substance abuse groups and after hospitalization rehab

## 2022-05-20 NOTE — BH TREATMENT PLAN - NSPTSTATEDGOAL_PSY_ALL_CORE
Patient would like a respite from community living and enroll in substance use rehab. SW unable to assess level of motivation due to acute depression.

## 2022-05-21 RX ADMIN — BICTEGRAVIR SODIUM, EMTRICITABINE, AND TENOFOVIR ALAFENAMIDE FUMARATE 1 TABLET(S): 30; 120; 15 TABLET ORAL at 12:10

## 2022-05-21 RX ADMIN — Medication 20 MILLIGRAM(S): at 12:10

## 2022-05-21 RX ADMIN — Medication 50 MILLIGRAM(S): at 21:56

## 2022-05-21 RX ADMIN — Medication 1 MILLIGRAM(S): at 06:19

## 2022-05-21 RX ADMIN — Medication 1 MILLIGRAM(S): at 18:22

## 2022-05-21 RX ADMIN — RISPERIDONE 3 MILLIGRAM(S): 4 TABLET ORAL at 21:55

## 2022-05-22 LAB
ANION GAP SERPL CALC-SCNC: 11 MMOL/L — SIGNIFICANT CHANGE UP (ref 5–17)
BUN SERPL-MCNC: 20 MG/DL — SIGNIFICANT CHANGE UP (ref 7–23)
CALCIUM SERPL-MCNC: 9 MG/DL — SIGNIFICANT CHANGE UP (ref 8.4–10.5)
CHLORIDE SERPL-SCNC: 101 MMOL/L — SIGNIFICANT CHANGE UP (ref 96–108)
CO2 SERPL-SCNC: 25 MMOL/L — SIGNIFICANT CHANGE UP (ref 22–31)
CREAT SERPL-MCNC: 1 MG/DL — SIGNIFICANT CHANGE UP (ref 0.5–1.3)
EGFR: 101 ML/MIN/1.73M2 — SIGNIFICANT CHANGE UP
GLUCOSE SERPL-MCNC: 100 MG/DL — HIGH (ref 70–99)
POTASSIUM SERPL-MCNC: 4.1 MMOL/L — SIGNIFICANT CHANGE UP (ref 3.5–5.3)
POTASSIUM SERPL-SCNC: 4.1 MMOL/L — SIGNIFICANT CHANGE UP (ref 3.5–5.3)
SARS-COV-2 RNA SPEC QL NAA+PROBE: SIGNIFICANT CHANGE UP
SODIUM SERPL-SCNC: 137 MMOL/L — SIGNIFICANT CHANGE UP (ref 135–145)
TROPONIN T SERPL-MCNC: 0.01 NG/ML — SIGNIFICANT CHANGE UP (ref 0–0.01)

## 2022-05-22 PROCEDURE — 99232 SBSQ HOSP IP/OBS MODERATE 35: CPT

## 2022-05-22 RX ORDER — BICTEGRAVIR SODIUM, EMTRICITABINE, AND TENOFOVIR ALAFENAMIDE FUMARATE 30; 120; 15 MG/1; MG/1; MG/1
1 TABLET ORAL DAILY
Refills: 0 | Status: DISCONTINUED | OUTPATIENT
Start: 2022-05-22 | End: 2022-05-27

## 2022-05-22 RX ORDER — ACETAMINOPHEN 500 MG
1000 TABLET ORAL ONCE
Refills: 0 | Status: DISCONTINUED | OUTPATIENT
Start: 2022-05-22 | End: 2022-05-27

## 2022-05-22 RX ADMIN — Medication 650 MILLIGRAM(S): at 19:26

## 2022-05-22 RX ADMIN — Medication 50 MILLIGRAM(S): at 21:08

## 2022-05-22 RX ADMIN — Medication 20 MILLIGRAM(S): at 11:05

## 2022-05-22 RX ADMIN — Medication 650 MILLIGRAM(S): at 14:27

## 2022-05-22 RX ADMIN — BICTEGRAVIR SODIUM, EMTRICITABINE, AND TENOFOVIR ALAFENAMIDE FUMARATE 1 TABLET(S): 30; 120; 15 TABLET ORAL at 11:07

## 2022-05-22 RX ADMIN — RISPERIDONE 3 MILLIGRAM(S): 4 TABLET ORAL at 21:08

## 2022-05-22 NOTE — BH INPATIENT PSYCHIATRY PROGRESS NOTE - NSBHMETABOLIC_PSY_ALL_CORE_FT
BMI: BMI (kg/m2): 23.6 (05-14-22 @ 23:00)  HbA1c: A1C with Estimated Average Glucose Result: 5.4 % (05-17-22 @ 11:11)    Glucose:   BP: 115/76 (05-21-22 @ 16:43) (110/73 - 133/83)  Lipid Panel: Date/Time: 05-17-22 @ 11:11  Cholesterol, Serum: 201  Direct LDL: --  HDL Cholesterol, Serum: 63  Total Cholesterol/HDL Ration Measurement: --  Triglycerides, Serum: 181   BMI: BMI (kg/m2): 23.6 (05-14-22 @ 23:00)  HbA1c: A1C with Estimated Average Glucose Result: 5.4 % (05-17-22 @ 11:11)    Glucose:   BP: 121/70 (05-22-22 @ 08:10) (110/73 - 133/83)  Lipid Panel: Date/Time: 05-17-22 @ 11:11  Cholesterol, Serum: 201  Direct LDL: --  HDL Cholesterol, Serum: 63  Total Cholesterol/HDL Ration Measurement: --  Triglycerides, Serum: 181

## 2022-05-22 NOTE — BH CHART NOTE - NSEVENTNOTEFT_PSY_ALL_CORE
Pt complaining of right sternal chest pain with radiation to right neck and shoulder with 7/10 intensity at rest and 10/10 intensity on coughing or sneezing. Denies any SOB, LEON, headache, blurry vision, nausea, lightheadedness, dizziness, he is in no apparent distress. He is also reporting sneezing and is concerned for COVID-19 infection.    PLAN  - STAT EKG performed- NSR without any evidence of CITLALLI or STD.  - Troponin and BMP STAT  - Acetaminophen 1000 mg PO PRN x once  - COVID-19 PCR ordered STAT  - Isolation precautions until COVID study results. Pt complaining of right sternal chest pain with radiation to right neck and shoulder with 7/10 intensity at rest and 10/10 intensity on coughing or sneezing. Denies any SOB, LEON, headache, blurry vision, nausea, lightheadedness, dizziness, he is in no apparent distress. He is also reporting sneezing and is concerned for COVID-19 infection.    Vitals taken on time of assessment: /77, HR 85, T 97.7    PLAN  - STAT EKG performed- NSR without any evidence of CITLALLI or STD.  - Troponin and BMP STAT  - Acetaminophen 1000 mg PO PRN x once  - COVID-19 PCR ordered STAT  - Isolation precautions until COVID study results. Pt complaining of right sternal chest pain with radiation to right neck and shoulder with 7/10 intensity at rest and 10/10 intensity on coughing or sneezing. Denies any SOB, LEON, diaphoresis, headache, blurry vision, nausea, lightheadedness, dizziness, he is in no apparent distress. He is also reporting sneezing and is concerned for COVID-19 infection; agreeable to COVID PCR.    Vitals taken on time of assessment: /77, HR 85, T 97.7    I asked nursing staff to please draw STAT bloodwork to r/o ACS.    PLAN  - STAT EKG performed- NSR without any evidence of CITLALLI or STD.  - Troponin and BMP STAT  - Acetaminophen 1000 mg PO PRN x once  - COVID-19 PCR ordered STAT  - Discussed event with [facility] Dr. Reyes and Unit Director Dr. Massey Pt complaining of right sternal chest pain with radiation to right neck and shoulder with 7/10 intensity at rest and 10/10 intensity on coughing or sneezing. Denies any SOB, LEON, diaphoresis, headache, blurry vision, nausea, lightheadedness, dizziness, he is in no apparent distress. He is also reporting sneezing and is concerned for COVID-19 infection; agreeable to COVID PCR.    Vitals taken on time of assessment: /77, HR 85, T 97.7    I asked nursing staff to please draw STAT bloodwork to r/o ACS.    ddx: r/o ACS, r/o costochondritis     PLAN  - STAT EKG performed- NSR without any evidence of CITLALLI or STD.  - Troponin and BMP STAT  - Acetaminophen 1000 mg PO PRN x once  - COVID-19 PCR ordered STAT  - Discussed event with [facility]ARY Reyes and Unit Director Dr. Massey

## 2022-05-22 NOTE — BH INPATIENT PSYCHIATRY PROGRESS NOTE - NSBHFUPINTERVALHXFT_PSY_A_CORE
anxious; has sternal pain thought to be muscular; Not endorsing  suicidal or homicidal ideation intent or plans; no mention of auditory or visual hallucinations Sleep  appetite ok; Alert; oriented; cognition intact; speech clear; no tremor or evidence of movement impairment.  i&j fair to poor : aware of medications and acknowledges symptoms but not reflective in a meaningful way  on issues that impact on symptoms. Thinking is congruent with affect; no peculiarities of thinking or language use. Fair to good eye contact; speech clear spontaneous and normal volume    Spoke at length about amphetamine use; his aggressiveness which he relates to drugs;

## 2022-05-22 NOTE — BH INPATIENT PSYCHIATRY PROGRESS NOTE - CURRENT MEDICATION
MEDICATIONS  (STANDING):  bictegravir 50 mG/emtricitabine 200 mG/tenofovir alafenamide 25 mG (BIKTARVY) 1 Tablet(s) Oral daily  FLUoxetine 20 milliGRAM(s) Oral daily  risperiDONE   Tablet 3 milliGRAM(s) Oral at bedtime    MEDICATIONS  (PRN):  acetaminophen     Tablet .. 650 milliGRAM(s) Oral every 6 hours PRN Moderate Pain (4 - 6)  aluminum hydroxide/magnesium hydroxide/simethicone Suspension 30 milliLiter(s) Oral every 6 hours PRN Dyspepsia  diphenhydrAMINE 50 milliGRAM(s) Oral every 6 hours PRN agitation  haloperidol     Tablet 5 milliGRAM(s) Oral every 6 hours PRN agitation  LORazepam     Tablet 2 milliGRAM(s) Oral every 6 hours PRN Agitation   MEDICATIONS  (STANDING):  acetaminophen     Tablet .. 1000 milliGRAM(s) Oral once  bictegravir 50 mG/emtricitabine 200 mG/tenofovir alafenamide 25 mG (BIKTARVY) 1 Tablet(s) Oral daily  FLUoxetine 20 milliGRAM(s) Oral daily  risperiDONE   Tablet 3 milliGRAM(s) Oral at bedtime    MEDICATIONS  (PRN):  acetaminophen     Tablet .. 650 milliGRAM(s) Oral every 6 hours PRN Moderate Pain (4 - 6)  aluminum hydroxide/magnesium hydroxide/simethicone Suspension 30 milliLiter(s) Oral every 6 hours PRN Dyspepsia  diphenhydrAMINE 50 milliGRAM(s) Oral every 6 hours PRN agitation  haloperidol     Tablet 5 milliGRAM(s) Oral every 6 hours PRN agitation  LORazepam     Tablet 2 milliGRAM(s) Oral every 6 hours PRN Agitation

## 2022-05-22 NOTE — BH INPATIENT PSYCHIATRY PROGRESS NOTE - NSBHCHARTREVIEWVS_PSY_A_CORE FT
Vital Signs Last 24 Hrs  T(C): 36.6 (05-21-22 @ 16:43), Max: 36.6 (05-21-22 @ 16:43)  T(F): 97.9 (05-21-22 @ 16:43), Max: 97.9 (05-21-22 @ 16:43)  HR: 87 (05-21-22 @ 16:43) (87 - 87)  BP: 115/76 (05-21-22 @ 16:43) (115/76 - 115/76)  BP(mean): --  RR: 18 (05-21-22 @ 16:43) (18 - 18)  SpO2: 96% (05-21-22 @ 16:43) (96% - 96%)     Vital Signs Last 24 Hrs  T(C): 37.1 (05-22-22 @ 08:10), Max: 37.1 (05-22-22 @ 08:10)  T(F): 98.7 (05-22-22 @ 08:10), Max: 98.7 (05-22-22 @ 08:10)  HR: 84 (05-22-22 @ 08:10) (84 - 87)  BP: 121/70 (05-22-22 @ 08:10) (115/76 - 121/70)  BP(mean): --  RR: 18 (05-22-22 @ 08:10) (18 - 18)  SpO2: 98% (05-22-22 @ 08:10) (96% - 98%)
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
## 2022-05-23 PROCEDURE — 99233 SBSQ HOSP IP/OBS HIGH 50: CPT

## 2022-05-23 RX ORDER — CLONAZEPAM 1 MG
1 TABLET ORAL
Refills: 0 | Status: DISCONTINUED | OUTPATIENT
Start: 2022-05-23 | End: 2022-05-25

## 2022-05-23 RX ORDER — BENZTROPINE MESYLATE 1 MG
0.5 TABLET ORAL
Refills: 0 | Status: DISCONTINUED | OUTPATIENT
Start: 2022-05-23 | End: 2022-05-27

## 2022-05-23 RX ADMIN — Medication 50 MILLIGRAM(S): at 21:01

## 2022-05-23 RX ADMIN — Medication 20 MILLIGRAM(S): at 11:16

## 2022-05-23 RX ADMIN — Medication 0.5 MILLIGRAM(S): at 14:02

## 2022-05-23 RX ADMIN — BICTEGRAVIR SODIUM, EMTRICITABINE, AND TENOFOVIR ALAFENAMIDE FUMARATE 1 TABLET(S): 30; 120; 15 TABLET ORAL at 11:16

## 2022-05-23 RX ADMIN — Medication 1 MILLIGRAM(S): at 21:01

## 2022-05-23 RX ADMIN — RISPERIDONE 3 MILLIGRAM(S): 4 TABLET ORAL at 21:01
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
## 2022-05-23 NOTE — BH INPATIENT PSYCHIATRY PROGRESS NOTE - NSBHCHARTREVIEWVS_PSY_A_CORE FT
Vital Signs Last 24 Hrs  T(C): 37 (05-23-22 @ 08:33), Max: 37.1 (05-22-22 @ 16:35)  T(F): 98.6 (05-23-22 @ 08:33), Max: 98.7 (05-22-22 @ 16:35)  HR: 71 (05-23-22 @ 08:33) (71 - 80)  BP: 118/79 (05-23-22 @ 08:33) (118/75 - 118/79)  BP(mean): --  RR: 18 (05-23-22 @ 08:33) (18 - 18)  SpO2: 98% (05-23-22 @ 08:33) (98% - 98%)

## 2022-05-23 NOTE — BH INPATIENT PSYCHIATRY PROGRESS NOTE - NSBHMETABOLIC_PSY_ALL_CORE_FT
BMI: BMI (kg/m2): 23.6 (05-14-22 @ 23:00)  HbA1c: A1C with Estimated Average Glucose Result: 5.4 % (05-17-22 @ 11:11)    Glucose:   BP: 118/79 (05-23-22 @ 08:33) (110/73 - 121/70)  Lipid Panel: Date/Time: 05-17-22 @ 11:11  Cholesterol, Serum: 201  Direct LDL: --  HDL Cholesterol, Serum: 63  Total Cholesterol/HDL Ration Measurement: --  Triglycerides, Serum: 181

## 2022-05-23 NOTE — BH INPATIENT PSYCHIATRY PROGRESS NOTE - CURRENT MEDICATION
MEDICATIONS  (STANDING):  acetaminophen     Tablet .. 1000 milliGRAM(s) Oral once  benztropine 0.5 milliGRAM(s) Oral two times a day  bictegravir 50 mG/emtricitabine 200 mG/tenofovir alafenamide 25 mG (BIKTARVY) 1 Tablet(s) Oral daily  FLUoxetine 20 milliGRAM(s) Oral daily  risperiDONE   Tablet 3 milliGRAM(s) Oral at bedtime    MEDICATIONS  (PRN):  acetaminophen     Tablet .. 650 milliGRAM(s) Oral every 6 hours PRN Moderate Pain (4 - 6)  aluminum hydroxide/magnesium hydroxide/simethicone Suspension 30 milliLiter(s) Oral every 6 hours PRN Dyspepsia  diphenhydrAMINE 50 milliGRAM(s) Oral every 6 hours PRN agitation  haloperidol     Tablet 5 milliGRAM(s) Oral every 6 hours PRN agitation  LORazepam     Tablet 2 milliGRAM(s) Oral every 6 hours PRN Agitation

## 2022-05-23 NOTE — BH INPATIENT PSYCHIATRY PROGRESS NOTE - NSBHFUPINTERVALHXFT_PSY_A_CORE
Patient visible on the unit, wearing hospital scrubs, grooming has improved. He reports 'I can't stop moving around' and is noted to be pacing back and forth throughout our interaction. He states that he noticed feeling restless today, felt similar to when he took haldol in the past, is open to medications. Cogentin 0.5mg bid initiated. He reports having had 1 episode of vh on Saturday, states that he was looking out the window and saw someone falling out of a window in a nearby building, but when he looked on the curb he didn't see anything and knew that it was a vh, was not distressed about it. No vh since Saturday, no si/hi/pi. No acute medical concerns.  Patient visible on the unit, wearing hospital scrubs, grooming has improved. He reports 'I can't stop moving around' and is noted to be pacing back and forth throughout our interaction. He states that he noticed feeling restless today, felt similar to when he took haldol in the past, is open to medications. Cogentin 0.5mg bid initiated. He reports having had 1 episode of vh on Saturday, states that he was looking out the window and saw someone falling out of a window in a nearby building, but when he looked on the curb he didn't see anything and knew that it was a vh, was not distressed about it. No vh since Saturday, no si/hi/pi. No acute medical concerns. Covid swab negative. Sleep and appetite unremarkable.

## 2022-05-24 PROCEDURE — 99232 SBSQ HOSP IP/OBS MODERATE 35: CPT

## 2022-05-24 RX ADMIN — Medication 1 MILLIGRAM(S): at 10:22

## 2022-05-24 RX ADMIN — BICTEGRAVIR SODIUM, EMTRICITABINE, AND TENOFOVIR ALAFENAMIDE FUMARATE 1 TABLET(S): 30; 120; 15 TABLET ORAL at 10:22

## 2022-05-24 RX ADMIN — Medication 0.5 MILLIGRAM(S): at 21:42

## 2022-05-24 RX ADMIN — Medication 20 MILLIGRAM(S): at 10:22

## 2022-05-24 RX ADMIN — Medication 50 MILLIGRAM(S): at 21:42

## 2022-05-24 RX ADMIN — RISPERIDONE 3 MILLIGRAM(S): 4 TABLET ORAL at 21:42

## 2022-05-24 RX ADMIN — Medication 0.5 MILLIGRAM(S): at 10:22

## 2022-05-24 RX ADMIN — Medication 1 MILLIGRAM(S): at 21:42

## 2022-05-24 NOTE — BH INPATIENT PSYCHIATRY PROGRESS NOTE - CURRENT MEDICATION
MEDICATIONS  (STANDING):  acetaminophen     Tablet .. 1000 milliGRAM(s) Oral once  benztropine 0.5 milliGRAM(s) Oral two times a day  bictegravir 50 mG/emtricitabine 200 mG/tenofovir alafenamide 25 mG (BIKTARVY) 1 Tablet(s) Oral daily  clonazePAM  Tablet 1 milliGRAM(s) Oral two times a day  FLUoxetine 20 milliGRAM(s) Oral daily  risperiDONE   Tablet 3 milliGRAM(s) Oral at bedtime    MEDICATIONS  (PRN):  acetaminophen     Tablet .. 650 milliGRAM(s) Oral every 6 hours PRN Moderate Pain (4 - 6)  aluminum hydroxide/magnesium hydroxide/simethicone Suspension 30 milliLiter(s) Oral every 6 hours PRN Dyspepsia  diphenhydrAMINE 50 milliGRAM(s) Oral every 6 hours PRN agitation  haloperidol     Tablet 5 milliGRAM(s) Oral every 6 hours PRN agitation  LORazepam     Tablet 2 milliGRAM(s) Oral every 6 hours PRN Agitation

## 2022-05-24 NOTE — BH INPATIENT PSYCHIATRY PROGRESS NOTE - NSBHMETABOLIC_PSY_ALL_CORE_FT
BMI: BMI (kg/m2): 23.6 (05-14-22 @ 23:00)  HbA1c: A1C with Estimated Average Glucose Result: 5.4 % (05-17-22 @ 11:11)    Glucose:   BP: 104/67 (05-24-22 @ 08:12) (104/67 - 121/70)  Lipid Panel: Date/Time: 05-17-22 @ 11:11  Cholesterol, Serum: 201  Direct LDL: --  HDL Cholesterol, Serum: 63  Total Cholesterol/HDL Ration Measurement: --  Triglycerides, Serum: 181

## 2022-05-24 NOTE — BH INPATIENT PSYCHIATRY PROGRESS NOTE - NSBHFUPINTERVALHXFT_PSY_A_CORE
Patient visible on the unit, is wearing his own clothes, walking around the unit and interacting with select peers. Pleasant and interactive on approach. Denies having any concerns or complaints. No reported si/hi/avh or paranoid ideation, future oriented and remains interested in pursuing rehab. Is compliant with all medications, feels that cogentin has been helpful with his sxs. No acute medical concerns. Does not appear depressed, anxious or psychotic, appears to be at his baseline, aftercare planning in effect. Sleep and appetite fair.
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
## 2022-05-24 NOTE — BH INPATIENT PSYCHIATRY PROGRESS NOTE - NSBHCHARTREVIEWVS_PSY_A_CORE FT
Vital Signs Last 24 Hrs  T(C): 36.6 (05-24-22 @ 08:12), Max: 36.7 (05-23-22 @ 17:09)  T(F): 97.8 (05-24-22 @ 08:12), Max: 98 (05-23-22 @ 17:09)  HR: 80 (05-24-22 @ 08:12) (80 - 82)  BP: 104/67 (05-24-22 @ 08:12) (104/67 - 116/75)  BP(mean): --  RR: 18 (05-24-22 @ 08:12) (18 - 18)  SpO2: 97% (05-24-22 @ 08:12) (96% - 97%)

## 2022-05-25 PROCEDURE — 99232 SBSQ HOSP IP/OBS MODERATE 35: CPT

## 2022-05-25 RX ORDER — FLUOXETINE HCL 10 MG
1 CAPSULE ORAL
Qty: 0 | Refills: 0 | DISCHARGE
Start: 2022-05-25

## 2022-05-25 RX ORDER — CLONAZEPAM 1 MG
0.5 TABLET ORAL AT BEDTIME
Refills: 0 | Status: DISCONTINUED | OUTPATIENT
Start: 2022-05-25 | End: 2022-05-25

## 2022-05-25 RX ORDER — BICTEGRAVIR SODIUM, EMTRICITABINE, AND TENOFOVIR ALAFENAMIDE FUMARATE 30; 120; 15 MG/1; MG/1; MG/1
1 TABLET ORAL
Qty: 0 | Refills: 0 | DISCHARGE
Start: 2022-05-25

## 2022-05-25 RX ORDER — BENZTROPINE MESYLATE 1 MG
1 TABLET ORAL
Qty: 0 | Refills: 0 | DISCHARGE
Start: 2022-05-25

## 2022-05-25 RX ORDER — CLONAZEPAM 1 MG
0.5 TABLET ORAL
Refills: 0 | Status: DISCONTINUED | OUTPATIENT
Start: 2022-05-25 | End: 2022-05-27

## 2022-05-25 RX ORDER — RISPERIDONE 4 MG/1
1 TABLET ORAL
Qty: 0 | Refills: 0 | DISCHARGE
Start: 2022-05-25

## 2022-05-25 RX ADMIN — RISPERIDONE 3 MILLIGRAM(S): 4 TABLET ORAL at 21:08

## 2022-05-25 RX ADMIN — Medication 20 MILLIGRAM(S): at 12:33

## 2022-05-25 RX ADMIN — Medication 0.5 MILLIGRAM(S): at 12:33

## 2022-05-25 RX ADMIN — Medication 0.5 MILLIGRAM(S): at 12:42

## 2022-05-25 RX ADMIN — Medication 0.5 MILLIGRAM(S): at 21:08

## 2022-05-25 RX ADMIN — BICTEGRAVIR SODIUM, EMTRICITABINE, AND TENOFOVIR ALAFENAMIDE FUMARATE 1 TABLET(S): 30; 120; 15 TABLET ORAL at 12:33

## 2022-05-25 NOTE — BH INPATIENT PSYCHIATRY PROGRESS NOTE - NSBHFUPINTERVALHXFT_PSY_A_CORE
Patient observed attending select groups, interacting with staff and peers appropriately, laughing, smiling appears to be in good spirits. Is cooperative and friendly on approach with no complaints, psychiatric or of a medical nature. No side effects or adverse reactions of medications expressed. Sleep and appetite remain fair. No si/hi/avh or paranoid ideation. He is focused on rehab, however there are no inpatient rehabs available at this time. He is agreeable to go to a dual diagnosis program and requests to be discharge on Friday.   Per staff report, pt is appropriate, appears at baseline.

## 2022-05-25 NOTE — BH INPATIENT PSYCHIATRY PROGRESS NOTE - NSBHCHARTREVIEWVS_PSY_A_CORE FT
Vital Signs Last 24 Hrs  T(C): 36.7 (05-25-22 @ 09:31), Max: 37.1 (05-24-22 @ 16:43)  T(F): 98.1 (05-25-22 @ 09:31), Max: 98.7 (05-24-22 @ 16:43)  HR: 78 (05-25-22 @ 09:31) (74 - 78)  BP: 116/77 (05-25-22 @ 09:31) (116/73 - 116/77)  BP(mean): --  RR: 20 (05-25-22 @ 09:31) (18 - 20)  SpO2: 99% (05-25-22 @ 09:31) (97% - 99%)

## 2022-05-25 NOTE — BH INPATIENT PSYCHIATRY PROGRESS NOTE - NSBHMETABOLIC_PSY_ALL_CORE_FT
BMI: BMI (kg/m2): 23.6 (05-14-22 @ 23:00)  HbA1c: A1C with Estimated Average Glucose Result: 5.4 % (05-17-22 @ 11:11)    Glucose:   BP: 116/77 (05-25-22 @ 09:31) (104/67 - 118/79)  Lipid Panel: Date/Time: 05-17-22 @ 11:11  Cholesterol, Serum: 201  Direct LDL: --  HDL Cholesterol, Serum: 63  Total Cholesterol/HDL Ration Measurement: --  Triglycerides, Serum: 181

## 2022-05-25 NOTE — BH INPATIENT PSYCHIATRY PROGRESS NOTE - CURRENT MEDICATION
MEDICATIONS  (STANDING):  acetaminophen     Tablet .. 1000 milliGRAM(s) Oral once  benztropine 0.5 milliGRAM(s) Oral two times a day  bictegravir 50 mG/emtricitabine 200 mG/tenofovir alafenamide 25 mG (BIKTARVY) 1 Tablet(s) Oral daily  clonazePAM  Tablet 0.5 milliGRAM(s) Oral two times a day  FLUoxetine 20 milliGRAM(s) Oral daily  risperiDONE   Tablet 3 milliGRAM(s) Oral at bedtime    MEDICATIONS  (PRN):  acetaminophen     Tablet .. 650 milliGRAM(s) Oral every 6 hours PRN Moderate Pain (4 - 6)  aluminum hydroxide/magnesium hydroxide/simethicone Suspension 30 milliLiter(s) Oral every 6 hours PRN Dyspepsia  diphenhydrAMINE 50 milliGRAM(s) Oral every 6 hours PRN agitation  haloperidol     Tablet 5 milliGRAM(s) Oral every 6 hours PRN agitation  LORazepam     Tablet 2 milliGRAM(s) Oral every 6 hours PRN Agitation
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
## 2022-05-26 PROCEDURE — 99232 SBSQ HOSP IP/OBS MODERATE 35: CPT

## 2022-05-26 RX ORDER — CLONAZEPAM 1 MG
1 TABLET ORAL
Qty: 0 | Refills: 0 | DISCHARGE
Start: 2022-05-26

## 2022-05-26 RX ADMIN — Medication 0.5 MILLIGRAM(S): at 21:28

## 2022-05-26 RX ADMIN — RISPERIDONE 3 MILLIGRAM(S): 4 TABLET ORAL at 21:28

## 2022-05-26 RX ADMIN — Medication 20 MILLIGRAM(S): at 10:05

## 2022-05-26 RX ADMIN — Medication 50 MILLIGRAM(S): at 21:29

## 2022-05-26 RX ADMIN — Medication 0.5 MILLIGRAM(S): at 10:02

## 2022-05-26 RX ADMIN — BICTEGRAVIR SODIUM, EMTRICITABINE, AND TENOFOVIR ALAFENAMIDE FUMARATE 1 TABLET(S): 30; 120; 15 TABLET ORAL at 10:04

## 2022-05-26 NOTE — BH INPATIENT PSYCHIATRY PROGRESS NOTE - NSTXSUICIDINTERMD_PSY_ALL_CORE
Risperdal 3mg qhs for mood stabilization

## 2022-05-26 NOTE — BH INPATIENT PSYCHIATRY PROGRESS NOTE - NSTXSUBMISDATETRGT_PSY_ALL_CORE
26-May-2022
27-May-2022
21-May-2022
21-May-2022
26-May-2022
26-May-2022
27-May-2022
21-May-2022

## 2022-05-26 NOTE — BH INPATIENT PSYCHIATRY PROGRESS NOTE - NSTXSUBMISDATEEST_PSY_ALL_CORE
19-May-2022
14-May-2022
19-May-2022
14-May-2022
14-May-2022
19-May-2022

## 2022-05-26 NOTE — BH INPATIENT PSYCHIATRY PROGRESS NOTE - NSTXSUBMISINTERMD_PSY_ALL_CORE
Encourage pt to attend substance abuse groups and after hospitalization rehab

## 2022-05-26 NOTE — BH INPATIENT PSYCHIATRY PROGRESS NOTE - NSTXDEPRESGOAL_PSY_ALL_CORE
Will identify thoughts and self-talk that contribute to depression
Other...
Other...
Will identify thoughts and self-talk that contribute to depression
Will identify thoughts and self-talk that contribute to depression
Other...

## 2022-05-26 NOTE — BH INPATIENT PSYCHIATRY PROGRESS NOTE - NSBHFUPINTERVALCCFT_PSY_A_CORE
I feel ok
treated for aggressiveness associated with substance use  'I can't stop moving around'
treated for aggressiveness associated with substance use  'I'm ok'
treated for aggressiveness associated with substance use

## 2022-05-26 NOTE — BH INPATIENT PSYCHIATRY PROGRESS NOTE - NSTXSUICIDDATEEST_PSY_ALL_CORE
14-May-2022

## 2022-05-26 NOTE — BH INPATIENT PSYCHIATRY PROGRESS NOTE - NSBHFUPINTERVALHXFT_PSY_A_CORE
Patient visible on the unit, attending groups, social with peers and staff. Appropriate and well related. No complaints medical or psychiatric in nature. Future oriented and discharge focused. Is aware of plan to discharge tomorrow with appropriate aftercare appointments. Compliant with current medication regimen. No acute medical concerns

## 2022-05-26 NOTE — BH INPATIENT PSYCHIATRY PROGRESS NOTE - NSTXSUICIDGOAL_PSY_ALL_CORE
Will identify and utilize 2 coping skills
Will identify and utilize 2 coping skills
Will express feelings associated with suicidal ideation
Will identify and utilize 2 coping skills
Will express feelings associated with suicidal ideation
Will identify and utilize 2 coping skills
Will express feelings associated with suicidal ideation
Will express feelings associated with suicidal ideation

## 2022-05-26 NOTE — BH INPATIENT PSYCHIATRY PROGRESS NOTE - NSTXDEPRESGOALOTHER_PSY_ALL_CORE
Patient will stabilize mood and alleviate symptoms of psychosis to engage in discharge planning.
Pt will participate in groups and milieu tx structure of unit
Patient will stabilize mood and alleviate symptoms of depression to engage in discharge planning.
Patient will stabilize mood and alleviate symptoms of psychosis to engage in discharge planning.

## 2022-05-26 NOTE — BH INPATIENT PSYCHIATRY PROGRESS NOTE - PRN MEDS
MEDICATIONS  (PRN):  acetaminophen     Tablet .. 650 milliGRAM(s) Oral every 6 hours PRN Moderate Pain (4 - 6)  aluminum hydroxide/magnesium hydroxide/simethicone Suspension 30 milliLiter(s) Oral every 6 hours PRN Dyspepsia  diphenhydrAMINE 50 milliGRAM(s) Oral every 6 hours PRN agitation  haloperidol     Tablet 5 milliGRAM(s) Oral every 6 hours PRN agitation  LORazepam     Tablet 2 milliGRAM(s) Oral every 6 hours PRN Agitation  
MEDICATIONS  (PRN):  diphenhydrAMINE 50 milliGRAM(s) Oral every 6 hours PRN agitation  haloperidol     Tablet 5 milliGRAM(s) Oral every 6 hours PRN agitation  LORazepam     Tablet 2 milliGRAM(s) Oral every 6 hours PRN Agitation  
MEDICATIONS  (PRN):  acetaminophen     Tablet .. 650 milliGRAM(s) Oral every 6 hours PRN Moderate Pain (4 - 6)  aluminum hydroxide/magnesium hydroxide/simethicone Suspension 30 milliLiter(s) Oral every 6 hours PRN Dyspepsia  diphenhydrAMINE 50 milliGRAM(s) Oral every 6 hours PRN agitation  haloperidol     Tablet 5 milliGRAM(s) Oral every 6 hours PRN agitation  LORazepam     Tablet 2 milliGRAM(s) Oral every 6 hours PRN Agitation  
MEDICATIONS  (PRN):  diphenhydrAMINE 50 milliGRAM(s) Oral every 6 hours PRN agitation  haloperidol     Tablet 5 milliGRAM(s) Oral every 6 hours PRN agitation  LORazepam     Tablet 2 milliGRAM(s) Oral every 6 hours PRN Agitation  
MEDICATIONS  (PRN):  acetaminophen     Tablet .. 650 milliGRAM(s) Oral every 6 hours PRN Moderate Pain (4 - 6)  aluminum hydroxide/magnesium hydroxide/simethicone Suspension 30 milliLiter(s) Oral every 6 hours PRN Dyspepsia  diphenhydrAMINE 50 milliGRAM(s) Oral every 6 hours PRN agitation  haloperidol     Tablet 5 milliGRAM(s) Oral every 6 hours PRN agitation  LORazepam     Tablet 2 milliGRAM(s) Oral every 6 hours PRN Agitation  
MEDICATIONS  (PRN):  diphenhydrAMINE 50 milliGRAM(s) Oral every 6 hours PRN agitation  haloperidol     Tablet 5 milliGRAM(s) Oral every 6 hours PRN agitation  LORazepam     Tablet 2 milliGRAM(s) Oral every 6 hours PRN Agitation  
MEDICATIONS  (PRN):  acetaminophen     Tablet .. 650 milliGRAM(s) Oral every 6 hours PRN Moderate Pain (4 - 6)  aluminum hydroxide/magnesium hydroxide/simethicone Suspension 30 milliLiter(s) Oral every 6 hours PRN Dyspepsia  diphenhydrAMINE 50 milliGRAM(s) Oral every 6 hours PRN agitation  haloperidol     Tablet 5 milliGRAM(s) Oral every 6 hours PRN agitation  LORazepam     Tablet 2 milliGRAM(s) Oral every 6 hours PRN Agitation  
MEDICATIONS  (PRN):  diphenhydrAMINE 50 milliGRAM(s) Oral every 6 hours PRN agitation  haloperidol     Tablet 5 milliGRAM(s) Oral every 6 hours PRN agitation  LORazepam     Tablet 2 milliGRAM(s) Oral every 6 hours PRN Agitation

## 2022-05-26 NOTE — BH INPATIENT PSYCHIATRY PROGRESS NOTE - NSBHCHARTREVIEWVS_PSY_A_CORE FT
Vital Signs Last 24 Hrs  T(C): 37.2 (05-26-22 @ 08:20), Max: 37.2 (05-26-22 @ 08:20)  T(F): 98.9 (05-26-22 @ 08:20), Max: 98.9 (05-26-22 @ 08:20)  HR: 102 (05-26-22 @ 08:20) (85 - 102)  BP: 114/77 (05-26-22 @ 08:20) (114/77 - 130/81)  BP(mean): --  RR: 18 (05-26-22 @ 08:20) (18 - 18)  SpO2: 99% (05-26-22 @ 08:20) (98% - 99%)

## 2022-05-26 NOTE — BH INPATIENT PSYCHIATRY PROGRESS NOTE - NSTXSUBMISGOAL_PSY_ALL_CORE
Accept referral for substance abuse treatment on discharge
Other...
Accept referral for substance abuse treatment on discharge
Be able to acknowledge that substance abuse is a problem
Accept referral for substance abuse treatment on discharge
Be able to acknowledge that substance abuse is a problem
Be able to acknowledge that substance abuse is a problem
Other...

## 2022-05-26 NOTE — BH INPATIENT PSYCHIATRY PROGRESS NOTE - NSICDXBHSECONDARYDX_PSY_ALL_CORE
Substance induced mood disorder   F19.94  Polysubstance dependence including opioid drug with daily use   F11.20  Polysubstance (including opioids) dependence, daily use   F11.20  Personality disorder   F60.9  MDD (major depressive disorder)   F32.9  
Substance induced mood disorder   F19.94  Polysubstance dependence including opioid drug with daily use   F11.20  Polysubstance (including opioids) dependence, daily use   F11.20  Personality disorder   F60.9  
Substance induced mood disorder   F19.94  Polysubstance dependence including opioid drug with daily use   F11.20  Polysubstance (including opioids) dependence, daily use   F11.20  Personality disorder   F60.9  MDD (major depressive disorder)   F32.9  
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
## 2022-05-26 NOTE — BH INPATIENT PSYCHIATRY PROGRESS NOTE - NSTXSUICIDDATETRGT_PSY_ALL_CORE
21-May-2022
27-May-2022
21-May-2022
27-May-2022
21-May-2022
27-May-2022
27-May-2022
21-May-2022

## 2022-05-26 NOTE — BH INPATIENT PSYCHIATRY PROGRESS NOTE - NSTXDEPRESINTERMD_PSY_ALL_CORE
Prozac 20mg daily initiated for depressive sxs

## 2022-05-26 NOTE — BH INPATIENT PSYCHIATRY PROGRESS NOTE - NSBHCONSBHPROVDETAILS_PSY_A_CORE  FT
Patient reports that he is not currently in outpatient psychiatric treatment.

## 2022-05-26 NOTE — BH INPATIENT PSYCHIATRY PROGRESS NOTE - NSBHMETABOLIC_PSY_ALL_CORE_FT
BMI: BMI (kg/m2): 23.6 (05-14-22 @ 23:00)  HbA1c: A1C with Estimated Average Glucose Result: 5.4 % (05-17-22 @ 11:11)    Glucose:   BP: 114/77 (05-26-22 @ 08:20) (104/67 - 130/81)  Lipid Panel: Date/Time: 05-17-22 @ 11:11  Cholesterol, Serum: 201  Direct LDL: --  HDL Cholesterol, Serum: 63  Total Cholesterol/HDL Ration Measurement: --  Triglycerides, Serum: 181

## 2022-05-26 NOTE — BH INPATIENT PSYCHIATRY PROGRESS NOTE - NSDCCRITERIA_PSY_ALL_CORE
Improvement in mood symptoms, decrease in intensity and frequency of SI/HI.  

## 2022-05-26 NOTE — BH INPATIENT PSYCHIATRY PROGRESS NOTE - NSTXDEPRESDATETRGT_PSY_ALL_CORE
30-May-2022
23-May-2022
30-May-2022
27-May-2022
26-May-2022
23-May-2022
23-May-2022
30-May-2022
27-May-2022
30-May-2022

## 2022-05-26 NOTE — BH INPATIENT PSYCHIATRY PROGRESS NOTE - NSTXDEPRESDATEEST_PSY_ALL_CORE
23-May-2022
16-May-2022
19-May-2022
16-May-2022
23-May-2022
16-May-2022
19-May-2022
23-May-2022
19-May-2022
23-May-2022

## 2022-05-26 NOTE — BH INPATIENT PSYCHIATRY PROGRESS NOTE - NSBHCONSULTIPREASON_PSY_A_CORE
danger to self; mental illness expected to respond to inpatient care
other reason
danger to self; mental illness expected to respond to inpatient care
danger to self; mental illness expected to respond to inpatient care

## 2022-05-26 NOTE — BH INPATIENT PSYCHIATRY PROGRESS NOTE - OTHER
multiple tattoos on neck, arms and hands

## 2022-05-27 VITALS
OXYGEN SATURATION: 98 % | SYSTOLIC BLOOD PRESSURE: 119 MMHG | HEART RATE: 73 BPM | DIASTOLIC BLOOD PRESSURE: 76 MMHG | TEMPERATURE: 98 F | RESPIRATION RATE: 18 BRPM

## 2022-05-27 PROCEDURE — 80307 DRUG TEST PRSMV CHEM ANLYZR: CPT

## 2022-05-27 PROCEDURE — U0003: CPT

## 2022-05-27 PROCEDURE — 85025 COMPLETE CBC W/AUTO DIFF WBC: CPT

## 2022-05-27 PROCEDURE — 70450 CT HEAD/BRAIN W/O DYE: CPT | Mod: MG

## 2022-05-27 PROCEDURE — U0005: CPT

## 2022-05-27 PROCEDURE — 81001 URINALYSIS AUTO W/SCOPE: CPT

## 2022-05-27 PROCEDURE — 83036 HEMOGLOBIN GLYCOSYLATED A1C: CPT

## 2022-05-27 PROCEDURE — 99285 EMERGENCY DEPT VISIT HI MDM: CPT | Mod: 25

## 2022-05-27 PROCEDURE — 80061 LIPID PANEL: CPT

## 2022-05-27 PROCEDURE — 36415 COLL VENOUS BLD VENIPUNCTURE: CPT

## 2022-05-27 PROCEDURE — G1004: CPT

## 2022-05-27 PROCEDURE — 80048 BASIC METABOLIC PNL TOTAL CA: CPT

## 2022-05-27 PROCEDURE — 99233 SBSQ HOSP IP/OBS HIGH 50: CPT

## 2022-05-27 PROCEDURE — 84484 ASSAY OF TROPONIN QUANT: CPT

## 2022-05-27 RX ORDER — FLUOXETINE HCL 10 MG
1 CAPSULE ORAL
Qty: 14 | Refills: 0
Start: 2022-05-27 | End: 2022-06-09

## 2022-05-27 RX ORDER — RISPERIDONE 4 MG/1
1 TABLET ORAL
Qty: 14 | Refills: 0
Start: 2022-05-27 | End: 2022-06-09

## 2022-05-27 RX ORDER — BICTEGRAVIR SODIUM, EMTRICITABINE, AND TENOFOVIR ALAFENAMIDE FUMARATE 30; 120; 15 MG/1; MG/1; MG/1
1 TABLET ORAL
Qty: 0 | Refills: 0 | DISCHARGE
Start: 2022-05-27

## 2022-05-27 RX ORDER — CLONAZEPAM 1 MG
1 TABLET ORAL
Qty: 0 | Refills: 0 | DISCHARGE
Start: 2022-05-27

## 2022-05-27 RX ORDER — CLONAZEPAM 1 MG
1 TABLET ORAL
Qty: 14 | Refills: 0
Start: 2022-05-27 | End: 2022-06-02

## 2022-05-27 RX ORDER — FLUOXETINE HCL 10 MG
1 CAPSULE ORAL
Qty: 0 | Refills: 0 | DISCHARGE
Start: 2022-05-27

## 2022-05-27 RX ORDER — BENZTROPINE MESYLATE 1 MG
1 TABLET ORAL
Qty: 28 | Refills: 0
Start: 2022-05-27 | End: 2022-06-09

## 2022-05-27 RX ORDER — BENZTROPINE MESYLATE 1 MG
1 TABLET ORAL
Qty: 0 | Refills: 0 | DISCHARGE
Start: 2022-05-27

## 2022-05-27 RX ORDER — BICTEGRAVIR SODIUM, EMTRICITABINE, AND TENOFOVIR ALAFENAMIDE FUMARATE 30; 120; 15 MG/1; MG/1; MG/1
1 TABLET ORAL
Qty: 30 | Refills: 0
Start: 2022-05-27 | End: 2022-06-25

## 2022-05-27 RX ORDER — RISPERIDONE 4 MG/1
1 TABLET ORAL
Qty: 0 | Refills: 0 | DISCHARGE
Start: 2022-05-27

## 2022-05-27 RX ADMIN — Medication 0.5 MILLIGRAM(S): at 11:03

## 2022-05-27 RX ADMIN — BICTEGRAVIR SODIUM, EMTRICITABINE, AND TENOFOVIR ALAFENAMIDE FUMARATE 1 TABLET(S): 30; 120; 15 TABLET ORAL at 11:04

## 2022-05-27 RX ADMIN — Medication 20 MILLIGRAM(S): at 11:04

## 2022-05-27 NOTE — BH INPATIENT PSYCHIATRY DISCHARGE NOTE - HOSPITAL COURSE
5/16 adjusting well to unit, denies si/hi/avh or PI, future oriented requests referral to inpt rehab, compliant with medications, no acute medical concerns  5/17 pt appears very sad and melancholic today continues to deny si/hi/avh or PI, remains agreeable to rehab post discharge  5/18 appears depressed today, feels that it is taking some time to get back to his baseline. No si/hi/avh or PI  5/19 No si/hi/avh or PI, continues to feel depressed and low, but is slightly brighter  5/10 appears more reactive, less depressed, is future oriented and expressing desire to go to rehab and then move to [place]  ;;05/22: tolerating current meds; some pain thought to be muscular; relfects on amphetamine use and his past aggressiveness.  No behavioral issues. Not endorsing  suicidal or homicidal ideation intent or plans; no mention of auditory or visual hallucinations   5/23 cogentin 0.5mg bid started for akathisia, no other complaints at this time.  5/24 pt at baseline, calm, cooperative and appropriate, discharge planning in effect   5/25 pt unchanged since yesterday, discharge planning in effect, will plan to discharge on Friday with appt to [facility]  5/26 well related, appropriate, no complaints  5/27 pt calm, cooperative and future oriented, anticipating discharge today    Safety plan completed prior to discharge and pt able to verbalize sources of support in event of crisis. No si/hi/avh or paranoid ideation endorsed. Good mood, pleasant and future oriented. Presentation appears in line with substance induced mood disorder and malingering.     [facility]

## 2022-05-27 NOTE — BH SAFETY PLAN - WARNING SIGN 1
Pt completed a written safety plan and received a copy of it to take with him. An additional copy has been filed in Pt's chart on the unit.

## 2022-05-27 NOTE — BH DISCHARGE NOTE NURSING/SOCIAL WORK/PSYCH REHAB - OTHER MODE OF TRANSPORTATION
Return home to shared apartment at [address] [place] [address], [facility], [place] [address] [address]

## 2022-05-27 NOTE — BH DISCHARGE NOTE NURSING/SOCIAL WORK/PSYCH REHAB - PATIENT PORTAL LINK FT
You can access the FollowMyHealth Patient Portal offered by [facility] by registering at the following website: [email or web address]. By joining [employer]’s FollowMyHealth portal, you will also be able to view your health information using other applications (apps) compatible with our system.

## 2022-05-27 NOTE — BH DISCHARGE NOTE NURSING/SOCIAL WORK/PSYCH REHAB - NSDCADDINFO1FT_PSY_ALL_CORE
Appointment on Tuesday, 05/31/2022 at 01:00PM. This is an in-person appointment. Please bring a photo ID and insurance card.

## 2022-05-27 NOTE — BH INPATIENT PSYCHIATRY DISCHARGE NOTE - NSDCMRMEDTOKEN_GEN_ALL_CORE_FT
benztropine 0.5 mg oral tablet: 1 tab(s) orally 2 times a day  bictegravir/emtricitabine/tenofovir 50 mg-200 mg-25 mg oral tablet: 1 tab(s) orally once a day  clonazePAM 0.5 mg oral tablet: 1 tab(s) orally 2 times a day  FLUoxetine 20 mg oral capsule: 1 cap(s) orally once a day  risperiDONE 3 mg oral tablet: 1 tab(s) orally once a day (at bedtime)

## 2022-05-27 NOTE — BH INPATIENT PSYCHIATRY DISCHARGE NOTE - NSDCCPCAREPLAN_GEN_ALL_CORE_FT
PRINCIPAL DISCHARGE DIAGNOSIS  Diagnosis: Substance induced mood disorder  Assessment and Plan of Treatment: Continue current medication regimen and follow up with aftercare appointments      SECONDARY DISCHARGE DIAGNOSES  Diagnosis: Substance induced mood disorder  Assessment and Plan of Treatment: Continue current medication regimen and follow up with aftercare appointments    Diagnosis: Polysubstance (including opioids) dependence, daily use  Assessment and Plan of Treatment: Continue current medication regimen and follow up with aftercare appointments    Diagnosis: Personality disorder  Assessment and Plan of Treatment: Continue current medication regimen and follow up with aftercare appointments    Diagnosis: MDD (major depressive disorder), severe  Assessment and Plan of Treatment: Continue current medication regimen and follow up with aftercare appointments    Diagnosis: HIV disease  Assessment and Plan of Treatment: Continue current medication regimen and follow up with aftercare appointments

## 2022-05-27 NOTE — BH DISCHARGE NOTE NURSING/SOCIAL WORK/PSYCH REHAB - NSDCPRGOAL_PSY_ALL_CORE
Pt was isolative following admission for a number of days but has been increasingly visible on the unit, in the milieu and attending select groups. Pt has been social with select peers and endorsed connecting with a group of peers at point on the unit. At time of discharge, Pt displays full range of affect and appears future oriented. Pt endorses awareness of substance abuse as his primary problem and its impact on his mental health, and is endorsing intention for sobriety. Pt expresses motivation to see his grand daughter and, ultimately, pursue finding a job. Pt completed a safety plan and received a copy to take with him.

## 2022-05-27 NOTE — BH DISCHARGE NOTE NURSING/SOCIAL WORK/PSYCH REHAB - NSCDUDCCRISIS_PSY_A_CORE
[facility] Well  1 [phone]) [facility]-WELL ([phone])  Text "WELL" to 98950  Website: www.[employer]/.[employer] 1 (550) 121-QQUF (8940) Website: www.safehorizon.org/.National Suicide Prevention Lifeline 6 (835) 114-7221/.  [employer]  1 (607) LIF[facility] (915-1891)/.  [facility]  [address] [place] [phone]   Hours:  Monday through Friday from 9 AM to 3 PM/.  [employer] - [facility] Crisis Line  [phone], Option 1

## 2022-05-27 NOTE — BH INPATIENT PSYCHIATRY DISCHARGE NOTE - REASON FOR ADMISSION
Patient self-presented to ED with suicidal ideation and plan to jump from his window in setting of methamphetamine intoxication.

## 2022-05-27 NOTE — BH INPATIENT PSYCHIATRY DISCHARGE NOTE - NSBHMETABOLIC_PSY_ALL_CORE_FT
BMI: BMI (kg/m2): 23.6 (05-14-22 @ 23:00)  HbA1c: A1C with Estimated Average Glucose Result: 5.4 % (05-17-22 @ 11:11)    Glucose:   BP: 119/76 (05-27-22 @ 08:05) (114/77 - 130/81)  Lipid Panel: Date/Time: 05-17-22 @ 11:11  Cholesterol, Serum: 201  Direct LDL: --  HDL Cholesterol, Serum: 63  Total Cholesterol/HDL Ration Measurement: --  Triglycerides, Serum: 181

## 2022-05-27 NOTE — BH INPATIENT PSYCHIATRY DISCHARGE NOTE - OTHER PAST PSYCHIATRIC HISTORY (INCLUDE DETAILS REGARDING ONSET, COURSE OF ILLNESS, INPATIENT/OUTPATIENT TREATMENT)
F32.9 Major Depressive Disorder  F19.94 Substance-Induced Mood Disorder  F11.20 Opioid Use Disorder, Severe  Multiple Prior Psychiatric Admissions (most recently at [facility] from 01/07/22 - 01/19/22 for MDD)  Multiple Prior Substance Use Detox/Rehab Admissions (most recently at [facility] 03/06/22 - 03/17/22)  Multiple Prior Suicide Attempts (x5 most recently in 2021 when he jumped in front of a car; others include hanging and jumping out of third story window)
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
## 2022-05-27 NOTE — BH DISCHARGE NOTE NURSING/SOCIAL WORK/PSYCH REHAB - NSDCVIVACCINE_GEN_ALL_CORE_FT
Tdap; 31-Jul-2018 20:20; Servando Drew (RN); [employer]; [id number]; IntraMuscular; Deltoid Right.; 0.5 milliLiter(s); VIS (VIS Published: 09-May-2013, VIS Presented: 31-Jul-2018);

## 2022-05-27 NOTE — BH INPATIENT PSYCHIATRY DISCHARGE NOTE - DESCRIPTION
The patient was born and raised in [place], [place] by his mother.  He states that his father was intermittently in his life and he last spoke to him when he was 14 years old.  The patient has one older brother and three older sisters.  The patient states that he came to [facility] in 2012 because he read that [facility] provided excellent treatment for HIV.  The patient is currently domiciled with a roommate in [place], [place].  The patient states that he has lived in this apartment for two years but that his roommate just moved in two weeks ago.  The patient completed high school and completed one semester at the [employer].  The patient is currently unemployed and supports himself through government assistance.  He states that he last worked at [employer] in [place] four years ago.  The patient denies a history of  service.  He identifies as a cis-gender male and he is heterosexual.  He states that he was raised Jewish but that he does not practice any Tenriism.

## 2022-06-01 DIAGNOSIS — F32.A DEPRESSION, UNSPECIFIED: ICD-10-CM

## 2022-06-01 DIAGNOSIS — F11.20 OPIOID DEPENDENCE, UNCOMPLICATED: ICD-10-CM

## 2022-06-01 DIAGNOSIS — Z86.16 PERSONAL HISTORY OF COVID-19: ICD-10-CM

## 2022-06-01 DIAGNOSIS — F19.94 OTHER PSYCHOACTIVE SUBSTANCE USE, UNSPECIFIED WITH PSYCHOACTIVE SUBSTANCE-INDUCED MOOD DISORDER: ICD-10-CM

## 2022-06-01 DIAGNOSIS — B20 HUMAN IMMUNODEFICIENCY VIRUS [HIV] DISEASE: ICD-10-CM

## 2022-06-01 DIAGNOSIS — Z76.5 MALINGERER [CONSCIOUS SIMULATION]: ICD-10-CM

## 2022-06-01 DIAGNOSIS — F32.2 MAJOR DEPRESSIVE DISORDER, SINGLE EPISODE, SEVERE WITHOUT PSYCHOTIC FEATURES: ICD-10-CM

## 2022-06-01 DIAGNOSIS — F60.9 PERSONALITY DISORDER, UNSPECIFIED: ICD-10-CM

## 2022-06-01 DIAGNOSIS — R45.851 SUICIDAL IDEATIONS: ICD-10-CM

## 2022-06-16 NOTE — ED BEHAVIORAL HEALTH ASSESSMENT NOTE - NS ED BHA ALCOHOL
ASSESSMENT/PLAN:                                                        1.  Back pain/pyuria: 17-year-old female at 9 weeks gestation.  I did check a fetal heart rate today and it was 160.  She has some white cells in her urine and did have back pain but the back pain has resolved she tells me.  I did check a urine and it shows some white cells so a culture will be set up.  Because of the back pain and the junky appearance to her urine I am going to start her on Macrobid 100 mg twice daily for 7 days.  Because she is allergic to amoxicillin I cannot use Augmentin which would be my first choice.  Bactrim is really not indicated in the first trimester because of the risk of birth defects.  Cephalexin is similar to amoxicillin and there is some cross-reactivity.  Therefore I think Macrobid is the best choice.  If the urine culture is negative we will ask her to stop it.  I encouraged her to increase fluids and she will let us know if she develops a fever or develops another significant backache.  I will plan to call her next week just to check in and see if her symptoms have improved but at this point she told me the back pain is barely noticeable if at all and this was not the case several days ago when she called.  Was more intense at that time.                                                                       SUBJECTIVE:                                                      17-year-old female  1 para 0 at approximately 9 weeks gestation.  For the past 2 days she has had some right-sided back pain.  No dysuria or hematuria.  No fevers.  Does not recall lifting anything heavy or doing any intense activity that would cause back pain.  She was advised to come in just to make sure that it was not a kidney infection.  Otherwise feels well.  No vaginal bleeding.        Patient Active Problem List   Diagnosis     Suicidal ideation     ADHD (attention deficit hyperactivity disorder), combined type     Allergic  rhinitis     Comedonal acne     Depressive disorder     Generalized anxiety disorder     Major depressive disorder, recurrent, moderate (H)     Past Surgical History:   Procedure Laterality Date     NO HISTORY OF SURGERY         Social History     Tobacco Use     Smoking status: Never Smoker     Smokeless tobacco: Never Used     Tobacco comment: no exposure   Substance Use Topics     Alcohol use: Never     Family History   Problem Relation Age of Onset     Anxiety Disorder Mother      Allergies Mother      Allergies Father      Allergies Sister      No Known Problems Brother      Diabetes Maternal Grandmother      Skin Cancer Maternal Aunt      Birth marks Maternal Aunt      Amblyopia No family hx of      Strabismus No family hx of          Current Outpatient Medications   Medication Sig Dispense Refill     DULoxetine (CYMBALTA) 60 MG capsule Take 1 capsule by mouth daily       ondansetron (ZOFRAN ODT) 4 MG ODT tab TAKE 1 TABLET (4 MG) BY MOUTH EVERY 8 HOURS AS NEEDED FOR NAUSEA 15 tablet 0     Prenatal Vit-Fe Fumarate-FA (PRENATAL MULTIVITAMIN W/IRON) 27-0.8 MG tablet Take 1 tablet by mouth daily       lamoTRIgine (LAMICTAL) 25 MG tablet Take 1 tablet by mouth daily (Patient not taking: Reported on 6/16/2022)         ROS:  A 12 point systems review is negative except for what is listed above in the Subjective history.        Wt Readings from Last 3 Encounters:   06/16/22 54.6 kg (120 lb 4.8 oz) (52 %, Z= 0.05)*   05/17/22 54.4 kg (120 lb) (52 %, Z= 0.05)*   05/12/22 54.4 kg (120 lb) (52 %, Z= 0.05)*     * Growth percentiles are based on [facility] (Girls, 2-20 Years) data.                     BP Readings from Last 6 Encounters:   06/16/22 100/60 (17 %, Z = -0.95 /  25 %, Z = -0.67)*   05/17/22 118/70 (77 %, Z = 0.74 /  66 %, Z = 0.41)*   05/12/22 120/60 (84 %, Z = 0.99 /  25 %, Z = -0.67)*   09/22/21 100/60 (19 %, Z = -0.88 /  27 %, Z = -0.61)*   07/08/21 (!) 89/50 (2 %, Z = -2.05 /  5 %, Z = -1.64)*   07/01/21 110/69 (55  %, Z = 0.13 /  62 %, Z = 0.31)*     *BP percentiles are based on the 2017 AAP Clinical Practice Guideline for girls          OBJECTIVE:                                                    Vital signs: /60 (BP Location: Left arm, Patient Position: Sitting, Cuff Size: Adult Regular)   Pulse 117   Temp 98  F (36.7  C)   Resp 16   Wt 54.6 kg (120 lb 4.8 oz)   LMP 04/12/2022 (Approximate)   SpO2 99%   BMI 18.57 kg/m         HEENT is normal.      Neck is supple, mobile, no adenoapthy or masses palpable. Normal range of motion noted.     Chest is clear to auscultation. No wheezes, rales or rhonchi heard.  cardiac exam is normal with s1, s2, no murmurs or adventitious sounds.Normal rate and rhythm is heard.     Abdomen is soft,  nondistended, No masses felt.No HSM. No guarding or rigidity or rebound noted. Palpation reveals  no    tenderness   Normal bowel sounds heard.       There is no CVA tenderness.  She indicates that the area that hurts is below the CVA area but above the iliac crest and on the right side and laterally.  Above the area where she would have sciatica.  It is in the paraspinous muscles and just lateral to that.  It appears to be muscular based on the location but I will send her for urinalysis and CBC before making that determination.    UA shows some white cells very consistent with what was seen on the urinalysis 3 days ago and that urine culture was negative.  A current urine culture has been set up.    CBC shows normal hemoglobin and white blood count of 8.9 with normal differential count.    A total of 30 minutes were spent in today's visit including the time spent with  the patient in addition to the time spent just prior to the visit reviewing the chart  and then charting afterwards on this patient today.      Esteban Batres MD  [facility]       The above information was dictating using Dragon voice software and as a result there may be some  irregularities that were not detected in my review of this note.                                                                         Yes

## 2022-06-22 NOTE — ED BEHAVIORAL HEALTH ASSESSMENT NOTE - PATIENT'S CHIEF COMPLAINT
Ongoing SW/CM Assessment/Plan of Care Note     See SW/CM flowsheets for goals and other objective data.    Patient/Family discharge goal (s):  Goal #1: Adjustment/coping issues addressed  Goal #2: Communication facilitated  Goal #3: Psychosocial needs assessed    Disposition:  Planned Discharge Destination: Home/apartment with Spouse/SO    Progress note:   Chart reviewed. Per OFT discussion, continue w/ pain control. PT/OT following, eval pending. No discharge date identified.   PTA pt from home w/ spouse and independent at baseline. Goal is for pt to return home w/ support from spouse. Pt lives in tri level home, but living arrangements can be made on their main level. Further discharge planning on hold pending PT/OT evals.  SW will continue to monitor stay and facilitate d/c plan.  JARRELL Leon       "I've been spending too much time in my room"

## 2022-07-02 ENCOUNTER — INPATIENT (INPATIENT)
Facility: HOSPITAL | Age: 37
LOS: 12 days | Discharge: HOME | DRG: 897 | End: 2022-07-15
Attending: PSYCHIATRY & NEUROLOGY | Admitting: PSYCHIATRY & NEUROLOGY
Payer: MEDICAID

## 2022-07-02 VITALS
TEMPERATURE: 98 F | WEIGHT: 134.92 LBS | HEIGHT: 66 IN | SYSTOLIC BLOOD PRESSURE: 120 MMHG | DIASTOLIC BLOOD PRESSURE: 75 MMHG | OXYGEN SATURATION: 99 % | HEART RATE: 71 BPM | RESPIRATION RATE: 18 BRPM

## 2022-07-02 DIAGNOSIS — F25.9 SCHIZOAFFECTIVE DISORDER, UNSPECIFIED: ICD-10-CM

## 2022-07-02 DIAGNOSIS — F19.959 OTHER PSYCHOACTIVE SUBSTANCE USE, UNSPECIFIED WITH PSYCHOACTIVE SUBSTANCE-INDUCED PSYCHOTIC DISORDER, UNSPECIFIED: ICD-10-CM

## 2022-07-02 LAB
ALBUMIN SERPL ELPH-MCNC: 3.8 G/DL — SIGNIFICANT CHANGE UP (ref 3.4–5)
ALP SERPL-CCNC: 87 U/L — SIGNIFICANT CHANGE UP (ref 40–120)
ALT FLD-CCNC: 32 U/L — SIGNIFICANT CHANGE UP (ref 12–42)
AMPHET UR-MCNC: NEGATIVE — SIGNIFICANT CHANGE UP
ANION GAP SERPL CALC-SCNC: 7 MMOL/L — LOW (ref 9–16)
APPEARANCE UR: CLEAR — SIGNIFICANT CHANGE UP
AST SERPL-CCNC: 44 U/L — HIGH (ref 15–37)
BARBITURATES UR SCN-MCNC: NEGATIVE — SIGNIFICANT CHANGE UP
BASOPHILS # BLD AUTO: 0.03 K/UL — SIGNIFICANT CHANGE UP (ref 0–0.2)
BASOPHILS NFR BLD AUTO: 0.9 % — SIGNIFICANT CHANGE UP (ref 0–2)
BENZODIAZ UR-MCNC: NEGATIVE — SIGNIFICANT CHANGE UP
BILIRUB SERPL-MCNC: 0.7 MG/DL — SIGNIFICANT CHANGE UP (ref 0.2–1.2)
BILIRUB UR-MCNC: ABNORMAL
BUN SERPL-MCNC: 19 MG/DL — SIGNIFICANT CHANGE UP (ref 7–23)
CALCIUM SERPL-MCNC: 9.1 MG/DL — SIGNIFICANT CHANGE UP (ref 8.5–10.5)
CHLORIDE SERPL-SCNC: 105 MMOL/L — SIGNIFICANT CHANGE UP (ref 96–108)
CO2 SERPL-SCNC: 28 MMOL/L — SIGNIFICANT CHANGE UP (ref 22–31)
COCAINE METAB.OTHER UR-MCNC: POSITIVE
COLOR SPEC: YELLOW — SIGNIFICANT CHANGE UP
CREAT SERPL-MCNC: 1.33 MG/DL — HIGH (ref 0.5–1.3)
DIFF PNL FLD: NEGATIVE — SIGNIFICANT CHANGE UP
EGFR: 71 ML/MIN/1.73M2 — SIGNIFICANT CHANGE UP
EOSINOPHIL # BLD AUTO: 0.07 K/UL — SIGNIFICANT CHANGE UP (ref 0–0.5)
EOSINOPHIL NFR BLD AUTO: 2 % — SIGNIFICANT CHANGE UP (ref 0–6)
ETHANOL SERPL-MCNC: <3 MG/DL — SIGNIFICANT CHANGE UP
GLUCOSE SERPL-MCNC: 113 MG/DL — HIGH (ref 70–99)
GLUCOSE UR QL: NEGATIVE — SIGNIFICANT CHANGE UP
HCT VFR BLD CALC: 43.1 % — SIGNIFICANT CHANGE UP (ref 39–50)
HGB BLD-MCNC: 14.4 G/DL — SIGNIFICANT CHANGE UP (ref 13–17)
IMM GRANULOCYTES NFR BLD AUTO: 0.3 % — SIGNIFICANT CHANGE UP (ref 0–1.5)
KETONES UR-MCNC: ABNORMAL MG/DL
LEUKOCYTE ESTERASE UR-ACNC: NEGATIVE — SIGNIFICANT CHANGE UP
LYMPHOCYTES # BLD AUTO: 1.69 K/UL — SIGNIFICANT CHANGE UP (ref 1–3.3)
LYMPHOCYTES # BLD AUTO: 48.1 % — HIGH (ref 13–44)
MCHC RBC-ENTMCNC: 30.2 PG — SIGNIFICANT CHANGE UP (ref 27–34)
MCHC RBC-ENTMCNC: 33.4 GM/DL — SIGNIFICANT CHANGE UP (ref 32–36)
MCV RBC AUTO: 90.4 FL — SIGNIFICANT CHANGE UP (ref 80–100)
METHADONE UR-MCNC: NEGATIVE — SIGNIFICANT CHANGE UP
MONOCYTES # BLD AUTO: 0.26 K/UL — SIGNIFICANT CHANGE UP (ref 0–0.9)
MONOCYTES NFR BLD AUTO: 7.4 % — SIGNIFICANT CHANGE UP (ref 2–14)
NEUTROPHILS # BLD AUTO: 1.45 K/UL — LOW (ref 1.8–7.4)
NEUTROPHILS NFR BLD AUTO: 41.3 % — LOW (ref 43–77)
NITRITE UR-MCNC: NEGATIVE — SIGNIFICANT CHANGE UP
NRBC # BLD: 0 /100 WBCS — SIGNIFICANT CHANGE UP (ref 0–0)
OPIATES UR-MCNC: NEGATIVE — SIGNIFICANT CHANGE UP
PCP SPEC-MCNC: SIGNIFICANT CHANGE UP
PCP UR-MCNC: NEGATIVE — SIGNIFICANT CHANGE UP
PH UR: 6 — SIGNIFICANT CHANGE UP (ref 5–8)
PLATELET # BLD AUTO: 273 K/UL — SIGNIFICANT CHANGE UP (ref 150–400)
POTASSIUM SERPL-MCNC: 4 MMOL/L — SIGNIFICANT CHANGE UP (ref 3.5–5.3)
POTASSIUM SERPL-SCNC: 4 MMOL/L — SIGNIFICANT CHANGE UP (ref 3.5–5.3)
PROT SERPL-MCNC: 9 G/DL — HIGH (ref 6.4–8.2)
PROT UR-MCNC: ABNORMAL MG/DL
RBC # BLD: 4.77 M/UL — SIGNIFICANT CHANGE UP (ref 4.2–5.8)
RBC # FLD: 13.6 % — SIGNIFICANT CHANGE UP (ref 10.3–14.5)
RBC CASTS # UR COMP ASSIST: < 5 /HPF — SIGNIFICANT CHANGE UP
SARS-COV-2 RNA SPEC QL NAA+PROBE: SIGNIFICANT CHANGE UP
SODIUM SERPL-SCNC: 140 MMOL/L — SIGNIFICANT CHANGE UP (ref 132–145)
SP GR SPEC: >=1.03 — SIGNIFICANT CHANGE UP (ref 1–1.03)
THC UR QL: NEGATIVE — SIGNIFICANT CHANGE UP
UROBILINOGEN FLD QL: 1 E.U./DL — SIGNIFICANT CHANGE UP
WBC # BLD: 3.51 K/UL — LOW (ref 3.8–10.5)
WBC # FLD AUTO: 3.51 K/UL — LOW (ref 3.8–10.5)
WBC UR QL: < 5 /HPF — SIGNIFICANT CHANGE UP

## 2022-07-02 PROCEDURE — 99285 EMERGENCY DEPT VISIT HI MDM: CPT | Mod: 25

## 2022-07-02 PROCEDURE — 71045 X-RAY EXAM CHEST 1 VIEW: CPT | Mod: 26

## 2022-07-02 PROCEDURE — 93010 ELECTROCARDIOGRAM REPORT: CPT

## 2022-07-02 NOTE — ED BEHAVIORAL HEALTH ASSESSMENT NOTE - DEPENDENTS
----- Message from Daniela Meek sent at 5/18/2017  2:42 PM CDT -----  Patient is returning nurse's call/please call patient back at [phone] around 4:30  
Results given  
None known

## 2022-07-02 NOTE — ED ADULT NURSE NOTE - OBJECTIVE STATEMENT
patient here with +SI; no plan at this time; past attempts at SI by jumping out of an apartment window

## 2022-07-02 NOTE — ED PROVIDER NOTE - OBJECTIVE STATEMENT
Pt is 35 y/o male with a history of bipolar disorder, schizophrenia, depression, supposed to be taking Seroquel and Trazadone but is only complaint with Trazadone at this time, has history of HIV and taking Biktarvy, presents to the ED with suicidal ideation. Pt denies any plan at this time but states recently lost job and is feeling depressed and wants to hurt himself. Pt states he has attempted to harm self in the past by jumping in front of cares and building, has had psychiatric admissions for SI in the past. Pt admits to taking 2 pints of alcohol 2 days ago, also cocaine 2 days ago, but denies any other recreational drug use. Pt is 37 y/o male with a history of bipolar disorder, schizophrenia, depression, supposed to be taking Seroquel and Trazadone but is only complaint with Trazadone at this time, has history of HIV and taking Biktarvy, presents to the ED with suicidal ideation. Pt denies any plan at this time but states recently lost job and is feeling depressed and wants to hurt himself. Pt states he has attempted to harm self in the past by jumping in front of cars and off buildings, has had psychiatric admissions for SI in the past. Pt admits to drinking 2 pints of alcohol 2 days ago, also taking cocaine 2 days ago, but denies any other recreational drug use.

## 2022-07-02 NOTE — ED BEHAVIORAL HEALTH ASSESSMENT NOTE - SUMMARY
37 yo M single, domiciled in [facility] housing , unemployed, with PMH of HIV on Biktarvy, PPH of polysubstance use disorder (alcohol, cocaine, opiates, perhaps others), SIPD, SIMD, prior SA (hanging, jumping out of 3rd story window), multiple prior IP psychiatric and detox/rehab stays (5/21 [facility], 7/21 [facility], 10/21 [facility], 12/21 [facility], 3/22 [facility], 5/14 [facility]), who self-presented today reporting suicidal ideation and hallucinations.     Amandeep reports recent decompensation following termination from work, and describes experiencing distressing auditory hallucinations and suicidal ideation (with plan to jump into traffic or out of a window). These symptoms occur in context of cocaine/alcohol use as well as medication non-compliance. Amandeep is at an elevated risk at baseline (due to psychotic illness, due to severe substance abuse patterns, due to chronic medical condition), and his risk is acutely elevated due to above symptoms. He is a danger to himself and would reasonable benefit from treatment in a psychiatric hospital. He is in agreement to sign into hospital voluntarily.

## 2022-07-02 NOTE — ED BEHAVIORAL HEALTH ASSESSMENT NOTE - HPI (INCLUDE ILLNESS QUALITY, SEVERITY, DURATION, TIMING, CONTEXT, MODIFYING FACTORS, ASSOCIATED SIGNS AND SYMPTOMS)
37 yo M single, domiciled in [place] housing , unemployed, with PMH of HIV on Biktarvy, PPH of polysubstance use disorder (alcohol, cocaine, opiates, perhaps others), SIPD, SIMD, prior SA (hanging, jumping out of 3rd story window), multiple prior IP psychiatric and detox/rehab stays (5/21 [facility], 7/21 SI, 10/21 [facility], 12/21 LH, 3/22 [facility], 5/14 [facility]), who self-presented today reporting suicidal ideation and hallucinations.     Amandeep was interviewed in a private room on 1:1. He was calm and cooperative with examination. He reported that he lost his job several days ago, and since that time has had worsening AH and SI. Regarding his AH, he described hearing "conversations about me" outside his window, and voices that said bad things about him. He reported SI to jump out of his window or in front of a car. He rated the likelihood of him acting on SI as "60%." He described several of his previous suicide attempts to me, including stabbing himself in the neck. He endorsed drug use 2 days ago - took cocaine and drank alcohol - but reported sobriety at time of our encounter. He reported he used to take Seroquel but had stopped 2 weeks ago. He was unable to state why. He described VH of talking to a friend in person in [facility] whom he knew to be in assisted. He laughed when describing this, and wondered if he had imagined this while using drugs.     I attempted to safety plan with Amandeep, but he reported he had no support system nor any factors that prevented him from killing himself. He expressed strong concern for his own safety. He agreed to voluntary hospitalization.     COLLATERAL (quoted ED  Assessment 5/14/22)    SUKUMARKES  [id number]  Suicidal Ideation: 43x between 2017 and 3/2022  Self Harm: 4x between 2018-8/2019  Flags: high utilization inptER, readmission, EPHRAIM, treatment engagement  Dx: Schizoaffective disorder, polysubstance-use (cocaine, opioid, cannabis, alcohol, tobacco, sedative), SIPD, PTSD, Panic Disorder  PMH: HIV, Herpes Zoster, T1DM, and many more.  Medications: Escitalopram (last 2/2022), gabapentin (12/2021), trazodone, risperidone (9/2021), quetiapine 300 (5/2021); multiple other short trials.   OP: [facility] with 9x visits for MDD last in 10/2021; multiple others.  ED: 21x MH last here on 7/2021; 2x ROA (last on 11/2020)  IP: 28x MH last at [facility] 1/2022; 9x ROA (last [facility] 3/2022).    COVID Exposure Screen- Patient    1. *Have you had a COVID-19 test in the last 90 days? (x) Yes ( ) No ( ) Unknown- Reason: _____  IF YES PROCEED TO QUESTION #2. IF NO OR UNKNOWN, PLEASE SKIP TO QUESTION #3.  2. Date of test(s) and result(s): May 2022 - neg  3. *Have you tested positive for COVID-19 antibodies? ( ) Yes (x) No ( ) Unknown- Reason: _____  IF YES PROCEED TO QUESTION #4. IF NO or UNKNOWN, PLEASE SKIP TO QUESTION #5.  4. Date of positive antibody test: ________  5. *Have you received 2 doses of the COVID-19 vaccine? ( ) Yes (x) No ( ) Unknown- Reason: _____  IF YES PROCEED TO QUESTION #6. IF NO or UNKNOWN, PLEASE SKIP TO QUESTION #7.  6. Date of second dose: ________  7. *In the past 10 days, have you been around anyone with a positive COVID-19 test?* ( ) Yes (x) No ( ) Unknown- Reason: ____  IF YES PROCEED TO QUESTION #8. IF NO or UNKNOWN, PLEASE SKIP TO QUESTION #13.  8. Were you within 6 feet of them for at least 15 minutes? ( ) Yes ( ) No ( ) Unknown- Reason: _____  9. Have you provided care for them? ( ) Yes ( ) No ( ) Unknown- Reason: ______  10. Have you had direct physical contact with them (touched, hugged, or kissed them)? ( ) Yes ( ) No ( ) Unknown- Reason: _____  11. Have you shared eating or drinking utensils with them? ( ) Yes ( ) No ( ) Unknown- Reason: ____  12. Have they sneezed, coughed, or somehow gotten respiratory droplets on you? ( ) Yes ( ) No ( ) Unknown- Reason: ______  13. *Have you been out of [place] within the past 10 days?* ( ) Yes (x) No ( ) Unknown- Reason: _____  IF YES PLEASE ANSWER THE FOLLOWING QUESTIONS:  14. Which state/country have you been to? ______  15. Were you there over 24 hours? ( ) Yes ( ) No ( ) Unknown- Reason: ______  16. Date of return to [facility]: ______ 35 yo M single, domiciled in [facility] housing , unemployed, with PMH of HIV on Biktarvy, PPH of polysubstance use disorder (alcohol, cocaine, opiates, perhaps others), SIPD, SIMD, prior SA (hanging, jumping out of 3rd story window), multiple prior IP psychiatric and detox/rehab stays (5/21 LH, 7/21 SI, 10/21 [facility], 12/21 LH, 3/22 [facility], 5/14 [facility]), who self-presented today reporting suicidal ideation and hallucinations.     Amandeep was interviewed in a private room on 1:1. He was calm and cooperative with examination. He reported that he lost his job several days ago, and since that time has had worsening AH and SI. Regarding his AH, he described hearing "conversations about me" outside his window, and voices that said bad things about him. He reported SI to jump out of his window or in front of a car. He rated the likelihood of him acting on SI as "60%." He described several of his previous suicide attempts to me, including stabbing himself in the neck. He endorsed drug use 2 days ago - took cocaine and drank alcohol - but reported sobriety at time of our encounter. He reported he used to take Risperdal but had stopped 2 weeks ago. He was unable to state why. He described VH of talking to a friend in person in [facility] whom he knew to be in jail. He laughed when describing this, and wondered if he had imagined this while using drugs.     I attempted to safety plan with Amandeep, but he reported he had no support system nor any factors that prevented him from killing himself. He expressed strong concern for his own safety. He agreed to voluntary hospitalization.     COLLATERAL (quoted ED  Assessment 5/14/22)    SUKUMARKES  [id number]  Suicidal Ideation: 43x between 2017 and 3/2022  Self Harm: 4x between 2018-8/2019  Flags: high utilization inptER, readmission, EPHRAIM, treatment engagement  Dx: Schizoaffective disorder, polysubstance-use (cocaine, opioid, cannabis, alcohol, tobacco, sedative), SIPD, PTSD, Panic Disorder  PMH: HIV, Herpes Zoster, T1DM, and many more.  Medications: Escitalopram (last 2/2022), gabapentin (12/2021), trazodone, risperidone (9/2021), quetiapine 300 (5/2021); multiple other short trials.   OP: [facility] with 9x visits for MDD last in 10/2021; multiple others.  ED: 21x MH last here on 7/2021; 2x ROA (last on 11/2020)  IP: 28x MH last at [facility] 1/2022; 9x ROA (last [facility] 3/2022).    COVID Exposure Screen- Patient    1. *Have you had a COVID-19 test in the last 90 days? (x) Yes ( ) No ( ) Unknown- Reason: _____  IF YES PROCEED TO QUESTION #2. IF NO OR UNKNOWN, PLEASE SKIP TO QUESTION #3.  2. Date of test(s) and result(s): May 2022 - neg  3. *Have you tested positive for COVID-19 antibodies? ( ) Yes (x) No ( ) Unknown- Reason: _____  IF YES PROCEED TO QUESTION #4. IF NO or UNKNOWN, PLEASE SKIP TO QUESTION #5.  4. Date of positive antibody test: ________  5. *Have you received 2 doses of the COVID-19 vaccine? ( ) Yes (x) No ( ) Unknown- Reason: _____  IF YES PROCEED TO QUESTION #6. IF NO or UNKNOWN, PLEASE SKIP TO QUESTION #7.  6. Date of second dose: ________  7. *In the past 10 days, have you been around anyone with a positive COVID-19 test?* ( ) Yes (x) No ( ) Unknown- Reason: ____  IF YES PROCEED TO QUESTION #8. IF NO or UNKNOWN, PLEASE SKIP TO QUESTION #13.  8. Were you within 6 feet of them for at least 15 minutes? ( ) Yes ( ) No ( ) Unknown- Reason: _____  9. Have you provided care for them? ( ) Yes ( ) No ( ) Unknown- Reason: ______  10. Have you had direct physical contact with them (touched, hugged, or kissed them)? ( ) Yes ( ) No ( ) Unknown- Reason: _____  11. Have you shared eating or drinking utensils with them? ( ) Yes ( ) No ( ) Unknown- Reason: ____  12. Have they sneezed, coughed, or somehow gotten respiratory droplets on you? ( ) Yes ( ) No ( ) Unknown- Reason: ______  13. *Have you been out of [place] within the past 10 days?* ( ) Yes (x) No ( ) Unknown- Reason: _____  IF YES PLEASE ANSWER THE FOLLOWING QUESTIONS:  14. Which state/country have you been to? ______  15. Were you there over 24 hours? ( ) Yes ( ) No ( ) Unknown- Reason: ______  16. Date of return to [facility]: ______

## 2022-07-02 NOTE — ED ADULT NURSE NOTE - ED STAT RN HANDOFF DETAILS
received verbal hand off from SKYE TINSLEY at 2010; Patient sitting with 1:1 and security at bedside; resting comfortably; waiting for lab results and for patient to talk to telepsych

## 2022-07-02 NOTE — ED ADULT TRIAGE NOTE - CHIEF COMPLAINT QUOTE
Pt came in c/o of SI/AV/VH. Denies HI. Reports feeling suicidal after loosing his job this week. PMH of bipolar and schizophrenia. Non compliant with medication. Reports history of jumping out the 3rd floor of a building and jumping in front of cars. Denies having a plan at this time. 1:1 constant observation initiated in triage

## 2022-07-02 NOTE — ED PROVIDER NOTE - CONTEXT
Pt states recently lost job, feeling depressed, wants to hurt self, attempted to harm self in past by jumping in front of cars and off building, had psych admissions for SI in past, 2 pints of alcohol and cocaine 2 days ago/known (describe) Pt states recently lost job, feeling depressed, wants to hurt self, attempted to harm self in past by jumping in front of cars and off building, had psych admissions for SI in past, admits to taking 2 pints of alcohol and cocaine 2 days ago/known (describe)

## 2022-07-02 NOTE — ED PROVIDER NOTE - PROGRESS NOTE DETAILS
discussed with telepsych attending Dr. Holguin. plan for voluntary admission to [facility]

## 2022-07-02 NOTE — ED PROVIDER NOTE - ATTENDING APP SHARED VISIT CONTRIBUTION OF CARE
Agree w PA note.  Pt w hx of depression/ suicidality/ psych admissions in past, hx of HIV, presents with suicidal ideation without distinct plan.  Seen by psych; plan for voluntary psych admission.

## 2022-07-02 NOTE — ED PROVIDER NOTE - CLINICAL SUMMARY MEDICAL DECISION MAKING FREE TEXT BOX
Pt with history of depression, BPD, schizophrenia, non-compliant with medications, history of polysubstance abuse with alcohol and cocaine last used 2 days ago presents complaining of suicidal ideation. Will check labs, EKG, consult psych. Pt with history of depression, bipolar disorder, schizophrenia, non-compliant with medications, history of polysubstance abuse with alcohol and cocaine last used 2 days ago presents complaining of suicidal ideation. Will check labs, EKG, consult psych.

## 2022-07-03 DIAGNOSIS — F11.20 OPIOID DEPENDENCE, UNCOMPLICATED: ICD-10-CM

## 2022-07-03 DIAGNOSIS — F10.20 ALCOHOL DEPENDENCE, UNCOMPLICATED: ICD-10-CM

## 2022-07-03 DIAGNOSIS — F14.10 COCAINE ABUSE, UNCOMPLICATED: ICD-10-CM

## 2022-07-03 PROCEDURE — 99223 1ST HOSP IP/OBS HIGH 75: CPT

## 2022-07-03 RX ORDER — TRAZODONE HCL 50 MG
50 TABLET ORAL AT BEDTIME
Refills: 0 | Status: DISCONTINUED | OUTPATIENT
Start: 2022-07-03 | End: 2022-07-15

## 2022-07-03 RX ORDER — POLYETHYLENE GLYCOL 3350 17 G/17G
17 POWDER, FOR SOLUTION ORAL AT BEDTIME
Refills: 0 | Status: DISCONTINUED | OUTPATIENT
Start: 2022-07-03 | End: 2022-07-15

## 2022-07-03 RX ORDER — HALOPERIDOL DECANOATE 100 MG/ML
5 INJECTION INTRAMUSCULAR EVERY 6 HOURS
Refills: 0 | Status: DISCONTINUED | OUTPATIENT
Start: 2022-07-03 | End: 2022-07-15

## 2022-07-03 RX ORDER — ACETAMINOPHEN 500 MG
650 TABLET ORAL EVERY 6 HOURS
Refills: 0 | Status: DISCONTINUED | OUTPATIENT
Start: 2022-07-03 | End: 2022-07-15

## 2022-07-03 RX ORDER — BENZTROPINE MESYLATE 1 MG
1 TABLET ORAL EVERY 6 HOURS
Refills: 0 | Status: DISCONTINUED | OUTPATIENT
Start: 2022-07-03 | End: 2022-07-07

## 2022-07-03 NOTE — BH INPATIENT PSYCHIATRY ASSESSMENT NOTE - NSICDXBHSECONDARYDX_PSY_ALL_CORE
Schizoaffective disorder   F25.9  Cocaine use disorder   F14.10  Severe alcohol use disorder   F10.20  Severe opioid use disorder   F11.20

## 2022-07-03 NOTE — BH INPATIENT PSYCHIATRY ASSESSMENT NOTE - HPI (INCLUDE ILLNESS QUALITY, SEVERITY, DURATION, TIMING, CONTEXT, MODIFYING FACTORS, ASSOCIATED SIGNS AND SYMPTOMS)
35 yo M single, domiciled in [place] housing , unemployed, with PMH of HIV on Biktarvy, PPH of polysubstance use disorder (alcohol, cocaine, opiates, perhaps others), SIPD, SIMD, prior SA (hanging, jumping out of 3rd story window), multiple prior IP psychiatric and detox/rehab stays (5/21 [facility], 7/21 [facility], 10/21 [facility], 12/21 [facility], 3/22 [facility], 5/14 [facility]), who self-presented today reporting suicidal ideation and hallucinations.     Amandeep was interviewed in a private room on 1:1. He was calm and cooperative with examination. He reported that he lost his job several days ago, and since that time has had worsening AH and SI. Regarding his AH, he described hearing "conversations about me" outside his window, and voices that said bad things about him. He reported SI to jump out of his window or in front of a car. He rated the likelihood of him acting on SI as "60%." He described several of his previous suicide attempts to me, including stabbing himself in the neck. He endorsed drug use 2 days ago - took cocaine and drank alcohol - but reported sobriety at time of our encounter. He reported he used to take Risperdal but had stopped 2 weeks ago. He was unable to state why. He described VH of [facility] to be in [facility]. He laughed when describing this, and wondered if he had imagined this while using drugs.     I attempted to safety plan with Amandeep, but he reported he had no support system nor any factors that prevented him from killing himself. He expressed strong concern for his own safety. He agreed to voluntary hospitalization.     Currently pt says he thinks he "overdid it with the drugs" and was hearing voices and seeing things. He says he is still suicidal but he seems to be enjoyinh interacting with peers and watching TV. Seems close to baseline to me and I know him from multiple past admissions.     COLLATERAL (quoted ED  Assessment 5/14/22)    PSYCCELINE  [id number]  Suicidal Ideation: 43x between 2017 and 3/2022  Self Harm: 4x between 2018-8/2019  Flags: high utilization inptER, readmission, EPHRAIM, treatment engagement  Dx: Schizoaffective disorder, polysubstance-use (cocaine, opioid, cannabis, alcohol, tobacco, sedative), SIPD, PTSD, Panic Disorder  PMH: HIV, Herpes Zoster, T1DM, and many more.  Medications: Escitalopram (last 2/2022), gabapentin (12/2021), trazodone, risperidone (9/2021), quetiapine 300 (5/2021); multiple other short trials.   OP: [facility] with 9x visits for MDD last in 10/2021; multiple others.  ED: 21x MH last here on 7/2021; 2x ROA (last on 11/2020)  IP: 28x MH last at [facility] 1/2022; 9x ROA (last [facility] 3/2022).    COVID Exposure Screen- Patient    1. *Have you had a COVID-19 test in the last 90 days? (x) Yes ( ) No ( ) Unknown- Reason: _____  IF YES PROCEED TO QUESTION #2. IF NO OR UNKNOWN, PLEASE SKIP TO QUESTION #3.  2. Date of test(s) and result(s): May 2022 - neg  3. *Have you tested positive for COVID-19 antibodies? ( ) Yes (x) No ( ) Unknown- Reason: _____  IF YES PROCEED TO QUESTION #4. IF NO or UNKNOWN, PLEASE SKIP TO QUESTION #5.  4. Date of positive antibody test: ________  5. *Have you received 2 doses of the COVID-19 vaccine? ( ) Yes (x) No ( ) Unknown- Reason: _____  IF YES PROCEED TO QUESTION #6. IF NO or UNKNOWN, PLEASE SKIP TO QUESTION #7.  6. Date of second dose: ________  7. *In the past 10 days, have you been around anyone with a positive COVID-19 test?* ( ) Yes (x) No ( ) Unknown- Reason: ____  IF YES PROCEED TO QUESTION #8. IF NO or UNKNOWN, PLEASE SKIP TO QUESTION #13.  8. Were you within 6 feet of them for at least 15 minutes? ( ) Yes ( ) No ( ) Unknown- Reason: _____  9. Have you provided care for them? ( ) Yes ( ) No ( ) Unknown- Reason: ______  10. Have you had direct physical contact with them (touched, hugged, or kissed them)? ( ) Yes ( ) No ( ) Unknown- Reason: _____  11. Have you shared eating or drinking utensils with them? ( ) Yes ( ) No ( ) Unknown- Reason: ____  12. Have they sneezed, coughed, or somehow gotten respiratory droplets on you? ( ) Yes ( ) No ( ) Unknown- Reason: ______  13. *Have you been out of [place] within the past 10 days?* ( ) Yes (x) No ( ) Unknown- Reason: _____  IF YES PLEASE ANSWER THE FOLLOWING QUESTIONS:  14. Which state/country have you been to? ______  15. Were you there over 24 hours? ( ) Yes ( ) No ( ) Unknown- Reason: ______  16. Date of return to [facility]: ______

## 2022-07-03 NOTE — BH PATIENT PROFILE - FALL HARM RISK - FALL HARM RISK
Patient Instructions by Arielle Young MD at 04/04/17 01:13 PM     Author:  Arielle Young MD Service:  (none) Author Type:  Physician     Filed:  04/04/17 01:13 PM Encounter Date:  4/4/2017 Status:  Signed     :  Arielle Young MD (Physician)              Rest  Fluids  Ibuprofen 800mg every 8 hours for fever/pain  Take your medication  If not feeling better over the next 5-7 days then return to walk in care if you are unable to see your doctor.          Revision History        User Key Date/Time User Provider Type Action    > [N/A] 04/04/17 01:13 PM Arielle Young MD Physician Sign             No indicators present

## 2022-07-03 NOTE — BH INPATIENT PSYCHIATRY ASSESSMENT NOTE - NSBHMETABOLIC_PSY_ALL_CORE_FT
BMI: BMI (kg/m2): 21.9 (07-03-22 @ 10:45)  HbA1c: A1C with Estimated Average Glucose Result: 5.4 % (05-17-22 @ 11:11)    Glucose:   BP: 112/74 (07-03-22 @ 10:45) (107/77 - 120/75)  Lipid Panel: Date/Time: 05-17-22 @ 11:11  Cholesterol, Serum: 201  Direct LDL: --  HDL Cholesterol, Serum: 63  Total Cholesterol/HDL Ration Measurement: --  Triglycerides, Serum: 181

## 2022-07-03 NOTE — BH PATIENT PROFILE - HOME MEDICATIONS
clonazePAM 0.5 mg oral tablet , 1 tab(s) orally 2 times a day MDD:2  bictegravir/emtricitabine/tenofovir 50 mg-200 mg-25 mg oral tablet , 1 tab(s) orally once a day  risperiDONE 3 mg oral tablet , 1 tab(s) orally once a day (at bedtime)  benztropine 0.5 mg oral tablet , 1 tab(s) orally 2 times a day  FLUoxetine 20 mg oral capsule , 1 cap(s) orally once a day  traZODone 150 mg oral tablet , 1 tab(s) orally once a day (at bedtime)

## 2022-07-03 NOTE — BH INPATIENT PSYCHIATRY ASSESSMENT NOTE - CURRENT MEDICATION
MEDICATIONS  (STANDING):    MEDICATIONS  (PRN):  acetaminophen     Tablet .. 650 milliGRAM(s) Oral every 6 hours PRN Moderate Pain (4 - 6)  benztropine 1 milliGRAM(s) Oral every 6 hours PRN agitation  haloperidol     Tablet 5 milliGRAM(s) Oral every 6 hours PRN Agitation  LORazepam     Tablet 2 milliGRAM(s) Oral every 6 hours PRN Agitation  polyethylene glycol 3350 17 Gram(s) Oral at bedtime PRN constipation  traZODone 50 milliGRAM(s) Oral at bedtime PRN insomnia

## 2022-07-03 NOTE — BH INPATIENT PSYCHIATRY ASSESSMENT NOTE - NSBHASSESSSUMMFT_PSY_ALL_CORE
35 YO M with hx of substance induced mood less likely schizoaffective and polysubstance use (cocaine/opioid/etoh) disorder with multiple past 8U admissions seems close to baseline. Hx of malingering as well

## 2022-07-03 NOTE — BH INPATIENT PSYCHIATRY ASSESSMENT NOTE - NSBHCHARTREVIEWVS_PSY_A_CORE FT
Vital Signs Last 24 Hrs  T(C): 36.9 (07-03-22 @ 10:45), Max: 36.9 (07-03-22 @ 10:45)  T(F): 98.4 (07-03-22 @ 10:45), Max: 98.4 (07-03-22 @ 10:45)  HR: 57 (07-03-22 @ 10:45) (57 - 68)  BP: 112/74 (07-03-22 @ 10:45) (107/77 - 112/74)  BP(mean): --  RR: 18 (07-03-22 @ 10:45) (18 - 18)  SpO2: 97% (07-03-22 @ 10:45) (97% - 98%)

## 2022-07-04 RX ORDER — BICTEGRAVIR SODIUM, EMTRICITABINE, AND TENOFOVIR ALAFENAMIDE FUMARATE 30; 120; 15 MG/1; MG/1; MG/1
1 TABLET ORAL DAILY
Refills: 0 | Status: DISCONTINUED | OUTPATIENT
Start: 2022-07-04 | End: 2022-07-10

## 2022-07-04 NOTE — BH INPATIENT PSYCHIATRY PROGRESS NOTE - NSBHFUPINTERVALHXFT_PSY_A_CORE
Chart and nursing notes reviewed.  No acute events overnight, VSS.  The patient has been interacting appropriately with staff and peers and has been compliant with medications.  The patient continues to tolerate medications and denies any medication side effects.    On evaluation, the patient is calm, cooperative, demonstrating good behavioral control and linear thought processes.  The patient states that his mood is "okay" and that he is "not doing too bad."  The patient states that the auditory hallucinations have improved (e.g. "they are just whispers now") following metabolization of substances and his suicidal ideation has resolved.  The patient attributes his worsening of auditory hallucinations to cocaine use.  The patient adamantly denies SI/HI, intent and plan, visual hallucinations and no paranoia or delusions are reported or elicited upon interview.  All questions and concerns addressed.

## 2022-07-04 NOTE — BH INPATIENT PSYCHIATRY PROGRESS NOTE - NSBHCHARTREVIEWVS_PSY_A_CORE FT
Vital Signs Last 24 Hrs  T(C): 36.9 (07-04-22 @ 16:58), Max: 36.9 (07-04-22 @ 06:00)  T(F): 98.4 (07-04-22 @ 16:58), Max: 98.5 (07-04-22 @ 06:00)  HR: 70 (07-04-22 @ 16:58) (55 - 70)  BP: 120/74 (07-04-22 @ 16:58) (107/65 - 120/76)  BP(mean): --  RR: 18 (07-04-22 @ 16:58) (16 - 18)  SpO2: 99% (07-04-22 @ 16:58) (99% - 99%)
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
## 2022-07-04 NOTE — BH INPATIENT PSYCHIATRY PROGRESS NOTE - NSBHMETABOLIC_PSY_ALL_CORE_FT
BMI: BMI (kg/m2): 21.9 (07-03-22 @ 10:45)  HbA1c: A1C with Estimated Average Glucose Result: 5.4 % (05-17-22 @ 11:11)    Glucose:   BP: 120/74 (07-04-22 @ 16:58) (107/65 - 120/76)  Lipid Panel: Date/Time: 05-17-22 @ 11:11  Cholesterol, Serum: 201  Direct LDL: --  HDL Cholesterol, Serum: 63  Total Cholesterol/HDL Ration Measurement: --  Triglycerides, Serum: 181

## 2022-07-05 DIAGNOSIS — F60.2 ANTISOCIAL PERSONALITY DISORDER: ICD-10-CM

## 2022-07-05 PROCEDURE — 99233 SBSQ HOSP IP/OBS HIGH 50: CPT

## 2022-07-05 RX ORDER — FLUOXETINE HCL 10 MG
20 CAPSULE ORAL DAILY
Refills: 0 | Status: DISCONTINUED | OUTPATIENT
Start: 2022-07-05 | End: 2022-07-15

## 2022-07-05 RX ADMIN — BICTEGRAVIR SODIUM, EMTRICITABINE, AND TENOFOVIR ALAFENAMIDE FUMARATE 1 TABLET(S): 30; 120; 15 TABLET ORAL at 11:10

## 2022-07-05 RX ADMIN — Medication 20 MILLIGRAM(S): at 16:46

## 2022-07-05 NOTE — BH SOCIAL WORK INITIAL PSYCHOSOCIAL EVALUATION - OTHER PAST PSYCHIATRIC HISTORY (INCLUDE DETAILS REGARDING ONSET, COURSE OF ILLNESS, INPATIENT/OUTPATIENT TREATMENT)
Per chart review, PPH of polysubstance use disorder (alcohol, cocaine, opiates, perhaps others), SIPD, SIMD, prior SA (hanging, jumping out of 3rd story window), multiple prior IP psychiatric and detox/rehab stays (5/21 [facility], 7/21 [facility], 10/21 [facility], 12/21 [facility], 3/22 [facility], 5/14 [facility]), who self-presented today reporting suicidal ideation and hallucinations.    Per chart review, PPH of polysubstance use disorder (alcohol, cocaine, opiates, perhaps others), SIPD, SIMD, prior SA (hanging, jumping out of 3rd story window), multiple prior IP psychiatric and detox/rehab stays (5/21 [facility], 7/21 [facility], 10/21 [facility], 12/21 [facility], 3/22 [facility], 5/14 [facility]), who self-presented to ED reporting suicidal ideation and hallucinations.

## 2022-07-05 NOTE — BH INPATIENT PSYCHIATRY PROGRESS NOTE - NSICDXBHSECONDARYDX_PSY_ALL_CORE
Schizoaffective disorder   F25.9  Substance-induced psychotic disorder   F19.959  Cocaine use disorder   F14.10  Severe alcohol use disorder   F10.20  Severe opioid use disorder   F11.20  Antisocial personality disorder in adult   F60.2

## 2022-07-05 NOTE — BH INPATIENT PSYCHIATRY PROGRESS NOTE - NSBHFUPINTERVALHXFT_PSY_A_CORE
Patient seen with team this morning, requests to be started on 'the right medication' and be referred to rehab. States that this ah has been improving but is mostly at night before bedtime. No reported si/hi/vh or paranoid ideations. No acute medical concerns. Has been compliant with Biktarvy. Continuing to adjust well to the unit.

## 2022-07-05 NOTE — BH SOCIAL WORK INITIAL PSYCHOSOCIAL EVALUATION - NSBHHOUSECONTACTFT_PSY_ALL_CORE
Per chart, pt's address is: [address], [address], [place] [address].  [facility], but provided consent to call main [facility] Phone #: [phone].

## 2022-07-05 NOTE — BH SOCIAL WORK INITIAL PSYCHOSOCIAL EVALUATION - NSBHEMPLOYEDYN_PSY_ALL_CORE
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 He originally was hired by an acquaintance, and worked for approximately 1 month./No

## 2022-07-05 NOTE — BH INPATIENT PSYCHIATRY PROGRESS NOTE - NSBHCHARTREVIEWVS_PSY_A_CORE FT
Vital Signs Last 24 Hrs  T(C): 36.6 (07-05-22 @ 09:32), Max: 36.9 (07-04-22 @ 16:58)  T(F): 97.8 (07-05-22 @ 09:32), Max: 98.4 (07-04-22 @ 16:58)  HR: 62 (07-05-22 @ 09:32) (62 - 70)  BP: 98/59 (07-05-22 @ 09:32) (98/59 - 120/74)  BP(mean): --  RR: 20 (07-05-22 @ 09:32) (18 - 20)  SpO2: 98% (07-05-22 @ 09:32) (98% - 99%)

## 2022-07-05 NOTE — BH SOCIAL WORK INITIAL PSYCHOSOCIAL EVALUATION - NSBHCHILDEVENTS_PSY_ALL_CORE
Pt did not disclose. Pt notes that his nieces, mother, and sisters reside in [place]./Other (specify)
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
## 2022-07-06 LAB
A1C WITH ESTIMATED AVERAGE GLUCOSE RESULT: 4.9 % — SIGNIFICANT CHANGE UP (ref 4–5.6)
CHOLEST SERPL-MCNC: 183 MG/DL — SIGNIFICANT CHANGE UP
ESTIMATED AVERAGE GLUCOSE: 94 MG/DL — SIGNIFICANT CHANGE UP (ref 68–114)
HDLC SERPL-MCNC: 61 MG/DL — SIGNIFICANT CHANGE UP
LIPID PNL WITH DIRECT LDL SERPL: 87 MG/DL — SIGNIFICANT CHANGE UP
NON HDL CHOLESTEROL: 122 MG/DL — SIGNIFICANT CHANGE UP
SARS-COV-2 RNA SPEC QL NAA+PROBE: SIGNIFICANT CHANGE UP
TRIGL SERPL-MCNC: 176 MG/DL — HIGH

## 2022-07-06 PROCEDURE — 99232 SBSQ HOSP IP/OBS MODERATE 35: CPT

## 2022-07-06 RX ORDER — RISPERIDONE 4 MG/1
1 TABLET ORAL AT BEDTIME
Refills: 0 | Status: DISCONTINUED | OUTPATIENT
Start: 2022-07-06 | End: 2022-07-15

## 2022-07-06 RX ADMIN — RISPERIDONE 1 MILLIGRAM(S): 4 TABLET ORAL at 21:32

## 2022-07-06 RX ADMIN — Medication 20 MILLIGRAM(S): at 11:34

## 2022-07-06 RX ADMIN — Medication 50 MILLIGRAM(S): at 21:32

## 2022-07-06 RX ADMIN — BICTEGRAVIR SODIUM, EMTRICITABINE, AND TENOFOVIR ALAFENAMIDE FUMARATE 1 TABLET(S): 30; 120; 15 TABLET ORAL at 11:34

## 2022-07-06 NOTE — BH INPATIENT PSYCHIATRY PROGRESS NOTE - NSBHFUPINTERVALHXFT_PSY_A_CORE
Patient visible on unit, seen today in his room, cooperative on approach, reflective. He states that he began to attend groups last night and went to [facility]. he was irritated during the group and became upset because he realized that he wanted what they facilitators had..sobriety. He continues to endorse goal of inpt rehab after this admission. Is apologetic for his admission stating that he uses the admissions to see if they can help with maintaining sobriety. No si/hi/vh of PI .States that he has ah in the evenings prior to going to bed. Agreeable to risperdal being restarted.
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
## 2022-07-06 NOTE — ED PROVIDER NOTE - NS ED MD DISPO DISCHARGE CCDA
Click to link to [facility] HEART CARE NOTE    Thank you, Dr. Lopez, for asking me to see Lanie Martin in consultation at [facility] to rule out HCM.      Assessment/Plan:   1.  Family history of hypertrophic cardiomyopathy: The patient's father was diagnosed with hypertrophic cardiomyopathy.  Clinically the patient has no symptoms.  Her palpitations are caused by occasional PVCs or PACs.  Her ECG showed normal sinus rhythm, no LVH.  Her echocardiogram demonstrated normal left ventricular size, wall thickness, systolic function, no obvious valvular disease.  Based on her current evaluation, there is no evidence of hypertrophic cardiomyopathy.  Her father did not have a genetically tested.  It is not necessary for her to have gene test.  Based on current evaluation, she has no evidence of a hypertrophic cardiomyopathy at this time.  Recommended EKG annually, echocardiogram every 5 years.  The patient agreed with the plan.    Thank you for the opportunity to be involved in the care of Lanie aMrtin. If you have any questions, please feel free to contact me.  I will see the patient again as needed    Much or all of the text in this note was generated through the use of Dragon Dictate voice-to-text software. Errors in spelling or words which seem out of context are unintentional.   Sound alike errors, in particular, may have escaped editing.       History of Present Illness:   It is my pleasure to see Lanie Martin at the [facility] clinic for evaluation of palpitations. Lanie Martin is a 35 year old female with a medical history of anxiety, depression, family history of hypertrophic cardiomyopathy.    The patient's father was diagnosed with hypertrophic cardiomyopathy.  She was referred to cardiology for ruling out hypertrophic cardiomyopathy.  She denies chest pain, shortness of breath.  She has occasional fluttering  heartbeat.  She had no dizziness, lightheadedness or syncope.  She has no orthopnea, PND or leg edema.  Her blood pressure and heart rate are in normal range.    Her ECG showed normal sinus rhythm, no evidence of LVH.  Echocardiogram was reported normal left ventricular size, wall thickness and systolic function, no obvious valvular disease.    Past Medical History:     Patient Active Problem List   Diagnosis     Other depression     Family history of hypertrophic cardiomyopathy       Past Surgical History:     Past Surgical History:   Procedure Laterality Date     AS CREATE EARDRUM OPENING,GEN ANESTH       LAPAROTOMY MINI, TUBAL LIGATION (POST PARTUM), COMBINED Bilateral 1/22/2021    Procedure: LIGATION, FALLOPIAN TUBE, POSTPARTUM;  Surgeon: Mellissa Torrez DO;  Location: [facility] OR     MOUTH SURGERY      wisdom teeth       Family History:     Family History   Problem Relation Age of Onset     Mental Illness Mother      Depression Mother      Hypertension Father      Hypertrophic cardiomyopathy Father      Coronary Artery Disease Maternal Grandmother         MI x3     Diabetes Maternal Grandfather      Cancer Paternal Grandmother      Cancer Paternal Grandfather      Supraventricular tachycardia Son        Social History:    reports that she has quit smoking. She quit after 7.00 years of use. She has never used smokeless tobacco. She reports current alcohol use. She reports current drug use. Drug: Marijuana.    Review of Systems:   12 systems are reviewed negative except for in HPI.    Meds:     Current Outpatient Medications:      buPROPion (WELLBUTRIN) 100 MG tablet, Take 150 mg by mouth daily, Disp: , Rfl:      escitalopram (LEXAPRO) 20 MG tablet, Take 20 mg by mouth daily, Disp: , Rfl:      Vitamin D, Cholecalciferol, 25 MCG (1000 UT) CAPS, Take 50,000 Int'l Units/L by mouth once a week, Disp: , Rfl:      Allergies:   Tylenol w/codeine [acetaminophen-codeine]    Objective:      Physical Exam  60.3 kg (133 lb)      Body mass index is 23.42 kg/m .  BP 92/60 (BP Location: Left arm, Patient Position: Sitting, Cuff Size: Adult Regular)   Pulse 79   Resp 14   Wt 60.3 kg (133 lb)   LMP 05/07/2022 (Exact Date)   BMI 23.42 kg/m      General Appearance:   Awake, Alert, No acute distress.   HEENT:  Pupil equal, reactive to light. No scleral icterus; the mucous membranes were moist. No oral ulcers or thrush.    Neck: No cervical bruits. No JVD. No thyromegaly. No lymph node enlargement or tenderness.   Chest: The spine was straight. The chest was symmetric.   Lungs:   Respirations unlabored. Lungs are clear to auscultation. No crackles. No wheezing.   Cardiovascular:   RRR, normal first and second heart sounds with no murmurs. No rubs or gallops.    Abdomen:  Soft. No tenderness. Non-distended. Bowels sounds are present   Extremities: Equal posterior tibial pulses. No leg edema.   Skin: No rashes or ulcers. Warm, Dry.   Musculoskeletal: No tenderness. No deformity.   Neurologic: Mood and affect are appropriate. No focal deficits.         EKG:  Personally reivewed  Normal sinus rhythm  Normal ECG     Cardiac Imaging Studies  ECHO on 7-1-2022:  The visual ejection fraction is 55-60%.  Left ventricular systolic function is normal.    Lab Review   Lab Results   Component Value Date     05/10/2022     05/28/2020    CO2 27 05/10/2022    CO2 28 05/28/2020    BUN 7 05/10/2022    BUN 11 05/28/2020     Lab Results   Component Value Date    WBC 3.5 05/10/2022    WBC 13.3 01/20/2021    HGB 13.0 05/10/2022    HGB 12.3 01/20/2021    HCT 39.8 05/10/2022    HCT 35.3 01/20/2021    MCV 94 05/10/2022    MCV 90 01/20/2021     05/10/2022     01/20/2021     Lab Results   Component Value Date    TSH 0.53 05/10/2022                Patient/Caregiver provided printed discharge information.

## 2022-07-06 NOTE — ED ADULT NURSE NOTE - NS_NURSE_OTHER_FACILTIY_ED_ALL_ED_FT
[facility] floor Low 4 Metronidazole Counseling:  I discussed with the patient the risks of metronidazole including but not limited to seizures, nausea/vomiting, a metallic taste in the mouth, nausea/vomiting and severe allergy.

## 2022-07-06 NOTE — BH INPATIENT PSYCHIATRY PROGRESS NOTE - NSBHMETABOLIC_PSY_ALL_CORE_FT
BMI: BMI (kg/m2): 21.9 (07-03-22 @ 10:45)  HbA1c: A1C with Estimated Average Glucose Result: 4.9 % (07-06-22 @ 08:08)    Glucose:   BP: 111/72 (07-06-22 @ 09:00) (98/59 - 120/76)  Lipid Panel: Date/Time: 07-06-22 @ 08:08  Cholesterol, Serum: 183  Direct LDL: --  HDL Cholesterol, Serum: 61  Total Cholesterol/HDL Ration Measurement: --  Triglycerides, Serum: 176

## 2022-07-06 NOTE — BH INPATIENT PSYCHIATRY PROGRESS NOTE - NSBHCHARTREVIEWVS_PSY_A_CORE FT
Vital Signs Last 24 Hrs  T(C): 36.6 (07-06-22 @ 09:00), Max: 36.9 (07-05-22 @ 17:04)  T(F): 97.9 (07-06-22 @ 09:00), Max: 98.4 (07-05-22 @ 17:04)  HR: 55 (07-06-22 @ 09:00) (55 - 72)  BP: 111/72 (07-06-22 @ 09:00) (111/72 - 115/75)  BP(mean): --  RR: 18 (07-06-22 @ 09:00) (18 - 18)  SpO2: 98% (07-06-22 @ 09:00) (98% - 98%)

## 2022-07-07 DIAGNOSIS — E11.9 TYPE 2 DIABETES MELLITUS WITHOUT COMPLICATIONS: ICD-10-CM

## 2022-07-07 DIAGNOSIS — F32.9 MAJOR DEPRESSIVE DISORDER, SINGLE EPISODE, UNSPECIFIED: ICD-10-CM

## 2022-07-07 PROCEDURE — 99232 SBSQ HOSP IP/OBS MODERATE 35: CPT

## 2022-07-07 RX ORDER — BENZTROPINE MESYLATE 1 MG
1 TABLET ORAL AT BEDTIME
Refills: 0 | Status: DISCONTINUED | OUTPATIENT
Start: 2022-07-07 | End: 2022-07-15

## 2022-07-07 RX ADMIN — Medication 20 MILLIGRAM(S): at 10:52

## 2022-07-07 RX ADMIN — BICTEGRAVIR SODIUM, EMTRICITABINE, AND TENOFOVIR ALAFENAMIDE FUMARATE 1 TABLET(S): 30; 120; 15 TABLET ORAL at 10:52

## 2022-07-07 RX ADMIN — Medication 50 MILLIGRAM(S): at 21:38

## 2022-07-07 RX ADMIN — RISPERIDONE 1 MILLIGRAM(S): 4 TABLET ORAL at 21:38

## 2022-07-07 RX ADMIN — Medication 1 MILLIGRAM(S): at 21:37

## 2022-07-07 NOTE — BH INPATIENT PSYCHIATRY PROGRESS NOTE - NSICDXBHSECONDARYDX_PSY_ALL_CORE
Schizoaffective disorder   F25.9  Substance-induced psychotic disorder   F19.959  Cocaine use disorder   F14.10  Severe alcohol use disorder   F10.20  Severe opioid use disorder   F11.20  Antisocial personality disorder in adult   F60.2   Schizoaffective disorder   F25.9  Substance-induced psychotic disorder   F19.959  Cocaine use disorder   F14.10  Severe alcohol use disorder   F10.20  Severe opioid use disorder   F11.20  Antisocial personality disorder in adult   F60.2  Major depression   F32.9
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
## 2022-07-07 NOTE — BH INPATIENT PSYCHIATRY PROGRESS NOTE - NSBHCHARTREVIEWVS_PSY_A_CORE FT
Vital Signs Last 24 Hrs  T(C): 36.8 (07-07-22 @ 09:30), Max: 37.7 (07-06-22 @ 17:00)  T(F): 98.3 (07-07-22 @ 09:30), Max: 99.9 (07-06-22 @ 17:00)  HR: 76 (07-07-22 @ 09:30) (75 - 76)  BP: 113/65 (07-07-22 @ 09:30) (108/67 - 113/65)  BP(mean): --  RR: 18 (07-07-22 @ 09:30) (18 - 18)  SpO2: 99% (07-07-22 @ 09:30) (97% - 99%)

## 2022-07-07 NOTE — BH INPATIENT PSYCHIATRY PROGRESS NOTE - NSBHFUPINTERVALHXFT_PSY_A_CORE
Patient seen walking around the unit, hasn't attended group sessions but states he went to [facility] on 7/5. Slightly guarded upon interview but opened up to conversation upon further discussion. Complains of fatigue and not being able to sleep the past two nights due to hearing "someone talking" and is unsure if it is outside the room or a hallucination. He states he feels as if he can't stop moving around beginning this morning, since restarting Risperdal last night. Denies any other physical complaints, diet changes or bowel changes. He states he has suicidal thoughts around three times per day with no plan or intention. Denies  homicidal thoughts or ideations. Pt endorses feeling "better" since admission but admits that upon discharge he would start using drugs and alcohol immediately again unless he goes to rehab. Willing to go to future group sessions and hopeful for future rehab placement. Patient seen walking around the unit, hasn't attended group sessions but states he went to [facility] on 7/5. Slightly guarded upon interview but opened up with further discussion. Complains of fatigue and not being able to sleep the past two nights due to hearing "someone talking" and is unsure if voices are in the hallway or a hallucination. He states he feels like he can't stop moving since this morning and with restarting Risperdal last night. Denies any other physical complaints, diet changes or bowel changes. He states he has suicidal thoughts three times per day with no plan or intent. Denies homicidal thoughts or ideations. Pt endorses feeling "better" since admission but admits that upon discharge he would start using drugs and alcohol immediately again unless he goes to rehab. Willing to go to future group sessions and hopeful for future rehab placement.

## 2022-07-07 NOTE — BH INPATIENT PSYCHIATRY PROGRESS NOTE - NSBHMETABOLIC_PSY_ALL_CORE_FT
BMI: BMI (kg/m2): 21.9 (07-03-22 @ 10:45)  HbA1c: A1C with Estimated Average Glucose Result: 4.9 % (07-06-22 @ 08:08)    Glucose:   BP: 113/65 (07-07-22 @ 09:30) (98/59 - 120/74)  Lipid Panel: Date/Time: 07-06-22 @ 08:08  Cholesterol, Serum: 183  Direct LDL: --  HDL Cholesterol, Serum: 61  Total Cholesterol/HDL Ration Measurement: --  Triglycerides, Serum: 176

## 2022-07-08 PROCEDURE — 99233 SBSQ HOSP IP/OBS HIGH 50: CPT

## 2022-07-08 RX ADMIN — Medication 1 MILLIGRAM(S): at 22:29

## 2022-07-08 RX ADMIN — Medication 20 MILLIGRAM(S): at 10:08

## 2022-07-08 RX ADMIN — Medication 50 MILLIGRAM(S): at 22:29

## 2022-07-08 RX ADMIN — RISPERIDONE 1 MILLIGRAM(S): 4 TABLET ORAL at 22:28

## 2022-07-08 RX ADMIN — BICTEGRAVIR SODIUM, EMTRICITABINE, AND TENOFOVIR ALAFENAMIDE FUMARATE 1 TABLET(S): 30; 120; 15 TABLET ORAL at 10:09

## 2022-07-08 NOTE — BH INPATIENT PSYCHIATRY PROGRESS NOTE - NSBHFUPINTERVALHXFT_PSY_A_CORE
Patient is visible on unit, seen attending intermittent group sessions and per nursing staff went to 12 step yesterday. Upon approach he is open yet guarded but talkative with further interview. When asked how group sessions are going he states he "doesn't like people" when he is sober but normally has a "social breakthrough" 1-2 months into sobriety. He states he slept well last night and is eating all meals appropriately. Denies any physical complaints or akathisia as noted yesterday. Continues to elicit auditory hallucinations of voices and express ideation for drug and alcohol relapse after discharge. Shows some goal-oriented behavior in wanting to see his children in [place] once he gets sober without any official plans to do so. Denies suicidal thoughts or intent, homicidal thoughts or visual hallucinations.  Patient is visible on unit, seen attending intermittent group sessions and per nursing staff went to 12 step yesterday. Upon approach he is cooperative yet guarded  with further interview. When asked how group sessions are going he states he "doesn't like people" when he is sober but normally has a "social breakthrough" 1-2 months into sobriety. He states he slept well last night and is eating all meals appropriately. Denies any physical complaints or akathisia as noted yesterday. Continues to elicit auditory hallucinations of voices and express ideation for drug and alcohol relapse after discharge. Shows some goal-oriented behavior in wanting to see his children in [place] once he gets sober without any official plans to do so. Denies suicidal thoughts or intent, homicidal thoughts or visual hallucinations.

## 2022-07-08 NOTE — BH INPATIENT PSYCHIATRY PROGRESS NOTE - NSBHCHARTREVIEWVS_PSY_A_CORE FT
Vital Signs Last 24 Hrs  T(C): 37.1 (07-08-22 @ 08:40), Max: 37.1 (07-08-22 @ 08:40)  T(F): 98.7 (07-08-22 @ 08:40), Max: 98.7 (07-08-22 @ 08:40)  HR: 65 (07-08-22 @ 08:40) (65 - 76)  BP: 110/72 (07-08-22 @ 08:40) (107/64 - 110/72)  BP(mean): --  RR: 18 (07-08-22 @ 08:40) (15 - 18)  SpO2: 98% (07-08-22 @ 08:40) (98% - 98%)     Vital Signs Last 24 Hrs  T(C): 37.1 (07-08-22 @ 16:31), Max: 37.1 (07-08-22 @ 08:40)  T(F): 98.7 (07-08-22 @ 16:31), Max: 98.7 (07-08-22 @ 08:40)  HR: 81 (07-08-22 @ 16:31) (65 - 81)  BP: 122/73 (07-08-22 @ 16:31) (107/64 - 122/73)  BP(mean): --  RR: 18 (07-08-22 @ 16:31) (15 - 18)  SpO2: 99% (07-08-22 @ 16:31) (98% - 99%)
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
## 2022-07-08 NOTE — BH INPATIENT PSYCHIATRY PROGRESS NOTE - NSBHMETABOLIC_PSY_ALL_CORE_FT
BMI: BMI (kg/m2): 21.9 (07-03-22 @ 10:45)  HbA1c: A1C with Estimated Average Glucose Result: 4.9 % (07-06-22 @ 08:08)    Glucose:   BP: 110/72 (07-08-22 @ 08:40) (107/64 - 115/75)  Lipid Panel: Date/Time: 07-06-22 @ 08:08  Cholesterol, Serum: 183  Direct LDL: --  HDL Cholesterol, Serum: 61  Total Cholesterol/HDL Ration Measurement: --  Triglycerides, Serum: 176   BMI: BMI (kg/m2): 21.9 (07-03-22 @ 10:45)  HbA1c: A1C with Estimated Average Glucose Result: 4.9 % (07-06-22 @ 08:08)    Glucose:   BP: 122/73 (07-08-22 @ 16:31) (107/64 - 122/73)  Lipid Panel: Date/Time: 07-06-22 @ 08:08  Cholesterol, Serum: 183  Direct LDL: --  HDL Cholesterol, Serum: 61  Total Cholesterol/HDL Ration Measurement: --  Triglycerides, Serum: 176

## 2022-07-08 NOTE — BH INPATIENT PSYCHIATRY PROGRESS NOTE - NSICDXBHSECONDARYDX_PSY_ALL_CORE
Schizoaffective disorder   F25.9  Substance-induced psychotic disorder   F19.959  Cocaine use disorder   F14.10  Severe alcohol use disorder   F10.20  Severe opioid use disorder   F11.20  Antisocial personality disorder in adult   F60.2  Major depression   F32.9

## 2022-07-09 RX ADMIN — BICTEGRAVIR SODIUM, EMTRICITABINE, AND TENOFOVIR ALAFENAMIDE FUMARATE 1 TABLET(S): 30; 120; 15 TABLET ORAL at 09:56

## 2022-07-09 RX ADMIN — RISPERIDONE 1 MILLIGRAM(S): 4 TABLET ORAL at 21:43

## 2022-07-09 RX ADMIN — Medication 20 MILLIGRAM(S): at 09:56

## 2022-07-09 RX ADMIN — Medication 1 MILLIGRAM(S): at 21:42

## 2022-07-09 RX ADMIN — Medication 50 MILLIGRAM(S): at 21:43

## 2022-07-10 PROCEDURE — 99232 SBSQ HOSP IP/OBS MODERATE 35: CPT

## 2022-07-10 RX ORDER — BICTEGRAVIR SODIUM, EMTRICITABINE, AND TENOFOVIR ALAFENAMIDE FUMARATE 30; 120; 15 MG/1; MG/1; MG/1
1 TABLET ORAL DAILY
Refills: 0 | Status: DISCONTINUED | OUTPATIENT
Start: 2022-07-10 | End: 2022-07-15

## 2022-07-10 RX ADMIN — Medication 1 MILLIGRAM(S): at 21:28

## 2022-07-10 RX ADMIN — BICTEGRAVIR SODIUM, EMTRICITABINE, AND TENOFOVIR ALAFENAMIDE FUMARATE 1 TABLET(S): 30; 120; 15 TABLET ORAL at 21:28

## 2022-07-10 RX ADMIN — Medication 50 MILLIGRAM(S): at 21:29

## 2022-07-10 RX ADMIN — BICTEGRAVIR SODIUM, EMTRICITABINE, AND TENOFOVIR ALAFENAMIDE FUMARATE 1 TABLET(S): 30; 120; 15 TABLET ORAL at 09:29

## 2022-07-10 RX ADMIN — Medication 20 MILLIGRAM(S): at 09:29

## 2022-07-10 RX ADMIN — RISPERIDONE 1 MILLIGRAM(S): 4 TABLET ORAL at 21:28

## 2022-07-10 NOTE — BH INPATIENT PSYCHIATRY PROGRESS NOTE - NSBHFUPINTERVALHXFT_PSY_A_CORE
in bed mostly avoidant ; superficial replies; Not endorsing  suicidal or homicidal ideation intent or plans; no mention of auditory or visual hallucinations .

## 2022-07-10 NOTE — BH INPATIENT PSYCHIATRY PROGRESS NOTE - NSBHCHARTREVIEWVS_PSY_A_CORE FT
Vital Signs Last 24 Hrs  T(C): 37.6 (07-09-22 @ 17:00), Max: 37.6 (07-09-22 @ 17:00)  T(F): 99.7 (07-09-22 @ 17:00), Max: 99.7 (07-09-22 @ 17:00)  HR: 77 (07-09-22 @ 17:00) (63 - 77)  BP: 109/67 (07-09-22 @ 17:00) (109/67 - 110/69)  BP(mean): --  RR: 18 (07-09-22 @ 17:00) (18 - 18)  SpO2: 98% (07-09-22 @ 17:00) (98% - 99%)     Vital Signs Last 24 Hrs  T(C): 36.8 (07-10-22 @ 09:00), Max: 37.6 (07-09-22 @ 17:00)  T(F): 98.2 (07-10-22 @ 09:00), Max: 99.7 (07-09-22 @ 17:00)  HR: 66 (07-10-22 @ 09:00) (66 - 77)  BP: 104/67 (07-10-22 @ 09:00) (104/67 - 109/67)  BP(mean): --  RR: 18 (07-10-22 @ 09:00) (18 - 18)  SpO2: 99% (07-10-22 @ 09:00) (98% - 99%)

## 2022-07-10 NOTE — BH INPATIENT PSYCHIATRY PROGRESS NOTE - NSDCCRITERIA_PSY_ALL_CORE
less suicidal  suicidal ideation is not prominent and patient able to make meaningful future plans.
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
## 2022-07-10 NOTE — BH INPATIENT PSYCHIATRY PROGRESS NOTE - NSBHMETABOLIC_PSY_ALL_CORE_FT
BMI: BMI (kg/m2): 21.9 (07-03-22 @ 10:45)  HbA1c: A1C with Estimated Average Glucose Result: 4.9 % (07-06-22 @ 08:08)    Glucose:   BP: 109/67 (07-09-22 @ 17:00) (107/64 - 122/73)  Lipid Panel: Date/Time: 07-06-22 @ 08:08  Cholesterol, Serum: 183  Direct LDL: --  HDL Cholesterol, Serum: 61  Total Cholesterol/HDL Ration Measurement: --  Triglycerides, Serum: 176   BMI: BMI (kg/m2): 21.9 (07-03-22 @ 10:45)  HbA1c: A1C with Estimated Average Glucose Result: 4.9 % (07-06-22 @ 08:08)    Glucose:   BP: 104/67 (07-10-22 @ 09:00) (104/67 - 122/73)  Lipid Panel: Date/Time: 07-06-22 @ 08:08  Cholesterol, Serum: 183  Direct LDL: --  HDL Cholesterol, Serum: 61  Total Cholesterol/HDL Ration Measurement: --  Triglycerides, Serum: 176

## 2022-07-11 PROCEDURE — 99232 SBSQ HOSP IP/OBS MODERATE 35: CPT

## 2022-07-11 RX ADMIN — BICTEGRAVIR SODIUM, EMTRICITABINE, AND TENOFOVIR ALAFENAMIDE FUMARATE 1 TABLET(S): 30; 120; 15 TABLET ORAL at 11:36

## 2022-07-11 RX ADMIN — Medication 1 MILLIGRAM(S): at 21:33

## 2022-07-11 RX ADMIN — Medication 20 MILLIGRAM(S): at 11:35

## 2022-07-11 RX ADMIN — Medication 50 MILLIGRAM(S): at 21:33

## 2022-07-11 RX ADMIN — RISPERIDONE 1 MILLIGRAM(S): 4 TABLET ORAL at 21:33

## 2022-07-11 NOTE — BH INPATIENT PSYCHIATRY PROGRESS NOTE - NSBHMETABOLIC_PSY_ALL_CORE_FT
BMI: BMI (kg/m2): 21.9 (07-03-22 @ 10:45)  HbA1c: A1C with Estimated Average Glucose Result: 4.9 % (07-06-22 @ 08:08)    Glucose:   BP: 112/71 (07-11-22 @ 08:40) (104/67 - 122/73)  Lipid Panel: Date/Time: 07-06-22 @ 08:08  Cholesterol, Serum: 183  Direct LDL: --  HDL Cholesterol, Serum: 61  Total Cholesterol/HDL Ration Measurement: --  Triglycerides, Serum: 176   BMI: BMI (kg/m2): 21.9 (07-03-22 @ 10:45)  HbA1c: A1C with Estimated Average Glucose Result: 4.9 % (07-06-22 @ 08:08)    Glucose:   BP: 106/68 (07-11-22 @ 16:50) (104/67 - 112/71)  Lipid Panel: Date/Time: 07-06-22 @ 08:08  Cholesterol, Serum: 183  Direct LDL: --  HDL Cholesterol, Serum: 61  Total Cholesterol/HDL Ration Measurement: --  Triglycerides, Serum: 176
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
## 2022-07-11 NOTE — BH INPATIENT PSYCHIATRY PROGRESS NOTE - NSBHFUPINTERVALHXFT_PSY_A_CORE
Patient is visible on the unit, conversing with peers and going to group meetings. Upon approach he continues to be guarded but conversational and stares out window during interview. He states he enjoys discussions with peers and that he has learned "not to fear change". Describes that he is at a crossroads with recovery or relapse and continues to express desire for rehab. He is taking medications but is adverse to therapy and states the last time he saw a therapist 2 years ago he threw a chair at them because they "pushed his buttons". Admits to seeing an unidentifiable man sitting next to him last night and this morning for a brief moment and denies any auditory hallucinations. States he recognizes consequences of suicide now and denies thoughts or plan. Denies homicidal thoughts or intent.  Patient is visible on the unit, conversing with peers and going to group meetings. Upon approach he continues to be guarded and stares out the window but is conversational. He states he enjoys discussions with peers and that he has learned "not to fear change". Describes that he is at a crossroads with recovery or relapse and continues to express desire for rehab. He is taking medications but is adverse to therapy, stating the last time he saw a therapist was 2 years ago and he threw a chair at the therapist because she "pushed his buttons". Admits to seeing an unidentifiable man sitting next to him last night and this morning for a brief moment but denies any auditory hallucinations. States he thinks about the consequences of suicide now and denies suicidal thoughts or intent. Denies homicidal thoughts or intent. Denies any physical complaints and states he is eating and sleeping appropriately.

## 2022-07-11 NOTE — BH INPATIENT PSYCHIATRY PROGRESS NOTE - NSBHCHARTREVIEWVS_PSY_A_CORE FT
Vital Signs Last 24 Hrs  T(C): 36.8 (07-11-22 @ 08:40), Max: 36.8 (07-11-22 @ 08:40)  T(F): 98.3 (07-11-22 @ 08:40), Max: 98.3 (07-11-22 @ 08:40)  HR: 80 (07-11-22 @ 08:40) (80 - 80)  BP: 112/71 (07-11-22 @ 08:40) (112/71 - 112/71)  BP(mean): --  RR: 18 (07-11-22 @ 08:40) (18 - 18)  SpO2: 97% (07-11-22 @ 08:40) (97% - 97%)     Vital Signs Last 24 Hrs  T(C): 37.2 (07-11-22 @ 16:50), Max: 37.2 (07-11-22 @ 16:50)  T(F): 99 (07-11-22 @ 16:50), Max: 99 (07-11-22 @ 16:50)  HR: 78 (07-11-22 @ 16:50) (78 - 80)  BP: 106/68 (07-11-22 @ 16:50) (106/68 - 112/71)  BP(mean): --  RR: 18 (07-11-22 @ 16:50) (18 - 18)  SpO2: 97% (07-11-22 @ 16:50) (97% - 97%)

## 2022-07-12 PROCEDURE — 99232 SBSQ HOSP IP/OBS MODERATE 35: CPT

## 2022-07-12 RX ADMIN — Medication 1 MILLIGRAM(S): at 21:18

## 2022-07-12 RX ADMIN — Medication 20 MILLIGRAM(S): at 09:19

## 2022-07-12 RX ADMIN — Medication 50 MILLIGRAM(S): at 21:18

## 2022-07-12 RX ADMIN — BICTEGRAVIR SODIUM, EMTRICITABINE, AND TENOFOVIR ALAFENAMIDE FUMARATE 1 TABLET(S): 30; 120; 15 TABLET ORAL at 09:19

## 2022-07-12 RX ADMIN — RISPERIDONE 1 MILLIGRAM(S): 4 TABLET ORAL at 21:18

## 2022-07-12 NOTE — BH INPATIENT PSYCHIATRY PROGRESS NOTE - NSBHCHARTREVIEWVS_PSY_A_CORE FT
Vital Signs Last 24 Hrs  T(C): 37.2 (07-11-22 @ 16:50), Max: 37.2 (07-11-22 @ 16:50)  T(F): 99 (07-11-22 @ 16:50), Max: 99 (07-11-22 @ 16:50)  HR: 78 (07-11-22 @ 16:50) (78 - 78)  BP: 106/68 (07-11-22 @ 16:50) (106/68 - 106/68)  BP(mean): --  RR: 18 (07-11-22 @ 16:50) (18 - 18)  SpO2: 97% (07-11-22 @ 16:50) (97% - 97%)     Vital Signs Last 24 Hrs  T(C): 36.7 (07-12-22 @ 09:39), Max: 37.2 (07-11-22 @ 16:50)  T(F): 98.1 (07-12-22 @ 09:39), Max: 99 (07-11-22 @ 16:50)  HR: 74 (07-12-22 @ 09:39) (74 - 78)  BP: 105/61 (07-12-22 @ 09:39) (105/61 - 106/68)  BP(mean): --  RR: 20 (07-12-22 @ 09:39) (18 - 20)  SpO2: 97% (07-12-22 @ 09:39) (97% - 97%)

## 2022-07-12 NOTE — BH INPATIENT PSYCHIATRY PROGRESS NOTE - NSDCCRITERIA_PSY_ALL_CORE
suicidal ideation is not prominent and patient able to make meaningful future plans.   suicidal ideation is not prominent and patient able to make meaningful future plans

## 2022-07-12 NOTE — BH INPATIENT PSYCHIATRY PROGRESS NOTE - NSBHFUPINTERVALHXFT_PSY_A_CORE
Patient visible on the unit and participating in groups consistently. During interview he has a blunted affect and continues to be rehab-focused. Upon conversation of long term rehab states "I originally said no but on second thought I am ready". Discussed his last long-term admission from 2013 as court-mandated after being arrested for distrubution of drugs. He stated it lasted 2 years at a facility in [place] and he hated it because he has no choice but to be there. 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 Requests for a facility outside of [place] in order to "get the f*ck out of [facility]" and one that is co-ed. He describes his stay at [facility] as his "last run around" and states the he doesn't think he will be back after he goes to rehab. Denies any physical complaints and states he is eating and sleeping well. Denies any si/hi/ah/vh.  Patient visible on the unit and participating in groups consistently. During interview he has a blunted affect and continues to be rehab-focused. Upon conversation of long term rehab states "I originally said no but on second thought I am ready". Discussed his last long-term admission from 2013 as court-mandated after being arrested for distrubution of drugs. He stated it lasted 2 years at a facility in [place] and he hated it because he has no choice but to be there. 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 Requests for a facility outside of [place] in order to "get the f*ck out of [facility]" and one that is also co-ed. He describes his stay at [facility] as his "last run around" and states the he doesn't think he will be back after he goes to rehab. Denies any physical complaints and states he is eating and sleeping well. Denies any si/hi/ah/vh.

## 2022-07-12 NOTE — BH INPATIENT PSYCHIATRY PROGRESS NOTE - NSBHMETABOLIC_PSY_ALL_CORE_FT
BMI: BMI (kg/m2): 21.9 (07-03-22 @ 10:45)  HbA1c: A1C with Estimated Average Glucose Result: 4.9 % (07-06-22 @ 08:08)    Glucose:   BP: 106/68 (07-11-22 @ 16:50) (104/67 - 112/71)  Lipid Panel: Date/Time: 07-06-22 @ 08:08  Cholesterol, Serum: 183  Direct LDL: --  HDL Cholesterol, Serum: 61  Total Cholesterol/HDL Ration Measurement: --  Triglycerides, Serum: 176   BMI: BMI (kg/m2): 21.9 (07-03-22 @ 10:45)  HbA1c: A1C with Estimated Average Glucose Result: 4.9 % (07-06-22 @ 08:08)    Glucose:   BP: 105/61 (07-12-22 @ 09:39) (104/67 - 112/71)  Lipid Panel: Date/Time: 07-06-22 @ 08:08  Cholesterol, Serum: 183  Direct LDL: --  HDL Cholesterol, Serum: 61  Total Cholesterol/HDL Ration Measurement: --  Triglycerides, Serum: 176

## 2022-07-12 NOTE — BH INPATIENT PSYCHIATRY PROGRESS NOTE - NSICDXBHSECONDARYDX_PSY_ALL_CORE
Schizoaffective disorder   F25.9  Substance-induced psychotic disorder   F19.959  Cocaine use disorder   F14.10  Severe alcohol use disorder   F10.20  Severe opioid use disorder   F11.20  Antisocial personality disorder in adult   F60.2  Major depression   F32.9   Antisocial personality disorder in adult   F60.2  Schizoaffective disorder   F25.9  Substance-induced psychotic disorder   F19.959  Cocaine use disorder   F14.10  Severe alcohol use disorder   F10.20  Severe opioid use disorder   F11.20  Major depression   F32.9

## 2022-07-12 NOTE — BH INPATIENT PSYCHIATRY PROGRESS NOTE - NSBHASSESSSUMMFT_PSY_ALL_CORE
36 year old male, single, domiciled in [facility]A housing, unemployed, with PMH of HIV (on Biktarvy) and PPH of polysubstance use disorder (alcohol, cocaine, cannabis, opioids), SIPD, SIMD, history of multiple inpatient psychiatric hospitalizations most recently at [facility] (01/07/22-01/19/22) MDD recurrent in full remission and multiple ROA detox/rehab admissions most recently at [facility] (03/06/22-03/17/22) and [facility] (12/16/21-12/22/21), history of prior suicide attempts (x5 most recently in 2021 when he impulsively jumped in front of car, others via hanging and jumping out of a third story window), history of violence, no history of trauma, BIB self reporting worsening AH and SI in the setting of cocaine use.    On evaluation, the patient is calm, cooperative, demonstrating good behavioral control and linear thought processes.  The patient states that his mood is "okay," and he reports resolution of SI and significant improvement in AH following metabolization of substances.  The patient attributes his worsening of auditory hallucinations to cocaine use.  The patient adamantly denies SI/HI, intent and plan, visual hallucinations and no paranoia or delusions are reported or elicited upon interview.  The patient continues to require inpatient psychiatric hospitalization for safety, medication optimization and psychiatric stabilization.    Plan:   # SIP vs SIMD vs MDD:  # Cluster-B Personality Traits:  - consider restarting risperidone 3 mg PO qHs for AH given documentation of prior good efficacy  - consider restarting fluoxetine 20 mg PO daily for depressive and anxious symptoms.  - haldol 5 mg PO/IM, lorazepam 2 mg PO/IM, diphenhydramine 50 mg PO/IM q6h PRN for agitation.  -  benztropine 1 mg PO  for EPS    # Polysubstance Use Disorder (Cocaine, Opioids, Alcohol):  - motivational interviewing  - referral to outpatient substance use treatment      # HIV:   - continue bictegravir/emtricitabine/tenofovir 50 mg-200 mg-25mg oral tablet: 1 tab(s) orally once a day    7/5 Prozac 20mg qd added to regimen to treat depressive sxs, pt compliant with Biktarvy. No si/hi/vh or PI expressed  7/6 tolerating med regimen well, reflective, future oriented, Risperdal 1mg qhs initiated for ongoing ah, denies si/hi/vh  7/7 endorses akathisia this AM since restarting Risperdal 1mg qhs, cogentin 1mg qhs to be started reflective and future oriented, has suicidal thoughts without intent, denies hi/vh, considering rehab  7/8 denies medication SE, went to 12 step yesterday, rehab-focused with continued ideations of relapse if discharged home, denies si/hi/ah/vh  7/10: in bed mostly avoidant; Not endorsing  suicidal or homicidal ideation intent or plans; no mention of auditory or visual hallucinations Alert; oriented; cognition intact; speech clear; no tremor or evidence of movement impairment.   7/11: guarded but conversational, describes new visual hallucinations and continues to be rehab focused, denies si/hi/ah, attending groups 36 year old male, single, domiciled in [facility]A housing, unemployed, with PMH of HIV (on Biktarvy) and PPH of polysubstance use disorder (alcohol, cocaine, cannabis, opioids), SIPD, SIMD, history of multiple inpatient psychiatric hospitalizations most recently at [facility] (01/07/22-01/19/22) MDD recurrent in full remission and [facility] and [facility] (12/16/21-12/22/21), history of prior suicide attempts (x5 most recently in 2021 when he impulsively jumped in front of car, others via hanging and jumping out of a third story window), history of violence, no history of trauma, BIB self reporting worsening AH and SI in the setting of cocaine use.    On evaluation, the patient is calm, cooperative, demonstrating good behavioral control and linear thought processes.  The patient states that his mood is "okay," and he reports resolution of SI and significant improvement in AH following metabolization of substances.  The patient attributes his worsening of auditory hallucinations to cocaine use.  The patient adamantly denies SI/HI, intent and plan, visual hallucinations and no paranoia or delusions are reported or elicited upon interview.  The patient continues to require inpatient psychiatric hospitalization for safety, medication optimization and psychiatric stabilization.    Plan:   # SIP vs SIMD vs MDD:  # Cluster-B Personality Traits:  - consider restarting risperidone 3 mg PO qHs for AH given documentation of prior good efficacy  - consider restarting fluoxetine 20 mg PO daily for depressive and anxious symptoms.  - haldol 5 mg PO/IM, lorazepam 2 mg PO/IM, diphenhydramine 50 mg PO/IM q6h PRN for agitation.  -  benztropine 1 mg PO  for EPS    # Polysubstance Use Disorder (Cocaine, Opioids, Alcohol):  - motivational interviewing  - referral to outpatient substance use treatment      # HIV:   - continue bictegravir/emtricitabine/tenofovir 50 mg-200 mg-25mg oral tablet: 1 tab(s) orally once a day    7/5 Prozac 20mg qd added to regimen to treat depressive sxs, pt compliant with Biktarvy. No si/hi/vh or PI expressed  7/6 tolerating med regimen well, reflective, future oriented, Risperdal 1mg qhs initiated for ongoing ah, denies si/hi/vh  7/7 endorses akathisia this AM since restarting Risperdal 1mg qhs, cogentin 1mg qhs to be started reflective and future oriented, has suicidal thoughts without intent, denies hi/vh, considering rehab  7/8 denies medication SE, went to 12 step yesterday, rehab-focused with continued ideations of relapse if discharged home, denies si/hi/ah/vh  7/10: in bed mostly avoidant; Not endorsing  suicidal or homicidal ideation intent or plans; no mention of auditory or visual hallucinations Alert; oriented; cognition intact; speech clear; no tremor or evidence of movement impairment.   7/11: guarded but conversational, describes new visual hallucinations and continues to be rehab focused, denies si/hi/ah, attending groups  7/11: visible on unit and attending groups, future and rehab-focused and open to long-term facility, denies si/hi/vh/ah.  36 year old male, single, domiciled in [facility] housing, unemployed, with PMH of HIV (on Biktarvy) and PPH of polysubstance use disorder (alcohol, cocaine, cannabis, opioids), SIPD, SIMD, history of multiple inpatient psychiatric hospitalizations most recently at [facility] (01/07/22-01/19/22) MDD recurrent in full remission and multiple ROA detox/rehab admissions most recently at [facility] (03/06/22-03/17/22) and [facility] (12/16/21-12/22/21), history of prior suicide attempts (x5 most recently in 2021 when he impulsively jumped in front of car, others via hanging and jumping out of a third story window), history of violence, no history of trauma, BIB self reporting worsening AH and SI in the setting of cocaine use.    On evaluation, the patient is calm, cooperative, demonstrating good behavioral control and linear thought processes.  The patient states that his mood is "okay," and he reports resolution of SI and significant improvement in AH following metabolization of substances.  The patient attributes his worsening of auditory hallucinations to cocaine use.  The patient adamantly denies SI/HI, intent and plan, visual hallucinations and no paranoia or delusions are reported or elicited upon interview.  The patient continues to require inpatient psychiatric hospitalization for safety, medication optimization and psychiatric stabilization.    Plan:   # SIP vs SIMD vs MDD:  # Cluster-B Personality Traits:  - consider restarting risperidone 3 mg PO qHs for AH given documentation of prior good efficacy  - consider restarting fluoxetine 20 mg PO daily for depressive and anxious symptoms.  - haldol 5 mg PO/IM, lorazepam 2 mg PO/IM, diphenhydramine 50 mg PO/IM q6h PRN for agitation.  -  benztropine 1 mg PO  for EPS    # Polysubstance Use Disorder (Cocaine, Opioids, Alcohol):  - motivational interviewing  - referral to outpatient substance use treatment      # HIV:   - continue bictegravir/emtricitabine/tenofovir 50 mg-200 mg-25mg oral tablet: 1 tab(s) orally once a day    7/5 Prozac 20mg qd added to regimen to treat depressive sxs, pt compliant with Biktarvy. No si/hi/vh or PI expressed  7/6 tolerating med regimen well, reflective, future oriented, Risperdal 1mg qhs initiated for ongoing ah, denies si/hi/vh  7/7 endorses akathisia this AM since restarting Risperdal 1mg qhs, cogentin 1mg qhs to be started reflective and future oriented, has suicidal thoughts without intent, denies hi/vh, considering rehab  7/8 denies medication SE, went to 12 step yesterday, rehab-focused with continued ideations of relapse if discharged home, denies si/hi/ah/vh  7/10: in bed mostly avoidant; Not endorsing  suicidal or homicidal ideation intent or plans; no mention of auditory or visual hallucinations Alert; oriented; cognition intact; speech clear; no tremor or evidence of movement impairment.   7/11: guarded but conversational, describes new visual hallucinations and continues to be rehab focused, denies si/hi/ah, attending groups  7/12: visible on unit and attending groups, future and rehab-focused and open to long-term residential rehab, denies si/hi/vh/ah.

## 2022-07-13 PROCEDURE — 99232 SBSQ HOSP IP/OBS MODERATE 35: CPT

## 2022-07-13 RX ADMIN — BICTEGRAVIR SODIUM, EMTRICITABINE, AND TENOFOVIR ALAFENAMIDE FUMARATE 1 TABLET(S): 30; 120; 15 TABLET ORAL at 09:38

## 2022-07-13 RX ADMIN — Medication 20 MILLIGRAM(S): at 09:38

## 2022-07-13 RX ADMIN — Medication 1 MILLIGRAM(S): at 21:25

## 2022-07-13 RX ADMIN — Medication 50 MILLIGRAM(S): at 21:24

## 2022-07-13 RX ADMIN — RISPERIDONE 1 MILLIGRAM(S): 4 TABLET ORAL at 21:26

## 2022-07-13 NOTE — BH INPATIENT PSYCHIATRY PROGRESS NOTE - NSBHCHARTREVIEWVS_PSY_A_CORE FT
Vital Signs Last 24 Hrs  T(C): 37 (07-12-22 @ 16:45), Max: 37 (07-12-22 @ 16:45)  T(F): 98.6 (07-12-22 @ 16:45), Max: 98.6 (07-12-22 @ 16:45)  HR: 86 (07-12-22 @ 16:45) (74 - 86)  BP: 117/74 (07-12-22 @ 16:45) (105/61 - 117/74)  BP(mean): --  RR: 18 (07-12-22 @ 16:45) (18 - 20)  SpO2: 98% (07-12-22 @ 16:45) (97% - 98%)     Vital Signs Last 24 Hrs  T(C): 37.1 (07-13-22 @ 08:13), Max: 37.1 (07-13-22 @ 08:13)  T(F): 98.8 (07-13-22 @ 08:13), Max: 98.8 (07-13-22 @ 08:13)  HR: 70 (07-13-22 @ 08:13) (70 - 86)  BP: 104/66 (07-13-22 @ 08:13) (104/66 - 117/74)  BP(mean): --  RR: 18 (07-13-22 @ 08:13) (18 - 18)  SpO2: 98% (07-13-22 @ 08:13) (98% - 98%)     Vital Signs Last 24 Hrs  T(C): 37.4 (07-13-22 @ 16:43), Max: 37.4 (07-13-22 @ 16:43)  T(F): 99.3 (07-13-22 @ 16:43), Max: 99.3 (07-13-22 @ 16:43)  HR: 73 (07-13-22 @ 16:43) (70 - 73)  BP: 112/69 (07-13-22 @ 16:43) (104/66 - 112/69)  BP(mean): --  RR: 18 (07-13-22 @ 16:43) (18 - 18)  SpO2: 97% (07-13-22 @ 16:43) (97% - 98%)

## 2022-07-13 NOTE — BH INPATIENT PSYCHIATRY PROGRESS NOTE - NSBHFUPINTERVALHXFT_PSY_A_CORE
Patient visible on units and continues to go to groups and pace halls. Remains rehab-focused. States he is now agreeable to long-term rehab and is "nervous but excited" and wants to "see how it goes". When asked why he wanted a co-ed facility at first he states that male-only facilities have "masculine energy" but now he will go anywhere. Discusses concern of losing his benefits while in rehab and recertifying with his .  Overall states decreased depression/anxiety and denies si/hi/ah/vh.

## 2022-07-13 NOTE — BH INPATIENT PSYCHIATRY PROGRESS NOTE - NSBHMETABOLIC_PSY_ALL_CORE_FT
BMI: BMI (kg/m2): 21.9 (07-03-22 @ 10:45)  HbA1c: A1C with Estimated Average Glucose Result: 4.9 % (07-06-22 @ 08:08)    Glucose:   BP: 117/74 (07-12-22 @ 16:45) (104/67 - 117/74)  Lipid Panel: Date/Time: 07-06-22 @ 08:08  Cholesterol, Serum: 183  Direct LDL: --  HDL Cholesterol, Serum: 61  Total Cholesterol/HDL Ration Measurement: --  Triglycerides, Serum: 176   BMI: BMI (kg/m2): 21.9 (07-03-22 @ 10:45)  HbA1c: A1C with Estimated Average Glucose Result: 4.9 % (07-06-22 @ 08:08)    Glucose:   BP: 104/66 (07-13-22 @ 08:13) (104/66 - 117/74)  Lipid Panel: Date/Time: 07-06-22 @ 08:08  Cholesterol, Serum: 183  Direct LDL: --  HDL Cholesterol, Serum: 61  Total Cholesterol/HDL Ration Measurement: --  Triglycerides, Serum: 176   BMI: BMI (kg/m2): 21.9 (07-03-22 @ 10:45)  HbA1c: A1C with Estimated Average Glucose Result: 4.9 % (07-06-22 @ 08:08)    Glucose:   BP: 112/69 (07-13-22 @ 16:43) (104/66 - 117/74)  Lipid Panel: Date/Time: 07-06-22 @ 08:08  Cholesterol, Serum: 183  Direct LDL: --  HDL Cholesterol, Serum: 61  Total Cholesterol/HDL Ration Measurement: --  Triglycerides, Serum: 176

## 2022-07-13 NOTE — BH INPATIENT PSYCHIATRY PROGRESS NOTE - NSICDXBHSECONDARYDX_PSY_ALL_CORE
Antisocial personality disorder in adult   F60.2  Schizoaffective disorder   F25.9  Substance-induced psychotic disorder   F19.959  Cocaine use disorder   F14.10  Severe alcohol use disorder   F10.20  Severe opioid use disorder   F11.20  Major depression   F32.9

## 2022-07-13 NOTE — BH INPATIENT PSYCHIATRY PROGRESS NOTE - NSBHASSESSSUMMFT_PSY_ALL_CORE
36 year old male, single, domiciled in [facility] housing, unemployed, with PMH of HIV (on Biktarvy) and PPH of polysubstance use disorder (alcohol, cocaine, cannabis, opioids), SIPD, SIMD, history of multiple inpatient psychiatric hospitalizations most recently at [facility] (01/07/22-01/19/22) MDD recurrent in full remission and multiple ROA detox/rehab admissions most recently at [facility] (03/06/22-03/17/22) and [facility] (12/16/21-12/22/21), history of prior suicide attempts (x5 most recently in 2021 when he impulsively jumped in front of car, others via hanging and jumping out of a third story window), history of violence, no history of trauma, BIB self reporting worsening AH and SI in the setting of cocaine use.    On evaluation, the patient is calm, cooperative, demonstrating good behavioral control and linear thought processes.  The patient states that his mood is "okay," and he reports resolution of SI and significant improvement in AH following metabolization of substances.  The patient attributes his worsening of auditory hallucinations to cocaine use.  The patient adamantly denies SI/HI, intent and plan, visual hallucinations and no paranoia or delusions are reported or elicited upon interview.  The patient continues to require inpatient psychiatric hospitalization for safety, medication optimization and psychiatric stabilization.    Plan:   # SIP vs SIMD vs MDD:  # Cluster-B Personality Traits:  - consider restarting risperidone 3 mg PO qHs for AH given documentation of prior good efficacy  - consider restarting fluoxetine 20 mg PO daily for depressive and anxious symptoms.  - haldol 5 mg PO/IM, lorazepam 2 mg PO/IM, diphenhydramine 50 mg PO/IM q6h PRN for agitation.  -  benztropine 1 mg PO  for EPS    # Polysubstance Use Disorder (Cocaine, Opioids, Alcohol):  - motivational interviewing  - referral to outpatient substance use treatment      # HIV:   - continue bictegravir/emtricitabine/tenofovir 50 mg-200 mg-25mg oral tablet: 1 tab(s) orally once a day    7/5 Prozac 20mg qd added to regimen to treat depressive sxs, pt compliant with Biktarvy. No si/hi/vh or PI expressed  7/6 tolerating med regimen well, reflective, future oriented, Risperdal 1mg qhs initiated for ongoing ah, denies si/hi/vh  7/7 endorses akathisia this AM since restarting Risperdal 1mg qhs, cogentin 1mg qhs to be started reflective and future oriented, has suicidal thoughts without intent, denies hi/vh, considering rehab  7/8 denies medication SE, went to 12 step yesterday, rehab-focused with continued ideations of relapse if discharged home, denies si/hi/ah/vh  7/10: in bed mostly avoidant; Not endorsing  suicidal or homicidal ideation intent or plans; no mention of auditory or visual hallucinations Alert; oriented; cognition intact; speech clear; no tremor or evidence of movement impairment.   7/11: guarded but conversational, describes new visual hallucinations and continues to be rehab focused, denies si/hi/ah, attending groups  7/12: visible on unit and attending groups, future and rehab-focused and open to long-term residential rehab, denies si/hi/vh/ah.  36 year old male, single, domiciled in [facility] housing, unemployed, with PMH of HIV (on Biktarvy) and PPH of polysubstance use disorder (alcohol, cocaine, cannabis, opioids), SIPD, SIMD, history of multiple inpatient psychiatric hospitalizations most recently at [facility] (01/07/22-01/19/22) MDD recurrent in full remission and multiple ROA detox/rehab admissions most recently at [facility] (03/06/22-03/17/22) and [facility] (12/16/21-12/22/21), history of prior suicide attempts (x5 most recently in 2021 when he impulsively jumped in front of car, others via hanging and jumping out of a third story window), history of violence, no history of trauma, BIB self reporting worsening AH and SI in the setting of cocaine use.    On evaluation, the patient is calm, cooperative, demonstrating good behavioral control and linear thought processes.  The patient states that his mood is "okay," and he reports resolution of SI and significant improvement in AH following metabolization of substances.  The patient attributes his worsening of auditory hallucinations to cocaine use.  The patient adamantly denies SI/HI, intent and plan, visual hallucinations and no paranoia or delusions are reported or elicited upon interview.  The patient continues to require inpatient psychiatric hospitalization for safety, medication optimization and psychiatric stabilization.    Plan:   # SIP vs SIMD vs MDD:  # Cluster-B Personality Traits:  - consider restarting risperidone 3 mg PO qHs for AH given documentation of prior good efficacy  - consider restarting fluoxetine 20 mg PO daily for depressive and anxious symptoms.  - haldol 5 mg PO/IM, lorazepam 2 mg PO/IM, diphenhydramine 50 mg PO/IM q6h PRN for agitation.  -  benztropine 1 mg PO  for EPS    # Polysubstance Use Disorder (Cocaine, Opioids, Alcohol):  - motivational interviewing  - referral to outpatient substance use treatment      # HIV:   - continue bictegravir/emtricitabine/tenofovir 50 mg-200 mg-25mg oral tablet: 1 tab(s) orally once a day    7/5 Prozac 20mg qd added to regimen to treat depressive sxs, pt compliant with Biktarvy. No si/hi/vh or PI expressed  7/6 tolerating med regimen well, reflective, future oriented, Risperdal 1mg qhs initiated for ongoing ah, denies si/hi/vh  7/7 endorses akathisia this AM since restarting Risperdal 1mg qhs, cogentin 1mg qhs to be started reflective and future oriented, has suicidal thoughts without intent, denies hi/vh, considering rehab  7/8 denies medication SE, went to 12 step yesterday, rehab-focused with continued ideations of relapse if discharged home, denies si/hi/ah/vh  7/10: in bed mostly avoidant; Not endorsing  suicidal or homicidal ideation intent or plans; no mention of auditory or visual hallucinations Alert; oriented; cognition intact; speech clear; no tremor or evidence of movement impairment.   7/11: guarded but conversational, describes new visual hallucinations and continues to be rehab focused, denies si/hi/ah, attending groups  7/12: visible on unit and attending groups, future and rehab-focused and open to long-term residential rehab, denies si/hi/vh/ah.   7/13 attending groups and conversational, awaiting placement for long-term rehab now that pt is agreeable, denies si/hi/ah/vh

## 2022-07-14 PROCEDURE — 99232 SBSQ HOSP IP/OBS MODERATE 35: CPT

## 2022-07-14 RX ORDER — TRAZODONE HCL 50 MG
1 TABLET ORAL
Qty: 14 | Refills: 0
Start: 2022-07-14 | End: 2022-07-27

## 2022-07-14 RX ORDER — BICTEGRAVIR SODIUM, EMTRICITABINE, AND TENOFOVIR ALAFENAMIDE FUMARATE 30; 120; 15 MG/1; MG/1; MG/1
1 TABLET ORAL
Qty: 30 | Refills: 0
Start: 2022-07-14 | End: 2022-08-12

## 2022-07-14 RX ORDER — BENZTROPINE MESYLATE 1 MG
1 TABLET ORAL
Qty: 0 | Refills: 0 | DISCHARGE
Start: 2022-07-14

## 2022-07-14 RX ORDER — RISPERIDONE 4 MG/1
1 TABLET ORAL
Qty: 0 | Refills: 0 | DISCHARGE
Start: 2022-07-14

## 2022-07-14 RX ORDER — BENZTROPINE MESYLATE 1 MG
1 TABLET ORAL
Qty: 14 | Refills: 0
Start: 2022-07-14 | End: 2022-07-27

## 2022-07-14 RX ORDER — RISPERIDONE 4 MG/1
1 TABLET ORAL
Qty: 14 | Refills: 0
Start: 2022-07-14 | End: 2022-07-27

## 2022-07-14 RX ORDER — BICTEGRAVIR SODIUM, EMTRICITABINE, AND TENOFOVIR ALAFENAMIDE FUMARATE 30; 120; 15 MG/1; MG/1; MG/1
1 TABLET ORAL
Qty: 0 | Refills: 0 | DISCHARGE
Start: 2022-07-14

## 2022-07-14 RX ORDER — FLUOXETINE HCL 10 MG
1 CAPSULE ORAL
Qty: 14 | Refills: 0
Start: 2022-07-14 | End: 2022-07-27

## 2022-07-14 RX ORDER — TRAZODONE HCL 50 MG
1 TABLET ORAL
Qty: 0 | Refills: 0 | DISCHARGE
Start: 2022-07-14

## 2022-07-14 RX ORDER — FLUOXETINE HCL 10 MG
1 CAPSULE ORAL
Qty: 0 | Refills: 0 | DISCHARGE
Start: 2022-07-14

## 2022-07-14 RX ADMIN — Medication 50 MILLIGRAM(S): at 21:34

## 2022-07-14 RX ADMIN — RISPERIDONE 1 MILLIGRAM(S): 4 TABLET ORAL at 21:36

## 2022-07-14 RX ADMIN — Medication 20 MILLIGRAM(S): at 09:41

## 2022-07-14 RX ADMIN — Medication 1 MILLIGRAM(S): at 21:35

## 2022-07-14 RX ADMIN — BICTEGRAVIR SODIUM, EMTRICITABINE, AND TENOFOVIR ALAFENAMIDE FUMARATE 1 TABLET(S): 30; 120; 15 TABLET ORAL at 09:41

## 2022-07-14 NOTE — BH TREATMENT PLAN - NSTXALCDRGINTERMD_PSY_ALL_CORE
motivational interviewing, AA/NA, consider rehab and naltrexone motivational interviewing, TARI/ALVARADO, Has been rejected by all short-term rehabs, he is refusing long-term, for [facility] and discharge tomorrow.

## 2022-07-14 NOTE — BH INPATIENT PSYCHIATRY PROGRESS NOTE - NSTXDEPRESGOALOTHER_PSY_ALL_CORE
Pt. will participate in groups and milieu tx. structure of unit
Pt. will participate in groups and milieu tx structure of unit
Patient will stabilize mood & alleviate sx of depression to engage with discharge planning.
Pt. will participate in groups and milieu tx. structure of unit
Patient will stabilize mood & alleviate sx of depression to engage with discharge planning.
Patient will stabilize mood & alleviate sx of depression to engage with discharge planning.

## 2022-07-14 NOTE — BH INPATIENT PSYCHIATRY PROGRESS NOTE - NSTXSUICIDINTERMD_PSY_ALL_CORE
psychopharm management x 15 min daily, encourage coping skills to weather strong emotions

## 2022-07-14 NOTE — BH INPATIENT PSYCHIATRY PROGRESS NOTE - OTHER
multiple tattoos on neck and arms

## 2022-07-14 NOTE — BH INPATIENT PSYCHIATRY DISCHARGE NOTE - NSICDXPASTMEDICALHX_GEN_ALL_CORE_FT
PAST MEDICAL HISTORY:  Alcohol abuse     Anxiety     Bipolar disorder     Cocaine abuse     COVID-19     Depression     Heroin abuse     HIV (human immunodeficiency virus infection)     Schizophrenia, unspecified type        PAST MEDICAL HISTORY:  Alcohol abuse     Anxiety     Bipolar disorder     Cocaine abuse     COVID-19     Depression     Heroin abuse     HIV (human immunodeficiency virus infection)     Schizophrenia, unspecified type

## 2022-07-14 NOTE — BH INPATIENT PSYCHIATRY PROGRESS NOTE - NSBHADMITIPREASONDETAILS_PSY_A_CORE FT
Rehab placement vs discharge planning 

## 2022-07-14 NOTE — BH INPATIENT PSYCHIATRY PROGRESS NOTE - NSCGISEVERILLNESS_PSY_ALL_CORE
4 = Moderately ill – overt symptoms causing noticeable, but modest, functional impairment or distress; symptom level may warrant medication

## 2022-07-14 NOTE — BH INPATIENT PSYCHIATRY PROGRESS NOTE - PRN MEDS
MEDICATIONS  (PRN):  acetaminophen     Tablet .. 650 milliGRAM(s) Oral every 6 hours PRN Moderate Pain (4 - 6)  haloperidol     Tablet 5 milliGRAM(s) Oral every 6 hours PRN Agitation  LORazepam     Tablet 2 milliGRAM(s) Oral every 6 hours PRN Agitation  polyethylene glycol 3350 17 Gram(s) Oral at bedtime PRN constipation  traZODone 50 milliGRAM(s) Oral at bedtime PRN insomnia  
MEDICATIONS  (PRN):  acetaminophen     Tablet .. 650 milliGRAM(s) Oral every 6 hours PRN Moderate Pain (4 - 6)  benztropine 1 milliGRAM(s) Oral every 6 hours PRN agitation  haloperidol     Tablet 5 milliGRAM(s) Oral every 6 hours PRN Agitation  LORazepam     Tablet 2 milliGRAM(s) Oral every 6 hours PRN Agitation  polyethylene glycol 3350 17 Gram(s) Oral at bedtime PRN constipation  traZODone 50 milliGRAM(s) Oral at bedtime PRN insomnia  
MEDICATIONS  (PRN):  acetaminophen     Tablet .. 650 milliGRAM(s) Oral every 6 hours PRN Moderate Pain (4 - 6)  haloperidol     Tablet 5 milliGRAM(s) Oral every 6 hours PRN Agitation  LORazepam     Tablet 2 milliGRAM(s) Oral every 6 hours PRN Agitation  polyethylene glycol 3350 17 Gram(s) Oral at bedtime PRN constipation  traZODone 50 milliGRAM(s) Oral at bedtime PRN insomnia  
MEDICATIONS  (PRN):  acetaminophen     Tablet .. 650 milliGRAM(s) Oral every 6 hours PRN Moderate Pain (4 - 6)  haloperidol     Tablet 5 milliGRAM(s) Oral every 6 hours PRN Agitation  LORazepam     Tablet 2 milliGRAM(s) Oral every 6 hours PRN Agitation  polyethylene glycol 3350 17 Gram(s) Oral at bedtime PRN constipation  traZODone 50 milliGRAM(s) Oral at bedtime PRN insomnia  
MEDICATIONS  (PRN):  acetaminophen     Tablet .. 650 milliGRAM(s) Oral every 6 hours PRN Moderate Pain (4 - 6)  benztropine 1 milliGRAM(s) Oral every 6 hours PRN agitation  haloperidol     Tablet 5 milliGRAM(s) Oral every 6 hours PRN Agitation  LORazepam     Tablet 2 milliGRAM(s) Oral every 6 hours PRN Agitation  polyethylene glycol 3350 17 Gram(s) Oral at bedtime PRN constipation  traZODone 50 milliGRAM(s) Oral at bedtime PRN insomnia  
MEDICATIONS  (PRN):  acetaminophen     Tablet .. 650 milliGRAM(s) Oral every 6 hours PRN Moderate Pain (4 - 6)  haloperidol     Tablet 5 milliGRAM(s) Oral every 6 hours PRN Agitation  LORazepam     Tablet 2 milliGRAM(s) Oral every 6 hours PRN Agitation  polyethylene glycol 3350 17 Gram(s) Oral at bedtime PRN constipation  traZODone 50 milliGRAM(s) Oral at bedtime PRN insomnia  
MEDICATIONS  (PRN):  acetaminophen     Tablet .. 650 milliGRAM(s) Oral every 6 hours PRN Moderate Pain (4 - 6)  benztropine 1 milliGRAM(s) Oral every 6 hours PRN agitation  haloperidol     Tablet 5 milliGRAM(s) Oral every 6 hours PRN Agitation  LORazepam     Tablet 2 milliGRAM(s) Oral every 6 hours PRN Agitation  polyethylene glycol 3350 17 Gram(s) Oral at bedtime PRN constipation  traZODone 50 milliGRAM(s) Oral at bedtime PRN insomnia  
MEDICATIONS  (PRN):  acetaminophen     Tablet .. 650 milliGRAM(s) Oral every 6 hours PRN Moderate Pain (4 - 6)  haloperidol     Tablet 5 milliGRAM(s) Oral every 6 hours PRN Agitation  LORazepam     Tablet 2 milliGRAM(s) Oral every 6 hours PRN Agitation  polyethylene glycol 3350 17 Gram(s) Oral at bedtime PRN constipation  traZODone 50 milliGRAM(s) Oral at bedtime PRN insomnia  
MEDICATIONS  (PRN):  acetaminophen     Tablet .. 650 milliGRAM(s) Oral every 6 hours PRN Moderate Pain (4 - 6)  benztropine 1 milliGRAM(s) Oral every 6 hours PRN agitation  haloperidol     Tablet 5 milliGRAM(s) Oral every 6 hours PRN Agitation  LORazepam     Tablet 2 milliGRAM(s) Oral every 6 hours PRN Agitation  polyethylene glycol 3350 17 Gram(s) Oral at bedtime PRN constipation  traZODone 50 milliGRAM(s) Oral at bedtime PRN insomnia  
MEDICATIONS  (PRN):  acetaminophen     Tablet .. 650 milliGRAM(s) Oral every 6 hours PRN Moderate Pain (4 - 6)  haloperidol     Tablet 5 milliGRAM(s) Oral every 6 hours PRN Agitation  LORazepam     Tablet 2 milliGRAM(s) Oral every 6 hours PRN Agitation  polyethylene glycol 3350 17 Gram(s) Oral at bedtime PRN constipation  traZODone 50 milliGRAM(s) Oral at bedtime PRN insomnia

## 2022-07-14 NOTE — BH TREATMENT PLAN - NSTXPLANTHERAPYSESSIONSFT_PSY_ALL_CORE
07-10-22  Type of therapy: Creative arts therapy  Type of session: Group  Level of patient participation: Participates  Duration of participation: 60 minutes  Therapy conducted by: Psych rehab  Therapy Summary: Pt. joined and enthusiastically participated in an open studio group; pt. demonstrated well-related behavior, focus, and good-natured social interaction.    07-11-22  Type of therapy: Addiction education,Other  Type of session: Individual  Level of patient participation: Engaged  Duration of participation: 15 minutes  Therapy conducted by: Social work  Therapy Summary: This SW met with the pt on 7/11/22 to gather his substance use history for his rehab referrals. Pt stated he first used cocaine at 15y/o; last used on 7/3/22; used daily $300/day (5 grams/day). Pt stated he first used alcohol at 17y/o; last used on 7/3/22; drank 3 pints/day of vodka + a 24-pack of beer. Pt stated he first used heroin at 22y/o; last used on 6/1/22; used $30/day, 2x/month. Pt stated that he first used crystal meth at 17y/o; last used mid-April 2022; used 3x/month, $100 per sitting.

## 2022-07-14 NOTE — BH INPATIENT PSYCHIATRY PROGRESS NOTE - NSTXSUBMISINTERMD_PSY_ALL_CORE
motivational interviewing, AA/NA

## 2022-07-14 NOTE — BH INPATIENT PSYCHIATRY PROGRESS NOTE - NSBHFUPINTERVALCCFT_PSY_A_CORE
Auditory Hallucinations and Suicidal Ideation
"Feeling shitty"
"The same"
Auditory Hallucinations   'I want what they have'
Auditory Hallucinations and Suicidal Ideation
"Feeling okay"; treated for mood and substance abuse;  
"Nervous but excited"
"Feeling ready but nervous"
"Feeling okay"
"Like I messed up"

## 2022-07-14 NOTE — BH INPATIENT PSYCHIATRY PROGRESS NOTE - NSBHATTESTBILLINGAW_PSY_A_CORE
[id number]-Unaqroahsm Inpatient care - moderate complexity - 25 minutes
[id number]-Auzepzegzl Inpatient care - moderate complexity - 25 minutes
13534-Vvrdrnxvul Inpatient care - moderate complexity - 25 minutes
[id number]-Rmjgzzqtbk Inpatient care - moderate complexity - 25 minutes
36028-Clrpdsdigx Inpatient care - moderate complexity - 25 minutes
[id number]-Bimwudzhfi Inpatient care - high complexity - 35 minutes
[id number]-Apnuuxwrhj Inpatient care - moderate complexity - 25 minutes
01184-Hwswbvwget Inpatient care - low complexity - 15 minutes
[id number]-Euwfzxhbgn Inpatient care - moderate complexity - 25 minutes
58850-Wbmysufgrg Inpatient care - moderate complexity - 25 minutes

## 2022-07-14 NOTE — BH INPATIENT PSYCHIATRY PROGRESS NOTE - NSTXSUBMISDATEEST_PSY_ALL_CORE
10-Jul-2022
03-Jul-2022
05-Jul-2022
10-Jul-2022
05-Jul-2022
10-Jul-2022
10-Jul-2022

## 2022-07-14 NOTE — BH INPATIENT PSYCHIATRY PROGRESS NOTE - NSTXALCDRGDATETRGT_PSY_ALL_CORE
12-Jul-2022
10-Jul-2022
12-Jul-2022

## 2022-07-14 NOTE — BH INPATIENT PSYCHIATRY PROGRESS NOTE - NSBHATTESTAPPBILLTIME_PSY_A_CORE
I attest my time as POLO is greater than 50% of the total combined time spent on qualifying patient care activities. I have reviewed and verified the documentation.

## 2022-07-14 NOTE — BH INPATIENT PSYCHIATRY DISCHARGE NOTE - HOSPITAL COURSE
7/5 Prozac 20mg qd added to regimen to treat depressive sxs, pt compliant with Biktarvy. No si/hi/vh or PI expressed  7/6 tolerating med regimen well, reflective, future oriented, Risperdal 1mg qhs initiated for ongoing ah, denies si/hi/vh  7/7 endorses akathisia this AM since restarting Risperdal 1mg qhs, cogentin 1mg qhs to be started reflective and future oriented, has suicidal thoughts without intent, denies hi/vh, considering rehab  7/8 denies medication SE, went to 12 step yesterday, rehab-focused with continued ideations of relapse if discharged home, denies si/hi/ah/vh  7/10: in bed mostly avoidant; Not endorsing  suicidal or homicidal ideation intent or plans; no mention of auditory or visual hallucinations Alert; oriented; cognition intact; speech clear; no tremor or evidence of movement impairment.   7/11: guarded but conversational, describes new visual hallucinations and continues to be rehab focused, denies si/hi/ah, attending groups  7/12: visible on unit and attending groups, future and rehab-focused and open to long-term residential rehab, denies si/hi/vh/ah.   7/13 attending groups and conversational, awaiting placement for long-term rehab now that pt is agreeable, denies si/hi/ah/vh 7/5 Prozac 20mg qd added to regimen to treat depressive sxs, pt compliant with Biktarvy. No si/hi/vh or PI expressed  7/6 tolerating med regimen well, reflective, future oriented, Risperdal 1mg qhs initiated for ongoing ah, denies si/hi/vh  7/7 endorses akathisia this AM since restarting Risperdal 1mg qhs, cogentin 1mg qhs to be started reflective and future oriented, has suicidal thoughts without intent, denies hi/vh, considering rehab  7/8 denies medication SE, went to 12 step yesterday, rehab-focused with continued ideations of relapse if discharged home, denies si/hi/ah/vh  7/10: in bed mostly avoidant; Not endorsing  suicidal or homicidal ideation intent or plans; no mention of auditory or visual hallucinations Alert; oriented; cognition intact; speech clear; no tremor or evidence of movement impairment.   7/11: guarded but conversational, describes new visual hallucinations and continues to be rehab focused, denies si/hi/ah, attending groups  7/12: visible on unit and attending groups, future and rehab-focused and open to long-term residential rehab, denies si/hi/vh/ah.   7/13 attending groups and conversational, awaiting placement for long-term rehab now that pt is agreeable, denies si/hi/ah/vh    Patient well known to the unit with multiple prior IP psychiatric and detox/rehab stays (5/21 [facility], 7/21 [facility], 10/21 [facility], 12/21 [facility], 3/22 [facility], 5/14 [facility]).  Since admission has been rehab-focused yet endorsing the idea he will be back at some point (giving advice to improve future meetings, etc). States he thought he wouldn't be admitted to this unit when he presented to the ED because "we don't do anything for him here" and wanted to be sent to another hospital. Has been med compliant and attending groups throughout admission with lack of will to abstain from substance use after discharge. Continuously decreasing depressive symptoms throughout admission and no longer hearing auditory or visual hallucinations. Denies si/hi. 7/5 Prozac 20mg qd added to regimen to treat depressive sxs, pt compliant with Biktarvy. No si/hi/vh or PI expressed  7/6 tolerating med regimen well, reflective, future oriented, Risperdal 1mg qhs initiated for ongoing ah, denies si/hi/vh  7/7 endorses akathisia this AM since restarting Risperdal 1mg qhs, cogentin 1mg qhs to be started reflective and future oriented, has suicidal thoughts without intent, denies hi/vh, considering rehab  7/8 denies medication SE, went to 12 step yesterday, rehab-focused with continued ideations of relapse if discharged home, denies si/hi/ah/vh  7/10: in bed mostly avoidant; Not endorsing  suicidal or homicidal ideation intent or plans; no mention of auditory or visual hallucinations Alert; oriented; cognition intact; speech clear; no tremor or evidence of movement impairment.   7/11: guarded but conversational, describes new visual hallucinations and continues to be rehab focused, denies si/hi/ah, attending groups  7/12: visible on unit and attending groups, future and rehab-focused and open to long-term residential rehab, denies si/hi/vh/ah.   7/13 attending groups and conversational, awaiting placement for long-term rehab now that pt is agreeable, denies si/hi/ah/vh  7/14 Patient pleasant, visible on the unit, appropriately attired in his own clothing, future oriented and discharge focused, states that he needs to be discharged early to go to his  in regards to his benefits and housing. No reported si/hi/avh or PI. Well related, appropriate for discharge. [facility]ations [facility] to [facility] per his request.     Patient well known to the unit with multiple prior IP psychiatric and detox/rehab stays (5/21 [facility], 7/21 [facility], 10/21 [facility], 12/21 [facility], 3/22 [facility], 5/14 [facility]).  Since admission has been rehab-focused, requesting inpatient rehabs. He was referred to four rehabs and was declined by all four due to his behavior. Long term residential was offered to patient which he was initially opposed to but later stated that he would only consider this option if it was outside of the city and only if co-ed. He was accepted by 2, but they were both men only programs. He declined long term rehab and requested to be discharged to outpatient dual diagnosis program. During admission he was overheard by staff sharing intent of returning to the unit at some point in the near future and was also giving advice to improve future meetings/groups, etc . States he did not think that he would be admitted to this unit when he initially presented to the ED because "you don't do anything for me here" and wanted to be sent to another hospital, however he has repeatedly intentionally brought himself to [facility] or [facility]. During admission, he has been med compliant and attending groups. Reported depressive symptoms and auditory hallucinations were addressed on the with appropriate medications. Extensive education provided to pt regarding consistency with his medications and need for outpatient follow up as he regularly does not take medications after discharge and does not follow up with scheduled aftercare. Patient noted to routinely use admission to [facility] as respite, and would benefit most from a rehab program and extended evaluation and treatment in ED instead of admission. Safety plan completed prior to discharge.

## 2022-07-14 NOTE — BH INPATIENT PSYCHIATRY DISCHARGE NOTE - NSDCMRMEDTOKEN_GEN_ALL_CORE_FT
benztropine 0.5 mg oral tablet: 1 tab(s) orally 2 times a day  bictegravir/emtricitabine/tenofovir 50 mg-200 mg-25 mg oral tablet: 1 tab(s) orally once a day  clonazePAM 0.5 mg oral tablet: 1 tab(s) orally 2 times a day MDD:2  FLUoxetine 20 mg oral capsule: 1 cap(s) orally once a day  risperiDONE 3 mg oral tablet: 1 tab(s) orally once a day (at bedtime)  traZODone 150 mg oral tablet: 1 tab(s) orally once a day (at bedtime)   benztropine 1 mg oral tablet: 1 tab(s) orally once a day (at bedtime)  bictegravir/emtricitabine/tenofovir 50 mg-200 mg-25 mg oral tablet: 1 tab(s) orally once a day  FLUoxetine 20 mg oral capsule: 1 cap(s) orally once a day  risperiDONE 1 mg oral tablet: 1 tab(s) orally once a day (at bedtime)  traZODone 50 mg oral tablet: 1 tab(s) orally once a day (at bedtime), As needed, insomnia

## 2022-07-14 NOTE — BH TREATMENT PLAN - NSTXDEPRESINTERMD_PSY_ALL_CORE
psychopharm management x 15 min daily, tackle substance use, encourage rehab psychopharm management x 15 min daily

## 2022-07-14 NOTE — BH TREATMENT PLAN - NSTXSUICIDINTERRN_PSY_ALL_CORE
Enocourage patient to participate in groups. Encourage patient to idenitfy and utlize coping skills to minimize urge to hurt self.
Enocourage patient to participate in groups. Encourage patient to idenitfy and utlize coping skills to minimize urge to hurt self.
Assess and monitor risk for suicide, including but not limited to thoughts and ideation as patient may not disclose voluntarily; consider need for safety measures.

## 2022-07-14 NOTE — BH DISCHARGE NOTE NURSING/SOCIAL WORK/PSYCH REHAB - PATIENT PORTAL LINK FT
You can access the FollowMyHealth Patient Portal offered by [facility] by registering at the following website: [email or web address]. By joining [facility]’s FollowMyHealth portal, you will also be able to view your health information using other applications (apps) compatible with our system.

## 2022-07-14 NOTE — BH INPATIENT PSYCHIATRY DISCHARGE NOTE - HPI (INCLUDE ILLNESS QUALITY, SEVERITY, DURATION, TIMING, CONTEXT, MODIFYING FACTORS, ASSOCIATED SIGNS AND SYMPTOMS)
37 yo M single, domiciled in [place] housing , unemployed, with PMH of HIV on Biktarvy, PPH of polysubstance use disorder (alcohol, cocaine, opiates, perhaps others), SIPD, SIMD, prior SA (hanging, jumping out of 3rd story window), multiple prior IP psychiatric and detox/rehab stays (5/21 [facility], 7/21 [facility], 10/21 [facility], 12/21 [facility], 3/22 [facility], 5/14 [facility]), who self-presented today reporting suicidal ideation and hallucinations.     Amandeep was interviewed in a private room on 1:1. He was calm and cooperative with examination. He reported that he lost his job several days ago, and since that time has had worsening AH and SI. Regarding his AH, he described hearing "conversations about me" outside his window, and voices that said bad things about him. He reported SI to jump out of his window or in front of a car. He rated the likelihood of him acting on SI as "60%." He described several of his previous suicide attempts to me, including stabbing himself in the neck. He endorsed drug use 2 days ago - took cocaine and drank alcohol - but reported sobriety at time of our encounter. He reported he used to take Risperdal but had stopped 2 weeks ago. He was unable to state why. He described VH of talking to a friend in person in [facility] whom he knew to be in retirement. He laughed when describing this, and wondered if he had imagined this while using drugs.     I attempted to safety plan with Amandeep, but he reported he had no support system nor any factors that prevented him from killing himself. He expressed strong concern for his own safety. He agreed to voluntary hospitalization.     Currently pt says he thinks he "overdid it with the drugs" and was hearing voices and seeing things. He says he is still suicidal but he seems to be enjoyinh interacting with peers and watching TV. Seems close to baseline to me and I know him from multiple past admissions.     COLLATERAL (quoted ED  Assessment 5/14/22)    PSYCCELINE  [id number]  Suicidal Ideation: 43x between 2017 and 3/2022  Self Harm: 4x between 2018-8/2019  Flags: high utilization inptER, readmission, EPHRAIM, treatment engagement  Dx: Schizoaffective disorder, polysubstance-use (cocaine, opioid, cannabis, alcohol, tobacco, sedative), SIPD, PTSD, Panic Disorder  PMH: HIV, Herpes Zoster, T1DM, and many more.  Medications: Escitalopram (last 2/2022), gabapentin (12/2021), trazodone, risperidone (9/2021), quetiapine 300 (5/2021); multiple other short trials.   OP: [facility] with 9x visits for MDD last in 10/2021; multiple others.  ED: 21x MH last here on 7/2021; 2x ROA (last on 11/2020)  IP: 28x MH last at [facility] 1/2022; 9x ROA (last [facility] 3/2022).    COVID Exposure Screen- Patient    1. *Have you had a COVID-19 test in the last 90 days? (x) Yes ( ) No ( ) Unknown- Reason: _____  IF YES PROCEED TO QUESTION #2. IF NO OR UNKNOWN, PLEASE SKIP TO QUESTION #3.  2. Date of test(s) and result(s): May 2022 - neg  3. *Have you tested positive for COVID-19 antibodies? ( ) Yes (x) No ( ) Unknown- Reason: _____  IF YES PROCEED TO QUESTION #4. IF NO or UNKNOWN, PLEASE SKIP TO QUESTION #5.  4. Date of positive antibody test: ________  5. *Have you received 2 doses of the COVID-19 vaccine? ( ) Yes (x) No ( ) Unknown- Reason: _____  IF YES PROCEED TO QUESTION #6. IF NO or UNKNOWN, PLEASE SKIP TO QUESTION #7.  6. Date of second dose: ________  7. *In the past 10 days, have you been around anyone with a positive COVID-19 test?* ( ) Yes (x) No ( ) Unknown- Reason: ____  IF YES PROCEED TO QUESTION #8. IF NO or UNKNOWN, PLEASE SKIP TO QUESTION #13.  8. Were you within 6 feet of them for at least 15 minutes? ( ) Yes ( ) No ( ) Unknown- Reason: _____  9. Have you provided care for them? ( ) Yes ( ) No ( ) Unknown- Reason: ______  10. Have you had direct physical contact with them (touched, hugged, or kissed them)? ( ) Yes ( ) No ( ) Unknown- Reason: _____  11. Have you shared eating or drinking utensils with them? ( ) Yes ( ) No ( ) Unknown- Reason: ____  12. Have they sneezed, coughed, or somehow gotten respiratory droplets on you? ( ) Yes ( ) No ( ) Unknown- Reason: ______  13. *Have you been out of [place] within the past 10 days?* ( ) Yes (x) No ( ) Unknown- Reason: _____  IF YES PLEASE ANSWER THE FOLLOWING QUESTIONS:  14. Which state/country have you been to? ______  15. Were you there over 24 hours? ( ) Yes ( ) No ( ) Unknown- Reason: ______  16. Date of return to [facility]: ______

## 2022-07-14 NOTE — BH TREATMENT PLAN - NSTXSUICIDGOALOTHER_PSY_ALL_CORE
Patient will stabilize mood & alleviate sx of SI to engage with discharge planning
Patient will stabilize mood & alleviate sx of substance misuse to engage with discharge planning.

## 2022-07-14 NOTE — BH TREATMENT PLAN - NSTXSUICIDINTERMD_PSY_ALL_CORE
psychopharm management x 15 min daily, encourage coping skills to weather strong emotions

## 2022-07-14 NOTE — BH TREATMENT PLAN - NSTXALCDRGINTERRN_PSY_ALL_CORE
Monitor for s/s of with drawal. Encourage patient to participate in AA meetings.
Monitor for s/s of withdrawal. Encourage patient to participate in AA meetings.
patient os alert and oriented x4 calm and cooperative compliant with meds ,joined select groups,social with select peers denies si hi avh ,reports improved mood ,waiting for rehab.

## 2022-07-14 NOTE — BH INPATIENT PSYCHIATRY PROGRESS NOTE - NSBHCHARTREVIEWVS_PSY_A_CORE FT
Vital Signs Last 24 Hrs  T(C): 37.4 (07-13-22 @ 16:43), Max: 37.4 (07-13-22 @ 16:43)  T(F): 99.3 (07-13-22 @ 16:43), Max: 99.3 (07-13-22 @ 16:43)  HR: 73 (07-13-22 @ 16:43) (73 - 73)  BP: 112/69 (07-13-22 @ 16:43) (112/69 - 112/69)  BP(mean): --  RR: 18 (07-13-22 @ 16:43) (18 - 18)  SpO2: 97% (07-13-22 @ 16:43) (97% - 97%)     Vital Signs Last 24 Hrs  T(C): 37.3 (07-14-22 @ 08:13), Max: 37.4 (07-13-22 @ 16:43)  T(F): 99.2 (07-14-22 @ 08:13), Max: 99.3 (07-13-22 @ 16:43)  HR: 80 (07-14-22 @ 08:13) (73 - 80)  BP: 110/73 (07-14-22 @ 08:13) (110/73 - 112/69)  BP(mean): --  RR: 18 (07-14-22 @ 08:13) (18 - 18)  SpO2: 98% (07-14-22 @ 08:13) (97% - 98%)

## 2022-07-14 NOTE — BH TREATMENT PLAN - NSDCCRITERIA_PSY_ALL_CORE
less suicidal 
less suicidal 
suicidal ideation is not prominent and patient able to make meaningful future plans

## 2022-07-14 NOTE — BH INPATIENT PSYCHIATRY PROGRESS NOTE - NSTXDEPRESDATEEST_PSY_ALL_CORE
05-Jul-2022
10-Jul-2022
10-Jul-2022
05-Jul-2022
03-Jul-2022
05-Jul-2022
10-Jul-2022
10-Jul-2022

## 2022-07-14 NOTE — BH DISCHARGE NOTE NURSING/SOCIAL WORK/PSYCH REHAB - NSDCPRRECOMMEND_PSY_ALL_CORE
Pt will review and utilize his safety plan to maintain mental health and personal safety  Pt will reach out to [facility] and/or Ric[facility] Palestine to seek social/support community

## 2022-07-14 NOTE — BH DISCHARGE NOTE NURSING/SOCIAL WORK/PSYCH REHAB - NSDPDISTO_PSY_ALL_CORE
Patient will return to his assigned [facility]./Alternate Care Site Patient will return to his assigned [facility]: [address], [address], [place] [address]./Alternate Care Site

## 2022-07-14 NOTE — BH INPATIENT PSYCHIATRY PROGRESS NOTE - NSTXSUBMISGOALOTHER_PSY_ALL_CORE
Patient will stabilize mood & alleviate sx of substance misuse to engage with discharge planning.
Patient will stabilize mood & alleviate sx of substance misuse to engage with discharge planning.
Pt. will attend 12-step groups when speaker is on unit
Patient will stabilize mood & alleviate sx of substance misuse to engage with discharge planning.
Patient will stabilize mood & alleviate sx of substance misuse to engage with discharge planning.
Pt. will attend 12-step groups when speaker is on the unit
Pt. will attend 12-step groups when speaker is on unit
Patient will stabilize mood & alleviate sx of substance misuse to engage with discharge planning.
Pt. will attend 12-step groups when speaker is on unit
Pt. will attend 12-step groups when speaker is on unit

## 2022-07-14 NOTE — BH TREATMENT PLAN - NSTXSUBMISINTERPR_PSY_ALL_CORE
Pt. will continue to be invited to 12-step groups when speaker is on unit
Pt. will be invited and encouraged to attend 12-step groups when speaker is on unit
Pt. will be invited and encouraged to attend 12-step groups when speaker is on unit

## 2022-07-14 NOTE — BH INPATIENT PSYCHIATRY PROGRESS NOTE - NSBHCHARTREVIEWLAB_PSY_A_CORE FT
No new results

## 2022-07-14 NOTE — BH INPATIENT PSYCHIATRY PROGRESS NOTE - NSBHATTESTBILLONSITE_PSY_A_CORE
POLO to bill

## 2022-07-14 NOTE — BH DISCHARGE NOTE NURSING/SOCIAL WORK/PSYCH REHAB - NSDCPRGOAL_PSY_ALL_CORE
Pt has attended groups increasingly over this hospitalization. Pt has been social with peers in groups and in milieu, and appears better related on this admission than on past ones. Pt demonstrates coburn range of affect over this stay and brighter mood. Pt demonstrates some awareness about own self-destructive behaviors and own role in interpersonal conflicts. Pt reports being in progress of accepting that he is an "addict." Pt endorses goal of maintaining sobriety while at times displaying ambivalence around it. Pt reports wanting to improve interpersonal skills and need to establish a new peer group. Pt endorses intent to engage in outpatient supports and attend 12 step meetings. Pt completed a safety plan and received a copy to take with him.

## 2022-07-14 NOTE — BH TREATMENT PLAN - NSTXALCDRGINTERSW_PSY_ALL_CORE
Discharge planning.  to liaison with collateral contacts & family as needed.
Discharge planning.  to liaison with collateral contacts & family as needed.

## 2022-07-14 NOTE — BH INPATIENT PSYCHIATRY PROGRESS NOTE - NSTXSUBMISDATETRGT_PSY_ALL_CORE
17-Jul-2022
10-Jul-2022
12-Jul-2022
17-Jul-2022
17-Jul-2022
12-Jul-2022
12-Jul-2022
17-Jul-2022

## 2022-07-14 NOTE — BH DISCHARGE NOTE NURSING/SOCIAL WORK/PSYCH REHAB - NSDCVIVACCINE_GEN_ALL_CORE_FT
Tdap; 31-Jul-2018 20:20; Servando Drew (RN); [employer]; [id number]; IntraMuscular; Deltoid Right.; 0.5 milliLiter(s); VIS (VIS Published: 09-May-2013, VIS Presented: 31-Jul-2018);

## 2022-07-14 NOTE — BH INPATIENT PSYCHIATRY PROGRESS NOTE - NSTXALCDRGGOALOTHER_PSY_ALL_CORE
Patient will stabilize mood & alleviate sx of withdrawal to engage with discharge planning.

## 2022-07-14 NOTE — BH INPATIENT PSYCHIATRY DISCHARGE NOTE - NSBHMETABOLIC_PSY_ALL_CORE_FT
BMI: BMI (kg/m2): 21.9 (07-03-22 @ 10:45)  HbA1c: A1C with Estimated Average Glucose Result: 4.9 % (07-06-22 @ 08:08)    Glucose:   BP: 112/69 (07-13-22 @ 16:43) (104/66 - 117/74)  Lipid Panel: Date/Time: 07-06-22 @ 08:08  Cholesterol, Serum: 183  Direct LDL: --  HDL Cholesterol, Serum: 61  Total Cholesterol/HDL Ration Measurement: --  Triglycerides, Serum: 176

## 2022-07-14 NOTE — BH INPATIENT PSYCHIATRY DISCHARGE NOTE - OTHER PAST PSYCHIATRIC HISTORY (INCLUDE DETAILS REGARDING ONSET, COURSE OF ILLNESS, INPATIENT/OUTPATIENT TREATMENT)
Per chart review, PPH of polysubstance use disorder (alcohol, cocaine, opiates, perhaps others), SIPD, SIMD, prior SA (hanging, jumping out of 3rd story window), multiple prior IP psychiatric and detox/rehab stays (5/21 [facility], 7/21 [facility], 10/21 [facility], 12/21 [facility], 3/22 [facility], 5/14 [facility]), who self-presented to ED reporting suicidal ideation and hallucinations.    PPH of polysubstance use disorder (alcohol, cocaine, opiates, perhaps others), SIPD, SIMD, prior SA (hanging, jumping out of 3rd story window), multiple prior IP psychiatric and detox/rehab stays (5/21 [facility], 7/21 [facility], 10/21 [facility], 12/21 [facility], 3/22 [facility], 5/14 [facility]), who self-presented to ED reporting suicidal ideation and hallucinations.

## 2022-07-14 NOTE — BH TREATMENT PLAN - NSTXDEPRESGOALOTHER_PSY_ALL_CORE
Pt. will participate in groups and milieu tx. structure of unit
Pt. will participate in groups and milieu tx structure of unit
Patient will stabilize mood & alleviate sx of depression to engage with discharge planning.

## 2022-07-14 NOTE — BH TREATMENT PLAN - NSTXALCDRGGOALOTHER_PSY_ALL_CORE
Patient will stabilize mood & alleviate sx of withdrawal to engage with discharge planning.
Patient will stabilize mood & alleviate sx of withdrawal to engage with discharge planning.

## 2022-07-14 NOTE — BH INPATIENT PSYCHIATRY PROGRESS NOTE - NSBHMETABOLIC_PSY_ALL_CORE_FT
BMI: BMI (kg/m2): 21.9 (07-03-22 @ 10:45)  HbA1c: A1C with Estimated Average Glucose Result: 4.9 % (07-06-22 @ 08:08)    Glucose:   BP: 112/69 (07-13-22 @ 16:43) (104/66 - 117/74)  Lipid Panel: Date/Time: 07-06-22 @ 08:08  Cholesterol, Serum: 183  Direct LDL: --  HDL Cholesterol, Serum: 61  Total Cholesterol/HDL Ration Measurement: --  Triglycerides, Serum: 176   BMI: BMI (kg/m2): 21.9 (07-03-22 @ 10:45)  HbA1c: A1C with Estimated Average Glucose Result: 4.9 % (07-06-22 @ 08:08)    Glucose:   BP: 110/73 (07-14-22 @ 08:13) (104/66 - 117/74)  Lipid Panel: Date/Time: 07-06-22 @ 08:08  Cholesterol, Serum: 183  Direct LDL: --  HDL Cholesterol, Serum: 61  Total Cholesterol/HDL Ration Measurement: --  Triglycerides, Serum: 176

## 2022-07-14 NOTE — BH INPATIENT PSYCHIATRY PROGRESS NOTE - NSTXSUICIDGOALOTHER_PSY_ALL_CORE
Patient will stabilize mood & alleviate sx of substance misuse to engage with discharge planning.
Patient will stabilize mood & alleviate sx of substance misuse to engage with discharge planning.
Patient will stabilize mood & alleviate sx of SI to engage with discharge planning
Patient will stabilize mood & alleviate sx of SI to engage with discharge planning
Patient will stabilize mood & alleviate sx of substance misuse to engage with discharge planning.
Patient will stabilize mood & alleviate sx of SI to engage with discharge planning
Patient will stabilize mood & alleviate sx of SI to engage with discharge planning
Patient will stabilize mood & alleviate sx of substance misuse to engage with discharge planning.
Patient will stabilize mood & alleviate sx of substance misuse to engage with discharge planning.

## 2022-07-14 NOTE — BH INPATIENT PSYCHIATRY PROGRESS NOTE - NSTXSUICIDDATETRGT_PSY_ALL_CORE
12-Jul-2022
10-Jul-2022
12-Jul-2022

## 2022-07-14 NOTE — BH INPATIENT PSYCHIATRY DISCHARGE NOTE - NSBHSAALC_PSY_A_CORE FT
Per chart: Hx of 1 pint vodka daily, last drink 2 days ago. Hx of 1 pint vodka daily, last drink 2 days ago.

## 2022-07-14 NOTE — BH INPATIENT PSYCHIATRY DISCHARGE NOTE - NSDCCPCAREPLAN_GEN_ALL_CORE_FT
PRINCIPAL DISCHARGE DIAGNOSIS  Diagnosis: Suicidal ideation  Assessment and Plan of Treatment:       SECONDARY DISCHARGE DIAGNOSES  Diagnosis: Substance-induced psychotic disorder  Assessment and Plan of Treatment:     Diagnosis: Auditory hallucination  Assessment and Plan of Treatment:     Diagnosis: Schizoaffective disorder  Assessment and Plan of Treatment:      PRINCIPAL DISCHARGE DIAGNOSIS  Diagnosis: Substance induced mood disorder  Assessment and Plan of Treatment: Continue current medication regimen and follow up with aftercare appointments      SECONDARY DISCHARGE DIAGNOSES  Diagnosis: Schizoaffective disorder  Assessment and Plan of Treatment: Continue current medication regimen and follow up with aftercare appointments    Diagnosis: Antisocial personality disorder in adult  Assessment and Plan of Treatment: Continue current medication regimen and follow up with aftercare appointments    Diagnosis: Polysubstance (including opioids) dependence, daily use  Assessment and Plan of Treatment: Continue current medication regimen and follow up with aftercare appointments    Diagnosis: HIV disease  Assessment and Plan of Treatment: Continue current medication regimen and follow up with aftercare appointments

## 2022-07-14 NOTE — BH TREATMENT PLAN - NSBHPRIMARYDX_PSY_ALL_CORE
Substance-induced psychotic disorder    

## 2022-07-14 NOTE — BH INPATIENT PSYCHIATRY PROGRESS NOTE - NSTXSUICIDGOAL_PSY_ALL_CORE
Other...
Be able to state 3 reasons for living
Other...

## 2022-07-14 NOTE — BH INPATIENT PSYCHIATRY PROGRESS NOTE - NSBHASSESSSUMMFT_PSY_ALL_CORE
36 year old male, single, domiciled in [facility], unemployed, with PMH of HIV (on Biktarvy) and PPH of polysubstance use disorder (alcohol, cocaine, cannabis, opioids), SIPD, SIMD, history of multiple inpatient psychiatric hospitalizations most recently at [facility] (01/07/22-01/19/22) MDD recurrent in full remission and multiple ROA detox/rehab admissions most recently at [facility] (03/06/22-03/17/22) and [facility] (12/16/21-12/22/21), history of prior suicide attempts (x5 most recently in 2021 when he impulsively jumped in front of car, others via hanging and jumping out of a third story window), history of violence, no history of trauma, BIB self reporting worsening AH and SI in the setting of cocaine use.    On evaluation, the patient is calm, cooperative, demonstrating good behavioral control and linear thought processes.  The patient states that his mood is "okay," and he reports resolution of SI and significant improvement in AH following metabolization of substances.  The patient attributes his worsening of auditory hallucinations to cocaine use.  The patient adamantly denies SI/HI, intent and plan, visual hallucinations and no paranoia or delusions are reported or elicited upon interview.  The patient continues to require inpatient psychiatric hospitalization for safety, medication optimization and psychiatric stabilization.    Plan:   # SIP vs SIMD vs MDD:  # Cluster-B Personality Traits:  - consider restarting risperidone 3 mg PO qHs for AH given documentation of prior good efficacy  - consider restarting fluoxetine 20 mg PO daily for depressive and anxious symptoms.  - haldol 5 mg PO/IM, lorazepam 2 mg PO/IM, diphenhydramine 50 mg PO/IM q6h PRN for agitation.  -  benztropine 1 mg PO  for EPS    # Polysubstance Use Disorder (Cocaine, Opioids, Alcohol):  - motivational interviewing  - referral to outpatient substance use treatment      # HIV:   - continue bictegravir/emtricitabine/tenofovir 50 mg-200 mg-25mg oral tablet: 1 tab(s) orally once a day    7/5 Prozac 20mg qd added to regimen to treat depressive sxs, pt compliant with Biktarvy. No si/hi/vh or PI expressed  7/6 tolerating med regimen well, reflective, future oriented, Risperdal 1mg qhs initiated for ongoing ah, denies si/hi/vh  7/7 endorses akathisia this AM since restarting Risperdal 1mg qhs, cogentin 1mg qhs to be started reflective and future oriented, has suicidal thoughts without intent, denies hi/vh, considering rehab  7/8 denies medication SE, went to 12 step yesterday, rehab-focused with continued ideations of relapse if discharged home, denies si/hi/ah/vh  7/10: in bed mostly avoidant; Not endorsing  suicidal or homicidal ideation intent or plans; no mention of auditory or visual hallucinations Alert; oriented; cognition intact; speech clear; no tremor or evidence of movement impairment.   7/11: guarded but conversational, describes new visual hallucinations and continues to be rehab focused, denies si/hi/ah, attending groups  7/12: visible on unit and attending groups, future and rehab-focused and open to long-term residential rehab, denies si/hi/vh/ah.   7/13 attending groups and conversational, awaiting placement for long-term rehab now that pt is agreeable, denies si/hi/ah/vh 36 year old male, single, domiciled in [facility], unemployed, with PMH of HIV (on Biktarvy) and PPH of polysubstance use disorder (alcohol, cocaine, cannabis, opioids), SIPD, SIMD, history of multiple inpatient psychiatric hospitalizations most recently at [facility] (01/07/22-01/19/22) MDD recurrent in full remission and multiple ROA detox/rehab admissions most recently at [facility] (03/06/22-03/17/22) and [facility] (12/16/21-12/22/21), history of prior suicide attempts (x5 most recently in 2021 when he impulsively jumped in front of car, others via hanging and jumping out of a third story window), history of violence, no history of trauma, BIB self reporting worsening AH and SI in the setting of cocaine use.    On evaluation, the patient is calm, cooperative, demonstrating good behavioral control and linear thought processes.  The patient states that his mood is "okay," and he reports resolution of SI and significant improvement in AH following metabolization of substances.  The patient attributes his worsening of auditory hallucinations to cocaine use.  The patient adamantly denies SI/HI, intent and plan, visual hallucinations and no paranoia or delusions are reported or elicited upon interview.  The patient continues to require inpatient psychiatric hospitalization for safety, medication optimization and psychiatric stabilization.    Plan:   # SIP vs SIMD vs MDD:  # Cluster-B Personality Traits:  - consider restarting risperidone 3 mg PO qHs for AH given documentation of prior good efficacy  - consider restarting fluoxetine 20 mg PO daily for depressive and anxious symptoms.  - haldol 5 mg PO/IM, lorazepam 2 mg PO/IM, diphenhydramine 50 mg PO/IM q6h PRN for agitation.  -  benztropine 1 mg PO  for EPS    # Polysubstance Use Disorder (Cocaine, Opioids, Alcohol):  - motivational interviewing  - referral to outpatient substance use treatment      # HIV:   - continue bictegravir/emtricitabine/tenofovir 50 mg-200 mg-25mg oral tablet: 1 tab(s) orally once a day    7/5 Prozac 20mg qd added to regimen to treat depressive sxs, pt compliant with Biktarvy. No si/hi/vh or PI expressed  7/6 tolerating med regimen well, reflective, future oriented, Risperdal 1mg qhs initiated for ongoing ah, denies si/hi/vh  7/7 endorses akathisia this AM since restarting Risperdal 1mg qhs, cogentin 1mg qhs to be started reflective and future oriented, has suicidal thoughts without intent, denies hi/vh, considering rehab  7/8 denies medication SE, went to 12 step yesterday, rehab-focused with continued ideations of relapse if discharged home, denies si/hi/ah/vh  7/10: in bed mostly avoidant; Not endorsing  suicidal or homicidal ideation intent or plans; no mention of auditory or visual hallucinations Alert; oriented; cognition intact; speech clear; no tremor or evidence of movement impairment.   7/11: guarded but conversational, describes new visual hallucinations and continues to be rehab focused, denies si/hi/ah, attending groups  7/12: visible on unit and attending groups, future and rehab-focused and open to long-term residential rehab, denies si/hi/vh/ah.   7/13: attending groups and conversational, awaiting planning for long-term rehab [facility], denies si/hi/ah/vh  7/14: visible on unit and speech clear, refused long term rehab option, [facility], denies si/hi/ah/vh

## 2022-07-14 NOTE — BH INPATIENT PSYCHIATRY PROGRESS NOTE - NSTXALCDRGGOAL_PSY_ALL_CORE
Other...
Will not display signs of withdrawal
Other...

## 2022-07-14 NOTE — BH TREATMENT PLAN - NSTXDEPRESINTERPR_PSY_ALL_CORE
Pt. will continue to be invited to participate in all offered groups
Pt. will be invited and encouraged to attend all offered groups
Pt. will be invited and encouraged to attend all offered groups

## 2022-07-14 NOTE — BH DISCHARGE NOTE NURSING/SOCIAL WORK/PSYCH REHAB - NSDCADDINFO1FT_PSY_ALL_CORE
You are scheduled to attend an IN-PERSON intake appointment at [facility] on 7/18/22 at 1:00PM. Please bring a photo ID and your health insurance card.

## 2022-07-14 NOTE — BH INPATIENT PSYCHIATRY PROGRESS NOTE - NSBHROSSYSTEMS_PSY_ALL_CORE
Psychiatric
Constitutional Symptoms.../Psychiatric
Psychiatric

## 2022-07-14 NOTE — BH INPATIENT PSYCHIATRY PROGRESS NOTE - NSBHFUPINTERVALHXFT_PSY_A_CORE
Patient seen visible on the floor and open to conversation. He states that he feels like he messed up all his rehab options by being aggressive at previous placements. Describes an instance where he "blacked out and woke up with a note never to come back". States "we will see" how he does after being discharged depending on if he passes a liquor store or not. States he has gained 35 pounds since being here, has been sleeping well and has no other complaints. Denies any suicidal thoughts, homicidal thoughts, or auditory of visual hallucinations.  Patient seen visible on the floor and open to conversation. He states that he feels like he messed up all his rehab options by being aggressive at previous placements and is now remorseful. Describes an instance where he "blacked out and woke up with a note never to come back" after throwing chairs. States "we will see" how he does after being discharged depending on if he passes a liquor store or not. States he has gained 35 pounds since being here, has been sleeping well and has no other complaints. Denies any suicidal thoughts, homicidal thoughts, or auditory of visual hallucinations.

## 2022-07-14 NOTE — BH INPATIENT PSYCHIATRY PROGRESS NOTE - NSTXDEPRESINTERMD_PSY_ALL_CORE
psychopharm management x 15 min daily, tackle substance use, encourage rehab
psychopharm management x 15 min daily, tackle substance use
psychopharm management x 15 min daily, tackle substance use, encourage rehab
psychopharm management x 15 min daily, tackle substance use
psychopharm management x 15 min daily, tackle substance use, encourage rehab
psychopharm management x 15 min daily, tackle substance use

## 2022-07-14 NOTE — BH INPATIENT PSYCHIATRY PROGRESS NOTE - NSTXDEPRESDATETRGT_PSY_ALL_CORE
17-Jul-2022
12-Jul-2022
10-Jul-2022
17-Jul-2022
12-Jul-2022
12-Jul-2022

## 2022-07-14 NOTE — BH INPATIENT PSYCHIATRY PROGRESS NOTE - NSTXALCDRGINTERMD_PSY_ALL_CORE
motivational interviewing, AA/NA, consider rehab and naltrexone

## 2022-07-14 NOTE — BH INPATIENT PSYCHIATRY PROGRESS NOTE - CURRENT MEDICATION
MEDICATIONS  (STANDING):    MEDICATIONS  (PRN):  acetaminophen     Tablet .. 650 milliGRAM(s) Oral every 6 hours PRN Moderate Pain (4 - 6)  benztropine 1 milliGRAM(s) Oral every 6 hours PRN agitation  haloperidol     Tablet 5 milliGRAM(s) Oral every 6 hours PRN Agitation  LORazepam     Tablet 2 milliGRAM(s) Oral every 6 hours PRN Agitation  polyethylene glycol 3350 17 Gram(s) Oral at bedtime PRN constipation  traZODone 50 milliGRAM(s) Oral at bedtime PRN insomnia  
MEDICATIONS  (STANDING):  bictegravir 50 mG/emtricitabine 200 mG/tenofovir alafenamide 25 mG (BIKTARVY) 1 Tablet(s) Oral daily  FLUoxetine 20 milliGRAM(s) Oral daily  risperiDONE   Tablet 1 milliGRAM(s) Oral at bedtime    MEDICATIONS  (PRN):  acetaminophen     Tablet .. 650 milliGRAM(s) Oral every 6 hours PRN Moderate Pain (4 - 6)  benztropine 1 milliGRAM(s) Oral every 6 hours PRN agitation  haloperidol     Tablet 5 milliGRAM(s) Oral every 6 hours PRN Agitation  LORazepam     Tablet 2 milliGRAM(s) Oral every 6 hours PRN Agitation  polyethylene glycol 3350 17 Gram(s) Oral at bedtime PRN constipation  traZODone 50 milliGRAM(s) Oral at bedtime PRN insomnia  
MEDICATIONS  (STANDING):  bictegravir 50 mG/emtricitabine 200 mG/tenofovir alafenamide 25 mG (BIKTARVY) 1 Tablet(s) Oral daily  FLUoxetine 20 milliGRAM(s) Oral daily  risperiDONE   Tablet 1 milliGRAM(s) Oral at bedtime    MEDICATIONS  (PRN):  acetaminophen     Tablet .. 650 milliGRAM(s) Oral every 6 hours PRN Moderate Pain (4 - 6)  benztropine 1 milliGRAM(s) Oral every 6 hours PRN agitation  haloperidol     Tablet 5 milliGRAM(s) Oral every 6 hours PRN Agitation  LORazepam     Tablet 2 milliGRAM(s) Oral every 6 hours PRN Agitation  polyethylene glycol 3350 17 Gram(s) Oral at bedtime PRN constipation  traZODone 50 milliGRAM(s) Oral at bedtime PRN insomnia  
MEDICATIONS  (STANDING):  benztropine 1 milliGRAM(s) Oral at bedtime  bictegravir 50 mG/emtricitabine 200 mG/tenofovir alafenamide 25 mG (BIKTARVY) 1 Tablet(s) Oral daily  FLUoxetine 20 milliGRAM(s) Oral daily  risperiDONE   Tablet 1 milliGRAM(s) Oral at bedtime    MEDICATIONS  (PRN):  acetaminophen     Tablet .. 650 milliGRAM(s) Oral every 6 hours PRN Moderate Pain (4 - 6)  haloperidol     Tablet 5 milliGRAM(s) Oral every 6 hours PRN Agitation  LORazepam     Tablet 2 milliGRAM(s) Oral every 6 hours PRN Agitation  polyethylene glycol 3350 17 Gram(s) Oral at bedtime PRN constipation  traZODone 50 milliGRAM(s) Oral at bedtime PRN insomnia  
MEDICATIONS  (STANDING):  benztropine 1 milliGRAM(s) Oral at bedtime  bictegravir 50 mG/emtricitabine 200 mG/tenofovir alafenamide 25 mG (BIKTARVY) 1 Tablet(s) Oral daily  FLUoxetine 20 milliGRAM(s) Oral daily  risperiDONE   Tablet 1 milliGRAM(s) Oral at bedtime    MEDICATIONS  (PRN):  acetaminophen     Tablet .. 650 milliGRAM(s) Oral every 6 hours PRN Moderate Pain (4 - 6)  haloperidol     Tablet 5 milliGRAM(s) Oral every 6 hours PRN Agitation  LORazepam     Tablet 2 milliGRAM(s) Oral every 6 hours PRN Agitation  polyethylene glycol 3350 17 Gram(s) Oral at bedtime PRN constipation  traZODone 50 milliGRAM(s) Oral at bedtime PRN insomnia  
MEDICATIONS  (STANDING):  bictegravir 50 mG/emtricitabine 200 mG/tenofovir alafenamide 25 mG (BIKTARVY) 1 Tablet(s) Oral daily  FLUoxetine 20 milliGRAM(s) Oral daily    MEDICATIONS  (PRN):  acetaminophen     Tablet .. 650 milliGRAM(s) Oral every 6 hours PRN Moderate Pain (4 - 6)  benztropine 1 milliGRAM(s) Oral every 6 hours PRN agitation  haloperidol     Tablet 5 milliGRAM(s) Oral every 6 hours PRN Agitation  LORazepam     Tablet 2 milliGRAM(s) Oral every 6 hours PRN Agitation  polyethylene glycol 3350 17 Gram(s) Oral at bedtime PRN constipation  traZODone 50 milliGRAM(s) Oral at bedtime PRN insomnia  
MEDICATIONS  (STANDING):  benztropine 1 milliGRAM(s) Oral at bedtime  bictegravir 50 mG/emtricitabine 200 mG/tenofovir alafenamide 25 mG (BIKTARVY) 1 Tablet(s) Oral daily  FLUoxetine 20 milliGRAM(s) Oral daily  risperiDONE   Tablet 1 milliGRAM(s) Oral at bedtime    MEDICATIONS  (PRN):  acetaminophen     Tablet .. 650 milliGRAM(s) Oral every 6 hours PRN Moderate Pain (4 - 6)  haloperidol     Tablet 5 milliGRAM(s) Oral every 6 hours PRN Agitation  LORazepam     Tablet 2 milliGRAM(s) Oral every 6 hours PRN Agitation  polyethylene glycol 3350 17 Gram(s) Oral at bedtime PRN constipation  traZODone 50 milliGRAM(s) Oral at bedtime PRN insomnia  

## 2022-07-14 NOTE — BH INPATIENT PSYCHIATRY PROGRESS NOTE - NSTXALCDRGDATEEST_PSY_ALL_CORE
05-Jul-2022
03-Jul-2022
05-Jul-2022

## 2022-07-14 NOTE — BH INPATIENT PSYCHIATRY PROGRESS NOTE - NSTXSUICIDDATEEST_PSY_ALL_CORE
05-Jul-2022
03-Jul-2022
05-Jul-2022

## 2022-07-14 NOTE — BH DISCHARGE NOTE NURSING/SOCIAL WORK/PSYCH REHAB - NSCDUDCCRISIS_PSY_A_CORE
[facility] Well  1 ([phone]) [facility]-WELL ([phone])  Text "WELL" to [phone]  Website: www.[employer]Kinetics/.[employer] 1 (216) 721-JVSK (9393) Website: www.safehorizon.org/.National Suicide Prevention Lifeline 1 (567) 038-0527/.  [employer]  1 (743) LIFENET (747-6043)/.  [facility]  [address] [place] [address] [phone]   Hours:  Monday through Friday from 9 AM to 3 PM/.  [facility] Crisis Line  [phone], Option 1 [facility] Well  1 (226) [facility]-WELL ([phone])  Text "WELL" to 84002  Website: www.EventKloud.[employer]/.National Suicide Prevention Lifeline 1 (875) 305-9453/.  [employer]  1 (655) LIFENET (904-7320)/.  [facility] Crisis [facility]  [address] [place] [address] [phone]   Hours:  Monday through Friday from 9 AM to 3 PM [facility] Well  1 (241) [facility]-WELL ([phone])  Text "WELL" to 57748  Website: www.ZocDoc/.Safe Horizons 1 (651) 501-NJLX (5358) Website: www.safehorizon.org/.National Suicide Prevention Lifeline 7 (705) 791-8408/.  [employer]  1 (995) LIFENET (582-7191)/.  [facility]’s Behavioral [facility] Crisis [facility]  [address] [place] [address] [phone]   Hours:  Monday through Friday from 9 AM to 3 PM/.  [facility] Dept of  Affairs - [facility] Crisis Line  2 [phone], Option 1

## 2022-07-14 NOTE — BH TREATMENT PLAN - NSTXDEPRESINTERRN_PSY_ALL_CORE
patient is encouraged to join groups ,verbalize feelings to staff and comply with meds
patient is encouraged to join groups ,verbalize feelings to staff and comply with meds

## 2022-07-15 VITALS
OXYGEN SATURATION: 98 % | DIASTOLIC BLOOD PRESSURE: 78 MMHG | HEART RATE: 72 BPM | RESPIRATION RATE: 16 BRPM | SYSTOLIC BLOOD PRESSURE: 117 MMHG

## 2022-07-15 PROCEDURE — 99238 HOSP IP/OBS DSCHRG MGMT 30/<: CPT

## 2022-07-15 RX ADMIN — BICTEGRAVIR SODIUM, EMTRICITABINE, AND TENOFOVIR ALAFENAMIDE FUMARATE 1 TABLET(S): 30; 120; 15 TABLET ORAL at 10:44

## 2022-07-15 RX ADMIN — Medication 20 MILLIGRAM(S): at 10:46

## 2022-07-19 DIAGNOSIS — Z79.899 OTHER LONG TERM (CURRENT) DRUG THERAPY: ICD-10-CM
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
## 2022-07-19 DIAGNOSIS — Z91.51 PERSONAL HISTORY OF SUICIDAL BEHAVIOR: ICD-10-CM

## 2022-07-19 DIAGNOSIS — F19.94 OTHER PSYCHOACTIVE SUBSTANCE USE, UNSPECIFIED WITH PSYCHOACTIVE SUBSTANCE-INDUCED MOOD DISORDER: ICD-10-CM

## 2022-07-19 DIAGNOSIS — Z86.16 PERSONAL HISTORY OF COVID-19: ICD-10-CM

## 2022-07-19 DIAGNOSIS — Z56.0 UNEMPLOYMENT, UNSPECIFIED: ICD-10-CM

## 2022-07-19 DIAGNOSIS — Z21 ASYMPTOMATIC HUMAN IMMUNODEFICIENCY VIRUS [HIV] INFECTION STATUS: ICD-10-CM

## 2022-07-19 DIAGNOSIS — F14.10 COCAINE ABUSE, UNCOMPLICATED: ICD-10-CM

## 2022-07-19 DIAGNOSIS — R45.851 SUICIDAL IDEATIONS: ICD-10-CM

## 2022-07-19 DIAGNOSIS — F10.20 ALCOHOL DEPENDENCE, UNCOMPLICATED: ICD-10-CM

## 2022-07-19 DIAGNOSIS — F25.9 SCHIZOAFFECTIVE DISORDER, UNSPECIFIED: ICD-10-CM

## 2022-07-19 DIAGNOSIS — F60.2 ANTISOCIAL PERSONALITY DISORDER: ICD-10-CM

## 2022-07-19 SDOH — ECONOMIC STABILITY - INCOME SECURITY: UNEMPLOYMENT, UNSPECIFIED: Z56.0

## 2022-07-21 NOTE — BH INPATIENT PSYCHIATRY ASSESSMENT NOTE - REASONS: WHAT REASONS DID YOU HAVE FOR THINKING ABOUT WANTING TO DIE OR KILLING YOURSELF? WAS IT TO END THE PAIN, STOP THE WAY YOU WERE FEELING (BECAUSE YOU COULDN’T STAND THE WAY YOU FELT), GET ATTENTION, REVENGE OR A REACTION FROM OTHERS? OR BOTH?
Subjective:       Patient ID: Luciana Jacob is a 20 y.o.  at 23w4d      Chief Complaint:  Routine Prenatal Visit      History of Present Illness  Presents for routine prenatal visit. Complains of congestion. Labs and history reviewed with pt. MSAFP negative.       Review of Systems  Denies n/v, f/c, dysuria, contractions,   VD, VB, round ligament pain, headaches      OB History        1    Para        Term                AB        Living           SAB        IAB        Ectopic        Multiple        Live Births                       Objective:     Vitals:    22 0841   BP: 110/71   Pulse: 73     Wt Readings from Last 3 Encounters:   22 91.3 kg (201 lb 6 oz)   22 89.8 kg (198 lb)   22 87.5 kg (193 lb)        nad  NCAT  pupils normal size  Skin nml no rashes or lesions  No resp distress, resp even and unlabored  Gravid nt, no rebound no guarding  No cyanosis or clubbing, edema appropriate for pregn  FH AGA  FHT: 140s       Assessment:        1. Encounter for supervision of normal first pregnancy in second trimester                Plan:        Encounter for supervision of normal first pregnancy in second trimester         Encouraged covid test. Flonase/zrytec/ tylenol notify if ssx persist >10 days or worsen  Encouraged PNV  Pain, fever, bleeding precautions   Follow up for CHELY caballero/Bridger.           (0) Does not apply
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
## 2022-08-11 PROCEDURE — 81001 URINALYSIS AUTO W/SCOPE: CPT

## 2022-08-11 PROCEDURE — 36415 COLL VENOUS BLD VENIPUNCTURE: CPT

## 2022-08-11 PROCEDURE — 80307 DRUG TEST PRSMV CHEM ANLYZR: CPT

## 2022-08-11 PROCEDURE — U0003: CPT

## 2022-08-11 PROCEDURE — 85025 COMPLETE CBC W/AUTO DIFF WBC: CPT

## 2022-08-11 PROCEDURE — 99285 EMERGENCY DEPT VISIT HI MDM: CPT | Mod: 25

## 2022-08-11 PROCEDURE — 71045 X-RAY EXAM CHEST 1 VIEW: CPT

## 2022-08-11 PROCEDURE — 83036 HEMOGLOBIN GLYCOSYLATED A1C: CPT

## 2022-08-11 PROCEDURE — 80053 COMPREHEN METABOLIC PANEL: CPT

## 2022-08-11 PROCEDURE — 80061 LIPID PANEL: CPT

## 2022-08-11 PROCEDURE — 87635 SARS-COV-2 COVID-19 AMP PRB: CPT

## 2022-08-11 PROCEDURE — U0005: CPT
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
## 2022-09-10 VITALS
DIASTOLIC BLOOD PRESSURE: 68 MMHG | OXYGEN SATURATION: 98 % | HEART RATE: 76 BPM | RESPIRATION RATE: 18 BRPM | WEIGHT: 145.06 LBS | TEMPERATURE: 100 F | SYSTOLIC BLOOD PRESSURE: 120 MMHG | HEIGHT: 66 IN

## 2022-09-10 LAB
ALBUMIN SERPL ELPH-MCNC: 3.5 G/DL — SIGNIFICANT CHANGE UP (ref 3.3–5)
ALP SERPL-CCNC: 82 U/L — SIGNIFICANT CHANGE UP (ref 40–120)
ALT FLD-CCNC: 29 U/L — SIGNIFICANT CHANGE UP (ref 10–45)
ANION GAP SERPL CALC-SCNC: 12 MMOL/L — SIGNIFICANT CHANGE UP (ref 5–17)
APAP SERPL-MCNC: <5 UG/ML — LOW (ref 10–30)
AST SERPL-CCNC: 32 U/L — SIGNIFICANT CHANGE UP (ref 10–40)
BASOPHILS # BLD AUTO: 0.02 K/UL — SIGNIFICANT CHANGE UP (ref 0–0.2)
BASOPHILS NFR BLD AUTO: 0.6 % — SIGNIFICANT CHANGE UP (ref 0–2)
BILIRUB SERPL-MCNC: 0.2 MG/DL — SIGNIFICANT CHANGE UP (ref 0.2–1.2)
BUN SERPL-MCNC: 15 MG/DL — SIGNIFICANT CHANGE UP (ref 7–23)
CALCIUM SERPL-MCNC: 8.8 MG/DL — SIGNIFICANT CHANGE UP (ref 8.4–10.5)
CHLORIDE SERPL-SCNC: 100 MMOL/L — SIGNIFICANT CHANGE UP (ref 96–108)
CO2 SERPL-SCNC: 23 MMOL/L — SIGNIFICANT CHANGE UP (ref 22–31)
CREAT SERPL-MCNC: 0.73 MG/DL — SIGNIFICANT CHANGE UP (ref 0.5–1.3)
EGFR: 122 ML/MIN/1.73M2 — SIGNIFICANT CHANGE UP
EOSINOPHIL # BLD AUTO: 0.14 K/UL — SIGNIFICANT CHANGE UP (ref 0–0.5)
EOSINOPHIL NFR BLD AUTO: 4 % — SIGNIFICANT CHANGE UP (ref 0–6)
ETHANOL SERPL-MCNC: <10 MG/DL — SIGNIFICANT CHANGE UP (ref 0–10)
GLUCOSE SERPL-MCNC: 133 MG/DL — HIGH (ref 70–99)
HCT VFR BLD CALC: 37.2 % — LOW (ref 39–50)
HGB BLD-MCNC: 12.3 G/DL — LOW (ref 13–17)
IMM GRANULOCYTES NFR BLD AUTO: 0 % — SIGNIFICANT CHANGE UP (ref 0–1.5)
LYMPHOCYTES # BLD AUTO: 1.48 K/UL — SIGNIFICANT CHANGE UP (ref 1–3.3)
LYMPHOCYTES # BLD AUTO: 41.8 % — SIGNIFICANT CHANGE UP (ref 13–44)
MCHC RBC-ENTMCNC: 29.5 PG — SIGNIFICANT CHANGE UP (ref 27–34)
MCHC RBC-ENTMCNC: 33.1 GM/DL — SIGNIFICANT CHANGE UP (ref 32–36)
MCV RBC AUTO: 89.2 FL — SIGNIFICANT CHANGE UP (ref 80–100)
MONOCYTES # BLD AUTO: 0.45 K/UL — SIGNIFICANT CHANGE UP (ref 0–0.9)
MONOCYTES NFR BLD AUTO: 12.7 % — SIGNIFICANT CHANGE UP (ref 2–14)
NEUTROPHILS # BLD AUTO: 1.45 K/UL — LOW (ref 1.8–7.4)
NEUTROPHILS NFR BLD AUTO: 40.9 % — LOW (ref 43–77)
NRBC # BLD: 0 /100 WBCS — SIGNIFICANT CHANGE UP (ref 0–0)
PLATELET # BLD AUTO: 224 K/UL — SIGNIFICANT CHANGE UP (ref 150–400)
POTASSIUM SERPL-MCNC: 3.8 MMOL/L — SIGNIFICANT CHANGE UP (ref 3.5–5.3)
POTASSIUM SERPL-SCNC: 3.8 MMOL/L — SIGNIFICANT CHANGE UP (ref 3.5–5.3)
PROT SERPL-MCNC: 7 G/DL — SIGNIFICANT CHANGE UP (ref 6–8.3)
RBC # BLD: 4.17 M/UL — LOW (ref 4.2–5.8)
RBC # FLD: 13.3 % — SIGNIFICANT CHANGE UP (ref 10.3–14.5)
SALICYLATES SERPL-MCNC: <0.3 MG/DL — LOW (ref 2.8–20)
SARS-COV-2 RNA SPEC QL NAA+PROBE: NEGATIVE — SIGNIFICANT CHANGE UP
SODIUM SERPL-SCNC: 135 MMOL/L — SIGNIFICANT CHANGE UP (ref 135–145)
WBC # BLD: 3.54 K/UL — LOW (ref 3.8–10.5)
WBC # FLD AUTO: 3.54 K/UL — LOW (ref 3.8–10.5)

## 2022-09-10 PROCEDURE — 99285 EMERGENCY DEPT VISIT HI MDM: CPT

## 2022-09-10 NOTE — ED ADULT NURSE REASSESSMENT NOTE - NS ED NURSE REASSESS COMMENT FT1
pt. placed on immediate 1:1 for active SI / HI. Pt. denies active AH / VH, all attempts made within last 24 hours of both SI and HI. Pt. placed on 1:1 CO, all belongings secured with blue ID labels in front of nurses station away from pt (top drawer, 2 bags). Pt. wanded by security as per protocol, placed in gold gown and nonskid footwear, CNA sitting arms length away, safety checklist UTD and filled out completely. Pt. aware of plan and safety precautions taken. Will continue to assess. Pt. tolerating plan of care well.
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
## 2022-09-11 ENCOUNTER — INPATIENT (INPATIENT)
Facility: HOSPITAL | Age: 37
LOS: 14 days | Discharge: NURSING, REHAB OR LONG-TERM CARE | DRG: 885 | End: 2022-09-26
Attending: RADIOLOGY | Admitting: PSYCHIATRY & NEUROLOGY
Payer: MEDICAID

## 2022-09-11 DIAGNOSIS — F25.9 SCHIZOAFFECTIVE DISORDER, UNSPECIFIED: ICD-10-CM

## 2022-09-11 LAB
AMPHET UR-MCNC: NEGATIVE — SIGNIFICANT CHANGE UP
APPEARANCE UR: CLEAR — SIGNIFICANT CHANGE UP
BARBITURATES UR SCN-MCNC: NEGATIVE — SIGNIFICANT CHANGE UP
BENZODIAZ UR-MCNC: NEGATIVE — SIGNIFICANT CHANGE UP
BILIRUB UR-MCNC: NEGATIVE — SIGNIFICANT CHANGE UP
COCAINE METAB.OTHER UR-MCNC: POSITIVE
COLOR SPEC: YELLOW — SIGNIFICANT CHANGE UP
DIFF PNL FLD: NEGATIVE — SIGNIFICANT CHANGE UP
GLUCOSE UR QL: NEGATIVE — SIGNIFICANT CHANGE UP
KETONES UR-MCNC: NEGATIVE — SIGNIFICANT CHANGE UP
LEUKOCYTE ESTERASE UR-ACNC: NEGATIVE — SIGNIFICANT CHANGE UP
METHADONE UR-MCNC: NEGATIVE — SIGNIFICANT CHANGE UP
NITRITE UR-MCNC: NEGATIVE — SIGNIFICANT CHANGE UP
OPIATES UR-MCNC: NEGATIVE — SIGNIFICANT CHANGE UP
PCP SPEC-MCNC: SIGNIFICANT CHANGE UP
PCP UR-MCNC: NEGATIVE — SIGNIFICANT CHANGE UP
PH UR: 6 — SIGNIFICANT CHANGE UP (ref 5–8)
PROT UR-MCNC: NEGATIVE MG/DL — SIGNIFICANT CHANGE UP
SP GR SPEC: >=1.03 — SIGNIFICANT CHANGE UP (ref 1–1.03)
THC UR QL: NEGATIVE — SIGNIFICANT CHANGE UP
UROBILINOGEN FLD QL: 1 E.U./DL — SIGNIFICANT CHANGE UP

## 2022-09-11 PROCEDURE — 90792 PSYCH DIAG EVAL W/MED SRVCS: CPT | Mod: 95

## 2022-09-11 RX ORDER — HALOPERIDOL DECANOATE 100 MG/ML
5 INJECTION INTRAMUSCULAR EVERY 6 HOURS
Refills: 0 | Status: DISCONTINUED | OUTPATIENT
Start: 2022-09-12 | End: 2022-09-26

## 2022-09-11 RX ORDER — QUETIAPINE FUMARATE 200 MG/1
100 TABLET, FILM COATED ORAL AT BEDTIME
Refills: 0 | Status: DISCONTINUED | OUTPATIENT
Start: 2022-09-12 | End: 2022-09-12

## 2022-09-11 RX ORDER — BICTEGRAVIR SODIUM, EMTRICITABINE, AND TENOFOVIR ALAFENAMIDE FUMARATE 30; 120; 15 MG/1; MG/1; MG/1
1 TABLET ORAL DAILY
Refills: 0 | Status: DISCONTINUED | OUTPATIENT
Start: 2022-09-12 | End: 2022-09-25

## 2022-09-11 RX ORDER — TRAZODONE HCL 50 MG
50 TABLET ORAL ONCE
Refills: 0 | Status: DISCONTINUED | OUTPATIENT
Start: 2022-09-12 | End: 2022-09-12

## 2022-09-11 RX ORDER — ESCITALOPRAM OXALATE 10 MG/1
10 TABLET, FILM COATED ORAL DAILY
Refills: 0 | Status: DISCONTINUED | OUTPATIENT
Start: 2022-09-12 | End: 2022-09-22

## 2022-09-11 RX ORDER — INFLUENZA VIRUS VACCINE 15; 15; 15; 15 UG/.5ML; UG/.5ML; UG/.5ML; UG/.5ML
0.5 SUSPENSION INTRAMUSCULAR ONCE
Refills: 0 | Status: COMPLETED | OUTPATIENT
Start: 2022-09-11 | End: 2022-09-11

## 2022-09-11 NOTE — BH PATIENT PROFILE - HOME MEDICATIONS
bictegravir/emtricitabine/tenofovir 50 mg-200 mg-25 mg oral tablet , 1 tab(s) orally once a day  risperiDONE 1 mg oral tablet , 1 tab(s) orally once a day (at bedtime)  benztropine 1 mg oral tablet , 1 tab(s) orally once a day (at bedtime)  traZODone 50 mg oral tablet , 1 tab(s) orally once a day (at bedtime), As needed, insomnia  FLUoxetine 20 mg oral capsule , 1 cap(s) orally once a day
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
## 2022-09-11 NOTE — ED BEHAVIORAL HEALTH ASSESSMENT NOTE - RISK ASSESSMENT
Assessment/Plan:  Rapid strep test is negative  Patient be started on a Z-Sammy and may take Tylenol or Motrin gabrielle r daniel Sanchez She is encouraged to drink plenty of fluids, gargle with salt water and rest   Return to the office in 1 week or sooner gabrielle Sanchez Diagnoses and all orders for this visit:    Pharyngitis, unspecified etiology  Comments:  Rapid strep test negative  Z-Sammy and Tylenol or Motrin p r n   Increase fluids, gargle with salt water and rest   Orders:  -     azithromycin (ZITHROMAX) 250 mg tablet; Take 2 tablets today then 1 tablet daily x 4 days    Sore throat  -     POCT rapid strepA          Subjective:      Patient ID: Syeda Ruby is a 44 y o  female  Patient complains of cold symptoms for the past couple weeks  Past few days she complains of sore throat and painful swallowing  She admits to congestion and nonproductive cough  Patient denies fever or rash  She has treated this with DayQuil and Tylenol without significant relief  Sore Throat    This is a new problem  The current episode started in the past 7 days  The problem has been gradually worsening  There has been no fever  Associated symptoms include congestion, coughing, ear pain, a hoarse voice, a plugged ear sensation, neck pain, swollen glands and trouble swallowing  Pertinent negatives include no headaches or shortness of breath  She has had exposure to strep  She has tried acetaminophen and gargles (dayquil) for the symptoms  The treatment provided no relief  The following portions of the patient's history were reviewed and updated as appropriate: allergies, current medications, past family history, past medical history, past social history, past surgical history and problem list     Review of Systems   HENT: Positive for congestion, ear pain, hoarse voice, sore throat and trouble swallowing  Respiratory: Positive for cough  Negative for shortness of breath  Musculoskeletal: Positive for neck pain  Neurological: Negative for headaches  Objective:      /72   Pulse 76   Temp 99 3 °F (37 4 °C) (Tympanic)   Resp 16   Ht 5' 4" (1 626 m)   Wt 72 1 kg (159 lb)   BMI 27 29 kg/m²          Physical Exam   Constitutional: She is oriented to person, place, and time  She appears well-developed and well-nourished  No distress  HENT:   Head: Normocephalic  Right Ear: External ear normal    Left Ear: External ear normal    Positive turbinates swelling with mucoid drainage  Throat positive erythema and right tonsillar exudate  Mucous membranes moist    Eyes: Conjunctivae are normal  No scleral icterus  Neck: Neck supple  Positive right anterior cervical node  Cardiovascular: Normal rate and regular rhythm  Pulmonary/Chest: Effort normal and breath sounds normal    Abdominal: Soft  There is no tenderness  Musculoskeletal: She exhibits no edema  Lymphadenopathy:     She has cervical adenopathy  Neurological: She is alert and oriented to person, place, and time  Skin: Skin is warm and dry  Psychiatric: She has a normal mood and affect  Moderate Acute Suicide Risk as above

## 2022-09-11 NOTE — ED PROVIDER NOTE - TEMPLATE, MLM
Confirmation of follow up.     The coding team was able to make the adjustment.     Thank you for letting us know about the opportunity to help the patient.    Duong Guzman MBA, MSN  Director of Clinic Operations, Cardiovascular & Thoracic Services  [facility]  [address]  [place] [address]  O: [phone]  M: [phone]      From: Mary Anne Orantes <[email or web address]>  Sent: Tuesday, February 16, 2021 7:50 AM  To: Duong Guzman [email or web address]>  Subject: RE: US Aorta Screening Parviz Monroy,    This has been corrected to a US Aorta Complete.    Thank you   Abdominal Pain, N/V/D

## 2022-09-11 NOTE — ED PROVIDER NOTE - CPE EDP EYES NORM
Bed: Exam 11  Expected date:   Expected time:   Means of arrival:   Comments:  Using for EKG   Dictated  CONSTANTINO - guided DC cardioversion  200j  - NSR  See orders  OK to be discharged at noon if fuly awake, has eaten and ambulated normal...
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
## 2022-09-11 NOTE — ED PROVIDER NOTE - ATTENDING APP SHARED VISIT CONTRIBUTION OF CARE
I discussed the plan of care of the patient directly with the PA while the patient was in the Emergency Department. On exam, pt well appearing young male, no acute distress, +SI, calm cooperative. I have reviewed the ACP note and agree with the history, exam and plan of care. Pending psychiatric consult.

## 2022-09-11 NOTE — ED BEHAVIORAL HEALTH ASSESSMENT NOTE - NSBHATTESTBILLING_PSY_A_CORE
96003-[id number] diagnostic evaluation with medical services 99285-Emergency department visit - high complexity

## 2022-09-11 NOTE — ED BEHAVIORAL HEALTH ASSESSMENT NOTE - VIOLENCE RISK FACTORS:
Antisocial behavior/cognition (past or present)/Violent ideation/threat/speech/Substance abuse/Affective dysregulation/Impulsivity

## 2022-09-11 NOTE — ED BEHAVIORAL HEALTH ASSESSMENT NOTE - DESCRIPTION
Pt cooperative with labwork. Calm, CO 1:1 at bedside.    Vital Signs - Last 24 Hrs    T(C): 36.6 (09-11-22 @ 06:07), Max: 37.5 (09-10-22 @ 21:04)  HR: 67 (09-11-22 @ 06:07) (67 - 76)  BP: 104/72 (09-11-22 @ 06:07) (104/72 - 120/68)  RR: 19 (09-11-22 @ 06:07) (16 - 19)  SpO2: 98% (09-11-22 @ 06:07) (97% - 98%)  Wt(kg): --  Daily Height in cm: 167.64 (10 Sep 2022 21:04)    Daily     I&O's Summary HIV as per HPI
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
## 2022-09-11 NOTE — BH PATIENT PROFILE - FALL HARM RISK - UNIVERSAL INTERVENTIONS
Bed in lowest position, wheels locked, appropriate side rails in place/Call bell, personal items and telephone in reach/Instruct patient to call for assistance before getting out of bed or chair/Non-slip footwear when patient is out of bed/[place] to call system/Physically safe environment - no spills, clutter or unnecessary equipment/Purposeful Proactive Rounding/Room/bathroom lighting operational, light cord in reach

## 2022-09-12 PROCEDURE — 99223 1ST HOSP IP/OBS HIGH 75: CPT

## 2022-09-12 RX ORDER — TRAZODONE HCL 50 MG
150 TABLET ORAL AT BEDTIME
Refills: 0 | Status: DISCONTINUED | OUTPATIENT
Start: 2022-09-12 | End: 2022-09-26

## 2022-09-12 RX ORDER — ONDANSETRON 8 MG/1
4 TABLET, FILM COATED ORAL EVERY 6 HOURS
Refills: 0 | Status: DISCONTINUED | OUTPATIENT
Start: 2022-09-12 | End: 2022-09-26

## 2022-09-12 RX ORDER — NALTREXONE HYDROCHLORIDE 50 MG/1
50 TABLET, FILM COATED ORAL DAILY
Refills: 0 | Status: DISCONTINUED | OUTPATIENT
Start: 2022-09-12 | End: 2022-09-15

## 2022-09-12 RX ADMIN — BICTEGRAVIR SODIUM, EMTRICITABINE, AND TENOFOVIR ALAFENAMIDE FUMARATE 1 TABLET(S): 30; 120; 15 TABLET ORAL at 13:35

## 2022-09-12 RX ADMIN — Medication 150 MILLIGRAM(S): at 22:27

## 2022-09-12 RX ADMIN — ESCITALOPRAM OXALATE 10 MILLIGRAM(S): 10 TABLET, FILM COATED ORAL at 10:08

## 2022-09-12 NOTE — BH INPATIENT PSYCHIATRY ASSESSMENT NOTE - NSBHMETABOLIC_PSY_ALL_CORE_FT
BMI: BMI (kg/m2): 24.1 (09-11-22 @ 07:52)  HbA1c: A1C with Estimated Average Glucose Result: 4.9 % (07-06-22 @ 08:08)    Glucose:   BP: 123/88 (09-12-22 @ 08:40) (104/72 - 123/88)  Lipid Panel: Date/Time: 07-06-22 @ 08:08  Cholesterol, Serum: 183  Direct LDL: --  HDL Cholesterol, Serum: 61  Total Cholesterol/HDL Ration Measurement: --  Triglycerides, Serum: 176   BMI: BMI (kg/m2): 24.1 (09-11-22 @ 07:52)  HbA1c: A1C with Estimated Average Glucose Result: 5.5 % (09-13-22 @ 09:13)    Glucose:   BP: 110/61 (09-21-22 @ 08:55) (100/66 - 131/71)  Lipid Panel: Date/Time: 09-13-22 @ 09:13  Cholesterol, Serum: 185  Direct LDL: --  HDL Cholesterol, Serum: 52  Total Cholesterol/HDL Ration Measurement: --  Triglycerides, Serum: 203   BMI: BMI (kg/m2): 24.1 (09-11-22 @ 07:52)  HbA1c: A1C with Estimated Average Glucose Result: 5.5 % (09-13-22 @ 09:13)    Glucose:   BP: 133/81 (09-26-22 @ 08:30) (111/69 - 133/81)  Lipid Panel: Date/Time: 09-13-22 @ 09:13  Cholesterol, Serum: 185  Direct LDL: --  HDL Cholesterol, Serum: 52  Total Cholesterol/HDL Ration Measurement: --  Triglycerides, Serum: 203
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
## 2022-09-12 NOTE — BH SOCIAL WORK INITIAL PSYCHOSOCIAL EVALUATION - NSBHTREATHX_PSY_ALL_CORE
Pt was discharged to the [facility] during his last [facility] hospitalization, but reports he did not attend./No

## 2022-09-12 NOTE — BH SOCIAL WORK INITIAL PSYCHOSOCIAL EVALUATION - NSPTSTATEDGOAL_PSY_ALL_CORE
"I'm interested in rehab. I do not want an ACT Team. I am open to a long-acting injectable medication."

## 2022-09-12 NOTE — BH INPATIENT PSYCHIATRY ASSESSMENT NOTE - NSBHMEDSOTHERFT_PSY_A_CORE
escitalopram 10mg  quetiapine 100mg  bictegravir-emtricitabine-tenofovir 50mg/200mg/25mg  trazodone 50 mg

## 2022-09-12 NOTE — BH SOCIAL WORK INITIAL PSYCHOSOCIAL EVALUATION - DETAILS
Pt reports that his father had depression and alcoholism, and completed suicide via hanging.  The pt reports his father abused alcohol.

## 2022-09-12 NOTE — BH SOCIAL WORK INITIAL PSYCHOSOCIAL EVALUATION - NSBHSPIRITUALEFFECTFT_PSY_ALL_CORE
Pt reports that he grew up [employer], but does not consider himself Latter day. 
Detail Level: Simple
Additional Notes: Detailed instructions given for acne regimen. Instructed to wash face using Panoxyl in the morning. If this is too drying, try CetaohilRecommending OTC Differin and Panoxyl wash. Apply Clindamycin lotion then moisturizer with sunscreen. In the evening, take one pill (Doxycycline) once daily with food. Wash face using Cetaohil then apply otc Differin. Apply pea size amount to face, avoiding eyes and lips.
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
## 2022-09-12 NOTE — BH SOCIAL WORK INITIAL PSYCHOSOCIAL EVALUATION - NSBHHOUSECONTACTFT_PSY_ALL_CORE
Per chart, pt is domiciled in [facility].   [facility] chart, [facility] ([phone]), [facility] Supervisor ([phone])

## 2022-09-12 NOTE — BH INPATIENT PSYCHIATRY ASSESSMENT NOTE - CURRENT MEDICATION
MEDICATIONS  (STANDING):  bictegravir 50 mG/emtricitabine 200 mG/tenofovir alafenamide 25 mG (BIKTARVY) 1 Tablet(s) Oral daily  escitalopram 10 milliGRAM(s) Oral daily  influenza   Vaccine 0.5 milliLiter(s) IntraMuscular once  QUEtiapine 100 milliGRAM(s) Oral at bedtime    MEDICATIONS  (PRN):  haloperidol     Tablet 5 milliGRAM(s) Oral every 6 hours PRN agitation  traZODone 50 milliGRAM(s) Oral Once PRN insomnia   MEDICATIONS  (STANDING):  bictegravir 50 mG/emtricitabine 200 mG/tenofovir alafenamide 25 mG (BIKTARVY) 1 Tablet(s) Oral daily  escitalopram 10 milliGRAM(s) Oral daily  influenza   Vaccine 0.5 milliLiter(s) IntraMuscular once  prazosin. 3 milliGRAM(s) Oral at bedtime  traZODone 150 milliGRAM(s) Oral at bedtime    MEDICATIONS  (PRN):  haloperidol     Tablet 5 milliGRAM(s) Oral every 6 hours PRN agitation  ondansetron    Tablet 4 milliGRAM(s) Oral every 6 hours PRN nausea/vomiting   MEDICATIONS  (STANDING):    MEDICATIONS  (PRN):
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
## 2022-09-12 NOTE — BH INPATIENT PSYCHIATRY ASSESSMENT NOTE - NSBHPHYSICALEXAM_PSY_ALL_CORE
General: Pt in no acute distress, cooperative, sitting on edge of bed. Appears staged age.     Skin: No rashes or lesions. Warm, dry, color good.    Head: Normocephalic, good hair distribution. Small, 2.5cm, well-healing laceration on left side of head.     Eyes: Lids symmetrical, pupils equal, round, reactive to light. Conjunctiva clear bilaterally, no discharge.     Thorax: No chest wall tenderness. No respiratory distress or accessory muscle use. Breath sounds vesicular, symmetrical, without rales, rhonchi, or wheezing bilaterally.    Cardiovascular: No lifts, heaves, no thrills. Rate and rhythm regular, S1 and S2 clear. No S3, S4. No murmur, gallops, or rubs.     Abdomen: Non-distended. Soft and nontender, no masses palpated.     Peripheral vascular: No edema of feet or ankles. No discoloration or lesions.     Neuro: see MSE.

## 2022-09-12 NOTE — BH INPATIENT PSYCHIATRY ASSESSMENT NOTE - DESCRIPTION
Lives in [place] with friends, states he feels safe at home and that it is a good living situation. Currently unemployed and has been for several years, states he is not ready to go back to work. Currently receiving public assistance. Highest level of education is high school. Denies access to weapons. Denies [employer] affiliation. Has 3 children, 3 year old daughter living in [place] with mother and step father, and 2 older children who both live in [place]. States he would like to leave [place] but has not made plans to do so.
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
## 2022-09-12 NOTE — BH INPATIENT PSYCHIATRY ASSESSMENT NOTE - NSBHATTESTCOMMENTATTENDFT_PSY_A_CORE
Pt very well known to 8U. Similar to past presentations. Seems depressed. Will continue lexapro and discontinue seroquel as he has no psychotic symptoms. Pt interested in trial of naltrexone. Does not want ACT Team. Would gladly go to inpatient substance abuse rehab.

## 2022-09-12 NOTE — BH INPATIENT PSYCHIATRY ASSESSMENT NOTE - NSBHCHARTREVIEWVS_PSY_A_CORE FT
Vital Signs Last 24 Hrs  T(C): 37.3 (09-12-22 @ 08:40), Max: 37.3 (09-12-22 @ 08:40)  T(F): 99.1 (09-12-22 @ 08:40), Max: 99.1 (09-12-22 @ 08:40)  HR: 80 (09-12-22 @ 08:40) (54 - 80)  BP: 123/88 (09-12-22 @ 08:40) (120/70 - 123/88)  BP(mean): --  RR: 18 (09-12-22 @ 08:40) (18 - 18)  SpO2: 99% (09-12-22 @ 08:40) (98% - 99%)    Orthostatic VS  09-11-22 @ 07:52  Lying BP: 117/71 HR: --  Sitting BP: --/-- HR: --  Standing BP: --/-- HR: --  Site: --  Mode: --   Vital Signs Last 24 Hrs  T(C): 36.7 (09-21-22 @ 08:55), Max: 37.2 (09-20-22 @ 20:14)  T(F): 98.1 (09-21-22 @ 08:55), Max: 99 (09-20-22 @ 20:14)  HR: 85 (09-21-22 @ 08:55) (62 - 85)  BP: 110/61 (09-21-22 @ 08:55) (110/61 - 131/71)  BP(mean): --  RR: 18 (09-21-22 @ 08:55) (18 - 18)  SpO2: 99% (09-21-22 @ 08:55) (97% - 99%)     Vital Signs Last 24 Hrs  T(C): --  T(F): --  HR: --  BP: --  BP(mean): --  RR: --  SpO2: --

## 2022-09-13 LAB
A1C WITH ESTIMATED AVERAGE GLUCOSE RESULT: 5.5 % — SIGNIFICANT CHANGE UP (ref 4–5.6)
CHOLEST SERPL-MCNC: 185 MG/DL — SIGNIFICANT CHANGE UP
ESTIMATED AVERAGE GLUCOSE: 111 MG/DL — SIGNIFICANT CHANGE UP (ref 68–114)
HDLC SERPL-MCNC: 52 MG/DL — SIGNIFICANT CHANGE UP
LIPID PNL WITH DIRECT LDL SERPL: 92 MG/DL — SIGNIFICANT CHANGE UP
NON HDL CHOLESTEROL: 133 MG/DL — HIGH
TRIGL SERPL-MCNC: 203 MG/DL — HIGH

## 2022-09-13 PROCEDURE — 99233 SBSQ HOSP IP/OBS HIGH 50: CPT

## 2022-09-13 RX ADMIN — BICTEGRAVIR SODIUM, EMTRICITABINE, AND TENOFOVIR ALAFENAMIDE FUMARATE 1 TABLET(S): 30; 120; 15 TABLET ORAL at 11:03

## 2022-09-13 RX ADMIN — NALTREXONE HYDROCHLORIDE 50 MILLIGRAM(S): 50 TABLET, FILM COATED ORAL at 11:02

## 2022-09-13 RX ADMIN — ONDANSETRON 4 MILLIGRAM(S): 8 TABLET, FILM COATED ORAL at 21:53

## 2022-09-13 RX ADMIN — ESCITALOPRAM OXALATE 10 MILLIGRAM(S): 10 TABLET, FILM COATED ORAL at 11:02

## 2022-09-13 RX ADMIN — Medication 150 MILLIGRAM(S): at 21:53

## 2022-09-13 NOTE — BH INPATIENT PSYCHIATRY PROGRESS NOTE - CURRENT MEDICATION
MEDICATIONS  (STANDING):  bictegravir 50 mG/emtricitabine 200 mG/tenofovir alafenamide 25 mG (BIKTARVY) 1 Tablet(s) Oral daily  escitalopram 10 milliGRAM(s) Oral daily  influenza   Vaccine 0.5 milliLiter(s) IntraMuscular once  naltrexone 50 milliGRAM(s) Oral daily  traZODone 150 milliGRAM(s) Oral at bedtime    MEDICATIONS  (PRN):  haloperidol     Tablet 5 milliGRAM(s) Oral every 6 hours PRN agitation  ondansetron    Tablet 4 milliGRAM(s) Oral every 6 hours PRN nausea/vomiting   MEDICATIONS  (STANDING):    MEDICATIONS  (PRN):
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
## 2022-09-13 NOTE — BH INPATIENT PSYCHIATRY PROGRESS NOTE - PRN MEDS
MEDICATIONS  (PRN):  haloperidol     Tablet 5 milliGRAM(s) Oral every 6 hours PRN agitation  ondansetron    Tablet 4 milliGRAM(s) Oral every 6 hours PRN nausea/vomiting   MEDICATIONS  (PRN):

## 2022-09-13 NOTE — BH INPATIENT PSYCHIATRY PROGRESS NOTE - NSBHCHARTREVIEWVS_PSY_A_CORE FT
Vital Signs Last 24 Hrs  T(C): 37.1 (09-13-22 @ 10:50), Max: 37.3 (09-12-22 @ 17:12)  T(F): 98.8 (09-13-22 @ 10:50), Max: 99.1 (09-12-22 @ 17:12)  HR: 70 (09-13-22 @ 10:50) (67 - 70)  BP: 100/61 (09-13-22 @ 10:50) (100/61 - 113/74)  BP(mean): --  RR: 18 (09-13-22 @ 10:50) (18 - 18)  SpO2: 96% (09-13-22 @ 10:50) (96% - 97%)     Vital Signs Last 24 Hrs  T(C): --  T(F): --  HR: --  BP: --  BP(mean): --  RR: --  SpO2: --

## 2022-09-14 PROCEDURE — 99232 SBSQ HOSP IP/OBS MODERATE 35: CPT

## 2022-09-14 RX ORDER — PRAZOSIN HCL 2 MG
1 CAPSULE ORAL AT BEDTIME
Refills: 0 | Status: DISCONTINUED | OUTPATIENT
Start: 2022-09-14 | End: 2022-09-16

## 2022-09-14 RX ADMIN — ESCITALOPRAM OXALATE 10 MILLIGRAM(S): 10 TABLET, FILM COATED ORAL at 10:58

## 2022-09-14 RX ADMIN — BICTEGRAVIR SODIUM, EMTRICITABINE, AND TENOFOVIR ALAFENAMIDE FUMARATE 1 TABLET(S): 30; 120; 15 TABLET ORAL at 10:59

## 2022-09-14 RX ADMIN — ONDANSETRON 4 MILLIGRAM(S): 8 TABLET, FILM COATED ORAL at 21:46

## 2022-09-14 RX ADMIN — NALTREXONE HYDROCHLORIDE 50 MILLIGRAM(S): 50 TABLET, FILM COATED ORAL at 10:58

## 2022-09-14 RX ADMIN — Medication 1 MILLIGRAM(S): at 21:48

## 2022-09-14 RX ADMIN — Medication 150 MILLIGRAM(S): at 21:46
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
## 2022-09-14 NOTE — BH INPATIENT PSYCHIATRY PROGRESS NOTE - NSBHCHARTREVIEWVS_PSY_A_CORE FT
Vital Signs Last 24 Hrs  T(C): 36.7 (09-14-22 @ 09:00), Max: 37.2 (09-13-22 @ 17:04)  T(F): 98 (09-14-22 @ 09:00), Max: 98.9 (09-13-22 @ 17:04)  HR: 65 (09-14-22 @ 09:00) (65 - 67)  BP: 98/65 (09-14-22 @ 09:00) (98/65 - 120/74)  BP(mean): --  RR: 18 (09-14-22 @ 09:00) (18 - 18)  SpO2: 98% (09-14-22 @ 09:00) (96% - 98%)     Vital Signs Last 24 Hrs  T(C): 36.7 (09-14-22 @ 09:00), Max: 36.7 (09-14-22 @ 09:00)  T(F): 98 (09-14-22 @ 09:00), Max: 98 (09-14-22 @ 09:00)  HR: 73 (09-14-22 @ 19:05) (65 - 73)  BP: 122/76 (09-14-22 @ 19:05) (98/65 - 122/76)  BP(mean): --  RR: 18 (09-14-22 @ 19:05) (18 - 18)  SpO2: 98% (09-14-22 @ 19:05) (98% - 98%)

## 2022-09-14 NOTE — BH INPATIENT PSYCHIATRY PROGRESS NOTE - PRN MEDS
MEDICATIONS  (PRN):  haloperidol     Tablet 5 milliGRAM(s) Oral every 6 hours PRN agitation  ondansetron    Tablet 4 milliGRAM(s) Oral every 6 hours PRN nausea/vomiting

## 2022-09-14 NOTE — BH INPATIENT PSYCHIATRY PROGRESS NOTE - NSBHMETABOLIC_PSY_ALL_CORE_FT
BMI: BMI (kg/m2): 24.1 (09-11-22 @ 07:52)  HbA1c: A1C with Estimated Average Glucose Result: 5.5 % (09-13-22 @ 09:13)    Glucose:   BP: 98/65 (09-14-22 @ 09:00) (98/65 - 123/88)  Lipid Panel: Date/Time: 09-13-22 @ 09:13  Cholesterol, Serum: 185  Direct LDL: --  HDL Cholesterol, Serum: 52  Total Cholesterol/HDL Ration Measurement: --  Triglycerides, Serum: 203   BMI: BMI (kg/m2): 24.1 (09-11-22 @ 07:52)  HbA1c: A1C with Estimated Average Glucose Result: 5.5 % (09-13-22 @ 09:13)    Glucose:   BP: 122/76 (09-14-22 @ 19:05) (98/65 - 123/88)  Lipid Panel: Date/Time: 09-13-22 @ 09:13  Cholesterol, Serum: 185  Direct LDL: --  HDL Cholesterol, Serum: 52  Total Cholesterol/HDL Ration Measurement: --  Triglycerides, Serum: 203

## 2022-09-14 NOTE — ED BEHAVIORAL HEALTH ASSESSMENT NOTE - ELEVATED CHRONIC RISK
Duplicate-   Rx sent to pharmacy 9/12/22    Kanika FOSTER RN, BSN, PHN  [facility]    
Yes

## 2022-09-14 NOTE — BH INPATIENT PSYCHIATRY PROGRESS NOTE - CURRENT MEDICATION
MEDICATIONS  (STANDING):  bictegravir 50 mG/emtricitabine 200 mG/tenofovir alafenamide 25 mG (BIKTARVY) 1 Tablet(s) Oral daily  escitalopram 10 milliGRAM(s) Oral daily  influenza   Vaccine 0.5 milliLiter(s) IntraMuscular once  naltrexone 50 milliGRAM(s) Oral daily  prazosin. 1 milliGRAM(s) Oral at bedtime  traZODone 150 milliGRAM(s) Oral at bedtime    MEDICATIONS  (PRN):  haloperidol     Tablet 5 milliGRAM(s) Oral every 6 hours PRN agitation  ondansetron    Tablet 4 milliGRAM(s) Oral every 6 hours PRN nausea/vomiting

## 2022-09-15 PROCEDURE — 99232 SBSQ HOSP IP/OBS MODERATE 35: CPT

## 2022-09-15 RX ADMIN — NALTREXONE HYDROCHLORIDE 50 MILLIGRAM(S): 50 TABLET, FILM COATED ORAL at 11:00

## 2022-09-15 RX ADMIN — Medication 1 MILLIGRAM(S): at 21:27

## 2022-09-15 RX ADMIN — BICTEGRAVIR SODIUM, EMTRICITABINE, AND TENOFOVIR ALAFENAMIDE FUMARATE 1 TABLET(S): 30; 120; 15 TABLET ORAL at 11:00

## 2022-09-15 RX ADMIN — ESCITALOPRAM OXALATE 10 MILLIGRAM(S): 10 TABLET, FILM COATED ORAL at 11:00

## 2022-09-15 RX ADMIN — Medication 150 MILLIGRAM(S): at 21:27

## 2022-09-15 NOTE — BH INPATIENT PSYCHIATRY PROGRESS NOTE - NSBHMETABOLIC_PSY_ALL_CORE_FT
BMI: BMI (kg/m2): 24.1 (09-11-22 @ 07:52)  HbA1c: A1C with Estimated Average Glucose Result: 5.5 % (09-13-22 @ 09:13)    Glucose:   BP: 112/71 (09-15-22 @ 08:43) (98/65 - 122/76)  Lipid Panel: Date/Time: 09-13-22 @ 09:13  Cholesterol, Serum: 185  Direct LDL: --  HDL Cholesterol, Serum: 52  Total Cholesterol/HDL Ration Measurement: --  Triglycerides, Serum: 203   BMI: BMI (kg/m2): 24.1 (09-11-22 @ 07:52)  HbA1c: A1C with Estimated Average Glucose Result: 5.5 % (09-13-22 @ 09:13)    Glucose:   BP: 133/81 (09-26-22 @ 08:30) (111/69 - 133/81)  Lipid Panel: Date/Time: 09-13-22 @ 09:13  Cholesterol, Serum: 185  Direct LDL: --  HDL Cholesterol, Serum: 52  Total Cholesterol/HDL Ration Measurement: --  Triglycerides, Serum: 203
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
## 2022-09-15 NOTE — BH INPATIENT PSYCHIATRY PROGRESS NOTE - CURRENT MEDICATION
MEDICATIONS  (STANDING):  bictegravir 50 mG/emtricitabine 200 mG/tenofovir alafenamide 25 mG (BIKTARVY) 1 Tablet(s) Oral daily  escitalopram 10 milliGRAM(s) Oral daily  influenza   Vaccine 0.5 milliLiter(s) IntraMuscular once  naltrexone 50 milliGRAM(s) Oral daily  prazosin. 1 milliGRAM(s) Oral at bedtime  traZODone 150 milliGRAM(s) Oral at bedtime    MEDICATIONS  (PRN):  haloperidol     Tablet 5 milliGRAM(s) Oral every 6 hours PRN agitation  ondansetron    Tablet 4 milliGRAM(s) Oral every 6 hours PRN nausea/vomiting   MEDICATIONS  (STANDING):    MEDICATIONS  (PRN):

## 2022-09-15 NOTE — BH INPATIENT PSYCHIATRY PROGRESS NOTE - NSBHCHARTREVIEWVS_PSY_A_CORE FT
Vital Signs Last 24 Hrs  T(C): 36.6 (09-15-22 @ 08:43), Max: 36.6 (09-15-22 @ 08:43)  T(F): 97.9 (09-15-22 @ 08:43), Max: 97.9 (09-15-22 @ 08:43)  HR: 66 (09-15-22 @ 08:43) (66 - 73)  BP: 112/71 (09-15-22 @ 08:43) (112/71 - 122/76)  BP(mean): --  RR: 18 (09-15-22 @ 08:43) (18 - 18)  SpO2: 98% (09-15-22 @ 08:43) (98% - 98%)     Vital Signs Last 24 Hrs  T(C): --  T(F): --  HR: --  BP: --  BP(mean): --  RR: --  SpO2: --

## 2022-09-16 PROCEDURE — 99232 SBSQ HOSP IP/OBS MODERATE 35: CPT

## 2022-09-16 RX ORDER — PRAZOSIN HCL 2 MG
2 CAPSULE ORAL AT BEDTIME
Refills: 0 | Status: DISCONTINUED | OUTPATIENT
Start: 2022-09-16 | End: 2022-09-19

## 2022-09-16 RX ADMIN — ESCITALOPRAM OXALATE 10 MILLIGRAM(S): 10 TABLET, FILM COATED ORAL at 10:08

## 2022-09-16 RX ADMIN — BICTEGRAVIR SODIUM, EMTRICITABINE, AND TENOFOVIR ALAFENAMIDE FUMARATE 1 TABLET(S): 30; 120; 15 TABLET ORAL at 10:08

## 2022-09-16 RX ADMIN — Medication 2 MILLIGRAM(S): at 21:46

## 2022-09-16 RX ADMIN — Medication 150 MILLIGRAM(S): at 21:48

## 2022-09-16 NOTE — BH INPATIENT PSYCHIATRY PROGRESS NOTE - NSBHCHARTREVIEWVS_PSY_A_CORE FT
Vital Signs Last 24 Hrs  T(C): 36.7 (09-16-22 @ 08:33), Max: 36.7 (09-16-22 @ 08:33)  T(F): 98 (09-16-22 @ 08:33), Max: 98 (09-16-22 @ 08:33)  HR: 60 (09-16-22 @ 08:33) (60 - 60)  BP: 100/64 (09-16-22 @ 08:33) (100/64 - 100/64)  BP(mean): --  RR: 18 (09-16-22 @ 08:33) (18 - 18)  SpO2: 98% (09-16-22 @ 08:33) (98% - 98%)     Vital Signs Last 24 Hrs  T(C): --  T(F): --  HR: --  BP: --  BP(mean): --  RR: --  SpO2: --

## 2022-09-16 NOTE — BH INPATIENT PSYCHIATRY PROGRESS NOTE - CURRENT MEDICATION
MEDICATIONS  (STANDING):  bictegravir 50 mG/emtricitabine 200 mG/tenofovir alafenamide 25 mG (BIKTARVY) 1 Tablet(s) Oral daily  escitalopram 10 milliGRAM(s) Oral daily  influenza   Vaccine 0.5 milliLiter(s) IntraMuscular once  prazosin. 1 milliGRAM(s) Oral at bedtime  traZODone 150 milliGRAM(s) Oral at bedtime    MEDICATIONS  (PRN):  haloperidol     Tablet 5 milliGRAM(s) Oral every 6 hours PRN agitation  ondansetron    Tablet 4 milliGRAM(s) Oral every 6 hours PRN nausea/vomiting   MEDICATIONS  (STANDING):    MEDICATIONS  (PRN):
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
## 2022-09-16 NOTE — BH INPATIENT PSYCHIATRY PROGRESS NOTE - NSBHMETABOLIC_PSY_ALL_CORE_FT
BMI: BMI (kg/m2): 24.1 (09-11-22 @ 07:52)  HbA1c: A1C with Estimated Average Glucose Result: 5.5 % (09-13-22 @ 09:13)    Glucose:   BP: 100/64 (09-16-22 @ 08:33) (98/65 - 122/76)  Lipid Panel: Date/Time: 09-13-22 @ 09:13  Cholesterol, Serum: 185  Direct LDL: --  HDL Cholesterol, Serum: 52  Total Cholesterol/HDL Ration Measurement: --  Triglycerides, Serum: 203   BMI: BMI (kg/m2): 24.1 (09-11-22 @ 07:52)  HbA1c: A1C with Estimated Average Glucose Result: 5.5 % (09-13-22 @ 09:13)    Glucose:   BP: 133/81 (09-26-22 @ 08:30) (111/69 - 133/81)  Lipid Panel: Date/Time: 09-13-22 @ 09:13  Cholesterol, Serum: 185  Direct LDL: --  HDL Cholesterol, Serum: 52  Total Cholesterol/HDL Ration Measurement: --  Triglycerides, Serum: 203

## 2022-09-17 RX ADMIN — Medication 150 MILLIGRAM(S): at 21:21

## 2022-09-17 RX ADMIN — ONDANSETRON 4 MILLIGRAM(S): 8 TABLET, FILM COATED ORAL at 10:00

## 2022-09-17 RX ADMIN — Medication 2 MILLIGRAM(S): at 21:21

## 2022-09-17 RX ADMIN — ESCITALOPRAM OXALATE 10 MILLIGRAM(S): 10 TABLET, FILM COATED ORAL at 09:58

## 2022-09-17 RX ADMIN — BICTEGRAVIR SODIUM, EMTRICITABINE, AND TENOFOVIR ALAFENAMIDE FUMARATE 1 TABLET(S): 30; 120; 15 TABLET ORAL at 09:57

## 2022-09-18 RX ADMIN — Medication 2 MILLIGRAM(S): at 21:33

## 2022-09-18 RX ADMIN — Medication 150 MILLIGRAM(S): at 21:31

## 2022-09-18 RX ADMIN — ESCITALOPRAM OXALATE 10 MILLIGRAM(S): 10 TABLET, FILM COATED ORAL at 10:47

## 2022-09-18 RX ADMIN — BICTEGRAVIR SODIUM, EMTRICITABINE, AND TENOFOVIR ALAFENAMIDE FUMARATE 1 TABLET(S): 30; 120; 15 TABLET ORAL at 10:48

## 2022-09-19 PROCEDURE — 99232 SBSQ HOSP IP/OBS MODERATE 35: CPT

## 2022-09-19 RX ORDER — PRAZOSIN HCL 2 MG
3 CAPSULE ORAL AT BEDTIME
Refills: 0 | Status: DISCONTINUED | OUTPATIENT
Start: 2022-09-19 | End: 2022-09-26

## 2022-09-19 RX ADMIN — Medication 3 MILLIGRAM(S): at 21:39

## 2022-09-19 RX ADMIN — BICTEGRAVIR SODIUM, EMTRICITABINE, AND TENOFOVIR ALAFENAMIDE FUMARATE 1 TABLET(S): 30; 120; 15 TABLET ORAL at 11:40

## 2022-09-19 RX ADMIN — ESCITALOPRAM OXALATE 10 MILLIGRAM(S): 10 TABLET, FILM COATED ORAL at 11:40

## 2022-09-19 RX ADMIN — Medication 150 MILLIGRAM(S): at 21:39

## 2022-09-19 NOTE — BH INPATIENT PSYCHIATRY PROGRESS NOTE - NSBHATTESTTYPESTAFF_PSY_A_CORE
How Severe Are Your Spot(S)?: mild
LV 6/7/22 WITH CC FOR DM [facility] 12/12/22
What Is The Reason For Today's Visit?: Full Body Skin Examination
What Is The Reason For Today's Visit? (Being Monitored For X): the development of new lesions
Student

## 2022-09-19 NOTE — BH INPATIENT PSYCHIATRY PROGRESS NOTE - NSBHMETABOLIC_PSY_ALL_CORE_FT
BMI: BMI (kg/m2): 24.1 (09-11-22 @ 07:52)  HbA1c: A1C with Estimated Average Glucose Result: 5.5 % (09-13-22 @ 09:13)    Glucose:   BP: 109/67 (09-19-22 @ 08:53) (100/66 - 121/56)  Lipid Panel: Date/Time: 09-13-22 @ 09:13  Cholesterol, Serum: 185  Direct LDL: --  HDL Cholesterol, Serum: 52  Total Cholesterol/HDL Ration Measurement: --  Triglycerides, Serum: 203   BMI: BMI (kg/m2): 24.1 (09-11-22 @ 07:52)  HbA1c: A1C with Estimated Average Glucose Result: 5.5 % (09-13-22 @ 09:13)    Glucose:   BP: 133/81 (09-26-22 @ 08:30) (111/69 - 133/81)  Lipid Panel: Date/Time: 09-13-22 @ 09:13  Cholesterol, Serum: 185  Direct LDL: --  HDL Cholesterol, Serum: 52  Total Cholesterol/HDL Ration Measurement: --  Triglycerides, Serum: 203

## 2022-09-19 NOTE — BH INPATIENT PSYCHIATRY PROGRESS NOTE - CURRENT MEDICATION
MEDICATIONS  (STANDING):  bictegravir 50 mG/emtricitabine 200 mG/tenofovir alafenamide 25 mG (BIKTARVY) 1 Tablet(s) Oral daily  escitalopram 10 milliGRAM(s) Oral daily  influenza   Vaccine 0.5 milliLiter(s) IntraMuscular once  prazosin. 2 milliGRAM(s) Oral at bedtime  traZODone 150 milliGRAM(s) Oral at bedtime    MEDICATIONS  (PRN):  haloperidol     Tablet 5 milliGRAM(s) Oral every 6 hours PRN agitation  ondansetron    Tablet 4 milliGRAM(s) Oral every 6 hours PRN nausea/vomiting   MEDICATIONS  (STANDING):    MEDICATIONS  (PRN):

## 2022-09-19 NOTE — BH INPATIENT PSYCHIATRY PROGRESS NOTE - NSICDXBHSECONDARYDX_PSY_ALL_CORE
Schizoaffective disorder   F25.9  Cocaine use disorder   F14.10  Severe alcohol use disorder   F10.20  Severe opioid use disorder   F11.20   Severe alcohol use disorder   F10.20  Cocaine use disorder   F14.10

## 2022-09-19 NOTE — BH INPATIENT PSYCHIATRY PROGRESS NOTE - NSBHATTESTCOMMENTATTENDFT_PSY_A_CORE
Pt off naltrexone. Lexapro and prazosin. Awaitin rehab. Still depressed with terrible nightmares. Nausea better since naltrexone discontinued. 
I agree with above assessment. Prazosin 1 mg started for nightmares and will be uptitrated. Pt complaining of nausea/vomiting. Naltrexone started for cravings. Pt on board with looking for inpatient rehab. Considering increaing lexapro when less nauseous. 
Pt still waiting for inpatient substance abuse rehab. Stopping naltrexone due to nausea/vomiting. Uptitrating prazosin for nightmares. Lexapro for depression. 
Will discuss naltrexone discontinuation with patient as he is having protracted nausea/vomiting and this could be a side effect of naltrexone. Prazosin uptitration for nightmares. Pt motivated for inpatient rehab and very much against ACT Team referral. He had ACT in past and felt they were invasive and bothered him too early in the morning. 
Pt started on lexapro and naltrexone. Pt continues to report depression. Interested in inpatient rehab. Had trouble getting into a rehab during last admission to . Pt has had at least 5 admits to  in last year and a half.
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
## 2022-09-19 NOTE — BH INPATIENT PSYCHIATRY PROGRESS NOTE - NSBHCHARTREVIEWVS_PSY_A_CORE FT
Vital Signs Last 24 Hrs  T(C): 36.8 (09-19-22 @ 08:53), Max: 37.2 (09-18-22 @ 17:00)  T(F): 98.2 (09-19-22 @ 08:53), Max: 99 (09-18-22 @ 17:00)  HR: 79 (09-19-22 @ 08:53) (71 - 84)  BP: 109/67 (09-19-22 @ 08:53) (100/66 - 114/70)  BP(mean): --  RR: 18 (09-19-22 @ 08:53) (18 - 18)  SpO2: 98% (09-19-22 @ 08:53) (96% - 98%)     Vital Signs Last 24 Hrs  T(C): --  T(F): --  HR: --  BP: --  BP(mean): --  RR: --  SpO2: --

## 2022-09-20 RX ADMIN — BICTEGRAVIR SODIUM, EMTRICITABINE, AND TENOFOVIR ALAFENAMIDE FUMARATE 1 TABLET(S): 30; 120; 15 TABLET ORAL at 10:08

## 2022-09-20 RX ADMIN — Medication 3 MILLIGRAM(S): at 21:21

## 2022-09-20 RX ADMIN — ESCITALOPRAM OXALATE 10 MILLIGRAM(S): 10 TABLET, FILM COATED ORAL at 10:08

## 2022-09-20 RX ADMIN — Medication 150 MILLIGRAM(S): at 21:22

## 2022-09-20 NOTE — BH INPATIENT PSYCHIATRY PROGRESS NOTE - NSBHCHARTREVIEWVS_PSY_A_CORE FT
Vital Signs Last 24 Hrs  T(C): 37.1 (09-20-22 @ 08:10), Max: 37.2 (09-19-22 @ 20:25)  T(F): 98.8 (09-20-22 @ 08:10), Max: 99 (09-19-22 @ 20:25)  HR: 76 (09-20-22 @ 08:10) (70 - 79)  BP: 106/70 (09-20-22 @ 08:10) (106/70 - 119/73)  BP(mean): --  RR: 18 (09-20-22 @ 08:10) (18 - 18)  SpO2: 98% (09-20-22 @ 08:10) (98% - 98%)     Vital Signs Last 24 Hrs  T(C): 37.1 (09-20-22 @ 08:10), Max: 37.2 (09-19-22 @ 20:25)  T(F): 98.8 (09-20-22 @ 08:10), Max: 99 (09-19-22 @ 20:25)  HR: 76 (09-20-22 @ 08:10) (70 - 76)  BP: 106/70 (09-20-22 @ 08:10) (106/70 - 119/73)  BP(mean): --  RR: 18 (09-20-22 @ 08:10) (18 - 18)  SpO2: 98% (09-20-22 @ 08:10) (98% - 98%)
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
## 2022-09-20 NOTE — BH INPATIENT PSYCHIATRY PROGRESS NOTE - CURRENT MEDICATION
MEDICATIONS  (STANDING):  bictegravir 50 mG/emtricitabine 200 mG/tenofovir alafenamide 25 mG (BIKTARVY) 1 Tablet(s) Oral daily  escitalopram 10 milliGRAM(s) Oral daily  influenza   Vaccine 0.5 milliLiter(s) IntraMuscular once  prazosin. 3 milliGRAM(s) Oral at bedtime  traZODone 150 milliGRAM(s) Oral at bedtime    MEDICATIONS  (PRN):  haloperidol     Tablet 5 milliGRAM(s) Oral every 6 hours PRN agitation  ondansetron    Tablet 4 milliGRAM(s) Oral every 6 hours PRN nausea/vomiting

## 2022-09-20 NOTE — BH INPATIENT PSYCHIATRY PROGRESS NOTE - NSBHMETABOLIC_PSY_ALL_CORE_FT
BMI: BMI (kg/m2): 24.1 (09-11-22 @ 07:52)  HbA1c: A1C with Estimated Average Glucose Result: 5.5 % (09-13-22 @ 09:13)    Glucose:   BP: 106/70 (09-20-22 @ 08:10) (100/66 - 120/77)  Lipid Panel: Date/Time: 09-13-22 @ 09:13  Cholesterol, Serum: 185  Direct LDL: --  HDL Cholesterol, Serum: 52  Total Cholesterol/HDL Ration Measurement: --  Triglycerides, Serum: 203

## 2022-09-21 LAB
HCT VFR BLD CALC: 37.6 % — LOW (ref 39–50)
HGB BLD-MCNC: 12.2 G/DL — LOW (ref 13–17)
MCHC RBC-ENTMCNC: 29.8 PG — SIGNIFICANT CHANGE UP (ref 27–34)
MCHC RBC-ENTMCNC: 32.4 GM/DL — SIGNIFICANT CHANGE UP (ref 32–36)
MCV RBC AUTO: 91.7 FL — SIGNIFICANT CHANGE UP (ref 80–100)
NRBC # BLD: 0 /100 WBCS — SIGNIFICANT CHANGE UP (ref 0–0)
PLATELET # BLD AUTO: 274 K/UL — SIGNIFICANT CHANGE UP (ref 150–400)
RBC # BLD: 4.1 M/UL — LOW (ref 4.2–5.8)
RBC # FLD: 14.1 % — SIGNIFICANT CHANGE UP (ref 10.3–14.5)
WBC # BLD: 5.23 K/UL — SIGNIFICANT CHANGE UP (ref 3.8–10.5)
WBC # FLD AUTO: 5.23 K/UL — SIGNIFICANT CHANGE UP (ref 3.8–10.5)

## 2022-09-21 PROCEDURE — 99232 SBSQ HOSP IP/OBS MODERATE 35: CPT

## 2022-09-21 RX ADMIN — Medication 3 MILLIGRAM(S): at 21:22

## 2022-09-21 RX ADMIN — Medication 150 MILLIGRAM(S): at 21:22

## 2022-09-21 RX ADMIN — BICTEGRAVIR SODIUM, EMTRICITABINE, AND TENOFOVIR ALAFENAMIDE FUMARATE 1 TABLET(S): 30; 120; 15 TABLET ORAL at 10:09

## 2022-09-21 RX ADMIN — ESCITALOPRAM OXALATE 10 MILLIGRAM(S): 10 TABLET, FILM COATED ORAL at 10:08

## 2022-09-21 NOTE — BH TREATMENT PLAN - NSTXSUBMISINTERPR_PSY_ALL_CORE
Pt. will continue to be invited to 12-step groups when speaker is on unit
Pt. will be invited to 12-step groups when speaker is on unit

## 2022-09-21 NOTE — BH INPATIENT PSYCHIATRY PROGRESS NOTE - NSBHCHARTREVIEWVS_PSY_A_CORE FT
Vital Signs Last 24 Hrs  T(C): 36.7 (09-21-22 @ 08:55), Max: 37.2 (09-20-22 @ 20:14)  T(F): 98.1 (09-21-22 @ 08:55), Max: 99 (09-20-22 @ 20:14)  HR: 85 (09-21-22 @ 08:55) (62 - 85)  BP: 110/61 (09-21-22 @ 08:55) (110/61 - 131/71)  BP(mean): --  RR: 18 (09-21-22 @ 08:55) (18 - 18)  SpO2: 99% (09-21-22 @ 08:55) (97% - 99%)

## 2022-09-21 NOTE — BH DISCHARGE NOTE NURSING/SOCIAL WORK/PSYCH REHAB - NSDCOTHERNAME_GEN_ALL_CORE
JOSEPH Hernández 	(Phone: [phone])  JOSEPH Rogers Case  (Phone: [phone])		 ACT Referral Submitted on 9/23/22

## 2022-09-21 NOTE — BH INPATIENT PSYCHIATRY PROGRESS NOTE - NSBHMETABOLIC_PSY_ALL_CORE_FT
BMI: BMI (kg/m2): 24.1 (09-11-22 @ 07:52)  HbA1c: A1C with Estimated Average Glucose Result: 5.5 % (09-13-22 @ 09:13)    Glucose:   BP: 110/61 (09-21-22 @ 08:55) (100/66 - 131/71)  Lipid Panel: Date/Time: 09-13-22 @ 09:13  Cholesterol, Serum: 185  Direct LDL: --  HDL Cholesterol, Serum: 52  Total Cholesterol/HDL Ration Measurement: --  Triglycerides, Serum: 203

## 2022-09-21 NOTE — BH TREATMENT PLAN - NSTXDEPRESGOALOTHER_PSY_ALL_CORE
Pt. will participate in groups and milieu tx structure of unit
Pt. will participate in groups and milieu tx structure of unit

## 2022-09-21 NOTE — BH DISCHARGE NOTE NURSING/SOCIAL WORK/PSYCH REHAB - NSDCADDINFO1FT_PSY_ALL_CORE
You are being discharged from [facility] on MONDAY, SEPTEMBER 26, 2022 at 10AM. A taxi will take you straight to [facility] for an intake at approximately 11AM.

## 2022-09-21 NOTE — BH TREATMENT PLAN - NSTXSUICIDGOALOTHER_PSY_ALL_CORE
Patient will stabilize mood & alleviate sx of SI to engage with discharge planning.
Patient will stabilize mood & alleviate sx of SI to engage with discharge planning.

## 2022-09-21 NOTE — BH TREATMENT PLAN - ANXIETY/PANIC/FEAR NURSING INTERVENTIONS/RECOMMENDATIONS
Encourage patient to adhere with medications and treatment. Encourage patient to attend groups and verbalize his feelings and concerns. Help patient identify coping strategies that have worked in the past and support the use of these strategies.
patient is encouraged to join groups ,verbalize feelings to staff and comply with meds

## 2022-09-21 NOTE — BH TREATMENT PLAN - NSTXALCDRGINTERMD_PSY_ALL_CORE
motivational interviewing, AA/NA, consider rehab and naltrexone
motivational interviewing, AA/NA, consider rehab and naltrexone

## 2022-09-21 NOTE — BH DISCHARGE NOTE NURSING/SOCIAL WORK/PSYCH REHAB - NSDCVIVACCINE_GEN_ALL_CORE_FT
Tdap; 31-Jul-2018 20:20; Servando Drew (RN); [employer]; [id number]; IntraMuscular; Deltoid Right.; 0.5 milliLiter(s); VIS (VIS Published: 09-May-2013, VIS Presented: 31-Jul-2018);

## 2022-09-21 NOTE — BH TREATMENT PLAN - NSTXDEPRESINTERMD_PSY_ALL_CORE
psychopharm management x 15 min daily, tackle substance use
psychopharm management x 15 min daily, tackle substance use

## 2022-09-21 NOTE — BH TREATMENT PLAN - NSTXDEPRESINTERPR_PSY_ALL_CORE
Pt. will continue to be invited to join all offered groups
Pt. will continue to be invited to join all offered groups

## 2022-09-21 NOTE — BH TREATMENT PLAN - NSPTSTATEDGOAL_PSY_ALL_CORE
"I'm interested in rehab. I do not want an ACT Team. I am open to a long-acting injectable medication."
"I'm interested in rehab. I do not want an ACT Team. I am open to a long-acting injectable medication."

## 2022-09-21 NOTE — BH DISCHARGE NOTE NURSING/SOCIAL WORK/PSYCH REHAB - NSCDUDCCRISIS_PSY_A_CORE
[facility]  1 (848) [facility] ([phone])  Text "WELL" to 01549  Website: www.[employer]/.[facility] 1 (214) 271-PMQK (9559) Website: www.safehorizon.org/.National Suicide Prevention Lifeline 0 (590) 375-6797/.  [facility]  1 (559) LIFENET (460-6558)/.  [facility]  [address] [place] [id number] [phone]   Hours:  Monday through Friday from 9 AM to 3 PM/.  [place] [employer] - Veterans Crisis Line  7 [phone], Option 1

## 2022-09-21 NOTE — BH DISCHARGE NOTE NURSING/SOCIAL WORK/PSYCH REHAB - PATIENT PORTAL LINK FT
You can access the FollowMyHealth Patient Portal offered by [facility] by registering at the following website: [email or web address]. By joining [facility]’s FollowMyHealth portal, you will also be able to view your health information using other applications (apps) compatible with our system.
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
## 2022-09-21 NOTE — BH TREATMENT PLAN - NSTXSUICIDINTERMD_PSY_ALL_CORE
psychopharm management x 15 min daily, encourage coping skills to weather strong emotions
psychopharm management x 15 min daily, encourage coping skills to weather strong emotions

## 2022-09-21 NOTE — BH DISCHARGE NOTE NURSING/SOCIAL WORK/PSYCH REHAB - NSDCPRGOAL_PSY_ALL_CORE
Over the course of tx. ,pt. was initially isolative, often staying in bed throughout the day and resisting group and milieu; pt. grew increasingly visible and engaged; pt. demonstrated substantive insight on his mental illness, suicidality, and substance abuse issues that persistently bring him back to the hospital; pt. endorses motivation to adhere to continued care after discharge.

## 2022-09-22 DIAGNOSIS — F19.94 OTHER PSYCHOACTIVE SUBSTANCE USE, UNSPECIFIED WITH PSYCHOACTIVE SUBSTANCE-INDUCED MOOD DISORDER: ICD-10-CM

## 2022-09-22 DIAGNOSIS — F19.959 OTHER PSYCHOACTIVE SUBSTANCE USE, UNSPECIFIED WITH PSYCHOACTIVE SUBSTANCE-INDUCED PSYCHOTIC DISORDER, UNSPECIFIED: ICD-10-CM

## 2022-09-22 LAB
4/8 RATIO: 0.56 RATIO — LOW (ref 0.9–3.6)
ABS CD8: 813 /UL — HIGH (ref 142–740)
CD16+CD56+ CELLS NFR BLD: 10 % — SIGNIFICANT CHANGE UP (ref 5–23)
CD16+CD56+ CELLS NFR SPEC: 170 /UL — SIGNIFICANT CHANGE UP (ref 71–410)
CD19 BLASTS SPEC-ACNC: 112 /UL — SIGNIFICANT CHANGE UP (ref 84–469)
CD19 BLASTS SPEC-ACNC: 7 % — SIGNIFICANT CHANGE UP (ref 6–24)
CD3 BLASTS SPEC-ACNC: 1316 /UL — SIGNIFICANT CHANGE UP (ref 672–1870)
CD3 BLASTS SPEC-ACNC: 78 % — SIGNIFICANT CHANGE UP (ref 59–83)
CD4 %: 27 % — LOW (ref 30–62)
CD8 %: 48 % — HIGH (ref 12–36)
HIV-1 VIRAL LOAD RESULT: ABNORMAL
HIV1 RNA # SERPL NAA+PROBE: 174 — SIGNIFICANT CHANGE UP
HIV1 RNA SER-IMP: SIGNIFICANT CHANGE UP
HIV1 RNA SERPL NAA+PROBE-ACNC: ABNORMAL
HIV1 RNA SERPL NAA+PROBE-LOG#: 2.24 — SIGNIFICANT CHANGE UP
T-CELL CD4 SUBSET PNL BLD: 456 /UL — LOW (ref 489–1457)

## 2022-09-22 PROCEDURE — 71045 X-RAY EXAM CHEST 1 VIEW: CPT | Mod: 26

## 2022-09-22 PROCEDURE — 99232 SBSQ HOSP IP/OBS MODERATE 35: CPT

## 2022-09-22 RX ORDER — ESCITALOPRAM OXALATE 10 MG/1
15 TABLET, FILM COATED ORAL DAILY
Refills: 0 | Status: DISCONTINUED | OUTPATIENT
Start: 2022-09-22 | End: 2022-09-26

## 2022-09-22 RX ORDER — ESCITALOPRAM OXALATE 10 MG/1
1 TABLET, FILM COATED ORAL
Qty: 0 | Refills: 0 | DISCHARGE
Start: 2022-09-22

## 2022-09-22 RX ORDER — TRAZODONE HCL 50 MG
1 TABLET ORAL
Qty: 0 | Refills: 0 | DISCHARGE
Start: 2022-09-22

## 2022-09-22 RX ORDER — BICTEGRAVIR SODIUM, EMTRICITABINE, AND TENOFOVIR ALAFENAMIDE FUMARATE 30; 120; 15 MG/1; MG/1; MG/1
1 TABLET ORAL
Qty: 0 | Refills: 0 | DISCHARGE
Start: 2022-09-22

## 2022-09-22 RX ORDER — PRAZOSIN HCL 2 MG
3 CAPSULE ORAL
Qty: 0 | Refills: 0 | DISCHARGE
Start: 2022-09-22

## 2022-09-22 RX ADMIN — Medication 150 MILLIGRAM(S): at 21:10

## 2022-09-22 RX ADMIN — Medication 3 MILLIGRAM(S): at 21:10

## 2022-09-22 RX ADMIN — BICTEGRAVIR SODIUM, EMTRICITABINE, AND TENOFOVIR ALAFENAMIDE FUMARATE 1 TABLET(S): 30; 120; 15 TABLET ORAL at 10:05

## 2022-09-22 RX ADMIN — ESCITALOPRAM OXALATE 10 MILLIGRAM(S): 10 TABLET, FILM COATED ORAL at 10:05

## 2022-09-22 NOTE — BH INPATIENT PSYCHIATRY DISCHARGE NOTE - NSDCRECOMMENDMEDICALFT_PSY_ALL_CORE
Please ensure that you attend all scheduled follow-up appointments. Continue medications as prescribed at the time of discharge.

## 2022-09-22 NOTE — BH INPATIENT PSYCHIATRY DISCHARGE NOTE - NSDCCPCAREPLAN_GEN_ALL_CORE_FT
PRINCIPAL DISCHARGE DIAGNOSIS  Diagnosis: Major depressive disorder, recurrent episode, severe  Assessment and Plan of Treatment: - Continue escitalopram 10mg once daily for depression  - Continue prazosin 3mg at bedtime for nightmares  - Continue trazodone 150mg at bedtime for insomnia and depression  - Follow-up w/ outpatient providers      SECONDARY DISCHARGE DIAGNOSES  Diagnosis: Human immunodeficiency virus (HIV)  Assessment and Plan of Treatment: - Continue Biktarvy ([id number]) once daily  - Follow-up w/ outpatient providers    Diagnosis: Severe alcohol use disorder  Assessment and Plan of Treatment: - Naltrexone trial discontinued due to poor tolerability (e.g. nausea, vomiting)   - Continue to follow-up in the outpatient setting for substance use disorder-focused treatment    Diagnosis: Cocaine use disorder  Assessment and Plan of Treatment: - Continue to follow-up in the outpatient setting for substance use disorder-focused treatment    Diagnosis: Opioid use disorder  Assessment and Plan of Treatment: - Continue to follow-up in the outpatient setting for substance use disorder-focused treatment  - Discuss medication-assisted treatment (e.g. suboxone, methadone) with outpatient providers     PRINCIPAL DISCHARGE DIAGNOSIS  Diagnosis: Major depressive disorder, recurrent episode, severe  Assessment and Plan of Treatment: - Continue escitalopram 15mg once daily for depression  - Continue prazosin 3mg at bedtime for nightmares  - Continue trazodone 150mg at bedtime for insomnia and depression  - Follow-up w/ outpatient providers      SECONDARY DISCHARGE DIAGNOSES  Diagnosis: Human immunodeficiency virus (HIV)  Assessment and Plan of Treatment: - Continue Biktarvy ([id number]) once daily  - Follow-up w/ outpatient providers    Diagnosis: Severe alcohol use disorder  Assessment and Plan of Treatment: - Naltrexone trial discontinued due to poor tolerability (e.g. nausea, vomiting)   - Continue to follow-up in the outpatient setting for substance use disorder-focused treatment    Diagnosis: Cocaine use disorder  Assessment and Plan of Treatment: - Continue to follow-up in the outpatient setting for substance use disorder-focused treatment    Diagnosis: Opioid use disorder  Assessment and Plan of Treatment: - Continue to follow-up in the outpatient setting for substance use disorder-focused treatment  - Discuss medication-assisted treatment (e.g. suboxone, methadone) with outpatient providers
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
## 2022-09-22 NOTE — BH INPATIENT PSYCHIATRY DISCHARGE NOTE - NSBHSUICIDESTATUS_PSY_ALL_CORE
Mr. Hernandez is currently denying any active suicidal ideation or any recurrent, intrusive thoughts of death. He described improved depressive symptoms, is now increasingly hopeful for the future, and is motivated to continue his care in the outpatient setting and to continue mending the fraught relationships that have prevented him from seeing his children as frequently as he would prefer.
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
## 2022-09-22 NOTE — BH INPATIENT PSYCHIATRY DISCHARGE NOTE - DESCRIPTION
Lives in [place] with friends, states he feels safe at home and that it is a good living situation. Currently unemployed and has been for several years, states he is not ready to go back to work. Currently receiving public assistance. Highest level of education is high school. Denies access to weapons. Denies Buddhism affiliation. Has 3 children, 3 year old daughter living in [place] with mother and step father, and 2 older children who both live in [place]. States he would like to leave [place] but has not made plans to do so.

## 2022-09-22 NOTE — BH INPATIENT PSYCHIATRY DISCHARGE NOTE - NSBHDCMEDICALFT_PSY_A_CORE
Patient was continued on Biktarvy for HIV-positivity. In the initial few days of his hospitalization, the patient experienced disabling nausea/vomiting, for which he was initially initiated on ondansetron with minimal benefit; these symptoms appeared to have improved following discontinuation of naltrexone.
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
## 2022-09-22 NOTE — BH INPATIENT PSYCHIATRY DISCHARGE NOTE - NSBHDCRISKMITIGATEFT_PSY_ALL_CORE
Patient connected w/ outpatient substance use disorder-focused behavioral health care and initiated on antidepressant medication that has yielded significant benefit in his depressive symptoms.

## 2022-09-22 NOTE — BH INPATIENT PSYCHIATRY DISCHARGE NOTE - ATTENDING DISCHARGE PHYSICAL EXAMINATION:
MSE- Well groomed and related, good EC. -PMR/A Speech: wnl Mood: "OK" Affect: full-range, appropriate TP: linear TC: -SI/HI/AH/VH/PI. I&J: good

## 2022-09-22 NOTE — BH INPATIENT PSYCHIATRY PROGRESS NOTE - NSBHMETABOLIC_PSY_ALL_CORE_FT
BMI: BMI (kg/m2): 24.1 (09-11-22 @ 07:52)  HbA1c: A1C with Estimated Average Glucose Result: 5.5 % (09-13-22 @ 09:13)    Glucose:   BP: 135/83 (09-22-22 @ 08:15) (106/70 - 135/83)  Lipid Panel: Date/Time: 09-13-22 @ 09:13  Cholesterol, Serum: 185  Direct LDL: --  HDL Cholesterol, Serum: 52  Total Cholesterol/HDL Ration Measurement: --  Triglycerides, Serum: 203

## 2022-09-22 NOTE — BH INPATIENT PSYCHIATRY DISCHARGE NOTE - NSDCMRMEDTOKEN_GEN_ALL_CORE_FT
bictegravir/emtricitabine/tenofovir 50 mg-200 mg-25 mg oral tablet: 1 tab(s) orally once a day  escitalopram 10 mg oral tablet: 1 tab(s) orally once a day  prazosin 1 mg oral capsule: 3 cap(s) orally once a day (at bedtime)  traZODone 150 mg oral tablet: 1 tab(s) orally once a day (at bedtime)   bictegravir/emtricitabine/tenofovir 50 mg-200 mg-25 mg oral tablet: 1 tab(s) orally once a day  escitalopram 5 mg oral tablet: 3 tab(s) orally once a day  prazosin 1 mg oral capsule: 3 cap(s) orally once a day (at bedtime)  traZODone 150 mg oral tablet: 1 tab(s) orally once a day (at bedtime)   bictegravir/emtricitabine/tenofovir 50 mg-200 mg-25 mg oral tablet: 1 tab(s) orally once a day  bictegravir/emtricitabine/tenofovir 50 mg-200 mg-25 mg oral tablet: 1 tab(s) orally once a day  escitalopram 5 mg oral tablet: 3 tab(s) orally once a day  prazosin 1 mg oral capsule: 3 cap(s) orally once a day (at bedtime)  traZODone 150 mg oral tablet: 1 tab(s) orally once a day (at bedtime)

## 2022-09-22 NOTE — BH INPATIENT PSYCHIATRY DISCHARGE NOTE - NSBHMETABOLIC_PSY_ALL_CORE_FT
BMI: BMI (kg/m2): 24.1 (09-11-22 @ 07:52)  HbA1c: A1C with Estimated Average Glucose Result: 5.5 % (09-13-22 @ 09:13)    Glucose:   BP: 130/75 (09-21-22 @ 20:12) (106/70 - 131/71)  Lipid Panel: Date/Time: 09-13-22 @ 09:13  Cholesterol, Serum: 185  Direct LDL: --  HDL Cholesterol, Serum: 52  Total Cholesterol/HDL Ration Measurement: --  Triglycerides, Serum: 203
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
## 2022-09-22 NOTE — BH INPATIENT PSYCHIATRY PROGRESS NOTE - NSBHCHARTREVIEWVS_PSY_A_CORE FT
Vital Signs Last 24 Hrs  T(C): 37.1 (09-22-22 @ 08:15), Max: 37.2 (09-21-22 @ 20:12)  T(F): 98.8 (09-22-22 @ 08:15), Max: 99 (09-21-22 @ 20:12)  HR: 62 (09-22-22 @ 08:15) (62 - 72)  BP: 135/83 (09-22-22 @ 08:15) (123/74 - 135/83)  BP(mean): --  RR: 18 (09-22-22 @ 08:15) (18 - 18)  SpO2: 98% (09-22-22 @ 08:15) (98% - 98%)

## 2022-09-22 NOTE — BH INPATIENT PSYCHIATRY DISCHARGE NOTE - NSCURPASTPSYDX_PSY_ALL_CORE
Mood disorder/PTSD/Alcohol/Substance Use disorders/Cluster B Personality disorder/traits/Conduct problems

## 2022-09-22 NOTE — BH INPATIENT PSYCHIATRY PROGRESS NOTE - CURRENT MEDICATION
MEDICATIONS  (STANDING):  bictegravir 50 mG/emtricitabine 200 mG/tenofovir alafenamide 25 mG (BIKTARVY) 1 Tablet(s) Oral daily  escitalopram 15 milliGRAM(s) Oral daily  influenza   Vaccine 0.5 milliLiter(s) IntraMuscular once  prazosin. 3 milliGRAM(s) Oral at bedtime  traZODone 150 milliGRAM(s) Oral at bedtime    MEDICATIONS  (PRN):  haloperidol     Tablet 5 milliGRAM(s) Oral every 6 hours PRN agitation  ondansetron    Tablet 4 milliGRAM(s) Oral every 6 hours PRN nausea/vomiting

## 2022-09-22 NOTE — BH INPATIENT PSYCHIATRY DISCHARGE NOTE - NSDCPROCEDURESFT_PSY_ALL_CORE
CBC (9/10/22): WBCs of 3.54, RBCs of 4.17, Hemoglobin of 12.3, Hematocrit of 37.2  CBC (9/21/22): WBCs unremarkable, hemoglobin 12.2, hematocrit 37.6, otherwise unremarkable  Hemoglobin A1C (9/13/22): 5.5%  Lipid Panel (9/13/22): Total Cholesterol of 185 (within normal limits), elevated triglycerides to 203, HDL of 52 (within normal limits), elevated non-HDL cholesterol to 133, LDL of 92 (within normal limits)  Urine Toxicology (9/11/22): positive for cocaine metabolites, otherwise unremarkable  COVID-19 PCR (9/10/22): negative  EKG (9/10/22): normal EKG
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
## 2022-09-23 RX ORDER — PRAZOSIN HCL 2 MG
3 CAPSULE ORAL
Qty: 90 | Refills: 0
Start: 2022-09-23 | End: 2022-10-22

## 2022-09-23 RX ORDER — BICTEGRAVIR SODIUM, EMTRICITABINE, AND TENOFOVIR ALAFENAMIDE FUMARATE 30; 120; 15 MG/1; MG/1; MG/1
1 TABLET ORAL
Qty: 30 | Refills: 0
Start: 2022-09-23 | End: 2022-10-22

## 2022-09-23 RX ORDER — ESCITALOPRAM OXALATE 10 MG/1
3 TABLET, FILM COATED ORAL
Qty: 90 | Refills: 0
Start: 2022-09-23 | End: 2022-10-22

## 2022-09-23 RX ORDER — TRAZODONE HCL 50 MG
1 TABLET ORAL
Qty: 30 | Refills: 0
Start: 2022-09-23 | End: 2022-10-22

## 2022-09-23 RX ADMIN — ESCITALOPRAM OXALATE 15 MILLIGRAM(S): 10 TABLET, FILM COATED ORAL at 09:18

## 2022-09-23 RX ADMIN — Medication 3 MILLIGRAM(S): at 21:54

## 2022-09-23 RX ADMIN — Medication 150 MILLIGRAM(S): at 21:51

## 2022-09-23 RX ADMIN — BICTEGRAVIR SODIUM, EMTRICITABINE, AND TENOFOVIR ALAFENAMIDE FUMARATE 1 TABLET(S): 30; 120; 15 TABLET ORAL at 09:18
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
## 2022-09-23 NOTE — BH INPATIENT PSYCHIATRY PROGRESS NOTE - NSBHCHARTREVIEWVS_PSY_A_CORE FT
Vital Signs Last 24 Hrs  T(C): 36.7 (09-23-22 @ 09:00), Max: 37.2 (09-22-22 @ 20:17)  T(F): 98.1 (09-23-22 @ 09:00), Max: 98.9 (09-22-22 @ 20:17)  HR: 78 (09-23-22 @ 09:00) (78 - 93)  BP: 107/61 (09-23-22 @ 09:00) (107/61 - 143/83)  BP(mean): --  RR: 18 (09-23-22 @ 09:00) (18 - 18)  SpO2: 98% (09-23-22 @ 09:00) (98% - 98%)

## 2022-09-23 NOTE — BH INPATIENT PSYCHIATRY PROGRESS NOTE - NSBHMETABOLIC_PSY_ALL_CORE_FT
BMI: BMI (kg/m2): 24.1 (09-11-22 @ 07:52)  HbA1c: A1C with Estimated Average Glucose Result: 5.5 % (09-13-22 @ 09:13)    Glucose:   BP: 107/61 (09-23-22 @ 09:00) (107/61 - 143/83)  Lipid Panel: Date/Time: 09-13-22 @ 09:13  Cholesterol, Serum: 185  Direct LDL: --  HDL Cholesterol, Serum: 52  Total Cholesterol/HDL Ration Measurement: --  Triglycerides, Serum: 203

## 2022-09-24 RX ADMIN — Medication 3 MILLIGRAM(S): at 21:42

## 2022-09-24 RX ADMIN — ONDANSETRON 4 MILLIGRAM(S): 8 TABLET, FILM COATED ORAL at 19:10

## 2022-09-24 RX ADMIN — ESCITALOPRAM OXALATE 15 MILLIGRAM(S): 10 TABLET, FILM COATED ORAL at 09:41

## 2022-09-24 RX ADMIN — Medication 150 MILLIGRAM(S): at 21:41

## 2022-09-24 RX ADMIN — BICTEGRAVIR SODIUM, EMTRICITABINE, AND TENOFOVIR ALAFENAMIDE FUMARATE 1 TABLET(S): 30; 120; 15 TABLET ORAL at 09:42

## 2022-09-25 LAB — SARS-COV-2 RNA SPEC QL NAA+PROBE: SIGNIFICANT CHANGE UP

## 2022-09-25 PROCEDURE — 99232 SBSQ HOSP IP/OBS MODERATE 35: CPT

## 2022-09-25 RX ORDER — BICTEGRAVIR SODIUM, EMTRICITABINE, AND TENOFOVIR ALAFENAMIDE FUMARATE 30; 120; 15 MG/1; MG/1; MG/1
1 TABLET ORAL DAILY
Refills: 0 | Status: DISCONTINUED | OUTPATIENT
Start: 2022-09-25 | End: 2022-09-26

## 2022-09-25 RX ADMIN — ESCITALOPRAM OXALATE 15 MILLIGRAM(S): 10 TABLET, FILM COATED ORAL at 11:53

## 2022-09-25 RX ADMIN — Medication 3 MILLIGRAM(S): at 21:34

## 2022-09-25 RX ADMIN — Medication 150 MILLIGRAM(S): at 21:34

## 2022-09-25 RX ADMIN — BICTEGRAVIR SODIUM, EMTRICITABINE, AND TENOFOVIR ALAFENAMIDE FUMARATE 1 TABLET(S): 30; 120; 15 TABLET ORAL at 11:52

## 2022-09-25 NOTE — BH INPATIENT PSYCHIATRY PROGRESS NOTE - NSBHMSEKNOW_PSY_A_CORE
Instructions: This plan will send the code FBSD to the PM system.  DO NOT or CHANGE the price.
Price (Do Not Change): 0.00
Detail Level: Simple
Normal

## 2022-09-25 NOTE — BH INPATIENT PSYCHIATRY PROGRESS NOTE - NSTXSUBMISDATEEST_PSY_ALL_CORE
12-Sep-2022
21-Sep-2022
21-Sep-2022
12-Sep-2022
19-Sep-2022
21-Sep-2022
21-Sep-2022
12-Sep-2022

## 2022-09-25 NOTE — BH INPATIENT PSYCHIATRY PROGRESS NOTE - NSBHFUPINTERVALHXFT_PSY_A_CORE
Mr. Hernandez was encountered in the hallway and was agreeable to speaking with writer. He states he does not want to go in his room because his roommate is ill. He states he feels "ok". He states he attended multiple groups over the weekend including art, music, and games and states he enjoys going. He states his appetite is better than it has been and that the nausea and dizziness is better because one of his medications was discontinued. He endorses continued difficulty sleeping at night, stating he wakes up about 10 times a night due to nightmares. He states he can remember the nightmares and that they are "extremely disturbing" in content, involving violence towards himself and "lots of blood". He is not napping during the day. He also states that he feels restless, but states it is not anxiety. He describes the feeling as he can not stop moving, that he has a lot of energy, and that his whole body is restless. He states he no longer feels the anger and anxiety he was feeling on Friday.    Denies SI, HI, AH, VH. 
Attends some groups and is appropriate; social with some peers. Fair to good eye contact; speech clear spontaneous and normal volume . Good adls.  Not endorsing  suicidal or homicidal ideation intent or plans; no mention of auditory or visual hallucinations Alert; oriented; cognition intact; speech clear; no tremor or evidence of movement impairment. Thinking is congruent with affect; no peculiarities of thinking or language use.  "I am okay"
Mr. Hernandez was encountered in hallway, agreeable to speaking with writer. States he feels better than yesterday. Reports sleeping well, with no nightmares. Eating and drinking well. Taking meds as prescribed. Attending groups, feels they're helping. Hopeful for going to rehab on discharge.     Denies SI, HI, AH, VH. 
37 YO M admitted to [facility] for suicidal ideation and history of polysubstance abuse. He was encountered at bedside, sitting upright. He states that last night he experienced nightmares all night about "running away from something but I don't know what". He states he woke up about 6 times throughout the night but was able to fall asleep right after. He also states he is nauseous but denies vomiting, states his bowel movements are normal, and eating as normal. He states he spent time out of his room last night and interacted with some of the other patients on the unit. He denied attending groups yesterday but states he is interested in attending them today. He states he is taking his medication as prescribed. Inquired further on patient's cigarette use, he states he has been smoking 1 ppd x 15 years. Refusing nicotine patch or gum at this time. He states he is experiencing suicidal thoughts at this time, but will not elaborate further. States "it would be when I get out of here but I don't know what".     Denies HI, AH, VH. 
Mr. Hernandez was encountered in the common area, agreeable to speaking with writer. Reports no longer feeling anxious or depressed and he is feeling better from yesterday. States he interacts with other patients, is attending groups, and taking his medications as prescribed. Reports sleeping well, but still having nightmares.    Denies SI, HI, AH, VH. 
Mr. Hernandez was encountered at bedside while he was laying down in bed, was agreeable to talking with writer. He states he is still dizzy and that nothing is helping. He also states he is still nauseous and that the zofran is not helping. He is still feeling depressed but states there is nothing that is specifically making him feel depressed. He is eating, drinking water, and taking his medications as prescribed which he feels are helping him. He states his sleep is "bad" and that he is sleeping 3-4 hours a night. He states he is having difficulty falling asleep and staying asleep, and that he is having nightmares about "not being able to be there for my family". He states he is attending groups during the day. He has not contacted any family or friends since being here.     Denies SI, HI, AH, VH. 
Mr. Hernandez was encountered sitting upright on his bed and was agreeable to talking with writer. He states he is depressed and "cannot think about positive things". He states that he thinks about "bad stuff" and that it would be easier for himself and his kids if he were no longer here. He also states he is having thoughts about hurting himself but he cannot elaborate further and states he does not have a plan. He states he attended group yesterday and watched TV with other patients but this did not change his mood at all. He states he has not spoken with family or friends since admission. Endorses taking medication as prescribed but is unsure if they are helping. He states he is eating normally but it is difficult due to being nauseous. He is also complaining of dizziness that is worse with standing. He states nothing improves the dizziness and that he alerted staff to this complaint.     Denies HI, AH, VH, anxiety, vomiting, diarrhea, constipation, dysuria. 
Mr. Hernandez was observed throughout the day participating in groups, interacting with select peers, and eating his meals. He was accepted to inpatient rehab today, and is currently pending transfer scheduled for Monday. He denied having any acute complaints and continues to tolerate his current regimen well. 
Mr. Hernandez was encountered in the hallway and was agreeable to speaking with writer, states he does not want to talk in his room. He states he is feeling better from yesterday and that his nightmares are gone due to an increase in the dose of "the medication I take before bed". He states he is taking his medications as prescribed and eating well.     Denies SI, HI, AH, VH. 
Mr. Hernandez was encountered in the hallway, and was agreeable to talking with writer. He states he would like to talk in the hallway since his roommate was sleeping in his room. He states "I don't know if I am depressed or pissed off". He states that "reality is hitting" and he feels like "I've shut every door". He feels as though he's done too much wrong in life to fix it. He also states he is having anxiety and feels like his heart is racing periodically. He states he went to group this morning which he felt was helpful but stated it "stirred up feelings" and made him angry. He reports taking medications as prescribed but states he does not know if they are helping. He also reports having difficulty with sleep, stating he struggles to fall asleep and wakes up repeatedly during the night due to nightmares. He states he has been talking with one of the patients on the floor and has found a friend in her, which he enjoys. He admits to suicidal ideation, stating "I wish there was a pill to kill me slowly". He continues to endorse nausea and dizziness, stating the zofran is not helping.     Denies HI, AH, VH.

## 2022-09-25 NOTE — BH INPATIENT PSYCHIATRY PROGRESS NOTE - NSTXANXDATEEST_PSY_ALL_CORE
21-Sep-2022
12-Sep-2022
21-Sep-2022
12-Sep-2022
12-Sep-2022
21-Sep-2022
21-Sep-2022
12-Sep-2022

## 2022-09-25 NOTE — BH INPATIENT PSYCHIATRY PROGRESS NOTE - NSBHFUPINTERVALCCFT_PSY_A_CORE
"I feel negative"  
"I'm depressed"  
"I'm ok"   
"I'm ok"   
Ongoing treatment for major depressive disorder and substance use disorder, pending transfer to inpatient rehab.  
"I had nightmares all night"  
Ongoing treatment for major depressive disorder and substance use disorder, pending transfer to inpatient rehab.  
"I'm alright"  
"I'm alright"  
"I'm dizzy"

## 2022-09-25 NOTE — BH INPATIENT PSYCHIATRY PROGRESS NOTE - NSTXSUBMISGOALOTHER_PSY_ALL_CORE
Pt. will join 12-step groups when speaker is on unit
Patient will stabilize mood & alleviate sx of substance misuse to engage with discharge planning.
Patient will stabilize mood & alleviate sx of substance misuse to engage with discharge planning.
Pt. will join 12-step groups when speaker is on unit
Patient will stabilize mood & alleviate sx of substance misuse to engage with discharge planning.
Pt. will join 12-step groups when speaker is on unit
Pt. will join 12-step groups when speaker is on unit
Patient will stabilize mood & alleviate sx of substance misuse to engage with discharge planning.

## 2022-09-25 NOTE — BH INPATIENT PSYCHIATRY PROGRESS NOTE - NSBHMSELANG_PSY_A_CORE
Postoperative Patient Follow-up      7/26/2021    Devin Bobur 65year old      HPI  Patient presents with:  Post-Op: S/P Repair of incarcerated right inguinal hernia with mesh 7/13/2021. Patient states he has a little pain, does wear support.   Bowels are
No abnormalities noted

## 2022-09-25 NOTE — BH INPATIENT PSYCHIATRY PROGRESS NOTE - NSTXSUBMISDATETRGT_PSY_ALL_CORE
26-Sep-2022
19-Sep-2022
28-Sep-2022
19-Sep-2022
28-Sep-2022
28-Sep-2022
19-Sep-2022
28-Sep-2022

## 2022-09-25 NOTE — BH INPATIENT PSYCHIATRY PROGRESS NOTE - NSTXSUICIDGOALOTHER_PSY_ALL_CORE
Patient will stabilize mood & alleviate sx of SI to engage with discharge planning.

## 2022-09-25 NOTE — BH INPATIENT PSYCHIATRY PROGRESS NOTE - NSBHATTESTBILLINGAW_PSY_A_CORE
[id number]-[id number] Inpatient care - moderate complexity - 25 minutes
[id number]-Wngqtobian Inpatient care - moderate complexity - 25 minutes
[id number]-Dzjmmeyevp Inpatient care - moderate complexity - 25 minutes
[id number]-Dimswmjfbt Inpatient care - moderate complexity - 25 minutes
[id number]-Atvnfmwpjz Inpatient care - moderate complexity - 25 minutes
[id number]-Ddurjkezgk Inpatient care - high complexity - 35 minutes

## 2022-09-25 NOTE — BH INPATIENT PSYCHIATRY PROGRESS NOTE - NSDCCRITERIA_PSY_ALL_CORE
less suicidal 
Pt will endorse decreased suicidal ideation. 
Pt will endorse decreased suicidal ideation. 
less suicidal 
Pt will endorse decreased suicidal ideation. 
Pt will endorse decreased suicidal ideation.

## 2022-09-25 NOTE — BH INPATIENT PSYCHIATRY PROGRESS NOTE - NSTXDEPRESGOALOTHER_PSY_ALL_CORE
Patient will stabilize mood & alleviate sx of depression to engage with discharge planning.
Pt. will participate in groups and milieu tx structure of unit
Patient will stabilize mood & alleviate sx of depression to engage with discharge planning.
Pt. will participate in groups and milieu tx structure of unit
Patient will stabilize mood & alleviate sx of depression to engage with discharge planning.
Patient will stabilize mood & alleviate sx of depression to engage with discharge planning.
Pt. will participate in groups and milieu tx structure of unit
Pt. will participate in groups and milieu tx structure of unit

## 2022-09-25 NOTE — BH INPATIENT PSYCHIATRY PROGRESS NOTE - NSTXALCDRGDATEEST_PSY_ALL_CORE
21-Sep-2022
03-Jul-2022
21-Sep-2022
21-Sep-2022
03-Jul-2022
03-Jul-2022
21-Sep-2022
03-Jul-2022

## 2022-09-25 NOTE — BH INPATIENT PSYCHIATRY PROGRESS NOTE - NSTXALCDRGGOALOTHER_PSY_ALL_CORE
Patient will stabilize mood & alleviate sx of alcohol/drug withdrawal to engage with discharge planning.

## 2022-09-25 NOTE — BH INPATIENT PSYCHIATRY PROGRESS NOTE - NSTXDEPRESDATEEST_PSY_ALL_CORE
21-Sep-2022
03-Jul-2022
03-Jul-2022
21-Sep-2022
21-Sep-2022
19-Sep-2022
03-Jul-2022
21-Sep-2022
03-Jul-2022
03-Jul-2022

## 2022-09-25 NOTE — BH INPATIENT PSYCHIATRY PROGRESS NOTE - NSTXANXGOALOTHER_PSY_ALL_CORE
Pt. will participate in groups and milieu tx structure of unit
Patient will stabilize mood & alleviate sx of anxiety to engage with discharge planning
Patient will stabilize mood & alleviate sx of anxiety to engage with discharge planning
Pt. will participate in groups and milieu tx structure of unit
Pt. will participate in groups and milieu tx structure of unit
Patient will stabilize mood & alleviate sx of anxiety to engage with discharge planning
Pt. will participate in groups and milieu tx structure of unit
Patient will stabilize mood & alleviate sx of anxiety to engage with discharge planning

## 2022-09-25 NOTE — BH INPATIENT PSYCHIATRY PROGRESS NOTE - NSTXALCDRGDATETRGT_PSY_ALL_CORE
28-Sep-2022
24-Sep-2022
10-Jul-2022
10-Jul-2022
28-Sep-2022
10-Jul-2022
28-Sep-2022
28-Sep-2022
10-Jul-2022
24-Sep-2022

## 2022-09-25 NOTE — BH INPATIENT PSYCHIATRY PROGRESS NOTE - NSBHMETABOLIC_PSY_ALL_CORE_FT
BMI: BMI (kg/m2): 24.1 (09-11-22 @ 07:52)  HbA1c: A1C with Estimated Average Glucose Result: 5.5 % (09-13-22 @ 09:13)    Glucose:   BP: 111/69 (09-25-22 @ 09:00) (107/61 - 143/83)  Lipid Panel: Date/Time: 09-13-22 @ 09:13  Cholesterol, Serum: 185  Direct LDL: --  HDL Cholesterol, Serum: 52  Total Cholesterol/HDL Ration Measurement: --  Triglycerides, Serum: 203

## 2022-09-25 NOTE — BH INPATIENT PSYCHIATRY PROGRESS NOTE - NSBHMSEPERCEPT_PSY_A_CORE
No abnormalities
reports paranoia but denies hallucinations/No abnormalities
No abnormalities
reports paranoia but denies hallucinations/No abnormalities

## 2022-09-25 NOTE — BH INPATIENT PSYCHIATRY PROGRESS NOTE - NSTXANXDATETRGT_PSY_ALL_CORE
28-Sep-2022
19-Sep-2022
28-Sep-2022
19-Sep-2022
28-Sep-2022
28-Sep-2022
19-Sep-2022
19-Sep-2022

## 2022-09-25 NOTE — BH INPATIENT PSYCHIATRY PROGRESS NOTE - NSTXSUICIDDATETRGT_PSY_ALL_CORE
28-Sep-2022
19-Sep-2022
28-Sep-2022
19-Sep-2022
28-Sep-2022
28-Sep-2022

## 2022-09-25 NOTE — BH INPATIENT PSYCHIATRY PROGRESS NOTE - NSTXSUICIDDATEEST_PSY_ALL_CORE
12-Sep-2022
21-Sep-2022
12-Sep-2022
21-Sep-2022
12-Sep-2022

## 2022-09-25 NOTE — BH INPATIENT PSYCHIATRY PROGRESS NOTE - NSBHMSETHTCONTENT_PSY_A_CORE
Hopelessness/Guilt
Hopelessness
Unremarkable
Unremarkable
Hopelessness/Suicidality
Unremarkable
Unremarkable
Hopelessness/Suicidality
Hopelessness/Guilt/Suicidality
Unremarkable

## 2022-09-25 NOTE — BH INPATIENT PSYCHIATRY PROGRESS NOTE - NSBHROSSYSTEMS_PSY_ALL_CORE
Cardiovascular.../Gastrointestinal.../Neurological.../Psychiatric
Psychiatric
Gastrointestinal.../Neurological.../Psychiatric
Psychiatric
Gastrointestinal.../Neurological.../Psychiatric
Gastrointestinal.../Psychiatric

## 2022-09-25 NOTE — BH INPATIENT PSYCHIATRY PROGRESS NOTE - NSTXDEPRESINTERMD_PSY_ALL_CORE
psychopharm management x 15 min daily, tackle substance use
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
## 2022-09-25 NOTE — BH INPATIENT PSYCHIATRY PROGRESS NOTE - NSBHATTESTTYPEVISIT_PSY_A_CORE
Attending Only
Attending with Resident/Fellow/Student

## 2022-09-25 NOTE — BH INPATIENT PSYCHIATRY PROGRESS NOTE - NSTXALCDRGGOAL_PSY_ALL_CORE
Other...
Other...
Will not display signs of withdrawal
Other...
Will not display signs of withdrawal
Other...

## 2022-09-25 NOTE — BH INPATIENT PSYCHIATRY PROGRESS NOTE - NSTXDEPRESDATETRGT_PSY_ALL_CORE
10-Jul-2022
10-Jul-2022
28-Sep-2022
10-Jul-2022
28-Sep-2022
28-Sep-2022
10-Jul-2022
26-Sep-2022
28-Sep-2022
10-Jul-2022

## 2022-09-26 VITALS
OXYGEN SATURATION: 98 % | SYSTOLIC BLOOD PRESSURE: 133 MMHG | DIASTOLIC BLOOD PRESSURE: 81 MMHG | TEMPERATURE: 99 F | RESPIRATION RATE: 18 BRPM | HEART RATE: 80 BPM

## 2022-09-26 PROCEDURE — 86357 NK CELLS TOTAL COUNT: CPT

## 2022-09-26 PROCEDURE — 99285 EMERGENCY DEPT VISIT HI MDM: CPT

## 2022-09-26 PROCEDURE — 80061 LIPID PANEL: CPT

## 2022-09-26 PROCEDURE — 80053 COMPREHEN METABOLIC PANEL: CPT

## 2022-09-26 PROCEDURE — 71045 X-RAY EXAM CHEST 1 VIEW: CPT

## 2022-09-26 PROCEDURE — 86355 B CELLS TOTAL COUNT: CPT

## 2022-09-26 PROCEDURE — 86360 T CELL ABSOLUTE COUNT/RATIO: CPT

## 2022-09-26 PROCEDURE — 99239 HOSP IP/OBS DSCHRG MGMT >30: CPT

## 2022-09-26 PROCEDURE — 83036 HEMOGLOBIN GLYCOSYLATED A1C: CPT

## 2022-09-26 PROCEDURE — 36415 COLL VENOUS BLD VENIPUNCTURE: CPT

## 2022-09-26 PROCEDURE — 86359 T CELLS TOTAL COUNT: CPT

## 2022-09-26 PROCEDURE — 87536 HIV-1 QUANT&REVRSE TRNSCRPJ: CPT

## 2022-09-26 PROCEDURE — 87635 SARS-COV-2 COVID-19 AMP PRB: CPT

## 2022-09-26 PROCEDURE — 85025 COMPLETE CBC W/AUTO DIFF WBC: CPT

## 2022-09-26 PROCEDURE — 85027 COMPLETE CBC AUTOMATED: CPT

## 2022-09-26 PROCEDURE — 80307 DRUG TEST PRSMV CHEM ANLYZR: CPT

## 2022-09-26 PROCEDURE — 81003 URINALYSIS AUTO W/O SCOPE: CPT

## 2022-09-26 RX ORDER — BICTEGRAVIR SODIUM, EMTRICITABINE, AND TENOFOVIR ALAFENAMIDE FUMARATE 30; 120; 15 MG/1; MG/1; MG/1
1 TABLET ORAL
Qty: 0 | Refills: 0 | DISCHARGE
Start: 2022-09-26

## 2022-09-26 RX ADMIN — BICTEGRAVIR SODIUM, EMTRICITABINE, AND TENOFOVIR ALAFENAMIDE FUMARATE 1 TABLET(S): 30; 120; 15 TABLET ORAL at 10:12

## 2022-09-26 RX ADMIN — ESCITALOPRAM OXALATE 15 MILLIGRAM(S): 10 TABLET, FILM COATED ORAL at 10:13

## 2022-09-27 DIAGNOSIS — F14.10 COCAINE ABUSE, UNCOMPLICATED: ICD-10-CM

## 2022-09-27 DIAGNOSIS — F10.20 ALCOHOL DEPENDENCE, UNCOMPLICATED: ICD-10-CM

## 2022-10-05 DIAGNOSIS — S01.92XA: ICD-10-CM
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
## 2022-10-05 DIAGNOSIS — F14.19 COCAINE ABUSE WITH UNSPECIFIED COCAINE-INDUCED DISORDER: ICD-10-CM

## 2022-10-05 DIAGNOSIS — Z21 ASYMPTOMATIC HUMAN IMMUNODEFICIENCY VIRUS [HIV] INFECTION STATUS: ICD-10-CM

## 2022-10-05 DIAGNOSIS — F11.20 OPIOID DEPENDENCE, UNCOMPLICATED: ICD-10-CM

## 2022-10-05 DIAGNOSIS — F10.20 ALCOHOL DEPENDENCE, UNCOMPLICATED: ICD-10-CM

## 2022-10-05 DIAGNOSIS — Z88.0 ALLERGY STATUS TO PENICILLIN: ICD-10-CM

## 2022-10-05 DIAGNOSIS — F19.959 OTHER PSYCHOACTIVE SUBSTANCE USE, UNSPECIFIED WITH PSYCHOACTIVE SUBSTANCE-INDUCED PSYCHOTIC DISORDER, UNSPECIFIED: ICD-10-CM

## 2022-10-05 DIAGNOSIS — F33.2 MAJOR DEPRESSIVE DISORDER, RECURRENT SEVERE WITHOUT PSYCHOTIC FEATURES: ICD-10-CM

## 2022-10-05 DIAGNOSIS — Z86.16 PERSONAL HISTORY OF COVID-19: ICD-10-CM

## 2022-10-05 DIAGNOSIS — F25.0 SCHIZOAFFECTIVE DISORDER, BIPOLAR TYPE: ICD-10-CM

## 2022-10-05 DIAGNOSIS — R45.851 SUICIDAL IDEATIONS: ICD-10-CM

## 2022-10-07 NOTE — PATIENT PROFILE BEHAVIORAL HEALTH - ALCOHOL USE HISTORY SINGLE SELECT
[place] EMERGENCY DEPARTMENT ENCOUNTER:    [facility] INPATIENT UN[facility] - MEDICAL SURGICAL  [address] [address]  [phone]    CHIEF COMPLAINT:    Chief Complaint   Patient presents with   • Fever 75 years or more       HPI:    This is a 83 year old male who presented to the ED the EMS, EMS reports the patient had a fever, and altered mental status.  Per EMS, patient has not had any coughing shortness of breath, abdomen pain chest pain, he does have a indwelling Em catheter, with urine that appears to be cloudy  ALLERGIES:    ALLERGIES:   Allergen Reactions   • Aspirin VOMITING   • Losartan DIARRHEA       CURRENT MEDICATIONS:    Current Facility-Administered Medications   Medication   • sodium chloride 0.9 % flush bag 25 mL   • sodium chloride (PF) 0.9 % injection 2 mL   • sodium chloride (NORMAL SALINE) 0.9 % bolus 500 mL   • piperacillin-tazobactam (ZOSYN) 4.5 g in sodium chloride 0.9 % 100 mL IVPB   • lactated ringers infusion   • acetaminophen (TYLENOL) tablet 650 mg   • ondansetron (ZOFRAN) injection 4 mg   • sodium chloride 0.9% infusion       PROBLEM LIST:  No   Patient Active Problem List   Diagnosis   • Bladder cancer (CMS/HCC)   • Diverticulosis of colon (without mention of hemorrhage)   • Benign neoplasm of colon   • Internal hemorrhoids   • Lumbar stenosis with neurogenic claudication   • Neurogenic bladder   • Closed fracture of left hip (CMS/HCC)   • Osteoporosis with pathological fracture, hip fx, 8/2015   • Hyperparathyroidism, secondary renal (CMS/HCC)   • Essential (primary) hypertension   • Infection associated with indwelling ureteral stent (CMS/HCC)   • Urinary tract infection associated with indwelling urethral catheter (CMS/HCC)   • Sepsis (CMS/HCC)   • Stage 4 chronic kidney disease (CMS/HCC)   • Elevated d-dimer   • COPD (chronic obstructive pulmonary disease) (CMS/HCC)   • Chronic indwelling Em catheter   • Catheter-associated urinary  tract infection (CMS/HCC)   • Generalized weakness   • S/P ureteral stent placement   • Chronic back pain   • Toxic metabolic encephalopathy   • Gross hematuria   • Atelectasis        PAST MEDICAL HISTORY:    Past Medical History:   Diagnosis Date   • Allergy    • Benign neoplasm of colon 10/28/2013   • Bladder tumor 2013   • Chronic kidney disease     elevated creatinine   • COPD (chronic obstructive pulmonary disease) (CMS/HCC)    • Diverticulosis of colon (without mention of hemorrhage) 10/28/2013   • Essential (primary) hypertension    • Hyperparathyroidism, secondary renal (CMS/HCC) 8/16/2015   • Internal hemorrhoids 10/28/2013   • Malignant neoplasm (CMS/HCC)     bladder cancer 2 years ago   • Osteoporosis with pathological fracture 8/15/2015   • Other chronic pain     Lumbar stenosis   • Pneumonia    • PVD (peripheral vascular disease) (CMS/HCC)    • S/P ureteral stent placement 12/13/2020    Bilateral stents in place   • Skin cancer     nose, neck, above left eyebrow       SURGICAL HISTORY:    Past Surgical History:   Procedure Laterality Date   • Bladder surgery  4/13/2015    CYSTOSCOPY   • Cystoscopy,insert ureteral stent Bilateral 03/02/2017, 9/21/2017    Bilateral double-J stent change   • Epidural     • Orif hip fracture Left 08/15/2015   • Skin cancer excision      nasal skin excision;  basal cell carcinoma   • Transurethral resection of bladder tumor  7/10/2013   • Transurethral resection of bladder tumor  5/7/2015    Bilateral ureteroscopy right renal pelvic lesion fulguration, bilateral stents. TURBT   • Ureteral stent placement Bilateral 8/13/2015   • Ureteral stent placement Bilateral 02/10/2016, 9/8/2016, 8-, 8/6/2019    Bilateral stent change    • Ureteral stent placement      multiple exchanges   • Ureteroscopy Left 08/13/2015    Left ureteroscopy left double-J stent, right retrograde right double-J stent       SOCIAL HISTORY:    Social History     Tobacco Use   • Smoking status: Former  Smoker     Packs/day: 1.00     Years: 40.00     Pack years: 40.00     Types: Cigarettes     Quit date: 2011     Years since quittin.3   • Smokeless tobacco: Never Used   Vaping Use   • Vaping Use: never used   • Devices: Pre-filled or refillable cartridge, Pre-filled pod   Substance Use Topics   • Alcohol use: No     Alcohol/week: 0.0 standard drinks   • Drug use: No       FAMILY HISTORY:    Family History   Problem Relation Age of Onset   • Arthritis Brother    • High blood pressure Brother    • Asthma Brother    • High blood pressure Brother    • Arthritis Brother    • Early death Brother 62        lung condition       REVIEW OF SYSTEMS    Unable to obtain secondary to patient's condition    PHYSICAL EXAM    ED Triage Vitals [10/06/22 2031]   BP (!) 168/77   Heart Rate (!) 141   Resp (!) 26   Temp (!) 103.1 °F (39.5 °C)   SpO2 (!) 84 %     Constitutional:  Alert orientated to person only, cooperative, conversive in no acute distress.   Integument:  No rash or lesion.   HEENT:  Sclerae and conjunctivae are normal bilaterally.   Neck:  Trachea is midline.  C-Spine:  FROM.  No posterior midline tenderness, paraspinal tenderness or spasm.   Respiratory:  CTA.  No rales, rhonchi or wheezes.  Cardiovascular:  RRR without murmur.  No edema.    Abdomen:  Non distended, nontender, no hepatosplenomegaly.   Musculoskeletal:  Upper and lower ext nontender bilaterally, normal ROM, no bony step abnormalities.  Back:  Normal alignment.  No CVA or midline bony tenderness.    Neurologic:  Cranial nerve II-XII grossly intact, no focal weakness or numbness.    RADIOLOGY AND LAB RESULTS:    Results for orders placed or performed during the hospital encounter of 10/06/22   Comprehensive Metabolic Panel   Result Value    Fasting Status     Sodium 139    Potassium 4.6     Comment: Slight to moderate hemolysis, result may be falsely increased.    Chloride 102    Carbon Dioxide 29    Anion Gap 13    Glucose 120 (H)    BUN 33 (H)     Creatinine 2.56 (H)    Glomerular Filtration Rate 24 (L)     Comment: eGFR 15 - 29 mL/min/1.73 m2 = Severe decrease in kidney function. Stage 4 CKD (chronic kidney disease), or severe kidney disease. Estimated GFR calculated using the CKD-EPI-R (2021) equation that does not include race in the creatinine calculation.    BUN/ Creatinine Ratio 13    Calcium 9.2    Bilirubin, Total 0.9    GOT/AST 23     Comment: Slight to moderate hemolysis, result may be falsely increased.    GPT/ALT 15    Alkaline Phosphatase 104    Albumin 3.4 (L)    Protein, Total 7.6    Globulin 4.2 (H)    A/G Ratio 0.8 (L)   Lactic Acid Venous With Reflex   Result Value    Lactate, Venous 2.6 (HH)   Magnesium   Result Value    Magnesium 1.8   Procalcitonin   Result Value    Procalcitonin 44.61 (H)     Comment:   Bacterial Sepsis:  <0.5 ng/mL:    Sepsis not likely. Localized bacterial infection possible.  0.5 - 2 ng/mL: Sepsis or other conditions possible  >2.0 ng/mL:    High risk of sepsis/septic shock    NOTE: If bacterial sepsis is of high clinical suspicion but PCT<2 ng/mL,  consider recheck PCT 6-24 hours after initial test.     Lower Respiratory Tract Infection (LRTI):  <0.1 ng/mL:       Bacterial infection highly unlikely  0.1 - 0.25 ng/mL: Bacterial infection unlikely  0.26 - 0.5 ng/mL: Bacterial infection likely  > 0.5 ng/mL:      Bacterial infection highly likely    NOTE: Patients with advanced CKD or medical/surgical trauma may have  elevated baseline PCT (>0.5 ng/mL) in the absence of bacterial infection. If  bacterial infection is suspected, consider repeat PCT in 2 to 4 days to guide de-escalation or discontinuation of antibiotic therapy.  Higher reference range cutoffs may be appropriate for patients <3 days old.     Urinalysis & Reflex Microscopy With Culture If Indicated   Result Value    COLOR, URINALYSIS Yellow    APPEARANCE, URINALYSIS Hazy    GLUCOSE, URINALYSIS Negative    BILIRUBIN, URINALYSIS Negative    KETONES,  URINALYSIS Negative    SPECIFIC GRAVITY, URINALYSIS 1.020    OCCULT BLOOD, URINALYSIS Large (A)    PH, URINALYSIS 7.5 (H)    PROTEIN, URINALYSIS 100  (A)    UROBILINOGEN, URINALYSIS 0.2    NITRITE, URINALYSIS Positive (A)    LEUKOCYTE ESTERASE, URINALYSIS Moderate (A)    SQUAMOUS EPITHELIAL, URINALYSIS 1 to 5    ERYTHROCYTES, URINALYSIS >100 (A)    LEUKOCYTES, URINALYSIS >100 (A)    BACTERIA, URINALYSIS Large (A)    HYALINE CASTS, URINALYSIS None Seen    AMORPHOUS MATERIAL Present   SARS-COV-2/INFLUENZA BY PCR   Result Value    Rapid SARS-COV-2 by PCR Not Detected    Influenza A by PCR Not Detected    Influenza B by PCR Not Detected    Isolation Guidelines      Comment: Do not use this test result as the sole decision-maker for discontinuation of isolation.   Clinical evaluation should be considered for other respiratory illness requiring transmission-based isolation.    -    No fever (<99.0 F/37.2 C) for at least 24 hours without the use of fever-reducing medications    AND  -    Respiratory symptoms have improved or resolved (e.g. cough, shortness of breath)     AND  -    COVID-19 negative test    See COVID-19 Deisolation Resource Guide    Procedural Comment      Comment: SARS-COV-2 nucleic acid has not been detected indicating the absence of COVID-19.    This test was performed using the [employer] Xpert Xpress SARS-CoV-2/Flu/RSV RT-PCR test that has been given Emergency Use Authorization (EUA) by the [place] Food and Drug Administration (FDA).  These tests are considered definitive and do not need to be confirmed by another method.   CBC with Automated Differential (performable only)   Result Value    WBC 11.3 (H)    RBC 5.08    HGB 14.8    HCT 46.4    MCV 91.3    MCH 29.1    MCHC 31.9 (L)    RDW-CV 13.4    RDW-SD 45.1        NRBC 0    Neutrophil, Percent 97    Lymphocytes, Percent 2    Mono, Percent 0    Eosinophils, Percent 0    Basophils, Percent 0    Immature Granulocytes 1    Absolute Neutrophils  11.0 (H)    Absolute Lymphocytes 0.2 (L)    Absolute Monocytes 0.0 (L)    Absolute Eosinophils  0.0    Absolute Basophils 0.0    Absolute Immmature Granulocytes 0.1   Light Blue Top   Result Value    Extra Tube Hold for Add Ons   Gold Top   Result Value    Extra Tube Hold for Add Ons   Lactic Acid, Venous   Result Value    Lactate, Venous 1.9     XR CHEST AP OR PA - PORTABLE   Final Result   IMPRESSION:     Progressive reticular nodular opacities at the lung bases. This may reflect   inflammatory or infectious process with mild atelectasis.      CT HEAD WO CONTRAST   Final Result   IMPRESSION:      Atrophy and white matter small vessel disease without acute intracranial   finding.                Vitals:    10/07/22 0138 10/07/22 0146 10/07/22 0236 10/07/22 0413   BP: 91/52 90/49 (!) 86/51 109/55   BP Location:    LUE - Left upper extremity   Patient Position:    Semi-Ames's   Pulse:    99   Resp:    (!) 22   Temp:    96.9 °F (36.1 °C)   TempSrc:    Temporal   SpO2:    97%   Weight:    123 kg (271 lb 2.7 oz)   Height:          Medications   sodium chloride 0.9% infusion ( Intravenous Not Given 10/7/22 0321)   sodium chloride 0.9 % flush bag 25 mL (has no administration in time range)   sodium chloride (PF) 0.9 % injection 2 mL (2 mLs Intracatheter Not Given 10/7/22 0113)   sodium chloride (NORMAL SALINE) 0.9 % bolus 500 mL (has no administration in time range)   piperacillin-tazobactam (ZOSYN) 4.5 g in sodium chloride 0.9 % 100 mL IVPB (0 g Intravenous Completed 10/7/22 0140)     Followed by   piperacillin-tazobactam (ZOSYN) 4.5 g in sodium chloride 0.9 % 100 mL IVPB (4.5 g Intravenous New Bag 10/7/22 0616)   lactated ringers infusion ( Intravenous New Bag 10/7/22 0120)   acetaminophen (TYLENOL) tablet 650 mg (has no administration in time range)   ondansetron (ZOFRAN) injection 4 mg (has no administration in time range)   sodium chloride (NORMAL SALINE) 0.9 % bolus 1,000 mL (0 mLs Intravenous Completed 10/6/22  2203)   cefTRIAXone (ROCEPHIN) syringe 2,000 mg (2,000 mg Intravenous Given 10/6/22 2221)   sodium chloride (NORMAL SALINE) 0.9 % bolus 1,000 mL (0 mLs Intravenous Completed 10/6/22 2250)     Or   sodium chloride 0.9% infusion ( Intravenous See Alternative 10/6/22 2250)   acetaminophen (TYLENOL) tablet 1,000 mg (1,000 mg Oral Given 10/6/22 2237)   azithromycin (ZITHROMAX) 500 mg in sodium chloride 0.9 % 250 mL IVPB (0 mg Intravenous Completed 10/7/22 0023)   sodium chloride (NORMAL SALINE) 0.9 % bolus 500 mL (0 mLs Intravenous Completed 10/7/22 0155)             ED COURSE & MEDICAL DECISION MAKING:      Patient presents to ER chief complaint of fever and altered mental status.  Is unclear however patient has been sick, initially patient will be evaluated for sepsis, CT of the head shows no acute intracranial pathology, laboratory workup shows elevated white blood cell count procalcitonin and lactic acid, patient's urinalysis is consistent with urinary tract infection    2138   sepsis was identified, patient be given Rocephin, patient does have elevated lactic acid but is less than 4, patient meets severe sepsis, but not septic shock.  Patient is given IV fluids, and maintenance fluids started at 150 mL an hour.  Patient is re-evaluated multiple times, is mental status improved.     Secondary shows a reticular pattern, although believe the patient's source of infection is most likely the urinary tract infection, given the x-ray elevated procalcitonin, patient was started on azithromycin on top of the Rocephin to cover community-acquired pneumonia.  Patient is placed in droplet precautions.  Patient was endorsed to Dr. Johnson hospitalist    The critical care time 53 minutes      DIAGNOSIS:  1. Toxic metabolic encephalopathy    2. Urinary tract infection without hematuria, site unspecified    3. Pneumonia of both lungs due to infectious organism, unspecified part of lung    4. Acute respiratory failure with hypoxia  (CMS/[facility])              The patient understands that if sx do not improve, worsen, or there are any other concerns they should immediately return to the ER or call 911/ambulance.     Florencio Ricardo, DO  10/07/22 0623       Florencio Ricardo, DO  10/07/22 0625     yes...

## 2022-10-18 NOTE — BH SOCIAL WORK CONFIRMATION FOLLOW UP NOTE - NSLINKEDTOLOC_PSY_ALL_CORE
[facility]  [address]  [place] [address]  [phone]

## 2022-10-18 NOTE — BH SOCIAL WORK CONFIRMATION FOLLOW UP NOTE - NSCOMMENTS_PSY_ALL_CORE
Caring Call: This SW unable to conduct Caring Call, as pt was discharged straight to an inpatient rehab setting.

## 2022-10-21 ENCOUNTER — EMERGENCY (EMERGENCY)
Facility: HOSPITAL | Age: 37
LOS: 1 days | Discharge: HOME | End: 2022-10-21
Attending: EMERGENCY MEDICINE | Admitting: EMERGENCY MEDICINE

## 2022-10-21 VITALS
WEIGHT: 145.06 LBS | DIASTOLIC BLOOD PRESSURE: 80 MMHG | HEIGHT: 66 IN | RESPIRATION RATE: 18 BRPM | HEART RATE: 74 BPM | OXYGEN SATURATION: 98 % | SYSTOLIC BLOOD PRESSURE: 158 MMHG | TEMPERATURE: 98 F

## 2022-10-21 DIAGNOSIS — R55 SYNCOPE AND COLLAPSE: ICD-10-CM

## 2022-10-21 DIAGNOSIS — Z20.822 CONTACT WITH AND (SUSPECTED) EXPOSURE TO COVID-19: ICD-10-CM

## 2022-10-21 DIAGNOSIS — Z88.0 ALLERGY STATUS TO PENICILLIN: ICD-10-CM

## 2022-10-21 DIAGNOSIS — Z86.59 PERSONAL HISTORY OF OTHER MENTAL AND BEHAVIORAL DISORDERS: ICD-10-CM

## 2022-10-21 DIAGNOSIS — F20.9 SCHIZOPHRENIA, UNSPECIFIED: ICD-10-CM

## 2022-10-21 DIAGNOSIS — Z21 ASYMPTOMATIC HUMAN IMMUNODEFICIENCY VIRUS [HIV] INFECTION STATUS: ICD-10-CM

## 2022-10-21 DIAGNOSIS — R42 DIZZINESS AND GIDDINESS: ICD-10-CM

## 2022-10-21 LAB
ALBUMIN SERPL ELPH-MCNC: 3.1 G/DL — LOW (ref 3.4–5)
ALP SERPL-CCNC: 93 U/L — SIGNIFICANT CHANGE UP (ref 40–120)
ALT FLD-CCNC: 25 U/L — SIGNIFICANT CHANGE UP (ref 12–42)
ANION GAP SERPL CALC-SCNC: 7 MMOL/L — LOW (ref 9–16)
APAP SERPL-MCNC: <2 UG/ML — LOW (ref 10–30)
AST SERPL-CCNC: 31 U/L — SIGNIFICANT CHANGE UP (ref 15–37)
BASOPHILS # BLD AUTO: 0.02 K/UL — SIGNIFICANT CHANGE UP (ref 0–0.2)
BASOPHILS NFR BLD AUTO: 0.4 % — SIGNIFICANT CHANGE UP (ref 0–2)
BILIRUB SERPL-MCNC: 0.2 MG/DL — SIGNIFICANT CHANGE UP (ref 0.2–1.2)
BUN SERPL-MCNC: 12 MG/DL — SIGNIFICANT CHANGE UP (ref 7–23)
CALCIUM SERPL-MCNC: 8 MG/DL — LOW (ref 8.5–10.5)
CHLORIDE SERPL-SCNC: 109 MMOL/L — HIGH (ref 96–108)
CO2 SERPL-SCNC: 25 MMOL/L — SIGNIFICANT CHANGE UP (ref 22–31)
CREAT SERPL-MCNC: 1.09 MG/DL — SIGNIFICANT CHANGE UP (ref 0.5–1.3)
EGFR: 91 ML/MIN/1.73M2 — SIGNIFICANT CHANGE UP
EOSINOPHIL # BLD AUTO: 0.15 K/UL — SIGNIFICANT CHANGE UP (ref 0–0.5)
EOSINOPHIL NFR BLD AUTO: 3.3 % — SIGNIFICANT CHANGE UP (ref 0–6)
ETHANOL SERPL-MCNC: <3 MG/DL — SIGNIFICANT CHANGE UP
GLUCOSE SERPL-MCNC: 119 MG/DL — HIGH (ref 70–99)
HCT VFR BLD CALC: 34.8 % — LOW (ref 39–50)
HGB BLD-MCNC: 11.4 G/DL — LOW (ref 13–17)
IMM GRANULOCYTES NFR BLD AUTO: 0.2 % — SIGNIFICANT CHANGE UP (ref 0–0.9)
LYMPHOCYTES # BLD AUTO: 2.27 K/UL — SIGNIFICANT CHANGE UP (ref 1–3.3)
LYMPHOCYTES # BLD AUTO: 50.2 % — HIGH (ref 13–44)
MCHC RBC-ENTMCNC: 30.2 PG — SIGNIFICANT CHANGE UP (ref 27–34)
MCHC RBC-ENTMCNC: 32.8 GM/DL — SIGNIFICANT CHANGE UP (ref 32–36)
MCV RBC AUTO: 92.3 FL — SIGNIFICANT CHANGE UP (ref 80–100)
MONOCYTES # BLD AUTO: 0.42 K/UL — SIGNIFICANT CHANGE UP (ref 0–0.9)
MONOCYTES NFR BLD AUTO: 9.3 % — SIGNIFICANT CHANGE UP (ref 2–14)
NEUTROPHILS # BLD AUTO: 1.65 K/UL — LOW (ref 1.8–7.4)
NEUTROPHILS NFR BLD AUTO: 36.6 % — LOW (ref 43–77)
NRBC # BLD: 0 /100 WBCS — SIGNIFICANT CHANGE UP (ref 0–0)
PLATELET # BLD AUTO: 252 K/UL — SIGNIFICANT CHANGE UP (ref 150–400)
POTASSIUM SERPL-MCNC: 3.5 MMOL/L — SIGNIFICANT CHANGE UP (ref 3.5–5.3)
POTASSIUM SERPL-SCNC: 3.5 MMOL/L — SIGNIFICANT CHANGE UP (ref 3.5–5.3)
PROT SERPL-MCNC: 7 G/DL — SIGNIFICANT CHANGE UP (ref 6.4–8.2)
RBC # BLD: 3.77 M/UL — LOW (ref 4.2–5.8)
RBC # FLD: 14.2 % — SIGNIFICANT CHANGE UP (ref 10.3–14.5)
SALICYLATES SERPL-MCNC: 2.2 MG/DL — LOW (ref 2.8–20)
SARS-COV-2 RNA SPEC QL NAA+PROBE: SIGNIFICANT CHANGE UP
SODIUM SERPL-SCNC: 141 MMOL/L — SIGNIFICANT CHANGE UP (ref 132–145)
TROPONIN I, HIGH SENSITIVITY RESULT: 6 NG/L — SIGNIFICANT CHANGE UP
WBC # BLD: 4.52 K/UL — SIGNIFICANT CHANGE UP (ref 3.8–10.5)
WBC # FLD AUTO: 4.52 K/UL — SIGNIFICANT CHANGE UP (ref 3.8–10.5)

## 2022-10-21 PROCEDURE — 99285 EMERGENCY DEPT VISIT HI MDM: CPT

## 2022-10-21 PROCEDURE — 93010 ELECTROCARDIOGRAM REPORT: CPT

## 2022-10-21 NOTE — ED ADULT TRIAGE NOTE - CHIEF COMPLAINT QUOTE
Walk in pt with c/o 'I keep passed out.' States occurred this morning when he was sitting on park bench, p/s 'when I came to I was talking to a friend of mine who has passed away. im seeing people that arent there.' Pt admits to hx schizophrenia, anxiety, depression, bipolar d/o and polysubstance abuse. Denies SI/HI at this time. Pt has appropriate behavior and affect at triage.

## 2022-10-21 NOTE — ED ADULT NURSE NOTE - COVID-19 ORDERING FACILITY
Personalized Preventive Plan for Ayah Both - 1/21/2022  Medicare offers a range of preventive health benefits. Some of the tests and screenings are paid in full while other may be subject to a deductible, co-insurance, and/or copay. Some of these benefits include a comprehensive review of your medical history including lifestyle, illnesses that may run in your family, and various assessments and screenings as appropriate. After reviewing your medical record and screening and assessments performed today your provider may have ordered immunizations, labs, imaging, and/or referrals for you. A list of these orders (if applicable) as well as your Preventive Care list are included within your After Visit Summary for your review. Other Preventive Recommendations:    · A preventive eye exam performed by an eye specialist is recommended every 1-2 years to screen for glaucoma; cataracts, macular degeneration, and other eye disorders. · A preventive dental visit is recommended every 6 months. · Try to get at least 150 minutes of exercise per week or 10,000 steps per day on a pedometer . · Order or download the FREE \"Exercise & Physical Activity: Your Everyday Guide\" from [employer]. Call 3-199.157.1176 or search [employer] online. · You need 6334-0887 mg of calcium and 6877-0090 IU of vitamin D per day. It is possible to meet your calcium requirement with diet alone, but a vitamin D supplement is usually necessary to meet this goal.  · When exposed to the sun, use a sunscreen that protects against both UVA and UVB radiation with an SPF of 30 or greater. Reapply every 2 to 3 hours or after sweating, drying off with a towel, or swimming. · Always wear a seat belt when traveling in a car. Always wear a helmet when riding a bicycle or motorcycle.
[facility]

## 2022-10-21 NOTE — ED PROVIDER NOTE - NSFOLLOWUPINSTRUCTIONS_ED_ALL_ED_FT
Syncope       Syncope is when you pass out (faint) for a short time. It is caused by a sudden decrease in blood flow to the brain. Signs that you may be about to pass out include:  •Feeling dizzy or light-headed.      •Feeling sick to your stomach (nauseous).      •Seeing all white or all black.      •Having cold, clammy skin.      If you pass out, get help right away. Call your local emergency services (911 in the [place] Do not drive yourself to the hospital.      Follow these instructions at home:    Watch for any changes in your symptoms. Take these actions to stay safe and help with your symptoms:    Lifestyle     • Do not drive, use machinery, or play sports until your doctor says it is okay.      • Do not drink alcohol.      • Do not use any products that contain nicotine or tobacco, such as cigarettes and e-cigarettes. If you need help quitting, ask your doctor.      •Drink enough fluid to keep your pee (urine) pale yellow.      General instructions     •Take over-the-counter and prescription medicines only as told by your doctor.      •If you are taking blood pressure or heart medicine, sit up and stand up slowly. Spend a few minutes getting ready to sit and then stand. This can help you feel less dizzy.      •Have someone stay with you until you feel stable.      •If you start to feel like you might pass out, lie down right away and raise (elevate) your feet above the level of your heart. Breathe deeply and steadily. Wait until all of the symptoms are gone.      •Keep all follow-up visits as told by your doctor. This is important.        Get help right away if:    •You have a very bad headache.      •You pass out once or more than once.      •You have pain in your chest, belly, or back.      •You have a very fast or uneven heartbeat (palpitations).      •It hurts to breathe.      •You are bleeding from your mouth or your bottom (rectum).      •You have black or tarry poop (stool).      •You have jerky movements that you cannot control (seizure).      •You are confused.      •You have trouble walking.      •You are very weak.      •You have vision problems.      These symptoms may be an emergency. Do not wait to see if the symptoms will go away. Get medical help right away. Call your local emergency services (911 in the [place] Do not drive yourself to the hospital.       Summary    •Syncope is when you pass out (faint) for a short time. It is caused by a sudden decrease in blood flow to the brain.      •Signs that you may be about to faint include feeling dizzy, light-headed, or sick to your stomach, seeing all white or all black, or having cold, clammy skin.      •If you start to feel like you might pass out, lie down right away and raise (elevate) your feet above the level of your heart. Breathe deeply and steadily. Wait until all of the symptoms are gone.      This information is not intended to replace advice given to you by your health care provider. Make sure you discuss any questions you have with your health care provider.

## 2022-10-21 NOTE — ED ADULT NURSE NOTE - CHIEF COMPLAINT
----- Message from Callie Daniels sent at 11/10/2020  2:20 PM CST -----  Regarding: fax  Windy Woo called for appt. Notes or procedure notes and she need them faxed over to [phone] (fax)     The patient is a 36y Male complaining of hallucinations.

## 2022-10-21 NOTE — ED ADULT NURSE NOTE - OBJECTIVE STATEMENT
Patient presents with complaints of "I keep passing out" in and out of consciousness for hours, and also seeing his friend who passed away and seeing things that are not there. Denies auditory hallucination. Denies SI or HI. Patient reports history of schizophrenia, bipolar disorder, depression, anxiety, HIV. Pt has not been complaint with any of the medication. Admits to doing cocaine a couple days ago. Denies other drug use. Denies chest pain, nausea, vomiting, fever, chill, dysuria. C/o SOB and dizziness.

## 2022-10-21 NOTE — ED PROVIDER NOTE - NS ED ROS FT
Constitutional:  No fever, No chills, No night sweats  Eyes:  No visual changes, No discharge, No redness  ENMT:  No epistaxis, no nasal congestion, no throat pain, no difficulty swallowing  CV:  No chest pain, No palpitations, No peripheral edema  Resp:  No cough, No shortness of breath  GI:  No abdominal pain, No vomiting, No diarrhea  MSK:  No neck pain or stiffness, No joint swelling or pain, No back pain  Neuro: +loss of consciousness, no gait abnormality, no headache, no sensory deficits, and no weakness.  Skin:  No abrasions, no lesions, no rashes  Psych:  No known mental health issues

## 2022-10-21 NOTE — ED PROVIDER NOTE - OBJECTIVE STATEMENT
35yo M hx of schizophrenia presents with episode of syncope this morning.  pt states 37yo M hx of schizophrenia and HIV presents with episode of syncope this morning.  pt states he was sitting on a bench at 5am and felt lightheaded, then woke up on the bench at 10am.  Did not fall to ground or hit head.  No CP or SOB.  No fever, chills, nausea, vomiting, or diarrhea.  pt then spent the day helping a friend move and felt fine, but came to ED tonight to get checked out.  States he may have seen someone who wasn't there right before he lost consciousness this morning.  No auditory hallucinations.  No SI or HI.

## 2022-10-21 NOTE — ED PROVIDER NOTE - PHYSICAL EXAMINATION
Constitutional: awake and alert, in no acute distress  HEENT: head normocephalic and atraumatic. moist mucous membranes  Eyes: extraocular movements intact, normal conjunctiva  Neck: supple, normal ROM  Cardiovascular: regular rate   Pulmonary: no respiratory distress  Gastrointestinal: abdomen flat and nondistended, nontender to palpation  Skin: warm, dry, normal for ethnicity  Musculoskeletal: no edema, no deformity, no calf TTP  Neurological: oriented x4, no focal neurologic deficit.   Psychiatric: calm and cooperative

## 2022-10-21 NOTE — ED PROVIDER NOTE - PATIENT PORTAL LINK FT
You can access the FollowMyHealth Patient Portal offered by [facility] by registering at the following website: [email or web address]. By joining [facility]’s FollowMyHealth portal, you will also be able to view your health information using other applications (apps) compatible with our system.

## 2022-10-21 NOTE — ED PROVIDER NOTE - CLINICAL SUMMARY MEDICAL DECISION MAKING FREE TEXT BOX
37yo M hx of schizophrenia and drug abuse presents with possible syncope preceded by prodrome of lightheadedness this morning vs. sleep with hypnagogia.  On exam afebrile, VSS, well appearing, no neuro deficits or focal findings.  EKG nonischemic.  No delta wave, no prolonged QT, no brugada pattern.  Trop negative.  No electrolyte abn.  Pt feels better after IV hydration.  Stable for dc w plan for PMD follow up.  Will refill pt's biktarvy.

## 2022-10-22 VITALS
SYSTOLIC BLOOD PRESSURE: 113 MMHG | RESPIRATION RATE: 16 BRPM | DIASTOLIC BLOOD PRESSURE: 66 MMHG | HEART RATE: 65 BPM | TEMPERATURE: 98 F | OXYGEN SATURATION: 98 %

## 2022-10-22 NOTE — ED ADULT NURSE REASSESSMENT NOTE - NS ED NURSE REASSESS COMMENT FT1
Received patient from [facility]. Pt resting in stretcher, in no acute distress at this time, respirations even bilaterally. Pt states "I see people when I get up. They're people I know but I know they're not real". Denies t/a hallucinations. Safety measures maintained, pending further dispo.

## 2022-11-03 NOTE — BEHAVIORAL HEALTH ASSESSMENT NOTE - RISK ASSESSMENT
alert and awake Patient at acute and imminent risk of harm to himself due to suicide attempt tonight, command auditory hallucinations, active suicidal ideation, past attempts, and acute psychosis.

## 2022-11-04 ENCOUNTER — EMERGENCY (EMERGENCY)
Facility: HOSPITAL | Age: 37
LOS: 1 days | Discharge: HOME | End: 2022-11-04

## 2022-11-04 VITALS
OXYGEN SATURATION: 99 % | RESPIRATION RATE: 18 BRPM | SYSTOLIC BLOOD PRESSURE: 120 MMHG | TEMPERATURE: 98 F | WEIGHT: 151.9 LBS | HEIGHT: 66 IN | DIASTOLIC BLOOD PRESSURE: 81 MMHG | HEART RATE: 67 BPM

## 2022-11-04 LAB — SARS-COV-2 RNA SPEC QL NAA+PROBE: SIGNIFICANT CHANGE UP

## 2022-11-04 PROCEDURE — 71046 X-RAY EXAM CHEST 2 VIEWS: CPT | Mod: 26

## 2022-11-04 PROCEDURE — 99284 EMERGENCY DEPT VISIT MOD MDM: CPT | Mod: 25

## 2022-11-04 RX ORDER — ACETAMINOPHEN 500 MG
1 TABLET ORAL
Qty: 28 | Refills: 0
Start: 2022-11-04 | End: 2022-11-10

## 2022-11-04 RX ORDER — IBUPROFEN 200 MG
600 TABLET ORAL ONCE
Refills: 0 | Status: COMPLETED | OUTPATIENT
Start: 2022-11-04 | End: 2022-11-04

## 2022-11-04 RX ORDER — METHOCARBAMOL 500 MG/1
750 TABLET, FILM COATED ORAL ONCE
Refills: 0 | Status: COMPLETED | OUTPATIENT
Start: 2022-11-04 | End: 2022-11-04

## 2022-11-04 RX ADMIN — Medication 200 MILLIGRAM(S): at 19:37

## 2022-11-04 RX ADMIN — METHOCARBAMOL 750 MILLIGRAM(S): 500 TABLET, FILM COATED ORAL at 19:36

## 2022-11-04 RX ADMIN — Medication 600 MILLIGRAM(S): at 19:35

## 2022-11-04 NOTE — ED PROVIDER NOTE - OBJECTIVE STATEMENT
35 y/o male, with a PMHx of HIV, presents to the ED c/o cough and loss of taste x2 days. Pt is not vaccinated for COVID or the flu. Pt denies travel, sick contacts, shortness of breath, and abdominal pain. Pt has not taken any medications for his symptoms. Of not, Pt is undomicile.

## 2022-11-04 NOTE — ED ADULT NURSE NOTE - OBJECTIVE STATEMENT
pt is 36y male, here for cough and body aches x 4 days, no CP or SOB, pt is a&ox3, ambulatory with steady gait, normal WOB, no cough noted during assessment, NAD present

## 2022-11-04 NOTE — ED PROVIDER NOTE - PATIENT PORTAL LINK FT
You can access the FollowMyHealth Patient Portal offered by [facility] by registering at the following website: [email or web address]. By joining [facility]’s FollowMyHealth portal, you will also be able to view your health information using other applications (apps) compatible with our system.

## 2022-11-04 NOTE — ED PROVIDER NOTE - CLINICAL SUMMARY MEDICAL DECISION MAKING FREE TEXT BOX
35 y/o male with a cough and loss of taste x2 days, unvaccinated for COVID/flu. Plan to obtain CXR, covid test, and provide ibuprofen. [id number] pending medical workup. 37 y/o male with a cough and loss of taste x2 days, unvaccinated for COVID/flu. Plan to obtain CXR, covid test, and provide ibuprofen. [id number] pending medical workup.     chest XR wet read unremarkable. Pending COVID test, will text patient with result. Prescription for Robitussin and Tylenol given. Educated on smoking cessation. Will follow up with PMD in a week. discharge and return to ED instructions given.

## 2022-11-04 NOTE — ED PROVIDER NOTE - PHYSICAL EXAMINATION
CONSTITUTIONAL: Well appearing, well nourished, awake, alert, oriented to person, place, time/situation and in no apparent distress.  ENT: Airway patent, Nasal mucosa clear. Mouth with normal mucosa.  EYES: Clear bilaterally.  RESPIRATORY: Breathing comfortably with normal RR.  NEURO: Alert and oriented, no focal deficits.  SKIN: Skin normal color for race, warm, dry and intact. No evidence of rash.  PSYCH: Alert and oriented to person, place, time/situation. normal mood and affect. no apparent risk to self or others.

## 2022-11-08 DIAGNOSIS — Z20.822 CONTACT WITH AND (SUSPECTED) EXPOSURE TO COVID-19: ICD-10-CM

## 2022-11-08 DIAGNOSIS — Z86.59 PERSONAL HISTORY OF OTHER MENTAL AND BEHAVIORAL DISORDERS: ICD-10-CM

## 2022-11-08 DIAGNOSIS — F41.8 OTHER SPECIFIED ANXIETY DISORDERS: ICD-10-CM

## 2022-11-08 DIAGNOSIS — B20 HUMAN IMMUNODEFICIENCY VIRUS [HIV] DISEASE: ICD-10-CM

## 2022-11-08 DIAGNOSIS — Z28.310 UNVACCINATED FOR COVID-19: ICD-10-CM

## 2022-11-08 DIAGNOSIS — J06.9 ACUTE UPPER RESPIRATORY INFECTION, UNSPECIFIED: ICD-10-CM

## 2022-11-08 DIAGNOSIS — F20.9 SCHIZOPHRENIA, UNSPECIFIED: ICD-10-CM

## 2022-11-08 DIAGNOSIS — R05.1 ACUTE COUGH: ICD-10-CM

## 2022-11-08 DIAGNOSIS — F31.9 BIPOLAR DISORDER, UNSPECIFIED: ICD-10-CM

## 2022-11-14 NOTE — PATIENT PROFILE BEHAVIORAL HEALTH - CENTRAL VENOUS CATHETER
RN Hypertension Cinic Visit #1    Patient presents to RN hypertension visit for initial appointment. RN hypertension protocol is signed by Dr. Ulisses Baker. Patient was provided RN triage contact information and Understanding Hypertension patient information packet.     Last Clinician Visit for this condition: 10/31/22  Per that visit, plan of care: restarting Amlodipine 10mg PO daily, documentation noted that Denise ran out of the medication for a few days  Next office visit is scheduled for: 12/2/2022    BP Readings from Last 1 Encounters:   11/14/22 1058 (!) 150/90   11/14/22 1045 (!) 170/90     Pulse Readings from Last 1 Encounters:   11/14/22 81     Patient Reported Vitals    There is no flowsheet data to display.          Patient was not provided Hypertension Patient Worksheet Intake Form.    Discussed monitoring blood pressures at home, including brand, using an upper arm and appropriate size cuff. Patient does have a blood pressure cuff at home but does not check his/her BP at home. .     Patient was educated regarding lifestyle management, including not smoking, healthy diet/decrease salt intake, exercise regularly, decrease stress, reduce caffeine intake and watch weight.    Current hypertension medications are Labetolol 100mg PO daily, Amlodipine 10mg PO daily, and Losartan 100mg PO daily and last dose was taken at 0900.    Patient meets the following HTN protocol exclusion criteria: None    Patient answered yes to the following screening questions: None    BP is above goal. The following medication adjustments were made per protocol today:  and Change: Labetolol from 100mg PO daily to 100mg PO BID    Patient will return to clinic in 2 weeks for nurse visit blood pressure check.     Topics NOT discussed today, to be covered at next appointment: None    no

## 2022-11-16 NOTE — ED PROVIDER NOTE - CONDUCTED A DETAILED DISCUSSION WITH PATIENT AND/OR GUARDIAN REGARDING, MDM
98.4 need for outpatient follow-up/return to ED if symptoms worsen, persist or questions arise/radiology results

## 2022-12-14 NOTE — BH INPATIENT PSYCHIATRY ASSESSMENT NOTE - CURRENT ACTIVE IDEATION:
None known Graft Donor Site Bandage (Optional-Leave Blank If You Don't Want In Note): Steri-strips and a pressure bandage were applied to the donor site.

## 2022-12-24 ENCOUNTER — EMERGENCY (EMERGENCY)
Facility: HOSPITAL | Age: 37
LOS: 1 days | Discharge: HOME | End: 2022-12-24
Attending: EMERGENCY MEDICINE | Admitting: EMERGENCY MEDICINE
Payer: MEDICAID

## 2022-12-24 VITALS
TEMPERATURE: 98 F | WEIGHT: 158.07 LBS | HEART RATE: 93 BPM | HEIGHT: 66 IN | DIASTOLIC BLOOD PRESSURE: 67 MMHG | OXYGEN SATURATION: 97 % | SYSTOLIC BLOOD PRESSURE: 132 MMHG | RESPIRATION RATE: 18 BRPM

## 2022-12-24 DIAGNOSIS — Z86.59 PERSONAL HISTORY OF OTHER MENTAL AND BEHAVIORAL DISORDERS: ICD-10-CM

## 2022-12-24 DIAGNOSIS — Z86.16 PERSONAL HISTORY OF COVID-19: ICD-10-CM

## 2022-12-24 DIAGNOSIS — B20 HUMAN IMMUNODEFICIENCY VIRUS [HIV] DISEASE: ICD-10-CM

## 2022-12-24 DIAGNOSIS — F20.9 SCHIZOPHRENIA, UNSPECIFIED: ICD-10-CM

## 2022-12-24 DIAGNOSIS — F31.9 BIPOLAR DISORDER, UNSPECIFIED: ICD-10-CM

## 2022-12-24 DIAGNOSIS — Z20.822 CONTACT WITH AND (SUSPECTED) EXPOSURE TO COVID-19: ICD-10-CM

## 2022-12-24 DIAGNOSIS — F41.8 OTHER SPECIFIED ANXIETY DISORDERS: ICD-10-CM

## 2022-12-24 DIAGNOSIS — B34.9 VIRAL INFECTION, UNSPECIFIED: ICD-10-CM

## 2022-12-24 DIAGNOSIS — M79.10 MYALGIA, UNSPECIFIED SITE: ICD-10-CM

## 2022-12-24 DIAGNOSIS — Z88.0 ALLERGY STATUS TO PENICILLIN: ICD-10-CM

## 2022-12-24 LAB
ALBUMIN SERPL ELPH-MCNC: 4.4 G/DL — SIGNIFICANT CHANGE UP (ref 3.3–5)
ALP SERPL-CCNC: 86 U/L — SIGNIFICANT CHANGE UP (ref 40–120)
ALT FLD-CCNC: 32 U/L — SIGNIFICANT CHANGE UP (ref 10–45)
ANION GAP SERPL CALC-SCNC: 9 MMOL/L — SIGNIFICANT CHANGE UP (ref 5–17)
AST SERPL-CCNC: 74 U/L — HIGH (ref 10–40)
BASOPHILS # BLD AUTO: 0.02 K/UL — SIGNIFICANT CHANGE UP (ref 0–0.2)
BASOPHILS NFR BLD AUTO: 0.4 % — SIGNIFICANT CHANGE UP (ref 0–2)
BILIRUB SERPL-MCNC: 0.5 MG/DL — SIGNIFICANT CHANGE UP (ref 0.2–1.2)
BUN SERPL-MCNC: 15 MG/DL — SIGNIFICANT CHANGE UP (ref 7–23)
CALCIUM SERPL-MCNC: 9 MG/DL — SIGNIFICANT CHANGE UP (ref 8.4–10.5)
CHLORIDE SERPL-SCNC: 98 MMOL/L — SIGNIFICANT CHANGE UP (ref 96–108)
CO2 SERPL-SCNC: 30 MMOL/L — SIGNIFICANT CHANGE UP (ref 22–31)
CREAT SERPL-MCNC: 0.83 MG/DL — SIGNIFICANT CHANGE UP (ref 0.5–1.3)
EGFR: 116 ML/MIN/1.73M2 — SIGNIFICANT CHANGE UP
EOSINOPHIL # BLD AUTO: 0.01 K/UL — SIGNIFICANT CHANGE UP (ref 0–0.5)
EOSINOPHIL NFR BLD AUTO: 0.2 % — SIGNIFICANT CHANGE UP (ref 0–6)
FLUAV AG NPH QL: DETECTED
FLUBV AG NPH QL: SIGNIFICANT CHANGE UP
GLUCOSE SERPL-MCNC: 115 MG/DL — HIGH (ref 70–99)
HCT VFR BLD CALC: 41.5 % — SIGNIFICANT CHANGE UP (ref 39–50)
HGB BLD-MCNC: 13.9 G/DL — SIGNIFICANT CHANGE UP (ref 13–17)
IMM GRANULOCYTES NFR BLD AUTO: 0.2 % — SIGNIFICANT CHANGE UP (ref 0–0.9)
LIDOCAIN IGE QN: 15 U/L — SIGNIFICANT CHANGE UP (ref 7–60)
LYMPHOCYTES # BLD AUTO: 1.18 K/UL — SIGNIFICANT CHANGE UP (ref 1–3.3)
LYMPHOCYTES # BLD AUTO: 21.2 % — SIGNIFICANT CHANGE UP (ref 13–44)
MCHC RBC-ENTMCNC: 30.4 PG — SIGNIFICANT CHANGE UP (ref 27–34)
MCHC RBC-ENTMCNC: 33.5 GM/DL — SIGNIFICANT CHANGE UP (ref 32–36)
MCV RBC AUTO: 90.8 FL — SIGNIFICANT CHANGE UP (ref 80–100)
MONOCYTES # BLD AUTO: 0.6 K/UL — SIGNIFICANT CHANGE UP (ref 0–0.9)
MONOCYTES NFR BLD AUTO: 10.8 % — SIGNIFICANT CHANGE UP (ref 2–14)
NEUTROPHILS # BLD AUTO: 3.75 K/UL — SIGNIFICANT CHANGE UP (ref 1.8–7.4)
NEUTROPHILS NFR BLD AUTO: 67.2 % — SIGNIFICANT CHANGE UP (ref 43–77)
NRBC # BLD: 0 /100 WBCS — SIGNIFICANT CHANGE UP (ref 0–0)
PLATELET # BLD AUTO: 281 K/UL — SIGNIFICANT CHANGE UP (ref 150–400)
POTASSIUM SERPL-MCNC: 3.8 MMOL/L — SIGNIFICANT CHANGE UP (ref 3.5–5.3)
POTASSIUM SERPL-SCNC: 3.8 MMOL/L — SIGNIFICANT CHANGE UP (ref 3.5–5.3)
PROT SERPL-MCNC: 8.5 G/DL — HIGH (ref 6–8.3)
RBC # BLD: 4.57 M/UL — SIGNIFICANT CHANGE UP (ref 4.2–5.8)
RBC # FLD: 12.9 % — SIGNIFICANT CHANGE UP (ref 10.3–14.5)
RSV RNA NPH QL NAA+NON-PROBE: SIGNIFICANT CHANGE UP
SARS-COV-2 RNA SPEC QL NAA+PROBE: SIGNIFICANT CHANGE UP
SODIUM SERPL-SCNC: 137 MMOL/L — SIGNIFICANT CHANGE UP (ref 135–145)
WBC # BLD: 5.57 K/UL — SIGNIFICANT CHANGE UP (ref 3.8–10.5)
WBC # FLD AUTO: 5.57 K/UL — SIGNIFICANT CHANGE UP (ref 3.8–10.5)

## 2022-12-24 PROCEDURE — 99283 EMERGENCY DEPT VISIT LOW MDM: CPT

## 2022-12-24 PROCEDURE — 83690 ASSAY OF LIPASE: CPT

## 2022-12-24 PROCEDURE — 36415 COLL VENOUS BLD VENIPUNCTURE: CPT

## 2022-12-24 PROCEDURE — 96361 HYDRATE IV INFUSION ADD-ON: CPT

## 2022-12-24 PROCEDURE — 87637 SARSCOV2&INF A&B&RSV AMP PRB: CPT

## 2022-12-24 PROCEDURE — 96374 THER/PROPH/DIAG INJ IV PUSH: CPT

## 2022-12-24 PROCEDURE — 99284 EMERGENCY DEPT VISIT MOD MDM: CPT | Mod: 25

## 2022-12-24 PROCEDURE — 71046 X-RAY EXAM CHEST 2 VIEWS: CPT | Mod: 26

## 2022-12-24 PROCEDURE — 71046 X-RAY EXAM CHEST 2 VIEWS: CPT

## 2022-12-24 PROCEDURE — 80053 COMPREHEN METABOLIC PANEL: CPT

## 2022-12-24 PROCEDURE — 96375 TX/PRO/DX INJ NEW DRUG ADDON: CPT

## 2022-12-24 PROCEDURE — 85025 COMPLETE CBC W/AUTO DIFF WBC: CPT

## 2022-12-24 RX ORDER — ONDANSETRON 8 MG/1
4 TABLET, FILM COATED ORAL ONCE
Refills: 0 | Status: COMPLETED | OUTPATIENT
Start: 2022-12-24 | End: 2022-12-24

## 2022-12-24 RX ORDER — SODIUM CHLORIDE 9 MG/ML
1000 INJECTION INTRAMUSCULAR; INTRAVENOUS; SUBCUTANEOUS ONCE
Refills: 0 | Status: COMPLETED | OUTPATIENT
Start: 2022-12-24 | End: 2022-12-24

## 2022-12-24 RX ORDER — KETOROLAC TROMETHAMINE 30 MG/ML
15 SYRINGE (ML) INJECTION ONCE
Refills: 0 | Status: DISCONTINUED | OUTPATIENT
Start: 2022-12-24 | End: 2022-12-24

## 2022-12-24 RX ADMIN — ONDANSETRON 4 MILLIGRAM(S): 8 TABLET, FILM COATED ORAL at 10:51

## 2022-12-24 RX ADMIN — SODIUM CHLORIDE 1000 MILLILITER(S): 9 INJECTION INTRAMUSCULAR; INTRAVENOUS; SUBCUTANEOUS at 12:00

## 2022-12-24 RX ADMIN — SODIUM CHLORIDE 1000 MILLILITER(S): 9 INJECTION INTRAMUSCULAR; INTRAVENOUS; SUBCUTANEOUS at 10:51

## 2022-12-24 RX ADMIN — Medication 15 MILLIGRAM(S): at 10:51

## 2022-12-24 RX ADMIN — Medication 15 MILLIGRAM(S): at 12:00

## 2022-12-24 NOTE — ED ADULT NURSE NOTE - OBJECTIVE STATEMENT
Patient is 37 y.o male client reporting for flu-like symptoms - vomiting, body aches,, diarrhea, headache, cough x 6 days. Denies cp, sob, dizziness, tingling. numbness, abd pain or urinary sx. No known medical condition. Assessment ongoing. Will cont to monitor.

## 2022-12-24 NOTE — ED ADULT NURSE NOTE - NSIMPLEMENTINTERV_GEN_ALL_ED
Discharged per wheelchair in stable condition to car, moved self into car,released to care of   
Implemented All Universal Safety Interventions:  [place] to call system. Call bell, personal items and telephone within reach. Instruct patient to call for assistance. Room bathroom lighting operational. Non-slip footwear when patient is off stretcher. Physically safe environment: no spills, clutter or unnecessary equipment. Stretcher in lowest position, wheels locked, appropriate side rails in place.

## 2022-12-24 NOTE — ED PROVIDER NOTE - OBJECTIVE STATEMENT
38 yo m with pmh of HIV (CD4 245, VL detectable, on meds) c/o bodyaches, cough, n/v x 6 days. Reports intermittent subjective fever. Diarrhea that started today. Denies cp, sob, dizziness, abd pain, back pain. 36 yo m with pmh of HIV (CD4 245, VL undetectable, on meds) c/o bodyaches, cough, n/v x 6 days. Reports intermittent subjective fever. Diarrhea that started today. Denies cp, sob, dizziness, abd pain, back pain.

## 2022-12-24 NOTE — ED PROVIDER NOTE - NSFOLLOWUPINSTRUCTIONS_ED_ALL_ED_FT
Viral Syndrome    WHAT YOU NEED TO KNOW:    Viral syndrome is a term used for symptoms of an infection caused by a virus. Viruses are spread easily from person to person through the air and on shared items. An illness caused by a virus usually goes away in 10 to 14 days without treatment. Antibiotics are not given for a viral infection.     DISCHARGE INSTRUCTIONS:    Call 911 for the following:     You have a seizure.       You cannot be woken.       You have chest pain or trouble breathing.     Return to the emergency department if:     You have a stiff neck, a bad headache, and sensitivity to light.       You feel weak, dizzy, or confused.       You stop urinating or urinate a lot less than normal.       You cough up blood or thick, yellow or green, mucus.       You have severe abdominal pain or your abdomen is larger than usual.     Contact your healthcare provider if:     Your symptoms do not get better with treatment, or get worse, after 3 days.       You have a rash or ear pain.       You have burning when you urinate.       You have questions or concerns about your condition or care.    Medicines: You may need any of the following:     Acetaminophen decreases pain and fever. It is available without a doctor's order. Ask how much medicine to take and how often to take it. Follow directions. Acetaminophen can cause liver damage if not taken correctly.       NSAIDs, such as ibuprofen, help decrease swelling, pain, and fever. NSAIDs can cause stomach bleeding or kidney problems in certain people. If you take blood thinner medicine, always ask your healthcare provider if NSAIDs are safe for you. Always read the medicine label and follow directions.      Cold medicine helps decrease swelling, control a cough, and relieve chest or nasal congestion.       Saline nasal spray helps decrease nasal congestion.       Take your medicine as directed. Contact your healthcare provider if you think your medicine is not helping or if you have side effects. Tell him of her if you are allergic to any medicine. Keep a list of the medicines, vitamins, and herbs you take. Include the amounts, and when and why you take them. Bring the list or the pill bottles to follow-up visits. Carry your medicine list with you in case of an emergency.    Manage your symptoms:     Drink liquids as directed to prevent dehydration. Ask how much liquid to drink each day and which liquids are best for you. Ask if you should drink an oral rehydration solution (ORS). An ORS has the right amounts of water, salts, and sugar you need to replace body fluids. This may help prevent dehydration caused by vomiting or diarrhea. Do not drink liquids with caffeine. Drinks with caffeine can make dehydration worse.       Get plenty of rest to help your body heal. Take naps throughout the day. Ask your healthcare provider when you can return to work and your normal activities.       Use a cool mist humidifier to help you breathe easier if you have nasal or chest congestion. Ask your healthcare provider how to use a cool mist humidifier.       Eat honey or use cough drops to help decrease throat discomfort. Ask your healthcare provider how much honey you should eat each day. Cough drops are available without a doctor's order. Follow directions for taking cough drops.       Do not smoke and stay away from others who smoke. Nicotine and other chemicals in cigarettes and cigars can cause lung damage. Smoking can also delay healing. Ask your healthcare provider for information if you currently smoke and need help to quit. E-cigarettes or smokeless tobacco still contain nicotine. Talk to your healthcare provider before you use these products.       Wash your hands frequently to prevent the spread of germs to others. Use soap and water. Use gel hand  when soap and water are not available. Wash your hands after you use the bathroom, cough, or sneeze. Wash your hands before you prepare or eat food.     Follow up with your healthcare provider as directed: Write down your questions so you remember to ask them during your visits.    Nausea / Vomiting    Nausea is the feeling that you have to vomit. As nausea gets worse, it can lead to vomiting. Vomiting puts you at an increased risk for dehydration. Older adults and people with other diseases or a weak immune system are at higher risk for dehydration. Drink clear fluids in small but frequent amounts as tolerated. Eat bland, easy-to-digest foods in small amounts as tolerated.    SEEK IMMEDIATE MEDICAL CARE IF YOU HAVE ANY OF THE FOLLOWING SYMPTOMS: fever, inability to keep sufficient fluids down, black or bloody vomitus, black or bloody stools, lightheadedness/dizziness, chest pain, severe headache, rash, shortness of breath, cold or clammy skin, confusion, pain with urination, or any signs of dehydration.

## 2022-12-24 NOTE — ED PROVIDER NOTE - PATIENT PORTAL LINK FT
You can access the FollowMyHealth Patient Portal offered by [facility] by registering at the following website: [email or web address]. By joining [facility]’s FollowMyHealth portal, you will also be able to view your health information using other applications (apps) compatible with our system.

## 2022-12-24 NOTE — ED PROVIDER NOTE - CLINICAL SUMMARY MEDICAL DECISION MAKING FREE TEXT BOX
38 yo m with pmh of HIV (CD4 245, VL detectable, on meds) c/o bodyaches, cough, n/v x 6 days. Reports intermittent subjective fever. Diarrhea that started today. Denies cp, sob, dizziness, abd pain, back pain. VSS. Lungs cta b/l. abd soft, nt, nd. Pt eating 38 yo m with pmh of HIV (CD4 245, VL detectable, on meds) c/o bodyaches, cough, n/v x 6 days. Reports intermittent subjective fever. Diarrhea that started today. Denies cp, sob, dizziness, abd pain, back pain. VSS. Lungs cta b/l. abd soft, nt, nd. Pt eating sardines with hot sauce.   labs, cxr wnl. Likely viral illness 38 yo m with pmh of HIV (CD4 245, VL undetectable, on meds) c/o bodyaches, cough, n/v x 6 days. Reports intermittent subjective fever. Diarrhea that started today. Denies cp, sob, dizziness, abd pain, back pain. VSS. Lungs cta b/l. abd soft, nt, nd. Pt eating sardines with hot sauce.   labs, cxr wnl. Likely viral illness

## 2023-02-20 ENCOUNTER — EMERGENCY (EMERGENCY)
Facility: HOSPITAL | Age: 38
LOS: 1 days | Discharge: PSYCHIATRIC OR SUBSTANCE USE FACILITY | End: 2023-02-20
Attending: EMERGENCY MEDICINE | Admitting: EMERGENCY MEDICINE
Payer: COMMERCIAL

## 2023-02-20 VITALS
HEIGHT: 66 IN | WEIGHT: 145.06 LBS | RESPIRATION RATE: 18 BRPM | DIASTOLIC BLOOD PRESSURE: 73 MMHG | HEART RATE: 79 BPM | TEMPERATURE: 98 F | OXYGEN SATURATION: 98 % | SYSTOLIC BLOOD PRESSURE: 113 MMHG

## 2023-02-20 DIAGNOSIS — F29 UNSPECIFIED PSYCHOSIS NOT DUE TO A SUBSTANCE OR KNOWN PHYSIOLOGICAL CONDITION: ICD-10-CM

## 2023-02-20 DIAGNOSIS — F19.94 OTHER PSYCHOACTIVE SUBSTANCE USE, UNSPECIFIED WITH PSYCHOACTIVE SUBSTANCE-INDUCED MOOD DISORDER: ICD-10-CM

## 2023-02-20 DIAGNOSIS — F05 DELIRIUM DUE TO KNOWN PHYSIOLOGICAL CONDITION: ICD-10-CM

## 2023-02-20 LAB
ALBUMIN SERPL ELPH-MCNC: 2.7 G/DL — LOW (ref 3.4–5)
ALP SERPL-CCNC: 114 U/L — SIGNIFICANT CHANGE UP (ref 40–120)
ALT FLD-CCNC: 23 U/L — SIGNIFICANT CHANGE UP (ref 12–42)
AMPHET UR-MCNC: NEGATIVE — SIGNIFICANT CHANGE UP
ANION GAP SERPL CALC-SCNC: 7 MMOL/L — LOW (ref 9–16)
APPEARANCE UR: CLEAR — SIGNIFICANT CHANGE UP
AST SERPL-CCNC: 31 U/L — SIGNIFICANT CHANGE UP (ref 15–37)
BARBITURATES UR SCN-MCNC: NEGATIVE — SIGNIFICANT CHANGE UP
BENZODIAZ UR-MCNC: NEGATIVE — SIGNIFICANT CHANGE UP
BILIRUB SERPL-MCNC: 0.2 MG/DL — SIGNIFICANT CHANGE UP (ref 0.2–1.2)
BILIRUB UR-MCNC: NEGATIVE — SIGNIFICANT CHANGE UP
BUN SERPL-MCNC: 14 MG/DL — SIGNIFICANT CHANGE UP (ref 7–23)
CALCIUM SERPL-MCNC: 8.1 MG/DL — LOW (ref 8.5–10.5)
CHLORIDE SERPL-SCNC: 109 MMOL/L — HIGH (ref 96–108)
CO2 SERPL-SCNC: 26 MMOL/L — SIGNIFICANT CHANGE UP (ref 22–31)
COCAINE METAB.OTHER UR-MCNC: POSITIVE
COLOR SPEC: YELLOW — SIGNIFICANT CHANGE UP
CREAT SERPL-MCNC: 0.74 MG/DL — SIGNIFICANT CHANGE UP (ref 0.5–1.3)
DIFF PNL FLD: NEGATIVE — SIGNIFICANT CHANGE UP
EGFR: 120 ML/MIN/1.73M2 — SIGNIFICANT CHANGE UP
ETHANOL SERPL-MCNC: <3 MG/DL — SIGNIFICANT CHANGE UP
FLUAV H1 2009 PAND RNA SPEC QL NAA+PROBE: SIGNIFICANT CHANGE UP
FLUAV H1 RNA SPEC QL NAA+PROBE: SIGNIFICANT CHANGE UP
FLUAV H3 RNA SPEC QL NAA+PROBE: SIGNIFICANT CHANGE UP
FLUAV SUBTYP SPEC NAA+PROBE: SIGNIFICANT CHANGE UP
FLUBV RNA SPEC QL NAA+PROBE: SIGNIFICANT CHANGE UP
GLUCOSE SERPL-MCNC: 94 MG/DL — SIGNIFICANT CHANGE UP (ref 70–99)
GLUCOSE UR QL: NEGATIVE — SIGNIFICANT CHANGE UP
HCT VFR BLD CALC: 39.4 % — SIGNIFICANT CHANGE UP (ref 39–50)
HGB BLD-MCNC: 12.3 G/DL — LOW (ref 13–17)
KETONES UR-MCNC: NEGATIVE — SIGNIFICANT CHANGE UP
LACTATE SERPL-SCNC: 0.9 MMOL/L — SIGNIFICANT CHANGE UP (ref 0.4–2)
LACTATE SERPL-SCNC: 2.8 MMOL/L — HIGH (ref 0.4–2)
LEUKOCYTE ESTERASE UR-ACNC: NEGATIVE — SIGNIFICANT CHANGE UP
MCHC RBC-ENTMCNC: 29.6 PG — SIGNIFICANT CHANGE UP (ref 27–34)
MCHC RBC-ENTMCNC: 31.2 GM/DL — LOW (ref 32–36)
MCV RBC AUTO: 94.7 FL — SIGNIFICANT CHANGE UP (ref 80–100)
METHADONE UR-MCNC: NEGATIVE — SIGNIFICANT CHANGE UP
NITRITE UR-MCNC: NEGATIVE — SIGNIFICANT CHANGE UP
NRBC # BLD: 0 /100 WBCS — SIGNIFICANT CHANGE UP (ref 0–0)
OPIATES UR-MCNC: NEGATIVE — SIGNIFICANT CHANGE UP
PCP SPEC-MCNC: SIGNIFICANT CHANGE UP
PCP UR-MCNC: NEGATIVE — SIGNIFICANT CHANGE UP
PH UR: 5.5 — SIGNIFICANT CHANGE UP (ref 5–8)
PLATELET # BLD AUTO: 256 K/UL — SIGNIFICANT CHANGE UP (ref 150–400)
POTASSIUM SERPL-MCNC: 4.8 MMOL/L — SIGNIFICANT CHANGE UP (ref 3.5–5.3)
POTASSIUM SERPL-SCNC: 4.8 MMOL/L — SIGNIFICANT CHANGE UP (ref 3.5–5.3)
PROT SERPL-MCNC: 7.5 G/DL — SIGNIFICANT CHANGE UP (ref 6.4–8.2)
PROT UR-MCNC: NEGATIVE MG/DL — SIGNIFICANT CHANGE UP
RAPID RVP RESULT: SIGNIFICANT CHANGE UP
RBC # BLD: 4.16 M/UL — LOW (ref 4.2–5.8)
RBC # FLD: 14.5 % — SIGNIFICANT CHANGE UP (ref 10.3–14.5)
SARS-COV-2 RNA SPEC QL NAA+PROBE: SIGNIFICANT CHANGE UP
SODIUM SERPL-SCNC: 142 MMOL/L — SIGNIFICANT CHANGE UP (ref 132–145)
SP GR SPEC: >=1.03 — SIGNIFICANT CHANGE UP (ref 1–1.03)
THC UR QL: NEGATIVE — SIGNIFICANT CHANGE UP
UROBILINOGEN FLD QL: 0.2 E.U./DL — SIGNIFICANT CHANGE UP
WBC # BLD: 3.88 K/UL — SIGNIFICANT CHANGE UP (ref 3.8–10.5)
WBC # FLD AUTO: 3.88 K/UL — SIGNIFICANT CHANGE UP (ref 3.8–10.5)

## 2023-02-20 PROCEDURE — 99223 1ST HOSP IP/OBS HIGH 75: CPT

## 2023-02-20 PROCEDURE — 90792 PSYCH DIAG EVAL W/MED SRVCS: CPT | Mod: 95,GC

## 2023-02-20 PROCEDURE — 71046 X-RAY EXAM CHEST 2 VIEWS: CPT | Mod: 26

## 2023-02-20 RX ORDER — SODIUM CHLORIDE 9 MG/ML
2000 INJECTION INTRAMUSCULAR; INTRAVENOUS; SUBCUTANEOUS ONCE
Refills: 0 | Status: COMPLETED | OUTPATIENT
Start: 2023-02-20 | End: 2023-02-20

## 2023-02-20 RX ADMIN — SODIUM CHLORIDE 1000 MILLILITER(S): 9 INJECTION INTRAMUSCULAR; INTRAVENOUS; SUBCUTANEOUS at 13:46

## 2023-02-20 NOTE — ED BEHAVIORAL HEALTH ASSESSMENT NOTE - DESCRIPTION
See BTCM note    Vital Signs Last 24 Hrs  T(C): 36.6 (20 Feb 2023 14:03), Max: 36.6 (20 Feb 2023 14:03)  T(F): 97.8 (20 Feb 2023 14:03), Max: 97.8 (20 Feb 2023 14:03)  HR: 79 (20 Feb 2023 14:03) (79 - 79)  BP: 120/70 (20 Feb 2023 14:03) (113/73 - 120/70)  BP(mean): --  RR: 16 (20 Feb 2023 14:03) (16 - 18)  SpO2: 99% (20 Feb 2023 14:03) (98% - 99%)    Parameters below as of 20 Feb 2023 14:03  Patient On (Oxygen Delivery Method): room air HIV Grew up in [place], has lived in [place] since 2012

## 2023-02-20 NOTE — ED CDU PROVIDER INITIAL DAY NOTE - CLINICAL SUMMARY MEDICAL DECISION MAKING FREE TEXT BOX
Cardiac 36 y/o Male with PMHx of HIV and depression presenting with suicidal ideation and pneumonia. Will obtain labs, EKG, chest x-ray, and reassess. Will observe.    Patient says his doctor stated he has pneumonia but he is not on antibiotics.  Pt with HIV.  CXR and blood cultures and viral panel ordered.

## 2023-02-20 NOTE — ED BEHAVIORAL HEALTH ASSESSMENT NOTE - RISK ASSESSMENT
Risk factors include previous suicide attempts, current substance use, lack of engagement in outpatient care

## 2023-02-20 NOTE — ED CDU PROVIDER INITIAL DAY NOTE - PROGRESS NOTE DETAILS
Accepted sign out from Dr. Hutchinson.  Pt awaiting dispo by tele-psych.  Pt with SI and concurrent PNA.  PNA stable and dischargeable as per Dr. hutchinson and [facility] hospitalist on call.  Rounded on pt and stable. Pt rested comfortably throughout the night.  There are still no psych beds within the system.  Pt denies any somatic sxs.  Given menu to order breakfast.  Will sign out to day team at 8am.

## 2023-02-20 NOTE — ED PROVIDER NOTE - CLINICAL SUMMARY MEDICAL DECISION MAKING FREE TEXT BOX
38 y/o Male with PMHx of HIV and depression presenting with suicidal ideation and pneumonia. Will obtain labs, EKG, chest x-ray, and reassess. Will observe. 36 y/o Male with PMHx of HIV and depression presenting with suicidal ideation and pneumonia. Will obtain labs, EKG, chest x-ray, and reassess. Will observe.    Patient says his doctor stated he has pneumonia but he is not on antibiotics.  Pt with HIV.  CXR and blood cultures and viral panel ordered.

## 2023-02-20 NOTE — ED ADULT NURSE NOTE - HPI (INCLUDE ILLNESS QUALITY, SEVERITY, DURATION, TIMING, CONTEXT, MODIFYING FACTORS, ASSOCIATED SIGNS AND SYMPTOMS)
Pt states plan to jump out of a window. Pt denies hi. Pt states auditory hallucinations taking him to jump in front of a car. calm and cooperative on arrival
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
## 2023-02-20 NOTE — ED BEHAVIORAL HEALTH ASSESSMENT NOTE - NSBHSAALC_PSY_A_CORE FT
Claims last use 3 days ago, uses $100 a week on liquor, does not know amount; reports "getting the shakes" but denies other withdrawal

## 2023-02-20 NOTE — ED BEHAVIORAL HEALTH ASSESSMENT NOTE - NS ED BHA REVIEW OF ED CHART AVAILABLE LABS REVIEWED
[de-identified] : Right breast: large pendulous breast with no palpable masses, nipple retraction or discharge. Well healed superior periareolar scar with minor contour depression at 9 o'clock\par Left breast: absence of breast; well-healed mastectomy scar, soft and flat, mastectomy flaps well-perfused with TE in good position\par Bilateral moderate hanging axillary roll\par Bilateral inframammary folds with no active rash [de-identified] : well-appearing, NAD Yes

## 2023-02-20 NOTE — ED PROVIDER NOTE - CROS ED ENDOCRINE ALL NEG
Patient seen and chart reviewed. Note dictated (initial)   I agree with gabapentin, clonidine and valium.   Would consider a few doses of romero dose thiamine  I also think that the hold should continue at least until social work is able to talk to, clarify discharge plan. May also obtain some collateral information from her parents who came from [place] to visit her.  .Page me at [phone], or re-consult psychiatry as needed.     - - -

## 2023-02-20 NOTE — ED CDU PROVIDER INITIAL DAY NOTE - OBJECTIVE STATEMENT
36 y/o Male with PMHx of HIV(on medication) and depression(on Seroquel and Trazodone) presenting for suicidal ideation since yesterday. Patient states that he attempted to jump out of a 3rd story window yesterday. Patient states that he got diagnosed with pneumonia 2 days ago and is not currently on medication. Patient endorses being admitted to psych for depression a couple of months ago. Patient states that he is allergies to penicillin.

## 2023-02-20 NOTE — ED BEHAVIORAL HEALTH ASSESSMENT NOTE - SUBSTANCE ISSUES AND PLAN (INCLUDE STANDING AND PRN MEDICATION)
Recommend monitoring for signs of alcohol withdrawal Monitor for etoh withdrawal and consider starting CIWA protocol for etoh withdrawal precautions Monitor for alcohol withdrawal, [facility]

## 2023-02-20 NOTE — ED BEHAVIORAL HEALTH ASSESSMENT NOTE - MEDICAL ISSUES AND PLAN (INCLUDE STANDING AND PRN MEDICATION)
Continue home Biktarvy and start Levaquin to target PNA Patient has received treatment for pneumonia; recommend ID consult re: [email or web address]
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
## 2023-02-20 NOTE — ED BEHAVIORAL HEALTH ASSESSMENT NOTE - HPI (INCLUDE ILLNESS QUALITY, SEVERITY, DURATION, TIMING, CONTEXT, MODIFYING FACTORS, ASSOCIATED SIGNS AND SYMPTOMS)
Mr. Hernandez is a 37-year-old single, domiciled in [facility] [place] with roommate, unemployed, with PMH of HIV on Biktarvy, PPH of polysubstance use disorder (alcohol, cocaine, opiates, perhaps others), SIPD, SIMD, prior SA (hanging, jumping out of 3rd story window, jumping in fron of cars), multiple prior  psychiatric and detox/rehab stays (5/21 [facility], 7/21 [facility], 10/21 [facility], 12/21 [facility], 3/22 [facility], 5/22 [facility], 7/22 [facility], 9/22 [facility]), who presents to the ED reporting suicidal ideation. Telepsychiatry consulted to asses for suicidal ideation.    On approach Mr. Hernandez was calm, cooperative, forthcoming of information. He reports that he attempted suicide yesterday at the behest of command auditory hallucinations and remains suicidal. He reports his girlfriend stopped him after he had jumped the window. He does not believe anything would have stopped him besides his girl friend, stating he "doesn't have much to live for." He states he began hearing voices yesterday afternoon telling him to kill himself; he did not recognize the voices and reports that he felt as though there were at least two; he last heard them this morning. He denies acute stressors; he reports he did use cocaine yesterday but "no more than usual," stating he uses about 50 dollars a week. He last heard the voices this morning telling him to jump out of a moving car. He last head voices prior to his hospitalization to [facility] in September.     He reports feeling "okay" after discharge in October for a "few months," noting 1 month ago be began to experience "panic attacks" described as intense fear, tearfulness, paranoia, racing heart, sweating lasting 30-60 minutes. He denies being able to recognize triggers or warning signs prior to the attacks, and that he has experienced them 3-5 times a week. Reports mood as "angry and sad" for past 3 weeks, increased irritability, increased appetite, poor concentration, with feelings of worsening worthlessness. Reports sleep as chronically poor. He denies seeing anything unusual or feeling as though others are after him, or feeling as though he has special sloan or receives hidden messages. Denies homicidal ideation.     Reports that his girlfriend noted earlier this week after he began feeling physically sick (with confirmed influenza) he left the house without shoes in the middle of the night. He does not recall when this happened, and reports some concern he has forgotten other events this week when his girlfriend was at work.     He denies following up with outpatient psychiatry or taking medications after last discharge. When asked why he replies "I don't feel like it." when asked how hospitalization would benefit him he states "I always feel better after but the meds stop working or I stop taking them." He reports he would not be safe to go home and he would harm himself because he does not like his roommate and his girlfriend is at work. Mr. Hernandez is a 37-year-old single, domiciled in [facility] [place] with roommate, unemployed, with PMH of HIV on Biktarvy, PPH of polysubstance use disorder (alcohol, cocaine, opiates, perhaps others), SIPD, SIMD, prior SA (hanging, jumping out of 3rd story window, jumping in fron of cars), multiple prior  psychiatric and detox/rehab stays (5/21 [facility], 7/21 [facility], 10/21 [facility], 12/21 [facility], 3/22 [facility], 5/22 [facility], 7/22 [facility], 9/22 [facility]), who presents to the ED reporting suicidal ideation. Telepsychiatry consulted to asses for suicidal ideation.    On approach Mr. Hernandez was calm, cooperative, forthcoming of information. He reports that he attempted suicide yesterday at the behest of command auditory hallucinations and remains suicidal. He reports his girlfriend stopped him after he had jumped the window. He does not believe anything would have stopped him besides his girl friend, stating he "doesn't have much to live for." He states he began hearing voices yesterday afternoon telling him to kill himself; he did not recognize the voices and reports that he felt as though there were at least two; he last heard them this morning. He denies acute stressors; he reports he did use cocaine yesterday but "no more than usual," stating he uses about 50 dollars a week. He last heard the voices this morning telling him to jump out of a moving car. He last head voices prior to his hospitalization to [facility] in September.     He reports feeling "okay" after discharge in October for a "few months," noting 1 month ago be began to experience "panic attacks" described as intense fear, tearfulness, paranoia, racing heart, sweating lasting 30-60 minutes. He denies being able to recognize triggers or warning signs prior to the attacks, and that he has experienced them 3-5 times a week. Reports mood as "angry and sad" for past 3 weeks, increased irritability, increased appetite, poor concentration, with feelings of worsening worthlessness. Reports sleep as chronically poor, but that he has only slept 2-4 hours per night. He denies seeing anything unusual or feeling as though others are after him, or feeling as though he has special sloan or receives hidden messages. Denies homicidal ideation.     Reports that his girlfriend noted earlier this week after he began feeling physically sick (with confirmed influenza) he left the house without shoes in the middle of the night. He does not recall when this happened, and reports some concern he has forgotten other events this week when his girlfriend was at work.     He denies following up with outpatient psychiatry or taking medications after last discharge. When asked why he replies "I don't feel like it." when asked how hospitalization would benefit him he states "I always feel better after but the meds stop working or I stop taking them." He reports he would not be safe to go home and he would harm himself because he does not like his roommate and his girlfriend is at work.    Mr. Hernandez reports he does not know girlfriend Diamond's number, does not have his cellphone. Unable to obtain number from chart.

## 2023-02-20 NOTE — ED BEHAVIORAL HEALTH ASSESSMENT NOTE - NS ED BHA PLAN ADMIT TO PSYCHIATRY ADMIT TO
RT Protocol Note  Anurag Cea 77 y o  female MRN: [id number]  Unit/Bed#: -01 Encounter: [id number]    [facility] ) [place] [address] )     Neuroendocrine cancer ([place] [address] )     Other

## 2023-02-20 NOTE — ED BEHAVIORAL HEALTH ASSESSMENT NOTE - SUMMARY
Mr. Hernandez is a 37-year-old single, domiciled in [place] with roommate, unemployed, with PMH of HIV on Biktarvy, PPH of polysubstance use disorder (alcohol, cocaine, opiates, perhaps others), SIPD, SIMD, prior SA (hanging, jumping out of 3rd story window, jumping in fron of cars), multiple prior IP psychiatric and detox/rehab stays (5/21 [facility], 7/21 [facility], 10/21 [facility], 12/21 [facility], 3/22 [facility], 5/22 [facility], 7/22 [facility], 9/22 [facility]), who presents to the ED reporting suicidal ideation. Telepsychiatry consulted to asses for suicidal ideation. Mr. Hernandez is a 37-year-old single, domiciled [place] with roommate, unemployed, with PMH of HIV on Biktarvy, PPH of polysubstance use disorder (alcohol, cocaine, opiates, perhaps others), SIPD, SIMD, prior SA (hanging, jumping out of 3rd story window, jumping in fron of cars), multiple prior IP psychiatric and detox/rehab stays (5/21 [facility], 7/21 [facility], 10/21 [facility], 12/21 [facility], 3/22 [facility], 5/22 [facility], 7/22 [facility], 9/22 [facility]), who presents to the ED reporting suicidal ideation. Telepsychiatry consulted to asses for suicidal ideation.    On evaluation, Mr. Hernandez appears calm, cooperative, reporting command auditory hallucinations to kill himself, which he reports he is unable to manage. He reports symptoms of anxiety, panic attacks, and worsening depression in the past month in the setting of medication and treatment nonadherence. Additionally, he reports an unclear dissociative episode this week concerning for delirium given his risk factors of HIV with current influenza/ pneumonia. While his chronic risk factors--substance use with concurrent psychosis, limited engagement with treatment teams, male gender, limited frustration tolerance--are unlikely to be meaningfully mitigated by inpatient psychiatric treatment given history of high ultilization, he remains acutely high risk for self-injurious behavior and would benefit from further observation and psychiatric treatment. Telepsychiatry recommends:    #Mood disorder, drug-induced   - Patient is NOT psychiatrically cleared for discharge  - Because of patient's current medical condition, plan to admit to medicine with [facility]ry following  - Defer starting psychiatric medication to CL team  - Patient to remain on 1:1 while on medical floor    #Alcohol use disorder  - Recommend monitoring for signs of alcohol withdrawal with CIWA protocol if necessary Mr. Hernandez is a 37-year-old single, domiciled in [place] with roommate, unemployed, with PMH of HIV on Biktarvy, PPH of polysubstance use disorder (alcohol, cocaine, opiates, perhaps others), SIPD, SIMD, prior SA (hanging, jumping out of 3rd story window, jumping in fron of cars), multiple prior IP psychiatric and detox/rehab stays (5/21 [facility], 7/21 [facility], 10/21 [facility], 12/21 [facility], 3/22 [facility], 5/22 [facility], 7/22 [facility], 9/22 [facility]), who presents to the ED reporting suicidal ideation. Telepsychiatry consulted to asses for suicidal ideation.    On evaluation, Mr. Hernandez appears calm, cooperative, reporting command auditory hallucinations to kill himself, which he reports he is unable to manage. He reports symptoms of anxiety, panic attacks, and worsening depression in the past month in the setting of medication and treatment nonadherence. Additionally, he reports an unclear dissociative episode this week concerning for delirium given his risk factors of HIV with current influenza/ pneumonia. While his chronic risk factors--substance use with concurrent psychosis, limited engagement with treatment teams, male gender, limited frustration tolerance--are unlikely to be meaningfully mitigated by inpatient psychiatric treatment given history of high ultilization, he remains acutely high risk for self-injurious behavior and would benefit from further observation and psychiatric treatment. Telepsychiatry recommends:    #Mood disorder, drug-induced   - Patient is NOT psychiatrically cleared for discharge  - Because of patient's current medical condition, plan to admit to medicine with [facility] Psych[facility]ry following  - Defer starting psychiatric medication to CL team  - Patient to remain on 1:1 while on medical floor    #Alcohol use disorder  - Recommend monitoring for signs of alcohol withdrawal with CIWA protocol if necessary    Per ED team, IM does not feel patient's clinical status requires inpatient medicine; plan to hold in ED for reassessment pending placement. Mr. Hernandez is a 37-year-old single, domiciled in [place] with roommate, unemployed, with PMH of HIV on Biktarvy, PPH of polysubstance use disorder (alcohol, cocaine, opiates, perhaps others), SIPD, SIMD, prior SA (hanging, jumping out of 3rd story window, jumping in fron of cars), multiple prior IP psychiatric and detox/rehab stays (5/21 [facility], 7/21 [facility], 10/21 [facility], 12/21 [facility], 3/22 [facility], 5/22 [facility], 7/22 [facility], 9/22 [facility]), who presents to the ED reporting suicidal ideation. Telepsychiatry consulted to asses for suicidal ideation.    On evaluation, Mr. Hernandez appears calm, cooperative, reporting command auditory hallucinations to kill himself, which he reports he is unable to manage. He reports symptoms of anxiety, panic attacks, and worsening depression in the past month in the setting of medication and treatment nonadherence. Additionally, he reports an unclear dissociative episode this week concerning for delirium given his risk factors of HIV with current influenza/ pneumonia. While his chronic risk factors--substance use with concurrent psychosis, limited engagement with treatment teams, male gender, limited frustration tolerance--are unlikely to be meaningfully mitigated by inpatient psychiatric treatment given history of high ultilization, he remains acutely high risk for self-injurious behavior and would benefit from further observation and psychiatric treatment. Telepsychiatry recommends:    #Mood disorder, drug-induced   - Patient is NOT psychiatrically cleared for discharge  - Because of patient's current medical condition, plan to reassess and treat in ED  - Defer starting at this time  - Patient to remain on 1:1     #Alcohol use disorder  - Recommend monitoring for signs of alcohol withdrawal with CIWA protocol if necessary    Per ED team, IM does not feel patient's clinical status requires inpatient medicine; plan to hold in ED for reassessment

## 2023-02-20 NOTE — ED ADULT TRIAGE NOTE - CHIEF COMPLAINT QUOTE
Pt walk in complaining of SI with a plan of jumping out the window since yesterday. Pt endorses auditory command hallucinations telling him to jump in front of a car. Denies drug use. No compliant w psych meds.

## 2023-02-20 NOTE — ED ADULT NURSE NOTE - OBJECTIVE STATEMENT
Pt aox3 with steady gait. c/o of SI with a plan of jumping out the window since yesterday. Pt endorses auditory command hallucinations telling him to jump in front of a car. Denies drug use. No compliant w psych meds. Pt aox3 with steady gait. c/o of SI with a plan of jumping out the window since yesterday. Pt endorses auditory command hallucinations telling him to jump in front of a car. Denies drug use. No compliant w psych meds. Multiple psych hospital stays. Last etoh drink  and cocaine use 2 days ago

## 2023-02-20 NOTE — ED PROVIDER NOTE - OBJECTIVE STATEMENT
38 y/o Male with PMHx of HIV(on medication) and depression(on Seroquel and Trazodone) presenting for suicidal ideation since yesterday. Patient states that he attempted to jump out of a 3rd story window yesterday. Patient states that he got diagnosed with pneumonia 2 days ago and is not currently on medication. Patient endorses being admitted to psych for depression a couple of months ago. Patient states that he is allergies to penicillin.

## 2023-02-21 ENCOUNTER — INPATIENT (INPATIENT)
Facility: HOSPITAL | Age: 38
LOS: 15 days | Discharge: HOME | End: 2023-03-09
Attending: PSYCHIATRY & NEUROLOGY | Admitting: PSYCHIATRY & NEUROLOGY
Payer: COMMERCIAL

## 2023-02-21 VITALS
SYSTOLIC BLOOD PRESSURE: 112 MMHG | TEMPERATURE: 98 F | DIASTOLIC BLOOD PRESSURE: 69 MMHG | OXYGEN SATURATION: 98 % | HEART RATE: 71 BPM | RESPIRATION RATE: 17 BRPM

## 2023-02-21 VITALS
TEMPERATURE: 98 F | HEIGHT: 66 IN | RESPIRATION RATE: 19 BRPM | WEIGHT: 147.05 LBS | HEART RATE: 69 BPM | SYSTOLIC BLOOD PRESSURE: 127 MMHG | DIASTOLIC BLOOD PRESSURE: 79 MMHG

## 2023-02-21 DIAGNOSIS — F32.A DEPRESSION, UNSPECIFIED: ICD-10-CM

## 2023-02-21 PROCEDURE — U0003: CPT

## 2023-02-21 PROCEDURE — 99233 SBSQ HOSP IP/OBS HIGH 50: CPT

## 2023-02-21 PROCEDURE — 86355 B CELLS TOTAL COUNT: CPT

## 2023-02-21 PROCEDURE — 36415 COLL VENOUS BLD VENIPUNCTURE: CPT

## 2023-02-21 PROCEDURE — 83036 HEMOGLOBIN GLYCOSYLATED A1C: CPT

## 2023-02-21 PROCEDURE — U0005: CPT

## 2023-02-21 PROCEDURE — 84443 ASSAY THYROID STIM HORMONE: CPT

## 2023-02-21 PROCEDURE — 86357 NK CELLS TOTAL COUNT: CPT

## 2023-02-21 PROCEDURE — 86359 T CELLS TOTAL COUNT: CPT

## 2023-02-21 PROCEDURE — 87536 HIV-1 QUANT&REVRSE TRNSCRPJ: CPT

## 2023-02-21 PROCEDURE — 86360 T CELL ABSOLUTE COUNT/RATIO: CPT

## 2023-02-21 PROCEDURE — 86480 TB TEST CELL IMMUN MEASURE: CPT

## 2023-02-21 PROCEDURE — 80061 LIPID PANEL: CPT

## 2023-02-21 RX ORDER — HALOPERIDOL DECANOATE 100 MG/ML
5 INJECTION INTRAMUSCULAR EVERY 6 HOURS
Refills: 0 | Status: DISCONTINUED | OUTPATIENT
Start: 2023-02-21 | End: 2023-03-09

## 2023-02-21 RX ORDER — IBUPROFEN 200 MG
600 TABLET ORAL EVERY 6 HOURS
Refills: 0 | Status: DISCONTINUED | OUTPATIENT
Start: 2023-02-21 | End: 2023-03-09

## 2023-02-21 RX ORDER — BICTEGRAVIR SODIUM, EMTRICITABINE, AND TENOFOVIR ALAFENAMIDE FUMARATE 30; 120; 15 MG/1; MG/1; MG/1
1 TABLET ORAL DAILY
Refills: 0 | Status: DISCONTINUED | OUTPATIENT
Start: 2023-02-21 | End: 2023-03-09

## 2023-02-21 RX ORDER — DIPHENHYDRAMINE HCL 50 MG
50 CAPSULE ORAL EVERY 6 HOURS
Refills: 0 | Status: DISCONTINUED | OUTPATIENT
Start: 2023-02-21 | End: 2023-03-06

## 2023-02-21 RX ORDER — BICTEGRAVIR SODIUM, EMTRICITABINE, AND TENOFOVIR ALAFENAMIDE FUMARATE 30; 120; 15 MG/1; MG/1; MG/1
1 TABLET ORAL DAILY
Refills: 0 | Status: DISCONTINUED | OUTPATIENT
Start: 2023-02-21 | End: 2023-02-21

## 2023-02-21 RX ORDER — ESCITALOPRAM OXALATE 10 MG/1
10 TABLET, FILM COATED ORAL DAILY
Refills: 0 | Status: DISCONTINUED | OUTPATIENT
Start: 2023-02-21 | End: 2023-02-28

## 2023-02-21 RX ORDER — TRAZODONE HCL 50 MG
50 TABLET ORAL AT BEDTIME
Refills: 0 | Status: DISCONTINUED | OUTPATIENT
Start: 2023-02-21 | End: 2023-02-22

## 2023-02-21 RX ORDER — ACETAMINOPHEN 500 MG
650 TABLET ORAL EVERY 6 HOURS
Refills: 0 | Status: DISCONTINUED | OUTPATIENT
Start: 2023-02-21 | End: 2023-03-09

## 2023-02-21 RX ADMIN — Medication 2 MILLIGRAM(S): at 20:56

## 2023-02-21 RX ADMIN — BICTEGRAVIR SODIUM, EMTRICITABINE, AND TENOFOVIR ALAFENAMIDE FUMARATE 1 TABLET(S): 30; 120; 15 TABLET ORAL at 20:57

## 2023-02-21 RX ADMIN — Medication 50 MILLIGRAM(S): at 20:57

## 2023-02-21 RX ADMIN — ESCITALOPRAM OXALATE 10 MILLIGRAM(S): 10 TABLET, FILM COATED ORAL at 20:57

## 2023-02-21 NOTE — ED BEHAVIORAL HEALTH PROGRESS NOTE - NS ED BHA PLAN PSYCHIATRIC ISSUES2 FT
-Admit to inpatient psychiatry for safety, stabilization, and appropriate aftercare planning (bed search underway; voluntary status)  -Restart escitalopram 10 mg daily to target depressive symptoms  -Recommending Haldol 5 mg q6 PO/IM PRN agitation, Ativan 2mg q6 PO/IM PRN agitation, Benadryl 50 mg PO/IM q6 PRN agitation

## 2023-02-21 NOTE — ED BEHAVIORAL HEALTH PROGRESS NOTE - DETAILS:
Patient seen and examined on reassessment this morning. Chart reviewed. Case discussed with EM provider. This morning patient states "I feel the same". Continues to endorse SI with thoughts of jumping in out of a window. Endorses hopelessness, poor sleep, low energy, negative self-esteem. Denies any physical complaints this morning.

## 2023-02-21 NOTE — ED BEHAVIORAL HEALTH PROGRESS NOTE - SUMMARY
"Risk factors include previous suicide attempts, current substance use, lack of engagement in outpatient care"

## 2023-02-21 NOTE — ED BEHAVIORAL HEALTH PROGRESS NOTE - NS ED BHA PLAN HOLD IN ED MEDICAL ISSUES2 FT
-As per EM provider, patient is medically cleared; treatment for pneumonia as per EM; continue Biktarvy (patient's home medication for HIV)
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
## 2023-02-21 NOTE — ED CDU PROVIDER DISPOSITION NOTE - CLINICAL COURSE
Pt is medically cleared with plan for Levaquin 750mg PO daily for total of 5 days (ending 2/24/23).  Pt will require Biktarvy daily as well for HIV.  Discussed with psych team; pt will be transferred as voluntary admit to [place].

## 2023-02-21 NOTE — ED BEHAVIORAL HEALTH NOTE - BEHAVIORAL HEALTH NOTE
=========   REASSESSMENT     =========     SOURCE:  RN and secondhand ED documentation.     BEHAVIOR: Pt has been observed by RN to be calm and cooperative, no aggression/violence reported. RN stated pt did not endorse any SI/HI/AVH.     TREATMENT:  Per chart and RN, patient received no medications.      VISITORS:  Per RN, pt has no visitors at bedside.

## 2023-02-21 NOTE — ED BEHAVIORAL HEALTH NOTE - BEHAVIORAL HEALTH NOTE
Patient interviewed and part B completed at [facility]. Orders have been entered and handoff provided to Inpatient MD, Dr. De La Cruz. Full ED BH Assessment is located in MRN [id number] (patient was seen at ED at [facility]).

## 2023-02-21 NOTE — ED ADULT NURSE REASSESSMENT NOTE - COMFORT CARE
meal provided/plan of care explained/po fluids offered/wait time explained
meal provided/plan of care explained/po fluids offered/wait time explained

## 2023-02-21 NOTE — ED ADULT NURSE REASSESSMENT NOTE - NS ED NURSE REASSESS COMMENT FT1
1:1 constant observation w/ staff member and safety maintained. Pt calm and cooperative. Denies any complaints.
Handed over pt care at midnight to Sudhir Whittaker
Patient resting on stretcher comfortably. Sitter in place. NAD
care assumed, 1:1 and security at bedside, awaiting placement w/o s/s of distress, will continue to monitor

## 2023-02-21 NOTE — ED ADULT NURSE REASSESSMENT NOTE - GENERAL PATIENT STATE
comfortable appearance/cooperative/smiling/interactive
comfortable appearance
awakens with ease/comfortable appearance/cooperative/resting/sleeping/smiling/interactive

## 2023-02-21 NOTE — BH PATIENT PROFILE - HOME MEDICATIONS
acetaminophen 650 mg oral tablet, extended release , 1 tab(s) orally 4 times a day   bictegravir/emtricitabine/tenofovir 50 mg-200 mg-25 mg oral tablet , 1 tab(s) orally once a day  traZODone 150 mg oral tablet , 1 tab(s) orally once a day (at bedtime)  escitalopram 5 mg oral tablet , 3 tab(s) orally once a day

## 2023-02-21 NOTE — ED BEHAVIORAL HEALTH PROGRESS NOTE - BED AVAILABILITY DETAILS FREE TEXT
No beds at [facility]

## 2023-02-21 NOTE — ED BEHAVIORAL HEALTH PROGRESS NOTE - BILLING CODES
43193-Fixfkkehcr hospital care - high complexity 40-54 minutes [id number]-Qvnnhrbhld Inpatient care - high complexity - 35 minutes

## 2023-02-22 DIAGNOSIS — F12.90 CANNABIS USE, UNSPECIFIED, UNCOMPLICATED: ICD-10-CM

## 2023-02-22 DIAGNOSIS — F14.10 COCAINE ABUSE, UNCOMPLICATED: ICD-10-CM

## 2023-02-22 DIAGNOSIS — F19.959 OTHER PSYCHOACTIVE SUBSTANCE USE, UNSPECIFIED WITH PSYCHOACTIVE SUBSTANCE-INDUCED PSYCHOTIC DISORDER, UNSPECIFIED: ICD-10-CM

## 2023-02-22 DIAGNOSIS — F19.90 OTHER PSYCHOACTIVE SUBSTANCE USE, UNSPECIFIED, UNCOMPLICATED: ICD-10-CM

## 2023-02-22 PROCEDURE — 99232 SBSQ HOSP IP/OBS MODERATE 35: CPT

## 2023-02-22 PROCEDURE — 99222 1ST HOSP IP/OBS MODERATE 55: CPT

## 2023-02-22 RX ORDER — TRAZODONE HCL 50 MG
100 TABLET ORAL AT BEDTIME
Refills: 0 | Status: DISCONTINUED | OUTPATIENT
Start: 2023-02-22 | End: 2023-03-09

## 2023-02-22 RX ADMIN — Medication 100 MILLIGRAM(S): at 19:39

## 2023-02-22 RX ADMIN — BICTEGRAVIR SODIUM, EMTRICITABINE, AND TENOFOVIR ALAFENAMIDE FUMARATE 1 TABLET(S): 30; 120; 15 TABLET ORAL at 10:00

## 2023-02-22 RX ADMIN — ESCITALOPRAM OXALATE 10 MILLIGRAM(S): 10 TABLET, FILM COATED ORAL at 10:00

## 2023-02-22 NOTE — BH INPATIENT PSYCHIATRY ASSESSMENT NOTE - NSBHMETABOLIC_PSY_ALL_CORE_FT
BMI: BMI (kg/m2): 23.8 (02-21-23 @ 19:23)  HbA1c: A1C with Estimated Average Glucose Result: 5.5 % (09-13-22 @ 09:13)    Glucose:   BP: 114/73 (02-22-23 @ 08:42) (114/73 - 127/79)  Lipid Panel: Date/Time: 09-13-22 @ 09:13  Cholesterol, Serum: 185  Direct LDL: --  HDL Cholesterol, Serum: 52  Total Cholesterol/HDL Ration Measurement: --  Triglycerides, Serum: 203

## 2023-02-22 NOTE — BH INPATIENT PSYCHIATRY ASSESSMENT NOTE - NSBHCHARTREVIEWLAB_PSY_A_CORE FT
Comprehensive Metabolic Panel (02.20.23 @ 12:28)   Sodium, Serum: 142 mmoL/L   Potassium, Serum: 4.8 mmol/L   Chloride, Serum: 109 mmol/L   Carbon Dioxide, Serum: 26 mmol/L   Anion Gap, Serum: 7 mmoL/L   Blood Urea Nitrogen, Serum: 14 mg/dL   Creatinine, Serum: 0.74 mg/dL   Glucose, Serum: 94 mg/dL   Calcium, Total Serum: 8.1 mg/dL   Protein Total, Serum: 7.5 g/dL   Albumin, Serum: 2.7 g/dL   Bilirubin Total, Serum: 0.2: Use of this assay is not recommended for patients undergoing treatment   with eltrombopag due to the potential for falsely elevated results mg/dL   Alkaline Phosphatase, Serum: 114 U/L   Aspartate Aminotransferase (AST/SGOT): 31 U/L   Alanine Aminotransferase (ALT/SGPT): 23 U/L   eGFR: 12  Complete Blood Count (02.20.23 @ 12:28)   Nucleated RBC: 0 /100 WBCs   WBC Count: 3.88 K/uL   RBC Count: 4.16 M/uL   Hemoglobin: 12.3 g/dL   Hematocrit: 39.4 %   Mean Cell Volume: 94.7 fl   Mean Cell Hemoglobin: 29.6 pg   Mean Cell Hemoglobin Conc: 31.2 gm/dL   Red Cell Distrib Width: 14.5 %   Platelet Count - Automated: 256 K/uL

## 2023-02-22 NOTE — BH INPATIENT PSYCHIATRY ASSESSMENT NOTE - NSBHSAALC_PSY_A_CORE FT
Pt reports occasional use of alcohol, once per week, last use several days ago  As per chart review, Claims last use 3 days ago, uses $100 a week on liquor, does not know amount; reports "getting the shakes" but denies other withdrawal

## 2023-02-22 NOTE — BH INPATIENT PSYCHIATRY ASSESSMENT NOTE - NSICDXBHSECONDARYDX_PSY_ALL_CORE
Substance-induced psychotic disorder   F19.959  Cocaine use disorder   F14.10  Alcohol use disorder   F19.90   Substance-induced psychotic disorder   F19.959  Cocaine use disorder   F14.10  Alcohol use disorder   F19.90  Cannabis use disorder   F12.90
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
## 2023-02-22 NOTE — CONSULT NOTE ADULT - ASSESSMENT
Patient is 38 yo male presenting     #Mental illness  -further care as per psych    #pna  noticed on the cxr  SPO2 is good, afebrile.  Blood culture negative  continue with current antibiotic   recommended to repeat cxr in 4-6 weeks discussed the importance of followup with pmd in details    #HIV  continue with home medications, and outpatient follow up with ID     thank you for the consult please contact us with questions

## 2023-02-22 NOTE — BH INPATIENT PSYCHIATRY ASSESSMENT NOTE - NSBHSACOC_PSY_A_CORE FT
Pt reports cocaine use, denies daily use, every 1-3 days, unable to quantify, last use PTA  As per chart review, Claims last use 3 days ago

## 2023-02-22 NOTE — BH INPATIENT PSYCHIATRY ASSESSMENT NOTE - HPI (INCLUDE ILLNESS QUALITY, SEVERITY, DURATION, TIMING, CONTEXT, MODIFYING FACTORS, ASSOCIATED SIGNS AND SYMPTOMS)
Mr. Hernandez is a 37-year-old single, domiciled in [place] with roommate, unemployed, with PMH of HIV on Biktarvy, PPH of polysubstance use disorder (alcohol, cocaine, opiates, perhaps others), SIPD, SIMD, prior SA (hanging, jumping out of 3rd story window, jumping in fron of cars), multiple prior  psychiatric and detox/rehab stays (5/21 [facility], 7/21 [facility], 10/21 [facility], 12/21 [facility], 3/22 [facility], 5/22 [facility], 7/22 [facility], 9/22 [facility]), who presents to the ED reporting suicidal ideation. Telepsychiatry consulted to asses for suicidal ideation.    On approach Mr. Hernandez was calm, cooperative, forthcoming of information. He reports that he attempted suicide yesterday at the behest of command auditory hallucinations and remains suicidal. He reports his girlfriend stopped him after he had jumped the window. He does not believe anything would have stopped him besides his girl friend, stating he "doesn't have much to live for." He states he began hearing voices yesterday afternoon telling him to kill himself; he did not recognize the voices and reports that he felt as though there were at least two; he last heard them this morning. He denies acute stressors; he reports he did use cocaine yesterday but "no more than usual," stating he uses about 50 dollars a week. He last heard the voices this morning telling him to jump out of a moving car. He last head voices prior to his hospitalization to [facility] in September.     He reports feeling "okay" after discharge in October for a "few months," noting 1 month ago be began to experience "panic attacks" described as intense fear, tearfulness, paranoia, racing heart, sweating lasting 30-60 minutes. He denies being able to recognize triggers or warning signs prior to the attacks, and that he has experienced them 3-5 times a week. Reports mood as "angry and sad" for past 3 weeks, increased irritability, increased appetite, poor concentration, with feelings of worsening worthlessness. Reports sleep as chronically poor, but that he has only slept 2-4 hours per night. He denies seeing anything unusual or feeling as though others are after him, or feeling as though he has special lsoan or receives hidden messages. Denies homicidal ideation.     Reports that his girlfriend noted earlier this week after he began feeling physically sick (with confirmed influenza) he left the house without shoes in the middle of the night. He does not recall when this happened, and reports some concern he has forgotten other events this week when his girlfriend was at work.     He denies following up with outpatient psychiatry or taking medications after last discharge. When asked why he replies "I don't feel like it." when asked how hospitalization would benefit him he states "I always feel better after but the meds stop working or I stop taking them." He reports he would not be safe to go home and he would harm himself because he does not like his roommate and his girlfriend is at work.    Mr. Hernandez reports he does not know girlfriend Diamond's number, does not have his cellphone. Unable to obtain number from chart.
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
## 2023-02-22 NOTE — BH INPATIENT PSYCHIATRY ASSESSMENT NOTE - NSBHCHARTREVIEWINVESTIGATE_PSY_A_CORE FT
< from: 12 Lead ECG (09.10.22 @ 21:10) >      Ventricular Rate 71 BPM    Atrial Rate 71 BPM    P-R Interval 136 ms    QRS Duration 106 ms    Q-T Interval 406 ms    QTC Calculation(Bazett) 441 ms    P Axis 43 degrees    R Axis 71 degrees    T Axis 66 degrees    Diagnosis Line Normal sinus rhythm  Normal ECG    < end of copied text >

## 2023-02-22 NOTE — BH INPATIENT PSYCHIATRY ASSESSMENT NOTE - NSDCCRITERIA_PSY_ALL_CORE
When pt is no longer an acute or imminent risk of harm to self or/and others, and is able to care for self safely, pt may then be discharged

## 2023-02-22 NOTE — BH INPATIENT PSYCHIATRY ASSESSMENT NOTE - DESCRIPTION
Grew up in [place], has lived in [place] since 2012 Grew up in [place], has lived in [place] since 2012  As per chart review, States father had history of heavy alcohol use, depression, suicide attempts. Committed suicide by hanging in 2018.  Lives in [place] with friends, states he feels safe at home and that it is a good living situation. Currently unemployed and has been for several years, states he is not ready to go back to work. Currently receiving public assistance. Highest level of education is high school. Denies access to weapons. Denies [employer]ism affiliation. Has 3 children, 3 year old daughter living in [place] with mother and step father, and 2 older children who both live in [place]. States he would like to leave [place] but has not made plans to do so.  prior incarcerations

## 2023-02-22 NOTE — BH INPATIENT PSYCHIATRY ASSESSMENT NOTE - NSBHASSESSSUMMFT_PSY_ALL_CORE
37-year-old single, domiciled in [facility] housing with roommate, unemployed, with PMH of HIV on Biktarvy, PPH of polysubstance use disorder (alcohol, cocaine, opiates, perhaps others), SIPD, SIMD, prior SA (hanging, jumping out of 3rd story window, jumping in fron of cars), multiple prior IP psychiatric and detox/rehab stays (5/21 [facility], 7/21 [facility], 10/21 [facility], 12/21 [facility], 3/22 [facility], 5/22 [facility], 7/22 [facility], 9/22 [facility]), who presents to the ED reporting suicidal ideation. Telepsychiatry consulted to asses for suicidal ideation. Pt reported command auditory hallucinations to kill himsel, as well as symptoms of anxiety, panic attacks, and worsening depression in the past month in the setting of treatment nonadherence and relapse on polysubstances (etoh, cocaine, cannabis). Additionally, he reports an unclear dissociative episode this week concerning for delirium given his risk factors of HIV with current influenza/ pneumonia. While his chronic risk factors--substance use with concurrent psychosis, limited engagement with treatment teams, male gender, limited frustration tolerance--are unlikely to be meaningfully mitigated by inpatient psychiatric treatment given history of high ultilization, he remains acutely high risk for self-injurious behavior and would benefit from further observation and psychiatric treatment.     #SIMD, SIPD  -c/w lexapro 10 mg daily  -titrate trazodone 100 mg qhs  -consider restart of risperdal after following metabolization of substances     #Polysubstance use (etoh, cocaine, cannabis; h/o opiates, meth)  -CATCH consult  -monitor for s/sx of withdrawal  -encourage rehab    #PNA  -medicine consult --> c/w levaquin x5 days (to be completed on 2/24/23), "recommended to repeat cxr in 4-6 weeks discussed the importance of followup with pmd in details"    #HIV  -medicine consult --> c/w biktarvy daily, f/u OP ID    #Agitation  -for agitation not amenable to verbal redirection, may give haldol 5 mg q6h prn and ativan 2 mg q6h prn with escalation to IM if pt is a danger to self or/and others with repeat EKG to ensure QTc <500 ms

## 2023-02-22 NOTE — UM REPORT PROGRESS NOTE - NSUMRMPROVIDER_GEN_A_CORE FT
[facility] via [place]  ID# [id number]    2/22- [facility] BONI called [facility]. Approved 9 days 2/21-3/1. Ref#[id number]. Review on 3/1 where CM is to call [phone]. Please inform if pt is discharged before 3/1. [facility] via [place]  ID# [id number]    2/22- [facility] SW called [facility]. Approved 9 days 2/21-3/1. Ref#[id number]. Review on 3/1 where CM is to call [phone]. Please inform if pt is discharged before 3/1.    3-1-23 call placed to [facility]  to schedule live review awaiting return call  [facility] [place]  ID# [id number]    2/22- [facility] called [facility]. Approved 9 days 2/21-3/1. Ref#[id number]. Review on 3/1 where CM is to call [phone]. Please inform if pt is discharged before 3/1.    3-1-23 call placed to [place] [id number]  to schedule live review awaiting return call  approved LCD and review 3-6-23 w [place] [id number]    [facility] via [place]  ID# [id number]    2/22- [facility] called [facility]. Approved 9 days 2/21-3/1. Ref#[id number]. Review on 3/1 where CM is to call [phone]. Please inform if pt is discharged before 3/1.    3-1-23 call placed to [facility]  to schedule live review awaiting return call  approved LCD and review 3-6-23 w [facility]       3-6-23 message left for [facility] to schedule live review  [facility] via [place]  ID# [id number]    2/22- [facility] called [facility]. Approved 9 days 2/21-3/1. Ref#[id number]. Review on 3/1 where CM is to call [phone]. Please inform if pt is discharged before 3/1.    3-1-23 call placed to [facility]  to schedule live review awaiting return call  approved LCD and review 3-6-23 w [facility]       3-6-23 message left for [facility] to schedule live review   approved LCD and review 3-8-23  [facility] [place]  ID# [id number]    2/22- [facility] called [facility]. Approved 9 days 2/21-3/1. Ref#[id number]. Review on 3/1 where CM is to call [phone]. Please inform if pt is discharged before 3/1.    3-1-23 call placed to [facility]  to schedule live review awaiting return call  approved LCD and review 3-6-23 w [facility]       3-6-23 message left for Gisela to schedule live review   approved LCD and review 3-8-23     3-8-23 Spoke with [facility] approved LCD ad review 3-10 [facility] [place]  ID# [id number]    2/22- [facility] called [facility]. Approved 9 days 2/21-3/1. Ref#[id number]. Review on 3/1 where CM is to call [phone]. Please inform if pt is discharged before 3/1.    3-1-23 call placed to [facility]  to schedule live review awaiting return call  approved LCD and review 3-6-23 w [facility]       3-6-23 message left for Gisela to schedule live review   approved LCD and review 3-8-23     3-8-23 Spoke with Me approved LCD ad review 3-10    DC clinical given to Gisela  [facility] [place]  ID# [id number]    2/22- [facility] called [facility]. Approved 9 days 2/21-3/1. Ref#[id number]. Review on 3/1 where CM is to call [phone]. Please inform if pt is discharged before 3/1.    3-1-23 call placed to [facility]  to schedule live review awaiting return call  approved LCD and review 3-6-23 w [facility]       3-6-23 message left for Mejennifer to schedule live review   approved LCD and review 3-8-23     3-8-23 Spoke with [facility] approved LCD ad review 3-10    3-9-23 DC clinical given to Mejennifer

## 2023-02-22 NOTE — BH INPATIENT PSYCHIATRY ASSESSMENT NOTE - NSBHATTESTBILLING_PSY_A_CORE
[id number]-Krbvxogulbg diagnostic evaluation with medical services 36004-Phbkqlvbna OBS or IP - moderate complexity OR 35-49 mins

## 2023-02-22 NOTE — BH INPATIENT PSYCHIATRY ASSESSMENT NOTE - OTHER
Pt reports AH, does not appear internally preoccupied Pt reports AH of "the devil", denies CAH; does not appear internally preoccupied
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
## 2023-02-22 NOTE — BH INPATIENT PSYCHIATRY ASSESSMENT NOTE - NSBHCHARTREVIEWVS_PSY_A_CORE FT
Vital Signs Last 24 Hrs  T(C): 36.3 (02-22-23 @ 08:42), Max: 36.7 (02-21-23 @ 13:43)  T(F): 97.4 (02-22-23 @ 08:42), Max: 98.1 (02-21-23 @ 13:43)  HR: 84 (02-22-23 @ 08:42) (66 - 84)  BP: 114/73 (02-22-23 @ 08:42) (107/65 - 127/79)  BP(mean): --  RR: 18 (02-22-23 @ 08:42) (17 - 19)  SpO2: 100% (02-22-23 @ 08:42) (97% - 100%)     Vital Signs Last 24 Hrs  T(C): 36.3 (02-22-23 @ 08:42), Max: 36.7 (02-21-23 @ 17:34)  T(F): 97.4 (02-22-23 @ 08:42), Max: 98 (02-21-23 @ 17:34)  HR: 84 (02-22-23 @ 08:42) (69 - 84)  BP: 114/73 (02-22-23 @ 08:42) (112/69 - 127/79)  BP(mean): --  RR: 18 (02-22-23 @ 08:42) (17 - 19)  SpO2: 100% (02-22-23 @ 08:42) (98% - 100%)

## 2023-02-22 NOTE — CONSULT NOTE ADULT - SUBJECTIVE AND OBJECTIVE BOX
CC.  Depression  HPI.  Patient reports cough improving.  afebrile  Offers no other medical complaints     Constitutional: No fever, fatigue or weight loss.  Skin: No rash.  Eyes: No recent vision problems or eye pain.  ENT: No congestion, ear pain, or sore throat.  Endocrine: No thyroid problems.  Cardiovascular: No chest pain or palpation.  Respiratory: No cough, shortness of breath, congestion, or wheezing.  Gastrointestinal: No abdominal pain, nausea, vomiting, or diarrhea.  Genitourinary: No dysuria.  Musculoskeletal: No joint swelling.  Neurologic: No headache.          PAST MEDICAL/SURGICAL/FAMILY/SOCIAL HISTORY:    Past Medical, Past Surgical, and Family History:  PAST MEDICAL HISTORY:  Alcohol abuse     Anxiety     Bipolar disorder     Cocaine abuse     COVID-19     Depression     Heroin abuse     HIV (human immunodeficiency virus infection)     Schizophrenia, unspecified type.     PAST SURGICAL HISTORY:  No significant past surgical history.     Tobacco Usage:  · Tobacco Usage	Unknown if ever smoked    · Attestation Comment: I have reviewed and confirmed nurses' notes for patient's medications, allergies, medical history, and surgical history.    ALLERGIES AND HOME MEDICATIONS:   Allergies:        Allergies:  	penicillin: Drug, Unknown  	penicillins: Drug Category, Unknown    Home Medications:   * Patient Currently Takes Medications as of 04-Nov-2022 20:10 documented in Structured Notes  · 	guaiFENesin 100 mg/5 mL oral liquid: 5 milliliter(s) orally 3 times a day   · 	acetaminophen 650 mg oral tablet, extended release: 1 tab(s) orally 4 times a day   · 	bictegravir/emtricitabine/tenofovir 50 mg-200 mg-25 mg oral tablet: 1 tab(s) orally once a day  · 	bictegravir/emtricitabine/tenofovir 50 mg-200 mg-25 mg oral tablet: 1 tab(s) orally once a day  · 	traZODone 150 mg oral tablet: 1 tab(s) orally once a day (at bedtime)  · 	prazosin 1 mg oral capsule: 3 cap(s) orally once a day (at bedtime)  · 	escitalopram 5 mg oral tablet: 3 tab(s) orally once a day      Vital Signs Last 24 Hrs  T(C): 36.3 (22 Feb 2023 08:42), Max: 36.7 (21 Feb 2023 17:34)  T(F): 97.4 (22 Feb 2023 08:42), Max: 98 (21 Feb 2023 17:34)  HR: 84 (22 Feb 2023 08:42) (69 - 84)  BP: 114/73 (22 Feb 2023 08:42) (112/69 - 127/79)  BP(mean): --  RR: 18 (22 Feb 2023 08:42) (17 - 19)  SpO2: 100% (22 Feb 2023 08:42) (98% - 100%)      PHYSICAL EXAM-  GENERAL: NAD,    HEAD:  Atraumatic, Normocephalic  EYES: EOMI, PERRLA, conjunctiva and sclera clear  NECK: Supple, No JVD, Normal thyroid  NERVOUS SYSTEM:  Alert & Oriented X3, Moving all extremities  CHEST/LUNG: Clear to percussion bilaterally; No rales, rhonchi, wheezing, or rubs  HEART: Regular rate and rhythm; No murmurs, rubs, or gallops  ABDOMEN: Soft, Nontender, Nondistended; Bowel sounds present  EXTREMITIES:   No clubbing, cyanosis, or edema  SKIN: No rashes or lesions      .  LABS: reviewed                    RADIOLOGY, EKG & ADDITIONAL TESTS: Reviewed.

## 2023-02-22 NOTE — BH INPATIENT PSYCHIATRY ASSESSMENT NOTE - OTHER PAST PSYCHIATRIC HISTORY (INCLUDE DETAILS REGARDING ONSET, COURSE OF ILLNESS, INPATIENT/OUTPATIENT TREATMENT)
Multiple prior admissions,   prior suicide attempts  prior NSSIB Multiple prior admissions, prior suicide attempts, prior NSSIB  As per chart review, "LIZETT [id number]  Suicidal Ideation: 43x between 2017 and 3/2022  Self Harm: 4x between 2018-8/2019  Flags: high utilization inptER, readmission, EPHRAIM, treatment engagement  Dx: Schizoaffective disorder, polysubstance-use (cocaine, opioid, cannabis, alcohol, tobacco, sedative), SIPD, PTSD, Panic Disorder  PMH: HIV, Herpes Zoster, T1DM, and many more.  Medications: Escitalopram (last 2/2022), gabapentin (12/2021), trazodone, risperidone (9/2021), quetiapine 300 (5/2021); multiple other short trials.   OP: [facility] with 9x visits for MDD last in 10/2021; multiple others.  ED: 21x MH last here on 7/2021; 2x ROA (last on 11/2020)  IP: 28x MH last at [facility] 1/2022; 9x ROA (last [facility] 3/2022)" Multiple prior admissions for SIMD and SIPD, prior suicide attempts, prior NSSIB  As per chart review, "LIZETT [id number]  Suicidal Ideation: 43x between 2017 and 3/2022  Self Harm: 4x between 2018-8/2019  Flags: high utilization inptER, readmission, EPHRAIM, treatment engagement  Dx: Schizoaffective disorder, polysubstance-use (cocaine, opioid, cannabis, alcohol, tobacco, sedative), SIPD, PTSD, Panic Disorder  PMH: HIV, Herpes Zoster, T1DM, and many more.  Medications: Escitalopram (last 2/2022), gabapentin (12/2021), trazodone, risperidone (9/2021), quetiapine 300 (5/2021); multiple other short trials.   OP: [facility] with 9x visits for MDD last in 10/2021; multiple others.  ED: 21x MH last here on 7/2021; 2x ROA (last on 11/2020)  IP: 28x MH last at [facility] 1/2022; 9x ROA (last [facility] 3/2022)"

## 2023-02-23 DIAGNOSIS — Z86.16 PERSONAL HISTORY OF COVID-19: ICD-10-CM

## 2023-02-23 DIAGNOSIS — F31.9 BIPOLAR DISORDER, UNSPECIFIED: ICD-10-CM

## 2023-02-23 DIAGNOSIS — F19.94 OTHER PSYCHOACTIVE SUBSTANCE USE, UNSPECIFIED WITH PSYCHOACTIVE SUBSTANCE-INDUCED MOOD DISORDER: ICD-10-CM

## 2023-02-23 DIAGNOSIS — J18.9 PNEUMONIA, UNSPECIFIED ORGANISM: ICD-10-CM

## 2023-02-23 DIAGNOSIS — R45.851 SUICIDAL IDEATIONS: ICD-10-CM

## 2023-02-23 DIAGNOSIS — Z88.0 ALLERGY STATUS TO PENICILLIN: ICD-10-CM

## 2023-02-23 DIAGNOSIS — F41.9 ANXIETY DISORDER, UNSPECIFIED: ICD-10-CM

## 2023-02-23 DIAGNOSIS — Z20.822 CONTACT WITH AND (SUSPECTED) EXPOSURE TO COVID-19: ICD-10-CM

## 2023-02-23 DIAGNOSIS — Z21 ASYMPTOMATIC HUMAN IMMUNODEFICIENCY VIRUS [HIV] INFECTION STATUS: ICD-10-CM

## 2023-02-23 DIAGNOSIS — F29 UNSPECIFIED PSYCHOSIS NOT DUE TO A SUBSTANCE OR KNOWN PHYSIOLOGICAL CONDITION: ICD-10-CM

## 2023-02-23 DIAGNOSIS — F20.9 SCHIZOPHRENIA, UNSPECIFIED: ICD-10-CM

## 2023-02-23 DIAGNOSIS — F05 DELIRIUM DUE TO KNOWN PHYSIOLOGICAL CONDITION: ICD-10-CM

## 2023-02-23 LAB
A1C WITH ESTIMATED AVERAGE GLUCOSE RESULT: 5.7 % — HIGH (ref 4–5.6)
CHOLEST SERPL-MCNC: 209 MG/DL — HIGH
ESTIMATED AVERAGE GLUCOSE: 117 MG/DL — HIGH (ref 68–114)
HDLC SERPL-MCNC: 70 MG/DL — SIGNIFICANT CHANGE UP
LIPID PNL WITH DIRECT LDL SERPL: 103 MG/DL — HIGH
NON HDL CHOLESTEROL: 139 MG/DL — HIGH
TRIGL SERPL-MCNC: 182 MG/DL — HIGH
TSH SERPL-MCNC: 3.48 UIU/ML — SIGNIFICANT CHANGE UP (ref 0.27–4.2)

## 2023-02-23 PROCEDURE — 99231 SBSQ HOSP IP/OBS SF/LOW 25: CPT

## 2023-02-23 RX ORDER — PRAZOSIN HCL 2 MG
1 CAPSULE ORAL AT BEDTIME
Refills: 0 | Status: DISCONTINUED | OUTPATIENT
Start: 2023-02-23 | End: 2023-02-24

## 2023-02-23 RX ADMIN — Medication 100 MILLIGRAM(S): at 20:29

## 2023-02-23 RX ADMIN — ESCITALOPRAM OXALATE 10 MILLIGRAM(S): 10 TABLET, FILM COATED ORAL at 08:07

## 2023-02-23 RX ADMIN — BICTEGRAVIR SODIUM, EMTRICITABINE, AND TENOFOVIR ALAFENAMIDE FUMARATE 1 TABLET(S): 30; 120; 15 TABLET ORAL at 08:07

## 2023-02-23 RX ADMIN — Medication 1 MILLIGRAM(S): at 20:29

## 2023-02-23 NOTE — BH SOCIAL WORK INITIAL PSYCHOSOCIAL EVALUATION - NSCMSPTSTRENGTHS_PSY_ALL_CORE
LABS: Personally reviewed labs, imaging, and ECG                          12.2   3.38  )-----------( 61       ( 10 Mar 2022 07:47 )             36.4       03-10    126<L>  |  98  |  43<H>  ----------------------------<  153<H>  5.3   |  18<L>  |  1.07    Ca    9.9      10 Mar 2022 10:33  Phos  3.7     03-10  Mg     2.00     03-10    TPro  7.9  /  Alb  4.2  /  TBili  2.4<H>  /  DBili  x   /  AST  112<H>  /  ALT  80<H>  /  AlkPhos  149<H>  03-10       LIVER FUNCTIONS - ( 10 Mar 2022 07:47 )  Alb: 4.2 g/dL / Pro: 7.9 g/dL / ALK PHOS: 149 U/L / ALT: 80 U/L / AST: 112 U/L / GGT: x                            Lactate Trend            CAPILLARY BLOOD GLUCOSE      POCT Blood Glucose.: 93 mg/dL (10 Mar 2022 09:31)        Culture Results:   No Growth Final (02-15 @ 05:25)  Culture Results:   No Growth Final (02-15 @ 05:25)  Culture Results:   No growth (02-11 @ 14:44)  Culture Results:   No growth at 5 days (02-11 @ 11:55)  Culture Results:   No growth at 5 days (02-11 @ 11:55)  Culture Results:   No Growth Final (02-10 @ 18:10)  Culture Results:   Growth in aerobic bottle: Staphylococcus pettenkoferi  Coag Negative Staphylococcus  Single set isolate, possible contaminant. Contact  Microbiology if susceptibility testing clinically  indicated.  ***Blood Panel PCR results on this specimen are available  approximately 3 hours after the Gram stain result.***  Gram stain, PCR, and/or culture results may not always  correspond due to difference in methodologies.  ************************************************************  This PCR assay was performed by multiplex PCR. This  Assay tests for 66 bacterial and resistance gene targets.  Please refer to the [facility] test directory  at [email or web address] for details. (02-10 @ 18:08)      RADIOLOGY & ADDITIONAL TESTS: Able to adapt/Resourceful

## 2023-02-23 NOTE — BH INPATIENT PSYCHIATRY PROGRESS NOTE - NSBHCHARTREVIEWVS_PSY_A_CORE FT
Vital Signs Last 24 Hrs  T(C): 36.8 (02-23-23 @ 09:27), Max: 37.2 (02-22-23 @ 16:00)  T(F): 98.2 (02-23-23 @ 09:27), Max: 98.9 (02-22-23 @ 16:00)  HR: 80 (02-23-23 @ 09:27) (80 - 82)  BP: 119/60 (02-23-23 @ 09:27) (119/60 - 122/70)  BP(mean): --  RR: 18 (02-23-23 @ 09:27) (18 - 18)  SpO2: 98% (02-23-23 @ 09:27) (98% - 98%)

## 2023-02-23 NOTE — BH INPATIENT PSYCHIATRY PROGRESS NOTE - NSBHMETABOLIC_PSY_ALL_CORE_FT
BMI: BMI (kg/m2): 23.8 (02-21-23 @ 19:23)  HbA1c: A1C with Estimated Average Glucose Result: 5.5 % (09-13-22 @ 09:13)    Glucose:   BP: 119/60 (02-23-23 @ 09:27) (114/73 - 127/79)  Lipid Panel: Date/Time: 02-23-23 @ 08:48  Cholesterol, Serum: 209  Direct LDL: --  HDL Cholesterol, Serum: 70  Total Cholesterol/HDL Ration Measurement: --  Triglycerides, Serum: 182   BMI: BMI (kg/m2): 23.8 (02-21-23 @ 19:23)  HbA1c: A1C with Estimated Average Glucose Result: 5.7 % (02-23-23 @ 08:48)    Glucose:   BP: 119/60 (02-23-23 @ 09:27) (114/73 - 127/79)  Lipid Panel: Date/Time: 02-23-23 @ 08:48  Cholesterol, Serum: 209  Direct LDL: --  HDL Cholesterol, Serum: 70  Total Cholesterol/HDL Ration Measurement: --  Triglycerides, Serum: 182

## 2023-02-23 NOTE — BH INPATIENT PSYCHIATRY PROGRESS NOTE - NSBHFUPINTERVALHXFT_PSY_A_CORE
Pt seen and evaluated, chart reviewed. As per nursing report, no acute events overnight, no PRNs. On evaluation, pt presents calm and well-groomed, watching TV in dayroom. He reports he is "all right". Reports he had difficulty falling and staying asleep. Endorses flashbacks of past traumas and nightmares. He reports h/o good response to prazosin and requests to restart. Otherwise denies any new complaints. Reports he is eating well and agrees to attend groups today. He reports motivation to go to rehab, informed on pending CATCH consult. Of note, pt reports AH; he presents linear with good eye contact, does not appear to be responding to internal stimuli. He denies active SI/HI, intent and plan. Adherent to medications, denies negative side effects. Visible on unit and in behavioral control.  Pt seen and evaluated, chart reviewed. As per nursing report, no acute events overnight, no PRNs. On evaluation, pt presents calm and well-groomed, watching TV in dayroom. He reports he is "all right". Reports he had difficulty falling and staying asleep. Endorses flashbacks of past traumas and nightmares. He reports h/o good response to prazosin and requests to restart. Otherwise denies any new complaints. Reports he is eating well and agrees to attend groups today. He reports motivation to go to rehab, informed on pending CATCH consult. Of note, pt reports AH; he presents linear with good eye contact, does not appear internally preoccupied. He denies active SI/HI, intent and plan. Adherent to medications, denies negative side effects. Visible on unit and in behavioral control.

## 2023-02-24 PROCEDURE — 99231 SBSQ HOSP IP/OBS SF/LOW 25: CPT

## 2023-02-24 RX ORDER — PRAZOSIN HCL 2 MG
2 CAPSULE ORAL AT BEDTIME
Refills: 0 | Status: DISCONTINUED | OUTPATIENT
Start: 2023-02-24 | End: 2023-03-09

## 2023-02-24 RX ADMIN — Medication 2 MILLIGRAM(S): at 20:12

## 2023-02-24 RX ADMIN — BICTEGRAVIR SODIUM, EMTRICITABINE, AND TENOFOVIR ALAFENAMIDE FUMARATE 1 TABLET(S): 30; 120; 15 TABLET ORAL at 08:14

## 2023-02-24 RX ADMIN — Medication 100 MILLIGRAM(S): at 20:12

## 2023-02-24 RX ADMIN — ESCITALOPRAM OXALATE 10 MILLIGRAM(S): 10 TABLET, FILM COATED ORAL at 08:14
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
## 2023-02-24 NOTE — BH INPATIENT PSYCHIATRY PROGRESS NOTE - NSICDXBHTERTIARYDX_PSY_ALL_CORE
R/O Schizoaffective disorder   F25.9   R/O Schizoaffective disorder   F25.9  R/O MDD (major depressive disorder)   F32.9

## 2023-02-24 NOTE — BH INPATIENT PSYCHIATRY PROGRESS NOTE - NSBHMETABOLIC_PSY_ALL_CORE_FT
BMI: BMI (kg/m2): 23.8 (02-21-23 @ 19:23)  HbA1c: A1C with Estimated Average Glucose Result: 5.7 % (02-23-23 @ 08:48)    Glucose:   BP: 106/64 (02-24-23 @ 09:21) (106/64 - 158/76)  Lipid Panel: Date/Time: 02-23-23 @ 08:48  Cholesterol, Serum: 209  Direct LDL: --  HDL Cholesterol, Serum: 70  Total Cholesterol/HDL Ration Measurement: --  Triglycerides, Serum: 182

## 2023-02-24 NOTE — BH INPATIENT PSYCHIATRY PROGRESS NOTE - NSBHCHARTREVIEWVS_PSY_A_CORE FT
Vital Signs Last 24 Hrs  T(C): 36.5 (02-24-23 @ 09:21), Max: 37 (02-23-23 @ 16:10)  T(F): 97.7 (02-24-23 @ 09:21), Max: 98.6 (02-23-23 @ 16:10)  HR: 96 (02-24-23 @ 09:21) (77 - 96)  BP: 106/64 (02-24-23 @ 09:21) (106/64 - 158/76)  BP(mean): --  RR: 18 (02-24-23 @ 09:21) (18 - 18)  SpO2: 99% (02-24-23 @ 09:21) (99% - 99%)

## 2023-02-25 PROCEDURE — 99231 SBSQ HOSP IP/OBS SF/LOW 25: CPT

## 2023-02-25 RX ADMIN — Medication 2 MILLIGRAM(S): at 20:25

## 2023-02-25 RX ADMIN — Medication 100 MILLIGRAM(S): at 20:25

## 2023-02-25 RX ADMIN — BICTEGRAVIR SODIUM, EMTRICITABINE, AND TENOFOVIR ALAFENAMIDE FUMARATE 1 TABLET(S): 30; 120; 15 TABLET ORAL at 08:23

## 2023-02-25 RX ADMIN — ESCITALOPRAM OXALATE 10 MILLIGRAM(S): 10 TABLET, FILM COATED ORAL at 08:23

## 2023-02-25 NOTE — BH INPATIENT PSYCHIATRY PROGRESS NOTE - OTHER
patient reports some memory impairment looks like there is slight difficulty with finding words Pt reports AH of "the devil", denies CAH; does not appear internally preoccupied

## 2023-02-25 NOTE — BH INPATIENT PSYCHIATRY PROGRESS NOTE - NSBHASSESSSUMMFT_PSY_ALL_CORE
37-year-old single, domiciled in [facility] housing with roommate, unemployed, with PMH of HIV on Biktarvy, PPH of polysubstance use disorder (alcohol, cocaine, opiates, perhaps others), SIPD, SIMD, prior SA (hanging, jumping out of 3rd story window, jumping in fron of cars), multiple prior IP psychiatric and detox/rehab stays (5/21 [facility], 7/21 [facility], 10/21 [facility], 12/21 [facility], 3/22 [facility], 5/22 [facility], 7/22 [facility], 9/22 [facility]), who presents to the ED reporting suicidal ideation. Telepsychiatry consulted to asses for suicidal ideation. Pt reported command auditory hallucinations to kill himsel, as well as symptoms of anxiety, panic attacks, and worsening depression in the past month in the setting of treatment nonadherence and relapse on polysubstances (etoh, cocaine, cannabis). Additionally, he reports an unclear dissociative episode this week concerning for delirium given his risk factors of HIV with current influenza/ pneumonia. While his chronic risk factors--substance use with concurrent psychosis, limited engagement with treatment teams, male gender, limited frustration tolerance--are unlikely to be meaningfully mitigated by inpatient psychiatric treatment given history of high ultilization, he remains acutely high risk for self-injurious behavior and would benefit from further observation and psychiatric treatment.     #SIMD, SIPD  -c/w lexapro 10 mg daily  -c/w trazodone 100 mg qhs  -titrate prazosin 2 mg qhs to target nightmares  -consider restart of risperdal after following metabolization of substances     #Polysubstance use (etoh, cocaine, cannabis; h/o opiates, meth)  -CATCH consult  -monitor for s/sx of withdrawal  -encourage rehab    #PNA  -medicine consult --> c/w levaquin x5 days (to be completed on 2/24/23), "recommended to repeat cxr in 4-6 weeks discussed the importance of followup with pmd in details"    #HIV  -medicine consult --> c/w biktarvy daily, f/u OP ID    #Agitation  -for agitation not amenable to verbal redirection, may give haldol 5 mg q6h prn and ativan 2 mg q6h prn with escalation to IM if pt is a danger to self or/and others with repeat EKG to ensure QTc <500 ms

## 2023-02-25 NOTE — BH INPATIENT PSYCHIATRY PROGRESS NOTE - NSBHMETABOLIC_PSY_ALL_CORE_FT
BMI: BMI (kg/m2): 23.8 (02-21-23 @ 19:23)  HbA1c: A1C with Estimated Average Glucose Result: 5.7 % (02-23-23 @ 08:48)    Glucose:   BP: 123/74 (02-25-23 @ 09:05) (106/64 - 158/76)  Lipid Panel: Date/Time: 02-23-23 @ 08:48  Cholesterol, Serum: 209  Direct LDL: --  HDL Cholesterol, Serum: 70  Total Cholesterol/HDL Ration Measurement: --  Triglycerides, Serum: 182

## 2023-02-25 NOTE — BH INPATIENT PSYCHIATRY PROGRESS NOTE - NSBHCHARTREVIEWVS_PSY_A_CORE FT
Vital Signs Last 24 Hrs  T(C): 37.1 (02-24-23 @ 16:02), Max: 37.1 (02-24-23 @ 16:02)  T(F): 98.7 (02-24-23 @ 16:02), Max: 98.7 (02-24-23 @ 16:02)  HR: 95 (02-25-23 @ 09:05) (67 - 95)  BP: 123/74 (02-25-23 @ 09:05) (123/74 - 128/71)  BP(mean): --  RR: 16 (02-25-23 @ 09:05) (16 - 19)  SpO2: 100% (02-25-23 @ 09:05) (99% - 100%)
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
## 2023-02-26 PROCEDURE — 99231 SBSQ HOSP IP/OBS SF/LOW 25: CPT | Mod: GC

## 2023-02-26 RX ADMIN — ESCITALOPRAM OXALATE 10 MILLIGRAM(S): 10 TABLET, FILM COATED ORAL at 08:23

## 2023-02-26 RX ADMIN — BICTEGRAVIR SODIUM, EMTRICITABINE, AND TENOFOVIR ALAFENAMIDE FUMARATE 1 TABLET(S): 30; 120; 15 TABLET ORAL at 08:23

## 2023-02-26 RX ADMIN — Medication 2 MILLIGRAM(S): at 20:03

## 2023-02-26 RX ADMIN — Medication 100 MILLIGRAM(S): at 20:03

## 2023-02-26 NOTE — BH INPATIENT PSYCHIATRY PROGRESS NOTE - NSBHFUPINTERVALHXFT_PSY_A_CORE
****THIS IS AN INCOMPLETE NOTE. ****PLAN HAS NOT BEEN FINALIZED BY ATTENDING. ****FULL NOTE TO FOLLOW SHORTLY. ****  Nursing reports no acute events overnight.     Chart reviewed, patient seen and evaluated in AM. On approach, patient lying in bed, appearing tired. Patient reports, "I've got this cough that started yesterday." Patient describes some transient discomfort in his chest and lower back during coughing (symptoms resolve). Patient endorsed OK sleep and appetite. Otherwise no complaints. Denies AVH, no overt psychosis. Denies SI. Patient compliant with medications; reports no side effects of medications.     Amenable to COVID testing.

## 2023-02-26 NOTE — BH SAFETY PLAN - STEP 4 ASK HELP NAME
OUTPATIENT PRE-OP OSTOMY CONSULTATION    Visit notification completed: NA  Patients mode of arrival: Ambulatory     INTAKE    Type of Stoma Planned: Temporary Colostomy- unknown if temporary or permanent    Diagnosis Pertinent to Stoma:diverticulosis and colon cancer  Date of Diagnosis: 2016  Type of Surgery:  Colostomy    Surgery Date: 7/31/18  Surgeon: Della     [facility]: [facility]  Current Living Situation:House  Anticipated Discharge Location After Hospital Stay:House  Any significant vision issues: No  Any hand dexterity issues: No    Present for Teaching Session: Patient and daughter   Present: NA    Pertinent Information: Patient presents for pre-op stoma marking    Stoma Site Marking:  Assessed Patient while: Lying, Sitting and Standing  Marked in LLQ with Permanent Marker, Covered with Tegaderm and Marker and extra Tegaderm given    Interventions: Educated on peristomal skin treatment  Miscellaneous Interventions: NA    Identified Barriers to Independent Care: None identified    EDUCATION  Teaching folder given: Yes   Teachings:   WOC Role, Activity, Anatomy, Bathing: Ostomy supplies are waterproof., Clothing, Diet, Exercise, Fluids: Drink 6-8 glasses of fluids daily , Home Care: if needed, Hospital Stay: 5-7 days, Pouching Products: Showed pouching system , clinic follow up, Hernia, Insurance, Ordering supplies and Support Group    Coordination of Care: NA    Total Time Spent with Patient: 30 minutes.  Education time: 20 minutes.  Wound care: 0 minutes.      
.
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
## 2023-02-26 NOTE — BH INPATIENT PSYCHIATRY PROGRESS NOTE - NSBHCHARTREVIEWVS_PSY_A_CORE FT
Vital Signs Last 24 Hrs  T(C): 36.3 (02-26-23 @ 08:33), Max: 37.2 (02-25-23 @ 15:56)  T(F): 97.3 (02-26-23 @ 08:33), Max: 99 (02-25-23 @ 15:56)  HR: 89 (02-26-23 @ 08:33) (72 - 89)  BP: 111/64 (02-26-23 @ 08:33) (111/64 - 125/69)  BP(mean): --  RR: 18 (02-26-23 @ 08:33) (18 - 20)  SpO2: 98% (02-26-23 @ 08:33) (98% - 98%)     Vital Signs Last 24 Hrs  T(C): 36.3 (02-26-23 @ 08:33), Max: 36.3 (02-26-23 @ 08:33)  T(F): 97.3 (02-26-23 @ 08:33), Max: 97.3 (02-26-23 @ 08:33)  HR: 89 (02-26-23 @ 08:33) (89 - 89)  BP: 111/64 (02-26-23 @ 08:33) (111/64 - 111/64)  BP(mean): --  RR: 18 (02-26-23 @ 08:33) (18 - 18)  SpO2: 98% (02-26-23 @ 08:33) (98% - 98%)

## 2023-02-26 NOTE — BH SAFETY PLAN - ENVIRONMENT SAFETY 1:
One way I would make my environment safe would be to call for help once I get into one of these moods.

## 2023-02-26 NOTE — CHART NOTE - NSCHARTNOTEFT_GEN_A_CORE
Notified by nursing that pt refused COVID PCR.     Note that pt was recently treated for PNA. Notified by nursing that pt refused COVID PCR.

## 2023-02-26 NOTE — BH INPATIENT PSYCHIATRY PROGRESS NOTE - NSBHMETABOLIC_PSY_ALL_CORE_FT
BMI: BMI (kg/m2): 23.8 (02-21-23 @ 19:23)  HbA1c: A1C with Estimated Average Glucose Result: 5.7 % (02-23-23 @ 08:48)    Glucose:   BP: 111/64 (02-26-23 @ 08:33) (106/64 - 158/76)  Lipid Panel: Date/Time: 02-23-23 @ 08:48  Cholesterol, Serum: 209  Direct LDL: --  HDL Cholesterol, Serum: 70  Total Cholesterol/HDL Ration Measurement: --  Triglycerides, Serum: 182   BMI: BMI (kg/m2): 23.8 (02-21-23 @ 19:23)  HbA1c: A1C with Estimated Average Glucose Result: 5.7 % (02-23-23 @ 08:48)    Glucose:   BP: 111/64 (02-26-23 @ 08:33) (106/64 - 128/71)  Lipid Panel: Date/Time: 02-23-23 @ 08:48  Cholesterol, Serum: 209  Direct LDL: --  HDL Cholesterol, Serum: 70  Total Cholesterol/HDL Ration Measurement: --  Triglycerides, Serum: 182

## 2023-02-26 NOTE — BH SAFETY PLAN - DISTRACTION PLACE 1
One place that Would provide distraction would be to admit myself into a hospital. One place that Would provide distraction would be a [employer] park.

## 2023-02-27 PROCEDURE — 99231 SBSQ HOSP IP/OBS SF/LOW 25: CPT

## 2023-02-27 RX ORDER — ESCITALOPRAM OXALATE 10 MG/1
15 TABLET, FILM COATED ORAL DAILY
Refills: 0 | Status: DISCONTINUED | OUTPATIENT
Start: 2023-02-28 | End: 2023-03-09

## 2023-02-27 RX ORDER — ESCITALOPRAM OXALATE 10 MG/1
5 TABLET, FILM COATED ORAL ONCE
Refills: 0 | Status: COMPLETED | OUTPATIENT
Start: 2023-02-27 | End: 2023-02-27

## 2023-02-27 RX ADMIN — Medication 100 MILLIGRAM(S): at 20:20

## 2023-02-27 RX ADMIN — ESCITALOPRAM OXALATE 10 MILLIGRAM(S): 10 TABLET, FILM COATED ORAL at 08:19

## 2023-02-27 RX ADMIN — BICTEGRAVIR SODIUM, EMTRICITABINE, AND TENOFOVIR ALAFENAMIDE FUMARATE 1 TABLET(S): 30; 120; 15 TABLET ORAL at 08:19

## 2023-02-27 RX ADMIN — ESCITALOPRAM OXALATE 5 MILLIGRAM(S): 10 TABLET, FILM COATED ORAL at 11:21

## 2023-02-27 RX ADMIN — Medication 2 MILLIGRAM(S): at 20:20

## 2023-02-27 NOTE — BH INPATIENT PSYCHIATRY PROGRESS NOTE - NSBHMETABOLIC_PSY_ALL_CORE_FT
BMI: BMI (kg/m2): 23.8 (02-21-23 @ 19:23)  HbA1c: A1C with Estimated Average Glucose Result: 5.7 % (02-23-23 @ 08:48)    Glucose:   BP: 146/89 (02-27-23 @ 08:30) (111/64 - 146/89)  Lipid Panel: Date/Time: 02-23-23 @ 08:48  Cholesterol, Serum: 209  Direct LDL: --  HDL Cholesterol, Serum: 70  Total Cholesterol/HDL Ration Measurement: --  Triglycerides, Serum: 182

## 2023-02-27 NOTE — BH INPATIENT PSYCHIATRY PROGRESS NOTE - OTHER
looks like there is slight difficulty with finding words "better" patient reports some memory impairment
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
## 2023-02-27 NOTE — BH INPATIENT PSYCHIATRY PROGRESS NOTE - NSBHCHARTREVIEWVS_PSY_A_CORE FT
Vital Signs Last 24 Hrs  T(C): 36.5 (02-27-23 @ 08:30), Max: 37 (02-26-23 @ 17:37)  T(F): 97.7 (02-27-23 @ 08:30), Max: 98.6 (02-26-23 @ 17:37)  HR: 82 (02-27-23 @ 08:30) (74 - 82)  BP: 146/89 (02-27-23 @ 08:30) (116/63 - 146/89)  BP(mean): --  RR: 18 (02-27-23 @ 08:30) (18 - 18)  SpO2: 100% (02-26-23 @ 17:37) (100% - 100%)

## 2023-02-27 NOTE — BH INPATIENT PSYCHIATRY PROGRESS NOTE - NSBHFUPINTERVALHXFT_PSY_A_CORE
Pt seen and evaluated, chart reviewed. As per nursing report, no acute events overnight, no PRNs. On evaluation, pt presents well-groomed and socializing appropriately with group milieu in dayroom. He is with coburn affect, calm and cooperative. He reports he is feeling better. Attributes improvement to improved sleep, states he is with decreased nightmares and able to sleep more overnight. States he continues with some low energy/motivation. Reports resolution of hopelessness. Denies active SI/HI, intent and plan. No overt psychosis. Pt adherent to medications, denies negative side effects. Visible on unit, encouraged groups/DBT.     Amenable to COVID testing. Pt seen and evaluated, chart reviewed. As per nursing report, no acute events overnight, no PRNs. On evaluation, pt presents well-groomed and socializing appropriately with group milieu in dayroom. He is with coburn affect, calm and cooperative. He reports he is feeling better. Attributes improvement to improved sleep, states he is with decreased nightmares and able to sleep more overnight. States he continues with some low energy/motivation. Reports resolution of hopelessness. Denies active SI/HI, intent and plan. No overt psychosis. Pt adherent to medications, denies negative side effects. Visible on unit, encouraged groups/DBT.

## 2023-02-28 PROCEDURE — 99231 SBSQ HOSP IP/OBS SF/LOW 25: CPT

## 2023-02-28 RX ADMIN — ESCITALOPRAM OXALATE 15 MILLIGRAM(S): 10 TABLET, FILM COATED ORAL at 08:08

## 2023-02-28 RX ADMIN — BICTEGRAVIR SODIUM, EMTRICITABINE, AND TENOFOVIR ALAFENAMIDE FUMARATE 1 TABLET(S): 30; 120; 15 TABLET ORAL at 08:06

## 2023-02-28 NOTE — BH INPATIENT PSYCHIATRY PROGRESS NOTE - NSBHASSESSSUMMFT_PSY_ALL_CORE
37-year-old single, domiciled in [facility] housing with roommate, unemployed, with PMH of HIV on Biktarvy, PPH of polysubstance use disorder (alcohol, cocaine, opiates, perhaps others), SIPD, SIMD, prior SA (hanging, jumping out of 3rd story window, jumping in front of cars), multiple prior IP psychiatric and detox/rehab stays (5/21 [facility], 7/21 [facility], 10/21 [facility], 12/21 [facility], 3/22 [facility], 5/22 [facility], 7/22 [facility], 9/22 [facility]), who presents to the ED reporting suicidal ideation. Telepsychiatry consulted to asses for suicidal ideation. Pt reported command auditory hallucinations to kill himself, as well as symptoms of anxiety, panic attacks, and worsening depression in the past month in the setting of treatment nonadherence and relapse on polysubstances (etoh, cocaine, cannabis). Additionally, he reports an unclear dissociative episode this week concerning for delirium given his risk factors of HIV with current influenza/ pneumonia. While his chronic risk factors--substance use with concurrent psychosis, limited engagement with treatment teams, male gender, limited frustration tolerance--are unlikely to be meaningfully mitigated by inpatient psychiatric treatment given history of high ultilization, he remains acutely high risk for self-injurious behavior and would benefit from further observation and psychiatric treatment.     On evalaution today, pt presents with coburn affect, calm and cooperative, well-groomed. He is socializing appropriately with group milieu and not a behavioral concern. He reports improved mood, anxiety and sleep since admission. Endorses resolution of SI and is motivated to attend rehab to help maintain sobriety. No overt psychosis noted.     #SIMD, SIPD  -c/w lexapro 15 mg daily (2/27)  -c/w trazodone 100 mg qhs  -c/w prazosin 2 mg qhs to target nightmares  -consider restart of risperdal after following metabolization of substances     #Polysubstance use (etoh, cocaine, cannabis; h/o opiates, meth)  -CATCH consult  -monitor for s/sx of withdrawal  -encourage rehab    #PNA  -medicine consult --> c/w levaquin x5 days (to be completed on 2/24/23), "recommended to repeat cxr in 4-6 weeks discussed the importance of followup with pmd in details"    #new onset cough  - reports  new onset coughing (started 2/25) --> resolved 2/27  - no fever at this time    #HIV  -medicine consult --> c/w biktarvy daily, f/u OP ID    #Agitation  -for agitation not amenable to verbal redirection, may give haldol 5 mg q6h prn and ativan 2 mg q6h prn with escalation to IM if pt is a danger to self or/and others with repeat EKG to ensure QTc <500 ms 37-year-old single, domiciled in [facility] housing with roommate, unemployed, with PMH of HIV on Biktarvy, PPH of polysubstance use disorder (alcohol, cocaine, opiates, perhaps others), SIPD, SIMD, prior SA (hanging, jumping out of 3rd story window, jumping in front of cars), multiple prior IP psychiatric and detox/rehab stays (5/21 [facility], 7/21 [facility], 10/21 [facility], 12/21 [facility], 3/22 [facility], 5/22 [facility], 7/22 [facility], 9/22 [facility]), who presents to the ED reporting suicidal ideation. Telepsychiatry consulted to asses for suicidal ideation. Pt reported command auditory hallucinations to kill himself, as well as symptoms of anxiety, panic attacks, and worsening depression in the past month in the setting of treatment nonadherence and relapse on polysubstances (etoh, cocaine, cannabis). Additionally, he reports an unclear dissociative episode this week concerning for delirium given his risk factors of HIV with current influenza/ pneumonia. While his chronic risk factors--substance use with concurrent psychosis, limited engagement with treatment teams, male gender, limited frustration tolerance--are unlikely to be meaningfully mitigated by inpatient psychiatric treatment given history of high ultilization, he remains acutely high risk for self-injurious behavior and would benefit from further observation and psychiatric treatment.     On evaluation, pt presents with coburn affect, calm and cooperative, well-groomed. He is socializing appropriately with group milieu and not a behavioral concern. He reports improved mood, anxiety and sleep since admission. Reports resolution of nightmares and SI. Pt motivated to attend rehab to help maintain sobriety, pending referrals. No overt psychosis noted.     #SIMD, SIPD  -c/w lexapro 15 mg daily (2/27)  -c/w trazodone 100 mg qhs  -c/w prazosin 2 mg qhs to target nightmares  -consider restart of risperdal after following metabolization of substances     #Polysubstance use (etoh, cocaine, cannabis; h/o opiates, meth)  -CATCH consult  -monitor for s/sx of withdrawal  -encourage rehab    #PNA  -medicine consult --> c/w levaquin x5 days (to be completed on 2/24/23), "recommended to repeat cxr in 4-6 weeks discussed the importance of followup with pmd in details"    #new onset cough  - reports  new onset coughing (started 2/25) --> resolved 2/27  - no fever at this time    #HIV  -medicine consult --> c/w biktarvy daily, f/u OP ID    #Agitation  -for agitation not amenable to verbal redirection, may give haldol 5 mg q6h prn and ativan 2 mg q6h prn with escalation to IM if pt is a danger to self or/and others with repeat EKG to ensure QTc <500 ms

## 2023-02-28 NOTE — BH INPATIENT PSYCHIATRY PROGRESS NOTE - NSBHMETABOLIC_PSY_ALL_CORE_FT
BMI: BMI (kg/m2): 23.8 (02-21-23 @ 19:23)  HbA1c: A1C with Estimated Average Glucose Result: 5.7 % (02-23-23 @ 08:48)    Glucose:   BP: 117/67 (02-28-23 @ 08:45) (107/69 - 146/89)  Lipid Panel: Date/Time: 02-23-23 @ 08:48  Cholesterol, Serum: 209  Direct LDL: --  HDL Cholesterol, Serum: 70  Total Cholesterol/HDL Ration Measurement: --  Triglycerides, Serum: 182

## 2023-02-28 NOTE — BH INPATIENT PSYCHIATRY PROGRESS NOTE - OTHER
looks like there is slight difficulty with finding words patient reports some memory impairment "better"

## 2023-02-28 NOTE — BH INPATIENT PSYCHIATRY PROGRESS NOTE - NSBHCHARTREVIEWVS_PSY_A_CORE FT
Vital Signs Last 24 Hrs  T(C): 35.9 (02-28-23 @ 08:45), Max: 36.3 (02-27-23 @ 16:07)  T(F): 96.6 (02-28-23 @ 08:45), Max: 97.4 (02-27-23 @ 16:07)  HR: 82 (02-28-23 @ 08:45) (68 - 82)  BP: 117/67 (02-28-23 @ 08:45) (107/69 - 117/67)  BP(mean): --  RR: 16 (02-28-23 @ 08:45) (16 - 18)  SpO2: 100% (02-28-23 @ 08:45) (99% - 100%)

## 2023-02-28 NOTE — BH INPATIENT PSYCHIATRY PROGRESS NOTE - NSBHFUPINTERVALHXFT_PSY_A_CORE
Pt seen and evaluated, chart reviewed. As per nursing report, no acute events overnight, no PRNs. On evaluation, pt presents calm and cooperative with coburn affect, well-groomed. He reports he is     Denies active SI/HI, intent and plan. No overt psychosis. Pt adherent to medications, denies negative side effects. Visible on unit, encouraged groups/DBT. Pt seen and evaluated, chart reviewed. As per nursing report, no acute events overnight, no PRNs. On evaluation, pt presents calm and cooperative with full affect, well-groomed. He reports he is doing well. Denies any new complaints. Endorses improved mood. Reports good appetite, requesting double portions. Reports improved sleep and resolution of nightmares. No overt psychosis noted. Denies SI/HI, intent and plan. Pt adherent to medications, denies negative side effects. Visible on unit and in good behavioral control.

## 2023-03-01 PROCEDURE — 99231 SBSQ HOSP IP/OBS SF/LOW 25: CPT

## 2023-03-01 RX ADMIN — Medication 2 MILLIGRAM(S): at 20:18

## 2023-03-01 RX ADMIN — BICTEGRAVIR SODIUM, EMTRICITABINE, AND TENOFOVIR ALAFENAMIDE FUMARATE 1 TABLET(S): 30; 120; 15 TABLET ORAL at 08:16

## 2023-03-01 RX ADMIN — ESCITALOPRAM OXALATE 15 MILLIGRAM(S): 10 TABLET, FILM COATED ORAL at 08:17

## 2023-03-01 RX ADMIN — Medication 100 MILLIGRAM(S): at 20:18

## 2023-03-01 NOTE — BH INPATIENT PSYCHIATRY PROGRESS NOTE - NSBHCHARTREVIEWVS_PSY_A_CORE FT
Vital Signs Last 24 Hrs  T(C): 36.5 (03-01-23 @ 08:44), Max: 37.1 (02-28-23 @ 17:06)  T(F): 97.7 (03-01-23 @ 08:44), Max: 98.8 (02-28-23 @ 17:06)  HR: 63 (03-01-23 @ 08:44) (63 - 86)  BP: 113/68 (03-01-23 @ 08:44) (113/68 - 124/58)  BP(mean): --  RR: 18 (03-01-23 @ 08:44) (16 - 18)  SpO2: 99% (03-01-23 @ 08:44) (98% - 99%)

## 2023-03-01 NOTE — BH INPATIENT PSYCHIATRY PROGRESS NOTE - NSBHFUPINTERVALHXFT_PSY_A_CORE
Pt seen and evaluated, chart reviewed. As per nursing report, no acute events overnight, no PRNs. On evaluation, pt presents calm and cooperative with full affect, socially appropriately with group milieu. He reports he is doing well. Denies any new complaints. Reports good mood, sleep and appetite. Reports resolution of nightmares and SI. No overt psychosis noted. Denies SI/HI, intent and plan. Pt adherent to medications, denies negative side effects. Visible on unit and in good behavioral control. Pt reports motivation to maintain sobriety and requesting rehab. Visible on unit and in behavioral control.

## 2023-03-01 NOTE — BH INPATIENT PSYCHIATRY PROGRESS NOTE - NSBHASSESSSUMMFT_PSY_ALL_CORE
37-year-old single, domiciled in [facility] housing with roommate, unemployed, with PMH of HIV on Biktarvy, PPH of polysubstance use disorder (alcohol, cocaine, opiates, perhaps others), SIPD, SIMD, prior SA (hanging, jumping out of 3rd story window, jumping in front of cars), multiple prior IP psychiatric and detox/rehab stays (5/21 [facility], 7/21 [facility], 10/21 [facility], 12/21 [facility], 3/22 [facility], 5/22 [facility], 7/22 [facility], 9/22 [facility]), who presents to the ED reporting suicidal ideation. Telepsychiatry consulted to asses for suicidal ideation. Pt reported command auditory hallucinations to kill himself, as well as symptoms of anxiety, panic attacks, and worsening depression in the past month in the setting of treatment nonadherence and relapse on polysubstances (etoh, cocaine, cannabis). Additionally, he reports an unclear dissociative episode this week concerning for delirium given his risk factors of HIV with current influenza/ pneumonia. While his chronic risk factors--substance use with concurrent psychosis, limited engagement with treatment teams, male gender, limited frustration tolerance--are unlikely to be meaningfully mitigated by inpatient psychiatric treatment given history of high ultilization, he remains acutely high risk for self-injurious behavior and would benefit from further observation and psychiatric treatment.     On evaluation, pt presents with coburn affect, calm and cooperative, well-groomed. He is socializing appropriately with group milieu and not a behavioral concern. He reports improved mood, anxiety and sleep since admission. Reports resolution of nightmares and SI. Pt motivated to attend rehab to help maintain sobriety, pending referrals. No overt psychosis noted.     #SIMD, SIPD  -c/w lexapro 15 mg daily (2/27)  -c/w trazodone 100 mg qhs  -c/w prazosin 2 mg qhs to target nightmares    #Polysubstance use (etoh, cocaine, cannabis; h/o opiates, meth)  -CATCH consult  -monitor for s/sx of withdrawal  -encourage rehab    #PNA  -medicine consult --> c/w levaquin x5 days (to be completed on 2/24/23), "recommended to repeat cxr in 4-6 weeks discussed the importance of followup with pmd in details"    #new onset cough  - reports  new onset coughing (started 2/25) --> resolved 2/27  - no fever at this time    #HIV  -medicine consult --> c/w biktarvy daily, f/u OP ID    #Agitation  -for agitation not amenable to verbal redirection, may give haldol 5 mg q6h prn and ativan 2 mg q6h prn with escalation to IM if pt is a danger to self or/and others with repeat EKG to ensure QTc <500 ms

## 2023-03-01 NOTE — BH INPATIENT PSYCHIATRY PROGRESS NOTE - NSBHMETABOLIC_PSY_ALL_CORE_FT
BMI: BMI (kg/m2): 23.8 (02-21-23 @ 19:23)  HbA1c: A1C with Estimated Average Glucose Result: 5.7 % (02-23-23 @ 08:48)    Glucose:   BP: 113/68 (03-01-23 @ 08:44) (107/69 - 146/89)  Lipid Panel: Date/Time: 02-23-23 @ 08:48  Cholesterol, Serum: 209  Direct LDL: --  HDL Cholesterol, Serum: 70  Total Cholesterol/HDL Ration Measurement: --  Triglycerides, Serum: 182

## 2023-03-02 PROCEDURE — 99231 SBSQ HOSP IP/OBS SF/LOW 25: CPT

## 2023-03-02 RX ADMIN — Medication 2 MILLIGRAM(S): at 20:20

## 2023-03-02 RX ADMIN — Medication 100 MILLIGRAM(S): at 20:20

## 2023-03-02 RX ADMIN — ESCITALOPRAM OXALATE 15 MILLIGRAM(S): 10 TABLET, FILM COATED ORAL at 08:30

## 2023-03-02 RX ADMIN — BICTEGRAVIR SODIUM, EMTRICITABINE, AND TENOFOVIR ALAFENAMIDE FUMARATE 1 TABLET(S): 30; 120; 15 TABLET ORAL at 14:15

## 2023-03-02 NOTE — BH INPATIENT PSYCHIATRY PROGRESS NOTE - NSBHCHARTREVIEWVS_PSY_A_CORE FT
Vital Signs Last 24 Hrs  T(C): 36.3 (03-02-23 @ 08:00), Max: 37.3 (03-01-23 @ 16:25)  T(F): 97.4 (03-02-23 @ 08:00), Max: 99.2 (03-01-23 @ 16:25)  HR: 95 (03-02-23 @ 08:00) (81 - 95)  BP: 141/83 (03-02-23 @ 08:00) (117/75 - 141/83)  BP(mean): --  RR: 16 (03-02-23 @ 08:00) (16 - 18)  SpO2: 97% (03-02-23 @ 08:00) (97% - 99%)

## 2023-03-02 NOTE — BH INPATIENT PSYCHIATRY PROGRESS NOTE - NSBHASSESSSUMMFT_PSY_ALL_CORE
37-year-old single, domiciled in [facility] housing with roommate, unemployed, with PMH of HIV on Biktarvy, PPH of polysubstance use disorder (alcohol, cocaine, opiates, perhaps others), SIPD, SIMD, prior SA (hanging, jumping out of 3rd story window, jumping in front of cars), multiple prior IP psychiatric and detox/rehab stays (5/21 [facility], 7/21 [facility], 10/21 [facility], 12/21 [facility], 3/22 [facility], 5/22 [facility], 7/22 [facility], 9/22 [facility]), who presents to the ED reporting suicidal ideation. Telepsychiatry consulted to asses for suicidal ideation. Pt reported command auditory hallucinations to kill himself, as well as symptoms of anxiety, panic attacks, and worsening depression in the past month in the setting of treatment nonadherence and relapse on polysubstances (etoh, cocaine, cannabis). Additionally, he reports an unclear dissociative episode this week concerning for delirium given his risk factors of HIV with current influenza/ pneumonia. While his chronic risk factors--substance use with concurrent psychosis, limited engagement with treatment teams, male gender, limited frustration tolerance--are unlikely to be meaningfully mitigated by inpatient psychiatric treatment given history of high ultilization, he remains acutely high risk for self-injurious behavior and would benefit from further observation and psychiatric treatment.     On evaluation, pt presents with coburn affect, calm and cooperative, well-groomed. He is socializing appropriately with group milieu and not a behavioral concern. He reports improved mood, anxiety and sleep since admission. Reports resolution of nightmares and SI. Pt motivated to attend rehab to help maintain sobriety, pending referrals. No overt psychosis noted.     #SIMD, SIPD  -c/w lexapro 15 mg daily (2/27)  -c/w trazodone 100 mg qhs  -c/w prazosin 2 mg qhs to target nightmares    #Polysubstance use (etoh, cocaine, cannabis; h/o opiates, meth)  -CATCH consult  -monitor for s/sx of withdrawal  -encourage rehab    #PNA  -medicine consult --> c/w levaquin x5 days (to be completed on 2/24/23), "recommended to repeat cxr in 4-6 weeks discussed the importance of followup with pmd in details"    #new onset cough  - reports  new onset coughing (started 2/25) --> resolved 2/27  - no fever at this time    #HIV  -medicine consult --> c/w biktarvy daily, f/u OP ID    #Agitation  -for agitation not amenable to verbal redirection, may give haldol 5 mg q6h prn and ativan 2 mg q6h prn with escalation to IM if pt is a danger to self or/and others with repeat EKG to ensure QTc <500 ms

## 2023-03-02 NOTE — BH INPATIENT PSYCHIATRY PROGRESS NOTE - NSBHMETABOLIC_PSY_ALL_CORE_FT
BMI: BMI (kg/m2): 23.8 (02-21-23 @ 19:23)  HbA1c: A1C with Estimated Average Glucose Result: 5.7 % (02-23-23 @ 08:48)    Glucose:   BP: 141/83 (03-02-23 @ 08:00) (107/69 - 141/83)  Lipid Panel: Date/Time: 02-23-23 @ 08:48  Cholesterol, Serum: 209  Direct LDL: --  HDL Cholesterol, Serum: 70  Total Cholesterol/HDL Ration Measurement: --  Triglycerides, Serum: 182

## 2023-03-03 PROCEDURE — 99231 SBSQ HOSP IP/OBS SF/LOW 25: CPT

## 2023-03-03 RX ADMIN — HALOPERIDOL DECANOATE 5 MILLIGRAM(S): 100 INJECTION INTRAMUSCULAR at 20:18

## 2023-03-03 RX ADMIN — BICTEGRAVIR SODIUM, EMTRICITABINE, AND TENOFOVIR ALAFENAMIDE FUMARATE 1 TABLET(S): 30; 120; 15 TABLET ORAL at 09:07

## 2023-03-03 RX ADMIN — ESCITALOPRAM OXALATE 15 MILLIGRAM(S): 10 TABLET, FILM COATED ORAL at 08:45

## 2023-03-03 RX ADMIN — Medication 2 MILLIGRAM(S): at 20:18

## 2023-03-03 RX ADMIN — Medication 100 MILLIGRAM(S): at 20:19

## 2023-03-03 NOTE — BH INPATIENT PSYCHIATRY PROGRESS NOTE - NSBHMETABOLIC_PSY_ALL_CORE_FT
BMI: BMI (kg/m2): 23.8 (02-21-23 @ 19:23)  HbA1c: A1C with Estimated Average Glucose Result: 5.7 % (02-23-23 @ 08:48)    Glucose:   BP: 123/78 (03-02-23 @ 16:10) (113/68 - 141/83)  Lipid Panel: Date/Time: 02-23-23 @ 08:48  Cholesterol, Serum: 209  Direct LDL: --  HDL Cholesterol, Serum: 70  Total Cholesterol/HDL Ration Measurement: --  Triglycerides, Serum: 182

## 2023-03-03 NOTE — BH TREATMENT PLAN - NSTXSUBMISINTERRN_PSY_ALL_CORE
Monitor for signs and symptoms of withdrawal  Encouragement of cessation of substance abuse.  Will provide education regarding substance misuse and effects on mental health  Will encourgae substance use group attendance  RN will encourage daily group attendance to learn positive coping skills
Monitor for signs and symptoms of opiate W/D.  Encouragement of cessation of substance abuse.  Will provide a safe detox with medications

## 2023-03-03 NOTE — BH TREATMENT PLAN - NSTXCOPEINTERSW_PSY_ALL_CORE
Pt presents with improved coping abilities. SW will continue to encourage pt to attend unit groups to cope effectively during crisis.
Sw will provide support contacts education and referrals for healthy coping skills.

## 2023-03-03 NOTE — BH TREATMENT PLAN - NSTXPLANTHERAPYSESSIONSFT_PSY_ALL_CORE
02-22-23  Type of therapy: Tips for Emotional Resilience  Type of session: Group  Level of patient participation: Attentive,Engaged  Duration of participation: 30 minutes  Therapy conducted by: Nursing  Therapy Summary: Pt. appeared interested in the subject matter, was attentive and nodded his head at times but did not speak.    02-24-23  Type of therapy: Coping skills,Creative arts therapy,Leisure development,Self esteem,Stress management  Type of session: Group  Level of patient participation: Engaged,Participates  Duration of participation: 30 minutes  Therapy conducted by: Psych rehab  Therapy Summary: Pt is willing to attend Rt groups , pt has been calm , cooperative , enjoys art and is pleasant upon approach.  
  02-24-23  Type of therapy: Dialectical behavior therapy,Coping skills  Type of session: Group  Level of patient participation: Engaged  Duration of participation: 15 minutes  Therapy conducted by: Social work  Therapy Summary: Patient attended the Crisis Skills Group with  and peers. The DBT Wise Mind ACCEPTS skill was reviewed (a Distress Tolerance skill that uses distraction to distance yourself from a distressing situation or emotion). The following skills were reviewed: “activities, contributing, comparisons, emotions, pushing away, thoughts, sensations”.     Amandeep was in good spirits and appeared open to the therapeutic intervention. He left the group early and is encouraged to attend future sessions.    02-24-23  Type of therapy: Coping skills,Relapse prevention  Type of session: Group  Level of patient participation: Attentive,Engaged,Participates  Duration of participation: 45 minutes  Therapy conducted by: Social work  Therapy Summary: Topic: Identifying common warning signs of depression, anxiety and/ or adriel as well as relapse signatures.     Amandeep was focused and engaged. He agreed with the benefits of identifying common warning signs of mental illness. He was able to identify some of his own warning signs. He engaged well with peers.    02-27-23  Type of therapy: Self esteem  Type of session: Group  Level of patient participation: Attentive,Engaged,Participates  Duration of participation: 45 minutes  Therapy conducted by: Social work  Therapy Summary: Topic: "My Strengths and Qualities" group activity.     Patient participated and engaged in the group activity.    03-01-23  Type of therapy: Dialectical behavior therapy,Coping skills  Type of session: Group  Level of patient participation: Attentive,Engaged,Participated with encouragement  Duration of participation: 45 minutes  Therapy conducted by: Social work  Therapy Summary: Patient attended the Crisis Skills Group with  and peers. The STOP Skill was reviewed which is a distress tolerance DBT Skill that helps one respond to stressors by staying in control of emotions and not acting solely on impulse. The following steps were reviewed: “Stop, Take a step back, Observe, Proceed effectively”.     Amandeep was an active listener. He appeared open to the therapeutic intervention. He was able to identify benefits of using the STOP skill, as well as some of his barriers. He remained present for the groups duration.    03-03-23  Type of therapy: Coping skills,Creative arts therapy,Social skills training,Stress management  Type of session: Group  Level of patient participation: Participates  Duration of participation: 45 minutes  Therapy conducted by: Psych rehab  Therapy Summary: Pt is attending RT sessions , engaged in freestyle drawing . Pts mood seems to be improving as well as coping skills.

## 2023-03-03 NOTE — BH TREATMENT PLAN - NSDCCRITERIA_PSY_ALL_CORE
When pt is no longer an acute or imminent risk of harm to self or/and others, and is able to care for self safely, pt may then be discharged
When pt is no longer an acute or imminent risk of harm to self or/and others, and is able to care for self safely, pt may then be discharged

## 2023-03-03 NOTE — BH INPATIENT PSYCHIATRY PROGRESS NOTE - NSBHFUPINTERVALHXFT_PSY_A_CORE
Pt seen and evaluated, chart reviewed. As per nursing report, no acute events overnight, no PRNs. On evaluation, pt presents calm and cooperative with full affect, socially appropriately with group milieu. He reports good mood, sleep and appetite. Reports resolution of nightmares and SI. No overt psychosis noted. Denies SI/HI, intent and plan. Pt presents future-oriented and goal-directed, motivated to attend rehab, pending placement. Pt adherent to medications, denies negative side effects. Visible on unit and in good behavioral control.

## 2023-03-03 NOTE — BH INPATIENT PSYCHIATRY PROGRESS NOTE - NSBHASSESSSUMMFT_PSY_ALL_CORE
37-year-old single, domiciled in [facility] housing with roommate, unemployed, with PMH of HIV on Biktarvy, PPH of polysubstance use disorder (alcohol, cocaine, opiates, perhaps others), SIPD, SIMD, prior SA (hanging, jumping out of 3rd story window, jumping in front of cars), multiple prior IP psychiatric and detox/rehab stays (5/21 [facility], 7/21 [facility], 10/21 [facility], 12/21 [facility], 3/22 [facility], 5/22 [facility], 7/22 [facility], 9/22 [facility]), who presents to the ED reporting suicidal ideation. Telepsychiatry consulted to asses for suicidal ideation. Pt reported command auditory hallucinations to kill himself, as well as symptoms of anxiety, panic attacks, and worsening depression in the past month in the setting of treatment nonadherence and relapse on polysubstances (etoh, cocaine, cannabis). Additionally, he reports an unclear dissociative episode this week concerning for delirium given his risk factors of HIV with current influenza/ pneumonia. While his chronic risk factors--substance use with concurrent psychosis, limited engagement with treatment teams, male gender, limited frustration tolerance--are unlikely to be meaningfully mitigated by inpatient psychiatric treatment given history of high ultilization, he remains acutely high risk for self-injurious behavior and would benefit from further observation and psychiatric treatment.     On evaluation, pt presents with coburn affect, calm and cooperative, well-groomed. He is socializing appropriately with group milieu and not a behavioral concern. He reports improved mood, anxiety and sleep since admission. Reports resolution of nightmares and SI. No overt psychosis noted. Pt motivated to attend rehab to help maintain sobriety, pending placement. Discharge planning ensues.    #SIMD, SIPD  -c/w lexapro 15 mg daily (2/27)  -c/w trazodone 100 mg qhs  -c/w prazosin 2 mg qhs to target nightmares    #Polysubstance use (etoh, cocaine, cannabis; h/o opiates, meth)  -CATCH consult  -monitor for s/sx of withdrawal  -encourage rehab --> pt agreeable, pending placement    #PNA  -medicine consult --> c/w levaquin x5 days (to be completed on 2/24/23), "recommended to repeat cxr in 4-6 weeks discussed the importance of followup with pmd in details"    #new onset cough  - reports  new onset coughing (started 2/25) --> resolved 2/27  - no fever at this time    #HIV  -medicine consult --> c/w biktarvy daily, f/u OP ID    #Agitation  -for agitation not amenable to verbal redirection, may give haldol 5 mg q6h prn and ativan 2 mg q6h prn with escalation to IM if pt is a danger to self or/and others with repeat EKG to ensure QTc <500 ms

## 2023-03-03 NOTE — BH TREATMENT PLAN - NSTXCOPEINTERPR_PSY_ALL_CORE
Continue to offer support and encouragement to attend RT sessions.
Pt will be encouraged to attend RT sessions , offered support and opportunity to explore emotions .

## 2023-03-03 NOTE — BH TREATMENT PLAN - NSTXSUBMISINTERMD_PSY_ALL_CORE
Utilization of motivational interviewing to promote decrease of substance use, CATCH consult
Utilization of motivational interviewing to promote decrease of substance use, CATCH consult

## 2023-03-03 NOTE — BH TREATMENT PLAN - NSTXPSYCHOINTERRN_PSY_ALL_CORE
RN will assess for command auditory hallucination  RN will provide daily medication regimen  RN will offer PRN medication for worsening hallucinations  RN will ensure the enviroment is safe  RN will educate on coping skills/ encourage distractions to help manage auditory hallucination
RN will assess for command auditory hallucination  RN will provide daily medication regimen  RN will offer PRN medication for worsening hallucinations  RN will ensure the enviroment is safe  RN will educate on coping skills/ encourage distractions to help manage auditory hallucination

## 2023-03-03 NOTE — BH TREATMENT PLAN - NSTXSUICIDINTERRN_PSY_ALL_CORE
RN to assess patients behavior and be alert for increased signs of impulsivity/agitation.  RN will monitor patient and provide support and comfort and provedie safety on unit.    RN to encourage medication compliance and provide support and education as needed on Dx, coping skills, medication, and safety planning.  RN to Encourage daily ADL's  RN to Encourage group attendance  RN will assess and intervene for any suicidal ideations
RN to assess patients behavior and be alert for increased signs of worsening mood, impulsivity and agitation.   RN will monitor patient and provide support and comfort and provide safety on unit.   RN to encourage medication compliance.   Provide support and education as needed on Dx, coping skills, medication, and safety planning.   RN to Encourage daily ADL's.   RN to Encourage group attendance  RN will assess and intervene for any suicidal ideations.   RN/PES will cokmplet Safety planning with patient

## 2023-03-03 NOTE — BH TREATMENT PLAN - NSCMSPTSTRENGTHS_PSY_ALL_CORE
Able to adapt/Compliance to treatment/Future/goal oriented/Motivated/Resourceful
Able to adapt/Resourceful

## 2023-03-03 NOTE — BH TREATMENT PLAN - NSTXPSYCHOGOAL_PSY_ALL_CORE
Will verbalize decreased distress related to psychosis to 2 on a 10-point scale
Will report using a mindfulness skill to be able to read 5 pages of a book daily despite hallucinations

## 2023-03-03 NOTE — BH INPATIENT PSYCHIATRY PROGRESS NOTE - NSBHCHARTREVIEWVS_PSY_A_CORE FT
Vital Signs Last 24 Hrs  T(C): 36.7 (03-02-23 @ 16:10), Max: 36.7 (03-02-23 @ 16:10)  T(F): 98.1 (03-02-23 @ 16:10), Max: 98.1 (03-02-23 @ 16:10)  HR: 86 (03-02-23 @ 16:10) (86 - 86)  BP: 123/78 (03-02-23 @ 16:10) (123/78 - 123/78)  BP(mean): --  RR: 19 (03-02-23 @ 16:10) (19 - 19)  SpO2: 100% (03-02-23 @ 16:10) (100% - 100%)

## 2023-03-04 RX ADMIN — BICTEGRAVIR SODIUM, EMTRICITABINE, AND TENOFOVIR ALAFENAMIDE FUMARATE 1 TABLET(S): 30; 120; 15 TABLET ORAL at 08:51

## 2023-03-04 RX ADMIN — ESCITALOPRAM OXALATE 15 MILLIGRAM(S): 10 TABLET, FILM COATED ORAL at 08:51

## 2023-03-04 RX ADMIN — HALOPERIDOL DECANOATE 5 MILLIGRAM(S): 100 INJECTION INTRAMUSCULAR at 20:23

## 2023-03-04 RX ADMIN — Medication 50 MILLIGRAM(S): at 08:57

## 2023-03-04 RX ADMIN — HALOPERIDOL DECANOATE 5 MILLIGRAM(S): 100 INJECTION INTRAMUSCULAR at 08:57

## 2023-03-04 RX ADMIN — Medication 2 MILLIGRAM(S): at 20:19

## 2023-03-04 RX ADMIN — Medication 100 MILLIGRAM(S): at 20:20

## 2023-03-05 RX ADMIN — ESCITALOPRAM OXALATE 15 MILLIGRAM(S): 10 TABLET, FILM COATED ORAL at 08:10

## 2023-03-05 RX ADMIN — Medication 100 MILLIGRAM(S): at 20:09

## 2023-03-05 RX ADMIN — Medication 50 MILLIGRAM(S): at 19:16

## 2023-03-05 RX ADMIN — Medication 2 MILLIGRAM(S): at 20:09

## 2023-03-05 RX ADMIN — HALOPERIDOL DECANOATE 5 MILLIGRAM(S): 100 INJECTION INTRAMUSCULAR at 18:25

## 2023-03-05 RX ADMIN — BICTEGRAVIR SODIUM, EMTRICITABINE, AND TENOFOVIR ALAFENAMIDE FUMARATE 1 TABLET(S): 30; 120; 15 TABLET ORAL at 08:10

## 2023-03-06 PROCEDURE — 99231 SBSQ HOSP IP/OBS SF/LOW 25: CPT

## 2023-03-06 RX ORDER — HYDROXYZINE HCL 10 MG
50 TABLET ORAL EVERY 6 HOURS
Refills: 0 | Status: DISCONTINUED | OUTPATIENT
Start: 2023-03-06 | End: 2023-03-09

## 2023-03-06 RX ADMIN — Medication 100 MILLIGRAM(S): at 20:15

## 2023-03-06 RX ADMIN — Medication 2 MILLIGRAM(S): at 20:15

## 2023-03-06 RX ADMIN — BICTEGRAVIR SODIUM, EMTRICITABINE, AND TENOFOVIR ALAFENAMIDE FUMARATE 1 TABLET(S): 30; 120; 15 TABLET ORAL at 08:48

## 2023-03-06 RX ADMIN — Medication 50 MILLIGRAM(S): at 11:17

## 2023-03-06 RX ADMIN — Medication 50 MILLIGRAM(S): at 18:38

## 2023-03-06 RX ADMIN — ESCITALOPRAM OXALATE 15 MILLIGRAM(S): 10 TABLET, FILM COATED ORAL at 08:47

## 2023-03-06 NOTE — BH INPATIENT PSYCHIATRY PROGRESS NOTE - NSBHFUPINTERVALHXFT_PSY_A_CORE
Pt seen and evaluated, chart reviewed. As per nursing report, pt c/o anxiety, PRN haldol given. On evaluation, pt presents calm and cooperative with full affect, socially appropriately with group milieu. He reports he was anxious 2/2 rehab placement. Otherwise denies any new complaints. He reports good mood, sleep and appetite. Reports resolution of nightmares and SI. No overt psychosis noted. Denies SI/HI, intent and plan. Pt presents future-oriented and goal-directed, motivated to attend rehab, pending placement. Pt adherent to medications, denies negative side effects. Visible on unit and in good behavioral control. Discharge planning ensues. MOCA Pt seen and evaluated, chart reviewed. As per nursing report, pt c/o anxiety, PRN haldol given. On evaluation, pt presents calm and cooperative with full affect, socially appropriately with group milieu. He reports he was anxious 2/2 rehab placement. Otherwise denies any new complaints. He reports good mood, sleep and appetite. Reports resolution of nightmares and SI. No overt psychosis noted. Denies SI/HI, intent and plan. Pt presents future-oriented and goal-directed, motivated to attend rehab, pending placement. Pt adherent to medications, denies negative side effects. Visible on unit and in good behavioral control. Discharge planning ensues. MOCA 27.

## 2023-03-06 NOTE — BH INPATIENT PSYCHIATRY PROGRESS NOTE - NSBHASSESSSUMMFT_PSY_ALL_CORE
37-year-old single, domiciled in [facility] housing with roommate, unemployed, with PMH of HIV on Biktarvy, PPH of polysubstance use disorder (alcohol, cocaine, opiates, perhaps others), SIPD, SIMD, prior SA (hanging, jumping out of 3rd story window, jumping in front of cars), multiple prior IP psychiatric and detox/rehab stays (5/21 [facility], 7/21 [facility], 10/21 [facility], 12/21 [facility], 3/22 [facility], 5/22 [facility], 7/22 [facility], 9/22 [facility]), who presents to the ED reporting suicidal ideation. Telepsychiatry consulted to asses for suicidal ideation. Pt reported command auditory hallucinations to kill himself, as well as symptoms of anxiety, panic attacks, and worsening depression in the past month in the setting of treatment nonadherence and relapse on polysubstances (etoh, cocaine, cannabis). Additionally, he reports an unclear dissociative episode this week concerning for delirium given his risk factors of HIV with current influenza/ pneumonia. While his chronic risk factors--substance use with concurrent psychosis, limited engagement with treatment teams, male gender, limited frustration tolerance--are unlikely to be meaningfully mitigated by inpatient psychiatric treatment given history of high ultilization, he remains acutely high risk for self-injurious behavior and would benefit from further observation and psychiatric treatment.     On evaluation, pt presents with coburn affect, calm and cooperative, well-groomed. He is socializing appropriately with group milieu and not a behavioral concern. He reports improved mood, anxiety and sleep since admission. Reports resolution of nightmares and SI. No overt psychosis noted. Pt motivated to attend rehab to help maintain sobriety, pending placement. Discharge planning ensues.    #SIMD, SIPD  -c/w lexapro 15 mg daily (2/27)  -c/w trazodone 100 mg qhs  -c/w prazosin 2 mg qhs to target nightmares    #Polysubstance use (etoh, cocaine, cannabis; h/o opiates, meth)  -CATCH consult  -monitor for s/sx of withdrawal  -encourage rehab --> pt agreeable, pending placement    #PNA  -medicine consult --> c/w levaquin x5 days (to be completed on 2/24/23), "recommended to repeat cxr in 4-6 weeks discussed the importance of followup with pmd in details"    #new onset cough  - reports  new onset coughing (started 2/25) --> resolved 2/27  - no fever at this time    #HIV  -medicine consult --> c/w biktarvy daily, f/u OP ID    #Agitation  -for agitation not amenable to verbal redirection, may give haldol 5 mg q6h prn and ativan 2 mg q6h prn with escalation to IM if pt is a danger to self or/and others with repeat EKG to ensure QTc <500 ms 37-year-old single, domiciled in [facility] housing with roommate, unemployed, with PMH of HIV on Biktarvy, PPH of polysubstance use disorder (alcohol, cocaine, opiates, perhaps others), SIPD, SIMD, prior SA (hanging, jumping out of 3rd story window, jumping in front of cars), multiple prior IP psychiatric and detox/rehab stays (5/21 [facility], 7/21 [facility], 10/21 [facility], 12/21 [facility], 3/22 [facility], 5/22 [facility], 7/22 [facility], 9/22 [facility]), who presents to the ED reporting suicidal ideation. Telepsychiatry consulted to asses for suicidal ideation. Pt reported command auditory hallucinations to kill himself, as well as symptoms of anxiety, panic attacks, and worsening depression in the past month in the setting of treatment nonadherence and relapse on polysubstances (etoh, cocaine, cannabis). Additionally, he reports an unclear dissociative episode this week concerning for delirium given his risk factors of HIV with current influenza/ pneumonia. While his chronic risk factors--substance use with concurrent psychosis, limited engagement with treatment teams, male gender, limited frustration tolerance--are unlikely to be meaningfully mitigated by inpatient psychiatric treatment given history of high ultilization, he remains acutely high risk for self-injurious behavior and would benefit from further observation and psychiatric treatment.     On evaluation, pt presents with coburn affect, calm and cooperative, well-groomed. He is socializing appropriately with group milieu and not a behavioral concern. He reports improved mood, anxiety and sleep since admission. Reports resolution of nightmares and SI. No overt psychosis noted. Pt motivated to attend rehab to help maintain sobriety, pending placement. Discharge planning ensues. MOCA 27.    #SIMD, SIPD  -c/w lexapro 15 mg daily (2/27)  -c/w trazodone 100 mg qhs  -c/w prazosin 2 mg qhs to target nightmares    #Polysubstance use (etoh, cocaine, cannabis; h/o opiates, meth)  -CATCH consult  -monitor for s/sx of withdrawal  -encourage rehab --> pt agreeable, pending placement    #PNA  -medicine consult --> c/w levaquin x5 days (to be completed on 2/24/23), "recommended to repeat cxr in 4-6 weeks discussed the importance of followup with pmd in details"    #new onset cough  - reports  new onset coughing (started 2/25) --> resolved 2/27  - no fever at this time    #HIV  -medicine consult --> c/w biktarvy daily, f/u OP ID    #Agitation  -for agitation not amenable to verbal redirection, may give haldol 5 mg q6h prn and ativan 2 mg q6h prn with escalation to IM if pt is a danger to self or/and others with repeat EKG to ensure QTc <500 ms

## 2023-03-06 NOTE — BH INPATIENT PSYCHIATRY PROGRESS NOTE - NSBHCHARTREVIEWVS_PSY_A_CORE FT
Vital Signs Last 24 Hrs  T(C): 36.6 (03-05-23 @ 16:26), Max: 36.6 (03-05-23 @ 16:26)  T(F): 97.8 (03-05-23 @ 16:26), Max: 97.8 (03-05-23 @ 16:26)  HR: 79 (03-06-23 @ 09:25) (71 - 79)  BP: 121/60 (03-06-23 @ 09:25) (111/71 - 121/60)  BP(mean): --  RR: 18 (03-06-23 @ 09:25) (18 - 18)  SpO2: 100% (03-06-23 @ 09:25) (99% - 100%)    Orthostatic VS  03-05-23 @ 09:38  Lying BP: 118/62 HR: --  Sitting BP: --/-- HR: --  Standing BP: --/-- HR: --  Site: --  Mode: --

## 2023-03-06 NOTE — BH INPATIENT PSYCHIATRY PROGRESS NOTE - NSBHMETABOLIC_PSY_ALL_CORE_FT
BMI: BMI (kg/m2): 23.8 (02-21-23 @ 19:23)  HbA1c: A1C with Estimated Average Glucose Result: 5.7 % (02-23-23 @ 08:48)    Glucose:   BP: 121/60 (03-06-23 @ 09:25) (103/79 - 121/72)  Lipid Panel: Date/Time: 02-23-23 @ 08:48  Cholesterol, Serum: 209  Direct LDL: --  HDL Cholesterol, Serum: 70  Total Cholesterol/HDL Ration Measurement: --  Triglycerides, Serum: 182

## 2023-03-07 LAB — SARS-COV-2 RNA SPEC QL NAA+PROBE: SIGNIFICANT CHANGE UP

## 2023-03-07 PROCEDURE — 99231 SBSQ HOSP IP/OBS SF/LOW 25: CPT

## 2023-03-07 RX ADMIN — BICTEGRAVIR SODIUM, EMTRICITABINE, AND TENOFOVIR ALAFENAMIDE FUMARATE 1 TABLET(S): 30; 120; 15 TABLET ORAL at 08:20

## 2023-03-07 RX ADMIN — Medication 50 MILLIGRAM(S): at 20:47

## 2023-03-07 RX ADMIN — Medication 100 MILLIGRAM(S): at 20:41

## 2023-03-07 RX ADMIN — Medication 2 MILLIGRAM(S): at 20:41

## 2023-03-07 RX ADMIN — ESCITALOPRAM OXALATE 15 MILLIGRAM(S): 10 TABLET, FILM COATED ORAL at 08:20

## 2023-03-07 RX ADMIN — Medication 50 MILLIGRAM(S): at 08:23

## 2023-03-07 NOTE — BH INPATIENT PSYCHIATRY PROGRESS NOTE - NSBHCHARTREVIEWVS_PSY_A_CORE FT
Vital Signs Last 24 Hrs  T(C): 36.6 (03-07-23 @ 09:10), Max: 36.9 (03-06-23 @ 16:03)  T(F): 97.8 (03-07-23 @ 09:10), Max: 98.5 (03-06-23 @ 16:03)  HR: 82 (03-07-23 @ 09:10) (82 - 84)  BP: 130/72 (03-07-23 @ 09:10) (121/75 - 130/72)  BP(mean): --  RR: 18 (03-06-23 @ 16:03) (18 - 18)  SpO2: --

## 2023-03-07 NOTE — BH INPATIENT PSYCHIATRY PROGRESS NOTE - NSBHASSESSSUMMFT_PSY_ALL_CORE
37-year-old single, domiciled in [facility] housing with roommate, unemployed, with PMH of HIV on Biktarvy, PPH of polysubstance use disorder (alcohol, cocaine, opiates, perhaps others), SIPD, SIMD, prior SA (hanging, jumping out of 3rd story window, jumping in front of cars), multiple prior IP psychiatric and detox/rehab stays (5/21 [facility], 7/21 [facility], 10/21 [facility], 12/21 [facility], 3/22 [facility], 5/22 [facility], 7/22 [facility], 9/22 [facility]), who presents to the ED reporting suicidal ideation. Telepsychiatry consulted to asses for suicidal ideation. Pt reported command auditory hallucinations to kill himself, as well as symptoms of anxiety, panic attacks, and worsening depression in the past month in the setting of treatment nonadherence and relapse on polysubstances (etoh, cocaine, cannabis). Additionally, he reports an unclear dissociative episode this week concerning for delirium given his risk factors of HIV with current influenza/ pneumonia. While his chronic risk factors--substance use with concurrent psychosis, limited engagement with treatment teams, male gender, limited frustration tolerance--are unlikely to be meaningfully mitigated by inpatient psychiatric treatment given history of high ultilization, he remains acutely high risk for self-injurious behavior and would benefit from further observation and psychiatric treatment.     On evaluation, pt presents with coburn affect, calm and cooperative, well-groomed. He is socializing appropriately with group milieu and not a behavioral concern. He reports improved mood, anxiety and sleep since admission. Reports resolution of nightmares and SI. No overt psychosis noted. Pt motivated to attend rehab to help maintain sobriety, pending placement. Discharge planning ensues. MOCA 27.    #SIMD, SIPD  -c/w lexapro 15 mg daily (2/27)  -c/w trazodone 100 mg qhs  -c/w prazosin 2 mg qhs to target nightmares    #Polysubstance use (etoh, cocaine, cannabis; h/o opiates, meth)  -CATCH consult  -monitor for s/sx of withdrawal  -encourage rehab --> pt agreeable, pending placement    #PNA  -completed levaquin on 2/24/23, f/u repeat cxr in 4-6 weeks    #HIV  -medicine consult --> c/w biktarvy daily, f/u OP ID    #Agitation  -for agitation not amenable to verbal redirection, may give haldol 5 mg q6h prn and ativan 2 mg q6h prn with escalation to IM if pt is a danger to self or/and others with repeat EKG to ensure QTc <500 ms 37-year-old single, domiciled in [facility] housing with roommate, unemployed, with PMH of HIV on Biktarvy, PPH of polysubstance use disorder (alcohol, cocaine, opiates, perhaps others), SIPD, SIMD, prior SA (hanging, jumping out of 3rd story window, jumping in front of cars), multiple prior IP psychiatric and detox/rehab stays (5/21 [facility], 7/21 [facility], 10/21 [facility], 12/21 [facility], 3/22 [facility], 5/22 [facility], 7/22 [facility], 9/22 [facility]), who presents to the ED reporting suicidal ideation. Telepsychiatry consulted to asses for suicidal ideation. Pt reported command auditory hallucinations to kill himself, as well as symptoms of anxiety, panic attacks, and worsening depression in the past month in the setting of treatment nonadherence and relapse on polysubstances (etoh, cocaine, cannabis). Additionally, he reports an unclear dissociative episode this week concerning for delirium given his risk factors of HIV with current influenza/ pneumonia. While his chronic risk factors--substance use with concurrent psychosis, limited engagement with treatment teams, male gender, limited frustration tolerance--are unlikely to be meaningfully mitigated by inpatient psychiatric treatment given history of high ultilization, he remains acutely high risk for self-injurious behavior and would benefit from further observation and psychiatric treatment.     On evaluation, pt presents with coburn affect, calm and cooperative, well-groomed. He is socializing appropriately with group milieu and not a behavioral concern. He reports improved mood, anxiety and sleep since admission. Reports resolution of nightmares and SI. No overt psychosis noted. Pt motivated to attend rehab to help maintain sobriety, pending placement. Discharge planning ensues. MOCA 26.    #SIMD, SIPD  -c/w lexapro 15 mg daily (2/27)  -c/w trazodone 100 mg qhs  -c/w prazosin 2 mg qhs to target nightmares    #Polysubstance use (etoh, cocaine, cannabis; h/o opiates, meth)  -CATCH consult  -monitor for s/sx of withdrawal  -encourage rehab --> pt agreeable, pending placement    #PNA  -completed levaquin on 2/24/23, f/u repeat cxr in 4-6 weeks    #HIV  -medicine consult --> c/w biktarvy daily, f/u OP ID    #Agitation  -for agitation not amenable to verbal redirection, may give haldol 5 mg q6h prn and ativan 2 mg q6h prn with escalation to IM if pt is a danger to self or/and others with repeat EKG to ensure QTc <500 ms

## 2023-03-07 NOTE — BH INPATIENT PSYCHIATRY PROGRESS NOTE - NSBHMETABOLIC_PSY_ALL_CORE_FT
BMI: BMI (kg/m2): 23.8 (02-21-23 @ 19:23)  HbA1c: A1C with Estimated Average Glucose Result: 5.7 % (02-23-23 @ 08:48)    Glucose:   BP: 130/72 (03-07-23 @ 09:10) (103/79 - 130/72)  Lipid Panel: Date/Time: 02-23-23 @ 08:48  Cholesterol, Serum: 209  Direct LDL: --  HDL Cholesterol, Serum: 70  Total Cholesterol/HDL Ration Measurement: --  Triglycerides, Serum: 182

## 2023-03-07 NOTE — BH INPATIENT PSYCHIATRY PROGRESS NOTE - NSBHFUPINTERVALHXFT_PSY_A_CORE
Pt seen and evaluated, chart reviewed. As per nursing report, pt c/o anxiety, PRN hydroxyzine given. On evaluation, pt presents calm and pleasant, well-groomed. He reports he is anxious 2/2 rehab placement, inquires on referrals. Otherwise denies any new complaints. He reports good mood, sleep and appetite. Reports resolution of nightmares and SI. No overt psychosis noted. Denies SI/HI, intent and plan. Pt presents future-oriented and goal-directed. Pt adherent to medications, denies negative side effects. Visible on unit and in good behavioral control. Discharge planning ensues.  Pt seen and evaluated, chart reviewed. As per nursing report, pt c/o anxiety, PRN hydroxyzine given. On evaluation, pt presents calm and pleasant, well-groomed. He reports he is anxious 2/2 rehab placement, inquires on referrals. Otherwise denies any new complaints. He reports good mood, sleep and appetite. Reports resolution of nightmares and SI. No overt psychosis noted. Denies SI/HI, intent and plan. Pt presents future-oriented and goal-directed. Pt adherent to medications, denies negative side effects. Visible on unit and in good behavioral control. Discharge planning ensues. Pending Covid19 today.

## 2023-03-08 PROCEDURE — 99231 SBSQ HOSP IP/OBS SF/LOW 25: CPT

## 2023-03-08 RX ADMIN — Medication 50 MILLIGRAM(S): at 20:03

## 2023-03-08 RX ADMIN — BICTEGRAVIR SODIUM, EMTRICITABINE, AND TENOFOVIR ALAFENAMIDE FUMARATE 1 TABLET(S): 30; 120; 15 TABLET ORAL at 08:31

## 2023-03-08 RX ADMIN — ESCITALOPRAM OXALATE 15 MILLIGRAM(S): 10 TABLET, FILM COATED ORAL at 08:31

## 2023-03-08 RX ADMIN — Medication 2 MILLIGRAM(S): at 20:01

## 2023-03-08 RX ADMIN — Medication 100 MILLIGRAM(S): at 20:00

## 2023-03-08 NOTE — BH INPATIENT PSYCHIATRY DISCHARGE NOTE - NSDCMRMEDTOKEN_GEN_ALL_CORE_FT
acetaminophen 650 mg oral tablet, extended release: 1 tab(s) orally 4 times a day   bictegravir/emtricitabine/tenofovir 50 mg-200 mg-25 mg oral tablet: 1 tab(s) orally once a day  escitalopram 5 mg oral tablet: 3 tab(s) orally once a day  traZODone 150 mg oral tablet: 1 tab(s) orally once a day (at bedtime)   bictegravir/emtricitabine/tenofovir 50 mg-200 mg-25 mg oral tablet: 1 tab(s) orally once a day x 14 days  Continue to take as prescribed until told otherwise by your provider  escitalopram 5 mg oral tablet: 3 tab(s) orally once a day x 14 days  Continue to take as prescribed until told otherwise by your provider  prazosin 2 mg oral capsule: 1 cap(s) orally once a day (at bedtime) x 14 days  Continue to take as prescribed until told otherwise by your provider  traZODone 100 mg oral tablet: 1 tab(s) orally once a day (at bedtime) x 14 days  Continue to take as prescribed until told otherwise by your provider

## 2023-03-08 NOTE — BH INPATIENT PSYCHIATRY PROGRESS NOTE - NSBHMETABOLIC_PSY_ALL_CORE_FT
BMI: BMI (kg/m2): 23.8 (02-21-23 @ 19:23)  HbA1c: A1C with Estimated Average Glucose Result: 5.7 % (02-23-23 @ 08:48)    Glucose:   BP: 130/79 (03-08-23 @ 09:53) (111/71 - 130/79)  Lipid Panel: Date/Time: 02-23-23 @ 08:48  Cholesterol, Serum: 209  Direct LDL: --  HDL Cholesterol, Serum: 70  Total Cholesterol/HDL Ration Measurement: --  Triglycerides, Serum: 182

## 2023-03-08 NOTE — BH INPATIENT PSYCHIATRY DISCHARGE NOTE - HOSPITAL COURSE
Pt Pt has made significant progress over the course of hospitalization. With continuous psychotherapy from the treatment team and the medications, he reports feeling better, sleeping and eating well. Thought process and insight improved. Pt able to verbalize negative effects of substance use on mental health and motivation to maintain sobriety. He was motivated to go to inpatient rehab, ***** Pt was calm and cooperative and was in good behavioral control. Denied any suicidal or homicidal ideations. Denied any auditory or visual hallucinations. Pt was evaluated by treatment team, pt is stable for discharge and shows no imminent danger to self, others or property at this time. S/he understands and agrees with treatment plan and following up with outpatient. Psychoeducation provided regarding diagnosis, medications, treatment and follow up. Risks, benefits, alternatives discussed, all questions and concerns addressed and answered. Reviewed crisis intervention with verbalized understanding. Pt has made significant progress over the course of hospitalization. With continuous psychotherapy from the treatment team and the medications, he reports feeling better, sleeping and eating well. Thought process and insight improved. Pt able to verbalize negative effects of substance use on mental health and motivation to maintain sobriety. He was initially motivated to go to inpatient rehab, however was unable to be placed at this time. Pt agreed to OP CARL and to consider AA/NA in the community. Pt was calm and cooperative and was in good behavioral control. Denied any suicidal or homicidal ideations. Denied any auditory or visual hallucinations. Pt was evaluated by treatment team, pt is stable for discharge and shows no imminent danger to self, others or property at this time. S/he understands and agrees with treatment plan and following up with outpatient. Psychoeducation provided regarding diagnosis, medications, treatment and follow up. Risks, benefits, alternatives discussed, all questions and concerns addressed and answered. Reviewed crisis intervention with verbalized understanding.

## 2023-03-08 NOTE — BH INPATIENT PSYCHIATRY DISCHARGE NOTE - NSDCRECOMMEND_PSY_ALL_CORE
Recommended laboratory tests/other investigations following discharge.../Recommended medical follow-up following discharge... Unrestricted diet/activity/Recommended laboratory tests/other investigations following discharge.../Recommended medical follow-up following discharge...
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
## 2023-03-08 NOTE — BH INPATIENT PSYCHIATRY DISCHARGE NOTE - NSICDXPASTMEDICALHX_GEN_ALL_CORE_FT
PAST MEDICAL HISTORY:  Alcohol abuse     Cocaine abuse     COVID-19     Heroin abuse     HIV (human immunodeficiency virus infection)

## 2023-03-08 NOTE — BH INPATIENT PSYCHIATRY PROGRESS NOTE - NSBHCHARTREVIEWVS_PSY_A_CORE FT
Vital Signs Last 24 Hrs  T(C): 36.7 (03-08-23 @ 09:53), Max: 37 (03-07-23 @ 15:39)  T(F): 98.1 (03-08-23 @ 09:53), Max: 98.6 (03-07-23 @ 15:39)  HR: 83 (03-08-23 @ 09:53) (77 - 86)  BP: 130/79 (03-08-23 @ 09:53) (119/78 - 130/79)  BP(mean): --  RR: 18 (03-08-23 @ 09:53) (16 - 18)  SpO2: 99% (03-08-23 @ 05:31) (97% - 99%)

## 2023-03-08 NOTE — BH INPATIENT PSYCHIATRY PROGRESS NOTE - NSBHASSESSSUMMFT_PSY_ALL_CORE
37-year-old single, domiciled in [place] housing with roommate, unemployed, with PMH of HIV on Biktarvy, PPH of polysubstance use disorder (alcohol, cocaine, opiates, perhaps others), SIPD, SIMD, prior SA (hanging, jumping out of 3rd story window, jumping in front of cars), multiple prior IP psychiatric and detox/rehab stays (5/21 [facility], 7/21 [facility], 10/21 [facility], 12/21 [facility], 3/22 [facility], 5/22 [facility], 7/22 [facility], 9/22 [facility]), who presents to the ED reporting suicidal ideation. Telepsychiatry consulted to asses for suicidal ideation. Pt reported command auditory hallucinations to kill himself, as well as symptoms of anxiety, panic attacks, and worsening depression in the past month in the setting of treatment nonadherence and relapse on polysubstances (etoh, cocaine, cannabis). Additionally, he reports an unclear dissociative episode this week concerning for delirium given his risk factors of HIV with current influenza/ pneumonia. While his chronic risk factors--substance use with concurrent psychosis, limited engagement with treatment teams, male gender, limited frustration tolerance--are unlikely to be meaningfully mitigated by inpatient psychiatric treatment given history of high ultilization, he remains acutely high risk for self-injurious behavior and would benefit from further observation and psychiatric treatment.     On evaluation, pt presents with coburn affect, calm and cooperative, well-groomed. He is socializing appropriately with group milieu and not a behavioral concern. He reports improved mood, anxiety and sleep since admission. Reports resolution of nightmares and SI. No overt psychosis noted. Pt motivated to attend rehab to help maintain sobriety, pending placement. Discharge planning ensues. MOCA 26.    #SIMD, SIPD  -c/w lexapro 15 mg daily (2/27)  -c/w trazodone 100 mg qhs  -c/w prazosin 2 mg qhs to target nightmares    #Polysubstance use (etoh, cocaine, cannabis; h/o opiates, meth)  -CATCH consult  -monitor for s/sx of withdrawal  -encourage rehab --> pt agreeable, pending placement    #PNA  -completed levaquin on 2/24/23, f/u repeat cxr in 4-6 weeks    #HIV  -medicine consult --> c/w biktarvy daily, f/u OP ID    #Agitation  -for agitation not amenable to verbal redirection, may give haldol 5 mg q6h prn and ativan 2 mg q6h prn with escalation to IM if pt is a danger to self or/and others with repeat EKG to ensure QTc <500 ms

## 2023-03-08 NOTE — BH INPATIENT PSYCHIATRY PROGRESS NOTE - NSBHFUPINTERVALHXFT_PSY_A_CORE
Pt seen and evaluated, chart reviewed. As per nursing report, pt c/o anxiety, PRN hydroxyzine given. On evaluation, pt presents calm and pleasant, well-groomed, socializing appropriately with group milieu. He reports he is anxious 2/2 rehab placement and next steps, inquires on referrals. He endorses motivation to maintain sobriety. Presents future-oriented, states he hopes to improve his relationships with his ex-wife and children. He endorses good mood, sleep and appetite. No overt psychosis noted. Denies SI/HI, intent and plan. He is adherent to medications, denies negative side effects. Visible on unit and in behavioral control. Discharge planning ensues.

## 2023-03-08 NOTE — BH INPATIENT PSYCHIATRY DISCHARGE NOTE - HPI (INCLUDE ILLNESS QUALITY, SEVERITY, DURATION, TIMING, CONTEXT, MODIFYING FACTORS, ASSOCIATED SIGNS AND SYMPTOMS)
Mr. Hernandez is a 37-year-old single, domiciled in [facility] housing with roommate, unemployed, with PMH of HIV on Biktarvy, PPH of polysubstance use disorder (alcohol, cocaine, opiates, perhaps others), SIPD, SIMD, prior SA (hanging, jumping out of 3rd story window, jumping in fron of cars), multiple prior  psychiatric and detox/rehab stays (5/21 [facility], 7/21 [facility], 10/21 [facility], 12/21 [facility], 3/22 [facility], 5/22 [facility], 7/22 [facility], 9/22 [facility]), who presents to the ED reporting suicidal ideation. Telepsychiatry consulted to asses for suicidal ideation.    On approach Mr. Hernandez was calm, cooperative, forthcoming of information. He reports that he attempted suicide yesterday at the behest of command auditory hallucinations and remains suicidal. He reports his girlfriend stopped him after he had jumped the window. He does not believe anything would have stopped him besides his girl friend, stating he "doesn't have much to live for." He states he began hearing voices yesterday afternoon telling him to kill himself; he did not recognize the voices and reports that he felt as though there were at least two; he last heard them this morning. He denies acute stressors; he reports he did use cocaine yesterday but "no more than usual," stating he uses about 50 dollars a week. He last heard the voices this morning telling him to jump out of a moving car. He last head voices prior to his hospitalization to [facility] in September.     He reports feeling "okay" after discharge in October for a "few months," noting 1 month ago be began to experience "panic attacks" described as intense fear, tearfulness, paranoia, racing heart, sweating lasting 30-60 minutes. He denies being able to recognize triggers or warning signs prior to the attacks, and that he has experienced them 3-5 times a week. Reports mood as "angry and sad" for past 3 weeks, increased irritability, increased appetite, poor concentration, with feelings of worsening worthlessness. Reports sleep as chronically poor, but that he has only slept 2-4 hours per night. He denies seeing anything unusual or feeling as though others are after him, or feeling as though he has special sloan or receives hidden messages. Denies homicidal ideation.     Reports that his girlfriend noted earlier this week after he began feeling physically sick (with confirmed influenza) he left the house without shoes in the middle of the night. He does not recall when this happened, and reports some concern he has forgotten other events this week when his girlfriend was at work.     He denies following up with outpatient psychiatry or taking medications after last discharge. When asked why he replies "I don't feel like it." when asked how hospitalization would benefit him he states "I always feel better after but the meds stop working or I stop taking them." He reports he would not be safe to go home and he would harm himself because he does not like his roommate and his girlfriend is at work.    Mr. Hernandez reports he does not know girlfriend Dimaond's number, does not have his cellphone. Unable to obtain number from chart. Mr. Hernandez is a 37-year-old single, domiciled in [facility] housing with roommate, unemployed, with PMH of HIV on Biktarvy, PPH of polysubstance use disorder (alcohol, cocaine, opiates, perhaps others), SIPD, SIMD, prior SA (hanging, jumping out of 3rd story window, jumping in front of cars), multiple prior IP psychiatric and detox/rehab stays (5/21 [facility], 7/21 [facility], 10/21 [facility], 12/21 [facility], 3/22 [facility], 5/22 [facility], 7/22 [facility], 9/22 [facility]), who presents to the ED reporting suicidal ideation. Telepsychiatry consulted to asses for suicidal ideation.    On approach Mr. Hernandez was calm, cooperative, forthcoming of information. He reports that he attempted suicide yesterday at the behest of command auditory hallucinations and remains suicidal. He reports his girlfriend stopped him after he had jumped the window. He does not believe anything would have stopped him besides his girl friend, stating he "doesn't have much to live for." He states he began hearing voices yesterday afternoon telling him to kill himself; he did not recognize the voices and reports that he felt as though there were at least two; he last heard them this morning. He denies acute stressors; he reports he did use cocaine yesterday but "no more than usual," stating he uses about 50 dollars a week. He last heard the voices this morning telling him to jump out of a moving car. He last head voices prior to his hospitalization to [facility] in September.     He reports feeling "okay" after discharge in October for a "few months," noting 1 month ago be began to experience "panic attacks" described as intense fear, tearfulness, paranoia, racing heart, sweating lasting 30-60 minutes. He denies being able to recognize triggers or warning signs prior to the attacks, and that he has experienced them 3-5 times a week. Reports mood as "angry and sad" for past 3 weeks, increased irritability, increased appetite, poor concentration, with feelings of worsening worthlessness. Reports sleep as chronically poor, but that he has only slept 2-4 hours per night. He denies seeing anything unusual or feeling as though others are after him, or feeling as though he has special sloan or receives hidden messages. Denies homicidal ideation.     Reports that his girlfriend noted earlier this week after he began feeling physically sick (with confirmed influenza) he left the house without shoes in the middle of the night. He does not recall when this happened, and reports some concern he has forgotten other events this week when his girlfriend was at work.     He denies following up with outpatient psychiatry or taking medications after last discharge. When asked why he replies "I don't feel like it." when asked how hospitalization would benefit him he states "I always feel better after but the meds stop working or I stop taking them." He reports he would not be safe to go home and he would harm himself because he does not like his roommate and his girlfriend is at work.    Mr. Hernandez reports he does not know girlfriend Diamond's number, does not have his cellphone. Unable to obtain number from chart.

## 2023-03-08 NOTE — BH INPATIENT PSYCHIATRY DISCHARGE NOTE - DESCRIPTION
Grew up in [place], has lived in [place] since 2012  As per chart review, States father had history of heavy alcohol use, depression, suicide attempts. Committed suicide by hanging in 2018.  Lives in [place] with friends, states he feels safe at home and that it is a good living situation. Currently unemployed and has been for several years, states he is not ready to go back to work. Currently receiving public assistance. Highest level of education is high school. Denies access to weapons. Denies [employer] affiliation. Has 3 children, 3 year old daughter living in [place] with mother and step father, and 2 older children who both live in [place]. States he would like to leave [place] but has not made plans to do so.  prior incarcerations

## 2023-03-08 NOTE — BH INPATIENT PSYCHIATRY DISCHARGE NOTE - NSBHDCMEDICALFT_PSY_A_CORE
#PNA  -completed levaquin on 2/24/23, f/u repeat cxr in 4-6 weeks    #HIV  -medicine consult --> c/w biktarvy daily, f/u OP ID

## 2023-03-08 NOTE — BH INPATIENT PSYCHIATRY DISCHARGE NOTE - NSDCCPCAREPLAN_GEN_ALL_CORE_FT
PRINCIPAL DISCHARGE DIAGNOSIS  Diagnosis: Substance induced mood disorder  Assessment and Plan of Treatment: Continue current medication regimen and follow up with aftercare appointments      SECONDARY DISCHARGE DIAGNOSES  Diagnosis: Substance-induced psychotic disorder  Assessment and Plan of Treatment: Continue current medication regimen and follow up with aftercare appointments    Diagnosis: Cocaine use disorder  Assessment and Plan of Treatment: Continue current medication regimen and follow up with aftercare appointments    Diagnosis: Alcohol use disorder  Assessment and Plan of Treatment: Continue current medication regimen and follow up with aftercare appointments    Diagnosis: Cannabis use disorder  Assessment and Plan of Treatment: Continue current medication regimen and follow up with aftercare appointments

## 2023-03-08 NOTE — BH INPATIENT PSYCHIATRY DISCHARGE NOTE - OTHER PAST PSYCHIATRIC HISTORY (INCLUDE DETAILS REGARDING ONSET, COURSE OF ILLNESS, INPATIENT/OUTPATIENT TREATMENT)
Prior [facility] admissions  As per chart review, polysubstance use disorder (alcohol, cocaine, opiates, perhaps others), SIPD, SIMD, prior SA (hanging, jumping out of 3rd story window), multiple prior IP psychiatric and detox/rehab stays, hx of malingering

## 2023-03-09 VITALS
HEART RATE: 89 BPM | TEMPERATURE: 99 F | DIASTOLIC BLOOD PRESSURE: 65 MMHG | SYSTOLIC BLOOD PRESSURE: 126 MMHG | RESPIRATION RATE: 18 BRPM

## 2023-03-09 LAB
4/8 RATIO: 0.76 RATIO — LOW (ref 1.2–3.4)
ABS CD8: 867 /UL — HIGH (ref 206–494)
CD16+CD56+ CELLS NFR BLD: 11 % — SIGNIFICANT CHANGE UP (ref 4–20)
CD16+CD56+ CELLS NFR SPEC: 222 /UL — SIGNIFICANT CHANGE UP (ref 89–385)
CD19 BLASTS SPEC-ACNC: 150 /UL — SIGNIFICANT CHANGE UP (ref 72–502)
CD19 BLASTS SPEC-ACNC: 7 % — LOW (ref 10–28)
CD3 BLASTS SPEC-ACNC: 1575 /UL — SIGNIFICANT CHANGE UP (ref 800–2012)
CD3 BLASTS SPEC-ACNC: 79 % — SIGNIFICANT CHANGE UP (ref 59–81)
CD4 %: 33 % — LOW (ref 42–58)
CD8 %: 44 % — HIGH (ref 14–30)
T-CELL CD4 SUBSET PNL BLD: 656 /UL — SIGNIFICANT CHANGE UP (ref 495–1312)

## 2023-03-09 PROCEDURE — 99238 HOSP IP/OBS DSCHRG MGMT 30/<: CPT

## 2023-03-09 RX ORDER — ESCITALOPRAM OXALATE 10 MG/1
3 TABLET, FILM COATED ORAL
Qty: 42 | Refills: 0
Start: 2023-03-09 | End: 2023-03-22

## 2023-03-09 RX ORDER — TRAZODONE HCL 50 MG
1 TABLET ORAL
Qty: 14 | Refills: 0
Start: 2023-03-09 | End: 2023-03-22

## 2023-03-09 RX ORDER — PRAZOSIN HCL 2 MG
1 CAPSULE ORAL
Qty: 14 | Refills: 0
Start: 2023-03-09 | End: 2023-03-22

## 2023-03-09 RX ORDER — BICTEGRAVIR SODIUM, EMTRICITABINE, AND TENOFOVIR ALAFENAMIDE FUMARATE 30; 120; 15 MG/1; MG/1; MG/1
1 TABLET ORAL
Qty: 14 | Refills: 0
Start: 2023-03-09 | End: 2023-03-22

## 2023-03-09 RX ADMIN — BICTEGRAVIR SODIUM, EMTRICITABINE, AND TENOFOVIR ALAFENAMIDE FUMARATE 1 TABLET(S): 30; 120; 15 TABLET ORAL at 09:15

## 2023-03-09 RX ADMIN — ESCITALOPRAM OXALATE 15 MILLIGRAM(S): 10 TABLET, FILM COATED ORAL at 09:15

## 2023-03-09 NOTE — BH INPATIENT PSYCHIATRY PROGRESS NOTE - NSCGISEVERILLNESS_PSY_ALL_CORE
2 = Borderline mentally ill – subtle or suspected pathology
1 = Normal – not at all ill, symptoms of disorder not present past seven days
2 = Borderline mentally ill – subtle or suspected pathology
1 = Normal – not at all ill, symptoms of disorder not present past seven days
2 = Borderline mentally ill – subtle or suspected pathology

## 2023-03-09 NOTE — BH INPATIENT PSYCHIATRY PROGRESS NOTE - NSTXSUBMISDATEEST_PSY_ALL_CORE
21-Feb-2023
23-Feb-2023
21-Feb-2023
23-Feb-2023
21-Feb-2023
21-Feb-2023
23-Feb-2023

## 2023-03-09 NOTE — BH INPATIENT PSYCHIATRY PROGRESS NOTE - NSTXPSYCHOPROGRES_PSY_ALL_CORE
Met - goal discontinued
Improving
Met - goal discontinued
Improving
Met - goal discontinued
Improving

## 2023-03-09 NOTE — BH INPATIENT PSYCHIATRY PROGRESS NOTE - NSTXPSYCHOINTERMD_PSY_ALL_CORE
Medication management, groups/DBT

## 2023-03-09 NOTE — BH INPATIENT PSYCHIATRY PROGRESS NOTE - NSBHMSEAFFQUAL_PSY_A_CORE
Euthymic
Irritable
Euthymic
Euthymic
Anxious
Anxious
Euthymic
Anxious
Euthymic
Euthymic
Anxious
Euthymic
Euthymic

## 2023-03-09 NOTE — BH INPATIENT PSYCHIATRY PROGRESS NOTE - NSTXSUICIDPROGRES_PSY_ALL_CORE
Met - goal discontinued
Improving
Met - goal discontinued
Met - goal discontinued
Improving
Met - goal discontinued
Improving

## 2023-03-09 NOTE — BH INPATIENT PSYCHIATRY PROGRESS NOTE - NSBHMETABOLICLABS_PSY_ALL_CORE
Labs within last 12 months
All labs not within last 12 months, ordered
Labs within last 12 months
Labs within last 12 months
All labs not within last 12 months, ordered
Labs within last 12 months
All labs not within last 12 months, ordered

## 2023-03-09 NOTE — BH DISCHARGE NOTE NURSING/SOCIAL WORK/PSYCH REHAB - NSTOBACCOSMKCESSPRO_PSY_ALL_CORE
[facility] Tobacco Control  [address] [place] [address]  [phone]/.  [facility] Smoking Cessation Support Group   [facility] [address] [place] [address] [phone] or [phone] [facility] Tobacco [facility]  [address] [place] [address]  [phone]/.  [facility] Smoking Cessation Support Group   [facility] [address] [address]  [phone] or [phone]/.  [facility] Smoker's Quitline   [phone] ([phone])

## 2023-03-09 NOTE — BH INPATIENT PSYCHIATRY PROGRESS NOTE - NSTXSUBMISPROGRES_PSY_ALL_CORE
Improving
Met - goal discontinued
Improving
Met - goal discontinued
Improving

## 2023-03-09 NOTE — BH DISCHARGE NOTE NURSING/SOCIAL WORK/PSYCH REHAB - NSDCPEWEB_GEN_ALL_CORE
[facility] for Tobacco Control website --- [email or web address] website --- [email or web address]

## 2023-03-09 NOTE — BH INPATIENT PSYCHIATRY PROGRESS NOTE - NSBHMSEPERCEPT_PSY_A_CORE
No abnormalities
Other
No abnormalities
Other

## 2023-03-09 NOTE — BH INPATIENT PSYCHIATRY PROGRESS NOTE - CURRENT MEDICATION
MEDICATIONS  (STANDING):  bictegravir 50 mG/emtricitabine 200 mG/tenofovir alafenamide 25 mG (BIKTARVY) 1 Tablet(s) Oral daily  escitalopram 10 milliGRAM(s) Oral daily  escitalopram 5 milliGRAM(s) Oral once  prazosin. 2 milliGRAM(s) Oral at bedtime  traZODone 100 milliGRAM(s) Oral at bedtime    MEDICATIONS  (PRN):  acetaminophen     Tablet .. 650 milliGRAM(s) Oral every 6 hours PRN Mild Pain (1 - 3), Moderate Pain (4 - 6), Severe Pain (7 - 10)  diphenhydrAMINE 50 milliGRAM(s) Oral every 6 hours PRN Extrapyramidal prophylaxis  haloperidol     Tablet 5 milliGRAM(s) Oral every 6 hours PRN agitation  ibuprofen  Tablet. 600 milliGRAM(s) Oral every 6 hours PRN Mild Pain (1 - 3), Moderate Pain (4 - 6), Severe Pain (7 - 10)  LORazepam     Tablet 2 milliGRAM(s) Oral every 6 hours PRN physical aggression  
[facility] [place]  [address]  [address]  [place] [address]  [phone]          February 11, 2021    RE:  Stephanie Mendez                                                                                                                                                       [address] [place] [address]  [place] [address]            To whom it may concern:    Stephanie Mendez is under my professional care.    She has been suffering from neck and upper back pain related to her macromastia for decades.      I am in full support of her plan for breast reduction surgery as part of her medical treatment plan.    Sincerely,        Nancy Urban MD    
MEDICATIONS  (STANDING):  bictegravir 50 mG/emtricitabine 200 mG/tenofovir alafenamide 25 mG (BIKTARVY) 1 Tablet(s) Oral daily  escitalopram 10 milliGRAM(s) Oral daily  prazosin. 2 milliGRAM(s) Oral at bedtime  traZODone 100 milliGRAM(s) Oral at bedtime    MEDICATIONS  (PRN):  acetaminophen     Tablet .. 650 milliGRAM(s) Oral every 6 hours PRN Mild Pain (1 - 3), Moderate Pain (4 - 6), Severe Pain (7 - 10)  diphenhydrAMINE 50 milliGRAM(s) Oral every 6 hours PRN Extrapyramidal prophylaxis  haloperidol     Tablet 5 milliGRAM(s) Oral every 6 hours PRN agitation  ibuprofen  Tablet. 600 milliGRAM(s) Oral every 6 hours PRN Mild Pain (1 - 3), Moderate Pain (4 - 6), Severe Pain (7 - 10)  LORazepam     Tablet 2 milliGRAM(s) Oral every 6 hours PRN physical aggression  
MEDICATIONS  (STANDING):  bictegravir 50 mG/emtricitabine 200 mG/tenofovir alafenamide 25 mG (BIKTARVY) 1 Tablet(s) Oral daily  escitalopram 15 milliGRAM(s) Oral daily  prazosin. 2 milliGRAM(s) Oral at bedtime  traZODone 100 milliGRAM(s) Oral at bedtime    MEDICATIONS  (PRN):  acetaminophen     Tablet .. 650 milliGRAM(s) Oral every 6 hours PRN Mild Pain (1 - 3), Moderate Pain (4 - 6), Severe Pain (7 - 10)  haloperidol     Tablet 5 milliGRAM(s) Oral every 6 hours PRN agitation  hydrOXYzine hydrochloride 50 milliGRAM(s) Oral every 6 hours PRN anxiety/insomnia  ibuprofen  Tablet. 600 milliGRAM(s) Oral every 6 hours PRN Mild Pain (1 - 3), Moderate Pain (4 - 6), Severe Pain (7 - 10)  
MEDICATIONS  (STANDING):  bictegravir 50 mG/emtricitabine 200 mG/tenofovir alafenamide 25 mG (BIKTARVY) 1 Tablet(s) Oral daily  escitalopram 15 milliGRAM(s) Oral daily  prazosin. 2 milliGRAM(s) Oral at bedtime  traZODone 100 milliGRAM(s) Oral at bedtime    MEDICATIONS  (PRN):  acetaminophen     Tablet .. 650 milliGRAM(s) Oral every 6 hours PRN Mild Pain (1 - 3), Moderate Pain (4 - 6), Severe Pain (7 - 10)  diphenhydrAMINE 50 milliGRAM(s) Oral every 6 hours PRN Extrapyramidal prophylaxis  haloperidol     Tablet 5 milliGRAM(s) Oral every 6 hours PRN agitation  ibuprofen  Tablet. 600 milliGRAM(s) Oral every 6 hours PRN Mild Pain (1 - 3), Moderate Pain (4 - 6), Severe Pain (7 - 10)  LORazepam     Tablet 2 milliGRAM(s) Oral every 6 hours PRN physical aggression  
MEDICATIONS  (STANDING):  bictegravir 50 mG/emtricitabine 200 mG/tenofovir alafenamide 25 mG (BIKTARVY) 1 Tablet(s) Oral daily  escitalopram 10 milliGRAM(s) Oral daily  prazosin. 2 milliGRAM(s) Oral at bedtime  traZODone 100 milliGRAM(s) Oral at bedtime    MEDICATIONS  (PRN):  acetaminophen     Tablet .. 650 milliGRAM(s) Oral every 6 hours PRN Mild Pain (1 - 3), Moderate Pain (4 - 6), Severe Pain (7 - 10)  diphenhydrAMINE 50 milliGRAM(s) Oral every 6 hours PRN Extrapyramidal prophylaxis  haloperidol     Tablet 5 milliGRAM(s) Oral every 6 hours PRN agitation  ibuprofen  Tablet. 600 milliGRAM(s) Oral every 6 hours PRN Mild Pain (1 - 3), Moderate Pain (4 - 6), Severe Pain (7 - 10)  LORazepam     Tablet 2 milliGRAM(s) Oral every 6 hours PRN physical aggression  
MEDICATIONS  (STANDING):  bictegravir 50 mG/emtricitabine 200 mG/tenofovir alafenamide 25 mG (BIKTARVY) 1 Tablet(s) Oral daily  escitalopram 10 milliGRAM(s) Oral daily  levoFLOXacin  Tablet 750 milliGRAM(s) Oral daily  prazosin. 1 milliGRAM(s) Oral at bedtime  traZODone 100 milliGRAM(s) Oral at bedtime    MEDICATIONS  (PRN):  acetaminophen     Tablet .. 650 milliGRAM(s) Oral every 6 hours PRN Mild Pain (1 - 3), Moderate Pain (4 - 6), Severe Pain (7 - 10)  diphenhydrAMINE 50 milliGRAM(s) Oral every 6 hours PRN Extrapyramidal prophylaxis  haloperidol     Tablet 5 milliGRAM(s) Oral every 6 hours PRN agitation  ibuprofen  Tablet. 600 milliGRAM(s) Oral every 6 hours PRN Mild Pain (1 - 3), Moderate Pain (4 - 6), Severe Pain (7 - 10)  LORazepam     Tablet 2 milliGRAM(s) Oral every 6 hours PRN physical aggression  
MEDICATIONS  (STANDING):  bictegravir 50 mG/emtricitabine 200 mG/tenofovir alafenamide 25 mG (BIKTARVY) 1 Tablet(s) Oral daily  escitalopram 15 milliGRAM(s) Oral daily  prazosin. 2 milliGRAM(s) Oral at bedtime  traZODone 100 milliGRAM(s) Oral at bedtime    MEDICATIONS  (PRN):  acetaminophen     Tablet .. 650 milliGRAM(s) Oral every 6 hours PRN Mild Pain (1 - 3), Moderate Pain (4 - 6), Severe Pain (7 - 10)  diphenhydrAMINE 50 milliGRAM(s) Oral every 6 hours PRN Extrapyramidal prophylaxis  haloperidol     Tablet 5 milliGRAM(s) Oral every 6 hours PRN agitation  ibuprofen  Tablet. 600 milliGRAM(s) Oral every 6 hours PRN Mild Pain (1 - 3), Moderate Pain (4 - 6), Severe Pain (7 - 10)  
MEDICATIONS  (STANDING):  bictegravir 50 mG/emtricitabine 200 mG/tenofovir alafenamide 25 mG (BIKTARVY) 1 Tablet(s) Oral daily  escitalopram 15 milliGRAM(s) Oral daily  prazosin. 2 milliGRAM(s) Oral at bedtime  traZODone 100 milliGRAM(s) Oral at bedtime    MEDICATIONS  (PRN):  acetaminophen     Tablet .. 650 milliGRAM(s) Oral every 6 hours PRN Mild Pain (1 - 3), Moderate Pain (4 - 6), Severe Pain (7 - 10)  haloperidol     Tablet 5 milliGRAM(s) Oral every 6 hours PRN agitation  hydrOXYzine hydrochloride 50 milliGRAM(s) Oral every 6 hours PRN anxiety/insomnia  ibuprofen  Tablet. 600 milliGRAM(s) Oral every 6 hours PRN Mild Pain (1 - 3), Moderate Pain (4 - 6), Severe Pain (7 - 10)  
MEDICATIONS  (STANDING):  bictegravir 50 mG/emtricitabine 200 mG/tenofovir alafenamide 25 mG (BIKTARVY) 1 Tablet(s) Oral daily  escitalopram 15 milliGRAM(s) Oral daily  prazosin. 2 milliGRAM(s) Oral at bedtime  traZODone 100 milliGRAM(s) Oral at bedtime    MEDICATIONS  (PRN):  acetaminophen     Tablet .. 650 milliGRAM(s) Oral every 6 hours PRN Mild Pain (1 - 3), Moderate Pain (4 - 6), Severe Pain (7 - 10)  diphenhydrAMINE 50 milliGRAM(s) Oral every 6 hours PRN Extrapyramidal prophylaxis  haloperidol     Tablet 5 milliGRAM(s) Oral every 6 hours PRN agitation  ibuprofen  Tablet. 600 milliGRAM(s) Oral every 6 hours PRN Mild Pain (1 - 3), Moderate Pain (4 - 6), Severe Pain (7 - 10)  
MEDICATIONS  (STANDING):  bictegravir 50 mG/emtricitabine 200 mG/tenofovir alafenamide 25 mG (BIKTARVY) 1 Tablet(s) Oral daily  escitalopram 15 milliGRAM(s) Oral daily  prazosin. 2 milliGRAM(s) Oral at bedtime  traZODone 100 milliGRAM(s) Oral at bedtime    MEDICATIONS  (PRN):  acetaminophen     Tablet .. 650 milliGRAM(s) Oral every 6 hours PRN Mild Pain (1 - 3), Moderate Pain (4 - 6), Severe Pain (7 - 10)  haloperidol     Tablet 5 milliGRAM(s) Oral every 6 hours PRN agitation  hydrOXYzine hydrochloride 50 milliGRAM(s) Oral every 6 hours PRN anxiety/insomnia  ibuprofen  Tablet. 600 milliGRAM(s) Oral every 6 hours PRN Mild Pain (1 - 3), Moderate Pain (4 - 6), Severe Pain (7 - 10)  
MEDICATIONS  (STANDING):  bictegravir 50 mG/emtricitabine 200 mG/tenofovir alafenamide 25 mG (BIKTARVY) 1 Tablet(s) Oral daily  escitalopram 10 milliGRAM(s) Oral daily  prazosin. 2 milliGRAM(s) Oral at bedtime  traZODone 100 milliGRAM(s) Oral at bedtime    MEDICATIONS  (PRN):  acetaminophen     Tablet .. 650 milliGRAM(s) Oral every 6 hours PRN Mild Pain (1 - 3), Moderate Pain (4 - 6), Severe Pain (7 - 10)  diphenhydrAMINE 50 milliGRAM(s) Oral every 6 hours PRN Extrapyramidal prophylaxis  haloperidol     Tablet 5 milliGRAM(s) Oral every 6 hours PRN agitation  ibuprofen  Tablet. 600 milliGRAM(s) Oral every 6 hours PRN Mild Pain (1 - 3), Moderate Pain (4 - 6), Severe Pain (7 - 10)  LORazepam     Tablet 2 milliGRAM(s) Oral every 6 hours PRN physical aggression  
MEDICATIONS  (STANDING):  bictegravir 50 mG/emtricitabine 200 mG/tenofovir alafenamide 25 mG (BIKTARVY) 1 Tablet(s) Oral daily  escitalopram 15 milliGRAM(s) Oral daily  prazosin. 2 milliGRAM(s) Oral at bedtime  traZODone 100 milliGRAM(s) Oral at bedtime    MEDICATIONS  (PRN):  acetaminophen     Tablet .. 650 milliGRAM(s) Oral every 6 hours PRN Mild Pain (1 - 3), Moderate Pain (4 - 6), Severe Pain (7 - 10)  diphenhydrAMINE 50 milliGRAM(s) Oral every 6 hours PRN Extrapyramidal prophylaxis  haloperidol     Tablet 5 milliGRAM(s) Oral every 6 hours PRN agitation  ibuprofen  Tablet. 600 milliGRAM(s) Oral every 6 hours PRN Mild Pain (1 - 3), Moderate Pain (4 - 6), Severe Pain (7 - 10)  LORazepam     Tablet 2 milliGRAM(s) Oral every 6 hours PRN physical aggression  
MEDICATIONS  (STANDING):  bictegravir 50 mG/emtricitabine 200 mG/tenofovir alafenamide 25 mG (BIKTARVY) 1 Tablet(s) Oral daily  escitalopram 15 milliGRAM(s) Oral daily  prazosin. 2 milliGRAM(s) Oral at bedtime  traZODone 100 milliGRAM(s) Oral at bedtime    MEDICATIONS  (PRN):  acetaminophen     Tablet .. 650 milliGRAM(s) Oral every 6 hours PRN Mild Pain (1 - 3), Moderate Pain (4 - 6), Severe Pain (7 - 10)  haloperidol     Tablet 5 milliGRAM(s) Oral every 6 hours PRN agitation  hydrOXYzine hydrochloride 50 milliGRAM(s) Oral every 6 hours PRN anxiety/insomnia  ibuprofen  Tablet. 600 milliGRAM(s) Oral every 6 hours PRN Mild Pain (1 - 3), Moderate Pain (4 - 6), Severe Pain (7 - 10)

## 2023-03-09 NOTE — BH INPATIENT PSYCHIATRY PROGRESS NOTE - NSBHATTESTBILLING_PSY_A_CORE
76935-Itcpolcstf - moderate complexity OR 30-39 mins
82224-Ygrcoyprxn OBS or IP - low complexity OR 25-34 mins
88312-Zbwcxtvgjf OBS or IP - low complexity OR 25-34 mins
49976-Fpmudnqrge - moderate complexity OR 30-39 mins
04696-Mxntbepxcp OBS or IP - low complexity OR 25-34 mins
90893-Gmissxwapp OBS or IP - low complexity OR 25-34 mins
04042-Xirrrkfmns OBS or IP - low complexity OR 25-34 mins
82347-Ykbxiohcxb OBS or IP - low complexity OR 25-34 mins
70290-Vsphgoxkzm OBS or IP - low complexity OR 25-34 mins
49778-Qbxsajqinv OBS or IP - low complexity OR 25-34 mins
34871-Mmcpddtnkg OBS or IP - low complexity OR 25-34 mins
36825-Dpkoxzkijj OBS or IP - low complexity OR 25-34 mins
37069-Fxqylvogra OBS or IP - low complexity OR 25-34 mins

## 2023-03-09 NOTE — BH INPATIENT PSYCHIATRY PROGRESS NOTE - NSTXCOPEDATEEST_PSY_ALL_CORE
21-Feb-2023
24-Feb-2023
21-Feb-2023

## 2023-03-09 NOTE — BH INPATIENT PSYCHIATRY PROGRESS NOTE - NSTXSUBMISINTERMD_PSY_ALL_CORE
Utilization of motivational interviewing to promote decrease of substance use, CATCH consult

## 2023-03-09 NOTE — BH DISCHARGE NOTE NURSING/SOCIAL WORK/PSYCH REHAB - NSDCVIVACCINE_GEN_ALL_CORE_FT
Tdap; 31-Jul-2018 20:20; Servando Drew (RN); [employer]; [id number]; IntraMuscular; Deltoid Right.; 0.5 milliLiter(s); VIS (VIS Published: 09-May-2013, VIS Presented: 31-Jul-2018);

## 2023-03-09 NOTE — BH INPATIENT PSYCHIATRY PROGRESS NOTE - NSICDXBHPRIMARYDX_PSY_ALL_CORE
Substance induced mood disorder   F19.70  
Substance induced mood disorder   F19.02  
Substance induced mood disorder   F19.87  
Substance induced mood disorder   F19.28  
Substance induced mood disorder   F19.45  
Substance induced mood disorder   F19.96  
Substance induced mood disorder   F19.86  
Substance induced mood disorder   F19.89  
Substance induced mood disorder   F19.35  
Substance induced mood disorder   F19.71  
Substance induced mood disorder   F19.18  
Substance induced mood disorder   F19.83  
Substance induced mood disorder   F19.16

## 2023-03-09 NOTE — BH INPATIENT PSYCHIATRY PROGRESS NOTE - NSBHMETABOLIC_PSY_ALL_CORE_FT
BMI: BMI (kg/m2): 23.8 (02-21-23 @ 19:23)  HbA1c: A1C with Estimated Average Glucose Result: 5.7 % (02-23-23 @ 08:48)    Glucose:   BP: 126/65 (03-09-23 @ 08:56) (119/78 - 130/79)  Lipid Panel: Date/Time: 02-23-23 @ 08:48  Cholesterol, Serum: 209  Direct LDL: --  HDL Cholesterol, Serum: 70  Total Cholesterol/HDL Ration Measurement: --  Triglycerides, Serum: 182

## 2023-03-09 NOTE — BH INPATIENT PSYCHIATRY PROGRESS NOTE - NSBHATTESTATTENDCOLLABFT_PSY_A_CORE
Dr. De La Cruz

## 2023-03-09 NOTE — ED PROVIDER NOTE - CARDIOVASCULAR NEGATIVE STATEMENT, MLM
no chest pain and no edema. Opioid Counseling: I discussed with the patient the potential side effects of opioids including but not limited to addiction, altered mental status, and depression. I stressed avoiding alcohol, benzodiazepines, muscle relaxants and sleep aids unless specifically okayed by a physician. The patient verbalized understanding of the proper use and possible adverse effects of opioids. All of the patient's questions and concerns were addressed. They were instructed to flush the remaining pills down the toilet if they did not need them for pain.

## 2023-03-09 NOTE — BH DISCHARGE NOTE NURSING/SOCIAL WORK/PSYCH REHAB - PATIENT PORTAL LINK FT
You can access the FollowMyHealth Patient Portal offered by [facility] by registering at the following website: [email or web address]. By joining [facility]’s FollowMyHealth portal, you will also be able to view your health information using other applications (apps) compatible with our system.

## 2023-03-09 NOTE — BH INPATIENT PSYCHIATRY PROGRESS NOTE - NSCGIIMPROVESX_PSY_ALL_CORE
1 - Very much improved - nearly all better; good level of functioning; minimal symptoms; represents a very substantial change
1 - Very much improved - nearly all better; good level of functioning; minimal symptoms; represents a very substantial change

## 2023-03-09 NOTE — BH DISCHARGE NOTE NURSING/SOCIAL WORK/PSYCH REHAB - NSDCPETBCESMAN_GEN_ALL_CORE
If you are a smoker, it is important for your health to stop smoking. Please be aware that second hand smoke is also harmful. Not applicable

## 2023-03-09 NOTE — BH INPATIENT PSYCHIATRY PROGRESS NOTE - NSTXSUBMISDATETRGT_PSY_ALL_CORE
03-Mar-2023
03-Mar-2023
10-Mar-2023
03-Mar-2023
10-Mar-2023
03-Mar-2023
10-Mar-2023
03-Mar-2023
03-Mar-2023
10-Mar-2023
10-Mar-2023

## 2023-03-09 NOTE — BH INPATIENT PSYCHIATRY PROGRESS NOTE - NSTXPSYCHODATETRGT_PSY_ALL_CORE
03-Mar-2023
10-Mar-2023
10-Mar-2023
03-Mar-2023
10-Mar-2023
10-Mar-2023
03-Mar-2023
03-Mar-2023
10-Mar-2023

## 2023-03-09 NOTE — BH INPATIENT PSYCHIATRY PROGRESS NOTE - NSTXCOPEPROGRES_PSY_ALL_CORE
Met - goal discontinued
Improving
Met - goal discontinued
Improving
Met - goal discontinued
Improving
Improving
Met - goal discontinued
Improving
Met - goal discontinued

## 2023-03-09 NOTE — BH INPATIENT PSYCHIATRY PROGRESS NOTE - NSTXCOPEDATETRGT_PSY_ALL_CORE
03-Feb-2023
20-Feb-2023
17-Mar-2023
20-Feb-2023
17-Mar-2023
20-Feb-2023
20-Feb-2023
17-Mar-2023
17-Mar-2023
20-Feb-2023
10-Mar-2023

## 2023-03-09 NOTE — BH INPATIENT PSYCHIATRY PROGRESS NOTE - NSBHASSESSSUMMFT_PSY_ALL_CORE
37-year-old single, domiciled in [facility] housing with roommate, unemployed, with PMH of HIV on Biktarvy, PPH of polysubstance use disorder (alcohol, cocaine, opiates, perhaps others), SIPD, SIMD, prior SA (hanging, jumping out of 3rd story window, jumping in front of cars), multiple prior IP psychiatric and detox/rehab stays (5/21 [facility], 7/21 [facility], 10/21 [facility], 12/21 [facility], 3/22 [facility], 5/22 [facility], 7/22 [facility], 9/22 [facility]), who presents to the ED reporting suicidal ideation. Telepsychiatry consulted to asses for suicidal ideation. Pt reported command auditory hallucinations to kill himself, as well as symptoms of anxiety, panic attacks, and worsening depression in the past month in the setting of treatment nonadherence and relapse on polysubstances (etoh, cocaine, cannabis). Additionally, he reports an unclear dissociative episode this week concerning for delirium given his risk factors of HIV with current influenza/ pneumonia. While his chronic risk factors--substance use with concurrent psychosis, limited engagement with treatment teams, male gender, limited frustration tolerance--are unlikely to be meaningfully mitigated by inpatient psychiatric treatment given history of high ultilization, he remains acutely high risk for self-injurious behavior and would benefit from further observation and psychiatric treatment.     On evaluation, pt presents with coburn affect, calm and cooperative, well-groomed. He is socializing appropriately with group milieu and not a behavioral concern. He reports improved mood, anxiety and sleep since admission. Reports resolution of nightmares and SI. No overt psychosis noted. Pt motivated to attend rehab to help maintain sobriety, pending placement. Discharge planning ensues. MOCA 26.    #SIMD, SIPD  -c/w lexapro 15 mg daily (2/27)  -c/w trazodone 100 mg qhs  -c/w prazosin 2 mg qhs to target nightmares    #Polysubstance use (etoh, cocaine, cannabis; h/o opiates, meth)  -CATCH consult  -monitor for s/sx of withdrawal  -encourage rehab --> pt agreeable, pending placement    #PNA  -completed levaquin on 2/24/23, f/u repeat cxr in 4-6 weeks    #HIV  -medicine consult --> c/w biktarvy daily, f/u OP ID    #Agitation  -for agitation not amenable to verbal redirection, may give haldol 5 mg q6h prn and ativan 2 mg q6h prn with escalation to IM if pt is a danger to self or/and others with repeat EKG to ensure QTc <500 ms 37-year-old single, domiciled in [facility] housing with roommate, unemployed, with PMH of HIV on Biktarvy, PPH of polysubstance use disorder (alcohol, cocaine, opiates, perhaps others), SIPD, SIMD, prior SA (hanging, jumping out of 3rd story window, jumping in front of cars), multiple prior IP psychiatric and detox/rehab stays (5/21 [facility], 7/21 [facility], 10/21 [facility], 12/21 [facility], 3/22 [facility], 5/22 [facility], 7/22 [facility], 9/22 [facility]), who presents to the ED reporting suicidal ideation. Telepsychiatry consulted to asses for suicidal ideation. Pt reported command auditory hallucinations to kill himself, as well as symptoms of anxiety, panic attacks, and worsening depression in the past month in the setting of treatment nonadherence and relapse on polysubstances (etoh, cocaine, cannabis). Additionally, he reports an unclear dissociative episode this week concerning for delirium given his risk factors of HIV with current influenza/ pneumonia. While his chronic risk factors--substance use with concurrent psychosis, limited engagement with treatment teams, male gender, limited frustration tolerance--are unlikely to be meaningfully mitigated by inpatient psychiatric treatment given history of high ultilization, he remains acutely high risk for self-injurious behavior and would benefit from further observation and psychiatric treatment.     On evaluation, pt presents with coburn affect, calm and cooperative, well-groomed. He is socializing appropriately with group milieu and not a behavioral concern. He reports improved mood, anxiety and sleep since admission. Reports resolution of nightmares and SI. No overt psychosis noted. MOCA 26. Denies SI/HI, intent and plan. Pt presents future-oriented and goal-directed. Of note, pt was initially agreeable to attend rehab to help maintain sobriety, however was unable to be placed at this time; pt agreed to OP CARL. Discharge today.     #SIMD, SIPD  -c/w lexapro 15 mg daily (2/27)  -c/w trazodone 100 mg qhs  -c/w prazosin 2 mg qhs to target nightmares    #Polysubstance use (etoh, cocaine, cannabis; h/o opiates, meth)  -CATCH consult  -monitor for s/sx of withdrawal  -encourage rehab --> pt agreeable, pending placement    #PNA  -completed levaquin on 2/24/23, f/u repeat cxr in 4-6 weeks    #HIV  -medicine consult --> c/w biktarvy daily, f/u OP ID    #Agitation  -for agitation not amenable to verbal redirection, may give haldol 5 mg q6h prn and ativan 2 mg q6h prn with escalation to IM if pt is a danger to self or/and others with repeat EKG to ensure QTc <500 ms

## 2023-03-09 NOTE — BH DISCHARGE NOTE NURSING/SOCIAL WORK/PSYCH REHAB - NSCDUDCCRISIS_PSY_A_CORE
[facility]  1 [phone]) [facility]LL ([phone])  Text "WELL" to 20381  Website: www.[employer]/.[employer] [facility] 1 (568) 621-HOPE (5060) Website: www.safehorizon.org/.National Suicide Prevention Lifeline 5 (709) 700-1841/.  [employer]  1 (722) LIFENET (609-8396)/988 Suicide and Crisis Lifeline

## 2023-03-09 NOTE — BH INPATIENT PSYCHIATRY PROGRESS NOTE - NSBHMSETHTPROC_PSY_A_CORE
Linear
Glycopyrrolate Counseling:  I discussed with the patient the risks of glycopyrrolate including but not limited to skin rash, drowsiness, dry mouth, difficulty urinating, and blurred vision.
Linear

## 2023-03-09 NOTE — BH INPATIENT PSYCHIATRY PROGRESS NOTE - NSBHMSEKNOWHOW_PSY_ALL_CORE
Vocabulary

## 2023-03-09 NOTE — BH INPATIENT PSYCHIATRY PROGRESS NOTE - NSTXSUBMISGOAL_PSY_ALL_CORE
Be able to verbalize an understanding of the association between substance abuse and mental health

## 2023-03-09 NOTE — BH INPATIENT PSYCHIATRY PROGRESS NOTE - NSBHCHARTREVIEWVS_PSY_A_CORE FT
Vital Signs Last 24 Hrs  T(C): 37.1 (03-09-23 @ 08:56), Max: 37.6 (03-08-23 @ 15:33)  T(F): 98.8 (03-09-23 @ 08:56), Max: 99.7 (03-08-23 @ 15:33)  HR: 89 (03-09-23 @ 08:56) (86 - 92)  BP: 126/65 (03-09-23 @ 08:56) (126/65 - 130/62)  BP(mean): --  RR: 18 (03-09-23 @ 08:56) (18 - 20)  SpO2: 99% (03-08-23 @ 15:33) (99% - 99%)

## 2023-03-09 NOTE — BH INPATIENT PSYCHIATRY PROGRESS NOTE - NSTXPROBSUBMIS_PSY_ALL_CORE
SUBSTANCE MISUSE

## 2023-03-09 NOTE — BH INPATIENT PSYCHIATRY PROGRESS NOTE - NSBHMSEMUSCLE_PSY_A_CORE
Normal muscle tone/strength
Unable to assess
Normal muscle tone/strength
Normal muscle tone/strength
Unable to assess
Normal muscle tone/strength
Unable to assess
Unable to assess
Normal muscle tone/strength
Normal muscle tone/strength

## 2023-03-09 NOTE — BH INPATIENT PSYCHIATRY PROGRESS NOTE - NSTXPSYCHOGOAL_PSY_ALL_CORE
Will verbalize decreased distress related to psychosis to 2 on a 10-point scale
Will report using a mindfulness skill to be able to read 5 pages of a book daily despite hallucinations
Will verbalize decreased distress related to psychosis to 2 on a 10-point scale

## 2023-03-09 NOTE — BH INPATIENT PSYCHIATRY PROGRESS NOTE - NSBHADMITMEDEDUDETAILS_A_CORE FT
Reeducated on risks and benefits of prazosin, and side effects profile. Pt verbalized understanding. 

## 2023-03-09 NOTE — BH DISCHARGE NOTE NURSING/SOCIAL WORK/PSYCH REHAB - NSDCPEEMAIL_GEN_ALL_CORE
[facility] email [email or web address]

## 2023-03-09 NOTE — BH INPATIENT PSYCHIATRY PROGRESS NOTE - NSTXSUICIDDATETRGT_PSY_ALL_CORE
03-Mar-2023
10-Mar-2023
03-Mar-2023
10-Mar-2023
10-Mar-2023
03-Mar-2023
10-Mar-2023
10-Mar-2023

## 2023-03-09 NOTE — BH INPATIENT PSYCHIATRY PROGRESS NOTE - NSICDXBHSECONDARYDX_PSY_ALL_CORE
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  

## 2023-03-09 NOTE — BH INPATIENT PSYCHIATRY PROGRESS NOTE - NSTXPSYCHODATEEST_PSY_ALL_CORE
21-Feb-2023

## 2023-03-09 NOTE — BH INPATIENT PSYCHIATRY PROGRESS NOTE - NSBHMSETHTCONTENT_PSY_A_CORE
Ruminations
Unremarkable
Ruminations
Unremarkable
Unremarkable
Ruminations
Unremarkable
Unremarkable
Ruminations
Ruminations
Unremarkable
Preoccupations/Ruminations
Unremarkable

## 2023-03-09 NOTE — BH INPATIENT PSYCHIATRY PROGRESS NOTE - NSBHFUPINTERVALHXFT_PSY_A_CORE
Pt seen and evaluated, chart reviewed. As per nursing report, no acute events overnight. On evaluation, pt presents calm and pleasant, well-groomed, socializing appropriately with group milieu. He report she is doing well and ready for discharge. Endorses good mood, sleep and appetite. No overt psychosis noted. Denies SI/HI, intent and plan. Presents future-oriented and goal-directed. Adherent to medications, denies negative side effects. Visible on unit and in behavioral control.  Pt seen and evaluated, chart reviewed. As per nursing report, no acute events overnight. On evaluation, pt presents calm and pleasant, well-groomed, socializing appropriately with group milieu. He reports he is doing well and ready for discharge. Endorses good mood, sleep and appetite. No overt psychosis noted. Denies SI/HI, intent and plan. Presents future-oriented and goal-directed. Of note, pt was initially agreeable to rehab however unable to be placed at this time, agrees to f/u OP CARL. Adherent to medications, denies negative side effects. Visible on unit and in behavioral control. Discharge today.

## 2023-03-10 LAB
GAMMA INTERFERON BACKGROUND BLD IA-ACNC: 0.02 IU/ML — SIGNIFICANT CHANGE UP
HIV1 RNA # SERPL NAA+PROBE: 44 COPIES/ML — SIGNIFICANT CHANGE UP
HIV1 RNA SER-IMP: DETECTED COPIES/ML
HIV1 RNA SERPL NAA+PROBE-LOG#: 1.64 LOGCOPIES/ML — SIGNIFICANT CHANGE UP
M TB IFN-G BLD-IMP: NEGATIVE — SIGNIFICANT CHANGE UP
M TB IFN-G CD4+ BCKGRND COR BLD-ACNC: 0.01 IU/ML — SIGNIFICANT CHANGE UP
M TB IFN-G CD4+CD8+ BCKGRND COR BLD-ACNC: 0.02 IU/ML — SIGNIFICANT CHANGE UP
QUANT TB PLUS MITOGEN MINUS NIL: >10 IU/ML — SIGNIFICANT CHANGE UP

## 2023-03-11 DIAGNOSIS — F14.99 COCAINE USE, UNSPECIFIED WITH UNSPECIFIED COCAINE-INDUCED DISORDER: ICD-10-CM

## 2023-03-11 DIAGNOSIS — F19.94 OTHER PSYCHOACTIVE SUBSTANCE USE, UNSPECIFIED WITH PSYCHOACTIVE SUBSTANCE-INDUCED MOOD DISORDER: ICD-10-CM

## 2023-03-11 DIAGNOSIS — Z88.0 ALLERGY STATUS TO PENICILLIN: ICD-10-CM

## 2023-03-11 DIAGNOSIS — J18.9 PNEUMONIA, UNSPECIFIED ORGANISM: ICD-10-CM

## 2023-03-11 DIAGNOSIS — R45.1 RESTLESSNESS AND AGITATION: ICD-10-CM

## 2023-03-11 DIAGNOSIS — Z20.822 CONTACT WITH AND (SUSPECTED) EXPOSURE TO COVID-19: ICD-10-CM

## 2023-03-11 DIAGNOSIS — Z21 ASYMPTOMATIC HUMAN IMMUNODEFICIENCY VIRUS [HIV] INFECTION STATUS: ICD-10-CM

## 2023-03-11 DIAGNOSIS — F12.90 CANNABIS USE, UNSPECIFIED, UNCOMPLICATED: ICD-10-CM

## 2023-03-20 NOTE — BH SOCIAL WORK CONFIRMATION FOLLOW UP NOTE - NSCOMMENTS_PSY_ALL_CORE
Amandeep broke his intake mental health appointment with the [facility] on 3/14 at 1pm, [phone]. SW Assistant sent a mobile crisis referral via [facility] on 3/14, Ref #[id number]. Patient did not provide an apartment number and mobile crisis closed the case due to not being able to locate patient at address provided ([address] [place] [address]).

## 2023-03-28 NOTE — ED PROVIDER NOTE - CPE EDP CARDIAC NORM
Caller: Ayden Murillo    Relationship: Self    Best call back number:    [phone]      What medication are you requesting: FREESTYLE GILLIAN     If a prescription is needed, what is your preferred pharmacy and phone number: [facility] - [place], [place] - [address] [facility] [phone]  - [phone]     Additional notes:    PATIENT STATES IT IS A LOT CHEAPER AT [facility] THAN [facility].     normal...

## 2023-04-05 NOTE — ED BEHAVIORAL HEALTH ASSESSMENT NOTE - CURRENT INTENT
Assessment & Plan   (R10.64) Abdominal pain, generalized  (primary encounter diagnosis)  Comment:   Plan: XR Abdomen 2 Views, UA Macro with Reflex to         Micro and Culture - lab collect  Susan has had abdominal pain for the past 3 days. Her history and examination today are reassuring against acute abdominal concerns.  Advised symptomatic cares for comfort with stoool softener, gas remedies and regular diet as needed.  Monitor symptoms closely and follow up by mychart or in clinic if worsening symptoms in the next 5-7 days.                        Malena Crowell PA-C        Mary Nicolas is a 11 year old, presenting for the following health issues:  Abdominal Pain        4/5/2023    11:55 AM   Additional Questions   Roomed by mary   Accompanied by mom     Abdominal Pain  Associated symptoms include abdominal pain.   History of Present Illness       Reason for visit:  Stomach pain  Symptom onset:  1-3 days ago        Abdominal Symptoms    Problem started: 4 days ago  Abdominal pain: YES- mostly in the middle   Fever: no  Vomiting: No  Diarrhea: No  Constipation: no  Frequency of stool: every other day  Nausea: no  Urinary symptoms - pain or frequency: No  Therapies Tried: none  Sick contacts: None;  LMP:  not applicable    Click here for [place] stool scale.      Pain has been present for 2-3 days.  Has had pain mainly in the middle of her tummy.  Eating will make it come on usually and it goes away on its own.  Running and playing is not tolerated when the pain is present, but doesn't bring it on when its not.  No dysuria or enuresis.  Two days ago she had a watery stool, but since then it has been soft and easy to pass.  No history of constipation on a regular basis.  No fever.  No sore throat.        Review of Systems   Gastrointestinal: Positive for abdominal pain.      Constitutional, eye, ENT, skin, respiratory, cardiac, and GI are normal except as otherwise noted.      Objective    BP  "106/68   Pulse 80   Temp 97.8  F (36.6  C) (Tympanic)   Resp 20   Ht 4' 6.92\" (1.395 m)   Wt 70 lb (31.8 kg)   SpO2 100%   BMI 16.32 kg/m    27 %ile (Z= -0.60) based on [facility] (Girls, 2-20 Years) weight-for-age data using vitals from 4/5/2023.  Blood pressure %afsaneh are 76 % systolic and 79 % diastolic based on the 2017 AAP Clinical Practice Guideline. This reading is in the normal blood pressure range.    Physical Exam   GENERAL: Active, alert, in no acute distress.  HEAD: Normocephalic.  EYES:  No discharge or erythema. Normal pupils and EOM.  EARS: Normal canals. Tympanic membranes are normal; gray and translucent.  NOSE: Normal without discharge.  MOUTH/THROAT: Clear. No oral lesions. Teeth intact without obvious abnormalities.  LYMPH NODES: No adenopathy  LUNGS: Clear. No rales, rhonchi, wheezing or retractions  HEART: Regular rhythm. Normal S1/S2. No murmurs.  ABDOMEN: normal bowel sounds, soft, mild tenderness on mid to lower abdomen bilateral with palpation. No guarding or rebound. Psoas sign negative. Moving in the room and jumping on the floor does not elicit pain.     Diagnostics:   Results for orders placed or performed in visit on 04/05/23 (from the past 24 hour(s))   UA Macro with Reflex to Micro and Culture - lab collect    Specimen: Urine, Clean Catch   Result Value Ref Range    Color Urine Yellow Colorless, Straw, Light Yellow, Yellow    Appearance Urine Clear Clear    Glucose Urine Negative Negative mg/dL    Bilirubin Urine Negative Negative    Ketones Urine Trace (A) Negative mg/dL    Specific Gravity Urine 1.020 1.003 - 1.035    Blood Urine Negative Negative    pH Urine 7.0 5.0 - 7.0    Protein Albumin Urine Negative Negative mg/dL    Urobilinogen Urine 1.0 0.2, 1.0 E.U./dL    Nitrite Urine Negative Negative    Leukocyte Esterase Urine Negative Negative    Narrative    Microscopic not indicated     Recent Results (from the past 24 hour(s))   XR Abdomen 2 Views    Narrative    ABDOMEN TWO-THREE " VIEW April 5, 2023 12:29 PM     HISTORY: Abdominal pain, generalized.    COMPARISON: None.    FINDINGS: Small amount of stool. No free air. There are no air filled  distended loops of small bowel. The colon is not distended. The lung  bases are unremarkable.      Impression    IMPRESSION: Nonobstructed bowel gas pattern.    BOY MAGANA MD         SYSTEM ID:  [id number]                      Yes

## 2023-04-29 ENCOUNTER — INPATIENT (INPATIENT)
Facility: HOSPITAL | Age: 38
LOS: 16 days | Discharge: HOME | DRG: 754 | End: 2023-05-16
Attending: PSYCHIATRY & NEUROLOGY | Admitting: PSYCHIATRY & NEUROLOGY
Payer: COMMERCIAL

## 2023-04-29 ENCOUNTER — EMERGENCY (EMERGENCY)
Facility: HOSPITAL | Age: 38
LOS: 1 days | Discharge: ANOTHER HOSPITAL | End: 2023-04-29
Attending: EMERGENCY MEDICINE | Admitting: EMERGENCY MEDICINE
Payer: COMMERCIAL

## 2023-04-29 VITALS
HEART RATE: 69 BPM | OXYGEN SATURATION: 99 % | DIASTOLIC BLOOD PRESSURE: 72 MMHG | SYSTOLIC BLOOD PRESSURE: 113 MMHG | RESPIRATION RATE: 16 BRPM | TEMPERATURE: 98 F

## 2023-04-29 VITALS
OXYGEN SATURATION: 100 % | TEMPERATURE: 98 F | DIASTOLIC BLOOD PRESSURE: 70 MMHG | RESPIRATION RATE: 16 BRPM | HEART RATE: 65 BPM | SYSTOLIC BLOOD PRESSURE: 110 MMHG

## 2023-04-29 VITALS — WEIGHT: 149.69 LBS | HEIGHT: 66 IN | TEMPERATURE: 96 F | RESPIRATION RATE: 18 BRPM | HEART RATE: 63 BPM

## 2023-04-29 DIAGNOSIS — Z88.0 ALLERGY STATUS TO PENICILLIN: ICD-10-CM

## 2023-04-29 DIAGNOSIS — F19.94 OTHER PSYCHOACTIVE SUBSTANCE USE, UNSPECIFIED WITH PSYCHOACTIVE SUBSTANCE-INDUCED MOOD DISORDER: ICD-10-CM

## 2023-04-29 DIAGNOSIS — R07.89 OTHER CHEST PAIN: ICD-10-CM

## 2023-04-29 DIAGNOSIS — F32.9 MAJOR DEPRESSIVE DISORDER, SINGLE EPISODE, UNSPECIFIED: ICD-10-CM

## 2023-04-29 DIAGNOSIS — R45.851 SUICIDAL IDEATIONS: ICD-10-CM

## 2023-04-29 DIAGNOSIS — F19.10 OTHER PSYCHOACTIVE SUBSTANCE ABUSE, UNCOMPLICATED: ICD-10-CM

## 2023-04-29 DIAGNOSIS — F43.0 ACUTE STRESS REACTION: ICD-10-CM

## 2023-04-29 DIAGNOSIS — F20.9 SCHIZOPHRENIA, UNSPECIFIED: ICD-10-CM

## 2023-04-29 DIAGNOSIS — B35.3 TINEA PEDIS: ICD-10-CM

## 2023-04-29 DIAGNOSIS — D70.9 NEUTROPENIA, UNSPECIFIED: ICD-10-CM

## 2023-04-29 DIAGNOSIS — F43.20 ADJUSTMENT DISORDER, UNSPECIFIED: ICD-10-CM

## 2023-04-29 DIAGNOSIS — Z20.822 CONTACT WITH AND (SUSPECTED) EXPOSURE TO COVID-19: ICD-10-CM

## 2023-04-29 DIAGNOSIS — B20 HUMAN IMMUNODEFICIENCY VIRUS [HIV] DISEASE: ICD-10-CM

## 2023-04-29 DIAGNOSIS — F31.9 BIPOLAR DISORDER, UNSPECIFIED: ICD-10-CM

## 2023-04-29 DIAGNOSIS — F14.10 COCAINE ABUSE, UNCOMPLICATED: ICD-10-CM

## 2023-04-29 DIAGNOSIS — G47.00 INSOMNIA, UNSPECIFIED: ICD-10-CM

## 2023-04-29 DIAGNOSIS — E78.00 PURE HYPERCHOLESTEROLEMIA, UNSPECIFIED: ICD-10-CM

## 2023-04-29 DIAGNOSIS — F11.20 OPIOID DEPENDENCE, UNCOMPLICATED: ICD-10-CM

## 2023-04-29 DIAGNOSIS — F39 UNSPECIFIED MOOD [AFFECTIVE] DISORDER: ICD-10-CM

## 2023-04-29 LAB
ALBUMIN SERPL ELPH-MCNC: 4.2 G/DL — SIGNIFICANT CHANGE UP (ref 3.4–5)
ALP SERPL-CCNC: 99 U/L — SIGNIFICANT CHANGE UP (ref 40–120)
ALT FLD-CCNC: 35 U/L — SIGNIFICANT CHANGE UP (ref 12–42)
AMPHET UR-MCNC: NEGATIVE — SIGNIFICANT CHANGE UP
ANION GAP SERPL CALC-SCNC: 6 MMOL/L — LOW (ref 9–16)
APAP SERPL-MCNC: <2 UG/ML — LOW (ref 10–30)
AST SERPL-CCNC: 63 U/L — HIGH (ref 15–37)
BARBITURATES UR SCN-MCNC: NEGATIVE — SIGNIFICANT CHANGE UP
BASOPHILS # BLD AUTO: 0.05 K/UL — SIGNIFICANT CHANGE UP (ref 0–0.2)
BASOPHILS NFR BLD AUTO: 0.9 % — SIGNIFICANT CHANGE UP (ref 0–2)
BENZODIAZ UR-MCNC: POSITIVE
BILIRUB SERPL-MCNC: 0.4 MG/DL — SIGNIFICANT CHANGE UP (ref 0.2–1.2)
BUN SERPL-MCNC: 14 MG/DL — SIGNIFICANT CHANGE UP (ref 7–23)
CALCIUM SERPL-MCNC: 9.2 MG/DL — SIGNIFICANT CHANGE UP (ref 8.5–10.5)
CHLORIDE SERPL-SCNC: 98 MMOL/L — SIGNIFICANT CHANGE UP (ref 96–108)
CO2 SERPL-SCNC: 34 MMOL/L — HIGH (ref 22–31)
COCAINE METAB.OTHER UR-MCNC: POSITIVE
CREAT SERPL-MCNC: 0.91 MG/DL — SIGNIFICANT CHANGE UP (ref 0.5–1.3)
EGFR: 112 ML/MIN/1.73M2 — SIGNIFICANT CHANGE UP
EOSINOPHIL # BLD AUTO: 0.07 K/UL — SIGNIFICANT CHANGE UP (ref 0–0.5)
EOSINOPHIL NFR BLD AUTO: 1.2 % — SIGNIFICANT CHANGE UP (ref 0–6)
ETHANOL SERPL-MCNC: <3 MG/DL — SIGNIFICANT CHANGE UP
GLUCOSE SERPL-MCNC: 79 MG/DL — SIGNIFICANT CHANGE UP (ref 70–99)
HCT VFR BLD CALC: 44.4 % — SIGNIFICANT CHANGE UP (ref 39–50)
HGB BLD-MCNC: 14.5 G/DL — SIGNIFICANT CHANGE UP (ref 13–17)
IMM GRANULOCYTES NFR BLD AUTO: 0.2 % — SIGNIFICANT CHANGE UP (ref 0–0.9)
LYMPHOCYTES # BLD AUTO: 1.77 K/UL — SIGNIFICANT CHANGE UP (ref 1–3.3)
LYMPHOCYTES # BLD AUTO: 30.3 % — SIGNIFICANT CHANGE UP (ref 13–44)
MCHC RBC-ENTMCNC: 29.7 PG — SIGNIFICANT CHANGE UP (ref 27–34)
MCHC RBC-ENTMCNC: 32.7 GM/DL — SIGNIFICANT CHANGE UP (ref 32–36)
MCV RBC AUTO: 91 FL — SIGNIFICANT CHANGE UP (ref 80–100)
METHADONE UR-MCNC: NEGATIVE — SIGNIFICANT CHANGE UP
MONOCYTES # BLD AUTO: 0.52 K/UL — SIGNIFICANT CHANGE UP (ref 0–0.9)
MONOCYTES NFR BLD AUTO: 8.9 % — SIGNIFICANT CHANGE UP (ref 2–14)
NEUTROPHILS # BLD AUTO: 3.42 K/UL — SIGNIFICANT CHANGE UP (ref 1.8–7.4)
NEUTROPHILS NFR BLD AUTO: 58.5 % — SIGNIFICANT CHANGE UP (ref 43–77)
NRBC # BLD: 0 /100 WBCS — SIGNIFICANT CHANGE UP (ref 0–0)
OPIATES UR-MCNC: NEGATIVE — SIGNIFICANT CHANGE UP
PCP SPEC-MCNC: SIGNIFICANT CHANGE UP
PCP UR-MCNC: NEGATIVE — SIGNIFICANT CHANGE UP
PLATELET # BLD AUTO: 297 K/UL — SIGNIFICANT CHANGE UP (ref 150–400)
POTASSIUM SERPL-MCNC: 4 MMOL/L — SIGNIFICANT CHANGE UP (ref 3.5–5.3)
POTASSIUM SERPL-SCNC: 4 MMOL/L — SIGNIFICANT CHANGE UP (ref 3.5–5.3)
PROT SERPL-MCNC: 9.6 G/DL — HIGH (ref 6.4–8.2)
RBC # BLD: 4.88 M/UL — SIGNIFICANT CHANGE UP (ref 4.2–5.8)
RBC # FLD: 14.4 % — SIGNIFICANT CHANGE UP (ref 10.3–14.5)
SALICYLATES SERPL-MCNC: 1.6 MG/DL — LOW (ref 2.8–20)
SARS-COV-2 RNA SPEC QL NAA+PROBE: SIGNIFICANT CHANGE UP
SODIUM SERPL-SCNC: 138 MMOL/L — SIGNIFICANT CHANGE UP (ref 132–145)
THC UR QL: NEGATIVE — SIGNIFICANT CHANGE UP
WBC # BLD: 5.84 K/UL — SIGNIFICANT CHANGE UP (ref 3.8–10.5)
WBC # FLD AUTO: 5.84 K/UL — SIGNIFICANT CHANGE UP (ref 3.8–10.5)

## 2023-04-29 PROCEDURE — 80053 COMPREHEN METABOLIC PANEL: CPT

## 2023-04-29 PROCEDURE — 99232 SBSQ HOSP IP/OBS MODERATE 35: CPT | Mod: GC

## 2023-04-29 PROCEDURE — 80354 DRUG SCREENING FENTANYL: CPT

## 2023-04-29 PROCEDURE — 86355 B CELLS TOTAL COUNT: CPT

## 2023-04-29 PROCEDURE — 80307 DRUG TEST PRSMV CHEM ANLYZR: CPT

## 2023-04-29 PROCEDURE — 99053 MED SERV 10PM-8AM 24 HR FAC: CPT

## 2023-04-29 PROCEDURE — 86360 T CELL ABSOLUTE COUNT/RATIO: CPT

## 2023-04-29 PROCEDURE — 93005 ELECTROCARDIOGRAM TRACING: CPT

## 2023-04-29 PROCEDURE — 87536 HIV-1 QUANT&REVRSE TRNSCRPJ: CPT

## 2023-04-29 PROCEDURE — 71046 X-RAY EXAM CHEST 2 VIEWS: CPT

## 2023-04-29 PROCEDURE — 86359 T CELLS TOTAL COUNT: CPT

## 2023-04-29 PROCEDURE — 36415 COLL VENOUS BLD VENIPUNCTURE: CPT

## 2023-04-29 PROCEDURE — 84702 CHORIONIC GONADOTROPIN TEST: CPT

## 2023-04-29 PROCEDURE — 83036 HEMOGLOBIN GLYCOSYLATED A1C: CPT

## 2023-04-29 PROCEDURE — 84443 ASSAY THYROID STIM HORMONE: CPT

## 2023-04-29 PROCEDURE — 85025 COMPLETE CBC W/AUTO DIFF WBC: CPT

## 2023-04-29 PROCEDURE — 99285 EMERGENCY DEPT VISIT HI MDM: CPT

## 2023-04-29 PROCEDURE — 90792 PSYCH DIAG EVAL W/MED SRVCS: CPT | Mod: 95

## 2023-04-29 PROCEDURE — 86357 NK CELLS TOTAL COUNT: CPT

## 2023-04-29 PROCEDURE — 80061 LIPID PANEL: CPT

## 2023-04-29 RX ORDER — TRAZODONE HCL 50 MG
50 TABLET ORAL AT BEDTIME
Refills: 0 | Status: DISCONTINUED | OUTPATIENT
Start: 2023-04-29 | End: 2023-04-30

## 2023-04-29 RX ORDER — ACETAMINOPHEN 500 MG
650 TABLET ORAL EVERY 6 HOURS
Refills: 0 | Status: DISCONTINUED | OUTPATIENT
Start: 2023-04-29 | End: 2023-05-16

## 2023-04-29 RX ORDER — HALOPERIDOL DECANOATE 100 MG/ML
5 INJECTION INTRAMUSCULAR EVERY 6 HOURS
Refills: 0 | Status: DISCONTINUED | OUTPATIENT
Start: 2023-04-29 | End: 2023-05-01

## 2023-04-29 RX ORDER — DIAZEPAM 5 MG
5 TABLET ORAL ONCE
Refills: 0 | Status: DISCONTINUED | OUTPATIENT
Start: 2023-04-29 | End: 2023-04-29

## 2023-04-29 RX ORDER — BICTEGRAVIR SODIUM, EMTRICITABINE, AND TENOFOVIR ALAFENAMIDE FUMARATE 30; 120; 15 MG/1; MG/1; MG/1
1 TABLET ORAL ONCE
Refills: 0 | Status: COMPLETED | OUTPATIENT
Start: 2023-04-29 | End: 2023-04-29

## 2023-04-29 RX ORDER — ESCITALOPRAM OXALATE 10 MG/1
5 TABLET, FILM COATED ORAL DAILY
Refills: 0 | Status: DISCONTINUED | OUTPATIENT
Start: 2023-04-29 | End: 2023-05-01

## 2023-04-29 RX ORDER — HYDROXYZINE HCL 10 MG
50 TABLET ORAL EVERY 6 HOURS
Refills: 0 | Status: DISCONTINUED | OUTPATIENT
Start: 2023-04-29 | End: 2023-05-16

## 2023-04-29 RX ORDER — BICTEGRAVIR SODIUM, EMTRICITABINE, AND TENOFOVIR ALAFENAMIDE FUMARATE 30; 120; 15 MG/1; MG/1; MG/1
1 TABLET ORAL DAILY
Refills: 0 | Status: DISCONTINUED | OUTPATIENT
Start: 2023-04-29 | End: 2023-05-16

## 2023-04-29 RX ADMIN — Medication 650 MILLIGRAM(S): at 22:06

## 2023-04-29 RX ADMIN — Medication 50 MILLIGRAM(S): at 21:28

## 2023-04-29 RX ADMIN — Medication 5 MILLIGRAM(S): at 04:44

## 2023-04-29 RX ADMIN — Medication 650 MILLIGRAM(S): at 21:27

## 2023-04-29 NOTE — ED PROVIDER NOTE - PHYSICAL EXAMINATION
Physical Exam    Vital Signs: I have reviewed the initial vital signs.  Constitutional: well-appearing, appears stated age  Eyes: PERRLA, and symmetrical lids.  ENT: Neck supple with no adenopathy, moist MM.  Cardiovascular: regular rate, regular rhythm, well-perfused extremities  Respiratory: unlabored respiratory effort, clear to auscultation bilaterally  Gastrointestinal: soft, non-tender abdomen, no pulsatile mass  Musculoskeletal: supple neck, no lower extremity edema  Integumentary: warm, dry, no rash  Neurologic: awake, alert, cranial nerves II-XII grossly intact, extremities’ motor and sensory functions grossly intact  Psychiatric: A&Ox3, appropriate mood, appropriate affect

## 2023-04-29 NOTE — BH PATIENT PROFILE - FALL HARM RISK - UNIVERSAL INTERVENTIONS
Bed in lowest position, wheels locked, appropriate side rails in place/Call bell, personal items and telephone in reach/Instruct patient to call for assistance before getting out of bed or chair/Non-slip footwear when patient is out of bed/[place] to call system/Physically safe environment - no spills, clutter or unnecessary equipment/Purposeful Proactive Rounding/Room/bathroom lighting operational, light cord in reach Bed in lowest position, wheels locked, appropriate side rails in place/Call bell, personal items and telephone in reach/Instruct patient to call for assistance before getting out of bed or chair/Non-slip footwear when patient is out of bed/[place] to call system/Physically safe environment - no spills, clutter or unnecessary equipment/Purposeful Proactive Rounding/Room/bathroom lighting operational, light cord in reach Bed in lowest position, wheels locked, appropriate side rails in place/Call bell, personal items and telephone in reach/Instruct patient to call for assistance before getting out of bed or chair/Non-slip footwear when patient is out of bed/[address] to call system/Physically safe environment - no spills, clutter or unnecessary equipment/Purposeful Proactive Rounding/Room/bathroom lighting operational, light cord in reach Bed in lowest position, wheels locked, appropriate side rails in place/Call bell, personal items and telephone in reach/Instruct patient to call for assistance before getting out of bed or chair/Non-slip footwear when patient is out of bed/[place] to call system/Physically safe environment - no spills, clutter or unnecessary equipment/Purposeful Proactive Rounding/Room/bathroom lighting operational, light cord in reach

## 2023-04-29 NOTE — ED BEHAVIORAL HEALTH PROGRESS NOTE - CASE SUMMARY/FORMULATION (CLEARLY DOCUMENT RATIONALE FOR DISPOSITION CHANGE)
36 yo male, single , noncaregiver, currently domiciled at transitional housing, working side jobs with pphx of SIMD, SIPD, Cocaine Use Disorder, AUD, Cannabis Use Disorder, multiple prior psychiatric hospitalizations (last in March 2023), hx of suicidality, no known legal/violence hx, with PMH of HIV (on Biktarvy), who went over to his ex-wife's house night of 4/28/23, engaged in a drug binge (cocaine, heroin; UTOX + benzo, cocaine), with her and her current , passed out at one point in time, and then subsequently woke up in a dumpster. Patient alleges that he was placed into the dumpster by his ex-wife and her  hence his emotional reaction is so that he verbalized homicidal ideation towards both with subsequent intent to kill himself after he kills them as he also still loves his ex-wife. Patient at this time meets need for inpatient psychiatric admission for stabilization given recent relapse on drugs leading to traumatic event/significant trigger leading to verbalizing both active suicidal and homicidal ideation thus posing imminent risk to self and others. Patient is help seeking and his thoughts are distressing to him, thus he signed 9.13

## 2023-04-29 NOTE — ED BEHAVIORAL HEALTH ASSESSMENT NOTE - DETAILS
Hi towards ex-wife and her  See HPI; additionally has jumped in front of cares before; number of suicide attempts unclear details not elicited via phone n/a

## 2023-04-29 NOTE — BH INPATIENT PSYCHIATRY ASSESSMENT NOTE - NSBHCRANIAL_PSY_ALL_CORE
Marti Knox(Attending) Recognizes 2 fingers or can read (II)/Smiles, shows teeth, opens mouth, sticks out tongue (V, VII, XI)/Normal speech (IX, X, XII)/Extraocular Eye Movement Intact  (III, IV, VI)/Shoulder shrug (XI)/Hearing intact (VIII)

## 2023-04-29 NOTE — ED BEHAVIORAL HEALTH PROGRESS NOTE - DETAILS
self referred  on call received hand off  see HPI; reports wanting to kill himself after killing ex-wife  homicidal ideation towards ex-wife and her current  after Patient reported that after a night of drug binge together, they dumped him in the dumpsters where he subsequently woke up. He states he now plans to kill them. He states he plans to kill himself after. Dr Paz on call accepted Patient, Unit given verbal hand off as per Dr Paz's request at [phone]

## 2023-04-29 NOTE — BH INPATIENT PSYCHIATRY ASSESSMENT NOTE - DESCRIPTION
Grew up in [place], has lived in [place] since 2012  As per chart review, States father had history of heavy alcohol use, depression, suicide attempts. Committed suicide by hanging in 2018.  Lives in [place] with friends, states he feels safe at home and that it is a good living situation. Currently unemployed and has been for several years, states he is not ready to go back to work. Currently receiving public assistance. Highest level of education is high school. Denies access to weapons. Denies Jain affiliation. Has 3 children, 3 year old daughter living in [place] with mother and step father, and 2 older children who both live in [place]. States he would like to leave [place] but has not made plans to do so.  prior incarcerations Grew up in [place], has lived in [place] since 2012  As per chart review, States father had history of heavy alcohol use, depression, suicide attempts. Committed suicide by hanging in 2018.  Lives in [place] with friends, states he feels safe at home and that it is a good living situation. Currently unemployed and has been for several years, states he is not ready to go back to work. Currently receiving public assistance. Highest level of education is high school. Denies access to weapons. Denies Anabaptism affiliation. Has 3 children, 3 year old daughter living in [place] with mother and step father, and 2 older children who both live in [place]. States he would like to leave [place] but has not made plans to do so.  prior incarcerations Grew up in [place], has lived in [place] since 2012  As per chart review, States father had history of heavy alcohol use, depression, suicide attempts. Committed suicide by hanging in 2018.  Lives in [place] with friends, states he feels safe at home and that it is a good living situation. Currently unemployed and has been for several years, states he is not ready to go back to work. Currently receiving public assistance. Highest level of education is high school. Denies access to weapons. Denies Orthodoxy affiliation. Has 3 children, 3 year old daughter living in [place] with mother and step father, and 2 older children who both live in [place]. States he would like to leave [place] but has not made plans to do so.  prior incarcerations Grew up in [place], has lived in [place] since 2012  As per chart review, States father had history of heavy alcohol use, depression, suicide attempts. Committed suicide by hanging in 2018.  Lives in [place] with friends, states he feels safe at home and that it is a good living situation. Currently unemployed and has been for several years, states he is not ready to go back to work. Currently receiving public assistance. Highest level of education is high school. Denies access to weapons. Denies Taoist affiliation. Has 3 children, 3 year old daughter living in [place] with mother and step father, and 2 older children who both live in [place]. States he would like to leave [place] but has not made plans to do so.  prior incarcerations

## 2023-04-29 NOTE — BH INPATIENT PSYCHIATRY ASSESSMENT NOTE - NSBHATTESTBILLING_PSY_A_CORE
57829-[id number] OBS or IP - moderate complexity OR 35-49 mins [id number]-Kflhugekct OBS or IP - moderate complexity OR 35-49 mins 33520-Xmuufjtnnf OBS or IP - moderate complexity OR 35-49 mins 61742-Omkvpkfubc OBS or IP - moderate complexity OR 35-49 mins

## 2023-04-29 NOTE — ED PROVIDER NOTE - ATTENDING APP SHARED VISIT CONTRIBUTION OF CARE
Patient presenting to the E.D. expressing suicidal ideation, unable to contract for safety, requiring admission to in patient psychiatry.

## 2023-04-29 NOTE — ED BEHAVIORAL HEALTH PROGRESS NOTE - NSTXRELFACTOR_PSY_ALL_CORE
PRIMARY DIAGNOSIS: GASTROENTERITIS    OUTPATIENT/OBSERVATION GOALS TO BE MET BEFORE DISCHARGE  1. Orthostatic performed: N/A    2. Tolerating PO fluid and/or antibiotics (if applicable): Yes--appetite improving, but no lunch eaten.     3. Nausea/Vomiting/Diarrhea symptoms improved: Yes--no nausea /vomiting.     4. Pain status: Improved-controlled with oral pain medications. Taking dilauid, atarax, tylenol,     5. Return to near baseline physical activity: No--feels very tired, worn out, no energy.     Discharge Planner Nurse   Safe discharge environment identified: Yes  Barriers to discharge: Yes--electrolytes still being replaced. K and phos were both low and replaced.        Entered by: Shital Camargo RN 01/02/2023 1:11 PM     Please review provider order for any additional goals.   Nurse to notify provider when observation goals have been met and patient is ready for discharge.   Non-compliant or not receiving treatment

## 2023-04-29 NOTE — ED ADULT TRIAGE NOTE - CHIEF COMPLAINT QUOTE
Pt walked in c/o suicidal ideation. States "I need to see psych for my mental". When asked about a plan, pt states "anything right now". Tearful in triage.

## 2023-04-29 NOTE — BH INPATIENT PSYCHIATRY ASSESSMENT NOTE - RISK ASSESSMENT
risk factors of previous psychiatric dx and presenting symptoms are largely mitigated by his lack of previous SA hx, his attitude against suicide, his ability to cope with stress, frustration tolerance, his help seeking behavior and denial of active SI

## 2023-04-29 NOTE — BH INPATIENT PSYCHIATRY ASSESSMENT NOTE - CURRENT MEDICATION
MEDICATIONS  (STANDING):    MEDICATIONS  (PRN):   MEDICATIONS  (STANDING):  escitalopram 5 milliGRAM(s) Oral daily    MEDICATIONS  (PRN):  haloperidol     Tablet 5 milliGRAM(s) Oral every 6 hours PRN aggression  LORazepam     Tablet 2 milliGRAM(s) Oral every 6 hours PRN aggitation  traZODone 50 milliGRAM(s) Oral at bedtime PRN insomnia

## 2023-04-29 NOTE — ED BEHAVIORAL HEALTH PROGRESS NOTE - RISK ASSESSMENT
Chronic risk factors: single, male gender, ongoing substance abuse; psychosocial stressors; significant medical issue (HIV); hx of SA/SI; noncompliance with mental health services; hx of mood disorder. Protective factors: young; no chronic pain; medication and treatment compliant with his Biktarvy; no known legal issues; motivated for help; articulate. Acute risk factors identified: recent relapse on drugs leading to traumatic event/significant trigger leading to verbalizing both active suicidal and homicidal ideation thus posing imminent risk to self and others.

## 2023-04-29 NOTE — ED BEHAVIORAL HEALTH PROGRESS NOTE - ADDITIONAL DETAILS ALL
homicidal ideation towards ex-wife and her current  after Patient reported that after a night of drug binge together, they dumped him in the dumpsters where he subsequently woke up. He states he now plans to kill them. He states he plans to kill himself after.

## 2023-04-29 NOTE — ED PROVIDER NOTE - OBJECTIVE STATEMENT
38 yo m pmhx sig for schizoaffective and bipolar pw gradual onset of SI and difficulty concentrating, during the interview pt is in emotional distress crying stating "I just can't take it anymore, it going to happen", no HI. Pt cooperative and following commands. Admits to cocaine use 5 hr pta.    I have reviewed available current nursing and previous documentation of past medical, surgical, family, and/or social history.

## 2023-04-29 NOTE — ED BEHAVIORAL HEALTH PROGRESS NOTE - SUMMARY
recent relapse on drugs leading to traumatic event/significant trigger leading to verbalizing both active suicidal and homicidal ideation thus posing imminent risk to self and others.   PLAN:  -Admit 9.13 voluntarily, awaiting bed  -Patient will need Tarasoff Warning if patient were to be discharged   -Continue Home Medication which includes: denies being on any psychiatric medications. States taking Biktarvy 50/200/25mg tab PO qd (compliant with it)   - heroin withdrawal/craving: methadone 10mg PO TID PRN   -PRNs: Haldol 5/Ativan 2/Benadryl 50mg q6hr prn severe agitation; Hydroxyzine 25mg q6hr prn anxiety; Tylenol 650mg q6hr pain; monitor QTc, monitor for sedation, attempt verbal redirection   -Labs:   ---CBC: unremarkable   ---CMP: AST 63/ALT 35   ---EKG: completed   ---BAL: neg   -COVID: Screen:-denied; PCR-***;   -Recommend COVID PCR, UDS, CBC DIff

## 2023-04-29 NOTE — ED BEHAVIORAL HEALTH ASSESSMENT NOTE - VIOLENCE RISK FACTORS:
Violent ideation/threat/speech/Substance abuse/Affective dysregulation/Impulsivity/Noncompliance with treatment/Irritability

## 2023-04-29 NOTE — BH INPATIENT PSYCHIATRY ASSESSMENT NOTE - OTHER PAST PSYCHIATRIC HISTORY (INCLUDE DETAILS REGARDING ONSET, COURSE OF ILLNESS, INPATIENT/OUTPATIENT TREATMENT)
Multiple prior admissions for SIMD and SIPD, prior suicide attempts, prior NSSIB  As per chart review, "LIZETT [id number]  Suicidal Ideation: 43x between 2017 and 3/2022  Self Harm: 4x between 2018-8/2019  Flags: high utilization inptER, readmission, EPHRAIM, treatment engagement  Dx: Schizoaffective disorder, polysubstance-use (cocaine, opioid, cannabis, alcohol, tobacco, sedative), SIPD, PTSD, Panic Disorder  PMH: HIV, Herpes Zoster, T1DM, and many more.  Medications: Escitalopram (last 2/2022), gabapentin (12/2021), trazodone, risperidone (9/2021), quetiapine 300 (5/2021); multiple other short trials.   OP: [facility] with 9x visits for MDD last in 10/2021; multiple others.  ED: 21x MH last here on 7/2021; 2x ROA (last on 11/2020)  IP: 28x MH last at [facility] 1/2022; 9x ROA (last [facility] 3/2022)"    Patient know to the unit  Hx of drug use with intermittent sobriety and relapses.  Hx of depressive episodes in part due to psychosocial stressors  Multiple prior admissions for SIMD and SIPD, prior suicide attempts, prior NSSIB  As per chart review, "LIZETT [id number]  Suicidal Ideation: 43x between 2017 and 3/2022  Self Harm: 4x between 2018-8/2019  Flags: high utilization inptER, readmission, EPHRAIM, treatment engagement  Dx: Schizoaffective disorder, polysubstance-use (cocaine, opioid, cannabis, alcohol, tobacco, sedative), SIPD, PTSD, Panic Disorder  PMH: HIV, Herpes Zoster, T1DM, and many more.  Medications: Escitalopram (last 2/2022), gabapentin (12/2021), trazodone, risperidone (9/2021), quetiapine 300 (5/2021); multiple other short trials.   OP: [facility] with 9x visits for MDD last in 10/2021; multiple others.  ED: 21x MH last here on 7/2021; 2x ROA (last on 11/2020)  IP: 28x MH last at [facility] 1/2022; 9x ROA (last [facility] 3/2022)"    Patient know to the unit  Hx of drug use with intermittent sobriety and relapses.  Hx of depressive episodes in part due to psychosocial stressors  Multiple prior admissions for SIMD and SIPD, prior suicide attempts, prior NSSIB  As per chart review, "LIZETT [id number]  Suicidal Ideation: 43x between 2017 and 3/2022  Self Harm: 4x between 2018-8/2019  Flags: high utilization inptER, readmission, EPHRAIM, treatment engagement  Dx: Schizoaffective disorder, polysubstance-use (cocaine, opioid, cannabis, alcohol, tobacco, sedative), SIPD, PTSD, Panic Disorder  PMH: HIV, Herpes Zoster, T1DM, and many more.  Medications: Escitalopram (last 2/2022), gabapentin (12/2021), trazodone, risperidone (9/2021), quetiapine 300 (5/2021); multiple other short trials.   OP: [facility] with 9x visits for MDD last in 10/2021; multiple others.  ED: 21x MH last here on 7/2021; 2x ROA (last on 11/2020)  IP: 28x MH last at [facility] 1/2022; 9x ROA (last [facility] 3/2022)"    Patient know to the unit  Hx of drug use with intermittent sobriety and relapses.  Hx of depressive episodes in part due to psychosocial stressors  Multiple prior admissions for SIMD and SIPD, prior suicide attempts, prior NSSIB  As per chart review, "LIZETT [id number]  Suicidal Ideation: 43x between 2017 and 3/2022  Self Harm: 4x between 2018-8/2019  Flags: high utilization inptER, readmission, EPHRAIM, treatment engagement  Dx: Schizoaffective disorder, polysubstance-use (cocaine, opioid, cannabis, alcohol, tobacco, sedative), SIPD, PTSD, Panic Disorder  PMH: HIV, Herpes Zoster, T1DM, and many more.  Medications: Escitalopram (last 2/2022), gabapentin (12/2021), trazodone, risperidone (9/2021), quetiapine 300 (5/2021); multiple other short trials.   OP: [facility] with 9x visits for MDD last in 10/2021; multiple others.  ED: 21x MH last here on 7/2021; 2x ROA (last on 11/2020)  IP: 28x MH last at [facility] 1/2022; 9x ROA (last [facility] 3/2022)"    Patient know to the unit  Hx of drug use with intermittent sobriety and relapses.  Hx of depressive episodes in part due to psychosocial stressors
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
## 2023-04-29 NOTE — BH INPATIENT PSYCHIATRY ASSESSMENT NOTE - NSBHATTESTCOMMENTATTENDFT_PSY_A_CORE
I interviewed patient with the resident Dr. Gonzalez. Patient presents sad-looking, depressed, upset with his relapse and subsequent events leading to this admission. He is looking for help and is motivated for abstinence. Agree with resident's assessment and plan.

## 2023-04-29 NOTE — ED BEHAVIORAL HEALTH ASSESSMENT NOTE - RISK ASSESSMENT
RF: SI/HI, past hospitalization, not receiving treatment, noncompliance  PF: help seeking, no reported access to gun  RM: Admit

## 2023-04-29 NOTE — ED BEHAVIORAL HEALTH ASSESSMENT NOTE - NSPRESENTSXS_PSY_ALL_CORE
Depressed mood/Anhedonia/Hopelessness or despair/Command hallucinations to hurt self/Refusal or inability to complete safety plan

## 2023-04-29 NOTE — BH INPATIENT PSYCHIATRY ASSESSMENT NOTE - NSSUICPROTFACT_PSY_ALL_CORE
Cultural, spiritual and/or moral attitudes against suicide/Ability to cope with stress/Frustration tolerance/Other
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
## 2023-04-29 NOTE — ED PROVIDER NOTE - CLINICAL SUMMARY MEDICAL DECISION MAKING FREE TEXT BOX
patient presenting to the ED w/ SI - seen by psychiatry and accepted to their service for further evaluation.

## 2023-04-29 NOTE — ED BEHAVIORAL HEALTH ASSESSMENT NOTE - NSBHSAALC_PSY_A_CORE FT
denied recent but per last chart review, Claims last use 3 days ago, uses $100 a week on liquor, does not know amount; reports "getting the shakes" but denies other withdrawal

## 2023-04-29 NOTE — BH INPATIENT PSYCHIATRY ASSESSMENT NOTE - PAST PSYCHOTROPIC MEDICATION
up to lexapro 15mg po daily, prazosin 2mg qhs, Trazadone 50mg qhs prn    previously Seroquel, prozac, risperdal
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
## 2023-04-29 NOTE — ED BEHAVIORAL HEALTH PROGRESS NOTE - PSYCHIATRIC ISSUES AND PLAN (INCLUDE STANDING AND PRN MEDICATION)
Seroquel 50mg PO q6hrs PRN for anxiety, Seroquel 100mg PO qhs for sleep/mood for now. Giving time for all substances to totally leave Pt's system first

## 2023-04-30 DIAGNOSIS — F11.20 OPIOID DEPENDENCE, UNCOMPLICATED: ICD-10-CM

## 2023-04-30 LAB
ALBUMIN SERPL ELPH-MCNC: 3.9 G/DL — SIGNIFICANT CHANGE UP (ref 3.5–5.2)
ALP SERPL-CCNC: 97 U/L — SIGNIFICANT CHANGE UP (ref 30–115)
ALT FLD-CCNC: 22 U/L — SIGNIFICANT CHANGE UP (ref 0–41)
ANION GAP SERPL CALC-SCNC: 6 MMOL/L — LOW (ref 7–14)
AST SERPL-CCNC: 31 U/L — SIGNIFICANT CHANGE UP (ref 0–41)
BASOPHILS # BLD AUTO: 0.04 K/UL — SIGNIFICANT CHANGE UP (ref 0–0.2)
BASOPHILS NFR BLD AUTO: 1 % — SIGNIFICANT CHANGE UP (ref 0–1)
BILIRUB SERPL-MCNC: 0.3 MG/DL — SIGNIFICANT CHANGE UP (ref 0.2–1.2)
BUN SERPL-MCNC: 18 MG/DL — SIGNIFICANT CHANGE UP (ref 10–20)
CALCIUM SERPL-MCNC: 9 MG/DL — SIGNIFICANT CHANGE UP (ref 8.4–10.5)
CHLORIDE SERPL-SCNC: 103 MMOL/L — SIGNIFICANT CHANGE UP (ref 98–110)
CHOLEST SERPL-MCNC: 240 MG/DL — HIGH
CO2 SERPL-SCNC: 29 MMOL/L — SIGNIFICANT CHANGE UP (ref 17–32)
CREAT SERPL-MCNC: 0.7 MG/DL — SIGNIFICANT CHANGE UP (ref 0.7–1.5)
EGFR: 122 ML/MIN/1.73M2 — SIGNIFICANT CHANGE UP
EOSINOPHIL # BLD AUTO: 0.22 K/UL — SIGNIFICANT CHANGE UP (ref 0–0.7)
EOSINOPHIL NFR BLD AUTO: 5.7 % — SIGNIFICANT CHANGE UP (ref 0–8)
GLUCOSE SERPL-MCNC: 75 MG/DL — SIGNIFICANT CHANGE UP (ref 70–99)
HCG SERPL-ACNC: <1 MIU/ML — SIGNIFICANT CHANGE UP
HCT VFR BLD CALC: 43.7 % — SIGNIFICANT CHANGE UP (ref 42–52)
HDLC SERPL-MCNC: 64 MG/DL — SIGNIFICANT CHANGE UP
HGB BLD-MCNC: 14.1 G/DL — SIGNIFICANT CHANGE UP (ref 14–18)
IMM GRANULOCYTES NFR BLD AUTO: 0.3 % — SIGNIFICANT CHANGE UP (ref 0.1–0.3)
LIPID PNL WITH DIRECT LDL SERPL: 155 MG/DL — HIGH
LYMPHOCYTES # BLD AUTO: 2.1 K/UL — SIGNIFICANT CHANGE UP (ref 1.2–3.4)
LYMPHOCYTES # BLD AUTO: 54.7 % — HIGH (ref 20.5–51.1)
MCHC RBC-ENTMCNC: 29 PG — SIGNIFICANT CHANGE UP (ref 27–31)
MCHC RBC-ENTMCNC: 32.3 G/DL — SIGNIFICANT CHANGE UP (ref 32–37)
MCV RBC AUTO: 89.9 FL — SIGNIFICANT CHANGE UP (ref 80–94)
MONOCYTES # BLD AUTO: 0.49 K/UL — SIGNIFICANT CHANGE UP (ref 0.1–0.6)
MONOCYTES NFR BLD AUTO: 12.8 % — HIGH (ref 1.7–9.3)
NEUTROPHILS # BLD AUTO: 0.98 K/UL — LOW (ref 1.4–6.5)
NEUTROPHILS NFR BLD AUTO: 25.5 % — LOW (ref 42.2–75.2)
NON HDL CHOLESTEROL: 176 MG/DL — HIGH
NRBC # BLD: 0 /100 WBCS — SIGNIFICANT CHANGE UP (ref 0–0)
PLATELET # BLD AUTO: 301 K/UL — SIGNIFICANT CHANGE UP (ref 130–400)
PMV BLD: 10 FL — SIGNIFICANT CHANGE UP (ref 7.4–10.4)
POTASSIUM SERPL-MCNC: 4.7 MMOL/L — SIGNIFICANT CHANGE UP (ref 3.5–5)
POTASSIUM SERPL-SCNC: 4.7 MMOL/L — SIGNIFICANT CHANGE UP (ref 3.5–5)
PROT SERPL-MCNC: 7.7 G/DL — SIGNIFICANT CHANGE UP (ref 6–8)
RBC # BLD: 4.86 M/UL — SIGNIFICANT CHANGE UP (ref 4.7–6.1)
RBC # FLD: 14.4 % — SIGNIFICANT CHANGE UP (ref 11.5–14.5)
SODIUM SERPL-SCNC: 138 MMOL/L — SIGNIFICANT CHANGE UP (ref 135–146)
TRIGL SERPL-MCNC: 104 MG/DL — SIGNIFICANT CHANGE UP
WBC # BLD: 3.84 K/UL — LOW (ref 4.8–10.8)
WBC # FLD AUTO: 3.84 K/UL — LOW (ref 4.8–10.8)

## 2023-04-30 PROCEDURE — 99231 SBSQ HOSP IP/OBS SF/LOW 25: CPT | Mod: GC

## 2023-04-30 PROCEDURE — 99222 1ST HOSP IP/OBS MODERATE 55: CPT

## 2023-04-30 RX ORDER — TRAZODONE HCL 50 MG
50 TABLET ORAL AT BEDTIME
Refills: 0 | Status: DISCONTINUED | OUTPATIENT
Start: 2023-04-30 | End: 2023-05-02

## 2023-04-30 RX ORDER — IBUPROFEN 200 MG
400 TABLET ORAL EVERY 8 HOURS
Refills: 0 | Status: DISCONTINUED | OUTPATIENT
Start: 2023-04-30 | End: 2023-05-16

## 2023-04-30 RX ADMIN — Medication 400 MILLIGRAM(S): at 11:13

## 2023-04-30 RX ADMIN — ESCITALOPRAM OXALATE 5 MILLIGRAM(S): 10 TABLET, FILM COATED ORAL at 08:16

## 2023-04-30 RX ADMIN — Medication 50 MILLIGRAM(S): at 20:03

## 2023-04-30 RX ADMIN — BICTEGRAVIR SODIUM, EMTRICITABINE, AND TENOFOVIR ALAFENAMIDE FUMARATE 1 TABLET(S): 30; 120; 15 TABLET ORAL at 08:16

## 2023-04-30 NOTE — BH SAFETY PLAN - SUICIDE PREVENTION LIFELINE PHONES
Suicide Prevention [facility] Phone: [phone] TALK ([phone] Suicide Prevention [facility] Phone: [phone]- TALK ([phone]) Suicide Prevention [facility] Phone: [phone]- TALK ([phone]) Suicide Prevention [facility] Phone: [phone]- TALK ([phone])

## 2023-04-30 NOTE — BH SAFETY PLAN - CRISIS CLINICIAN NAME 1
Staff at [place] [facility] Staff at [place] [facility] Staff at [place] [facility] Staff at [place] [facility]

## 2023-04-30 NOTE — BH INPATIENT PSYCHIATRY PROGRESS NOTE - NSBHCHARTREVIEWVS_PSY_A_CORE FT
Vital Signs Last 24 Hrs  T(C): 35.8 (04-29-23 @ 17:01), Max: 35.8 (04-29-23 @ 17:01)  T(F): 96.4 (04-29-23 @ 17:01), Max: 96.4 (04-29-23 @ 17:01)  HR: 88 (04-30-23 @ 08:12) (63 - 88)  BP: 102/63 (04-30-23 @ 08:12) (102/63 - 102/63)  BP(mean): --  RR: 18 (04-30-23 @ 08:12) (18 - 18)  SpO2: --     Vital Signs Last 24 Hrs  T(C): 35.8 (04-29-23 @ 17:01), Max: 35.8 (04-29-23 @ 17:01)  T(F): 96.4 (04-29-23 @ 17:01), Max: 96.4 (04-29-23 @ 17:01)  HR: 67 (04-30-23 @ 15:45) (63 - 88)  BP: 103/60 (04-30-23 @ 15:45) (102/63 - 103/60)  BP(mean): --  RR: 16 (04-30-23 @ 15:45) (16 - 18)  SpO2: --

## 2023-04-30 NOTE — BH SAFETY PLAN - ENVIRONMENT SAFETY 1:
I feel that my environment would be safer if I stopped using and stayed in a program, perhaps tried sober living and repair my relationship with my daughter.
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
## 2023-04-30 NOTE — BH INPATIENT PSYCHIATRY PROGRESS NOTE - NSBHMETABOLIC_PSY_ALL_CORE_FT
BMI: BMI (kg/m2): 24.2 (04-29-23 @ 17:01)  HbA1c:   Glucose:   BP: 102/63 (04-30-23 @ 08:12) (102/63 - 102/63)  Lipid Panel: Date/Time: 04-30-23 @ 08:00  Cholesterol, Serum: 240  Direct LDL: --  HDL Cholesterol, Serum: 64  Total Cholesterol/HDL Ration Measurement: --  Triglycerides, Serum: 104   BMI: BMI (kg/m2): 24.2 (04-29-23 @ 17:01)  HbA1c:   Glucose:   BP: 103/60 (04-30-23 @ 15:45) (102/63 - 103/60)  Lipid Panel: Date/Time: 04-30-23 @ 08:00  Cholesterol, Serum: 240  Direct LDL: --  HDL Cholesterol, Serum: 64  Total Cholesterol/HDL Ration Measurement: --  Triglycerides, Serum: 104

## 2023-04-30 NOTE — BH SAFETY PLAN - LOCAL URGENT CARE NAME
[facility] ([facility]) [facility] ([facility]) [facility] ([facility]) [facility] ([facility])

## 2023-04-30 NOTE — BH INPATIENT PSYCHIATRY PROGRESS NOTE - CURRENT MEDICATION
MEDICATIONS  (STANDING):  bictegravir 50 mG/emtricitabine 200 mG/tenofovir alafenamide 25 mG (BIKTARVY) 1 Tablet(s) Oral daily  escitalopram 5 milliGRAM(s) Oral daily    MEDICATIONS  (PRN):  acetaminophen     Tablet .. 650 milliGRAM(s) Oral every 6 hours PRN Temp greater or equal to 38C (100.4F), Mild Pain (1 - 3)  haloperidol     Tablet 5 milliGRAM(s) Oral every 6 hours PRN aggression  hydrOXYzine hydrochloride 50 milliGRAM(s) Oral every 6 hours PRN anxiety/sleep  ibuprofen  Tablet. 400 milliGRAM(s) Oral every 8 hours PRN Moderate Pain (4 - 6)  LORazepam     Tablet 2 milliGRAM(s) Oral every 6 hours PRN aggitation  traZODone 50 milliGRAM(s) Oral at bedtime PRN insomnia   MEDICATIONS  (STANDING):  bictegravir 50 mG/emtricitabine 200 mG/tenofovir alafenamide 25 mG (BIKTARVY) 1 Tablet(s) Oral daily  escitalopram 5 milliGRAM(s) Oral daily  traZODone 50 milliGRAM(s) Oral at bedtime    MEDICATIONS  (PRN):  acetaminophen     Tablet .. 650 milliGRAM(s) Oral every 6 hours PRN Temp greater or equal to 38C (100.4F), Mild Pain (1 - 3)  haloperidol     Tablet 5 milliGRAM(s) Oral every 6 hours PRN aggression  hydrOXYzine hydrochloride 50 milliGRAM(s) Oral every 6 hours PRN anxiety/sleep  ibuprofen  Tablet. 400 milliGRAM(s) Oral every 8 hours PRN Moderate Pain (4 - 6)  LORazepam     Tablet 2 milliGRAM(s) Oral every 6 hours PRN aggitation

## 2023-04-30 NOTE — BH INPATIENT PSYCHIATRY PROGRESS NOTE - NSBHASSESSSUMMFT_PSY_ALL_CORE
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 To note patient has multiple prior hospitalization (last in March 2023), numerous previous SA per chart, no previous self harm, no legal/violence hx.     Evaluation today found patient to be calm, cooperative but with sad affect and tearful swollen eyes. Patient reports hx of remission with recent relapse that lead him to overdose and end up in a dumpster. Patient presents now with feelings if depression, feeling betrayed by his ex-wife, thoughts about wanting to kill one of them (ex-wife or new ) so they know how he feels and reports he wants to kill himself afterwards. Patient's presentation appears consistent with adjustment disorder, however, he does have a history of depressive mood and his history of substance use and suicidal and homicidal ideations put him at elevated acute risk which warrants inpatient psychiatric care for safety and stabilization.     Today patient complains of body pain that is disrupting his breathing and sleep.     #mood disorder  - c/w lexapro 5 mg qday  - c/w trazodone 50 mg qhs PRN    #substance use disorder  - f/u CATCH team consult  - consider initiating suboxone and rehab on d/c    #hx of HIV  #neutropenia  - f/u medicine consult    #rib pain  - ibuprofen 400 mg q6h PRN for pain    ***PRNs  for agitation not amenable to verbal redirection, can give Haldol 5mg PO and Ativan 2mg PO q6h, with escalation to IM formulation if patient refuses or a danger to himself or others.  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 To note patient has multiple prior hospitalization (last in March 2023), numerous previous SA per chart, no previous self harm, no legal/violence hx.     Evaluation today found patient to be calm, cooperative but with sad affect and tearful swollen eyes. Patient reports hx of remission with recent relapse that lead him to overdose and end up in a dumpster. Patient presents now with feelings if depression, feeling betrayed by his ex-wife, thoughts about wanting to kill one of them (ex-wife or new ) so they know how he feels and reports he wants to kill himself afterwards. Patient's presentation appears consistent with adjustment disorder, however, he does have a history of depressive mood and his history of substance use and suicidal and homicidal ideations put him at elevated acute risk which warrants inpatient psychiatric care for safety and stabilization.     Today patient complains of body pain that is disrupting his breathing and sleep.     #mood disorder  - c/w lexapro 5 mg qday  - c/w trazodone 50 mg qhs PRN    #substance use disorder  - f/u CATCH team consult  - consider initiating suboxone and rehab on d/c    #hx of HIV  #neutropenia  - f/u medicine consult    #rib pain  - ibuprofen 400 mg q6h PRN for pain    ***PRNs  for agitation not amenable to verbal redirection, can give Haldol 5mg PO and Ativan 2mg PO q6h, with escalation to IM formulation if patient refuses or a danger to himself or others.  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 To note patient has multiple prior hospitalization (last in March 2023), numerous previous SA per chart, no previous self harm, no legal/violence hx.     Evaluation today found patient to be calm, cooperative but with sad affect and tearful swollen eyes. Patient reports hx of remission with recent relapse that lead him to overdose and end up in a dumpster. Patient presents now with feelings if depression, feeling betrayed by his ex-wife, thoughts about wanting to kill one of them (ex-wife or new ) so they know how he feels and reports he wants to kill himself afterwards. Patient's presentation appears consistent with adjustment disorder, however, he does have a history of depressive mood and his history of substance use and suicidal and homicidal ideations put him at elevated acute risk which warrants inpatient psychiatric care for safety and stabilization.     Today patient complains of body pain that is disrupting his breathing and sleep. Will make trazodone standing and f/u recommendations from medicine    #mood disorder  - c/w lexapro 5 mg qday  - make trazodone standing 50 mg qhs     #substance use disorder  - f/u CATCH team consult  - consider initiating suboxone and rehab on d/c    #hx of HIV  #neutropenia  - f/u medicine consult    #rib pain  - ibuprofen 400 mg q6h PRN for pain    ***PRNs  for agitation not amenable to verbal redirection, can give Haldol 5mg PO and Ativan 2mg PO q6h, with escalation to IM formulation if patient refuses or a danger to himself or others.  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 To note patient has multiple prior hospitalization (last in March 2023), numerous previous SA per chart, no previous self harm, no legal/violence hx.     Evaluation today found patient to be calm, cooperative but with sad affect and tearful swollen eyes. Patient reports hx of remission with recent relapse that lead him to overdose and end up in a dumpster. Patient presents now with feelings if depression, feeling betrayed by his ex-wife, thoughts about wanting to kill one of them (ex-wife or new ) so they know how he feels and reports he wants to kill himself afterwards. Patient's presentation appears consistent with adjustment disorder, however, he does have a history of depressive mood and his history of substance use and suicidal and homicidal ideations put him at elevated acute risk which warrants inpatient psychiatric care for safety and stabilization.     Today patient complains of body pain that is disrupting his breathing and sleep. Will make trazodone standing and f/u recommendations from medicine    #mood disorder  - c/w lexapro 5 mg qday  - make trazodone standing 50 mg qhs     #substance use disorder  - f/u CATCH team consult  - consider initiating suboxone and rehab on d/c    #hx of HIV  #neutropenia  - f/u medicine consult    #rib pain  - ibuprofen 400 mg q6h PRN for pain    ***PRNs  for agitation not amenable to verbal redirection, can give Haldol 5mg PO and Ativan 2mg PO q6h, with escalation to IM formulation if patient refuses or a danger to himself or others.

## 2023-04-30 NOTE — CONSULT NOTE ADULT - SUBJECTIVE AND OBJECTIVE BOX
HOSPITALIST CONSULT for IPP History and Physical     ADRIANNE BRADLEY  37y, Male  Allergy: penicillin (Unknown)      Patient other chart:  MRN: [id number]  Per ED note:   38 yo male, currently domiciled at transitional housing, 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 To note patient has multiple prior hospitalization (last in March 2023), numerous previous SA per chart, no previous self harm, no legal/violence hx, no substance use hx.      On interview, patient is tearful and states he doesn’t know how to express what he is feeling. He states last night he was over at his ex-wife's house doing drugs with her and her . He states he relapsed and used cocaine and heroine with them. He states then he woke up in the dumpsters and he makes allegations that they dumped him in the dumpsters. He states he now plans to kill them. He states he plans to kill himself after. He is unable to state an actual plan. He states the only reason he came ot the ED is because he still loves her. Patient is unable to state his current medication but is able to state taking his Biktarvy.      To note, patient discharged on Lexapro 15mg qdaily, prazosin 2mg qHS, trazodone 100mg qHS. Patient was agreeable to rehab but placement did not get completed      ON UNIT:   On encounter patient is calm, coopertive with sad affect and tearful eyes.     Patient reports today that he had been clean since he left the hospital last admission. He reports that he had relayed this to his ex wife who invited him over to celebrate his success. He reports that he cannot stand or bear being around his ex-wife and her new  (reporting a lot of feelings about her new partner being there for his daughter when he was not) and reports that for some unclear reason, he decided that to deal with the situation the best way he knew how: by getting high. He reports that maybe it was just an excuse to get high but he reports that he ended up getting high on heroin, coke and beer (reports that his wife and her  did not know he was going to get high) and he reports that he ended up overdosing. He reports that he woke in a dumpster and was confused. He reports that he spoke to his ex-wife and reports that all she said was "i panicked" without emotions and as a matter of fact. He reports that he felt extremely low as a result, indicated that he felt betrayed and disrespected, and reports that he had the urge to kill one of them just so they know what it feels like to be dead and end up in a dumpster and then reported he would kill himself because ending up in a situation like that makes it "not worth it". Patient did not provide plan for hurting self or others and appeared to be speaking out of frustration. Motivational interviewing and supportive therapy provided. Patient reported that currently he was free of si and reported feeling safe here.  (29 Apr 2023 16:02)    FAMILY HISTORY:    PAST MEDICAL & SURGICAL HISTORY:      SOCIAL HISTORY  Social History:      Home Medications:      ROS  General: Denies fevers, chills, nightsweats, weight loss  HEENT: Denies headache, rhinorrhea, sore throat, eye pain  CV: Denies CP, palpitations  PULM: Denies SOB, cough  GI: Denies abdominal pain, diarrhea  : Denies dysuria, hematuria  MSK: Denies arthralgias  SKIN: Denies rash   NEURO: Denies paresthesias, weakness  PSYCH: Denies depression    VITALS:  T(F): 96.4, Max: 96.4 (04-29-23 @ 17:01)  HR: 88  BP: 102/63  RR: 18Vital Signs Last 24 Hrs  T(C): 35.8 (29 Apr 2023 17:01), Max: 35.8 (29 Apr 2023 17:01)  T(F): 96.4 (29 Apr 2023 17:01), Max: 96.4 (29 Apr 2023 17:01)  HR: 88 (30 Apr 2023 08:12) (63 - 88)  BP: 102/63 (30 Apr 2023 08:12) (102/63 - 102/63)  BP(mean): --  RR: 18 (30 Apr 2023 08:12) (18 - 18)  SpO2: --        PHYSICAL EXAM:  Gen: NAD, resting in bed  HEENT: Normocephalic, atraumatic  Neck: supple, no lymphadenopathy  CV: Regular rate & regular rhythm  Lungs: CTABL no wheeze  Abdomen: Soft, NTND+ BS present  Ext: Warm, well perfused no CCE  Neuro: non focal, awake, CN II-XII intact   Skin: no rash, no erythema  Psych: no SI, HI, Hallucination     TESTS & MEASUREMENTS:                        14.1   3.84  )-----------( 301      ( 30 Apr 2023 08:00 )             43.7     04-30    138  |  103  |  18  ----------------------------<  75  4.7   |  29  |  0.7    Ca    9.0      30 Apr 2023 08:00    TPro  7.7  /  Alb  3.9  /  TBili  0.3  /  DBili  x   /  AST  31  /  ALT  22  /  AlkPhos  97  04-30      LIVER FUNCTIONS - ( 30 Apr 2023 08:00 )  Alb: 3.9 g/dL / Pro: 7.7 g/dL / ALK PHOS: 97 U/L / ALT: 22 U/L / AST: 31 U/L / GGT: x             INFECTIOUS DISEASES TESTING      RADIOLOGY & ADDITIONAL TESTS:  I have personally reviewed the last Chest xray        CARDIOLOGY TESTING      MEDICATIONS  (STANDING):  bictegravir 50 mG/emtricitabine 200 mG/tenofovir alafenamide 25 mG (BIKTARVY) 1 Tablet(s) Oral daily  escitalopram 5 milliGRAM(s) Oral daily  traZODone 50 milliGRAM(s) Oral at bedtime    MEDICATIONS  (PRN):  acetaminophen     Tablet .. 650 milliGRAM(s) Oral every 6 hours PRN Temp greater or equal to 38C (100.4F), Mild Pain (1 - 3)  haloperidol     Tablet 5 milliGRAM(s) Oral every 6 hours PRN aggression  hydrOXYzine hydrochloride 50 milliGRAM(s) Oral every 6 hours PRN anxiety/sleep  ibuprofen  Tablet. 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 Admitted for suicidal ideation. Medicine consulted for medical management    IMPRESSION  - Suicidal Ideations  - HIV  - HLD  - SIMD, SIPD, Cocaine Use Disorder, AUD, Cannabis Use Disorder    PLAN  - Please get HIV viral loads and CD4 count  - Please get EKG and monitor QTc  - Resume HIV med of BIKTARVY  - Consider a trial of lipitor 20mg  - Rest of plan by primary     Thank you for allowing me to participate in the patient's management Reconsult as needed.   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 To note patient has multiple prior hospitalization (last in March 2023), numerous previous SA per chart, no previous self harm, no legal/violence hx, no substance use hx.      On interview, patient is tearful and states he doesn’t know how to express what he is feeling. He states last night he was over at his ex-wife's house doing drugs with her and her . He states he relapsed and used cocaine and heroine with them. He states then he woke up in the dumpsters and he makes allegations that they dumped him in the dumpsters. He states he now plans to kill them. He states he plans to kill himself after. He is unable to state an actual plan. He states the only reason he came ot the ED is because he still loves her. Patient is unable to state his current medication but is able to state taking his Biktarvy.      To note, patient discharged on Lexapro 15mg qdaily, prazosin 2mg qHS, trazodone 100mg qHS. Patient was agreeable to rehab but placement did not get completed      ON UNIT:   On encounter patient is calm, coopertive with sad affect and tearful eyes.     Patient reports today that he had been clean since he left the hospital last admission. He reports that he had relayed this to his ex wife who invited him over to celebrate his success. He reports that he cannot stand or bear being around his ex-wife and her new  (reporting a lot of feelings about her new partner being there for his daughter when he was not) and reports that for some unclear reason, he decided that to deal with the situation the best way he knew how: by getting high. He reports that maybe it was just an excuse to get high but he reports that he ended up getting high on heroin, coke and beer (reports that his wife and her  did not know he was going to get high) and he reports that he ended up overdosing. He reports that he woke in a dumpster and was confused. He reports that he spoke to his ex-wife and reports that all she said was "i panicked" without emotions and as a matter of fact. He reports that he felt extremely low as a result, indicated that he felt betrayed and disrespected, and reports that he had the urge to kill one of them just so they know what it feels like to be dead and end up in a dumpster and then reported he would kill himself because ending up in a situation like that makes it "not worth it". Patient did not provide plan for hurting self or others and appeared to be speaking out of frustration. Motivational interviewing and supportive therapy provided. Patient reported that currently he was free of si and reported feeling safe here.  (29 Apr 2023 16:02)    FAMILY HISTORY:    PAST MEDICAL & SURGICAL HISTORY:      SOCIAL HISTORY  Social History:      Home Medications:      ROS  General: Denies fevers, chills, nightsweats, weight loss  HEENT: Denies headache, rhinorrhea, sore throat, eye pain  CV: Denies CP, palpitations  PULM: Denies SOB, cough  GI: Denies abdominal pain, diarrhea  : Denies dysuria, hematuria  MSK: Denies arthralgias  SKIN: Denies rash   NEURO: Denies paresthesias, weakness  PSYCH: Denies depression    VITALS:  T(F): 96.4, Max: 96.4 (04-29-23 @ 17:01)  HR: 88  BP: 102/63  RR: 18Vital Signs Last 24 Hrs  T(C): 35.8 (29 Apr 2023 17:01), Max: 35.8 (29 Apr 2023 17:01)  T(F): 96.4 (29 Apr 2023 17:01), Max: 96.4 (29 Apr 2023 17:01)  HR: 88 (30 Apr 2023 08:12) (63 - 88)  BP: 102/63 (30 Apr 2023 08:12) (102/63 - 102/63)  BP(mean): --  RR: 18 (30 Apr 2023 08:12) (18 - 18)  SpO2: --        PHYSICAL EXAM:  Gen: NAD, resting in bed  HEENT: Normocephalic, atraumatic  Neck: supple, no lymphadenopathy  CV: Regular rate & regular rhythm  Lungs: CTABL no wheeze  Abdomen: Soft, NTND+ BS present  Ext: Warm, well perfused no CCE  Neuro: non focal, awake, CN II-XII intact   Skin: no rash, no erythema  Psych: no SI, HI, Hallucination     TESTS & MEASUREMENTS:                        14.1   3.84  )-----------( 301      ( 30 Apr 2023 08:00 )             43.7     04-30    138  |  103  |  18  ----------------------------<  75  4.7   |  29  |  0.7    Ca    9.0      30 Apr 2023 08:00    TPro  7.7  /  Alb  3.9  /  TBili  0.3  /  DBili  x   /  AST  31  /  ALT  22  /  AlkPhos  97  04-30      LIVER FUNCTIONS - ( 30 Apr 2023 08:00 )  Alb: 3.9 g/dL / Pro: 7.7 g/dL / ALK PHOS: 97 U/L / ALT: 22 U/L / AST: 31 U/L / GGT: x             INFECTIOUS DISEASES TESTING      RADIOLOGY & ADDITIONAL TESTS:  I have personally reviewed the last Chest xray        CARDIOLOGY TESTING      MEDICATIONS  (STANDING):  bictegravir 50 mG/emtricitabine 200 mG/tenofovir alafenamide 25 mG (BIKTARVY) 1 Tablet(s) Oral daily  escitalopram 5 milliGRAM(s) Oral daily  traZODone 50 milliGRAM(s) Oral at bedtime    MEDICATIONS  (PRN):  acetaminophen     Tablet .. 650 milliGRAM(s) Oral every 6 hours PRN Temp greater or equal to 38C (100.4F), Mild Pain (1 - 3)  haloperidol     Tablet 5 milliGRAM(s) Oral every 6 hours PRN aggression  hydrOXYzine hydrochloride 50 milliGRAM(s) Oral every 6 hours PRN anxiety/sleep  ibuprofen  Tablet. 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 Admitted for suicidal ideation. Medicine consulted for medical management    IMPRESSION  - Suicidal Ideations  - HIV  - HLD  - SIMD, SIPD, Cocaine Use Disorder, AUD, Cannabis Use Disorder    PLAN  - Please get HIV viral loads and CD4 count  - Please get EKG and monitor QTc  - Resume HIV med of BIKTARVY  - Consider a trial of lipitor 20mg  - Rest of plan by primary     Thank you for allowing me to participate in the patient's management Reconsult as needed.   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 To note patient has multiple prior hospitalization (last in March 2023), numerous previous SA per chart, no previous self harm, no legal/violence hx, no substance use hx.      On interview, patient is tearful and states he doesn’t know how to express what he is feeling. He states last night he was over at his ex-wife's house doing drugs with her and her . He states he relapsed and used cocaine and heroine with them. He states then he woke up in the dumpsters and he makes allegations that they dumped him in the dumpsters. He states he now plans to kill them. He states he plans to kill himself after. He is unable to state an actual plan. He states the only reason he came ot the ED is because he still loves her. Patient is unable to state his current medication but is able to state taking his Biktarvy.      To note, patient discharged on Lexapro 15mg qdaily, prazosin 2mg qHS, trazodone 100mg qHS. Patient was agreeable to rehab but placement did not get completed      ON UNIT:   On encounter patient is calm, coopertive with sad affect and tearful eyes.     Patient reports today that he had been clean since he left the hospital last admission. He reports that he had relayed this to his ex wife who invited him over to celebrate his success. He reports that he cannot stand or bear being around his ex-wife and her new  (reporting a lot of feelings about her new partner being there for his daughter when he was not) and reports that for some unclear reason, he decided that to deal with the situation the best way he knew how: by getting high. He reports that maybe it was just an excuse to get high but he reports that he ended up getting high on heroin, coke and beer (reports that his wife and her  did not know he was going to get high) and he reports that he ended up overdosing. He reports that he woke in a dumpster and was confused. He reports that he spoke to his ex-wife and reports that all she said was "i panicked" without emotions and as a matter of fact. He reports that he felt extremely low as a result, indicated that he felt betrayed and disrespected, and reports that he had the urge to kill one of them just so they know what it feels like to be dead and end up in a dumpster and then reported he would kill himself because ending up in a situation like that makes it "not worth it". Patient did not provide plan for hurting self or others and appeared to be speaking out of frustration. Motivational interviewing and supportive therapy provided. Patient reported that currently he was free of si and reported feeling safe here.  (29 Apr 2023 16:02)    FAMILY HISTORY:    PAST MEDICAL & SURGICAL HISTORY:      SOCIAL HISTORY  Social History:      Home Medications:      ROS  General: Denies fevers, chills, nightsweats, weight loss  HEENT: Denies headache, rhinorrhea, sore throat, eye pain  CV: Denies CP, palpitations  PULM: Denies SOB, cough  GI: Denies abdominal pain, diarrhea  : Denies dysuria, hematuria  MSK: Denies arthralgias  SKIN: Denies rash   NEURO: Denies paresthesias, weakness  PSYCH: Denies depression    VITALS:  T(F): 96.4, Max: 96.4 (04-29-23 @ 17:01)  HR: 88  BP: 102/63  RR: 18Vital Signs Last 24 Hrs  T(C): 35.8 (29 Apr 2023 17:01), Max: 35.8 (29 Apr 2023 17:01)  T(F): 96.4 (29 Apr 2023 17:01), Max: 96.4 (29 Apr 2023 17:01)  HR: 88 (30 Apr 2023 08:12) (63 - 88)  BP: 102/63 (30 Apr 2023 08:12) (102/63 - 102/63)  BP(mean): --  RR: 18 (30 Apr 2023 08:12) (18 - 18)  SpO2: --        PHYSICAL EXAM:  Gen: NAD, resting in bed  HEENT: Normocephalic, atraumatic  Neck: supple, no lymphadenopathy  CV: Regular rate & regular rhythm  Lungs: CTABL no wheeze  Abdomen: Soft, NTND+ BS present  Ext: Warm, well perfused no CCE  Neuro: non focal, awake, CN II-XII intact   Skin: no rash, no erythema  Psych: no SI, HI, Hallucination     TESTS & MEASUREMENTS:                        14.1   3.84  )-----------( 301      ( 30 Apr 2023 08:00 )             43.7     04-30    138  |  103  |  18  ----------------------------<  75  4.7   |  29  |  0.7    Ca    9.0      30 Apr 2023 08:00    TPro  7.7  /  Alb  3.9  /  TBili  0.3  /  DBili  x   /  AST  31  /  ALT  22  /  AlkPhos  97  04-30      LIVER FUNCTIONS - ( 30 Apr 2023 08:00 )  Alb: 3.9 g/dL / Pro: 7.7 g/dL / ALK PHOS: 97 U/L / ALT: 22 U/L / AST: 31 U/L / GGT: x             INFECTIOUS DISEASES TESTING      RADIOLOGY & ADDITIONAL TESTS:  I have personally reviewed the last Chest xray        CARDIOLOGY TESTING      MEDICATIONS  (STANDING):  bictegravir 50 mG/emtricitabine 200 mG/tenofovir alafenamide 25 mG (BIKTARVY) 1 Tablet(s) Oral daily  escitalopram 5 milliGRAM(s) Oral daily  traZODone 50 milliGRAM(s) Oral at bedtime    MEDICATIONS  (PRN):  acetaminophen     Tablet .. 650 milliGRAM(s) Oral every 6 hours PRN Temp greater or equal to 38C (100.4F), Mild Pain (1 - 3)  haloperidol     Tablet 5 milliGRAM(s) Oral every 6 hours PRN aggression  hydrOXYzine hydrochloride 50 milliGRAM(s) Oral every 6 hours PRN anxiety/sleep  ibuprofen  Tablet. 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 Admitted for suicidal ideation. Medicine consulted for medical management    IMPRESSION  - Suicidal Ideations  - HIV  - HLD  - SIMD, SIPD, Cocaine Use Disorder, AUD, Cannabis Use Disorder    PLAN  - Please get HIV viral loads and CD4 count  - Please get EKG and monitor QTc  - Resume HIV med of BIKTARVY  - Consider a trial of lipitor 20mg  - Rest of plan by primary     Thank you for allowing me to participate in the patient's management Reconsult as needed.   HOSPITALIST CONSULT for IPP History and Physical     ADRIANNE BRADLEY  37y, Male  Allergy: penicillin (Unknown)      Patient other chart:  MRN: [id number]  Per ED note:   38 yo male, currently domiciled at transitional housing, 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 To note patient has multiple prior hospitalization (last in March 2023), numerous previous SA per chart, no previous self harm, no legal/violence hx, no substance use hx.      On interview, patient is tearful and states he doesn’t know how to express what he is feeling. He states last night he was over at his ex-wife's house doing drugs with her and her . He states he relapsed and used cocaine and heroine with them. He states then he woke up in the dumpsters and he makes allegations that they dumped him in the dumpsters. He states he now plans to kill them. He states he plans to kill himself after. He is unable to state an actual plan. He states the only reason he came ot the ED is because he still loves her. Patient is unable to state his current medication but is able to state taking his Biktarvy.      To note, patient discharged on Lexapro 15mg qdaily, prazosin 2mg qHS, trazodone 100mg qHS. Patient was agreeable to rehab but placement did not get completed      ON UNIT:   On encounter patient is calm, coopertive with sad affect and tearful eyes.     Patient reports today that he had been clean since he left the hospital last admission. He reports that he had relayed this to his ex wife who invited him over to celebrate his success. He reports that he cannot stand or bear being around his ex-wife and her new  (reporting a lot of feelings about her new partner being there for his daughter when he was not) and reports that for some unclear reason, he decided that to deal with the situation the best way he knew how: by getting high. He reports that maybe it was just an excuse to get high but he reports that he ended up getting high on heroin, coke and beer (reports that his wife and her  did not know he was going to get high) and he reports that he ended up overdosing. He reports that he woke in a dumpster and was confused. He reports that he spoke to his ex-wife and reports that all she said was "i panicked" without emotions and as a matter of fact. He reports that he felt extremely low as a result, indicated that he felt betrayed and disrespected, and reports that he had the urge to kill one of them just so they know what it feels like to be dead and end up in a dumpster and then reported he would kill himself because ending up in a situation like that makes it "not worth it". Patient did not provide plan for hurting self or others and appeared to be speaking out of frustration. Motivational interviewing and supportive therapy provided. Patient reported that currently he was free of si and reported feeling safe here.  (29 Apr 2023 16:02)    FAMILY HISTORY:    PAST MEDICAL & SURGICAL HISTORY:      SOCIAL HISTORY  Social History:      Home Medications:      ROS  General: Denies fevers, chills, nightsweats, weight loss  HEENT: Denies headache, rhinorrhea, sore throat, eye pain  CV: Denies CP, palpitations  PULM: Denies SOB, cough  GI: Denies abdominal pain, diarrhea  : Denies dysuria, hematuria  MSK: Denies arthralgias  SKIN: Denies rash   NEURO: Denies paresthesias, weakness  PSYCH: Denies depression    VITALS:  T(F): 96.4, Max: 96.4 (04-29-23 @ 17:01)  HR: 88  BP: 102/63  RR: 18Vital Signs Last 24 Hrs  T(C): 35.8 (29 Apr 2023 17:01), Max: 35.8 (29 Apr 2023 17:01)  T(F): 96.4 (29 Apr 2023 17:01), Max: 96.4 (29 Apr 2023 17:01)  HR: 88 (30 Apr 2023 08:12) (63 - 88)  BP: 102/63 (30 Apr 2023 08:12) (102/63 - 102/63)  BP(mean): --  RR: 18 (30 Apr 2023 08:12) (18 - 18)  SpO2: --        PHYSICAL EXAM:  Gen: NAD, resting in bed  HEENT: Normocephalic, atraumatic  Neck: supple, no lymphadenopathy  CV: Regular rate & regular rhythm  Lungs: CTABL no wheeze  Abdomen: Soft, NTND+ BS present  Ext: Warm, well perfused no CCE  Neuro: non focal, awake, CN II-XII intact   Skin: no rash, no erythema  Psych: no SI, HI, Hallucination     TESTS & MEASUREMENTS:                        14.1   3.84  )-----------( 301      ( 30 Apr 2023 08:00 )             43.7     04-30    138  |  103  |  18  ----------------------------<  75  4.7   |  29  |  0.7    Ca    9.0      30 Apr 2023 08:00    TPro  7.7  /  Alb  3.9  /  TBili  0.3  /  DBili  x   /  AST  31  /  ALT  22  /  AlkPhos  97  04-30      LIVER FUNCTIONS - ( 30 Apr 2023 08:00 )  Alb: 3.9 g/dL / Pro: 7.7 g/dL / ALK PHOS: 97 U/L / ALT: 22 U/L / AST: 31 U/L / GGT: x             INFECTIOUS DISEASES TESTING      RADIOLOGY & ADDITIONAL TESTS:  I have personally reviewed the last Chest xray        CARDIOLOGY TESTING      MEDICATIONS  (STANDING):  bictegravir 50 mG/emtricitabine 200 mG/tenofovir alafenamide 25 mG (BIKTARVY) 1 Tablet(s) Oral daily  escitalopram 5 milliGRAM(s) Oral daily  traZODone 50 milliGRAM(s) Oral at bedtime    MEDICATIONS  (PRN):  acetaminophen     Tablet .. 650 milliGRAM(s) Oral every 6 hours PRN Temp greater or equal to 38C (100.4F), Mild Pain (1 - 3)  haloperidol     Tablet 5 milliGRAM(s) Oral every 6 hours PRN aggression  hydrOXYzine hydrochloride 50 milliGRAM(s) Oral every 6 hours PRN anxiety/sleep  ibuprofen  Tablet. 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 Admitted for suicidal ideation. Medicine consulted for medical management    IMPRESSION  - Suicidal Ideations  - HIV  - HLD  - SIMD, SIPD, Cocaine Use Disorder, AUD, Cannabis Use Disorder    PLAN  - Please get HIV viral loads and CD4 count  - Please get EKG and monitor QTc  - Resume HIV med of BIKTARVY  - Consider a trial of lipitor 20mg  - Rest of plan by primary     Thank you for allowing me to participate in the patient's management Reconsult as needed.

## 2023-04-30 NOTE — BH SAFETY PLAN - LOCAL URGENT CARE ADDRESS
[address] [place] [address]  [address] [place] [id number]  [address] [place] [address]  [address] [place] [id number]

## 2023-04-30 NOTE — BH SAFETY PLAN - WARNING SIGN 2
The reason I used was because of the situation. I live with my ex wife and her new  and I cant stand being around him.

## 2023-04-30 NOTE — BH INPATIENT PSYCHIATRY PROGRESS NOTE - PRN MEDS
MEDICATIONS  (PRN):  acetaminophen     Tablet .. 650 milliGRAM(s) Oral every 6 hours PRN Temp greater or equal to 38C (100.4F), Mild Pain (1 - 3)  haloperidol     Tablet 5 milliGRAM(s) Oral every 6 hours PRN aggression  hydrOXYzine hydrochloride 50 milliGRAM(s) Oral every 6 hours PRN anxiety/sleep  ibuprofen  Tablet. 400 milliGRAM(s) Oral every 8 hours PRN Moderate Pain (4 - 6)  LORazepam     Tablet 2 milliGRAM(s) Oral every 6 hours PRN aggitation  traZODone 50 milliGRAM(s) Oral at bedtime PRN insomnia   MEDICATIONS  (PRN):  acetaminophen     Tablet .. 650 milliGRAM(s) Oral every 6 hours PRN Temp greater or equal to 38C (100.4F), Mild Pain (1 - 3)  haloperidol     Tablet 5 milliGRAM(s) Oral every 6 hours PRN aggression  hydrOXYzine hydrochloride 50 milliGRAM(s) Oral every 6 hours PRN anxiety/sleep  ibuprofen  Tablet. 400 milliGRAM(s) Oral every 8 hours PRN Moderate Pain (4 - 6)  LORazepam     Tablet 2 milliGRAM(s) Oral every 6 hours PRN aggitation

## 2023-04-30 NOTE — BH SAFETY PLAN - PHONE
[phone]

## 2023-04-30 NOTE — BH SAFETY PLAN - SUICIDE AND CRISIS LIFELINE, CALL 988
Suicide and Crisis Lifeline, call 061 Suicide and Crisis Lifeline, call 510 Suicide and Crisis Lifeline, call 071 Suicide and Crisis Lifeline, call 407

## 2023-04-30 NOTE — BH INPATIENT PSYCHIATRY PROGRESS NOTE - NSBHATTESTBILLING_PSY_A_CORE
83227-Lmfeemybxo OBS or IP - low complexity OR 25-34 mins 42795-Mliwtuevay OBS or IP - low complexity OR 25-34 mins 54687-Zubicomkue OBS or IP - low complexity OR 25-34 mins 67620-Khwmgjltxt OBS or IP - low complexity OR 25-34 mins

## 2023-05-01 DIAGNOSIS — F14.10 COCAINE ABUSE, UNCOMPLICATED: ICD-10-CM

## 2023-05-01 LAB
A1C WITH ESTIMATED AVERAGE GLUCOSE RESULT: 5.5 % — SIGNIFICANT CHANGE UP (ref 4–5.6)
ESTIMATED AVERAGE GLUCOSE: 111 MG/DL — SIGNIFICANT CHANGE UP (ref 68–114)
TSH SERPL-MCNC: 3.02 UIU/ML — SIGNIFICANT CHANGE UP (ref 0.27–4.2)

## 2023-05-01 PROCEDURE — 99231 SBSQ HOSP IP/OBS SF/LOW 25: CPT

## 2023-05-01 RX ORDER — ATORVASTATIN CALCIUM 80 MG/1
20 TABLET, FILM COATED ORAL AT BEDTIME
Refills: 0 | Status: DISCONTINUED | OUTPATIENT
Start: 2023-05-01 | End: 2023-05-16

## 2023-05-01 RX ORDER — NICOTINE POLACRILEX 2 MG
1 GUM BUCCAL DAILY
Refills: 0 | Status: DISCONTINUED | OUTPATIENT
Start: 2023-05-01 | End: 2023-05-16

## 2023-05-01 RX ORDER — ESCITALOPRAM OXALATE 10 MG/1
10 TABLET, FILM COATED ORAL DAILY
Refills: 0 | Status: DISCONTINUED | OUTPATIENT
Start: 2023-05-02 | End: 2023-05-04

## 2023-05-01 RX ORDER — HALOPERIDOL DECANOATE 100 MG/ML
5 INJECTION INTRAMUSCULAR EVERY 6 HOURS
Refills: 0 | Status: DISCONTINUED | OUTPATIENT
Start: 2023-05-01 | End: 2023-05-16

## 2023-05-01 RX ADMIN — ATORVASTATIN CALCIUM 20 MILLIGRAM(S): 80 TABLET, FILM COATED ORAL at 20:55

## 2023-05-01 RX ADMIN — Medication 50 MILLIGRAM(S): at 20:55

## 2023-05-01 RX ADMIN — BICTEGRAVIR SODIUM, EMTRICITABINE, AND TENOFOVIR ALAFENAMIDE FUMARATE 1 TABLET(S): 30; 120; 15 TABLET ORAL at 09:51

## 2023-05-01 RX ADMIN — ESCITALOPRAM OXALATE 5 MILLIGRAM(S): 10 TABLET, FILM COATED ORAL at 09:51
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
## 2023-05-01 NOTE — BH INPATIENT PSYCHIATRY PROGRESS NOTE - NSBHMETABOLIC_PSY_ALL_CORE_FT
BMI: BMI (kg/m2): 24.2 (04-29-23 @ 17:01)  HbA1c:   Glucose:   BP: 112/66 (05-01-23 @ 08:17) (102/63 - 112/66)  Lipid Panel: Date/Time: 04-30-23 @ 08:00  Cholesterol, Serum: 240  Direct LDL: --  HDL Cholesterol, Serum: 64  Total Cholesterol/HDL Ration Measurement: --  Triglycerides, Serum: 104

## 2023-05-01 NOTE — UM REPORT PROGRESS NOTE - NSUMRMPROVIDER_GEN_A_CORE FT
Amandeep Hernandez – [facility] ID# [id number] - Patient is a transfer from [facility] ED...AUTH IS REQ. AND TRANSFERRING FACILITY WAS NOTIFIED AND REQUEST WAS SENT FOR TRANSFERRING FACILITY TO OBTAIN AUTH. Amandeep Hernandez – [facility] ID# [id number] - Patient is a transfer from [facility] ED...AUTH IS REQ. AND TRANSFERRING FACILITY WAS NOTIFIED AND REQUEST WAS SENT FOR TRANSFERRING FACILITY TO OBTAIN AUTH. Amandeep Hernandez – [facility] ID# [id number] - Patient is a transfer from [facility] ED...AUTH IS REQ. AND TRANSFERRING FACILITY WAS NOTIFIED AND REQUEST WAS SENT FOR TRANSFERRING FACILITY TO OBTAIN AUTH. Amandeep Hernandez – [facility] ID# [id number] - Patient is a transfer from [facility] ED...AUTH IS REQ. AND TRANSFERRING FACILITY WAS NOTIFIED AND REQUEST WAS SENT FOR TRANSFERRING FACILITY TO OBTAIN AUTH. Amandeep Hernandez – [facility] ID# [id number] - Patient is a transfer from [facility] ED...AUTH IS REQ. AND TRANSFERRING FACILITY WAS NOTIFIED AND REQUEST WAS SENT FOR TRANSFERRING FACILITY TO OBTAIN AUTH.      Auth #[id number]  Covered 9 days – review on 5/8/2023  [phone]  Reviewer has yet to be assigned    Amandeep Hernandez – [facility] ID# [id number] - Patient is a transfer from [facility] ED...AUTH IS REQ. AND TRANSFERRING FACILITY WAS NOTIFIED AND REQUEST WAS SENT FOR TRANSFERRING FACILITY TO OBTAIN AUTH.      Auth #[id number]  Covered 9 days – review on 5/8/2023  [phone]  Reviewer has yet to be assigned    Amandeep Hernandez – [facility] ID# [id number] - Patient is a transfer from [facility] ED...AUTH IS REQ. AND TRANSFERRING FACILITY WAS NOTIFIED AND REQUEST WAS SENT FOR TRANSFERRING FACILITY TO OBTAIN AUTH.      Auth #[id number]  Covered 9 days – review on 5/8/2023  [phone]  Reviewer has yet to be assigned    Amandeep Hernandez – [facility] ID# [id number] - Patient is a transfer from [facility] ED...AUTH IS REQ. AND TRANSFERRING FACILITY WAS NOTIFIED AND REQUEST WAS SENT FOR TRANSFERRING FACILITY TO OBTAIN AUTH.      Auth #[id number]  Covered 9 days – review on 5/8/2023  [phone]  Reviewer has yet to be assigned    Amandeep Hernandez – [facility] ID# [id number] - Patient is a transfer from [facility] ED...AUTH IS REQ. AND TRANSFERRING FACILITY WAS NOTIFIED AND REQUEST WAS SENT FOR TRANSFERRING FACILITY TO OBTAIN AUTH.      Auth #[id number]  Covered 9 days – review on 5/8/2023  [phone]  Reviewer has yet to be assigned     5-2 call from  requesting addl info. SW left message awaiting call back    Amandeep Hernandez – [facility] ID# [id number] - Patient is a transfer from [facility] ED...AUTH IS REQ. AND TRANSFERRING FACILITY WAS NOTIFIED AND REQUEST WAS SENT FOR TRANSFERRING FACILITY TO OBTAIN AUTH.      Auth #[id number]  Covered 9 days – review on 5/8/2023  [phone]  Reviewer has yet to be assigned     5-2 call from  requesting addl info. SW left message awaiting call back    Amandeep Hernandez – [facility] ID# [id number] - Patient is a transfer from [facility] ED...AUTH IS REQ. AND TRANSFERRING FACILITY WAS NOTIFIED AND REQUEST WAS SENT FOR TRANSFERRING FACILITY TO OBTAIN AUTH.      Auth #[id number]  Covered 9 days – review on 5/8/2023  [phone]  Reviewer has yet to be assigned     5-2 call from  requesting addl info. SW left message awaiting call back    Amandeep Hernandez – [facility] ID# [id number] - Patient is a transfer from [facility] ED...AUTH IS REQ. AND TRANSFERRING FACILITY WAS NOTIFIED AND REQUEST WAS SENT FOR TRANSFERRING FACILITY TO OBTAIN AUTH.      Auth #[id number]  Covered 9 days – review on 5/8/2023  [phone]  Reviewer has yet to be assigned     5-2 call from  requesting addl info. SW left message awaiting call back    Amandeep Hernandez – [facility] ID# [id number] - Patient is a transfer from [facility] ED...AUTH IS REQ. AND TRANSFERRING FACILITY WAS NOTIFIED AND REQUEST WAS SENT FOR TRANSFERRING FACILITY TO OBTAIN AUTH.      Auth #[id number]  Covered 9 days – review on 5/8/2023  [phone]  Reviewer has yet to be assigned     5-2 call from  requesting addl info.  left message awaiting call back   5-8 live review W/Kaia approved LCD and review 5-12 @ 11:30 clay Higgins    Amandeep Hernandez – [facility] ID# [id number] - Patient is a transfer from [facility] ED...AUTH IS REQ. AND TRANSFERRING FACILITY WAS NOTIFIED AND REQUEST WAS SENT FOR TRANSFERRING FACILITY TO OBTAIN AUTH.      Auth #[id number]  Covered 9 days – review on 5/8/2023  [phone]  Reviewer has yet to be assigned     5-2 call from  requesting addl info.  left message awaiting call back   5-8 live review W/Kaia approved LCD and review 5-12 @ 11:30 clay Higgins    Amandeep Hernandez – [facility] ID# [id number] - Patient is a transfer from [facility] ED...AUTH IS REQ. AND TRANSFERRING FACILITY WAS NOTIFIED AND REQUEST WAS SENT FOR TRANSFERRING FACILITY TO OBTAIN AUTH.      Auth #[id number]  Covered 9 days – review on 5/8/2023  [phone]  Reviewer has yet to be assigned     5-2 call from  requesting addl info.  left message awaiting call back   5-8 live review W/Kaia approved LCD and review 5-12 @ 11:30 clay Higgins    Amandeep Hernandez – [facility] ID# [id number] - Patient is a transfer from [facility] ED...AUTH IS REQ. AND TRANSFERRING FACILITY WAS NOTIFIED AND REQUEST WAS SENT FOR TRANSFERRING FACILITY TO OBTAIN AUTH.      Auth #[id number]  Covered 9 days – review on 5/8/2023  [phone]  Reviewer has yet to be assigned     5-2 call from  requesting addl info.  left message awaiting call back   5-8 live review W/Kaia approved LCD and review 5-12 @ 11:30 clay Higgins    Amandeep Hernandez – [facility] ID# [id number] - Patient is a transfer from [facility] ED...AUTH IS REQ. AND TRANSFERRING FACILITY WAS NOTIFIED AND REQUEST WAS SENT FOR TRANSFERRING FACILITY TO OBTAIN AUTH.      Auth #[id number]  Covered 9 days – review on 5/8/2023  [phone]  Reviewer has yet to be assigned     5-2 call from  requesting addl info.  left message awaiting call back   5-8 live review CYNDY/Kaia approved LCD and review 5-12 @ 11:30 w Gisela   5-12 approved LCD and review 5-16 Gisela will call in am to schedule    Amandeep Hernandez – [facility] ID# [id number] - Patient is a transfer from [facility] ED...AUTH IS REQ. AND TRANSFERRING FACILITY WAS NOTIFIED AND REQUEST WAS SENT FOR TRANSFERRING FACILITY TO OBTAIN AUTH.      Auth #[id number]  Covered 9 days – review on 5/8/2023  [phone]  Reviewer has yet to be assigned     5-2 call from  requesting addl info.  left message awaiting call back   5-8 live review CYNDY/Kaia approved LCD and review 5-12 @ 11:30 w Gisela   5-12 approved LCD and review 5-16 Gisela will call in am to schedule    Amandeep Hernandez [facility] ID# [id number] - Patient is a transfer from [facility] ED...AUTH IS REQ. AND TRANSFERRING FACILITY WAS NOTIFIED AND REQUEST WAS SENT FOR TRANSFERRING FACILITY TO OBTAIN AUTH.      Auth #[id number]  Covered 9 days – review on 5/8/2023  [phone]  Reviewer has yet to be assigned     5-2 call from  requesting addl info.  left message awaiting call back   5-8 live review CYNDY/Kaia approved LCD and review 5-12 @ 11:30 w Gisela   5-12 approved LCD and review 5-16 Gisela will call in am to schedule    Amandeep Hernandez – [facility] ID# [id number] - Patient is a transfer from [facility] ED...AUTH IS REQ. AND TRANSFERRING FACILITY WAS NOTIFIED AND REQUEST WAS SENT FOR TRANSFERRING FACILITY TO OBTAIN AUTH.      Auth #[id number]  Covered 9 days – review on 5/8/2023  [phone]  Reviewer has yet to be assigned     5-2 call from  requesting addl info.  left message awaiting call back   5-8 live review CYNDY/Kaia approved LCD and review 5-12 @ 11:30 w Gisela   5-12 approved LCD and review 5-16 Gisela will call in am to schedule    [facility] ID# [id number] - Patient is a transfer from [facility] ED...AUTH IS REQ. AND TRANSFERRING FACILITY WAS NOTIFIED AND REQUEST WAS SENT FOR TRANSFERRING FACILITY TO OBTAIN AUTH.      Auth #[id number]  Covered 9 days – review on 5/8/2023  [phone]  Reviewer has yet to be assigned     5-2 call from  requesting addl info. BONI left message awaiting call back   5-8 live review CYNDY/Kaia approved LCD and review 5-12 @ 11:30 w Gisela   5-12 approved LCD and review 5-16 Gisela will call in am to schedule   5/16 (BONI Renee)  Patient was discharged today 5/16.  D/C clinical left with Annalisa [phone]  [facility] ID# [id number] - Patient is a transfer from [facility] ED...AUTH IS REQ. AND TRANSFERRING FACILITY WAS NOTIFIED AND REQUEST WAS SENT FOR TRANSFERRING FACILITY TO OBTAIN AUTH.      Auth #[id number]  Covered 9 days – review on 5/8/2023  [phone]  Reviewer has yet to be assigned     5-2 call from  requesting addl info. BONI left message awaiting call back   5-8 live review CYNDY/Kaia approved LCD and review 5-12 @ 11:30 w Gisela   5-12 approved LCD and review 5-16 Gisela will call in am to schedule   5/16 (BONI Renee)  Patient was discharged today 5/16.  D/C clinical left with Annalisa [phone]  [facility] ID# [id number] - Patient is a transfer from [facility] ED...AUTH IS REQ. AND TRANSFERRING FACILITY WAS NOTIFIED AND REQUEST WAS SENT FOR TRANSFERRING FACILITY TO OBTAIN AUTH.      Auth #[id number]  Covered 9 days – review on 5/8/2023  [phone]  Reviewer has yet to be assigned     5-2 call from  requesting addl info. BONI left message awaiting call back   5-8 live review CYNDY/Kaia approved LCD and review 5-12 @ 11:30 w Gisela   5-12 approved LCD and review 5-16 Gisela will call in am to schedule   5/16 (BONI Renee)  Patient was discharged today 5/16.  D/C clinical left with Annalisa [phone]  [facility] ID# [id number] - Patient is a transfer from [facility] ED...AUTH IS REQ. AND TRANSFERRING FACILITY WAS NOTIFIED AND REQUEST WAS SENT FOR TRANSFERRING FACILITY TO OBTAIN AUTH.      Auth #[id number]  Covered 9 days – review on 5/8/2023  [phone]  Reviewer has yet to be assigned     5-2 call from  requesting addl info. BONI left message awaiting call back   5-8 live review CYNDY/Kaia approved LCD and review 5-12 @ 11:30 w Gisela   5-12 approved LCD and review 5-16 Gisela will call in am to schedule   5/16 (BONI Renee)  Patient was discharged today 5/16.  D/C clinical left with Annalisa [phone]

## 2023-05-01 NOTE — BH INPATIENT PSYCHIATRY PROGRESS NOTE - NSICDXBHTERTIARYDX_PSY_ALL_CORE
R/O Adjustment disorder   F43.20   R/O Adjustment disorder   F43.20  R/O Substance induced mood disorder   F19.94

## 2023-05-01 NOTE — BH INPATIENT PSYCHIATRY PROGRESS NOTE - PRN MEDS
MEDICATIONS  (PRN):  acetaminophen     Tablet .. 650 milliGRAM(s) Oral every 6 hours PRN Temp greater or equal to 38C (100.4F), Mild Pain (1 - 3)  haloperidol     Tablet 5 milliGRAM(s) Oral every 6 hours PRN aggression  hydrOXYzine hydrochloride 50 milliGRAM(s) Oral every 6 hours PRN anxiety/sleep  ibuprofen  Tablet. 400 milliGRAM(s) Oral every 8 hours PRN Moderate Pain (4 - 6)  LORazepam     Tablet 2 milliGRAM(s) Oral every 6 hours PRN aggitation   MEDICATIONS  (PRN):  acetaminophen     Tablet .. 650 milliGRAM(s) Oral every 6 hours PRN Temp greater or equal to 38C (100.4F), Mild Pain (1 - 3)  haloperidol     Tablet 5 milliGRAM(s) Oral every 6 hours PRN agitation  hydrOXYzine hydrochloride 50 milliGRAM(s) Oral every 6 hours PRN anxiety/sleep  ibuprofen  Tablet. 400 milliGRAM(s) Oral every 8 hours PRN Moderate Pain (4 - 6)

## 2023-05-01 NOTE — BH SOCIAL WORK INITIAL PSYCHOSOCIAL EVALUATION - NSBHBARRIERS_PSY_ALL_CORE
Co-morbid personality/Financial difficulties/Lack of insight/Non-compliant with treatment/Poor judgement

## 2023-05-01 NOTE — BH INPATIENT PSYCHIATRY PROGRESS NOTE - CURRENT MEDICATION
MEDICATIONS  (STANDING):  atorvastatin 20 milliGRAM(s) Oral at bedtime  bictegravir 50 mG/emtricitabine 200 mG/tenofovir alafenamide 25 mG (BIKTARVY) 1 Tablet(s) Oral daily  escitalopram 5 milliGRAM(s) Oral daily  traZODone 50 milliGRAM(s) Oral at bedtime    MEDICATIONS  (PRN):  acetaminophen     Tablet .. 650 milliGRAM(s) Oral every 6 hours PRN Temp greater or equal to 38C (100.4F), Mild Pain (1 - 3)  haloperidol     Tablet 5 milliGRAM(s) Oral every 6 hours PRN aggression  hydrOXYzine hydrochloride 50 milliGRAM(s) Oral every 6 hours PRN anxiety/sleep  ibuprofen  Tablet. 400 milliGRAM(s) Oral every 8 hours PRN Moderate Pain (4 - 6)  LORazepam     Tablet 2 milliGRAM(s) Oral every 6 hours PRN aggitation   MEDICATIONS  (STANDING):  atorvastatin 20 milliGRAM(s) Oral at bedtime  bictegravir 50 mG/emtricitabine 200 mG/tenofovir alafenamide 25 mG (BIKTARVY) 1 Tablet(s) Oral daily  traZODone 50 milliGRAM(s) Oral at bedtime    MEDICATIONS  (PRN):  acetaminophen     Tablet .. 650 milliGRAM(s) Oral every 6 hours PRN Temp greater or equal to 38C (100.4F), Mild Pain (1 - 3)  haloperidol     Tablet 5 milliGRAM(s) Oral every 6 hours PRN agitation  hydrOXYzine hydrochloride 50 milliGRAM(s) Oral every 6 hours PRN anxiety/sleep  ibuprofen  Tablet. 400 milliGRAM(s) Oral every 8 hours PRN Moderate Pain (4 - 6)

## 2023-05-01 NOTE — BH INPATIENT PSYCHIATRY PROGRESS NOTE - NSBHFUPINTERVALHXFT_PSY_A_CORE
Pt seen and evaluated, chart reviewed. As per nursing report, no acute events overnight, no PRNs. On evaluation, pt presents linear and cooperative with constricted affect. He reports he was doing well after last IPP in March and was able to maintain sobriety. Reports he began to have increased cravings resulting in rehab for 1 week at [facility], completed on April 27. Reports he went to celebrate with his ex-wife and her , and then relapsed on heroin and cocaine (reports "one line" each) in their home. States he "passed out" "overdosed" and when he awoke, he was in a dumpster. He reports he continues with anger regarding situation with passive HI and SI, denies intent or plan. He reports motivation to "get my head back on straight". He reports he has been with low mood and energy, anxiety and disrupted sleep with passive SI in last several weeks. Denies sx of adriel and psychosis. Denies active SI/HI, intent and plan. He is adherent to medications, denies negative side effects. Psychoeducation provided on MAT and rehab, pt states he will consider.  Pt seen and evaluated, chart reviewed. As per nursing report, no acute events overnight, no PRNs. On evaluation, pt presents linear and cooperative with constricted affect. He reports he was doing well after last IPP in March and was able to maintain sobriety. Reports he began to have increased cravings resulting in rehab for 1 week at [facility], completed on April 27. Reports he went to celebrate with his ex-wife and her , and then relapsed on heroin and cocaine (reports "one line" each) in their home. States he "passed out" "overdosed" and when he awoke, he was in a dumpster. He reports he continues with anger regarding situation with passive HI and SI, denies intent or plan. He reports motivation to "get my head back on straight". He reports he has been with low mood and energy, anxiety and disrupted sleep with passive SI in last several weeks. Denies sx of adriel and psychosis. Denies active SI/HI, intent and plan. He is adherent to medications, denies negative side effects. Psychoeducation provided on MAT and rehab, pt states he will consider.  Pt seen and evaluated, chart reviewed. As per nursing report, no acute events overnight, no PRNs. On evaluation, pt presents linear and cooperative with constricted affect. He reports he was doing well after last IPP in March and was able to maintain sobriety. Reports he began to have increased cravings resulting in rehab for 1 week at [facility], completed on April 27. Reports he went to celebrate with his ex-wife and her , and then relapsed on heroin and cocaine (reports "one line" each) in their home. States he "passed out" "overdosed" and when he awoke, he was in a dumpster. He reports he continues with anger regarding situation with passive HI and SI, denies intent or plan. He reports motivation to "get my head back on straight". He reports he has been with low mood and energy, anxiety and disrupted sleep with passive SI in last several weeks. Denies sx of adriel and psychosis. Denies active SI/HI, intent and plan. He is adherent to medications, denies negative side effects. Psychoeducation provided on MAT and rehab, pt states he will consider.  Pt seen and evaluated, chart reviewed. As per nursing report, no acute events overnight, no PRNs. On evaluation, pt presents linear and cooperative with constricted affect. He reports he was doing well after last IPP in March and was able to maintain sobriety. Reports he began to have increased cravings resulting in rehab for 1 week at [facility], completed on April 27. Reports he went to celebrate with his ex-wife and her , and then relapsed on heroin and cocaine (reports "one line" each) in their home. States he "passed out" "overdosed" and when he awoke, he was in a dumpster. He reports he continues with anger regarding situation with passive HI and SI, denies intent or plan. He reports motivation to "get my head back on straight". He reports he has been with low mood and energy, anxiety and disrupted sleep with passive SI in last several weeks. Denies sx of adriel and psychosis. Denies active SI/HI, intent and plan. He is adherent to medications, denies negative side effects. Psychoeducation provided on MAT and rehab, pt states he will consider.
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
## 2023-05-01 NOTE — BH CHART NOTE - NSEVENTNOTEFT_PSY_ALL_CORE
Called patients pharmacy [facility] [phone]  They state patient has not filled prescriptions since December of 2021.
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
## 2023-05-01 NOTE — BH INPATIENT PSYCHIATRY PROGRESS NOTE - NSBHCHARTREVIEWVS_PSY_A_CORE FT
Vital Signs Last 24 Hrs  T(C): 35.6 (05-01-23 @ 08:17), Max: 35.6 (05-01-23 @ 08:17)  T(F): 96.1 (05-01-23 @ 08:17), Max: 96.1 (05-01-23 @ 08:17)  HR: 62 (05-01-23 @ 08:17) (62 - 67)  BP: 112/66 (05-01-23 @ 08:17) (103/60 - 112/66)  BP(mean): --  RR: 18 (05-01-23 @ 08:17) (16 - 18)  SpO2: --

## 2023-05-02 PROCEDURE — 99231 SBSQ HOSP IP/OBS SF/LOW 25: CPT

## 2023-05-02 PROCEDURE — 93010 ELECTROCARDIOGRAM REPORT: CPT

## 2023-05-02 RX ORDER — TRAZODONE HCL 50 MG
100 TABLET ORAL AT BEDTIME
Refills: 0 | Status: DISCONTINUED | OUTPATIENT
Start: 2023-05-02 | End: 2023-05-16

## 2023-05-02 RX ADMIN — Medication 100 MILLIGRAM(S): at 20:27

## 2023-05-02 RX ADMIN — ESCITALOPRAM OXALATE 10 MILLIGRAM(S): 10 TABLET, FILM COATED ORAL at 08:10

## 2023-05-02 RX ADMIN — ATORVASTATIN CALCIUM 20 MILLIGRAM(S): 80 TABLET, FILM COATED ORAL at 20:27

## 2023-05-02 RX ADMIN — BICTEGRAVIR SODIUM, EMTRICITABINE, AND TENOFOVIR ALAFENAMIDE FUMARATE 1 TABLET(S): 30; 120; 15 TABLET ORAL at 08:10

## 2023-05-02 NOTE — BH INPATIENT PSYCHIATRY PROGRESS NOTE - CURRENT MEDICATION
MEDICATIONS  (STANDING):  atorvastatin 20 milliGRAM(s) Oral at bedtime  bictegravir 50 mG/emtricitabine 200 mG/tenofovir alafenamide 25 mG (BIKTARVY) 1 Tablet(s) Oral daily  escitalopram 10 milliGRAM(s) Oral daily  nicotine - 21 mG/24Hr(s) Patch 1 Patch Transdermal daily  traZODone 50 milliGRAM(s) Oral at bedtime    MEDICATIONS  (PRN):  acetaminophen     Tablet .. 650 milliGRAM(s) Oral every 6 hours PRN Temp greater or equal to 38C (100.4F), Mild Pain (1 - 3)  haloperidol     Tablet 5 milliGRAM(s) Oral every 6 hours PRN agitation  hydrOXYzine hydrochloride 50 milliGRAM(s) Oral every 6 hours PRN anxiety/sleep  ibuprofen  Tablet. 400 milliGRAM(s) Oral every 8 hours PRN Moderate Pain (4 - 6)

## 2023-05-02 NOTE — BH INPATIENT PSYCHIATRY PROGRESS NOTE - PRN MEDS
MEDICATIONS  (PRN):  acetaminophen     Tablet .. 650 milliGRAM(s) Oral every 6 hours PRN Temp greater or equal to 38C (100.4F), Mild Pain (1 - 3)  haloperidol     Tablet 5 milliGRAM(s) Oral every 6 hours PRN agitation  hydrOXYzine hydrochloride 50 milliGRAM(s) Oral every 6 hours PRN anxiety/sleep  ibuprofen  Tablet. 400 milliGRAM(s) Oral every 8 hours PRN Moderate Pain (4 - 6)

## 2023-05-02 NOTE — BH INPATIENT PSYCHIATRY PROGRESS NOTE - NSBHCHARTREVIEWVS_PSY_A_CORE FT
Vital Signs Last 24 Hrs  T(C): 36.4 (05-02-23 @ 07:19), Max: 36.8 (05-01-23 @ 16:23)  T(F): 97.5 (05-02-23 @ 07:19), Max: 98.3 (05-01-23 @ 16:23)  HR: 62 (05-02-23 @ 07:19) (62 - 73)  BP: 124/64 (05-02-23 @ 07:19) (106/54 - 124/64)  BP(mean): --  RR: 18 (05-02-23 @ 07:19) (16 - 18)  SpO2: --

## 2023-05-02 NOTE — BH INPATIENT PSYCHIATRY PROGRESS NOTE - NSBHFUPINTERVALHXFT_PSY_A_CORE
Pt seen and evaluated, chart reviewed. As per nursing report, no acute events overnight, no PRNs. On evaluation, pt presents linear and well-groomed with coburn affect than prior evaluation.  Pt seen and evaluated, chart reviewed. As per nursing report, no acute events overnight, no PRNs. On evaluation, pt presents linear and well-groomed with coburn affect than prior evaluation. He reports he is "the same". Endorses continued low mood and ruminations on events leading to admission with difficulty falling and maintaining sleep last night. Emotional support provided, encouraged groups/DBT. He is with some improved insight, verbalizes negative effects of substance use on mental health and requests to speak with CATCH on rehab options. He denies AVH, paranoia. No overt delusions elicited. Denies active SI/HI, intent and plan. He is adherent to medications, denies negative side effects. He is more visible on unit and in behavioral control.

## 2023-05-02 NOTE — BH INPATIENT PSYCHIATRY PROGRESS NOTE - NSBHATTESTBILLING_PSY_A_CORE
84527-Gpsvejltrz OBS or IP - low complexity OR 25-34 mins 25436-Toqihwcdob OBS or IP - low complexity OR 25-34 mins 00016-Zymxuthjpd OBS or IP - low complexity OR 25-34 mins 35185-Ydctmyiqiz OBS or IP - low complexity OR 25-34 mins
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
## 2023-05-02 NOTE — BH INPATIENT PSYCHIATRY PROGRESS NOTE - NSBHMETABOLIC_PSY_ALL_CORE_FT
BMI: BMI (kg/m2): 24.2 (04-29-23 @ 17:01)  HbA1c: A1C with Estimated Average Glucose Result: 5.5 % (04-30-23 @ 08:00)    Glucose:   BP: 124/64 (05-02-23 @ 07:19) (102/63 - 124/64)  Lipid Panel: Date/Time: 04-30-23 @ 08:00  Cholesterol, Serum: 240  Direct LDL: --  HDL Cholesterol, Serum: 64  Total Cholesterol/HDL Ration Measurement: --  Triglycerides, Serum: 104

## 2023-05-03 LAB
4/8 RATIO: 0.62 RATIO — LOW (ref 1.2–3.4)
ABS CD8: 873 /UL — HIGH (ref 206–494)
AMPHET UR-MCNC: NEGATIVE — SIGNIFICANT CHANGE UP
BARBITURATES UR SCN-MCNC: NEGATIVE — SIGNIFICANT CHANGE UP
BENZODIAZ UR-MCNC: NEGATIVE — SIGNIFICANT CHANGE UP
CD16+CD56+ CELLS NFR BLD: 14 % — SIGNIFICANT CHANGE UP (ref 4–20)
CD16+CD56+ CELLS NFR SPEC: 263 /UL — SIGNIFICANT CHANGE UP (ref 89–385)
CD19 BLASTS SPEC-ACNC: 5 % — LOW (ref 10–28)
CD19 BLASTS SPEC-ACNC: 89 /UL — SIGNIFICANT CHANGE UP (ref 72–502)
CD3 BLASTS SPEC-ACNC: 1475 /UL — SIGNIFICANT CHANGE UP (ref 800–2012)
CD3 BLASTS SPEC-ACNC: 80 % — SIGNIFICANT CHANGE UP (ref 59–81)
CD4 %: 29 % — LOW (ref 42–58)
CD8 %: 47 % — HIGH (ref 14–30)
COCAINE METAB.OTHER UR-MCNC: NEGATIVE — SIGNIFICANT CHANGE UP
DRUG SCREEN 1, URINE RESULT: SIGNIFICANT CHANGE UP
FENTANYL UR QL: NEGATIVE — SIGNIFICANT CHANGE UP
HIV1 RNA # SERPL NAA+PROBE: 95 COPIES/ML — SIGNIFICANT CHANGE UP
HIV1 RNA SER-IMP: DETECTED COPIES/ML
HIV1 RNA SERPL NAA+PROBE-LOG#: 1.98 LOGCOPIES/ML — SIGNIFICANT CHANGE UP
METHADONE UR-MCNC: NEGATIVE — SIGNIFICANT CHANGE UP
OPIATES UR-MCNC: NEGATIVE — SIGNIFICANT CHANGE UP
OXYCODONE UR-MCNC: NEGATIVE — SIGNIFICANT CHANGE UP
PCP UR-MCNC: NEGATIVE — SIGNIFICANT CHANGE UP
PROPOXYPHENE QUALITATIVE URINE RESULT: NEGATIVE — SIGNIFICANT CHANGE UP
T-CELL CD4 SUBSET PNL BLD: 538 /UL — SIGNIFICANT CHANGE UP (ref 495–1312)
THC UR QL: NEGATIVE — SIGNIFICANT CHANGE UP

## 2023-05-03 PROCEDURE — 99231 SBSQ HOSP IP/OBS SF/LOW 25: CPT

## 2023-05-03 RX ORDER — ONDANSETRON 8 MG/1
4 TABLET, FILM COATED ORAL ONCE
Refills: 0 | Status: COMPLETED | OUTPATIENT
Start: 2023-05-03 | End: 2023-05-03

## 2023-05-03 RX ADMIN — ESCITALOPRAM OXALATE 10 MILLIGRAM(S): 10 TABLET, FILM COATED ORAL at 08:24

## 2023-05-03 RX ADMIN — Medication 650 MILLIGRAM(S): at 07:15

## 2023-05-03 RX ADMIN — Medication 100 MILLIGRAM(S): at 20:21

## 2023-05-03 RX ADMIN — ONDANSETRON 4 MILLIGRAM(S): 8 TABLET, FILM COATED ORAL at 20:22

## 2023-05-03 RX ADMIN — ATORVASTATIN CALCIUM 20 MILLIGRAM(S): 80 TABLET, FILM COATED ORAL at 20:22

## 2023-05-03 RX ADMIN — Medication 650 MILLIGRAM(S): at 06:46

## 2023-05-03 RX ADMIN — BICTEGRAVIR SODIUM, EMTRICITABINE, AND TENOFOVIR ALAFENAMIDE FUMARATE 1 TABLET(S): 30; 120; 15 TABLET ORAL at 08:24

## 2023-05-03 NOTE — BH INPATIENT PSYCHIATRY PROGRESS NOTE - NSBHATTESTBILLING_PSY_A_CORE
92469-Hxhskrtsrq OBS or IP - low complexity OR 25-34 mins 30389-Yppdoqzqtg OBS or IP - low complexity OR 25-34 mins 04185-Kemxgdrgos OBS or IP - low complexity OR 25-34 mins 39491-Gueyootgrz OBS or IP - low complexity OR 25-34 mins

## 2023-05-03 NOTE — BH INPATIENT PSYCHIATRY PROGRESS NOTE - NSBHMETABOLIC_PSY_ALL_CORE_FT
BMI: BMI (kg/m2): 24.2 (04-29-23 @ 17:01)  HbA1c: A1C with Estimated Average Glucose Result: 5.5 % (04-30-23 @ 08:00)    Glucose:   BP: 109/78 (05-03-23 @ 08:50) (103/60 - 124/64)  Lipid Panel: Date/Time: 04-30-23 @ 08:00  Cholesterol, Serum: 240  Direct LDL: --  HDL Cholesterol, Serum: 64  Total Cholesterol/HDL Ration Measurement: --  Triglycerides, Serum: 104

## 2023-05-03 NOTE — BH INPATIENT PSYCHIATRY PROGRESS NOTE - NSBHCHARTREVIEWVS_PSY_A_CORE FT
Vital Signs Last 24 Hrs  T(C): --  T(F): --  HR: 71 (05-03-23 @ 08:50) (69 - 71)  BP: 109/78 (05-03-23 @ 08:50) (109/78 - 119/71)  BP(mean): --  RR: 18 (05-03-23 @ 08:50) (16 - 18)  SpO2: --
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
## 2023-05-03 NOTE — BH INPATIENT PSYCHIATRY PROGRESS NOTE - NSBHATTESTTYPEVISIT_PSY_A_CORE
POLO without on-site Attending supervision PLOO without on-site Attending supervision On-site Attending supervising POLO (99XXX codes)

## 2023-05-03 NOTE — BH INPATIENT PSYCHIATRY PROGRESS NOTE - NSBHFUPINTERVALHXFT_PSY_A_CORE
Pt seen and evaluated, chart reviewed. As per nursing report, no acute events overnight, no PRNs. On evaluation, pt presents pleasant and well-groomed. He reports he has been working on self reflection and shows treatment team a letter he wrote to himself addressing his future goals. He endorses motivation to attend rehab and maintain sobriety. Endorses protective factors include his daughter. He endorses improving mood and appetite. Reports improving sleep, states he was able to better sleep last night with decrease of ruminations. He denies AVH, paranoia. No overt delusions elicited. Denies active SI/HI, intent and plan. He is adherent to medications, denies negative side effects. He is more visible on unit and in groups/DBT.

## 2023-05-03 NOTE — BH INPATIENT PSYCHIATRY PROGRESS NOTE - CURRENT MEDICATION
MEDICATIONS  (STANDING):  atorvastatin 20 milliGRAM(s) Oral at bedtime  bictegravir 50 mG/emtricitabine 200 mG/tenofovir alafenamide 25 mG (BIKTARVY) 1 Tablet(s) Oral daily  escitalopram 10 milliGRAM(s) Oral daily  nicotine - 21 mG/24Hr(s) Patch 1 Patch Transdermal daily  traZODone 100 milliGRAM(s) Oral at bedtime    MEDICATIONS  (PRN):  acetaminophen     Tablet .. 650 milliGRAM(s) Oral every 6 hours PRN Temp greater or equal to 38C (100.4F), Mild Pain (1 - 3)  haloperidol     Tablet 5 milliGRAM(s) Oral every 6 hours PRN agitation  hydrOXYzine hydrochloride 50 milliGRAM(s) Oral every 6 hours PRN anxiety/sleep  ibuprofen  Tablet. 400 milliGRAM(s) Oral every 8 hours PRN Moderate Pain (4 - 6)

## 2023-05-04 PROCEDURE — 99231 SBSQ HOSP IP/OBS SF/LOW 25: CPT

## 2023-05-04 RX ORDER — ESCITALOPRAM OXALATE 10 MG/1
15 TABLET, FILM COATED ORAL DAILY
Refills: 0 | Status: DISCONTINUED | OUTPATIENT
Start: 2023-05-05 | End: 2023-05-16

## 2023-05-04 RX ADMIN — Medication 1 PATCH: at 18:33

## 2023-05-04 RX ADMIN — Medication 50 MILLIGRAM(S): at 15:34

## 2023-05-04 RX ADMIN — Medication 1 PATCH: at 08:07

## 2023-05-04 RX ADMIN — BICTEGRAVIR SODIUM, EMTRICITABINE, AND TENOFOVIR ALAFENAMIDE FUMARATE 1 TABLET(S): 30; 120; 15 TABLET ORAL at 08:06

## 2023-05-04 RX ADMIN — ATORVASTATIN CALCIUM 20 MILLIGRAM(S): 80 TABLET, FILM COATED ORAL at 20:25

## 2023-05-04 RX ADMIN — ESCITALOPRAM OXALATE 10 MILLIGRAM(S): 10 TABLET, FILM COATED ORAL at 08:06

## 2023-05-04 RX ADMIN — Medication 100 MILLIGRAM(S): at 20:25

## 2023-05-04 NOTE — BH CHART NOTE - NSPSYPRGNOTEFT_PSY_ALL_CORE
CC: None   HPI: Patient seen and evaluated at bedside. Patient reports no acute complaints. He future and goal oriented states that, "It is time to quit." He seems to be optimistic that this time will be different and that he is ready to make the appropriate changes in his life. He is expressing wanting to go to rehab. He also shares insight and fear to the potential harms if he were to continue to use drugs. He has no suicidal/homicidal ideations. No acute psychosis/hallucinations are present. Awaiting options for rehab placement.     MEDICATIONS  (PRN):  acetaminophen     Tablet .. 650 milliGRAM(s) Oral every 6 hours PRN Temp greater or equal to 38C (100.4F), Mild Pain (1 - 3)  haloperidol     Tablet 5 milliGRAM(s) Oral every 6 hours PRN agitation  hydrOXYzine hydrochloride 50 milliGRAM(s) Oral every 6 hours PRN anxiety/sleep  ibuprofen  Tablet. 400 milliGRAM(s) Oral every 8 hours PRN Moderate Pain (4 - 6)    MEDICATIONS  (STANDING):  atorvastatin 20 milliGRAM(s) Oral at bedtime  bictegravir 50 mG/emtricitabine 200 mG/tenofovir alafenamide 25 mG (BIKTARVY) 1 Tablet(s) Oral daily  escitalopram 10 milliGRAM(s) Oral daily  nicotine - 21 mG/24Hr(s) Patch 1 Patch Transdermal daily  traZODone 100 milliGRAM(s) Oral at bedtime    Mental status Exam:  Level of Consciousness: Alert  General Appearance: No deformities present  Body Habitus: Average build  Hygiene: Good  Grooming: Good  Behavior: Cooperative  Eye Contact: Good  Relatedness: Good  Impulse Control: Normal  Muscle Tone/Strength: Normal muscle tone/strength  Abnormal Movements: No abnormal movements  Gait/Station: Normal gait / station  Speech: Normal volume, rate, productivity, spontaneity and articulation  Mood: Depressed  Affect Quality: Depressed  Affect Range: Constricted  Affect Congruence: Congruent  Thought Process: Linear  Thought Associations: Normal  Thought Content: Ruminations  Perceptions: No abnormalities  Orientation: Oriented to time, place, person, situation  Attention/Concentration: Normal  Estimated Intelligence: Average  Recent Memory: Normal  Remote Memory: Normal  Fund of Knowledge: Normal  How Fund of Knowledge Assessed: Vocabulary  Language: No abnormalities noted  Judgment: Fair  Insight: Fair  New onset of suicidality? No.     Assessment:  Primary Diagnosis: Depression, unspecified   F32.A  -Cocaine use disorder   F14.10  -Opioid dependence   F11.20      #Depression unspecified  -c/w lexapro 10 mg daily  -c/w trazodone 50 mg qhs --> titrate trazodone 100 mg qhs (5/2)    #substance use disorder (cocaine, heroin)  -CATCH team consult  -encourage MAT or/and rehab  -monitor for s/sx of with withdrawals    #hx of HIV  #neutropenia  -medicine consult --> "Resume HIV med of BIKTARVY, Consider a trial of lipitor 20mg"    #Agitation  -for agitation not amenable to verbal redirection, can give haldol 5 mg PO q6h and benadryl 50 mg PO q6h, with escalation to IM formulation if patient refuses or a danger to himself or others

## 2023-05-04 NOTE — BH INPATIENT PSYCHIATRY PROGRESS NOTE - CURRENT MEDICATION
MEDICATIONS  (STANDING):  atorvastatin 20 milliGRAM(s) Oral at bedtime  bictegravir 50 mG/emtricitabine 200 mG/tenofovir alafenamide 25 mG (BIKTARVY) 1 Tablet(s) Oral daily  nicotine - 21 mG/24Hr(s) Patch 1 Patch Transdermal daily  traZODone 100 milliGRAM(s) Oral at bedtime    MEDICATIONS  (PRN):  acetaminophen     Tablet .. 650 milliGRAM(s) Oral every 6 hours PRN Temp greater or equal to 38C (100.4F), Mild Pain (1 - 3)  haloperidol     Tablet 5 milliGRAM(s) Oral every 6 hours PRN agitation  hydrOXYzine hydrochloride 50 milliGRAM(s) Oral every 6 hours PRN anxiety/sleep  ibuprofen  Tablet. 400 milliGRAM(s) Oral every 8 hours PRN Moderate Pain (4 - 6)

## 2023-05-04 NOTE — BH INPATIENT PSYCHIATRY PROGRESS NOTE - NSBHCHARTREVIEWVS_PSY_A_CORE FT
Vital Signs Last 24 Hrs  T(C): 35.6 (05-04-23 @ 08:37), Max: 36.3 (05-03-23 @ 16:16)  T(F): 96 (05-04-23 @ 08:37), Max: 97.4 (05-03-23 @ 16:16)  HR: 69 (05-04-23 @ 08:37) (69 - 79)  BP: 119/77 (05-04-23 @ 08:37) (119/77 - 135/74)  BP(mean): --  RR: 18 (05-04-23 @ 08:37) (18 - 18)  SpO2: --

## 2023-05-04 NOTE — BH INPATIENT PSYCHIATRY PROGRESS NOTE - NSBHMETABOLIC_PSY_ALL_CORE_FT
BMI: BMI (kg/m2): 24.2 (04-29-23 @ 17:01)  HbA1c: A1C with Estimated Average Glucose Result: 5.5 % (04-30-23 @ 08:00)    Glucose:   BP: 119/77 (05-04-23 @ 08:37) (106/54 - 135/74)  Lipid Panel: Date/Time: 04-30-23 @ 08:00  Cholesterol, Serum: 240  Direct LDL: --  HDL Cholesterol, Serum: 64  Total Cholesterol/HDL Ration Measurement: --  Triglycerides, Serum: 104

## 2023-05-04 NOTE — BH INPATIENT PSYCHIATRY PROGRESS NOTE - NSBHADMITMEDEDUDETAILS_A_CORE FT
Reeducated on risks and benefits, and side effects profiles, of current medication regimen. 
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
## 2023-05-04 NOTE — BH INPATIENT PSYCHIATRY PROGRESS NOTE - NSBHFUPINTERVALHXFT_PSY_A_CORE
Pt seen and evaluated, chart reviewed. As per nursing report, no acute events overnight, no PRNs. On evaluation, pt presents pleasant and well-groomed, socializing appropriately with group milieu. He presents with improving insight, verbalizes benefits of attending rehab and agreeing to referrals, CATCH following. He endorses fear of using illicit substances again, states next time he may die. He presents future-oriented, endorses goal to improve relations with his daughter. He reports improving mood and appetite, requesting double portions. Continues to endorse anxiety and ruminations on situation resulting in disrupted sleep. No overt psychosis elicited. Denies active SI/HI, intent and plan. He is adherent to medications, denies negative side effects. He is visible on unit and in groups/DBT.  Pt seen and evaluated, chart reviewed. As per nursing report, no acute events overnight, no PRNs. On evaluation, pt presents pleasant and well-groomed, socializing appropriately with group milieu. He presents with improving insight, verbalizes benefits of attending rehab and agreeing to referrals, CATCH following. He endorses fear of using illicit substances again, reports worry that next time he uses he may die via unintentional OD/fentanyl. He reports desire to live, presents future-oriented, endorses goal to improve relations with his daughter. He reports improving mood and appetite, requesting double portions. Continues to endorse anxiety and ruminations on his situation resulting in disrupted sleep. No overt psychosis elicited. Denies active SI/HI, intent and plan. He is adherent to medications, denies negative side effects. He is visible on unit and in groups/DBT.

## 2023-05-04 NOTE — BH INPATIENT PSYCHIATRY PROGRESS NOTE - NSBHATTESTBILLING_PSY_A_CORE
33857-Lrvladyefl OBS or IP - low complexity OR 25-34 mins [id number]-Kfjyxfaeak OBS or IP - low complexity OR 25-34 mins [id number]-Ktduaolvab OBS or IP - low complexity OR 25-34 mins [id number]-Aeugeutvcx OBS or IP - low complexity OR 25-34 mins

## 2023-05-05 PROCEDURE — 99231 SBSQ HOSP IP/OBS SF/LOW 25: CPT

## 2023-05-05 RX ADMIN — Medication 1 PATCH: at 08:42

## 2023-05-05 RX ADMIN — Medication 100 MILLIGRAM(S): at 20:05

## 2023-05-05 RX ADMIN — Medication 1 PATCH: at 08:20

## 2023-05-05 RX ADMIN — ESCITALOPRAM OXALATE 15 MILLIGRAM(S): 10 TABLET, FILM COATED ORAL at 08:18

## 2023-05-05 RX ADMIN — BICTEGRAVIR SODIUM, EMTRICITABINE, AND TENOFOVIR ALAFENAMIDE FUMARATE 1 TABLET(S): 30; 120; 15 TABLET ORAL at 08:18

## 2023-05-05 RX ADMIN — ATORVASTATIN CALCIUM 20 MILLIGRAM(S): 80 TABLET, FILM COATED ORAL at 20:06

## 2023-05-05 NOTE — BH INPATIENT PSYCHIATRY PROGRESS NOTE - NSBHMETABOLIC_PSY_ALL_CORE_FT
BMI: BMI (kg/m2): 24.2 (04-29-23 @ 17:01)  HbA1c: A1C with Estimated Average Glucose Result: 5.5 % (04-30-23 @ 08:00)    Glucose:   BP: 117/76 (05-05-23 @ 08:17) (109/78 - 135/74)  Lipid Panel: Date/Time: 04-30-23 @ 08:00  Cholesterol, Serum: 240  Direct LDL: --  HDL Cholesterol, Serum: 64  Total Cholesterol/HDL Ration Measurement: --  Triglycerides, Serum: 104
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
## 2023-05-05 NOTE — BH CHART NOTE - NSPSYPRGNOTEFT_PSY_ALL_CORE
CC: Trouble sleeping and anxious     HPI: Patient seen and evaluated at bedside. Patient reports some anxiety throughout the night and nightmares. He has some fear from the experience he had recently prior to coming into the hospital with substance abuse and has continued fears of what may happen if he were to continue to use drugs. He future and goal oriented states that, "It is time to quit." He seems to be optimistic that this time will be different and that he is ready to make the appropriate changes in his life. He is expressing wanting to go to rehab. He has no suicidal/homicidal ideations. No acute psychosis/hallucinations are present. Awaiting options for rehab placement.     MEDICATIONS  (PRN):  acetaminophen     Tablet .. 650 milliGRAM(s) Oral every 6 hours PRN Temp greater or equal to 38C (100.4F), Mild Pain (1 - 3)  haloperidol     Tablet 5 milliGRAM(s) Oral every 6 hours PRN agitation  hydrOXYzine hydrochloride 50 milliGRAM(s) Oral every 6 hours PRN anxiety/sleep  ibuprofen  Tablet. 400 milliGRAM(s) Oral every 8 hours PRN Moderate Pain (4 - 6)    MEDICATIONS  (STANDING):  atorvastatin 20 milliGRAM(s) Oral at bedtime  bictegravir 50 mG/emtricitabine 200 mG/tenofovir alafenamide 25 mG (BIKTARVY) 1 Tablet(s) Oral daily  escitalopram 10 milliGRAM(s) Oral daily  nicotine - 21 mG/24Hr(s) Patch 1 Patch Transdermal daily  traZODone 100 milliGRAM(s) Oral at bedtime    Mental status Exam:  Level of Consciousness: Alert  General Appearance: No deformities present  Body Habitus: Average build  Hygiene: Good  Grooming: Good  Behavior: Cooperative  Eye Contact: Good  Relatedness: Good  Impulse Control: Normal  Muscle Tone/Strength: Normal muscle tone/strength  Abnormal Movements: No abnormal movements  Gait/Station: Normal gait / station  Speech: Normal volume, rate, productivity, spontaneity and articulation  Mood: Depressed  Affect Quality: Depressed  Affect Range: Constricted  Affect Congruence: Congruent  Thought Process: Linear  Thought Associations: Normal  Thought Content: Ruminations  Perceptions: No abnormalities  Orientation: Oriented to time, place, person, situation  Attention/Concentration: Normal  Estimated Intelligence: Average  Recent Memory: Normal  Remote Memory: Normal  Fund of Knowledge: Normal  How Fund of Knowledge Assessed: Vocabulary  Language: No abnormalities noted  Judgment: Fair  Insight: Fair  New onset of suicidality? No.     Assessment:  Primary Diagnosis: Depression, unspecified   F32.A  -Cocaine use disorder   F14.10  -Opioid dependence   F11.20      #Depression unspecified  -c/w lexapro 10 mg daily  -c/w trazodone 50 mg qhs --> titrate trazodone 100 mg qhs (5/2)    #substance use disorder (cocaine, heroin)  -CATCH team consult  -encourage MAT or/and rehab  -monitor for s/sx of with withdrawals    #hx of HIV  #neutropenia  -medicine consult --> "Resume HIV med of BIKTARVY, Consider a trial of lipitor 20mg"    #Agitation  -for agitation not amenable to verbal redirection, can give haldol 5 mg PO q6h and benadryl 50 mg PO q6h, with escalation to IM formulation if patient refuses or a danger to himself or others CC: Trouble sleeping and anxious     HPI: Patient seen and evaluated at bedside. Patient reports some anxiety throughout the night and nightmares 2/2 fear from the experience he had recently prior to coming into the hospital with substance abuse. He reports continued fears of what may happen if he were to continue to use drugs. He is future and goal oriented states that, "It is time to quit." He seems to be optimistic that this time will be different and that he is ready to make the appropriate changes in his life. He is expressing wanting to go to rehab. He has no suicidal/homicidal ideations. No acute psychosis/hallucinations are present. Awaiting options for rehab placement.     MEDICATIONS  (PRN):  acetaminophen     Tablet .. 650 milliGRAM(s) Oral every 6 hours PRN Temp greater or equal to 38C (100.4F), Mild Pain (1 - 3)  haloperidol     Tablet 5 milliGRAM(s) Oral every 6 hours PRN agitation  hydrOXYzine hydrochloride 50 milliGRAM(s) Oral every 6 hours PRN anxiety/sleep  ibuprofen  Tablet. 400 milliGRAM(s) Oral every 8 hours PRN Moderate Pain (4 - 6)    MEDICATIONS  (STANDING):  atorvastatin 20 milliGRAM(s) Oral at bedtime  bictegravir 50 mG/emtricitabine 200 mG/tenofovir alafenamide 25 mG (BIKTARVY) 1 Tablet(s) Oral daily  escitalopram 10 milliGRAM(s) Oral daily  nicotine - 21 mG/24Hr(s) Patch 1 Patch Transdermal daily  traZODone 100 milliGRAM(s) Oral at bedtime    Mental status Exam:  Level of Consciousness: Alert  General Appearance: No deformities present  Body Habitus: Average build  Hygiene: Good  Grooming: Good  Behavior: Cooperative  Eye Contact: Good  Relatedness: Good  Impulse Control: Normal  Muscle Tone/Strength: Normal muscle tone/strength  Abnormal Movements: No abnormal movements  Gait/Station: Normal gait / station  Speech: Normal volume, rate, productivity, spontaneity and articulation  Mood: Depressed  Affect Quality: Depressed  Affect Range: Constricted  Affect Congruence: Congruent  Thought Process: Linear  Thought Associations: Normal  Thought Content: Ruminations  Perceptions: No abnormalities  Orientation: Oriented to time, place, person, situation  Attention/Concentration: Normal  Estimated Intelligence: Average  Recent Memory: Normal  Remote Memory: Normal  Fund of Knowledge: Normal  How Fund of Knowledge Assessed: Vocabulary  Language: No abnormalities noted  Judgment: Fair  Insight: Fair  New onset of suicidality? No.     Assessment:  Primary Diagnosis: Depression, unspecified   F32.A  -Cocaine use disorder   F14.10  -Opioid dependence   F11.20      #Depression unspecified  -c/w lexapro 15 mg daily   -c/w trazodone 50 mg qhs --> titrate trazodone 100 mg qhs (5/2)    #substance use disorder (cocaine, heroin)  -CATCH team consult  -encourage MAT or/and rehab  -monitor for s/sx of with withdrawals    #hx of HIV  #neutropenia  -medicine consult --> "Resume HIV med of BIKTARVY, Consider a trial of lipitor 20mg"    #Agitation  -for agitation not amenable to verbal redirection, can give haldol 5 mg PO q6h and benadryl 50 mg PO q6h, with escalation to IM formulation if patient refuses or a danger to himself or others CC: Trouble sleeping and anxious     HPI: Patient seen and evaluated at bedside. Patient reports some anxiety throughout the night and nightmares 2/2 fear from the experience he had recently prior to coming into the hospital. He reports continued fears of what may happen if he were to continue to use drugs. Emotional support provided, reinforced coping skills. He is future and goal oriented states that, "It is time to quit." He seems to be optimistic that this time will be different and that he is ready to make the appropriate changes in his life. He is expressing wanting to go to rehab. He has no suicidal/homicidal ideations. No acute psychosis/hallucinations are present. Awaiting options for rehab placement.     MEDICATIONS  (PRN):  acetaminophen     Tablet .. 650 milliGRAM(s) Oral every 6 hours PRN Temp greater or equal to 38C (100.4F), Mild Pain (1 - 3)  haloperidol     Tablet 5 milliGRAM(s) Oral every 6 hours PRN agitation  hydrOXYzine hydrochloride 50 milliGRAM(s) Oral every 6 hours PRN anxiety/sleep  ibuprofen  Tablet. 400 milliGRAM(s) Oral every 8 hours PRN Moderate Pain (4 - 6)    MEDICATIONS  (STANDING):  atorvastatin 20 milliGRAM(s) Oral at bedtime  bictegravir 50 mG/emtricitabine 200 mG/tenofovir alafenamide 25 mG (BIKTARVY) 1 Tablet(s) Oral daily  escitalopram 10 milliGRAM(s) Oral daily  nicotine - 21 mG/24Hr(s) Patch 1 Patch Transdermal daily  traZODone 100 milliGRAM(s) Oral at bedtime    Mental status Exam:  Level of Consciousness: Alert  General Appearance: No deformities present  Body Habitus: Average build  Hygiene: Good  Grooming: Good  Behavior: Cooperative  Eye Contact: Good  Relatedness: Good  Impulse Control: Normal  Muscle Tone/Strength: Normal muscle tone/strength  Abnormal Movements: No abnormal movements  Gait/Station: Normal gait / station  Speech: Normal volume, rate, productivity, spontaneity and articulation  Mood: Depressed  Affect Quality: Anxious  Affect Range: Full  Affect Congruence: Congruent  Thought Process: Linear  Thought Associations: Normal  Thought Content: Ruminations  Perceptions: No abnormalities  Orientation: Oriented to time, place, person, situation  Attention/Concentration: Normal  Estimated Intelligence: Average  Recent Memory: Normal  Remote Memory: Normal  Fund of Knowledge: Normal  How Fund of Knowledge Assessed: Vocabulary  Language: No abnormalities noted  Judgment: Fair  Insight: Fair  New onset of suicidality? No.     Assessment:  Primary Diagnosis: Depression, unspecified   F32.A  -Cocaine use disorder   F14.10  -Opioid dependence   F11.20      #Depression unspecified  -c/w lexapro 15 mg daily   -c/w trazodone 50 mg qhs --> titrate trazodone 100 mg qhs (5/2)    #substance use disorder (cocaine, heroin)  -CATCH team consult  -encourage MAT or/and rehab  -monitor for s/sx of with withdrawals    #hx of HIV  #neutropenia  -medicine consult --> "Resume HIV med of BIKTARVY, Consider a trial of lipitor 20mg"    #Agitation  -for agitation not amenable to verbal redirection, can give haldol 5 mg PO q6h and benadryl 50 mg PO q6h, with escalation to IM formulation if patient refuses or a danger to himself or others

## 2023-05-05 NOTE — BH INPATIENT PSYCHIATRY PROGRESS NOTE - NSBHATTESTBILLING_PSY_A_CORE
60642-Qkxexhfgbq OBS or IP - low complexity OR 25-34 mins 23831-Sgktdtcrpe OBS or IP - low complexity OR 25-34 mins 63744-Guahkzgiyv OBS or IP - low complexity OR 25-34 mins 51143-Sewdxcjysg OBS or IP - low complexity OR 25-34 mins

## 2023-05-05 NOTE — BH INPATIENT PSYCHIATRY PROGRESS NOTE - CURRENT MEDICATION
MEDICATIONS  (STANDING):  atorvastatin 20 milliGRAM(s) Oral at bedtime  bictegravir 50 mG/emtricitabine 200 mG/tenofovir alafenamide 25 mG (BIKTARVY) 1 Tablet(s) Oral daily  escitalopram 15 milliGRAM(s) Oral daily  nicotine - 21 mG/24Hr(s) Patch 1 Patch Transdermal daily  traZODone 100 milliGRAM(s) Oral at bedtime    MEDICATIONS  (PRN):  acetaminophen     Tablet .. 650 milliGRAM(s) Oral every 6 hours PRN Temp greater or equal to 38C (100.4F), Mild Pain (1 - 3)  haloperidol     Tablet 5 milliGRAM(s) Oral every 6 hours PRN agitation  hydrOXYzine hydrochloride 50 milliGRAM(s) Oral every 6 hours PRN anxiety/sleep  ibuprofen  Tablet. 400 milliGRAM(s) Oral every 8 hours PRN Moderate Pain (4 - 6)

## 2023-05-05 NOTE — BH INPATIENT PSYCHIATRY PROGRESS NOTE - NSBHASSESSSUMMFT_PSY_ALL_CORE
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 To note patient has multiple prior hospitalization (last in March 2023), numerous previous SA per chart, no previous self harm, no legal/violence hx. Pt reports hx of remission with recent relapse that lead him to overdose and end up in a dumpster. Patient presented with feelings of depression, feeling betrayed by his ex-wife, thoughts about wanting to kill one of them (ex-wife or new ) so they know how he feels and reports he wants to kill himself afterwards. Patient's presentation appears consistent with adjustment disorder, however, he does have a history of depressive mood and his history of substance use and suicidal and homicidal ideations put him at elevated acute risk which warrants inpatient psychiatric care for safety and stabilization.     On evaluation, pt presents linear and cooperative with good ADLs. He reports he had difficulty sleeping and nightmares of drug dealers trying to get him. Endorses fear of relapsing and motivation to maintain sobriety, requesting for rehab, CATCH following. He reports improving mood and resolution of SI/HI/I/P. He presents future-oriented with improved insight.     #Depression unspecified  -titrate lexapro 15 mg daily (5/4)  -c/w trazodone 50 mg qhs --> titrate trazodone 100 mg qhs (5/2)    #substance use disorder (cocaine, heroin)  -CATCH team consult  -encourage MAT or/and rehab --> pt agreeable to rehab, pending placement   -monitor for s/sx of with withdrawals    #hx of HIV  #neutropenia  -medicine consult --> "Resume HIV med of BIKTARVY, Consider a trial of lipitor 20mg"    #Agitation  -for agitation not amenable to verbal redirection, can give haldol 5 mg PO q6h and benadryl 50 mg PO q6h, with escalation to IM formulation if patient refuses or a danger to himself or others  -PLEASE AVOID BENZOS d/t hx/potential for abuse 38 yo male, currently domiciled at transitional housing, 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 To note patient has multiple prior hospitalization (last in March 2023), numerous previous SA per chart, no previous self harm, no legal/violence hx. Pt reports hx of remission with recent relapse that lead him to overdose and end up in a dumpster. Patient presented with feelings of depression, feeling betrayed by his ex-wife, thoughts about wanting to kill one of them (ex-wife or new ) so they know how he feels and reports he wants to kill himself afterwards. Patient's presentation appears consistent with adjustment disorder, however, he does have a history of depressive mood and his history of substance use and suicidal and homicidal ideations put him at elevated acute risk which warrants inpatient psychiatric care for safety and stabilization.     On evaluation, pt presents linear and cooperative with good ADLs. He reports he had difficulty sleeping and nightmares of drug dealers trying to get him. Endorses fear of relapsing and motivation to maintain sobriety, requesting for rehab, CATCH following. He reports improving mood and resolution of SI/HI/I/P. He presents future-oriented with improved insight.     #Depression unspecified  -titrate lexapro 15 mg daily (5/4)  -c/w trazodone 50 mg qhs --> titrate trazodone 100 mg qhs (5/2)    #substance use disorder (cocaine, heroin)  -CATCH team consult  -encourage MAT or/and rehab --> pt agreeable to rehab, pending placement   -monitor for s/sx of with withdrawals    #hx of HIV  #neutropenia  -medicine consult --> "Resume HIV med of BIKTARVY, Consider a trial of lipitor 20mg"    #Agitation  -for agitation not amenable to verbal redirection, can give haldol 5 mg PO q6h and benadryl 50 mg PO q6h, with escalation to IM formulation if patient refuses or a danger to himself or others  -PLEASE AVOID BENZOS d/t hx/potential for abuse

## 2023-05-05 NOTE — BH INPATIENT PSYCHIATRY PROGRESS NOTE - NSBHCHARTREVIEWVS_PSY_A_CORE FT
Vital Signs Last 24 Hrs  T(C): 35.9 (05-04-23 @ 17:07), Max: 35.9 (05-04-23 @ 17:07)  T(F): 96.7 (05-04-23 @ 17:07), Max: 96.7 (05-04-23 @ 17:07)  HR: 62 (05-05-23 @ 08:17) (62 - 78)  BP: 117/76 (05-05-23 @ 08:17) (117/76 - 135/72)  BP(mean): --  RR: 18 (05-05-23 @ 08:17) (18 - 18)  SpO2: --

## 2023-05-06 RX ADMIN — BICTEGRAVIR SODIUM, EMTRICITABINE, AND TENOFOVIR ALAFENAMIDE FUMARATE 1 TABLET(S): 30; 120; 15 TABLET ORAL at 12:18

## 2023-05-06 RX ADMIN — ATORVASTATIN CALCIUM 20 MILLIGRAM(S): 80 TABLET, FILM COATED ORAL at 20:10

## 2023-05-06 RX ADMIN — ESCITALOPRAM OXALATE 15 MILLIGRAM(S): 10 TABLET, FILM COATED ORAL at 08:13

## 2023-05-06 RX ADMIN — Medication 100 MILLIGRAM(S): at 20:10

## 2023-05-07 RX ORDER — NYSTATIN CREAM 100000 [USP'U]/G
1 CREAM TOPICAL ONCE
Refills: 0 | Status: COMPLETED | OUTPATIENT
Start: 2023-05-07 | End: 2023-05-07

## 2023-05-07 RX ADMIN — ATORVASTATIN CALCIUM 20 MILLIGRAM(S): 80 TABLET, FILM COATED ORAL at 20:17

## 2023-05-07 RX ADMIN — Medication 100 MILLIGRAM(S): at 20:17

## 2023-05-07 RX ADMIN — HALOPERIDOL DECANOATE 5 MILLIGRAM(S): 100 INJECTION INTRAMUSCULAR at 13:37

## 2023-05-07 RX ADMIN — NYSTATIN CREAM 1 APPLICATION(S): 100000 CREAM TOPICAL at 16:23

## 2023-05-07 RX ADMIN — ESCITALOPRAM OXALATE 15 MILLIGRAM(S): 10 TABLET, FILM COATED ORAL at 08:22

## 2023-05-07 RX ADMIN — BICTEGRAVIR SODIUM, EMTRICITABINE, AND TENOFOVIR ALAFENAMIDE FUMARATE 1 TABLET(S): 30; 120; 15 TABLET ORAL at 11:00

## 2023-05-08 PROCEDURE — 99231 SBSQ HOSP IP/OBS SF/LOW 25: CPT

## 2023-05-08 RX ADMIN — Medication 100 MILLIGRAM(S): at 20:38

## 2023-05-08 RX ADMIN — BICTEGRAVIR SODIUM, EMTRICITABINE, AND TENOFOVIR ALAFENAMIDE FUMARATE 1 TABLET(S): 30; 120; 15 TABLET ORAL at 08:45

## 2023-05-08 RX ADMIN — ATORVASTATIN CALCIUM 20 MILLIGRAM(S): 80 TABLET, FILM COATED ORAL at 20:38

## 2023-05-08 RX ADMIN — ESCITALOPRAM OXALATE 15 MILLIGRAM(S): 10 TABLET, FILM COATED ORAL at 08:45

## 2023-05-08 NOTE — BH INPATIENT PSYCHIATRY PROGRESS NOTE - NSBHMETABOLIC_PSY_ALL_CORE_FT
BMI: BMI (kg/m2): 24.2 (04-29-23 @ 17:01)  HbA1c: A1C with Estimated Average Glucose Result: 5.5 % (04-30-23 @ 08:00)    Glucose:   BP: 131/72 (05-08-23 @ 08:16) (108/70 - 132/61)  Lipid Panel: Date/Time: 04-30-23 @ 08:00  Cholesterol, Serum: 240  Direct LDL: --  HDL Cholesterol, Serum: 64  Total Cholesterol/HDL Ration Measurement: --  Triglycerides, Serum: 104

## 2023-05-08 NOTE — BH INPATIENT PSYCHIATRY PROGRESS NOTE - NSBHATTESTBILLING_PSY_A_CORE
32330-Zektiptfta OBS or IP - low complexity OR 25-34 mins 78130-Akajsxuxga OBS or IP - low complexity OR 25-34 mins 13744-Bogibtcqcb OBS or IP - low complexity OR 25-34 mins 67556-Sqloetapeu OBS or IP - low complexity OR 25-34 mins

## 2023-05-08 NOTE — BH CHART NOTE - NSPSYPRGNOTEFT_PSY_ALL_CORE
CC: None    HPI: Patient seen and evaluated at bedside. Patient reports feeling much better and as if he has a break through and has been able to face some of the challenges he has been avoiding in the past. He is future and goal oriented states that, "It is time to quit." He seems to be optimistic that this time will be different and that he is ready to make the appropriate changes in his life. He is expressing wanting to go to rehab. He has no suicidal/homicidal ideations. No acute psychosis/hallucinations are present. Awaiting options for rehab placement. Patient is seen engaging on the unit with peers and in groups.     MEDICATIONS  (PRN):  acetaminophen     Tablet .. 650 milliGRAM(s) Oral every 6 hours PRN Temp greater or equal to 38C (100.4F), Mild Pain (1 - 3)  haloperidol     Tablet 5 milliGRAM(s) Oral every 6 hours PRN agitation  hydrOXYzine hydrochloride 50 milliGRAM(s) Oral every 6 hours PRN anxiety/sleep  ibuprofen  Tablet. 400 milliGRAM(s) Oral every 8 hours PRN Moderate Pain (4 - 6)    MEDICATIONS  (STANDING):  atorvastatin 20 milliGRAM(s) Oral at bedtime  bictegravir 50 mG/emtricitabine 200 mG/tenofovir alafenamide 25 mG (BIKTARVY) 1 Tablet(s) Oral daily  escitalopram 10 milliGRAM(s) Oral daily  nicotine - 21 mG/24Hr(s) Patch 1 Patch Transdermal daily  traZODone 100 milliGRAM(s) Oral at bedtime    Mental status Exam:  Level of Consciousness: Alert  General Appearance: No deformities present  Body Habitus: Average build  Hygiene: Good  Grooming: Good  Behavior: Cooperative  Eye Contact: Good  Relatedness: Good  Impulse Control: Normal  Muscle Tone/Strength: Normal muscle tone/strength  Abnormal Movements: No abnormal movements  Gait/Station: Normal gait / station  Speech: Normal volume, rate, productivity, spontaneity and articulation  Mood: Depressed  Affect Quality: Anxious  Affect Range: Full  Affect Congruence: Congruent  Thought Process: Linear  Thought Associations: Normal  Thought Content: Ruminations  Perceptions: No abnormalities  Orientation: Oriented to time, place, person, situation  Attention/Concentration: Normal  Estimated Intelligence: Average  Recent Memory: Normal  Remote Memory: Normal  Fund of Knowledge: Normal  How Fund of Knowledge Assessed: Vocabulary  Language: No abnormalities noted  Judgment: Fair  Insight: Fair  New onset of suicidality? No.     Assessment:  Primary Diagnosis: Depression, unspecified   F32.A  -Cocaine use disorder   F14.10  -Opioid dependence   F11.20      #Depression unspecified  -c/w lexapro 15 mg daily   -c/w trazodone 50 mg qhs --> titrate trazodone 100 mg qhs (5/2)    #substance use disorder (cocaine, heroin)  -CATCH team consult  -encourage MAT or/and rehab  -monitor for s/sx of with withdrawals    #hx of HIV  #neutropenia  -medicine consult --> "Resume HIV med of BIKTARVY, Consider a trial of lipitor 20mg"    #Agitation  -for agitation not amenable to verbal redirection, can give haldol 5 mg PO q6h and benadryl 50 mg PO q6h, with escalation to IM formulation if patient refuses or a danger to himself or others

## 2023-05-08 NOTE — BH INPATIENT PSYCHIATRY PROGRESS NOTE - NSBHASSESSSUMMFT_PSY_ALL_CORE
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 To note patient has multiple prior hospitalization (last in March 2023), numerous previous SA per chart, no previous self harm, no legal/violence hx. Pt reports hx of remission with recent relapse that lead him to overdose and end up in a dumpster. Patient presented with feelings of depression, feeling betrayed by his ex-wife, thoughts about wanting to kill one of them (ex-wife or new ) so they know how he feels and reports he wants to kill himself afterwards. Patient's presentation appears consistent with adjustment disorder, however, he does have a history of depressive mood and his history of substance use and suicidal and homicidal ideations put him at elevated acute risk which warrants inpatient psychiatric care for safety and stabilization.     On evaluation, pt presents linear and cooperative with good ADLs. He reports improved mood and sleep with decrease of nightmares of drug dealers trying to get him. Endorses fear of relapsing and motivation to maintain sobriety, requesting for rehab, CATCH following. He reports improving mood and resolution of SI/HI, intent and plan. He presents future-oriented with improved insight.     #Depression unspecified  -c/w lexapro 15 mg daily (5/4)  -c/w trazodone 50 mg qhs --> titrate trazodone 100 mg qhs (5/2)    #substance use disorder (cocaine, heroin)  -CATCH team consult  -encourage MAT or/and rehab --> pt agreeable to rehab, pending placement   -monitor for s/sx of with withdrawals    #hx of HIV  #neutropenia  -medicine consult --> "Resume HIV med of BIKTARVY, Consider a trial of lipitor 20mg"    #Agitation  -for agitation not amenable to verbal redirection, can give haldol 5 mg PO q6h and benadryl 50 mg PO q6h, with escalation to IM formulation if patient refuses or a danger to himself or others  -PLEASE AVOID BENZOS d/t hx/potential for abuse 36 yo male, currently domiciled at transitional housing, 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 To note patient has multiple prior hospitalization (last in March 2023), numerous previous SA per chart, no previous self harm, no legal/violence hx. Pt reports hx of remission with recent relapse that lead him to overdose and end up in a dumpster. Patient presented with feelings of depression, feeling betrayed by his ex-wife, thoughts about wanting to kill one of them (ex-wife or new ) so they know how he feels and reports he wants to kill himself afterwards. Patient's presentation appears consistent with adjustment disorder, however, he does have a history of depressive mood and his history of substance use and suicidal and homicidal ideations put him at elevated acute risk which warrants inpatient psychiatric care for safety and stabilization.     On evaluation, pt presents linear and cooperative with good ADLs. He reports improved mood and sleep with decrease of nightmares of drug dealers trying to get him. Endorses fear of relapsing and motivation to maintain sobriety, requesting for rehab, CATCH following. He reports improving mood and resolution of SI/HI, intent and plan. He presents future-oriented with improved insight.     #Depression unspecified  -c/w lexapro 15 mg daily (5/4)  -c/w trazodone 50 mg qhs --> titrate trazodone 100 mg qhs (5/2)    #substance use disorder (cocaine, heroin)  -CATCH team consult  -encourage MAT or/and rehab --> pt agreeable to rehab, pending placement   -monitor for s/sx of with withdrawals    #hx of HIV  #neutropenia  -medicine consult --> "Resume HIV med of BIKTARVY, Consider a trial of lipitor 20mg"    #Agitation  -for agitation not amenable to verbal redirection, can give haldol 5 mg PO q6h and benadryl 50 mg PO q6h, with escalation to IM formulation if patient refuses or a danger to himself or others  -PLEASE AVOID BENZOS d/t hx/potential for abuse

## 2023-05-08 NOTE — BH INPATIENT PSYCHIATRY PROGRESS NOTE - NSBHFUPINTERVALHXFT_PSY_A_CORE
Pt seen and evaluated, chart reviewed. As per nursing report, no acute events overnight, no PRNs. On evaluation, pt presents calm and pleasant, shows treatment team encouraging letters he has been writing to himself and a forgiveness letter he wrote towards his mother over the weekend. He is with improved insight and verbalizes motivation to continue building his coping skills and to maintain sobriety. He endorses improved mood, sleep and appetite. He denies AVH. Denies paranoia. Denies SI/HI, intent and plan. He presents future-oriented and goal-directed, requesting for rehab, CATCH following. He is adherent to medications, denies negative side effects. Visible on unit and in behavioral control.

## 2023-05-08 NOTE — BH INPATIENT PSYCHIATRY PROGRESS NOTE - NSBHCHARTREVIEWVS_PSY_A_CORE FT
Vital Signs Last 24 Hrs  T(C): 36.3 (05-08-23 @ 08:16), Max: 37 (05-08-23 @ 05:41)  T(F): 97.3 (05-08-23 @ 08:16), Max: 98.6 (05-08-23 @ 05:41)  HR: 71 (05-08-23 @ 08:16) (66 - 71)  BP: 131/72 (05-08-23 @ 08:16) (108/70 - 131/72)  BP(mean): --  RR: 18 (05-08-23 @ 08:16) (16 - 18)  SpO2: --

## 2023-05-09 PROCEDURE — 99231 SBSQ HOSP IP/OBS SF/LOW 25: CPT

## 2023-05-09 PROCEDURE — 93010 ELECTROCARDIOGRAM REPORT: CPT

## 2023-05-09 RX ADMIN — Medication 50 MILLIGRAM(S): at 11:34

## 2023-05-09 RX ADMIN — Medication 400 MILLIGRAM(S): at 13:23

## 2023-05-09 RX ADMIN — Medication 30 MILLILITER(S): at 13:23

## 2023-05-09 RX ADMIN — Medication 400 MILLIGRAM(S): at 14:11

## 2023-05-09 RX ADMIN — Medication 100 MILLIGRAM(S): at 20:15

## 2023-05-09 RX ADMIN — ATORVASTATIN CALCIUM 20 MILLIGRAM(S): 80 TABLET, FILM COATED ORAL at 20:14

## 2023-05-09 RX ADMIN — BICTEGRAVIR SODIUM, EMTRICITABINE, AND TENOFOVIR ALAFENAMIDE FUMARATE 1 TABLET(S): 30; 120; 15 TABLET ORAL at 08:07

## 2023-05-09 RX ADMIN — ESCITALOPRAM OXALATE 15 MILLIGRAM(S): 10 TABLET, FILM COATED ORAL at 08:06

## 2023-05-09 NOTE — BH INPATIENT PSYCHIATRY PROGRESS NOTE - NSBHATTESTBILLING_PSY_A_CORE
54152-Ebstjkmehg OBS or IP - low complexity OR 25-34 mins 39413-Zvwbazfjgz OBS or IP - low complexity OR 25-34 mins 29274-Crcejduslk OBS or IP - low complexity OR 25-34 mins 33172-Jsaviigobz OBS or IP - low complexity OR 25-34 mins

## 2023-05-09 NOTE — PROVIDER CONTACT NOTE (CHANGE IN STATUS NOTIFICATION) - ASSESSMENT
pain reports having  sharp pain in his chest area, while standing up, vs bp 131/77, hr 70 pox 96 % on R/A,  no s/s of distress, no nausea or vomiting, no diaphoresis or other complaints at this moment. patient reports having  sharp pain in his chest area, while standing up, vs bp 131/77, hr 70 pox 96 % on R/A,  no s/s of distress, no nausea or vomiting, no diaphoresis or other complaints at this moment.

## 2023-05-09 NOTE — BH INPATIENT PSYCHIATRY PROGRESS NOTE - NSBHMETABOLIC_PSY_ALL_CORE_FT
BMI: BMI (kg/m2): 24.2 (04-29-23 @ 17:01)  HbA1c: A1C with Estimated Average Glucose Result: 5.5 % (04-30-23 @ 08:00)    Glucose:   BP: 146/79 (05-09-23 @ 08:25) (108/70 - 146/79)  Lipid Panel: Date/Time: 04-30-23 @ 08:00  Cholesterol, Serum: 240  Direct LDL: --  HDL Cholesterol, Serum: 64  Total Cholesterol/HDL Ration Measurement: --  Triglycerides, Serum: 104   BMI: BMI (kg/m2): 24.2 (04-29-23 @ 17:01)  HbA1c: A1C with Estimated Average Glucose Result: 5.5 % (04-30-23 @ 08:00)    Glucose:   BP: 118/69 (05-09-23 @ 16:02) (108/70 - 146/79)  Lipid Panel: Date/Time: 04-30-23 @ 08:00  Cholesterol, Serum: 240  Direct LDL: --  HDL Cholesterol, Serum: 64  Total Cholesterol/HDL Ration Measurement: --  Triglycerides, Serum: 104

## 2023-05-09 NOTE — CHART NOTE - NSCHARTNOTEFT_GEN_A_CORE
called by RN   for pt complaining of CP     upon interviewing   pt states no pain at the time but has ON/ off pain in mid sternum for " years "   no diaphoresis / sob / indigestion / fever/ chills / shoulder or jaw pain     risk factors : heavy smoker and HLD     Vital Signs Last 24 Hrs  T(C): 36.1 (09 May 2023 08:25), Max: 36.2 (09 May 2023 05:59)  T(F): 96.9 (09 May 2023 08:25), Max: 97.2 (09 May 2023 05:59)  HR: 70 (09 May 2023 13:06) (66 - 74)  BP: 131/77 (09 May 2023 13:06) (118/69 - 146/79)  BP(mean): --  RR: 18 (09 May 2023 08:25) (16 - 18)  SpO2: 96% (09 May 2023 13:06) (96% - 96%)    Parameters below as of 09 May 2023 13:06  Patient On (Oxygen Delivery Method): room air    PE:  chest: cta b/l  cardio: s1s2 heard good rate no murmurs heard  abd: bs+ ntnd     ekg ordered stat : NSR with 69bpm         # CP   unlikely cardiac , ? GI / musculoskeletal    - Maalox and Motrin   - monitor vitals     call medical team as needed
Called by RN - pt feeling nauseous after dinner. Will give Zofran 4mg Po x 1 dose    Recall if any further issues
Patient requested foot powder for reported fungal infection of foot. Ordered nystatin powder for foot x1 for now.

## 2023-05-09 NOTE — BH CHART NOTE - NSPSYPRGNOTEFT_PSY_ALL_CORE
CC: None    HPI: Patient seen and evaluated at bedside. Patient feeling much better and remains motivated to rehab. He is future and goal oriented on recovery and staying clean. He seems to be optimistic that this time will be different and that he is ready to make the appropriate changes in his life. He has no suicidal/homicidal ideations. No acute psychosis/hallucinations are present. Awaiting options for rehab placement. Patient is seen engaging on the unit with peers and in groups.     MEDICATIONS  (PRN):  acetaminophen     Tablet .. 650 milliGRAM(s) Oral every 6 hours PRN Temp greater or equal to 38C (100.4F), Mild Pain (1 - 3)  haloperidol     Tablet 5 milliGRAM(s) Oral every 6 hours PRN agitation  hydrOXYzine hydrochloride 50 milliGRAM(s) Oral every 6 hours PRN anxiety/sleep  ibuprofen  Tablet. 400 milliGRAM(s) Oral every 8 hours PRN Moderate Pain (4 - 6)    MEDICATIONS  (STANDING):  atorvastatin 20 milliGRAM(s) Oral at bedtime  bictegravir 50 mG/emtricitabine 200 mG/tenofovir alafenamide 25 mG (BIKTARVY) 1 Tablet(s) Oral daily  escitalopram 10 milliGRAM(s) Oral daily  nicotine - 21 mG/24Hr(s) Patch 1 Patch Transdermal daily  traZODone 100 milliGRAM(s) Oral at bedtime    Mental status Exam:  Level of Consciousness: Alert  General Appearance: No deformities present  Body Habitus: Average build  Hygiene: Good  Grooming: Good  Behavior: Cooperative  Eye Contact: Good  Relatedness: Good  Impulse Control: Normal  Muscle Tone/Strength: Normal muscle tone/strength  Abnormal Movements: No abnormal movements  Gait/Station: Normal gait / station  Speech: Normal volume, rate, productivity, spontaneity and articulation  Mood: Depressed  Affect Quality: Anxious  Affect Range: Full  Affect Congruence: Congruent  Thought Process: Linear  Thought Associations: Normal  Thought Content: Ruminations  Perceptions: No abnormalities  Orientation: Oriented to time, place, person, situation  Attention/Concentration: Normal  Estimated Intelligence: Average  Recent Memory: Normal  Remote Memory: Normal  Fund of Knowledge: Normal  How Fund of Knowledge Assessed: Vocabulary  Language: No abnormalities noted  Judgment: Fair  Insight: Fair  New onset of suicidality? No.     Assessment:  Primary Diagnosis: Depression, unspecified   F32.A  -Cocaine use disorder   F14.10  -Opioid dependence   F11.20      #Depression unspecified  -c/w lexapro 15 mg daily   -c/w trazodone 50 mg qhs --> titrate trazodone 100 mg qhs (5/2)    #substance use disorder (cocaine, heroin)  -CATCH team consult  -encourage MAT or/and rehab  -monitor for s/sx of with withdrawals    #hx of HIV  #neutropenia  -medicine consult --> "Resume HIV med of BIKTARVY, Consider a trial of lipitor 20mg"    #Agitation  -for agitation not amenable to verbal redirection, can give haldol 5 mg PO q6h and benadryl 50 mg PO q6h, with escalation to IM formulation if patient refuses or a danger to himself or others

## 2023-05-09 NOTE — BH INPATIENT PSYCHIATRY PROGRESS NOTE - NSBHFUPINTERVALHXFT_PSY_A_CORE
Pt seen and evaluated, chart reviewed. As per nursing report, no acute events overnight, no PRNs. On evaluation, pt presents calm and pleasant, well-groomed. He reports is doing well. Denies any new complaints. Presents future-oriented, endorses motivation to go to rehab and inquiries on placement. Endorses good mood, sleep and appetite. Denies AVH, paranoia. Denies SI/HI, intent and plan. Adherent to medications, denies negative side effects. Visible on unit and in behavioral control. Discharge planning ensues, pending rehab placement.  Pt seen and evaluated, chart reviewed. As per nursing report, no acute events overnight, no PRNs. On evaluation, pt presents calm and pleasant, well-groomed. He reports he is doing well. Denies any new complaints. Presents future-oriented, endorses motivation to go to rehab and inquiries on placement. Endorses good mood, sleep and appetite. Denies AVH, paranoia. Denies SI/HI, intent and plan. Adherent to medications, denies negative side effects. Visible on unit and in behavioral control. Discharge planning ensues, pending rehab placement.

## 2023-05-09 NOTE — BH INPATIENT PSYCHIATRY PROGRESS NOTE - NSBHCHARTREVIEWVS_PSY_A_CORE FT
Vital Signs Last 24 Hrs  T(C): 36.1 (05-09-23 @ 08:25), Max: 36.2 (05-09-23 @ 05:59)  T(F): 96.9 (05-09-23 @ 08:25), Max: 97.2 (05-09-23 @ 05:59)  HR: 74 (05-09-23 @ 08:25) (66 - 74)  BP: 146/79 (05-09-23 @ 08:25) (118/69 - 146/79)  BP(mean): --  RR: 18 (05-09-23 @ 08:25) (16 - 18)  SpO2: --     Vital Signs Last 24 Hrs  T(C): 36.2 (05-09-23 @ 16:02), Max: 36.2 (05-09-23 @ 05:59)  T(F): 97.2 (05-09-23 @ 16:02), Max: 97.2 (05-09-23 @ 05:59)  HR: 66 (05-09-23 @ 16:02) (66 - 74)  BP: 118/69 (05-09-23 @ 16:02) (118/69 - 146/79)  BP(mean): --  RR: 16 (05-09-23 @ 16:02) (16 - 18)  SpO2: 96% (05-09-23 @ 13:06) (96% - 96%)

## 2023-05-10 PROCEDURE — 99231 SBSQ HOSP IP/OBS SF/LOW 25: CPT

## 2023-05-10 RX ADMIN — ESCITALOPRAM OXALATE 15 MILLIGRAM(S): 10 TABLET, FILM COATED ORAL at 08:02

## 2023-05-10 RX ADMIN — Medication 100 MILLIGRAM(S): at 20:57

## 2023-05-10 RX ADMIN — BICTEGRAVIR SODIUM, EMTRICITABINE, AND TENOFOVIR ALAFENAMIDE FUMARATE 1 TABLET(S): 30; 120; 15 TABLET ORAL at 08:02

## 2023-05-10 RX ADMIN — ATORVASTATIN CALCIUM 20 MILLIGRAM(S): 80 TABLET, FILM COATED ORAL at 20:56

## 2023-05-10 NOTE — BH INPATIENT PSYCHIATRY PROGRESS NOTE - NSBHATTESTBILLING_PSY_A_CORE
71238-Jezovpzgyh OBS or IP - low complexity OR 25-34 mins 00851-Fwohbsdmhd OBS or IP - low complexity OR 25-34 mins 40679-Kspfhnepmn OBS or IP - low complexity OR 25-34 mins 99735-Llqzlcapwy OBS or IP - low complexity OR 25-34 mins

## 2023-05-10 NOTE — BH INPATIENT PSYCHIATRY PROGRESS NOTE - NSBHCHARTREVIEWVS_PSY_A_CORE FT
Vital Signs Last 24 Hrs  T(C): 36.1 (05-10-23 @ 08:20), Max: 36.2 (05-09-23 @ 16:02)  T(F): 96.9 (05-10-23 @ 08:20), Max: 97.2 (05-09-23 @ 16:02)  HR: 67 (05-10-23 @ 08:20) (66 - 70)  BP: 115/63 (05-10-23 @ 08:20) (115/63 - 131/77)  BP(mean): --  RR: 16 (05-10-23 @ 08:20) (16 - 16)  SpO2: 96% (05-09-23 @ 13:06) (96% - 96%)

## 2023-05-10 NOTE — BH INPATIENT PSYCHIATRY PROGRESS NOTE - NSBHMETABOLIC_PSY_ALL_CORE_FT
BMI: BMI (kg/m2): 24.2 (04-29-23 @ 17:01)  HbA1c: A1C with Estimated Average Glucose Result: 5.5 % (04-30-23 @ 08:00)    Glucose:   BP: 115/63 (05-10-23 @ 08:20) (108/70 - 146/79)  Lipid Panel: Date/Time: 04-30-23 @ 08:00  Cholesterol, Serum: 240  Direct LDL: --  HDL Cholesterol, Serum: 64  Total Cholesterol/HDL Ration Measurement: --  Triglycerides, Serum: 104

## 2023-05-10 NOTE — ED BEHAVIORAL HEALTH ASSESSMENT NOTE - DOMICILE TYPE
A fetal ultrasound was completed  See Ob procedures in Epic for an interpretation and recommendations  Do not hesitate to contact us in [facility] if you have questions  Luis Oquendo MD, [id number] [facility]  Maternal Fetal Medicine Private [facility]idence
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
## 2023-05-10 NOTE — BH INPATIENT PSYCHIATRY PROGRESS NOTE - NSBHFUPINTERVALHXFT_PSY_A_CORE
Pt seen and evaluated, chart reviewed. As per nursing report, pt c/o CP, seen by medical PA, EKG WNL, no further episodes. On evaluation, pt presents pleasant with bright affect, well-groomed. He reports he is doing well. Reports CP yesterday a/w anxiety on next steps, denies further episodes, reports PRN hydroxyzine was helpful. Emotional support provided, reinforced coping skills. He continues to present future-oriented, endorses motivation to go to rehab and inquiries on placement. Endorses good mood, sleep and appetite. Denies AVH, paranoia. Denies SI/HI, intent and plan. Adherent to medications, denies negative side effects. Visible on unit and in behavioral control. Discharge planning ensues, pending rehab placement.

## 2023-05-11 PROCEDURE — 71046 X-RAY EXAM CHEST 2 VIEWS: CPT | Mod: 26

## 2023-05-11 PROCEDURE — 99231 SBSQ HOSP IP/OBS SF/LOW 25: CPT

## 2023-05-11 RX ADMIN — Medication 400 MILLIGRAM(S): at 13:48

## 2023-05-11 RX ADMIN — ATORVASTATIN CALCIUM 20 MILLIGRAM(S): 80 TABLET, FILM COATED ORAL at 20:02

## 2023-05-11 RX ADMIN — BICTEGRAVIR SODIUM, EMTRICITABINE, AND TENOFOVIR ALAFENAMIDE FUMARATE 1 TABLET(S): 30; 120; 15 TABLET ORAL at 08:30

## 2023-05-11 RX ADMIN — Medication 100 MILLIGRAM(S): at 20:02

## 2023-05-11 RX ADMIN — Medication 50 MILLIGRAM(S): at 20:32

## 2023-05-11 RX ADMIN — ESCITALOPRAM OXALATE 15 MILLIGRAM(S): 10 TABLET, FILM COATED ORAL at 08:30

## 2023-05-11 RX ADMIN — Medication 400 MILLIGRAM(S): at 13:47

## 2023-05-11 NOTE — BH INPATIENT PSYCHIATRY PROGRESS NOTE - NSBHCHARTREVIEWVS_PSY_A_CORE FT
Vital Signs Last 24 Hrs  T(C): 36.8 (05-11-23 @ 06:39), Max: 36.8 (05-11-23 @ 06:39)  T(F): 98.3 (05-11-23 @ 06:39), Max: 98.3 (05-11-23 @ 06:39)  HR: 87 (05-11-23 @ 09:28) (74 - 87)  BP: 151/84 (05-11-23 @ 09:28) (112/57 - 151/84)  BP(mean): --  RR: 18 (05-11-23 @ 09:28) (18 - 74)  SpO2: --

## 2023-05-11 NOTE — BH INPATIENT PSYCHIATRY PROGRESS NOTE - NSBHATTESTBILLING_PSY_A_CORE
01896-Fcxpiaikfb OBS or IP - low complexity OR 25-34 mins 70031-Drjifeiqyq OBS or IP - low complexity OR 25-34 mins 58827-Lkbdipsjxi OBS or IP - low complexity OR 25-34 mins 22552-Tpfptxrhed OBS or IP - low complexity OR 25-34 mins

## 2023-05-11 NOTE — BH INPATIENT PSYCHIATRY PROGRESS NOTE - NSBHFUPINTERVALHXFT_PSY_A_CORE
Pt seen and evaluated, chart reviewed. As per nursing report, no acute events overnight, no PRNs. On evaluation, pt presents pleasant with bright affect, well-groomed. He reports he is doing well. He continues to present future-oriented, endorses motivation to go to rehab and inquiries on placement. Endorses good mood, sleep and appetite. Denies AVH, paranoia. Denies SI/HI, intent and plan. Adherent to medications, denies negative side effects. Visible on unit and in behavioral control. Discharge planning ensues, pending rehab placement.

## 2023-05-11 NOTE — OCCUPATIONAL THERAPY INITIAL EVALUATION ADULT - LEVEL OF INDEPENDENCE:TOILET, OT EVAL
independent Low Dose Naltrexone Counseling- I discussed with the patient the potential risks and side effects of low dose naltrexone including but not limited to: more vivid dreams, headaches, nausea, vomiting, abdominal pain, fatigue, dizziness, and anxiety.
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
## 2023-05-11 NOTE — BH INPATIENT PSYCHIATRY PROGRESS NOTE - NSBHMETABOLIC_PSY_ALL_CORE_FT
BMI: BMI (kg/m2): 24.2 (04-29-23 @ 17:01)  HbA1c: A1C with Estimated Average Glucose Result: 5.5 % (04-30-23 @ 08:00)    Glucose:   BP: 151/84 (05-11-23 @ 09:28) (112/57 - 151/84)  Lipid Panel: Date/Time: 04-30-23 @ 08:00  Cholesterol, Serum: 240  Direct LDL: --  HDL Cholesterol, Serum: 64  Total Cholesterol/HDL Ration Measurement: --  Triglycerides, Serum: 104

## 2023-05-12 PROCEDURE — 99231 SBSQ HOSP IP/OBS SF/LOW 25: CPT

## 2023-05-12 RX ORDER — SODIUM CHLORIDE 0.65 %
1 AEROSOL, SPRAY (ML) NASAL THREE TIMES A DAY
Refills: 0 | Status: DISCONTINUED | OUTPATIENT
Start: 2023-05-12 | End: 2023-05-16

## 2023-05-12 RX ORDER — DIPHENHYDRAMINE HCL 50 MG
25 CAPSULE ORAL
Refills: 0 | Status: DISCONTINUED | OUTPATIENT
Start: 2023-05-12 | End: 2023-05-16

## 2023-05-12 RX ORDER — LORATADINE 10 MG/1
10 TABLET ORAL DAILY
Refills: 0 | Status: DISCONTINUED | OUTPATIENT
Start: 2023-05-12 | End: 2023-05-16

## 2023-05-12 RX ADMIN — BICTEGRAVIR SODIUM, EMTRICITABINE, AND TENOFOVIR ALAFENAMIDE FUMARATE 1 TABLET(S): 30; 120; 15 TABLET ORAL at 08:15

## 2023-05-12 RX ADMIN — LORATADINE 10 MILLIGRAM(S): 10 TABLET ORAL at 09:17

## 2023-05-12 RX ADMIN — Medication 50 MILLIGRAM(S): at 20:15

## 2023-05-12 RX ADMIN — Medication 1 SPRAY(S): at 20:34

## 2023-05-12 RX ADMIN — Medication 25 MILLIGRAM(S): at 20:33

## 2023-05-12 RX ADMIN — ESCITALOPRAM OXALATE 15 MILLIGRAM(S): 10 TABLET, FILM COATED ORAL at 08:15

## 2023-05-12 RX ADMIN — ATORVASTATIN CALCIUM 20 MILLIGRAM(S): 80 TABLET, FILM COATED ORAL at 20:15

## 2023-05-12 RX ADMIN — Medication 650 MILLIGRAM(S): at 06:03

## 2023-05-12 RX ADMIN — Medication 100 MILLIGRAM(S): at 20:15

## 2023-05-12 RX ADMIN — Medication 650 MILLIGRAM(S): at 15:25

## 2023-05-12 NOTE — BH INPATIENT PSYCHIATRY PROGRESS NOTE - NSBHFUPINTERVALHXFT_PSY_A_CORE
Pt seen and evaluated, chart reviewed. As per nursing report, pt requested PRN hydroxyzine for sleep overnight. On evaluation, pt presents pleasant with bright affect, well-groomed, socializing appropriately with group milieu. He reports he is with a stuffy nose, endorses h/o seasonal allergies, requests for claritin. Otherwise he denies any new complaints. Endorses good mood, sleep and appetite. Denies AVH, paranoia. Denies SI/HI, intent and plan. He is adherent to medications, denies negative side effects. Visible on unit and not a behavioral concern. He is future-oriented and motivated for rehab, pending rehab placement.

## 2023-05-12 NOTE — BH INPATIENT PSYCHIATRY PROGRESS NOTE - NSBHMETABOLIC_PSY_ALL_CORE_FT
BMI: BMI (kg/m2): 24.2 (04-29-23 @ 17:01)  HbA1c: A1C with Estimated Average Glucose Result: 5.5 % (04-30-23 @ 08:00)    Glucose:   BP: 122/65 (05-12-23 @ 08:17) (112/57 - 151/84)  Lipid Panel: Date/Time: 04-30-23 @ 08:00  Cholesterol, Serum: 240  Direct LDL: --  HDL Cholesterol, Serum: 64  Total Cholesterol/HDL Ration Measurement: --  Triglycerides, Serum: 104

## 2023-05-12 NOTE — BH INPATIENT PSYCHIATRY PROGRESS NOTE - NSBHASSESSSUMMFT_PSY_ALL_CORE
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 To note patient has multiple prior hospitalization (last in March 2023), numerous previous SA per chart, no previous self harm, no legal/violence hx. Pt reports hx of remission with recent relapse that lead him to overdose and end up in a dumpster. Patient presented with feelings of depression, feeling betrayed by his ex-wife, thoughts about wanting to kill one of them (ex-wife or new ) so they know how he feels and reports he wants to kill himself afterwards. Patient's presentation appears consistent with adjustment disorder, however, he does have a history of depressive mood and his history of substance use and suicidal and homicidal ideations put him at elevated acute risk which warrants inpatient psychiatric care for safety and stabilization.     On evaluation, pt presents linear and cooperative with good ADLs. He reports improved mood and sleep with decrease of nightmares of drug dealers trying to get him. Endorses fear of relapsing and motivation to maintain sobriety, requesting for rehab, CATCH following. He reports improving mood and resolution of SI/HI, intent and plan. He presents future-oriented with improved insight. Pending rehab placement.    #Depression unspecified  -c/w lexapro 15 mg daily (5/4)  -c/w trazodone 50 mg qhs --> titrate trazodone 100 mg qhs (5/2)  -encourage groups/DBT    #substance use disorder (cocaine, heroin)  -CATCH team consult  -encourage MAT or/and rehab --> pt agreeable to rehab, pending placement   -monitor for s/sx of with withdrawals    #hx of HIV  #neutropenia  -medicine consult --> "Resume HIV med of BIKTARVY, Consider a trial of lipitor 20mg"    #Agitation  -for agitation not amenable to verbal redirection, can give haldol 5 mg PO q6h and benadryl 50 mg PO q6h, with escalation to IM formulation if patient refuses or a danger to himself or others  -PLEASE AVOID BENZOS d/t hx/potential for abuse

## 2023-05-12 NOTE — BH INPATIENT PSYCHIATRY PROGRESS NOTE - NSBHCHARTREVIEWVS_PSY_A_CORE FT
Vital Signs Last 24 Hrs  T(C): --  T(F): --  HR: 77 (05-12-23 @ 08:17) (76 - 77)  BP: 122/65 (05-12-23 @ 08:17) (122/65 - 125/84)  BP(mean): --  RR: 18 (05-12-23 @ 08:17) (18 - 18)  SpO2: --

## 2023-05-12 NOTE — BH INPATIENT PSYCHIATRY PROGRESS NOTE - CURRENT MEDICATION
MEDICATIONS  (STANDING):  atorvastatin 20 milliGRAM(s) Oral at bedtime  bictegravir 50 mG/emtricitabine 200 mG/tenofovir alafenamide 25 mG (BIKTARVY) 1 Tablet(s) Oral daily  escitalopram 15 milliGRAM(s) Oral daily  loratadine 10 milliGRAM(s) Oral daily  nicotine - 21 mG/24Hr(s) Patch 1 Patch Transdermal daily  traZODone 100 milliGRAM(s) Oral at bedtime    MEDICATIONS  (PRN):  acetaminophen     Tablet .. 650 milliGRAM(s) Oral every 6 hours PRN Temp greater or equal to 38C (100.4F), Mild Pain (1 - 3)  haloperidol     Tablet 5 milliGRAM(s) Oral every 6 hours PRN agitation  hydrOXYzine hydrochloride 50 milliGRAM(s) Oral every 6 hours PRN anxiety/sleep  ibuprofen  Tablet. 400 milliGRAM(s) Oral every 8 hours PRN Moderate Pain (4 - 6)

## 2023-05-12 NOTE — BH INPATIENT PSYCHIATRY PROGRESS NOTE - NSBHATTESTBILLING_PSY_A_CORE
94795-Vfxuecteyj OBS or IP - low complexity OR 25-34 mins 39345-Snvnzaftsa OBS or IP - low complexity OR 25-34 mins 86930-Geoxkbziwb OBS or IP - low complexity OR 25-34 mins 15641-Kgflrpjvnp OBS or IP - low complexity OR 25-34 mins

## 2023-05-13 RX ADMIN — LORATADINE 10 MILLIGRAM(S): 10 TABLET ORAL at 08:16

## 2023-05-13 RX ADMIN — Medication 100 MILLIGRAM(S): at 20:07

## 2023-05-13 RX ADMIN — Medication 1 SPRAY(S): at 20:16

## 2023-05-13 RX ADMIN — ESCITALOPRAM OXALATE 15 MILLIGRAM(S): 10 TABLET, FILM COATED ORAL at 08:16

## 2023-05-13 RX ADMIN — ATORVASTATIN CALCIUM 20 MILLIGRAM(S): 80 TABLET, FILM COATED ORAL at 20:07

## 2023-05-13 RX ADMIN — BICTEGRAVIR SODIUM, EMTRICITABINE, AND TENOFOVIR ALAFENAMIDE FUMARATE 1 TABLET(S): 30; 120; 15 TABLET ORAL at 08:16

## 2023-05-14 RX ADMIN — Medication 1 SPRAY(S): at 06:44

## 2023-05-14 RX ADMIN — Medication 25 MILLIGRAM(S): at 06:44

## 2023-05-14 RX ADMIN — Medication 100 MILLIGRAM(S): at 20:14

## 2023-05-14 RX ADMIN — BICTEGRAVIR SODIUM, EMTRICITABINE, AND TENOFOVIR ALAFENAMIDE FUMARATE 1 TABLET(S): 30; 120; 15 TABLET ORAL at 08:34

## 2023-05-14 RX ADMIN — ATORVASTATIN CALCIUM 20 MILLIGRAM(S): 80 TABLET, FILM COATED ORAL at 20:14

## 2023-05-14 RX ADMIN — Medication 25 MILLIGRAM(S): at 20:18

## 2023-05-14 RX ADMIN — Medication 1 SPRAY(S): at 20:17

## 2023-05-14 RX ADMIN — ESCITALOPRAM OXALATE 15 MILLIGRAM(S): 10 TABLET, FILM COATED ORAL at 08:23

## 2023-05-14 RX ADMIN — LORATADINE 10 MILLIGRAM(S): 10 TABLET ORAL at 08:25

## 2023-05-15 PROCEDURE — 99231 SBSQ HOSP IP/OBS SF/LOW 25: CPT

## 2023-05-15 RX ORDER — ESCITALOPRAM OXALATE 10 MG/1
3 TABLET, FILM COATED ORAL
Qty: 0 | Refills: 0 | DISCHARGE
Start: 2023-05-15

## 2023-05-15 RX ORDER — NICOTINE POLACRILEX 2 MG
1 GUM BUCCAL
Qty: 0 | Refills: 0 | DISCHARGE
Start: 2023-05-15

## 2023-05-15 RX ORDER — LORATADINE 10 MG/1
1 TABLET ORAL
Qty: 0 | Refills: 0 | DISCHARGE
Start: 2023-05-15

## 2023-05-15 RX ORDER — ATORVASTATIN CALCIUM 80 MG/1
1 TABLET, FILM COATED ORAL
Qty: 0 | Refills: 0 | DISCHARGE
Start: 2023-05-15

## 2023-05-15 RX ORDER — BICTEGRAVIR SODIUM, EMTRICITABINE, AND TENOFOVIR ALAFENAMIDE FUMARATE 30; 120; 15 MG/1; MG/1; MG/1
1 TABLET ORAL
Qty: 0 | Refills: 0 | DISCHARGE
Start: 2023-05-15

## 2023-05-15 RX ORDER — TRAZODONE HCL 50 MG
1 TABLET ORAL
Qty: 0 | Refills: 0 | DISCHARGE
Start: 2023-05-15

## 2023-05-15 RX ADMIN — LORATADINE 10 MILLIGRAM(S): 10 TABLET ORAL at 08:33

## 2023-05-15 RX ADMIN — ESCITALOPRAM OXALATE 15 MILLIGRAM(S): 10 TABLET, FILM COATED ORAL at 08:33

## 2023-05-15 RX ADMIN — Medication 100 MILLIGRAM(S): at 20:04

## 2023-05-15 RX ADMIN — Medication 50 MILLIGRAM(S): at 20:08

## 2023-05-15 RX ADMIN — BICTEGRAVIR SODIUM, EMTRICITABINE, AND TENOFOVIR ALAFENAMIDE FUMARATE 1 TABLET(S): 30; 120; 15 TABLET ORAL at 08:36

## 2023-05-15 RX ADMIN — ATORVASTATIN CALCIUM 20 MILLIGRAM(S): 80 TABLET, FILM COATED ORAL at 20:04

## 2023-05-15 NOTE — BH DISCHARGE NOTE NURSING/SOCIAL WORK/PSYCH REHAB - NSDCPEWEB_GEN_ALL_CORE
[facility] for Tobacco Control website --- http://[facility]/quitsmoking/[place] website --- www.[facility] HospitalCamalize SLfrjc.com [facility] for Tobacco Control website --- http://[facility]/quitsmoking/[place] website --- www.[facility]5byfrjc.com [facility] for Tobacco Control website --- http://[facility]/quitsmoking/[place] website --- www.[facility] CenterStartup Threadsfrjc.com [facility] for Tobacco Control website --- http://[facility]/quitsmoking/[place] website --- www.[facility] HospitalAMECfrjc.com

## 2023-05-15 NOTE — BH DISCHARGE NOTE NURSING/SOCIAL WORK/PSYCH REHAB - NSCDUDCCRISIS_PSY_A_CORE
[facility] Well  1 (288) [facility] ([phone])  Text "WELL" to 22425  Website: www.Sossee.Sabirmedical/.National Suicide Prevention Lifeline 8 (226) 962-8236/.  [employer]  1 (936) LIFENET (070-0217)/988 Suicide and Crisis Lifeline [facility] Well  1 (343) [facility] ([phone])  Text "WELL" to 31111  Website: www.ClearSlide.[employer]/.National Suicide Prevention Lifeline 7 (123) 811-2080/.  [employer]  1 (002) LIFENET ([phone])/988 Suicide and Crisis Lifeline [facility] Well  1 (330) [facility] ([phone])  Text "WELL" to 95233  Website: www.Trendrating.Becual/.National Suicide Prevention Lifeline 6 (572) 674-7367/.  [employer]et  1 (815) LIFENET (496-3629)/988 Suicide and Crisis Lifeline [facility] Well  1 (081) [facility] ([phone])  Text "WELL" to 04477  Website: www.[employer].Fiix/.National Suicide Prevention Lifeline 3 (752) 553-2515/.  [employer]  1 (912) LIFENET (883-0033)/988 Suicide and Crisis Lifeline

## 2023-05-15 NOTE — BH TREATMENT PLAN - NSTXCOPEINTERRN_PSY_ALL_CORE
RN to encourage verbalization of feelings.  RN to encourage medication compliance and provide support and education as needed on Dx, coping skills, medication, and safety planning.  RN to encourage daily ADL's  RN to encourage group attendance  RN to assess and intervene for any coping needs of patient

## 2023-05-15 NOTE — BH DISCHARGE NOTE NURSING/SOCIAL WORK/PSYCH REHAB - [facility]
Unit Name: 2I Unit Phone Number: [phone] Unit Name: 2I Unit Phone Number: [phone] Unit Name: 2I Unit Phone Number: [phone] Unit Name: 2I Unit Phone Number: [phone]

## 2023-05-15 NOTE — BH INPATIENT PSYCHIATRY PROGRESS NOTE - NSBHFUPINTERVALHXFT_PSY_A_CORE
Pt seen and evaluated, chart reviewed. As per nursing report, no acute events overnight. On evaluation, pt presents socializing with group milieu, pleasant with bright affect, well-groomed. He reports he is doing well. Denies any new complaints. Endorses good mood, sleep and appetite. Denies AVH, paranoia. Denies SI/HI, intent and plan. He is adherent to medications, denies negative side effects. Visible on unit and not a behavioral concern. He is future-oriented and motivated for rehab, pending rehab placement. Discharge planning ensues.

## 2023-05-15 NOTE — BH INPATIENT PSYCHIATRY PROGRESS NOTE - NSBHATTESTBILLING_PSY_A_CORE
29282-Bqzppzyeer OBS or IP - low complexity OR 25-34 mins 99484-Dzgpjwplbn OBS or IP - low complexity OR 25-34 mins 85578-Sugimorrte OBS or IP - low complexity OR 25-34 mins 98781-Rbxsewkuix OBS or IP - low complexity OR 25-34 mins

## 2023-05-15 NOTE — BH TREATMENT PLAN - NSTXCOPEINTERPR_PSY_ALL_CORE
Rt will offer support and ongoing encouragement .
Rt will offer support and encouragement .
Rt will continue to offer support and encouragement .

## 2023-05-15 NOTE — BH TREATMENT PLAN - NSTXSUBMISINTERRN_PSY_ALL_CORE
Provide education related to substance misuse  Encourage rehab referral
Provide education related to substance misuse  Encourage rehab referral

## 2023-05-15 NOTE — BH DISCHARGE NOTE NURSING/SOCIAL WORK/PSYCH REHAB - NSDCPEEMAIL_GEN_ALL_CORE
[facility] for Tobacco Control email [email or web address] for Tobacco Control email [email or web address] for Tobacco Control email [email or web address] for Tobacco Control email [email or web address]

## 2023-05-15 NOTE — BH DISCHARGE NOTE NURSING/SOCIAL WORK/PSYCH REHAB - NSDPFAC_GEN_ALL_CORE
[facility] [place]

## 2023-05-15 NOTE — BH DISCHARGE NOTE NURSING/SOCIAL WORK/PSYCH REHAB - NSDCINSTRUCTNUMBER_PSY_ALL_CORE
[facility]

## 2023-05-15 NOTE — BH INPATIENT PSYCHIATRY DISCHARGE NOTE - HPI (INCLUDE ILLNESS QUALITY, SEVERITY, DURATION, TIMING, CONTEXT, MODIFYING FACTORS, ASSOCIATED SIGNS AND SYMPTOMS)
Patient other chart:  MRN: [id number]  Per ED note:     38 yo male, currently domiciled at transitional housing, 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 To note patient has multiple prior hospitalization (last in March 2023), numerous previous SA per chart, no previous self harm, no legal/violence hx, no substance use hx.      On interview, patient is tearful and states he doesn’t know how to express what he is feeling. He states last night he was over at his ex-wife's house doing drugs with her and her . He states he relapsed and used cocaine and heroine with them. He states then he woke up in the dumpsters and he makes allegations that they dumped him in the dumpsters. He states he now plans to kill them. He states he plans to kill himself after. He is unable to state an actual plan. He states the only reason he came ot the ED is because he still loves her. Patient is unable to state his current medication but is able to state taking his Biktarvy.      To note, patient discharged on Lexapro 15mg qdaily, prazosin 2mg qHS, trazodone 100mg qHS. Patient was agreeable to rehab but placement did not get completed      ON UNIT:   On encounter patient is calm, coopertive with sad affect and tearful eyes.     Patient reports today that he had been clean since he left the hospital last admission. He reports that he had relayed this to his ex wife who invited him over to celebrate his success. He reports that he cannot stand or bear being around his ex-wife and her new  (reporting a lot of feelings about her new partner being there for his daughter when he was not) and reports that for some unclear reason, he decided that to deal with the situation the best way he knew how: by getting high. He reports that maybe it was just an excuse to get high but he reports that he ended up getting high on heroin, coke and beer (reports that his wife and her  did not know he was going to get high) and he reports that he ended up overdosing. He reports that he woke in a dumpster and was confused. He reports that he spoke to his ex-wife and reports that all she said was "i panicked" without emotions and as a matter of fact. He reports that he felt extremely low as a result, indicated that he felt betrayed and disrespected, and reports that he had the urge to kill one of them just so they know what it feels like to be dead and end up in a dumpster and then reported he would kill himself because ending up in a situation like that makes it "not worth it". Patient did not provide plan for hurting self or others and appeared to be speaking out of frustration. Motivational interviewing and supportive therapy provided. Patient reported that currently he was free of si and reported feeling safe here.  Patient other chart:  MRN: [id number]  Per ED note:     38 yo male, currently domiciled at transitional housing, 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 To note patient has multiple prior hospitalization (last in March 2023), numerous previous SA per chart, no previous self harm, no legal/violence hx, no substance use hx.      On interview, patient is tearful and states he doesn’t know how to express what he is feeling. He states last night he was over at his ex-wife's house doing drugs with her and her . He states he relapsed and used cocaine and heroine with them. He states then he woke up in the dumpsters and he makes allegations that they dumped him in the dumpsters. He states he now plans to kill them. He states he plans to kill himself after. He is unable to state an actual plan. He states the only reason he came ot the ED is because he still loves her. Patient is unable to state his current medication but is able to state taking his Biktarvy.      To note, patient discharged on Lexapro 15mg qdaily, prazosin 2mg qHS, trazodone 100mg qHS. Patient was agreeable to rehab but placement did not get completed      ON UNIT:   On encounter patient is calm, coopertive with sad affect and tearful eyes.     Patient reports today that he had been clean since he left the hospital last admission. He reports that he had relayed this to his ex wife who invited him over to celebrate his success. He reports that he cannot stand or bear being around his ex-wife and her new  (reporting a lot of feelings about her new partner being there for his daughter when he was not) and reports that for some unclear reason, he decided that to deal with the situation the best way he knew how: by getting high. He reports that maybe it was just an excuse to get high but he reports that he ended up getting high on heroin, coke and beer (reports that his wife and her  did not know he was going to get high) and he reports that he ended up overdosing. He reports that he woke in a dumpster and was confused. He reports that he spoke to his ex-wife and reports that all she said was "i panicked" without emotions and as a matter of fact. He reports that he felt extremely low as a result, indicated that he felt betrayed and disrespected, and reports that he had the urge to kill one of them just so they know what it feels like to be dead and end up in a dumpster and then reported he would kill himself because ending up in a situation like that makes it "not worth it". Patient did not provide plan for hurting self or others and appeared to be speaking out of frustration. Motivational interviewing and supportive therapy provided. Patient reported that currently he was free of si and reported feeling safe here.  Patient other chart:  MRN: [id number]  Per ED note:     36 yo male, currently domiciled at transitional housing, 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 To note patient has multiple prior hospitalization (last in March 2023), numerous previous SA per chart, no previous self harm, no legal/violence hx, no substance use hx.      On interview, patient is tearful and states he doesn’t know how to express what he is feeling. He states last night he was over at his ex-wife's house doing drugs with her and her . He states he relapsed and used cocaine and heroine with them. He states then he woke up in the dumpsters and he makes allegations that they dumped him in the dumpsters. He states he now plans to kill them. He states he plans to kill himself after. He is unable to state an actual plan. He states the only reason he came ot the ED is because he still loves her. Patient is unable to state his current medication but is able to state taking his Biktarvy.      To note, patient discharged on Lexapro 15mg qdaily, prazosin 2mg qHS, trazodone 100mg qHS. Patient was agreeable to rehab but placement did not get completed      ON UNIT:   On encounter patient is calm, coopertive with sad affect and tearful eyes.     Patient reports today that he had been clean since he left the hospital last admission. He reports that he had relayed this to his ex wife who invited him over to celebrate his success. He reports that he cannot stand or bear being around his ex-wife and her new  (reporting a lot of feelings about her new partner being there for his daughter when he was not) and reports that for some unclear reason, he decided that to deal with the situation the best way he knew how: by getting high. He reports that maybe it was just an excuse to get high but he reports that he ended up getting high on heroin, coke and beer (reports that his wife and her  did not know he was going to get high) and he reports that he ended up overdosing. He reports that he woke in a dumpster and was confused. He reports that he spoke to his ex-wife and reports that all she said was "i panicked" without emotions and as a matter of fact. He reports that he felt extremely low as a result, indicated that he felt betrayed and disrespected, and reports that he had the urge to kill one of them just so they know what it feels like to be dead and end up in a dumpster and then reported he would kill himself because ending up in a situation like that makes it "not worth it". Patient did not provide plan for hurting self or others and appeared to be speaking out of frustration. Motivational interviewing and supportive therapy provided. Patient reported that currently he was free of si and reported feeling safe here.  Patient other chart:  MRN: [id number]  Per ED note:     38 yo male, currently domiciled at transitional housing, 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 To note patient has multiple prior hospitalization (last in March 2023), numerous previous SA per chart, no previous self harm, no legal/violence hx, no substance use hx.      On interview, patient is tearful and states he doesn’t know how to express what he is feeling. He states last night he was over at his ex-wife's house doing drugs with her and her . He states he relapsed and used cocaine and heroine with them. He states then he woke up in the dumpsters and he makes allegations that they dumped him in the dumpsters. He states he now plans to kill them. He states he plans to kill himself after. He is unable to state an actual plan. He states the only reason he came ot the ED is because he still loves her. Patient is unable to state his current medication but is able to state taking his Biktarvy.      To note, patient discharged on Lexapro 15mg qdaily, prazosin 2mg qHS, trazodone 100mg qHS. Patient was agreeable to rehab but placement did not get completed      ON UNIT:   On encounter patient is calm, coopertive with sad affect and tearful eyes.     Patient reports today that he had been clean since he left the hospital last admission. He reports that he had relayed this to his ex wife who invited him over to celebrate his success. He reports that he cannot stand or bear being around his ex-wife and her new  (reporting a lot of feelings about her new partner being there for his daughter when he was not) and reports that for some unclear reason, he decided that to deal with the situation the best way he knew how: by getting high. He reports that maybe it was just an excuse to get high but he reports that he ended up getting high on heroin, coke and beer (reports that his wife and her  did not know he was going to get high) and he reports that he ended up overdosing. He reports that he woke in a dumpster and was confused. He reports that he spoke to his ex-wife and reports that all she said was "i panicked" without emotions and as a matter of fact. He reports that he felt extremely low as a result, indicated that he felt betrayed and disrespected, and reports that he had the urge to kill one of them just so they know what it feels like to be dead and end up in a dumpster and then reported he would kill himself because ending up in a situation like that makes it "not worth it". Patient did not provide plan for hurting self or others and appeared to be speaking out of frustration. Motivational interviewing and supportive therapy provided. Patient reported that currently he was free of si and reported feeling safe here.

## 2023-05-15 NOTE — BH INPATIENT PSYCHIATRY PROGRESS NOTE - CURRENT MEDICATION
MEDICATIONS  (STANDING):  atorvastatin 20 milliGRAM(s) Oral at bedtime  bictegravir 50 mG/emtricitabine 200 mG/tenofovir alafenamide 25 mG (BIKTARVY) 1 Tablet(s) Oral daily  escitalopram 15 milliGRAM(s) Oral daily  loratadine 10 milliGRAM(s) Oral daily  nicotine - 21 mG/24Hr(s) Patch 1 Patch Transdermal daily  traZODone 100 milliGRAM(s) Oral at bedtime    MEDICATIONS  (PRN):  acetaminophen     Tablet .. 650 milliGRAM(s) Oral every 6 hours PRN Temp greater or equal to 38C (100.4F), Mild Pain (1 - 3)  diphenhydrAMINE 25 milliGRAM(s) Oral two times a day PRN allergies  haloperidol     Tablet 5 milliGRAM(s) Oral every 6 hours PRN agitation  hydrOXYzine hydrochloride 50 milliGRAM(s) Oral every 6 hours PRN anxiety/sleep  ibuprofen  Tablet. 400 milliGRAM(s) Oral every 8 hours PRN Moderate Pain (4 - 6)  sodium chloride 0.65% Nasal 1 Spray(s) Both Nostrils three times a day PRN nasal congestion

## 2023-05-15 NOTE — BH DISCHARGE NOTE NURSING/SOCIAL WORK/PSYCH REHAB - NSDCPEPAMP_GEN_ALL_CORE
“[facility] for Tobacco Control” pamphlet given “[facility] for Tobacco Control” pamphlet given “[facility] for Tobacco Control” pamphlet given “[facility] for Tobacco Control” pamphlet given

## 2023-05-15 NOTE — BH INPATIENT PSYCHIATRY PROGRESS NOTE - NSBHCHARTREVIEWVS_PSY_A_CORE FT
Vital Signs Last 24 Hrs  T(C): 36.1 (05-15-23 @ 08:26), Max: 36.4 (05-15-23 @ 06:00)  T(F): 96.9 (05-15-23 @ 08:26), Max: 97.6 (05-15-23 @ 06:00)  HR: 70 (05-15-23 @ 08:26) (66 - 70)  BP: 127/81 (05-15-23 @ 08:26) (116/75 - 127/81)  BP(mean): --  RR: 18 (05-15-23 @ 08:26) (16 - 18)  SpO2: --

## 2023-05-15 NOTE — BH DISCHARGE NOTE NURSING/SOCIAL WORK/PSYCH REHAB - NSDCPEHOTLINE_GEN_ALL_CORE
[facility] Smokers Quitline 6-122-IPWDVMC ([phone]) [facility] Smokers Quitline 8-571-KZZMHYE ([phone]) [facility] Smokers Quitline 6-334-UEJHFUY ([phone]) [facility] Smokers Quitline 3-128-PPDQXHN ([phone])

## 2023-05-15 NOTE — BH INPATIENT PSYCHIATRY DISCHARGE NOTE - HOSPITAL COURSE
Pt has made significant progress over the course of hospitalization. With continuous psychotherapy from the treatment team and the medications, he reports feeling better, sleeping and eating well. Started on lexapro to target mood and anxiety, titrated lexapro 15 mg daily. Started on trazodone for insomnia, titrated trazodone 100 mg qhs. Thought process and insight improved. Pt was calm and cooperative and was in good behavioral control. Denied any suicidal or homicidal ideations. Denied any auditory or visual hallucinations. Pt was motivated to go to inpatient rehab, was screened and accepted. Pt was evaluated by treatment team, pt is stable for discharge and shows no imminent danger to self, others or property at this time. He understands and agrees with treatment plan and following up with outpatient. Psychoeducation provided regarding diagnosis, medications, treatment and follow up. Risks, benefits, alternatives discussed, all questions and concerns addressed and answered. Reviewed crisis intervention with verbalized understanding.

## 2023-05-15 NOTE — BH TREATMENT PLAN - NSTXSUBMISGOAL_PSY_ALL_CORE
Will develop a relapse prevention plan
Accept referral for substance abuse treatment on discharge
Accept referral for substance abuse treatment on discharge

## 2023-05-15 NOTE — BH TREATMENT PLAN - NSCMSPTSTRENGTHS_PSY_ALL_CORE
Compliance to treatment/Future/goal oriented/Highly motivated for treatment/Resourceful/Sense of Humor
Able to adapt/Compliance to treatment/Highly motivated for treatment/Resourceful/Self-reliant
Compliance to treatment/Future/goal oriented/Highly motivated for treatment/Resourceful/Sense of Humor

## 2023-05-15 NOTE — BH INPATIENT PSYCHIATRY DISCHARGE NOTE - NSBHDCBILLING_PSY_ALL_CORE
90344 (Hospital discharge day management; 30 min or less) 87085 (Hospital discharge day management; 30 min or less) 67067 (Hospital discharge day management; 30 min or less) 46363 (Hospital discharge day management; 30 min or less)

## 2023-05-15 NOTE — BH DISCHARGE NOTE NURSING/SOCIAL WORK/PSYCH REHAB - NSBHDCADDR1FT_A_CORE
[address] [place] [address], [address]  [place] [address] [address] [place] [address], [address]  [place] [address] [address] [place] [address], [address]  [place] [address] [address] [place] [address], [address]  [place] [address]

## 2023-05-15 NOTE — BH INPATIENT PSYCHIATRY PROGRESS NOTE - PRN MEDS
MEDICATIONS  (PRN):  acetaminophen     Tablet .. 650 milliGRAM(s) Oral every 6 hours PRN Temp greater or equal to 38C (100.4F), Mild Pain (1 - 3)  diphenhydrAMINE 25 milliGRAM(s) Oral two times a day PRN allergies  haloperidol     Tablet 5 milliGRAM(s) Oral every 6 hours PRN agitation  hydrOXYzine hydrochloride 50 milliGRAM(s) Oral every 6 hours PRN anxiety/sleep  ibuprofen  Tablet. 400 milliGRAM(s) Oral every 8 hours PRN Moderate Pain (4 - 6)  sodium chloride 0.65% Nasal 1 Spray(s) Both Nostrils three times a day PRN nasal congestion

## 2023-05-15 NOTE — BH DISCHARGE NOTE NURSING/SOCIAL WORK/PSYCH REHAB - NSTOBACCOSMKCESSPRO_PSY_ALL_CORE
Patient refused referral .  [facility]oker[facility]uit[facility]   [phone] ([phone])/Patient refused referral .  [facility] Smoker's Quitline   [phone] ([phone])/Patient refused referral .  [facility] Smoker's Quitline   [phone] ([phone])/Patient refused referral .  [facility] Smoker's Quitline   [phone] ([phone])/Patient refused referral

## 2023-05-15 NOTE — BH DISCHARGE NOTE NURSING/SOCIAL WORK/PSYCH REHAB - FACILITY ADDRESS
[address] [place] [address], [address]  [place] [address] [address] [place] [address], [address]  [place] [address] [address] [place] [address], [address]  [place] [address] [address] [place] [address], [address]  [place] [address]

## 2023-05-15 NOTE — BH DISCHARGE NOTE NURSING/SOCIAL WORK/PSYCH REHAB - NSBHDCAGENCY1FT_PSY_A_CORE
[facility] [place] [facility] [place]

## 2023-05-15 NOTE — BH INPATIENT PSYCHIATRY DISCHARGE NOTE - DESCRIPTION
Grew up in [place], has lived in [place] since 2012  As per chart review, States father had history of heavy alcohol use, depression, suicide attempts. Committed suicide by hanging in 2018.  Lives in [place] with friends, states he feels safe at home and that it is a good living situation. Currently unemployed and has been for several years, states he is not ready to go back to work. Currently receiving public assistance. Highest level of education is high school. Denies access to weapons. Denies Buddhist affiliation. Has 3 children, 3 year old daughter living in [place] with mother and step father, and 2 older children who both live in [place]. States he would like to leave [place] but has not made plans to do so.  prior incarcerations Grew up in [place], has lived in [place] since 2012  As per chart review, States father had history of heavy alcohol use, depression, suicide attempts. Committed suicide by hanging in 2018.  Lives in [place] with friends, states he feels safe at home and that it is a good living situation. Currently unemployed and has been for several years, states he is not ready to go back to work. Currently receiving public assistance. Highest level of education is high school. Denies access to weapons. Denies Buddhism affiliation. Has 3 children, 3 year old daughter living in [place] with mother and step father, and 2 older children who both live in [place]. States he would like to leave [place] but has not made plans to do so.  prior incarcerations Grew up in [place], has lived in [place] since 2012  As per chart review, States father had history of heavy alcohol use, depression, suicide attempts. Committed suicide by hanging in 2018.  Lives in [place] with friends, states he feels safe at home and that it is a good living situation. Currently unemployed and has been for several years, states he is not ready to go back to work. Currently receiving public assistance. Highest level of education is high school. Denies access to weapons. Denies Confucianist affiliation. Has 3 children, 3 year old daughter living in [place] with mother and step father, and 2 older children who both live in [place]. States he would like to leave [place] but has not made plans to do so.  prior incarcerations Grew up in [place], has lived in [place] since 2012  As per chart review, States father had history of heavy alcohol use, depression, suicide attempts. Committed suicide by hanging in 2018.  Lives in [place] with friends, states he feels safe at home and that it is a good living situation. Currently unemployed and has been for several years, states he is not ready to go back to work. Currently receiving public assistance. Highest level of education is high school. Denies access to weapons. Denies Baptism affiliation. Has 3 children, 3 year old daughter living in [place] with mother and step father, and 2 older children who both live in [place]. States he would like to leave [place] but has not made plans to do so.  prior incarcerations

## 2023-05-15 NOTE — BH TREATMENT PLAN - NSTXCOPEINTERSW_PSY_ALL_CORE
Sw will provide support contacts education and referrals for healthy coping skills. Pt encouraged to attend at least 1 group per day to bolster healthy coping skills.
Sw will provide support contacts education and referrals for healthy coping skills.

## 2023-05-15 NOTE — BH TREATMENT PLAN - NSTXSUICIDINTERRN_PSY_ALL_CORE
RN to assess patients behavior and be alert for increased signs of worsening mood, impulsivity and agitation.   RN will monitor patient and provide support and comfort and provedie safety on unit.   RN to encourage medication compliance.   Provide support and education as needed on Dx, coping skills, medication, and safety planning. RN to Encourage daily ADL's. RN to Encourage group attendance  RN will assess and intervene for any suicidal ideations. Safety planning
RN to assess patients behavior and be alert for increased signs of worsening mood, impulsivity and agitation.   RN will monitor patient and provide support and comfort and provedie safety on unit.   RN to encourage medication compliance.   Provide support and education as needed on Dx, coping skills, medication, and safety planning. RN to Encourage daily ADL's. RN to Encourage group attendance  RN will assess and intervene for any suicidal ideations. Safety planning

## 2023-05-15 NOTE — BH INPATIENT PSYCHIATRY DISCHARGE NOTE - NSDCMRMEDTOKEN_GEN_ALL_CORE_FT
atorvastatin 20 mg oral tablet: 1 tab(s) orally once a day (at bedtime) Continue to take as prescribed until told otherwise by your provider  bictegravir/emtricitabine/tenofovir 50 mg-200 mg-25 mg oral tablet: 1 tab(s) orally once a day Continue to take as prescribed until told otherwise by your provider  escitalopram 5 mg oral tablet: 3 tab(s) orally once a day Continue to take as prescribed until told otherwise by your provider  loratadine 10 mg oral tablet: 1 tab(s) orally once a day Continue to take as prescribed until told otherwise by your provider  nicotine 21 mg/24 hr transdermal film, extended release: 1 patch transdermal once a day Continue to take as prescribed until told otherwise by your provider  traZODone 100 mg oral tablet: 1 tab(s) orally once a day (at bedtime) Continue to take as prescribed until told otherwise by your provider

## 2023-05-15 NOTE — BH INPATIENT PSYCHIATRY PROGRESS NOTE - NSBHMETABOLIC_PSY_ALL_CORE_FT
BMI: BMI (kg/m2): 24.2 (04-29-23 @ 17:01)  HbA1c: A1C with Estimated Average Glucose Result: 5.5 % (04-30-23 @ 08:00)    Glucose:   BP: 127/81 (05-15-23 @ 08:26) (115/67 - 140/81)  Lipid Panel: Date/Time: 04-30-23 @ 08:00  Cholesterol, Serum: 240  Direct LDL: --  HDL Cholesterol, Serum: 64  Total Cholesterol/HDL Ration Measurement: --  Triglycerides, Serum: 104

## 2023-05-15 NOTE — BH INPATIENT PSYCHIATRY DISCHARGE NOTE - NSBHDCMEDICALFT_PSY_A_CORE
#HLD  -medicine consult  -c/w lipitor 20 mg qhs    #hx of HIV  #neutropenia  -medicine consult  -ID consult  -restart biktarvy daily

## 2023-05-15 NOTE — BH TREATMENT PLAN - NSTXDEPRESINTERMD_PSY_ALL_CORE
Medications management, groups/DBT

## 2023-05-15 NOTE — BH DISCHARGE NOTE NURSING/SOCIAL WORK/PSYCH REHAB - NSDCNEXTLEVELWHO_PSY_ALL_CORE_FT
E-Mailed to [place] [email or web address] [email or web address] E-Mailed to [place] TAIWO [email or web address] E-Mailed to [place] TAIWO [email or web address] E-Mailed to [place] TAIWO [email or web address]

## 2023-05-15 NOTE — BH TREATMENT PLAN - NSTXSUBMISINTERMD_PSY_ALL_CORE
Utilization of motivational interviewing to promote decrease of substance use, CATCH consult, encourage MAT or/and rehab

## 2023-05-15 NOTE — BH DISCHARGE NOTE NURSING/SOCIAL WORK/PSYCH REHAB - PATIENT PORTAL LINK FT
You can access the FollowMyHealth Patient Portal offered by [facility] by registering at the following website: [email or web address]. By joining [employer]’s FollowMyHealth portal, you will also be able to view your health information using other applications (apps) compatible with our system. You can access the FollowMyHealth Patient Portal offered by [facility] by registering at the following website: [email or web address]. By joining [employer]’s FollowMyHealth portal, you will also be able to view your health information using other applications (apps) compatible with our system. You can access the FollowMyHealth Patient Portal offered by [facility] by registering at the following website: [email or web address]. By joining [employer]’s FollowMyHealth portal, you will also be able to view your health information using other applications (apps) compatible with our system. You can access the FollowMyHealth Patient Portal offered by [facility] by registering at the following website: [email or web address]. By joining [employer]’s FollowMyHealth portal, you will also be able to view your health information using other applications (apps) compatible with our system.

## 2023-05-16 VITALS — TEMPERATURE: 96 F | HEART RATE: 74 BPM | RESPIRATION RATE: 20 BRPM

## 2023-05-16 PROCEDURE — 99238 HOSP IP/OBS DSCHRG MGMT 30/<: CPT

## 2023-05-16 RX ADMIN — ESCITALOPRAM OXALATE 15 MILLIGRAM(S): 10 TABLET, FILM COATED ORAL at 08:15

## 2023-05-16 RX ADMIN — BICTEGRAVIR SODIUM, EMTRICITABINE, AND TENOFOVIR ALAFENAMIDE FUMARATE 1 TABLET(S): 30; 120; 15 TABLET ORAL at 08:15

## 2023-05-16 RX ADMIN — LORATADINE 10 MILLIGRAM(S): 10 TABLET ORAL at 06:09

## 2023-05-16 NOTE — BH INPATIENT PSYCHIATRY PROGRESS NOTE - NSBHATTESTBILLING_PSY_A_CORE
12074-Ozvgpimcwf OBS or IP - low complexity OR 25-34 mins 42944-Esghrsdvay OBS or IP - low complexity OR 25-34 mins 09864-Dzghijptqj OBS or IP - low complexity OR 25-34 mins 84071-Wxmyascbzo OBS or IP - low complexity OR 25-34 mins

## 2023-05-16 NOTE — BH INPATIENT PSYCHIATRY PROGRESS NOTE - NSTXSUBMISGOAL_PSY_ALL_CORE
Accept referral for substance abuse treatment on discharge
Accept referral for substance abuse treatment on discharge
Will develop a relapse prevention plan
Accept referral for substance abuse treatment on discharge
Accept referral for substance abuse treatment on discharge
Will develop a relapse prevention plan
Accept referral for substance abuse treatment on discharge
Will develop a relapse prevention plan
Accept referral for substance abuse treatment on discharge
Will develop a relapse prevention plan

## 2023-05-16 NOTE — BH INPATIENT PSYCHIATRY PROGRESS NOTE - NSTXSUICIDGOAL_PSY_ALL_CORE
Be able to state 3 reasons for living
Will develop a suicide prevention/safety plan
Be able to state 3 reasons for living
Will develop a suicide prevention/safety plan
Will develop a suicide prevention/safety plan
Be able to state 3 reasons for living
Will develop a suicide prevention/safety plan

## 2023-05-16 NOTE — BH INPATIENT PSYCHIATRY PROGRESS NOTE - NSTXCOPEDATETRGT_PSY_ALL_CORE
08-May-2023
22-May-2023
08-May-2023
29-May-2023
08-May-2023
08-May-2023
29-May-2023
22-May-2023
08-May-2023
22-May-2023

## 2023-05-16 NOTE — BH INPATIENT PSYCHIATRY PROGRESS NOTE - NSTXSUBMISDATETRGT_PSY_ALL_CORE
15-May-2023
08-May-2023
15-May-2023
08-May-2023
15-May-2023
08-May-2023
15-May-2023

## 2023-05-16 NOTE — BH INPATIENT PSYCHIATRY PROGRESS NOTE - NSTXCOPEDATEEST_PSY_ALL_CORE
01-May-2023

## 2023-05-16 NOTE — BH INPATIENT PSYCHIATRY PROGRESS NOTE - NSTXSUBMISDATEEST_PSY_ALL_CORE
30-Apr-2023
01-May-2023
30-Apr-2023
30-Apr-2023
01-May-2023
30-Apr-2023
30-Apr-2023
01-May-2023
30-Apr-2023
01-May-2023

## 2023-05-16 NOTE — BH CHART NOTE - NSNOTETYPE_PSY_ALL_CORE
Psychology Progress Note
Suicide Risk Assessment
Psychology Progress Note

## 2023-05-16 NOTE — BH INPATIENT PSYCHIATRY PROGRESS NOTE - NSTXSUICIDDATETRGT_PSY_ALL_CORE
22-May-2023
08-May-2023
22-May-2023
08-May-2023
22-May-2023
08-May-2023
22-May-2023
08-May-2023

## 2023-05-16 NOTE — BH INPATIENT PSYCHIATRY PROGRESS NOTE - NSTXDEPRESDATEEST_PSY_ALL_CORE
01-May-2023
29-Apr-2023
01-May-2023
29-Apr-2023
01-May-2023

## 2023-05-16 NOTE — BH INPATIENT PSYCHIATRY PROGRESS NOTE - NSTXDEPRESINTERMD_PSY_ALL_CORE
Medications management, groups/DBT

## 2023-05-16 NOTE — BH INPATIENT PSYCHIATRY PROGRESS NOTE - NSDCCRITERIA_PSY_ALL_CORE
When pt is no longer an acute or imminent risk of harm to self or/and others, and is able to care for self safely, pt may then be discharged
patient will report alleviation of SI/HI and will have improved mood
patient will report alleviation of SI/HI and will have improved mood
When pt is no longer an acute or imminent risk of harm to self or/and others, and is able to care for self safely, pt may then be discharged

## 2023-05-16 NOTE — BH INPATIENT PSYCHIATRY PROGRESS NOTE - NSBHCHARTREVIEWVS_PSY_A_CORE FT
Vital Signs Last 24 Hrs  T(C): 35.8 (05-16-23 @ 08:24), Max: 36.8 (05-15-23 @ 16:12)  T(F): 96.5 (05-16-23 @ 08:24), Max: 98.2 (05-15-23 @ 16:12)  HR: 74 (05-16-23 @ 08:24) (72 - 74)  BP: 122/72 (05-15-23 @ 16:12) (122/72 - 122/72)  BP(mean): --  RR: 20 (05-16-23 @ 08:24) (16 - 20)  SpO2: --

## 2023-05-16 NOTE — BH INPATIENT PSYCHIATRY PROGRESS NOTE - NSBHASSESSSUMMFT_PSY_ALL_CORE
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 To note patient has multiple prior hospitalization (last in March 2023), numerous previous SA per chart, no previous self harm, no legal/violence hx. Pt reports hx of remission with recent relapse that lead him to overdose and end up in a dumpster. Patient presented with feelings of depression, feeling betrayed by his ex-wife, thoughts about wanting to kill one of them (ex-wife or new ) so they know how he feels and reports he wants to kill himself afterwards. Patient's presentation appears consistent with adjustment disorder, however, he does have a history of depressive mood and his history of substance use and suicidal and homicidal ideations put him at elevated acute risk which warrants inpatient psychiatric care for safety and stabilization.     On evaluation, pt presents linear and cooperative with good ADLs. He reports improved mood and sleep with decrease of nightmares of drug dealers trying to get him. Endorses fear of relapsing and motivation to maintain sobriety, requesting for rehab, CATCH following. He reports improving mood and resolution of SI/HI, intent and plan. He presents future-oriented with improved insight. Accepted to [facility]. Discharge today.    #Depression unspecified  -c/w lexapro 15 mg daily (5/4)  -c/w trazodone 50 mg qhs --> titrate trazodone 100 mg qhs (5/2)  -encourage groups/DBT    #substance use disorder (cocaine, heroin)  -CATCH team consult  -encourage MAT or/and rehab --> pt agreeable to rehab, pending placement   -monitor for s/sx of with withdrawals    #hx of HIV  #neutropenia  -medicine consult --> "Resume HIV med of BIKTARVY, Consider a trial of lipitor 20mg"    #Agitation  -for agitation not amenable to verbal redirection, can give haldol 5 mg PO q6h and benadryl 50 mg PO q6h, with escalation to IM formulation if patient refuses or a danger to himself or others  -PLEASE AVOID BENZOS d/t hx/potential for abuse 36 yo male, currently domiciled at transitional housing, 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 To note patient has multiple prior hospitalization (last in March 2023), numerous previous SA per chart, no previous self harm, no legal/violence hx. Pt reports hx of remission with recent relapse that lead him to overdose and end up in a dumpster. Patient presented with feelings of depression, feeling betrayed by his ex-wife, thoughts about wanting to kill one of them (ex-wife or new ) so they know how he feels and reports he wants to kill himself afterwards. Patient's presentation appears consistent with adjustment disorder, however, he does have a history of depressive mood and his history of substance use and suicidal and homicidal ideations put him at elevated acute risk which warrants inpatient psychiatric care for safety and stabilization.     On evaluation, pt presents linear and cooperative with good ADLs. He reports improved mood and sleep with decrease of nightmares of drug dealers trying to get him. Endorses fear of relapsing and motivation to maintain sobriety, requesting for rehab, CATCH following. He reports improving mood and resolution of SI/HI, intent and plan. He presents future-oriented with improved insight. Accepted to [facility]. Discharge today.    #Depression unspecified  -c/w lexapro 15 mg daily (5/4)  -c/w trazodone 50 mg qhs --> titrate trazodone 100 mg qhs (5/2)  -encourage groups/DBT    #substance use disorder (cocaine, heroin)  -CATCH team consult  -encourage MAT or/and rehab --> pt agreeable to rehab, pending placement   -monitor for s/sx of with withdrawals    #hx of HIV  #neutropenia  -medicine consult --> "Resume HIV med of BIKTARVY, Consider a trial of lipitor 20mg"    #Agitation  -for agitation not amenable to verbal redirection, can give haldol 5 mg PO q6h and benadryl 50 mg PO q6h, with escalation to IM formulation if patient refuses or a danger to himself or others  -PLEASE AVOID BENZOS d/t hx/potential for abuse

## 2023-05-16 NOTE — BH INPATIENT PSYCHIATRY PROGRESS NOTE - NSTXDEPRESGOAL_PSY_ALL_CORE
Report using a coping skill to overcome sadness and worry in order to socialize with peers daily

## 2023-05-16 NOTE — BH INPATIENT PSYCHIATRY PROGRESS NOTE - NSTXDEPRESDATETRGT_PSY_ALL_CORE
08-May-2023
22-May-2023
22-May-2023
08-May-2023

## 2023-05-16 NOTE — BH INPATIENT PSYCHIATRY PROGRESS NOTE - NSBHFUPINTERVALHXFT_PSY_A_CORE
Pt seen and evaluated, chart reviewed. As per nursing report, no acute events overnight. On evaluation, pt presents socializing with group milieu, pleasant with bright affect, well-groomed. He reports he is doing well and ready for discharge to rehab. Denies any new complaints. Endorses good mood, sleep and appetite. Denies AVH, paranoia. Denies SI/HI, intent and plan. He is adherent to medications, denies negative side effects. Visible on unit and not a behavioral concern. He is future-oriented and motivated for rehab. Accepted to [facility], discharge today. Pt seen and evaluated, chart reviewed. As per nursing report, no acute events overnight. On evaluation, pt presents socializing with group milieu, pleasant with bright affect, well-groomed. He reports he is doing well and ready for discharge to rehab. Denies any new complaints. Endorses good mood, sleep and appetite. Denies AVH, paranoia. Denies SI/HI, intent and plan. He is adherent to medications, denies negative side effects. Visible on unit and not a behavioral concern. He is future-oriented and motivated for rehab. Accepted to [facility], discharge today. Pt verbalizes understanding and agrees to discharge instructions.

## 2023-05-16 NOTE — BH INPATIENT PSYCHIATRY PROGRESS NOTE - NSBHCHARTREVIEWLAB_PSY_A_CORE FT
Complete Blood Count + Automated Diff (04.30.23 @ 08:00)   WBC Count: 3.84 K/uL  RBC Count: 4.86 M/uL  Hemoglobin: 14.1 g/dL  Hematocrit: 43.7 %  Mean Cell Volume: 89.9 fL  Mean Cell Hemoglobin: 29.0 pg  Mean Cell Hemoglobin Conc: 32.3 g/dL  Red Cell Distrib Width: 14.4 %  Platelet Count - Automated: 301 K/uL  MPV: 10.0 fL  Auto Neutrophil #: 0.98 K/uL  Auto Lymphocyte #: 2.10 K/uL  Auto Monocyte #: 0.49 K/uL  Auto Eosinophil #: 0.22 K/uL  Auto Basophil #: 0.04 K/uL  Auto Neutrophil %: 25.5: Differential percentages must be correlated with absolute numbers for   clinical significance. %  Auto Lymphocyte %: 54.7 %  Auto Monocyte %: 12.8 %  Auto Eosinophil %: 5.7 %  Auto Basophil %: 1.0 %  Auto Immature Granulocyte %: 0.3%  Nucleated RBC: 0 /100 WBCs  Comprehensive Metabolic Panel (04.30.23 @ 08:00)   Sodium, Serum: 138 mmol/L  Potassium, Serum: 4.7 mmol/L  Chloride, Serum: 103 mmol/L  Carbon Dioxide, Serum: 29 mmol/L  Anion Gap, Serum: 6 mmol/L  Blood Urea Nitrogen, Serum: 18 mg/dL  Creatinine, Serum: 0.7 mg/dL  Glucose, Serum: 75 mg/dL  Calcium, Total Serum: 9.0 mg/dL  Protein Total, Serum: 7.7 g/dL  Albumin, Serum: 3.9 g/dL  Bilirubin Total, Serum: 0.3 mg/dL  Alkaline Phosphatase, Serum: 97 U/L  Aspartate Aminotransferase (AST/SGOT): 31 U/L  Alanine Aminotransferase (ALT/SGPT): 22 U/L  eGFR: 122  Lipid Profile (04.30.23 @ 08:00)   Cholesterol, Serum: 240 mg/dL  Triglycerides, Serum: 104 mg/dL  HDL Cholesterol, Serum: 64 mg/dL  Non HDL Cholesterol: 176:   LDL Cholesterol Calculated: 155 mg/dL  

## 2023-05-16 NOTE — BH INPATIENT PSYCHIATRY PROGRESS NOTE - NSBHMETABOLICLABS_PSY_ALL_CORE
Labs within last 12 months

## 2023-05-16 NOTE — BH INPATIENT PSYCHIATRY PROGRESS NOTE - NSTXSUBMISPROGRES_PSY_ALL_CORE
Met - goal discontinued
Improving
Met - goal discontinued
Improving
Met - goal discontinued
Improving
Improving
Met - goal discontinued

## 2023-05-16 NOTE — BH INPATIENT PSYCHIATRY PROGRESS NOTE - NSBHMETABOLIC_PSY_ALL_CORE_FT
BMI: BMI (kg/m2): 24.2 (04-29-23 @ 17:01)  HbA1c: A1C with Estimated Average Glucose Result: 5.5 % (04-30-23 @ 08:00)    Glucose:   BP: 122/72 (05-15-23 @ 16:12) (115/67 - 140/70)  Lipid Panel: Date/Time: 04-30-23 @ 08:00  Cholesterol, Serum: 240  Direct LDL: --  HDL Cholesterol, Serum: 64  Total Cholesterol/HDL Ration Measurement: --  Triglycerides, Serum: 104   BMI: BMI (kg/m2): 24.2 (04-29-23 @ 17:01)  HbA1c: A1C with Estimated Average Glucose Result: 5.5 % (04-30-23 @ 08:00)    Glucose:   BP: 122/72 (05-15-23 @ 16:12) (115/67 - 127/81)  Lipid Panel: Date/Time: 04-30-23 @ 08:00  Cholesterol, Serum: 240  Direct LDL: --  HDL Cholesterol, Serum: 64  Total Cholesterol/HDL Ration Measurement: --  Triglycerides, Serum: 104

## 2023-05-16 NOTE — BH INPATIENT PSYCHIATRY PROGRESS NOTE - NSICDXBHSECONDARYDX_PSY_ALL_CORE
Cocaine use disorder   F14.10  Opioid dependence   F11.20  
Opioid dependence   F11.20

## 2023-05-16 NOTE — BH INPATIENT PSYCHIATRY PROGRESS NOTE - NSBHCONSULTIPREASON_PSY_A_CORE
other reason

## 2023-05-16 NOTE — BH INPATIENT PSYCHIATRY PROGRESS NOTE - NSTXDEPRESPROGRES_PSY_ALL_CORE
Met - goal discontinued
Improving

## 2023-05-16 NOTE — BH INPATIENT PSYCHIATRY PROGRESS NOTE - NSTXSUICIDDATEEST_PSY_ALL_CORE
30-Apr-2023
30-Apr-2023
01-May-2023
30-Apr-2023
01-May-2023
01-May-2023
30-Apr-2023
01-May-2023

## 2023-05-17 NOTE — BH SOCIAL WORK CONFIRMATION FOLLOW UP NOTE - NSCOMMENTS_PSY_ALL_CORE
Amandeep was discharged on 5/16 with transportation provided to [facility] rehab. Amandeep did not attend his intake appointment at [facility]. His current whereabouts are unknown.  Amandeep was discharged on 5/16 with transportation provided to [facility] rehab. Amandeep did not attend his intake appointment at [facility]. His current whereabouts are unknown.  Amandeep was discharged on 5/16 with transportation provided to [facility] rehab. Amandeep did not attend his intake appointment at [facility]. His current whereabouts are unknown.  Amandeep was discharged on 5/16 with transportation provided to [facility] rehab. Amandeep did not attend his intake appointment at [facility]. His current whereabouts are unknown.

## 2023-05-22 NOTE — BH SOCIAL WORK INITIAL PSYCHOSOCIAL EVALUATION - NSBHSUBSTANCELAST_PSY_ALL_CORE
Writer received fax from pharmacy stating patients LORazepam (ATIVAN) 1 MG tablet is on backorder.  Pharmacy requesting authorization to convert to 0.5 mg tabs as an alternative.  Please follow up with pharmacy for resolution   Within the past 30 days

## 2023-05-23 NOTE — ED BEHAVIORAL HEALTH ASSESSMENT NOTE - PRIMARY DX
Depressive disorder Is This A New Presentation, Or A Follow-Up?: Skin Lesions How Severe Is Your Skin Lesion?: mild Has Your Skin Lesion Been Treated?: not been treated

## 2023-05-30 NOTE — BH PATIENT PROFILE - COPING BEHAVIORS, PROFILE
no abdominal distension/no diarrhea/no dysuria/no fever/no hematuria/no nausea/no vomiting/no burning urination/no chills "relapse"

## 2023-06-01 NOTE — ED PROVIDER NOTE - CARE PLAN
Principal Discharge DX:	Abscess Ilumya Counseling: I discussed with the patient the risks of tildrakizumab including but not limited to immunosuppression, malignancy, posterior leukoencephalopathy syndrome, and serious infections.  The patient understands that monitoring is required including a PPD at baseline and must alert us or the primary physician if symptoms of infection or other concerning signs are noted.

## 2023-06-14 NOTE — BH INPATIENT PSYCHIATRY PROGRESS NOTE - PRN MEDS
MEDICATIONS  (PRN):  acetaminophen     Tablet .. 650 milliGRAM(s) Oral every 6 hours PRN Mild Pain (1 - 3), Moderate Pain (4 - 6), Severe Pain (7 - 10)  diphenhydrAMINE 50 milliGRAM(s) Oral every 6 hours PRN Extrapyramidal prophylaxis  haloperidol     Tablet 5 milliGRAM(s) Oral every 6 hours PRN agitation  ibuprofen  Tablet. 600 milliGRAM(s) Oral every 6 hours PRN Mild Pain (1 - 3), Moderate Pain (4 - 6), Severe Pain (7 - 10)  
MEDICATIONS  (PRN):  acetaminophen     Tablet .. 650 milliGRAM(s) Oral every 6 hours PRN Mild Pain (1 - 3), Moderate Pain (4 - 6), Severe Pain (7 - 10)  diphenhydrAMINE 50 milliGRAM(s) Oral every 6 hours PRN Extrapyramidal prophylaxis  haloperidol     Tablet 5 milliGRAM(s) Oral every 6 hours PRN agitation  ibuprofen  Tablet. 600 milliGRAM(s) Oral every 6 hours PRN Mild Pain (1 - 3), Moderate Pain (4 - 6), Severe Pain (7 - 10)  LORazepam     Tablet 2 milliGRAM(s) Oral every 6 hours PRN physical aggression  
MEDICATIONS  (PRN):  acetaminophen     Tablet .. 650 milliGRAM(s) Oral every 6 hours PRN Mild Pain (1 - 3), Moderate Pain (4 - 6), Severe Pain (7 - 10)  haloperidol     Tablet 5 milliGRAM(s) Oral every 6 hours PRN agitation  hydrOXYzine hydrochloride 50 milliGRAM(s) Oral every 6 hours PRN anxiety/insomnia  ibuprofen  Tablet. 600 milliGRAM(s) Oral every 6 hours PRN Mild Pain (1 - 3), Moderate Pain (4 - 6), Severe Pain (7 - 10)  
MEDICATIONS  (PRN):  acetaminophen     Tablet .. 650 milliGRAM(s) Oral every 6 hours PRN Mild Pain (1 - 3), Moderate Pain (4 - 6), Severe Pain (7 - 10)  haloperidol     Tablet 5 milliGRAM(s) Oral every 6 hours PRN agitation  hydrOXYzine hydrochloride 50 milliGRAM(s) Oral every 6 hours PRN anxiety/insomnia  ibuprofen  Tablet. 600 milliGRAM(s) Oral every 6 hours PRN Mild Pain (1 - 3), Moderate Pain (4 - 6), Severe Pain (7 - 10)  
Quality 111:Pneumonia Vaccination Status For Older Adults: Pneumococcal Vaccination Previously Received
Quality 265: Biopsy Follow-Up: Biopsy results reviewed, communicated, tracked, and documented
Detail Level: Detailed
MEDICATIONS  (PRN):  acetaminophen     Tablet .. 650 milliGRAM(s) Oral every 6 hours PRN Mild Pain (1 - 3), Moderate Pain (4 - 6), Severe Pain (7 - 10)  diphenhydrAMINE 50 milliGRAM(s) Oral every 6 hours PRN Extrapyramidal prophylaxis  haloperidol     Tablet 5 milliGRAM(s) Oral every 6 hours PRN agitation  ibuprofen  Tablet. 600 milliGRAM(s) Oral every 6 hours PRN Mild Pain (1 - 3), Moderate Pain (4 - 6), Severe Pain (7 - 10)  LORazepam     Tablet 2 milliGRAM(s) Oral every 6 hours PRN physical aggression  
Quality 226: Preventive Care And Screening: Tobacco Use: Screening And Cessation Intervention: Patient screened for tobacco use and is an ex/non-smoker
Quality 110: Preventive Care And Screening: Influenza Immunization: Influenza Immunization Administered during Influenza season
Quality 128: Preventive Care And Screening: Body Mass Index (Bmi) Screening And Follow-Up Plan: BMI is documented within normal parameters and no follow-up plan is required.
Quality 431: Preventive Care And Screening: Unhealthy Alcohol Use - Screening: Patient not identified as an unhealthy alcohol user when screened for unhealthy alcohol use using a systematic screening method
Quality 402: Tobacco Use And Help With Quitting Among Adolescents: Patient screened for tobacco and never smoked
MEDICATIONS  (PRN):  acetaminophen     Tablet .. 650 milliGRAM(s) Oral every 6 hours PRN Mild Pain (1 - 3), Moderate Pain (4 - 6), Severe Pain (7 - 10)  diphenhydrAMINE 50 milliGRAM(s) Oral every 6 hours PRN Extrapyramidal prophylaxis  haloperidol     Tablet 5 milliGRAM(s) Oral every 6 hours PRN agitation  ibuprofen  Tablet. 600 milliGRAM(s) Oral every 6 hours PRN Mild Pain (1 - 3), Moderate Pain (4 - 6), Severe Pain (7 - 10)  LORazepam     Tablet 2 milliGRAM(s) Oral every 6 hours PRN physical aggression  
MEDICATIONS  (PRN):  acetaminophen     Tablet .. 650 milliGRAM(s) Oral every 6 hours PRN Mild Pain (1 - 3), Moderate Pain (4 - 6), Severe Pain (7 - 10)  diphenhydrAMINE 50 milliGRAM(s) Oral every 6 hours PRN Extrapyramidal prophylaxis  haloperidol     Tablet 5 milliGRAM(s) Oral every 6 hours PRN agitation  ibuprofen  Tablet. 600 milliGRAM(s) Oral every 6 hours PRN Mild Pain (1 - 3), Moderate Pain (4 - 6), Severe Pain (7 - 10)  LORazepam     Tablet 2 milliGRAM(s) Oral every 6 hours PRN physical aggression  
MEDICATIONS  (PRN):  acetaminophen     Tablet .. 650 milliGRAM(s) Oral every 6 hours PRN Mild Pain (1 - 3), Moderate Pain (4 - 6), Severe Pain (7 - 10)  diphenhydrAMINE 50 milliGRAM(s) Oral every 6 hours PRN Extrapyramidal prophylaxis  haloperidol     Tablet 5 milliGRAM(s) Oral every 6 hours PRN agitation  ibuprofen  Tablet. 600 milliGRAM(s) Oral every 6 hours PRN Mild Pain (1 - 3), Moderate Pain (4 - 6), Severe Pain (7 - 10)  
MEDICATIONS  (PRN):  acetaminophen     Tablet .. 650 milliGRAM(s) Oral every 6 hours PRN Mild Pain (1 - 3), Moderate Pain (4 - 6), Severe Pain (7 - 10)  haloperidol     Tablet 5 milliGRAM(s) Oral every 6 hours PRN agitation  hydrOXYzine hydrochloride 50 milliGRAM(s) Oral every 6 hours PRN anxiety/insomnia  ibuprofen  Tablet. 600 milliGRAM(s) Oral every 6 hours PRN Mild Pain (1 - 3), Moderate Pain (4 - 6), Severe Pain (7 - 10)  
MEDICATIONS  (PRN):  acetaminophen     Tablet .. 650 milliGRAM(s) Oral every 6 hours PRN Mild Pain (1 - 3), Moderate Pain (4 - 6), Severe Pain (7 - 10)  diphenhydrAMINE 50 milliGRAM(s) Oral every 6 hours PRN Extrapyramidal prophylaxis  haloperidol     Tablet 5 milliGRAM(s) Oral every 6 hours PRN agitation  ibuprofen  Tablet. 600 milliGRAM(s) Oral every 6 hours PRN Mild Pain (1 - 3), Moderate Pain (4 - 6), Severe Pain (7 - 10)  LORazepam     Tablet 2 milliGRAM(s) Oral every 6 hours PRN physical aggression  
MEDICATIONS  (PRN):  acetaminophen     Tablet .. 650 milliGRAM(s) Oral every 6 hours PRN Mild Pain (1 - 3), Moderate Pain (4 - 6), Severe Pain (7 - 10)  haloperidol     Tablet 5 milliGRAM(s) Oral every 6 hours PRN agitation  hydrOXYzine hydrochloride 50 milliGRAM(s) Oral every 6 hours PRN anxiety/insomnia  ibuprofen  Tablet. 600 milliGRAM(s) Oral every 6 hours PRN Mild Pain (1 - 3), Moderate Pain (4 - 6), Severe Pain (7 - 10)

## 2023-06-14 NOTE — ED BEHAVIORAL HEALTH PROGRESS NOTE - NSBHATTESTTYPEVISIT_PSY_A_CORE
Attending Only
Assistance OOB with selected safe patient handling equipment if applicable/Assistance with ambulation/Communicate risk of Fall with Harm to all staff, patient, and family/Provide visual cue: red socks, yellow wristband, yellow gown, etc/Reinforce activity limits and safety measures with patient and family/Bed in lowest position, wheels locked, appropriate side rails in place/Call bell, personal items and telephone in reach/Instruct patient to call for assistance before getting out of bed/chair/stretcher/Non-slip footwear applied when patient is off stretcher/[place] to call system/Physically safe environment - no spills, clutter or unnecessary equipment/Purposeful Proactive Rounding/Room/bathroom lighting operational, light cord in reach

## 2023-06-15 NOTE — BH INPATIENT PSYCHIATRY ASSESSMENT NOTE - NSTXSUBMISDATEEST_PSY_ALL_CORE
26-Nov-2021 Rifampin Pregnancy And Lactation Text: This medication is Pregnancy Category C and it isn't know if it is safe during pregnancy. It is also excreted in breast milk and should not be used if you are breast feeding.

## 2023-06-20 NOTE — PROGRESS NOTE ADULT - ATTENDING SUPERVISION STATEMENT
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 and lift, use positions that put less stress on your back.      •Take over-the-counter and prescription medicines only as told by your health care provider, and apply heat or ice as told.      This information is not intended to replace advice given to you by your health care provider. Make sure you discuss any questions you have with your health care provider.

## 2023-06-21 ENCOUNTER — EMERGENCY (EMERGENCY)
Facility: HOSPITAL | Age: 38
LOS: 1 days | Discharge: HOME | End: 2023-06-21
Attending: EMERGENCY MEDICINE | Admitting: EMERGENCY MEDICINE
Payer: MEDICAID

## 2023-06-21 VITALS
SYSTOLIC BLOOD PRESSURE: 111 MMHG | HEART RATE: 67 BPM | OXYGEN SATURATION: 98 % | RESPIRATION RATE: 18 BRPM | DIASTOLIC BLOOD PRESSURE: 78 MMHG | TEMPERATURE: 98 F

## 2023-06-21 VITALS
RESPIRATION RATE: 17 BRPM | HEART RATE: 67 BPM | TEMPERATURE: 99 F | OXYGEN SATURATION: 97 % | SYSTOLIC BLOOD PRESSURE: 121 MMHG | HEIGHT: 66 IN | WEIGHT: 145.06 LBS | DIASTOLIC BLOOD PRESSURE: 71 MMHG

## 2023-06-21 DIAGNOSIS — F17.200 NICOTINE DEPENDENCE, UNSPECIFIED, UNCOMPLICATED: ICD-10-CM

## 2023-06-21 DIAGNOSIS — F31.9 BIPOLAR DISORDER, UNSPECIFIED: ICD-10-CM

## 2023-06-21 DIAGNOSIS — Z91.51 PERSONAL HISTORY OF SUICIDAL BEHAVIOR: ICD-10-CM

## 2023-06-21 DIAGNOSIS — Z88.0 ALLERGY STATUS TO PENICILLIN: ICD-10-CM

## 2023-06-21 DIAGNOSIS — B20 HUMAN IMMUNODEFICIENCY VIRUS [HIV] DISEASE: ICD-10-CM

## 2023-06-21 DIAGNOSIS — R45.851 SUICIDAL IDEATIONS: ICD-10-CM

## 2023-06-21 DIAGNOSIS — Z20.822 CONTACT WITH AND (SUSPECTED) EXPOSURE TO COVID-19: ICD-10-CM

## 2023-06-21 DIAGNOSIS — F22 DELUSIONAL DISORDERS: ICD-10-CM

## 2023-06-21 DIAGNOSIS — F43.25 ADJUSTMENT DISORDER WITH MIXED DISTURBANCE OF EMOTIONS AND CONDUCT: ICD-10-CM

## 2023-06-21 DIAGNOSIS — S61.512A LACERATION WITHOUT FOREIGN BODY OF LEFT WRIST, INITIAL ENCOUNTER: ICD-10-CM

## 2023-06-21 DIAGNOSIS — Z23 ENCOUNTER FOR IMMUNIZATION: ICD-10-CM

## 2023-06-21 DIAGNOSIS — Z91.148 PATIENT'S OTHER NONCOMPLIANCE WITH MEDICATION REGIMEN FOR OTHER REASON: ICD-10-CM

## 2023-06-21 DIAGNOSIS — Z86.16 PERSONAL HISTORY OF COVID-19: ICD-10-CM

## 2023-06-21 DIAGNOSIS — R45.84 ANHEDONIA: ICD-10-CM
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
## 2023-06-21 DIAGNOSIS — F10.90 ALCOHOL USE, UNSPECIFIED, UNCOMPLICATED: ICD-10-CM

## 2023-06-21 DIAGNOSIS — F15.20 OTHER STIMULANT DEPENDENCE, UNCOMPLICATED: ICD-10-CM

## 2023-06-21 DIAGNOSIS — F25.9 SCHIZOAFFECTIVE DISORDER, UNSPECIFIED: ICD-10-CM
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
## 2023-06-21 DIAGNOSIS — F14.90 COCAINE USE, UNSPECIFIED, UNCOMPLICATED: ICD-10-CM

## 2023-06-21 LAB
AMPHET UR-MCNC: NEGATIVE — SIGNIFICANT CHANGE UP
ANION GAP SERPL CALC-SCNC: 10 MMOL/L — SIGNIFICANT CHANGE UP (ref 5–17)
APAP SERPL-MCNC: <5 UG/ML — LOW (ref 10–30)
APPEARANCE UR: CLEAR — SIGNIFICANT CHANGE UP
BARBITURATES UR SCN-MCNC: NEGATIVE — SIGNIFICANT CHANGE UP
BASOPHILS # BLD AUTO: 0.01 K/UL — SIGNIFICANT CHANGE UP (ref 0–0.2)
BASOPHILS NFR BLD AUTO: 0.3 % — SIGNIFICANT CHANGE UP (ref 0–2)
BENZODIAZ UR-MCNC: NEGATIVE — SIGNIFICANT CHANGE UP
BILIRUB UR-MCNC: NEGATIVE — SIGNIFICANT CHANGE UP
BUN SERPL-MCNC: 10 MG/DL — SIGNIFICANT CHANGE UP (ref 7–23)
CALCIUM SERPL-MCNC: 8 MG/DL — LOW (ref 8.4–10.5)
CHLORIDE SERPL-SCNC: 109 MMOL/L — HIGH (ref 96–108)
CO2 SERPL-SCNC: 26 MMOL/L — SIGNIFICANT CHANGE UP (ref 22–31)
COCAINE METAB.OTHER UR-MCNC: POSITIVE
COLOR SPEC: YELLOW — SIGNIFICANT CHANGE UP
CREAT SERPL-MCNC: 0.65 MG/DL — SIGNIFICANT CHANGE UP (ref 0.5–1.3)
DIFF PNL FLD: NEGATIVE — SIGNIFICANT CHANGE UP
EGFR: 125 ML/MIN/1.73M2 — SIGNIFICANT CHANGE UP
EOSINOPHIL # BLD AUTO: 0.04 K/UL — SIGNIFICANT CHANGE UP (ref 0–0.5)
EOSINOPHIL NFR BLD AUTO: 1.3 % — SIGNIFICANT CHANGE UP (ref 0–6)
ETHANOL SERPL-MCNC: <10 MG/DL — SIGNIFICANT CHANGE UP (ref 0–10)
GLUCOSE SERPL-MCNC: 108 MG/DL — HIGH (ref 70–99)
GLUCOSE UR QL: NEGATIVE — SIGNIFICANT CHANGE UP
HCT VFR BLD CALC: 40 % — SIGNIFICANT CHANGE UP (ref 39–50)
HGB BLD-MCNC: 12.9 G/DL — LOW (ref 13–17)
IMM GRANULOCYTES NFR BLD AUTO: 0 % — SIGNIFICANT CHANGE UP (ref 0–0.9)
KETONES UR-MCNC: NEGATIVE — SIGNIFICANT CHANGE UP
LEUKOCYTE ESTERASE UR-ACNC: NEGATIVE — SIGNIFICANT CHANGE UP
LYMPHOCYTES # BLD AUTO: 1.31 K/UL — SIGNIFICANT CHANGE UP (ref 1–3.3)
LYMPHOCYTES # BLD AUTO: 42 % — SIGNIFICANT CHANGE UP (ref 13–44)
MCHC RBC-ENTMCNC: 29.9 PG — SIGNIFICANT CHANGE UP (ref 27–34)
MCHC RBC-ENTMCNC: 32.3 GM/DL — SIGNIFICANT CHANGE UP (ref 32–36)
MCV RBC AUTO: 92.6 FL — SIGNIFICANT CHANGE UP (ref 80–100)
METHADONE UR-MCNC: NEGATIVE — SIGNIFICANT CHANGE UP
MONOCYTES # BLD AUTO: 0.26 K/UL — SIGNIFICANT CHANGE UP (ref 0–0.9)
MONOCYTES NFR BLD AUTO: 8.3 % — SIGNIFICANT CHANGE UP (ref 2–14)
NEUTROPHILS # BLD AUTO: 1.5 K/UL — LOW (ref 1.8–7.4)
NEUTROPHILS NFR BLD AUTO: 48.1 % — SIGNIFICANT CHANGE UP (ref 43–77)
NITRITE UR-MCNC: NEGATIVE — SIGNIFICANT CHANGE UP
NRBC # BLD: 0 /100 WBCS — SIGNIFICANT CHANGE UP (ref 0–0)
OPIATES UR-MCNC: NEGATIVE — SIGNIFICANT CHANGE UP
PCP SPEC-MCNC: SIGNIFICANT CHANGE UP
PCP UR-MCNC: NEGATIVE — SIGNIFICANT CHANGE UP
PH UR: 5.5 — SIGNIFICANT CHANGE UP (ref 5–8)
PLATELET # BLD AUTO: 305 K/UL — SIGNIFICANT CHANGE UP (ref 150–400)
POTASSIUM SERPL-MCNC: 4.4 MMOL/L — SIGNIFICANT CHANGE UP (ref 3.5–5.3)
POTASSIUM SERPL-SCNC: 4.4 MMOL/L — SIGNIFICANT CHANGE UP (ref 3.5–5.3)
PROT UR-MCNC: NEGATIVE MG/DL — SIGNIFICANT CHANGE UP
RBC # BLD: 4.32 M/UL — SIGNIFICANT CHANGE UP (ref 4.2–5.8)
RBC # FLD: 14.3 % — SIGNIFICANT CHANGE UP (ref 10.3–14.5)
SALICYLATES SERPL-MCNC: <0.3 MG/DL — LOW (ref 2.8–20)
SARS-COV-2 RNA SPEC QL NAA+PROBE: SIGNIFICANT CHANGE UP
SODIUM SERPL-SCNC: 145 MMOL/L — SIGNIFICANT CHANGE UP (ref 135–145)
SP GR SPEC: >=1.03 — SIGNIFICANT CHANGE UP (ref 1–1.03)
THC UR QL: NEGATIVE — SIGNIFICANT CHANGE UP
UROBILINOGEN FLD QL: 0.2 E.U./DL — SIGNIFICANT CHANGE UP
WBC # BLD: 3.12 K/UL — LOW (ref 3.8–10.5)
WBC # FLD AUTO: 3.12 K/UL — LOW (ref 3.8–10.5)

## 2023-06-21 PROCEDURE — 80307 DRUG TEST PRSMV CHEM ANLYZR: CPT

## 2023-06-21 PROCEDURE — 90471 IMMUNIZATION ADMIN: CPT

## 2023-06-21 PROCEDURE — 93005 ELECTROCARDIOGRAM TRACING: CPT

## 2023-06-21 PROCEDURE — 99284 EMERGENCY DEPT VISIT MOD MDM: CPT

## 2023-06-21 PROCEDURE — 99284 EMERGENCY DEPT VISIT MOD MDM: CPT | Mod: 25

## 2023-06-21 PROCEDURE — 80048 BASIC METABOLIC PNL TOTAL CA: CPT

## 2023-06-21 PROCEDURE — 81003 URINALYSIS AUTO W/O SCOPE: CPT

## 2023-06-21 PROCEDURE — 36415 COLL VENOUS BLD VENIPUNCTURE: CPT

## 2023-06-21 PROCEDURE — 90715 TDAP VACCINE 7 YRS/> IM: CPT

## 2023-06-21 PROCEDURE — 90792 PSYCH DIAG EVAL W/MED SRVCS: CPT

## 2023-06-21 PROCEDURE — 85025 COMPLETE CBC W/AUTO DIFF WBC: CPT

## 2023-06-21 PROCEDURE — 87635 SARS-COV-2 COVID-19 AMP PRB: CPT

## 2023-06-21 RX ORDER — TETANUS TOXOID, REDUCED DIPHTHERIA TOXOID AND ACELLULAR PERTUSSIS VACCINE, ADSORBED 5; 2.5; 8; 8; 2.5 [IU]/.5ML; [IU]/.5ML; UG/.5ML; UG/.5ML; UG/.5ML
0.5 SUSPENSION INTRAMUSCULAR ONCE
Refills: 0 | Status: COMPLETED | OUTPATIENT
Start: 2023-06-21 | End: 2023-06-21

## 2023-06-21 RX ADMIN — TETANUS TOXOID, REDUCED DIPHTHERIA TOXOID AND ACELLULAR PERTUSSIS VACCINE, ADSORBED 0.5 MILLILITER(S): 5; 2.5; 8; 8; 2.5 SUSPENSION INTRAMUSCULAR at 15:03

## 2023-06-21 NOTE — ED BEHAVIORAL HEALTH ASSESSMENT NOTE - DESCRIPTION
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

## 2023-06-21 NOTE — ED PROVIDER NOTE - CLINICAL SUMMARY MEDICAL DECISION MAKING FREE TEXT BOX
37 M pmh schizoaffective, bipolar p/w SI and auditory hallucinations for past week.  AH telling him to harm other.  reports told cut L wrist w/ glass but does not recall.  on exam pt well appearing, nad, 2cm superficial scabbed over laceration to radial aspect L distal wrist, FROM all ext, NVI.   will place on one to one, obtain psych clearance labs, update tetanus and c/s psych

## 2023-06-21 NOTE — ED PROVIDER NOTE - NSFOLLOWUPINSTRUCTIONS_ED_ALL_ED_FT
please return immediately if you want to harm yourself or others,   follow up with your psychiatrist.

## 2023-06-21 NOTE — ED ADULT NURSE REASSESSMENT NOTE - NS ED NURSE REASSESS COMMENT FT1
Pt NAD, breathing unlabored and equal on RA at this time. Pt remains on 1:1, pt denies any complaints at this time. Awaiting for psych consult.

## 2023-06-21 NOTE — ED BEHAVIORAL HEALTH ASSESSMENT NOTE - DETAILS
lack of privacy in ED setting father  by suicide pt declined to participate Pt reports he has no recollection of cutting himself yesterday or the intent behind doing so. referred by self
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
## 2023-06-21 NOTE — ED BEHAVIORAL HEALTH ASSESSMENT NOTE - RISK ASSESSMENT
Pt is at chronically elevated risk of suicide given substance use history and history of suicide attempt. Pt does not appear to be at acutely elevated risk of suicide and at his baseline.

## 2023-06-21 NOTE — ED PROVIDER NOTE - NSTIMEPROVIDERCAREINITIATE_GEN_ER
Patient presented with indwelling urethral catheter  - u/a positive for UTI  - urine Cx pending  - Catheter changed  - Abx as above   21-Jun-2023 14:18

## 2023-06-21 NOTE — ED PROVIDER NOTE - PATIENT PORTAL LINK FT
You can access the FollowMyHealth Patient Portal offered by [facility] by registering at the following website: [email or web address]. By joining [facility]’s FollowMyHealth portal, you will also be able to view your health information using other applications (apps) compatible with our system.

## 2023-06-21 NOTE — ED BEHAVIORAL HEALTH ASSESSMENT NOTE - SUMMARY
Mr. Hernandez is a 36yo man with history of AUD, stimulant and cannabis use disorders, multiple suicide attempt and psychiatric admissions who presented to ED today brought in by self after he reports he cut himself with glass yesterday. He reports he does not recall doing so but was told by somebody else that he did. He does not recall the events leading up to cutting himself or why he would do so. Pt reports he did not come to the ED yesterday because he needed to rest at home first. Pt is well related and does not present as acutely depressed, manic, psychotic, suicidal or homicidal. Pt is appropriate for outpatient psychiatric and substance use treatment.

## 2023-06-21 NOTE — ED PROVIDER NOTE - OBJECTIVE STATEMENT
37 M pmh schizoaffective, bipolar p/w SI and auditory hallucinations for past week.  pt reports + SI without plan and also hearing voices that are telling him to hurt other people.  he is intermittent compliant w/ seroquel, trazadone, lexapro.  admits to occasional cocaine (last used 5 days ago).  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  also noted superficial cut to L wrist which he does not recall doing.  lacey 37 M pmh schizoaffective, bipolar p/w SI and auditory hallucinations for past week.  pt reports + SI without plan and also hearing voices that are telling him to hurt other people.  he is intermittent compliant w/ seroquel, trazadone, lexapro.  admits to occasional cocaine (last used 5 days ago).  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  also noted superficial cut to L wrist which he does not recall doing.  denies f/c, HA, dizziness, uri sxs, chest pain, sob, abd pain, nvd, trauma
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
## 2023-06-21 NOTE — ED PROVIDER NOTE - ATTENDING APP SHARED VISIT CONTRIBUTION OF CARE
37 M pmh schizoaffective, bipolar p/w SI and auditory hallucinations for past week.  pt reports + SI without plan and also hearing voices that are telling him to hurt other people.  he is intermittent compliant w/ seroquel, trazadone, lexapro.  admits to occasional cocaine (last used 5 days ago).  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  also noted superficial cut to L wrist which he does not recall doing.      Agree with above note as documented by PA.  I was available as the supervising attending during patient's ER evaluation.    Placed on 1:1 and medical clearance labs ordered.  Pending psych evaluation and dispo.

## 2023-06-21 NOTE — ED BEHAVIORAL HEALTH ASSESSMENT NOTE - NSBHATTESTATTENDBILLTIME_PSY_A_CORE
I attest my time as attending is greater than POLO time spent on qualifying patient care activities.
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
## 2023-06-21 NOTE — ED BEHAVIORAL HEALTH ASSESSMENT NOTE - NSBHATTESTCOMMENTATTENDFT_PSY_A_CORE
Pt is well known to  and has been admitted there several times. He seems at baseline and said that he could try following up as an outpatient. He can't return to where he was living with super, and admits not having a place to stay is a major motivator in his coming to the ED. He says he did not follow up at [facility] and has not been in treatment. He reported depression but seemed in a jocular mood. He had cuts on his hands and and swollen, very dirty fingers. UTOX + for cocaine. Adjustment disorder with disturbance of mood and conduct, cocaine, etoh use d/o, Treat and release. To follow up at [facility] outpatient clinic tomorrow at 8:30 am.

## 2023-06-21 NOTE — ED PROVIDER NOTE - PHYSICAL EXAMINATION
Vitals reviewed  Gen: comfortable appearing, nad, speaking in full sentences  Skin: wwp, 2cm superficial scabbed over laceration to radial aspect L distal wrist  HEENT: ncat, eomi, mmm  CV: rrr, no audible m/r/g  Resp: symmetrical expansion, ctab, no w/r/r  Abd: nondistended, soft/nt  Ext: FROM throughout, no peripheral edema, distal pulses intact  Neuro: alert/oriented, no focal deficits, steady gait

## 2023-06-21 NOTE — ED BEHAVIORAL HEALTH ASSESSMENT NOTE - HPI (INCLUDE ILLNESS QUALITY, SEVERITY, DURATION, TIMING, CONTEXT, MODIFYING FACTORS, ASSOCIATED SIGNS AND SYMPTOMS)
Mr. Hernandez is a 38yo man with history of AUD, stimulant and cannabis use disorders, multiple suicide attempt and psychiatric admissions who presented to ED today brought in by self after he reports he cut himself with glass yesterday. He reports he does not recall doing so but was told by somebody else that he did. He does not recall the events leading up to cutting himself or why he would do so. Pt reports he did not come to the ED yesterday because he needed to rest at home first.     Pt denies using any substances for at least the last 4 days. Pt c/o CAH "to hurt people ... sometimes" he was unable to describe the content of the AH and reports he has never followed through on any of these commands. Pt c/o "paranoia" that "people" in general will harm him. Pt denies suicidal intent and violent ideation at the time of evaluation.       After discussing options for treatment, pt reports he is amenable to going to [facility] for OP treatment. He declined safety planning.

## 2023-06-21 NOTE — ED BEHAVIORAL HEALTH ASSESSMENT NOTE - ADDITIONAL DETAILS ALL
pt reports at least 2 suicide attempts in the last 2 years (1.5 years ago walking in front of a bus and 1 year ago going out of a 3rd floor window) often in the context of intoxication.

## 2023-06-21 NOTE — ED BEHAVIORAL HEALTH ASSESSMENT NOTE - OTHER PAST PSYCHIATRIC HISTORY (INCLUDE DETAILS REGARDING ONSET, COURSE OF ILLNESS, INPATIENT/OUTPATIENT TREATMENT)
Multiple past psychiatric admissions, last about 3 months ago at [facility]. at least 2 lifetime suicide attempts, last 1 year ago. Pt has history of nonadherence with OP psychiatric and substance treatment

## 2023-07-12 ENCOUNTER — EMERGENCY (EMERGENCY)
Facility: HOSPITAL | Age: 38
LOS: 1 days | Discharge: HOME | End: 2023-07-12
Attending: EMERGENCY MEDICINE | Admitting: EMERGENCY MEDICINE
Payer: COMMERCIAL

## 2023-07-12 VITALS
DIASTOLIC BLOOD PRESSURE: 73 MMHG | SYSTOLIC BLOOD PRESSURE: 102 MMHG | OXYGEN SATURATION: 95 % | TEMPERATURE: 98 F | HEART RATE: 65 BPM | WEIGHT: 154.98 LBS | HEIGHT: 66 IN | RESPIRATION RATE: 18 BRPM

## 2023-07-12 VITALS
DIASTOLIC BLOOD PRESSURE: 80 MMHG | RESPIRATION RATE: 20 BRPM | HEART RATE: 71 BPM | SYSTOLIC BLOOD PRESSURE: 111 MMHG | OXYGEN SATURATION: 96 %

## 2023-07-12 DIAGNOSIS — R05.9 COUGH, UNSPECIFIED: ICD-10-CM

## 2023-07-12 DIAGNOSIS — R42 DIZZINESS AND GIDDINESS: ICD-10-CM

## 2023-07-12 DIAGNOSIS — Z88.0 ALLERGY STATUS TO PENICILLIN: ICD-10-CM

## 2023-07-12 DIAGNOSIS — B20 HUMAN IMMUNODEFICIENCY VIRUS [HIV] DISEASE: ICD-10-CM

## 2023-07-12 DIAGNOSIS — R07.0 PAIN IN THROAT: ICD-10-CM

## 2023-07-12 DIAGNOSIS — R68.83 CHILLS (WITHOUT FEVER): ICD-10-CM

## 2023-07-12 DIAGNOSIS — R53.83 OTHER FATIGUE: ICD-10-CM

## 2023-07-12 DIAGNOSIS — Z20.822 CONTACT WITH AND (SUSPECTED) EXPOSURE TO COVID-19: ICD-10-CM

## 2023-07-12 LAB
ALBUMIN SERPL ELPH-MCNC: 2.7 G/DL — LOW (ref 3.4–5)
ALP SERPL-CCNC: 73 U/L — SIGNIFICANT CHANGE UP (ref 40–120)
ALT FLD-CCNC: 19 U/L — SIGNIFICANT CHANGE UP (ref 12–42)
ANION GAP SERPL CALC-SCNC: 9 MMOL/L — SIGNIFICANT CHANGE UP (ref 9–16)
APTT BLD: 33.2 SEC — SIGNIFICANT CHANGE UP (ref 27.5–35.5)
AST SERPL-CCNC: 27 U/L — SIGNIFICANT CHANGE UP (ref 15–37)
BASOPHILS # BLD AUTO: 0.06 K/UL — SIGNIFICANT CHANGE UP (ref 0–0.2)
BASOPHILS NFR BLD AUTO: 0.7 % — SIGNIFICANT CHANGE UP (ref 0–2)
BILIRUB SERPL-MCNC: 0.1 MG/DL — LOW (ref 0.2–1.2)
BUN SERPL-MCNC: 8 MG/DL — SIGNIFICANT CHANGE UP (ref 7–23)
CALCIUM SERPL-MCNC: 8.6 MG/DL — SIGNIFICANT CHANGE UP (ref 8.5–10.5)
CHLORIDE SERPL-SCNC: 102 MMOL/L — SIGNIFICANT CHANGE UP (ref 96–108)
CO2 SERPL-SCNC: 28 MMOL/L — SIGNIFICANT CHANGE UP (ref 22–31)
CREAT SERPL-MCNC: 0.8 MG/DL — SIGNIFICANT CHANGE UP (ref 0.5–1.3)
EGFR: 118 ML/MIN/1.73M2 — SIGNIFICANT CHANGE UP
EOSINOPHIL # BLD AUTO: 0.16 K/UL — SIGNIFICANT CHANGE UP (ref 0–0.5)
EOSINOPHIL NFR BLD AUTO: 1.9 % — SIGNIFICANT CHANGE UP (ref 0–6)
FLUAV AG NPH QL: SIGNIFICANT CHANGE UP
FLUBV AG NPH QL: SIGNIFICANT CHANGE UP
GLUCOSE BLDC GLUCOMTR-MCNC: 100 MG/DL — HIGH (ref 70–99)
GLUCOSE SERPL-MCNC: 114 MG/DL — HIGH (ref 70–99)
HCT VFR BLD CALC: 35.4 % — LOW (ref 39–50)
HGB BLD-MCNC: 11.5 G/DL — LOW (ref 13–17)
IMM GRANULOCYTES NFR BLD AUTO: 1.5 % — HIGH (ref 0–0.9)
INR BLD: 1.11 — SIGNIFICANT CHANGE UP (ref 0.88–1.16)
LACTATE BLDV-MCNC: 1.4 MMOL/L — SIGNIFICANT CHANGE UP (ref 0.5–2)
LYMPHOCYTES # BLD AUTO: 1.98 K/UL — SIGNIFICANT CHANGE UP (ref 1–3.3)
LYMPHOCYTES # BLD AUTO: 23.2 % — SIGNIFICANT CHANGE UP (ref 13–44)
MCHC RBC-ENTMCNC: 29.5 PG — SIGNIFICANT CHANGE UP (ref 27–34)
MCHC RBC-ENTMCNC: 32.5 GM/DL — SIGNIFICANT CHANGE UP (ref 32–36)
MCV RBC AUTO: 90.8 FL — SIGNIFICANT CHANGE UP (ref 80–100)
MONOCYTES # BLD AUTO: 0.55 K/UL — SIGNIFICANT CHANGE UP (ref 0–0.9)
MONOCYTES NFR BLD AUTO: 6.5 % — SIGNIFICANT CHANGE UP (ref 2–14)
NEUTROPHILS # BLD AUTO: 5.64 K/UL — SIGNIFICANT CHANGE UP (ref 1.8–7.4)
NEUTROPHILS NFR BLD AUTO: 66.2 % — SIGNIFICANT CHANGE UP (ref 43–77)
NRBC # BLD: 0 /100 WBCS — SIGNIFICANT CHANGE UP (ref 0–0)
PLATELET # BLD AUTO: 420 K/UL — HIGH (ref 150–400)
POTASSIUM SERPL-MCNC: 3.8 MMOL/L — SIGNIFICANT CHANGE UP (ref 3.5–5.3)
POTASSIUM SERPL-SCNC: 3.8 MMOL/L — SIGNIFICANT CHANGE UP (ref 3.5–5.3)
PROT SERPL-MCNC: 7.8 G/DL — SIGNIFICANT CHANGE UP (ref 6.4–8.2)
PROTHROM AB SERPL-ACNC: 13 SEC — SIGNIFICANT CHANGE UP (ref 10.5–13.4)
RBC # BLD: 3.9 M/UL — LOW (ref 4.2–5.8)
RBC # FLD: 13.6 % — SIGNIFICANT CHANGE UP (ref 10.3–14.5)
RSV RNA NPH QL NAA+NON-PROBE: SIGNIFICANT CHANGE UP
SARS-COV-2 RNA SPEC QL NAA+PROBE: SIGNIFICANT CHANGE UP
SODIUM SERPL-SCNC: 139 MMOL/L — SIGNIFICANT CHANGE UP (ref 132–145)
WBC # BLD: 8.52 K/UL — SIGNIFICANT CHANGE UP (ref 3.8–10.5)
WBC # FLD AUTO: 8.52 K/UL — SIGNIFICANT CHANGE UP (ref 3.8–10.5)

## 2023-07-12 PROCEDURE — 99285 EMERGENCY DEPT VISIT HI MDM: CPT

## 2023-07-12 PROCEDURE — 71046 X-RAY EXAM CHEST 2 VIEWS: CPT | Mod: 26

## 2023-07-12 RX ORDER — PSEUDOEPHEDRINE HCL 30 MG
30 TABLET ORAL ONCE
Refills: 0 | Status: COMPLETED | OUTPATIENT
Start: 2023-07-12 | End: 2023-07-12

## 2023-07-12 RX ORDER — DEXAMETHASONE 0.5 MG/5ML
10 ELIXIR ORAL ONCE
Refills: 0 | Status: COMPLETED | OUTPATIENT
Start: 2023-07-12 | End: 2023-07-12

## 2023-07-12 RX ORDER — SODIUM CHLORIDE 9 MG/ML
1000 INJECTION INTRAMUSCULAR; INTRAVENOUS; SUBCUTANEOUS ONCE
Refills: 0 | Status: COMPLETED | OUTPATIENT
Start: 2023-07-12 | End: 2023-07-12

## 2023-07-12 RX ORDER — BICTEGRAVIR SODIUM, EMTRICITABINE, AND TENOFOVIR ALAFENAMIDE FUMARATE 30; 120; 15 MG/1; MG/1; MG/1
1 TABLET ORAL
Qty: 30 | Refills: 0
Start: 2023-07-12 | End: 2023-08-10

## 2023-07-12 RX ORDER — KETOROLAC TROMETHAMINE 30 MG/ML
15 SYRINGE (ML) INJECTION ONCE
Refills: 0 | Status: DISCONTINUED | OUTPATIENT
Start: 2023-07-12 | End: 2023-07-12

## 2023-07-12 RX ORDER — AZITHROMYCIN 500 MG/1
1 TABLET, FILM COATED ORAL
Qty: 4 | Refills: 0
Start: 2023-07-12 | End: 2023-07-15

## 2023-07-12 RX ADMIN — Medication 102 MILLIGRAM(S): at 12:59

## 2023-07-12 RX ADMIN — Medication 30 MILLIGRAM(S): at 12:59

## 2023-07-12 RX ADMIN — SODIUM CHLORIDE 1000 MILLILITER(S): 9 INJECTION INTRAMUSCULAR; INTRAVENOUS; SUBCUTANEOUS at 13:00

## 2023-07-12 RX ADMIN — Medication 15 MILLIGRAM(S): at 12:59

## 2023-07-12 NOTE — ED PROVIDER NOTE - OBJECTIVE STATEMENT
38 yo M pmh of pysch and HIV  c/o productive cough x a few days with throat pain, fatigue, chills, and occasional dizziness  Denies other acute complaints

## 2023-07-12 NOTE — ED PROVIDER NOTE - PHYSICAL EXAMINATION
GENERAL: well appearing, no acute distress, coughing   HEAD: atraumatic   EYES: EOMI   ENT: moist oral mucosa   CARDIAC: regular rate  RESPIRATORY: no increased work of breathing, lungs clear   MUSCULOSKELETAL: no deformity   NEUROLOGICAL: alert, spontaneous movement of extremities   SKIN: no visible rash  PSYCHIATRIC: cooperative

## 2023-07-12 NOTE — ED PROVIDER NOTE - PROGRESS NOTE DETAILS
EKG - nsr, rate 68, QTc 444, no ischemic changes, no ectopy labs - non actionable  CXR - lungs clear  Dc supportive care out pt fu  Discussed indications for patient return to ED. Patient understood.

## 2023-07-12 NOTE — ED PROVIDER NOTE - PATIENT PORTAL LINK FT
You can access the FollowMyHealth Patient Portal offered by [facility] by registering at the following website: [email or web address]. By joining [facility]’s FollowMyHealth portal, you will also be able to view your health information using other applications (apps) compatible with our system.

## 2023-07-12 NOTE — ED ADULT NURSE NOTE - NSFALLUNIVINTERV_ED_ALL_ED
Bed/Stretcher in lowest position, wheels locked, appropriate side rails in place/Call bell, personal items and telephone in reach/Instruct patient to call for assistance before getting out of bed/chair/stretcher/Non-slip footwear applied when patient is off stretcher/[place] to call system/Physically safe environment - no spills, clutter or unnecessary equipment/Purposeful proactive rounding/Room/bathroom lighting operational, light cord in reach

## 2023-07-12 NOTE — ED PROVIDER NOTE - CCCP TRG CHIEF CMPLNT
dizziness Interpolation Flap Text: A decision was made to reconstruct the defect utilizing an interpolation axial flap.  A telfa template was made of the defect. This telfa template was then used to outline the interpolation flap.  The donor area for the pedicle flap was then injected with anesthesia.  The flap was excised through the skin and subcutaneous tissue down to the layer of the underlying musculature.  The interpolation flap was carefully excised within this deep plane to maintain its blood supply.  The edges of the donor site were undermined.   The donor site was closed in a primary fashion.  The pedicle was then rotated into position and sutured.  Once the tube was sutured into place, adequate blood supply was confirmed with blanching and refill.
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
## 2023-07-25 NOTE — BH SOCIAL WORK INITIAL PSYCHOSOCIAL EVALUATION - NSBHABUSENEGLECTFT_PSY_ALL_CORE
Patient felt uncomfortable and refused to discuss. Quality 431: Preventive Care And Screening: Unhealthy Alcohol Use - Screening: Patient not identified as an unhealthy alcohol user when screened for unhealthy alcohol use using a systematic screening method Quality 226: Preventive Care And Screening: Tobacco Use: Screening And Cessation Intervention: Patient screened for tobacco use and is an ex/non-smoker Detail Level: Detailed

## 2023-07-30 NOTE — BEHAVIORAL HEALTH ASSESSMENT NOTE - RECENT MEMORY
PEDIATRIC RESIDENT FLOOR ADMISSION NOTE    History obtained from: Mother and Father  Live video/phone  service used? Yes, Language: Lithuanian    History Of Present Illness:    Jessee is a 19 month old male presenting with fever greater than 5 days. Parents state that pt started having high fevers on 7/23 that have not improved. Parents gave Advil with relief for 6 hours. Parents also noticed that pt had extremely red eyes, disseminated rash over hands, feet, and torso. Pt has also had red lips that is unusual. Parents report that pt has been coughing a little bit more than usual and sounds mildly congested. He has not had any nausea, vomiting or diarrhea. He is not feeding or drinking as much as normal and has decreased urine output per parents.     Parents went to ED on 7/26 where he was diagnosed with hand foot mouth disease and sent home with erythromycin for conjunctivitis.    Pt has had no sick contacts, no travel history, and no known prior COVID infection.    ROS:  Review of Systems   Constitutional: Positive for appetite change, fatigue and fever.   HENT: Positive for trouble swallowing. Negative for drooling and ear discharge.    Eyes: Positive for redness. Negative for pain and discharge.   Respiratory: Positive for cough. Negative for apnea and wheezing.    Cardiovascular: Negative for leg swelling and cyanosis.   Gastrointestinal: Negative for constipation, diarrhea and vomiting.   Genitourinary: Positive for decreased urine volume. Negative for difficulty urinating and urgency.   Musculoskeletal: Negative for joint swelling, neck pain and neck stiffness.   Skin: Positive for rash. Negative for color change.   Neurological: Negative for seizures, weakness and headaches.   Hematological: Negative for adenopathy.     Complete review of systems reviewed, pertinent findings noted above, remaining review of systems otherwise negative.    ED Course:  ED Triage Vitals   ED Triage Vitals Group      Temp  07/29/23 1922 99.3 °F (37.4 °C)      Heart Rate 07/29/23 1922 131      Resp 07/29/23 1922 32      BP 07/29/23 1922 93/54      SpO2 07/29/23 1922 97 %     Labs:  Quad neg  Strep neg  Plt 593    CRP 14  Na 131   Alt 115  Alk Phos 273  Albumin 3.3  UA- unremarkable  RPP, Bcx, EBV, CMV pending    CXR:   IMPRESSION:     Findings which may be seen with reactive airway changes as well as peribronchial inflammation such as with viral bronchiolitis.  No focal infiltrate.    Past Medical History  No known history    Birth History  No significant history      Developmental History  Normal development/met milestones    Surgical History  None     Social History  Lives at home with Mom Dad Brother  Pets: No  Smoke exposure: No  Cared for by:   Recent travel: No  Known sick contacts: No    Family History  None    Allergies  ALLERGIES:  Patient has no known allergies.    Home Medications  None    Immunizations  Immunization History   Administered Date(s) Administered   • BCG 2021   • DTP HIB Hep B 04/11/2022, 07/29/2022, 10/31/2022   • Hep B, adolescent or pediatric 2021   • Hib (PRP-OMP) 04/17/2023   • MMR 04/17/2023   • Pneumococcal Conjugate 13 Valent Vacc (Prevnar 13) 04/17/2023   • Polio, Oral 03/17/2022, 05/17/2022   • Polio, Unspecified Formulation 07/29/2022     Immunization: UTD    PCP:  Ministerio Rhodes DO        Physical Exam  Temp:  [97.7 °F (36.5 °C)-100.9 °F (38.3 °C)] 100.9 °F (38.3 °C)  Heart Rate:  [131-140] 140  Resp:  [28-32] 30  BP: ()/(54-84) 112/77   No intake or output data in the 24 hours ending 07/30/23 0055      Physical Exam  Constitutional:       General: He is not in acute distress.     Appearance: He is toxic-appearing.   HENT:      Head: Normocephalic.      Right Ear: External ear normal. Tympanic membrane is not erythematous.      Left Ear: External ear normal. Tympanic membrane is not erythematous.      Nose: Nose normal. No congestion or rhinorrhea.      Mouth/Throat:       Mouth: Mucous membranes are moist.      Pharynx: Oropharynx is clear.      Comments: Red lips, no cracked skin, no strawberry tongue     Neck: Normal range of motion. No rigidity.   Eyes:      General:         Right eye: No discharge.         Left eye: No discharge.      Extraocular Movements: Extraocular movements intact.      Pupils: Pupils are equal, round, and reactive to light.      Comments: Mild peripheral conjunctivitis     Cardiovascular:      Rate and Rhythm: Normal rate and regular rhythm.      Pulses: Normal pulses.      Heart sounds: Normal heart sounds.   Pulmonary:      Effort: Pulmonary effort is normal. No respiratory distress.      Breath sounds: Normal breath sounds. No wheezing.   Abdominal:      General: Abdomen is flat.      Palpations: Abdomen is soft.      Tenderness: There is no abdominal tenderness.   Musculoskeletal:         General: Swelling (Mild swelling in bilateral lower feet) present. No tenderness. Normal range of motion.   Lymphadenopathy:      Cervical: No cervical adenopathy.   Skin:     General: Skin is warm.      Capillary Refill: Capillary refill takes less than 2 seconds.      Coloration: Skin is not cyanotic.      Findings: No erythema or rash.   Neurological:      Mental Status: He is alert.            LABORATORY DATA:    Admission on 07/29/2023   Component Date Value Ref Range Status   • Rapid SARS-COV-2 by PCR 07/29/2023 Not Detected  Not Detected / Detected / Presumptive Positive / Inhibitors present Final   • Influenza A by PCR 07/29/2023 Not Detected  Not Detected Final   • Influenza B by PCR 07/29/2023 Not Detected  Not Detected Final   • RSV BY PCR 07/29/2023 Not Detected  Not Detected Final   • Isolation Guidelines 07/29/2023    Final    Do not use this test result as the sole decision-maker for discontinuation of isolation.   Clinical evaluation should be considered for other respiratory illness requiring transmission-based isolation.    -    No fever (<99.0  F/37.2 C) for at least 24 hours without the use of fever-reducing medications    AND  -    Respiratory symptoms have improved or resolved (e.g. cough, shortness of breath)     AND  -    COVID-19 negative test    See COVID-19 Deisolation Resource Guide   • Procedural Comment 07/29/2023    Final    SARS-COV-2 nucleic acid has not been detected indicating the absence of COVID-19.    This test was performed using the its learning Xpert Xpress SARS-CoV-2/Flu/RSV RT-PCR test that has been given Emergency Use Authorization (EUA) by the [place] Food and Drug Administration (FDA). These tests are considered definitive and do not need to be confirmed by another method.   • STREPTOCOCCUS GROUP A PCR 07/29/2023 Not Detected  Not Detected Final    This result was obtained by RT-PCR amplification followed by fluorescence detection. This test has been cleared by the U.S. Food and Drug Administration for detection of Strep A.   • RBC Sedimentation Rate 07/29/2023 108 (H)  0 - 20 mm/hr Final   • C-Reactive Protein 07/29/2023 14.0 (H)  <=1.0 mg/dL Final   • Sodium 07/29/2023 131 (L)  135 - 145 mmol/L Final   • Potassium 07/29/2023 4.3  3.4 - 5.1 mmol/L Final   • Chloride 07/29/2023 101  97 - 110 mmol/L Final   • Carbon Dioxide 07/29/2023 25  21 - 32 mmol/L Final   • Anion Gap 07/29/2023 9  7 - 19 mmol/L Final   • Glucose 07/29/2023 90  70 - 99 mg/dL Final   • BUN 07/29/2023 7  5 - 18 mg/dL Final   • Creatinine 07/29/2023 0.31  0.16 - 0.59 mg/dL Final   • Glomerular Filtration Rate 07/29/2023    Final    GFR not calculated for age less than 18 years   • BUN/Cr 07/29/2023 23  7 - 25 Final   • Calcium 07/29/2023 9.8  8.0 - 11.0 mg/dL Final   • Bilirubin, Total 07/29/2023 0.4  0.2 - 1.4 mg/dL Final   • GOT/AST 07/29/2023 34  10 - 55 Units/L Final   • GPT/ALT 07/29/2023 115 (H)  6 - 45 Units/L Final   • Alkaline Phosphatase 07/29/2023 273 (H)  125 - 272 Units/L Final   • Albumin 07/29/2023 3.3 (L)  3.5 - 4.8 g/dL Final   • Protein, Total  07/29/2023 7.6 (H)  5.6 - 7.5 g/dL Final   • Globulin 07/29/2023 4.3 (H)  2.0 - 4.0 g/dL Final   • A/G Ratio 07/29/2023 0.8 (L)  1.0 - 2.4 Final   • COLOR, URINALYSIS 07/29/2023 Straw   Final   • APPEARANCE, URINALYSIS 07/29/2023 Clear   Final   • GLUCOSE, URINALYSIS 07/29/2023 Negative  Negative mg/dL Final   • BILIRUBIN, URINALYSIS 07/29/2023 Negative  Negative Final   • KETONES, URINALYSIS 07/29/2023 40 (A)  Negative mg/dL Final   • SPECIFIC GRAVITY, URINALYSIS 07/29/2023 1.016  1.005 - 1.030 Final    Measured by refractometry   • OCCULT BLOOD, URINALYSIS 07/29/2023 Negative  Negative Final   • PH, URINALYSIS 07/29/2023 6.0  5.0 - 7.0 Final   • PROTEIN, URINALYSIS 07/29/2023 Trace (A)  Negative mg/dL Final   • UROBILINOGEN, URINALYSIS 07/29/2023 0.2  0.2, 1.0 mg/dL Final   • NITRITE, URINALYSIS 07/29/2023 Negative  Negative Final   • LEUKOCYTE ESTERASE, URINALYSIS 07/29/2023 Negative  Negative Final   • WBC 07/29/2023 16.5  5.0 - 19.5 K/mcL Final   • RBC 07/29/2023 4.33  3.10 - 4.50 mil/mcL Final   • HGB 07/29/2023 10.6  10.5 - 13.5 g/dL Final   • HCT 07/29/2023 31.5  29.0 - 41.0 % Final   • MCV 07/29/2023 72.7  70.0 - 86.0 fl Final   • MCH 07/29/2023 24.5  23.0 - 31.0 pg Final   • MCHC 07/29/2023 33.7  30.0 - 36.0 g/dL Final   • RDW-CV 07/29/2023 15.5 (H)  11.0 - 15.0 % Final   • RDW-SD 07/29/2023 40.8  35.0 - 47.0 fL Final   • PLT 07/29/2023 593 (H)  140 - 450 K/mcL Final   • NRBC 07/29/2023 0  <=0 /100 WBC Final   • Neutrophil, Percent 07/29/2023 44  % Final   • Lymphocytes, Percent 07/29/2023 47  % Final   • Mono, Percent 07/29/2023 5  % Final   • Eosinophils, Percent 07/29/2023 4  % Final   • Basophils, Percent 07/29/2023 0  % Final   • Immature Granulocytes 07/29/2023 0  % Final   • Absolute Neutrophils 07/29/2023 7.2  1.5 - 8.5 K/mcL Final   • Absolute Lymphocytes 07/29/2023 7.8  4.0 - 10.5 K/mcL Final   • Absolute Monocytes 07/29/2023 0.8  0.0 - 0.8 K/mcL Final   • Absolute Eosinophils  07/29/2023 0.6   0.0 - 0.7 K/mcL Final   • Absolute Basophils 07/29/2023 0.0  0.0 - 0.2 K/mcL Final   • Absolute Immature Granulocytes 07/29/2023 0.1  0.0 - 0.2 K/mcL Final         IMAGING STUDIES:    XR CHEST PA AND LATERAL 2 VIEWS   Final Result      Findings which may be seen with reactive airway changes as well as peribronchial inflammation such as with viral bronchiolitis.  No focal infiltrate.      This document is electronically signed by Rolf Dennis ([facility] pediatric radiologist), on 07/29/2023 22:19 PM CDT.               ASSESSMENT:  Patient is a 19 month old with complaint of fevers for greater than 5 days, rash, resolved conjunctivitis and decreased p.o with recent diagnosis of hand-foot-and-mouth disease, with no focality on lung exam bilaterally clear tympanic membranes currently well-appearing but given prolonged fevers will work-up for possible incomplete Kawasaki disease given duration.  My differential diagnosis includes but is not limited to incomplete Kawasaki disease versus MIS-C versus adenovirus versus UTI versus less likely sepsis as patient's vitals stable. The patient will require admission.  Patient with elevated CRP and ESR with possible concern for Kawasaki versus MIS-C.  We will obtain EBV and CMV titers and consult ID.    Principal Problem:    Prolonged fever      Problem List:  1. Fever  2. Elevated inflammatory markers  3. Transaminitis    PLAN:  Neuro/Pain:  - Tylenol q6h PRN  - Motrin q6h PRN    Resp:  - RA    CV:  - Cardiopulmonary monitoring  - Echo?    FEN/GI:  - GPD  - D5NS @ [facility]  - CMP 7/30    ID:  - ID on consult  - EBV, CMV, COVID IgG pending  - Ucx, Bcx pending  - RPP pending  - Consider IVIG and Aspirin after consultation with ID    Antibiotic Decision Making  Patient on Antibiotics: - No      VTE: 1 (Inflammatory state), at baseline mobility, SCDs not indicated  Lines: PIV. Function, utilization, and necessity addressed/discussed on rounds      We discussed the current management plan  with patient and family, who stated understating of, and agreement with, current plan. All of their questions and concerns were addressed.    RN present on rounds/updated on plan of care.      Shanice Laws MD     7/30/2023  12:55 AM   Impaired

## 2023-08-10 NOTE — ED ADULT NURSE NOTE - HOW OFTEN DO YOU HAVE SIX OR MORE DRINKS ON ONE OCCASION?
Subjective:      Patient ID: Juan Carlos Herrera is a 48 CHRISSY Duffy MD, Gastroenterology, Odessa Regional Never

## 2023-08-14 NOTE — BH INPATIENT PSYCHIATRY ASSESSMENT NOTE - NSBHMSERECMEM_PSY_A_CORE
Health Maintenance Due   Topic Date Due    COVID-19 Vaccine (1) Never done    Well Child Exam 24 Months  08/01/2023       Patient is due for topics as listed above but is not proceeding with Immunization(s) COVID-19 at this time.    Normal

## 2023-08-19 ENCOUNTER — HOSPITAL ENCOUNTER (INPATIENT)
Dept: HOSPITAL 74 - YASAS | Age: 38
LOS: 5 days | Discharge: HOME | End: 2023-08-24
Attending: ALLERGY & IMMUNOLOGY | Admitting: ALLERGY & IMMUNOLOGY
Payer: COMMERCIAL

## 2023-08-19 VITALS — BODY MASS INDEX: 24.2 KG/M2

## 2023-08-19 DIAGNOSIS — F17.210: ICD-10-CM

## 2023-08-19 DIAGNOSIS — Z28.9: ICD-10-CM

## 2023-08-19 DIAGNOSIS — F10.230: Primary | ICD-10-CM

## 2023-08-19 DIAGNOSIS — Z88.0: ICD-10-CM

## 2023-08-19 DIAGNOSIS — Z28.310: ICD-10-CM

## 2023-08-19 DIAGNOSIS — F14.20: ICD-10-CM

## 2023-08-19 DIAGNOSIS — F19.24: ICD-10-CM

## 2023-08-19 DIAGNOSIS — F20.9: ICD-10-CM

## 2023-08-19 DIAGNOSIS — F31.9: ICD-10-CM

## 2023-08-19 PROCEDURE — HZ2ZZZZ DETOXIFICATION SERVICES FOR SUBSTANCE ABUSE TREATMENT: ICD-10-PCS | Performed by: ALLERGY & IMMUNOLOGY

## 2023-08-20 LAB
ALBUMIN SERPL-MCNC: 2.9 G/DL (ref 3.4–5)
ALP SERPL-CCNC: 102 U/L (ref 45–117)
ALT SERPL-CCNC: 32 U/L (ref 13–61)
ANION GAP SERPL CALC-SCNC: 5 MMOL/L (ref 8–16)
AST SERPL-CCNC: 37 U/L (ref 15–37)
BILIRUB SERPL-MCNC: 0.2 MG/DL (ref 0.2–1)
BUN SERPL-MCNC: 11.2 MG/DL (ref 7–18)
CALCIUM SERPL-MCNC: 8.4 MG/DL (ref 8.5–10.1)
CHLORIDE SERPL-SCNC: 111 MMOL/L (ref 98–107)
CO2 SERPL-SCNC: 28 MMOL/L (ref 21–32)
CREAT SERPL-MCNC: 0.7 MG/DL (ref 0.55–1.3)
DEPRECATED RDW RBC AUTO: 14.6 % (ref 11.9–15.9)
GLUCOSE SERPL-MCNC: 70 MG/DL (ref 74–106)
HCT VFR BLD CALC: 38 % (ref 35.4–49)
HGB BLD-MCNC: 12.9 GM/DL (ref 11.7–16.9)
MCH RBC QN AUTO: 30 PG (ref 25.7–33.7)
MCHC RBC AUTO-ENTMCNC: 33.9 G/DL (ref 32–35.9)
MCV RBC: 88.5 FL (ref 80–96)
PLATELET # BLD AUTO: 259 10^3/UL (ref 134–434)
PMV BLD: 8.6 FL (ref 7.5–11.1)
POTASSIUM SERPLBLD-SCNC: 4.1 MMOL/L (ref 3.5–5.1)
PROT SERPL-MCNC: 7.4 G/DL (ref 6.4–8.2)
RBC # BLD AUTO: 4.3 M/MM3 (ref 4–5.6)
SODIUM SERPL-SCNC: 144 MMOL/L (ref 136–145)
WBC # BLD AUTO: 5 K/MM3 (ref 4–10)

## 2023-08-20 RX ADMIN — Medication SCH MG: at 22:48

## 2023-08-20 RX ADMIN — NICOTINE SCH: 21 PATCH TRANSDERMAL at 10:20

## 2023-08-20 RX ADMIN — Medication SCH TAB: at 10:20

## 2023-08-21 RX ADMIN — Medication SCH TAB: at 10:15

## 2023-08-21 RX ADMIN — NICOTINE SCH: 21 PATCH TRANSDERMAL at 10:15

## 2023-08-21 RX ADMIN — Medication SCH MG: at 22:18

## 2023-08-22 RX ADMIN — Medication SCH MG: at 22:08

## 2023-08-22 RX ADMIN — Medication SCH TAB: at 10:04

## 2023-08-22 RX ADMIN — NICOTINE SCH: 21 PATCH TRANSDERMAL at 10:04

## 2023-08-23 RX ADMIN — Medication SCH MG: at 22:26

## 2023-08-23 RX ADMIN — NICOTINE SCH: 21 PATCH TRANSDERMAL at 10:10

## 2023-08-23 RX ADMIN — Medication SCH: at 10:11

## 2023-08-23 RX ADMIN — BICTEGRAVIR SODIUM, EMTRICITABINE, AND TENOFOVIR ALAFENAMIDE FUMARATE SCH EACH: 50; 200; 25 TABLET ORAL at 11:42

## 2023-08-24 VITALS
RESPIRATION RATE: 17 BRPM | DIASTOLIC BLOOD PRESSURE: 60 MMHG | SYSTOLIC BLOOD PRESSURE: 100 MMHG | HEART RATE: 66 BPM | TEMPERATURE: 97.8 F

## 2023-08-24 RX ADMIN — BICTEGRAVIR SODIUM, EMTRICITABINE, AND TENOFOVIR ALAFENAMIDE FUMARATE SCH EACH: 50; 200; 25 TABLET ORAL at 07:17

## 2023-08-24 NOTE — ED PROVIDER NOTE - COVID-19 RESULT

## 2023-09-13 NOTE — ED BEHAVIORAL HEALTH ASSESSMENT NOTE - MARITAL STATUS
Problem: At Risk for Falls  Goal: # Patient does not fall  Outcome: Outcome Met, Continue evaluating goal progress toward completion  Goal: # Takes action to control fall-related risks  Outcome: Outcome Met, Continue evaluating goal progress toward completion  Goal: # Verbalizes understanding of fall risk/precautions  Description: Document education using the patient education activity  Outcome: Outcome Met, Continue evaluating goal progress toward completion     Problem: Breathing Pattern Ineffective  Goal: Air exchange is effective, demonstrated by Sp02 sat of greater then or = 92% (or as ordered)  Outcome: Outcome Met, Continue evaluating goal progress toward completion  Goal: Respiratory pattern is quiet and regular without report of SOB  Outcome: Outcome Met, Continue evaluating goal progress toward completion  Goal: Verbalizes/demonstrates effective breathing management strategies  Description: Document education using the patient education activity.   Outcome: Outcome Met, Continue evaluating goal progress toward completion      Single

## 2023-09-16 NOTE — PROGRESS NOTE BEHAVIORAL HEALTH - NSBHPTASSESSDT_PSY_A_CORE
02-Aug-2018 13:00 Patient refused for staff to undress wound to obtain wound culture due to pain. JASMIN North aware.

## 2023-09-25 NOTE — PSYCHIATRIC REHAB INITIAL EVALUATION - NSBHALCSUBCHOICE_PSY_ALL_CORE
-- DO NOT REPLY / DO NOT REPLY ALL --  -- Message is from Engagement Center Operations (ECO) --    Referral Request  Name of Specialist:  Torin Cox MD  [facility]  Address           [address] [place] [address]  Phone Number         [phone]  Fax     [phone]    Provider's specialty: ENT    Medical condition for referral:   Sinus     Is this a NEW request?: yes      Referral ordered by:        Insurance type:        Payor: [employer] / Plan: [employer] HMO / Product Type: HMO      Preferred Delivery Method   Call when ready for pickup - phone number to notify: [phone] and Fax - number to send to: [phone]     Caller Information       Type Contact Phone/Fax    09/25/2023 02:09 PM CDT Phone (Incoming) Mirella Abernathy (Self) [phone] (M)          Alternative phone number:  [phone]    Can a detailed message be left? Yes    Please give this turnaround time to the caller:   \"This message will be sent to [state Provider's full name]. The clinical team will return your call as soon as they review your message. Typically, it takes 3 business days to process referral requests.\"  
Referral generated and faxed  Tried calling patient but unable to leave voicemail   
poly substance use

## 2023-10-02 NOTE — BH INPATIENT PSYCHIATRY DISCHARGE NOTE - NSBHSUBSTUSED_PSY_A_CORE
Neuro Interventional  Consult Note    Name: Suzan Gupta  MRN:  [id number]  : 1969  Admit Date: 2023   Consult Date: 10/2/2023  Service requesting consult: Critical care  Reason for consult: DAPT hold recs    Chief Complaint: Respiratory distress, increased O2 needs.     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 Knowles, 23), HTN, II DM, DVTs. Suzan Gupta [facility] ED on [facility] from her assisted living facility with increased O2 needs and concern for recurrent aspiration PNA. She was admitted to critical care unit, intubated on 10/1 overnight for respiratory distress and sedated with propofol. Pulmonary team consulted for concern for tracheal stenosis. MARIA TERESA team consulted for DAPT recs in the setting of need for DAPT hold for bronchoscopy +/- tracheal dilation by Pulmonary team.      Subjective/Interval: Suzan is seen lying in bed, intubated and sedated with Propofol but follows commands on her left side and is oriented with options. A CTA Head/Neck was completed this afternoon, reviewed with nahomy Barajas. Ok to proceed with pre-operative Brilinta hold plan below. Discussed face to face with intensivist (Dr. Whittington) and via Epic chat with Pulmonology team.     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 Knowles, 23   HTN   II DM   DVTs   · Discussed above HPI with Dr. Knowles. Who recommended STAT CTA Head/Neck to ensure stent patency prior to initiating DAPT hold plan.   · STAT CTA Head/Neck completed 10/2/23 reviewed by Dr. Knowles, stent patent. Ok to proceed with  DAPT hold plan below:   · DAPT hold plan:   · Ok to hold Brilinta (currently dosed at 90mg BID) 4 days pre-op (tentative plan for Bronch on 10/6/23, timing TBD), continue Aspirin 81mg daily.   · Initiate Cangrelor gtt at 1mcg/kg/min at the time of first missed Brilinta dose (on 10/2/23 at 21:00).   · Recommend discontinuing Cangrelor gtt 30 min pre-operatively (but will defer to performing provider, who recommends 1 hour pre-op hold).   · MARIA TERESA will continue to chart check daily until procedure is complete and pt is transitioned off Cangrelor gtt postoperatively and PO DAPT is resumed.     Reviewed: Current vital signs, Past medical history, Past surgical history, Medications, Allergies, Family history and Social history    Labs:  Lab Results   Component Value Date    WBC 9.9 10/02/2023    RBC 3.78 (L) 10/02/2023    HGB 11.0 (L) 10/02/2023    HCT 33.0 (L) 10/02/2023    MCV 87.3 10/02/2023    MCH 29.1 10/02/2023    MCHC 33.3 10/02/2023     10/02/2023    SODIUM 138 10/02/2023    POTASSIUM 3.3 (L) 10/02/2023    CHLORIDE 103 10/02/2023    CO2 27 10/02/2023    GLUCOSE 180 (H) 10/02/2023    BUN 13 10/02/2023    CREATININE 0.48 (L) 10/02/2023    CALCIUM 9.0 10/02/2023    BILIRUBIN 0.3 10/02/2023    AST 8 10/02/2023    INR 1.0 09/28/2023    PTT 32 09/28/2023    ASARES 363 08/02/2023    P2Y12 57 (L) 08/02/2023       Imaging:  CTA Head/Neck 10/2/2023: Patent stent per Dr. Knowles, awaiting official Radiology read at time of note.     Review of Systems:  Limited due to patient condition, intubated, sedated.     Physical Exam:  Vitals:  Vital Last Value 24 Hour Range   Temperature 99.5 °F (37.5 °C) (10/02/23 1100) Temp  Min: 97 °F (36.1 °C)  Max: 99.5 °F (37.5 °C)   Pulse 82 (10/02/23 1100) Pulse  Min: 60  Max: 82   Respiratory 17 (10/02/23 1100) Resp  Min: 16  Max: 26   Non-Invasive  Blood Pressure 114/66 (10/02/23 1100) BP  Min: 95/62  Max: 123/74   Pulse Oximetry 100 % (10/02/23 1100) SpO2  Min: 95 %  Max: 100 %    Arterial   Blood Pressure (!) 78/62 (10/01/23 0900) No data recorded      General:  Alert, intubated, sedated with propofol. No acute distress.  Skin:  Warm and dry without rash.    Neurological:   Mental status: Alert to normal voice, oriented to month, year and place with options.   Language: RIMMA, intubated and sedated. Follows commands.   Cranial nerves: Visual fields deferred. Pupils 3 mm, briskly reactive. No gaze preference or deviation. EOMs full. No nystagmus observed. Facial sensation deferred. Face appears symmetric although presence of ETT impairs assessment. Hearing appears intact to normal voice.   Motor: Muscle bulk and tone normal. Strength 3/5 in LUE, RUE withdraws to pain, strength 3/5 in LLE as pt bends leg at the knee but does not lift leg fully off bed, RLE without spontaneous movements but withdraws to pain.  Sensory: Light touch sense appears intact symetrically in BUE and BLE. Unable to adequately participate in extinction exam.   Coordination: Rapid alternating movements deferred. No abnormal or extraneous movements.   Eyes:  Normal conjunctivae and sclerae.    ENT:  Oral and nasal mucous membranes pink and moist.   Mallampati: Deferred, currently intubated.   Cardiovascular:  Regular rate and rhythm without murmur. No evidence of UE or LE edema.   Peripheral vascular: Bilateral DP pulses +1, bilateral radial pulses +2.  Respiratory:  Normal respiratory effort. On mechanical ventilator.   Gastrointestinal:  Soft and nontender.     Musculoskeletal:  No deformity noted.   Psychiatric:   Cooperative.  Appropriate mood and affect.     ASA Classification: 4 - A patient with severe systemic disease that is a constant threat to life.      Discussed with Dr. Knowles, patient, Dr. Whittington (face to face), Pulmonology team (per Epic chat), Adalgisa (Neuro ICU Pharmacist).     Thank you for allowing to participate in the care of Suzan Gupta.     10/2/2023  Shannon Sunshine, [facility]  Neuro  Interventional Radiology  [facility]   Pager: [phone]    Please page the above NP number from [phone]  Answering service: [phone]  After 1800 please contact our answering service to reach the on call MARIA TERESA Provider    Attending Physicians:   Dr. Kristel Alcantara        Alcohol/Cannabis/Cocaine/Crack/Heroin/Opiates

## 2023-10-09 NOTE — ED BEHAVIORAL HEALTH ASSESSMENT NOTE - NS ED BHA BILLING ATTENDING WO NP TRAINEE
Bexarotene Pregnancy And Lactation Text: This medication is Pregnancy Category X and should not be given to women who are pregnant or may become pregnant. This medication should not be used if you are breast feeding. [id number]

## 2023-10-23 NOTE — BH DISCHARGE NOTE NURSING/SOCIAL WORK/PSYCH REHAB - NSTOBACCOOTHER_PSY_ALL_CORE_FT
[facility]  Anesthesia Pre-Operative Evaluation         Patient Name: Rosa Madera  YOB: 1999  MRN: [id number]    10/23/2023      Rosa Madera is a 23 y.o. female  @ 39w2d who presents for scheduled IOL. IUP c/b Rh negative status. Denies cHTN, asthma, DM, bleeding diathesis, anticoag, spinal d/o or prior spinal surgery.    OB History    Para Term  AB Living   1             SAB IAB Ectopic Multiple Live Births                  # Outcome Date GA Lbr Wilman/2nd Weight Sex Delivery Anes PTL Lv   1 Current                Review of patient's allergies indicates:   Allergen Reactions    Shellfish containing products        Wt Readings from Last 1 Encounters:   10/18/23 0804 76 kg (167 lb 8.8 oz)       BP Readings from Last 3 Encounters:   10/18/23 122/80   10/18/23 120/82   10/04/23 119/80       Patient Active Problem List   Diagnosis    Rh negative state in antepartum period    Encounter for supervision of normal first pregnancy in second trimester    Anemia affecting pregnancy in third trimester    Fetal arrhythmia affecting pregnancy, antepartum    Contraception- pt desires nexplanon at delivery    Hx of anxiety disorder    Encounter for elective induction of labor    GBS (group B streptococcus) infection       Past Surgical History:   Procedure Laterality Date    AUGMENTATION OF BREAST         Social History     Socioeconomic History    Marital status: Single   Tobacco Use    Smoking status: Never    Smokeless tobacco: Never   Substance and Sexual Activity    Alcohol use: Not Currently     Alcohol/week: 2.0 standard drinks of alcohol     Types: 2 Glasses of wine per week     Comment: occassional / 2 wine on weekend    Drug use: Not Currently     Types: Marijuana    Sexual activity: Yes     Partners: Male         Chemistry        Component Value Date/Time     2023 1405    K 4.0 2023 1405     2023 1405    CO2 21 (L)  "03/03/2023 1405    BUN 7 03/03/2023 1405    CREATININE 0.6 05/26/2023 1545    GLU 93 03/03/2023 1405        Component Value Date/Time    CALCIUM 9.5 03/03/2023 1405    ALKPHOS 51 (L) 03/03/2023 1405    AST 18 05/26/2023 1545    ALT 12 03/03/2023 1405    BILITOT 0.4 03/03/2023 1405    ESTGFRAFRICA >105 11/23/2022 2055    ESTGFRAFRICA >60.0 05/01/2022 1547    EGFRNONAA >60.0 05/01/2022 1547            Lab Results   Component Value Date    WBC 7.59 10/04/2023    HGB 9.4 (L) 10/04/2023    HCT 29.1 (L) 10/04/2023    MCV 94 10/04/2023     10/04/2023       No results for input(s): "PT", "INR", "PROTIME", "APTT" in the last 72 hours.          Pre-op Assessment    I have reviewed the Patient Summary Reports.     I have reviewed the Nursing Notes. I have reviewed the NPO Status.   I have reviewed the Medications.     Review of Systems  Anesthesia Hx:  No problems with previous Anesthesia  Denies Family Hx of Anesthesia complications.   Denies Personal Hx of Anesthesia complications.   Social:  Non-Smoker    Hematology/Oncology:  Hematology Normal   Oncology Normal     EENT/Dental:EENT/Dental Normal   Cardiovascular:  Cardiovascular Normal     Pulmonary:  Pulmonary Normal    Renal/:  Renal/ Normal     Hepatic/GI:  Hepatic/GI Normal    Musculoskeletal:  Musculoskeletal Normal    Neurological:  Neurology Normal    Endocrine:  Endocrine Normal    Psych:  Psychiatric Normal           Physical Exam  General: Well nourished    Airway:  Mouth Opening: Normal  TM Distance: Normal  Neck ROM: Normal ROM        Anesthesia Plan  Type of Anesthesia, risks & benefits discussed:    Anesthesia Type: Gen ETT, MAC, Epidural, Spinal, CSE  Intra-op Monitoring Plan: Standard ASA Monitors  Post Op Pain Control Plan: multimodal analgesia, IV/PO Opioids PRN, epidural analgesia and intrathecal opioid  Airway Plan: Video, Post-Induction  Informed Consent: Informed consent signed with the Patient and all parties understand the risks and agree " with anesthesia plan.  All questions answered.   ASA Score: 2  Day of Surgery Review of History & Physical: H&P Update referred to the surgeon/provider.    Ready For Surgery From Anesthesia Perspective.     .       Call [facility] Smokers' Quitline at [phone] ([phone]) or visit [email or web address]

## 2023-10-25 NOTE — BH INPATIENT PSYCHIATRY ASSESSMENT NOTE - NSBHCONTPROVIDER_PSY_ALL_CORE
[facility].    No, attempted...

## 2023-10-26 NOTE — ED BEHAVIORAL HEALTH ASSESSMENT NOTE - NICOTINE
MEDICARE WELLNESS VISIT + NOTE    CHIEF COMPLAINT:  Kilo Del Rosario presents for his Subsequent Annual Medicare Wellness Visit.   His additional complaints or concerns are addressed below.      Patient Care Team:  Jose D Medina MD as PCP - General (Family Practice)        Patient Active Problem List   Diagnosis   • Routine health maintenance   • Vitamin D deficiency   • Hyperlipidemia   • Former smoker   • Prediabetes   • Transaminitis   • Fatty liver   • BPH with obstruction/lower urinary tract symptoms   • Dyspnea on exertion   • Sleep apnea   • Obesity (BMI 30-39.9)         Past Medical History:   Diagnosis Date   • Hypercholesteremia    • Overweight (BMI 25.0-29.9) 7/27/2021   • Tinea manus 7/27/2021         Past Surgical History:   Procedure Laterality Date   • Eye surgery Right          Social History     Tobacco Use   • Smoking status: Former     Types: Cigarettes   • Smokeless tobacco: Never   Vaping Use   • Vaping Use: never used   Substance Use Topics   • Alcohol use: Yes     Alcohol/week: 2.0 standard drinks of alcohol     Types: 2 Cans of beer per week     Comment: 2 or 3 a day, more during the weekends   • Drug use: Never     Family History   Problem Relation Age of Onset   • Cancer Mother         Lung cancer - smoker   • Cancer Father         unknown source   • Diabetes Neg Hx    • Heart disease Neg Hx    • Kidney disease Neg Hx    • Stroke Neg Hx          Current Outpatient Medications   Medication Sig Dispense Refill   • atorvastatin (LIPITOR) 40 MG tablet TAKE 1 TABLET BY MOUTH AT BEDTIME 90 tablet 0   • tamsulosin (FLOMAX) 0.4 MG Cap TAKE 1 CAPSULE BY MOUTH AT BEDTIME 90 capsule 3   • cholecalciferol (VITAMIN D) 25 mcg (1,000 units) tablet Take by mouth daily.     • clotrimazole-betamethasone (LOTRISONE) 1-0.05 % cream Apply topically 2 times daily. 30 g 0   • betamethasone valerate (VALISONE) 0.1 % cream Apply topically 2 times daily. 30 g 0     No current facility-administered  medications for this visit.        The following items on the Medicare Health Risk Assessment were found to be positive            Vision and Hearing screens:   Vision Screening    Right eye Left eye Both eyes   Without correction      With correction   20/20   Hearing Screening - Comments:: Whisper screening test results:  Right - normal  Left - normal      Advance care planning documents on file - no    Cognitive/Functional Status: no evidence of cognitive dysfunction by direct observation    Opioid Review: Kilo is not taking opioid medications.    Recent PHQ 2/9 Score:    PHQ 2:  PHQ 2 Score Adult PHQ 2 Score Adult PHQ 2 Interpretation Little interest or pleasure in activity?   10/26/2023  10:29 AM 0 No further screening needed 0       PHQ 9:       DEPRESSION ASSESSMENT/PLAN:  Depression screening is negative no further plan needed. Depression Screening performed. Screening time with patient was 15 minutes.     Body mass index is 31.18 kg/m².    BMI ASSESSMENT/PLAN:  Patient is overweight.    30 minutes of physical activity a day, Caloric restriction and Low carbohydrate diet        See Patient Instructions section.   Return in about 1 year (around 10/26/2024) for Medicare Wellness Visit.      OUTPATIENT PROGRESS NOTE    Subjective   Chief Complaint Medicare Wellness Visit and Office Visit    No complaints. Compliant with meds, no issues. Cardio following. He never address sleep apnea. No recent ED or hospitalizations. Denies tobacco, ETOH and illicits. Works in welding. Lives with wife and daughter's family. Able to do all ADLs, no pain (0/10). Request Flu shot, rest of vaccines UTD. Denies STDs or high risk sexual contacts. Denies CP, SOB, dizziness, palpitations, fever, chills, depression, dysuria, N/V/D/C.       Medications  Medications were reviewed and updated today.    Histories  I have personally reviewed and updated the patient's past medical, past surgical, family and social histories during today's  visit.    Review of Systems   Constitutional: Negative.    HENT: Negative.    Eyes: Negative.    Respiratory: Negative.    Cardiovascular: Negative.    Gastrointestinal: Negative.    Endocrine: Negative.    Genitourinary: Negative.    Musculoskeletal: Negative.    Skin: Negative.    Allergic/Immunologic: Negative.    Neurological: Negative.    Hematological: Negative.    Psychiatric/Behavioral: Negative.        Objective   Visit Vitals  /66 (BP Location: LUE - Left upper extremity, Patient Position: Sitting, Cuff Size: Regular)   Pulse 70   Temp 97.6 °F (36.4 °C) (Temporal)   Resp 16   Ht 5' 4.96\" (1.65 m)   Wt 84.9 kg (187 lb 2.7 oz)   SpO2 93%   BMI 31.18 kg/m²     Physical Exam  Vitals reviewed.   Constitutional:       Appearance: He is well-developed. He is not ill-appearing or diaphoretic.      Comments: overweight   HENT:      Head: Normocephalic and atraumatic.      Right Ear: External ear normal.      Left Ear: External ear normal.      Nose: Nose normal.   Eyes:      Conjunctiva/sclera: Conjunctivae normal.      Pupils: Pupils are equal, round, and reactive to light.   Neck:      Thyroid: No thyromegaly.   Cardiovascular:      Rate and Rhythm: Normal rate and regular rhythm.      Heart sounds: Normal heart sounds. No murmur heard.  Pulmonary:      Effort: Pulmonary effort is normal. No respiratory distress.      Breath sounds: Normal breath sounds. No wheezing.   Abdominal:      General: Bowel sounds are normal. There is no distension.      Palpations: Abdomen is soft.      Tenderness: There is no abdominal tenderness.   Musculoskeletal:         General: Normal range of motion.      Cervical back: Normal range of motion and neck supple.   Skin:     General: Skin is warm and dry.      Findings: No rash.   Neurological:      Mental Status: He is alert and oriented to person, place, and time. Mental status is at baseline.      Deep Tendon Reflexes: Reflexes are normal and symmetric.   Psychiatric:          Behavior: Behavior normal.         Thought Content: Thought content normal.         Judgment: Judgment normal.         Laboratory  · I have reviewed the pertinent laboratory tests.     Imaging  · I have reviewed the pertinent imaging study reports.     Assessment & Plan   Diagnoses and associated orders for this visit:  1. Sleep apnea, unspecified type  Assessment & Plan:  Not well controlled  Likely caused of fatigue and SURESH  Wt loss recommended  SULY questionnaire+  ENT re:referral  Orders:  -     SERVICE TO ENT  2. Medicare annual wellness visit, initial  -      - Initial Medicare Wellness Visit - Select if billing add'l E/M  3. BPH with obstruction/lower urinary tract symptoms  Assessment & Plan:  Improved, although not compliant  +/- LUTS  PSA: 3.17 and UA neg for UTI (10/2021)  Started Tamsulosin 0.4mg at bedtime. Compliant discussed  Monitor  4. Prediabetes  Assessment & Plan:  Stable  A1c: 6.0 (10/2022)  Lifestyle changes recommended  F/u A1c  Orders:  -     Glycohemoglobin  5. Transaminitis  Assessment & Plan:  Transaminitis / Fatty liver  Stable, asymptomatic  AST: 54; ALT: 93  Hep ABC neg  Liver US (9/13/21): fatty liver  Avoid hepatotoxic's  F/u LFTs  Monitor  Orders:  -     Comprehensive Metabolic Panel  6. Fatty liver  7. Vitamin D deficiency  Assessment & Plan:  Not well controlled  S/p tx  F/u vitamin D levels  Orders:  -     Vitamin D -25 Hydroxy  8. Mixed hyperlipidemia  Assessment & Plan:  Not well controlled  LDL: 304, triglycerides: 153 and HDL: 53  Started Atorvastatin 40mg bedtime   Low fat diet  F/u lipids  Cardio following  Orders:  -     SERVICE TO CARDIOLOGY  -     Lipid Panel With Reflex  9. Dyspnea on exertion  Assessment & Plan:  Stable  Likely from SULY  Normal labs  EKG neg  Cont statin  Stress TTE (10/13/22): negative  Cardio following  ENT re:referral  Orders:  -     SERVICE TO CARDIOLOGY  10. Routine health maintenance  Assessment & Plan:  Flu shot given. Tdap, PCV20,  Shingrix and COVID-19 vaccine UTD.  Colonoscopy (8/20/21): hemorrhoids. Repeat in 10 years.  Former smoker (3-4 cig/day x 6 years. Quit 2006). AAA US (9/19/22): negative  Normal BP  No ASA  BMI: 31 addressed (weight goals reviewed and discussed). Lifestyle changes recommended for obesity  F/u labs  11. Need for influenza vaccination  -     INFLUENZA QUADRIVALENT HIGH DOSE PRES FREE 0.7 ML VACC,IM  12. Obesity (BMI 30-39.9)  13. Former smoker     None known

## 2023-11-11 NOTE — ED BEHAVIORAL HEALTH ASSESSMENT NOTE - PSYCHIATRIC ISSUES AND PLAN (INCLUDE STANDING AND PRN MEDICATION)
Interventional Radiology Pre-Procedure Note    Jessica Barraza  Procedure: tunneled dialysis catheter    NPO: yes  Antibiotics: n/a  Anticoagulation: eliquis BID - last done 11/11 PM  Adverse events to anesthesia: n/a  Objection to blood products: n/a    PAST MEDICAL & SURGICAL HISTORY:  Breast CA  Diabetes  Stroke  Cardiac arrest  HTN (hypertension)  H/O mastectomy, bilateral    Vital Signs Last 24 Hrs  T(C): 37 (11 Nov 2023 08:00), Max: 37.6 (10 Nov 2023 20:00)  T(F): 98.6 (11 Nov 2023 08:00), Max: 99.7 (11 Nov 2023 00:00)  HR: 112 (11 Nov 2023 10:22) (110 - 123)  BP: --  BP(mean): --  RR: 24 (11 Nov 2023 09:00) (19 - 29)  SpO2: 100% (11 Nov 2023 10:22) (91% - 100%)    Parameters below as of 11 Nov 2023 10:22  Patient On (Oxygen Delivery Method): ventilator        Allergies: Allergies    isoniazid (Rash)  nafcillin (Unknown)  hydrALAZINE (Rash)  vitamin E (Short breath; Urticaria; Hives)  doxycycline (Rash)  cefepime (Rash)  NIFEdipine (Urticaria; Hives)  Zosyn (Rash)    Intolerances    Hemoglobin: 9.1 (11-11-23 @ 00:25)  Creatinine: 1.18 (11-11-23 @ 00:25)  Hemoglobin: 8.9 (11-10-23 @ 01:15)  Creatinine: 1.42 (11-10-23 @ 01:15)  Creatinine: 1.24 (11-09-23 @ 00:31)  Hemoglobin: 9.0 (11-09-23 @ 00:31)  Creatinine: 1.55 (11-08-23 @ 05:15)  Hemoglobin: 8.7 (11-08-23 @ 05:15)  Hemoglobin: 9.0 (11-07-23 @ 01:00)  Creatinine: 1.23 (11-07-23 @ 01:00)      Personally reviewed labs, imaging, EKG                        9.1    13.05 )-----------( 485      ( 11 Nov 2023 00:25 )             31.7     11-11    136  |  102  |  30<H>  ----------------------------<  177<H>  4.8   |  25  |  1.18    Ca    8.7      11 Nov 2023 00:25  Phos  4.7     11-11  Mg     2.40     11-11    TPro  6.7  /  Alb  1.9<L>  /  TBili  <0.2  /  DBili  x   /  AST  29  /  ALT  19  /  AlkPhos  352<H>  11-11            Urinalysis Basic - ( 11 Nov 2023 00:25 )    Color: x / Appearance: x / SG: x / pH: x  Gluc: 177 mg/dL / Ketone: x  / Bili: x / Urobili: x   Blood: x / Protein: x / Nitrite: x   Leuk Esterase: x / RBC: x / WBC x   Sq Epi: x / Non Sq Epi: x / Bacteria: x                 9.1    13.05 )-----------( 485      ( 11 Nov 2023 00:25 )             31.7     11-11    136  |  102  |  30<H>  ----------------------------<  177<H>  4.8   |  25  |  1.18    Ca    8.7      11 Nov 2023 00:25  Phos  4.7     11-11  Mg     2.40     11-11    TPro  6.7  /  Alb  1.9<L>  /  TBili  <0.2  /  DBili  x   /  AST  29  /  ALT  19  /  AlkPhos  352<H>  11-11        Informed consent obtained. All questions and concerns have been addressed at this time. should do Li level. intermittently compliant with medications. reports last being on lithium, risperdal and trazodone. low risk for violence but can give PRNS: haldol 5mg, ativan 2mg, diphenhydramine 50mg, PO/IM, Q6H for Agitation depression/psychosis vs schizoaffective disorder: per psyckes as of 2/2020, consider duloxetine 30 mg BID, risperidone 2 mg BID, trazodone PRN sleep.

## 2023-11-16 NOTE — ED BEHAVIORAL HEALTH PROGRESS NOTE - NSICDXBHSECONDARYDX_PSY_ALL_CORE
Polysubstance abuse   F19.10  Substance induced mood disorder   F19.94  
Regular rate and rhythm. cap refill < 2 seconds

## 2023-11-20 ENCOUNTER — INPATIENT (INPATIENT)
Facility: HOSPITAL | Age: 38
LOS: 9 days | Discharge: NURSING, REHAB OR LONG-TERM CARE | DRG: 885 | End: 2023-11-30
Attending: PSYCHIATRY & NEUROLOGY | Admitting: PSYCHIATRY & NEUROLOGY
Payer: MEDICAID

## 2023-11-20 VITALS
WEIGHT: 149.91 LBS | RESPIRATION RATE: 16 BRPM | OXYGEN SATURATION: 96 % | TEMPERATURE: 98 F | HEART RATE: 95 BPM | SYSTOLIC BLOOD PRESSURE: 132 MMHG | DIASTOLIC BLOOD PRESSURE: 84 MMHG | HEIGHT: 66 IN

## 2023-11-20 DIAGNOSIS — F39 UNSPECIFIED MOOD [AFFECTIVE] DISORDER: ICD-10-CM

## 2023-11-20 LAB
ALBUMIN SERPL ELPH-MCNC: 3.2 G/DL — LOW (ref 3.4–5)
ALBUMIN SERPL ELPH-MCNC: 3.2 G/DL — LOW (ref 3.4–5)
ALP SERPL-CCNC: 78 U/L — SIGNIFICANT CHANGE UP (ref 40–120)
ALP SERPL-CCNC: 78 U/L — SIGNIFICANT CHANGE UP (ref 40–120)
ALT FLD-CCNC: 11 U/L — LOW (ref 12–42)
ALT FLD-CCNC: 11 U/L — LOW (ref 12–42)
AMPHET UR-MCNC: NEGATIVE — SIGNIFICANT CHANGE UP
AMPHET UR-MCNC: NEGATIVE — SIGNIFICANT CHANGE UP
ANION GAP SERPL CALC-SCNC: 7 MMOL/L — LOW (ref 9–16)
ANION GAP SERPL CALC-SCNC: 7 MMOL/L — LOW (ref 9–16)
AST SERPL-CCNC: 23 U/L — SIGNIFICANT CHANGE UP (ref 15–37)
AST SERPL-CCNC: 23 U/L — SIGNIFICANT CHANGE UP (ref 15–37)
BARBITURATES UR SCN-MCNC: NEGATIVE — SIGNIFICANT CHANGE UP
BARBITURATES UR SCN-MCNC: NEGATIVE — SIGNIFICANT CHANGE UP
BASOPHILS # BLD AUTO: 0.04 K/UL — SIGNIFICANT CHANGE UP (ref 0–0.2)
BASOPHILS # BLD AUTO: 0.04 K/UL — SIGNIFICANT CHANGE UP (ref 0–0.2)
BASOPHILS NFR BLD AUTO: 0.5 % — SIGNIFICANT CHANGE UP (ref 0–2)
BASOPHILS NFR BLD AUTO: 0.5 % — SIGNIFICANT CHANGE UP (ref 0–2)
BENZODIAZ UR-MCNC: NEGATIVE — SIGNIFICANT CHANGE UP
BENZODIAZ UR-MCNC: NEGATIVE — SIGNIFICANT CHANGE UP
BILIRUB SERPL-MCNC: 0.4 MG/DL — SIGNIFICANT CHANGE UP (ref 0.2–1.2)
BILIRUB SERPL-MCNC: 0.4 MG/DL — SIGNIFICANT CHANGE UP (ref 0.2–1.2)
BUN SERPL-MCNC: 13 MG/DL — SIGNIFICANT CHANGE UP (ref 7–23)
BUN SERPL-MCNC: 13 MG/DL — SIGNIFICANT CHANGE UP (ref 7–23)
CALCIUM SERPL-MCNC: 9 MG/DL — SIGNIFICANT CHANGE UP (ref 8.5–10.5)
CALCIUM SERPL-MCNC: 9 MG/DL — SIGNIFICANT CHANGE UP (ref 8.5–10.5)
CHLORIDE SERPL-SCNC: 100 MMOL/L — SIGNIFICANT CHANGE UP (ref 96–108)
CHLORIDE SERPL-SCNC: 100 MMOL/L — SIGNIFICANT CHANGE UP (ref 96–108)
CO2 SERPL-SCNC: 27 MMOL/L — SIGNIFICANT CHANGE UP (ref 22–31)
CO2 SERPL-SCNC: 27 MMOL/L — SIGNIFICANT CHANGE UP (ref 22–31)
COCAINE METAB.OTHER UR-MCNC: POSITIVE
COCAINE METAB.OTHER UR-MCNC: POSITIVE
CREAT SERPL-MCNC: 1.16 MG/DL — SIGNIFICANT CHANGE UP (ref 0.5–1.3)
CREAT SERPL-MCNC: 1.16 MG/DL — SIGNIFICANT CHANGE UP (ref 0.5–1.3)
EGFR: 84 ML/MIN/1.73M2 — SIGNIFICANT CHANGE UP
EGFR: 84 ML/MIN/1.73M2 — SIGNIFICANT CHANGE UP
EOSINOPHIL # BLD AUTO: 0.03 K/UL — SIGNIFICANT CHANGE UP (ref 0–0.5)
EOSINOPHIL # BLD AUTO: 0.03 K/UL — SIGNIFICANT CHANGE UP (ref 0–0.5)
EOSINOPHIL NFR BLD AUTO: 0.4 % — SIGNIFICANT CHANGE UP (ref 0–6)
EOSINOPHIL NFR BLD AUTO: 0.4 % — SIGNIFICANT CHANGE UP (ref 0–6)
ETHANOL SERPL-MCNC: <3 MG/DL — SIGNIFICANT CHANGE UP
ETHANOL SERPL-MCNC: <3 MG/DL — SIGNIFICANT CHANGE UP
FLUAV AG NPH QL: SIGNIFICANT CHANGE UP
FLUAV AG NPH QL: SIGNIFICANT CHANGE UP
FLUBV AG NPH QL: SIGNIFICANT CHANGE UP
FLUBV AG NPH QL: SIGNIFICANT CHANGE UP
GLUCOSE SERPL-MCNC: 196 MG/DL — HIGH (ref 70–99)
GLUCOSE SERPL-MCNC: 196 MG/DL — HIGH (ref 70–99)
HCT VFR BLD CALC: 47.3 % — SIGNIFICANT CHANGE UP (ref 39–50)
HCT VFR BLD CALC: 47.3 % — SIGNIFICANT CHANGE UP (ref 39–50)
HGB BLD-MCNC: 15.5 G/DL — SIGNIFICANT CHANGE UP (ref 13–17)
HGB BLD-MCNC: 15.5 G/DL — SIGNIFICANT CHANGE UP (ref 13–17)
IMM GRANULOCYTES NFR BLD AUTO: 0.1 % — SIGNIFICANT CHANGE UP (ref 0–0.9)
IMM GRANULOCYTES NFR BLD AUTO: 0.1 % — SIGNIFICANT CHANGE UP (ref 0–0.9)
LYMPHOCYTES # BLD AUTO: 0.93 K/UL — LOW (ref 1–3.3)
LYMPHOCYTES # BLD AUTO: 0.93 K/UL — LOW (ref 1–3.3)
LYMPHOCYTES # BLD AUTO: 11.3 % — LOW (ref 13–44)
LYMPHOCYTES # BLD AUTO: 11.3 % — LOW (ref 13–44)
MCHC RBC-ENTMCNC: 30.1 PG — SIGNIFICANT CHANGE UP (ref 27–34)
MCHC RBC-ENTMCNC: 30.1 PG — SIGNIFICANT CHANGE UP (ref 27–34)
MCHC RBC-ENTMCNC: 32.8 GM/DL — SIGNIFICANT CHANGE UP (ref 32–36)
MCHC RBC-ENTMCNC: 32.8 GM/DL — SIGNIFICANT CHANGE UP (ref 32–36)
MCV RBC AUTO: 91.8 FL — SIGNIFICANT CHANGE UP (ref 80–100)
MCV RBC AUTO: 91.8 FL — SIGNIFICANT CHANGE UP (ref 80–100)
METHADONE UR-MCNC: NEGATIVE — SIGNIFICANT CHANGE UP
METHADONE UR-MCNC: NEGATIVE — SIGNIFICANT CHANGE UP
MONOCYTES # BLD AUTO: 0.48 K/UL — SIGNIFICANT CHANGE UP (ref 0–0.9)
MONOCYTES # BLD AUTO: 0.48 K/UL — SIGNIFICANT CHANGE UP (ref 0–0.9)
MONOCYTES NFR BLD AUTO: 5.8 % — SIGNIFICANT CHANGE UP (ref 2–14)
MONOCYTES NFR BLD AUTO: 5.8 % — SIGNIFICANT CHANGE UP (ref 2–14)
NEUTROPHILS # BLD AUTO: 6.74 K/UL — SIGNIFICANT CHANGE UP (ref 1.8–7.4)
NEUTROPHILS # BLD AUTO: 6.74 K/UL — SIGNIFICANT CHANGE UP (ref 1.8–7.4)
NEUTROPHILS NFR BLD AUTO: 81.9 % — HIGH (ref 43–77)
NEUTROPHILS NFR BLD AUTO: 81.9 % — HIGH (ref 43–77)
NRBC # BLD: 0 /100 WBCS — SIGNIFICANT CHANGE UP (ref 0–0)
NRBC # BLD: 0 /100 WBCS — SIGNIFICANT CHANGE UP (ref 0–0)
OPIATES UR-MCNC: NEGATIVE — SIGNIFICANT CHANGE UP
OPIATES UR-MCNC: NEGATIVE — SIGNIFICANT CHANGE UP
PCP SPEC-MCNC: SIGNIFICANT CHANGE UP
PCP SPEC-MCNC: SIGNIFICANT CHANGE UP
PCP UR-MCNC: NEGATIVE — SIGNIFICANT CHANGE UP
PCP UR-MCNC: NEGATIVE — SIGNIFICANT CHANGE UP
PLATELET # BLD AUTO: 337 K/UL — SIGNIFICANT CHANGE UP (ref 150–400)
PLATELET # BLD AUTO: 337 K/UL — SIGNIFICANT CHANGE UP (ref 150–400)
POTASSIUM SERPL-MCNC: 3.6 MMOL/L — SIGNIFICANT CHANGE UP (ref 3.5–5.3)
POTASSIUM SERPL-MCNC: 3.6 MMOL/L — SIGNIFICANT CHANGE UP (ref 3.5–5.3)
POTASSIUM SERPL-SCNC: 3.6 MMOL/L — SIGNIFICANT CHANGE UP (ref 3.5–5.3)
POTASSIUM SERPL-SCNC: 3.6 MMOL/L — SIGNIFICANT CHANGE UP (ref 3.5–5.3)
PROT SERPL-MCNC: 8.6 G/DL — HIGH (ref 6.4–8.2)
PROT SERPL-MCNC: 8.6 G/DL — HIGH (ref 6.4–8.2)
RBC # BLD: 5.15 M/UL — SIGNIFICANT CHANGE UP (ref 4.2–5.8)
RBC # BLD: 5.15 M/UL — SIGNIFICANT CHANGE UP (ref 4.2–5.8)
RBC # FLD: 14.3 % — SIGNIFICANT CHANGE UP (ref 10.3–14.5)
RBC # FLD: 14.3 % — SIGNIFICANT CHANGE UP (ref 10.3–14.5)
RSV RNA NPH QL NAA+NON-PROBE: SIGNIFICANT CHANGE UP
RSV RNA NPH QL NAA+NON-PROBE: SIGNIFICANT CHANGE UP
SARS-COV-2 RNA SPEC QL NAA+PROBE: SIGNIFICANT CHANGE UP
SARS-COV-2 RNA SPEC QL NAA+PROBE: SIGNIFICANT CHANGE UP
SODIUM SERPL-SCNC: 134 MMOL/L — SIGNIFICANT CHANGE UP (ref 132–145)
SODIUM SERPL-SCNC: 134 MMOL/L — SIGNIFICANT CHANGE UP (ref 132–145)
THC UR QL: NEGATIVE — SIGNIFICANT CHANGE UP
THC UR QL: NEGATIVE — SIGNIFICANT CHANGE UP
WBC # BLD: 8.23 K/UL — SIGNIFICANT CHANGE UP (ref 3.8–10.5)
WBC # BLD: 8.23 K/UL — SIGNIFICANT CHANGE UP (ref 3.8–10.5)
WBC # FLD AUTO: 8.23 K/UL — SIGNIFICANT CHANGE UP (ref 3.8–10.5)
WBC # FLD AUTO: 8.23 K/UL — SIGNIFICANT CHANGE UP (ref 3.8–10.5)

## 2023-11-20 PROCEDURE — 90792 PSYCH DIAG EVAL W/MED SRVCS: CPT | Mod: 95,GC

## 2023-11-20 PROCEDURE — 99223 1ST HOSP IP/OBS HIGH 75: CPT

## 2023-11-20 RX ORDER — ACETAMINOPHEN 500 MG
650 TABLET ORAL EVERY 6 HOURS
Refills: 0 | Status: DISCONTINUED | OUTPATIENT
Start: 2023-11-20 | End: 2023-11-30

## 2023-11-20 RX ORDER — FOLIC ACID 0.8 MG
1 TABLET ORAL DAILY
Refills: 0 | Status: DISCONTINUED | OUTPATIENT
Start: 2023-11-20 | End: 2023-11-30

## 2023-11-20 RX ORDER — THIAMINE MONONITRATE (VIT B1) 100 MG
100 TABLET ORAL DAILY
Refills: 0 | Status: COMPLETED | OUTPATIENT
Start: 2023-11-20 | End: 2023-11-23

## 2023-11-20 RX ORDER — HALOPERIDOL DECANOATE 100 MG/ML
5 INJECTION INTRAMUSCULAR EVERY 8 HOURS
Refills: 0 | Status: DISCONTINUED | OUTPATIENT
Start: 2023-11-20 | End: 2023-11-30

## 2023-11-20 RX ORDER — IBUPROFEN 200 MG
600 TABLET ORAL EVERY 8 HOURS
Refills: 0 | Status: DISCONTINUED | OUTPATIENT
Start: 2023-11-20 | End: 2023-11-30

## 2023-11-20 RX ADMIN — Medication 650 MILLIGRAM(S): at 23:50

## 2023-11-20 NOTE — ED PROVIDER NOTE - PHYSICAL EXAMINATION
VITAL SIGNS: I have reviewed nursing notes and confirm.  CONST: No apparent distress.  ENT: No nasal discharge; airway clear.  EYES: Sclera clear. Pupils round and symmetrical bilaterally.  RESP: Breathing comfortably; speaking in full sentences.   MSK: No acute deformities noted to extremities.   NEURO: Alert, oriented. Speech is fluent and appropriate. Moving all extremities appropriately. No gross motor or sensory abnormalities.  SKIN: Skin is normal temperature; no diaphoresis; no pallor.

## 2023-11-20 NOTE — ED BEHAVIORAL HEALTH ASSESSMENT NOTE - NSBHSAALC_PSY_A_CORE FT
reports last drink approx 4 days ago, many years history of use reports drinking 12-pack every 3 days
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
## 2023-11-20 NOTE — ED BEHAVIORAL HEALTH ASSESSMENT NOTE - ADDITIONAL DETAILS ALL
Reports last SA via cutting was over 3 months ago, but unable to provide specific date/time of attempt; per chart review- pt reports at least 2 suicide attempts in the last 2 years (1.5 years ago walking in front of a bus and 1 year ago going out of a 3rd floor window) often in the context of intoxication.

## 2023-11-20 NOTE — ED BEHAVIORAL HEALTH ASSESSMENT NOTE - NSPRESENTSXS_PSY_ALL_CORE
Depressed mood/Anhedonia/Command hallucinations to hurt self Depressed mood/Anhedonia/Psychosis/Command hallucinations to hurt self/Refusal or inability to complete safety plan

## 2023-11-20 NOTE — ED ADULT NURSE NOTE - NS ED PATIENT SAFETY CONCERN
Patient is a returning patient and needs to come in ASAP as she has relapsed.    Pt is 18 weeks at this time.   No

## 2023-11-20 NOTE — ED BEHAVIORAL HEALTH ASSESSMENT NOTE - PAST PSYCHOTROPIC MEDICATION
Lexapro, Seroquel, prozac, trazodone, prazosin Lexapro, Seroquel, prozac, trazodone, prazosin, risperdal, klonopin, gabapentin, lithium, naltrexone, paliperidone, depakote, duloxetine, venlafaxine, mirtazapine, abilify, zyprexa, haldol, clonidine

## 2023-11-20 NOTE — ED CDU PROVIDER DISPOSITION NOTE - CLINICAL COURSE
Accepted for psych at [facility], voluntary admission.  Paperwork and EKG sent.

## 2023-11-20 NOTE — ED BEHAVIORAL HEALTH NOTE - BEHAVIORAL HEALTH NOTE
Patient interviewed and part B completed at [facility]. Orders have been entered and handoff provided to inpatient RN and Inpatient MD. Full ED BH Assessment is located in MRN [id number] Patient interviewed and part B completed at [facility]. Orders have been entered and handoff provided to inpatient RN and Inpatient MD. Full ED BH Assessment is located in MRN [id number]    This writer was notified by the unit staff that the pt spiked a fever on the unit and reported that he had not had a BM for several days. This writer ordered Miralax and Trazodone and recommended that staff reach out to the doctor on call to consider placing a med consult.

## 2023-11-20 NOTE — ED BEHAVIORAL HEALTH ASSESSMENT NOTE - OTHER PAST PSYCHIATRIC HISTORY (INCLUDE DETAILS REGARDING ONSET, COURSE OF ILLNESS, INPATIENT/OUTPATIENT TREATMENT)
Multiple past psychiatric admissions, last known at [facility] from 4/2023-5/2023, at least 2 lifetime suicide attempts, last 1 year ago. Pt has history of nonadherence with OP psychiatric and substance treatment

## 2023-11-20 NOTE — ED BEHAVIORAL HEALTH ASSESSMENT NOTE - NSBHDSMAXES_PSY_ALL_CORE
Post-Operative Day Number 1 Progress Note    Patient doing well post-op day 1 from  delivery without significant complaints. Pain controlled on current medication. Voiding without difficulty, normal lochia. Vitals:  Patient Vitals for the past 8 hrs:   BP Temp Pulse Resp SpO2   17 1400 113/67 97.5 °F (36.4 °C) 64 14 100 %     Temp (24hrs), Av °F (36.7 °C), Min:97.5 °F (36.4 °C), Max:98.6 °F (37 °C)      Vital signs stable, afebrile. Exam:  Patient without distress. Abdomen soft, fundus firm at level of umbilicus, non tender. Incision dry and clean without erythema. Lower extremities are negative for swelling, cords or tenderness. Lab/Data Review:  CBC:   Lab Results   Component Value Date/Time    WBC 13.3 (H) 2017 06:59 AM    HGB 9.5 (L) 2017 06:59 AM    HCT 26.5 (L) 2017 06:59 AM     (L) 2017 06:59 AM       Assessment and Plan:  Patient appears to be having uncomplicated post- course. Continue routine post-op care and maternal education. See above

## 2023-11-20 NOTE — ED BEHAVIORAL HEALTH NOTE - BEHAVIORAL HEALTH NOTE
This writer assessed pt on camera prior to his transfer to [facility], at which time he presented as calm and in good behavioral control. This writer informed pt he would be reassessed on camera once he arrives at [facility] and pt verbalized understanding.

## 2023-11-20 NOTE — ED BEHAVIORAL HEALTH ASSESSMENT NOTE - DESCRIPTION
In the ED, patient in good behavioral and impulse control. Patient did not require IM or PRN medications. See  Not for further information. unemployed (reports receiving disability for psychiatric illness); , lives alone in apartment in the [employer] In the ED, patient in good behavioral and impulse control. Patient did not require IM or PRN medications. See  Note for further information. HIV +- reports adherence to Biktarvy

## 2023-11-20 NOTE — ED BEHAVIORAL HEALTH NOTE - BEHAVIORAL HEALTH NOTE
Authored by [facility] Melinda Clark    =========   ED COURSE   =========     SOURCE:  RN        ARRIVAL:  Per chart and RN, patient arrived via EMS. Per RN, patient was expressing increased AH upon arrival.       BELONGINGS:  Per RN, patient arrived with __clothes. All belongings were provided to hospital security, and patient currently in a gown with a 1:1 staff member.     BEHAVIOR: RN described patient to be calm and cooperative, presenting with linear thought process, (AAOx3), remains in good behavioral and impulse control, is not violent/aggressive. RN stated that the patient is experiencing increased AH, the voices are telling him to jump out window. RN stated that there no visible marks, bruises, or lacerations on the body. RN stated that the patient appears to be well-groomed, maintains good hygiene, and reports good ADLs, ambulates without assistance.       TREATMENT:  Per chart and RN, patient did not need PRN medications.        VISITORS:  Per RN, there are no visitors at bedside

## 2023-11-20 NOTE — ED PROVIDER NOTE - CLINICAL SUMMARY MEDICAL DECISION MAKING FREE TEXT BOX
Labs okay, alcohol negative.  Drug screen positive for cocaine.  Patient is calm and cooperative.  Admit to observation. Called telepsych with request for consult. Awaiting telepsych eval.

## 2023-11-20 NOTE — BH PATIENT PROFILE - FALL HARM RISK - UNIVERSAL INTERVENTIONS
Bed in lowest position, wheels locked, appropriate side rails in place/Call bell, personal items and telephone in reach/Instruct patient to call for assistance before getting out of bed or chair/Non-slip footwear when patient is out of bed/[place] to call system/Physically safe environment - no spills, clutter or unnecessary equipment/Purposeful Proactive Rounding/Room/bathroom lighting operational, light cord in reach
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
## 2023-11-20 NOTE — ED CDU PROVIDER INITIAL DAY NOTE - OBJECTIVE STATEMENT
38 y/o M with Hx of bipolar disorder, Hx of schizophrenia, Hx of HIV on Biktarvy, he was hospitalized several months ago and was recommended to take medications for his psychiatric conditions but he has been off his medications for many months. He self medicates with cocaine and alcohol. Over the last 2 weeks he has abstained from cocaine and alcohol but is still off his psych medications, the names and dosages of which he can not recall. Today he comes in to the ER complaining of auditory hallucinations, suicidal thoughts, and voices telling him to jump out of a window. He states he frequently gets hallucinations and suicidal thoughts with substance abuse but he has not been using cocaine and alcohol for the last few weeks and reports the voices have not stopped and is more intense today. He has been to this ER before and a chart review shows an ED visit for behavior health evaluation in March of 2022 with a diagnosis of schizoaffective disorder. At the time he presented to [facility] and was hospitalized for a few weeks. He was given Lexapro, Trazadone, Seroquel, hydroxyzine, and Haldol and was diagnosed with depression with psychotic features. In April of 2023 he was hospitalized in [place] psych[facility] unit. In June of 2023 he had a psych evaluation and was hospitalized again.
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
## 2023-11-20 NOTE — BH PATIENT PROFILE - HOME MEDICATIONS
bictegravir/emtricitabine/tenofovir 50 mg-200 mg-25 mg oral tablet , 1 tab(s) orally once a day  benzonatate 150 mg oral capsule , 1 cap(s) orally 3 times a day as needed for  cough  azithromycin 250 mg oral tablet , 1 tab(s) orally once a day  bictegravir/emtricitabine/tenofovir 50 mg-200 mg-25 mg oral tablet , 1 tab(s) orally once a day x 14 days  Continue to take as prescribed until told otherwise by your provider  traZODone 100 mg oral tablet , 1 tab(s) orally once a day (at bedtime) x 14 days  Continue to take as prescribed until told otherwise by your provider  escitalopram 5 mg oral tablet , 3 tab(s) orally once a day x 14 days  Continue to take as prescribed until told otherwise by your provider  prazosin 2 mg oral capsule , 1 cap(s) orally once a day (at bedtime) x 14 days  Continue to take as prescribed until told otherwise by your provider

## 2023-11-20 NOTE — ED ADULT TRIAGE NOTE - CHIEF COMPLAINT QUOTE
Pt reports he has been sober from alcohol and cocaine for 3 days and has been having recurrent SI. Pt denies HI. Has hx of suicide attempts in the past, has a plan to jump out his window. Denies any medical sx at this time. Pt brought back to room and 1:1 initiated immediately.

## 2023-11-20 NOTE — ED BEHAVIORAL HEALTH ASSESSMENT NOTE - SUBSTANCE USE
Upon showing pt his admission wrist band to ensure accuracy of name and birthday, pt squinted his eyes and verbalized difficulty reading the information and stated, \"I think the meds are really kicking in.\" Pt noted to be napping/sleeping upon writer entering waiting room. Writer again asked pt how much Lyrica he took prior to coming to [facility]. Pt states, \"I don't know, like 3-4.\" Writer asked pt if he took anything else, and pt reported taking more than prescribed of his baclofen and tizanidine. When asked how many he took of each, pt states, \"I don't know, maybe 3-4. I don't know.\" When asked if this was attempt to kill himself, pt states, \"I don't know, maybe it was.\" Writer notified MD, who changed recommendation to medical clearance at [facility]. Pt initially refusing recommendation but ultimately agreeable after much discussion/therapeutic communication by writer and lead intake counselors.    Yes

## 2023-11-20 NOTE — ED BEHAVIORAL HEALTH ASSESSMENT NOTE - NSBHATTESTCOMMENTATTENDFT_PSY_A_CORE
36 yo M; reportedly domiciled alone in the [place]; PPHx of schizoaffective disorder, prior admissions, hx of SA; PMHx of HIV; hx of polysubstance use; BIBS; psychiatry consulted for SI/CAH.  Pt endorses CAH to jump out the window, also SI without plan/intent, feels he has nothing to live for, with poor sleep, "standoffish, depressed" mood, not tending to ADLs recently.  Pt appears depressed/anxious and is appropriate for voluntary admission at this time.    Covid Screen - Patient  denies positive test in past 3 months  denies recent exposures 36 yo M; reportedly domiciled alone in the [place]; PPHx of schizoaffective disorder, prior admissions, hx of SA; PMHx of HIV; hx of polysubstance use, denies hx of withdrawal seizures/DTs/ICU stays; BIBS; psychiatry consulted for SI/CAH.  Pt endorses CAH to jump out the window, also SI without plan/intent, feels he has nothing to live for, with poor sleep, "standoffish, depressed" mood, not tending to ADLs recently.  Pt appears depressed/anxious and is appropriate for voluntary admission at this time.    Covid Screen - Patient  denies positive test in past 3 months  denies recent exposures

## 2023-11-20 NOTE — ED ADULT NURSE REASSESSMENT NOTE - NS ED NURSE REASSESS COMMENT FT1
Received handoff from Mariella CHEUNG. patient in no acute distress. patient maintained on 1:1 constant observation. patient sleeping comfortably- rise and fall of chest noted, breathing unlabored. Safety intact- care ongoing.

## 2023-11-20 NOTE — ED BEHAVIORAL HEALTH ASSESSMENT NOTE - RISK ASSESSMENT
At this time, patient is at elevated risk of harm to himself and requires inpatient hospitalization for safety, stabilization, and optimization of current medication regimen  Acute risk factors- active substance use, CAH to harm himself, current SI w/o intent or plan, active mood episode  Chronic risk factors- hx of multiple SA, hx of multiple IP admissions, hx of polysubstance use  Protective factors- help-seeking At this time, patient is at elevated risk of harm to himself and requires inpatient hospitalization for safety, stabilization, and optimization of current medication regimen  Acute risk factors- active substance use, CAH to harm himself, current SI w/o intent or plan, active mood episode  Chronic risk factors- hx of multiple SA, hx of multiple IP admissions, hx of polysubstance use, poor social support  Protective factors- help-seeking
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
## 2023-11-21 DIAGNOSIS — F14.10 COCAINE ABUSE, UNCOMPLICATED: ICD-10-CM

## 2023-11-21 DIAGNOSIS — B20 HUMAN IMMUNODEFICIENCY VIRUS [HIV] DISEASE: ICD-10-CM

## 2023-11-21 LAB
CHOLEST SERPL-MCNC: 128 MG/DL — SIGNIFICANT CHANGE UP
CHOLEST SERPL-MCNC: 128 MG/DL — SIGNIFICANT CHANGE UP
HDLC SERPL-MCNC: 45 MG/DL — SIGNIFICANT CHANGE UP
HDLC SERPL-MCNC: 45 MG/DL — SIGNIFICANT CHANGE UP
LIPID PNL WITH DIRECT LDL SERPL: 65 MG/DL — SIGNIFICANT CHANGE UP
LIPID PNL WITH DIRECT LDL SERPL: 65 MG/DL — SIGNIFICANT CHANGE UP
NON HDL CHOLESTEROL: 83 MG/DL — SIGNIFICANT CHANGE UP
NON HDL CHOLESTEROL: 83 MG/DL — SIGNIFICANT CHANGE UP
TRIGL SERPL-MCNC: 88 MG/DL — SIGNIFICANT CHANGE UP
TRIGL SERPL-MCNC: 88 MG/DL — SIGNIFICANT CHANGE UP

## 2023-11-21 PROCEDURE — 99233 SBSQ HOSP IP/OBS HIGH 50: CPT

## 2023-11-21 RX ORDER — INFLUENZA VIRUS VACCINE 15; 15; 15; 15 UG/.5ML; UG/.5ML; UG/.5ML; UG/.5ML
0.5 SUSPENSION INTRAMUSCULAR ONCE
Refills: 0 | Status: COMPLETED | OUTPATIENT
Start: 2023-11-21 | End: 2023-11-21

## 2023-11-21 RX ORDER — QUETIAPINE FUMARATE 200 MG/1
50 TABLET, FILM COATED ORAL ONCE
Refills: 0 | Status: COMPLETED | OUTPATIENT
Start: 2023-11-21 | End: 2023-11-21

## 2023-11-21 RX ORDER — QUETIAPINE FUMARATE 200 MG/1
100 TABLET, FILM COATED ORAL AT BEDTIME
Refills: 0 | Status: DISCONTINUED | OUTPATIENT
Start: 2023-11-21 | End: 2023-11-24

## 2023-11-21 RX ORDER — QUETIAPINE FUMARATE 200 MG/1
50 TABLET, FILM COATED ORAL DAILY
Refills: 0 | Status: DISCONTINUED | OUTPATIENT
Start: 2023-11-22 | End: 2023-11-25

## 2023-11-21 RX ORDER — TRAZODONE HCL 50 MG
50 TABLET ORAL ONCE
Refills: 0 | Status: COMPLETED | OUTPATIENT
Start: 2023-11-21 | End: 2023-11-21

## 2023-11-21 RX ORDER — POLYETHYLENE GLYCOL 3350 17 G/17G
17 POWDER, FOR SOLUTION ORAL ONCE
Refills: 0 | Status: COMPLETED | OUTPATIENT
Start: 2023-11-21 | End: 2023-11-21

## 2023-11-21 RX ORDER — BICTEGRAVIR SODIUM, EMTRICITABINE, AND TENOFOVIR ALAFENAMIDE FUMARATE 30; 120; 15 MG/1; MG/1; MG/1
1 TABLET ORAL DAILY
Refills: 0 | Status: DISCONTINUED | OUTPATIENT
Start: 2023-11-21 | End: 2023-11-30

## 2023-11-21 RX ORDER — MAGNESIUM HYDROXIDE 400 MG/1
30 TABLET, CHEWABLE ORAL DAILY
Refills: 0 | Status: DISCONTINUED | OUTPATIENT
Start: 2023-11-21 | End: 2023-11-30

## 2023-11-21 RX ADMIN — Medication 1 MILLIGRAM(S): at 10:31

## 2023-11-21 RX ADMIN — QUETIAPINE FUMARATE 100 MILLIGRAM(S): 200 TABLET, FILM COATED ORAL at 22:50

## 2023-11-21 RX ADMIN — BICTEGRAVIR SODIUM, EMTRICITABINE, AND TENOFOVIR ALAFENAMIDE FUMARATE 1 TABLET(S): 30; 120; 15 TABLET ORAL at 17:46

## 2023-11-21 RX ADMIN — POLYETHYLENE GLYCOL 3350 17 GRAM(S): 17 POWDER, FOR SOLUTION ORAL at 00:30

## 2023-11-21 RX ADMIN — Medication 50 MILLIGRAM(S): at 00:30

## 2023-11-21 RX ADMIN — Medication 100 MILLIGRAM(S): at 10:31

## 2023-11-21 RX ADMIN — QUETIAPINE FUMARATE 50 MILLIGRAM(S): 200 TABLET, FILM COATED ORAL at 14:51

## 2023-11-21 RX ADMIN — Medication 1 TABLET(S): at 10:31
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
## 2023-11-21 NOTE — BH INPATIENT PSYCHIATRY ASSESSMENT NOTE - NSBHMSETHTCONTENT_PSY_A_CORE
Patient: Maddi Long 70 year old female    MRN:[id number]     Location: [facility] OR    Date: 4/4/2023    Pre-Op Diagnosis:  Incarcerated ventral hernia    Post-Op Diagnosis:  Incarcerated ventral hernia    Procedure:   1. Robotically assisted laparoscopic repair of 4 cm ventral hernia using underlay 11 cm echo mesh    Surgeon: Subhash Henriquez MD    First Assistant:  Owen surgical assist    Assistant tasks: Opening and closing, Dissecting tissue and Implanting devices                         Anesthesia Type:  General     Estimated Blood Loss: Minimal    Complications: None    Implants:   Implant Name Type Inv. Item Serial No.  Lot No. LRB No. Used Action   MESH VENTRALIGHT ST SEPRA ECHO PS CRC 4.5IN SPRFLM PP - [id number] Mesh MESH VENTRALIGHT ST SEPRA ECHO PS CRC 4.5IN SPRFLM PP  [employer] [id number] N/A 1 Implanted       Specimen(s): No specimens collected         Indications for procedure:  The patient is a 70-year-old female who has had problems with a ventral hernia in the epigastric region.  It has been enlarging and causing discomfort.  It was felt she would benefit from repair.  The potential risks and complications of surgery as well as alternatives were discussed with the patient who understood and gave her informed consent to proceed.    Description of procedure:  The patient was taken to the operating room and placed in the supine position on the operating table.  General anesthesia was induced.  The abdomen was prepped and draped in a sterile manner.  A left subcostal incision was made and the abdominal wall was elevated with towel clips.  A Veress needle was used to enter the abdominal cavity.  Position was confirmed by water drop test and pressure measurements.  The abdomen was insufflated with carbon dioxide to a pressure of 15 mmHg.  A 12 mm Optiview trocar was introduced through this site.  Under direct and laparoscopic visualization 2 additional 8 mm trocars were introduced lower down on  the patient's left.  The robotic patient cart was docked into position.    Patient had a moderate degree of adhesions from the omentum to the underside of the abdominal wall which were all taken down.  The hernia defect in the epigastric region containing a large amount of incarcerated omentum.  I had to enlarge the defect inferiorly and superiorly in order to reduce the contents.  The defect was then closed vertically with running permanent V lock suture.  An 11 cm echo mesh was then brought into the field and was sewn in circumferentially with running absorbable Stratafix.  This gave us excellent coverage.    The left subcostal port site was closed at the fascial level with 1 PDS.  The mesh implantation site was then infiltrated with Marcaine.  CO2 was decompressed.  Trocars were withdrawn.  The wounds were closed with absorbable suture.  Steri-Strips and dressings were applied.  The patient was then aroused from anesthesia and was taken back to the recovery room in satisfactory condition.  All sponge and needle counts were reported as correct x2 at the end of the case.        Findings:  4 cm diameter hernia defect in the epigastric region containing a large amount of incarcerated omentum.  Reduced and repaired primarily followed by underlay 11 cm echo mesh.       Subhash Henriquez MD FACS   Suicidality
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
## 2023-11-21 NOTE — BH INPATIENT PSYCHIATRY ASSESSMENT NOTE - NSBHATTESTBILLING_PSY_A_CORE
00417-[id number] diagnostic evaluation with medical services 99223-Initial OBS or IP - high complexity OR  mins

## 2023-11-21 NOTE — BH INPATIENT PSYCHIATRY ASSESSMENT NOTE - SELF INJURIOUS BEHAVIOR WITHOUT SUICIDAL INTENT:
Advised on the below with Dr. Mita Roman. Per Dr. Mita Roman patient's last few liver function tests have been good and she is fine to take claritin. Claritin becomes an issue in patient's in liver failure, where the claritin potency becomes higher.      There is no
Claritin has warnings regarding how it is primarily processed and absorbed through the liver. Patient is wondering if she should still take it or if she should switch to something else. Please advise.
RE: dx w/ non alcoholic liver disease in Nov 2019  -  RE: aryan has a caution about Liver disease  - wondering if okay to take ?    please advise
None known

## 2023-11-21 NOTE — BH INPATIENT PSYCHIATRY ASSESSMENT NOTE - OTHER PAST PSYCHIATRIC HISTORY (INCLUDE DETAILS REGARDING ONSET, COURSE OF ILLNESS, INPATIENT/OUTPATIENT TREATMENT)
Multiple past psychiatric admissions, last known at [facility] from 4/2023-5/2023, at least 2 lifetime suicide attempts, last 1 year ago. Pt has history of nonadherence with OP psychiatric and substance treatment

## 2023-11-21 NOTE — BH INPATIENT PSYCHIATRY ASSESSMENT NOTE - DESCRIPTION
unemployed (reports receiving disability for psychiatric illness); , lives alone in apartment in the [place]

## 2023-11-21 NOTE — BH INPATIENT PSYCHIATRY ASSESSMENT NOTE - NSBHCHARTREVIEWVS_PSY_A_CORE FT
Vital Signs Last 24 Hrs  T(C): 36.6 (11-21-23 @ 09:20), Max: 38.7 (11-20-23 @ 23:26)  T(F): 97.8 (11-21-23 @ 09:20), Max: 101.7 (11-20-23 @ 23:26)  HR: 84 (11-21-23 @ 09:20) (73 - 98)  BP: 119/82 (11-21-23 @ 09:20) (115/72 - 129/78)  BP(mean): --  RR: 18 (11-21-23 @ 09:20) (16 - 20)  SpO2: 100% (11-21-23 @ 09:20) (96% - 100%)
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
## 2023-11-21 NOTE — BH INPATIENT PSYCHIATRY ASSESSMENT NOTE - NSBHMETABOLICLABS_PSY_ALL_CORE
Quality 226: Preventive Care And Screening: Tobacco Use: Screening And Cessation Intervention: Patient screened for tobacco use and is an ex/non-smoker LACERATION Quality 130: Documentation Of Current Medications In The Medical Record: Current Medications Documented Detail Level: Detailed Labs within last 12 months

## 2023-11-21 NOTE — BH SOCIAL WORK INITIAL PSYCHOSOCIAL EVALUATION - NSBHHOUSECOMMENTFT_PSY_ALL_CORE
Pt reports that he is domiciled in HAS housing. Pt reports his address is: [address] [place] [address], [address].

## 2023-11-21 NOTE — BH INPATIENT PSYCHIATRY ASSESSMENT NOTE - CURRENT MEDICATION
MEDICATIONS  (STANDING):  folic acid 1 milliGRAM(s) Oral daily  influenza   Vaccine 0.5 milliLiter(s) IntraMuscular once  multivitamin 1 Tablet(s) Oral daily  polyethylene glycol 3350 17 Gram(s) Oral once  QUEtiapine 100 milliGRAM(s) Oral at bedtime  thiamine 100 milliGRAM(s) Oral daily  traZODone 50 milliGRAM(s) Oral once    MEDICATIONS  (PRN):  acetaminophen     Tablet .. 650 milliGRAM(s) Oral every 6 hours PRN Temp greater or equal to 38C (100.4F)  haloperidol     Tablet 5 milliGRAM(s) Oral every 8 hours PRN agitation  ibuprofen  Tablet. 600 milliGRAM(s) Oral every 8 hours PRN Mild Pain (1 - 3), Moderate Pain (4 - 6)  LORazepam     Tablet 2 milliGRAM(s) Oral every 1 hour PRN Symptom-triggered: each CIWA -Ar score 8 or GREATER  LORazepam     Tablet 2 milliGRAM(s) Oral every 2 hours PRN Symptom-triggered 2 point increase in CIWA-Ar   MEDICATIONS  (STANDING):  folic acid 1 milliGRAM(s) Oral daily  influenza   Vaccine 0.5 milliLiter(s) IntraMuscular once  multivitamin 1 Tablet(s) Oral daily  polyethylene glycol 3350 17 Gram(s) Oral once  QUEtiapine 100 milliGRAM(s) Oral at bedtime  thiamine 100 milliGRAM(s) Oral daily  traZODone 50 milliGRAM(s) Oral once    MEDICATIONS  (PRN):  acetaminophen     Tablet .. 650 milliGRAM(s) Oral every 6 hours PRN Temp greater or equal to 38C (100.4F)  haloperidol     Tablet 5 milliGRAM(s) Oral every 8 hours PRN agitation  ibuprofen  Tablet. 600 milliGRAM(s) Oral every 8 hours PRN Mild Pain (1 - 3), Moderate Pain (4 - 6)  LORazepam     Tablet 2 milliGRAM(s) Oral every 2 hours PRN Symptom-triggered 2 point increase in CIWA-Ar  LORazepam     Tablet 2 milliGRAM(s) Oral every 1 hour PRN Symptom-triggered: each CIWA -Ar score 8 or GREATER   MEDICATIONS  (STANDING):  bictegravir 50 mG/emtricitabine 200 mG/tenofovir alafenamide 25 mG (BIKTARVY) 1 Tablet(s) Oral daily  folic acid 1 milliGRAM(s) Oral daily  influenza   Vaccine 0.5 milliLiter(s) IntraMuscular once  multivitamin 1 Tablet(s) Oral daily  polyethylene glycol 3350 17 Gram(s) Oral once  QUEtiapine 100 milliGRAM(s) Oral at bedtime  thiamine 100 milliGRAM(s) Oral daily  traZODone 50 milliGRAM(s) Oral once    MEDICATIONS  (PRN):  acetaminophen     Tablet .. 650 milliGRAM(s) Oral every 6 hours PRN Temp greater or equal to 38C (100.4F)  aluminum hydroxide/magnesium hydroxide/simethicone Suspension 30 milliLiter(s) Oral every 6 hours PRN Dyspepsia  haloperidol     Tablet 5 milliGRAM(s) Oral every 8 hours PRN agitation  ibuprofen  Tablet. 600 milliGRAM(s) Oral every 8 hours PRN Mild Pain (1 - 3), Moderate Pain (4 - 6)  LORazepam     Tablet 2 milliGRAM(s) Oral every 1 hour PRN Symptom-triggered: each CIWA -Ar score 8 or GREATER  LORazepam     Tablet 2 milliGRAM(s) Oral every 2 hours PRN Symptom-triggered 2 point increase in CIWA-Ar  magnesium hydroxide Suspension 30 milliLiter(s) Oral daily PRN Constipation

## 2023-11-21 NOTE — ED ADULT NURSE NOTE - NSSUHOSCREENINGYN_ED_ALL_ED
Yes - the patient is able to be screened Winlevi Pregnancy And Lactation Text: This medication is considered safe during pregnancy and breastfeeding.
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
## 2023-11-21 NOTE — BH INPATIENT PSYCHIATRY ASSESSMENT NOTE - NSBHMETABOLIC_PSY_ALL_CORE_FT
BMI: BMI (kg/m2): 22 (11-20-23 @ 23:46)  HbA1c: A1C with Estimated Average Glucose Result: 5.7 % (02-23-23 @ 08:48)    Glucose: POCT Blood Glucose.: 100 mg/dL (07-12-23 @ 12:25)    BP: 119/82 (11-21-23 @ 09:20) (115/72 - 132/84)Vital Signs Last 24 Hrs  T(C): 36.6 (11-21-23 @ 09:20), Max: 38.7 (11-20-23 @ 23:26)  T(F): 97.8 (11-21-23 @ 09:20), Max: 101.7 (11-20-23 @ 23:26)  HR: 84 (11-21-23 @ 09:20) (73 - 98)  BP: 119/82 (11-21-23 @ 09:20) (115/72 - 129/78)  BP(mean): --  RR: 18 (11-21-23 @ 09:20) (16 - 20)  SpO2: 100% (11-21-23 @ 09:20) (96% - 100%)      Lipid Panel: Date/Time: 11-21-23 @ 05:30  Cholesterol, Serum: 128  LDL Cholesterol Calculated: 65  HDL Cholesterol, Serum: 45  Total Cholesterol/HDL Ration Measurement: --  Triglycerides, Serum: 88

## 2023-11-22 DIAGNOSIS — F19.94 OTHER PSYCHOACTIVE SUBSTANCE USE, UNSPECIFIED WITH PSYCHOACTIVE SUBSTANCE-INDUCED MOOD DISORDER: ICD-10-CM

## 2023-11-22 LAB
4/8 RATIO: 0.76 RATIO — LOW (ref 0.9–3.6)
4/8 RATIO: 0.76 RATIO — LOW (ref 0.9–3.6)
ABS CD8: 427 CELLS/UL — SIGNIFICANT CHANGE UP (ref 142–740)
ABS CD8: 427 CELLS/UL — SIGNIFICANT CHANGE UP (ref 142–740)
CD16+CD56+ CELLS NFR BLD: 12 % — SIGNIFICANT CHANGE UP (ref 5–23)
CD16+CD56+ CELLS NFR BLD: 12 % — SIGNIFICANT CHANGE UP (ref 5–23)
CD16+CD56+ CELLS NFR SPEC: 115 CELLS/UL — SIGNIFICANT CHANGE UP (ref 71–410)
CD16+CD56+ CELLS NFR SPEC: 115 CELLS/UL — SIGNIFICANT CHANGE UP (ref 71–410)
CD19 BLASTS SPEC-ACNC: 72 CELLS/UL — LOW (ref 84–469)
CD19 BLASTS SPEC-ACNC: 72 CELLS/UL — LOW (ref 84–469)
CD19 BLASTS SPEC-ACNC: 8 % — SIGNIFICANT CHANGE UP (ref 6–24)
CD19 BLASTS SPEC-ACNC: 8 % — SIGNIFICANT CHANGE UP (ref 6–24)
CD3 BLASTS SPEC-ACNC: 759 CELLS/UL — SIGNIFICANT CHANGE UP (ref 672–1870)
CD3 BLASTS SPEC-ACNC: 759 CELLS/UL — SIGNIFICANT CHANGE UP (ref 672–1870)
CD3 BLASTS SPEC-ACNC: 77 % — SIGNIFICANT CHANGE UP (ref 59–83)
CD3 BLASTS SPEC-ACNC: 77 % — SIGNIFICANT CHANGE UP (ref 59–83)
CD4 %: 32 % — SIGNIFICANT CHANGE UP (ref 30–62)
CD4 %: 32 % — SIGNIFICANT CHANGE UP (ref 30–62)
CD8 %: 42 % — HIGH (ref 12–36)
CD8 %: 42 % — HIGH (ref 12–36)
T-CELL CD4 SUBSET PNL BLD: 322 CELLS/UL — LOW (ref 489–1457)
T-CELL CD4 SUBSET PNL BLD: 322 CELLS/UL — LOW (ref 489–1457)

## 2023-11-22 PROCEDURE — 99231 SBSQ HOSP IP/OBS SF/LOW 25: CPT

## 2023-11-22 RX ADMIN — Medication 1 MILLIGRAM(S): at 11:04

## 2023-11-22 RX ADMIN — QUETIAPINE FUMARATE 50 MILLIGRAM(S): 200 TABLET, FILM COATED ORAL at 11:04

## 2023-11-22 RX ADMIN — QUETIAPINE FUMARATE 100 MILLIGRAM(S): 200 TABLET, FILM COATED ORAL at 21:11

## 2023-11-22 RX ADMIN — Medication 1 TABLET(S): at 11:05

## 2023-11-22 RX ADMIN — Medication 100 MILLIGRAM(S): at 11:04

## 2023-11-22 RX ADMIN — BICTEGRAVIR SODIUM, EMTRICITABINE, AND TENOFOVIR ALAFENAMIDE FUMARATE 1 TABLET(S): 30; 120; 15 TABLET ORAL at 11:04

## 2023-11-22 NOTE — BH INPATIENT PSYCHIATRY PROGRESS NOTE - PRN MEDS
MEDICATIONS  (PRN):  acetaminophen     Tablet .. 650 milliGRAM(s) Oral every 6 hours PRN Temp greater or equal to 38C (100.4F)  aluminum hydroxide/magnesium hydroxide/simethicone Suspension 30 milliLiter(s) Oral every 6 hours PRN Dyspepsia  haloperidol     Tablet 5 milliGRAM(s) Oral every 8 hours PRN agitation  ibuprofen  Tablet. 600 milliGRAM(s) Oral every 8 hours PRN Mild Pain (1 - 3), Moderate Pain (4 - 6)  LORazepam     Tablet 2 milliGRAM(s) Oral every 2 hours PRN Symptom-triggered 2 point increase in CIWA-Ar  LORazepam     Tablet 2 milliGRAM(s) Oral every 1 hour PRN Symptom-triggered: each CIWA -Ar score 8 or GREATER  magnesium hydroxide Suspension 30 milliLiter(s) Oral daily PRN Constipation

## 2023-11-22 NOTE — BH INPATIENT PSYCHIATRY PROGRESS NOTE - NSBHATTESTBILLING_PSY_A_CORE
99223-Initial OBS or IP - high complexity OR  mins 52158-Emplbegifp OBS or IP - low complexity OR 25-34 mins

## 2023-11-22 NOTE — BH INPATIENT PSYCHIATRY PROGRESS NOTE - NSICDXBHSECONDARYDX_PSY_ALL_CORE
Cocaine use disorder   F14.10  HIV disease   B20   Unspecified mood [affective] disorder   F39  Cocaine use disorder   F14.10  HIV disease   B20  Substance induced mood disorder   F19.94

## 2023-11-22 NOTE — BH INPATIENT PSYCHIATRY PROGRESS NOTE - NSBHFUPINTERVALHXFT_PSY_A_CORE
Saw patient today in his room with SARAH Whatley and Jeannine (BONI). Pt was groggy, but cooperative with us. Pt states that he is doing the same, "no better or worse", he slept last night, but had SI this morning, from "the voice he has been hearing and ignored it". He is comfortable and feels safe on the unit. When asked about VH he stated that "he is unsure". Denies worsening of sx with substance use. Would like to start taking Seroquel again; does not remember his prior does. Overall he seemed agitated and anxious, and did not maintain great eye contact. Denies any fevers, abd pain. MAR reviewed, labs unremarkable. Saw patient today in his room with SARAH Whatley and Clare(BONI). Pt was groggy, but cooperative with us. Pt states that he is doing the same, "no better or worse", he slept last night, but had SI this morning, from "the voice he has been hearing and ignored it". He is comfortable and feels safe on the unit. When asked about VH he stated that "he is unsure". Denies worsening of sx with substance use. Would like to start taking Seroquel again; does not remember his prior does. Overall he seemed agitated and anxious, and did not maintain great eye contact. Denies any fevers, abd pain. MAR reviewed, labs unremarkable.

## 2023-11-22 NOTE — BH INPATIENT PSYCHIATRY PROGRESS NOTE - NSBHATTESTCOMMENTATTENDFT_PSY_A_CORE
Patient seen with team, NP agrees with PA-s documentation. Patient is known to writer from multiple prior admissions. Appears at/near baseline, endorses having some transient si without intent or plan. ah. no vh/hi/or PI. Is interested in being stabilized on seroquel and getting referred to a mental health clinic. He does not want to receive psychiatric care at current comprehensive clinic. viral load, t cell pending. He has since been restarted on his biktarvy.

## 2023-11-22 NOTE — BH INPATIENT PSYCHIATRY PROGRESS NOTE - NSBHCHARTREVIEWVS_PSY_A_CORE FT
Vital Signs Last 24 Hrs  T(C): 37 (11-22-23 @ 08:23), Max: 37 (11-22-23 @ 08:23)  T(F): 98.6 (11-22-23 @ 08:23), Max: 98.6 (11-22-23 @ 08:23)  HR: 72 (11-22-23 @ 08:23) (72 - 72)  BP: 110/74 (11-22-23 @ 08:23) (110/74 - 110/74)  BP(mean): --  RR: --  SpO2: 99% (11-22-23 @ 08:23) (99% - 99%)
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
## 2023-11-22 NOTE — BH INPATIENT PSYCHIATRY PROGRESS NOTE - NSBHMETABOLIC_PSY_ALL_CORE_FT
BMI: BMI (kg/m2): 22 (11-20-23 @ 23:46)  HbA1c: A1C with Estimated Average Glucose Result: 5.7 % (02-23-23 @ 08:48)    Glucose: POCT Blood Glucose.: 100 mg/dL (07-12-23 @ 12:25)    BP: 110/74 (11-22-23 @ 08:23) (110/74 - 132/84)Vital Signs Last 24 Hrs  T(C): 37 (11-22-23 @ 08:23), Max: 37 (11-22-23 @ 08:23)  T(F): 98.6 (11-22-23 @ 08:23), Max: 98.6 (11-22-23 @ 08:23)  HR: 72 (11-22-23 @ 08:23) (72 - 72)  BP: 110/74 (11-22-23 @ 08:23) (110/74 - 110/74)  BP(mean): --  RR: --  SpO2: 99% (11-22-23 @ 08:23) (99% - 99%)      Lipid Panel: Date/Time: 11-21-23 @ 05:30  Cholesterol, Serum: 128  LDL Cholesterol Calculated: 65  HDL Cholesterol, Serum: 45  Total Cholesterol/HDL Ration Measurement: --  Triglycerides, Serum: 88

## 2023-11-22 NOTE — BH INPATIENT PSYCHIATRY PROGRESS NOTE - CURRENT MEDICATION
MEDICATIONS  (STANDING):  bictegravir 50 mG/emtricitabine 200 mG/tenofovir alafenamide 25 mG (BIKTARVY) 1 Tablet(s) Oral daily  folic acid 1 milliGRAM(s) Oral daily  multivitamin 1 Tablet(s) Oral daily  QUEtiapine 50 milliGRAM(s) Oral daily  QUEtiapine 100 milliGRAM(s) Oral at bedtime  thiamine 100 milliGRAM(s) Oral daily    MEDICATIONS  (PRN):  acetaminophen     Tablet .. 650 milliGRAM(s) Oral every 6 hours PRN Temp greater or equal to 38C (100.4F)  aluminum hydroxide/magnesium hydroxide/simethicone Suspension 30 milliLiter(s) Oral every 6 hours PRN Dyspepsia  haloperidol     Tablet 5 milliGRAM(s) Oral every 8 hours PRN agitation  ibuprofen  Tablet. 600 milliGRAM(s) Oral every 8 hours PRN Mild Pain (1 - 3), Moderate Pain (4 - 6)  LORazepam     Tablet 2 milliGRAM(s) Oral every 2 hours PRN Symptom-triggered 2 point increase in CIWA-Ar  LORazepam     Tablet 2 milliGRAM(s) Oral every 1 hour PRN Symptom-triggered: each CIWA -Ar score 8 or GREATER  magnesium hydroxide Suspension 30 milliLiter(s) Oral daily PRN Constipation

## 2023-11-23 PROCEDURE — 99231 SBSQ HOSP IP/OBS SF/LOW 25: CPT

## 2023-11-23 RX ADMIN — Medication 1 MILLIGRAM(S): at 10:25

## 2023-11-23 RX ADMIN — BICTEGRAVIR SODIUM, EMTRICITABINE, AND TENOFOVIR ALAFENAMIDE FUMARATE 1 TABLET(S): 30; 120; 15 TABLET ORAL at 10:25

## 2023-11-23 RX ADMIN — Medication 1 TABLET(S): at 10:25

## 2023-11-23 RX ADMIN — QUETIAPINE FUMARATE 100 MILLIGRAM(S): 200 TABLET, FILM COATED ORAL at 21:22

## 2023-11-23 RX ADMIN — Medication 100 MILLIGRAM(S): at 10:25

## 2023-11-23 RX ADMIN — QUETIAPINE FUMARATE 50 MILLIGRAM(S): 200 TABLET, FILM COATED ORAL at 10:24

## 2023-11-23 NOTE — BH INPATIENT PSYCHIATRY PROGRESS NOTE - NSBHFUPINTERVALHXFT_PSY_A_CORE
Saw patient today in his room with SARAH Whatley and Clare(BONI). Pt was groggy, but cooperative with us. Pt states that he is doing the same, "no better or worse", he slept last night, but had SI this morning, from "the voice he has been hearing and ignored it". He is comfortable and feels safe on the unit. When asked about VH he stated that "he is unsure". Denies worsening of sx with substance use. Would like to start taking Seroquel again; does not remember his prior does. Overall he seemed agitated and anxious, and did not maintain great eye contact. Denies any fevers, abd pain. MAR reviewed, labs unremarkable. Pt is well known to [facility] and doing better on current regimen vis-a-vis suicidality. CD4-322. UTOX + cocaine. Pt has lost a ton of weight in the past two years. Other labs and vitals fine. Will likely order a CXR and nutrition consult. Pt spending much of the day in bed. 15 minutes spent seeing patient and 10 min spent documenting.

## 2023-11-23 NOTE — BH INPATIENT PSYCHIATRY PROGRESS NOTE - NSBHMETABOLIC_PSY_ALL_CORE_FT
BMI: BMI (kg/m2): 22 (11-20-23 @ 23:46)  HbA1c: A1C with Estimated Average Glucose Result: 5.7 % (02-23-23 @ 08:48)    Glucose: POCT Blood Glucose.: 100 mg/dL (07-12-23 @ 12:25)    BP: 115/70 (11-23-23 @ 09:05) (110/74 - 130/88)Vital Signs Last 24 Hrs  T(C): 36.4 (11-23-23 @ 09:05), Max: 36.4 (11-23-23 @ 09:05)  T(F): 97.6 (11-23-23 @ 09:05), Max: 97.6 (11-23-23 @ 09:05)  HR: 70 (11-23-23 @ 09:05) (70 - 70)  BP: 115/70 (11-23-23 @ 09:05) (115/70 - 115/70)  BP(mean): --  RR: --  SpO2: 99% (11-23-23 @ 09:05) (99% - 99%)      Lipid Panel: Date/Time: 11-21-23 @ 05:30  Cholesterol, Serum: 128  LDL Cholesterol Calculated: 65  HDL Cholesterol, Serum: 45  Total Cholesterol/HDL Ration Measurement: --  Triglycerides, Serum: 88   BMI: BMI (kg/m2): 22 (11-20-23 @ 23:46)  HbA1c: A1C with Estimated Average Glucose Result: 5.7 % (02-23-23 @ 08:48)    Glucose: POCT Blood Glucose.: 100 mg/dL (07-12-23 @ 12:25)    BP: 115/70 (11-23-23 @ 09:05) (110/74 - 129/78)Vital Signs Last 24 Hrs  T(C): 36.4 (11-23-23 @ 09:05), Max: 36.4 (11-23-23 @ 09:05)  T(F): 97.6 (11-23-23 @ 09:05), Max: 97.6 (11-23-23 @ 09:05)  HR: 70 (11-23-23 @ 09:05) (70 - 70)  BP: 115/70 (11-23-23 @ 09:05) (115/70 - 115/70)  BP(mean): --  RR: --  SpO2: 99% (11-23-23 @ 09:05) (99% - 99%)      Lipid Panel: Date/Time: 11-21-23 @ 05:30  Cholesterol, Serum: 128  LDL Cholesterol Calculated: 65  HDL Cholesterol, Serum: 45  Total Cholesterol/HDL Ration Measurement: --  Triglycerides, Serum: 88

## 2023-11-23 NOTE — BH INPATIENT PSYCHIATRY PROGRESS NOTE - NSBHCHARTREVIEWVS_PSY_A_CORE FT
Vital Signs Last 24 Hrs  T(C): 36.4 (11-23-23 @ 09:05), Max: 36.4 (11-23-23 @ 09:05)  T(F): 97.6 (11-23-23 @ 09:05), Max: 97.6 (11-23-23 @ 09:05)  HR: 70 (11-23-23 @ 09:05) (70 - 70)  BP: 115/70 (11-23-23 @ 09:05) (115/70 - 115/70)  BP(mean): --  RR: --  SpO2: 99% (11-23-23 @ 09:05) (99% - 99%)
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
## 2023-11-23 NOTE — BH INPATIENT PSYCHIATRY PROGRESS NOTE - NSICDXBHPRIMARYDX_PSY_ALL_CORE
Schizoaffective disorder   F25.9   Adjustment disorder with mixed anxiety and depressed mood   F43.23

## 2023-11-23 NOTE — BH INPATIENT PSYCHIATRY PROGRESS NOTE - PRN MEDS
MEDICATIONS  (PRN):  acetaminophen     Tablet .. 650 milliGRAM(s) Oral every 6 hours PRN Temp greater or equal to 38C (100.4F)  aluminum hydroxide/magnesium hydroxide/simethicone Suspension 30 milliLiter(s) Oral every 6 hours PRN Dyspepsia  haloperidol     Tablet 5 milliGRAM(s) Oral every 8 hours PRN agitation  ibuprofen  Tablet. 600 milliGRAM(s) Oral every 8 hours PRN Mild Pain (1 - 3), Moderate Pain (4 - 6)  LORazepam     Tablet 2 milliGRAM(s) Oral every 1 hour PRN Symptom-triggered: each CIWA -Ar score 8 or GREATER  LORazepam     Tablet 2 milliGRAM(s) Oral every 2 hours PRN Symptom-triggered 2 point increase in CIWA-Ar  magnesium hydroxide Suspension 30 milliLiter(s) Oral daily PRN Constipation   MEDICATIONS  (PRN):  acetaminophen     Tablet .. 650 milliGRAM(s) Oral every 6 hours PRN Temp greater or equal to 38C (100.4F)  aluminum hydroxide/magnesium hydroxide/simethicone Suspension 30 milliLiter(s) Oral every 6 hours PRN Dyspepsia  haloperidol     Tablet 5 milliGRAM(s) Oral every 8 hours PRN agitation  ibuprofen  Tablet. 600 milliGRAM(s) Oral every 8 hours PRN Mild Pain (1 - 3), Moderate Pain (4 - 6)  LORazepam     Tablet 2 milliGRAM(s) Oral every 2 hours PRN Symptom-triggered 2 point increase in CIWA-Ar  LORazepam     Tablet 2 milliGRAM(s) Oral every 1 hour PRN Symptom-triggered: each CIWA -Ar score 8 or GREATER  magnesium hydroxide Suspension 30 milliLiter(s) Oral daily PRN Constipation

## 2023-11-23 NOTE — BH INPATIENT PSYCHIATRY PROGRESS NOTE - NSICDXBHSECONDARYDX_PSY_ALL_CORE
Unspecified mood [affective] disorder   F39  Cocaine use disorder   F14.10  HIV disease   B20  Substance induced mood disorder   F19.94   Cocaine use disorder   F14.10

## 2023-11-23 NOTE — BH INPATIENT PSYCHIATRY PROGRESS NOTE - CURRENT MEDICATION
MEDICATIONS  (STANDING):  bictegravir 50 mG/emtricitabine 200 mG/tenofovir alafenamide 25 mG (BIKTARVY) 1 Tablet(s) Oral daily  folic acid 1 milliGRAM(s) Oral daily  multivitamin 1 Tablet(s) Oral daily  QUEtiapine 50 milliGRAM(s) Oral daily  QUEtiapine 100 milliGRAM(s) Oral at bedtime    MEDICATIONS  (PRN):  acetaminophen     Tablet .. 650 milliGRAM(s) Oral every 6 hours PRN Temp greater or equal to 38C (100.4F)  aluminum hydroxide/magnesium hydroxide/simethicone Suspension 30 milliLiter(s) Oral every 6 hours PRN Dyspepsia  haloperidol     Tablet 5 milliGRAM(s) Oral every 8 hours PRN agitation  ibuprofen  Tablet. 600 milliGRAM(s) Oral every 8 hours PRN Mild Pain (1 - 3), Moderate Pain (4 - 6)  LORazepam     Tablet 2 milliGRAM(s) Oral every 1 hour PRN Symptom-triggered: each CIWA -Ar score 8 or GREATER  LORazepam     Tablet 2 milliGRAM(s) Oral every 2 hours PRN Symptom-triggered 2 point increase in CIWA-Ar  magnesium hydroxide Suspension 30 milliLiter(s) Oral daily PRN Constipation   MEDICATIONS  (STANDING):  bictegravir 50 mG/emtricitabine 200 mG/tenofovir alafenamide 25 mG (BIKTARVY) 1 Tablet(s) Oral daily  folic acid 1 milliGRAM(s) Oral daily  multivitamin 1 Tablet(s) Oral daily  QUEtiapine 50 milliGRAM(s) Oral daily  QUEtiapine 100 milliGRAM(s) Oral at bedtime    MEDICATIONS  (PRN):  acetaminophen     Tablet .. 650 milliGRAM(s) Oral every 6 hours PRN Temp greater or equal to 38C (100.4F)  aluminum hydroxide/magnesium hydroxide/simethicone Suspension 30 milliLiter(s) Oral every 6 hours PRN Dyspepsia  haloperidol     Tablet 5 milliGRAM(s) Oral every 8 hours PRN agitation  ibuprofen  Tablet. 600 milliGRAM(s) Oral every 8 hours PRN Mild Pain (1 - 3), Moderate Pain (4 - 6)  LORazepam     Tablet 2 milliGRAM(s) Oral every 2 hours PRN Symptom-triggered 2 point increase in CIWA-Ar  LORazepam     Tablet 2 milliGRAM(s) Oral every 1 hour PRN Symptom-triggered: each CIWA -Ar score 8 or GREATER  magnesium hydroxide Suspension 30 milliLiter(s) Oral daily PRN Constipation

## 2023-11-24 PROCEDURE — 99232 SBSQ HOSP IP/OBS MODERATE 35: CPT

## 2023-11-24 RX ORDER — QUETIAPINE FUMARATE 200 MG/1
200 TABLET, FILM COATED ORAL AT BEDTIME
Refills: 0 | Status: DISCONTINUED | OUTPATIENT
Start: 2023-11-24 | End: 2023-11-27

## 2023-11-24 RX ADMIN — BICTEGRAVIR SODIUM, EMTRICITABINE, AND TENOFOVIR ALAFENAMIDE FUMARATE 1 TABLET(S): 30; 120; 15 TABLET ORAL at 11:31

## 2023-11-24 RX ADMIN — Medication 1 TABLET(S): at 11:31

## 2023-11-24 RX ADMIN — QUETIAPINE FUMARATE 200 MILLIGRAM(S): 200 TABLET, FILM COATED ORAL at 21:47

## 2023-11-24 RX ADMIN — QUETIAPINE FUMARATE 50 MILLIGRAM(S): 200 TABLET, FILM COATED ORAL at 11:31

## 2023-11-24 RX ADMIN — Medication 1 MILLIGRAM(S): at 11:31

## 2023-11-24 NOTE — BH INPATIENT PSYCHIATRY PROGRESS NOTE - NSBHCHARTREVIEWVS_PSY_A_CORE FT
Vital Signs Last 24 Hrs  T(C): 36.5 (11-24-23 @ 09:21), Max: 36.8 (11-24-23 @ 06:00)  T(F): 97.7 (11-24-23 @ 09:21), Max: 98.3 (11-24-23 @ 06:00)  HR: 73 (11-24-23 @ 09:21) (65 - 73)  BP: 112/77 (11-24-23 @ 09:21) (104/69 - 112/77)  BP(mean): --  RR: 18 (11-24-23 @ 09:21) (17 - 18)  SpO2: 99% (11-24-23 @ 09:21) (98% - 99%)

## 2023-11-24 NOTE — BH INPATIENT PSYCHIATRY PROGRESS NOTE - NSBHMETABOLIC_PSY_ALL_CORE_FT
BMI: BMI (kg/m2): 22 (11-20-23 @ 23:46)  HbA1c: A1C with Estimated Average Glucose Result: 5.7 % (02-23-23 @ 08:48)    Glucose: POCT Blood Glucose.: 100 mg/dL (07-12-23 @ 12:25)    BP: 112/77 (11-24-23 @ 09:21) (104/69 - 115/70)Vital Signs Last 24 Hrs  T(C): 36.5 (11-24-23 @ 09:21), Max: 36.8 (11-24-23 @ 06:00)  T(F): 97.7 (11-24-23 @ 09:21), Max: 98.3 (11-24-23 @ 06:00)  HR: 73 (11-24-23 @ 09:21) (65 - 73)  BP: 112/77 (11-24-23 @ 09:21) (104/69 - 112/77)  BP(mean): --  RR: 18 (11-24-23 @ 09:21) (17 - 18)  SpO2: 99% (11-24-23 @ 09:21) (98% - 99%)      Lipid Panel: Date/Time: 11-21-23 @ 05:30  Cholesterol, Serum: 128  LDL Cholesterol Calculated: 65  HDL Cholesterol, Serum: 45  Total Cholesterol/HDL Ration Measurement: --  Triglycerides, Serum: 88
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
## 2023-11-24 NOTE — BH INPATIENT PSYCHIATRY PROGRESS NOTE - OTHER
extensive tattooing  Since Wed pt has been seeing/feeling the "presence of a woman watching him" Pt seemed anxious 2/2 the 'presence' that he has been seeing/feeling

## 2023-11-24 NOTE — BH INPATIENT PSYCHIATRY PROGRESS NOTE - NSBHFUPINTERVALHXFT_PSY_A_CORE
Pt seen in his room sitting on his bed. Pt stated that since we met on Wed, he has "felt the presence of a woman watching him." States that it is "a very heavy and uncomfortable feeling." During the interview pt stated that the woman was in his bathroom. He felt this same presence about a year ago, and she went away on her own. The 'presence' does not say anything, and he is unsure of who she is. Pt states that this 'woman' as well as the voices he has been hearing have been disrupting his sleep. The voice often wakes him up at night, and continues to tell him to harm himself. Pt stated that "the voice is very mad that he is here" ([facility]). Pt states that he is eating more since he got here. States that he had SI last night after another pt was talking too loudly during the football game. He said that he "came into his room and tried to see what he could use to harm himself, but could not find anything." Denied any HI towards that patient, "she is annoying and hurting her would be a waste of time", or anyone else.   Pt agrees to increasing his Seroquel dose as discussed with Dr. Witt. Compliant with other medications, no PRN needed. MAR reviewed. Labs within normal limits.

## 2023-11-24 NOTE — BH INPATIENT PSYCHIATRY PROGRESS NOTE - NSBHATTESTCOMMENTATTENDFT_PSY_A_CORE
;;11/24: Not endorsing  suicidal or homicidal ideation intent or plans; no mention of auditory or visual hallucinations i&j fair to poor : aware of medications and acknowledges symptoms but not reflective in a meaningful way  on issues that impact on symptoms. Alert; oriented; cognition intact; speech clear; no tremor or evidence of movement impairment. mood much more stable and improved;  responding to current regime.

## 2023-11-25 LAB
HIV-1 VIRAL LOAD RESULT: ABNORMAL
HIV-1 VIRAL LOAD RESULT: ABNORMAL
HIV1 RNA # SERPL NAA+PROBE: SIGNIFICANT CHANGE UP
HIV1 RNA # SERPL NAA+PROBE: SIGNIFICANT CHANGE UP
HIV1 RNA SER-IMP: SIGNIFICANT CHANGE UP
HIV1 RNA SER-IMP: SIGNIFICANT CHANGE UP
HIV1 RNA SERPL NAA+PROBE-ACNC: ABNORMAL
HIV1 RNA SERPL NAA+PROBE-ACNC: ABNORMAL
HIV1 RNA SERPL NAA+PROBE-LOG#: 4.65 — SIGNIFICANT CHANGE UP
HIV1 RNA SERPL NAA+PROBE-LOG#: 4.65 — SIGNIFICANT CHANGE UP

## 2023-11-25 PROCEDURE — 99231 SBSQ HOSP IP/OBS SF/LOW 25: CPT

## 2023-11-25 RX ORDER — QUETIAPINE FUMARATE 200 MG/1
100 TABLET, FILM COATED ORAL DAILY
Refills: 0 | Status: DISCONTINUED | OUTPATIENT
Start: 2023-11-25 | End: 2023-11-30

## 2023-11-25 RX ADMIN — Medication 1 TABLET(S): at 11:11

## 2023-11-25 RX ADMIN — BICTEGRAVIR SODIUM, EMTRICITABINE, AND TENOFOVIR ALAFENAMIDE FUMARATE 1 TABLET(S): 30; 120; 15 TABLET ORAL at 11:11

## 2023-11-25 RX ADMIN — QUETIAPINE FUMARATE 200 MILLIGRAM(S): 200 TABLET, FILM COATED ORAL at 20:49

## 2023-11-25 RX ADMIN — Medication 1 MILLIGRAM(S): at 11:11

## 2023-11-25 RX ADMIN — QUETIAPINE FUMARATE 50 MILLIGRAM(S): 200 TABLET, FILM COATED ORAL at 11:11

## 2023-11-25 NOTE — BH INPATIENT PSYCHIATRY PROGRESS NOTE - CURRENT MEDICATION
MEDICATIONS  (STANDING):  bictegravir 50 mG/emtricitabine 200 mG/tenofovir alafenamide 25 mG (BIKTARVY) 1 Tablet(s) Oral daily  folic acid 1 milliGRAM(s) Oral daily  multivitamin 1 Tablet(s) Oral daily  QUEtiapine 50 milliGRAM(s) Oral daily  QUEtiapine 200 milliGRAM(s) Oral at bedtime    MEDICATIONS  (PRN):  acetaminophen     Tablet .. 650 milliGRAM(s) Oral every 6 hours PRN Temp greater or equal to 38C (100.4F)  aluminum hydroxide/magnesium hydroxide/simethicone Suspension 30 milliLiter(s) Oral every 6 hours PRN Dyspepsia  haloperidol     Tablet 5 milliGRAM(s) Oral every 8 hours PRN agitation  ibuprofen  Tablet. 600 milliGRAM(s) Oral every 8 hours PRN Mild Pain (1 - 3), Moderate Pain (4 - 6)  LORazepam     Tablet 2 milliGRAM(s) Oral every 2 hours PRN Symptom-triggered 2 point increase in CIWA-Ar  LORazepam     Tablet 2 milliGRAM(s) Oral every 1 hour PRN Symptom-triggered: each CIWA -Ar score 8 or GREATER  magnesium hydroxide Suspension 30 milliLiter(s) Oral daily PRN Constipation

## 2023-11-25 NOTE — BH INPATIENT PSYCHIATRY PROGRESS NOTE - NSBHFUPINTERVALHXFT_PSY_A_CORE
no Patient is observed to be sitting calmly watching TV in the day area. He is cooperative with evaluation and agrees to move to a private space. He states that the woman is in the bathroom currently in his room, and she cannot move past the hallway. He describes her as short and white. He states that he feels comfortable outside his room as she is unable to follow him; however, he is able to sleep well at night despite her presence there. He denies any other VH. He initially denies current AH with last AH in the morning, telling him "You should go home and jump out the window." He appears internally preoccupied and later states that he is still hearing this voice, and he has been hearing it all day. He states that it is worse today than yesterday, and there was no improvement after he took his morning dose of Seroquel. He is amenable to increasing his medications. He states that he does briefly consider jumping out the window, but will not do so while on the inpatient unit. He denies any desire to harm himself though it is hard to suppress the voice. He states that he would be able to speak with a staff if he were to want to harm himself. He denies any HI. He reports good appetite. He denies any side effects.

## 2023-11-25 NOTE — BH INPATIENT PSYCHIATRY PROGRESS NOTE - OTHER
extensive tattooing on hands and neck Since Wed pt has been seeing/feeling the "presence of a woman watching him" (currently in the bathroom) and CAH telling him to jump out the window, appears internally preoccupied

## 2023-11-25 NOTE — BH INPATIENT PSYCHIATRY PROGRESS NOTE - NSBHMETABOLIC_PSY_ALL_CORE_FT
BMI: BMI (kg/m2): 22 (11-20-23 @ 23:46)  HbA1c: A1C with Estimated Average Glucose Result: 5.7 % (02-23-23 @ 08:48)    Glucose: POCT Blood Glucose.: 100 mg/dL (07-12-23 @ 12:25)    BP: 117/68 (11-25-23 @ 09:20) (104/69 - 117/68)Vital Signs Last 24 Hrs  T(C): 37.1 (11-25-23 @ 09:20), Max: 37.1 (11-25-23 @ 09:20)  T(F): 98.7 (11-25-23 @ 09:20), Max: 98.7 (11-25-23 @ 09:20)  HR: 88 (11-25-23 @ 09:20) (81 - 88)  BP: 117/68 (11-25-23 @ 09:20) (106/72 - 117/68)  BP(mean): --  RR: --  SpO2: 98% (11-25-23 @ 09:20) (96% - 98%)      Lipid Panel: Date/Time: 11-21-23 @ 05:30  Cholesterol, Serum: 128  LDL Cholesterol Calculated: 65  HDL Cholesterol, Serum: 45  Total Cholesterol/HDL Ration Measurement: --  Triglycerides, Serum: 88
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
## 2023-11-25 NOTE — BH INPATIENT PSYCHIATRY PROGRESS NOTE - NSBHCHARTREVIEWVS_PSY_A_CORE FT
Vital Signs Last 24 Hrs  T(C): 37.1 (11-25-23 @ 09:20), Max: 37.1 (11-25-23 @ 09:20)  T(F): 98.7 (11-25-23 @ 09:20), Max: 98.7 (11-25-23 @ 09:20)  HR: 88 (11-25-23 @ 09:20) (81 - 88)  BP: 117/68 (11-25-23 @ 09:20) (106/72 - 117/68)  BP(mean): --  RR: --  SpO2: 98% (11-25-23 @ 09:20) (96% - 98%)

## 2023-11-26 RX ADMIN — BICTEGRAVIR SODIUM, EMTRICITABINE, AND TENOFOVIR ALAFENAMIDE FUMARATE 1 TABLET(S): 30; 120; 15 TABLET ORAL at 10:22

## 2023-11-26 RX ADMIN — QUETIAPINE FUMARATE 100 MILLIGRAM(S): 200 TABLET, FILM COATED ORAL at 10:21

## 2023-11-26 RX ADMIN — Medication 650 MILLIGRAM(S): at 10:45

## 2023-11-26 RX ADMIN — Medication 1 TABLET(S): at 10:22

## 2023-11-26 RX ADMIN — QUETIAPINE FUMARATE 200 MILLIGRAM(S): 200 TABLET, FILM COATED ORAL at 21:51

## 2023-11-26 RX ADMIN — Medication 1 MILLIGRAM(S): at 10:22

## 2023-11-26 RX ADMIN — Medication 650 MILLIGRAM(S): at 10:22

## 2023-11-27 PROCEDURE — 99232 SBSQ HOSP IP/OBS MODERATE 35: CPT

## 2023-11-27 RX ORDER — QUETIAPINE FUMARATE 200 MG/1
300 TABLET, FILM COATED ORAL AT BEDTIME
Refills: 0 | Status: DISCONTINUED | OUTPATIENT
Start: 2023-11-27 | End: 2023-11-30

## 2023-11-27 RX ADMIN — BICTEGRAVIR SODIUM, EMTRICITABINE, AND TENOFOVIR ALAFENAMIDE FUMARATE 1 TABLET(S): 30; 120; 15 TABLET ORAL at 09:59

## 2023-11-27 RX ADMIN — Medication 1 TABLET(S): at 09:59

## 2023-11-27 RX ADMIN — QUETIAPINE FUMARATE 300 MILLIGRAM(S): 200 TABLET, FILM COATED ORAL at 21:10

## 2023-11-27 RX ADMIN — Medication 1 MILLIGRAM(S): at 09:59

## 2023-11-27 RX ADMIN — QUETIAPINE FUMARATE 100 MILLIGRAM(S): 200 TABLET, FILM COATED ORAL at 09:59

## 2023-11-27 NOTE — BH INPATIENT PSYCHIATRY PROGRESS NOTE - NSBHCHARTREVIEWVS_PSY_A_CORE FT
Vital Signs Last 24 Hrs  T(C): 37.2 (11-27-23 @ 08:27), Max: 37.2 (11-27-23 @ 08:27)  T(F): 98.9 (11-27-23 @ 08:27), Max: 98.9 (11-27-23 @ 08:27)  HR: 87 (11-27-23 @ 08:27) (87 - 87)  BP: 113/75 (11-27-23 @ 08:27) (113/75 - 113/75)  BP(mean): --  RR: 18 (11-27-23 @ 08:27) (18 - 18)  SpO2: 97% (11-27-23 @ 08:27) (97% - 97%)     Vital Signs Last 24 Hrs  T(C): 37.5 (11-27-23 @ 16:18), Max: 37.5 (11-27-23 @ 16:18)  T(F): 99.5 (11-27-23 @ 16:18), Max: 99.5 (11-27-23 @ 16:18)  HR: 92 (11-27-23 @ 16:18) (87 - 92)  BP: 109/73 (11-27-23 @ 16:18) (109/73 - 113/75)  BP(mean): --  RR: 18 (11-27-23 @ 16:18) (18 - 18)  SpO2: 98% (11-27-23 @ 16:18) (97% - 98%)

## 2023-11-27 NOTE — BH INPATIENT PSYCHIATRY PROGRESS NOTE - NSBHMETABOLIC_PSY_ALL_CORE_FT
BMI: BMI (kg/m2): 22 (11-20-23 @ 23:46)  HbA1c: A1C with Estimated Average Glucose Result: 5.7 % (02-23-23 @ 08:48)    Glucose: POCT Blood Glucose.: 100 mg/dL (07-12-23 @ 12:25)    BP: 113/75 (11-27-23 @ 08:27) (106/72 - 129/83)Vital Signs Last 24 Hrs  T(C): 37.2 (11-27-23 @ 08:27), Max: 37.2 (11-27-23 @ 08:27)  T(F): 98.9 (11-27-23 @ 08:27), Max: 98.9 (11-27-23 @ 08:27)  HR: 87 (11-27-23 @ 08:27) (87 - 87)  BP: 113/75 (11-27-23 @ 08:27) (113/75 - 113/75)  BP(mean): --  RR: 18 (11-27-23 @ 08:27) (18 - 18)  SpO2: 97% (11-27-23 @ 08:27) (97% - 97%)      Lipid Panel: Date/Time: 11-21-23 @ 05:30  Cholesterol, Serum: 128  LDL Cholesterol Calculated: 65  HDL Cholesterol, Serum: 45  Total Cholesterol/HDL Ration Measurement: --  Triglycerides, Serum: 88   BMI: BMI (kg/m2): 22 (11-20-23 @ 23:46)  HbA1c: A1C with Estimated Average Glucose Result: 5.7 % (02-23-23 @ 08:48)    Glucose: POCT Blood Glucose.: 100 mg/dL (07-12-23 @ 12:25)    BP: 109/73 (11-27-23 @ 16:18) (106/72 - 129/83)Vital Signs Last 24 Hrs  T(C): 37.5 (11-27-23 @ 16:18), Max: 37.5 (11-27-23 @ 16:18)  T(F): 99.5 (11-27-23 @ 16:18), Max: 99.5 (11-27-23 @ 16:18)  HR: 92 (11-27-23 @ 16:18) (87 - 92)  BP: 109/73 (11-27-23 @ 16:18) (109/73 - 113/75)  BP(mean): --  RR: 18 (11-27-23 @ 16:18) (18 - 18)  SpO2: 98% (11-27-23 @ 16:18) (97% - 98%)      Lipid Panel: Date/Time: 11-21-23 @ 05:30  Cholesterol, Serum: 128  LDL Cholesterol Calculated: 65  HDL Cholesterol, Serum: 45  Total Cholesterol/HDL Ration Measurement: --  Triglycerides, Serum: 88

## 2023-11-27 NOTE — BH INPATIENT PSYCHIATRY PROGRESS NOTE - NSBHFUPINTERVALCCFT_PSY_A_CORE
"grouchy" "grouchy"  on admission risk was MODERATE to HIGH; current risk LOW to MODERATE on mediation;

## 2023-11-27 NOTE — BH INPATIENT PSYCHIATRY PROGRESS NOTE - CURRENT MEDICATION
MEDICATIONS  (STANDING):  bictegravir 50 mG/emtricitabine 200 mG/tenofovir alafenamide 25 mG (BIKTARVY) 1 Tablet(s) Oral daily  folic acid 1 milliGRAM(s) Oral daily  multivitamin 1 Tablet(s) Oral daily  QUEtiapine 100 milliGRAM(s) Oral daily  QUEtiapine 200 milliGRAM(s) Oral at bedtime    MEDICATIONS  (PRN):  acetaminophen     Tablet .. 650 milliGRAM(s) Oral every 6 hours PRN Temp greater or equal to 38C (100.4F)  aluminum hydroxide/magnesium hydroxide/simethicone Suspension 30 milliLiter(s) Oral every 6 hours PRN Dyspepsia  haloperidol     Tablet 5 milliGRAM(s) Oral every 8 hours PRN agitation  ibuprofen  Tablet. 600 milliGRAM(s) Oral every 8 hours PRN Mild Pain (1 - 3), Moderate Pain (4 - 6)  LORazepam     Tablet 2 milliGRAM(s) Oral every 1 hour PRN Symptom-triggered: each CIWA -Ar score 8 or GREATER  LORazepam     Tablet 2 milliGRAM(s) Oral every 2 hours PRN Symptom-triggered 2 point increase in CIWA-Ar  magnesium hydroxide Suspension 30 milliLiter(s) Oral daily PRN Constipation   MEDICATIONS  (STANDING):  bictegravir 50 mG/emtricitabine 200 mG/tenofovir alafenamide 25 mG (BIKTARVY) 1 Tablet(s) Oral daily  folic acid 1 milliGRAM(s) Oral daily  multivitamin 1 Tablet(s) Oral daily  QUEtiapine 300 milliGRAM(s) Oral at bedtime  QUEtiapine 100 milliGRAM(s) Oral daily    MEDICATIONS  (PRN):  acetaminophen     Tablet .. 650 milliGRAM(s) Oral every 6 hours PRN Temp greater or equal to 38C (100.4F)  aluminum hydroxide/magnesium hydroxide/simethicone Suspension 30 milliLiter(s) Oral every 6 hours PRN Dyspepsia  haloperidol     Tablet 5 milliGRAM(s) Oral every 8 hours PRN agitation  ibuprofen  Tablet. 600 milliGRAM(s) Oral every 8 hours PRN Mild Pain (1 - 3), Moderate Pain (4 - 6)  LORazepam     Tablet 2 milliGRAM(s) Oral every 2 hours PRN Symptom-triggered 2 point increase in CIWA-Ar  LORazepam     Tablet 2 milliGRAM(s) Oral every 1 hour PRN Symptom-triggered: each CIWA -Ar score 8 or GREATER  magnesium hydroxide Suspension 30 milliLiter(s) Oral daily PRN Constipation

## 2023-11-27 NOTE — BH INPATIENT PSYCHIATRY PROGRESS NOTE - NSBHATTESTCOMMENTATTENDFT_PSY_A_CORE
;;11/27: no SI or HI;  AH persists but may be lower; mood is improved; patient wants more Seroquel to be raised to 300mg at night 100mg in am.  Alert; oriented; cognition intact; speech clear; no tremor or evidence of movement impairment. i&j fair to poor : aware of medications and acknowledges symptoms but not reflective in a meaningful way  on issues that impact on symptoms.

## 2023-11-27 NOTE — BH INPATIENT PSYCHIATRY PROGRESS NOTE - NSBHATTESTBILLING_PSY_A_CORE
11496-Cwezsukkhl OBS or IP - low complexity OR 25-34 mins 06851-Qdamhksbqd OBS or IP - moderate complexity OR 35-49 mins
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
## 2023-11-27 NOTE — BH INPATIENT PSYCHIATRY PROGRESS NOTE - NSBHFUPINTERVALHXFT_PSY_A_CORE
Upon approach, pt seen standing in his room staring out his window. When asked what he was thinking about pt stated that he was "thinking about a Dr. at [facility] that told me that if I don't get better, I eventually won't come back from my thoughts and will lose my mind." Pt seemed very distressed by this and is afraid of "losing his mind." Pt states that his overall mood is "grouchy" today because he did not sleep last night. Stated that the voice woke him up screaming to "get out of here". The voice has also been telling him that he should leave and hurt people that have wronged him, but he does not want to do this. Pt also states that he woke up about an hour after taking his medications feeling like he "couldn't catch his breath". This made him very anxious to the point that he ripped off his hospital bracelet. Pt states that the 'presence' is still in the bathroom. He finds comfort being out of his room as she does not leave the bathroom.   Pt feels safe on the floor, but does not interact too much with others as he feels he will "say mean things to them," if he interacts with them. Pt thinks that increasing his Seroquel may help with the hallucinations.  Denies current SI/HI. Compliant with medications. Eating well   Labs and MAR reviewed

## 2023-11-27 NOTE — BH INPATIENT PSYCHIATRY PROGRESS NOTE - OTHER
Very focused on his [facility] Since Wed pt has been seeing/feeling the "presence of a woman watching him" (currently in the bathroom) and CAH screaming at him to get out of here, appears internally preoccupied extensive tattooing on hands and neck

## 2023-11-28 PROCEDURE — 99232 SBSQ HOSP IP/OBS MODERATE 35: CPT

## 2023-11-28 RX ADMIN — QUETIAPINE FUMARATE 100 MILLIGRAM(S): 200 TABLET, FILM COATED ORAL at 12:19

## 2023-11-28 RX ADMIN — BICTEGRAVIR SODIUM, EMTRICITABINE, AND TENOFOVIR ALAFENAMIDE FUMARATE 1 TABLET(S): 30; 120; 15 TABLET ORAL at 12:19

## 2023-11-28 RX ADMIN — Medication 1 MILLIGRAM(S): at 12:19

## 2023-11-28 RX ADMIN — Medication 1 TABLET(S): at 12:20

## 2023-11-28 RX ADMIN — QUETIAPINE FUMARATE 300 MILLIGRAM(S): 200 TABLET, FILM COATED ORAL at 21:45

## 2023-11-28 NOTE — BH INPATIENT PSYCHIATRY PROGRESS NOTE - PRN MEDS
MEDICATIONS  (PRN):  acetaminophen     Tablet .. 650 milliGRAM(s) Oral every 6 hours PRN Temp greater or equal to 38C (100.4F)  aluminum hydroxide/magnesium hydroxide/simethicone Suspension 30 milliLiter(s) Oral every 6 hours PRN Dyspepsia  haloperidol     Tablet 5 milliGRAM(s) Oral every 8 hours PRN agitation  ibuprofen  Tablet. 600 milliGRAM(s) Oral every 8 hours PRN Mild Pain (1 - 3), Moderate Pain (4 - 6)  magnesium hydroxide Suspension 30 milliLiter(s) Oral daily PRN Constipation

## 2023-11-28 NOTE — BH INPATIENT PSYCHIATRY DISCHARGE NOTE - NSDCPROCEDURESFT_PSY_ALL_CORE
COVID-19 PCR: NotDete (2023 15:03)  HIV-1 RNA Quantitative, Viral Load (.23 @ 12:19)    HIV-1 Viral Load Result: DET.    HIV-1 RNA Quantitative, Viral Load: 44,411    HIV-1 RNA Quantitative, Vir Load Interp: SeeComment METHOD: Transcription Mediated Amplification (TMA) – [employer].  Results from HIV-1 RNA tests that use other  methods might differ.  Abbreviations:  DET. = Detected,  {not det.} = Not Detected  {n/a} = not available  .    HIV-1 RNA Quantitative, Viral Load Lo.65

## 2023-11-28 NOTE — BH INPATIENT PSYCHIATRY DISCHARGE NOTE - DESCRIPTION
unemployed (reports receiving disability for psychiatric illness); , lives alone in apartment in the [place]
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
## 2023-11-28 NOTE — BH INPATIENT PSYCHIATRY DISCHARGE NOTE - REASON FOR ADMISSION
as per admission note: "CAH (chronic auditory hallucinations)  have increased in intensity of the last 2 days, but decided to seek psychiatric evaluation because the voices became increasingly worse this morning. Patient reports CAH also instruct him to'take stuf' and "lie to someone".

## 2023-11-28 NOTE — BH INPATIENT PSYCHIATRY DISCHARGE NOTE - EXTENDED VTE YES NO FOR MLM ENOXAPARIN
Telephone Encounter by Carolina Dunaway 

## 2023-11-28 NOTE — BH INPATIENT PSYCHIATRY PROGRESS NOTE - NSBHCHARTREVIEWVS_PSY_A_CORE FT
Vital Signs Last 24 Hrs  T(C): 37.6 (11-28-23 @ 09:13), Max: 37.6 (11-28-23 @ 09:13)  T(F): 99.7 (11-28-23 @ 09:13), Max: 99.7 (11-28-23 @ 09:13)  HR: 79 (11-28-23 @ 09:13) (79 - 92)  BP: 127/79 (11-28-23 @ 09:13) (109/73 - 127/79)  BP(mean): --  RR: 18 (11-28-23 @ 09:13) (18 - 18)  SpO2: 97% (11-28-23 @ 09:13) (97% - 98%)

## 2023-11-28 NOTE — BH TREATMENT PLAN - NSTXDEPRESINTERRN_PSY_ALL_CORE
Assess patient for suicidal ideations.  Educate and encourage patient to use coping skills to manage his depression.  Administer medications as ordered.
Assess patient for suicidal ideations.  Educate and encourage patient to use coping skills to manage his depression.  Administer medications as ordered.
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
## 2023-11-28 NOTE — BH INPATIENT PSYCHIATRY DISCHARGE NOTE - NSDCTESTSFT_PSY_ALL_CORE
no pending tests but:  Advise that as per FDA patient receive an  eye examination in six months of Seroquel use.  (Seroquel has been associated with  cataracts in beagle dogs during certification process.)

## 2023-11-28 NOTE — BH INPATIENT PSYCHIATRY DISCHARGE NOTE - NSBHMETABOLIC_PSY_ALL_CORE_FT
BMI: BMI (kg/m2): 22 (11-20-23 @ 23:46)  HbA1c: A1C with Estimated Average Glucose Result: 5.7 % (02-23-23 @ 08:48)    Glucose: POCT Blood Glucose.: 100 mg/dL (07-12-23 @ 12:25)    BP: 127/79 (11-28-23 @ 09:13) (109/73 - 129/83)Vital Signs Last 24 Hrs  T(C): 37.6 (11-28-23 @ 09:13), Max: 37.6 (11-28-23 @ 09:13)  T(F): 99.7 (11-28-23 @ 09:13), Max: 99.7 (11-28-23 @ 09:13)  HR: 79 (11-28-23 @ 09:13) (79 - 92)  BP: 127/79 (11-28-23 @ 09:13) (109/73 - 127/79)  BP(mean): --  RR: 18 (11-28-23 @ 09:13) (18 - 18)  SpO2: 97% (11-28-23 @ 09:13) (97% - 98%)      Lipid Panel: Date/Time: 11-21-23 @ 05:30  Cholesterol, Serum: 128  LDL Cholesterol Calculated: 65  HDL Cholesterol, Serum: 45  Total Cholesterol/HDL Ration Measurement: --  Triglycerides, Serum: 88   BMI: BMI (kg/m2): 22 (11-20-23 @ 23:46)  HbA1c: A1C with Estimated Average Glucose Result: 5.7 % (02-23-23 @ 08:48)      Glucose: POCT Blood Glucose.: 100 mg/dL (07-12-23 @ 12:25)    BP: 127/79 (11-28-23 @ 09:13) (109/73 - 129/83)Vital Signs Last 24 Hrs  T(C): 37.6 (11-28-23 @ 09:13), Max: 37.6 (11-28-23 @ 09:13)  T(F): 99.7 (11-28-23 @ 09:13), Max: 99.7 (11-28-23 @ 09:13)  HR: 79 (11-28-23 @ 09:13) (79 - 92)  BP: 127/79 (11-28-23 @ 09:13) (109/73 - 127/79)  BP(mean): --  RR: 18 (11-28-23 @ 09:13) (18 - 18)  SpO2: 97% (11-28-23 @ 09:13) (97% - 98%)      Lipid Panel: Date/Time: 11-21-23 @ 05:30  Cholesterol, Serum: 128  LDL Cholesterol Calculated: 65  HDL Cholesterol, Serum: 45  Total Cholesterol/HDL Ration Measurement: --  Triglycerides, Serum: 88

## 2023-11-28 NOTE — BH INPATIENT PSYCHIATRY DISCHARGE NOTE - ATTENDING DISCHARGE PHYSICAL EXAMINATION:
;;11/30:  Not endorsing  suicidal or homicidal ideation intent or plans; no mention of auditory or visual hallucinations; little endorsement when asked about AH; "I am ready to go."  Wants to go to AA.  Future oriented; looks forward to eating donuts.  Alert; oriented; cognition intact; speech clear; no tremor or evidence of movement impairment. i&j fair to poor : aware of medications and acknowledges symptoms minimally reflective in a meaningful way  on issues that impact on symptoms.
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
## 2023-11-28 NOTE — BH TREATMENT PLAN - NSTXPSYCHOINTERRN_PSY_ALL_CORE
Provide education to the patient and their family about psychosis, possible triggers, strategies for managing, and any warning signs of relapse.  Assess the patient’s medication needs, provide med reminders, monitor safety, and provide education about side effects.  Help the patient process their experiences, identify triggers and develop coping skills to manage symptoms.

## 2023-11-28 NOTE — BH INPATIENT PSYCHIATRY DISCHARGE NOTE - NSDCMRMEDTOKEN_GEN_ALL_CORE_FT
azithromycin 250 mg oral tablet: 1 tab(s) orally once a day  benzonatate 150 mg oral capsule: 1 cap(s) orally 3 times a day as needed for  cough  bictegravir/emtricitabine/tenofovir 50 mg-200 mg-25 mg oral tablet: 1 tab(s) orally once a day  bictegravir/emtricitabine/tenofovir 50 mg-200 mg-25 mg oral tablet: 1 tab(s) orally once a day x 14 days  Continue to take as prescribed until told otherwise by your provider  escitalopram 5 mg oral tablet: 3 tab(s) orally once a day x 14 days  Continue to take as prescribed until told otherwise by your provider  prazosin 2 mg oral capsule: 1 cap(s) orally once a day (at bedtime) x 14 days  Continue to take as prescribed until told otherwise by your provider  traZODone 100 mg oral tablet: 1 tab(s) orally once a day (at bedtime) x 14 days  Continue to take as prescribed until told otherwise by your provider   bictegravir/emtricitabine/tenofovir 50 mg-200 mg-25 mg oral tablet: 1 tab(s) orally once a day  folic acid 1 mg oral tablet: 1 tab(s) orally once a day  Multiple Vitamins oral tablet: 1 tab(s) orally once a day  QUEtiapine 100 mg oral tablet: 1 tab(s) orally once a day  QUEtiapine 300 mg oral tablet: 1 tab(s) orally once a day (at bedtime)  Seroquel 400 mg oral tablet: 1 tab(s) orally once a day (at bedtime)

## 2023-11-28 NOTE — BH INPATIENT PSYCHIATRY DISCHARGE NOTE - NSDCCPCAREPLAN_GEN_ALL_CORE_FT
PRINCIPAL DISCHARGE DIAGNOSIS  Diagnosis: Substance induced mood disorder  Assessment and Plan of Treatment:       SECONDARY DISCHARGE DIAGNOSES  Diagnosis: HIV disease  Assessment and Plan of Treatment:     Diagnosis: Psychosis  Assessment and Plan of Treatment:

## 2023-11-28 NOTE — BH INPATIENT PSYCHIATRY PROGRESS NOTE - NSBHFUPINTERVALHXFT_PSY_A_CORE
Pt seen with Dr. Oconnell. Upon approach pt was resting in bed. States that he did not have any auditory or visual hallucinations since increasing his Seroquel dose last night (100mg qd, 300mg qhs). Pt states he will try to be compliant with medications after discharge.  Denies current SI/HI. Compliant with medications. Eating well   Labs and MAR reviewed

## 2023-11-28 NOTE — BH INPATIENT PSYCHIATRY PROGRESS NOTE - NSBHMETABOLIC_PSY_ALL_CORE_FT
BMI: BMI (kg/m2): 22 (11-20-23 @ 23:46)  HbA1c: A1C with Estimated Average Glucose Result: 5.7 % (02-23-23 @ 08:48)    Glucose: POCT Blood Glucose.: 100 mg/dL (07-12-23 @ 12:25)    BP: 127/79 (11-28-23 @ 09:13) (109/73 - 129/83)Vital Signs Last 24 Hrs  T(C): 37.6 (11-28-23 @ 09:13), Max: 37.6 (11-28-23 @ 09:13)  T(F): 99.7 (11-28-23 @ 09:13), Max: 99.7 (11-28-23 @ 09:13)  HR: 79 (11-28-23 @ 09:13) (79 - 92)  BP: 127/79 (11-28-23 @ 09:13) (109/73 - 127/79)  BP(mean): --  RR: 18 (11-28-23 @ 09:13) (18 - 18)  SpO2: 97% (11-28-23 @ 09:13) (97% - 98%)      Lipid Panel: Date/Time: 11-21-23 @ 05:30  Cholesterol, Serum: 128  LDL Cholesterol Calculated: 65  HDL Cholesterol, Serum: 45  Total Cholesterol/HDL Ration Measurement: --  Triglycerides, Serum: 88
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
## 2023-11-28 NOTE — BH INPATIENT PSYCHIATRY PROGRESS NOTE - CURRENT MEDICATION
MEDICATIONS  (STANDING):  bictegravir 50 mG/emtricitabine 200 mG/tenofovir alafenamide 25 mG (BIKTARVY) 1 Tablet(s) Oral daily  folic acid 1 milliGRAM(s) Oral daily  multivitamin 1 Tablet(s) Oral daily  QUEtiapine 300 milliGRAM(s) Oral at bedtime  QUEtiapine 100 milliGRAM(s) Oral daily    MEDICATIONS  (PRN):  acetaminophen     Tablet .. 650 milliGRAM(s) Oral every 6 hours PRN Temp greater or equal to 38C (100.4F)  aluminum hydroxide/magnesium hydroxide/simethicone Suspension 30 milliLiter(s) Oral every 6 hours PRN Dyspepsia  haloperidol     Tablet 5 milliGRAM(s) Oral every 8 hours PRN agitation  ibuprofen  Tablet. 600 milliGRAM(s) Oral every 8 hours PRN Mild Pain (1 - 3), Moderate Pain (4 - 6)  magnesium hydroxide Suspension 30 milliLiter(s) Oral daily PRN Constipation   MEDICATIONS  (STANDING):  bictegravir 50 mG/emtricitabine 200 mG/tenofovir alafenamide 25 mG (BIKTARVY) 1 Tablet(s) Oral daily  folic acid 1 milliGRAM(s) Oral daily  multivitamin 1 Tablet(s) Oral daily  QUEtiapine 100 milliGRAM(s) Oral daily  QUEtiapine 300 milliGRAM(s) Oral at bedtime    MEDICATIONS  (PRN):  acetaminophen     Tablet .. 650 milliGRAM(s) Oral every 6 hours PRN Temp greater or equal to 38C (100.4F)  aluminum hydroxide/magnesium hydroxide/simethicone Suspension 30 milliLiter(s) Oral every 6 hours PRN Dyspepsia  haloperidol     Tablet 5 milliGRAM(s) Oral every 8 hours PRN agitation  ibuprofen  Tablet. 600 milliGRAM(s) Oral every 8 hours PRN Mild Pain (1 - 3), Moderate Pain (4 - 6)  magnesium hydroxide Suspension 30 milliLiter(s) Oral daily PRN Constipation
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
## 2023-11-29 RX ADMIN — QUETIAPINE FUMARATE 300 MILLIGRAM(S): 200 TABLET, FILM COATED ORAL at 22:06

## 2023-11-29 RX ADMIN — Medication 1 TABLET(S): at 10:51

## 2023-11-29 RX ADMIN — BICTEGRAVIR SODIUM, EMTRICITABINE, AND TENOFOVIR ALAFENAMIDE FUMARATE 1 TABLET(S): 30; 120; 15 TABLET ORAL at 10:50

## 2023-11-29 RX ADMIN — Medication 1 MILLIGRAM(S): at 10:51

## 2023-11-29 RX ADMIN — QUETIAPINE FUMARATE 100 MILLIGRAM(S): 200 TABLET, FILM COATED ORAL at 10:51
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
## 2023-11-29 NOTE — BH INPATIENT PSYCHIATRY PROGRESS NOTE - GENERAL APPEARANCE
scars on forearm and neck from past self harm/cutting/Other
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
## 2023-11-29 NOTE — BH INPATIENT PSYCHIATRY PROGRESS NOTE - NSTXDEPRESINTERMD_PSY_ALL_CORE
psychoeducation/psychopharmacology 

## 2023-11-29 NOTE — BH INPATIENT PSYCHIATRY PROGRESS NOTE - NSTXDEPRESGOALOTHER_PSY_ALL_CORE
Pt will participate in groups and milieu treatment structure of the unit

## 2023-11-29 NOTE — BH INPATIENT PSYCHIATRY PROGRESS NOTE - NSCGISEVERILLNESS_PSY_ALL_CORE
4 = Moderately ill – overt symptoms causing noticeable, but modest, functional impairment or distress; symptom level may warrant medication
5 = Markedly ill - intrusive symptoms that distinctly impair social/occupational function or cause intrusive levels of distress

## 2023-11-29 NOTE — BH INPATIENT PSYCHIATRY PROGRESS NOTE - NSCGIIMPROVESX_PSY_ALL_CORE
5 = Minimally worse - slightly worse but may not be clinically meaningful; may represent very little change in basic clinical status or functional capacity
2 = Much improved - notably better with signficant reduction of symptoms; increase in the level of functioning but some symptoms remain

## 2023-11-29 NOTE — BH INPATIENT PSYCHIATRY PROGRESS NOTE - NSBHMETABOLIC_PSY_ALL_CORE_FT
BMI: BMI (kg/m2): 22 (11-20-23 @ 23:46)  HbA1c: A1C with Estimated Average Glucose Result: 5.7 % (02-23-23 @ 08:48)    Glucose: POCT Blood Glucose.: 100 mg/dL (07-12-23 @ 12:25)    BP: 127/79 (11-28-23 @ 09:13) (109/73 - 127/79)Vital Signs Last 24 Hrs  T(C): --  T(F): --  HR: --  BP: --  BP(mean): --  RR: --  SpO2: --      Lipid Panel: Date/Time: 11-21-23 @ 05:30  Cholesterol, Serum: 128  LDL Cholesterol Calculated: 65  HDL Cholesterol, Serum: 45  Total Cholesterol/HDL Ration Measurement: --  Triglycerides, Serum: 88

## 2023-11-29 NOTE — BH INPATIENT PSYCHIATRY PROGRESS NOTE - NSTXPSYCHOGOAL_PSY_ALL_CORE
Will identify 2 coping skills that help mitigate hallucinations

## 2023-11-29 NOTE — BH INPATIENT PSYCHIATRY PROGRESS NOTE - CURRENT MEDICATION
MEDICATIONS  (STANDING):  bictegravir 50 mG/emtricitabine 200 mG/tenofovir alafenamide 25 mG (BIKTARVY) 1 Tablet(s) Oral daily  folic acid 1 milliGRAM(s) Oral daily  multivitamin 1 Tablet(s) Oral daily  QUEtiapine 300 milliGRAM(s) Oral at bedtime  QUEtiapine 100 milliGRAM(s) Oral daily    MEDICATIONS  (PRN):  acetaminophen     Tablet .. 650 milliGRAM(s) Oral every 6 hours PRN Temp greater or equal to 38C (100.4F)  aluminum hydroxide/magnesium hydroxide/simethicone Suspension 30 milliLiter(s) Oral every 6 hours PRN Dyspepsia  haloperidol     Tablet 5 milliGRAM(s) Oral every 8 hours PRN agitation  ibuprofen  Tablet. 600 milliGRAM(s) Oral every 8 hours PRN Mild Pain (1 - 3), Moderate Pain (4 - 6)  magnesium hydroxide Suspension 30 milliLiter(s) Oral daily PRN Constipation

## 2023-11-29 NOTE — BH INPATIENT PSYCHIATRY PROGRESS NOTE - NSBHMSEPERCEPT_PSY_A_CORE
Auditory hallucinations/Other
Auditory hallucinations/Visual hallucinations/Other
Auditory hallucinations/Other
Auditory hallucinations/Visual hallucinations/Other
No abnormalities
Auditory hallucinations/Visual hallucinations/Other
Auditory hallucinations

## 2023-11-30 VITALS
RESPIRATION RATE: 18 BRPM | TEMPERATURE: 98 F | DIASTOLIC BLOOD PRESSURE: 78 MMHG | OXYGEN SATURATION: 98 % | SYSTOLIC BLOOD PRESSURE: 121 MMHG | HEART RATE: 101 BPM

## 2023-11-30 PROCEDURE — 99285 EMERGENCY DEPT VISIT HI MDM: CPT

## 2023-11-30 PROCEDURE — 86357 NK CELLS TOTAL COUNT: CPT

## 2023-11-30 PROCEDURE — 80061 LIPID PANEL: CPT

## 2023-11-30 PROCEDURE — 99238 HOSP IP/OBS DSCHRG MGMT 30/<: CPT

## 2023-11-30 PROCEDURE — G0378: CPT

## 2023-11-30 PROCEDURE — 80053 COMPREHEN METABOLIC PANEL: CPT

## 2023-11-30 PROCEDURE — 86355 B CELLS TOTAL COUNT: CPT

## 2023-11-30 PROCEDURE — 85025 COMPLETE CBC W/AUTO DIFF WBC: CPT

## 2023-11-30 PROCEDURE — 87536 HIV-1 QUANT&REVRSE TRNSCRPJ: CPT

## 2023-11-30 PROCEDURE — 86359 T CELLS TOTAL COUNT: CPT

## 2023-11-30 PROCEDURE — 87637 SARSCOV2&INF A&B&RSV AMP PRB: CPT

## 2023-11-30 PROCEDURE — 36415 COLL VENOUS BLD VENIPUNCTURE: CPT

## 2023-11-30 PROCEDURE — 80307 DRUG TEST PRSMV CHEM ANLYZR: CPT

## 2023-11-30 PROCEDURE — 86360 T CELL ABSOLUTE COUNT/RATIO: CPT

## 2023-11-30 RX ORDER — BICTEGRAVIR SODIUM, EMTRICITABINE, AND TENOFOVIR ALAFENAMIDE FUMARATE 30; 120; 15 MG/1; MG/1; MG/1
1 TABLET ORAL
Qty: 30 | Refills: 0
Start: 2023-11-30 | End: 2023-12-29

## 2023-11-30 RX ORDER — QUETIAPINE FUMARATE 200 MG/1
1 TABLET, FILM COATED ORAL
Qty: 30 | Refills: 0
Start: 2023-11-30 | End: 2023-12-29

## 2023-11-30 RX ORDER — QUETIAPINE FUMARATE 200 MG/1
1 TABLET, FILM COATED ORAL
Qty: 0 | Refills: 0 | DISCHARGE
Start: 2023-11-30

## 2023-11-30 RX ORDER — FOLIC ACID 0.8 MG
1 TABLET ORAL
Qty: 30 | Refills: 0
Start: 2023-11-30 | End: 2023-12-29

## 2023-11-30 RX ADMIN — BICTEGRAVIR SODIUM, EMTRICITABINE, AND TENOFOVIR ALAFENAMIDE FUMARATE 1 TABLET(S): 30; 120; 15 TABLET ORAL at 10:13

## 2023-11-30 RX ADMIN — Medication 1 MILLIGRAM(S): at 10:13

## 2023-11-30 RX ADMIN — QUETIAPINE FUMARATE 100 MILLIGRAM(S): 200 TABLET, FILM COATED ORAL at 10:14

## 2023-11-30 RX ADMIN — Medication 1 TABLET(S): at 10:13

## 2023-11-30 NOTE — BH DISCHARGE NOTE NURSING/SOCIAL WORK/PSYCH REHAB - NSTOBACCOREFERRAL_GEN_A_NCS
Call [facility] Smokers' Quitline at [phone] ([phone]) or visit [email or web address]./Yes

## 2023-11-30 NOTE — BH DISCHARGE NOTE NURSING/SOCIAL WORK/PSYCH REHAB - NSDCVIVACCINE_GEN_ALL_CORE_FT
Tdap; 31-Jul-2018 20:20; Servando Drew (RN); [employer]; [id number]; IntraMuscular; Deltoid Right.; 0.5 milliLiter(s); VIS (VIS Published: 09-May-2013, VIS Presented: 31-Jul-2018);   Tdap; 21-Jun-2023 15:03; Leydi Wright); [employer]; [id number] (Exp. Date: 08-May-2025); IntraMuscular; Deltoid Left.; 0.5 milliLiter(s); VIS (VIS Published: 09-May-2013, VIS Presented: 21-Jun-2023);

## 2023-11-30 NOTE — BH DISCHARGE NOTE NURSING/SOCIAL WORK/PSYCH REHAB - NSDCPRGOAL_PSY_ALL_CORE
Pt attended select groups during admission.  Pt has demonstrated moderate range of affect, often appearing flat.  Pt has generally been pleasant on approach but has often kept to himself and has not been very interactive with staff or peers.  Pt has endorsed readiness for discharge and completed a safety plan.

## 2023-11-30 NOTE — BH DISCHARGE NOTE NURSING/SOCIAL WORK/PSYCH REHAB - PATIENT PORTAL LINK FT
You can access the FollowMyHealth Patient Portal offered by [facility] by registering at the following website: [email or web address]. By joining [facility]’s FollowMyHealth portal, you will also be able to view your health information using other applications (apps) compatible with our system.

## 2023-11-30 NOTE — BH DISCHARGE NOTE NURSING/SOCIAL WORK/PSYCH REHAB - NSCDUDCCRISIS_PSY_A_CORE
Please of the to [facility] that I recommend. They are listed below. I will send him a MyChart with their names and addresses as well.     Dr. Lokesh Beaver  [id number], [address] [facility]  Phone: [phone]    Dr. Iván Orozco  [address], [place] [address]  [phone] [facility] Well  1 [phone]) [facility] ([phone])  Text "WELL" to 75[address]  Website: [email or web address]/.National Suicide Prevention Lifeline 7 (352) 116-4358/.  [employer]  1 (232) LIFENET (773-7297)/988 Suicide and Crisis Lifeline

## 2023-12-01 NOTE — BH SOCIAL WORK CONFIRMATION FOLLOW UP NOTE - NSCOMMENTS_PSY_ALL_CORE
Caring Call: Chart reviewed on 12/1/23. This SW called the pt ([phone]) 3x on 12/1/23, but the call rang several times before disconnecting on each occasion. This SW verified the pt's phone # with the pt prior to his hospital discharge. SW to remain available.     Linkage Call: To be completed after pt's scheduled appointment:     [facility]  04-Dec-2023 09:30  [address] [id number] [phone]  Mental Health Treatment  Substance Use Disorder Treatment   Caring Call: Chart reviewed on 12/1/23. This SW called the pt ([phone]) 3x on 12/1/23, but the call rang several times before disconnecting on each occasion. This SW verified the pt's phone # with the pt prior to his hospital discharge. SW to remain available.     Linkage Call: To be completed after pt's scheduled appointment:     [facility]  04-Dec-2023 09:30  [address] [facility] [id number] [phone]  Mental Health Treatment  Substance Use Disorder Treatment   Caring Call: Chart reviewed on 12/1/23. This SW called the pt ([phone]) 3x on 12/1/23, but the call rang several times before disconnecting on each occasion. This SW verified the pt's phone # with the pt prior to his hospital discharge. SW to remain available.     Linkage Call: To be completed after pt's scheduled appointment:     [facility]  04-Dec-2023 09:30  [address] [facility] [id number] [phone]  Mental Health Treatment  Substance Use Disorder Treatment   Caring Call: Chart reviewed on 12/1/23. This SW called the pt ([phone]) 3x on 12/1/23, but the call rang several times before disconnecting on each occasion. This SW verified the pt's phone # with the pt prior to his hospital discharge. SW to remain available.     This SW received a call from [facility] BONI Gill ([phone]) on 12/4/23 stating the pt was currently at their [facility].     Linkage Call: Chart reviewed on 12/4/23. As pt was at [facility] on day of his [facility] outpatient appointment, he did not attend.     [facility]  04-Dec-2023 09:30  [address] [facility], [place] [address]  [phone]  Mental Health Treatment  Substance Use Disorder Treatment   Caring Call: Chart reviewed on 12/1/23. This SW called the pt ([phone]) 3x on 12/1/23, but the call rang several times before disconnecting on each occasion. This SW verified the pt's phone # with the pt prior to his hospital discharge. SW to remain available.     This SW received a call from [facility] BONI Gill ([phone]) on 12/4/23 stating the pt was currently at their [facility].     Linkage Call: Chart reviewed on 12/4/23. As pt was at [facility] on day of his [facility] outpatient appointment, he did not attend.     [facility]  04-Dec-2023 09:30  [address] [facility], [place] [address]  [phone]  Mental Health Treatment  Substance Use Disorder Treatment   Caring Call: Chart reviewed on 12/1/23. This SW called the pt ([phone]) 3x on 12/1/23, but the call rang several times before disconnecting on each occasion. This SW verified the pt's phone # with the pt prior to his hospital discharge. SW to remain available.     This SW received a call from [facility] BONI Gill ([phone]) on 12/4/23 stating the pt was currently at their [facility].     Linkage Call: Chart reviewed on 12/4/23. As pt was at [facility] on day of his [facility] outpatient appointment, he did not attend.     [facility]  04-Dec-2023 09:30  [address] [facility], [place] [address]  [phone]  Mental Health Treatment  Substance Use Disorder Treatment

## 2023-12-05 DIAGNOSIS — Z88.0 ALLERGY STATUS TO PENICILLIN: ICD-10-CM

## 2023-12-05 DIAGNOSIS — F25.9 SCHIZOAFFECTIVE DISORDER, UNSPECIFIED: ICD-10-CM

## 2023-12-05 DIAGNOSIS — F19.94 OTHER PSYCHOACTIVE SUBSTANCE USE, UNSPECIFIED WITH PSYCHOACTIVE SUBSTANCE-INDUCED MOOD DISORDER: ICD-10-CM

## 2023-12-05 DIAGNOSIS — F10.10 ALCOHOL ABUSE, UNCOMPLICATED: ICD-10-CM

## 2023-12-05 DIAGNOSIS — B20 HUMAN IMMUNODEFICIENCY VIRUS [HIV] DISEASE: ICD-10-CM

## 2023-12-05 DIAGNOSIS — R45.851 SUICIDAL IDEATIONS: ICD-10-CM

## 2023-12-05 DIAGNOSIS — F14.10 COCAINE ABUSE, UNCOMPLICATED: ICD-10-CM

## 2023-12-05 DIAGNOSIS — Z86.16 PERSONAL HISTORY OF COVID-19: ICD-10-CM

## 2023-12-27 NOTE — PROGRESS NOTE BEHAVIORAL HEALTH - NS ED BHA AXIS I SECONDARY1 CODE FT
F17.200 Quality 130: Documentation Of Current Medications In The Medical Record: Current Medications with Name, Dosage, Frequency, or Route not Documented, Reason not Given Detail Level: Detailed Quality 431: Preventive Care And Screening: Unhealthy Alcohol Use - Screening: Patient not identified as an unhealthy alcohol user when screened for unhealthy alcohol use using a systematic screening method Quality 110: Preventive Care And Screening: Influenza Immunization: Influenza immunization was not ordered or administered, reason not given Quality 154 Part A: Falls: Risk Assessment (Should Be Reported With Measure 155.): Falls risk assessment completed and documented in the past 12 months. Quality 226: Preventive Care And Screening: Tobacco Use: Screening And Cessation Intervention: Patient screened for tobacco use and is an ex/non-smoker Quality 47: Advance Care Plan: Advance Care Planning discussed and documented; advance care plan or surrogate decision maker documented in the medical record. Quality 111:Pneumonia Vaccination Status For Older Adults: Patient received any pneumococcal conjugate or polysaccharide vaccine on or after their 60th birthday and before the end of the measurement period Quality 154 Part B: Falls: Risk Screening (Should Be Reported With Measure 155.): Patient screened for future fall risk; documentation of no falls in the past year or only one fall without injury in the past year

## 2023-12-28 NOTE — ED PROVIDER NOTE - INTERNATIONAL TRAVEL
12/28/23      Leighton Bland  [address] [facility] [address]  1965      To whom it may concern,     Leighton Bland had a physical on 12/28/23 and was given a flu shot.     Please contact my office with any questions or concerns.     Sincerely,         Khari Price, [facility]  [address] [place] [address]  Phone: [phone]  Fax: [phone]  
No

## 2024-01-03 NOTE — BH PATIENT PROFILE - NSTOBACCOCESSATIONEDU4_GEN_A_NUR
Pt. with limited insight to deficits and decreased safety awareness/impaired/at risk behaviors demonstrated Smoking even a single puff increases the likelihood of a full relapse, withdrawal symptoms peak within 1-2 weeks, but can persist for months

## 2024-01-09 NOTE — ED ADULT NURSE NOTE - TEMPLATE LIST FOR HEAD TO TOE ASSESSMENT
Referring Provider:  Nam Oates MD    Additional Provider(s):  Colleen Weiler, DO    Reason for Referral:  Kita Reece was referred for genetic counseling because of a family history of cancer. Ms. Reece is a 44 year-old woman of Black descent who has no personal history of cancer. Her ovaries are intact. Ms. Reece's last mammogram was on 23. Ms. Reece has not had a colonoscopy.    Social History:  Ms. Reece was seen today with by herself. Ms. Reece lives in [place].     Family History:   A three generation pedigree was obtained.      Ms. Reece has two sons, ages eight and 11.     Ms. Reece one sister and one biological brother, neither of whom have had cancer. Ms. Reece's sister had a MICHAEL/BSO in her 30s because of cysts and fibroids.       Ms. Reece's mother is 65 years-old and has not had cancer.  Ms. Reece's mother had two brothers and five sisters. One of Ms. Reece's maternal aunts recently  in her late 60s or 70s from an unknown cancer. Ms. Reece's maternal grandmother  in her 80s and did not have cancer. Ms. Reece's maternal grandfather  in his 60s and did not have any known history of cancer.     Ms. Reece's father is 68 years-old and has not had cancer. Ms. Reece's father has one brother and four sisters. Ms. Reece's paternal grandmother had ovarian cancer at an unspecified older age; she  in her mid-80s from renal disease. Ms. Reece's paternal grandfather  in his 80s and did not have cancer.     Please see the pedigree for additional family history information.     Counseling:   The following information was discussed with Ms. Reece.    Genetics of Breast and Ovarian Cancer:  In the [place], approximately 1 in 8 women will develop breast cancer and 1 in 70-75 women will develop ovarian cancer.  Although the majority of breast and ovarian cancer cases are sporadic, approximately 5-10% of women with breast cancer and 20-24% of  women with ovarian cancer have a hereditary cancer syndrome.  Signs of a hereditary cancer syndrome include some rare cancers, common cancers occurring at unusually young ages, multiple primary cancers in the same individual, or the same type of cancer or related cancers (e.g., breast and ovarian, colorectal and endometrial) in three or more individuals in the same lineage.  Mutations in the genes, BRCA1 and BRCA2, account for the majority of hereditary breast and ovarian cancer families.  Mutations in genes other than BRCA1/2, many of which now have medical management recommendations (e.g., FE, CHEK2, PALB2) are identified in 3-10% of individuals tested using a multigene panel.      Risk Assessment:   Ms. Reece meets NCCN Guidelines testing criteria for ovarian cancer susceptibility genes. I recommend that testing be performed as part of a multigene panel.     Genetic Testing (Panel):  The pros, cons, and limitations of genetic testing were discussed including the potential implications of test results on clinical management.     If a pathogenic variant is not identified (negative result), it is still possible that Ms. Reece has a pathogenic variant in one of these genes that was not detected by the genetic test, or that the family is dealing with a hereditary cancer syndrome involving a different gene. It is also possible that Ms. Reece's relatives have a pathogenic variant in one of these genes that Ms. Reece did not inherit. In this scenario, options for cancer screening/management should be determined according to personal and family histories and should be discussed with a physician.      A variant of uncertain significance is a DNA change that may or may not alter the function of the gene; therefore, it is usually not possible to determine if the gene variant is responsible for an individual's increased cancer risk.     If Ms. Reece is found to carry a pathogenic variant in a cancer predisposition  gene, she is at significantly increased risk for various cancers. The magnitude of these risks, and the cancers for which she is at increased risk would depend on the gene involved. Medical recommendations for individuals with BRCA1/2 pathogenic variants were reviewed as an example. It was also explained that for some of the genes for which testing is available, the associated cancer risks have yet to be determined and medical management recommendations may not yet be available for individuals with pathogenic variants in these genes. If she were to test positive for a pathogenic variant, her children and siblings would each have a 50% chance of carrying the same variant. At-risk adults (>18) would have the option of pursuing targeted genetic testing to clarify their cancer risks. Genetic test results have implications for the entire biological family. Thus, it is recommended that she share her genetic test results with her biological family members so that they may have their risk assessed.     Genetic Information Non-Discrimination Act:  The legal protections of the Genetic Information Nondiscrimination Act (ALVAREZ) for health insurance and employment were discussed.  ALVAREZ does not provide protection for life insurance, disability or long-term care insurance.    Summary and Plan:  Ms. Reece was referred for genetic counseling because of family history of cancer. Her reported family history is somewhat suspicious for a hereditary cancer syndrome. Genetic testing on Ms. Reece for BRCA1/2 pathogenic variants as part of a multigene panel is indicated.      At the conclusion of the counseling session Ms. Reece decided to proceed with genetic testing. Written consent was obtained.      Blood and paperwork were sent to [facility] for their Breast/Gynecological Cancers Guidelines panel. I anticipate that Ms. Reece's results will be available within 2-3 weeks and will call her with the results.  Results will also be  communicated to Dr. Oates and Dr. Weiler.    Approximately 40 minutes was spent in consultation with Ms. Reece.       Psych/Behavioral

## 2024-01-15 NOTE — BH INPATIENT PSYCHIATRY DISCHARGE NOTE - NSDCHC_MEDRECLITE_GEN_ALL_CORE
[FreeTextEntry1] : 76-year-old female admitted to [facility] from December 20 1 December 28 2023.  Patient was admitted for ESBL Proteus bacteremia/sepsis and aspiration pneumonia.  She had cultures growing from the blood and sputum with ESBL Proteus.  Repeat blood cultures on December 24 with no growth.  Patient has CT scan chest reported multifocal pneumonia.  Patient completed course of ertapenem with Benadryl which she completed on January 4, 2024 at the nursing facility she is at.  She follows with pulmonary for her tracheomalacia and COPD.  She reports no fever no chills.  She denies any shortness of breath.  She is still coughing and producing sputum.
Click to Modify Medication Indication on Note Save

## 2024-01-19 NOTE — BH INPATIENT PSYCHIATRY PROGRESS NOTE - NSTXCOPEGOAL_PSY_ALL_CORE
Identify and utilize 2 coping skills that meet their needs
Pt informed will fax now  
Identify and utilize 2 coping skills that meet their needs

## 2024-01-28 ENCOUNTER — EMERGENCY (EMERGENCY)
Facility: HOSPITAL | Age: 39
LOS: 1 days | Discharge: HOME | End: 2024-01-28
Admitting: EMERGENCY MEDICINE
Payer: COMMERCIAL

## 2024-01-28 VITALS
RESPIRATION RATE: 16 BRPM | WEIGHT: 149.91 LBS | HEART RATE: 100 BPM | DIASTOLIC BLOOD PRESSURE: 74 MMHG | SYSTOLIC BLOOD PRESSURE: 117 MMHG | HEIGHT: 66 IN | TEMPERATURE: 98 F | OXYGEN SATURATION: 98 %

## 2024-01-28 DIAGNOSIS — Z21 ASYMPTOMATIC HUMAN IMMUNODEFICIENCY VIRUS [HIV] INFECTION STATUS: ICD-10-CM

## 2024-01-28 DIAGNOSIS — Z76.0 ENCOUNTER FOR ISSUE OF REPEAT PRESCRIPTION: ICD-10-CM

## 2024-01-28 DIAGNOSIS — R10.9 UNSPECIFIED ABDOMINAL PAIN: ICD-10-CM

## 2024-01-28 DIAGNOSIS — Z88.0 ALLERGY STATUS TO PENICILLIN: ICD-10-CM

## 2024-01-28 PROCEDURE — 99283 EMERGENCY DEPT VISIT LOW MDM: CPT

## 2024-01-28 RX ORDER — BICTEGRAVIR SODIUM, EMTRICITABINE, AND TENOFOVIR ALAFENAMIDE FUMARATE 30; 120; 15 MG/1; MG/1; MG/1
1 TABLET ORAL
Qty: 30 | Refills: 0
Start: 2024-01-28 | End: 2024-02-26

## 2024-01-28 NOTE — ED PROVIDER NOTE - PHYSICAL EXAMINATION
CONSTITUTIONAL: Well-appearing; well-nourished; in no apparent distress.   	HEAD: Normocephalic; atraumatic.   	EYES:  conjunctiva and sclera clear  	ENT: normal nose; no rhinorrhea; normal pharynx with no erythema or lesions.   	NECK: Supple; non-tender;   	CARDIOVASCULAR: Normal S1, S2; no murmurs, rubs, or gallops. Regular rate and rhythm.   	RESPIRATORY: Breathing easily; breath sounds clear and equal bilaterally; no wheezes, rhonchi, or rales.  	GI: Soft; non-distended; non-tender  	EXT: SALDANA x 4. normal gait.   	SKIN: Normal for age and race; warm; dry; good turgor; no apparent lesions or rash.   	NEURO: A & O x 3; face symmetric; grossly unremarkable.   PSYCHOLOGICAL: The patient’s mood and manner are appropriate.

## 2024-01-28 NOTE — ED PROVIDER NOTE - NSFOLLOWUPINSTRUCTIONS_ED_ALL_ED_FT
Follow up with your primary care doctor or clinics listed below if you do not have a doctor  [address] [address]  To make an appointment, call [phone]  [facility]  Address: [address] [place] [id number]  Appointment Center: [phone] ([phone])    Return for any concerns.

## 2024-01-28 NOTE — ED PROVIDER NOTE - PATIENT PORTAL LINK FT
You can access the FollowMyHealth Patient Portal offered by Health system by registering at the following website: [email or web address]. By joining [employer]s FollowMyHealth portal, you will also be able to view your health information using other applications (apps) compatible with our system.

## 2024-01-28 NOTE — ED PROVIDER NOTE - OBJECTIVE STATEMENT
39 yo male with hx HIV here requesting med refill of biktarvy, reports he ran out 2 days ago, has followup with HIV clinic. also had some abdominal discomfort earlier, now resolved. eating chocolate candy now. no vomiting/diarrhea/fever or chills.

## 2024-01-28 NOTE — ED PROVIDER NOTE - CLINICAL SUMMARY MEDICAL DECISION MAKING FREE TEXT BOX
39 yo male with hx HIV here requesting med refill of biktarvy, reports he ran out 2 days ago, has followup with HIV clinic. also had some abdominal discomfort earlier, now resolved. eating chocolate candy now.  med refill sent to pharmacy, advised f/u PMD. abdomen soft nontender nondistended, tolerating po. advised f/u PMD.

## 2024-02-06 NOTE — H&P ADULT - ASSESSMENT
February 6, 2024         Patient: Shabnam George   YOB: 1979       To Whom It May Concern,    Shabnam George is a patient of the [facility] Neurosurgery clinic last seen 2/6/24. Due to ongoing symptoms patient will remain off work to continue physical therapy. Weight lifting restriction will be increased to 20 lbs and then as tolerated thereafter. She will be re-evaluated and cleared for work once she is able to lift 35 lbs with minimal pain. If you have any questions or concerns please feel free to contact me         Sincerely,         Terrie Baliey, CNP        CC: No Recipients                                         36y/o male Pt states of how for the past 2-3 weeks his depressive mood has been worsening w/ insomnia, anhedonia, appetite loss, socially isolating, poor ADLs and staying on streets instead of being home. Patient reported ongoing SI - reported that about 1-2 week ago superficially lacerated the right side of his neck.

## 2024-02-07 NOTE — BH INPATIENT PSYCHIATRY DISCHARGE NOTE - NSDCCAREPROVSEEN_GEN_ALL_CORE_FT
[FreeTextEntry1] : Echocardiogram to eval LV function and rule out pericardial disease. Advised maintain good oral hydration. Arise slowly. Consider compression stockings if symptoms worsen/more frequent. Reassurance. 
Yumi Whatley

## 2024-02-21 NOTE — PROGRESS NOTE BEHAVIORAL HEALTH - NSBHADMITIPDSM_PSY_A_CORE
Vladimir Ayers MD;  Location: [facility];  Service: Endoscopy;  Laterality: N/A;    COLONOSCOPY N/A 03/08/2023    Procedure: COLONOSCOPY Xarelto clear in conversation from Dr. Herbert;  Surgeon: Darby Wong MD;  Location: [facility];  Barry Vazquez MD;  Location: [facility];  Service: General;  Laterality: N/A;    ROBOT-ASSISTED LAPAROSCOPIC RESECTION OF SIGMOID COLON USING DA [place] XI N/A 2/20/2024    Procedure: XI ROBOTIC COLECTOMY, SIGMOID;  Surgeon: Barry Vazquez MD;  Location: [facility] Barry Vazquez MD;  Location: [facility];  see above for Axis I, II, III

## 2024-02-27 NOTE — BH INPATIENT PSYCHIATRY PROGRESS NOTE - NSTXSUBMISPROGRES_PSY_ALL_CORE
Improving
27-Feb-2024 10:09
Improving
No Change
No Change
Improving

## 2024-03-16 ENCOUNTER — HOSPITAL ENCOUNTER (INPATIENT)
Dept: HOSPITAL 74 - YASAS | Age: 39
LOS: 10 days | Discharge: HOME | DRG: 772 | End: 2024-03-26
Attending: PSYCHIATRY & NEUROLOGY | Admitting: ALLERGY & IMMUNOLOGY
Payer: COMMERCIAL

## 2024-03-16 VITALS — BODY MASS INDEX: 22.8 KG/M2

## 2024-03-16 DIAGNOSIS — F14.20: ICD-10-CM

## 2024-03-16 DIAGNOSIS — F31.9: ICD-10-CM

## 2024-03-16 DIAGNOSIS — F10.20: Primary | ICD-10-CM

## 2024-03-16 DIAGNOSIS — Z88.0: ICD-10-CM

## 2024-03-16 DIAGNOSIS — F25.9: ICD-10-CM

## 2024-03-16 DIAGNOSIS — F39: ICD-10-CM

## 2024-03-16 DIAGNOSIS — B20: ICD-10-CM

## 2024-03-16 DIAGNOSIS — F19.24: ICD-10-CM

## 2024-03-16 DIAGNOSIS — F19.282: ICD-10-CM

## 2024-03-16 DIAGNOSIS — Z79.899: ICD-10-CM

## 2024-03-16 DIAGNOSIS — F17.210: ICD-10-CM

## 2024-03-16 PROCEDURE — HZ42ZZZ GROUP COUNSELING FOR SUBSTANCE ABUSE TREATMENT, COGNITIVE-BEHAVIORAL: ICD-10-PCS | Performed by: PSYCHIATRY & NEUROLOGY

## 2024-03-16 RX ADMIN — Medication SCH: at 22:29

## 2024-03-16 RX ADMIN — Medication SCH: at 22:28

## 2024-03-17 LAB
ALBUMIN SERPL-MCNC: 2.8 G/DL (ref 3.4–5)
ALP SERPL-CCNC: 87 U/L (ref 45–117)
ALT SERPL-CCNC: 29 U/L (ref 13–61)
ANION GAP SERPL CALC-SCNC: 7 MMOL/L (ref 4–13)
AST SERPL-CCNC: 23 U/L (ref 15–37)
BILIRUB SERPL-MCNC: 0.5 MG/DL (ref 0.2–1)
BUN SERPL-MCNC: 13.1 MG/DL (ref 7–18)
CALCIUM SERPL-MCNC: 8.4 MG/DL (ref 8.5–10.1)
CHLORIDE SERPL-SCNC: 110 MMOL/L (ref 98–107)
CO2 SERPL-SCNC: 26 MMOL/L (ref 21–32)
CREAT SERPL-MCNC: 0.8 MG/DL (ref 0.55–1.3)
DEPRECATED RDW RBC AUTO: 14.9 % (ref 11.9–15.9)
GLUCOSE SERPL-MCNC: 147 MG/DL (ref 74–106)
HCT VFR BLD CALC: 38.6 % (ref 35.4–49)
HGB BLD-MCNC: 12.6 GM/DL (ref 11.7–16.9)
MCH RBC QN AUTO: 29.2 PG (ref 25.7–33.7)
MCHC RBC AUTO-ENTMCNC: 32.7 G/DL (ref 32–35.9)
MCV RBC: 89.4 FL (ref 80–96)
PLATELET # BLD AUTO: 328 10^3/UL (ref 134–434)
PMV BLD: 8.6 FL (ref 7.5–11.1)
POTASSIUM SERPLBLD-SCNC: 3.7 MMOL/L (ref 3.5–5.1)
PROT SERPL-MCNC: 7.1 G/DL (ref 6.4–8.2)
RBC # BLD AUTO: 4.32 M/MM3 (ref 4–5.6)
SODIUM SERPL-SCNC: 142 MMOL/L (ref 136–145)
WBC # BLD AUTO: 5 K/MM3 (ref 4–10)

## 2024-03-17 RX ADMIN — Medication SCH TAB: at 10:42

## 2024-03-18 RX ADMIN — HYDROXYZINE PAMOATE PRN MG: 25 CAPSULE ORAL at 11:37

## 2024-03-19 RX ADMIN — BICTEGRAVIR SODIUM, EMTRICITABINE, AND TENOFOVIR ALAFENAMIDE FUMARATE SCH EACH: 50; 200; 25 TABLET ORAL at 09:02

## 2024-03-19 RX ADMIN — QUETIAPINE FUMARATE SCH MG: 100 TABLET ORAL at 21:33

## 2024-03-20 LAB
APPEARANCE UR: CLEAR
BILIRUB UR STRIP.AUTO-MCNC: NEGATIVE MG/DL
COLOR UR: (no result)
KETONES UR QL STRIP: NEGATIVE
LEUKOCYTE ESTERASE UR QL STRIP.AUTO: NEGATIVE
NITRITE UR QL STRIP: NEGATIVE
PH UR: 7.5 [PH] (ref 5–8)
PROT UR QL STRIP: NEGATIVE
PROT UR QL STRIP: NEGATIVE
SP GR UR: 1.02 (ref 1.01–1.03)
UROBILINOGEN UR STRIP-MCNC: 0.2 MG/DL (ref 0.2–1)

## 2024-03-20 RX ADMIN — BUPRENORPHINE HYDROCHLORIDE ONE: 150 FILM, SOLUBLE BUCCAL at 14:49

## 2024-03-20 RX ADMIN — BACLOFEN SCH MG: 10 TABLET ORAL at 15:00

## 2024-03-23 RX ADMIN — IBUPROFEN PRN MG: 600 TABLET, FILM COATED ORAL at 08:54

## 2024-03-24 VITALS — RESPIRATION RATE: 18 BRPM

## 2024-03-26 VITALS — TEMPERATURE: 97.1 F

## 2024-03-26 VITALS — SYSTOLIC BLOOD PRESSURE: 117 MMHG | DIASTOLIC BLOOD PRESSURE: 76 MMHG | HEART RATE: 92 BPM

## 2024-04-08 NOTE — ED BEHAVIORAL HEALTH ASSESSMENT NOTE - NS ED BHA ED COURSE FOUR POINT RESTRAINTS IN ED YN
[Routine Follow-Up] : a routine follow-up visit for [Asthma/RAD] : asthma/RAD [GERD] : GERD [Rhinitis] : rhinitis [Patient] : patient [Mother] : mother [FreeTextEntry3] : epistaxis; history of prematurity; chronic lung disease No

## 2024-04-09 ENCOUNTER — INPATIENT (INPATIENT)
Facility: HOSPITAL | Age: 39
LOS: 7 days | Discharge: HOME | DRG: 885 | End: 2024-04-17
Attending: PSYCHIATRY & NEUROLOGY | Admitting: PSYCHIATRY & NEUROLOGY
Payer: MEDICAID

## 2024-04-09 VITALS
DIASTOLIC BLOOD PRESSURE: 80 MMHG | OXYGEN SATURATION: 98 % | HEART RATE: 76 BPM | TEMPERATURE: 98 F | SYSTOLIC BLOOD PRESSURE: 132 MMHG | RESPIRATION RATE: 16 BRPM

## 2024-04-09 DIAGNOSIS — F33.3 MAJOR DEPRESSIVE DISORDER, RECURRENT, SEVERE WITH PSYCHOTIC SYMPTOMS: ICD-10-CM

## 2024-04-09 LAB
ALBUMIN SERPL ELPH-MCNC: 3.4 G/DL — SIGNIFICANT CHANGE UP (ref 3.4–5)
ALP SERPL-CCNC: 99 U/L — SIGNIFICANT CHANGE UP (ref 40–120)
ALT FLD-CCNC: 21 U/L — SIGNIFICANT CHANGE UP (ref 12–42)
AMPHET UR-MCNC: POSITIVE
ANION GAP SERPL CALC-SCNC: 9 MMOL/L — SIGNIFICANT CHANGE UP (ref 9–16)
APPEARANCE UR: CLEAR — SIGNIFICANT CHANGE UP
AST SERPL-CCNC: 26 U/L — SIGNIFICANT CHANGE UP (ref 15–37)
BACTERIA # UR AUTO: ABNORMAL /HPF
BARBITURATES UR SCN-MCNC: NEGATIVE — SIGNIFICANT CHANGE UP
BENZODIAZ UR-MCNC: NEGATIVE — SIGNIFICANT CHANGE UP
BILIRUB SERPL-MCNC: 0.3 MG/DL — SIGNIFICANT CHANGE UP (ref 0.2–1.2)
BILIRUB UR-MCNC: NEGATIVE — SIGNIFICANT CHANGE UP
BUN SERPL-MCNC: 13 MG/DL — SIGNIFICANT CHANGE UP (ref 7–23)
CALCIUM SERPL-MCNC: 8.3 MG/DL — LOW (ref 8.5–10.5)
CHLORIDE SERPL-SCNC: 106 MMOL/L — SIGNIFICANT CHANGE UP (ref 96–108)
CO2 SERPL-SCNC: 28 MMOL/L — SIGNIFICANT CHANGE UP (ref 22–31)
COCAINE METAB.OTHER UR-MCNC: POSITIVE
COLOR SPEC: SIGNIFICANT CHANGE UP
COMMENT - URINE 2: SIGNIFICANT CHANGE UP
CREAT SERPL-MCNC: 1.15 MG/DL — SIGNIFICANT CHANGE UP (ref 0.5–1.3)
DIFF PNL FLD: NEGATIVE — SIGNIFICANT CHANGE UP
EGFR: 84 ML/MIN/1.73M2 — SIGNIFICANT CHANGE UP
EPI CELLS # UR: PRESENT
ETHANOL SERPL-MCNC: <3 MG/DL — SIGNIFICANT CHANGE UP
FENTANYL UR QL SCN: NEGATIVE — SIGNIFICANT CHANGE UP
FLUAV AG NPH QL: SIGNIFICANT CHANGE UP
FLUBV AG NPH QL: SIGNIFICANT CHANGE UP
GLUCOSE SERPL-MCNC: 104 MG/DL — HIGH (ref 70–99)
GLUCOSE UR QL: NEGATIVE MG/DL — SIGNIFICANT CHANGE UP
HCT VFR BLD CALC: 40.5 % — SIGNIFICANT CHANGE UP (ref 39–50)
HGB BLD-MCNC: 13.4 G/DL — SIGNIFICANT CHANGE UP (ref 13–17)
KETONES UR-MCNC: ABNORMAL MG/DL
LEUKOCYTE ESTERASE UR-ACNC: NEGATIVE — SIGNIFICANT CHANGE UP
MCHC RBC-ENTMCNC: 29.6 PG — SIGNIFICANT CHANGE UP (ref 27–34)
MCHC RBC-ENTMCNC: 33.1 GM/DL — SIGNIFICANT CHANGE UP (ref 32–36)
MCV RBC AUTO: 89.4 FL — SIGNIFICANT CHANGE UP (ref 80–100)
METHADONE UR-MCNC: NEGATIVE — SIGNIFICANT CHANGE UP
NITRITE UR-MCNC: NEGATIVE — SIGNIFICANT CHANGE UP
NRBC # BLD: 0 /100 WBCS — SIGNIFICANT CHANGE UP (ref 0–0)
OPIATES UR-MCNC: NEGATIVE — SIGNIFICANT CHANGE UP
PCP SPEC-MCNC: SIGNIFICANT CHANGE UP
PCP UR-MCNC: NEGATIVE — SIGNIFICANT CHANGE UP
PH UR: 5.5 — SIGNIFICANT CHANGE UP (ref 5–8)
PLATELET # BLD AUTO: 290 K/UL — SIGNIFICANT CHANGE UP (ref 150–400)
POTASSIUM SERPL-MCNC: 3.8 MMOL/L — SIGNIFICANT CHANGE UP (ref 3.5–5.3)
POTASSIUM SERPL-SCNC: 3.8 MMOL/L — SIGNIFICANT CHANGE UP (ref 3.5–5.3)
PROT SERPL-MCNC: 8 G/DL — SIGNIFICANT CHANGE UP (ref 6.4–8.2)
PROT UR-MCNC: ABNORMAL MG/DL
RBC # BLD: 4.53 M/UL — SIGNIFICANT CHANGE UP (ref 4.2–5.8)
RBC # FLD: 14.3 % — SIGNIFICANT CHANGE UP (ref 10.3–14.5)
RBC CASTS # UR COMP ASSIST: 2 /HPF — SIGNIFICANT CHANGE UP (ref 0–4)
RSV RNA NPH QL NAA+NON-PROBE: SIGNIFICANT CHANGE UP
SARS-COV-2 RNA SPEC QL NAA+PROBE: SIGNIFICANT CHANGE UP
SODIUM SERPL-SCNC: 143 MMOL/L — SIGNIFICANT CHANGE UP (ref 132–145)
SP GR SPEC: 1.04 — HIGH (ref 1–1.03)
THC UR QL: NEGATIVE — SIGNIFICANT CHANGE UP
TROPONIN I, HIGH SENSITIVITY RESULT: <4 NG/L — SIGNIFICANT CHANGE UP
UROBILINOGEN FLD QL: 1 MG/DL — SIGNIFICANT CHANGE UP (ref 0.2–1)
WBC # BLD: 5.25 K/UL — SIGNIFICANT CHANGE UP (ref 3.8–10.5)
WBC # FLD AUTO: 5.25 K/UL — SIGNIFICANT CHANGE UP (ref 3.8–10.5)
WBC UR QL: 2 /HPF — SIGNIFICANT CHANGE UP (ref 0–5)

## 2024-04-09 PROCEDURE — 99223 1ST HOSP IP/OBS HIGH 75: CPT

## 2024-04-09 PROCEDURE — 90792 PSYCH DIAG EVAL W/MED SRVCS: CPT | Mod: 95,GC

## 2024-04-09 RX ORDER — HALOPERIDOL DECANOATE 100 MG/ML
5 INJECTION INTRAMUSCULAR EVERY 8 HOURS
Refills: 0 | Status: DISCONTINUED | OUTPATIENT
Start: 2024-04-09 | End: 2024-04-17

## 2024-04-09 RX ORDER — ACETAMINOPHEN 500 MG
650 TABLET ORAL EVERY 6 HOURS
Refills: 0 | Status: DISCONTINUED | OUTPATIENT
Start: 2024-04-09 | End: 2024-04-17

## 2024-04-09 RX ORDER — QUETIAPINE FUMARATE 200 MG/1
50 TABLET, FILM COATED ORAL AT BEDTIME
Refills: 0 | Status: DISCONTINUED | OUTPATIENT
Start: 2024-04-09 | End: 2024-04-12

## 2024-04-09 NOTE — ED BEHAVIORAL HEALTH ASSESSMENT NOTE - ADDITIONAL DETAILS ALL
Reports trying to jump out of apartment window today; reports self harm via cutting over 6 months ago; per chart review- pt reports at least 2 suicide attempts in the last 3 years (2 years ago walking in front of a bus and 1.5 year ago going out of a 3rd floor window) often in the context of intoxication.

## 2024-04-09 NOTE — ED BEHAVIORAL HEALTH ASSESSMENT NOTE - PRIOR MEDICATION SIDE EFFECTS OR ADVERSE REACTIONS
Pharmacy called asking for medication to be refilled today.  Getting blister pack prepared to go out this afternoon.     Fax for refill was sent last week.   (medication and pharmacy listed below)  
Yes

## 2024-04-09 NOTE — ED BEHAVIORAL HEALTH ASSESSMENT NOTE - OTHER PAST PSYCHIATRIC HISTORY (INCLUDE DETAILS REGARDING ONSET, COURSE OF ILLNESS, INPATIENT/OUTPATIENT TREATMENT)
Multiple past psychiatric admissions, last known at [facility] from 11/20/23-11-30-23, at least 2 lifetime suicide attempts, last 1.5 year ago. Pt has history of nonadherence with OP psychiatric and substance treatment

## 2024-04-09 NOTE — ED PROVIDER NOTE - OBJECTIVE STATEMENT
Feelings of sadness with plan to jump.  Recent cocaine use.  Prior charts reviewed, pt with hx of HIV and EPHRAIM.

## 2024-04-09 NOTE — ED BEHAVIORAL HEALTH ASSESSMENT NOTE - DESCRIPTION
In the ED, patient in good behavioral and impulse control. Patient did not require IM or PRN medications.     Vital Signs Last 24 Hrs  T(C): 36.8 (09 Apr 2024 17:59), Max: 36.8 (09 Apr 2024 17:59)  T(F): 98.2 (09 Apr 2024 17:59), Max: 98.2 (09 Apr 2024 17:59)  HR: 76 (09 Apr 2024 17:59) (76 - 76)  BP: 132/80 (09 Apr 2024 17:59) (132/80 - 132/80)  BP(mean): --  RR: 16 (09 Apr 2024 17:59) (16 - 16)  SpO2: 98% (09 Apr 2024 17:59) (98% - 98%)    Parameters below as of 09 Apr 2024 17:59  Patient On (Oxygen Delivery Method): room air HIV +- reports adherence to Biktarvy unemployed (reports receiving disability for psychiatric illness); , lives alone in apartment in the [place]

## 2024-04-09 NOTE — ED BEHAVIORAL HEALTH ASSESSMENT NOTE - PSYCHIATRIC ISSUES AND PLAN (INCLUDE STANDING AND PRN MEDICATION)
For severe agitation not responding to behavioral intervention, may give haldol 5 mg po q6h prn, ativan 2 mg po q6h prn, hydroxyzine 50 mg po q6h prn, with escalation to IM if patient refusing PO and remains an imminent danger to self or others. If IM antipsychotic is administered, please perform follow-up ECG for QTc monitoring.

## 2024-04-09 NOTE — ED ADULT TRIAGE NOTE - CHIEF COMPLAINT QUOTE
Pt states suicidal today, plan to jump out of girlfriends window. Last cocaine use 3 days prior. Denies hi. Pt states suicidal today, plan to jump out of girlfriends window. Last cocaine use 3 days prior. Denies hi. Pt placed on watch, clothes changed, security called.

## 2024-04-09 NOTE — ED PROVIDER NOTE - CLINICAL SUMMARY MEDICAL DECISION MAKING FREE TEXT BOX
Feelings of sadness with plan to jump.  Recent cocaine use.  Prior charts reviewed, pt with hx of HIV and EPHRAIM.  Urine toxicology, blood alcohol, CBC, CMP EKG performed

## 2024-04-09 NOTE — ED BEHAVIORAL HEALTH ASSESSMENT NOTE - NSBHATTESTCOMMENTATTENDFT_PSY_A_CORE
39 yo M; reportedly domiciled alone in the [place]; PPHx of schizoaffective disorder, prior admissions, hx of SA, hx of aggression; PMHx of HIV; hx of polysubstance use, denies hx of withdrawal seizures/DTs/ICU stays; BIBS; psychiatry consulted for SI.  Pt endorses SI, has thought of various plans but contracts to safety while in the hospital.  Pt declines to provide collateral contacts.  He reports recent cocaine use but denies recent alcohol or benzo use.  He is appropriate for voluntary admission at this time for safety, stabilization, and connection to outpatient care.    Covid Screen - Patient  denies positive test in past 3 months  denies recent exposures

## 2024-04-09 NOTE — ED BEHAVIORAL HEALTH ASSESSMENT NOTE - DETAILS
father  by suicide per chart review PCN - rash Endorses current active SI w/thoughts to jump into traffic, reports opening window to jump to death earlier today performed self-presented hx per chart
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
## 2024-04-09 NOTE — ED ADULT NURSE NOTE - HPI (INCLUDE ILLNESS QUALITY, SEVERITY, DURATION, TIMING, CONTEXT, MODIFYING FACTORS, ASSOCIATED SIGNS AND SYMPTOMS)
Pt walked in reporting suicidal ideations with plan to jump out of window. Calm and cooperative. Asking for food immediately upon arrival. Placed on constant observation on arrival
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
## 2024-04-09 NOTE — ED BEHAVIORAL HEALTH ASSESSMENT NOTE - NSBHSAALC_PSY_A_CORE FT
previously has reported drinking 12-pack every 3 days; denies use in past two weeks previously has reported drinking 12-pack every 3 days; denies use in past two weeks, denies hx of withdrawal seizures/DTs

## 2024-04-09 NOTE — ED BEHAVIORAL HEALTH ASSESSMENT NOTE - NSBHSACOC_PSY_A_CORE FT
Utox positive for cocaine today, many years history of use; reports last use insufflating 3 days ago

## 2024-04-09 NOTE — ED BEHAVIORAL HEALTH ASSESSMENT NOTE - PAST PSYCHOTROPIC MEDICATION
Lexapro, Seroquel, prozac, trazodone, prazosin, risperdal, klonopin, gabapentin, lithium, naltrexone, paliperidone, depakote, duloxetine, venlafaxine, mirtazapine, abilify, zyprexa, haldol, clonidine

## 2024-04-09 NOTE — ED BEHAVIORAL HEALTH ASSESSMENT NOTE - SUICIDAL BEHAVIOR DETAILS
[de-identified] : VOMITING [FreeTextEntry6] : nonbilious nonbloody emesis- regurgitation mom placed on enfamil AR  and doing better ( no choking) but arching and spitting up . good po /uo  See HPI as per HPI

## 2024-04-09 NOTE — ED BEHAVIORAL HEALTH ASSESSMENT NOTE - MEDICAL ISSUES AND PLAN (INCLUDE STANDING AND PRN MEDICATION)
pt is not suicidal/homicidal  , voluntary admitted  to start Clozapine +HIV- c/w with Biktarvy (primary team to confirm current dose)

## 2024-04-09 NOTE — ED BEHAVIORAL HEALTH ASSESSMENT NOTE - RISK ASSESSMENT
At this time, patient is at elevated risk of harm to himself and requires inpatient hospitalization for safety, stabilization, and optimization of current medication regimen  Acute risk factors- active substance use, CAH to harm himself, current SI w/o intent or plan, active mood episode  Chronic risk factors- hx of multiple SA, hx of multiple IP admissions, hx of polysubstance use, poor social support  Protective factors- help-seeking

## 2024-04-09 NOTE — ED ADULT NURSE NOTE - PAIN RATING/NUMBER SCALE (0-10): ACTIVITY
Quality 110: Preventive Care And Screening: Influenza Immunization: Influenza Immunization previously received during influenza season Detail Level: Detailed Quality 131: Pain Assessment And Follow-Up: Pain assessment using a standardized tool is documented as negative, no follow-up plan required 0 (no pain/absence of nonverbal indicators of pain)

## 2024-04-09 NOTE — ED ADULT NURSE NOTE - CHIEF COMPLAINT QUOTE
Pt states suicidal today, plan to jump out of girlfriends window. Last cocaine use 3 days prior. Denies hi.

## 2024-04-10 LAB
A1C WITH ESTIMATED AVERAGE GLUCOSE RESULT: 5.1 % — SIGNIFICANT CHANGE UP (ref 4–5.6)
CHOLEST SERPL-MCNC: 165 MG/DL — SIGNIFICANT CHANGE UP
ESTIMATED AVERAGE GLUCOSE: 100 MG/DL — SIGNIFICANT CHANGE UP (ref 68–114)
HDLC SERPL-MCNC: 56 MG/DL — SIGNIFICANT CHANGE UP
LIPID PNL WITH DIRECT LDL SERPL: 84 MG/DL — SIGNIFICANT CHANGE UP
NON HDL CHOLESTEROL: 109 MG/DL — SIGNIFICANT CHANGE UP
TRIGL SERPL-MCNC: 144 MG/DL — SIGNIFICANT CHANGE UP

## 2024-04-10 PROCEDURE — 99223 1ST HOSP IP/OBS HIGH 75: CPT

## 2024-04-10 RX ORDER — THIAMINE MONONITRATE (VIT B1) 100 MG
100 TABLET ORAL DAILY
Refills: 0 | Status: COMPLETED | OUTPATIENT
Start: 2024-04-10 | End: 2024-04-12

## 2024-04-10 RX ORDER — FOLIC ACID 0.8 MG
1 TABLET ORAL DAILY
Refills: 0 | Status: DISCONTINUED | OUTPATIENT
Start: 2024-04-10 | End: 2024-04-17

## 2024-04-10 RX ORDER — BICTEGRAVIR SODIUM, EMTRICITABINE, AND TENOFOVIR ALAFENAMIDE FUMARATE 30; 120; 15 MG/1; MG/1; MG/1
1 TABLET ORAL DAILY
Refills: 0 | Status: DISCONTINUED | OUTPATIENT
Start: 2024-04-10 | End: 2024-04-17

## 2024-04-10 RX ORDER — INFLUENZA VIRUS VACCINE 15; 15; 15; 15 UG/.5ML; UG/.5ML; UG/.5ML; UG/.5ML
0.5 SUSPENSION INTRAMUSCULAR ONCE
Refills: 0 | Status: COMPLETED | OUTPATIENT
Start: 2024-04-10 | End: 2024-04-10

## 2024-04-10 RX ORDER — DIPHENHYDRAMINE HCL 50 MG
25 CAPSULE ORAL AT BEDTIME
Refills: 0 | Status: DISCONTINUED | OUTPATIENT
Start: 2024-04-10 | End: 2024-04-17

## 2024-04-10 RX ADMIN — BICTEGRAVIR SODIUM, EMTRICITABINE, AND TENOFOVIR ALAFENAMIDE FUMARATE 1 TABLET(S): 30; 120; 15 TABLET ORAL at 10:19

## 2024-04-10 RX ADMIN — Medication 100 MILLIGRAM(S): at 10:19

## 2024-04-10 RX ADMIN — QUETIAPINE FUMARATE 50 MILLIGRAM(S): 200 TABLET, FILM COATED ORAL at 21:49

## 2024-04-10 RX ADMIN — Medication 1 TABLET(S): at 10:19

## 2024-04-10 RX ADMIN — Medication 1 MILLIGRAM(S): at 10:19

## 2024-04-10 NOTE — BH INPATIENT PSYCHIATRY ASSESSMENT NOTE - VIOLENCE RISK FACTORS:
Substance abuse/Noncompliance with treatment/Other Substance abuse/Impulsivity/Noncompliance with treatment/Other

## 2024-04-10 NOTE — BH INPATIENT PSYCHIATRY ASSESSMENT NOTE - NSBHCHARTREVIEWVS_PSY_A_CORE FT
Vital Signs Last 24 Hrs  T(C): 37.6 (04-10-24 @ 17:13), Max: 37.6 (04-10-24 @ 17:13)  T(F): 99.6 (04-10-24 @ 17:13), Max: 99.6 (04-10-24 @ 17:13)  HR: 71 (04-10-24 @ 17:13) (61 - 79)  BP: 104/70 (04-10-24 @ 17:13) (104/70 - 121/65)  BP(mean): 86 (04-10-24 @ 03:18) (86 - 86)  RR: 16 (04-10-24 @ 05:01) (16 - 18)  SpO2: 98% (04-10-24 @ 17:13) (97% - 99%)     Vital Signs Last 24 Hrs  T(C): 37.2 (04-11-24 @ 08:02), Max: 37.6 (04-10-24 @ 17:13)  T(F): 99 (04-11-24 @ 08:02), Max: 99.6 (04-10-24 @ 17:13)  HR: 69 (04-11-24 @ 08:02) (60 - 72)  BP: 122/79 (04-11-24 @ 08:02) (95/50 - 122/79)  BP(mean): --  RR: 17 (04-11-24 @ 06:00) (17 - 17)  SpO2: 97% (04-11-24 @ 08:02) (97% - 100%)
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
## 2024-04-10 NOTE — BH INPATIENT PSYCHIATRY ASSESSMENT NOTE - NSSUIIDEFREQ_PSY_ALL_CORE
[Medication Risks Reviewed] : Medication risks reviewed [de-identified] : 79 year old female with right shoulder pain secondary to degenerative changes in the shoulder, partial biceps tear, rotator cuff tendinopathy, synovitis. She received a cortisone injection today which she tolerated well. She is not interested in surgery at this time due to medical comorbidities. A lengthy discussion was held regarding the patient’s condition and treatment options including all risks, benefits, prognosis and outcomes of each were discussed in detail.The patient will contact me if there are any concerns otherwise follow up will be on a prn basis. The patient expressed understanding and all questions were answered.\par \par \par  (4) Daily or almost daily

## 2024-04-10 NOTE — BH INPATIENT PSYCHIATRY ASSESSMENT NOTE - NSBHSAALC_PSY_A_CORE FT
previously has reported drinking 12-pack every 3 days; denies use in past two weeks, denies hx of withdrawal seizures/DTs
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
## 2024-04-10 NOTE — BH INPATIENT PSYCHIATRY ASSESSMENT NOTE - OTHER PAST PSYCHIATRIC HISTORY (INCLUDE DETAILS REGARDING ONSET, COURSE OF ILLNESS, INPATIENT/OUTPATIENT TREATMENT)
Multiple past psychiatric admissions, last known at [facility] from 11/20/23-11-30-23, at least 2 lifetime suicide attempts, last 1.5 year ago. Pt has history of nonadherence with OP psychiatric and substance treatment

## 2024-04-10 NOTE — BH SOCIAL WORK INITIAL PSYCHOSOCIAL EVALUATION - NSBHHOUSECOMMENTFT_PSY_ALL_CORE
Pt reports that he is domiciled in [facility] housing. Pt reports his address is: [address] [place] [address], [address]
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
## 2024-04-10 NOTE — BH INPATIENT PSYCHIATRY ASSESSMENT NOTE - RISK ASSESSMENT
At this time, patient is at elevated risk of harm to himself and requires inpatient hospitalization for safety, stabilization, and optimization of current medication regimen  Acute risk factors- active substance use, CAH to harm himself, current SI w/o intent or plan, active mood episode  Chronic risk factors- hx of multiple SA, hx of multiple IP admissions, hx of polysubstance use, poor social support  Protective factors- help-seeking Static: gender, co-morbid medical issue, hx of mental illness, hx of past SA, family hx of suicide, substance abuse  Modifiable: current access to treatment/medications, current mood disorder/substance use  Protective: help seeking, domiciled
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
## 2024-04-10 NOTE — BH INPATIENT PSYCHIATRY ASSESSMENT NOTE - NSACTIVEVENT_PSY_ALL_CORE
Current or pending social isolation/Inadequate social supports Current or pending social isolation/Substance intoxication or withdrawal/Inadequate social supports/Perceived burden on family or others

## 2024-04-10 NOTE — BH INPATIENT PSYCHIATRY ASSESSMENT NOTE - NSICDXBHSECONDARYDX_PSY_ALL_CORE
Polysubstance abuse   F19.10  Substance induced mood disorder   F19.94  Personality disorder   F60.9

## 2024-04-10 NOTE — BH INPATIENT PSYCHIATRY ASSESSMENT NOTE - DESCRIPTION
unemployed (reports receiving disability for psychiatric illness); , lives alone in apartment in the [place]

## 2024-04-10 NOTE — BH INPATIENT PSYCHIATRY ASSESSMENT NOTE - NSPRESENTSXS_PSY_ALL_CORE
Depressed mood/Anhedonia/Psychosis/Command hallucinations to hurt self/Refusal or inability to complete safety plan Depressed mood/Anhedonia/Hopelessness or despair

## 2024-04-10 NOTE — BH INPATIENT PSYCHIATRY ASSESSMENT NOTE - DETAILS
Endorses current active SI w/thoughts to jump into traffic, reports opening window to jump to death earlier today per chart review PCN - rash father  by suicide hx per chart
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
## 2024-04-10 NOTE — BH INPATIENT PSYCHIATRY ASSESSMENT NOTE - NSBHPSYCHOLCOGORIENT_PSY_A_CORE
Have Demario increase the amitriptyline to 20 mg daily and we will discuss how he is feeling next week  Oriented to time, place, person, situation

## 2024-04-10 NOTE — BH INPATIENT PSYCHIATRY ASSESSMENT NOTE - NSBHMETABOLIC_PSY_ALL_CORE_FT
BMI: BMI (kg/m2): 22.6 (04-10-24 @ 05:01)  HbA1c: A1C with Estimated Average Glucose Result: 5.1 % (04-10-24 @ 15:10)    Glucose: POCT Blood Glucose.: 100 mg/dL (07-12-23 @ 12:25)    BP: 104/70 (04-10-24 @ 17:13) (104/70 - 132/80)Vital Signs Last 24 Hrs  T(C): 37.6 (04-10-24 @ 17:13), Max: 37.6 (04-10-24 @ 17:13)  T(F): 99.6 (04-10-24 @ 17:13), Max: 99.6 (04-10-24 @ 17:13)  HR: 71 (04-10-24 @ 17:13) (61 - 79)  BP: 104/70 (04-10-24 @ 17:13) (104/70 - 121/65)  BP(mean): 86 (04-10-24 @ 03:18) (86 - 86)  RR: 16 (04-10-24 @ 05:01) (16 - 18)  SpO2: 98% (04-10-24 @ 17:13) (97% - 99%)      Lipid Panel: Date/Time: 04-10-24 @ 15:16  Cholesterol, Serum: 165  LDL Cholesterol Calculated: 84  HDL Cholesterol, Serum: 56  Total Cholesterol/HDL Ration Measurement: --  Triglycerides, Serum: 144   BMI: BMI (kg/m2): 22.6 (04-10-24 @ 05:01)  HbA1c: A1C with Estimated Average Glucose Result: 5.1 % (04-10-24 @ 15:10)    Glucose: POCT Blood Glucose.: 100 mg/dL (07-12-23 @ 12:25)    BP: 122/79 (04-11-24 @ 08:02) (95/50 - 132/80)Vital Signs Last 24 Hrs  T(C): 37.2 (04-11-24 @ 08:02), Max: 37.6 (04-10-24 @ 17:13)  T(F): 99 (04-11-24 @ 08:02), Max: 99.6 (04-10-24 @ 17:13)  HR: 69 (04-11-24 @ 08:02) (60 - 72)  BP: 122/79 (04-11-24 @ 08:02) (95/50 - 122/79)  BP(mean): --  RR: 17 (04-11-24 @ 06:00) (17 - 17)  SpO2: 97% (04-11-24 @ 08:02) (97% - 100%)      Lipid Panel: Date/Time: 04-10-24 @ 15:16  Cholesterol, Serum: 165  LDL Cholesterol Calculated: 84  HDL Cholesterol, Serum: 56  Total Cholesterol/HDL Ration Measurement: --  Triglycerides, Serum: 144

## 2024-04-10 NOTE — BH PATIENT PROFILE - HOME MEDICATIONS
Biktarvy 50 mg-200 mg-25 mg oral tablet , 1 tab(s) orally once a day  Seroquel 400 mg oral tablet , 1 tab(s) orally once a day (at bedtime)  QUEtiapine 300 mg oral tablet , 1 tab(s) orally once a day (at bedtime)  QUEtiapine 100 mg oral tablet , 1 tab(s) orally once a day  Multiple Vitamins oral tablet , 1 tab(s) orally once a day  folic acid 1 mg oral tablet , 1 tab(s) orally once a day  bictegravir/emtricitabine/tenofovir 50 mg-200 mg-25 mg oral tablet , 1 tab(s) orally once a day  nicotine 21 mg/24 hr transdermal film, extended release , 1 patch transdermal once a day Continue to take as prescribed until told otherwise by your provider  bictegravir/emtricitabine/tenofovir 50 mg-200 mg-25 mg oral tablet , 1 tab(s) orally once a day Continue to take as prescribed until told otherwise by your provider  atorvastatin 20 mg oral tablet , 1 tab(s) orally once a day (at bedtime) Continue to take as prescribed until told otherwise by your provider  loratadine 10 mg oral tablet , 1 tab(s) orally once a day Continue to take as prescribed until told otherwise by your provider  traZODone 100 mg oral tablet , 1 tab(s) orally once a day (at bedtime) Continue to take as prescribed until told otherwise by your provider  escitalopram 5 mg oral tablet , 3 tab(s) orally once a day Continue to take as prescribed until told otherwise by your provider

## 2024-04-10 NOTE — BH INPATIENT PSYCHIATRY ASSESSMENT NOTE - CURRENT MEDICATION
MEDICATIONS  (STANDING):  bictegravir 50 mG/emtricitabine 200 mG/tenofovir alafenamide 25 mG (BIKTARVY) 1 Tablet(s) Oral daily  folic acid 1 milliGRAM(s) Oral daily  multivitamin 1 Tablet(s) Oral daily  QUEtiapine 50 milliGRAM(s) Oral at bedtime  thiamine 100 milliGRAM(s) Oral daily    MEDICATIONS  (PRN):  acetaminophen     Tablet .. 650 milliGRAM(s) Oral every 6 hours PRN Mild Pain (1 - 3), Moderate Pain (4 - 6)  diphenhydrAMINE 25 milliGRAM(s) Oral at bedtime PRN Insomnia  haloperidol     Tablet 5 milliGRAM(s) Oral every 8 hours PRN agitation  LORazepam     Tablet 2 milliGRAM(s) Oral every 2 hours PRN Symptom-triggered 2 point increase in CIWA-Ar  LORazepam     Tablet 2 milliGRAM(s) Oral every 1 hour PRN Symptom-triggered: each CIWA -Ar score 8 or GREATER

## 2024-04-11 DIAGNOSIS — E43 UNSPECIFIED SEVERE PROTEIN-CALORIE MALNUTRITION: ICD-10-CM

## 2024-04-11 DIAGNOSIS — F19.94 OTHER PSYCHOACTIVE SUBSTANCE USE, UNSPECIFIED WITH PSYCHOACTIVE SUBSTANCE-INDUCED MOOD DISORDER: ICD-10-CM

## 2024-04-11 DIAGNOSIS — F19.10 OTHER PSYCHOACTIVE SUBSTANCE ABUSE, UNCOMPLICATED: ICD-10-CM

## 2024-04-11 DIAGNOSIS — F60.9 PERSONALITY DISORDER, UNSPECIFIED: ICD-10-CM

## 2024-04-11 LAB
4/8 RATIO: 0.74 RATIO — LOW (ref 0.9–3.6)
ABS CD8: 509 CELLS/UL — SIGNIFICANT CHANGE UP (ref 142–740)
CD16+CD56+ CELLS NFR BLD: 9 % — SIGNIFICANT CHANGE UP (ref 5–23)
CD16+CD56+ CELLS NFR SPEC: 103 CELLS/UL — SIGNIFICANT CHANGE UP (ref 71–410)
CD19 BLASTS SPEC-ACNC: 10 % — SIGNIFICANT CHANGE UP (ref 6–24)
CD19 BLASTS SPEC-ACNC: 109 CELLS/UL — SIGNIFICANT CHANGE UP (ref 84–469)
CD3 BLASTS SPEC-ACNC: 77 % — SIGNIFICANT CHANGE UP (ref 59–83)
CD3 BLASTS SPEC-ACNC: 887 CELLS/UL — SIGNIFICANT CHANGE UP (ref 672–1870)
CD4 %: 32 % — SIGNIFICANT CHANGE UP (ref 30–62)
CD8 %: 43 % — HIGH (ref 12–36)
HIV-1 VIRAL LOAD RESULT: ABNORMAL
HIV1 RNA # SERPL NAA+PROBE: 751 — SIGNIFICANT CHANGE UP
HIV1 RNA SER-IMP: SIGNIFICANT CHANGE UP
HIV1 RNA SERPL NAA+PROBE-ACNC: ABNORMAL
HIV1 RNA SERPL NAA+PROBE-LOG#: 2.88 — SIGNIFICANT CHANGE UP
T-CELL CD4 SUBSET PNL BLD: 376 CELLS/UL — LOW (ref 489–1457)

## 2024-04-11 PROCEDURE — 99232 SBSQ HOSP IP/OBS MODERATE 35: CPT

## 2024-04-11 RX ADMIN — Medication 1 TABLET(S): at 10:45

## 2024-04-11 RX ADMIN — BICTEGRAVIR SODIUM, EMTRICITABINE, AND TENOFOVIR ALAFENAMIDE FUMARATE 1 TABLET(S): 30; 120; 15 TABLET ORAL at 10:45

## 2024-04-11 RX ADMIN — Medication 100 MILLIGRAM(S): at 10:45

## 2024-04-11 RX ADMIN — Medication 1 MILLIGRAM(S): at 10:45

## 2024-04-11 NOTE — DIETITIAN INITIAL EVALUATION ADULT - NUTRITIONGOAL OUTCOME1
Pt to meet at least 75% of nutritional needs consistently and reduce/resolve signs and symptoms of protein calorie malnutrition

## 2024-04-11 NOTE — DIETITIAN INITIAL EVALUATION ADULT - PERSON TAUGHT/METHOD
importance of adequate intake for repletion, dangers of substance use including decreased appetite/PO intake, weight loss, malnutrition/verbal instruction/patient instructed

## 2024-04-11 NOTE — DIETITIAN INITIAL EVALUATION ADULT - ADD RECOMMEND
1. Continue Regular diet, add Ensure Enlive 2x/day (350kcal, 20g protein per serving)   2. Encourage and monitor PO intake, honor preferences as able   3. Monitor labs, wt trends, GI function, and skin integrity   4. Pain and bowel regimen per team   5. RD to remain available for additional nutrition interventions and diet edu as needed

## 2024-04-11 NOTE — DIETITIAN INITIAL EVALUATION ADULT - OTHER CALCULATIONS
Based on Standards of Care pt within % IBW (99%) thus actual body weight used for all calculations (140#). Needs adjusted for malnutrition.

## 2024-04-11 NOTE — DIETITIAN INITIAL EVALUATION ADULT - PERTINENT LABORATORY DATA
04-09    143  |  106  |  13  ----------------------------<  104<H>  3.8   |  28  |  1.15    Ca    8.3<L>      09 Apr 2024 18:17    TPro  8.0  /  Alb  3.4  /  TBili  0.3  /  DBili  x   /  AST  26  /  ALT  21  /  AlkPhos  99  04-09  A1C with Estimated Average Glucose Result: 5.1 % (04-10-24 @ 15:10)  A1C with Estimated Average Glucose Result: 5.5 % (04-30-23 @ 08:00)
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
## 2024-04-11 NOTE — DIETITIAN NUTRITION RISK NOTIFICATION - ADDITIONAL COMMENTS/DIETITIAN RECOMMENDATIONS
1. Continue Regular diet, add Ensure Enlive 2x/day (350kcal, 20g protein per serving)   2. Encourage and monitor PO intake, honor preferences as able   3. Monitor labs, wt trends, GI function, and skin integrity   4. Pain and bowel regimen per team   5. RD to remain available for additional nutrition interventions and diet edu as needed  patient instructed; verbal instruction; importance of adequate intake for repletion, dangers of substance use including decreased appetite/PO intake, weight loss, malnutrition

## 2024-04-12 PROCEDURE — 99231 SBSQ HOSP IP/OBS SF/LOW 25: CPT

## 2024-04-12 RX ORDER — QUETIAPINE FUMARATE 200 MG/1
100 TABLET, FILM COATED ORAL AT BEDTIME
Refills: 0 | Status: DISCONTINUED | OUTPATIENT
Start: 2024-04-12 | End: 2024-04-17

## 2024-04-12 RX ADMIN — BICTEGRAVIR SODIUM, EMTRICITABINE, AND TENOFOVIR ALAFENAMIDE FUMARATE 1 TABLET(S): 30; 120; 15 TABLET ORAL at 11:04

## 2024-04-12 RX ADMIN — QUETIAPINE FUMARATE 100 MILLIGRAM(S): 200 TABLET, FILM COATED ORAL at 21:41

## 2024-04-12 RX ADMIN — Medication 1 MILLIGRAM(S): at 11:04

## 2024-04-12 RX ADMIN — Medication 100 MILLIGRAM(S): at 11:05

## 2024-04-12 RX ADMIN — Medication 1 TABLET(S): at 11:04

## 2024-04-13 RX ADMIN — BICTEGRAVIR SODIUM, EMTRICITABINE, AND TENOFOVIR ALAFENAMIDE FUMARATE 1 TABLET(S): 30; 120; 15 TABLET ORAL at 11:00

## 2024-04-13 RX ADMIN — Medication 1 MILLIGRAM(S): at 11:50

## 2024-04-13 RX ADMIN — QUETIAPINE FUMARATE 100 MILLIGRAM(S): 200 TABLET, FILM COATED ORAL at 21:11

## 2024-04-13 RX ADMIN — Medication 1 TABLET(S): at 11:00

## 2024-04-14 RX ADMIN — Medication 1 MILLIGRAM(S): at 10:15

## 2024-04-14 RX ADMIN — BICTEGRAVIR SODIUM, EMTRICITABINE, AND TENOFOVIR ALAFENAMIDE FUMARATE 1 TABLET(S): 30; 120; 15 TABLET ORAL at 10:16

## 2024-04-14 RX ADMIN — Medication 1 TABLET(S): at 10:15

## 2024-04-15 PROCEDURE — 99232 SBSQ HOSP IP/OBS MODERATE 35: CPT

## 2024-04-15 RX ADMIN — BICTEGRAVIR SODIUM, EMTRICITABINE, AND TENOFOVIR ALAFENAMIDE FUMARATE 1 TABLET(S): 30; 120; 15 TABLET ORAL at 10:37

## 2024-04-15 RX ADMIN — Medication 1 MILLIGRAM(S): at 10:37

## 2024-04-15 RX ADMIN — Medication 1 TABLET(S): at 10:37
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
## 2024-04-16 PROCEDURE — 99231 SBSQ HOSP IP/OBS SF/LOW 25: CPT

## 2024-04-16 RX ADMIN — Medication 1 TABLET(S): at 10:45

## 2024-04-16 RX ADMIN — BICTEGRAVIR SODIUM, EMTRICITABINE, AND TENOFOVIR ALAFENAMIDE FUMARATE 1 TABLET(S): 30; 120; 15 TABLET ORAL at 10:44

## 2024-04-16 RX ADMIN — Medication 1 MILLIGRAM(S): at 10:45

## 2024-04-16 NOTE — BH INPATIENT PSYCHIATRY PROGRESS NOTE - NSTXSUICIDGOAL_PSY_ALL_CORE
Talk to staff and ask for assistance when having suicidal wishes instead of acting out

## 2024-04-16 NOTE — BH INPATIENT PSYCHIATRY PROGRESS NOTE - NSBHMETABOLIC_PSY_ALL_CORE_FT
BMI: BMI (kg/m2): 22.6 (04-10-24 @ 05:01)  HbA1c: A1C with Estimated Average Glucose Result: 5.1 % (04-10-24 @ 15:10)    Glucose: POCT Blood Glucose.: 100 mg/dL (07-12-23 @ 12:25)    BP: 122/78 (04-13-24 @ 20:07) (104/63 - 122/79)Vital Signs Last 24 Hrs  T(C): 37.4 (04-13-24 @ 20:07), Max: 37.8 (04-13-24 @ 06:00)  T(F): 99.3 (04-13-24 @ 20:07), Max: 100 (04-13-24 @ 06:00)  HR: 78 (04-13-24 @ 20:07) (56 - 82)  BP: 122/78 (04-13-24 @ 20:07) (106/64 - 122/78)  BP(mean): --  RR: 17 (04-13-24 @ 06:00) (17 - 17)  SpO2: 98% (04-13-24 @ 20:07) (97% - 98%)      Lipid Panel: Date/Time: 04-10-24 @ 15:16  Cholesterol, Serum: 165  LDL Cholesterol Calculated: 84  HDL Cholesterol, Serum: 56  Total Cholesterol/HDL Ration Measurement: --  Triglycerides, Serum: 144  
BMI: BMI (kg/m2): 22.6 (04-10-24 @ 05:01)  HbA1c: A1C with Estimated Average Glucose Result: 5.1 % (04-10-24 @ 15:10)    Glucose: POCT Blood Glucose.: 100 mg/dL (07-12-23 @ 12:25)    BP: 112/65 (04-16-24 @ 07:55) (96/60 - 120/77)Vital Signs Last 24 Hrs  T(C): 36.5 (04-16-24 @ 07:55), Max: 36.5 (04-16-24 @ 07:55)  T(F): 97.7 (04-16-24 @ 07:55), Max: 97.7 (04-16-24 @ 07:55)  HR: 64 (04-16-24 @ 07:55) (64 - 64)  BP: 112/65 (04-16-24 @ 07:55) (112/65 - 112/65)  BP(mean): --  RR: 18 (04-16-24 @ 07:55) (18 - 18)  SpO2: 98% (04-16-24 @ 07:55) (98% - 98%)      Lipid Panel: Date/Time: 04-10-24 @ 15:16  Cholesterol, Serum: 165  LDL Cholesterol Calculated: 84  HDL Cholesterol, Serum: 56  Total Cholesterol/HDL Ration Measurement: --  Triglycerides, Serum: 144  
BMI: BMI (kg/m2): 22.6 (04-10-24 @ 05:01)  HbA1c: A1C with Estimated Average Glucose Result: 5.1 % (04-10-24 @ 15:10)    Glucose: POCT Blood Glucose.: 100 mg/dL (07-12-23 @ 12:25)    BP: 122/79 (04-11-24 @ 08:02) (95/50 - 132/80)Vital Signs Last 24 Hrs  T(C): 37.2 (04-11-24 @ 08:02), Max: 37.6 (04-10-24 @ 17:13)  T(F): 99 (04-11-24 @ 08:02), Max: 99.6 (04-10-24 @ 17:13)  HR: 69 (04-11-24 @ 08:02) (60 - 72)  BP: 122/79 (04-11-24 @ 08:02) (95/50 - 122/79)  BP(mean): --  RR: 17 (04-11-24 @ 06:00) (17 - 17)  SpO2: 97% (04-11-24 @ 08:02) (97% - 100%)      Lipid Panel: Date/Time: 04-10-24 @ 15:16  Cholesterol, Serum: 165  LDL Cholesterol Calculated: 84  HDL Cholesterol, Serum: 56  Total Cholesterol/HDL Ration Measurement: --  Triglycerides, Serum: 144  
BMI: BMI (kg/m2): 22.6 (04-10-24 @ 05:01)  HbA1c: A1C with Estimated Average Glucose Result: 5.1 % (04-10-24 @ 15:10)    Glucose: POCT Blood Glucose.: 100 mg/dL (07-12-23 @ 12:25)    BP: 117/75 (04-14-24 @ 07:42) (96/60 - 122/78)Vital Signs Last 24 Hrs  T(C): 36.7 (04-14-24 @ 07:42), Max: 37.7 (04-13-24 @ 16:47)  T(F): 98 (04-14-24 @ 07:42), Max: 99.8 (04-13-24 @ 16:47)  HR: 65 (04-14-24 @ 07:42) (52 - 82)  BP: 117/75 (04-14-24 @ 07:42) (96/60 - 122/78)  BP(mean): --  RR: 18 (04-14-24 @ 06:00) (18 - 18)  SpO2: 99% (04-14-24 @ 07:42) (98% - 99%)      Lipid Panel: Date/Time: 04-10-24 @ 15:16  Cholesterol, Serum: 165  LDL Cholesterol Calculated: 84  HDL Cholesterol, Serum: 56  Total Cholesterol/HDL Ration Measurement: --  Triglycerides, Serum: 144  
BMI: BMI (kg/m2): 22.6 (04-10-24 @ 05:01)  HbA1c: A1C with Estimated Average Glucose Result: 5.1 % (04-10-24 @ 15:10)    Glucose: POCT Blood Glucose.: 100 mg/dL (07-12-23 @ 12:25)    BP: 113/75 (04-12-24 @ 09:35) (95/50 - 132/80)Vital Signs Last 24 Hrs  T(C): 37.1 (04-12-24 @ 09:35), Max: 37.7 (04-11-24 @ 16:41)  T(F): 98.8 (04-12-24 @ 09:35), Max: 99.8 (04-11-24 @ 16:41)  HR: 78 (04-12-24 @ 09:35) (56 - 82)  BP: 113/75 (04-12-24 @ 09:35) (104/63 - 117/78)  BP(mean): --  RR: 18 (04-12-24 @ 09:35) (18 - 18)  SpO2: 95% (04-12-24 @ 09:35) (95% - 98%)      Lipid Panel: Date/Time: 04-10-24 @ 15:16  Cholesterol, Serum: 165  LDL Cholesterol Calculated: 84  HDL Cholesterol, Serum: 56  Total Cholesterol/HDL Ration Measurement: --  Triglycerides, Serum: 144  
BMI: BMI (kg/m2): 22.6 (04-10-24 @ 05:01)  HbA1c: A1C with Estimated Average Glucose Result: 5.1 % (04-10-24 @ 15:10)    Glucose: POCT Blood Glucose.: 100 mg/dL (07-12-23 @ 12:25)    BP: 115/75 (04-15-24 @ 10:31) (96/60 - 122/78)Vital Signs Last 24 Hrs  T(C): 37.3 (04-15-24 @ 10:31), Max: 37.3 (04-15-24 @ 10:31)  T(F): 99.2 (04-15-24 @ 10:31), Max: 99.2 (04-15-24 @ 10:31)  HR: 64 (04-15-24 @ 10:31) (64 - 84)  BP: 115/75 (04-15-24 @ 10:31) (115/75 - 116/73)  BP(mean): --  RR: 17 (04-15-24 @ 10:31) (17 - 17)  SpO2: 98% (04-15-24 @ 10:31) (97% - 98%)      Lipid Panel: Date/Time: 04-10-24 @ 15:16  Cholesterol, Serum: 165  LDL Cholesterol Calculated: 84  HDL Cholesterol, Serum: 56  Total Cholesterol/HDL Ration Measurement: --  Triglycerides, Serum: 144

## 2024-04-16 NOTE — BH INPATIENT PSYCHIATRY PROGRESS NOTE - NSBHATTESTTYPEVISIT_PSY_A_CORE
On-site Attending supervising POLO (99XXX codes)
Resident/Fellow with telephonic supervision
Resident/Fellow with telephonic supervision
On-site Attending supervising POLO (99XXX codes)

## 2024-04-16 NOTE — BH INPATIENT PSYCHIATRY PROGRESS NOTE - NSBHFUPINTERVALCCFT_PSY_A_CORE
'I'm ok'
'I'll sleep on it'
'I'm craving to use'
'I think I'm ready to go'
"I'm doing better."		
"I'm doing better."

## 2024-04-16 NOTE — BH INPATIENT PSYCHIATRY PROGRESS NOTE - NSBHFUPINTERVALHXFT_PSY_A_CORE
Patient visible on the unit, attending select groups, is seen today in his room resting on approach. He states that he did not take the seroquel last night, as he had a bad dream the night before and thought that it was due to the medication. He otherwise endorsed fair mood. We discussed aftercare options such as long term rehab, he is ambivalent stating that would think about it, but is leaning towards discharge this week as he wants to use drugs. He denies si/hi/avh or PI.
Patient seen face-to-face, dressed in his own PJs, full range affect, calm, cooperative, coherent and logical. Discussed his suicidal ideation, frequent hospitalizations, and need for a plan post discharge that offers patient a change of pace and hope for the future. No plan or intent to hurt self while in hospital.
Patient seen face-to-face, dressed in his own PJs, full range affect, calm, cooperative, coherent and logical. States that he is "feeling better," and that he has had no issues. Reports that he has not experienced any side effects from increasing Seroquel to 100mg nightly. Reports that his sleep and appetite are intact. At this time, he has no mood complaints. Denies suicidal/homicidal ideation, intent or plan. Denies auditory or visual hallucinations. 
Patient seen in his room today, is eating on approach. Today he has minimal complaints other than poor sleep last night. Denies any acute medical concerns. Feels safe on the unit, compliant with medication regimen. Endorses passive si, no hi/avh or PI. 
Patient visible on the unit, is ambivalent about long term rehab, requests that SW refer him to long term rehab and will wait 2-3 days for feedback. He reports desire to use. Not interested in medications to assist with cravings at this time. No si/hi/avh or PI. No acute medical concerns. 
Patient seen in room today, pleasant and cooperative on approach, states that he feels better since being here and thinks he is ready to be discharge 'I feel justyna bad taking up space for someone else who needs it.' Has no physical complaints or concerns. No si/hi/avh or Pi. Requested diet order be changed from puree to regular which he is able to tolerate.

## 2024-04-16 NOTE — BH INPATIENT PSYCHIATRY PROGRESS NOTE - NSDCCRITERIA_PSY_ALL_CORE
Improvement in mood and symptoms

## 2024-04-16 NOTE — BH INPATIENT PSYCHIATRY PROGRESS NOTE - GENERAL APPEARANCE
scars on forearm and neck from past self harm/cutting/Other

## 2024-04-16 NOTE — BH INPATIENT PSYCHIATRY PROGRESS NOTE - OTHER
multiple tattoos note

## 2024-04-16 NOTE — BH INPATIENT PSYCHIATRY PROGRESS NOTE - NSICDXBHSECONDARYDX_PSY_ALL_CORE
Polysubstance abuse   F19.10  Substance induced mood disorder   F19.94  Personality disorder   F60.9  Severe protein-calorie malnutrition   E43  

## 2024-04-16 NOTE — BH INPATIENT PSYCHIATRY PROGRESS NOTE - NSBHATTESTBILLING_PSY_A_CORE
58012-Pjutdgoxds OBS or IP - low complexity OR 25-34 mins
08254-Zxezkdejeb OBS or IP - low complexity OR 25-34 mins
54702-Tpolbwmamq OBS or IP - low complexity OR 25-34 mins
49674-Fcosjagfix OBS or IP - moderate complexity OR 35-49 mins
64611-Jraboicmur OBS or IP - low complexity OR 25-34 mins

## 2024-04-16 NOTE — BH INPATIENT PSYCHIATRY PROGRESS NOTE - CURRENT MEDICATION
MEDICATIONS  (STANDING):  bictegravir 50 mG/emtricitabine 200 mG/tenofovir alafenamide 25 mG (BIKTARVY) 1 Tablet(s) Oral daily  folic acid 1 milliGRAM(s) Oral daily  multivitamin 1 Tablet(s) Oral daily  QUEtiapine 100 milliGRAM(s) Oral at bedtime    MEDICATIONS  (PRN):  acetaminophen     Tablet .. 650 milliGRAM(s) Oral every 6 hours PRN Mild Pain (1 - 3), Moderate Pain (4 - 6)  diphenhydrAMINE 25 milliGRAM(s) Oral at bedtime PRN Insomnia  haloperidol     Tablet 5 milliGRAM(s) Oral every 8 hours PRN agitation  LORazepam     Tablet 2 milliGRAM(s) Oral every 2 hours PRN Symptom-triggered 2 point increase in CIWA-Ar  LORazepam     Tablet 2 milliGRAM(s) Oral every 1 hour PRN Symptom-triggered: each CIWA -Ar score 8 or GREATER  
MEDICATIONS  (STANDING):  bictegravir 50 mG/emtricitabine 200 mG/tenofovir alafenamide 25 mG (BIKTARVY) 1 Tablet(s) Oral daily  folic acid 1 milliGRAM(s) Oral daily  multivitamin 1 Tablet(s) Oral daily  QUEtiapine 50 milliGRAM(s) Oral at bedtime  thiamine 100 milliGRAM(s) Oral daily    MEDICATIONS  (PRN):  acetaminophen     Tablet .. 650 milliGRAM(s) Oral every 6 hours PRN Mild Pain (1 - 3), Moderate Pain (4 - 6)  diphenhydrAMINE 25 milliGRAM(s) Oral at bedtime PRN Insomnia  haloperidol     Tablet 5 milliGRAM(s) Oral every 8 hours PRN agitation  LORazepam     Tablet 2 milliGRAM(s) Oral every 2 hours PRN Symptom-triggered 2 point increase in CIWA-Ar  LORazepam     Tablet 2 milliGRAM(s) Oral every 1 hour PRN Symptom-triggered: each CIWA -Ar score 8 or GREATER

## 2024-04-16 NOTE — BH INPATIENT PSYCHIATRY PROGRESS NOTE - NSTXCOPEINTERMD_PSY_ALL_CORE
psychopharmacology x15 minutes

## 2024-04-16 NOTE — BH INPATIENT PSYCHIATRY PROGRESS NOTE - NSBHASSESSSUMMFT_PSY_ALL_CORE
This is a 37 yo , domiciled alone in the [place] (HASA housing), unemployed on disability, . Medical hx significant for HIV (Biktarvy) PPHx of multiple psychiatric diagnoses including schizoaffective d/o vs MDD w/psychotic features vs substance-induced psychosis, alcohol, stimulant and cannabis use disorders, malingering, Antisocial personality disorder. Multiple psychiatric admissions (per chart review, last known at [facility] from 11/20/2023-11/30/2023), most recent rehab at [facility] 3/2024. No current outpatient behavorial health provider, hx of medication non-adherence, hx of multiple SAs/SIB, hx of violence/aggression. Patient presents [facility] ED BIB by GF after reportedly trying to jump out her window, experiencing ongoing SI. Requesting voluntary admission to [facility]. Utox positive for cocaine, amphetamines.    Patient well known to writer and treatment team due to his multiple prior hospitalizations. Presentation is similar to past hospitalizations. He refuses to provide collateral information. Is not ready to address substance abuse at this time.     Biktarvy daily  Folic acid 1mg  Multivitamin tablet daily  Thiamine 100mg daily x3 days  Seroquel 100mg qhs  Nutrition consult placed- recs are appreciated    4/11 Will plan to continue titration of seroquel to 100mg qhs to better stabilize symptoms.   4/12 Patient reporting overall improvement in mood and sxs. Appears discharge focused. Denies si/hiavh or PI.
This is a 37 yo , domiciled alone in the [place] (HASA housing), unemployed on disability, . Medical hx significant for HIV (Biktarvy) PPHx of multiple psychiatric diagnoses including schizoaffective d/o vs MDD w/psychotic features vs substance-induced psychosis, alcohol, stimulant and cannabis use disorders, malingering, Antisocial personality disorder. Multiple psychiatric admissions (per chart review, last known at [facility] from 11/20/2023-11/30/2023), most recent rehab at [facility] 3/2024. No current outpatient behavorial health provider, hx of medication non-adherence, hx of multiple SAs/SIB, hx of violence/aggression. Patient presents [facility] ED BIB by GF after reportedly trying to jump out her window, experiencing ongoing SI. Requesting voluntary admission to [facility]. Utox positive for cocaine, amphetamines.    Patient well known to this service and primary treatment team due to his multiple prior hospitalizations. Presentation is similar to past hospitalizations. He refuses to provide collateral information. Is not ready to address substance abuse at this time.     Biktarvy daily  Folic acid 1mg  Multivitamin tablet daily  Thiamine 100mg daily x3 days  Seroquel 100mg qhs  Nutrition consult placed- recs are appreciated    4/11 Will plan to continue titration of seroquel to 100mg qhs to better stabilize symptoms.   4/12 Patient reporting overall improvement in mood and sxs. Appears discharge focused. Denies si/hiavh or PI.  4/13 He reports good mood, denies suicidality, eating and sleeping well. Denies AVH, no delusional content or paranoid ideations elicited.  
This is a 37 yo , domiciled alone in the [place] (HASA housing), unemployed on disability, . Medical hx significant for HIV (Biktarvy) PPHx of multiple psychiatric diagnoses including schizoaffective d/o vs MDD w/psychotic features vs substance-induced psychosis, alcohol, stimulant and cannabis use disorders, malingering, Antisocial personality disorder. Multiple psychiatric admissions (per chart review, last known at [facility] from 11/20/2023-11/30/2023), most recent rehab at [facility] 3/2024. No current outpatient behavorial health provider, hx of medication non-adherence, hx of multiple SAs/SIB, hx of violence/aggression. Patient presents [facility] ED BIB by GF after reportedly trying to jump out her window, experiencing ongoing SI. Requesting voluntary admission to [facility]. Utox positive for cocaine, amphetamines.    Patient well known to this service and primary treatment team due to his multiple prior hospitalizations. Presentation is similar to past hospitalizations. He refuses to provide collateral information. Is not ready to address substance abuse at this time.     Biktarvy daily  Folic acid 1mg  Multivitamin tablet daily  Thiamine 100mg daily x3 days  Seroquel 100mg qhs  Nutrition consult placed- recs are appreciated    4/11 Will plan to continue titration of seroquel to 100mg qhs to better stabilize symptoms.   4/12 Patient reporting overall improvement in mood and sxs. Appears discharge focused. Denies si/hiavh or PI.  4/13 He reports good mood, denies suicidality, eating and sleeping well. Denies AVH, no delusional content or paranoid ideations elicited.  
This is a 39 yo , domiciled alone in the [place] (HASA housing), unemployed on disability, . Medical hx significant for HIV (Biktarvy) PPHx of multiple psychiatric diagnoses including schizoaffective d/o vs MDD w/psychotic features vs substance-induced psychosis, alcohol, stimulant and cannabis use disorders, malingering, Antisocial personality disorder. Multiple psychiatric admissions (per chart review, last known at [facility] from 11/20/2023-11/30/2023), most recent rehab at [facility] 3/2024. No current outpatient behavorial health provider, hx of medication non-adherence, hx of multiple SAs/SIB, hx of violence/aggression. Patient presents [facility] ED BIB by GF after reportedly trying to jump out her window, experiencing ongoing SI. Requesting voluntary admission to [facility]. Utox positive for cocaine, amphetamines.    Patient well known to writer and treatment team due to his multiple prior hospitalizations. Presentation is similar to past hospitalizations. He refuses to provide collateral information. Is not ready to address substance abuse at this time.     Biktarvy daily  Folic acid 1mg  Multivitamin tablet daily  Thiamine 100mg daily x3 days  Seroquel 50mg daily increased to 100mg qhs  Nutrition consult placed- recs are appreciated    4/11 Will plan to continue titration of seroquel to 100mg qhs to better stabilize symptoms. 
This is a 39 yo , domiciled alone in the [place] (HASA housing), unemployed on disability, . Medical hx significant for HIV (Biktarvy) PPHx of multiple psychiatric diagnoses including schizoaffective d/o vs MDD w/psychotic features vs substance-induced psychosis, alcohol, stimulant and cannabis use disorders, malingering, Antisocial personality disorder. Multiple psychiatric admissions (per chart review, last known at [facility] from 11/20/2023-11/30/2023), most recent rehab at [facility] 3/2024. No current outpatient behavorial health provider, hx of medication non-adherence, hx of multiple SAs/SIB, hx of violence/aggression. Patient presents [facility] ED BIB by GF after reportedly trying to jump out her window, experiencing ongoing SI. Requesting voluntary admission to [facility]. Utox positive for cocaine, amphetamines.    Patient well known to this service and primary treatment team due to his multiple prior hospitalizations. Presentation is similar to past hospitalizations. He refuses to provide collateral information. Is not ready to address substance abuse at this time.     Biktarvy daily  Folic acid 1mg  Multivitamin tablet daily  Thiamine 100mg daily x3 days  Seroquel 100mg qhs  Nutrition consult placed- recs are appreciated    4/11 Will plan to continue titration of seroquel to 100mg qhs to better stabilize symptoms.   4/12 Patient reporting overall improvement in mood and sxs. Appears discharge focused. Denies si/hiavh or PI.  4/13 He reports good mood, denies suicidality, eating and sleeping well. Denies AVH, no delusional content or paranoid ideations elicited.  4/15 No psychiatric complaints, no si/hi/avh or PI, discharge focused  4/16 Patient is considering long term rehab
This is a 39 yo , domiciled alone in the [place] (HASA housing), unemployed on disability, . Medical hx significant for HIV (Biktarvy) PPHx of multiple psychiatric diagnoses including schizoaffective d/o vs MDD w/psychotic features vs substance-induced psychosis, alcohol, stimulant and cannabis use disorders, malingering, Antisocial personality disorder. Multiple psychiatric admissions (per chart review, last known at [facility] from 11/20/2023-11/30/2023), most recent rehab at [facility] 3/2024. No current outpatient behavorial health provider, hx of medication non-adherence, hx of multiple SAs/SIB, hx of violence/aggression. Patient presents [facility] ED BIB by GF after reportedly trying to jump out her window, experiencing ongoing SI. Requesting voluntary admission to [facility]. Utox positive for cocaine, amphetamines.    Patient well known to this service and primary treatment team due to his multiple prior hospitalizations. Presentation is similar to past hospitalizations. He refuses to provide collateral information. Is not ready to address substance abuse at this time.     Biktarvy daily  Folic acid 1mg  Multivitamin tablet daily  Thiamine 100mg daily x3 days  Seroquel 100mg qhs  Nutrition consult placed- recs are appreciated    4/11 Will plan to continue titration of seroquel to 100mg qhs to better stabilize symptoms.   4/12 Patient reporting overall improvement in mood and sxs. Appears discharge focused. Denies si/hiavh or PI.  4/13 He reports good mood, denies suicidality, eating and sleeping well. Denies AVH, no delusional content or paranoid ideations elicited.  4/15 No psychiatric complaints, no si/hi/avh or PI, discharge focused

## 2024-04-16 NOTE — BH INPATIENT PSYCHIATRY PROGRESS NOTE - NSBHMSEGAIT_PSY_A_CORE
Was the patient seen in the last year in this department? No    Does patient have an active prescription for medications requested? No   Received Request Via: Pharmacy  Pt with an upcoming avril 3/14/23          
Normal gait / station

## 2024-04-16 NOTE — BH INPATIENT PSYCHIATRY PROGRESS NOTE - PRN MEDS
MEDICATIONS  (PRN):  acetaminophen     Tablet .. 650 milliGRAM(s) Oral every 6 hours PRN Mild Pain (1 - 3), Moderate Pain (4 - 6)  diphenhydrAMINE 25 milliGRAM(s) Oral at bedtime PRN Insomnia  haloperidol     Tablet 5 milliGRAM(s) Oral every 8 hours PRN agitation  LORazepam     Tablet 2 milliGRAM(s) Oral every 2 hours PRN Symptom-triggered 2 point increase in CIWA-Ar  LORazepam     Tablet 2 milliGRAM(s) Oral every 1 hour PRN Symptom-triggered: each CIWA -Ar score 8 or GREATER  

## 2024-04-16 NOTE — BH INPATIENT PSYCHIATRY PROGRESS NOTE - NSBHCHARTREVIEWVS_PSY_A_CORE FT
Vital Signs Last 24 Hrs  T(C): 37.1 (04-12-24 @ 09:35), Max: 37.7 (04-11-24 @ 16:41)  T(F): 98.8 (04-12-24 @ 09:35), Max: 99.8 (04-11-24 @ 16:41)  HR: 78 (04-12-24 @ 09:35) (56 - 82)  BP: 113/75 (04-12-24 @ 09:35) (104/63 - 117/78)  BP(mean): --  RR: 18 (04-12-24 @ 09:35) (18 - 18)  SpO2: 95% (04-12-24 @ 09:35) (95% - 98%)    
Vital Signs Last 24 Hrs  T(C): 37.3 (04-15-24 @ 10:31), Max: 37.3 (04-15-24 @ 10:31)  T(F): 99.2 (04-15-24 @ 10:31), Max: 99.2 (04-15-24 @ 10:31)  HR: 64 (04-15-24 @ 10:31) (64 - 84)  BP: 115/75 (04-15-24 @ 10:31) (115/75 - 116/73)  BP(mean): --  RR: 17 (04-15-24 @ 10:31) (17 - 17)  SpO2: 98% (04-15-24 @ 10:31) (97% - 98%)    
Vital Signs Last 24 Hrs  T(C): 37.4 (04-13-24 @ 20:07), Max: 37.8 (04-13-24 @ 06:00)  T(F): 99.3 (04-13-24 @ 20:07), Max: 100 (04-13-24 @ 06:00)  HR: 78 (04-13-24 @ 20:07) (56 - 82)  BP: 122/78 (04-13-24 @ 20:07) (106/64 - 122/78)  BP(mean): --  RR: 17 (04-13-24 @ 06:00) (17 - 17)  SpO2: 98% (04-13-24 @ 20:07) (97% - 98%)    
Vital Signs Last 24 Hrs  T(C): 36.5 (04-16-24 @ 07:55), Max: 36.5 (04-16-24 @ 07:55)  T(F): 97.7 (04-16-24 @ 07:55), Max: 97.7 (04-16-24 @ 07:55)  HR: 64 (04-16-24 @ 07:55) (64 - 64)  BP: 112/65 (04-16-24 @ 07:55) (112/65 - 112/65)  BP(mean): --  RR: 18 (04-16-24 @ 07:55) (18 - 18)  SpO2: 98% (04-16-24 @ 07:55) (98% - 98%)    
Vital Signs Last 24 Hrs  T(C): 36.7 (04-14-24 @ 07:42), Max: 37.7 (04-13-24 @ 16:47)  T(F): 98 (04-14-24 @ 07:42), Max: 99.8 (04-13-24 @ 16:47)  HR: 65 (04-14-24 @ 07:42) (52 - 82)  BP: 117/75 (04-14-24 @ 07:42) (96/60 - 122/78)  BP(mean): --  RR: 18 (04-14-24 @ 06:00) (18 - 18)  SpO2: 99% (04-14-24 @ 07:42) (98% - 99%)    
Vital Signs Last 24 Hrs  T(C): 37.2 (04-11-24 @ 08:02), Max: 37.6 (04-10-24 @ 17:13)  T(F): 99 (04-11-24 @ 08:02), Max: 99.6 (04-10-24 @ 17:13)  HR: 69 (04-11-24 @ 08:02) (60 - 72)  BP: 122/79 (04-11-24 @ 08:02) (95/50 - 122/79)  BP(mean): --  RR: 17 (04-11-24 @ 06:00) (17 - 17)  SpO2: 97% (04-11-24 @ 08:02) (97% - 100%)

## 2024-04-17 VITALS
HEART RATE: 73 BPM | OXYGEN SATURATION: 97 % | TEMPERATURE: 99 F | SYSTOLIC BLOOD PRESSURE: 127 MMHG | DIASTOLIC BLOOD PRESSURE: 75 MMHG | RESPIRATION RATE: 18 BRPM

## 2024-04-17 PROCEDURE — 86360 T CELL ABSOLUTE COUNT/RATIO: CPT

## 2024-04-17 PROCEDURE — 80053 COMPREHEN METABOLIC PANEL: CPT

## 2024-04-17 PROCEDURE — 86357 NK CELLS TOTAL COUNT: CPT

## 2024-04-17 PROCEDURE — 36415 COLL VENOUS BLD VENIPUNCTURE: CPT

## 2024-04-17 PROCEDURE — 87536 HIV-1 QUANT&REVRSE TRNSCRPJ: CPT

## 2024-04-17 PROCEDURE — 80061 LIPID PANEL: CPT

## 2024-04-17 PROCEDURE — 81001 URINALYSIS AUTO W/SCOPE: CPT

## 2024-04-17 PROCEDURE — 99238 HOSP IP/OBS DSCHRG MGMT 30/<: CPT

## 2024-04-17 PROCEDURE — 99285 EMERGENCY DEPT VISIT HI MDM: CPT

## 2024-04-17 PROCEDURE — 87637 SARSCOV2&INF A&B&RSV AMP PRB: CPT

## 2024-04-17 PROCEDURE — 83036 HEMOGLOBIN GLYCOSYLATED A1C: CPT

## 2024-04-17 PROCEDURE — 84484 ASSAY OF TROPONIN QUANT: CPT

## 2024-04-17 PROCEDURE — 86355 B CELLS TOTAL COUNT: CPT

## 2024-04-17 PROCEDURE — 86359 T CELLS TOTAL COUNT: CPT

## 2024-04-17 PROCEDURE — 80307 DRUG TEST PRSMV CHEM ANLYZR: CPT

## 2024-04-17 PROCEDURE — 85027 COMPLETE CBC AUTOMATED: CPT

## 2024-04-17 RX ORDER — BICTEGRAVIR SODIUM, EMTRICITABINE, AND TENOFOVIR ALAFENAMIDE FUMARATE 30; 120; 15 MG/1; MG/1; MG/1
1 TABLET ORAL
Qty: 0 | Refills: 0 | DISCHARGE
Start: 2024-04-17

## 2024-04-17 RX ORDER — QUETIAPINE FUMARATE 200 MG/1
1 TABLET, FILM COATED ORAL
Qty: 30 | Refills: 0
Start: 2024-04-17 | End: 2024-05-16

## 2024-04-17 RX ORDER — BICTEGRAVIR SODIUM, EMTRICITABINE, AND TENOFOVIR ALAFENAMIDE FUMARATE 30; 120; 15 MG/1; MG/1; MG/1
1 TABLET ORAL
Qty: 30 | Refills: 0
Start: 2024-04-17 | End: 2024-05-16

## 2024-04-17 RX ORDER — QUETIAPINE FUMARATE 200 MG/1
1 TABLET, FILM COATED ORAL
Qty: 0 | Refills: 0 | DISCHARGE
Start: 2024-04-17

## 2024-04-17 RX ADMIN — Medication 1 TABLET(S): at 10:24

## 2024-04-17 RX ADMIN — BICTEGRAVIR SODIUM, EMTRICITABINE, AND TENOFOVIR ALAFENAMIDE FUMARATE 1 TABLET(S): 30; 120; 15 TABLET ORAL at 10:24

## 2024-04-17 RX ADMIN — Medication 1 MILLIGRAM(S): at 10:24

## 2024-04-17 NOTE — BH INPATIENT PSYCHIATRY DISCHARGE NOTE - HPI (INCLUDE ILLNESS QUALITY, SEVERITY, DURATION, TIMING, CONTEXT, MODIFYING FACTORS, ASSOCIATED SIGNS AND SYMPTOMS)
This is a 37 yo , domiciled alone in the [place] (HASA housing), unemployed on disability, . Medical hx significant for HIV (Biktarvy) PPHx of multiple psychiatric diagnoses including schizoaffective d/o vs MDD w/psychotic features vs substance-induced psychosis, alcohol, stimulant and cannabis use disorders, malingering, Antisocial personality disorder. Multiple psychiatric admissions (per chart review, last known at [facility] from 11/20/2023-11/30/2023), most recent rehab at [facility] 3/2024. No current outpatient behavorial health provider, hx of medication non-adherence, hx of multiple SAs/SIB, hx of violence/aggression. Patient presents [facility] ED BIB by GF after reportedly trying to jump out her window, experiencing ongoing SI. Requesting voluntary admission to [facility]. Utox positive for cocaine, amphetamines.    Patient states that he recently attempted to jump out of a window and was stopped by his girlfriend who subsequently brought him to the hospital. He reports regular consistent use of alcohol, cocaine and amphetamines. Recently completed a 30day inpatient rehab at [facility] in March and quickly relapsed. He reports using ~$100 cocaine (4-5 grams daily), and 1L hard alcohol daily. He states that he would like his medications to be adjusted, but also acknowledges that he does not take psychiatric medications outside of the hospital. He is compliant with his Biktarvy and receives it regularly for outpatient clinic. May be interested in receiving naltrexone for his AUD, but does not appear motivated for other substance abuse treatment. Denies recent avh or paranoid ideation. No thoughts of harming self.    Per ED report:  Patient seen, evaluated. He appears depressed on evaluation. He reports since discharge from [facility] in November he followed up briefly with [facility], but did not follow up after January; reports running out of medications at the end of January. Reports worsening mood over the past 3 weeks; endorses worsening depressed mood, insomnia (reports sleeping for 2-4 hours for last week), fear of being alone, loss of appetite, feelings of worthlessness and like a burden to "everyone in my life." Reports suicidal ideation daily for the past two weeks to either hang or strangle himself, or walk into traffic. Reports today opening girlfriend's window to climb onto and jump off of fire escape to kill himself but GF stopped him. Reports current suicidal ideation to walk into traffic or hang himself. Also endorses not showering, cleaning himself in a week. Denies seeing or hearing anything in the past few days--endorses chronic CAH but that he has not heard them in 3 days. Reports insufflating cocaine, 3 days ago, using about $100 worth every 3-4 days; denies other substance use. Denies increased energy, feelings of special pothers, or other symptoms of adriel. Denies homicidal ideation, denies ideation to harm girlfriend or others, reporting relationship has been "the only thing keeping me going."

## 2024-04-17 NOTE — BH INPATIENT PSYCHIATRY DISCHARGE NOTE - NSBHFUPINTERVALHXFT_PSY_A_CORE
Patient visible on the unit, is ambivalent about long term rehab, requests that SW refer him to long term rehab and will wait 2-3 days for feedback. He reports desire to use. Not interested in medications to assist with cravings at this time. No si/hi/avh or PI. No acute medical concerns.

## 2024-04-17 NOTE — BH INPATIENT PSYCHIATRY DISCHARGE NOTE - POSTFACE STATEMENT FOR MINUTES SPENT
minutes on the discharge service. Hydroquinone Pregnancy And Lactation Text: This medication has not been assigned a Pregnancy Risk Category but animal studies failed to show danger with the topical medication. It is unknown if the medication is excreted in breast milk.

## 2024-04-17 NOTE — BH DISCHARGE NOTE NURSING/SOCIAL WORK/PSYCH REHAB - NSDCPRGOAL_PSY_ALL_CORE
Pt attended select groups increasingly after initial isolative behavior. Pt has been in behavioral control and mood has appeared regulated over this hospitalization. Pt's affect appears less constricted and Pt has been more social with select peers. Pt continues to endorse that substance abuse is the primary problem negatively impacting his mental health but as well continues to display ambivalence about sobriety and seeking support for substance abuse. Pt has shown intervals of increased interest in inpatient rehab as well as intervals of apathy and overwhelming desire to use again. Pt acknowledges addiction and perseverative thoughts about using again. Pt states that family is a protective factor for him and he has hopes to reconnect with them "someday" if sober. Pt completed a safety plan and received a copy to take with him.

## 2024-04-17 NOTE — BH INPATIENT PSYCHIATRY DISCHARGE NOTE - NSBHASSESSSUMMFT_PSY_ALL_CORE
This is a 39 yo , domiciled alone in the [place] (HASA housing), unemployed on disability, . Medical hx significant for HIV (Biktarvy) PPHx of multiple psychiatric diagnoses including schizoaffective d/o vs MDD w/psychotic features vs substance-induced psychosis, alcohol, stimulant and cannabis use disorders, malingering, Antisocial personality disorder. Multiple psychiatric admissions (per chart review, last known at [facility] from 11/20/2023-11/30/2023), most recent rehab at [facility] 3/2024. No current outpatient behavorial health provider, hx of medication non-adherence, hx of multiple SAs/SIB, hx of violence/aggression. Patient presents [facility] ED BIB by GF after reportedly trying to jump out her window, experiencing ongoing SI. Requesting voluntary admission to [facility]. Utox positive for cocaine, amphetamines.    Patient well known to this service and primary treatment team due to his multiple prior hospitalizations. Presentation is similar to past hospitalizations. He refuses to provide collateral information. Is not ready to address substance abuse at this time.     Biktarvy daily  Folic acid 1mg  Multivitamin tablet daily  Thiamine 100mg daily x3 days  Seroquel 100mg qhs  Nutrition consult placed- recs are appreciated    4/11 Will plan to continue titration of seroquel to 100mg qhs to better stabilize symptoms.   4/12 Patient reporting overall improvement in mood and sxs. Appears discharge focused. Denies si/hiavh or PI.  4/13 He reports good mood, denies suicidality, eating and sleeping well. Denies AVH, no delusional content or paranoid ideations elicited.  4/15 No psychiatric complaints, no si/hi/avh or PI, discharge focused  4/16 Patient is considering long term rehab

## 2024-04-17 NOTE — BH INPATIENT PSYCHIATRY DISCHARGE NOTE - NSDCCPCAREPLAN_GEN_ALL_CORE_FT
PRINCIPAL DISCHARGE DIAGNOSIS  Diagnosis: Psychoactive substance-induced mood disorder  Assessment and Plan of Treatment:       SECONDARY DISCHARGE DIAGNOSES  Diagnosis: Substance use disorder  Assessment and Plan of Treatment:     Diagnosis: Cocaine abuse  Assessment and Plan of Treatment:     Diagnosis: Amphetamine abuse  Assessment and Plan of Treatment:     Diagnosis: HIV infection  Assessment and Plan of Treatment:

## 2024-04-17 NOTE — BH INPATIENT PSYCHIATRY DISCHARGE NOTE - DESCRIPTION
unemployed (reports receiving disability for psychiatric illness); , lives alone in apartment in the [place]

## 2024-04-17 NOTE — BH INPATIENT PSYCHIATRY DISCHARGE NOTE - HOSPITAL COURSE
Review of progress notes indicates an unremarkable course with the patient compliance with medication No behavioral issues. Not endorsing  suicidal or homicidal ideation intent or plans; no mention of auditory or visual hallucinations Alert; oriented; cognition intact; speech clear; no tremor or evidence of movement impairment.      ;;04/11:  Patient seen in his room today, is eating on approach. Today he has minimal complaints other than poor sleep last night. Denies any acute medical concerns. Feels safe on the unit, compliant with medication regimen. Endorses passive si, no hi/avh or PI.    ;04/16: Patient visible on the unit, is ambivalent about long term rehab, requests that SW refer him to long term rehab and will wait 2-3 days for feedback. He reports desire to use. Not interested in medications to assist with cravings at this time. No si/hi/avh or PI. No acute medical concerns.        Dicussed potential value of Naltrexone for ETOH but need to monitor issues because of HIV and liver funtion   Aspartate Aminotransferase (AST/SGOT): 26 U/L (04.09.24 @ 18:17) wnl   Alanine Aminotransferase (ALT/SGPT): 21 U/L (04.09.24 @ 18:17)  wnl           ---xxx    Current medications acetaminophen     Tablet .. 650 milliGRAM(s) Oral every 6 hours PRN  No rx given  diphenhydrAMINE 25 milliGRAM(s) Oral at bedtime PRN  folic acid 1 milliGRAM(s) Oral daily  haloperidol     Tablet 5 milliGRAM(s) Oral every 8 hours PRN  LORazepam     Tablet 2 milliGRAM(s) Oral every 2 hours PRN  LORazepam     Tablet 2 milliGRAM(s) Oral every 1 hour PRN  multivitamin 1 Tablet(s) Oral daily    rx given 30 day supply   QUEtiapine 100 milliGRAM(s) Oral at bedtime for mood   bictegravir 50 mG/emtricitabine 200 mG/tenofovir alafenamide 25 mG (BIKTARVY) 1 Tablet(s) Oral daily for HIV

## 2024-04-17 NOTE — BH INPATIENT PSYCHIATRY DISCHARGE NOTE - ATTENDING DISCHARGE PHYSICAL EXAMINATION:
;;04/17: Not endorsing  suicidal or homicidal ideation intent or plans; no mention of auditory or visual hallucinations Sleep  appetite ok; no pain issues. Future oriented; looks forward to eating Italian food.  i&J: poor: limited  awareness of medications; guarded or minimally acknowledging sxs; not reflective on issues that impact on symptoms Fair to good eye contact; speech clear spontaneous and normal volume Thinking is congruent with affect; no peculiarities of thinking or language use.

## 2024-04-17 NOTE — BH INPATIENT PSYCHIATRY DISCHARGE NOTE - NSBHDCCONDITION_PSY_ALL_CORE
Kenalog Preparation: kenalog
Concentration (Mg/Ml) Of Additional Medication: 2.5
Consent: The risks of atrophy were reviewed with the patient.
Symptomatic, but no longer needs inpatient level of care
Detail Level: None
Total Volume (Ccs): 1
Concentration (Mg/Ml): 10.0
Add Option For Additional Mediation: No

## 2024-04-17 NOTE — BH INPATIENT PSYCHIATRY DISCHARGE NOTE - NSBHDCSUICRISK_PSY_A_CORE
Video- Visit Details  Type of service:  video visit for psychotherapy  Time of services    Date: 7/20/21    Video Start Time: 1:00        Video End Time:  1:44    Reason for video visit:  Services only offered telehealth  Originating Site (patient location):  [facility]   Location- Patient's home  Distant Site (provider location):  Remote location  Mode of Communication:  Video Conference via Zoom  Consent:  Patient has given verbal consent for video visit?: Yes       ----------------------------------------------------------------------------------------------------------  [facility], [place]   Therapy Visit                       Client Name: Aracely Tenorio   YOB: 2008 (12 year old)   Date of Service:  July 20, 2021  Time of Service: 1:00 to 1:44  Service Type(s): 36446 psychotherapy (38-52 min. with patient and/or family)    Diagnoses:   Encounter Diagnosis   Name Primary?     Trauma and stressor-related disorder Yes       Individuals Present:   Client attended alone    Treatment goal(s) being addressed:   Trauma narrative       Subjective:  Aracely was engaged in session and was open to starting his trauma narrative.  He was open in his discussion about some of his history, but he avoided talking directly about the trauma. He denied any current trauma symptoms or other contributing symptoms.     Treatment:   Provided support and reassurance during Aracely's outline of trauma narrative.      Assessment and Progress:   Jannie was engaged in session and open to discussion about trauma, but he demonstrated some avoidance.  Denied concerns about current mental health symptoms or safety.    Plan:   Will continue with TF-CBT to provide support for Aracely to continue to engage in life while processing his experiences. Next therapy appointment has been scheduled for 7/23 to continue work on treatment goals.      Treatment Plan review due: 7/23/2021      Smitha GRULLON  DALE Johnson, [facility]  Child and Adolescent   Psychiatry Clinic  [phone]                yes...

## 2024-04-17 NOTE — BH INPATIENT PSYCHIATRY DISCHARGE NOTE - LEVEL OF CONSCIOUSNESS
Patients nephrostomy tube last exchanged on 2/28/2020  It appears she has started pelvic radiation, and is on Lovenox  These are likely contributing factors  Recommend patient increase water intake, and call if it worsens and/or has decreased urine output  Alert

## 2024-04-17 NOTE — BH DISCHARGE NOTE NURSING/SOCIAL WORK/PSYCH REHAB - NSTOBACCOOTHER_PSY_ALL_CORE_FT
Call [facility] Smokers' Quitline at 7-367-TF-QUITS ([phone]) or visit [email or web address]

## 2024-04-17 NOTE — BH DISCHARGE NOTE NURSING/SOCIAL WORK/PSYCH REHAB - PATIENT PORTAL LINK FT
You can access the FollowMyHealth Patient Portal offered by [facility] by registering at the following website: [email or web address]. By joining [facility]’s FollowMyHealth portal, you will also be able to view your health information using other applications (apps) compatible with our system.

## 2024-04-17 NOTE — BH INPATIENT PSYCHIATRY DISCHARGE NOTE - REASON FOR ADMISSION
37 yo , domiciled alone in the [place] (HAS housing), unemployed on disability, .  Patient states that he recently attempted to jump out of a window and was stopped by his girlfriend who subsequently brought him to the hospital. He reports regular consistent use of alcohol, cocaine and amphetamines.

## 2024-04-17 NOTE — BH INPATIENT PSYCHIATRY DISCHARGE NOTE - OTHER PAST PSYCHIATRIC HISTORY (INCLUDE DETAILS REGARDING ONSET, COURSE OF ILLNESS, INPATIENT/OUTPATIENT TREATMENT)
Multiple past psychiatric admissions, last known at [facility] from 11/20/23-11-30-23, at least 2 lifetime suicide attempts, last 1.5 year ago. Pt has history of nonadherence with OP psychiatric and substance treatment

## 2024-04-17 NOTE — BH INPATIENT PSYCHIATRY DISCHARGE NOTE - NSBHMETABOLIC_PSY_ALL_CORE_FT
BMI: BMI (kg/m2): 22.6 (04-10-24 @ 05:01)  HbA1c: A1C with Estimated Average Glucose Result: 5.1 % (04-10-24 @ 15:10)    Glucose: POCT Blood Glucose.: 100 mg/dL (07-12-23 @ 12:25)    BP: 127/75 (04-17-24 @ 09:38) (112/65 - 127/75)Vital Signs Last 24 Hrs  T(C): 37.2 (04-17-24 @ 09:38), Max: 37.2 (04-17-24 @ 09:38)  T(F): 99 (04-17-24 @ 09:38), Max: 99 (04-17-24 @ 09:38)  HR: 73 (04-17-24 @ 09:38) (73 - 73)  BP: 127/75 (04-17-24 @ 09:38) (127/75 - 127/75)  BP(mean): --  RR: 18 (04-17-24 @ 09:38) (18 - 18)  SpO2: 97% (04-17-24 @ 09:38) (97% - 97%)      Lipid Panel: Date/Time: 04-10-24 @ 15:16  Cholesterol, Serum: 165  LDL Cholesterol Calculated: 84  HDL Cholesterol, Serum: 56  Total Cholesterol/HDL Ration Measurement: --  Triglycerides, Serum: 144

## 2024-04-17 NOTE — BH INPATIENT PSYCHIATRY DISCHARGE NOTE - NSBHSUICIDESTATUS_PSY_ALL_CORE
on admission HIGH currently mood appears improved on medications;  risk LOW to MODERATE in view of HIV status and potential to use substances.

## 2024-04-17 NOTE — BH INPATIENT PSYCHIATRY DISCHARGE NOTE - NSDCPROCEDURESFT_PSY_ALL_CORE
Amphetamine, Urine: Positive (04.09.24 @ 18:12)  Cocaine Metabolite, Urine: Positive (04.09.24 @ 18:12)  HIV-1 RNA Quantitative, Viral Load: 751 (04.10.24 @ 15:10)

## 2024-04-17 NOTE — BH INPATIENT PSYCHIATRY DISCHARGE NOTE - NSDCMRMEDTOKEN_GEN_ALL_CORE_FT
bictegravir/emtricitabine/tenofovir 50 mg-200 mg-25 mg oral tablet: 1 tab(s) orally once a day  QUEtiapine 100 mg oral tablet: 1 tab(s) orally once a day (at bedtime)

## 2024-04-17 NOTE — BH DISCHARGE NOTE NURSING/SOCIAL WORK/PSYCH REHAB - NSCDUDCCRISIS_PSY_A_CORE
[facility] Well  [phone]) [facility] ([phone])  Text "WELL" to 80678  Website: www.[employer]/.National Suicide Prevention Lifeline 5 (583) 201-1884/.  [employer]  1 (865) LIFENET (329-2450)/988 Suicide and Crisis Lifeline

## 2024-04-17 NOTE — BH DISCHARGE NOTE NURSING/SOCIAL WORK/PSYCH REHAB - NSDCVIVACCINE_GEN_ALL_CORE_FT
Tdap; 31-Jul-2018 20:20; Servando Drew (RN); [employer]; [id number]; IntraMuscular; Deltoid Right.; 0.5 milliLiter(s); VIS (VIS Published: 09-May-2013, VIS Presented: 31-Jul-2018);   Tdap; 21-Jun-2023 15:03; Leydi Wright); [employer]; [id number] (Exp. Date: 08-May-2025); IntraMuscular; Deltoid Left.; 0.5 milliLiter(s); VIS (VIS Published: 09-May-2013, VIS Presented: 21-Jun-2023);

## 2024-04-18 NOTE — BH SOCIAL WORK CONFIRMATION FOLLOW UP NOTE - NSCOMMENTS_PSY_ALL_CORE
[facility]aring call 4/18/24: Outreach attempt made to number listed in chart. Pt not available , left vm requesting a call back.     Linkage 4/23/24:  [facility] call 4/18/24: Outreach attempt made to number listed in chart x3. Pt not available , left vm requesting a call back.     Linkage 4/23/24: Outreach made to clinic. Pt did not attend appointment as confirmed by the clinic.       Allyn Joiner  23-Apr-2024 12:00  [address] [facility] [address]  [phone]

## 2024-04-26 DIAGNOSIS — Z86.16 PERSONAL HISTORY OF COVID-19: ICD-10-CM

## 2024-04-26 DIAGNOSIS — R45.851 SUICIDAL IDEATIONS: ICD-10-CM

## 2024-04-26 DIAGNOSIS — F17.210 NICOTINE DEPENDENCE, CIGARETTES, UNCOMPLICATED: ICD-10-CM

## 2024-04-26 DIAGNOSIS — Z88.0 ALLERGY STATUS TO PENICILLIN: ICD-10-CM

## 2024-04-26 DIAGNOSIS — F33.3 MAJOR DEPRESSIVE DISORDER, RECURRENT, SEVERE WITH PSYCHOTIC SYMPTOMS: ICD-10-CM

## 2024-04-26 DIAGNOSIS — F60.2 ANTISOCIAL PERSONALITY DISORDER: ICD-10-CM

## 2024-04-26 DIAGNOSIS — F15.14 OTHER STIMULANT ABUSE WITH STIMULANT-INDUCED MOOD DISORDER: ICD-10-CM

## 2024-04-26 DIAGNOSIS — F14.14 COCAINE ABUSE WITH COCAINE-INDUCED MOOD DISORDER: ICD-10-CM

## 2024-04-26 DIAGNOSIS — Z91.148 PATIENT'S OTHER NONCOMPLIANCE WITH MEDICATION REGIMEN FOR OTHER REASON: ICD-10-CM

## 2024-04-26 DIAGNOSIS — Z21 ASYMPTOMATIC HUMAN IMMUNODEFICIENCY VIRUS [HIV] INFECTION STATUS: ICD-10-CM

## 2024-04-26 DIAGNOSIS — Z11.52 ENCOUNTER FOR SCREENING FOR COVID-19: ICD-10-CM

## 2024-04-26 DIAGNOSIS — Z81.8 FAMILY HISTORY OF OTHER MENTAL AND BEHAVIORAL DISORDERS: ICD-10-CM

## 2024-04-26 DIAGNOSIS — F19.14 OTHER PSYCHOACTIVE SUBSTANCE ABUSE WITH PSYCHOACTIVE SUBSTANCE-INDUCED MOOD DISORDER: ICD-10-CM

## 2024-04-26 DIAGNOSIS — Z91.51 PERSONAL HISTORY OF SUICIDAL BEHAVIOR: ICD-10-CM

## 2024-04-26 DIAGNOSIS — E43 UNSPECIFIED SEVERE PROTEIN-CALORIE MALNUTRITION: ICD-10-CM

## 2024-04-26 DIAGNOSIS — Z79.899 OTHER LONG TERM (CURRENT) DRUG THERAPY: ICD-10-CM

## 2024-04-26 DIAGNOSIS — Z91.199 PATIENT'S NONCOMPLIANCE WITH OTHER MEDICAL TREATMENT AND REGIMEN DUE TO UNSPECIFIED REASON: ICD-10-CM

## 2024-05-16 NOTE — BH INPATIENT PSYCHIATRY PROGRESS NOTE - NSBHPSYCHOLCOGORIENT_PSY_A_CORE
Oriented to time, place, person, situation
Oriented to time, place, person, situation
Pt refused IV access o/n; will place in AM per pt request; Pt is a 59y F PMH HTN, ETOH abuse, seizures admitted for worsening LE edema and anemia.
Oriented to time, place, person, situation

## 2024-05-22 NOTE — ED BEHAVIORAL HEALTH ASSESSMENT NOTE - THOUGHT PROCESS
" Neville West PA-C  Psychiatric Services  [facility] Sexual [facility] Health  [address]  [place] [address]  [phone]          PSYCHIATRIC DIAGNOSTIC ASSESSMENT      Name:  Sid Miranda  : 1993    Sid Miranda is a 30 year old male         Referred by:   Francisco Cannon  Patient Care Team:  No Ref-Primary, Physician as PCP - General  Francisco Cannon, PhD as Assigned Behavioral Health Provider  Therapist: Francisco Cannon, PhD    History was provided by patient who was a good historian(s).    Patient attended the session in clinic    RECORDS AVAILABLE FOR REVIEW: EHR records through [email or web address] .                                            CHIEF COMPLAINT   Patient is a 30 year old,  White Not  or  male  who presents for initial psychiatric evaluation. Referred by Francisco Cannon, PhD  Note by referring provider:   \"Client has depression, meth use disorder, anxiety. He's currently in a meth use pattern of a brief use episode every 3-4 weeks. His typical cycle is 1 day of use, 1 week of \"pink cloud\", 2 weeks of depression/irritability, then use. He has a history of inpatient meth treatment. We've been taking a harm reduction approach to reduce the harm/negative consequences of use. He is interested in disrupting his current use cycle.     What are your goals and the patient's goals for this consultation? __ Discuss medication treatment options. He is ambivalent about medications but would like information. Please see my note on 24 about medication history.   \"    HISTORY OF PRESENT ILLNESS   Reports past diagnosis of: substance abuse disorder, generalized anxiety,     Seeing Francisco a bit over a year now. Narrowing goals for treatment. Barriers to improvement  Would like to address baseline depression and meth use.   First sought treatment: in late childhood. Saw therapy at age 10 and 13-also had psych meds and stopped psychotherapist around 16 and " continued medsf ora few years. Therapisy in mid 20s.   Since then recovery at 26. Substance abuse treatment centers. Before brenda in DBT.   Couple of hospitalizations:   First at 13: 3 day thing.   Inpatient 14 (3 day eval after pretending suidicde attempt) and one at 15 (SIB).   Has community support. Closest friends in [facility] in active use.   Boyfriend in recovery   Had a sponsor and was fired.     PSYCHIATRIC HISTORY:   Previous psychiatry: yes.   Previous therapist: yes.    History of Interventions:  psychiatry    History of Psychiatric Hospitalizations:   - Inpatient:  yes MI/CD inpatient/outpatient/sober home  - IOP/PHP/Day treatment: IOP after 15. Couple of IOP programs.   History of Suicidal Ideation: once in a while., no intent or plan.   History of Suicide Attempts; not serious attempts.     History of Self-injurious Behavior: Cutting and Other highschool, middle school .  Current:  denies.   History of Violence/Aggression: meth use.   History of Commitment? denies.   Electroconvulsive Therapy (ECT) or Transcranial Magnetic Stimulation (TMS): denies.   PharmacogenomicTesting (such as GeneSight): denies.     PSYCHIATRIC REVIEW OF SYSTEMS:   Sleep: not as good as it usually is. Related to job. Shift starts at 4a, time change, difficulty falling aslee. 1-2 hour        Diet: No Restrictions  Exercise: yes.  Depression:  Rates depression a 4/10 on a ten point worsening scale. Fatigue, lack of interest, general low mood. Thinking thought patterns.   Suicidal ideation:  Denies and passive, no intent or plan  Anxiety: Rates anxiety a /10 on a ten point worsening scale currently. Social situations, future, past..   Panic:  couple in mid 20s. Heart palpitations.     Social anxiety: denies.   PTSD:  complex trauma.     OCD:  intrusive thoughts. Fidgety always moving.   Specific fears: denies.   Mood lability:  Could not elicit true manic symptoms, extended periods of decreased need for sleep, extreme high level  of energy, or grandiosity. Denies any symptoms consistent with hypomania.  Endorses: denies.   Psychosis:  yes related to meth use.     ADD / ADHD:  received diagnosis at different times. Started off highschool with Adderall prescrption. Fidget, difficulty holding focus.      Autism symptoms: denies. Awkward a lot d/t anxiety.   Eating Disorder:   dieting in adolescence.         ASSESSMENT SCALES:    PROMIS-10 Scores                No data to display                     No data to display              PHQ9 score is Not completed today  Suicidal ideation:  passive, no intent or plan        4/4/2023     8:55 AM   LORENZO-7 SCORE   Total Score 6 (mild anxiety)   Total Score 6    6         4/4/2023     8:55 AM   LORENZO-7   [employer], 2002; Used with Permission)   1. Feeling nervous, anxious, or on edge More than half the days   2. Not being able to stop or control worrying Several days   3. Worrying too much about different things Several days   4. Trouble relaxing Several days   5. Being so restless that it is hard to sit still Several days   6. Becoming easily annoyed or irritable Not at all   7. Feeling afraid, as if something awful might happen Not at all   LORENZO 7 TOTAL SCORE 6 (mild anxiety)   1. Feeling nervous, anxious, or on edge 2   2. Not being able to stop or control worrying 1   3. Worrying too much about different things 1   4. Trouble relaxing 1   5. Being so restless that it is hard to sit still 1   6. Becoming easily annoyed or irritable 0   7. Feeling afraid, as if something awful might happen 0   LORENZO-7 Total Score 6    6   If you checked any problems, how difficult have they made it for you to do your work, take care of things at home, or get along with other people? Somewhat difficult     GAD7 score is Not completed today     All other ROS negative.     FAMILY, MEDICAL, SURGICAL HISTORY REVIEWED.  MEDICATION HAVE BEEN REVIEWED AND ARE CURRENT TO THE BEST OF MY KNOWLEDGE AND ABILITY.      MEDICATIONS                                                                                                 No current outpatient medications on file.     No current facility-administered medications for this visit.       DRUG MONITORING:  [place] Prescription Monitoring Program evaluating controlled substances in the last year in [place]:  [place] Pres[place] [] was checked today:  not using controlled substances..    CURRENT MEDICATION SIDE EFFECTS REPORTED:  N/A not on medications.     NOTES ABOUT CURRENT PSYCHOTROPIC MEDICATIONS:   N/A    Supplements: denies.     PAST PSYCHOTROPIC MEDICATIONS:  Wellbutrin: on it August/September: about a month. Notes side effects which increased anxiety. Used it as a tool to recover from meth use.   Zoloft  Lithium   Risperdal   Abilify  Tegredol    VITALS   There were no vitals taken for this visit.     BP Readings from Last 1 Encounters:   No data found for BP     Pulse Readings from Last 1 Encounters:   No data found for Pulse     Wt Readings from Last 1 Encounters:   No data found for Wt     Ht Readings from Last 1 Encounters:   No data found for Ht     There is no height or weight on file to calculate BMI.          ALLERGY & IMMUNIZATIONS     Not on File      PERTINENT HISTORY     There is no problem list on file for this patient.       Seizures or Head Injury: late teens episode of non epileptic seizures. Stopped happening.   Cardiac history. Denies.        No past surgical history on file.       SOCIAL HISTORY  Born in [place], [place].  Needs as a kid were met.     Lives with boyfriend. Been together for 2 years.     Relationship status: relationship  Children: denies.   Highest education level was  [employer], BA international relations, [employer].  .    Service: denies.   Employment status: full time.     Trauma history:  states therapist notes complex trauma.   ACES (Adverse Childhood Experiences):  None.  Grew up in an intact home with all basic needs being met        LEGAL:  Denies    SUBSTANCE USE HISTORY  Social History     Tobacco Use    Smoking status: Not on file    Smokeless tobacco: Not on file   Substance Use Topics    Alcohol use: Not on file       CAGE:       3/21/2023     8:44 AM   CAGE-AID Flowsheet   Have you ever felt you should Cut down on your drinking or drug use? 1   Have people Annoyed you by criticizing your drinking or drug use? 1   Have you ever felt bad or Guilty about your drinking or drug use? 1   Have you ever had a drink or used drugs first thing in the morning to steady your nerves or to get rid of a hangover? (Eye opener) 1   CAGE-AID SCORE 4   Have you ever felt you should Cut down on your drinking or drug use? Yes   Have people Annoyed you by criticizing your drinking or drug use? Yes   Have you ever felt bad or Guilty about your drinking or drug use? Yes   Have you ever had a drink or used drugs first thing in the morning to steady your nerves or to get rid of a hangover? (Eye opener) Yes   CAGE-AID SCORE 4 (A total score of 2 or greater is considered clinically significant)       Caffeine: coffee in the morning.   Alcohol:  denies.  Other substance use: Found meth freshman year in [facility] [employer] [facility]. Not a whole lot of guidance. Offered Meth. Tried it and had use/cycle similar to now. Used it every month and discontinued. Went to [place] pantera year. Used IV first time. 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 By 25 last year in [place] was scary. Met a jennifer living in [facility] followed him here in 2020. Went into daily use until June. Inpatient treatment for first time. Overwhelmed with psychotic symptoms. Inpatient/outpatient sober home clean for a year. Perpetual relapse for a year. Summer 2022/2023 months on and off. Past year  4-6 weeks. One night.  Past use alcohol/substance use: see above.      Chemical dependency history: Patient has received chemical dependency treatment in the past at inpatient/outpatient sober home.       No  family history on file.         PERTINENT FAMILY PSYCHIATRIC HISTORY NOTES  Maternal: schizophrenia.   Paternal:   Substance use history in family: mom's side brothers   Family suicide history: denies.   Medications family responded to: denies.    Based on the clinical interview, there  are indications of drug or alcohol abuse. Meth use .  Continue to monitor.  Motivational interviewing utilized. Currently in the stage of Action/[place]power (Changing behavior), Discussed morbidity and mortality of continued substance abuse. , and Encouraged sobriety supports in the community. .     MEDICAL REVIEW OF SYSTEMS:   Ten system review was completed with pertinent positives noted above    MENTAL STATUS EXAM:   General/Constitutional:  Appearance:  awake, alert, adequately groomed, appeared stated age and no apparent distress  Attitude:   cooperative   Eye Contact:  good  Musculoskeletal:  Psychomotor Behavior:  no evidence of tardive dyskinesia, dystonia, or tics from the head up  Psychiatric:  Speech:  clear, coherent, regular rate, rhythm, and volume,  No pressure speech noted.  Associations:  no loose associations  Thought Process:  logical, linear and goal oriented  Thought Content:   Evidence of suicidal ideation. No evidence of homicidal ideation, no evidence of psychotic thought, no auditory hallucinations present and no visual hallucinations present  Mood:  anxious  Affect:  restricted (limited variability of emotion during exam) and was congruent to speech content.  Insight:  good  Judgment:  intact, adequate for safety  Impulse Control:  intact  Neurological:  Oriented to:  person, place, time, and situation  Attention Span and Concentration:  Able to attend to the interview     Language: intact    Recent and Remote Memory:  Intact to interview. Not formally assessed. No amnesia.   Fund of Knowledge: appropriate        SAFETY   Feels safe in home: Yes   Suicidal ideation: Denies and passive, no intent or  plan  History of suicide attempts:  Yes in adolescence.     Hx of impulsivity: Yes   Hope for the future: present   Hx of Command hallucinations or current psychosis: denies.   History of Self-injurious behaviors: Yes cutting  Current:  No  Family member  by suicide:  No    SAFETY ASSESSMENT:   Based on all available evidence including the factors cited above, overall Risk for harm is low and is appropriate for outpatient level of care.   Recommended that patient call 911 or go to the local ED should there be a change in any of these risk factors. and Recommended that legal guardian/parent call 911 or go to the local ED should there be a change in any of these risk factors.    Suicide Risk Factors: Previous self harm, diagnosis of a mental disorder (especially depression or mood disorders), male, prior suicide attempts, and current substance use  Risk is mitigated by the following protective factors: access to behavioral health care, active involvement in treatment, health seeking behaviors, connectedness to individuals, family, life skills (including good problem solving skills, coping skills, and ability to adapt to change), forward thinking, future oriented, stable housing, and employed      LANGUAGE OR COMMUNICATION BARRIERS   Are there language or communication issues or need for modification in treatment? No   Are there ethnic, cultural or Christian factors that may be relevant for therapy? No  Client identified their preferred language to be fluent English in conversational context  Does the client need the assistance of an  or other support involved in therapy? No    DSM 5 DIAGNOSIS:      Generalized anxiety disorder  Depression, unspecified depression type  Post-traumatic stress disorder  Severe stimulant use disorder (H)      MEETS CRITERIA PER DSM 5 AS FOLLOWS:  Previously diagnosed.     MEDICAL COMORBIDITY IMPACTING CLINICAL PICTURE: None noted. Known issue that I take into account for  their medical decisions, no current exacerbations or new concerns      ASSESSMENT AND PLAN    Sid Miranda is a 30 year old White Not  or  male presenting for psychiatric evaluation and medication management. Information is obtained from patient and available records.  Reports history of    Generalized anxiety disorder  Depression, unspecified depression type  Post-traumatic stress disorder  Severe stimulant use disorder (H)   Previously psychiatrically hospitalized during adolescence various times and chemical dependency treatment. History of IOP as well.  Hx of suicidal ideation and attempts.   Hx of self-injurious behaviors in the form of cutting starting at age middle school.  Genetically loaded for  substance use, schizophrenia. Grew up in an intact home with all basic needs being met.    Sid was seen today for consult.    His stimulant use anxiety and depression are not controlled it is reassuring he is in therapy is currently working with therapist for harm reduction with stimulant use disorder.  I am prescribing Lexapro 5 mg to address anxiety and depression to better overall mood.  Follow-up in 1 month.   Diagnoses and all orders for this visit:    Generalized anxiety disorder  -     escitalopram (LEXAPRO) 5 MG tablet; Take 1 tablet (5 mg) by mouth daily    Depression, unspecified depression type    Post-traumatic stress disorder    Severe stimulant use disorder (H)        CONSULTS/REFERRALS:   Continue therapy   Coordinate care with therapist as needed     MEDICAL:   None at this time  Coordinate care with PCP (No Ref-Primary, Physician) as needed  Follow up with primary care provider as planned or for acute medical concerns.    PSYCHOEDUCATION:  Medication side effects and alternatives reviewed. Health promotion activities recommended and reviewed today. All questions addressed. Education and counseling completed regarding risks and benefits of medications and psychotherapy options.   Consent provided by patient/guardian  Call the psychiatric nurse line with medication questions or concerns at [phone].  Kairos ARhart may be used to communicate with your provider, but this is not intended to be used for emergencies.  Medlineplus.gov is information for patients.  It is run by the [employer] of Medicine and it contains information about all disorders, diseases and all medications.      COMMUNITY RESOURCES:    CRISIS NUMBERS:  National Suicide Prevention Lifeline: 3-231-414-TALK (626-190-3944)  Wishdates/resources for a list of additional resources (SOS)            [facility] - [phone]   Urgent Care Adult Mental Jpcokv-[phone] mobile unit/ 24/7 crisis line  [facility] -[phone]   COPE 24/7 [place] Mobile Team -[phone] (adults)/ [phone] (child)  Poison Control Center - 1-733.887.1522    OR  go to nearest ER  Crisis Text Line for any crisis 24/7 send this-   To: 348057   [facility] ([facility]  [phone]  National Suicide Prevention Lifeline: 615.466.2122 (TTY: 625.666.3723). Call anytime for help.  (www.suicidepreventionlifeline.org)  National [place] on Mental Illness (www.samaria.org): 017-287-4412 or 100-538-5999.   Mental Health Association (www.mentalhealth.org): 812.850.2095 or 556-327-9793.  [place] Crisis Text Line: Text [place] to 530333  Suicide LifeLine Chat: suicideGymRealm.org/chat    ADMINISTRATIVE BILLING:     Time spent interviewing patient, reviewing referral documents, obtaining and reviewing outside records, communication with other health specialists, and preparing this report.    Greater than 50% of time was spent in counseling and coordination of care regarding above diagnoses and treatment plan.    Patient Status: Patient will continue to be seen for ongoing consultation and stabilization.    Signed:   Neville West PA-C        [employer]

## 2024-06-05 NOTE — PROGRESS NOTE BEHAVIORAL HEALTH - CASE SUMMARY
no psychosis; no active suicidal ideation.  Continue meds and treatment plan
no behavior issues or psychosis. Continue rehab placement efforts.  As per ortho
no behavior issues. Mood improving.  Rehab placement being pursued
continued improvement noted.  Rehab placement pending
mood pleasant; no s/h ideation.  Continue rehab placement plans and efforts
no psychosis; mood improving. Pt awaiting placement in rehab for continued substance abuse treatment.  Likelihood of relapse warrants continued treatment on inpatient setting to allow pt to go directly to rehab.
Pt was seen, evaluated and discussed with the resident, Dr. Gonzalez. Chart reviewed. 37yo  man, single, previously domiciled currently living at [facility], unemployed, with PPH of affective and psychotic symptoms, substance use disorders (prominently alcohol, cocaine, heroin use), past SA including hanging, history of NSSIB in the past, multiple prior admissions (May 2021 [facility], July 2021 [facility], Oct 2021 [facility], Dec [facility]), history of detox/rehab in the past, with PMH of HIV on Biktarvy (haven't taken in over 2 months), who BIBS for SI in the setting of recent suicide of his girlfriend, heavy substance use and associated with it mood sx.  On evaluation, pt endorsed he has been feeling "depressed." Reported non compliance with medications and has been abusing cocaine and alcohol. Denied current alcohol withdrawal symptoms. Agree with assessment and plan above. Continue with medications as above.
Detail Level: Detailed

## 2024-06-08 NOTE — BH DISCHARGE NOTE NURSING/SOCIAL WORK/PSYCH REHAB - NSDCINSTRUCTIONS_PSY_ALL_CORE_FT
When discharged, take only medications prescribed as instructed by your hospital provider    Do not stop or change any medications until you discuss changes with your own prescriber    Do not take any other medications, including left over medications from before your admission, over the counter medications or herbal supplements, unless you discuss with your own provider
will see patient in ED

## 2024-06-08 NOTE — BH INPATIENT PSYCHIATRY PROGRESS NOTE - NSBHCHARTREVIEWVS_PSY_A_CORE FT
Vital Signs Last 24 Hrs  T(C): 36.6 (09-25-22 @ 09:00), Max: 37.2 (09-24-22 @ 17:00)  T(F): 97.8 (09-25-22 @ 09:00), Max: 98.9 (09-24-22 @ 17:00)  HR: 86 (09-25-22 @ 09:00) (73 - 86)  BP: 111/69 (09-25-22 @ 09:00) (111/69 - 130/76)  BP(mean): --  RR: 18 (09-25-22 @ 09:00) (18 - 18)  SpO2: 97% (09-25-22 @ 09:00) (96% - 98%)     General

## 2024-06-17 NOTE — ED PROVIDER NOTE - NS ED MD DISPO DISCHARGE CCDA
Notified of test results. Diet and exercise. Labs in 1 yr.
Patient/Caregiver provided printed discharge information.

## 2024-06-22 NOTE — PROGRESS NOTE BEHAVIORAL HEALTH - SUMMARY
35  man, currently , living alone in [place], unemployed, two daughters not in his custody, who has PMHx of HIV, and PPHx of psychotic depression vs schizophrenia, poly substance use disorder (etoh and cocaine active, heroin in the past), many prior hospitalizations (most recent 10/2020 at [facility]) and suicide attempts, who self presented with depression, auditory hallucinations and suicidal ideation. Workup revealed UTOX with cocaine and ETOH level 23 suggesting recent substance use. Presented with constricted affect and poor eye contact. Still continues to endorse AH telling him to harm himself. Also continues to have SI w/plan to cut himself. Not able to safety plan at this time. Reports sleeping 2-3 hours a night for the past couple of weeks. Patient states that he has been doing poorly for a while, recent stressors include lack of family/social support during holidays, not having medications, using substances more again. Interested in voluntary admissions and restarting medications.  On initiation evaluation, patient appeared lethargic, withdrawn, continuing to endorse intermittent command auditory hallucinations, poor mood.   On evaluation today patient's mood mildly improved however remains lethargic. Auditory hallucinations continue to be intermittently present however none were reported thus far today, possibly demonstrating improvement as well. Will continue medications to target symptoms and continue to monitor for tolerability. Possible plan for rehab on discharge remains.      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  -continue lexapro 15mg po daily  -continue risperidone 1mg po bid    #Alcohol use disorder with withdrawal  -continue ativan taper with additional ativan 1mg po q6h prn for breakthrough symptoms of alcohol withdrawal  - Continue to monitor for alcohol withdrawal symptoms    #Cocaine use d/o  - Will continue to monitor  - No treatment indicated at this time    #Left Hip Ulcer, HIV+  - treatment per ID recommendations: d/c doxy, switch to bactrim DS, f/u labs, switch to descovy and Tivicay daily once labs sent    PRNs  -For severe agitation not responding to behavioral intervention, may give haldol 5 mg po q6h prn,  hydroxyzine 50 mg po q6h prn, with escalation to IM if patient refusing PO and remains an imminent danger to self or others. If IM antipsychotic is administered, please perform follow-up ECG for QTc monitoring. [facility]

## 2024-06-26 NOTE — ED ADULT TRIAGE NOTE - HEIGHT IN CM
[FreeTextEntry1] : 32 yo male with 5 mm distal ureteral stone found on CT.  Plan I have reviewed images of patient's CT abd/pelvis and discussed results with patient demonstrating 5 mm distal ureteral stone Urinalysis KUB Renal US Discussed dietary modifications for stone prevention Will call with results   Patient is being seen today for evaluation and management of a chronic and longitudinal ongoing condition and I am of the primary treating physician.    [phone]

## 2024-06-27 NOTE — BH INPATIENT PSYCHIATRY DISCHARGE NOTE - NSCORESITESY/N_GEN_A_CORE_RD
Detail Level: Detailed Depth Of Biopsy: dermis Was A Bandage Applied: Yes Size Of Lesion In Cm: 2.2 X Size Of Lesion In Cm: 0 Biopsy Type: H and E Biopsy Method: Dermablade No Anesthesia Type: 1% lidocaine with epinephrine and a 1:10 solution of 8.4% sodium bicarbonate Anesthesia Volume In Cc: 0.5 Hemostasis: Ferric chloride Wound Care: Petrolatum Dressing: bandage Destruction After The Procedure: No Type Of Destruction Used: Curettage Curettage Text: The wound bed was treated with curettage after the biopsy was performed. Cryotherapy Text: The wound bed was treated with cryotherapy after the biopsy was performed. Electrodesiccation Text: The wound bed was treated with electrodesiccation after the biopsy was performed. Electrodesiccation And Curettage Text: The wound bed was treated with electrodesiccation and curettage after the biopsy was performed. Silver Nitrate Text: The wound bed was treated with silver nitrate after the biopsy was performed. Lab: 1000 Lab Facility: [id number] Consent: Written consent was obtained and risks were reviewed including but not limited to scarring, infection, bleeding, scabbing, incomplete removal, nerve damage and allergy to anesthesia. Post-Care Instructions: I reviewed with the patient in detail post-care instructions. Patient is to keep the biopsy site dry overnight, and then apply bacitracin twice daily until healed. Patient may apply hydrogen peroxide soaks to remove any crusting. Notification Instructions: Patient will be notified of biopsy results. However, patient instructed to call the office if not contacted within 2 weeks. Billing Type: Third-Party Bill Information: Selecting Yes will display possible errors in your note based on the variables you have selected. This validation is only offered as a suggestion for you. PLEASE NOTE THAT THE VALIDATION TEXT WILL BE REMOVED WHEN YOU FINALIZE YOUR NOTE. IF YOU WANT TO FAX A PRELIMINARY NOTE YOU WILL NEED TO TOGGLE THIS TO 'NO' IF YOU DO NOT WANT IT IN YOUR FAXED NOTE.

## 2024-07-14 NOTE — ED ADULT NURSE NOTE - NS ED NURSE RECORD ANOTHER HT AND WT
[facility]  Progress Note  Name: Joaquin Frankel I  MRN: [id number]  Unit/Bed#: S -01 I Date of Admission: 7/4/2024   Date of Service: 7/14/2024 I Hospital Day: 10    Assessment & Plan   ESRD (end stage renal disease) on dialysis (HCC)  Assessment & Plan  Lab Results   Component Value Date    EGFR 8 07/13/2024    EGFR 6 07/12/2024    EGFR 7 07/11/2024    CREATININE 7.33 (H) 07/13/2024    CREATININE 8.84 (H) 07/12/2024    CREATININE 8.13 (H) 07/11/2024     Patient ESRD on Dialysis 3 times a week, MWF  Elevated , c/o of orthopnea   Etiology of CLOVIS: Etiology not entirely clear - Possible MGRS? Pattern of disease progression is not typical for diabetic nephropathy.   Etiology of CKD: Suspected due to diabetic nephropathy, hypertensive nephrosclerosis, obesity related renal dysfunction  Previous baseline creatinine 1.3-1.6 w/ prior episodes of CLOVIS  Serologic workup negative except for IgG kappa on SPEP and UPEP.  Cryoglobulin negative, ANCA negative, anti-dsDNA negative, anti-GBM negative, JOSE ROBERTO negative, C4 34, C3 118  Bone marrow biopsy 6/12/2024: negative for plasma cell neoplasm.   CRRT initiated 6/7/2024.  Peak creatinine 10.4  Last dialyzed 7/12/2024 removed 4 L  Dry weight decreased to 172.5 kg   electrolytes and acid/base stable  7/12 renal biopsy was not performed due to hypertension despite sedation and antihypertensive medications. Tentatively rescheduled for Monday or Tuesday next week.    Plan:  Nephrology onboard  Resume heparin- Hold on 7/15 8AM for potential kidney biopsy   Hold oral AC. Resume ASA 81 mg daily after renal biopsy   NPO after midnight today.  HD per nephro.  Continue torsemide 100 mg daily    Pulmonary embolism (HCC)  Assessment & Plan  Patient presented for worsening chest pain, shortness of breath, and dyspnea on exertion.  Patient was admitted for similar chest pain in early June and was found to have ESRD, started on HD MWF   D-Dimer 4.21  CTA  showed subsegmental right lower lobe pulmonary embolism  Placed on heparin drip  7/12/24 Heparin was subtherapeutic this morning, PTT 55. No bolus was given or increase in gtt as patient will have the gtt stopped at 10 am pending IR procedure.    Plan  Resume heparin - Hold on 7/15 8AM for potential kidney biopsy   Transition to warfarin 5 mg daily after renal biopsy   Supplemental oxygen as needed    * Chest pain  Assessment & Plan  Patient presented with worsening substernal chest pain over several weeks.  H/o chronic angina, CAD, family history of hereditary heart disease on fathers side.   Patient was admitted for similar chest pain in early June and was found to have ESRD, started on HD MWF.  Recent nuclear stress test without significant reversible ischemia  EKG on admission: Sinus Rhythm with PAC's. Repeat EKG showed sinus bradycardia  High-sensitivity troponin negative x 5   . D-Dimer 4.21  CTA: subsegmental right lower lobe pulmonary embolism  07/05/24: Updated echocardiogram on unremarkable  Patient blood pressure elevated on admission to 194/80, currently stable.  Patient received 2 doses of nitroglycerin in ED and reports it did not improve his pain, but it improved his elevated blood pressure.  Patient was saturating low 80s on room air on admission and required 3L supplemental O2, which improved saturation to 90s. O2 Sat currently 94% to 95% on room air.  MRSA negative. Viral panel negative.  Multifactorial from acute on chronic anemia, hypertensive urgency, and acute PE   Patient's chest pain has resolved following hemodialysis and the improvement of high blood pressure.   7/9: Telemetry discontinued  7/12: Lab holiday. Will get labs on Monday    Plan-  Per cardiology recs: Continue amlodipine 5 mg daily,Coreg 6.25 mg BID, Imdur 60 mg daily, atorvastatin 80 mg daily. Holding aspirin 81 mg daily    Type 2 diabetes mellitus (HCC)  Assessment & Plan  Lab Results   Component Value Date    HGBA1C  5.7 (H) 06/09/2024       Recent Labs     07/12/24  1521 07/12/24  2157 07/13/24  0808 07/13/24  1145   POCGLU 82 133 95 103       Blood Sugar Average: Last 72 hrs:  (P) [id number]      Anemia  Assessment & Plan    Hemoglobin   Date Value Ref Range Status   07/13/2024 9.4 (L) 12.0 - 17.0 g/dL Final   01/23/2017 14.5 12.6 - 17.7 g/dL Final     Hematocrit   Date Value Ref Range Status   07/13/2024 30.0 (L) 36.5 - 49.3 % Final   01/23/2017 43.8 37.5 - 51.0 % Final       Patient has history of anemia of chronic disease, ESRD  CBC on admission showed hgb 6.6 / hct 21.2  Received 2 units packed RBC's in ED. Hbg 7.6 after transfusion  Hematology following with outpatient f/u scheduled 7/11/2024, patient will reschedule due to overlapping hospital admission   On oral iron twice daily  No active bleeding, on heparin for PE, will switch to oral AC after renal biopsy procedure.  7/8/2024 Patient was transfused 1 unit of blood during hemodialysis.  Hemoglobin 8.8, hematocrit 27.1  S/P 3 units packed RBCs  Start IV Venofer 100 mg with each dialysis treatment for total 10 doses starting 7/10/24     Plan:  Continue to monitor hemoglobin levels  Transfuse for Hgb <8.0    ANGY (obstructive sleep apnea)  Assessment & Plan  Overnight pulse oximetry obtained: highest O2 sat 99%, his lowest O2 sat 73%, with a mean of 91%. Patient had 120 desaturation events <89%.  He spent an 1 hour and 15 minutes in desaturation (~15.82%) and and 1 hour and 34 minutes with desaturation <89% (~19.77%).  Counseled patient on importance of maintaining oxygen saturation as oxygen deprivation can lead to hypertension, heart failure, coronary artery disease, pulmonary hypertension, etc.    Patient was advised of need to wear oxygen at night upon discharge. He declines stating he would like to wait for formal evaluation via sleep study as an outpatient.    Chronic acquired lymphedema  Assessment & Plan  Chronic LLE lymphedema x 3 years after surgical  resection of LLE cyst    Plan:  Advised compression and elevation of lower extremity for symptomatic edema relief    Hyponatremia  Assessment & Plan  Lab Results   Component Value Date/Time    SODIUM 132 (L) 07/13/2024 07:45 AM     Likely due to fluid overload.     Plan:  Per nephro, fluid restriction to 1.5 L/day  Continue with hemodialysis  F/u  [facility] 7/15    Hypertension  Assessment & Plan  History of HTN, recent medication change after dx of ESRD  Workup at that time notable for IgG kappa monoclonal gammopathy and s/p bone marrow biopsy on 6/12/2023 to assess for multiple myeloma. Bone marrow biopsy with no evidence of plasma cell neoplasm.   HD on MWF schedule.   Presented to ED with uncontrolled /80. Endorsed headaches and blurry vision in his left eye in the ED that has since resolved.  BP improved    Plan:  Continue amlodipine 10 mg daily, carvedilol 6.25 mg twice daily, Imdur 60 mg once daily, torsemide 100 mg daily  Hold carvedilol if HR <50  Nephrology and cardiology onbooard   Continue UF with dialysis as BP tolerates     Acute respiratory failure with hypoxia (HCC)-resolved as of 7/6/2024  Assessment & Plan  Patient O2 saturation on admission was 83% on RA  Was placed on 3/L Nasal canula FiO2 32%  CTA showed right lower lobe subsegmental PE  Was placed on heparin    Plan:  Supplemental oxygen as needed  Continue heparin for PE  Incentive Spirometry           VTE Pharmacologic Prophylaxis: VTE Score: 7 High Risk (Score >/= 5) - Pharmacological DVT Prophylaxis Ordered: heparin drip. Sequential Compression Devices Ordered.    Mobility:   Basic Mobility Inpatient Raw Score: 24  JH-HLM Goal: 8: Walk 250 feet or more  JH-HLM Achieved: 8: Walk 250 feet ot more  JH-HLM Goal achieved. Continue to encourage appropriate mobility.    Patient Centered Rounds: I performed bedside rounds with nursing staff today.  Discussions with Specialists or Other Care Team Provider: nephro    Education and Discussions with  Family / Patient: Patient declined call to .     Current Length of Stay: 10 day(s)  Current Patient Status: Inpatient   Discharge Plan: Anticipate discharge in 24-48 hrs to home.    Code Status: Level 1 - Full Code    Subjective:   Patient was seen and examined at bedside this morning.  OOA x 4, NAD.  Reported chest pain has since significantly improved.  Pending kidney biopsy potentially on Monday or Tuesday.  Denied any other acute complaints.    Patient verbalized he will need a work note upon discharge.    Objective:     Vitals:   Temp (24hrs), Av.9 °F (37.2 °C), Min:98.9 °F (37.2 °C), Max:98.9 °F (37.2 °C)    Temp:  [98.9 °F (37.2 °C)] 98.9 °F (37.2 °C)  HR:  [61-72] 61  BP: (117-137)/(59-66) 137/59  SpO2:  [91 %-92 %] 91 %  Body mass index is 59.99 kg/m².     Input and Output Summary (last 24 hours):     Intake/Output Summary (Last 24 hours) at 2024 1351  Last data filed at 2024 1105  Gross per 24 hour   Intake 990 ml   Output 850 ml   Net 140 ml       Physical Exam:   Physical Exam  Vitals and nursing note reviewed.   Constitutional:       General: He is not in acute distress.     Appearance: He is obese. He is not ill-appearing.   HENT:      Head: Normocephalic and atraumatic.      Right Ear: External ear normal.      Left Ear: External ear normal.      Nose: Nose normal.      Mouth/Throat:      Mouth: Mucous membranes are moist.      Pharynx: Oropharynx is clear.   Eyes:      Extraocular Movements: Extraocular movements intact.      Conjunctiva/sclera: Conjunctivae normal.   Cardiovascular:      Rate and Rhythm: Normal rate and regular rhythm.      Pulses: Normal pulses.      Heart sounds: Normal heart sounds.   Pulmonary:      Effort: Pulmonary effort is normal. No respiratory distress.      Breath sounds: Normal breath sounds. No wheezing.      Comments: On RA  Abdominal:      General: Bowel sounds are normal. There is no distension.      Palpations: Abdomen is soft.       Tenderness: There is no abdominal tenderness. There is no right CVA tenderness, left CVA tenderness, guarding or rebound.      Comments: Lymphedema noted to the left lower pannus  Skin under pannus appears irritated with some areas of maceration.  There is also a small draining wound present.   Musculoskeletal:         General: Normal range of motion.      Cervical back: Normal range of motion and neck supple. No rigidity.      Right lower leg: Edema (violaceous skin changes right lower extremity with healing punctate wounds over shink) present.      Left lower leg: Edema (lymphedema) present.      Comments: LLE significantly larger than RLE due to chronic lymphedema. Healed surgical incision noted to left calf.    Skin:     General: Skin is warm and dry.      Comments: R IJV PermCath site clear. Dressing intact.   Neurological:      General: No focal deficit present.      Mental Status: He is alert and oriented to person, place, and time. Mental status is at baseline.   Psychiatric:         Mood and Affect: Mood normal.          Additional Data:     Labs:  Results from last 7 days   Lab Units 07/13/24  0745 07/12/24  0549   WBC Thousand/uL 5.99 6.08   HEMOGLOBIN g/dL 9.4* 8.1*   HEMATOCRIT % 30.0* 25.9*   PLATELETS Thousands/uL 261 239   SEGS PCT %  --  65   LYMPHO PCT %  --  16   MONO PCT %  --  10   EOS PCT %  --  7*     Results from last 7 days   Lab Units 07/13/24  0745   SODIUM mmol/L 132*   POTASSIUM mmol/L 4.4   CHLORIDE mmol/L 95*   CO2 mmol/L 27   BUN mg/dL 27*   CREATININE mg/dL 7.33*   ANION GAP mmol/L 10   CALCIUM mg/dL 9.3   GLUCOSE RANDOM mg/dL 82     Results from last 7 days   Lab Units 07/12/24  1623   INR  1.06     Results from last 7 days   Lab Units 07/14/24  1116 07/14/24  0748 07/13/24  2052 07/13/24  1606 07/13/24  1145 07/13/24  0808 07/12/24  2157 07/12/24  1521 07/12/24  1123 07/12/24  0742 07/11/24  2054 07/11/24  1620   POC GLUCOSE mg/dl 104 89 117 126 103 95 133 82 81 86 113 113                Lines/Drains:  Invasive Devices       Peripheral Intravenous Line  Duration             Peripheral IV 07/09/24 Right;Lower Arm 5 days              Hemodialysis Catheter  Duration             HD Permanent Double Catheter 29 days                          Imaging: Reviewed radiology reports from this admission including:    IR biopsy kidney random   Final Result by Radames Liu MD (07/12 8073)   CT-guided random kidney biopsy canceled due to inability to optimize blood pressure versus respiratory status.      Plan:   Will need repeat attempt at CT-guided biopsy with anesthesiology assistance, likely general anesthesia.      Workstation performed: [id number]         CTA ED chest PE Study   Final Result by Gurjit Gibbons MD (07/04 2003)      Subsegmental right lower lobe pulmonary embolism.   No CT evidence of right heart strain.      Diffuse patchy groundglass opacities consistent with infectious/inflammatory infiltrate.      Findings were discussed with Dr. Monique Rothman MD 8:00 p.m. on 7/4/2024            Workstation performed: [id number]         XR chest 1 view portable   Final Result by Danyell Ford MD (07/04 1707)      Mild pulmonary venous congestion.            Workstation performed: [id number]         IR biopsy kidney random    (Results Pending)       Recent Cultures (last 7 days):         Last 24 Hours Medication List:   Current Facility-Administered Medications   Medication Dose Route Frequency Provider Last Rate    acetaminophen  650 mg Oral Q6H PRN Mily Martin MD      amLODIPine  10 mg Oral Daily Andrea Longoria MD      atorvastatin  80 mg Oral Daily Mily Martin MD      carvedilol  6.25 mg Oral BID With Meals Andrea Longoria MD      ferrous sulfate  325 mg Oral BID With Meals Mily Martin MD      heparin (porcine)  3-30 Units/kg/hr (Order-Specific) Intravenous Titrated Jose Calderon MD 27 Units/kg/hr (07/14/24 1227)    heparin (porcine)  10,000 Units Intravenous Q6H PRN  Monique Rothman DO      heparin (porcine)  5,000 Units Intravenous Q6H PRN Monique Rothman DO      iron sucrose  100 mg Intravenous After Dialysis Johnny Viramontes MD Stopped (07/12/24 1134)    isosorbide mononitrate  60 mg Oral Daily Mily Martin MD      labetalol  10 mg Intravenous Q6H PRN Liana Riley MD      lidocaine  1 patch Topical Daily Mily Martin MD      nystatin   Topical BID Lorri Maria MD      ondansetron  4 mg Intravenous Once Cindy Balderas MD      oxyCODONE  10 mg Oral Q4H PRN Lorri Maria MD      oxyCODONE  5 mg Oral Q4H PRN Lorri Maria MD      polyethylene glycol  17 g Oral Daily PRN Diane Oliveira MD      sevelamer  800 mg Oral TID With Meals Mily Martin MD      torsemide  100 mg Oral Daily Charles Spear MD          Today, Patient Was Seen By: Jose Calderon MD    **Please Note: This note may have been constructed using a voice recognition system.**     Yes

## 2024-07-25 NOTE — ED ADULT NURSE NOTE - HAVE YOU HAD COVID IN THE LAST 60 DAYS?
Pt A/Ox4 on RA. Up with assist + walker--bedrest throughout shift. No complaints of N/V/D. Generalized back pain; tolerated well with scheduled medication. PRN magic mouthwash administered; provided relief.    L PIV dressing clean/dry/intact; flushes well with good blood return. Good appetite--adequate PO intake. Voiding; bedside commode--good urine output.      AM labs: phosphorus 2.2; potassium 3.3--replacements ordered.    All due medications given per MAR. Fall precautions maintained. Education provided. No acute changes during this shift. Will notify MD of any changes. All care endorsed.              No

## 2024-08-08 NOTE — PROGRESS NOTE BEHAVIORAL HEALTH - GROOMING
Patient is in the supine position.   The body was positioned using the following devices: safety strap. Good

## 2024-08-22 NOTE — BEHAVIORAL HEALTH ASSESSMENT NOTE - PRIOR MEDICATION SIDE EFFECTS OR ADVERSE REACTIONS
[Alert] : alert [Oriented x 3] : ~L oriented x 3 [Well Nourished] : well nourished [Conjunctiva Non-injected] : conjunctiva non-injected [No Visual Lymphadenopathy] : no visual  lymphadenopathy [No Clubbing] : no clubbing [No Edema] : no edema [No Bromhidrosis] : no bromhidrosis [No Chromhidrosis] : no chromhidrosis [FreeTextEntry3] : A complete skin examination was performed of the scalp/hair, head/face, conjunctivae/lids, lips/teeth/gums, neck, digits/nails, right and left axilla, right and left upper and lower extremities, chest, abdomen, back, buttocks and groin area. No bromohidrosis. No hyperhidrosis.   Notable findings are documented below: atrophic scar on left temple NER pink papule on left forhead   None known

## 2024-08-29 NOTE — ED PROVIDER NOTE - NSRISKSEXPLAINEDTO_ED_A_ED
A catheter was exchanged for a (CATHETER 6FR JR4 CLASSIC CRV 100CM RADOPQ BRAID SLCT SUP TRQ) catheter. Patient

## 2024-09-25 NOTE — ED PROVIDER NOTE - CLINICAL SUMMARY MEDICAL DECISION MAKING FREE TEXT BOX
General
patient from [facility], hiv, shingles, with fever  will check labs, cxr  likely return to [facility], continue valtrex

## 2024-10-01 NOTE — ED BEHAVIORAL HEALTH ASSESSMENT NOTE - ORIENTED TO TIME
[place] - Surgery  Discharge Note  Short Stay    Procedure(s) (LRB):  HYSTEROSCOPY, WITH DILATION AND CURETTAGE OF UTERUS (N/A)  ABLATION, ENDOMETRIUM (N/A)      OUTCOME: Patient tolerated treatment/procedure well without complication and is now ready for discharge.    DISPOSITION: Home or Self Care    FINAL DIAGNOSIS:  Menorrhagia, status post endometrial ablation    FOLLOWUP: In clinic    DISCHARGE INSTRUCTIONS:  Pelvic rest x4 weeks     Clinical Reference Documents Added to Patient Instructions         Document    DILATION AND CURETTAGE (D AND C) (ENGLISH)    HYSTEROSCOPY DISCHARGE INSTRUCTIONS (ENGLISH)            TIME SPENT ON DISCHARGE: 15 minutes      Alisia Collins MD, FACOG   Gynecology    [address] [address] [phone]    
Yes

## 2024-10-09 NOTE — ED ADULT NURSE NOTE - NURSING ED SKIN COLOR
normal for race Objective  – VS  T(C): 36.5 (10-08-24 @ 23:12)  HR: 91 (10-09-24 @ 00:56)  BP: 121/79 (10-08-24 @ 23:16)  RR: 18 (10-08-24 @ 23:16)  SpO2: 98% (10-09-24 @ 00:56)  – PE:   General: NAD  CV: RRR  Pulm: breathing comfortably on RA, clear to auscultation b/l  Abd: gravid, nontender  Extr: moving all extremities with ease, mild non-pitting edema of b/l LE, non-TTP of b/l LE  – Spec: negative pooling, negative bleeding  – VE: 0/0/-3  – FHT: 135/moderate variability/+accels/-decels  – [place]: infrequent contractions  – EFW: 3100g extrapolated from sono in office 1 week ago  – Sono: vertex, BPP 8/8, DARYA 12.45

## 2024-10-14 NOTE — ED ADULT TRIAGE NOTE - DOMESTIC TRAVEL HIGH RISK QUESTION
Render Risk Assessment In Note?: no Additional Notes: Imiquimod cream was RXed last visit and patient only used it couple of times before “my cat hide it away and I couldn’t find it”. Will send new RX per patient’s request. Detail Level: Simple No

## 2024-10-22 ENCOUNTER — INPATIENT (INPATIENT)
Facility: HOSPITAL | Age: 39
LOS: 13 days | Discharge: NURSING, REHAB OR LONG-TERM CARE | End: 2024-11-05
Attending: PSYCHIATRY & NEUROLOGY | Admitting: PSYCHIATRY & NEUROLOGY
Payer: MEDICAID

## 2024-10-22 VITALS
DIASTOLIC BLOOD PRESSURE: 67 MMHG | WEIGHT: 154.98 LBS | HEIGHT: 66 IN | HEART RATE: 66 BPM | OXYGEN SATURATION: 99 % | RESPIRATION RATE: 18 BRPM | TEMPERATURE: 98 F | SYSTOLIC BLOOD PRESSURE: 110 MMHG

## 2024-10-22 DIAGNOSIS — F19.90 OTHER PSYCHOACTIVE SUBSTANCE USE, UNSPECIFIED, UNCOMPLICATED: ICD-10-CM

## 2024-10-22 DIAGNOSIS — F32.3 MAJOR DEPRESSIVE DISORDER, SINGLE EPISODE, SEVERE WITH PSYCHOTIC FEATURES: ICD-10-CM

## 2024-10-22 DIAGNOSIS — T14.91XA SUICIDE ATTEMPT, INITIAL ENCOUNTER: ICD-10-CM

## 2024-10-22 LAB
ALBUMIN SERPL ELPH-MCNC: 3.1 G/DL — LOW (ref 3.4–5)
ALP SERPL-CCNC: 84 U/L — SIGNIFICANT CHANGE UP (ref 40–120)
ALT FLD-CCNC: 48 U/L — HIGH (ref 12–42)
AMPHET UR-MCNC: NEGATIVE — SIGNIFICANT CHANGE UP
ANION GAP SERPL CALC-SCNC: 7 MMOL/L — LOW (ref 9–16)
APPEARANCE UR: CLEAR — SIGNIFICANT CHANGE UP
AST SERPL-CCNC: 46 U/L — HIGH (ref 15–37)
BACTERIA # UR AUTO: ABNORMAL /HPF
BARBITURATES UR SCN-MCNC: NEGATIVE — SIGNIFICANT CHANGE UP
BASOPHILS # BLD AUTO: 0.04 K/UL — SIGNIFICANT CHANGE UP (ref 0–0.2)
BASOPHILS NFR BLD AUTO: 0.8 % — SIGNIFICANT CHANGE UP (ref 0–2)
BENZODIAZ UR-MCNC: NEGATIVE — SIGNIFICANT CHANGE UP
BILIRUB SERPL-MCNC: 0.2 MG/DL — SIGNIFICANT CHANGE UP (ref 0.2–1.2)
BILIRUB UR-MCNC: NEGATIVE — SIGNIFICANT CHANGE UP
BUN SERPL-MCNC: 26 MG/DL — HIGH (ref 7–23)
CALCIUM SERPL-MCNC: 8.3 MG/DL — LOW (ref 8.5–10.5)
CHLORIDE SERPL-SCNC: 107 MMOL/L — SIGNIFICANT CHANGE UP (ref 96–108)
CO2 SERPL-SCNC: 28 MMOL/L — SIGNIFICANT CHANGE UP (ref 22–31)
COCAINE METAB.OTHER UR-MCNC: POSITIVE
COLOR SPEC: YELLOW — SIGNIFICANT CHANGE UP
CREAT SERPL-MCNC: 1.09 MG/DL — SIGNIFICANT CHANGE UP (ref 0.5–1.3)
DIFF PNL FLD: NEGATIVE — SIGNIFICANT CHANGE UP
EGFR: 90 ML/MIN/1.73M2 — SIGNIFICANT CHANGE UP
EOSINOPHIL # BLD AUTO: 0.41 K/UL — SIGNIFICANT CHANGE UP (ref 0–0.5)
EOSINOPHIL NFR BLD AUTO: 8 % — HIGH (ref 0–6)
ETHANOL SERPL-MCNC: <3 MG/DL — SIGNIFICANT CHANGE UP
FENTANYL UR QL SCN: NEGATIVE — SIGNIFICANT CHANGE UP
GLUCOSE SERPL-MCNC: 84 MG/DL — SIGNIFICANT CHANGE UP (ref 70–99)
GLUCOSE UR QL: NEGATIVE MG/DL — SIGNIFICANT CHANGE UP
GRAN CASTS # UR COMP ASSIST: PRESENT
HCT VFR BLD CALC: 40.2 % — SIGNIFICANT CHANGE UP (ref 39–50)
HGB BLD-MCNC: 12.8 G/DL — LOW (ref 13–17)
IMM GRANULOCYTES NFR BLD AUTO: 0.4 % — SIGNIFICANT CHANGE UP (ref 0–0.9)
KETONES UR-MCNC: ABNORMAL MG/DL
LEUKOCYTE ESTERASE UR-ACNC: NEGATIVE — SIGNIFICANT CHANGE UP
LYMPHOCYTES # BLD AUTO: 1.33 K/UL — SIGNIFICANT CHANGE UP (ref 1–3.3)
LYMPHOCYTES # BLD AUTO: 25.9 % — SIGNIFICANT CHANGE UP (ref 13–44)
MAGNESIUM SERPL-MCNC: 1.9 MG/DL — SIGNIFICANT CHANGE UP (ref 1.6–2.6)
MCHC RBC-ENTMCNC: 29.3 PG — SIGNIFICANT CHANGE UP (ref 27–34)
MCHC RBC-ENTMCNC: 31.8 GM/DL — LOW (ref 32–36)
MCV RBC AUTO: 92 FL — SIGNIFICANT CHANGE UP (ref 80–100)
METHADONE UR-MCNC: NEGATIVE — SIGNIFICANT CHANGE UP
MONOCYTES # BLD AUTO: 0.67 K/UL — SIGNIFICANT CHANGE UP (ref 0–0.9)
MONOCYTES NFR BLD AUTO: 13.1 % — SIGNIFICANT CHANGE UP (ref 2–14)
NEUTROPHILS # BLD AUTO: 2.66 K/UL — SIGNIFICANT CHANGE UP (ref 1.8–7.4)
NEUTROPHILS NFR BLD AUTO: 51.8 % — SIGNIFICANT CHANGE UP (ref 43–77)
NITRITE UR-MCNC: NEGATIVE — SIGNIFICANT CHANGE UP
NRBC # BLD: 0 /100 WBCS — SIGNIFICANT CHANGE UP (ref 0–0)
OPIATES UR-MCNC: NEGATIVE — SIGNIFICANT CHANGE UP
PCP SPEC-MCNC: SIGNIFICANT CHANGE UP
PCP UR-MCNC: NEGATIVE — SIGNIFICANT CHANGE UP
PH UR: 7 — SIGNIFICANT CHANGE UP (ref 5–8)
PLATELET # BLD AUTO: 339 K/UL — SIGNIFICANT CHANGE UP (ref 150–400)
POTASSIUM SERPL-MCNC: 3.8 MMOL/L — SIGNIFICANT CHANGE UP (ref 3.5–5.3)
POTASSIUM SERPL-SCNC: 3.8 MMOL/L — SIGNIFICANT CHANGE UP (ref 3.5–5.3)
PROT SERPL-MCNC: 8 G/DL — SIGNIFICANT CHANGE UP (ref 6.4–8.2)
PROT UR-MCNC: 30 MG/DL
RBC # BLD: 4.37 M/UL — SIGNIFICANT CHANGE UP (ref 4.2–5.8)
RBC # FLD: 14 % — SIGNIFICANT CHANGE UP (ref 10.3–14.5)
RBC CASTS # UR COMP ASSIST: 2 /HPF — SIGNIFICANT CHANGE UP (ref 0–4)
SARS-COV-2 RNA SPEC QL NAA+PROBE: SIGNIFICANT CHANGE UP
SODIUM SERPL-SCNC: 142 MMOL/L — SIGNIFICANT CHANGE UP (ref 132–145)
SP GR SPEC: 1.03 — HIGH (ref 1–1.03)
THC UR QL: NEGATIVE — SIGNIFICANT CHANGE UP
UROBILINOGEN FLD QL: 1 MG/DL — SIGNIFICANT CHANGE UP (ref 0.2–1)
WBC # BLD: 5.13 K/UL — SIGNIFICANT CHANGE UP (ref 3.8–10.5)
WBC # FLD AUTO: 5.13 K/UL — SIGNIFICANT CHANGE UP (ref 3.8–10.5)
WBC UR QL: 2 /HPF — SIGNIFICANT CHANGE UP (ref 0–5)

## 2024-10-22 PROCEDURE — 99223 1ST HOSP IP/OBS HIGH 75: CPT

## 2024-10-22 PROCEDURE — 90792 PSYCH DIAG EVAL W/MED SRVCS: CPT | Mod: 95

## 2024-10-22 RX ORDER — QUETIAPINE FUMARATE 200 MG/1
50 TABLET ORAL ONCE
Refills: 0 | Status: COMPLETED | OUTPATIENT
Start: 2024-10-22 | End: 2024-10-22

## 2024-10-22 RX ADMIN — QUETIAPINE FUMARATE 50 MILLIGRAM(S): 200 TABLET ORAL at 12:27

## 2024-10-22 NOTE — ED BEHAVIORAL HEALTH ASSESSMENT NOTE - RISK ASSESSMENT
Patient is at high acute suicide risk given his active SI with plan, he cannot stop thinking about how to kill himself, many recent and remote suicide attempts.

## 2024-10-22 NOTE — ED PROVIDER NOTE - PHYSICAL EXAMINATION
VITAL SIGNS: I have reviewed nursing notes and confirm.  CONSTITUTIONAL: Well-developed; well-nourished; in no acute distress.  SKIN: Skin is warm and dry, no acute rash.  HEAD: Normocephalic; atraumatic.  NECK: Supple; non tender.  CARD: S1, S2 normal; no murmurs, gallops, or rubs. Regular rate and rhythm.  RESP: No wheezes, rales or rhonchi.  ABD: Soft; non-distended; non-tender; no rebound or guarding.  EXT: Normal ROM. No clubbing, cyanosis or edema.  NEURO: Alert, oriented. Grossly unremarkable. SALDANA, normal tone, no gross motor or sensory changes. Fluent speech.   PSYCH: Cooperative. Flat affect. Depressed mood.

## 2024-10-22 NOTE — ED BEHAVIORAL HEALTH ASSESSMENT NOTE - HPI (INCLUDE ILLNESS QUALITY, SEVERITY, DURATION, TIMING, CONTEXT, MODIFYING FACTORS, ASSOCIATED SIGNS AND SYMPTOMS)
39 yo M, PMHx HIV, unclear psychiatric history (though patient states he has been diagnosed with schizophrenia and bipolar disorder), presents to the emergency room complaining of suicidal ideation. Patient states that he cannot stop thinking about how to commit suicide. He says that he had a bet with his friend last week of who would kill themselves first. Both tried to OD and patient states that his friend won as his friend was dead by the time he woke up. Patient states he has been thinking more about this event since and due to that, has made multiple attempts to kill himself. He endorses attempting to overdose on fentanyl. His last use of fentanyl was 2 days ago. Patient also states he has been using cocaine intermittently and has not used in 2 days for that drug as well. He has had multiple attempts in the past and has had multiple hospital admissions. Patient attempted to jump in front of a bus earlier this morning. He was unsuccessful due to the bus breaking in time. Patient endorses depressed mood. During psychiatry interview, patient confirms that he wishes he were dead. Denies visual hallucinations. He does endorse auditory hallucinations - says he hears random voices and when he turns to see who it is, nobody is there. No HI. Denies chest pain, shortness of breath, fevers, chills, nausea, vomiting, headaches or dizziness.    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 Patient states he has not been taking any of his medications for weeks. He states he has a psychiatrist at [place] who he has not seen in a long time. He states that he comes to the ER for medication refills - he is also currently out of medications. 37 yo M, PMHx HIV, unclear psychiatric history (though patient states he has been diagnosed with schizophrenia and bipolar disorder), presents to the emergency room complaining of suicidal ideation.     Patient states that he cannot stop thinking about how to commit suicide. He says that he had a bet with his friend last week of who would kill themselves first. Both tried to OD and patient states that his friend won as his friend was dead by the time he woke up. Patient states he has been thinking more about this event since and due to that, has made multiple attempts to kill himself. He endorses attempting to overdose on fentanyl. His last use of fentanyl was 2 days ago. Patient also states he has been using cocaine intermittently and has not used in 2 days for that drug as well. He has had multiple attempts in the past and has had multiple hospital admissions. Patient attempted to jump in front of a bus earlier this morning. He was unsuccessful due to the bus breaking in time. Patient endorses depressed mood. During psychiatry interview, patient confirms that he wishes he were dead. Denies visual hallucinations. He does endorse auditory hallucinations - says he hears random voices and when he turns to see who it is, nobody is there. No HI. Denies chest pain, shortness of breath, fevers, chills, nausea, vomiting, headaches or dizziness.    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 Patient states he has not been taking any of his medications for weeks. He states he has a psychiatrist at [place] who he has not seen in a long time. He states that he comes to the ER for medication refills - he is also currently out of medications.

## 2024-10-22 NOTE — BH PATIENT PROFILE - REASON FOR REFUSAL (REFER PATIENT TO HEALTHCARE PROVIDER FOR FOLLOW-UP):
PLEASE RETURN TO THE EMERGENCY DEPARTMENT IMMEDIATELY if your symptoms worsen in anyway or in 8-12 hours if not improved for re-evaluation. You should immediately return to the ER for symptoms such as increasing pain, bloody stool, fever, a feeling of passing out, light headed, dizziness, chest pain, shortness of breath, persistent nausea and/or vomiting, numbness or weakness to the arms or legs, coolness or color change of the arms or legs. Take your medication as indicated and prescribed. If you are given an antibiotic then, make sure you get the prescription filled and take the antibiotics until finished. Please understand that at this time there is no evidence for a more serious underlying process, but that early in the process of an illness or injury, an emergency department workup can be falsely reassuring. You should contact your family doctor within the next 24 hours for a follow up appointment    [facility]!!!    From [facility] ([facility]) and [facility]    On behalf of the Emergency Department staff at [facility]), I would like to thank you for giving us the opportunity to address your health care needs and concerns. We hope that during your visit, our service was delivered in a professional and caring manner. Please keep [facility]) in mind as we walk with you down the path to your own personal wellness. Please expect an automated text message or email from us so we can ask a few questions about your health and progress. Based on your answers, a clinician may call you back to offer help and instructions. Please understand that early in the process of an illness or injury, an emergency department workup can be falsely reassuring. If you notice any worsening, changing or persistent symptoms please call your family doctor or return to the ER immediately. Tell us how we did during your visit at [email or web address]   and let us know about your experience Pt states he never takes flu vaccine and does not want it.

## 2024-10-22 NOTE — ED PROVIDER NOTE - NS ED ROS FT
+Suicidal ideations  Denies fevers, chills, nausea, vomiting, diarrhea, constipation, abdominal pain, urinary symptoms, chest pain, palpitations, shortness of breath, dyspnea on exertion, syncope/near syncope, cough/URI symptoms, headache, weakness, numbness, focal deficits, visual changes, gait or balance changes, dizziness

## 2024-10-22 NOTE — ED PROVIDER NOTE - OBJECTIVE STATEMENT
37 yo M, pmhx HIV [CD4 252, vl unk], unclear psychiatric history, presents to the emergency room complaining of suicidal ideation.  Patient states he had about with his friend last week of who would kill themselves first.  His friend 1 that by last week while in the patient's home; patient was went to find him.  Patient states he has been thinking more about this event since and due to that, has made multiple attempts to kill himself.  He endorses attempting to overdose on fentanyl.  His last use of fentanyl was 2 days ago.  Patient also states he has been using cocaine intermittently and has not used in 2 days for that drug as well.  He has had multiple attempts in the past and has had multiple hospital admissions.  Patient attempted to jump in front of a bus earlier this morning.  He was unsuccessful due to the bus breaking in time.  Denies chest pain, shortness of breath, fevers, chills, nausea, vomiting, headaches or dizziness.

## 2024-10-22 NOTE — ED BEHAVIORAL HEALTH ASSESSMENT NOTE - SUMMARY
39 yo M, PMHx HIV, unclear psychiatric history (though patient states he has been diagnosed with schizophrenia and bipolar disorder), presents to the emergency room complaining of suicidal ideation. Patient states that he cannot stop thinking about how to commit suicide. He says that he had a bet with his friend last week of who would kill themselves first. Both tried to OD and patient states that his friend won as his friend was dead by the time he woke up. Patient states he has been thinking more about this event since and due to that, has made multiple attempts to kill himself. He endorses attempting to overdose on fentanyl. His last use of fentanyl was 2 days ago. Patient also states he has been using cocaine intermittently and has not used in 2 days for that drug as well. He has had multiple attempts in the past and has had multiple hospital admissions. Patient attempted to jump in front of a bus earlier this morning. He was unsuccessful due to the bus breaking in time. Patient endorses depressed mood. During psychiatry interview, patient confirms that he wishes he were dead. Denies visual hallucinations. He does endorse auditory hallucinations - says he hears random voices and when he turns to see who it is, nobody is there. No HI. Denies chest pain, shortness of breath, fevers, chills, nausea, vomiting, headaches or dizziness.    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 Patient states he has not been taking any of his medications for weeks. He states he has a psychiatrist at [place] who he has not seen in a long time. He states that he comes to the ER for medication refills - he is also currently out of medications.    Psychiatry team explained to patient that given his active SI with plan, it is not safe to send him home - patient is amenable to [facility] admission.

## 2024-10-22 NOTE — BH PATIENT PROFILE - HOME MEDICATIONS
QUEtiapine 100 mg oral tablet , 1 tab(s) orally once a day (at bedtime)  bictegravir/emtricitabine/tenofovir 50 mg-200 mg-25 mg oral tablet , 1 tab(s) orally once a day

## 2024-10-22 NOTE — ED CDU PROVIDER INITIAL DAY NOTE - CLINICAL SUMMARY MEDICAL DECISION MAKING FREE TEXT BOX
37 yo M, pmhx HIV [CD4 252, vl unk], unclear psychiatric history, presents to the emergency room complaining of suicidal ideation. Patient is well-appearing and in no acute distress, he has noted to have a flat affect, but appears his stated age.  Will plan to obtain medical labs including alcohol level, EKG, and will discuss case with psychiatry to evaluate patient.  Patient does not endorse any specific medical complaints at this time.  Will reassess and dispo pending medical workup.

## 2024-10-22 NOTE — ED PROVIDER NOTE - NS ED ATTENDING STATEMENT MOD
This was a shared visit with the POLO. I reviewed and verified the documentation.
Review of Systems:  	•	CONSTITUTIONAL - no fever, no diaphoresis, no chills  	•	SKIN abscess right forearm AC     	•	RESPIRATORY - no shortness of breath, no cough  	•	CARDIAC - no chest pain, no palpitations

## 2024-10-22 NOTE — ED BEHAVIORAL HEALTH ASSESSMENT NOTE - AVIAN FLU SYMPTOMS
PATIENT WAS SEEN BY DR Tila Stern AND AN ORDER FOR AN ULTRA SOUND OF ABD  AND MOM CALLED TO SCHEDULE IT AND CAN'T GET IN  TIL June 6TH.  PATIENT IS IN A LOT OF PAIN IN LOWER LEFT STOMACH/ IS THERE SOME  A WAY TO HAVE THIS DONE SOONER?    PLEASE ADVISE No

## 2024-10-22 NOTE — ED BEHAVIORAL HEALTH ASSESSMENT NOTE - NS ED BHA PLAN ADMIT TO PSYCHIATRY ADMIT TO
After talking with pt and discussing history of greens/salads and medication change. Pt will  continue  with current diet and dosing of Warfarin.  Continue with moderation of Vit K and green leafy vegetables. Cautioned to call with increase bruising or bleeding. Reminded to call with medication change especially antibiotic. Call with any questions or concerns or any up coming procedures. Cautioned about using Herbal medication.     [facility]

## 2024-10-22 NOTE — ED BEHAVIORAL HEALTH ASSESSMENT NOTE - NS ED BHA PLAN HANDOFF TO INPATIENT PROVIDER YESNO
I spoke with David today who was asking if she could take melatonin, and wanted to know the dose she should try for this sleep aid.   Yes

## 2024-10-22 NOTE — ED ADULT NURSE NOTE - OBJECTIVE STATEMENT
Presents to ED for suicidal ideation. States tried to jump in front of city bus few hours PTA. Pt reports found his friend dead in  his apartment last week, "after making a bet who will kill themselves first". Hx: HIV states is compliant with meds and multiple Suicide attempts which was hospitalized for. Pt is calm and cooperative. Denies chest pain, abd pain and SOB. GCS 15.

## 2024-10-22 NOTE — ED ADULT TRIAGE NOTE - CHIEF COMPLAINT QUOTE
Pt walks in c/o suicidal ideation for several days. Pt states he has been trying to overdose on Fentanyl but denies taking in the last  24 hours. PMH of suicide attempts. Pt denies VH/AH.

## 2024-10-22 NOTE — ED PROVIDER NOTE - ATTENDING APP SHARED VISIT CONTRIBUTION OF CARE
38-year-old   Male with history of HIV,  unknown psychiatric history, presents to the ED with suicidal thoughts, plan to overdose on fentanyl.  Plan to get basic labs, alcohol level, urine drug screen, psychiatric consult.

## 2024-10-22 NOTE — ED BEHAVIORAL HEALTH ASSESSMENT NOTE - NSBHATTESTCOMMENTATTENDFT_PSY_A_CORE
39 yo M, PMHx HIV, unclear psychiatric history (though patient states he has been diagnosed with schizophrenia and bipolar disorder), past admissions, past SA, recent med nonadherence, presents to the emergency room complaining of suicidal ideation and reporting recent SI and AH.  Current presentation is most c/w decompensated mood/psychotic d/o w/ severe SI and will plan for 2PC admission and 1:1 given ongoing suicidal thoughts in the ER and recent high lethality SA.

## 2024-10-22 NOTE — ED PROVIDER NOTE - CLINICAL SUMMARY MEDICAL DECISION MAKING FREE TEXT BOX
39 yo M, pmhx HIV [CD4 252, vl unk], unclear psychiatric history, presents to the emergency room complaining of suicidal ideation. Patient is well-appearing and in no acute distress, he has noted to have a flat affect, but appears his stated age.  Will plan to obtain medical labs including alcohol level, EKG, and will discuss case with psychiatry to evaluate patient.  Patient does not endorse any specific medical complaints at this time.  Will reassess and dispo pending medical workup.

## 2024-10-23 DIAGNOSIS — F14.10 COCAINE ABUSE, UNCOMPLICATED: ICD-10-CM

## 2024-10-23 DIAGNOSIS — F60.2 ANTISOCIAL PERSONALITY DISORDER: ICD-10-CM

## 2024-10-23 DIAGNOSIS — F11.10 OPIOID ABUSE, UNCOMPLICATED: ICD-10-CM

## 2024-10-23 LAB
A1C WITH ESTIMATED AVERAGE GLUCOSE RESULT: 5.5 % — SIGNIFICANT CHANGE UP (ref 4–5.6)
ALBUMIN SERPL ELPH-MCNC: 3 G/DL — LOW (ref 3.3–5)
ALP SERPL-CCNC: 82 U/L — SIGNIFICANT CHANGE UP (ref 40–120)
ALT FLD-CCNC: 28 U/L — SIGNIFICANT CHANGE UP (ref 10–45)
AST SERPL-CCNC: 25 U/L — SIGNIFICANT CHANGE UP (ref 10–40)
BILIRUB DIRECT SERPL-MCNC: <0.2 MG/DL — SIGNIFICANT CHANGE UP (ref 0–0.3)
BILIRUB INDIRECT FLD-MCNC: SIGNIFICANT CHANGE UP (ref 0.2–1)
BILIRUB SERPL-MCNC: <0.2 MG/DL — SIGNIFICANT CHANGE UP (ref 0.2–1.2)
CHOLEST SERPL-MCNC: 122 MG/DL — SIGNIFICANT CHANGE UP
ESTIMATED AVERAGE GLUCOSE: 111 MG/DL — SIGNIFICANT CHANGE UP (ref 68–114)
HDLC SERPL-MCNC: 37 MG/DL — LOW
LIPID PNL WITH DIRECT LDL SERPL: 70 MG/DL — SIGNIFICANT CHANGE UP
NON HDL CHOLESTEROL: 85 MG/DL — SIGNIFICANT CHANGE UP
PROT SERPL-MCNC: 7.3 G/DL — SIGNIFICANT CHANGE UP (ref 6–8.3)
TRIGL SERPL-MCNC: 74 MG/DL — SIGNIFICANT CHANGE UP

## 2024-10-23 PROCEDURE — 99223 1ST HOSP IP/OBS HIGH 75: CPT

## 2024-10-23 RX ORDER — LORAZEPAM 2 MG
2 TABLET ORAL
Refills: 0 | Status: DISCONTINUED | OUTPATIENT
Start: 2024-10-23 | End: 2024-10-23

## 2024-10-23 RX ORDER — OLANZAPINE 20 MG/1
10 TABLET ORAL EVERY 8 HOURS
Refills: 0 | Status: DISCONTINUED | OUTPATIENT
Start: 2024-10-23 | End: 2024-11-05

## 2024-10-23 RX ORDER — METHADONE HYDROCHLORIDE 10 MG/1
5 TABLET ORAL EVERY 8 HOURS
Refills: 0 | Status: DISCONTINUED | OUTPATIENT
Start: 2024-10-23 | End: 2024-10-23

## 2024-10-23 RX ORDER — MAGNESIUM HYDROXIDE 1200 MG/15ML
30 SUSPENSION ORAL DAILY
Refills: 0 | Status: DISCONTINUED | OUTPATIENT
Start: 2024-10-23 | End: 2024-11-05

## 2024-10-23 RX ORDER — TRAZODONE HYDROCHLORIDE 100 MG/1
50 TABLET ORAL AT BEDTIME
Refills: 0 | Status: DISCONTINUED | OUTPATIENT
Start: 2024-10-23 | End: 2024-11-05

## 2024-10-23 RX ORDER — ACETAMINOPHEN 500 MG
650 TABLET ORAL EVERY 6 HOURS
Refills: 0 | Status: DISCONTINUED | OUTPATIENT
Start: 2024-10-23 | End: 2024-11-05

## 2024-10-23 RX ORDER — QUETIAPINE FUMARATE 200 MG/1
50 TABLET ORAL AT BEDTIME
Refills: 0 | Status: DISCONTINUED | OUTPATIENT
Start: 2024-10-23 | End: 2024-10-24

## 2024-10-23 RX ORDER — MAGNESIUM, ALUMINUM HYDROXIDE 200-200 MG
30 TABLET,CHEWABLE ORAL EVERY 6 HOURS
Refills: 0 | Status: DISCONTINUED | OUTPATIENT
Start: 2024-10-23 | End: 2024-11-05

## 2024-10-23 RX ADMIN — QUETIAPINE FUMARATE 50 MILLIGRAM(S): 200 TABLET ORAL at 22:13

## 2024-10-23 RX ADMIN — TRAZODONE HYDROCHLORIDE 50 MILLIGRAM(S): 100 TABLET ORAL at 22:13

## 2024-10-23 NOTE — BH SOCIAL WORK INITIAL PSYCHOSOCIAL EVALUATION - OTHER PAST PSYCHIATRIC HISTORY (INCLUDE DETAILS REGARDING ONSET, COURSE OF ILLNESS, INPATIENT/OUTPATIENT TREATMENT)
Per chart: 37 yo M, PMHx HIV, unclear psychiatric history (though patient states he has been diagnosed with schizophrenia and bipolar disorder), presents to the emergency room complaining of suicidal ideation.

## 2024-10-23 NOTE — BH INPATIENT PSYCHIATRY ASSESSMENT NOTE - NSBHASSESSSUMMFT_PSY_ALL_CORE
This is a 37 yo , domiciled alone in the [place] ([place] housing), unemployed on disability, . Medical hx significant for HIV (Biktarvy) PPHx of multiple psychiatric diagnoses including schizoaffective d/o vs MDD w/psychotic features vs substance-induced psychosis, alcohol, stimulant and cannabis use disorders, malingering, Antisocial personality disorder. Multiple psychiatric admissions (per chart review, last known at [facility] from 4/10/24-4/17/24), most recent rehab at [facility] 3/2024. No current outpatient behavorial health provider, hx of medication non-adherence, hx of multiple SAs/SIB, hx of violence/aggression. Patient presents [facility] ED BIBS with complaint of SI for several days with plan to OD on Fentanyl. Requesting voluntary admission to [facility]. Utox positive for cocaine.  Well known to unit and staff, presentation is significant for substance induced mood disorder, secondary gain and malingering    Seroquel 50mg qhs to be initiated for mood and insomnia

## 2024-10-23 NOTE — BH INPATIENT PSYCHIATRY ASSESSMENT NOTE - NSBHPHYSICALEXAM_PSY_ALL_CORE
VITALS: T(C): 36.5 (10-23-24 @ 09:35), Max: 37.2 (10-22-24 @ 19:05)  HR: 58 (10-23-24 @ 09:35) (58 - 63)  BP: 106/73 (10-23-24 @ 09:35) (105/59 - 106/73)  RR: 18 (10-23-24 @ 09:35) (17 - 18)  SpO2: 99% (10-23-24 @ 09:35) (98% - 99%)      GENERAL: NAD, comfortable, ambulating, missing several teeth  HEAD:  Atraumatic, Normocephalic  EYES: EOMI, PERRLA, conjunctiva and sclera clear  ENT: Moist mucous membranes  NECK: Supple, No JVD  CHEST/LUNG: Clear to auscultation bilaterally; No rales, rhonchi, wheezing, or rubs. Unlabored respirations  HEART: Regular rate and rhythm; No murmurs, rubs, or gallops  ABDOMEN: BSx4; Soft, nontender, nondistended  EXTREMITIES:  No clubbing, cyanosis, or edema  NERVOUS SYSTEM:  A&Ox3, no focal deficits   SKIN: No rashes or lesions

## 2024-10-23 NOTE — BH INPATIENT PSYCHIATRY ASSESSMENT NOTE - HPI (INCLUDE ILLNESS QUALITY, SEVERITY, DURATION, TIMING, CONTEXT, MODIFYING FACTORS, ASSOCIATED SIGNS AND SYMPTOMS)
This is a 39 yo , domiciled alone in the [place] ([place] housing), unemployed on disability, . Medical hx significant for HIV (Biktarvy) PPHx of multiple psychiatric diagnoses including schizoaffective d/o vs MDD w/psychotic features vs substance-induced psychosis, alcohol, stimulant and cannabis use disorders, malingering, Antisocial personality disorder. Multiple psychiatric admissions (per chart review, last known at [facility] from 4/10/24-4/17/24), most recent rehab at [facility] 3[facility]2024. [facility], hx of medication non-adherence, hx of multiple SAs/SIB, hx of violence/aggression. Patient presents [facility] ED BIBS with complaint of SI for several days with plan to OD on Fentanyl. Requesting voluntary admission to [facility]. Utox positive for cocaine.    Patient seen in  his room with treatment team. He is well known to writer and SW from numerous prior admissions on [facility], most recently in April of this year, 2024. He states that he has been having worsening SI over the last several weeks and recently made a bet with his friend (who had disclosed to him that he was also suicidal) that they should bet to see who would kill themselves first. He states that they both tried to OD on Fentanyl but when he woke up he discovered that his friend was dead. Of note, this report is similar to prior report in previous hospitalization and there is much suspicion that patient is falsifying. He reports that his last psychiatric hospitalization was in April at [facility] and he has not had any psychotic symptoms since then, no avh, paranoid ideations or disorganization. He promptly stopped psychiatric medication regimen after discharge, but has been taking Biktarvy daily as prescribed by physician at [facility]. He also denies detox/rehabs since hospitalization and may be interested in rehab this admission. Sleep has been overall unremarkable. He reports poor appetite but denies any changes in weight. He states that he would like to be started on seroquel. Is ambivalent about antidepressant, citing that he has been on many, none of which were helpful.    Per ED Report:  Patient states that he cannot stop thinking about how to commit suicide. He says that he had a bet with his friend last week of who would kill themselves first. Both tried to OD and patient states that his friend won as his friend was dead by the time he woke up. Patient states he has been thinking more about this event since and due to that, has made multiple attempts to kill himself. He endorses attempting to overdose on fentanyl. His last use of fentanyl was 2 days ago. Patient also states he has been using cocaine intermittently and has not used in 2 days for that drug as well. He has had multiple attempts in the past and has had multiple hospital admissions. Patient attempted to jump in front of a bus earlier this morning. He was unsuccessful due to the bus breaking in time. Patient endorses depressed mood. During psychiatry interview, patient confirms that he wishes he were dead. Denies visual hallucinations. He does endorse auditory hallucinations - says he hears random voices and when he turns to see who it is, nobody is there. No HI. Denies chest pain, shortness of breath, fevers, chills, nausea, vomiting, headaches or dizziness.    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 Patient states he has not been taking any of his medications for weeks. He states he has a psychiatrist at [place] who he has not seen in a long time. He states that he comes to the ER for medication refills - he is also currently out of medications.

## 2024-10-23 NOTE — BH INPATIENT PSYCHIATRY ASSESSMENT NOTE - NSBHMETABOLIC_PSY_ALL_CORE_FT
BMI: BMI (kg/m2): 25 (10-22-24 @ 08:20)  HbA1c: A1C with Estimated Average Glucose Result: 5.5 % (10-23-24 @ 05:30)    Glucose:   BP: 106/73 (10-23-24 @ 09:35) (105/59 - 111/78)Vital Signs Last 24 Hrs  T(C): 36.5 (10-23-24 @ 09:35), Max: 37.2 (10-22-24 @ 19:05)  T(F): 97.7 (10-23-24 @ 09:35), Max: 98.9 (10-22-24 @ 19:05)  HR: 58 (10-23-24 @ 09:35) (58 - 63)  BP: 106/73 (10-23-24 @ 09:35) (105/59 - 106/73)  BP(mean): --  RR: 18 (10-23-24 @ 09:35) (17 - 18)  SpO2: 99% (10-23-24 @ 09:35) (98% - 99%)      Lipid Panel: Date/Time: 10-23-24 @ 05:30  Cholesterol, Serum: 122  LDL Cholesterol Calculated: 70  HDL Cholesterol, Serum: 37  Total Cholesterol/HDL Ration Measurement: --  Triglycerides, Serum: 74

## 2024-10-23 NOTE — BH SOCIAL WORK INITIAL PSYCHOSOCIAL EVALUATION - NSBHTREATHX_PSY_ALL_CORE
The patient reports he has a [facility] based doctor who prescribes his Biktarvy. He reports taking his Biktarvy as prescribed, and attends in-person appointments every four months. He reports not being connected to an outpatient psychiatrist &/or substance use program.

## 2024-10-23 NOTE — BH SOCIAL WORK INITIAL PSYCHOSOCIAL EVALUATION - NSBHSACOC_PSY_A_CORE FT
During this assessment, the patient endorsed smoking & snorting cocaine. The patient reports he snorted Fentanyl for the first time shortly prior to hospital admission.

## 2024-10-23 NOTE — BH SOCIAL WORK INITIAL PSYCHOSOCIAL EVALUATION - NSBHEMPLOYEDYN_PSY_ALL_CORE
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

## 2024-10-23 NOTE — BH SOCIAL WORK INITIAL PSYCHOSOCIAL EVALUATION - NSPTSTATEDGOAL_PSY_ALL_CORE
"I don't know. If I knew the answer, I wouldn't be sitting here. Maybe medication, that's all I can think of. Rehab, maybe..."

## 2024-10-23 NOTE — BH INPATIENT PSYCHIATRY ASSESSMENT NOTE - CURRENT MEDICATION
MEDICATIONS  (STANDING):    MEDICATIONS  (PRN):  acetaminophen     Tablet .. 650 milliGRAM(s) Oral every 6 hours PRN Moderate Pain (4 - 6)  aluminum hydroxide/magnesium hydroxide/simethicone Suspension 30 milliLiter(s) Oral every 6 hours PRN Dyspepsia  LORazepam     Tablet 2 milliGRAM(s) Oral every 2 hours PRN CIWA-Ar score increase by 2 points and a total score of 7 or less  magnesium hydroxide Suspension 30 milliLiter(s) Oral daily PRN Constipation  methadone    Tablet 5 milliGRAM(s) Oral every 8 hours PRN opioid withdrawal  OLANZapine Disintegrating Tablet 10 milliGRAM(s) Oral every 8 hours PRN agitation  traZODone 50 milliGRAM(s) Oral at bedtime PRN insomnia

## 2024-10-23 NOTE — BH INPATIENT PSYCHIATRY ASSESSMENT NOTE - NSICDXBHSECONDARYDX_PSY_ALL_CORE
Substance use disorder   F19.90  Antisocial personality disorder in adult   F60.2  Cocaine use disorder   F14.10  Opioid abuse, episodic use   F11.10

## 2024-10-23 NOTE — BH INPATIENT PSYCHIATRY ASSESSMENT NOTE - NSBHCHARTREVIEWVS_PSY_A_CORE FT
Vital Signs Last 24 Hrs  T(C): 36.5 (10-23-24 @ 09:35), Max: 37.2 (10-22-24 @ 19:05)  T(F): 97.7 (10-23-24 @ 09:35), Max: 98.9 (10-22-24 @ 19:05)  HR: 58 (10-23-24 @ 09:35) (58 - 63)  BP: 106/73 (10-23-24 @ 09:35) (105/59 - 106/73)  BP(mean): --  RR: 18 (10-23-24 @ 09:35) (17 - 18)  SpO2: 99% (10-23-24 @ 09:35) (98% - 99%)

## 2024-10-23 NOTE — BH INPATIENT PSYCHIATRY ASSESSMENT NOTE - RISK ASSESSMENT
Static: gender, hx of SA, hx of mental illness, substance abuse  Modifiable: current substance use, access to medications/treatment,   Protective:

## 2024-10-23 NOTE — BH SOCIAL WORK INITIAL PSYCHOSOCIAL EVALUATION - NSBHSAALC_PSY_A_CORE FT
During this assessment, the patient stated that he does not "drink as heavy" as he did in the past, later explaining that he no longer drinks daily. He last drank 2 days prior to hospital admission and drank 1/2 gallon of vodka.

## 2024-10-24 PROCEDURE — 99232 SBSQ HOSP IP/OBS MODERATE 35: CPT

## 2024-10-24 RX ORDER — BICTEGRAVIR SODIUM, EMTRICITABINE, AND TENOFOVIR ALAFENAMIDE FUMARATE 50; 200; 25 MG/1; MG/1; MG/1
1 TABLET ORAL DAILY
Refills: 0 | Status: DISCONTINUED | OUTPATIENT
Start: 2024-10-24 | End: 2024-11-05

## 2024-10-24 RX ADMIN — BICTEGRAVIR SODIUM, EMTRICITABINE, AND TENOFOVIR ALAFENAMIDE FUMARATE 1 TABLET(S): 50; 200; 25 TABLET ORAL at 17:56
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
## 2024-10-24 NOTE — BH TREATMENT PLAN - NSTXSUBMISINTERMD_PSY_ALL_CORE
psychopharm x15 mins, rehab psychopharm x15 mins, rehab, as sx seem all substance-related, may well hold off on psychotropics

## 2024-10-25 LAB
4/8 RATIO: 0.51 RATIO — LOW (ref 0.9–3.6)
ABS CD8: 650 CELLS/UL — SIGNIFICANT CHANGE UP (ref 142–740)
CD16+CD56+ CELLS NFR BLD: 11 % — SIGNIFICANT CHANGE UP (ref 5–23)
CD16+CD56+ CELLS NFR SPEC: 127 CELLS/UL — SIGNIFICANT CHANGE UP (ref 71–410)
CD19 BLASTS SPEC-ACNC: 3 % — LOW (ref 6–24)
CD19 BLASTS SPEC-ACNC: 41 CELLS/UL — LOW (ref 84–469)
CD3 BLASTS SPEC-ACNC: 1003 CELLS/UL — SIGNIFICANT CHANGE UP (ref 672–1870)
CD3 BLASTS SPEC-ACNC: 81 % — SIGNIFICANT CHANGE UP (ref 59–83)
CD4 %: 26 % — LOW (ref 30–62)
CD8 %: 51 % — HIGH (ref 12–36)
HIV-1 VIRAL LOAD RESULT: ABNORMAL
HIV1 RNA # SERPL NAA+PROBE: SIGNIFICANT CHANGE UP
HIV1 RNA SER-IMP: SIGNIFICANT CHANGE UP
HIV1 RNA SERPL NAA+PROBE-ACNC: ABNORMAL
HIV1 RNA SERPL NAA+PROBE-LOG#: 4.23 — SIGNIFICANT CHANGE UP
T-CELL CD4 SUBSET PNL BLD: 331 CELLS/UL — LOW (ref 489–1457)

## 2024-10-25 PROCEDURE — 99232 SBSQ HOSP IP/OBS MODERATE 35: CPT

## 2024-10-25 RX ADMIN — BICTEGRAVIR SODIUM, EMTRICITABINE, AND TENOFOVIR ALAFENAMIDE FUMARATE 1 TABLET(S): 50; 200; 25 TABLET ORAL at 09:36
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
## 2024-10-26 RX ADMIN — BICTEGRAVIR SODIUM, EMTRICITABINE, AND TENOFOVIR ALAFENAMIDE FUMARATE 1 TABLET(S): 50; 200; 25 TABLET ORAL at 09:32

## 2024-10-26 RX ADMIN — Medication 650 MILLIGRAM(S): at 21:55

## 2024-10-26 RX ADMIN — TRAZODONE HYDROCHLORIDE 50 MILLIGRAM(S): 100 TABLET ORAL at 21:55

## 2024-10-26 RX ADMIN — Medication 650 MILLIGRAM(S): at 22:00
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
## 2024-10-27 RX ORDER — PRAZOSIN HCL 1 MG
1 CAPSULE ORAL AT BEDTIME
Refills: 0 | Status: DISCONTINUED | OUTPATIENT
Start: 2024-10-27 | End: 2024-11-05

## 2024-10-27 RX ADMIN — TRAZODONE HYDROCHLORIDE 50 MILLIGRAM(S): 100 TABLET ORAL at 21:42

## 2024-10-27 RX ADMIN — BICTEGRAVIR SODIUM, EMTRICITABINE, AND TENOFOVIR ALAFENAMIDE FUMARATE 1 TABLET(S): 50; 200; 25 TABLET ORAL at 10:05

## 2024-10-27 RX ADMIN — Medication 650 MILLIGRAM(S): at 21:42

## 2024-10-27 RX ADMIN — Medication 650 MILLIGRAM(S): at 22:42
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
## 2024-10-28 PROCEDURE — 99232 SBSQ HOSP IP/OBS MODERATE 35: CPT

## 2024-10-28 RX ADMIN — Medication 650 MILLIGRAM(S): at 21:53

## 2024-10-28 RX ADMIN — Medication 650 MILLIGRAM(S): at 23:15

## 2024-10-28 RX ADMIN — Medication 1 MILLIGRAM(S): at 21:52

## 2024-10-28 RX ADMIN — BICTEGRAVIR SODIUM, EMTRICITABINE, AND TENOFOVIR ALAFENAMIDE FUMARATE 1 TABLET(S): 50; 200; 25 TABLET ORAL at 09:37

## 2024-10-28 RX ADMIN — TRAZODONE HYDROCHLORIDE 50 MILLIGRAM(S): 100 TABLET ORAL at 21:53
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
## 2024-10-28 NOTE — BH TREATMENT PLAN - NSTXSUICIDINTERRN_PSY_ALL_CORE
Encourage Pt to speak with staff when he feels suicidal

## 2024-10-28 NOTE — BH TREATMENT PLAN - NSCMSPTSTRENGTHS_PSY_ALL_CORE
Assertive/Resourceful/Self-reliant
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
## 2024-10-28 NOTE — BH TREATMENT PLAN - NSTXSUICIDINTERSW_PSY_ALL_CORE
This SW will facilitate referrals to inpatient rehabs in collaboration with the patient. 

## 2024-10-29 PROCEDURE — 99232 SBSQ HOSP IP/OBS MODERATE 35: CPT

## 2024-10-29 RX ADMIN — Medication 1 MILLIGRAM(S): at 22:10

## 2024-10-29 RX ADMIN — BICTEGRAVIR SODIUM, EMTRICITABINE, AND TENOFOVIR ALAFENAMIDE FUMARATE 1 TABLET(S): 50; 200; 25 TABLET ORAL at 10:11

## 2024-10-29 RX ADMIN — TRAZODONE HYDROCHLORIDE 50 MILLIGRAM(S): 100 TABLET ORAL at 22:10

## 2024-10-29 RX ADMIN — Medication 650 MILLIGRAM(S): at 11:11

## 2024-10-29 RX ADMIN — Medication 650 MILLIGRAM(S): at 10:11
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
## 2024-10-30 PROCEDURE — 99232 SBSQ HOSP IP/OBS MODERATE 35: CPT

## 2024-10-30 RX ADMIN — Medication 650 MILLIGRAM(S): at 22:09

## 2024-10-30 RX ADMIN — BICTEGRAVIR SODIUM, EMTRICITABINE, AND TENOFOVIR ALAFENAMIDE FUMARATE 1 TABLET(S): 50; 200; 25 TABLET ORAL at 10:05

## 2024-10-30 RX ADMIN — Medication 650 MILLIGRAM(S): at 23:05

## 2024-10-30 RX ADMIN — TRAZODONE HYDROCHLORIDE 50 MILLIGRAM(S): 100 TABLET ORAL at 22:11

## 2024-10-30 RX ADMIN — Medication 1 MILLIGRAM(S): at 22:09
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
## 2024-10-31 PROCEDURE — 99232 SBSQ HOSP IP/OBS MODERATE 35: CPT

## 2024-10-31 RX ADMIN — Medication 650 MILLIGRAM(S): at 11:07

## 2024-10-31 RX ADMIN — BICTEGRAVIR SODIUM, EMTRICITABINE, AND TENOFOVIR ALAFENAMIDE FUMARATE 1 TABLET(S): 50; 200; 25 TABLET ORAL at 10:07

## 2024-10-31 RX ADMIN — TRAZODONE HYDROCHLORIDE 50 MILLIGRAM(S): 100 TABLET ORAL at 21:37

## 2024-10-31 RX ADMIN — Medication 1 MILLIGRAM(S): at 21:37

## 2024-10-31 RX ADMIN — Medication 650 MILLIGRAM(S): at 10:07

## 2024-10-31 RX ADMIN — Medication 650 MILLIGRAM(S): at 21:38
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
## 2024-11-01 PROCEDURE — 99232 SBSQ HOSP IP/OBS MODERATE 35: CPT

## 2024-11-01 RX ADMIN — Medication 650 MILLIGRAM(S): at 22:52

## 2024-11-01 RX ADMIN — BICTEGRAVIR SODIUM, EMTRICITABINE, AND TENOFOVIR ALAFENAMIDE FUMARATE 1 TABLET(S): 50; 200; 25 TABLET ORAL at 10:45

## 2024-11-01 RX ADMIN — Medication 650 MILLIGRAM(S): at 13:52

## 2024-11-01 RX ADMIN — Medication 1 MILLIGRAM(S): at 22:51

## 2024-11-01 RX ADMIN — TRAZODONE HYDROCHLORIDE 50 MILLIGRAM(S): 100 TABLET ORAL at 22:51

## 2024-11-01 RX ADMIN — Medication 650 MILLIGRAM(S): at 23:45

## 2024-11-01 RX ADMIN — Medication 650 MILLIGRAM(S): at 10:46

## 2024-11-01 NOTE — BH INPATIENT PSYCHIATRY PROGRESS NOTE - NSBHATTESTTYPEVISIT_PSY_A_CORE
On-site Attending supervising POLO (99XXX codes) On-site Attending with Resident/Fellow/Student and POLO (99XXX codes)
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
## 2024-11-02 RX ADMIN — Medication 650 MILLIGRAM(S): at 11:33

## 2024-11-02 RX ADMIN — Medication 650 MILLIGRAM(S): at 10:33

## 2024-11-02 RX ADMIN — BICTEGRAVIR SODIUM, EMTRICITABINE, AND TENOFOVIR ALAFENAMIDE FUMARATE 1 TABLET(S): 50; 200; 25 TABLET ORAL at 10:33

## 2024-11-02 RX ADMIN — Medication 650 MILLIGRAM(S): at 22:39

## 2024-11-02 RX ADMIN — TRAZODONE HYDROCHLORIDE 50 MILLIGRAM(S): 100 TABLET ORAL at 22:51

## 2024-11-02 RX ADMIN — Medication 1 MILLIGRAM(S): at 22:39

## 2024-11-03 RX ADMIN — Medication 650 MILLIGRAM(S): at 22:14

## 2024-11-03 RX ADMIN — Medication 650 MILLIGRAM(S): at 23:14

## 2024-11-03 RX ADMIN — BICTEGRAVIR SODIUM, EMTRICITABINE, AND TENOFOVIR ALAFENAMIDE FUMARATE 1 TABLET(S): 50; 200; 25 TABLET ORAL at 10:09

## 2024-11-03 RX ADMIN — Medication 1 MILLIGRAM(S): at 22:13

## 2024-11-03 RX ADMIN — Medication 650 MILLIGRAM(S): at 10:09

## 2024-11-03 RX ADMIN — TRAZODONE HYDROCHLORIDE 50 MILLIGRAM(S): 100 TABLET ORAL at 22:13

## 2024-11-04 LAB — SARS-COV-2 RNA SPEC QL NAA+PROBE: SIGNIFICANT CHANGE UP

## 2024-11-04 PROCEDURE — 99232 SBSQ HOSP IP/OBS MODERATE 35: CPT

## 2024-11-04 RX ORDER — PRAZOSIN HCL 1 MG
1 CAPSULE ORAL
Qty: 0 | Refills: 0 | DISCHARGE
Start: 2024-11-04

## 2024-11-04 RX ORDER — MELATONIN 5 MG
5 TABLET ORAL AT BEDTIME
Refills: 0 | Status: DISCONTINUED | OUTPATIENT
Start: 2024-11-04 | End: 2024-11-05

## 2024-11-04 RX ORDER — MELATONIN 5 MG
1 TABLET ORAL
Qty: 0 | Refills: 0 | DISCHARGE
Start: 2024-11-04

## 2024-11-04 RX ADMIN — Medication 650 MILLIGRAM(S): at 11:12

## 2024-11-04 RX ADMIN — Medication 650 MILLIGRAM(S): at 21:32

## 2024-11-04 RX ADMIN — BICTEGRAVIR SODIUM, EMTRICITABINE, AND TENOFOVIR ALAFENAMIDE FUMARATE 1 TABLET(S): 50; 200; 25 TABLET ORAL at 11:12

## 2024-11-04 RX ADMIN — Medication 650 MILLIGRAM(S): at 22:32

## 2024-11-04 RX ADMIN — Medication 650 MILLIGRAM(S): at 12:00

## 2024-11-04 RX ADMIN — TRAZODONE HYDROCHLORIDE 50 MILLIGRAM(S): 100 TABLET ORAL at 21:32

## 2024-11-04 RX ADMIN — Medication 1 MILLIGRAM(S): at 21:33

## 2024-11-04 NOTE — BH INPATIENT PSYCHIATRY PROGRESS NOTE - NSICDXBHPRIMARYDX_PSY_ALL_CORE
Substance induced mood disorder   F19.16  
Substance induced mood disorder   F19.74  
Substance induced mood disorder   F19.06  
Substance induced mood disorder   F19.33  
Substance induced mood disorder   F19.54  
Substance induced mood disorder   F19.10  
Substance induced mood disorder   F19.08  
Substance induced mood disorder   F19.44  
Substance induced mood disorder   F19.70  
Substance induced mood disorder   F19.47

## 2024-11-04 NOTE — BH INPATIENT PSYCHIATRY DISCHARGE NOTE - NSBHFUPINTERVALHXFT_PSY_A_CORE
to be completed 11/5 INCOMPLETE NOTE******* Pt seen, chart reviewed.  Pt reports improvements in mood sx and desire to be treated for substance issues in the Outpatient setting. Pt denies SI/HI/AH/VH.  No side effects to medications reported or observed.
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
## 2024-11-04 NOTE — BH INPATIENT PSYCHIATRY PROGRESS NOTE - NSTXSUICIDINTERMD_PSY_ALL_CORE
psychopharm x15 mins

## 2024-11-04 NOTE — BH INPATIENT PSYCHIATRY PROGRESS NOTE - OTHER
Pt endorses slight suicidal ideation when he can't fall asleep, stating while im trying to "fall asleep thoughts to harm my self creep up"

## 2024-11-04 NOTE — BH INPATIENT PSYCHIATRY PROGRESS NOTE - NSTXSUICIDDATEEST_PSY_ALL_CORE
23-Oct-2024
30-Oct-2024
30-Oct-2024
23-Oct-2024
30-Oct-2024
23-Oct-2024
30-Oct-2024

## 2024-11-04 NOTE — BH INPATIENT PSYCHIATRY PROGRESS NOTE - NSTXSUICIDDATETRGT_PSY_ALL_CORE
06-Nov-2024
31-Oct-2024
31-Oct-2024
04-Nov-2024
06-Nov-2024
04-Nov-2024
31-Oct-2024
06-Nov-2024
31-Oct-2024
06-Nov-2024

## 2024-11-04 NOTE — BH INPATIENT PSYCHIATRY PROGRESS NOTE - NSBHMSEBEHAV_PSY_A_CORE
tearful at times/Cooperative
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
## 2024-11-04 NOTE — BH INPATIENT PSYCHIATRY DISCHARGE NOTE - OTHER PAST PSYCHIATRIC HISTORY (INCLUDE DETAILS REGARDING ONSET, COURSE OF ILLNESS, INPATIENT/OUTPATIENT TREATMENT)
Initiate Treatment: triamcinolone acetonide 0.1 % topical cream \\nQuantity: 80.0 g  Days Supply: 30\\nSig: Apply thin layer to affected area on trunk/extremities twice daily X 2 weeks. Follow with moisturizer. Do not apply to face Detail Level: Zone Plan: Follow up 2 weeks. Render In Strict Bullet Format?: No Per chart: 39 yo M, PMHx HIV, unclear psychiatric history (though patient states he has been diagnosed with schizophrenia and bipolar disorder), presents to the emergency room complaining of suicidal ideation.

## 2024-11-04 NOTE — BH INPATIENT PSYCHIATRY PROGRESS NOTE - NSTXSUBMISDATEEST_PSY_ALL_CORE
30-Oct-2024
24-Oct-2024
30-Oct-2024
24-Oct-2024
24-Oct-2024
30-Oct-2024
24-Oct-2024
24-Oct-2024
30-Oct-2024
24-Oct-2024

## 2024-11-04 NOTE — BH INPATIENT PSYCHIATRY PROGRESS NOTE - NSBHMETABOLIC_PSY_ALL_CORE_FT
BMI: BMI (kg/m2): 24.2 (10-29-24 @ 09:04)  HbA1c: A1C with Estimated Average Glucose Result: 5.5 % (10-23-24 @ 05:30)    Glucose:   BP: 115/75 (10-31-24 @ 17:03) (107/61 - 124/77)Vital Signs Last 24 Hrs  T(C): 37 (10-31-24 @ 17:03), Max: 37 (10-31-24 @ 17:03)  T(F): 98.6 (10-31-24 @ 17:03), Max: 98.6 (10-31-24 @ 17:03)  HR: 87 (10-31-24 @ 17:03) (55 - 87)  BP: 115/75 (10-31-24 @ 17:03) (107/61 - 115/75)  BP(mean): --  RR: 18 (10-31-24 @ 17:03) (18 - 18)  SpO2: 97% (10-31-24 @ 17:03) (97% - 99%)      Lipid Panel: Date/Time: 10-23-24 @ 05:30  Cholesterol, Serum: 122  LDL Cholesterol Calculated: 70  HDL Cholesterol, Serum: 37  Total Cholesterol/HDL Ration Measurement: --  Triglycerides, Serum: 74  
BMI: BMI (kg/m2): 24.2 (10-29-24 @ 09:04)  HbA1c: A1C with Estimated Average Glucose Result: 5.5 % (10-23-24 @ 05:30)    Glucose:   BP: 124/71 (11-04-24 @ 08:35) (110/70 - 148/88)Vital Signs Last 24 Hrs  T(C): 36.8 (11-04-24 @ 08:35), Max: 36.9 (11-03-24 @ 16:35)  T(F): 98.2 (11-04-24 @ 08:35), Max: 98.5 (11-03-24 @ 16:35)  HR: 86 (11-04-24 @ 08:35) (85 - 86)  BP: 124/71 (11-04-24 @ 08:35) (124/71 - 148/88)  BP(mean): --  RR: 18 (11-04-24 @ 08:35) (18 - 18)  SpO2: 99% (11-04-24 @ 08:35) (99% - 99%)      Lipid Panel: Date/Time: 10-23-24 @ 05:30  Cholesterol, Serum: 122  LDL Cholesterol Calculated: 70  HDL Cholesterol, Serum: 37  Total Cholesterol/HDL Ration Measurement: --  Triglycerides, Serum: 74  
BMI: BMI (kg/m2): 24.2 (10-29-24 @ 09:04)  HbA1c: A1C with Estimated Average Glucose Result: 5.5 % (10-23-24 @ 05:30)    Glucose:   BP: 108/72 (11-01-24 @ 09:23) (107/61 - 124/77)Vital Signs Last 24 Hrs  T(C): 36.4 (11-01-24 @ 09:23), Max: 37 (10-31-24 @ 17:03)  T(F): 97.6 (11-01-24 @ 09:23), Max: 98.6 (10-31-24 @ 17:03)  HR: 75 (11-01-24 @ 09:23) (75 - 87)  BP: 108/72 (11-01-24 @ 09:23) (108/72 - 115/75)  BP(mean): --  RR: 18 (11-01-24 @ 09:23) (18 - 18)  SpO2: 100% (11-01-24 @ 09:23) (97% - 100%)      Lipid Panel: Date/Time: 10-23-24 @ 05:30  Cholesterol, Serum: 122  LDL Cholesterol Calculated: 70  HDL Cholesterol, Serum: 37  Total Cholesterol/HDL Ration Measurement: --  Triglycerides, Serum: 74  
BMI: BMI (kg/m2): 25 (10-22-24 @ 08:20)  HbA1c: A1C with Estimated Average Glucose Result: 5.5 % (10-23-24 @ 05:30)    Glucose:   BP: 121/80 (10-24-24 @ 08:34) (105/59 - 122/79)Vital Signs Last 24 Hrs  T(C): 36.8 (10-24-24 @ 08:34), Max: 37.1 (10-23-24 @ 19:27)  T(F): 98.2 (10-24-24 @ 08:34), Max: 98.7 (10-23-24 @ 19:27)  HR: 53 (10-24-24 @ 08:34) (53 - 66)  BP: 121/80 (10-24-24 @ 08:34) (114/74 - 122/79)  BP(mean): --  RR: 18 (10-24-24 @ 08:34) (18 - 18)  SpO2: 98% (10-24-24 @ 08:34) (97% - 98%)      Lipid Panel: Date/Time: 10-23-24 @ 05:30  Cholesterol, Serum: 122  LDL Cholesterol Calculated: 70  HDL Cholesterol, Serum: 37  Total Cholesterol/HDL Ration Measurement: --  Triglycerides, Serum: 74  
BMI: BMI (kg/m2): 25 (10-22-24 @ 08:20)  HbA1c: A1C with Estimated Average Glucose Result: 5.5 % (10-23-24 @ 05:30)    Glucose:   BP: 130/82 (10-27-24 @ 16:18) (105/68 - 130/82)Vital Signs Last 24 Hrs  T(C): 36.9 (10-27-24 @ 16:18), Max: 36.9 (10-27-24 @ 16:18)  T(F): 98.4 (10-27-24 @ 16:18), Max: 98.4 (10-27-24 @ 16:18)  HR: 77 (10-27-24 @ 16:18) (63 - 77)  BP: 130/82 (10-27-24 @ 16:18) (105/71 - 130/82)  BP(mean): --  RR: 18 (10-27-24 @ 10:05) (18 - 18)  SpO2: 98% (10-27-24 @ 16:18) (96% - 98%)      Lipid Panel: Date/Time: 10-23-24 @ 05:30  Cholesterol, Serum: 122  LDL Cholesterol Calculated: 70  HDL Cholesterol, Serum: 37  Total Cholesterol/HDL Ration Measurement: --  Triglycerides, Serum: 74  
BMI: BMI (kg/m2): 24.2 (10-29-24 @ 09:04)  HbA1c: A1C with Estimated Average Glucose Result: 5.5 % (10-23-24 @ 05:30)    Glucose:   BP: 115/75 (10-31-24 @ 17:03) (107/61 - 124/77)Vital Signs Last 24 Hrs  T(C): 37 (10-31-24 @ 17:03), Max: 37 (10-31-24 @ 17:03)  T(F): 98.6 (10-31-24 @ 17:03), Max: 98.6 (10-31-24 @ 17:03)  HR: 87 (10-31-24 @ 17:03) (55 - 87)  BP: 115/75 (10-31-24 @ 17:03) (107/61 - 115/75)  BP(mean): --  RR: 18 (10-31-24 @ 17:03) (18 - 18)  SpO2: 97% (10-31-24 @ 17:03) (97% - 99%)      Lipid Panel: Date/Time: 10-23-24 @ 05:30  Cholesterol, Serum: 122  LDL Cholesterol Calculated: 70  HDL Cholesterol, Serum: 37  Total Cholesterol/HDL Ration Measurement: --  Triglycerides, Serum: 74  
BMI: BMI (kg/m2): 25 (10-22-24 @ 08:20)  HbA1c: A1C with Estimated Average Glucose Result: 5.5 % (10-23-24 @ 05:30)    Glucose:   BP: 105/68 (10-25-24 @ 16:20) (105/59 - 132/89)Vital Signs Last 24 Hrs  T(C): 37.1 (10-25-24 @ 16:20), Max: 37.2 (10-24-24 @ 19:55)  T(F): 98.7 (10-25-24 @ 16:20), Max: 98.9 (10-24-24 @ 19:55)  HR: 68 (10-25-24 @ 16:20) (56 - 71)  BP: 105/68 (10-25-24 @ 16:20) (105/68 - 115/68)  BP(mean): --  RR: 18 (10-25-24 @ 16:20) (18 - 18)  SpO2: 100% (10-25-24 @ 16:20) (92% - 100%)      Lipid Panel: Date/Time: 10-23-24 @ 05:30  Cholesterol, Serum: 122  LDL Cholesterol Calculated: 70  HDL Cholesterol, Serum: 37  Total Cholesterol/HDL Ration Measurement: --  Triglycerides, Serum: 74  
BMI: BMI (kg/m2): 24.2 (10-29-24 @ 09:04)  HbA1c: A1C with Estimated Average Glucose Result: 5.5 % (10-23-24 @ 05:30)    Glucose:   BP: 115/69 (10-29-24 @ 16:43) (105/71 - 130/82)Vital Signs Last 24 Hrs  T(C): 37.2 (10-29-24 @ 16:43), Max: 37.2 (10-29-24 @ 16:43)  T(F): 98.9 (10-29-24 @ 16:43), Max: 98.9 (10-29-24 @ 16:43)  HR: 80 (10-29-24 @ 16:43) (70 - 80)  BP: 115/69 (10-29-24 @ 16:43) (108/70 - 115/69)  BP(mean): --  RR: 18 (10-29-24 @ 16:43) (18 - 18)  SpO2: 100% (10-29-24 @ 16:43) (98% - 100%)      Lipid Panel: Date/Time: 10-23-24 @ 05:30  Cholesterol, Serum: 122  LDL Cholesterol Calculated: 70  HDL Cholesterol, Serum: 37  Total Cholesterol/HDL Ration Measurement: --  Triglycerides, Serum: 74  
BMI: BMI (kg/m2): 25 (10-22-24 @ 08:20)  HbA1c: A1C with Estimated Average Glucose Result: 5.5 % (10-23-24 @ 05:30)    Glucose:   BP: 127/76 (10-26-24 @ 16:41) (105/68 - 132/89)Vital Signs Last 24 Hrs  T(C): 37.1 (10-26-24 @ 16:41), Max: 37.1 (10-26-24 @ 16:41)  T(F): 98.7 (10-26-24 @ 16:41), Max: 98.7 (10-26-24 @ 16:41)  HR: 78 (10-26-24 @ 16:41) (62 - 78)  BP: 127/76 (10-26-24 @ 16:41) (112/75 - 127/76)  BP(mean): --  RR: --  SpO2: 99% (10-26-24 @ 16:41) (98% - 99%)      Lipid Panel: Date/Time: 10-23-24 @ 05:30  Cholesterol, Serum: 122  LDL Cholesterol Calculated: 70  HDL Cholesterol, Serum: 37  Total Cholesterol/HDL Ration Measurement: --  Triglycerides, Serum: 74  
BMI: BMI (kg/m2): 25 (10-22-24 @ 08:20)  HbA1c: A1C with Estimated Average Glucose Result: 5.5 % (10-23-24 @ 05:30)    Glucose:   BP: 112/67 (10-28-24 @ 09:15) (105/68 - 130/82)Vital Signs Last 24 Hrs  T(C): 36.6 (10-28-24 @ 09:15), Max: 36.9 (10-27-24 @ 16:18)  T(F): 97.9 (10-28-24 @ 09:15), Max: 98.4 (10-27-24 @ 16:18)  HR: 69 (10-28-24 @ 09:15) (69 - 77)  BP: 112/67 (10-28-24 @ 09:15) (112/67 - 130/82)  BP(mean): --  RR: 18 (10-28-24 @ 09:15) (18 - 18)  SpO2: 98% (10-28-24 @ 09:15) (98% - 98%)      Lipid Panel: Date/Time: 10-23-24 @ 05:30  Cholesterol, Serum: 122  LDL Cholesterol Calculated: 70  HDL Cholesterol, Serum: 37  Total Cholesterol/HDL Ration Measurement: --  Triglycerides, Serum: 74

## 2024-11-04 NOTE — BH INPATIENT PSYCHIATRY DISCHARGE NOTE - NSBHMETABOLIC_PSY_ALL_CORE_FT
BMI: BMI (kg/m2): 24.2 (10-29-24 @ 09:04)  HbA1c: A1C with Estimated Average Glucose Result: 5.5 % (10-23-24 @ 05:30)    Glucose:   BP: 124/71 (11-04-24 @ 08:35) (110/70 - 148/88)Vital Signs Last 24 Hrs  T(C): 36.8 (11-04-24 @ 08:35), Max: 36.9 (11-03-24 @ 16:35)  T(F): 98.2 (11-04-24 @ 08:35), Max: 98.5 (11-03-24 @ 16:35)  HR: 86 (11-04-24 @ 08:35) (85 - 86)  BP: 124/71 (11-04-24 @ 08:35) (124/71 - 148/88)  BP(mean): --  RR: 18 (11-04-24 @ 08:35) (18 - 18)  SpO2: 99% (11-04-24 @ 08:35) (99% - 99%)      Lipid Panel: Date/Time: 10-23-24 @ 05:30  Cholesterol, Serum: 122  LDL Cholesterol Calculated: 70  HDL Cholesterol, Serum: 37  Total Cholesterol/HDL Ration Measurement: --  Triglycerides, Serum: 74

## 2024-11-04 NOTE — BH INPATIENT PSYCHIATRY PROGRESS NOTE - NSBHATTESTBILLING_PSY_A_CORE
86510-Ybipfeecxm OBS or IP - moderate complexity OR 35-49 mins
40091-Lxnmhcpkrq OBS or IP - moderate complexity OR 35-49 mins
83860-Emzbennegx OBS or IP - moderate complexity OR 35-49 mins
[id number]-Fnqvihhbde OBS or IP - moderate complexity OR 35-49 mins
[id number]-Qxziquezai OBS or IP - moderate complexity OR 35-49 mins
38021-Goobivnrag OBS or IP - moderate complexity OR 35-49 mins
[id number]-Qvpopfobft OBS or IP - moderate complexity OR 35-49 mins
54052-Udcblpenwm OBS or IP - moderate complexity OR 35-49 mins
[id number]-Wvknkuzsgv OBS or IP - low complexity OR 25-34 mins
[id number]-Wctlcqecic OBS or IP - moderate complexity OR 35-49 mins
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
## 2024-11-04 NOTE — BH INPATIENT PSYCHIATRY DISCHARGE NOTE - NSCURPASTPSYDX_PSY_ALL_CORE
Alcohol/Substance Use disorders Alcohol/Substance Use disorders/Cluster B Personality disorder/traits

## 2024-11-04 NOTE — BH INPATIENT PSYCHIATRY PROGRESS NOTE - NSDCCRITERIA_PSY_ALL_CORE
Improvement in mood and sxs

## 2024-11-04 NOTE — BH INPATIENT PSYCHIATRY PROGRESS NOTE - NSBHCHARTREVIEWVS_PSY_A_CORE FT
Vital Signs Last 24 Hrs  T(C): 36.6 (10-28-24 @ 09:15), Max: 36.9 (10-27-24 @ 16:18)  T(F): 97.9 (10-28-24 @ 09:15), Max: 98.4 (10-27-24 @ 16:18)  HR: 69 (10-28-24 @ 09:15) (69 - 77)  BP: 112/67 (10-28-24 @ 09:15) (112/67 - 130/82)  BP(mean): --  RR: 18 (10-28-24 @ 09:15) (18 - 18)  SpO2: 98% (10-28-24 @ 09:15) (98% - 98%)    
Vital Signs Last 24 Hrs  T(C): 36.8 (10-24-24 @ 08:34), Max: 37.1 (10-23-24 @ 19:27)  T(F): 98.2 (10-24-24 @ 08:34), Max: 98.7 (10-23-24 @ 19:27)  HR: 53 (10-24-24 @ 08:34) (53 - 66)  BP: 121/80 (10-24-24 @ 08:34) (114/74 - 122/79)  BP(mean): --  RR: 18 (10-24-24 @ 08:34) (18 - 18)  SpO2: 98% (10-24-24 @ 08:34) (97% - 98%)    
Vital Signs Last 24 Hrs  T(C): 37 (10-31-24 @ 17:03), Max: 37 (10-31-24 @ 17:03)  T(F): 98.6 (10-31-24 @ 17:03), Max: 98.6 (10-31-24 @ 17:03)  HR: 87 (10-31-24 @ 17:03) (55 - 87)  BP: 115/75 (10-31-24 @ 17:03) (107/61 - 115/75)  BP(mean): --  RR: 18 (10-31-24 @ 17:03) (18 - 18)  SpO2: 97% (10-31-24 @ 17:03) (97% - 99%)    
Vital Signs Last 24 Hrs  T(C): 37.2 (10-29-24 @ 16:43), Max: 37.2 (10-29-24 @ 16:43)  T(F): 98.9 (10-29-24 @ 16:43), Max: 98.9 (10-29-24 @ 16:43)  HR: 80 (10-29-24 @ 16:43) (70 - 80)  BP: 115/69 (10-29-24 @ 16:43) (108/70 - 115/69)  BP(mean): --  RR: 18 (10-29-24 @ 16:43) (18 - 18)  SpO2: 100% (10-29-24 @ 16:43) (98% - 100%)    
Vital Signs Last 24 Hrs  T(C): 37.1 (10-26-24 @ 16:41), Max: 37.1 (10-26-24 @ 16:41)  T(F): 98.7 (10-26-24 @ 16:41), Max: 98.7 (10-26-24 @ 16:41)  HR: 78 (10-26-24 @ 16:41) (62 - 78)  BP: 127/76 (10-26-24 @ 16:41) (112/75 - 127/76)  BP(mean): --  RR: --  SpO2: 99% (10-26-24 @ 16:41) (98% - 99%)    
Vital Signs Last 24 Hrs  T(C): 36.4 (11-01-24 @ 09:23), Max: 37 (10-31-24 @ 17:03)  T(F): 97.6 (11-01-24 @ 09:23), Max: 98.6 (10-31-24 @ 17:03)  HR: 75 (11-01-24 @ 09:23) (75 - 87)  BP: 108/72 (11-01-24 @ 09:23) (108/72 - 115/75)  BP(mean): --  RR: 18 (11-01-24 @ 09:23) (18 - 18)  SpO2: 100% (11-01-24 @ 09:23) (97% - 100%)    
Vital Signs Last 24 Hrs  T(C): 37 (10-31-24 @ 17:03), Max: 37 (10-31-24 @ 17:03)  T(F): 98.6 (10-31-24 @ 17:03), Max: 98.6 (10-31-24 @ 17:03)  HR: 87 (10-31-24 @ 17:03) (55 - 87)  BP: 115/75 (10-31-24 @ 17:03) (107/61 - 115/75)  BP(mean): --  RR: 18 (10-31-24 @ 17:03) (18 - 18)  SpO2: 97% (10-31-24 @ 17:03) (97% - 99%)    
Vital Signs Last 24 Hrs  T(C): 37.1 (10-25-24 @ 16:20), Max: 37.2 (10-24-24 @ 19:55)  T(F): 98.7 (10-25-24 @ 16:20), Max: 98.9 (10-24-24 @ 19:55)  HR: 68 (10-25-24 @ 16:20) (56 - 71)  BP: 105/68 (10-25-24 @ 16:20) (105/68 - 115/68)  BP(mean): --  RR: 18 (10-25-24 @ 16:20) (18 - 18)  SpO2: 100% (10-25-24 @ 16:20) (92% - 100%)    
Vital Signs Last 24 Hrs  T(C): 36.9 (10-27-24 @ 16:18), Max: 36.9 (10-27-24 @ 16:18)  T(F): 98.4 (10-27-24 @ 16:18), Max: 98.4 (10-27-24 @ 16:18)  HR: 77 (10-27-24 @ 16:18) (63 - 77)  BP: 130/82 (10-27-24 @ 16:18) (105/71 - 130/82)  BP(mean): --  RR: 18 (10-27-24 @ 10:05) (18 - 18)  SpO2: 98% (10-27-24 @ 16:18) (96% - 98%)    
Vital Signs Last 24 Hrs  T(C): 36.8 (11-04-24 @ 08:35), Max: 36.9 (11-03-24 @ 16:35)  T(F): 98.2 (11-04-24 @ 08:35), Max: 98.5 (11-03-24 @ 16:35)  HR: 86 (11-04-24 @ 08:35) (85 - 86)  BP: 124/71 (11-04-24 @ 08:35) (124/71 - 148/88)  BP(mean): --  RR: 18 (11-04-24 @ 08:35) (18 - 18)  SpO2: 99% (11-04-24 @ 08:35) (99% - 99%)

## 2024-11-04 NOTE — BH INPATIENT PSYCHIATRY PROGRESS NOTE - NSTXSUBMISGOAL_PSY_ALL_CORE
Will verbalize 3 adverse consequences of use

## 2024-11-04 NOTE — BH INPATIENT PSYCHIATRY PROGRESS NOTE - NSBHMSEAFFCONG_PSY_A_CORE
Congruent
Non-congruent
Congruent

## 2024-11-04 NOTE — BH INPATIENT PSYCHIATRY PROGRESS NOTE - NSICDXBHTERTIARYDX_PSY_ALL_CORE
R/O Bipolar disorder   F31.9  R/O Malingering   Z76.5  
R/O Bipolar disorder   F31.9  
R/O Bipolar disorder   F31.9  R/O Malingering   Z76.5  
R/O Bipolar disorder   F31.9  R/O Malingering   Z76.5

## 2024-11-04 NOTE — BH INPATIENT PSYCHIATRY PROGRESS NOTE - NSTXSUBMISDATETRGT_PSY_ALL_CORE
04-Nov-2024
04-Nov-2024
06-Nov-2024
31-Oct-2024
06-Nov-2024
06-Nov-2024
31-Oct-2024
06-Nov-2024
31-Oct-2024
31-Oct-2024

## 2024-11-04 NOTE — BH INPATIENT PSYCHIATRY DISCHARGE NOTE - NSDCCPCAREPLAN_GEN_ALL_CORE_FT
PRINCIPAL DISCHARGE DIAGNOSIS  Diagnosis: Substance induced mood disorder  Assessment and Plan of Treatment: f/u at rehab     PRINCIPAL DISCHARGE DIAGNOSIS  Diagnosis: Substance induced mood disorder  Assessment and Plan of Treatment: f/u at rehab      SECONDARY DISCHARGE DIAGNOSES  Diagnosis: Insomnia  Assessment and Plan of Treatment: continue with melatonin 5mg at bedtime as needed    Diagnosis: Nightmares  Assessment and Plan of Treatment: continue with prazosin 1mg at bedtime     PRINCIPAL DISCHARGE DIAGNOSIS  Diagnosis: Substance induced mood disorder  Assessment and Plan of Treatment: f/u at rehab      SECONDARY DISCHARGE DIAGNOSES  Diagnosis: Insomnia  Assessment and Plan of Treatment: continue with melatonin 5mg at bedtime as needed    Diagnosis: Nightmares  Assessment and Plan of Treatment: continue with prazosin 1mg at bedtime    Diagnosis: Cocaine use disorder  Assessment and Plan of Treatment: f/u at rehab    Diagnosis: Antisocial personality disorder  Assessment and Plan of Treatment: Recommend follow up with mental health provider and pursuing therapy    Diagnosis: Malingering  Assessment and Plan of Treatment: Recommend follow up with mental health provider and pursuing therapy

## 2024-11-04 NOTE — BH INPATIENT PSYCHIATRY PROGRESS NOTE - NSTXSUBMISINTERMD_PSY_ALL_CORE
psychopharm x15 mins, rehab, as sx seem all substance-related, may well hold off on psychotropics

## 2024-11-04 NOTE — BH INPATIENT PSYCHIATRY PROGRESS NOTE - NSBHFUPINTERVALHXFT_PSY_A_CORE
No acute events overnight.    On exam, patient is calm, cooperative, euthymic. Notes that he has been unable to sleep well in the hospital due to nightmares of waking up to his friends being dead beside him. Notes he has a history of nightmares but they are worse since he has been here. States he is having homicidal thoughts towards "a few" friends outside the hospital, when asked if he has a plan to harm them he smiles and then appears guarded, states he does not wish to disclose this. Denies suicidal thoughts. Pt amenable to trial of Prazosin for nightmares while in the hospital. No other acute concerns voiced.
Patient visible on the unit at time, initiates conversation with writer on each occasion. He reports feeling 'different' this time and is excited at what he future looks like. He shares desire to reach out to his family, specifically his sister and mother who he is unsure is alive or not. Has no acute psychiatric or mental health issues. No si/hi/avh or PI
Patient seen in room, writing in the desk. Pt stated "I'm feeling good today", continues to be hopeful in staying sober. Pt endorses slight suicidal ideation when he can't fall asleep, stating while im trying to "fall asleep thoughts to harm my self creep up".  Discussed different medications to help with the difficulties falling asleep, pt agrees to try melatonin. Reports once he falls asleep, pt is able to stay asleep, denies any nightmares at this time. Encourage to continue to attend groups to help build coping mechanism. Pt reports appetite as good.  Pt denies any AH/VH/HI/SA/PI. Pt in no acute distress, reports slight b/l hand pain- encourage to utilize PRN and moisturizer to help. 
Patient seen by treatment team. Reports feeling that he is at rock bottom and is interested in pursuing rehab as he no longer wants to use. Tearful at times, continues to endorse grief related to friend's recent death as well as shame related to his consistent drug use. No si/hi/avh or PI reported. No acute medical concerns. Sleep is much improved. He has been attending groups and finds it to be beneficial. 
Patient visible on the unit throughout the morning. Seen today by writer and SW in tv area. He reports taking a shower today and feeling somewhat better. He reports feeling angry today that his friend  and he didn't. He also continues to endorse si, no intent or plan. Is however reporting to be future oriented, requesting that team refer him to rehab. Denies any medical concerns, requests that Biktarvy be restarted and is agreeable to viral load and T-cell. Sleep last night was poor due to 'nightmares' of his friend dying. Has no other concerns. 
Patient visible on the unit, at intervals he is seen watching tv and going to groups. On approach he states that sleep was much improved last night as he feel asleep without issue and had no nightmares. He feels rested today. No si/hi/avh or PI. Noted to be brighter, smiling and well engaged. 
Patient visible on the unit, seen watching tv on approach. He is seen in his room by writer. He reports feeling tired due to poor sleep, initial insomnia as well as nightmares related to his friend's death. Amenable to prazosin which is to be started tonight. Discussed side effects etc. He is aware. He continues to report si without intent or plan. States that he has intermittent HI towards people in his life who wronged him, but has no intent or plan. No psychotic sxs reported. Has been attending groups, trying not to interact with other patients.   Noted to show interest in writer, wanting to discuss family and upbringing. 
No acute events overnight.    On exam, patient is calm, cooperative, euthymic. He reports that today, he had homicidal ideation towards people who have done him wrong from outside the hospital. He had thoughts of breaking into their home and hurting them.  These thoughts happened intermittently throughout the day and were not present prior to today. He tells me that he feels "depressed." He denies SI/AH/VH. Denies symptoms of withdrawal. He reports poor sleep from waking up in the middle of the night multiple times; he was encouraged to ask for Trazodone if he continues to have insomnia. He reports good appetite. No acute concerns.
Patient visible on the unit throughout the morning seen seated in tv area alone watching tv. He reports poor sleep last night due to nightmares. Has trazodone prn in place. Prazosin may be explored later. He reports feeling angry that friend  and he is still here, endorses passive si with no reported plan or intent. No hi/avh or PI reported. Has no medical concerns or complaints. He states that there are other people on the unit that seem happy and this makes him mad and becomes rude towards them.
Patient seen in common area, watching TV alone where writer spoke to patient. Pt stated he is feeling "better" but "sometimes on edge". Pt stated he looks forward to staying sober. However, pt endorses it is better to stay sober while inpatient. Encourage to continue to attend groups to help build coping mechanism to help with craving once discharge- pt reports groups and 12 steps has helped. When asked what makes him on edge, pt stated he doesn't want to talk about it. Pt reports sleep as great, happy with the addition of prazosin- denies any nightmares at this time. Pt denies any AH/VH/SI/HI/SA/PI. Pt in no acute distress, reports slight b/l hand pain- encourage to utilize PRN and moisturizer to help.

## 2024-11-04 NOTE — BH INPATIENT PSYCHIATRY PROGRESS NOTE - NSBHFUPINTERVALCCFT_PSY_A_CORE
"The nightmares are getting intense."
'I can be a jerk'
'I'm at rock bottom'
"I feel good'
'I feel different this time'
'I'm good today"
'I'm tired'
"I'm having homicidal ideation."
'can we restart my Biktarvy?'
'I'm feeling better, sometimes on edge"

## 2024-11-04 NOTE — BH INPATIENT PSYCHIATRY PROGRESS NOTE - NSBHATTESTCOMMENTATTENDFT_PSY_A_CORE
I agree with notation and plan. I agree with notation and plan, patient reports improvement in mood and sxs, feels ready to address his substance use etc. Was accepted to substance use rehab, will be discharged tomorrow.

## 2024-11-04 NOTE — BH INPATIENT PSYCHIATRY PROGRESS NOTE - CURRENT MEDICATION
MEDICATIONS  (STANDING):  bictegravir 50 mG/emtricitabine 200 mG/tenofovir alafenamide 25 mG (BIKTARVY) 1 Tablet(s) Oral daily    MEDICATIONS  (PRN):  acetaminophen     Tablet .. 650 milliGRAM(s) Oral every 6 hours PRN Moderate Pain (4 - 6)  aluminum hydroxide/magnesium hydroxide/simethicone Suspension 30 milliLiter(s) Oral every 6 hours PRN Dyspepsia  LORazepam     Tablet 2 milliGRAM(s) Oral every 2 hours PRN CIWA-Ar score increase by 2 points and a total score of 7 or less  magnesium hydroxide Suspension 30 milliLiter(s) Oral daily PRN Constipation  methadone    Tablet 5 milliGRAM(s) Oral every 8 hours PRN opioid withdrawal  OLANZapine Disintegrating Tablet 10 milliGRAM(s) Oral every 8 hours PRN agitation  traZODone 50 milliGRAM(s) Oral at bedtime PRN insomnia  
MEDICATIONS  (STANDING):  bictegravir 50 mG/emtricitabine 200 mG/tenofovir alafenamide 25 mG (BIKTARVY) 1 Tablet(s) Oral daily    MEDICATIONS  (PRN):  acetaminophen     Tablet .. 650 milliGRAM(s) Oral every 6 hours PRN Moderate Pain (4 - 6)  aluminum hydroxide/magnesium hydroxide/simethicone Suspension 30 milliLiter(s) Oral every 6 hours PRN Dyspepsia  LORazepam     Tablet 2 milliGRAM(s) Oral every 2 hours PRN CIWA-Ar score increase by 2 points and a total score of 7 or less  magnesium hydroxide Suspension 30 milliLiter(s) Oral daily PRN Constipation  methadone    Tablet 5 milliGRAM(s) Oral every 8 hours PRN opioid withdrawal  OLANZapine Disintegrating Tablet 10 milliGRAM(s) Oral every 8 hours PRN agitation  traZODone 50 milliGRAM(s) Oral at bedtime PRN insomnia  
MEDICATIONS  (STANDING):  bictegravir 50 mG/emtricitabine 200 mG/tenofovir alafenamide 25 mG (BIKTARVY) 1 Tablet(s) Oral daily  prazosin. 1 milliGRAM(s) Oral at bedtime    MEDICATIONS  (PRN):  acetaminophen     Tablet .. 650 milliGRAM(s) Oral every 6 hours PRN Moderate Pain (4 - 6)  aluminum hydroxide/magnesium hydroxide/simethicone Suspension 30 milliLiter(s) Oral every 6 hours PRN Dyspepsia  magnesium hydroxide Suspension 30 milliLiter(s) Oral daily PRN Constipation  OLANZapine Disintegrating Tablet 10 milliGRAM(s) Oral every 8 hours PRN agitation  traZODone 50 milliGRAM(s) Oral at bedtime PRN insomnia  
MEDICATIONS  (STANDING):  bictegravir 50 mG/emtricitabine 200 mG/tenofovir alafenamide 25 mG (BIKTARVY) 1 Tablet(s) Oral daily  prazosin. 1 milliGRAM(s) Oral at bedtime    MEDICATIONS  (PRN):  acetaminophen     Tablet .. 650 milliGRAM(s) Oral every 6 hours PRN Moderate Pain (4 - 6)  aluminum hydroxide/magnesium hydroxide/simethicone Suspension 30 milliLiter(s) Oral every 6 hours PRN Dyspepsia  LORazepam     Tablet 2 milliGRAM(s) Oral every 2 hours PRN CIWA-Ar score increase by 2 points and a total score of 7 or less  magnesium hydroxide Suspension 30 milliLiter(s) Oral daily PRN Constipation  methadone    Tablet 5 milliGRAM(s) Oral every 8 hours PRN opioid withdrawal  OLANZapine Disintegrating Tablet 10 milliGRAM(s) Oral every 8 hours PRN agitation  traZODone 50 milliGRAM(s) Oral at bedtime PRN insomnia  
MEDICATIONS  (STANDING):    MEDICATIONS  (PRN):  acetaminophen     Tablet .. 650 milliGRAM(s) Oral every 6 hours PRN Moderate Pain (4 - 6)  aluminum hydroxide/magnesium hydroxide/simethicone Suspension 30 milliLiter(s) Oral every 6 hours PRN Dyspepsia  LORazepam     Tablet 2 milliGRAM(s) Oral every 2 hours PRN CIWA-Ar score increase by 2 points and a total score of 7 or less  magnesium hydroxide Suspension 30 milliLiter(s) Oral daily PRN Constipation  methadone    Tablet 5 milliGRAM(s) Oral every 8 hours PRN opioid withdrawal  OLANZapine Disintegrating Tablet 10 milliGRAM(s) Oral every 8 hours PRN agitation  traZODone 50 milliGRAM(s) Oral at bedtime PRN insomnia  
MEDICATIONS  (STANDING):  bictegravir 50 mG/emtricitabine 200 mG/tenofovir alafenamide 25 mG (BIKTARVY) 1 Tablet(s) Oral daily  prazosin. 1 milliGRAM(s) Oral at bedtime    MEDICATIONS  (PRN):  acetaminophen     Tablet .. 650 milliGRAM(s) Oral every 6 hours PRN Moderate Pain (4 - 6)  aluminum hydroxide/magnesium hydroxide/simethicone Suspension 30 milliLiter(s) Oral every 6 hours PRN Dyspepsia  magnesium hydroxide Suspension 30 milliLiter(s) Oral daily PRN Constipation  OLANZapine Disintegrating Tablet 10 milliGRAM(s) Oral every 8 hours PRN agitation  traZODone 50 milliGRAM(s) Oral at bedtime PRN insomnia  
MEDICATIONS  (STANDING):  bictegravir 50 mG/emtricitabine 200 mG/tenofovir alafenamide 25 mG (BIKTARVY) 1 Tablet(s) Oral daily  prazosin. 1 milliGRAM(s) Oral at bedtime    MEDICATIONS  (PRN):  acetaminophen     Tablet .. 650 milliGRAM(s) Oral every 6 hours PRN Moderate Pain (4 - 6)  aluminum hydroxide/magnesium hydroxide/simethicone Suspension 30 milliLiter(s) Oral every 6 hours PRN Dyspepsia  LORazepam     Tablet 2 milliGRAM(s) Oral every 2 hours PRN CIWA-Ar score increase by 2 points and a total score of 7 or less  magnesium hydroxide Suspension 30 milliLiter(s) Oral daily PRN Constipation  methadone    Tablet 5 milliGRAM(s) Oral every 8 hours PRN opioid withdrawal  OLANZapine Disintegrating Tablet 10 milliGRAM(s) Oral every 8 hours PRN agitation  traZODone 50 milliGRAM(s) Oral at bedtime PRN insomnia  
MEDICATIONS  (STANDING):  bictegravir 50 mG/emtricitabine 200 mG/tenofovir alafenamide 25 mG (BIKTARVY) 1 Tablet(s) Oral daily  prazosin. 1 milliGRAM(s) Oral at bedtime    MEDICATIONS  (PRN):  acetaminophen     Tablet .. 650 milliGRAM(s) Oral every 6 hours PRN Moderate Pain (4 - 6)  aluminum hydroxide/magnesium hydroxide/simethicone Suspension 30 milliLiter(s) Oral every 6 hours PRN Dyspepsia  LORazepam     Tablet 2 milliGRAM(s) Oral every 2 hours PRN CIWA-Ar score increase by 2 points and a total score of 7 or less  magnesium hydroxide Suspension 30 milliLiter(s) Oral daily PRN Constipation  methadone    Tablet 5 milliGRAM(s) Oral every 8 hours PRN opioid withdrawal  OLANZapine Disintegrating Tablet 10 milliGRAM(s) Oral every 8 hours PRN agitation  traZODone 50 milliGRAM(s) Oral at bedtime PRN insomnia

## 2024-11-04 NOTE — BH INPATIENT PSYCHIATRY DISCHARGE NOTE - NSDCMRMEDTOKEN_GEN_ALL_CORE_FT
bictegravir/emtricitabine/tenofovir 50 mg-200 mg-25 mg oral tablet: 1 tab(s) orally once a day  melatonin 3 mg oral tablet: 1 tab(s) orally once a day (at bedtime) as needed for  insomnia  prazosin 1 mg oral capsule: 1 cap(s) orally once a day (at bedtime)

## 2024-11-04 NOTE — BH INPATIENT PSYCHIATRY PROGRESS NOTE - NSBHMSEGROOM_PSY_A_CORE
malodorous, dirt under nails/Fair

## 2024-11-04 NOTE — BH INPATIENT PSYCHIATRY DISCHARGE NOTE - NSPRESENTSXS_PSY_ALL_CORE
Depressed mood/Anhedonia/Hopelessness or despair/Refusal or inability to complete safety plan Depressed mood/Anhedonia/Hopelessness or despair

## 2024-11-04 NOTE — BH INPATIENT PSYCHIATRY PROGRESS NOTE - NSBHTIMEACTIVITIESPERFORMED_PSY_A_CORE
time spent with patient, coordination of care

## 2024-11-04 NOTE — BH INPATIENT PSYCHIATRY PROGRESS NOTE - PRN MEDS
MEDICATIONS  (PRN):  acetaminophen     Tablet .. 650 milliGRAM(s) Oral every 6 hours PRN Moderate Pain (4 - 6)  aluminum hydroxide/magnesium hydroxide/simethicone Suspension 30 milliLiter(s) Oral every 6 hours PRN Dyspepsia  magnesium hydroxide Suspension 30 milliLiter(s) Oral daily PRN Constipation  OLANZapine Disintegrating Tablet 10 milliGRAM(s) Oral every 8 hours PRN agitation  traZODone 50 milliGRAM(s) Oral at bedtime PRN insomnia  
MEDICATIONS  (PRN):  acetaminophen     Tablet .. 650 milliGRAM(s) Oral every 6 hours PRN Moderate Pain (4 - 6)  aluminum hydroxide/magnesium hydroxide/simethicone Suspension 30 milliLiter(s) Oral every 6 hours PRN Dyspepsia  LORazepam     Tablet 2 milliGRAM(s) Oral every 2 hours PRN CIWA-Ar score increase by 2 points and a total score of 7 or less  magnesium hydroxide Suspension 30 milliLiter(s) Oral daily PRN Constipation  methadone    Tablet 5 milliGRAM(s) Oral every 8 hours PRN opioid withdrawal  OLANZapine Disintegrating Tablet 10 milliGRAM(s) Oral every 8 hours PRN agitation  traZODone 50 milliGRAM(s) Oral at bedtime PRN insomnia  
MEDICATIONS  (PRN):  acetaminophen     Tablet .. 650 milliGRAM(s) Oral every 6 hours PRN Moderate Pain (4 - 6)  aluminum hydroxide/magnesium hydroxide/simethicone Suspension 30 milliLiter(s) Oral every 6 hours PRN Dyspepsia  magnesium hydroxide Suspension 30 milliLiter(s) Oral daily PRN Constipation  OLANZapine Disintegrating Tablet 10 milliGRAM(s) Oral every 8 hours PRN agitation  traZODone 50 milliGRAM(s) Oral at bedtime PRN insomnia  
MEDICATIONS  (PRN):  acetaminophen     Tablet .. 650 milliGRAM(s) Oral every 6 hours PRN Moderate Pain (4 - 6)  aluminum hydroxide/magnesium hydroxide/simethicone Suspension 30 milliLiter(s) Oral every 6 hours PRN Dyspepsia  LORazepam     Tablet 2 milliGRAM(s) Oral every 2 hours PRN CIWA-Ar score increase by 2 points and a total score of 7 or less  magnesium hydroxide Suspension 30 milliLiter(s) Oral daily PRN Constipation  methadone    Tablet 5 milliGRAM(s) Oral every 8 hours PRN opioid withdrawal  OLANZapine Disintegrating Tablet 10 milliGRAM(s) Oral every 8 hours PRN agitation  traZODone 50 milliGRAM(s) Oral at bedtime PRN insomnia  
MEDICATIONS  (PRN):  acetaminophen     Tablet .. 650 milliGRAM(s) Oral every 6 hours PRN Moderate Pain (4 - 6)  aluminum hydroxide/magnesium hydroxide/simethicone Suspension 30 milliLiter(s) Oral every 6 hours PRN Dyspepsia  magnesium hydroxide Suspension 30 milliLiter(s) Oral daily PRN Constipation  OLANZapine Disintegrating Tablet 10 milliGRAM(s) Oral every 8 hours PRN agitation  traZODone 50 milliGRAM(s) Oral at bedtime PRN insomnia  
MEDICATIONS  (PRN):  acetaminophen     Tablet .. 650 milliGRAM(s) Oral every 6 hours PRN Moderate Pain (4 - 6)  aluminum hydroxide/magnesium hydroxide/simethicone Suspension 30 milliLiter(s) Oral every 6 hours PRN Dyspepsia  LORazepam     Tablet 2 milliGRAM(s) Oral every 2 hours PRN CIWA-Ar score increase by 2 points and a total score of 7 or less  magnesium hydroxide Suspension 30 milliLiter(s) Oral daily PRN Constipation  methadone    Tablet 5 milliGRAM(s) Oral every 8 hours PRN opioid withdrawal  OLANZapine Disintegrating Tablet 10 milliGRAM(s) Oral every 8 hours PRN agitation  traZODone 50 milliGRAM(s) Oral at bedtime PRN insomnia  
MEDICATIONS  (PRN):  acetaminophen     Tablet .. 650 milliGRAM(s) Oral every 6 hours PRN Moderate Pain (4 - 6)  aluminum hydroxide/magnesium hydroxide/simethicone Suspension 30 milliLiter(s) Oral every 6 hours PRN Dyspepsia  LORazepam     Tablet 2 milliGRAM(s) Oral every 2 hours PRN CIWA-Ar score increase by 2 points and a total score of 7 or less  magnesium hydroxide Suspension 30 milliLiter(s) Oral daily PRN Constipation  methadone    Tablet 5 milliGRAM(s) Oral every 8 hours PRN opioid withdrawal  OLANZapine Disintegrating Tablet 10 milliGRAM(s) Oral every 8 hours PRN agitation  traZODone 50 milliGRAM(s) Oral at bedtime PRN insomnia  
MEDICATIONS  (PRN):  acetaminophen     Tablet .. 650 milliGRAM(s) Oral every 6 hours PRN Moderate Pain (4 - 6)  aluminum hydroxide/magnesium hydroxide/simethicone Suspension 30 milliLiter(s) Oral every 6 hours PRN Dyspepsia  magnesium hydroxide Suspension 30 milliLiter(s) Oral daily PRN Constipation  OLANZapine Disintegrating Tablet 10 milliGRAM(s) Oral every 8 hours PRN agitation  traZODone 50 milliGRAM(s) Oral at bedtime PRN insomnia  
MEDICATIONS  (PRN):  acetaminophen     Tablet .. 650 milliGRAM(s) Oral every 6 hours PRN Moderate Pain (4 - 6)  aluminum hydroxide/magnesium hydroxide/simethicone Suspension 30 milliLiter(s) Oral every 6 hours PRN Dyspepsia  LORazepam     Tablet 2 milliGRAM(s) Oral every 2 hours PRN CIWA-Ar score increase by 2 points and a total score of 7 or less  magnesium hydroxide Suspension 30 milliLiter(s) Oral daily PRN Constipation  methadone    Tablet 5 milliGRAM(s) Oral every 8 hours PRN opioid withdrawal  OLANZapine Disintegrating Tablet 10 milliGRAM(s) Oral every 8 hours PRN agitation  traZODone 50 milliGRAM(s) Oral at bedtime PRN insomnia  
MEDICATIONS  (PRN):  acetaminophen     Tablet .. 650 milliGRAM(s) Oral every 6 hours PRN Moderate Pain (4 - 6)  aluminum hydroxide/magnesium hydroxide/simethicone Suspension 30 milliLiter(s) Oral every 6 hours PRN Dyspepsia  LORazepam     Tablet 2 milliGRAM(s) Oral every 2 hours PRN CIWA-Ar score increase by 2 points and a total score of 7 or less  magnesium hydroxide Suspension 30 milliLiter(s) Oral daily PRN Constipation  methadone    Tablet 5 milliGRAM(s) Oral every 8 hours PRN opioid withdrawal  OLANZapine Disintegrating Tablet 10 milliGRAM(s) Oral every 8 hours PRN agitation  traZODone 50 milliGRAM(s) Oral at bedtime PRN insomnia

## 2024-11-04 NOTE — BH INPATIENT PSYCHIATRY DISCHARGE NOTE - REASON FOR ADMISSION
This is a 39 yo M, diagnosis with substance induced mood disorder. Pt is domiciled alone in the [place] ([place] housing), unemployed on disability, . Medical hx significant for HIV (Biktarvy) PPHx of multiple psychiatric diagnoses, last known at [facility] from 4/10/24-4/17/24), most recent rehab at [facility] 3/2024. No current outpatient behavorial health provider, hx of medication non-adherence. Pt presents with complaint of SI for several days with plan to OD on Fentanyl. Requesting voluntary admission. Utox positive for cocaine.    This is a 37 yo M, diagnosis with substance induced mood disorder. Pt is domiciled alone in the [place] (HASA housing), unemployed on disability, . Medical hx significant for HIV (Biktarvy) PPHx of multiple psychiatric diagnoses, last known at [facility] from 4/10/24-4/17/24), most recent rehab at [facility] 3/2024. No current outpatient behavorial health provider, hx of medication non-adherence. Pt presents with complaint of SI for several days with plan to OD on Fentanyl. Requesting voluntary admission. Utox positive for cocaine.     MEDICATIONS  (STANDING):  bictegravir 50 mG/emtricitabine 200 mG/tenofovir alafenamide 25 mG (BIKTARVY) 1 Tablet(s) Oral daily  prazosin. 1 milliGRAM(s) Oral at bedtime    MEDICATIONS  (PRN):  acetaminophen     Tablet .. 650 milliGRAM(s) Oral every 6 hours PRN Moderate Pain (4 - 6)  aluminum hydroxide/magnesium hydroxide/simethicone Suspension 30 milliLiter(s) Oral every 6 hours PRN Dyspepsia  magnesium hydroxide Suspension 30 milliLiter(s) Oral daily PRN Constipation  melatonin. 5 milliGRAM(s) Oral at bedtime PRN Insomnia  OLANZapine Disintegrating Tablet 10 milliGRAM(s) Oral every 8 hours PRN agitation  traZODone 50 milliGRAM(s) Oral at bedtime PRN insomnia     This is a 39 yo M, diagnosis with substance induced mood disorder. Pt is domiciled alone in the [place] ([place] housing), unemployed on disability, . Medical hx significant for HIV (Biktarvy) PPHx of multiple psychiatric diagnoses, last known at [facility] from 4/10/24-4/17/24), most recent rehab at [facility] 3/2024. No current outpatient behavorial health provider, hx of medication non-adherence. Pt presents with complaint of SI for several days with plan to OD on Fentanyl. Requesting voluntary admission. Utox positive for cocaine.
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
## 2024-11-05 VITALS
DIASTOLIC BLOOD PRESSURE: 75 MMHG | TEMPERATURE: 98 F | OXYGEN SATURATION: 97 % | SYSTOLIC BLOOD PRESSURE: 116 MMHG | WEIGHT: 158.95 LBS | HEART RATE: 76 BPM

## 2024-11-05 PROCEDURE — G0378: CPT

## 2024-11-05 PROCEDURE — 86359 T CELLS TOTAL COUNT: CPT

## 2024-11-05 PROCEDURE — 86355 B CELLS TOTAL COUNT: CPT

## 2024-11-05 PROCEDURE — 87536 HIV-1 QUANT&REVRSE TRNSCRPJ: CPT

## 2024-11-05 PROCEDURE — 86360 T CELL ABSOLUTE COUNT/RATIO: CPT

## 2024-11-05 PROCEDURE — 81001 URINALYSIS AUTO W/SCOPE: CPT

## 2024-11-05 PROCEDURE — 36415 COLL VENOUS BLD VENIPUNCTURE: CPT

## 2024-11-05 PROCEDURE — 85025 COMPLETE CBC W/AUTO DIFF WBC: CPT

## 2024-11-05 PROCEDURE — 99285 EMERGENCY DEPT VISIT HI MDM: CPT

## 2024-11-05 PROCEDURE — 86357 NK CELLS TOTAL COUNT: CPT

## 2024-11-05 PROCEDURE — 93005 ELECTROCARDIOGRAM TRACING: CPT

## 2024-11-05 PROCEDURE — 83735 ASSAY OF MAGNESIUM: CPT

## 2024-11-05 PROCEDURE — 80061 LIPID PANEL: CPT

## 2024-11-05 PROCEDURE — 99239 HOSP IP/OBS DSCHRG MGMT >30: CPT

## 2024-11-05 PROCEDURE — 80053 COMPREHEN METABOLIC PANEL: CPT

## 2024-11-05 PROCEDURE — 80307 DRUG TEST PRSMV CHEM ANLYZR: CPT

## 2024-11-05 PROCEDURE — 87635 SARS-COV-2 COVID-19 AMP PRB: CPT

## 2024-11-05 PROCEDURE — 80076 HEPATIC FUNCTION PANEL: CPT

## 2024-11-05 PROCEDURE — 83036 HEMOGLOBIN GLYCOSYLATED A1C: CPT

## 2024-11-05 RX ADMIN — Medication 650 MILLIGRAM(S): at 13:55

## 2024-11-05 RX ADMIN — BICTEGRAVIR SODIUM, EMTRICITABINE, AND TENOFOVIR ALAFENAMIDE FUMARATE 1 TABLET(S): 50; 200; 25 TABLET ORAL at 10:32

## 2024-11-05 NOTE — BH DISCHARGE NOTE NURSING/SOCIAL WORK/PSYCH REHAB - PATIENT PORTAL LINK FT
You can access the FollowMyHealth Patient Portal offered by [facility] by registering at the following website: [email or web address]. By joining [employer]’s FollowMyHealth portal, you will also be able to view your health information using other applications (apps) compatible with our system.

## 2024-11-05 NOTE — BH DISCHARGE NOTE NURSING/SOCIAL WORK/PSYCH REHAB - MODE OF TRANSPORTATION
Medicaid Transportation Company: [facility] [employer]  Trip Confirmation #:[id number]  Phone: [phone]/Medicaid Em[employer]

## 2024-11-05 NOTE — BH DISCHARGE NOTE NURSING/SOCIAL WORK/PSYCH REHAB - NSDCCRTYPESERV_GEN_ALL_CORE_FT
[facility] is your connection to free, confidential mental health support. Speak to a counselor via phone, text, or chat and get access to mental health and substance use services, in more than 200 languages, 24/7/365. At any hour of any day, in almost any language, from phone, tablet or computer, [facility] is your connection to get the help you need

## 2024-11-05 NOTE — BH DISCHARGE NOTE NURSING/SOCIAL WORK/PSYCH REHAB - NSDCADDINFO1FT_PSY_ALL_CORE
verbal cues A Medicaid cab will escort you to [facility] 28-day rehab on 11/5/24.

## 2024-11-05 NOTE — BH DISCHARGE NOTE NURSING/SOCIAL WORK/PSYCH REHAB - NSCDUDCCRISIS_PSY_A_CORE
[facility]  1 [phone]) [facility] ([phone])  Text "WELL" to 00787  Website: www.[employer].mktg/.National Suicide Prevention Lifeline 6 (499) 755-8070/.  [employer]  1 (586) LIFENET (891-0688)/988 Suicide and Crisis Lifeline

## 2024-11-05 NOTE — BH DISCHARGE NOTE NURSING/SOCIAL WORK/PSYCH REHAB - NSDCVIVACCINE_GEN_ALL_CORE_FT
Tdap; 31-Jul-2018 20:20; Servando Drew (RN); [employer]; [id number]; IntraMuscular; Deltoid Right.; 0.5 milliLiter(s); VIS (VIS Published: 09-May-2013, VIS Presented: 31-Jul-2018);   Tdap; 21-Jun-2023 15:03; Leydi Wright); [employer]; [id number] (Exp. Date: 08-May-2025); IntraMuscular; Deltoid Left.; 0.5 milliLiter(s); VIS (VIS Published: 09-May-2013, VIS Presented: 21-Jun-2023);

## 2024-11-06 NOTE — BH SOCIAL WORK CONFIRMATION FOLLOW UP NOTE - NSCOMMENTS_PSY_ALL_CORE
Caring Call: Chart reviewed on 11.6.24. As the patient was discharged to an inpatient substance use rehab, a Caring Call is unable to be completed due to the patient not having access to his phone while on an inpatient unit. SW to remain available.     Linkage Call: Chart reviewed on 11.6.24. This SW called [facility] ([phone]) on 11/6/24 at 1:32PM and spoke with representative Lenard who confirmed the patient safely arrived to their facility yesterday. SW to remain available.

## 2024-11-06 NOTE — BH SOCIAL WORK CONFIRMATION FOLLOW UP NOTE - NSLINKEDTOLOC_PSY_ALL_CORE
Mom would like school excuse stating pt is allowed to return to school on Monday to be faxed please.   Fax: [phone]   [facility]  [address] [place] [id number] [phone]

## 2024-11-09 DIAGNOSIS — F14.94 COCAINE USE, UNSPECIFIED WITH COCAINE-INDUCED MOOD DISORDER: ICD-10-CM

## 2024-11-09 DIAGNOSIS — Z88.0 ALLERGY STATUS TO PENICILLIN: ICD-10-CM

## 2024-11-09 DIAGNOSIS — G47.00 INSOMNIA, UNSPECIFIED: ICD-10-CM

## 2024-11-09 DIAGNOSIS — R45.851 SUICIDAL IDEATIONS: ICD-10-CM

## 2024-11-09 DIAGNOSIS — Z79.899 OTHER LONG TERM (CURRENT) DRUG THERAPY: ICD-10-CM

## 2024-11-09 DIAGNOSIS — F51.5 NIGHTMARE DISORDER: ICD-10-CM

## 2024-11-09 DIAGNOSIS — B20 HUMAN IMMUNODEFICIENCY VIRUS [HIV] DISEASE: ICD-10-CM

## 2024-11-09 DIAGNOSIS — F60.2 ANTISOCIAL PERSONALITY DISORDER: ICD-10-CM

## 2024-12-02 NOTE — ED ADULT NURSE NOTE - HIV MEDS
Yes Quality 226: Preventive Care And Screening: Tobacco Use: Screening And Cessation Intervention: Patient screened for tobacco use and is an ex/non-smoker Detail Level: Detailed

## 2024-12-24 NOTE — ED BEHAVIORAL HEALTH ASSESSMENT NOTE - MEDICAL RECORD REVIEWED
[FreeTextEntry1] : A 75 yo  came to establish care and for diabetes management Was diagnosed with (DIANN?) about 10 years ago - was treated with oral meds first. Currently on Tresiba - 18 units q am, and Novolog - 2-5 Units. Uses Dexcom. Needs to install Clarity to get connected. [place] about pumps, but is not sure if she wants one. Needs to "learn more about it". POC A1C - 8.4% POC BG - 296 mg/dl Optho - UTD; Podiatry - UTD eats healthy and exercises regularly; PMH - hypothyroidism - 125 mcg; states had US done and would send us a copy of the report. Hyperlipidemia - on low dose of statins. Osteoporosis - no Tx;  on low dose of daily vit d; Last Dexa 4-5 years ago.  10/16/24 MK  This visit was provided via telehealth using real-time 2-way audio visual technology. The patient was located at home at the time of the visit. The provider, BRENDA GUALLPA, was located at the medical office located in [place], [place] at the time of the visit. The patient and Provider participated in the telehealth encounter. Verbal consent given by the patient. F/U after adjustment of insulin. Pt connected via Clarity - reports downloaded and discussed in great detail. Improvement overnight since moving Tresiba to HS (currently on 16 Units) Postprandial hyperglycemia - Novolog is 3-5 Units ac. Is almost ready for pump - has a few questions. Started counting carbs.  12/24/24 MK  This visit was provided via telehealth using real-time 2-way audio visual technology. The patient was located at home at the time of the visit. The provider, BRENDA GUALLPA, was located at the medical office located in [place], [place] at the time of the visit. The patient and Provider participated in the telehealth encounter. Verbal consent given by the patient.  F/U after initiation of Mobi - was trained last week, and the first 3 days BG was WNR. After changing the set - BG is high - reports downloaded and analyzed via Tandem source. Her Basal rate was increased to 0.9/h; BG target - 110; i/c - 1/10; CF - 1;50, IOB - 5 h. TIR -54%; BG ~ 191 mg/dl  
Yes

## 2025-01-03 NOTE — BH INPATIENT PSYCHIATRY PROGRESS NOTE - NSBHATTESTSTAFFAMEND_PSY_A_CORE
no I have personally seen and examined this patient. I fully participated in the care of this patient. I have made amendments to the documentation where appropriate and otherwise agree with the history, physical exam, and plan as documented by the

## 2025-01-08 NOTE — BH INPATIENT PSYCHIATRY PROGRESS NOTE - NSTXSUICIDPROGRES_PSY_ALL_CORE
no
Improving

## 2025-01-24 NOTE — BH INPATIENT PSYCHIATRY PROGRESS NOTE - NSBHMSETHTCONTENT_PSY_A_CORE
"Enter Query Response Below      Query Response: Heart failure due to hypertension, hypertrophic obstructive cardiomyopathy and valvular disease              If applicable, please update the problem list.   Patient: Jeanie Grossman        : 1958  Account: [id number]           Admit Date: 1/15/2025        How to Respond to this query:       a. Click New Note     b. Answer query within the yellow box.                c. Update the Problem List, if applicable.      If you have any questions about this query contact me at: [email or web address]      or Dr. Rachel:    67-year-old patient admitted 1/15/2025 with acute on chronic HFrEF, per Discharge Summary. Patient also has history of hypertrophic cardiomyopathy and hypertension. H&P and Discharge Summary includes \"Moderate to severe aortic valve regurgitation\".  Treatment has included Lasix IV, increasing Coreg from 3.25mg twice daily to 6.25 mg twice daily, Jardiance PO, Cozaar, Aldactone, strict intake and output, daily weights, and 1500 mL/day fluid restriction.    Please clarify the etiology of the patient’s heart failure:    Heart failure due to hypertension  Heart failure due to hypertrophic obstructive cardiomyopathy  Heart failure due to valvular disease  Heart failure due to hypertension, hypertrophic obstructive cardiomyopathy and valvular disease  Other- specify__________    By submitting this query, we are merely seeking further clarification of documentation to accurately reflect all conditions that you are monitoring, evaluating, treating or that extend the hospitalization or utilize additional resources of care. Please utilize your independent clinical judgment when addressing the question(s) above.     This query and your response, once completed, will be entered into the legal medical record.    Sincerely,  Staci Shaver RN, BSN, CCDS  Clinical Documentation Integrity Program   " [employer]

## 2025-02-03 NOTE — BH INPATIENT PSYCHIATRY PROGRESS NOTE - NSTXSUBMISPROGRES_PSY_ALL_CORE
[facility]- Outpatient Rehabilitation and Therapy [address], [place] [address] office: [phone] fax: [phone]           Physical Therapy: TREATMENT/PROGRESS NOTE   Patient: Courtney Lebron (65 y.o. female)   Examination Date: 2025   :  1960 MRN: [id number]   Visit #: 3   Insurance Allowable Auth Needed   Auth after 8 []Yes    [x]No    Insurance: Payor: inmobly PLAN / Plan: inmobly PLAN / Product Type: *No Product type* /   Insurance ID: [id number] - (Medicaid Managed)  Secondary Insurance (if applicable):    Treatment Diagnosis: Right shoulder pain, decreased ROM and strength   Medical Diagnosis:  Rotator cuff impingement syndrome of right shoulder [M75.41]   Referring Physician: Endy Mahoney MD  PCP: Humza Anaya DO       Plan of care signed (Y/N): YES    Date of Patient follow up with Physician:      Progress Report: 25  POC update due: 25                                            Precautions/ Contra-indications:           Latex allergy:  NO  Pacemaker:    NO  Contraindications for Manipulation: None  Date of Surgery: NA  Other: NA    Assessment: Summary  Courtney Lebron is a 64 y.o. female presenting today to Outpatient PT with primary complaint of right shoulder pain which has been occurring since early December after doing push ups. Pt is noted to have reduced AROM with full PROM. Abduction and flexion strength is reduced. Internal and external strength is relatively good. 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 Unable to throw ball with her dog. It does present as  tendonosis or possible tear.     SUBJECTIVE EXAMINATION     Patient states shoulder has been feeling much better. Has also been getting more help at work which has decreased shoulder use and stress.        Test used Initial score  2025   Pain Summary VAS 5 1   Functional 
Improving

## 2025-02-07 ENCOUNTER — INPATIENT (INPATIENT)
Facility: HOSPITAL | Age: 40
LOS: 5 days | Discharge: NURSING, REHAB OR LONG-TERM CARE | DRG: 897 | End: 2025-02-13
Attending: PSYCHIATRY & NEUROLOGY | Admitting: EMERGENCY MEDICINE
Payer: MEDICAID

## 2025-02-07 VITALS
RESPIRATION RATE: 18 BRPM | DIASTOLIC BLOOD PRESSURE: 85 MMHG | TEMPERATURE: 98 F | WEIGHT: 145.06 LBS | HEART RATE: 83 BPM | SYSTOLIC BLOOD PRESSURE: 127 MMHG | OXYGEN SATURATION: 98 %

## 2025-02-07 DIAGNOSIS — F33.2 MAJOR DEPRESSIVE DISORDER, RECURRENT SEVERE WITHOUT PSYCHOTIC FEATURES: ICD-10-CM

## 2025-02-07 LAB
ALBUMIN SERPL ELPH-MCNC: 3.3 G/DL — LOW (ref 3.4–5)
ALP SERPL-CCNC: 114 U/L — SIGNIFICANT CHANGE UP (ref 40–120)
ALT FLD-CCNC: 19 U/L — SIGNIFICANT CHANGE UP (ref 12–42)
ANION GAP SERPL CALC-SCNC: 8 MMOL/L — LOW (ref 9–16)
APAP SERPL-MCNC: <2 UG/ML — LOW (ref 10–30)
APPEARANCE UR: ABNORMAL
AST SERPL-CCNC: 41 U/L — HIGH (ref 15–37)
BASOPHILS # BLD AUTO: 0.02 K/UL — SIGNIFICANT CHANGE UP (ref 0–0.2)
BASOPHILS NFR BLD AUTO: 0.5 % — SIGNIFICANT CHANGE UP (ref 0–2)
BILIRUB SERPL-MCNC: 0.3 MG/DL — SIGNIFICANT CHANGE UP (ref 0.2–1.2)
BILIRUB UR-MCNC: NEGATIVE — SIGNIFICANT CHANGE UP
BUN SERPL-MCNC: 12 MG/DL — SIGNIFICANT CHANGE UP (ref 7–23)
CALCIUM SERPL-MCNC: 8.3 MG/DL — LOW (ref 8.5–10.5)
CHLORIDE SERPL-SCNC: 105 MMOL/L — SIGNIFICANT CHANGE UP (ref 96–108)
CO2 SERPL-SCNC: 30 MMOL/L — SIGNIFICANT CHANGE UP (ref 22–31)
COLOR SPEC: YELLOW — SIGNIFICANT CHANGE UP
CREAT SERPL-MCNC: 0.8 MG/DL — SIGNIFICANT CHANGE UP (ref 0.5–1.3)
DIFF PNL FLD: NEGATIVE — SIGNIFICANT CHANGE UP
EGFR: 116 ML/MIN/1.73M2 — SIGNIFICANT CHANGE UP
EOSINOPHIL # BLD AUTO: 0.23 K/UL — SIGNIFICANT CHANGE UP (ref 0–0.5)
EOSINOPHIL NFR BLD AUTO: 5.4 % — SIGNIFICANT CHANGE UP (ref 0–6)
ETHANOL SERPL-MCNC: <3 MG/DL — SIGNIFICANT CHANGE UP
GLUCOSE SERPL-MCNC: 134 MG/DL — HIGH (ref 70–99)
GLUCOSE UR QL: NEGATIVE MG/DL — SIGNIFICANT CHANGE UP
HCT VFR BLD CALC: 38.8 % — LOW (ref 39–50)
HGB BLD-MCNC: 12.8 G/DL — LOW (ref 13–17)
IMM GRANULOCYTES NFR BLD AUTO: 0 % — SIGNIFICANT CHANGE UP (ref 0–0.9)
KETONES UR-MCNC: ABNORMAL MG/DL
LEUKOCYTE ESTERASE UR-ACNC: ABNORMAL
LYMPHOCYTES # BLD AUTO: 1.18 K/UL — SIGNIFICANT CHANGE UP (ref 1–3.3)
LYMPHOCYTES # BLD AUTO: 27.8 % — SIGNIFICANT CHANGE UP (ref 13–44)
MCHC RBC-ENTMCNC: 29.5 PG — SIGNIFICANT CHANGE UP (ref 27–34)
MCHC RBC-ENTMCNC: 33 G/DL — SIGNIFICANT CHANGE UP (ref 32–36)
MCV RBC AUTO: 89.4 FL — SIGNIFICANT CHANGE UP (ref 80–100)
MONOCYTES # BLD AUTO: 0.32 K/UL — SIGNIFICANT CHANGE UP (ref 0–0.9)
MONOCYTES NFR BLD AUTO: 7.5 % — SIGNIFICANT CHANGE UP (ref 2–14)
NEUTROPHILS # BLD AUTO: 2.49 K/UL — SIGNIFICANT CHANGE UP (ref 1.8–7.4)
NEUTROPHILS NFR BLD AUTO: 58.8 % — SIGNIFICANT CHANGE UP (ref 43–77)
NITRITE UR-MCNC: POSITIVE
NRBC # BLD: 0 /100 WBCS — SIGNIFICANT CHANGE UP (ref 0–0)
NRBC BLD-RTO: 0 /100 WBCS — SIGNIFICANT CHANGE UP (ref 0–0)
PCP SPEC-MCNC: SIGNIFICANT CHANGE UP
PH UR: 8.5 (ref 5–8)
PLATELET # BLD AUTO: 275 K/UL — SIGNIFICANT CHANGE UP (ref 150–400)
POTASSIUM SERPL-MCNC: 3.7 MMOL/L — SIGNIFICANT CHANGE UP (ref 3.5–5.3)
POTASSIUM SERPL-SCNC: 3.7 MMOL/L — SIGNIFICANT CHANGE UP (ref 3.5–5.3)
PROT SERPL-MCNC: 8 G/DL — SIGNIFICANT CHANGE UP (ref 6.4–8.2)
PROT UR-MCNC: 30 MG/DL
RBC # BLD: 4.34 M/UL — SIGNIFICANT CHANGE UP (ref 4.2–5.8)
RBC # FLD: 13.2 % — SIGNIFICANT CHANGE UP (ref 10.3–14.5)
SALICYLATES SERPL-MCNC: 2 MG/DL — LOW (ref 2.8–20)
SARS-COV-2 RNA SPEC QL NAA+PROBE: SIGNIFICANT CHANGE UP
SODIUM SERPL-SCNC: 143 MMOL/L — SIGNIFICANT CHANGE UP (ref 132–145)
SP GR SPEC: 1.03 — HIGH (ref 1–1.03)
UROBILINOGEN FLD QL: 1 MG/DL — SIGNIFICANT CHANGE UP (ref 0.2–1)
WBC # BLD: 4.24 K/UL — SIGNIFICANT CHANGE UP (ref 3.8–10.5)
WBC # FLD AUTO: 4.24 K/UL — SIGNIFICANT CHANGE UP (ref 3.8–10.5)

## 2025-02-07 PROCEDURE — 90792 PSYCH DIAG EVAL W/MED SRVCS: CPT | Mod: 95

## 2025-02-07 PROCEDURE — 73130 X-RAY EXAM OF HAND: CPT | Mod: 26,RT

## 2025-02-07 PROCEDURE — 99223 1ST HOSP IP/OBS HIGH 75: CPT

## 2025-02-07 RX ORDER — ACETAMINOPHEN 160 MG/5ML
650 SUSPENSION ORAL ONCE
Refills: 0 | Status: COMPLETED | OUTPATIENT
Start: 2025-02-07 | End: 2025-02-07

## 2025-02-07 RX ORDER — CLOSTRIDIUM TETANI TOXOID ANTIGEN (FORMALDEHYDE INACTIVATED), CORYNEBACTERIUM DIPHTHERIAE TOXOID ANTIGEN (FORMALDEHYDE INACTIVATED), BORDETELLA PERTUSSIS TOXOID ANTIGEN (GLUTARALDEHYDE INACTIVATED), BORDETELLA PERTUSSIS FILAMENTOUS HEMAGGLUTININ ANTIGEN (FORMALDEHYDE INACTIVATED), BORDETELLA PERTUSSIS PERTACTIN ANTIGEN, AND BORDETELLA PERTUSSIS FIMBRIAE 2/3 ANTIGEN 5; 2; 2.5; 5; 3; 5 [LF]/.5ML; [LF]/.5ML; UG/.5ML; UG/.5ML; UG/.5ML; UG/.5ML
0.5 INJECTION, SUSPENSION INTRAMUSCULAR ONCE
Refills: 0 | Status: COMPLETED | OUTPATIENT
Start: 2025-02-07 | End: 2025-02-07

## 2025-02-07 RX ORDER — SULFAMETHOXAZOLE AND TRIMETHOPRIM 400; 80 MG/1; MG/1
1 TABLET ORAL
Refills: 0 | Status: DISCONTINUED | OUTPATIENT
Start: 2025-02-07 | End: 2025-02-13

## 2025-02-07 RX ORDER — CEPHALEXIN 500 MG
500 CAPSULE ORAL
Refills: 0 | Status: DISCONTINUED | OUTPATIENT
Start: 2025-02-07 | End: 2025-02-13

## 2025-02-07 RX ADMIN — CLOSTRIDIUM TETANI TOXOID ANTIGEN (FORMALDEHYDE INACTIVATED), CORYNEBACTERIUM DIPHTHERIAE TOXOID ANTIGEN (FORMALDEHYDE INACTIVATED), BORDETELLA PERTUSSIS TOXOID ANTIGEN (GLUTARALDEHYDE INACTIVATED), BORDETELLA PERTUSSIS FILAMENTOUS HEMAGGLUTININ ANTIGEN (FORMALDEHYDE INACTIVATED), BORDETELLA PERTUSSIS PERTACTIN ANTIGEN, AND BORDETELLA PERTUSSIS FIMBRIAE 2/3 ANTIGEN 0.5 MILLILITER(S): 5; 2; 2.5; 5; 3; 5 INJECTION, SUSPENSION INTRAMUSCULAR at 14:01

## 2025-02-07 RX ADMIN — SULFAMETHOXAZOLE AND TRIMETHOPRIM 1 TABLET(S): 400; 80 TABLET ORAL at 13:13

## 2025-02-07 RX ADMIN — Medication 500 MILLIGRAM(S): at 19:51

## 2025-02-07 RX ADMIN — Medication 500 MILLIGRAM(S): at 23:32

## 2025-02-07 RX ADMIN — Medication 500 MILLIGRAM(S): at 13:13

## 2025-02-07 RX ADMIN — ACETAMINOPHEN 650 MILLIGRAM(S): 160 SUSPENSION ORAL at 13:30
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
## 2025-02-07 NOTE — ED CDU PROVIDER DISPOSITION NOTE - CLINICAL COURSE
Pt seen by psych.  Pt will be voluntary psych admit to [facility].  UA with +UTI.  Pt already on keflex and bactrim for coverage for finger.  With regards to inpatient stay, recommend keflex 500mg PO QID x7d and bactrim DS 1 tab PO BID x7d.
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
## 2025-02-07 NOTE — ED ADULT NURSE NOTE - BREATHING, MLM
Subjective:      Patient ID:  Brandi Manuel is a 78 y.o. female who presents for   Chief Complaint   Patient presents with    Lesion     R knee     Lesion - Initial  Affected locations: right knee  Duration: Pt. stated for months.  Signs / symptoms: dryness  Severity: mild  Timing: constant  Aggravated by: picking        Review of Systems   Constitutional:  Negative for fever and chills.   HENT:  Negative for sore throat.    Respiratory:  Negative for cough.        Objective:   Physical Exam   Constitutional: She appears well-developed and well-nourished.   Neurological: She is alert and oriented to person, place, and time.   Psychiatric: She has a normal mood and affect.   Skin:               Diagram Legend     Erythematous scaling macule/papule c/w actinic keratosis       Vascular papule c/w angioma      Pigmented verrucoid papule/plaque c/w seborrheic keratosis      Yellow umbilicated papule c/w sebaceous hyperplasia      Irregularly shaped tan macule c/w lentigo     1-2 mm smooth white papules consistent with Milia      Movable subcutaneous cyst with punctum c/w epidermal inclusion cyst      Subcutaneous movable cyst c/w pilar cyst      Firm pink to brown papule c/w dermatofibroma      Pedunculated fleshy papule(s) c/w skin tag(s)      Evenly pigmented macule c/w junctional nevus     Mildly variegated pigmented, slightly irregular-bordered macule c/w mildly atypical nevus      Flesh colored to evenly pigmented papule c/w intradermal nevus       Pink pearly papule/plaque c/w basal cell carcinoma      Erythematous hyperkeratotic cursted plaque c/w SCC      Surgical scar with no sign of skin cancer recurrence      Open and closed comedones      Inflammatory papules and pustules      Verrucoid papule consistent consistent with wart     Erythematous eczematous patches and plaques     Dystrophic onycholytic nail with subungual debris c/w onychomycosis     Umbilicated papule    Erythematous-base heme-crusted tan  verrucoid plaque consistent with inflamed seborrheic keratosis     Erythematous Silvery Scaling Plaque c/w Psoriasis     See annotation  No rash eyelids.      Assessment / Plan:        Viral warts, unspecified type  Discussed with patient the etiology and pathogenesis of the disease or skin lesion(s) and possible treatments and aggravators.    Reviewed with patient different treatment options and associated risks.  Cryosurgery procedure note:    Verbal consent from the patient is obtained including, but not limited to, risk of hypopigmentation/hyperpigmentation, scar, recurrence of lesion. Liquid nitrogen cryosurgery is applied to 1 verruca with prior paring, as detailed in the physical exam, to produce a freeze injury. 2 consecutive freeze thaw cycles are applied to each verruca. The patient is aware that blisters (possibly blood blisters) may form.    Encounter for cosmetic procedure  -     bimatoprost (LATISSE) 0.03 % ophthalmic solution; Place one drop on applicator and apply evenly along the skin of the upper eyelid at base of eyelashes once daily at bedtime; repeat procedure for second eye (use a clean applicator).  Dispense: 5 mL; Refill: 6  Condition is stable.  We will continue present management.  Pt wants refills of latisse for eyelashes.             Follow up in about 1 year (around 4/3/2025).   Spontaneous, unlabored and symmetrical
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
## 2025-02-07 NOTE — ED ADULT TRIAGE NOTE - CHIEF COMPLAINT QUOTE
pt c/ suicidal ideation and visual hallucinations, pt stated " I tried jumping in front of a train & I keep seeing things and people who aren't there, they're jumping in and out " denies HI, denies AH, states last use of cocaine was 4 days ago. safety precautions initiated at triage.

## 2025-02-07 NOTE — ED BEHAVIORAL HEALTH ASSESSMENT NOTE - VIOLENCE RISK FACTORS:
Antisocial behavior/cognition (past or present)/Substance abuse/Affective dysregulation/Impulsivity/Noncompliance with treatment/Community stressors that increase the risk of destabilization

## 2025-02-07 NOTE — ED BEHAVIORAL HEALTH ASSESSMENT NOTE - NSBHSACOC_PSY_A_CORE FT
Utox positive for cocaine today, many years history of use; reports last use insufflating 4 days ago

## 2025-02-07 NOTE — ED ADULT NURSE NOTE - NS ED NURSE LEVEL OF CONSCIOUSNESS AFFECT
AFTER YOUR ENDOSCOPY    Date of Procedure:  3/12/2021    You had the following procedure(s) performed today:  Colonoscopy    Exam Results:  The physician removed 2 polyp(s), which will be sent to the lab for testing. and Lab results will be called or mailed to you within 7-10 business days. Please follow up as directed in your final lab report. Please feel free to call Dr. Fraser's office with any questions or concerns.     Diet Instructions:  You may resume your regular diet today. It is recommended to avoid heavy, greasy, and spicy foods today.    Medications:  You may resume your medications as prescribed.    Activity for Today:    · DO NOT DRIVE.  Do not operate any other heavy machinery or equipment.  · Avoid activities that require coordination (climbing ladders, using a knife/cooking equipment, etc.)  · Do not make important financial or legal decisions  · Do not return to work.  · Do not drink any alcohol.  · You may resume your activity tomorrow.    It is normal to have.....    Colonoscopy / Sigmoidoscopy   · Mild abdominal distention and/or cramping are normal after these procedures, but should pass within an hour or two with the passage of air.  · Mild nausea and/or vomiting       When to call Dr. Fraser at [phone]:    For Colonoscopy / Sigmoidoscopy   · If you have unusual belly pain that is NOT relieved with passing air  · If you have rectal bleeding more than blood streaking on the toilet tissue  · If you have moderate nausea and/or vomiting         Go to the Emergency Room if you experience any of the following:  · Severe pain  · Difficulty breathing or swallowing  · Persistent vomiting  · Vomiting blood, either brown (coffee ground in consistency) or red  · Significant rectal bleeding  · Chills or fever over 101 degrees F.     
Calm/Depressed

## 2025-02-07 NOTE — ED ADULT NURSE NOTE - NSFALLRISKINTERV_ED_ALL_ED

## 2025-02-07 NOTE — ED BEHAVIORAL HEALTH ASSESSMENT NOTE - DESCRIPTION
unemployed (reports receiving disability for psychiatric illness); , lives alone in apartment in the [place] HIV +- reports adherence to [facility] Routine medical work-up to assure stability  Utox positive for cocaine  1:1  Psych consult
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
## 2025-02-07 NOTE — ED PROVIDER NOTE - PHYSICAL EXAMINATION
Constitutional: awake and alert, in no acute distress  HEENT: head normocephalic and atraumatic. moist mucous membranes  Eyes: extraocular movements intact, normal conjunctiva  Neck: supple, normal ROM  Cardiovascular: regular rate   Pulmonary: no respiratory distress  Gastrointestinal: abdomen flat and nondistended  Skin: warm, dry, normal for ethnicity  Musculoskeletal: small healing wound to R 3rd digit over DIP, no surrounding erythema   Neurological: oriented x4, no focal neurologic deficit.   Psychiatric: calm and cooperative, not responding to internal stimuli

## 2025-02-07 NOTE — ED BEHAVIORAL HEALTH ASSESSMENT NOTE - DIFFERENTIAL
MDD  PTSD  Poly-substance abuse, most recently cocaine  Schizoaffective D/O  Antisocial PD  Malingering

## 2025-02-07 NOTE — ED ADULT NURSE NOTE - PAIN: BODY LOCATION
Implemented All Universal Safety Interventions:  [place] to call system. Call bell, personal items and telephone within reach. Instruct patient to call for assistance. Room bathroom lighting operational. Non-slip footwear when patient is off stretcher. Physically safe environment: no spills, clutter or unnecessary equipment. Stretcher in lowest position, wheels locked, appropriate side rails in place. hand/Right:

## 2025-02-07 NOTE — ED BEHAVIORAL HEALTH ASSESSMENT NOTE - NSCURPASTPSYDX_PSY_ALL_CORE
Mood disorder/Psychotic disorder/PTSD/Alcohol/Substance Use disorders/Cluster B Personality disorder/traits

## 2025-02-07 NOTE — ED BEHAVIORAL HEALTH ASSESSMENT NOTE - CURRENT ACTIVE IDEATION
[facility] form completed and faxed back
[facility] called and said pt's supplies are in transit
Yes
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
## 2025-02-08 DIAGNOSIS — F10.20 ALCOHOL DEPENDENCE, UNCOMPLICATED: ICD-10-CM

## 2025-02-08 DIAGNOSIS — F14.10 COCAINE ABUSE, UNCOMPLICATED: ICD-10-CM

## 2025-02-08 PROCEDURE — 99222 1ST HOSP IP/OBS MODERATE 55: CPT

## 2025-02-08 PROCEDURE — G0545: CPT

## 2025-02-08 RX ORDER — MAGNESIUM, ALUMINUM HYDROXIDE 200-225/5
30 SUSPENSION, ORAL (FINAL DOSE FORM) ORAL EVERY 6 HOURS
Refills: 0 | Status: DISCONTINUED | OUTPATIENT
Start: 2025-02-08 | End: 2025-02-13

## 2025-02-08 RX ORDER — BICTEGRAVIR SODIUM, EMTRICITABINE, AND TENOFOVIR ALAFENAMIDE FUMARATE 50; 200; 25 MG/1; MG/1; MG/1
1 TABLET ORAL DAILY
Refills: 0 | Status: DISCONTINUED | OUTPATIENT
Start: 2025-02-08 | End: 2025-02-13

## 2025-02-08 RX ORDER — HALOPERIDOL 10 MG/1
5 TABLET ORAL EVERY 6 HOURS
Refills: 0 | Status: DISCONTINUED | OUTPATIENT
Start: 2025-02-08 | End: 2025-02-13

## 2025-02-08 RX ORDER — ACETAMINOPHEN, DIPHENHYDRAMINE HCL, PHENYLEPHRINE HCL 325; 25; 5 MG/1; MG/1; MG/1
3 TABLET ORAL AT BEDTIME
Refills: 0 | Status: DISCONTINUED | OUTPATIENT
Start: 2025-02-08 | End: 2025-02-13

## 2025-02-08 RX ORDER — CHLORDIAZEPOXIDE HYDROCHLORIDE 25 MG/1
50 CAPSULE ORAL
Refills: 0 | Status: DISCONTINUED | OUTPATIENT
Start: 2025-02-08 | End: 2025-02-13

## 2025-02-08 RX ORDER — ACETAMINOPHEN 160 MG/5ML
650 SUSPENSION ORAL EVERY 6 HOURS
Refills: 0 | Status: DISCONTINUED | OUTPATIENT
Start: 2025-02-08 | End: 2025-02-13

## 2025-02-08 RX ADMIN — SULFAMETHOXAZOLE AND TRIMETHOPRIM 1 TABLET(S): 400; 80 TABLET ORAL at 17:17

## 2025-02-08 RX ADMIN — BICTEGRAVIR SODIUM, EMTRICITABINE, AND TENOFOVIR ALAFENAMIDE FUMARATE 1 TABLET(S): 50; 200; 25 TABLET ORAL at 17:17

## 2025-02-08 RX ADMIN — Medication 500 MILLIGRAM(S): at 17:17

## 2025-02-08 RX ADMIN — Medication 500 MILLIGRAM(S): at 23:29

## 2025-02-08 RX ADMIN — Medication 500 MILLIGRAM(S): at 09:37

## 2025-02-08 RX ADMIN — SULFAMETHOXAZOLE AND TRIMETHOPRIM 1 TABLET(S): 400; 80 TABLET ORAL at 09:37

## 2025-02-08 NOTE — BH INPATIENT PSYCHIATRY ASSESSMENT NOTE - OTHER PAST PSYCHIATRIC HISTORY (INCLUDE DETAILS REGARDING ONSET, COURSE OF ILLNESS, INPATIENT/OUTPATIENT TREATMENT)
Multiple past psychiatric admissions, last known at [facility] from 11/20/23-11-30-23, at least 2 lifetime suicide attempts, last 1.5 year ago. Pt has history of nonadherence with OP psychiatric and substance treatment

## 2025-02-08 NOTE — BH INPATIENT PSYCHIATRY ASSESSMENT NOTE - DETAILS
Father  by suicide Patient reports interrupted suicide attempt by stepping in front of train; however friend pulled him back Lithium: polyuria per chart review
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
## 2025-02-08 NOTE — BH INPATIENT PSYCHIATRY ASSESSMENT NOTE - NSBHCHARTREVIEWVS_PSY_A_CORE FT
Vital Signs Last 24 Hrs  T(C): 36.9 (02-08-25 @ 08:50), Max: 36.9 (02-07-25 @ 17:54)  T(F): 98.5 (02-08-25 @ 08:50), Max: 98.5 (02-08-25 @ 02:27)  HR: 81 (02-08-25 @ 08:50) (72 - 83)  BP: 143/82 (02-08-25 @ 08:50) (118/84 - 143/82)  BP(mean): --  RR: 16 (02-08-25 @ 02:27) (16 - 18)  SpO2: 97% (02-08-25 @ 08:50) (97% - 99%)

## 2025-02-08 NOTE — CONSULT NOTE ADULT - ASSESSMENT
39M h/o HIV on BIC/TAF/FTC (CD4-331, 26%, VL 17k in 10/2024, followed at [place]), schizoaffective d/o vs MDD vs substance-induced psychosis, PSA (EtOH, cannabis, cocaine) p/w SI.  ID was consulted for HIV care.  Patient reports that he follows at [facility] and last visit was a few months ago, reports compliance to Biktarvy and takes it every day, (although our record shows VL 17k on 10/24/24, NU6=886, 26%)    - cont home med Biktarvy 1 tab daily   - f/u PMD at [facility] care after discharge  - would check VL and CD4 for our record    Thank you for your consult.  Please re-consult us or call us with questions.  Case d/w primary team.    Munira Grajeda MD, MS  Infectious Disease attending  office phone [phone]  For any questions during evening/weekend/holiday, please page ID on call

## 2025-02-08 NOTE — BH INPATIENT PSYCHIATRY ASSESSMENT NOTE - CURRENT MEDICATION
MEDICATIONS  (STANDING):  cephalexin 500 milliGRAM(s) Oral four times a day  trimethoprim  160 mG/sulfamethoxazole 800 mG 1 Tablet(s) Oral two times a day    MEDICATIONS  (PRN):  haloperidol     Tablet 5 milliGRAM(s) Oral every 6 hours PRN agitation  LORazepam     Tablet 2 milliGRAM(s) Oral every 6 hours PRN agitation   MEDICATIONS  (STANDING):  cephalexin 500 milliGRAM(s) Oral four times a day  trimethoprim  160 mG/sulfamethoxazole 800 mG 1 Tablet(s) Oral two times a day    MEDICATIONS  (PRN):  acetaminophen     Tablet .. 650 milliGRAM(s) Oral every 6 hours PRN Mild Pain (1 - 3), Moderate Pain (4 - 6)  aluminum hydroxide/magnesium hydroxide/simethicone Suspension 30 milliLiter(s) Oral every 6 hours PRN Dyspepsia  chlordiazePOXIDE 50 milliGRAM(s) Oral every 1 hour PRN CIWA-Ar score 8 or greater  haloperidol     Tablet 5 milliGRAM(s) Oral every 6 hours PRN Agitation  hydrOXYzine hydrochloride 50 milliGRAM(s) Oral every 6 hours PRN Anxiety  LORazepam     Tablet 2 milliGRAM(s) Oral every 6 hours PRN Agitation  melatonin. 3 milliGRAM(s) Oral at bedtime PRN Insomnia

## 2025-02-08 NOTE — BH INPATIENT PSYCHIATRY ASSESSMENT NOTE - RISK ASSESSMENT
Chronic risk factors include history of substance use, history of suicide attempts, unemployment, limited social supports. Acute risk factors include escalation in recent substance use. Protective factors include help seeking behavior. Patient is at an elevated chronic risk, but low acute risk of harm to self or others.

## 2025-02-08 NOTE — BH PATIENT PROFILE - HOME MEDICATIONS
melatonin 3 mg oral tablet , 1 tab(s) orally once a day (at bedtime) as needed for  insomnia  prazosin 1 mg oral capsule , 1 cap(s) orally once a day (at bedtime)  bictegravir/emtricitabine/tenofovir 50 mg-200 mg-25 mg oral tablet , 1 tab(s) orally once a day

## 2025-02-08 NOTE — BH TREATMENT PLAN - NSTXDEPRESINTERSW_PSY_ALL_CORE
Refer patient to [facility] or [facility] at discharge for treatment of depressive symptoms.

## 2025-02-08 NOTE — BH PATIENT PROFILE - FALL HARM RISK - UNIVERSAL INTERVENTIONS
Bed in lowest position, wheels locked, appropriate side rails in place/Call bell, personal items and telephone in reach/Instruct patient to call for assistance before getting out of bed or chair/Non-slip footwear when patient is out of bed/[place] to call system/Physically safe environment - no spills, clutter or unnecessary equipment/Purposeful Proactive Rounding/Room/bathroom lighting operational, light cord in reach

## 2025-02-08 NOTE — BH INPATIENT PSYCHIATRY ASSESSMENT NOTE - CURRENT ACTIVE IDEATION
If an incision was performed as part of your procedure, contact your doctor with any pain, swelling, redness, or other concerns you may have about that area. Yes
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
## 2025-02-08 NOTE — BH INPATIENT PSYCHIATRY ASSESSMENT NOTE - ADDITIONAL DETAILS ALL
Patient reports interrupted suicide attempt by stepping in front of train; however friend pulled him back

## 2025-02-08 NOTE — BH INPATIENT PSYCHIATRY ASSESSMENT NOTE - NSBHPHYSICALEXAM_PSY_ALL_CORE
CHIEF COMPLAINT:  Annual checkup.    HISTORY OF PRESENT ILLNESS:  The patient is an 89-year-old gentleman with a   history of  shunt, normal pressure hydrocephalus, hypertension,   hyperlipidemia, BPH, who comes in today for annual physical exam.  He is   accompanied by his daughter.  He has no new complaints.    REVIEW OF SYSTEMS:  The patient reports his weight has been stable.  He does   need hearing aids.  No trouble with his vision.  No trouble swallowing.  No   chest pain.  No shortness of breath.  He is walking with a walker.  No nausea,   vomiting, no abdominal pain, no bowel changes.  He does get up once at night,   early in the morning to go urinate.  He does report that he has problems with   his left leg.  No skin changes.  No numbness or tingling in the arms or legs.    PHYSICAL EXAMINATION:  GENERAL APPEARANCE:  No acute distress.  HEENT:  Conjunctivae clear.  He does have bilateral lens replacements.  TMs were   clear.  Nasal septum is midline without discharge.  Oropharynx is without   erythema.  NECK:  Trachea is midline without JVD.  PULMONARY:  Good inspiratory, expiratory breath sounds are heard.  Lungs are   clear to auscultation.  CARDIOVASCULAR:  S1, S2.  2+ carotid pulses without bruits.  EXTREMITIES:  Without edema.  ABDOMEN:  Nontender, nondistended, without hepatosplenomegaly.  RECTAL:  A digital rectal exam was performed.  The rectum was normal.  Stool was   brown and heme-negative.  Scrotum and penis were normal.  Prostate without   masses or nodules.    ASSESSMENT:  1.  Annual exam.  2.  Hypertension, currently stable.  3.  BPH.  4.  Normal pressure hydrocephalus, status post  shunt.  5.  Hyperlipidemia.    PLAN:  We will put the patient in for CBC, lipid panel.  The patient is to   follow up pending results.      EDITH/ANABEL  dd: 09/21/2017 07:24:57 (CDT)  td: 09/21/2017 13:39:33 (CDT)  Doc ID   #[id number]  Job ID #[id number]    CC:       
General: NAD, sitting calmly in bed  CV: RRR, no murmurs  Pulm: LCTAB, no wheezes  Abdomen: SNTND, no masses  Extr: R 3rd digit with laceration

## 2025-02-08 NOTE — BH INPATIENT PSYCHIATRY ASSESSMENT NOTE - DESCRIPTION
unemployed (reports receiving disability for psychiatric illness); , lives alone in apartment in the [place]

## 2025-02-08 NOTE — BH INPATIENT PSYCHIATRY ASSESSMENT NOTE - NSBHMETABOLIC_PSY_ALL_CORE_FT
BMI: BMI (kg/m2): 22.6 (02-08-25 @ 02:27)  HbA1c: A1C with Estimated Average Glucose Result: 5.5 % (10-23-24 @ 05:30)    Glucose:   BP: 143/82 (02-08-25 @ 08:50) (118/84 - 143/82)Vital Signs Last 24 Hrs  T(C): 36.9 (02-08-25 @ 08:50), Max: 36.9 (02-07-25 @ 17:54)  T(F): 98.5 (02-08-25 @ 08:50), Max: 98.5 (02-08-25 @ 02:27)  HR: 81 (02-08-25 @ 08:50) (72 - 83)  BP: 143/82 (02-08-25 @ 08:50) (118/84 - 143/82)  BP(mean): --  RR: 16 (02-08-25 @ 02:27) (16 - 18)  SpO2: 97% (02-08-25 @ 08:50) (97% - 99%)      Lipid Panel: Date/Time: 10-23-24 @ 05:30  Cholesterol, Serum: 122  LDL Cholesterol Calculated: 70  HDL Cholesterol, Serum: 37  Total Cholesterol/HDL Ration Measurement: --  Triglycerides, Serum: 74

## 2025-02-08 NOTE — BH INPATIENT PSYCHIATRY ASSESSMENT NOTE - NSICDXBHTERTIARYDX_PSY_ALL_CORE
R/O Schizoaffective disorder, bipolar type   F25.0  R/O Bipolar disorder   F31.9  R/O Major depressive disorder   F32.9

## 2025-02-08 NOTE — BH INPATIENT PSYCHIATRY ASSESSMENT NOTE - PAST PSYCHOTROPIC MEDICATION
Per chart review: lithium, depakote, invega, risperdal, seroquel, abilify, remeron, buspar, klonopin, buprenorphine, lexapro
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
## 2025-02-08 NOTE — BH TREATMENT PLAN - NSTXSUICIDINTERSW_PSY_ALL_CORE
Consider referral for Intensive Outpatient Program or Partial Hospitalization Program to keep suicidal thoughts in remission.

## 2025-02-09 LAB
A1C WITH ESTIMATED AVERAGE GLUCOSE RESULT: 5.4 % — SIGNIFICANT CHANGE UP (ref 4–5.6)
CHOLEST SERPL-MCNC: 146 MG/DL — SIGNIFICANT CHANGE UP
ESTIMATED AVERAGE GLUCOSE: 108 MG/DL — SIGNIFICANT CHANGE UP (ref 68–114)
HDLC SERPL-MCNC: 52 MG/DL — SIGNIFICANT CHANGE UP
LIPID PNL WITH DIRECT LDL SERPL: 75 MG/DL — SIGNIFICANT CHANGE UP
NON HDL CHOLESTEROL: 94 MG/DL — SIGNIFICANT CHANGE UP
TRIGL SERPL-MCNC: 104 MG/DL — SIGNIFICANT CHANGE UP

## 2025-02-09 RX ADMIN — Medication 500 MILLIGRAM(S): at 17:21

## 2025-02-09 RX ADMIN — SULFAMETHOXAZOLE AND TRIMETHOPRIM 1 TABLET(S): 400; 80 TABLET ORAL at 11:06

## 2025-02-09 RX ADMIN — Medication 500 MILLIGRAM(S): at 06:39

## 2025-02-09 RX ADMIN — Medication 500 MILLIGRAM(S): at 23:34

## 2025-02-09 RX ADMIN — BICTEGRAVIR SODIUM, EMTRICITABINE, AND TENOFOVIR ALAFENAMIDE FUMARATE 1 TABLET(S): 50; 200; 25 TABLET ORAL at 11:06

## 2025-02-09 RX ADMIN — Medication 500 MILLIGRAM(S): at 11:06

## 2025-02-09 RX ADMIN — ACETAMINOPHEN, DIPHENHYDRAMINE HCL, PHENYLEPHRINE HCL 3 MILLIGRAM(S): 325; 25; 5 TABLET ORAL at 21:32

## 2025-02-09 RX ADMIN — SULFAMETHOXAZOLE AND TRIMETHOPRIM 1 TABLET(S): 400; 80 TABLET ORAL at 21:32

## 2025-02-10 DIAGNOSIS — F60.2 ANTISOCIAL PERSONALITY DISORDER: ICD-10-CM

## 2025-02-10 LAB
4/8 RATIO: 0.5 RATIO — LOW (ref 0.9–3.6)
ABS CD8: 462 CELLS/UL — SIGNIFICANT CHANGE UP (ref 142–740)
CD16+CD56+ CELLS NFR BLD: 13 % — SIGNIFICANT CHANGE UP (ref 5–23)
CD16+CD56+ CELLS NFR SPEC: 126 CELLS/UL — SIGNIFICANT CHANGE UP (ref 71–410)
CD19 BLASTS SPEC-ACNC: 124 CELLS/UL — SIGNIFICANT CHANGE UP (ref 84–469)
CD19 BLASTS SPEC-ACNC: 13 % — SIGNIFICANT CHANGE UP (ref 6–24)
CD3 BLASTS SPEC-ACNC: 695 CELLS/UL — SIGNIFICANT CHANGE UP (ref 672–1870)
CD3 BLASTS SPEC-ACNC: 71 % — SIGNIFICANT CHANGE UP (ref 59–83)
CD4 %: 23 % — LOW (ref 30–62)
CD8 %: 47 % — HIGH (ref 12–36)
HIV-1 VIRAL LOAD RESULT: ABNORMAL
HIV1 RNA # SERPL NAA+PROBE: SIGNIFICANT CHANGE UP
HIV1 RNA SER-IMP: SIGNIFICANT CHANGE UP
HIV1 RNA SERPL NAA+PROBE-ACNC: ABNORMAL
HIV1 RNA SERPL NAA+PROBE-LOG#: 4.89 — SIGNIFICANT CHANGE UP
T-CELL CD4 SUBSET PNL BLD: 232 CELLS/UL — LOW (ref 489–1457)
VIABLE CELLS NFR SPEC: SIGNIFICANT CHANGE UP

## 2025-02-10 PROCEDURE — 99232 SBSQ HOSP IP/OBS MODERATE 35: CPT

## 2025-02-10 RX ADMIN — SULFAMETHOXAZOLE AND TRIMETHOPRIM 1 TABLET(S): 400; 80 TABLET ORAL at 09:49

## 2025-02-10 RX ADMIN — Medication 500 MILLIGRAM(S): at 06:20

## 2025-02-10 RX ADMIN — Medication 500 MILLIGRAM(S): at 09:49

## 2025-02-10 RX ADMIN — BICTEGRAVIR SODIUM, EMTRICITABINE, AND TENOFOVIR ALAFENAMIDE FUMARATE 1 TABLET(S): 50; 200; 25 TABLET ORAL at 09:49

## 2025-02-10 RX ADMIN — SULFAMETHOXAZOLE AND TRIMETHOPRIM 1 TABLET(S): 400; 80 TABLET ORAL at 21:12

## 2025-02-10 RX ADMIN — Medication 500 MILLIGRAM(S): at 17:18

## 2025-02-10 RX ADMIN — Medication 500 MILLIGRAM(S): at 23:35

## 2025-02-10 NOTE — BH SOCIAL WORK INITIAL PSYCHOSOCIAL EVALUATION - NSBHTREATHX_PSY_ALL_CORE
The patient reports he has a [facility] based doctor who prescribes his Biktarvy. He reports taking his Biktarvy as prescribed, and attends in-person appointments every four months. He reports not being connected to an outpatient psychiatrist &/or substance use program. The patient reports he has a [facility] based doctor who prescribes his Biktarvy. He reports taking his Biktarvy as prescribed, and attends in-person appointments every four months. He reports not being connected to an outpatient psychiatrist &/or substance use program./No
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
## 2025-02-10 NOTE — BH SOCIAL WORK INITIAL PSYCHOSOCIAL EVALUATION - NSHIGHRISKBEHFT_PSY_ALL_CORE
SI + Polysubstance Use (cocaine, fentanyl, alcohol).  SI + Polysubstance Use (cocaine, fentanyl, alcohol)

## 2025-02-10 NOTE — BH SOCIAL WORK INITIAL PSYCHOSOCIAL EVALUATION - NSBHEMPLOYEDYN_PSY_ALL_CORE
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

## 2025-02-10 NOTE — BH SOCIAL WORK INITIAL PSYCHOSOCIAL EVALUATION - NSBHFINANCESOCIAL_PSY_ALL_CORE
Food stamps/Temporary assistance Food stamps/Supplemental Nutrition Assistance Program (SNAP)/Temporary assistance

## 2025-02-11 PROCEDURE — 99232 SBSQ HOSP IP/OBS MODERATE 35: CPT

## 2025-02-11 RX ADMIN — Medication 500 MILLIGRAM(S): at 05:41

## 2025-02-11 RX ADMIN — SULFAMETHOXAZOLE AND TRIMETHOPRIM 1 TABLET(S): 400; 80 TABLET ORAL at 21:19

## 2025-02-11 RX ADMIN — Medication 50 MILLIGRAM(S): at 01:52

## 2025-02-11 RX ADMIN — Medication 500 MILLIGRAM(S): at 19:36

## 2025-02-11 RX ADMIN — Medication 500 MILLIGRAM(S): at 10:30

## 2025-02-11 RX ADMIN — BICTEGRAVIR SODIUM, EMTRICITABINE, AND TENOFOVIR ALAFENAMIDE FUMARATE 1 TABLET(S): 50; 200; 25 TABLET ORAL at 10:30

## 2025-02-11 RX ADMIN — SULFAMETHOXAZOLE AND TRIMETHOPRIM 1 TABLET(S): 400; 80 TABLET ORAL at 10:30

## 2025-02-11 RX ADMIN — Medication 500 MILLIGRAM(S): at 23:39

## 2025-02-11 RX ADMIN — ACETAMINOPHEN, DIPHENHYDRAMINE HCL, PHENYLEPHRINE HCL 3 MILLIGRAM(S): 325; 25; 5 TABLET ORAL at 00:13

## 2025-02-12 LAB — SARS-COV-2 RNA SPEC QL NAA+PROBE: SIGNIFICANT CHANGE UP

## 2025-02-12 PROCEDURE — 99222 1ST HOSP IP/OBS MODERATE 55: CPT

## 2025-02-12 PROCEDURE — 99232 SBSQ HOSP IP/OBS MODERATE 35: CPT

## 2025-02-12 PROCEDURE — G0545: CPT

## 2025-02-12 RX ORDER — SULFAMETHOXAZOLE AND TRIMETHOPRIM 400; 80 MG/1; MG/1
1 TABLET ORAL
Qty: 0 | Refills: 0 | DISCHARGE
Start: 2025-02-12

## 2025-02-12 RX ORDER — CEPHALEXIN 500 MG
1 CAPSULE ORAL
Qty: 0 | Refills: 0 | DISCHARGE
Start: 2025-02-12

## 2025-02-12 RX ADMIN — SULFAMETHOXAZOLE AND TRIMETHOPRIM 1 TABLET(S): 400; 80 TABLET ORAL at 21:21

## 2025-02-12 RX ADMIN — BICTEGRAVIR SODIUM, EMTRICITABINE, AND TENOFOVIR ALAFENAMIDE FUMARATE 1 TABLET(S): 50; 200; 25 TABLET ORAL at 09:55

## 2025-02-12 RX ADMIN — SULFAMETHOXAZOLE AND TRIMETHOPRIM 1 TABLET(S): 400; 80 TABLET ORAL at 09:55

## 2025-02-12 RX ADMIN — Medication 500 MILLIGRAM(S): at 18:40

## 2025-02-12 RX ADMIN — Medication 500 MILLIGRAM(S): at 06:13

## 2025-02-12 RX ADMIN — Medication 500 MILLIGRAM(S): at 13:39
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
## 2025-02-12 NOTE — BH INPATIENT PSYCHIATRY PROGRESS NOTE - NSBHCONSULTIPREASON_PSY_A_CORE
danger to self; mental illness expected to respond to inpatient care
other reason
other reason
danger to self; mental illness expected to respond to inpatient care

## 2025-02-12 NOTE — BH INPATIENT PSYCHIATRY PROGRESS NOTE - NSTXDEPRESGOAL_PSY_ALL_CORE
Other...
Exhibit improvements in self-grooming, hygiene, sleep and appetite
Exhibit improvements in self-grooming, hygiene, sleep and appetite
Other...

## 2025-02-12 NOTE — BH INPATIENT PSYCHIATRY PROGRESS NOTE - NSBHATTESTTYPEVISIT_PSY_A_CORE
On-site Attending supervising POLO (99XXX codes)
Resident/Fellow with telephonic supervision
On-site Attending supervising POLO (99XXX codes)
On-site Attending supervising POLO (99XXX codes)

## 2025-02-12 NOTE — BH INPATIENT PSYCHIATRY PROGRESS NOTE - NSBHMETABOLIC_PSY_ALL_CORE_FT
BMI: BMI (kg/m2): 22.6 (02-08-25 @ 02:27)  HbA1c: A1C with Estimated Average Glucose Result: 5.4 % (02-09-25 @ 05:30)    Glucose:   BP: 114/74 (02-12-25 @ 09:00) (114/72 - 120/82)Vital Signs Last 24 Hrs  T(C): 36.4 (02-12-25 @ 09:00), Max: 36.4 (02-12-25 @ 09:00)  T(F): 97.5 (02-12-25 @ 09:00), Max: 97.5 (02-12-25 @ 09:00)  HR: 82 (02-12-25 @ 09:00) (82 - 82)  BP: 114/74 (02-12-25 @ 09:00) (114/74 - 114/74)  BP(mean): --  RR: 16 (02-12-25 @ 09:00) (16 - 16)  SpO2: 98% (02-12-25 @ 09:00) (98% - 98%)      Lipid Panel: Date/Time: 02-09-25 @ 05:30  Cholesterol, Serum: 146  LDL Cholesterol Calculated: 75  HDL Cholesterol, Serum: 52  Total Cholesterol/HDL Ration Measurement: --  Triglycerides, Serum: 104  
BMI: BMI (kg/m2): 22.6 (02-08-25 @ 02:27)  HbA1c: A1C with Estimated Average Glucose Result: 5.4 % (02-09-25 @ 05:30)    Glucose:   BP: 119/75 (02-10-25 @ 08:00) (118/84 - 143/82)Vital Signs Last 24 Hrs  T(C): 36.8 (02-10-25 @ 08:00), Max: 37.1 (02-09-25 @ 16:57)  T(F): 98.3 (02-10-25 @ 08:00), Max: 98.8 (02-09-25 @ 16:57)  HR: 71 (02-10-25 @ 08:00) (71 - 82)  BP: 119/75 (02-10-25 @ 08:00) (119/75 - 120/82)  BP(mean): --  RR: 18 (02-10-25 @ 08:00) (18 - 18)  SpO2: 97% (02-10-25 @ 08:00) (97% - 97%)      Lipid Panel: Date/Time: 02-09-25 @ 05:30  Cholesterol, Serum: 146  LDL Cholesterol Calculated: 75  HDL Cholesterol, Serum: 52  Total Cholesterol/HDL Ration Measurement: --  Triglycerides, Serum: 104  
BMI: BMI (kg/m2): 22.6 (02-08-25 @ 02:27)  HbA1c: A1C with Estimated Average Glucose Result: 5.4 % (02-09-25 @ 05:30)  Lipid Panel 2/9: Cholesterol 146, , LDL 75    Glucose:   BP: 120/82 (02-09-25 @ 16:57) (118/84 - 143/82)Vital Signs Last 24 Hrs  T(C): 37.1 (02-09-25 @ 16:57), Max: 37.1 (02-09-25 @ 16:57)  T(F): 98.8 (02-09-25 @ 16:57), Max: 98.8 (02-09-25 @ 16:57)  HR: 82 (02-09-25 @ 16:57) (80 - 82)  BP: 120/82 (02-09-25 @ 16:57) (120/73 - 120/82)  BP(mean): --  RR: --  SpO2: 97% (02-09-25 @ 16:57) (97% - 97%)      Lipid Panel: Date/Time: 02-09-25 @ 05:30  Cholesterol, Serum: 146  LDL Cholesterol Calculated: 75  HDL Cholesterol, Serum: 52  Total Cholesterol/HDL Ration Measurement: --  Triglycerides, Serum: 104  
BMI: BMI (kg/m2): 22.6 (02-08-25 @ 02:27)  HbA1c: A1C with Estimated Average Glucose Result: 5.4 % (02-09-25 @ 05:30)    Glucose:   BP: 117/81 (02-11-25 @ 08:08) (114/72 - 128/85)Vital Signs Last 24 Hrs  T(C): 36.6 (02-11-25 @ 08:08), Max: 36.7 (02-10-25 @ 16:28)  T(F): 97.9 (02-11-25 @ 08:08), Max: 98 (02-10-25 @ 16:28)  HR: 66 (02-11-25 @ 08:08) (66 - 80)  BP: 117/81 (02-11-25 @ 08:08) (114/72 - 117/81)  BP(mean): --  RR: 18 (02-10-25 @ 16:28) (18 - 18)  SpO2: 98% (02-11-25 @ 08:08) (97% - 98%)      Lipid Panel: Date/Time: 02-09-25 @ 05:30  Cholesterol, Serum: 146  LDL Cholesterol Calculated: 75  HDL Cholesterol, Serum: 52  Total Cholesterol/HDL Ration Measurement: --  Triglycerides, Serum: 104

## 2025-02-12 NOTE — BH INPATIENT PSYCHIATRY PROGRESS NOTE - NSDCCRITERIA_PSY_ALL_CORE
no SI, improvement in mood, safe dispo

## 2025-02-12 NOTE — BH INPATIENT PSYCHIATRY PROGRESS NOTE - NSBHCHARTREVIEWLAB_PSY_A_CORE FT
see below in metabolic monitoring

## 2025-02-12 NOTE — BH INPATIENT PSYCHIATRY PROGRESS NOTE - PRN MEDS
MEDICATIONS  (PRN):  acetaminophen     Tablet .. 650 milliGRAM(s) Oral every 6 hours PRN Mild Pain (1 - 3), Moderate Pain (4 - 6)  aluminum hydroxide/magnesium hydroxide/simethicone Suspension 30 milliLiter(s) Oral every 6 hours PRN Dyspepsia  chlordiazePOXIDE 50 milliGRAM(s) Oral every 1 hour PRN CIWA-Ar score 8 or greater  haloperidol     Tablet 5 milliGRAM(s) Oral every 6 hours PRN Agitation  hydrOXYzine hydrochloride 50 milliGRAM(s) Oral every 6 hours PRN anxiety, itchiness  LORazepam     Tablet 2 milliGRAM(s) Oral every 6 hours PRN Agitation  melatonin. 3 milliGRAM(s) Oral at bedtime PRN Insomnia  
MEDICATIONS  (PRN):  acetaminophen     Tablet .. 650 milliGRAM(s) Oral every 6 hours PRN Mild Pain (1 - 3), Moderate Pain (4 - 6)  aluminum hydroxide/magnesium hydroxide/simethicone Suspension 30 milliLiter(s) Oral every 6 hours PRN Dyspepsia  chlordiazePOXIDE 50 milliGRAM(s) Oral every 1 hour PRN CIWA-Ar score 8 or greater  haloperidol     Tablet 5 milliGRAM(s) Oral every 6 hours PRN Agitation  hydrOXYzine hydrochloride 50 milliGRAM(s) Oral every 6 hours PRN anxiety, itchiness  LORazepam     Tablet 2 milliGRAM(s) Oral every 6 hours PRN Agitation  melatonin. 3 milliGRAM(s) Oral at bedtime PRN Insomnia  
MEDICATIONS  (PRN):  acetaminophen     Tablet .. 650 milliGRAM(s) Oral every 6 hours PRN Mild Pain (1 - 3), Moderate Pain (4 - 6)  aluminum hydroxide/magnesium hydroxide/simethicone Suspension 30 milliLiter(s) Oral every 6 hours PRN Dyspepsia  chlordiazePOXIDE 50 milliGRAM(s) Oral every 1 hour PRN CIWA-Ar score 8 or greater  haloperidol     Tablet 5 milliGRAM(s) Oral every 6 hours PRN Agitation  hydrOXYzine hydrochloride 50 milliGRAM(s) Oral every 6 hours PRN Anxiety  LORazepam     Tablet 2 milliGRAM(s) Oral every 6 hours PRN Agitation  melatonin. 3 milliGRAM(s) Oral at bedtime PRN Insomnia  
MEDICATIONS  (PRN):  acetaminophen     Tablet .. 650 milliGRAM(s) Oral every 6 hours PRN Mild Pain (1 - 3), Moderate Pain (4 - 6)  aluminum hydroxide/magnesium hydroxide/simethicone Suspension 30 milliLiter(s) Oral every 6 hours PRN Dyspepsia  chlordiazePOXIDE 50 milliGRAM(s) Oral every 1 hour PRN CIWA-Ar score 8 or greater  haloperidol     Tablet 5 milliGRAM(s) Oral every 6 hours PRN Agitation  hydrOXYzine hydrochloride 50 milliGRAM(s) Oral every 6 hours PRN anxiety, itchiness  LORazepam     Tablet 2 milliGRAM(s) Oral every 6 hours PRN Agitation  melatonin. 3 milliGRAM(s) Oral at bedtime PRN Insomnia

## 2025-02-12 NOTE — DIETITIAN INITIAL EVALUATION ADULT - PERTINENT LABORATORY DATA
A1C with Estimated Average Glucose Result: 5.4 % (02-09-25 @ 05:30)  A1C with Estimated Average Glucose Result: 5.5 % (10-23-24 @ 05:30)  A1C with Estimated Average Glucose Result: 5.1 % (04-10-24 @ 15:10)
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
## 2025-02-12 NOTE — BH INPATIENT PSYCHIATRY PROGRESS NOTE - NSBHFUPINTERVALHXFT_PSY_A_CORE
No acute events. Lipid panel obtained, all wnl.    Seen watching TV with peers. Calm and cooperative during interview. States that he is doing better, no SI, HI, AVH. Says that he feels a bit more hopeful, not suicidal since being admitted two days ago. Eating and sleeping well. No headaches, GI symptoms associated with alcohol or drug withdrawal. No side effects to his medication. Shows this writer two 0.5cm umbilicated papules on his lateral dorsal wrist that appear lightly pink, pt states that they are painful and itchy. No surrounding redness, discharge. Says that they previously had clear liquid coming out of them. Advised pt not to itch if possible, will change hydroxyzine prn to be for anxiety and itching, advised pt to take tylenol for pain. Denies IV drug use/dirty needle use in L wrist in over 10 years. Wound appears dry currently but advised pt to approach RN staff for a bandaid to cover them should he scratch again and break skin.
Patient seen in tv area watching tv on approach. He is seen alongside PAs this morning, mood is reported as 'well' He is aware of plan for discharge and transfer to [facility] tomorrow morning. He denies any thoughts of death, suicide or self-harm. No thoughts of wanting to harm others. Denies psychotic sxs. He reports having 3-4 postules on his Left wrist that itch and burn (writer to contact medicine for recs). No other medical complaints. Sleep and appetite much improved during his admission. 
Patient seen in his room at bedside. Is sleeping on approach, arousable to verbal stimulation. He states that he was up all night last night and only fell asleep after 0500 this morning, due to having a new roommate who snore throughout the night. Otherwise he has no concerns or complaints. No si/hi/avh or PI reported. Is future oriented and optimistic, looking forward to returning to rehab. No new medical complaints or issues.
Patient seen in his room at bedside. Well known to writer, appears at his baseline. Shares with writer that after his last hospitalization on 8U he did complete rehab and maintained sobriety for a period of time, went to AA etc until he relapsed and began to heavily use cocaine. Has also lot a lot of weight due to poor appetite. He has since been eating fully his this admission. He denies si/hi/avh or PI and states that he would like to return to his most recent rehab as he thought it was most helpful and he liked the staff there.   In regards to his R wrist he reports improvement in rom. Writer also reviewed xrays of R hand done on admission. He also reports 2 papules on wrist as he had previously.   No standing psychiatric medications  Nutritional consult to be placed

## 2025-02-12 NOTE — DIETITIAN INITIAL EVALUATION ADULT - ADD RECOMMEND
1. Continue Regular diet. Recommend to add daily MVI and Ensure Plus High Protein 2x/day (350kcal, 20g protein per serving).  2. Encourage and monitor PO intake, honor preferences as able.   3. Monitor wt trends, GI function, skin integrity.  4. Monitor lytes, renal indices, blood glucose, LFTs.    5. Pain and bowel regimen per team.
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
## 2025-02-12 NOTE — PROGRESS NOTE ADULT - SUBJECTIVE AND OBJECTIVE BOX
INFECTIOUS DISEASES CONSULT FOLLOW-UP NOTE    INTERVAL HPI/OVERNIGHT EVENTS:  re-consulted for L wrist pustule  reports that it started as pink pimple then now became pustule, itchy and painful  thinks he has a few more on L wrist and R forearm  adamantly denied any sexual/intimate contact in the past 3 years  reports h/o MPox 3 years ago and was admitted to hospital in [place] and was treated       ROS:   Constitutional, eyes, ENT, cardiovascular, respiratory, gastrointestinal, genitourinary, integumentary, neurological, psychiatric and heme/lymph are otherwise negative other than noted above       ANTIBIOTICS/RELEVANT:    MEDICATIONS  (STANDING):  bictegravir 50 mG/emtricitabine 200 mG/tenofovir alafenamide 25 mG (BIKTARVY) 1 Tablet(s) Oral daily  cephalexin 500 milliGRAM(s) Oral four times a day  trimethoprim  160 mG/sulfamethoxazole 800 mG 1 Tablet(s) Oral two times a day    MEDICATIONS  (PRN):  acetaminophen     Tablet .. 650 milliGRAM(s) Oral every 6 hours PRN Mild Pain (1 - 3), Moderate Pain (4 - 6)  aluminum hydroxide/magnesium hydroxide/simethicone Suspension 30 milliLiter(s) Oral every 6 hours PRN Dyspepsia  chlordiazePOXIDE 50 milliGRAM(s) Oral every 1 hour PRN CIWA-Ar score 8 or greater  haloperidol     Tablet 5 milliGRAM(s) Oral every 6 hours PRN Agitation  hydrOXYzine hydrochloride 50 milliGRAM(s) Oral every 6 hours PRN anxiety, itchiness  LORazepam     Tablet 2 milliGRAM(s) Oral every 6 hours PRN Agitation  melatonin. 3 milliGRAM(s) Oral at bedtime PRN Insomnia        Vital Signs Last 24 Hrs  T(C): 36.8 (12 Feb 2025 16:11), Max: 36.8 (12 Feb 2025 16:11)  T(F): 98.3 (12 Feb 2025 16:11), Max: 98.3 (12 Feb 2025 16:11)  HR: 82 (12 Feb 2025 16:11) (82 - 82)  BP: 122/69 (12 Feb 2025 16:11) (114/74 - 122/69)  BP(mean): --  RR: 16 (12 Feb 2025 09:00) (16 - 16)  SpO2: 98% (12 Feb 2025 16:11) (98% - 98%)    Parameters below as of 12 Feb 2025 16:11  Patient On (Oxygen Delivery Method): room air        PHYSICAL EXAM:  Constitutional: alert, NAD  Eyes: the sclera and conjunctiva were normal.   ENT: the ears and nose were normal in appearance.   Neck: the appearance of the neck was normal and the neck was supple.   Pulmonary: no respiratory distress  Derm: 0.5mm pustule on L wrist, a few scabs nearby      LABS:                MICROBIOLOGY:  CD4 =232/23%  HIV VL 77k    RADIOLOGY & ADDITIONAL STUDIES:  Reviewed
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
## 2025-02-12 NOTE — BH INPATIENT PSYCHIATRY PROGRESS NOTE - NSICDXBHPRIMARYDX_PSY_ALL_CORE
Substance induced mood disorder   F19.85  
Substance induced mood disorder   F19.23  
Substance induced mood disorder   F19.37  
Substance induced mood disorder   F19.57

## 2025-02-12 NOTE — BH INPATIENT PSYCHIATRY PROGRESS NOTE - NSTXDEPRESGOALOTHER_PSY_ALL_CORE
Pt. will participate in groups and the milieu treatment structure of the unit.
Pt. will participate in groups and the milieu treatment structure of the unit.

## 2025-02-12 NOTE — BH INPATIENT PSYCHIATRY PROGRESS NOTE - CURRENT MEDICATION
MEDICATIONS  (STANDING):  bictegravir 50 mG/emtricitabine 200 mG/tenofovir alafenamide 25 mG (BIKTARVY) 1 Tablet(s) Oral daily  cephalexin 500 milliGRAM(s) Oral four times a day  trimethoprim  160 mG/sulfamethoxazole 800 mG 1 Tablet(s) Oral two times a day    MEDICATIONS  (PRN):  acetaminophen     Tablet .. 650 milliGRAM(s) Oral every 6 hours PRN Mild Pain (1 - 3), Moderate Pain (4 - 6)  aluminum hydroxide/magnesium hydroxide/simethicone Suspension 30 milliLiter(s) Oral every 6 hours PRN Dyspepsia  chlordiazePOXIDE 50 milliGRAM(s) Oral every 1 hour PRN CIWA-Ar score 8 or greater  haloperidol     Tablet 5 milliGRAM(s) Oral every 6 hours PRN Agitation  hydrOXYzine hydrochloride 50 milliGRAM(s) Oral every 6 hours PRN anxiety, itchiness  LORazepam     Tablet 2 milliGRAM(s) Oral every 6 hours PRN Agitation  melatonin. 3 milliGRAM(s) Oral at bedtime PRN Insomnia  
MEDICATIONS  (STANDING):  bictegravir 50 mG/emtricitabine 200 mG/tenofovir alafenamide 25 mG (BIKTARVY) 1 Tablet(s) Oral daily  cephalexin 500 milliGRAM(s) Oral four times a day  trimethoprim  160 mG/sulfamethoxazole 800 mG 1 Tablet(s) Oral two times a day    MEDICATIONS  (PRN):  acetaminophen     Tablet .. 650 milliGRAM(s) Oral every 6 hours PRN Mild Pain (1 - 3), Moderate Pain (4 - 6)  aluminum hydroxide/magnesium hydroxide/simethicone Suspension 30 milliLiter(s) Oral every 6 hours PRN Dyspepsia  chlordiazePOXIDE 50 milliGRAM(s) Oral every 1 hour PRN CIWA-Ar score 8 or greater  haloperidol     Tablet 5 milliGRAM(s) Oral every 6 hours PRN Agitation  hydrOXYzine hydrochloride 50 milliGRAM(s) Oral every 6 hours PRN anxiety, itchiness  LORazepam     Tablet 2 milliGRAM(s) Oral every 6 hours PRN Agitation  melatonin. 3 milliGRAM(s) Oral at bedtime PRN Insomnia  
MEDICATIONS  (STANDING):  bictegravir 50 mG/emtricitabine 200 mG/tenofovir alafenamide 25 mG (BIKTARVY) 1 Tablet(s) Oral daily  cephalexin 500 milliGRAM(s) Oral four times a day  trimethoprim  160 mG/sulfamethoxazole 800 mG 1 Tablet(s) Oral two times a day    MEDICATIONS  (PRN):  acetaminophen     Tablet .. 650 milliGRAM(s) Oral every 6 hours PRN Mild Pain (1 - 3), Moderate Pain (4 - 6)  aluminum hydroxide/magnesium hydroxide/simethicone Suspension 30 milliLiter(s) Oral every 6 hours PRN Dyspepsia  chlordiazePOXIDE 50 milliGRAM(s) Oral every 1 hour PRN CIWA-Ar score 8 or greater  haloperidol     Tablet 5 milliGRAM(s) Oral every 6 hours PRN Agitation  hydrOXYzine hydrochloride 50 milliGRAM(s) Oral every 6 hours PRN Anxiety  LORazepam     Tablet 2 milliGRAM(s) Oral every 6 hours PRN Agitation  melatonin. 3 milliGRAM(s) Oral at bedtime PRN Insomnia  
MEDICATIONS  (STANDING):  bictegravir 50 mG/emtricitabine 200 mG/tenofovir alafenamide 25 mG (BIKTARVY) 1 Tablet(s) Oral daily  cephalexin 500 milliGRAM(s) Oral four times a day  trimethoprim  160 mG/sulfamethoxazole 800 mG 1 Tablet(s) Oral two times a day    MEDICATIONS  (PRN):  acetaminophen     Tablet .. 650 milliGRAM(s) Oral every 6 hours PRN Mild Pain (1 - 3), Moderate Pain (4 - 6)  aluminum hydroxide/magnesium hydroxide/simethicone Suspension 30 milliLiter(s) Oral every 6 hours PRN Dyspepsia  chlordiazePOXIDE 50 milliGRAM(s) Oral every 1 hour PRN CIWA-Ar score 8 or greater  haloperidol     Tablet 5 milliGRAM(s) Oral every 6 hours PRN Agitation  hydrOXYzine hydrochloride 50 milliGRAM(s) Oral every 6 hours PRN anxiety, itchiness  LORazepam     Tablet 2 milliGRAM(s) Oral every 6 hours PRN Agitation  melatonin. 3 milliGRAM(s) Oral at bedtime PRN Insomnia

## 2025-02-12 NOTE — BH INPATIENT PSYCHIATRY PROGRESS NOTE - NSICDXBHSECONDARYDX_PSY_ALL_CORE
Severe alcohol use disorder   F10.20  Cocaine use disorder   F14.10  Antisocial personality disorder   F60.2  

## 2025-02-12 NOTE — BH INPATIENT PSYCHIATRY PROGRESS NOTE - NSBHTIMEACTIVITIESPERFORMED_PSY_A_CORE
time spent with patient, coordination of care/treatment

## 2025-02-12 NOTE — PROGRESS NOTE ADULT - ASSESSMENT
39M h/o HIV on BIC/TAF/FTC (ZN1=841/23%, VL 77k in 2/2025, followed at [place]), schizoaffective d/o vs MDD vs substance-induced psychosis, PSA (EtOH, cannabis, cocaine) p/w SI.  Called for L wrist pasture x1.  Patient adamantly denied any sexual/intimate contact in the past 3 months and reports h/o MPox 3 years ago.  Case d/w Dr. Kirkland.  Suspect folliculitis, low suspicion for MPox.   Patient has been getting bactrim/cephalexin for cellulitis, which should cover it.      - warm compress to the area prn  - cont home med Biktarvy 1 tab daily   - f/u PMD at [facility] after discharge      Thank you for your consult.  Please re-consult us or call us with questions.  Case d/w primary team.    Munira Grajeda MD, MS  Infectious Disease attending  office phone [phone]  For any questions during evening/weekend/holiday, please page ID on call    39M h/o HIV on BIC/TAF/FTC (NW8=517/23%, VL 77k in 2/2025, followed at [place]), schizoaffective d/o vs MDD vs substance-induced psychosis, PSA (EtOH, cannabis, cocaine) p/w SI.  Called for L wrist pasture x1.  Patient adamantly denied any sexual/intimate contact in the past 3 months and reports h/o MPox 3 years ago.  Case d/w Dr. Kirkland.  Suspect folliculitis, low suspicion for MPox.  Doesn't look like zoster rash.  Patient has been getting bactrim/cephalexin for cellulitis, which should cover it.      - warm compress to the area prn  - cont home med Biktarvy 1 tab daily   - f/u PMD at [facility] after discharge      Thank you for your consult.  Please re-consult us or call us with questions.  Case d/w primary team.    Munira Grajeda MD, MS  Infectious Disease attending  office phone [phone]  For any questions during evening/weekend/holiday, please page ID on call

## 2025-02-12 NOTE — BH INPATIENT PSYCHIATRY PROGRESS NOTE - NSICDXBHTERTIARYDX_PSY_ALL_CORE
R/O Schizoaffective disorder, bipolar type   F25.0  R/O Bipolar disorder   F31.9  R/O Major depressive disorder   F32.9  
R/O MDD (major depressive disorder), recurrent severe, without psychosis   F33.2  R/O Bipolar disorder   F31.9  

## 2025-02-12 NOTE — DIETITIAN INITIAL EVALUATION ADULT - PERSON TAUGHT/METHOD
RD encouraged continued PO intake of meals and supplements as tolerated. Discussed nutritional implications of alcohol consumption. Pt verbalized understanding./verbal instruction/patient instructed

## 2025-02-12 NOTE — BH INPATIENT PSYCHIATRY PROGRESS NOTE - NSBHCHARTREVIEWVS_PSY_A_CORE FT
Vital Signs Last 24 Hrs  T(C): 36.8 (02-10-25 @ 08:00), Max: 37.1 (02-09-25 @ 16:57)  T(F): 98.3 (02-10-25 @ 08:00), Max: 98.8 (02-09-25 @ 16:57)  HR: 71 (02-10-25 @ 08:00) (71 - 82)  BP: 119/75 (02-10-25 @ 08:00) (119/75 - 120/82)  BP(mean): --  RR: 18 (02-10-25 @ 08:00) (18 - 18)  SpO2: 97% (02-10-25 @ 08:00) (97% - 97%)    
Vital Signs Last 24 Hrs  T(C): 37.1 (02-09-25 @ 16:57), Max: 37.1 (02-09-25 @ 16:57)  T(F): 98.8 (02-09-25 @ 16:57), Max: 98.8 (02-09-25 @ 16:57)  HR: 82 (02-09-25 @ 16:57) (80 - 82)  BP: 120/82 (02-09-25 @ 16:57) (120/73 - 120/82)  BP(mean): --  RR: --  SpO2: 97% (02-09-25 @ 16:57) (97% - 97%)    
Vital Signs Last 24 Hrs  T(C): 36.6 (02-11-25 @ 08:08), Max: 36.7 (02-10-25 @ 16:28)  T(F): 97.9 (02-11-25 @ 08:08), Max: 98 (02-10-25 @ 16:28)  HR: 66 (02-11-25 @ 08:08) (66 - 80)  BP: 117/81 (02-11-25 @ 08:08) (114/72 - 117/81)  BP(mean): --  RR: 18 (02-10-25 @ 16:28) (18 - 18)  SpO2: 98% (02-11-25 @ 08:08) (97% - 98%)    
Vital Signs Last 24 Hrs  T(C): 36.4 (02-12-25 @ 09:00), Max: 36.4 (02-12-25 @ 09:00)  T(F): 97.5 (02-12-25 @ 09:00), Max: 97.5 (02-12-25 @ 09:00)  HR: 82 (02-12-25 @ 09:00) (82 - 82)  BP: 114/74 (02-12-25 @ 09:00) (114/74 - 114/74)  BP(mean): --  RR: 16 (02-12-25 @ 09:00) (16 - 16)  SpO2: 98% (02-12-25 @ 09:00) (98% - 98%)
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
## 2025-02-12 NOTE — DIETITIAN NUTRITION RISK NOTIFICATION - TREATMENT: THE FOLLOWING DIET HAS BEEN RECOMMENDED
1. Continue Regular diet. Recommend to add daily MVI and Ensure Plus High Protein 2x/day (350kcal, 20g protein per serving).  2. Encourage and monitor PO intake, honor preferences as able.   3. Monitor wt trends, GI function, skin integrity.  4. Monitor lytes, renal indices, blood glucose, LFTs.    5. Pain and bowel regimen per team.    RD encouraged continued PO intake of meals and supplements as tolerated. Discussed nutritional implications of alcohol consumption. Pt verbalized understanding.

## 2025-02-12 NOTE — BH INPATIENT PSYCHIATRY PROGRESS NOTE - NSBHCONSBHPROVCNTCTNOFT_PSY_A_CORE
Patient without behavioral health provider

## 2025-02-12 NOTE — CONSULT NOTE ADULT - TIME BILLING
HIV care
folliculitis    chart reviewed, pt interviewed and examined, interpretation of data, and clinical documentation and as well as discussion with Epidemiology office,  ID  and  MIRI Eason. No clinical suspicion for Mpox.

## 2025-02-12 NOTE — DIETITIAN INITIAL EVALUATION ADULT - BODY MASS INDEX
1.  Discontinue Velcade  2.  Start next cycle of cyclophosphamide, dexamethasone 7/5/17. Scheduled/Janice  2.  Continue Zometa   Scheduled/Janice  4. Continue Aranesp 300 mcg SQ every three weeks   Scheduled/Janice  5- RTC MD 3 weeks  Scheduled/Janice  6- Continue acyclovir  7- Start neurontin 200 mg every night      AVS printed & given to patient/Janice  
22.6

## 2025-02-12 NOTE — BH CHART NOTE - NSEVENTNOTEFT_PSY_ALL_CORE
Medicine consulted for new onset R wrist pustules and burning and itching on the L wrist, L wrist noted to have a line of pustules. Awaiting and appreciate medicine recommendations

## 2025-02-12 NOTE — BH INPATIENT PSYCHIATRY PROGRESS NOTE - NSTXPROBSUBMIS_PSY_ALL_CORE
room air
SUBSTANCE MISUSE
Xenograft Text: The defect edges were debeveled with a #15 scalpel blade.  Given the location of the defect, shape of the defect and the proximity to free margins a xenograft was deemed most appropriate.  The graft was then trimmed to fit the size of the defect.  The graft was then placed in the primary defect and oriented appropriately.

## 2025-02-12 NOTE — DIETITIAN INITIAL EVALUATION ADULT - PERTINENT MEDS FT
MEDICATIONS  (STANDING):  bictegravir 50 mG/emtricitabine 200 mG/tenofovir alafenamide 25 mG (BIKTARVY) 1 Tablet(s) Oral daily  cephalexin 500 milliGRAM(s) Oral four times a day  trimethoprim  160 mG/sulfamethoxazole 800 mG 1 Tablet(s) Oral two times a day    MEDICATIONS  (PRN):  acetaminophen     Tablet .. 650 milliGRAM(s) Oral every 6 hours PRN Mild Pain (1 - 3), Moderate Pain (4 - 6)  aluminum hydroxide/magnesium hydroxide/simethicone Suspension 30 milliLiter(s) Oral every 6 hours PRN Dyspepsia  chlordiazePOXIDE 50 milliGRAM(s) Oral every 1 hour PRN CIWA-Ar score 8 or greater  haloperidol     Tablet 5 milliGRAM(s) Oral every 6 hours PRN Agitation  hydrOXYzine hydrochloride 50 milliGRAM(s) Oral every 6 hours PRN anxiety, itchiness  LORazepam     Tablet 2 milliGRAM(s) Oral every 6 hours PRN Agitation  melatonin. 3 milliGRAM(s) Oral at bedtime PRN Insomnia

## 2025-02-12 NOTE — DIETITIAN INITIAL EVALUATION ADULT - OTHER CALCULATIONS
pounds, %IBW 99  Pt within % IBW thus actual body weight used for all calculations. Needs adjusted for age, malnutrition.

## 2025-02-13 VITALS
HEART RATE: 68 BPM | SYSTOLIC BLOOD PRESSURE: 112 MMHG | OXYGEN SATURATION: 97 % | TEMPERATURE: 98 F | DIASTOLIC BLOOD PRESSURE: 76 MMHG | RESPIRATION RATE: 18 BRPM

## 2025-02-13 PROCEDURE — 36415 COLL VENOUS BLD VENIPUNCTURE: CPT

## 2025-02-13 PROCEDURE — 87536 HIV-1 QUANT&REVRSE TRNSCRPJ: CPT

## 2025-02-13 PROCEDURE — 99285 EMERGENCY DEPT VISIT HI MDM: CPT

## 2025-02-13 PROCEDURE — 80061 LIPID PANEL: CPT

## 2025-02-13 PROCEDURE — 99238 HOSP IP/OBS DSCHRG MGMT 30/<: CPT

## 2025-02-13 PROCEDURE — 86357 NK CELLS TOTAL COUNT: CPT

## 2025-02-13 PROCEDURE — 86360 T CELL ABSOLUTE COUNT/RATIO: CPT

## 2025-02-13 PROCEDURE — 80307 DRUG TEST PRSMV CHEM ANLYZR: CPT

## 2025-02-13 PROCEDURE — 83036 HEMOGLOBIN GLYCOSYLATED A1C: CPT

## 2025-02-13 PROCEDURE — 80053 COMPREHEN METABOLIC PANEL: CPT

## 2025-02-13 PROCEDURE — 90471 IMMUNIZATION ADMIN: CPT

## 2025-02-13 PROCEDURE — 86355 B CELLS TOTAL COUNT: CPT

## 2025-02-13 PROCEDURE — 73130 X-RAY EXAM OF HAND: CPT

## 2025-02-13 PROCEDURE — 87635 SARS-COV-2 COVID-19 AMP PRB: CPT

## 2025-02-13 PROCEDURE — 93005 ELECTROCARDIOGRAM TRACING: CPT

## 2025-02-13 PROCEDURE — 81001 URINALYSIS AUTO W/SCOPE: CPT

## 2025-02-13 PROCEDURE — 86359 T CELLS TOTAL COUNT: CPT

## 2025-02-13 PROCEDURE — G0378: CPT

## 2025-02-13 PROCEDURE — 85025 COMPLETE CBC W/AUTO DIFF WBC: CPT

## 2025-02-13 PROCEDURE — 90715 TDAP VACCINE 7 YRS/> IM: CPT

## 2025-02-13 RX ORDER — BICTEGRAVIR SODIUM, EMTRICITABINE, AND TENOFOVIR ALAFENAMIDE FUMARATE 50; 200; 25 MG/1; MG/1; MG/1
1 TABLET ORAL
Qty: 30 | Refills: 0
Start: 2025-02-13 | End: 2025-03-14

## 2025-02-13 RX ORDER — CEPHALEXIN 500 MG
1 CAPSULE ORAL
Qty: 6 | Refills: 0
Start: 2025-02-13 | End: 2025-02-14

## 2025-02-13 RX ORDER — SULFAMETHOXAZOLE AND TRIMETHOPRIM 400; 80 MG/1; MG/1
1 TABLET ORAL
Qty: 3 | Refills: 0
Start: 2025-02-13 | End: 2025-02-14

## 2025-02-13 RX ADMIN — SULFAMETHOXAZOLE AND TRIMETHOPRIM 1 TABLET(S): 400; 80 TABLET ORAL at 10:45

## 2025-02-13 RX ADMIN — Medication 500 MILLIGRAM(S): at 12:10

## 2025-02-13 RX ADMIN — BICTEGRAVIR SODIUM, EMTRICITABINE, AND TENOFOVIR ALAFENAMIDE FUMARATE 1 TABLET(S): 50; 200; 25 TABLET ORAL at 10:46

## 2025-02-13 RX ADMIN — Medication 500 MILLIGRAM(S): at 06:08

## 2025-02-13 RX ADMIN — Medication 500 MILLIGRAM(S): at 10:45

## 2025-02-13 RX ADMIN — Medication 50 MILLIGRAM(S): at 10:46

## 2025-02-13 NOTE — BH INPATIENT PSYCHIATRY DISCHARGE NOTE - NSDCMRMEDTOKEN_GEN_ALL_CORE_FT
bictegravir/emtricitabine/tenofovir 50 mg-200 mg-25 mg oral tablet: 1 tab(s) orally once a day  cephalexin 500 mg oral capsule: 1 cap(s) orally 4 times a day  sulfamethoxazole-trimethoprim 800 mg-160 mg oral tablet: 1 tab(s) orally 2 times a day
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
## 2025-02-13 NOTE — BH INPATIENT PSYCHIATRY DISCHARGE NOTE - NSBHMETABOLIC_PSY_ALL_CORE_FT
BMI: BMI (kg/m2): 22.6 (02-08-25 @ 02:27)  HbA1c: A1C with Estimated Average Glucose Result: 5.4 % (02-09-25 @ 05:30)    Glucose:   BP: 122/69 (02-12-25 @ 16:11) (114/72 - 122/69)Vital Signs Last 24 Hrs  T(C): 36.8 (02-12-25 @ 16:11), Max: 36.8 (02-12-25 @ 16:11)  T(F): 98.3 (02-12-25 @ 16:11), Max: 98.3 (02-12-25 @ 16:11)  HR: 82 (02-12-25 @ 16:11) (82 - 82)  BP: 122/69 (02-12-25 @ 16:11) (122/69 - 122/69)  BP(mean): --  RR: --  SpO2: 98% (02-12-25 @ 16:11) (98% - 98%)      Lipid Panel: Date/Time: 02-09-25 @ 05:30  Cholesterol, Serum: 146  LDL Cholesterol Calculated: 75  HDL Cholesterol, Serum: 52  Total Cholesterol/HDL Ration Measurement: --  Triglycerides, Serum: 104

## 2025-02-13 NOTE — BH INPATIENT PSYCHIATRY DISCHARGE NOTE - DESCRIPTION
unemployed (reports receiving disability for psychiatric illness); , lives alone in apartment in the [place]

## 2025-02-13 NOTE — BH INPATIENT PSYCHIATRY DISCHARGE NOTE - NSBHFUPINTERVALHXFT_PSY_A_CORE
in progress Patient seen in milieu with SARAH Whatley in patient's room. Patient seen pacing inside the room, no agitated but appeared disturbed and upset, but amenable to a conversation. Patient states that the agitated behaviour exhibited by another patient triggered him and he feels the need to go get a drink, patient ruminating on how he knows this is bad for him and will probably lead him to another rock bottom however at this point he doesn't care about the consequences but rather feels compelled to have a drink. Various approaches tried by SARAH Whatley to redirect patient towards the initial plan for rehab however patient politely adamant and continues to refuse d/c to rehab, but prefers d/c to home. Patient was offered his standing PRN medications to help manager his cravings and anxiety however patient continues to politely decline. Patient states "I will think about my consequences once I am drunk and sitting on someone's porch" . Multiple attempts at redirection however eventually decision made by the patient to d/c home.   Patient denies any suicidal/ homicidal ideations and hallucinations. All questions answered to the best of the ability.  Patient seen in milieu with SARAH Whatley in patient's room. Patient seen pacing inside the room, not agitated but appeared disturbed and upset,  amenable to a conversation. Patient states that the agitated behaviour exhibited by another patient triggered him and he feels the need to go get a drink, patient ruminating on how he knows this is bad for him and will probably lead him to another rock bottom however at this point he doesn't care about the consequences but rather feels compelled to have a drink. Various approaches tried by SARAH Whatley to redirect patient towards the initial plan for rehab however patient politely adamant and continues to refuse d/c to rehab, but prefers d/c to home. Patient was offered his standing PRN medications to help manager his cravings and anxiety however patient continues to politely decline. Patient states "I will think about my consequences once I am drunk and sitting on someone's porch" . Multiple attempts at redirection however eventually decision made by the patient to d/c home.   Patient denies any suicidal/ homicidal ideations and hallucinations. All questions answered to the best of the ability.
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
## 2025-02-13 NOTE — BH DISCHARGE NOTE NURSING/SOCIAL WORK/PSYCH REHAB - NSBHDCAGENCY1FT_PSY_A_CORE
[facility]
7-year-old male presents to the ED with mom for evaluation of chest pain that began this morning while he was watching a movie with mom.  He has just woken up and was drinking milk.  He suddenly described that his heart was racing and pointed to the center of his chest.  Denies any difficulty breathing or recent illness.  He had a stomach virus about 2 weeks ago which had fully resolved.  No significant family history of early cardiac death.  Patient now seems to be at his baseline.  As per mom, patient mentioned he had 1 similar episode while watching a movie at a different time but never told her about it.    Physical Exam: VS reviewed. Pt is well appearing, in no respiratory distress. MMM. Cap refill <2 seconds. TMs normal b/l, no erythema, no dullness, no hemotympanum. Eyes normal with no injection, no discharge, EOMI.  Pharynx with no erythema, no exudates, no stomatitis. No anterior cervical lymph nodes appreciated. Skin with no rash noted.  Chest is clear, no wheezing, rales or crackles. No retractions, no distress. Normal and equal breath sounds. Normal heart sounds, no muffling, no murmur appreciated. Abdomen soft, ND, no guarding, no localized tenderness.  Neuro exam grossly intact.     Plan:  EKG reviewed, bedside ECHO normal.  Peds Cardio follow up advised for Holter monitoring as needed for full evaluation.

## 2025-02-13 NOTE — BH SOCIAL WORK CONFIRMATION FOLLOW UP NOTE - NSCOMMENTS_PSY_ALL_CORE
CARING CONTACT [[id number]] @ [[id number]] Patient discharged on [02/13/2025]. No Caring Call required as patient was assessed at LOW ACUTE SUICIDE RISK on admission.   ---   CARING CONTACT [[id number]] @ [[id number]] Patient discharged on [02/13/2025]. No Caring Call required as patient was assessed at LOW ACUTE SUICIDE RISK on admission.   ---  LINKAGE CALL [[id number]] @ [[id number]] BONI called [[facility]] on [02/13/2025] to verify if patient attended appointment on [02/13/2025]. BONI confirmed patient did not attend outpatient appointment; patient declined rehab.

## 2025-02-13 NOTE — BH INPATIENT PSYCHIATRY DISCHARGE NOTE - NSBHDCMEDICALFT_PSY_A_CORE
L wrist pustules-  infectious disease and medicine consulted. a high probability of folliculitis in the differential diagnosis with the treatment plan to continue Kelfex 500 mg x 4/day, oral and bactrim 160 mg x 2/day till 02/14/2025.

## 2025-02-13 NOTE — BH INPATIENT PSYCHIATRY DISCHARGE NOTE - NSBHFUPINTERVALCCFT_PSY_A_CORE
Discharge "This (referring to an escalation of behaviour by another patient) triggered me, I want to get a drink and not go to the rehab"

## 2025-02-13 NOTE — BH DISCHARGE NOTE NURSING/SOCIAL WORK/PSYCH REHAB - NSDCVIVACCINE_GEN_ALL_CORE_FT
Tdap; 31-Jul-2018 20:20; Servando Drew (RN); [employer]; [id number]; IntraMuscular; Deltoid Right.; 0.5 milliLiter(s); VIS (VIS Published: 09-May-2013, VIS Presented: 31-Jul-2018);   Tdap; 21-Jun-2023 15:03; Leydi Wright (RN); [employer]; [id number] (Exp. Date: 08-May-2025); IntraMuscular; Deltoid Left.; 0.5 milliLiter(s); VIS (VIS Published: 09-May-2013, VIS Presented: 21-Jun-2023);   Tdap; 07-Feb-2025 14:01; Nalini Jane (RN); [employer]; [id number] (Exp. Date: 31-Aug-2026); IntraMuscular; Deltoid Right.; 0.5 milliLiter(s); VIS (VIS Published: 09-May-2013, VIS Presented: 07-Feb-2025);

## 2025-02-13 NOTE — BH DISCHARGE NOTE NURSING/SOCIAL WORK/PSYCH REHAB - NSDCADDINFO1FT_PSY_ALL_CORE
Appointment on Thursday, 02/13/2025 at 12:30PM. You will arrive at [facility] for intake for inpatient dual-diagnosis rehab. Please bring a copy of your insurance card and photo ID. Appointment on Thursday, 02/13/2025. You will arrive at [facility] for intake for inpatient dual-diagnosis rehab. Please bring a copy of your insurance card and photo ID.

## 2025-02-13 NOTE — BH INPATIENT PSYCHIATRY DISCHARGE NOTE - HOSPITAL COURSE
in progress 2/9: Pt calm, cooperative. No SI, HI, AVH. States that his mood has been stable, no SI. Diagnostically appears most consistent with substance induced mood disorder. Noted to have 2 0.5cm umbilicated papules on his left wrist that he found itchy and painful, advised to take Tylenol and use hydroxyzine for itchiness. Currently taking cephalexin/bactrim ppx given finger lesion. Consider ID consult on Mon AM given hx HIV.  2/10: Future oriented, wanting to return to rehab  2/11: Reports poor sleep last night due to snoring roommate. Otherwise has no complaints. Anticipating discharge to rehab  2/12: No psychiatric complaints, reports having bumps on his R wrist that are itching and pain, medicine team to be consulted. Pt to be discharged tomorrow  2/13: patient reports anxiety and feeling "triggered" after this mornings behavioural escalation exhibited by another patient on the unit. Patient refuses discharge to rehab and would rather be discharged to home. Patient reports "a need" for alcohol and attests to plans of going to a nearby liquor store for a drink after discharge. Multiple attempts made to redirect patient however patient continues to politely defer. Patient denies any suicidal/ homicidal ideations, hallucinations.         MEDICATIONS  (STANDING):  bictegravir 50 mG/emtricitabine 200 mG/tenofovir alafenamide 25 mG (BIKTARVY) 1 Tablet(s) Oral daily  cephalexin 500 milliGRAM(s) Oral four times a day  trimethoprim  160 mG/sulfamethoxazole 800 mG 1 Tablet(s) Oral two times a day       2/9: Pt calm, cooperative. No SI, HI, AVH. States that his mood has been stable, no SI. Diagnostically appears most consistent with substance induced mood disorder. Noted to have 2 0.5cm umbilicated papules on his left wrist that he found itchy and painful, advised to take Tylenol and use hydroxyzine for itchiness. Currently taking cephalexin/bactrim ppx given finger lesion. Consider ID consult on Mon AM given hx HIV.  2/10: Future oriented, wanting to return to rehab  2/11: Reports poor sleep last night due to snoring roommate. Otherwise has no complaints. Anticipating discharge to rehab  2/12: No psychiatric complaints, reports having bumps on his R wrist that are itching and pain, medicine team to be consulted. Pt to be discharged tomorrow  2/13: patient reports anxiety and feeling "triggered" after this mornings behavioural escalation exhibited by another patient on the unit. Patient refuses discharge to rehab and would rather be discharged to home. Patient reports "a need" for alcohol and attests to plans of going to a nearby liquor store for a drink after discharge. Multiple attempts made to redirect patient however patient continues to politely defer. Patient denies any suicidal/ homicidal ideations, hallucinations.     MEDICATIONS  (STANDING): Continue taking on 2/13/25  bictegravir 50 mG/emtricitabine 200 mG/tenofovir alafenamide 25 mG (BIKTARVY) 1 Tablet(s) Oral daily  cephalexin 500 milliGRAM(s) Oral four times a day  trimethoprim  160 mG/sulfamethoxazole 800 mG 1 Tablet(s) Oral two times a day    Upon admission patient was started on CIWA protocol due to hx of etoh use, however did not require prns. He stated to staff that he wanted to return to [facility] rehab which he most recently completed. No psychiatric medications started during this admission as primary diagnosis is substances (pt does not have hx of primary psychotic disorder). Patient quickly returned to baseline during hospitalization. Patient had phone screen with [facility] and was accepted. On day that he was to be transferred he refused to go, requesting to be discharged so that he could use alcohol. He stated that earlier that day when another patient had an episode of agitation, he was triggered and wanted to use etoh. Declined offered prns. Denied thoughts of death, suicide or of harming himself. Patient was discharged with Biktary and antibiotics.   Medicine team, infectious dx and epidemiology were consulted due to rash on pt's L wrist, ultimately postules were thought to be folliculitis

## 2025-02-13 NOTE — BH INPATIENT PSYCHIATRY DISCHARGE NOTE - NSDCCPCAREPLAN_GEN_ALL_CORE_FT
PRINCIPAL DISCHARGE DIAGNOSIS  Diagnosis: Cocaine use disorder  Assessment and Plan of Treatment: Continue medication regimen and follow up with aftercare appointments      SECONDARY DISCHARGE DIAGNOSES  Diagnosis: Severe alcohol use disorder  Assessment and Plan of Treatment: Continue medication regimen and follow up with aftercare appointments    Diagnosis: Substance induced mood disorder  Assessment and Plan of Treatment: Continue medication regimen and follow up with aftercare appointments    Diagnosis: Folliculitis  Assessment and Plan of Treatment:      PRINCIPAL DISCHARGE DIAGNOSIS  Diagnosis: Cocaine use disorder  Assessment and Plan of Treatment: Continue medication regimen and follow up with aftercare appointments      SECONDARY DISCHARGE DIAGNOSES  Diagnosis: Severe alcohol use disorder  Assessment and Plan of Treatment: Continue medication regimen and follow up with aftercare appointments    Diagnosis: Substance induced mood disorder  Assessment and Plan of Treatment: Continue medication regimen and follow up with aftercare appointments    Diagnosis: Folliculitis  Assessment and Plan of Treatment: Continue medication regimen and follow up with aftercare appointments

## 2025-02-13 NOTE — BH DISCHARGE NOTE NURSING/SOCIAL WORK/PSYCH REHAB - PATIENT PORTAL LINK FT
You can access the FollowMyHealth Patient Portal offered by [facility] by registering at the following website: [email or web address]. By joining [employer]’s FollowMyHealth portal, you will also be able to view your health information using other applications (apps) compatible with our system.
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
## 2025-02-15 NOTE — BH TREATMENT PLAN - NSTXSUBMISGOALOTHER_PSY_ALL_CORE
Pt. will attend 12-step groups when speaker is on unit
Chronic CHF
Pt. will attend 12-step groups when speaker is on the unit
Patient will stabilize mood & alleviate sx of substance misuse to engage with discharge planning.

## 2025-02-17 NOTE — BH INPATIENT PSYCHIATRY ASSESSMENT NOTE - HOMICIDALITY / AGGRESSION (CURRENT/PAST)
Follows with palliative care in the outpatient setting  Per palliative care note on 2/13  Continue home Valium 5 mg BID  Oxycodone IR 5 mg every 4 hours as needed for moderate pain  Oxycodone 10 mg every 4 hours as needed for severe pain  IV morphine 2 mg every 4 hours as needed for breakthrough pain  Palliative care consulted, appreciate recommendations   Yes

## 2025-02-20 DIAGNOSIS — Z21 ASYMPTOMATIC HUMAN IMMUNODEFICIENCY VIRUS [HIV] INFECTION STATUS: ICD-10-CM

## 2025-02-20 DIAGNOSIS — F11.10 OPIOID ABUSE, UNCOMPLICATED: ICD-10-CM

## 2025-02-20 DIAGNOSIS — Z88.0 ALLERGY STATUS TO PENICILLIN: ICD-10-CM

## 2025-02-20 DIAGNOSIS — F10.10 ALCOHOL ABUSE, UNCOMPLICATED: ICD-10-CM

## 2025-02-20 DIAGNOSIS — L73.9 FOLLICULAR DISORDER, UNSPECIFIED: ICD-10-CM

## 2025-02-20 DIAGNOSIS — R45.851 SUICIDAL IDEATIONS: ICD-10-CM

## 2025-02-20 DIAGNOSIS — Z79.899 OTHER LONG TERM (CURRENT) DRUG THERAPY: ICD-10-CM

## 2025-02-20 DIAGNOSIS — F19.94 OTHER PSYCHOACTIVE SUBSTANCE USE, UNSPECIFIED WITH PSYCHOACTIVE SUBSTANCE-INDUCED MOOD DISORDER: ICD-10-CM

## 2025-02-20 DIAGNOSIS — F14.10 COCAINE ABUSE, UNCOMPLICATED: ICD-10-CM

## 2025-02-20 DIAGNOSIS — Z59.01 SHELTERED HOMELESSNESS: ICD-10-CM

## 2025-02-20 DIAGNOSIS — F60.2 ANTISOCIAL PERSONALITY DISORDER: ICD-10-CM

## 2025-02-20 DIAGNOSIS — Z86.16 PERSONAL HISTORY OF COVID-19: ICD-10-CM

## 2025-02-20 SDOH — ECONOMIC STABILITY - HOUSING INSECURITY: SHELTERED HOMELESSNESS: Z59.01

## 2025-02-24 NOTE — ED ADULT NURSE NOTE - NS ED NURSE RECORD ANOTHER VITAL SIGN
Yes no Continue Regimen: Aczone am\\nDifferin q pm\\nBPO wash Detail Level: Zone Otc Regimen: BPO in shower Render In Strict Bullet Format?: No

## 2025-03-19 NOTE — ED ADULT NURSE NOTE - IS THE PATIENT ABLE TO BE SCREENED?
Virtual Regular VisitName: Mara Duarte      : 1993      MRN: [id number]  Encounter Provider: Kelly Henry DO  Encounter Date: 3/18/2025   Encounter department: [facility]  :  Assessment & Plan  Viral URI with cough  Patient's  child was positive for flu.  Is having symptoms for approximately 4 to 5 days now, congestion, sore throat was initially started when she was over in [place] as well.  Congestion with dry cough.  Overall feeling fatigue has had off-and-on fevers with chills.  -She is outside the window for Tamiflu even if she is flu positive  -Continue over-the-counter care with proper hydration  -Sent in Phenergan for cough, steroid for anti-inflammatory effect, Zofran for nausea if needed and for diarrhea she can use Imodium as needed.  Orders:  •  promethazine-dextromethorphan (PHENERGAN-DM) 6.25-15 mg/5 mL oral syrup; Take 5 mL by mouth 4 (four) times a day as needed for cough  •  predniSONE 20 mg tablet; Take 2 tablets (40 mg total) by mouth daily for 3 days  •  ondansetron (ZOFRAN) 4 mg tablet; Take 1 tablet (4 mg total) by mouth every 8 (eight) hours as needed for nausea or vomiting  •  loperamide (IMODIUM) 2 mg capsule; Take 1 capsule (2 mg total) by mouth 4 (four) times a day as needed for diarrhea    Viral URI with cough               History of Present Illness     Patient presenting for virtual visit regarding URI symptoms.  See symptoms above under assessment and plan.  Of note patient was traveling in [place], child has flu.      Review of Systems   Constitutional:  Positive for chills, fatigue and fever.   HENT:  Positive for congestion, sinus pressure, sore throat and voice change. Negative for ear pain.    Eyes:  Negative for pain and visual disturbance.   Respiratory:  Positive for cough. Negative for shortness of breath.    Cardiovascular:  Negative for chest pain and palpitations.   Gastrointestinal:  Negative for abdominal pain and vomiting.   Genitourinary:   Negative for dysuria and hematuria.   Musculoskeletal:  Negative for arthralgias and back pain.   Skin:  Negative for color change and rash.   Neurological:  Negative for seizures and syncope.   All other systems reviewed and are negative.      Objective   There were no vitals taken for this visit.    Physical Exam  Constitutional:       Appearance: Normal appearance.   HENT:      Head: Normocephalic and atraumatic.      Nose: Rhinorrhea present.   Eyes:      Extraocular Movements: Extraocular movements intact.      Conjunctiva/sclera: Conjunctivae normal.      Pupils: Pupils are equal, round, and reactive to light.   Pulmonary:      Effort: Pulmonary effort is normal. No respiratory distress.   Neurological:      Mental Status: She is alert.         Administrative Statements   Encounter provider Kelly Henry,     The Patient is located at Home and in the following state in which I hold an active license [place].    The patient was identified by name and date of birth. Mara Duarte was informed that this is a telemedicine visit and that the visit is being conducted through the Epic Embedded platform. She agrees to proceed..  My office door was closed. No one else was in the room.  She acknowledged consent and understanding of privacy and security of the video platform. The patient has agreed to participate and understands they can discontinue the visit at any time.    I have spent a total time of 36 minutes in caring for this patient on the day of the visit/encounter including Diagnostic results, Prognosis, Risks and benefits of tx options, Instructions for management, Patient and family education, Importance of tx compliance, Risk factor reductions, Impressions, Counseling / Coordination of care, Documenting in the medical record, Reviewing/placing orders in the medical record (including tests, medications, and/or procedures), and Obtaining or reviewing history  , not including the time spent for establishing the  audio/video connection.     No

## 2025-03-28 NOTE — BH INPATIENT PSYCHIATRY PROGRESS NOTE - CURRENT MEDICATION
MEDICATIONS  (STANDING):  bictegravir 50 mG/emtricitabine 200 mG/tenofovir alafenamide 25 mG (BIKTARVY) 1 Tablet(s) Oral daily  chlorhexidine 0.12% Liquid 15 milliLiter(s) Oral Mucosa two times a day  clindamycin   Capsule 300 milliGRAM(s) Oral every 6 hours  escitalopram 20 milliGRAM(s) Oral daily  gabapentin 100 milliGRAM(s) Oral three times a day  lactated ringers. 500 milliLiter(s) (75 mL/Hr) IV Continuous <Continuous>  prazosin. 1 milliGRAM(s) Oral at bedtime  QUEtiapine 200 milliGRAM(s) Oral at bedtime    MEDICATIONS  (PRN):  acetaminophen     Tablet .. 1000 milliGRAM(s) Oral every 6 hours PRN Temp greater or equal to 38C (100.4F), Moderate Pain (4 - 6)  loperamide 2 milliGRAM(s) Oral two times a day PRN diarrhea  nicotine  Polacrilex Gum 2 milliGRAM(s) Oral every 2 hours PRN nicotine cravings  OLANZapine 10 milliGRAM(s) Oral every 8 hours PRN agitation  traZODone 50 milliGRAM(s) Oral at bedtime PRN insomnia  
MEDICATIONS  (STANDING):  bictegravir 50 mG/emtricitabine 200 mG/tenofovir alafenamide 25 mG (BIKTARVY) 1 Tablet(s) Oral daily  chlorhexidine 0.12% Liquid 15 milliLiter(s) Oral Mucosa two times a day  clindamycin   Capsule 300 milliGRAM(s) Oral every 6 hours  escitalopram 20 milliGRAM(s) Oral daily  gabapentin 100 milliGRAM(s) Oral three times a day  lactated ringers. 500 milliLiter(s) (75 mL/Hr) IV Continuous <Continuous>  prazosin. 1 milliGRAM(s) Oral at bedtime  QUEtiapine 200 milliGRAM(s) Oral at bedtime    MEDICATIONS  (PRN):  acetaminophen     Tablet .. 1000 milliGRAM(s) Oral every 6 hours PRN Temp greater or equal to 38C (100.4F), Moderate Pain (4 - 6)  loperamide 2 milliGRAM(s) Oral two times a day PRN diarrhea  nicotine  Polacrilex Gum 2 milliGRAM(s) Oral every 2 hours PRN nicotine cravings  OLANZapine 10 milliGRAM(s) Oral every 8 hours PRN agitation  traZODone 50 milliGRAM(s) Oral at bedtime PRN insomnia  
Patient presents to clinic today for a loading dose of Firmagon. 3ml's of lidocaine was administered to right lower abdomen then 240mg of loading dose of Firmagon was administered on right lower abdomen. Patient tolerated procedure well. Patient has no further questions, patient will follow up in one month for maintenance dose.   
MEDICATIONS  (STANDING):  bictegravir 50 mG/emtricitabine 200 mG/tenofovir alafenamide 25 mG (BIKTARVY) 1 Tablet(s) Oral daily  chlorhexidine 0.12% Liquid 15 milliLiter(s) Oral Mucosa two times a day  clindamycin   Capsule 300 milliGRAM(s) Oral every 6 hours  escitalopram 20 milliGRAM(s) Oral daily  gabapentin 100 milliGRAM(s) Oral three times a day  lactated ringers. 500 milliLiter(s) (75 mL/Hr) IV Continuous <Continuous>  prazosin. 1 milliGRAM(s) Oral at bedtime  QUEtiapine 200 milliGRAM(s) Oral at bedtime    MEDICATIONS  (PRN):  acetaminophen     Tablet .. 1000 milliGRAM(s) Oral every 6 hours PRN Temp greater or equal to 38C (100.4F), Moderate Pain (4 - 6)  loperamide 2 milliGRAM(s) Oral two times a day PRN diarrhea  nicotine  Polacrilex Gum 2 milliGRAM(s) Oral every 2 hours PRN nicotine cravings  OLANZapine 10 milliGRAM(s) Oral every 8 hours PRN agitation  traZODone 50 milliGRAM(s) Oral at bedtime PRN insomnia  
MEDICATIONS  (STANDING):  bictegravir 50 mG/emtricitabine 200 mG/tenofovir alafenamide 25 mG (BIKTARVY) 1 Tablet(s) Oral daily  chlorhexidine 0.12% Liquid 15 milliLiter(s) Oral Mucosa two times a day  clindamycin   Capsule 300 milliGRAM(s) Oral every 6 hours  escitalopram 20 milliGRAM(s) Oral daily  gabapentin 100 milliGRAM(s) Oral three times a day  lactated ringers. 500 milliLiter(s) (75 mL/Hr) IV Continuous <Continuous>  prazosin. 1 milliGRAM(s) Oral at bedtime  QUEtiapine 200 milliGRAM(s) Oral at bedtime    MEDICATIONS  (PRN):  acetaminophen     Tablet .. 1000 milliGRAM(s) Oral every 6 hours PRN Temp greater or equal to 38C (100.4F), Moderate Pain (4 - 6)  loperamide 2 milliGRAM(s) Oral two times a day PRN diarrhea  nicotine  Polacrilex Gum 2 milliGRAM(s) Oral every 2 hours PRN nicotine cravings  OLANZapine 10 milliGRAM(s) Oral every 8 hours PRN agitation  traZODone 50 milliGRAM(s) Oral at bedtime PRN insomnia

## 2025-03-31 NOTE — ED ADULT NURSE NOTE - NS ED NOTE  TALK SOMEONE YN
RT Inhaler-Nebulizer Bronchodilator Protocol Note    There is a bronchodilator order in the chart from a provider indicating to follow the RT Bronchodilator Protocol and there is an “Initiate RT Inhaler-Nebulizer Bronchodilator Protocol” order as well (see protocol at bottom of note).    CXR Findings:  XR CHEST PORTABLE  Result Date: 3/29/2025  1. Patchy left retrocardiac opacity that can relate to atelectasis and/or infiltrate. **This report has been created using voice recognition software.  It may contain minor errors which are inherent in voice recognition technology.** Electronically signed by Dr. Tricia Caldwell      The findings from the last RT Protocol Assessment were as follows:   History Pulmonary Disease: Smoker 15 pack years or more  Respiratory Pattern: Regular pattern and RR 12-20 bpm  Breath Sounds: Slightly diminished and/or crackles  Cough: Strong, spontaneous, non-productive  Indication for Bronchodilator Therapy: Decreased or absent breath sounds  Bronchodilator Assessment Score: 3    Aerosolized bronchodilator medication orders have been revised according to the RT Inhaler-Nebulizer Bronchodilator Protocol below.    Respiratory Therapist to perform RT Therapy Protocol Assessment initially then follow the protocol.  Repeat RT Therapy Protocol Assessment PRN for score 0-3 or on second treatment, BID, and PRN for scores above 3.    No Indications - adjust the frequency to every 6 hours PRN wheezing or bronchospasm, if no treatments needed after 48 hours then discontinue using Per Protocol order mode.     If indication present, adjust the RT bronchodilator orders based on the Bronchodilator Assessment Score as indicated below.  Use Inhaler orders unless patient has one or more of the following: on home nebulizer, not able to hold breath for 10 seconds, is not alert and oriented, cannot activate and use MDI correctly, or respiratory rate 25 breaths per minute or more, then use the equivalent  No

## 2025-04-07 NOTE — BH INPATIENT PSYCHIATRY DISCHARGE NOTE - NSBHATTESTSEENBY_PSY_A_CORE
Last Visit Date: Visit date not found   Next Visit Date: Visit date not found     
NP without Attending Psychiatrist

## 2025-04-30 NOTE — CONSULT NOTE ADULT - PROBLEM SELECTOR PROBLEM 1
Head, normocephalic, atraumatic, Face, Face within normal limits, Ears, External ears within normal limits Uncomplicated alcohol dependence

## 2025-05-16 NOTE — PATIENT PROFILE BEHAVIORAL HEALTH - SW.
Follow-up with podiatry today or tomorrow, start prednisone as directed  You can elevate your foot ice it on and off for 20 minutes with a barrier in between, go to emergency department with any worsening symptoms, any increased p[ain, swelling or redness or fever go to emergency department.     social work

## 2025-08-19 ENCOUNTER — INPATIENT (INPATIENT)
Facility: HOSPITAL | Age: 40
LOS: 6 days | Discharge: HOME | DRG: 897 | End: 2025-08-26
Attending: PSYCHIATRY & NEUROLOGY | Admitting: PSYCHIATRY & NEUROLOGY
Payer: SELF-PAY

## 2025-08-19 VITALS
SYSTOLIC BLOOD PRESSURE: 143 MMHG | RESPIRATION RATE: 17 BRPM | HEIGHT: 66 IN | TEMPERATURE: 98 F | HEART RATE: 75 BPM | DIASTOLIC BLOOD PRESSURE: 98 MMHG | OXYGEN SATURATION: 98 % | WEIGHT: 145.06 LBS

## 2025-08-19 DIAGNOSIS — F32.A DEPRESSION, UNSPECIFIED: ICD-10-CM

## 2025-08-19 LAB
ALBUMIN SERPL ELPH-MCNC: 3.9 G/DL — SIGNIFICANT CHANGE UP (ref 3.4–5)
ALP SERPL-CCNC: 97 U/L — SIGNIFICANT CHANGE UP (ref 40–120)
ALT FLD-CCNC: 73 U/L — HIGH (ref 12–42)
ANION GAP SERPL CALC-SCNC: 10 MMOL/L — SIGNIFICANT CHANGE UP (ref 9–16)
APAP SERPL-MCNC: <2 UG/ML — LOW (ref 10–30)
AST SERPL-CCNC: 107 U/L — HIGH (ref 15–37)
BASOPHILS # BLD AUTO: 0.05 K/UL — SIGNIFICANT CHANGE UP (ref 0–0.2)
BASOPHILS NFR BLD AUTO: 1.1 % — SIGNIFICANT CHANGE UP (ref 0–2)
BILIRUB SERPL-MCNC: 0.5 MG/DL — SIGNIFICANT CHANGE UP (ref 0.2–1.2)
BUN SERPL-MCNC: 18 MG/DL — SIGNIFICANT CHANGE UP (ref 7–23)
CALCIUM SERPL-MCNC: 9 MG/DL — SIGNIFICANT CHANGE UP (ref 8.5–10.5)
CHLORIDE SERPL-SCNC: 99 MMOL/L — SIGNIFICANT CHANGE UP (ref 96–108)
CO2 SERPL-SCNC: 29 MMOL/L — SIGNIFICANT CHANGE UP (ref 22–31)
CREAT SERPL-MCNC: 1.02 MG/DL — SIGNIFICANT CHANGE UP (ref 0.5–1.3)
EGFR: 96 ML/MIN/1.73M2 — SIGNIFICANT CHANGE UP
EGFR: 96 ML/MIN/1.73M2 — SIGNIFICANT CHANGE UP
EOSINOPHIL # BLD AUTO: 0.3 K/UL — SIGNIFICANT CHANGE UP (ref 0–0.5)
EOSINOPHIL NFR BLD AUTO: 6.6 % — HIGH (ref 0–6)
ETHANOL SERPL-MCNC: <3 MG/DL — SIGNIFICANT CHANGE UP
GLUCOSE SERPL-MCNC: 98 MG/DL — SIGNIFICANT CHANGE UP (ref 70–99)
HCT VFR BLD CALC: 36.1 % — LOW (ref 39–50)
HGB BLD-MCNC: 12.4 G/DL — LOW (ref 13–17)
IMM GRANULOCYTES # BLD AUTO: 0 K/UL — SIGNIFICANT CHANGE UP (ref 0–0.07)
IMM GRANULOCYTES NFR BLD AUTO: 0 % — SIGNIFICANT CHANGE UP (ref 0–0.9)
LYMPHOCYTES # BLD AUTO: 1.61 K/UL — SIGNIFICANT CHANGE UP (ref 1–3.3)
LYMPHOCYTES NFR BLD AUTO: 35.2 % — SIGNIFICANT CHANGE UP (ref 13–44)
MCHC RBC-ENTMCNC: 30.5 PG — SIGNIFICANT CHANGE UP (ref 27–34)
MCHC RBC-ENTMCNC: 34.3 G/DL — SIGNIFICANT CHANGE UP (ref 32–36)
MCV RBC AUTO: 88.9 FL — SIGNIFICANT CHANGE UP (ref 80–100)
MONOCYTES # BLD AUTO: 0.45 K/UL — SIGNIFICANT CHANGE UP (ref 0–0.9)
MONOCYTES NFR BLD AUTO: 9.8 % — SIGNIFICANT CHANGE UP (ref 2–14)
NEUTROPHILS # BLD AUTO: 2.17 K/UL — SIGNIFICANT CHANGE UP (ref 1.8–7.4)
NEUTROPHILS NFR BLD AUTO: 47.3 % — SIGNIFICANT CHANGE UP (ref 43–77)
NRBC # BLD AUTO: 0 K/UL — SIGNIFICANT CHANGE UP (ref 0–0)
NRBC # FLD: 0 K/UL — SIGNIFICANT CHANGE UP (ref 0–0)
NRBC BLD AUTO-RTO: 0 /100 WBCS — SIGNIFICANT CHANGE UP (ref 0–0)
PCP SPEC-MCNC: SIGNIFICANT CHANGE UP
PLATELET # BLD AUTO: 368 K/UL — SIGNIFICANT CHANGE UP (ref 150–400)
PMV BLD: 9 FL — SIGNIFICANT CHANGE UP (ref 7–13)
POTASSIUM SERPL-MCNC: 3.3 MMOL/L — LOW (ref 3.5–5.3)
POTASSIUM SERPL-SCNC: 3.3 MMOL/L — LOW (ref 3.5–5.3)
PROT SERPL-MCNC: 9.8 G/DL — HIGH (ref 6.4–8.2)
RBC # BLD: 4.06 M/UL — LOW (ref 4.2–5.8)
RBC # FLD: 13.8 % — SIGNIFICANT CHANGE UP (ref 10.3–14.5)
SALICYLATES SERPL-MCNC: 3 MG/DL — SIGNIFICANT CHANGE UP (ref 2.8–20)
SODIUM SERPL-SCNC: 138 MMOL/L — SIGNIFICANT CHANGE UP (ref 132–145)
WBC # BLD: 4.58 K/UL — SIGNIFICANT CHANGE UP (ref 3.8–10.5)
WBC # FLD AUTO: 4.58 K/UL — SIGNIFICANT CHANGE UP (ref 3.8–10.5)

## 2025-08-19 PROCEDURE — 80307 DRUG TEST PRSMV CHEM ANLYZR: CPT

## 2025-08-19 PROCEDURE — 90792 PSYCH DIAG EVAL W/MED SRVCS: CPT | Mod: 95

## 2025-08-19 PROCEDURE — 99053 MED SERV 10PM-8AM 24 HR FAC: CPT

## 2025-08-19 PROCEDURE — 80053 COMPREHEN METABOLIC PANEL: CPT

## 2025-08-19 PROCEDURE — 36415 COLL VENOUS BLD VENIPUNCTURE: CPT

## 2025-08-19 PROCEDURE — 99223 1ST HOSP IP/OBS HIGH 75: CPT

## 2025-08-19 PROCEDURE — 85025 COMPLETE CBC W/AUTO DIFF WBC: CPT

## 2025-08-19 RX ORDER — LORAZEPAM 4 MG/ML
2 VIAL (ML) INJECTION EVERY 6 HOURS
Refills: 0 | Status: DISCONTINUED | OUTPATIENT
Start: 2025-08-19 | End: 2025-08-25

## 2025-08-19 RX ORDER — HALOPERIDOL 10 MG/1
5 TABLET ORAL EVERY 6 HOURS
Refills: 0 | Status: DISCONTINUED | OUTPATIENT
Start: 2025-08-19 | End: 2025-08-23

## 2025-08-19 RX ORDER — ACETAMINOPHEN 500 MG/5ML
650 LIQUID (ML) ORAL EVERY 6 HOURS
Refills: 0 | Status: DISCONTINUED | OUTPATIENT
Start: 2025-08-19 | End: 2025-08-26

## 2025-08-19 RX ORDER — MAGNESIUM, ALUMINUM HYDROXIDE 200-200 MG
30 TABLET,CHEWABLE ORAL EVERY 4 HOURS
Refills: 0 | Status: DISCONTINUED | OUTPATIENT
Start: 2025-08-19 | End: 2025-08-26

## 2025-08-19 RX ORDER — MELATONIN 5 MG
3 TABLET ORAL AT BEDTIME
Refills: 0 | Status: DISCONTINUED | OUTPATIENT
Start: 2025-08-19 | End: 2025-08-26

## 2025-08-20 DIAGNOSIS — F14.10 COCAINE ABUSE, UNCOMPLICATED: ICD-10-CM

## 2025-08-20 DIAGNOSIS — F10.20 ALCOHOL DEPENDENCE, UNCOMPLICATED: ICD-10-CM

## 2025-08-20 LAB
ALBUMIN SERPL ELPH-MCNC: 3.5 G/DL — SIGNIFICANT CHANGE UP (ref 3.3–5)
ALP SERPL-CCNC: 84 U/L — SIGNIFICANT CHANGE UP (ref 40–120)
ALT FLD-CCNC: 27 U/L — SIGNIFICANT CHANGE UP (ref 10–45)
AST SERPL-CCNC: 30 U/L — SIGNIFICANT CHANGE UP (ref 10–40)
BILIRUB DIRECT SERPL-MCNC: <0.1 MG/DL — SIGNIFICANT CHANGE UP (ref 0–0.3)
BILIRUB INDIRECT FLD-MCNC: SIGNIFICANT CHANGE UP (ref 0.2–1)
BILIRUB SERPL-MCNC: <0.2 MG/DL — SIGNIFICANT CHANGE UP (ref 0.2–1.2)
PROT SERPL-MCNC: 7.2 G/DL — SIGNIFICANT CHANGE UP (ref 6–8.3)

## 2025-08-20 PROCEDURE — 85025 COMPLETE CBC W/AUTO DIFF WBC: CPT

## 2025-08-20 PROCEDURE — 99223 1ST HOSP IP/OBS HIGH 75: CPT

## 2025-08-20 PROCEDURE — 80307 DRUG TEST PRSMV CHEM ANLYZR: CPT

## 2025-08-20 PROCEDURE — 80053 COMPREHEN METABOLIC PANEL: CPT

## 2025-08-20 PROCEDURE — 80076 HEPATIC FUNCTION PANEL: CPT

## 2025-08-20 PROCEDURE — 36415 COLL VENOUS BLD VENIPUNCTURE: CPT

## 2025-08-20 RX ORDER — NICOTINE POLACRILEX 4 MG/1
1 GUM, CHEWING ORAL DAILY
Refills: 0 | Status: DISCONTINUED | OUTPATIENT
Start: 2025-08-20 | End: 2025-08-26

## 2025-08-20 RX ORDER — BICTEGRAVIR SODIUM, EMTRICITABINE, AND TENOFOVIR ALAFENAMIDE FUMARATE 50; 200; 25 MG/1; MG/1; MG/1
1 TABLET ORAL DAILY
Refills: 0 | Status: DISCONTINUED | OUTPATIENT
Start: 2025-08-20 | End: 2025-08-26

## 2025-08-20 RX ORDER — NICOTINE POLACRILEX 4 MG/1
4 GUM, CHEWING ORAL
Refills: 0 | Status: DISCONTINUED | OUTPATIENT
Start: 2025-08-20 | End: 2025-08-26

## 2025-08-20 RX ORDER — LORAZEPAM 4 MG/ML
2 VIAL (ML) INJECTION
Refills: 0 | Status: DISCONTINUED | OUTPATIENT
Start: 2025-08-20 | End: 2025-08-25

## 2025-08-20 RX ADMIN — Medication 2 MILLIGRAM(S): at 16:05

## 2025-08-20 RX ADMIN — BICTEGRAVIR SODIUM, EMTRICITABINE, AND TENOFOVIR ALAFENAMIDE FUMARATE 1 TABLET(S): 50; 200; 25 TABLET ORAL at 16:05

## 2025-08-21 PROCEDURE — 99231 SBSQ HOSP IP/OBS SF/LOW 25: CPT

## 2025-08-21 RX ADMIN — HALOPERIDOL 5 MILLIGRAM(S): 10 TABLET ORAL at 06:37

## 2025-08-21 RX ADMIN — Medication 2 MILLIGRAM(S): at 06:36

## 2025-08-21 RX ADMIN — HALOPERIDOL 5 MILLIGRAM(S): 10 TABLET ORAL at 14:52

## 2025-08-21 RX ADMIN — Medication 2 MILLIGRAM(S): at 22:04

## 2025-08-21 RX ADMIN — HALOPERIDOL 5 MILLIGRAM(S): 10 TABLET ORAL at 22:04

## 2025-08-21 RX ADMIN — Medication 2 MILLIGRAM(S): at 14:52

## 2025-08-21 RX ADMIN — BICTEGRAVIR SODIUM, EMTRICITABINE, AND TENOFOVIR ALAFENAMIDE FUMARATE 1 TABLET(S): 50; 200; 25 TABLET ORAL at 11:46

## 2025-08-22 PROCEDURE — 80053 COMPREHEN METABOLIC PANEL: CPT

## 2025-08-22 PROCEDURE — 99231 SBSQ HOSP IP/OBS SF/LOW 25: CPT

## 2025-08-22 PROCEDURE — 85025 COMPLETE CBC W/AUTO DIFF WBC: CPT

## 2025-08-22 PROCEDURE — 80076 HEPATIC FUNCTION PANEL: CPT

## 2025-08-22 PROCEDURE — 80307 DRUG TEST PRSMV CHEM ANLYZR: CPT

## 2025-08-22 PROCEDURE — 36415 COLL VENOUS BLD VENIPUNCTURE: CPT

## 2025-08-22 RX ADMIN — BICTEGRAVIR SODIUM, EMTRICITABINE, AND TENOFOVIR ALAFENAMIDE FUMARATE 1 TABLET(S): 50; 200; 25 TABLET ORAL at 10:42

## 2025-08-22 RX ADMIN — HALOPERIDOL 5 MILLIGRAM(S): 10 TABLET ORAL at 10:41

## 2025-08-22 RX ADMIN — Medication 2 MILLIGRAM(S): at 17:02

## 2025-08-22 RX ADMIN — Medication 2 MILLIGRAM(S): at 10:40

## 2025-08-23 PROCEDURE — 99231 SBSQ HOSP IP/OBS SF/LOW 25: CPT

## 2025-08-23 RX ORDER — OLANZAPINE 10 MG/1
5 TABLET ORAL EVERY 6 HOURS
Refills: 0 | Status: DISCONTINUED | OUTPATIENT
Start: 2025-08-23 | End: 2025-08-26

## 2025-08-23 RX ADMIN — Medication 2 MILLIGRAM(S): at 23:01

## 2025-08-23 RX ADMIN — BICTEGRAVIR SODIUM, EMTRICITABINE, AND TENOFOVIR ALAFENAMIDE FUMARATE 1 TABLET(S): 50; 200; 25 TABLET ORAL at 11:03

## 2025-08-23 RX ADMIN — Medication 2 MILLIGRAM(S): at 07:40

## 2025-08-23 RX ADMIN — HALOPERIDOL 5 MILLIGRAM(S): 10 TABLET ORAL at 13:55

## 2025-08-23 RX ADMIN — HALOPERIDOL 5 MILLIGRAM(S): 10 TABLET ORAL at 07:40

## 2025-08-23 RX ADMIN — Medication 2 MILLIGRAM(S): at 13:55

## 2025-08-24 PROCEDURE — 99231 SBSQ HOSP IP/OBS SF/LOW 25: CPT

## 2025-08-24 RX ADMIN — Medication 2 MILLIGRAM(S): at 13:33

## 2025-08-24 RX ADMIN — Medication 3 MILLIGRAM(S): at 21:29

## 2025-08-24 RX ADMIN — BICTEGRAVIR SODIUM, EMTRICITABINE, AND TENOFOVIR ALAFENAMIDE FUMARATE 1 TABLET(S): 50; 200; 25 TABLET ORAL at 09:58

## 2025-08-24 RX ADMIN — Medication 2 MILLIGRAM(S): at 21:30

## 2025-08-24 RX ADMIN — Medication 650 MILLIGRAM(S): at 14:07

## 2025-08-24 RX ADMIN — Medication 650 MILLIGRAM(S): at 13:07

## 2025-08-25 PROCEDURE — 99232 SBSQ HOSP IP/OBS MODERATE 35: CPT

## 2025-08-25 RX ORDER — NICOTINE POLACRILEX 4 MG/1
1 GUM, CHEWING ORAL
Qty: 5 | Refills: 0
Start: 2025-08-25 | End: 2025-08-31

## 2025-08-25 RX ORDER — NICOTINE POLACRILEX 4 MG/1
1 GUM, CHEWING ORAL
Qty: 3 | Refills: 0
Start: 2025-08-25 | End: 2025-09-23

## 2025-08-25 RX ORDER — BICTEGRAVIR SODIUM, EMTRICITABINE, AND TENOFOVIR ALAFENAMIDE FUMARATE 50; 200; 25 MG/1; MG/1; MG/1
1 TABLET ORAL
Qty: 30 | Refills: 0
Start: 2025-08-25 | End: 2025-09-23

## 2025-08-25 RX ORDER — BICTEGRAVIR SODIUM, EMTRICITABINE, AND TENOFOVIR ALAFENAMIDE FUMARATE 50; 200; 25 MG/1; MG/1; MG/1
1 TABLET ORAL
Qty: 7 | Refills: 0
Start: 2025-08-25 | End: 2025-08-31

## 2025-08-25 RX ORDER — HYDROXYZINE HYDROCHLORIDE 25 MG/1
25 TABLET, FILM COATED ORAL EVERY 6 HOURS
Refills: 0 | Status: DISCONTINUED | OUTPATIENT
Start: 2025-08-25 | End: 2025-08-26

## 2025-08-25 RX ADMIN — HYDROXYZINE HYDROCHLORIDE 25 MILLIGRAM(S): 25 TABLET, FILM COATED ORAL at 15:36

## 2025-08-25 RX ADMIN — OLANZAPINE 5 MILLIGRAM(S): 10 TABLET ORAL at 16:19

## 2025-08-25 RX ADMIN — BICTEGRAVIR SODIUM, EMTRICITABINE, AND TENOFOVIR ALAFENAMIDE FUMARATE 1 TABLET(S): 50; 200; 25 TABLET ORAL at 10:10

## 2025-08-26 VITALS
WEIGHT: 153.44 LBS | OXYGEN SATURATION: 100 % | TEMPERATURE: 98 F | DIASTOLIC BLOOD PRESSURE: 80 MMHG | RESPIRATION RATE: 18 BRPM | HEART RATE: 71 BPM | SYSTOLIC BLOOD PRESSURE: 114 MMHG

## 2025-08-26 PROCEDURE — 85025 COMPLETE CBC W/AUTO DIFF WBC: CPT

## 2025-08-26 PROCEDURE — 80307 DRUG TEST PRSMV CHEM ANLYZR: CPT

## 2025-08-26 PROCEDURE — G0378: CPT

## 2025-08-26 PROCEDURE — 36415 COLL VENOUS BLD VENIPUNCTURE: CPT

## 2025-08-26 PROCEDURE — 80076 HEPATIC FUNCTION PANEL: CPT

## 2025-08-26 PROCEDURE — 93005 ELECTROCARDIOGRAM TRACING: CPT

## 2025-08-26 PROCEDURE — 99285 EMERGENCY DEPT VISIT HI MDM: CPT | Mod: 25

## 2025-08-26 PROCEDURE — 99239 HOSP IP/OBS DSCHRG MGMT >30: CPT

## 2025-08-26 PROCEDURE — 80053 COMPREHEN METABOLIC PANEL: CPT

## 2025-08-26 RX ADMIN — BICTEGRAVIR SODIUM, EMTRICITABINE, AND TENOFOVIR ALAFENAMIDE FUMARATE 1 TABLET(S): 50; 200; 25 TABLET ORAL at 10:14

## 2025-09-02 DIAGNOSIS — R45.851 SUICIDAL IDEATIONS: ICD-10-CM

## 2025-09-02 DIAGNOSIS — Z91.128 PATIENT'S INTENTIONAL UNDERDOSING OF MEDICATION REGIMEN FOR OTHER REASON: ICD-10-CM

## 2025-09-02 DIAGNOSIS — Z86.59 PERSONAL HISTORY OF OTHER MENTAL AND BEHAVIORAL DISORDERS: ICD-10-CM

## 2025-09-02 DIAGNOSIS — G47.00 INSOMNIA, UNSPECIFIED: ICD-10-CM

## 2025-09-02 DIAGNOSIS — F32.A DEPRESSION, UNSPECIFIED: ICD-10-CM

## 2025-09-02 DIAGNOSIS — F11.11 OPIOID ABUSE, IN REMISSION: ICD-10-CM

## 2025-09-02 DIAGNOSIS — F19.94 OTHER PSYCHOACTIVE SUBSTANCE USE, UNSPECIFIED WITH PSYCHOACTIVE SUBSTANCE-INDUCED MOOD DISORDER: ICD-10-CM

## 2025-09-02 DIAGNOSIS — F10.239 ALCOHOL DEPENDENCE WITH WITHDRAWAL, UNSPECIFIED: ICD-10-CM

## 2025-09-02 DIAGNOSIS — F17.200 NICOTINE DEPENDENCE, UNSPECIFIED, UNCOMPLICATED: ICD-10-CM

## 2025-09-02 DIAGNOSIS — Z59.02 UNSHELTERED HOMELESSNESS: ICD-10-CM

## 2025-09-02 DIAGNOSIS — F20.9 SCHIZOPHRENIA, UNSPECIFIED: ICD-10-CM

## 2025-09-02 DIAGNOSIS — T37.5X6A UNDERDOSING OF ANTIVIRAL DRUGS, INITIAL ENCOUNTER: ICD-10-CM

## 2025-09-02 DIAGNOSIS — Z88.0 ALLERGY STATUS TO PENICILLIN: ICD-10-CM

## 2025-09-02 DIAGNOSIS — F60.2 ANTISOCIAL PERSONALITY DISORDER: ICD-10-CM

## 2025-09-02 DIAGNOSIS — F14.13 COCAINE ABUSE, UNSPECIFIED WITH WITHDRAWAL: ICD-10-CM

## 2025-09-02 DIAGNOSIS — Z91.199 PATIENT'S NONCOMPLIANCE WITH OTHER MEDICAL TREATMENT AND REGIMEN DUE TO UNSPECIFIED REASON: ICD-10-CM
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
## 2025-09-02 DIAGNOSIS — Z91.51 PERSONAL HISTORY OF SUICIDAL BEHAVIOR: ICD-10-CM

## 2025-09-02 DIAGNOSIS — F14.122: ICD-10-CM

## 2025-09-02 SDOH — ECONOMIC STABILITY - HOUSING INSECURITY: UNSHELTERED HOMELESSNESS: Z59.02
